# Patient Record
Sex: MALE | Race: WHITE | Employment: OTHER
[De-identification: names, ages, dates, MRNs, and addresses within clinical notes are randomized per-mention and may not be internally consistent; named-entity substitution may affect disease eponyms.]

---

## 2017-01-03 ENCOUNTER — OFFICE VISIT (OUTPATIENT)
Dept: FAMILY MEDICINE CLINIC | Facility: CLINIC | Age: 62
End: 2017-01-03

## 2017-01-03 VITALS
WEIGHT: 180 LBS | HEART RATE: 52 BPM | HEIGHT: 71 IN | TEMPERATURE: 97.9 F | DIASTOLIC BLOOD PRESSURE: 68 MMHG | BODY MASS INDEX: 25.2 KG/M2 | SYSTOLIC BLOOD PRESSURE: 117 MMHG

## 2017-01-03 DIAGNOSIS — E78.5 DYSLIPIDEMIA: ICD-10-CM

## 2017-01-03 DIAGNOSIS — M25.561 KNEE MENISCUS PAIN, RIGHT: Primary | ICD-10-CM

## 2017-01-03 DIAGNOSIS — Z23 NEED FOR INFLUENZA VACCINATION: ICD-10-CM

## 2017-01-03 DIAGNOSIS — M17.5 OTHER SECONDARY OSTEOARTHRITIS OF RIGHT KNEE: ICD-10-CM

## 2017-01-03 DIAGNOSIS — Z85.038 HISTORY OF COLON CANCER: ICD-10-CM

## 2017-01-03 PROCEDURE — 99214 OFFICE O/P EST MOD 30 MIN: CPT | Performed by: FAMILY MEDICINE

## 2017-01-03 PROCEDURE — 90688 IIV4 VACCINE SPLT 0.5 ML IM: CPT | Performed by: FAMILY MEDICINE

## 2017-01-03 PROCEDURE — 90471 IMMUNIZATION ADMIN: CPT | Performed by: FAMILY MEDICINE

## 2017-01-03 RX ORDER — TADALAFIL 5 MG
TABLET ORAL
Qty: 30 TABLET | Refills: 0 | Status: SHIPPED | OUTPATIENT
Start: 2017-01-03 | End: 2017-02-01 | Stop reason: SDUPTHER

## 2017-01-03 ASSESSMENT — ENCOUNTER SYMPTOMS
RESPIRATORY NEGATIVE: 1
EYES NEGATIVE: 1

## 2017-01-25 DIAGNOSIS — M25.561 RIGHT KNEE PAIN, UNSPECIFIED CHRONICITY: Primary | ICD-10-CM

## 2017-01-30 ENCOUNTER — OFFICE VISIT (OUTPATIENT)
Dept: ORTHOPEDIC SURGERY | Facility: CLINIC | Age: 62
End: 2017-01-30

## 2017-01-30 VITALS
BODY MASS INDEX: 25.31 KG/M2 | HEART RATE: 76 BPM | WEIGHT: 180.78 LBS | SYSTOLIC BLOOD PRESSURE: 132 MMHG | DIASTOLIC BLOOD PRESSURE: 86 MMHG | HEIGHT: 71 IN

## 2017-01-30 DIAGNOSIS — M17.11 PRIMARY OSTEOARTHRITIS OF RIGHT KNEE: Primary | ICD-10-CM

## 2017-01-30 PROCEDURE — 99203 OFFICE O/P NEW LOW 30 MIN: CPT | Performed by: ORTHOPAEDIC SURGERY

## 2017-02-01 RX ORDER — TADALAFIL 5 MG
TABLET ORAL
Qty: 30 TABLET | Refills: 0 | Status: SHIPPED | OUTPATIENT
Start: 2017-02-01 | End: 2017-03-01 | Stop reason: SDUPTHER

## 2017-02-08 ENCOUNTER — TELEPHONE (OUTPATIENT)
Dept: FAMILY MEDICINE CLINIC | Facility: CLINIC | Age: 62
End: 2017-02-08

## 2017-03-01 RX ORDER — TADALAFIL 5 MG
TABLET ORAL
Qty: 30 TABLET | Refills: 2 | Status: SHIPPED | OUTPATIENT
Start: 2017-03-01 | End: 2017-06-12 | Stop reason: SDUPTHER

## 2017-03-20 ENCOUNTER — TELEPHONE (OUTPATIENT)
Dept: FAMILY MEDICINE CLINIC | Age: 62
End: 2017-03-20

## 2017-03-20 DIAGNOSIS — R21 RASH: Primary | ICD-10-CM

## 2017-03-22 ENCOUNTER — TELEPHONE (OUTPATIENT)
Dept: FAMILY MEDICINE CLINIC | Age: 62
End: 2017-03-22

## 2017-04-04 RX ORDER — IBUPROFEN 800 MG/1
800 TABLET ORAL EVERY 6 HOURS PRN
Qty: 120 TABLET | Refills: 3 | Status: SHIPPED | OUTPATIENT
Start: 2017-04-04 | End: 2017-09-14 | Stop reason: SDUPTHER

## 2017-06-12 RX ORDER — TADALAFIL 5 MG
TABLET ORAL
Qty: 30 TABLET | Refills: 2 | Status: SHIPPED | OUTPATIENT
Start: 2017-06-12 | End: 2017-09-14 | Stop reason: SDUPTHER

## 2017-08-09 ENCOUNTER — OFFICE VISIT (OUTPATIENT)
Dept: FAMILY MEDICINE CLINIC | Age: 62
End: 2017-08-09
Payer: MEDICAID

## 2017-08-09 VITALS
BODY MASS INDEX: 25.06 KG/M2 | HEART RATE: 52 BPM | DIASTOLIC BLOOD PRESSURE: 65 MMHG | SYSTOLIC BLOOD PRESSURE: 109 MMHG | HEIGHT: 71 IN | TEMPERATURE: 98.2 F | WEIGHT: 179 LBS

## 2017-08-09 DIAGNOSIS — B07.9 VIRAL WARTS, UNSPECIFIED TYPE: ICD-10-CM

## 2017-08-09 DIAGNOSIS — D23.5 CYST, DERMOID, TRUNK: Primary | ICD-10-CM

## 2017-08-09 DIAGNOSIS — Z00.00 HEALTH CARE MAINTENANCE: ICD-10-CM

## 2017-08-09 PROCEDURE — 99213 OFFICE O/P EST LOW 20 MIN: CPT

## 2017-08-09 PROCEDURE — 99213 OFFICE O/P EST LOW 20 MIN: CPT | Performed by: FAMILY MEDICINE

## 2017-08-09 ASSESSMENT — ENCOUNTER SYMPTOMS
COUGH: 0
APNEA: 0
CHEST TIGHTNESS: 0
CHOKING: 0

## 2017-09-14 RX ORDER — TADALAFIL 5 MG
TABLET ORAL
Qty: 30 TABLET | Refills: 2 | Status: SHIPPED | OUTPATIENT
Start: 2017-09-14 | End: 2017-11-02 | Stop reason: SDUPTHER

## 2017-09-17 RX ORDER — IBUPROFEN 800 MG/1
TABLET ORAL
Qty: 120 TABLET | Refills: 3 | Status: SHIPPED | OUTPATIENT
Start: 2017-09-17 | End: 2018-01-28 | Stop reason: SDUPTHER

## 2017-11-02 ENCOUNTER — OFFICE VISIT (OUTPATIENT)
Dept: UROLOGY | Age: 62
End: 2017-11-02
Payer: MEDICAID

## 2017-11-02 VITALS
DIASTOLIC BLOOD PRESSURE: 75 MMHG | BODY MASS INDEX: 25.34 KG/M2 | WEIGHT: 181 LBS | SYSTOLIC BLOOD PRESSURE: 125 MMHG | HEIGHT: 71 IN | HEART RATE: 52 BPM

## 2017-11-02 DIAGNOSIS — N52.8 OTHER MALE ERECTILE DYSFUNCTION: ICD-10-CM

## 2017-11-02 DIAGNOSIS — R35.0 URINARY FREQUENCY: ICD-10-CM

## 2017-11-02 DIAGNOSIS — N40.1 BPH WITH OBSTRUCTION/LOWER URINARY TRACT SYMPTOMS: Primary | ICD-10-CM

## 2017-11-02 DIAGNOSIS — N13.8 BPH WITH OBSTRUCTION/LOWER URINARY TRACT SYMPTOMS: Primary | ICD-10-CM

## 2017-11-02 PROBLEM — H47.299 PALLOR OF OPTIC DISC: Status: ACTIVE | Noted: 2017-07-05

## 2017-11-02 PROBLEM — H52.4 PRESBYOPIA: Status: ACTIVE | Noted: 2017-06-05

## 2017-11-02 PROBLEM — H52.00 HYPEROPIA: Status: ACTIVE | Noted: 2017-06-05

## 2017-11-02 LAB
BILIRUBIN, POC: NEGATIVE
BLOOD URINE, POC: NEGATIVE
CLARITY, POC: CLEAR
COLOR, POC: YELLOW
GLUCOSE URINE, POC: NEGATIVE
KETONES, POC: NEGATIVE
LEUKOCYTE EST, POC: NEGATIVE
NITRITE, POC: NEGATIVE
PH, POC: 5.5
PROTEIN, POC: NEGATIVE
SPECIFIC GRAVITY, POC: 1.02
UROBILINOGEN, POC: 0.2

## 2017-11-02 PROCEDURE — 3017F COLORECTAL CA SCREEN DOC REV: CPT | Performed by: SPECIALIST

## 2017-11-02 PROCEDURE — 1036F TOBACCO NON-USER: CPT | Performed by: SPECIALIST

## 2017-11-02 PROCEDURE — G8484 FLU IMMUNIZE NO ADMIN: HCPCS | Performed by: SPECIALIST

## 2017-11-02 PROCEDURE — 99213 OFFICE O/P EST LOW 20 MIN: CPT | Performed by: SPECIALIST

## 2017-11-02 PROCEDURE — G8419 CALC BMI OUT NRM PARAM NOF/U: HCPCS | Performed by: SPECIALIST

## 2017-11-02 PROCEDURE — 81003 URINALYSIS AUTO W/O SCOPE: CPT | Performed by: SPECIALIST

## 2017-11-02 PROCEDURE — G8427 DOCREV CUR MEDS BY ELIG CLIN: HCPCS | Performed by: SPECIALIST

## 2017-11-02 RX ORDER — HYDROCODONE BITARTRATE AND ACETAMINOPHEN 5; 325 MG/1; MG/1
1-2 TABLET ORAL
COMMUNITY
Start: 2014-10-23 | End: 2018-06-01 | Stop reason: HOSPADM

## 2017-11-02 RX ORDER — TADALAFIL 5 MG/1
5 TABLET ORAL DAILY
Qty: 30 TABLET | Refills: 11 | Status: SHIPPED | OUTPATIENT
Start: 2017-11-02 | End: 2018-11-08

## 2017-11-02 RX ORDER — TAMSULOSIN HYDROCHLORIDE 0.4 MG/1
0.4 CAPSULE ORAL DAILY
Qty: 90 CAPSULE | Refills: 3 | Status: SHIPPED | OUTPATIENT
Start: 2017-11-02 | End: 2018-11-08 | Stop reason: SDUPTHER

## 2017-11-02 NOTE — PROGRESS NOTES
Yuliana Diallo MD, [facility]  [address] [facility] Progress Note    Patient:  Lulu Shah  YOB: 1955  Date: 11/2/2017    HISTORY OF PRESENT ILLNESS:   The patient is a 57 y.o. male who presents today for follow-up for the following problem(s): BPH, ED  Overall the problem(s) : show no change. Associated Symptoms: No dysuria, gross hematuria. Pain Severity: Pain Score:   0 - No pain    Summary of old records:   History of rectal cancer, had resection, colostomy followed by reversal.  BPH on Flomax/tamsulosin 0.4 mg po qd and Cialis 5 mg qd  ED: Cialis 5 mg qd    Additional History: Patient's lower urinary tract symptoms are stable on Flomax/tamsulosin 0.4 mg po qd and Cialis 5 mg po qd for BPH. Not interested in medical Rx for ED since patient not sexually active.       Last several PSA's:  Lab Results   Component Value Date    PSA 0.56 01/03/2017    PSA 0.40 08/19/2015       Last total testosterone:  No results found for: TESTOSTERONE    Urinalysis today:  Results for POC orders placed in visit on 11/02/17   POCT Urinalysis No Micro (Auto)   Result Value Ref Range    Color, UA yellow     Clarity, UA clear     Glucose, UA POC negative     Bilirubin, UA negative     Ketones, UA negative     Spec Grav, UA 1.025     Blood, UA POC negative     pH, UA 5.5     Protein, UA POC negative     Urobilinogen, UA 0.2     Leukocytes, UA negative     Nitrite, UA negative        Last BUN and creatinine:  Lab Results   Component Value Date    BUN 17 09/14/2016     Lab Results   Component Value Date    CREATININE 0.70 09/14/2016       Imaging Reviewed during this Office Visit:   (results were independently reviewed by physician and radiology report verified)    PAST MEDICAL, FAMILY AND SOCIAL HISTORY UPDATE:  Past Medical History:   Diagnosis Date    Back pain, chronic     Cancer ([address]     Rectal    Cocaine abuse in remission     ED (erectile dysfunction) 4/2/2015    Hemorrhoids     Hernia     History of colon cancer      Past Surgical History:   Procedure Laterality Date    COLECTOMY      Colostomy    COLONOSCOPY      COLONOSCOPY  07/18/2016    HERNIA REPAIR Right 2009    KNEE SURGERY  1970's    Right    TONSILLECTOMY       Family History   Problem Relation Age of Onset    Diabetes Mother     Heart Attack Father     Heart Disease Father     Heart Disease Brother      Outpatient Prescriptions Marked as Taking for the 11/2/17 encounter (Office Visit) with Vaishnavi Casas MD   Medication Sig Dispense Refill    HYDROcodone-acetaminophen (NORCO) 5-325 MG per tablet Take 1-2 tablets by mouth .  tamsulosin (FLOMAX) 0.4 MG capsule Take 1 capsule by mouth daily 90 capsule 3    tadalafil (CIALIS) 5 MG tablet Take 1 tablet by mouth daily 30 tablet 11    ibuprofen (ADVIL;MOTRIN) 800 MG tablet take 1 tablet by mouth every 6 hours if needed for pain 120 tablet 3    metoprolol tartrate (LOPRESSOR) 25 MG tablet take 1 tablet by mouth twice a day 60 tablet 3    Elastic Bandages & Supports (ABDOMINAL BINDER/ELASTIC XL) MISC 1 Device by Does not apply route as needed (ventral hernia, while ambulating/active) 1 each 1    acetaminophen (TYLENOL) 325 MG tablet Take 650 mg by mouth every 6 hours as needed for Pain. Codeine and Penicillins  History   Smoking Status    Former Smoker   Smokeless Tobacco    Never Used       History   Alcohol Use    5.0 oz/week    10 Standard drinks or equivalent per week     Comment: 3 -4 times a week       REVIEW OF SYSTEMS:  Constitutional: negative  Eyes: negative  Respiratory: negative  Cardiovascular: negative  Gastrointestinal: negative  Musculoskeletal: negative  Genitourinary: negative  Skin: negative   Neurological: negative  Hematological/Lymphatic: negative  Psychological: negative    Physical Exam:      Vitals:    11/02/17 0930   BP: 125/75   Pulse: 52         Assessment and Plan      1. BPH with obstruction/lower urinary tract symptoms    2.  Other male erectile dysfunction    3. Urinary frequency           Plan:      Return in about 1 year (around 11/2/2018). Patient's lower urinary tract symptoms are stable on Flomax/tamsulosin 0.4 mg po qd and Cialis 5 mg po qd for BPH. Not interested in medical Rx for ED since patient not sexually active. I have discussed the care of this patient including pertinent history and exam findings, with the resident. I have seen and examined the patient and the key elements of all parts of the encounter have been performed by me. I agree with the assessment, plan and orders as documented by the resident. Finas Cowboy Karie Saint, MD, FACS

## 2017-11-02 NOTE — LETTER
[facility]    11/2/17    Patient: Khloe Henry  YOB: 1955    Dear Enma Robledo MD,    I had the pleasure of seeing one of your patients, Amanda Nuno today in the office today. Below are the relevant portions of my assessment and plan of care. IMPRESSION:  1. BPH with obstruction/lower urinary tract symptoms    2. Other male erectile dysfunction    3. Urinary frequency      PLAN:  Return in about 1 year (around 11/2/2018). Patient's lower urinary tract symptoms are stable on Flomax/tamsulosin 0.4 mg po qd and Cialis 5 mg po qd for BPH. Not interested in medical Rx for ED since patient not sexually active. Thank you for allowing me to participate in the care of this patient. I will keep you updated on this patient's follow up and I look forward to serving you and your patients again in the future. Tommie Ray MD, FACS

## 2017-11-24 DIAGNOSIS — M17.11 PRIMARY OSTEOARTHRITIS OF RIGHT KNEE: Primary | ICD-10-CM

## 2017-12-12 ENCOUNTER — OFFICE VISIT (OUTPATIENT)
Dept: FAMILY MEDICINE CLINIC | Age: 62
End: 2017-12-12
Payer: MEDICAID

## 2017-12-12 VITALS
SYSTOLIC BLOOD PRESSURE: 140 MMHG | HEART RATE: 64 BPM | DIASTOLIC BLOOD PRESSURE: 80 MMHG | HEIGHT: 71 IN | WEIGHT: 184 LBS | BODY MASS INDEX: 25.76 KG/M2 | TEMPERATURE: 98 F

## 2017-12-12 DIAGNOSIS — Z23 NEED FOR INFLUENZA VACCINATION: ICD-10-CM

## 2017-12-12 DIAGNOSIS — K43.2 INCISIONAL HERNIA, WITHOUT OBSTRUCTION OR GANGRENE: Primary | ICD-10-CM

## 2017-12-12 PROCEDURE — 90471 IMMUNIZATION ADMIN: CPT | Performed by: FAMILY MEDICINE

## 2017-12-12 PROCEDURE — 99213 OFFICE O/P EST LOW 20 MIN: CPT | Performed by: FAMILY MEDICINE

## 2017-12-12 PROCEDURE — 3017F COLORECTAL CA SCREEN DOC REV: CPT | Performed by: FAMILY MEDICINE

## 2017-12-12 PROCEDURE — G8419 CALC BMI OUT NRM PARAM NOF/U: HCPCS | Performed by: FAMILY MEDICINE

## 2017-12-12 PROCEDURE — G8427 DOCREV CUR MEDS BY ELIG CLIN: HCPCS | Performed by: FAMILY MEDICINE

## 2017-12-12 PROCEDURE — 90688 IIV4 VACCINE SPLT 0.5 ML IM: CPT | Performed by: FAMILY MEDICINE

## 2017-12-12 PROCEDURE — G8484 FLU IMMUNIZE NO ADMIN: HCPCS | Performed by: FAMILY MEDICINE

## 2017-12-12 PROCEDURE — 1036F TOBACCO NON-USER: CPT | Performed by: FAMILY MEDICINE

## 2017-12-12 ASSESSMENT — PATIENT HEALTH QUESTIONNAIRE - PHQ9
2. FEELING DOWN, DEPRESSED OR HOPELESS: 0
SUM OF ALL RESPONSES TO PHQ9 QUESTIONS 1 & 2: 0
SUM OF ALL RESPONSES TO PHQ QUESTIONS 1-9: 0
1. LITTLE INTEREST OR PLEASURE IN DOING THINGS: 0

## 2017-12-12 ASSESSMENT — ENCOUNTER SYMPTOMS
ABDOMINAL PAIN: 0
RECTAL PAIN: 0
ABDOMINAL DISTENTION: 1
DIARRHEA: 0
CHEST TIGHTNESS: 0
CHOKING: 0
ANAL BLEEDING: 0
APNEA: 0
COUGH: 0
BLOOD IN STOOL: 0
NAUSEA: 0
CONSTIPATION: 0
VOMITING: 0

## 2017-12-12 NOTE — PROGRESS NOTES
Visit Information    Have you changed or started any medications since your last visit including any over-the-counter medicines, vitamins, or herbal medicines? no   Have you stopped taking any of your medications? Is so, why? -  no  Are you having any side effects from any of your medications? - no    Have you seen any other physician or provider since your last visit?  no   Have you had any other diagnostic tests since your last visit?  no   Have you been seen in the emergency room and/or had an admission in a hospital since we last saw you?  no   Have you had your routine dental cleaning in the past 6 months?  no     Do you have an active MyChart account? If no, what is the barrier?   Yes    Patient Care Team:  Conchis Gomez MD as PCP - General (Family Medicine)  Damon Arango MD as Consulting Physician (Gastroenterology)  Lashon Agudelo MD as Consulting Physician (Hematology and Oncology)  Diana Giles DO as Consulting Physician (General Surgery)  Gabe Freeman CNP (Otolaryngology)    Medical History Review  Past Medical, Family, and Social History reviewed and does not contribute to the patient presenting condition    Health Maintenance   Topic Date Due    Hepatitis C screen  1955    HIV screen  08/11/1968    Diabetes screen  08/11/1993    Flu vaccine (1) 09/01/2017    DTaP/Tdap/Td vaccine (1 - Tdap) 08/18/2021 (Originally 8/11/1972)    Colon cancer screen colonoscopy  07/18/2018    Lipid screen  01/03/2022    Zostavax vaccine  Addressed

## 2017-12-12 NOTE — PROGRESS NOTES
Subjective:      Patient ID: Khloe Henry is a 57 y.o. male. HPI     Patient seen today for abdominal mass that appeared 2 years ago after reversal of colostomy secondary to colon cancer surgical excision stage II. Mass has been gradually increasing in size and bulges out every time there is any increase in intra-abdominal pressure. Patient failed to get his health maintenance labs done. He wants flu vaccine. Review of Systems   Constitutional: Negative for diaphoresis, fatigue, fever and unexpected weight change. Respiratory: Negative for apnea, cough, choking and chest tightness. Cardiovascular: Negative for chest pain, palpitations and leg swelling. Gastrointestinal: Positive for abdominal distention. Negative for abdominal pain, anal bleeding, blood in stool, constipation, diarrhea, nausea, rectal pain and vomiting. Abdominal mass. Genitourinary: Negative for difficulty urinating, dysuria, enuresis and flank pain. Musculoskeletal: Negative for myalgias, neck pain and neck stiffness. Objective:   Physical Exam   Constitutional: He is oriented to person, place, and time. He appears well-developed and well-nourished. HENT:   Head: Normocephalic and atraumatic. Cardiovascular: Normal rate, regular rhythm and normal heart sounds. Exam reveals no gallop and no friction rub. No murmur heard. Pulmonary/Chest: Effort normal and breath sounds normal. No respiratory distress. He has no wheezes. He has no rales. Abdominal: Soft. Bowel sounds are normal. He exhibits mass. He exhibits no distension. There is no tenderness. There is no rebound and no guarding. Musculoskeletal: Normal range of motion. He exhibits no edema or tenderness. Neurological: He is alert and oriented to person, place, and time. Nursing note and vitals reviewed. Assessment:      1. Incisional hernia, without obstruction or gangrene     2.  Need for influenza vaccination  Fercho Castillo, 6 MO AND OLDER, IM, MDV, 0.5ML (FLULAVAL QUADV)           Plan:      1. Incisional hernia, without obstruction or gangrene  Follow-up with [facility]. Establish surgeon.      2. Need for influenza vaccination    - INFLUENZA, QUADV, 6 MO AND OLDER, IM, MDV, 0.5ML ([place])

## 2017-12-22 ENCOUNTER — TELEPHONE (OUTPATIENT)
Dept: UROLOGY | Age: 62
End: 2017-12-22

## 2018-01-10 ENCOUNTER — HOSPITAL ENCOUNTER (OUTPATIENT)
Age: 63
Setting detail: SPECIMEN
Discharge: HOME | End: 2018-01-10
Payer: MEDICAID

## 2018-01-10 DIAGNOSIS — Z00.00 HEALTH CARE MAINTENANCE: ICD-10-CM

## 2018-01-10 LAB
ESTIMATED AVERAGE GLUCOSE: 97 MG/DL
HBA1C MFR BLD: 5 % (ref 4–6)
HEPATITIS C ANTIBODY: NONREACTIVE
HIV AG/AB: NONREACTIVE

## 2018-01-29 NOTE — TELEPHONE ENCOUNTER
Please review and address medication refill , if i can be an assistance be route to acceptable pool. Thank you.   Next Visit Date:  Future Appointments  Date Time Provider Yves Corralesi   11/8/2018 9:00 AM Mahesh Akers MD [facility] Urology [address] Maintenance   Topic Date Due    DTaP/Tdap/Td vaccine (1 - Tdap) 08/18/2021 (Originally 8/11/1972)    Colon cancer screen colonoscopy  07/18/2018    Diabetes screen  01/10/2021    Lipid screen  01/03/2022    Zostavax vaccine  Addressed    Flu vaccine  Completed    Hepatitis C screen  Completed    HIV screen  Completed       Hemoglobin A1C (%)   Date Value   01/10/2018 5.0             ( goal A1C is < 7)   No results found for: LABMICR  LDL Cholesterol (mg/dL)   Date Value   01/03/2017 123       (goal LDL is <100)   AST (U/L)   Date Value   09/14/2016 20     ALT (U/L)   Date Value   09/14/2016 12     BUN (mg/dL)   Date Value   09/14/2016 17     BP Readings from Last 3 Encounters:   12/12/17 (!) 140/80   11/02/17 125/75   08/09/17 109/65          (goal 120/80)    All Future Testing planned in [facility]  Lab Frequency Next Occurrence   CBC Auto Differential     Comprehensive Metabolic Panel     CEA                 Patient Active Problem List:     Hematochezia     Dyslipidemia     Rectal cancer [facility])     ED (erectile dysfunction)     Incomplete bladder emptying     Hypertrophy of prostate with urinary obstruction and other lower urinary tract symptoms (LUTS)     History of colon cancer     A-fib (HCC)     Anemia     DVT prophylaxis     Hyperopia     Nausea     Pallor of optic disc     Post-op pain     Presbyopia     Incisional hernia, without obstruction or gangrene     Need for influenza vaccination

## 2018-01-30 RX ORDER — IBUPROFEN 800 MG/1
TABLET ORAL
Qty: 120 TABLET | Refills: 3 | Status: SHIPPED | OUTPATIENT
Start: 2018-01-30 | End: 2018-06-01 | Stop reason: ALTCHOICE

## 2018-02-21 ENCOUNTER — TELEPHONE (OUTPATIENT)
Dept: FAMILY MEDICINE CLINIC | Age: 63
End: 2018-02-21

## 2018-03-06 NOTE — TELEPHONE ENCOUNTER
Medication is on med list please review and address    Please address the medication refill and close the encounter. If I can be of assistance, please route to the applicable pool. Thank you.   Next Visit Date:  Future Appointments  Date Time Provider Yves Corralesi   6/1/2018 1:30 PM Daphney Pelayo MD [facility]   11/8/2018 9:00 AM Taye Doherty MD [facility] Urology [address] Maintenance   Topic Date Due    Shingles Vaccine (1 of 2 - 2 Dose Series) 08/11/2003    DTaP/Tdap/Td vaccine (1 - Tdap) 08/18/2021 (Originally 8/11/1972)    Colon cancer screen colonoscopy  07/18/2018    Diabetes screen  01/10/2021    Lipid screen  01/03/2022    Flu vaccine  Completed    Hepatitis C screen  Completed    HIV screen  Completed       Hemoglobin A1C (%)   Date Value   01/10/2018 5.0             ( goal A1C is < 7)   No results found for: LABMICR  LDL Cholesterol (mg/dL)   Date Value   01/03/2017 123       (goal LDL is <100)   AST (U/L)   Date Value   09/14/2016 20     ALT (U/L)   Date Value   09/14/2016 12     BUN (mg/dL)   Date Value   09/14/2016 17     BP Readings from Last 3 Encounters:   12/12/17 (!) 140/80   11/02/17 125/75   08/09/17 109/65          (goal 120/80)    All Future Testing planned in [facility]  Lab Frequency Next Occurrence   CBC Auto Differential     Comprehensive Metabolic Panel     CEA                 Patient Active Problem List:     Hematochezia     Dyslipidemia     Rectal cancer [facility])     ED (erectile dysfunction)     Incomplete bladder emptying     Hypertrophy of prostate with urinary obstruction and other lower urinary tract symptoms (LUTS)     History of colon cancer     A-fib (HCC)     Anemia     DVT prophylaxis     Hyperopia     Nausea     Pallor of optic disc     Post-op pain     Presbyopia     Incisional hernia, without obstruction or gangrene     Need for influenza vaccination

## 2018-04-11 PROBLEM — Z23 NEED FOR INFLUENZA VACCINATION: Status: RESOLVED | Noted: 2017-12-12 | Resolved: 2018-04-11

## 2018-06-01 ENCOUNTER — OFFICE VISIT (OUTPATIENT)
Dept: FAMILY MEDICINE CLINIC | Age: 63
End: 2018-06-01
Payer: MEDICAID

## 2018-06-01 VITALS
SYSTOLIC BLOOD PRESSURE: 100 MMHG | HEIGHT: 71 IN | DIASTOLIC BLOOD PRESSURE: 80 MMHG | TEMPERATURE: 98.4 F | HEART RATE: 60 BPM | BODY MASS INDEX: 24.78 KG/M2 | WEIGHT: 177 LBS

## 2018-06-01 DIAGNOSIS — I48.0 PAROXYSMAL ATRIAL FIBRILLATION (HCC): ICD-10-CM

## 2018-06-01 DIAGNOSIS — K40.90 LEFT INGUINAL HERNIA: ICD-10-CM

## 2018-06-01 DIAGNOSIS — K43.2 INCISIONAL HERNIA, WITHOUT OBSTRUCTION OR GANGRENE: ICD-10-CM

## 2018-06-01 DIAGNOSIS — K21.9 GASTROESOPHAGEAL REFLUX DISEASE, ESOPHAGITIS PRESENCE NOT SPECIFIED: ICD-10-CM

## 2018-06-01 DIAGNOSIS — I10 ESSENTIAL HYPERTENSION: Primary | ICD-10-CM

## 2018-06-01 DIAGNOSIS — R42 ORTHOSTATIC DIZZINESS: ICD-10-CM

## 2018-06-01 DIAGNOSIS — Z85.038 HISTORY OF COLON CANCER: ICD-10-CM

## 2018-06-01 DIAGNOSIS — R01.1 SYSTOLIC MURMUR: ICD-10-CM

## 2018-06-01 PROCEDURE — 99213 OFFICE O/P EST LOW 20 MIN: CPT | Performed by: FAMILY MEDICINE

## 2018-06-01 PROCEDURE — 3017F COLORECTAL CA SCREEN DOC REV: CPT | Performed by: FAMILY MEDICINE

## 2018-06-01 PROCEDURE — G8420 CALC BMI NORM PARAMETERS: HCPCS | Performed by: FAMILY MEDICINE

## 2018-06-01 PROCEDURE — 1036F TOBACCO NON-USER: CPT | Performed by: FAMILY MEDICINE

## 2018-06-01 PROCEDURE — G8427 DOCREV CUR MEDS BY ELIG CLIN: HCPCS | Performed by: FAMILY MEDICINE

## 2018-06-01 RX ORDER — OMEPRAZOLE 20 MG/1
20 CAPSULE, DELAYED RELEASE ORAL NIGHTLY
Qty: 30 CAPSULE | Refills: 3 | Status: SHIPPED | OUTPATIENT
Start: 2018-06-01 | End: 2018-09-15 | Stop reason: SDUPTHER

## 2018-06-01 RX ORDER — ASPIRIN 81 MG/1
81 TABLET ORAL DAILY
Qty: 30 TABLET | Refills: 3 | Status: SHIPPED | OUTPATIENT
Start: 2018-06-01 | End: 2018-09-20 | Stop reason: SDUPTHER

## 2018-06-01 ASSESSMENT — ENCOUNTER SYMPTOMS
CHOKING: 0
CHEST TIGHTNESS: 0
COUGH: 0
APNEA: 0

## 2018-06-12 ENCOUNTER — HOSPITAL ENCOUNTER (OUTPATIENT)
Dept: NON INVASIVE DIAGNOSTICS | Age: 63
Discharge: HOME | End: 2018-06-12
Payer: MEDICAID

## 2018-06-12 DIAGNOSIS — R01.1 SYSTOLIC MURMUR: ICD-10-CM

## 2018-06-12 DIAGNOSIS — I48.0 PAROXYSMAL ATRIAL FIBRILLATION (HCC): ICD-10-CM

## 2018-06-12 LAB
LV EF: 45 %
LVEF MODALITY: NORMAL

## 2018-06-12 PROCEDURE — 93306 TTE W/DOPPLER COMPLETE: CPT

## 2018-06-14 ENCOUNTER — OFFICE VISIT (OUTPATIENT)
Dept: FAMILY MEDICINE CLINIC | Age: 63
End: 2018-06-14
Payer: MEDICAID

## 2018-06-14 VITALS
HEART RATE: 58 BPM | SYSTOLIC BLOOD PRESSURE: 134 MMHG | WEIGHT: 176.6 LBS | HEIGHT: 71 IN | DIASTOLIC BLOOD PRESSURE: 74 MMHG | TEMPERATURE: 98.2 F | BODY MASS INDEX: 24.72 KG/M2

## 2018-06-14 DIAGNOSIS — I42.2 HYPERTROPHIC NONOBSTRUCTIVE CARDIOMYOPATHY (HCC): ICD-10-CM

## 2018-06-14 DIAGNOSIS — I48.0 PAROXYSMAL ATRIAL FIBRILLATION (HCC): Primary | ICD-10-CM

## 2018-06-14 DIAGNOSIS — F51.01 PRIMARY INSOMNIA: ICD-10-CM

## 2018-06-14 PROCEDURE — G8427 DOCREV CUR MEDS BY ELIG CLIN: HCPCS | Performed by: FAMILY MEDICINE

## 2018-06-14 PROCEDURE — 3017F COLORECTAL CA SCREEN DOC REV: CPT | Performed by: FAMILY MEDICINE

## 2018-06-14 PROCEDURE — 99213 OFFICE O/P EST LOW 20 MIN: CPT | Performed by: FAMILY MEDICINE

## 2018-06-14 PROCEDURE — 1036F TOBACCO NON-USER: CPT | Performed by: FAMILY MEDICINE

## 2018-06-14 PROCEDURE — G8420 CALC BMI NORM PARAMETERS: HCPCS | Performed by: FAMILY MEDICINE

## 2018-06-14 RX ORDER — PHENOL 1.4 %
10 AEROSOL, SPRAY (ML) MUCOUS MEMBRANE NIGHTLY
Qty: 30 TABLET | Refills: 1 | Status: SHIPPED | OUTPATIENT
Start: 2018-06-14 | End: 2018-09-26

## 2018-06-14 RX ORDER — ATORVASTATIN CALCIUM 40 MG/1
40 TABLET, FILM COATED ORAL DAILY
Qty: 30 TABLET | Refills: 3 | Status: SHIPPED | OUTPATIENT
Start: 2018-06-14 | End: 2018-10-22 | Stop reason: SDUPTHER

## 2018-06-15 ASSESSMENT — ENCOUNTER SYMPTOMS
CHEST TIGHTNESS: 0
APNEA: 0
COUGH: 0
CHOKING: 0

## 2018-06-27 ENCOUNTER — OFFICE VISIT (OUTPATIENT)
Dept: SURGERY | Age: 63
End: 2018-06-27
Payer: MEDICAID

## 2018-06-27 VITALS
SYSTOLIC BLOOD PRESSURE: 138 MMHG | BODY MASS INDEX: 25.23 KG/M2 | HEART RATE: 45 BPM | DIASTOLIC BLOOD PRESSURE: 68 MMHG | WEIGHT: 180.2 LBS | TEMPERATURE: 97.8 F | HEIGHT: 71 IN

## 2018-06-27 DIAGNOSIS — K43.9 VENTRAL HERNIA WITHOUT OBSTRUCTION OR GANGRENE: Primary | ICD-10-CM

## 2018-06-27 PROCEDURE — 99999 PR OFFICE/OUTPT VISIT,PROCEDURE ONLY: CPT | Performed by: STUDENT IN AN ORGANIZED HEALTH CARE EDUCATION/TRAINING PROGRAM

## 2018-06-28 PROBLEM — I42.2 HYPERTROPHIC NONOBSTRUCTIVE CARDIOMYOPATHY (HCC): Status: ACTIVE | Noted: 2018-06-28

## 2018-07-25 NOTE — TELEPHONE ENCOUNTER
Medication is on med list please review and address    Please address the medication refill and close the encounter. If I can be of assistance, please route to the applicable pool. Thank you.   Next Visit Date:  Future Appointments  Date Time Provider Yves Corralesi   11/8/2018 9:00 AM Alvin Bhatt MD [facility] Urology [address] Maintenance   Topic Date Due    Pneumococcal med risk (1 of 1 - PPSV23) 08/11/1972    Shingles Vaccine (1 of 2 - 2 Dose Series) 08/11/2003    Colon cancer screen colonoscopy  07/18/2018    DTaP/Tdap/Td vaccine (1 - Tdap) 08/18/2021 (Originally 8/11/1972)    Flu vaccine (1) 09/01/2018    Diabetes screen  01/10/2021    Lipid screen  01/03/2022    Hepatitis C screen  Completed    HIV screen  Completed       Hemoglobin A1C (%)   Date Value   01/10/2018 5.0             ( goal A1C is < 7)   No results found for: LABMICR  LDL Cholesterol (mg/dL)   Date Value   01/03/2017 123   01/20/2014 141 (H)       (goal LDL is <100)   AST (U/L)   Date Value   09/14/2016 20     ALT (U/L)   Date Value   09/14/2016 12     BUN (mg/dL)   Date Value   09/14/2016 17     BP Readings from Last 3 Encounters:   06/27/18 138/68   06/14/18 134/74   06/01/18 100/80          (goal 120/80)    All Future Testing planned in [facility]  Lab Frequency Next Occurrence               Patient Active Problem List:     Hematochezia     Dyslipidemia     Rectal cancer [facility])     ED (erectile dysfunction)     Incomplete bladder emptying     Hypertrophy of prostate with urinary obstruction and other lower urinary tract symptoms (LUTS)     History of colon cancer     A-fib (HCC)     Anemia     DVT prophylaxis     Hyperopia     Nausea     Pallor of optic disc     Post-op pain     Presbyopia     Incisional hernia, without obstruction or gangrene     Hypertrophic nonobstructive cardiomyopathy ([address]

## 2018-07-27 ENCOUNTER — TELEPHONE (OUTPATIENT)
Dept: ADMINISTRATIVE | Age: 63
End: 2018-07-27

## 2018-07-27 NOTE — TELEPHONE ENCOUNTER
Next Visit Date:  Future Appointments  Date Time Provider Yves Corralesi   11/8/2018 9:00 AM Phillip Al MD [facility] Urology [address] Maintenance   Topic Date Due    Pneumococcal med risk (1 of 1 - PPSV23) 08/11/1972    Shingles Vaccine (1 of 2 - 2 Dose Series) 08/11/2003    Colon cancer screen colonoscopy  07/18/2018    DTaP/Tdap/Td vaccine (1 - Tdap) 08/18/2021 (Originally 8/11/1972)    Flu vaccine (1) 09/01/2018    Diabetes screen  01/10/2021    Lipid screen  01/03/2022    Hepatitis C screen  Completed    HIV screen  Completed       Hemoglobin A1C (%)   Date Value   01/10/2018 5.0             ( goal A1C is < 7)   No results found for: LABMICR  LDL Cholesterol (mg/dL)   Date Value   01/03/2017 123   01/20/2014 141 (H)       (goal LDL is <100)   AST (U/L)   Date Value   09/14/2016 20     ALT (U/L)   Date Value   09/14/2016 12     BUN (mg/dL)   Date Value   09/14/2016 17     BP Readings from Last 3 Encounters:   06/27/18 138/68   06/14/18 134/74   06/01/18 100/80          (goal 120/80)    All Future Testing planned in [facility]  Lab Frequency Next Occurrence               Patient Active Problem List:     Hematochezia     Dyslipidemia     Rectal cancer [facility])     ED (erectile dysfunction)     Incomplete bladder emptying     Hypertrophy of prostate with urinary obstruction and other lower urinary tract symptoms (LUTS)     History of colon cancer     A-fib (HCC)     Anemia     DVT prophylaxis     Hyperopia     Nausea     Pallor of optic disc     Post-op pain     Presbyopia     Incisional hernia, without obstruction or gangrene     Hypertrophic nonobstructive cardiomyopathy ([address]

## 2018-07-28 RX ORDER — IBUPROFEN 800 MG/1
800 TABLET ORAL EVERY 6 HOURS PRN
Qty: 120 TABLET | Refills: 3 | Status: ON HOLD | OUTPATIENT
Start: 2018-07-28 | End: 2018-12-07 | Stop reason: HOSPADM

## 2018-09-15 DIAGNOSIS — K21.9 GASTROESOPHAGEAL REFLUX DISEASE, ESOPHAGITIS PRESENCE NOT SPECIFIED: ICD-10-CM

## 2018-09-17 RX ORDER — OMEPRAZOLE 20 MG/1
CAPSULE, DELAYED RELEASE ORAL
Qty: 30 CAPSULE | Refills: 3 | Status: ON HOLD | OUTPATIENT
Start: 2018-09-17 | End: 2018-12-07 | Stop reason: HOSPADM

## 2018-09-20 DIAGNOSIS — I10 ESSENTIAL HYPERTENSION: ICD-10-CM

## 2018-09-20 RX ORDER — ASPIRIN 81 MG/1
TABLET, DELAYED RELEASE ORAL
Qty: 30 TABLET | Refills: 3 | Status: SHIPPED | OUTPATIENT
Start: 2018-09-20 | End: 2018-12-27 | Stop reason: ALTCHOICE

## 2018-09-20 NOTE — TELEPHONE ENCOUNTER
Please address the medication refill and close the encounter. If I can be of assistance, please route to the applicable pool. Thank you.   Last visit  Last Med refill:    Next Visit Date:  Future Appointments  Date Time Provider Yves Corralesi   9/26/2018 2:30 PM Cassandra Katz MD [facility]   11/8/2018 9:00 AM Denice Reed MD [facility] Urology [address] Maintenance   Topic Date Due    Shingles Vaccine (1 of 2 - 2 Dose Series) 08/11/2003    Colon cancer screen colonoscopy  07/18/2018    Pneumococcal low/med risk (1 of 2 - PCV13) 08/11/2018    Flu vaccine (1) 09/01/2018    DTaP/Tdap/Td vaccine (1 - Tdap) 08/18/2021 (Originally 8/11/1972)    Diabetes screen  01/10/2021    Lipid screen  01/03/2022    AAA screen  Completed    Hepatitis C screen  Completed    HIV screen  Completed       Hemoglobin A1C (%)   Date Value   01/10/2018 5.0             ( goal A1C is < 7)   No results found for: LABMICR  LDL Cholesterol (mg/dL)   Date Value   01/03/2017 123   01/20/2014 141 (H)       (goal LDL is <100)   AST (U/L)   Date Value   09/14/2016 20     ALT (U/L)   Date Value   09/14/2016 12     BUN (mg/dL)   Date Value   09/14/2016 17     BP Readings from Last 3 Encounters:   06/27/18 138/68   06/14/18 134/74   06/01/18 100/80          (goal 120/80)    All Future Testing planned in [facility]  Lab Frequency Next Occurrence               Patient Active Problem List:     Hematochezia     Dyslipidemia     Rectal cancer [facility])     ED (erectile dysfunction)     Incomplete bladder emptying     Hypertrophy of prostate with urinary obstruction and other lower urinary tract symptoms (LUTS)     History of colon cancer     A-fib (HCC)     Anemia     DVT prophylaxis     Hyperopia     Nausea     Pallor of optic disc     Post-op pain     Presbyopia     Incisional hernia, without obstruction or gangrene     Hypertrophic nonobstructive cardiomyopathy ([facility] [address]

## 2018-09-26 ENCOUNTER — OFFICE VISIT (OUTPATIENT)
Dept: FAMILY MEDICINE CLINIC | Age: 63
End: 2018-09-26
Payer: MEDICARE

## 2018-09-26 VITALS
DIASTOLIC BLOOD PRESSURE: 70 MMHG | WEIGHT: 180 LBS | RESPIRATION RATE: 16 BRPM | TEMPERATURE: 97.1 F | HEIGHT: 70 IN | OXYGEN SATURATION: 97 % | SYSTOLIC BLOOD PRESSURE: 110 MMHG | BODY MASS INDEX: 25.77 KG/M2 | HEART RATE: 68 BPM

## 2018-09-26 DIAGNOSIS — Z12.5 SCREENING FOR PROSTATE CANCER: ICD-10-CM

## 2018-09-26 DIAGNOSIS — Z13.0 SCREENING FOR DEFICIENCY ANEMIA: ICD-10-CM

## 2018-09-26 DIAGNOSIS — I10 ESSENTIAL HYPERTENSION: ICD-10-CM

## 2018-09-26 DIAGNOSIS — E78.00 PURE HYPERCHOLESTEROLEMIA: ICD-10-CM

## 2018-09-26 DIAGNOSIS — N40.0 BENIGN PROSTATIC HYPERPLASIA, UNSPECIFIED WHETHER LOWER URINARY TRACT SYMPTOMS PRESENT: ICD-10-CM

## 2018-09-26 DIAGNOSIS — Z13.29 SCREENING FOR THYROID DISORDER: ICD-10-CM

## 2018-09-26 DIAGNOSIS — I42.2 HYPERTROPHIC NONOBSTRUCTIVE CARDIOMYOPATHY (HCC): ICD-10-CM

## 2018-09-26 DIAGNOSIS — I48.0 PAROXYSMAL ATRIAL FIBRILLATION (HCC): ICD-10-CM

## 2018-09-26 DIAGNOSIS — Z85.048 HISTORY OF RECTAL CANCER: ICD-10-CM

## 2018-09-26 DIAGNOSIS — Z23 NEED FOR INFLUENZA VACCINATION: Primary | ICD-10-CM

## 2018-09-26 PROCEDURE — 1101F PT FALLS ASSESS-DOCD LE1/YR: CPT | Performed by: INTERNAL MEDICINE

## 2018-09-26 PROCEDURE — 99214 OFFICE O/P EST MOD 30 MIN: CPT | Performed by: INTERNAL MEDICINE

## 2018-09-26 PROCEDURE — 4040F PNEUMOC VAC/ADMIN/RCVD: CPT | Performed by: INTERNAL MEDICINE

## 2018-09-26 PROCEDURE — 3017F COLORECTAL CA SCREEN DOC REV: CPT | Performed by: INTERNAL MEDICINE

## 2018-09-26 PROCEDURE — 1123F ACP DISCUSS/DSCN MKR DOCD: CPT | Performed by: INTERNAL MEDICINE

## 2018-09-26 PROCEDURE — 1036F TOBACCO NON-USER: CPT | Performed by: INTERNAL MEDICINE

## 2018-09-26 PROCEDURE — G8419 CALC BMI OUT NRM PARAM NOF/U: HCPCS | Performed by: INTERNAL MEDICINE

## 2018-09-26 PROCEDURE — G8427 DOCREV CUR MEDS BY ELIG CLIN: HCPCS | Performed by: INTERNAL MEDICINE

## 2018-09-26 ASSESSMENT — PATIENT HEALTH QUESTIONNAIRE - PHQ9
1. LITTLE INTEREST OR PLEASURE IN DOING THINGS: 0
SUM OF ALL RESPONSES TO PHQ9 QUESTIONS 1 & 2: 0
SUM OF ALL RESPONSES TO PHQ QUESTIONS 1-9: 0
2. FEELING DOWN, DEPRESSED OR HOPELESS: 0
SUM OF ALL RESPONSES TO PHQ QUESTIONS 1-9: 0

## 2018-09-26 NOTE — PROGRESS NOTES
General: Alert and oriented, in no distress. Patient ambulating with normal gait. Normal body habitus. Chest: clear with no wheezes or rales. No retractions, or use of accessory muscles noted. Cardiovascular: PMI is not displaced, and no thrill noted. Regular rate and rhythm with no rub, murmur or gallop. There is no peripheral edema. Pedal pulses are normal.   Abdomen: Abdomen is soft and nontender. The bowel sounds are normal. Multiple well healed surgical scars noted with incisional hernias at midline incision and site of former ileostomy   Musculoskeletal: There are no deformities of the the extremities. Patient has all ten fingers intact. The patient has full range of motion on all 4 extremities without pain. Skin: The skin is warm and dry. There are no rashes noted. Prior to Visit Medications    Medication Sig Taking? Authorizing Provider   SM ASPIRIN ADULT LOW STRENGTH 81 MG EC tablet take 1 tablet by mouth once daily Yes Soco Mccann MD   omeprazole (PRILOSEC) 20 MG delayed release capsule take 1 tablet by mouth every evening Yes Soco Mccann MD   ibuprofen (ADVIL;MOTRIN) 800 MG tablet Take 1 tablet by mouth every 6 hours as needed for Pain Yes Soco Mccann MD   metoprolol tartrate (LOPRESSOR) 25 MG tablet take 1 tablet by mouth twice a day Yes Santhosh De Leon MD   atorvastatin (LIPITOR) 40 MG tablet Take 1 tablet by mouth daily Yes Jose Barnes MD   tamsulosin (FLOMAX) 0.4 MG capsule Take 1 capsule by mouth daily Yes Cesar Patiño MD   tadalafil (CIALIS) 5 MG tablet Take 1 tablet by mouth daily Yes Cesar Patiño MD   Elastic Bandages & Supports (ABDOMINAL BINDER/ELASTIC XL) MISC 1 Device by Does not apply route as needed (ventral hernia, while ambulating/active) Yes Bianca Cruz DO       Data Review echo from 6/2018 reviewed   All previous labs and data reviewed in chart and careEverywhere      Assessment/Plan:      1. Need for influenza vaccination  Given today     2.

## 2018-09-27 PROCEDURE — 90662 IIV NO PRSV INCREASED AG IM: CPT | Performed by: INTERNAL MEDICINE

## 2018-09-27 PROCEDURE — G0008 ADMIN INFLUENZA VIRUS VAC: HCPCS | Performed by: INTERNAL MEDICINE

## 2018-10-01 ENCOUNTER — HOSPITAL ENCOUNTER (OUTPATIENT)
Age: 63
Setting detail: SPECIMEN
Discharge: HOME | End: 2018-10-01
Payer: MEDICARE

## 2018-10-01 DIAGNOSIS — Z13.29 SCREENING FOR THYROID DISORDER: ICD-10-CM

## 2018-10-01 DIAGNOSIS — I10 ESSENTIAL HYPERTENSION: ICD-10-CM

## 2018-10-01 DIAGNOSIS — Z13.0 SCREENING FOR DEFICIENCY ANEMIA: ICD-10-CM

## 2018-10-01 DIAGNOSIS — E78.00 PURE HYPERCHOLESTEROLEMIA: ICD-10-CM

## 2018-10-01 DIAGNOSIS — Z12.5 SCREENING FOR PROSTATE CANCER: ICD-10-CM

## 2018-10-01 DIAGNOSIS — Z85.048 HISTORY OF RECTAL CANCER: ICD-10-CM

## 2018-10-01 LAB
ABSOLUTE EOS #: 0.34 K/UL (ref 0–0.44)
ABSOLUTE IMMATURE GRANULOCYTE: <0.03 K/UL (ref 0–0.3)
ABSOLUTE LYMPH #: 1.24 K/UL (ref 1.1–3.7)
ABSOLUTE MONO #: 0.52 K/UL (ref 0.1–1.2)
ALBUMIN SERPL-MCNC: 3.9 G/DL (ref 3.5–5.2)
ALBUMIN/GLOBULIN RATIO: 1.3 (ref 1–2.5)
ALP BLD-CCNC: 81 U/L (ref 40–129)
ALT SERPL-CCNC: 14 U/L (ref 5–41)
ANION GAP SERPL CALCULATED.3IONS-SCNC: 11 MMOL/L (ref 9–17)
AST SERPL-CCNC: 21 U/L
BASOPHILS # BLD: 1 % (ref 0–2)
BASOPHILS ABSOLUTE: 0.04 K/UL (ref 0–0.2)
BILIRUB SERPL-MCNC: 0.55 MG/DL (ref 0.3–1.2)
BUN BLDV-MCNC: 22 MG/DL (ref 8–23)
BUN/CREAT BLD: NORMAL (ref 9–20)
CALCIUM SERPL-MCNC: 8.9 MG/DL (ref 8.6–10.4)
CARCINOEMBRYONIC ANTIGEN: 3.4 NG/ML
CHLORIDE BLD-SCNC: 107 MMOL/L (ref 98–107)
CHOLESTEROL, FASTING: 140 MG/DL
CHOLESTEROL/HDL RATIO: 3
CO2: 26 MMOL/L (ref 20–31)
CREAT SERPL-MCNC: 0.7 MG/DL (ref 0.7–1.2)
DIFFERENTIAL TYPE: ABNORMAL
EOSINOPHILS RELATIVE PERCENT: 6 % (ref 1–4)
GFR AFRICAN AMERICAN: >60 ML/MIN
GFR NON-AFRICAN AMERICAN: >60 ML/MIN
GFR SERPL CREATININE-BSD FRML MDRD: NORMAL ML/MIN/{1.73_M2}
GFR SERPL CREATININE-BSD FRML MDRD: NORMAL ML/MIN/{1.73_M2}
GLUCOSE BLD-MCNC: 94 MG/DL (ref 70–99)
HCT VFR BLD CALC: 38.2 % (ref 40.7–50.3)
HDLC SERPL-MCNC: 46 MG/DL
HEMOGLOBIN: 13.1 G/DL (ref 13–17)
IMMATURE GRANULOCYTES: 0 %
LDL CHOLESTEROL: 81 MG/DL (ref 0–130)
LYMPHOCYTES # BLD: 22 % (ref 24–43)
MCH RBC QN AUTO: 33.2 PG (ref 25.2–33.5)
MCHC RBC AUTO-ENTMCNC: 34.3 G/DL (ref 28.4–34.8)
MCV RBC AUTO: 96.7 FL (ref 82.6–102.9)
MONOCYTES # BLD: 9 % (ref 3–12)
NRBC AUTOMATED: 0 PER 100 WBC
PDW BLD-RTO: 13 % (ref 11.8–14.4)
PLATELET # BLD: 238 K/UL (ref 138–453)
PLATELET ESTIMATE: ABNORMAL
PMV BLD AUTO: 10.3 FL (ref 8.1–13.5)
POTASSIUM SERPL-SCNC: 4.5 MMOL/L (ref 3.7–5.3)
PROSTATE SPECIFIC ANTIGEN: 0.36 UG/L
RBC # BLD: 3.95 M/UL (ref 4.21–5.77)
RBC # BLD: ABNORMAL 10*6/UL
SEG NEUTROPHILS: 62 % (ref 36–65)
SEGMENTED NEUTROPHILS ABSOLUTE COUNT: 3.38 K/UL (ref 1.5–8.1)
SODIUM BLD-SCNC: 144 MMOL/L (ref 135–144)
TOTAL PROTEIN: 6.8 G/DL (ref 6.4–8.3)
TRIGLYCERIDE, FASTING: 63 MG/DL
TSH SERPL DL<=0.05 MIU/L-ACNC: 4.17 MIU/L (ref 0.3–5)
VLDLC SERPL CALC-MCNC: NORMAL MG/DL (ref 1–30)
WBC # BLD: 5.5 K/UL (ref 3.5–11.3)
WBC # BLD: ABNORMAL 10*3/UL

## 2018-10-22 DIAGNOSIS — I42.2 HYPERTROPHIC NONOBSTRUCTIVE CARDIOMYOPATHY (HCC): ICD-10-CM

## 2018-10-22 DIAGNOSIS — I48.0 PAROXYSMAL ATRIAL FIBRILLATION (HCC): ICD-10-CM

## 2018-10-22 NOTE — TELEPHONE ENCOUNTER
Filled last by previous PCP 6/2018 #30 with 3 RF  Seen 9/26/18    Next Visit Date:  Future Appointments  Date Time Provider Yves Henriquez   11/8/2018 9:00 AM Noe Abraham MD [facility] Urology [facility]   11/29/2018 10:00 AM [facility] CATH LAB RM A [facility]   12/12/2018 1:00 PM Cassandra Dempsey  [address] Maintenance   Topic Date Due    Colon cancer screen colonoscopy  07/18/2018    Pneumococcal low/med risk (1 of 2 - PCV13) 12/26/2018 (Originally 8/11/2018)    Shingles Vaccine (1 of 2 - 2 Dose Series) 09/26/2019 (Originally 8/11/2003)    DTaP/Tdap/Td vaccine (1 - Tdap) 08/18/2021 (Originally 8/11/1972)    Lipid screen  10/01/2023    Flu vaccine  Completed    AAA screen  Completed    Hepatitis C screen  Completed    HIV screen  Completed       Hemoglobin A1C (%)   Date Value   01/10/2018 5.0             ( goal A1C is < 7)   No results found for: LABMICR  LDL Cholesterol (mg/dL)   Date Value   10/01/2018 81   01/03/2017 123       (goal LDL is <100)   AST (U/L)   Date Value   10/01/2018 21     ALT (U/L)   Date Value   10/01/2018 14     BUN (mg/dL)   Date Value   10/01/2018 22     BP Readings from Last 3 Encounters:   09/26/18 110/70   06/27/18 138/68   06/14/18 134/74          (goal 120/80)    All Future Testing planned in [facility]  Lab Frequency Next Occurrence               Patient Active Problem List:     Hematochezia     Dyslipidemia     Rectal cancer [facility])     ED (erectile dysfunction)     Incomplete bladder emptying     Hypertrophy of prostate with urinary obstruction and other lower urinary tract symptoms (LUTS)     History of colon cancer     A-fib (HCC)     Anemia     DVT prophylaxis     Hyperopia     Pallor of optic disc     Presbyopia     Incisional hernia, without obstruction or gangrene     Hypertrophic nonobstructive cardiomyopathy ([facility] [address]

## 2018-10-23 RX ORDER — ATORVASTATIN CALCIUM 40 MG/1
40 TABLET, FILM COATED ORAL DAILY
Qty: 90 TABLET | Refills: 1 | Status: SHIPPED | OUTPATIENT
Start: 2018-10-23 | End: 2019-04-18 | Stop reason: SDUPTHER

## 2018-10-31 ENCOUNTER — TELEPHONE (OUTPATIENT)
Dept: OTHER | Age: 63
End: 2018-10-31

## 2018-10-31 DIAGNOSIS — M25.561 CHRONIC PAIN OF RIGHT KNEE: Primary | ICD-10-CM

## 2018-10-31 DIAGNOSIS — G89.29 CHRONIC PAIN OF RIGHT KNEE: Primary | ICD-10-CM

## 2018-11-08 ENCOUNTER — OFFICE VISIT (OUTPATIENT)
Dept: UROLOGY | Age: 63
End: 2018-11-08
Payer: MEDICARE

## 2018-11-08 VITALS
DIASTOLIC BLOOD PRESSURE: 77 MMHG | SYSTOLIC BLOOD PRESSURE: 138 MMHG | HEIGHT: 71 IN | WEIGHT: 184 LBS | HEART RATE: 53 BPM | BODY MASS INDEX: 25.76 KG/M2

## 2018-11-08 DIAGNOSIS — N40.1 BENIGN PROSTATIC HYPERPLASIA WITH URINARY OBSTRUCTION: Primary | ICD-10-CM

## 2018-11-08 DIAGNOSIS — N13.8 BENIGN PROSTATIC HYPERPLASIA WITH URINARY OBSTRUCTION: Primary | ICD-10-CM

## 2018-11-08 DIAGNOSIS — N52.8 OTHER MALE ERECTILE DYSFUNCTION: ICD-10-CM

## 2018-11-08 LAB
BILIRUBIN, POC: NEGATIVE
BLOOD URINE, POC: NEGATIVE
CLARITY, POC: CLEAR
COLOR, POC: YELLOW
GLUCOSE URINE, POC: NEGATIVE
KETONES, POC: NEGATIVE
LEUKOCYTE EST, POC: NEGATIVE
NITRITE, POC: NEGATIVE
PH, POC: 6
PROTEIN, POC: NORMAL
SPECIFIC GRAVITY, POC: >=1.03
UROBILINOGEN, POC: 0.2

## 2018-11-08 PROCEDURE — 1123F ACP DISCUSS/DSCN MKR DOCD: CPT | Performed by: SPECIALIST

## 2018-11-08 PROCEDURE — 1101F PT FALLS ASSESS-DOCD LE1/YR: CPT | Performed by: SPECIALIST

## 2018-11-08 PROCEDURE — G8419 CALC BMI OUT NRM PARAM NOF/U: HCPCS | Performed by: SPECIALIST

## 2018-11-08 PROCEDURE — 3017F COLORECTAL CA SCREEN DOC REV: CPT | Performed by: SPECIALIST

## 2018-11-08 PROCEDURE — 81002 URINALYSIS NONAUTO W/O SCOPE: CPT | Performed by: SPECIALIST

## 2018-11-08 PROCEDURE — 99212 OFFICE O/P EST SF 10 MIN: CPT

## 2018-11-08 PROCEDURE — G8427 DOCREV CUR MEDS BY ELIG CLIN: HCPCS | Performed by: SPECIALIST

## 2018-11-08 PROCEDURE — 4040F PNEUMOC VAC/ADMIN/RCVD: CPT | Performed by: SPECIALIST

## 2018-11-08 PROCEDURE — 1036F TOBACCO NON-USER: CPT | Performed by: SPECIALIST

## 2018-11-08 PROCEDURE — G8482 FLU IMMUNIZE ORDER/ADMIN: HCPCS | Performed by: SPECIALIST

## 2018-11-08 PROCEDURE — 99213 OFFICE O/P EST LOW 20 MIN: CPT | Performed by: SPECIALIST

## 2018-11-08 RX ORDER — SILDENAFIL CITRATE 20 MG/1
20 TABLET ORAL PRN
Qty: 30 TABLET | Refills: 11 | Status: SHIPPED | OUTPATIENT
Start: 2018-11-08 | End: 2018-11-29 | Stop reason: ALTCHOICE

## 2018-11-08 RX ORDER — TAMSULOSIN HYDROCHLORIDE 0.4 MG/1
0.4 CAPSULE ORAL DAILY
Qty: 90 CAPSULE | Refills: 3 | Status: SHIPPED | OUTPATIENT
Start: 2018-11-08 | End: 2019-11-05 | Stop reason: SDUPTHER

## 2018-11-08 NOTE — PROGRESS NOTES
Nicole Andino MD, [facility]  [address] [facility] Progress Note    Patient:  Sandy Stringer  YOB: 1955  Date: 11/8/2018    HISTORY OF PRESENT ILLNESS:   The patient is a 70 y.o. male who presents today for follow-up for the following problem(s): BPH with obstruction and ED  Overall the problem(s) : show no change. Associated Symptoms: No dysuria, gross hematuria. Pain Severity: Pain Score:   6 (knees)    Summary of old records:   History of rectal cancer, had resection, colostomy followed by reversal.  BPH on Flomax/tamsulosin 0.4 mg po qd and Cialis 5 mg qd  ED: Cialis 5 mg qd-too costly, gave Rx for generic sildenafil 20 mg (1-5 tabs) prn ED. Additional History: Patient's lower urinary tract symptoms are stable on Flomax/tamsulosin 0.4 mg po qd for BPH symptoms. Patient given an Rx for generic sildenafil 20 mg (1-5 tabs) prn ED.       Last several PSA's:  Lab Results   Component Value Date    PSA 0.36 10/01/2018    PSA 0.56 01/03/2017    PSA 0.40 08/19/2015       Last total testosterone:  No results found for: TESTOSTERONE    Urinalysis today:  Results for POC orders placed in visit on 11/08/18   POCT Urinalysis no Micro   Result Value Ref Range    Color, UA yellow     Clarity, UA clear     Glucose, UA POC negative     Bilirubin, UA negative     Ketones, UA negative     Spec Grav, UA >=1.030     Blood, UA POC negative     pH, UA 6.0     Protein, UA POC 30 mg/dL     Urobilinogen, UA 0.2     Leukocytes, UA negative     Nitrite, UA negative        Last BUN and creatinine:  Lab Results   Component Value Date    BUN 22 10/01/2018     Lab Results   Component Value Date    CREATININE 0.70 10/01/2018       Imaging Reviewed during this Office Visit:   (results were independently reviewed by physician and radiology report verified)    PAST MEDICAL, FAMILY AND SOCIAL HISTORY UPDATE:  Past Medical History:   Diagnosis Date    Back pain, chronic     Cancer ([address]     Rectal    Cocaine abuse in negative  Hematological/Lymphatic: negative  Psychological: negative    Physical Exam:      Vitals:    11/08/18 0914   BP: 138/77   Pulse: 53     Patient alert and oriented and in no apparent distress. Assessment and Plan      1. Benign prostatic hyperplasia with urinary obstruction    2. Other male erectile dysfunction           Plan:      Return in about 1 year (around 11/8/2019). Patient's lower urinary tract symptoms are stable on Flomax/tamsulosin 0.4 mg po qd for BPH symptoms. Patient given an Rx for generic sildenafil 20 mg (1-5 tabs) prn ED. I have discussed the care of this patient including pertinent history and exam findings, with the resident. I have seen and examined the patient and the key elements of all parts of the encounter have been performed by me. I agree with the assessment, plan and orders as documented by the resident. Mayra Altamirano MD, FACS

## 2018-11-19 ENCOUNTER — OFFICE VISIT (OUTPATIENT)
Dept: ORTHOPEDIC SURGERY | Age: 63
End: 2018-11-19
Payer: MEDICARE

## 2018-11-19 VITALS — HEIGHT: 71 IN | BODY MASS INDEX: 25.2 KG/M2 | OXYGEN SATURATION: 99 % | HEART RATE: 55 BPM | WEIGHT: 180 LBS

## 2018-11-19 DIAGNOSIS — G89.29 CHRONIC PAIN OF RIGHT KNEE: Primary | ICD-10-CM

## 2018-11-19 DIAGNOSIS — M17.11 PRIMARY OSTEOARTHRITIS OF RIGHT KNEE: Primary | ICD-10-CM

## 2018-11-19 DIAGNOSIS — M25.561 CHRONIC PAIN OF RIGHT KNEE: Primary | ICD-10-CM

## 2018-11-19 PROCEDURE — G8427 DOCREV CUR MEDS BY ELIG CLIN: HCPCS | Performed by: ORTHOPAEDIC SURGERY

## 2018-11-19 PROCEDURE — G8482 FLU IMMUNIZE ORDER/ADMIN: HCPCS | Performed by: ORTHOPAEDIC SURGERY

## 2018-11-19 PROCEDURE — 3017F COLORECTAL CA SCREEN DOC REV: CPT | Performed by: ORTHOPAEDIC SURGERY

## 2018-11-19 PROCEDURE — 1123F ACP DISCUSS/DSCN MKR DOCD: CPT | Performed by: ORTHOPAEDIC SURGERY

## 2018-11-19 PROCEDURE — 1036F TOBACCO NON-USER: CPT | Performed by: ORTHOPAEDIC SURGERY

## 2018-11-19 PROCEDURE — 1101F PT FALLS ASSESS-DOCD LE1/YR: CPT | Performed by: ORTHOPAEDIC SURGERY

## 2018-11-19 PROCEDURE — 4040F PNEUMOC VAC/ADMIN/RCVD: CPT | Performed by: ORTHOPAEDIC SURGERY

## 2018-11-19 PROCEDURE — G8419 CALC BMI OUT NRM PARAM NOF/U: HCPCS | Performed by: ORTHOPAEDIC SURGERY

## 2018-11-19 PROCEDURE — 99203 OFFICE O/P NEW LOW 30 MIN: CPT | Performed by: ORTHOPAEDIC SURGERY

## 2018-11-19 ASSESSMENT — PROMIS GLOBAL HEALTH SCALE
SUM OF RESPONSES TO QUESTIONS 3, 6, 7, & 8: 17
IN GENERAL, WOULD YOU SAY YOUR QUALITY OF LIFE IS...[ON A SCALE OF 1 (POOR) TO 5 (EXCELLENT)]: 3
IN THE PAST 7 DAYS, HOW WOULD YOU RATE YOUR FATIGUE ON AVERAGE [ON A SCALE FROM 1 (NONE) TO 5 (VERY SEVERE)]?: 4
IN GENERAL, WOULD YOU SAY YOUR HEALTH IS...[ON A SCALE OF 1 (POOR) TO 5 (EXCELLENT)]: 3
IN GENERAL, HOW WOULD YOU RATE YOUR PHYSICAL HEALTH [ON A SCALE OF 1 (POOR) TO 5 (EXCELLENT)]?: 3
TO WHAT EXTENT ARE YOU ABLE TO CARRY OUT YOUR EVERYDAY PHYSICAL ACTIVITIES SUCH AS WALKING, CLIMBING STAIRS, CARRYING GROCERIES, OR MOVING A CHAIR [ON A SCALE OF 1 (NOT AT ALL) TO 5 (COMPLETELY)]?: 4
IN THE PAST 7 DAYS, HOW OFTEN HAVE YOU BEEN BOTHERED BY EMOTIONAL PROBLEMS, SUCH AS FEELING ANXIOUS, DEPRESSED, OR IRRITABLE [ON A SCALE FROM 1 (NEVER) TO 5 (ALWAYS)]?: 1
IN GENERAL, HOW WOULD YOU RATE YOUR SATISFACTION WITH YOUR SOCIAL ACTIVITIES AND RELATIONSHIPS [ON A SCALE OF 1 (POOR) TO 5 (EXCELLENT)]?: 4
HOW IS THE PROMIS V1.1 BEING ADMINISTERED?: 0
IN THE PAST 7 DAYS, HOW WOULD YOU RATE YOUR PAIN ON AVERAGE [ON A SCALE FROM 0 (NO PAIN) TO 10 (WORST IMAGINABLE PAIN)]?: 6
WHO IS THE PERSON COMPLETING THE PROMIS V1.1 SURVEY?: 0
SUM OF RESPONSES TO QUESTIONS 2, 4, 5, & 10: 11
IN GENERAL, PLEASE RATE HOW WELL YOU CARRY OUT YOUR USUAL SOCIAL ACTIVITIES (INCLUDES ACTIVITIES AT HOME, AT WORK, AND IN YOUR COMMUNITY, AND RESPONSIBILITIES AS A PARENT, CHILD, SPOUSE, EMPLOYEE, FRIEND, ETC) [ON A SCALE OF 1 (POOR) TO 5 (EXCELLENT)]?: 4
IN GENERAL, HOW WOULD YOU RATE YOUR MENTAL HEALTH, INCLUDING YOUR MOOD AND YOUR ABILITY TO THINK [ON A SCALE OF 1 (POOR) TO 5 (EXCELLENT)]?: 3

## 2018-11-19 ASSESSMENT — KOOS JR
HOW SEVERE IS YOUR KNEE STIFFNESS AFTER FIRST WAKING IN MORNING: 3
TWISING OR PIVOTING ON KNEE: 3
STRAIGHTENING KNEE FULLY: 3
STANDING UPRIGHT: 3
RISING FROM SITTING: 2
GOING UP OR DOWN STAIRS: 3
BENDING TO THE FLOOR TO PICK UP OBJECT: 2

## 2018-11-29 ENCOUNTER — HOSPITAL ENCOUNTER (OUTPATIENT)
Dept: CARDIAC CATH/INVASIVE PROCEDURES | Age: 63
Discharge: HOME | End: 2018-11-29
Payer: MEDICARE

## 2018-11-29 VITALS
SYSTOLIC BLOOD PRESSURE: 134 MMHG | RESPIRATION RATE: 16 BRPM | BODY MASS INDEX: 23.99 KG/M2 | DIASTOLIC BLOOD PRESSURE: 75 MMHG | WEIGHT: 181 LBS | HEIGHT: 73 IN | TEMPERATURE: 98 F | OXYGEN SATURATION: 95 % | HEART RATE: 54 BPM

## 2018-11-29 LAB
GFR NON-AFRICAN AMERICAN: >60 ML/MIN
GFR SERPL CREATININE-BSD FRML MDRD: >60 ML/MIN
GFR SERPL CREATININE-BSD FRML MDRD: NORMAL ML/MIN/{1.73_M2}
GLUCOSE BLD-MCNC: 97 MG/DL (ref 74–100)
LV EF: 48 %
LVEF MODALITY: NORMAL
PLATELET # BLD: 246 K/UL (ref 138–453)
POC CHLORIDE: 110 MMOL/L (ref 98–107)
POC CREATININE: 0.79 MG/DL (ref 0.51–1.19)
POC HEMATOCRIT: 38 % (ref 41–53)
POC HEMOGLOBIN: 13 G/DL (ref 13.5–17.5)
POC POTASSIUM: 4.1 MMOL/L (ref 3.5–4.5)
POC SODIUM: 145 MMOL/L (ref 138–146)

## 2018-11-29 PROCEDURE — 7100000010 HC PHASE II RECOVERY - FIRST 15 MIN

## 2018-11-29 PROCEDURE — C1894 INTRO/SHEATH, NON-LASER: HCPCS

## 2018-11-29 PROCEDURE — 85014 HEMATOCRIT: CPT

## 2018-11-29 PROCEDURE — 93312 ECHO TRANSESOPHAGEAL: CPT

## 2018-11-29 PROCEDURE — 6360000004 HC RX CONTRAST MEDICATION

## 2018-11-29 PROCEDURE — 2500000003 HC RX 250 WO HCPCS

## 2018-11-29 PROCEDURE — 82947 ASSAY GLUCOSE BLOOD QUANT: CPT

## 2018-11-29 PROCEDURE — 85049 AUTOMATED PLATELET COUNT: CPT

## 2018-11-29 PROCEDURE — 2709999900 HC NON-CHARGEABLE SUPPLY

## 2018-11-29 PROCEDURE — 7100000011 HC PHASE II RECOVERY - ADDTL 15 MIN

## 2018-11-29 PROCEDURE — 82435 ASSAY OF BLOOD CHLORIDE: CPT

## 2018-11-29 PROCEDURE — 93458 L HRT ARTERY/VENTRICLE ANGIO: CPT | Performed by: INTERNAL MEDICINE

## 2018-11-29 PROCEDURE — 84132 ASSAY OF SERUM POTASSIUM: CPT

## 2018-11-29 PROCEDURE — C1725 CATH, TRANSLUMIN NON-LASER: HCPCS

## 2018-11-29 PROCEDURE — 93325 DOPPLER ECHO COLOR FLOW MAPG: CPT

## 2018-11-29 PROCEDURE — C1769 GUIDE WIRE: HCPCS

## 2018-11-29 PROCEDURE — 82565 ASSAY OF CREATININE: CPT

## 2018-11-29 PROCEDURE — 84295 ASSAY OF SERUM SODIUM: CPT

## 2018-11-29 PROCEDURE — 6360000002 HC RX W HCPCS

## 2018-11-29 RX ORDER — SODIUM CHLORIDE 0.9 % (FLUSH) 0.9 %
10 SYRINGE (ML) INJECTION EVERY 12 HOURS SCHEDULED
Status: CANCELLED | OUTPATIENT
Start: 2018-11-29

## 2018-11-29 RX ORDER — ACETAMINOPHEN 325 MG/1
650 TABLET ORAL EVERY 4 HOURS PRN
Status: CANCELLED | OUTPATIENT
Start: 2018-11-29

## 2018-11-29 RX ORDER — LISINOPRIL 5 MG/1
5 TABLET ORAL DAILY
Qty: 30 TABLET | Refills: 1 | Status: SHIPPED | OUTPATIENT
Start: 2018-11-29 | End: 2019-02-04 | Stop reason: SDUPTHER

## 2018-11-29 RX ORDER — SODIUM CHLORIDE 0.9 % (FLUSH) 0.9 %
10 SYRINGE (ML) INJECTION PRN
Status: CANCELLED | OUTPATIENT
Start: 2018-11-29

## 2018-11-29 RX ORDER — SODIUM CHLORIDE 9 MG/ML
INJECTION, SOLUTION INTRAVENOUS CONTINUOUS
Status: DISCONTINUED | OUTPATIENT
Start: 2018-11-29 | End: 2018-11-30 | Stop reason: HOSPADM

## 2018-11-29 RX ORDER — ONDANSETRON 2 MG/ML
4 INJECTION INTRAMUSCULAR; INTRAVENOUS EVERY 6 HOURS PRN
Status: CANCELLED | OUTPATIENT
Start: 2018-11-29

## 2018-11-29 RX ADMIN — SODIUM CHLORIDE: 9 INJECTION, SOLUTION INTRAVENOUS at 10:17

## 2018-11-29 NOTE — H&P
cancer - in remission    Patient Active Problem List   Diagnosis    Hematochezia    Dyslipidemia    [facility] ([facility]    ED (erectile dysfunction)    Incomplete bladder emptying    Hypertrophy of prostate with urinary obstruction and other lower urinary tract symptoms (LUTS)    History of colon cancer    A-fib (HCC)    Anemia    DVT prophylaxis    Hyperopia    Pallor of optic disc    Presbyopia    Incisional hernia, without obstruction or gangrene    Hypertrophic nonobstructive cardiomyopathy ([address]       RECOMMENDATIONS:  1. Proceed with ODALYS and possible left heart cath after discussion with Dr. Tori Montiel    Will discuss with rounding attending Dr. Tori Montiel for final recommendations. Syeda Brown MD  Cardiology Fellow      Attending Physician Statement  I have discussed the case of Semaj Taveras including pertinent history and exam findings with the resident. I have seen and examined the patient and the key elements of the encounter have been performed by me. I agree with the assessment, plan and orders as documented by the resident With changes made to the note.      Electronically signed by Mike Mendez MD on 12/5/2018 at 1:04 PM.    [facility]      [phone]

## 2018-11-29 NOTE — PROGRESS NOTES
Patient received post cath to [facility]. Assessment obtained. Post cath pathway initiated. Right radial radial site with TR band intact. No hematoma noted. Patient without complaints. Taking oral fluids well.

## 2018-11-29 NOTE — OP NOTE
Family history of CAD  [x] Hyperlipidemia     [] Cerebrovascular Disease   [] Prior MI       [] Peripheral Vascular disease   [] Prior PCI              [] Diabetes Mellitus    [] Left Main PCI. [] Currently on Dialysis. [] Prior CABG. [] Currently smoker. [] Cardiac Arrest outside of healthcare facility. [] Yes    [x] No     [] Cardiac Arrest at other Facility. [] Yes   [x] No    Pre-Procedure Information. Heart Failure       [] Yes    [x] No    Diagnostic Test:   EKG       [x] Normal   [] Abnormal    New antiarrhythmia medications:    [] Yes   [x] No   New onset atrial fibrillation / Flutter     [] Yes   [x] No    Stress Test Performed:      [] Yes    [x] No     Cardiac CTA Performed:     [] Yes    [x] No     Pre Procedure Medications:   [x] Yes    [] No         [] ASA    [x] Beta Blockers      [] Nitrate   [] Ca Channel Blockers      [] Ranolazine   [x] Statin       [] Plavix/Others antiplatelets      Electronically signed on 11/29/2018 at 11:57 AM by:    Monica Degroot MD  Fellow, Cardiovascular Diseases  [facility]      Attending Physician Statement  I have discussed the case of Al Short including pertinent history and exam findings with the resident. I have seen and examined the patient and the key elements of the encounter have been performed by me. I agree with the assessment, plan and orders as documented by the resident With changes made to the note.   I was present during entire procedure and performed all critical elements of the procedure    Electronically signed by Isela Rascon MD on 12/5/2018 at 1:10 PM.    [facility]      [phone]

## 2018-12-03 ENCOUNTER — HOSPITAL ENCOUNTER (OUTPATIENT)
Dept: PREADMISSION TESTING | Age: 63
Discharge: HOME | End: 2018-12-07
Payer: MEDICARE

## 2018-12-03 ENCOUNTER — HOSPITAL ENCOUNTER (OUTPATIENT)
Age: 63
Setting detail: SPECIMEN
Discharge: HOME | End: 2018-12-03
Payer: MEDICARE

## 2018-12-03 LAB
HCT VFR BLD CALC: 38 % (ref 40.7–50.3)
HEMOGLOBIN: 12.1 G/DL (ref 13–17)
MCH RBC QN AUTO: 29.4 PG (ref 25.2–33.5)
MCHC RBC AUTO-ENTMCNC: 31.8 G/DL (ref 28.4–34.8)
MCV RBC AUTO: 92.5 FL (ref 82.6–102.9)
NRBC AUTOMATED: 0 PER 100 WBC
PDW BLD-RTO: 12.7 % (ref 11.8–14.4)
PLATELET # BLD: 260 K/UL (ref 138–453)
PMV BLD AUTO: 10.6 FL (ref 8.1–13.5)
RBC # BLD: 4.11 M/UL (ref 4.21–5.77)
WBC # BLD: 5.9 K/UL (ref 3.5–11.3)

## 2018-12-04 ENCOUNTER — TELEPHONE (OUTPATIENT)
Dept: FAMILY MEDICINE CLINIC | Age: 63
End: 2018-12-04

## 2018-12-04 ENCOUNTER — TELEPHONE (OUTPATIENT)
Dept: ORTHOPEDIC SURGERY | Age: 63
End: 2018-12-04

## 2018-12-04 DIAGNOSIS — Z85.038 HISTORY OF COLON CANCER: Primary | ICD-10-CM

## 2018-12-04 DIAGNOSIS — R19.5 POSITIVE FIT (FECAL IMMUNOCHEMICAL TEST): ICD-10-CM

## 2018-12-04 DIAGNOSIS — Z85.038 HISTORY OF COLON CANCER: ICD-10-CM

## 2018-12-04 LAB
CONTROL: PRESENT
HEMOCCULT STL QL: POSITIVE

## 2018-12-04 PROCEDURE — 82274 ASSAY TEST FOR BLOOD FECAL: CPT | Performed by: INTERNAL MEDICINE

## 2018-12-04 RX ORDER — LIDOCAINE HYDROCHLORIDE 10 MG/ML
1 INJECTION, SOLUTION EPIDURAL; INFILTRATION; INTRACAUDAL; PERINEURAL
Status: CANCELLED | OUTPATIENT
Start: 2018-12-04 | End: 2018-12-04

## 2018-12-04 RX ORDER — SODIUM CHLORIDE, SODIUM LACTATE, POTASSIUM CHLORIDE, CALCIUM CHLORIDE 600; 310; 30; 20 MG/100ML; MG/100ML; MG/100ML; MG/100ML
INJECTION, SOLUTION INTRAVENOUS CONTINUOUS
Status: CANCELLED | OUTPATIENT
Start: 2018-12-04

## 2018-12-04 RX ORDER — SODIUM CHLORIDE 0.9 % (FLUSH) 0.9 %
10 SYRINGE (ML) INJECTION EVERY 12 HOURS SCHEDULED
Status: CANCELLED | OUTPATIENT
Start: 2018-12-04

## 2018-12-04 RX ORDER — SODIUM CHLORIDE 0.9 % (FLUSH) 0.9 %
10 SYRINGE (ML) INJECTION PRN
Status: CANCELLED | OUTPATIENT
Start: 2018-12-04

## 2018-12-05 ENCOUNTER — ANESTHESIA EVENT (OUTPATIENT)
Dept: ENDOSCOPY | Age: 63
DRG: 379 | End: 2018-12-05
Payer: MEDICARE

## 2018-12-05 ENCOUNTER — HOSPITAL ENCOUNTER (INPATIENT)
Age: 63
LOS: 2 days | Discharge: HOME | DRG: 379 | End: 2018-12-07
Attending: EMERGENCY MEDICINE | Admitting: INTERNAL MEDICINE
Payer: MEDICARE

## 2018-12-05 ENCOUNTER — APPOINTMENT (OUTPATIENT)
Dept: GENERAL RADIOLOGY | Age: 63
DRG: 379 | End: 2018-12-05
Payer: MEDICARE

## 2018-12-05 ENCOUNTER — ANESTHESIA (OUTPATIENT)
Dept: ENDOSCOPY | Age: 63
DRG: 379 | End: 2018-12-05
Payer: MEDICARE

## 2018-12-05 VITALS
SYSTOLIC BLOOD PRESSURE: 147 MMHG | OXYGEN SATURATION: 98 % | DIASTOLIC BLOOD PRESSURE: 69 MMHG | RESPIRATION RATE: 10 BRPM

## 2018-12-05 PROBLEM — D50.9 IRON (FE) DEFICIENCY ANEMIA: Status: ACTIVE | Noted: 2018-12-05

## 2018-12-05 PROBLEM — K92.2 GI BLEED: Status: ACTIVE | Noted: 2018-12-05

## 2018-12-05 LAB
-: NORMAL
ABSOLUTE EOS #: 0.34 K/UL (ref 0–0.44)
ABSOLUTE IMMATURE GRANULOCYTE: <0.03 K/UL (ref 0–0.3)
ABSOLUTE LYMPH #: 1.25 K/UL (ref 1.1–3.7)
ABSOLUTE MONO #: 0.55 K/UL (ref 0.1–1.2)
ALBUMIN SERPL-MCNC: 3.7 G/DL (ref 3.5–5.2)
ALBUMIN/GLOBULIN RATIO: 1.3 (ref 1–2.5)
ALP BLD-CCNC: 66 U/L (ref 40–129)
ALT SERPL-CCNC: 17 U/L (ref 5–41)
AMORPHOUS: NORMAL
ANION GAP SERPL CALCULATED.3IONS-SCNC: 7 MMOL/L (ref 9–17)
AST SERPL-CCNC: 22 U/L
BACTERIA: NORMAL
BASOPHILS # BLD: 0 % (ref 0–2)
BASOPHILS ABSOLUTE: <0.03 K/UL (ref 0–0.2)
BILIRUB SERPL-MCNC: 0.28 MG/DL (ref 0.3–1.2)
BILIRUBIN DIRECT: <0.08 MG/DL
BILIRUBIN URINE: NEGATIVE
BILIRUBIN, INDIRECT: ABNORMAL MG/DL (ref 0–1)
BUN BLDV-MCNC: 22 MG/DL (ref 8–23)
BUN/CREAT BLD: ABNORMAL (ref 9–20)
CALCIUM SERPL-MCNC: 8.5 MG/DL (ref 8.6–10.4)
CASTS UA: NORMAL /LPF (ref 0–8)
CHLORIDE BLD-SCNC: 104 MMOL/L (ref 98–107)
CO2: 28 MMOL/L (ref 20–31)
COLOR: YELLOW
CREAT SERPL-MCNC: 0.84 MG/DL (ref 0.7–1.2)
CRYSTALS, UA: NORMAL /HPF
DIFFERENTIAL TYPE: ABNORMAL
EKG ATRIAL RATE: 54 BPM
EKG P AXIS: 48 DEGREES
EKG P-R INTERVAL: 188 MS
EKG Q-T INTERVAL: 424 MS
EKG QRS DURATION: 88 MS
EKG QTC CALCULATION (BAZETT): 402 MS
EKG R AXIS: -7 DEGREES
EKG T AXIS: 8 DEGREES
EKG VENTRICULAR RATE: 54 BPM
EOSINOPHILS RELATIVE PERCENT: 7 % (ref 1–4)
EPITHELIAL CELLS UA: NORMAL /HPF (ref 0–5)
FERRITIN: 16 UG/L (ref 30–400)
GFR AFRICAN AMERICAN: >60 ML/MIN
GFR NON-AFRICAN AMERICAN: >60 ML/MIN
GFR SERPL CREATININE-BSD FRML MDRD: ABNORMAL ML/MIN/{1.73_M2}
GFR SERPL CREATININE-BSD FRML MDRD: ABNORMAL ML/MIN/{1.73_M2}
GLOBULIN: ABNORMAL G/DL (ref 1.5–3.8)
GLUCOSE BLD-MCNC: 103 MG/DL (ref 70–99)
GLUCOSE URINE: NEGATIVE
HCT VFR BLD CALC: 28.7 % (ref 40.7–50.3)
HCT VFR BLD CALC: 31.8 % (ref 40.7–50.3)
HEMOGLOBIN: 10.4 G/DL (ref 13–17)
HEMOGLOBIN: 9.3 G/DL (ref 13–17)
IMMATURE GRANULOCYTES: 0 %
INR BLD: 1.1
IRON SATURATION: 9 % (ref 20–55)
IRON: 28 UG/DL (ref 59–158)
KETONES, URINE: NEGATIVE
LEUKOCYTE ESTERASE, URINE: NEGATIVE
LYMPHOCYTES # BLD: 24 % (ref 24–43)
MCH RBC QN AUTO: 29.7 PG (ref 25.2–33.5)
MCHC RBC AUTO-ENTMCNC: 32.7 G/DL (ref 28.4–34.8)
MCV RBC AUTO: 90.9 FL (ref 82.6–102.9)
MONOCYTES # BLD: 11 % (ref 3–12)
MUCUS: NORMAL
NITRITE, URINE: NEGATIVE
NRBC AUTOMATED: 0 PER 100 WBC
OTHER OBSERVATIONS UA: NORMAL
PARTIAL THROMBOPLASTIN TIME: 26.3 SEC (ref 20.5–30.5)
PDW BLD-RTO: 12.5 % (ref 11.8–14.4)
PH UA: 7 (ref 5–8)
PLATELET # BLD: 231 K/UL (ref 138–453)
PLATELET ESTIMATE: ABNORMAL
PMV BLD AUTO: 10 FL (ref 8.1–13.5)
POTASSIUM SERPL-SCNC: 4.1 MMOL/L (ref 3.7–5.3)
PROTEIN UA: NEGATIVE
PROTHROMBIN TIME: 12 SEC (ref 9–12)
RBC # BLD: 3.5 M/UL (ref 4.21–5.77)
RBC # BLD: ABNORMAL 10*6/UL
RBC UA: NORMAL /HPF (ref 0–4)
RENAL EPITHELIAL, UA: NORMAL /HPF
SEG NEUTROPHILS: 58 % (ref 36–65)
SEGMENTED NEUTROPHILS ABSOLUTE COUNT: 2.98 K/UL (ref 1.5–8.1)
SODIUM BLD-SCNC: 139 MMOL/L (ref 135–144)
SPECIFIC GRAVITY UA: 1.01 (ref 1–1.03)
TOTAL IRON BINDING CAPACITY: 321 UG/DL (ref 250–450)
TOTAL PROTEIN: 6.5 G/DL (ref 6.4–8.3)
TRICHOMONAS: NORMAL
TROPONIN INTERP: NORMAL
TROPONIN INTERP: NORMAL
TROPONIN T: <0.03 NG/ML
TROPONIN T: <0.03 NG/ML
TURBIDITY: CLEAR
UNSATURATED IRON BINDING CAPACITY: 293 UG/DL (ref 112–347)
URINE HGB: NEGATIVE
UROBILINOGEN, URINE: NORMAL
WBC # BLD: 5.2 K/UL (ref 3.5–11.3)
WBC # BLD: ABNORMAL 10*3/UL
WBC UA: NORMAL /HPF (ref 0–5)
YEAST: NORMAL

## 2018-12-05 PROCEDURE — 7100000010 HC PHASE II RECOVERY - FIRST 15 MIN: Performed by: INTERNAL MEDICINE

## 2018-12-05 PROCEDURE — 80048 BASIC METABOLIC PNL TOTAL CA: CPT

## 2018-12-05 PROCEDURE — 2500000003 HC RX 250 WO HCPCS: Performed by: NURSE ANESTHETIST, CERTIFIED REGISTERED

## 2018-12-05 PROCEDURE — 6360000002 HC RX W HCPCS: Performed by: HOSPITALIST

## 2018-12-05 PROCEDURE — 96376 TX/PRO/DX INJ SAME DRUG ADON: CPT

## 2018-12-05 PROCEDURE — 83550 IRON BINDING TEST: CPT

## 2018-12-05 PROCEDURE — 6360000002 HC RX W HCPCS: Performed by: NURSE ANESTHETIST, CERTIFIED REGISTERED

## 2018-12-05 PROCEDURE — 7100000011 HC PHASE II RECOVERY - ADDTL 15 MIN: Performed by: INTERNAL MEDICINE

## 2018-12-05 PROCEDURE — 2580000003 HC RX 258: Performed by: HOSPITALIST

## 2018-12-05 PROCEDURE — 80076 HEPATIC FUNCTION PANEL: CPT

## 2018-12-05 PROCEDURE — 43235 EGD DIAGNOSTIC BRUSH WASH: CPT | Performed by: INTERNAL MEDICINE

## 2018-12-05 PROCEDURE — 85025 COMPLETE CBC W/AUTO DIFF WBC: CPT

## 2018-12-05 PROCEDURE — 81001 URINALYSIS AUTO W/SCOPE: CPT

## 2018-12-05 PROCEDURE — 93005 ELECTROCARDIOGRAM TRACING: CPT

## 2018-12-05 PROCEDURE — 6370000000 HC RX 637 (ALT 250 FOR IP): Performed by: HOSPITALIST

## 2018-12-05 PROCEDURE — 99222 1ST HOSP IP/OBS MODERATE 55: CPT | Performed by: INTERNAL MEDICINE

## 2018-12-05 PROCEDURE — 83540 ASSAY OF IRON: CPT

## 2018-12-05 PROCEDURE — 99285 EMERGENCY DEPT VISIT HI MDM: CPT

## 2018-12-05 PROCEDURE — 3609012800 HC EGD DIAGNOSTIC ONLY: Performed by: INTERNAL MEDICINE

## 2018-12-05 PROCEDURE — 6370000000 HC RX 637 (ALT 250 FOR IP): Performed by: INTERNAL MEDICINE

## 2018-12-05 PROCEDURE — 6370000000 HC RX 637 (ALT 250 FOR IP): Performed by: NURSE PRACTITIONER

## 2018-12-05 PROCEDURE — 2709999900 HC NON-CHARGEABLE SUPPLY: Performed by: INTERNAL MEDICINE

## 2018-12-05 PROCEDURE — 84484 ASSAY OF TROPONIN QUANT: CPT

## 2018-12-05 PROCEDURE — 74022 RADEX COMPL AQT ABD SERIES: CPT

## 2018-12-05 PROCEDURE — 2580000003 HC RX 258: Performed by: EMERGENCY MEDICINE

## 2018-12-05 PROCEDURE — 85730 THROMBOPLASTIN TIME PARTIAL: CPT

## 2018-12-05 PROCEDURE — 2580000003 HC RX 258: Performed by: INTERNAL MEDICINE

## 2018-12-05 PROCEDURE — G0378 HOSPITAL OBSERVATION PER HR: HCPCS

## 2018-12-05 PROCEDURE — 1200000000 HC SEMI PRIVATE

## 2018-12-05 PROCEDURE — 0DJ08ZZ INSPECTION OF UPPER INTESTINAL TRACT, VIA NATURAL OR ARTIFICIAL OPENING ENDOSCOPIC: ICD-10-PCS | Performed by: INTERNAL MEDICINE

## 2018-12-05 PROCEDURE — 85610 PROTHROMBIN TIME: CPT

## 2018-12-05 PROCEDURE — 3700000000 HC ANESTHESIA ATTENDED CARE: Performed by: INTERNAL MEDICINE

## 2018-12-05 PROCEDURE — 85014 HEMATOCRIT: CPT

## 2018-12-05 PROCEDURE — 99223 1ST HOSP IP/OBS HIGH 75: CPT | Performed by: INTERNAL MEDICINE

## 2018-12-05 PROCEDURE — 96375 TX/PRO/DX INJ NEW DRUG ADDON: CPT

## 2018-12-05 PROCEDURE — 36415 COLL VENOUS BLD VENIPUNCTURE: CPT

## 2018-12-05 PROCEDURE — 82728 ASSAY OF FERRITIN: CPT

## 2018-12-05 PROCEDURE — 85018 HEMOGLOBIN: CPT

## 2018-12-05 RX ORDER — ONDANSETRON 2 MG/ML
4 INJECTION INTRAMUSCULAR; INTRAVENOUS EVERY 6 HOURS PRN
Status: DISCONTINUED | OUTPATIENT
Start: 2018-12-05 | End: 2018-12-07 | Stop reason: HOSPADM

## 2018-12-05 RX ORDER — LIDOCAINE HYDROCHLORIDE 10 MG/ML
INJECTION, SOLUTION EPIDURAL; INFILTRATION; INTRACAUDAL; PERINEURAL PRN
Status: DISCONTINUED | OUTPATIENT
Start: 2018-12-05 | End: 2018-12-05 | Stop reason: SDUPTHER

## 2018-12-05 RX ORDER — SODIUM CHLORIDE 9 MG/ML
INJECTION, SOLUTION INTRAVENOUS CONTINUOUS
Status: DISCONTINUED | OUTPATIENT
Start: 2018-12-05 | End: 2018-12-07

## 2018-12-05 RX ORDER — PROPOFOL 10 MG/ML
INJECTION, EMULSION INTRAVENOUS PRN
Status: DISCONTINUED | OUTPATIENT
Start: 2018-12-05 | End: 2018-12-05 | Stop reason: SDUPTHER

## 2018-12-05 RX ORDER — PANTOPRAZOLE SODIUM 40 MG/1
40 TABLET, DELAYED RELEASE ORAL
Status: DISCONTINUED | OUTPATIENT
Start: 2018-12-05 | End: 2018-12-07 | Stop reason: HOSPADM

## 2018-12-05 RX ORDER — PANTOPRAZOLE SODIUM 40 MG/1
40 TABLET, DELAYED RELEASE ORAL
Status: DISCONTINUED | OUTPATIENT
Start: 2018-12-06 | End: 2018-12-05 | Stop reason: SDUPTHER

## 2018-12-05 RX ORDER — LISINOPRIL 5 MG/1
5 TABLET ORAL DAILY
Status: DISCONTINUED | OUTPATIENT
Start: 2018-12-05 | End: 2018-12-06

## 2018-12-05 RX ORDER — ACETAMINOPHEN 325 MG/1
650 TABLET ORAL EVERY 4 HOURS PRN
Status: DISCONTINUED | OUTPATIENT
Start: 2018-12-05 | End: 2018-12-07 | Stop reason: HOSPADM

## 2018-12-05 RX ORDER — SODIUM CHLORIDE 0.9 % (FLUSH) 0.9 %
10 SYRINGE (ML) INJECTION PRN
Status: DISCONTINUED | OUTPATIENT
Start: 2018-12-05 | End: 2018-12-07 | Stop reason: HOSPADM

## 2018-12-05 RX ORDER — 0.9 % SODIUM CHLORIDE 0.9 %
1000 INTRAVENOUS SOLUTION INTRAVENOUS ONCE
Status: COMPLETED | OUTPATIENT
Start: 2018-12-05 | End: 2018-12-05

## 2018-12-05 RX ORDER — SODIUM CHLORIDE 0.9 % (FLUSH) 0.9 %
10 SYRINGE (ML) INJECTION EVERY 12 HOURS SCHEDULED
Status: DISCONTINUED | OUTPATIENT
Start: 2018-12-05 | End: 2018-12-07 | Stop reason: HOSPADM

## 2018-12-05 RX ORDER — HYDRALAZINE HYDROCHLORIDE 20 MG/ML
10 INJECTION INTRAMUSCULAR; INTRAVENOUS EVERY 6 HOURS PRN
Status: DISCONTINUED | OUTPATIENT
Start: 2018-12-05 | End: 2018-12-06

## 2018-12-05 RX ORDER — POLYETHYLENE GLYCOL 3350 17 G/17G
255 POWDER, FOR SOLUTION ORAL ONCE
Status: COMPLETED | OUTPATIENT
Start: 2018-12-05 | End: 2018-12-05

## 2018-12-05 RX ADMIN — SODIUM CHLORIDE: 9 INJECTION, SOLUTION INTRAVENOUS at 11:08

## 2018-12-05 RX ADMIN — LIDOCAINE HYDROCHLORIDE 50 MG: 10 INJECTION, SOLUTION EPIDURAL; INFILTRATION; INTRACAUDAL at 15:09

## 2018-12-05 RX ADMIN — METOPROLOL TARTRATE 25 MG: 25 TABLET ORAL at 16:54

## 2018-12-05 RX ADMIN — Medication 10 ML: at 10:42

## 2018-12-05 RX ADMIN — BISACODYL 20 MG: 5 TABLET, DELAYED RELEASE ORAL at 19:52

## 2018-12-05 RX ADMIN — PANTOPRAZOLE SODIUM 40 MG: 40 TABLET, DELAYED RELEASE ORAL at 17:43

## 2018-12-05 RX ADMIN — POLYETHYLENE GLYCOL 3350 255 G: 17 POWDER, FOR SOLUTION ORAL at 21:26

## 2018-12-05 RX ADMIN — IRON SUCROSE 300 MG: 20 INJECTION, SOLUTION INTRAVENOUS at 17:55

## 2018-12-05 RX ADMIN — PROPOFOL 150 MG: 10 INJECTION, EMULSION INTRAVENOUS at 15:09

## 2018-12-05 RX ADMIN — SODIUM CHLORIDE 1000 ML: 9 INJECTION, SOLUTION INTRAVENOUS at 08:06

## 2018-12-05 ASSESSMENT — ENCOUNTER SYMPTOMS
COUGH: 0
CONSTIPATION: 0
NAUSEA: 0
ABDOMINAL PAIN: 0
EYES NEGATIVE: 1
VOMITING: 0
WHEEZING: 0
COLOR CHANGE: 0
BLOOD IN STOOL: 1
DIARRHEA: 0
SHORTNESS OF BREATH: 0
COUGH: 1

## 2018-12-05 ASSESSMENT — PAIN SCALES - GENERAL
PAINLEVEL_OUTOF10: 0

## 2018-12-05 ASSESSMENT — PAIN - FUNCTIONAL ASSESSMENT: PAIN_FUNCTIONAL_ASSESSMENT: 0-10

## 2018-12-05 NOTE — H&P
[facility]      Department of Internal Medicine - Staff Internal Medicine Service      History & Physical        CHIEF COMPLAINT:      History Obtained From:  patient    HISTORY OF PRESENT ILLNESS:      The patient is a 70 y.o.  male with PMH significant for colon cancer s/p resection x2 and Atrial fibrillation on Xarelto who presents for evaluation of black stool for the past 4 days. On 11/29 the patient underwent ODALYS with left heart cath for evaluation of new onset atrial fibrillation (diagnosed 10/3/18). Two days later the patient had a bowel movement and recalls that the \"toilet was full of blood\" mixed with red stool and black stool. He attempted to reach the team who performed the procedure but was unable to and therefore presents today for evaluation of ongoing bleeding. He denies nausea, vomiting, hematemesis, abdominal pain, chest pain, or any other concerns. He has felt intermittently lightheaded but has not had any syncopal episodes. Work up in the ED was significant for findings of anemia (HGB 10.4) without evidence of free air or other acute pathology on abdominal XR. Patient is hypertensive at 160/87 on initial evaluation but otherwise hemodynamically stable. Of note, patient states that since his diagnosis of atrial fibrillation 2 months ago he has not been taking his xarelto, but following the procedure on 11/29, he has been taking is daily. Past Medical History:   has a past medical history of Back pain, chronic; Cancer ([address] Cocaine abuse in remission [facility]); ED (erectile dysfunction); Hemorrhoids; Hernia; History of colon cancer; and Murmur, cardiac. Past Surgical History:   has a past surgical history that includes hernia repair (Right, 2009); Tonsillectomy; Colonoscopy; colectomy; Colonoscopy (07/18/2016); knee surgery (Right, 1970's); Cardiac catheterization (11/29/2018); and transesophageal echocardiogram (11/29/2018).      Home Medications:    Prior to Admission medications    Medication Sig Start Date End Date Taking? Authorizing Provider   rivaroxaban (XARELTO) 20 MG TABS tablet Take 20 mg by mouth daily (with breakfast)    Historical Provider, MD   lisinopril (PRINIVIL;ZESTRIL) 5 MG tablet Take 1 tablet by mouth daily 11/29/18   Ziyad Mays MD   tamsulosin (FLOMAX) 0.4 MG capsule Take 1 capsule by mouth daily 11/8/18   Val Ornelas MD   atorvastatin (LIPITOR) 40 MG tablet Take 1 tablet by mouth daily 10/23/18   Cassandra Miller MD    ASPIRIN ADULT LOW STRENGTH 81 MG EC tablet take 1 tablet by mouth once daily 9/20/18   Annabella Ramirez MD   omeprazole (PRILOSEC) 20 MG delayed release capsule take 1 tablet by mouth every evening 9/17/18   Annabella Ramirez MD   ibuprofen (ADVIL;MOTRIN) 800 MG tablet Take 1 tablet by mouth every 6 hours as needed for Pain 7/28/18   Annabella Ramirez MD   metoprolol tartrate (LOPRESSOR) 25 MG tablet take 1 tablet by mouth twice a day 7/26/18   Leonette Romberg, MD   Elastic Bandages & Supports (ABDOMINAL BINDER/ELASTIC XL) MISC 1 Device by Does not apply route as needed (ventral hernia, while ambulating/active) 9/28/16   Saloni Coleman, DO      No current facility-administered medications for this encounter. Allergies:  Codeine and Penicillins    Social History:   reports that he quit smoking about 47 years ago. He has a 0.50 pack-year smoking history. He has never used smokeless tobacco. He reports that he drinks about 5.0 oz of alcohol per week . He reports that he does not use drugs. Family History: family history includes Diabetes in his mother; Heart Attack in his father; Heart Disease in his brother and father. REVIEW OF SYSTEMS:    · Constitutional: there has been no unanticipated weight loss. There's been No change in energy level, No change in activity level. · Eyes: No visual changes or diplopia. No scleral icterus.   · ENT: No Headaches  · Cardiovascular: No chest pain  · Respiratory: No previous pulmonary problems, No cough  · Gastrointestinal: No abdominal pain. No change in bowel or bladder habits. · Genitourinary: No dysuria, trouble voiding, or hematuria. · Musculoskeletal:  No gait disturbance, No weakness or joint complaints. · Integumentary: No rash or pruritis. · Neurological: No headache, diplopia, change in muscle strength, numbness or tingling. No change in gait, balance, coordination, mood, affect, memory, mentation, behavior. · Psychiatric: No anxiety, or depression. · Endocrine: No temperature intolerance. No excessive thirst, fluid intake, or urination. No tremor. · Hematologic/Lymphatic: No abnormal bruising or bleeding, blood clots or swollen lymph nodes. · Allergic/Immunologic: No nasal congestion or hives. PHYSICAL EXAM:      /66   Pulse 53   Temp 97.9 °F (36.6 °C) (Oral)   Resp 16   Ht 6' (1.829 m)   Wt 250 lb (113.4 kg)   SpO2 96%   BMI 33.91 kg/m²    Constitutional and General Appearance: alert, cooperative, no distress and appears stated age  HEENT: PERRL, no cervical lymphadenopathy. No masses palpable. Normal oral mucosa  Respiratory:  · Normal excursion and expansion without use of accessory muscles  · Resp Auscultation: Good respiratory effort. No for increased work of breathing. · On auscultation: clear to auscultation bilaterally  Cardiovascular:  · The apical impulse is not displaced  · Normal S1/S2 without murmurs, rubs, or gallops   · Irregularly irregular  Abdomen:   · No masses or tenderness  · Bowel sounds present  Extremities:  ·  No Cyanosis or Clubbing  ·  Lower extremity edema: No  ·  Skin: Warm and dry  Neurological:  · Mental Status: Level of Alertness: awake Orientation: person, time and place  · Attention/Concentration: normal   · Language: normal     DATA/IMAGING :    Diagnostics:    EKG: Pending  ECHO: not obtained. Ejection fraction: 45-50% (ECHO 06/12/18)  Stress Test: not obtained.   Cardiac Angiography: reviewed. Labs:     CBC:   Recent Labs      12/03/18   1005  12/05/18   0802   WBC  5.9  5.2   HGB  12.1*  10.4*   HCT  38.0*  31.8*   PLT  260  231     BMP:   Recent Labs      12/05/18   0802   NA  139   K  4.1   CO2  28   BUN  22   CREATININE  0.84   LABGLOM  >60   GLUCOSE  103*     BNP: No results for input(s): BNP in the last 72 hours. PT/INR:   Recent Labs      12/05/18   0802   PROTIME  12.0   INR  1.1     APTT:  Recent Labs      12/05/18   0802   APTT  26.3     CARDIAC ENZYMES:No results for input(s): CKTOTAL, CKMB, CKMBINDEX, TROPONINI in the last 72 hours. FASTING LIPID PANEL:  Lab Results   Component Value Date    HDL 46 10/01/2018    TRIG 165 01/03/2017     LIVER PROFILE:  Recent Labs      12/05/18   0802   AST  22   ALT  17   LABALBU  3.7       ASSESSMENT:    1. GI bleed  2. Atrial fibrillation   3. Anemia   4.  HTN     PLAN:    GI bleed, likely upper source   - possibly secondary to patients use of Xarelto, recent ODALYS, and NSAID use   - patient counseled on use of NSAIDs for controlling arthritic pain; has been taking 800mg ibuprofen intermittently for pain relief   - will hold Xarelto and ASA   - consider IV protonix   - NPO status pending GI evaluation and consult   - monitor for ongoing bleeding     Atrial Fibrillation   - new diagnosis (10/03/2018)   - patient not taking Xarelto for past 2 months; no evidence of thrombus on recent ODALYS  - hold Xarelto and ASA pending GI consultation     Anemia secondary to upper GI bleed   - HGB 10.4  - if HGB drops below 7, will replace with 1 unit packed RBCs   - continue to monitor; f/u H/H     HTN  - cont lopressor, lisinopril     Gabrielle Valdes MS3   [place]   12/5/2018 at 9:49 AM

## 2018-12-05 NOTE — OP NOTE
PROCEDURE NOTE    DATE OF PROCEDURE: 12/5/2018     SURGEON: Miguel Zurita MD  Facility: [facility]  ASSISTANT: None  Anesthesia: maC  PREOPERATIVE DIAGNOSIS:   RECTAL BLEEDING VS MELENA   DROPPING HGB   ON XERALTO  NSAIDs USE       Diagnosis:  LONG C2M3 SEGEMENT BE,WITH ANOTHER ISLAND ABOVE IT , NOT BIOPSIED BECAUSE OF PRESENTATION   NEEDS BXS LATER   GASTRITIS, MILD , NO BLEEDING   NO BLOOD OLD/FRESH IN THE UPPER GI TRACT    POSTOPERATIVE DIAGNOSIS: As described below    OPERATION: Upper GI endoscopy with Biopsy    ANESTHESIA: Moderate Sedation     ESTIMATED BLOOD LOSS: Less than 50 ml    COMPLICATIONS: None. SPECIMENS:  Was Obtained:     LONG C2M3 SEGEMENT BE,WITH ANOTHER ISLAND ABOVE IT , NOT BIOPSIED BECAUSE OF PRESENTATION   NEEDS BXS LATER   GASTRITIS, MILD , NO BLEEDING   NO BLOOD OLD/FRESH IN THE UPPER GI TRACT    HISTORY: The patient is a 70y.o. year old male with history of above preop diagnosis. I recommended esophagogastroduodenoscopy with possible biopsy and I explained the risk, benefits, expected outcome, and alternatives to the procedure. Risks included but are not limited to bleeding, infection, respiratory distress, hypotension, and perforation of the esophagus, stomach, or duodenum. Patient understands and is in agreement. PROCEDURE: The patient was given IV conscious sedation. The patient's SPO2 remained above 90% throughout the procedure. The gastroscope was inserted orally and advanced under direct vision through the esophagus, through the stomach, through the pylorus, and into the descending duodenum. Post sedation note : The patient's SPO2 remained above 90% throughout the procedure. the vital signs remained stable , and no immediate complication form the procedure noted, patient will be ready for d/c when criteria is met .       Findings:    Retropharyngeal area was grossly normal appearing    Esophagus: abnormal: LONG C2M3 SEGEMENT BE,WITH ANOTHER ISLAND ABOVE IT , NOT BIOPSIED BECAUSE OF PRESENTATION   NEEDS BXS LATER     Stomach:  GASTRITIS, MILD , NO BLEEDING   NO BLOOD OLD/FRESH IN THE UPPER GI TRACT      Duodenum:     Descending: normal    Bulb: normal    The scope was removed and the patient tolerated the procedure well. Recommendations/Plan:   1. REPEAT EGD OUT PT LATER FOR BXS   2. PPI  3. COLONOSCOPY IN AM   4.  MONITOR H/H     Electronically signed by Alicia Dejesus MD  on 12/5/2018 at 3:14 PM

## 2018-12-05 NOTE — ANESTHESIA PRE PROCEDURE
ALT 17 12/05/2018       POC Tests: No results for input(s): POCGLU, POCNA, POCK, POCCL, POCBUN, POCHEMO, POCHCT in the last 72 hours. Coags:   Lab Results   Component Value Date    PROTIME 12.0 12/05/2018    INR 1.1 12/05/2018    APTT 26.3 12/05/2018       HCG (If Applicable): No results found for: PREGTESTUR, PREGSERUM, HCG, HCGQUANT     ABGs: No results found for: PHART, PO2ART, DPL2EBQ, MMF5TYJ, BEART, Q2FYRPAE     Type & Screen (If Applicable):  No results found for: [facility]    Anesthesia Evaluation  Patient summary reviewed no history of anesthetic complications:   Airway: Mallampati: II  TM distance: >3 FB   Neck ROM: full  Mouth opening: > = 3 FB Dental:    (+) upper dentures and partials      Pulmonary:Negative Pulmonary ROS                              Cardiovascular:  Exercise tolerance: good (>4 METS),   (+) dysrhythmias (currently in NSR ): atrial fibrillation,       ECG reviewed      Echocardiogram reviewed                  Neuro/Psych:   Negative Neuro/Psych ROS              GI/Hepatic/Renal:             Endo/Other:                     Abdominal:           Vascular:                                   Echo Summary  Mild left ventricular enlargement with mildly reduced systolic function;  Estimated left ventricular ejection fraction 45%. (patient converted from  AFib to NSR during the study)  Left atrium is moderately dilated. Moderate severity mitral regurgitation. Mildly thickened and calcified aortic valve leaflets with evidence of mild  stenosis. Peak instantaneous gradient 28 mmHg and mean gradient 14 mmHg. [facility] 11-30-18  Angiographic Findings      Cardiac Arteries and Lesion Findings     LMCA: Normal 0% stenosis. LAD: Diffuse irregularities 30-40%. LCx: Single stenosis.        Lesion on Prox CX: Ostial.40% stenosis.     RCA: Normal 0% stenosis.     Coronary Tree      Dominance: Left     LV Analysis  LV function assessed as:Normal.  Ejection

## 2018-12-05 NOTE — CARE COORDINATION
Case Management Initial Discharge Plan  Breanan Cardona,             Met with:patient to discuss discharge plans. Information verified: address, contacts, phone number, , insurance Yes  PCP: Aurora Fernandes MD  Date of last visit: 1 months ago     Insurance Provider: [employer]    Discharge Planning    Living Arrangements:      Support Systems:       Home has 2 stories  3 stairs to climb to get into front door, 1 flight stairs to climb to reach second floor  Location of bedroom/bathroom in home 2nd floor    Patient able to perform ADL's:Independent    Current Services (outpatient & in home) none  DME equipment: none  DME provider:     Pharmacy: [facility] on [facility]   Potential Assistance Purchasing Medications:     Does patient want to participate in local refill/ meds to beds program?       Potential Assistance Needed:       Patient agreeable to home care: No  [place] of choice provided:  n/a    Prior SNF/Rehab Placement and Facility:   Agreeable to SNF/Rehab: No  [place] of choice provided: n/a   Evaluation: no    Expected Discharge date:     Patient expects to be discharged to: Follow Up Appointment: Best Day/ Time:      Transportation provider: self  Transportation arrangements needed for discharge: No    Readmission Risk              Risk of Unplanned Readmission:        0             Does patient have a readmission risk score greater than 14?: not calculated  If yes, follow-up appointment must be made within 7 days of discharge. Discharge Plan: return to home, no skilled needs identified.           Electronically signed by Paty Kumar RN on 18 at 9:46 AM

## 2018-12-05 NOTE — ED PROVIDER NOTES
[address] [facility]  Emergency Department        Pt Name: Nina Colmenares  MRN: [id number]  [place] 1955  Date of evaluation: 12/5/18    CHIEF COMPLAINT       Chief Complaint   Patient presents with    Rectal Bleeding     pt reported red tarry stools       HISTORY OF PRESENT ILLNESS  (Location/Symptom, Timing/Onset, Context/Setting, Quality, Duration, Modifying Factors, Severity.)      Nina Colmenares is a 70 y.o. male who presentswith Rectal bleeding. Patient reports dark red tarry stools multiple episodes over the past day. Previous history of colon cancer status post resection. No vomiting no abdominal pain. The patient does take Xarelto for atrial fibrillation and recently had a ODALYS performed which reportedly they had difficulty passing the scope but no bleeding complications that he is aware of. He has not taken his Xarelto today. PAST MEDICAL / SURGICAL / SOCIAL / FAMILY HISTORY      has a past medical history of Back pain, chronic; Cancer ([facility] [address] Cocaine abuse in remission [facility]); ED (erectile dysfunction); Hemorrhoids; Hernia; History of colon cancer; and Murmur, cardiac.     has a past surgical history that includes hernia repair (Right, 2009); Tonsillectomy; Colonoscopy; colectomy; Colonoscopy (07/18/2016); knee surgery (Right, 1970's); Cardiac catheterization (11/29/2018); and transesophageal echocardiogram (11/29/2018). Social History     Social History    Marital status: Single     Spouse name: N/A    Number of children: N/A    Years of education: N/A     Occupational History    Not on file.      Social History Main Topics    Smoking status: Former Smoker     Packs/day: 0.50     Years: 1.00     Quit date: 1971    Smokeless tobacco: Never Used    Alcohol use 5.0 oz/week     10 Standard drinks or equivalent per week      Comment: 3 -4 times a week    Drug use: No      Comment: Cocaine use in past    Sexual activity: Yes     Partners: Female     Other Topics Concern    Not on file     Social History Narrative    No narrative on file       Family History   Problem Relation Age of Onset    Diabetes Mother     Heart Attack Father     Heart Disease Father     Heart Disease Brother        Allergies:  Codeine and Penicillins    Home Medications:  Prior to Admission medications    Medication Sig Start Date End Date Taking? Authorizing Provider   rivaroxaban (XARELTO) 20 MG TABS tablet Take 20 mg by mouth daily (with breakfast)    Historical Provider, MD   lisinopril (PRINIVIL;ZESTRIL) 5 MG tablet Take 1 tablet by mouth daily 11/29/18   Owen Burgos MD   tamsulosin (FLOMAX) 0.4 MG capsule Take 1 capsule by mouth daily 11/8/18   Rabia Gutierrez MD   atorvastatin (LIPITOR) 40 MG tablet Take 1 tablet by mouth daily 10/23/18   MD SHEY Ziegler ASPIRIN ADULT LOW STRENGTH 81 MG EC tablet take 1 tablet by mouth once daily 9/20/18   Frankie Narayan MD   omeprazole (PRILOSEC) 20 MG delayed release capsule take 1 tablet by mouth every evening 9/17/18   Frankie Narayan MD   ibuprofen (ADVIL;MOTRIN) 800 MG tablet Take 1 tablet by mouth every 6 hours as needed for Pain 7/28/18   Frankie Narayan MD   metoprolol tartrate (LOPRESSOR) 25 MG tablet take 1 tablet by mouth twice a day 7/26/18   Ariadna Lu MD   Elastic Bandages & Supports (ABDOMINAL BINDER/ELASTIC XL) MISC 1 Device by Does not apply route as needed (ventral hernia, while ambulating/active) 9/28/16   Nidia Ornelasigham, DO       REVIEW OF SYSTEMS    (2-9 systems for level 4, 10 or more for level 5)      Review of Systems   Constitutional: Negative for chills and fever. Eyes: Negative for visual disturbance. Respiratory: Negative for cough, shortness of breath and wheezing. Cardiovascular: Negative for chest pain. Gastrointestinal: Positive for blood in stool. Negative for abdominal pain, constipation, diarrhea, nausea and vomiting. Genitourinary: Negative for dysuria.    Musculoskeletal: Negative for REPORTED 9 - 20    Calcium 8.5 (L) 8.6 - 10.4 mg/dL    Sodium 139 135 - 144 mmol/L    Potassium 4.1 3.7 - 5.3 mmol/L    Chloride 104 98 - 107 mmol/L    CO2 28 20 - 31 mmol/L    Anion Gap 7 (L) 9 - 17 mmol/L    GFR Non-African American >60 >60 mL/min    GFR African American >60 >60 mL/min    GFR Comment          GFR Staging NOT REPORTED    HEPATIC FUNCTION PANEL   Result Value Ref Range    Alb 3.7 3.5 - 5.2 g/dL    Alkaline Phosphatase 66 40 - 129 U/L    ALT 17 5 - 41 U/L    AST 22 <40 U/L    Total Bilirubin 0.28 (L) 0.3 - 1.2 mg/dL    Bilirubin, Direct <0.08 <0.31 mg/dL    Bilirubin, Indirect CANNOT BE CALCULATED 0.00 - 1.00 mg/dL    Total Protein 6.5 6.4 - 8.3 g/dL    Globulin NOT REPORTED 1.5 - 3.8 g/dL    Albumin/Globulin Ratio 1.3 1.0 - 2.5   PROTIME-INR   Result Value Ref Range    Protime 12.0 9.0 - 12.0 sec    INR 1.1    APTT   Result Value Ref Range    PTT 26.3 20.5 - 30.5 sec   Troponin   Result Value Ref Range    Troponin T <0.03 <0.03 ng/mL    Troponin Interp             IMPRESSION: Hemoglobin 12.1-10.4 from 2 days ago. BMP lipase LFTs otherwise unremarkable. RADIOLOGY:  Xr Acute Abd Series Chest 1 Vw    Result Date: 12/5/2018  EXAMINATION: TWO XRAY VIEWS OF THE ABDOMEN AND SINGLE  XRAY VIEW OF THE CHEST 12/5/2018 8:10 am COMPARISON: None. HISTORY: ORDERING SYSTEM PROVIDED HISTORY: Recent ODALYS, dark tarry stools, on xarelto, eval for free air TECHNOLOGIST PROVIDED HISTORY: Recent ODALYS, dark tarry stools, on xarelto, eval for free air FINDINGS: Cardiac monitoring leads overlie the chest.  Borderline cardiomegaly. No effusion. No pneumothorax. No focal airspace consolidation. The bowel gas pattern is nonspecific. No air-filled dilated loops of bowel are identified. No suspicious calcifications. Extensive postoperative changes of the pelvis. Degenerative changes of the lumbar spine with slight levoscoliosis. There is no free subdiaphragmatic air. 1. No free subdiaphragmatic air.  2. No focal airspace consolidation. 3. No pathologically dilated loops of bowel. EKG:  EKG Interpretation    Interpreted by emergency department physician    Rhythm: sinus bradycardia and sinus arrhythmia  Rate: 54  Axis: normal  Ectopy: none  Conduction: normal  ST Segments: normal  T Waves: normal  Q Waves: aVl    EKG  Impression: sinus arrhythmia and sinus bradycardia    Liseth Alston MD        POC ULTRASOUND:  Not clinically indicated. EMERGENCY DEPARTMENT COURSE:  9:06 AM Labs reviewed and show nearly 2 g hemoglobin drop within 2 days. Otherwise labs unremarkable. Acute abdominal series negative for any signs of free air. Will admit patient to medicine, has been accepted under Dr. Rivera  service. GI consult paged. PROCEDURES:  Not clinically indicated. CONSULTS:  IP CONSULT TO INTERNAL MEDICINE  IP CONSULT TO GI    CRITICAL CARE:  None    FINAL IMPRESSION      1. Gastrointestinal hemorrhage, unspecified gastrointestinal hemorrhage type    2.  Acute blood loss anemia          DISPOSITION / PLAN     DISPOSITION Admitted 2018 09:05:42 AM      PATIENT REFERRED TO:  Cassandra Rickeyarleilani [place], [address] [facility]  [phone]            DISCHARGE MEDICATIONS:  New Prescriptions    No medications on file       Dewey Bain MD  Attending Emergency Physician  [address] [facility]  ED    (Please note that portions of this note were completed with a voice recognition program.  Efforts were made to edit thedictations but occasionally words are mis-transcribed.)           Liseth Alston MD  [phone]

## 2018-12-05 NOTE — CONSULTS
Neuro:  A&O x Three, No focal neurological deficits    LABS and IMAGING:     CBC  Recent Labs      12/03/18   1005  12/05/18   0802   WBC  5.9  5.2   HGB  12.1*  10.4*   MCV  92.5  90.9   RDW  12.7  12.5   PLT  260  231       ANEMIA STUDIES  Recent Labs      12/05/18   1227   LABIRON  9*   TIBC  321   FERRITIN  16*       BMP  Recent Labs      12/05/18   0802   NA  139   K  4.1   CL  104   CO2  28   BUN  22   CREATININE  0.84   GLUCOSE  103*   CALCIUM  8.5*       LFTS  Recent Labs      12/05/18   0802   ALKPHOS  66   ALT  17   AST  22   BILITOT  0.28*   BILIDIR  <0.08   LABALBU  3.7       AMYLASE/LIPASE/AMMONIA  No results for input(s): AMYLASE, LIPASE, AMMONIA in the last 72 hours. PT/INR  Recent Labs      12/05/18   0802   PROTIME  12.0   INR  1.1        ASSESSMENT:  1. Hematochezia:  Likely due to anticoagulation xarelto usage. 2.  Gastritis: Due to NSAID usage  3. Paraoxysmal atrial fibrillation  4. H/o colon cancer    Plan:  1. Patient had an EGD today , which showed  Mild gastritis, no bleeding. Abnormal oseophagus, not biopsied because of presentation. Will need repeat EGD outpatient later for biopsies. 2.Colonoscopy tomorrow am.  3.Monitor H&h, transfuse to keep Hb greater than 8.  4 Protonix 40 mg OD  5. Please call GI with any questions, concerns. Attending Physician Statement  I have discussed the care of Nakul Enriquez and   I have examined the patient myselft and taken ros and hpi , including pertinent history and exam findings,  with the author of this note . I have reviewed the key elements of all parts of the encounter with the nurse practitioner/resident.     I agree with the assessment, plan and orders as documented by the above health care provider   Addition/summary:  RECTAL BLEEDING VS MELENA   DROPPING HGB   ON XERALTO  NSAIDs USE      egd now   PPI   h/h monitor   if negative colonoscopy     Electronically signed by Madison Wallace MD       This plan was formulated in collaboration with     Electronically signed by:  Fredy Molina MD   PGY-1, Internal Medicine

## 2018-12-05 NOTE — H&P
[facility]    HISTORY AND PHYSICAL EXAMINATION            Date:   12/5/2018  Patient name:  Duaine Kayser  Date of admission:  12/5/2018  6:59 AM  MRN:   [id number]  YOB: 1955    CHIEF COMPLAINT     History Obtained From:  Patient and chart review. HISTORY OF PRESENT ILLNESS      The patient is a 70 y.o.  male who is admitted to the hospital for GI bleed. Patient has had ODALYS on 11/29 for evaluation of new onset atrial fibrillation. And started taking xarelto, aspirin 2 days after which he noticed blood in his stools. Denies any belly pain, loose stools nausea or vomiting COUGHING up blood, the ground emesis. Has had dizziness did not pass out. History of Motrin inatke for chronic pain. No History of PUD  Medical history significant for colon cancer status post resection in 2014 at [facility] clinic, as not recently seen an oncologist.  His hemoglobin dropped from 12-10.4.,  Platelet count 957, INR 1.1, normal LFTs     Patient was prescribed Xarelto 2 months ago but started taking it 2 days prior to his bleeding episode. PAST MEDICAL HISTORY       has a past medical history of Back pain, chronic; Cancer ([facility] [address] Cocaine abuse in remission [facility]); ED (erectile dysfunction); Hemorrhoids; Hernia; History of colon cancer; and Murmur, cardiac. PAST SURGICAL HISTORY       has a past surgical history that includes hernia repair (Right, 2009); Tonsillectomy; Colonoscopy; colectomy; Colonoscopy (07/18/2016); knee surgery (Right, 1970's); Cardiac catheterization (11/29/2018); and transesophageal echocardiogram (11/29/2018). HOME MEDICATIONS        Prior to Admission medications    Medication Sig Start Date End Date Taking?  Authorizing Provider   rivaroxaban (XARELTO) 20 MG TABS tablet Take 20 mg by mouth daily (with breakfast)    Historical Provider, MD   lisinopril (PRINIVIL;ZESTRIL) 5 MG tablet Take 1 tablet by mouth daily 11/29/18   Lynn Greenfield MD   tamsulosin (FLOMAX) 0.4 MG capsule Take 1 capsule by mouth daily 11/8/18   Cindy Burkett MD   atorvastatin (LIPITOR) 40 MG tablet Take 1 tablet by mouth daily 10/23/18   Cassandra Morgan MD    ASPIRIN ADULT LOW STRENGTH 81 MG EC tablet take 1 tablet by mouth once daily 9/20/18   Cheryl Nguyen MD   omeprazole (PRILOSEC) 20 MG delayed release capsule take 1 tablet by mouth every evening 9/17/18   Cheryl Nguyen MD   ibuprofen (ADVIL;MOTRIN) 800 MG tablet Take 1 tablet by mouth every 6 hours as needed for Pain 7/28/18   Cheryl Nguyen MD   metoprolol tartrate (LOPRESSOR) 25 MG tablet take 1 tablet by mouth twice a day 7/26/18   Leonora Hatchet, MD   Elastic Bandages & Supports (ABDOMINAL BINDER/ELASTIC XL) MISC 1 Device by Does not apply route as needed (ventral hernia, while ambulating/active) 9/28/16   Nikia Talley, DO       ALLERGIES      Codeine and Penicillins    SOCIAL HISTORY       reports that he quit smoking about 47 years ago. He has a 0.50 pack-year smoking history. He has never used smokeless tobacco. He reports that he drinks about 5.0 oz of alcohol per week . He reports that he does not use drugs. FAMILY HISTORY      family history includes Diabetes in his mother; Heart Attack in his father; Heart Disease in his brother and father. REVIEW OF SYSTEMS      · Constitutional: Negative for weight loss  · Eyes: Negative for visual changes, diplopia, scleral icterus. · ENT: Negative for Headaches, hearing loss, vertigo, mouth sores, sore throat. · Cardiovascular: Negative for lightheadedness/orthostatic symptoms ,chest pain, dyspnea on exertion, palpitations or loss of consciousness. · Respiratory: Negative for cough or wheezing, sputum production, hemoptysis, pleuritic pain. · Gastrointestinal: Positive for  blood in stools. · Genitourinary:Negative for change in bladder habits, dysuria, trouble voiding, hematuria.   · Musculoskeletal: Negative for gait disturbance, weakness, joint complaints. · Integumentary: Negative for rash, pruritis. · Neurological: Negative for headache, dizziness, change in muscle strength, numbness/tingling, change in gait, balance, coordination,   · Psychiatric: negative for change in mood, affect, memory, mentation, behavior. · Endocrine: negative for temperature intolerance, excessive thirst, fluid intake, or urination, tremor. · Hematologic/Lymphatic: negative for abnormal bruising or bleeding. PHYSICAL EXAM      BP (!) 142/78   Pulse 53   Temp 97.7 °F (36.5 °C) (Oral)   Resp 13   Ht 5' 11\" (1.803 m)   Wt 191 lb 12.8 oz (87 kg)   SpO2 100%   BMI 26.75 kg/m²      · General appearance: well nourished  · HEENT: Head: Normocephalic, no lesions, without obvious abnormality. · Lungs: clear to auscultation bilaterally  · Heart: regular rate and rhythm, S1, S2 normal, no murmur, click, rub or gallop  · Abdomen: soft, non-tender; bowel sounds normal; no masses,  no organomegaly  · Extremities: extremities normal, atraumatic, no cyanosis or edema  · Neurological: Gait normal. Reflexes normal and symmetric.  Sensation grossly normal  · Skin - no rash, no lump   · Eye no icterus no redness  · Psych-normal affect   · NEURO-no limb weakness  No facial droop  · Lymphatic system-no lymphadenopathy no splenomegaly     DIAGNOSTICS      Laboratory Testing:  CBC:   Recent Labs      12/05/18   0802   WBC  5.2   HGB  10.4*   PLT  231     BMP:    Recent Labs      12/05/18   0802   NA  139   K  4.1   CL  104   CO2  28   BUN  22   CREATININE  0.84   GLUCOSE  103*     S. Calcium:  Recent Labs      12/05/18   0802   CALCIUM  8.5*     INR:   Recent Labs      12/05/18   0802   INR  1.1     Hepatic functions:   Recent Labs      12/05/18   0802   ALKPHOS  66   ALT  17   AST  22   PROT  6.5   BILITOT  0.28*   BILIDIR  <0.08   LABALBU  3.7   Thyroid functions:   Lab Results   Component Value Date    TSH 4.17 10/01/2018

## 2018-12-05 NOTE — ED NOTES
Dr Divina Cummings at bedside to update pt on results and plans to admit. No concerns voiced. NAD noted at this time.       Kitty Sweet RN  12/05/18 8051

## 2018-12-06 ENCOUNTER — ANESTHESIA (OUTPATIENT)
Dept: ENDOSCOPY | Age: 63
DRG: 379 | End: 2018-12-06
Payer: MEDICARE

## 2018-12-06 ENCOUNTER — ANESTHESIA EVENT (OUTPATIENT)
Dept: ENDOSCOPY | Age: 63
DRG: 379 | End: 2018-12-06
Payer: MEDICARE

## 2018-12-06 VITALS
DIASTOLIC BLOOD PRESSURE: 48 MMHG | SYSTOLIC BLOOD PRESSURE: 98 MMHG | RESPIRATION RATE: 25 BRPM | OXYGEN SATURATION: 98 %

## 2018-12-06 LAB
ABSOLUTE EOS #: 0.27 K/UL (ref 0–0.44)
ABSOLUTE IMMATURE GRANULOCYTE: <0.03 K/UL (ref 0–0.3)
ABSOLUTE LYMPH #: 1.13 K/UL (ref 1.1–3.7)
ABSOLUTE MONO #: 0.63 K/UL (ref 0.1–1.2)
ANION GAP SERPL CALCULATED.3IONS-SCNC: 10 MMOL/L (ref 9–17)
BASOPHILS # BLD: 1 % (ref 0–2)
BASOPHILS ABSOLUTE: 0.03 K/UL (ref 0–0.2)
BUN BLDV-MCNC: 11 MG/DL (ref 8–23)
BUN/CREAT BLD: ABNORMAL (ref 9–20)
CALCIUM SERPL-MCNC: 8.2 MG/DL (ref 8.6–10.4)
CHLORIDE BLD-SCNC: 111 MMOL/L (ref 98–107)
CO2: 24 MMOL/L (ref 20–31)
CREAT SERPL-MCNC: 0.72 MG/DL (ref 0.7–1.2)
DIFFERENTIAL TYPE: ABNORMAL
EOSINOPHILS RELATIVE PERCENT: 5 % (ref 1–4)
GFR AFRICAN AMERICAN: >60 ML/MIN
GFR NON-AFRICAN AMERICAN: >60 ML/MIN
GFR SERPL CREATININE-BSD FRML MDRD: ABNORMAL ML/MIN/{1.73_M2}
GFR SERPL CREATININE-BSD FRML MDRD: ABNORMAL ML/MIN/{1.73_M2}
GLUCOSE BLD-MCNC: 90 MG/DL (ref 70–99)
HCT VFR BLD CALC: 28.6 % (ref 40.7–50.3)
HEMOGLOBIN: 9.1 G/DL (ref 13–17)
IMMATURE GRANULOCYTES: 0 %
LYMPHOCYTES # BLD: 19 % (ref 24–43)
MCH RBC QN AUTO: 29.6 PG (ref 25.2–33.5)
MCHC RBC AUTO-ENTMCNC: 31.8 G/DL (ref 28.4–34.8)
MCV RBC AUTO: 93.2 FL (ref 82.6–102.9)
MONOCYTES # BLD: 10 % (ref 3–12)
NRBC AUTOMATED: 0 PER 100 WBC
PDW BLD-RTO: 12.5 % (ref 11.8–14.4)
PLATELET # BLD: 213 K/UL (ref 138–453)
PLATELET ESTIMATE: ABNORMAL
PMV BLD AUTO: 10.2 FL (ref 8.1–13.5)
POTASSIUM SERPL-SCNC: 3.8 MMOL/L (ref 3.7–5.3)
RBC # BLD: 3.07 M/UL (ref 4.21–5.77)
RBC # BLD: ABNORMAL 10*6/UL
SEG NEUTROPHILS: 65 % (ref 36–65)
SEGMENTED NEUTROPHILS ABSOLUTE COUNT: 3.96 K/UL (ref 1.5–8.1)
SODIUM BLD-SCNC: 145 MMOL/L (ref 135–144)
WBC # BLD: 6 K/UL (ref 3.5–11.3)
WBC # BLD: ABNORMAL 10*3/UL

## 2018-12-06 PROCEDURE — 36415 COLL VENOUS BLD VENIPUNCTURE: CPT

## 2018-12-06 PROCEDURE — 85025 COMPLETE CBC W/AUTO DIFF WBC: CPT

## 2018-12-06 PROCEDURE — 6360000002 HC RX W HCPCS: Performed by: HOSPITALIST

## 2018-12-06 PROCEDURE — 7100000011 HC PHASE II RECOVERY - ADDTL 15 MIN: Performed by: INTERNAL MEDICINE

## 2018-12-06 PROCEDURE — 6360000002 HC RX W HCPCS: Performed by: NURSE ANESTHETIST, CERTIFIED REGISTERED

## 2018-12-06 PROCEDURE — 3609009500 HC COLONOSCOPY DIAGNOSTIC OR SCREENING: Performed by: INTERNAL MEDICINE

## 2018-12-06 PROCEDURE — 96375 TX/PRO/DX INJ NEW DRUG ADDON: CPT

## 2018-12-06 PROCEDURE — 2580000003 HC RX 258: Performed by: NURSE ANESTHETIST, CERTIFIED REGISTERED

## 2018-12-06 PROCEDURE — 2580000003 HC RX 258: Performed by: HOSPITALIST

## 2018-12-06 PROCEDURE — 0DJD8ZZ INSPECTION OF LOWER INTESTINAL TRACT, VIA NATURAL OR ARTIFICIAL OPENING ENDOSCOPIC: ICD-10-PCS | Performed by: INTERNAL MEDICINE

## 2018-12-06 PROCEDURE — 80048 BASIC METABOLIC PNL TOTAL CA: CPT

## 2018-12-06 PROCEDURE — 3700000000 HC ANESTHESIA ATTENDED CARE: Performed by: INTERNAL MEDICINE

## 2018-12-06 PROCEDURE — 99233 SBSQ HOSP IP/OBS HIGH 50: CPT | Performed by: INTERNAL MEDICINE

## 2018-12-06 PROCEDURE — 96365 THER/PROPH/DIAG IV INF INIT: CPT

## 2018-12-06 PROCEDURE — 7100000010 HC PHASE II RECOVERY - FIRST 15 MIN: Performed by: INTERNAL MEDICINE

## 2018-12-06 PROCEDURE — 1200000000 HC SEMI PRIVATE

## 2018-12-06 PROCEDURE — G0378 HOSPITAL OBSERVATION PER HR: HCPCS

## 2018-12-06 PROCEDURE — 3700000001 HC ADD 15 MINUTES (ANESTHESIA): Performed by: INTERNAL MEDICINE

## 2018-12-06 RX ORDER — METOPROLOL TARTRATE 5 MG/5ML
5 INJECTION INTRAVENOUS EVERY 6 HOURS PRN
Status: DISCONTINUED | OUTPATIENT
Start: 2018-12-06 | End: 2018-12-07 | Stop reason: HOSPADM

## 2018-12-06 RX ORDER — ATORVASTATIN CALCIUM 40 MG/1
40 TABLET, FILM COATED ORAL DAILY
Status: DISCONTINUED | OUTPATIENT
Start: 2018-12-06 | End: 2018-12-07 | Stop reason: HOSPADM

## 2018-12-06 RX ORDER — LISINOPRIL 10 MG/1
10 TABLET ORAL DAILY
Status: DISCONTINUED | OUTPATIENT
Start: 2018-12-06 | End: 2018-12-07

## 2018-12-06 RX ORDER — SODIUM CHLORIDE 9 MG/ML
INJECTION, SOLUTION INTRAVENOUS CONTINUOUS PRN
Status: DISCONTINUED | OUTPATIENT
Start: 2018-12-06 | End: 2018-12-08 | Stop reason: SDUPTHER

## 2018-12-06 RX ORDER — PROPOFOL 10 MG/ML
INJECTION, EMULSION INTRAVENOUS PRN
Status: DISCONTINUED | OUTPATIENT
Start: 2018-12-06 | End: 2018-12-08 | Stop reason: SDUPTHER

## 2018-12-06 RX ORDER — TAMSULOSIN HYDROCHLORIDE 0.4 MG/1
0.4 CAPSULE ORAL DAILY
Status: DISCONTINUED | OUTPATIENT
Start: 2018-12-06 | End: 2018-12-07 | Stop reason: HOSPADM

## 2018-12-06 RX ADMIN — Medication 10 ML: at 08:21

## 2018-12-06 RX ADMIN — HYDRALAZINE HYDROCHLORIDE 10 MG: 20 INJECTION INTRAMUSCULAR; INTRAVENOUS at 08:19

## 2018-12-06 RX ADMIN — Medication 10 ML: at 20:48

## 2018-12-06 RX ADMIN — IRON SUCROSE 200 MG: 20 INJECTION, SOLUTION INTRAVENOUS at 22:36

## 2018-12-06 RX ADMIN — PROPOFOL 200 MG: 10 INJECTION, EMULSION INTRAVENOUS at 13:58

## 2018-12-06 RX ADMIN — SODIUM CHLORIDE: 9 INJECTION, SOLUTION INTRAVENOUS at 13:55

## 2018-12-06 RX ADMIN — SODIUM CHLORIDE: 9 INJECTION, SOLUTION INTRAVENOUS at 21:09

## 2018-12-06 ASSESSMENT — PAIN SCALES - GENERAL: PAINLEVEL_OUTOF10: 0

## 2018-12-06 ASSESSMENT — PAIN - FUNCTIONAL ASSESSMENT: PAIN_FUNCTIONAL_ASSESSMENT: 0-10

## 2018-12-06 NOTE — PLAN OF CARE
Problem:  Bowel Function - Altered:  Goal: Bowel elimination is within specified parameters  Bowel elimination is within specified parameters  Outcome: Ongoing      Problem: Fluid Volume - Imbalance:  Goal: Will show no signs and symptoms of excessive bleeding  Will show no signs and symptoms of excessive bleeding  Outcome: Ongoing    Goal: Absence of imbalanced fluid volume signs and symptoms  Absence of imbalanced fluid volume signs and symptoms  Outcome: Ongoing      Problem: Nausea/Vomiting:  Goal: Absence of nausea/vomiting  Absence of nausea/vomiting  Outcome: Ongoing    Goal: Able to drink  Able to drink  Outcome: Ongoing  Pt NPO  Goal: Able to eat  Able to eat  Outcome: Ongoing  Pt NPO  Goal: Ability to achieve adequate nutritional intake will improve  Ability to achieve adequate nutritional intake will improve  Outcome: Ongoing  Pt NPO

## 2018-12-06 NOTE — OP NOTE
BLEEDING , WILL GET BLEEDING SCAN       Electronically signed by Jamison Martino MD  on 12/6/2018 at 2:10 PM

## 2018-12-06 NOTE — PROGRESS NOTES
Physical Therapy  DATE: 2018    NAME: Calin Real  MRN: [id number]   : 1955    Patient not seen this date for Physical Therapy due to:  [] Blood transfusion in progress  [] Hemodialysis  []  Patient Declined  [] Spine Precautions   [] Strict Bedrest  [] Surgery/ Procedure  [] Testing      [] Other        [x] PT being discontinued at this time. Patient independent. No further needs. Discussed with pt--he denies PT needs, states he's been walking to the bathroom independently; states he's having less blood in his stool, but going for colonoscopy this PM; Defer PT eval d/t pt independence. [] PT being discontinued at this time as the patient has been transferred to palliative care. No further needs.     [facility], PT

## 2018-12-06 NOTE — FLOWSHEET NOTE
Patient had door shut and room darkened. Although Television was on it is clear that the patient was not paying attention to it. Patient indicated that he is feeling anxious and scared over the possible diagnosis of cancer as he had it once before. Patient is hoping that the colonoscopy that he is undergoing today will show nothing or something minor to explain the major bleeding. Patient began talking about his fears when the bed arrived to escort him to the procedure.  prayed with patient and then let patient know that he can reach out to chaplains if he needs to talk or not feeling good. Patient thanked . 12/06/18 1151   Encounter Summary   Services provided to: Patient   Referral/Consult From: Livescribe   Support System Unknown   Continue Visiting (12.6.2018)   Complexity of Encounter Moderate   Length of Encounter 15 minutes   Spiritual Assessment Completed Yes   Crisis   Type Emotional distress   Assessment Anxious; Fearful; Anticipatory grief;Despair   Intervention Active listening;Explored feelings, thoughts, concerns;Prayer;Discussed illness/injury and it's impact   Outcome Expressed gratitude     Electronically signed by Sheila Dwyer on 12/6/2018 at 11:57 AM

## 2018-12-06 NOTE — PLAN OF CARE
Problem:  Bowel Function - Altered:  Goal: Bowel elimination is within specified parameters  Bowel elimination is within specified parameters   Outcome: Ongoing      Problem: Fluid Volume - Imbalance:  Goal: Will show no signs and symptoms of excessive bleeding  Will show no signs and symptoms of excessive bleeding   Outcome: Ongoing    Goal: Absence of imbalanced fluid volume signs and symptoms  Absence of imbalanced fluid volume signs and symptoms   Outcome: Ongoing      Problem: Nausea/Vomiting:  Goal: Absence of nausea/vomiting  Absence of nausea/vomiting   Outcome: Ongoing    Goal: Able to drink  Able to drink   Outcome: Ongoing    Goal: Able to eat  Able to eat   Outcome: Ongoing    Goal: Ability to achieve adequate nutritional intake will improve  Ability to achieve adequate nutritional intake will improve   Outcome: Ongoing      Problem: Falls - Risk of:  Goal: Will remain free from falls  Will remain free from falls   Outcome: Ongoing    Goal: Absence of physical injury  Absence of physical injury   Outcome: Ongoing

## 2018-12-06 NOTE — PROGRESS NOTES
[address] [place] [address]   Department of Internal Medicine -   Internal Medicine Residency Program  ______________________________________________________________________    Patient: Franklin Desai  YOB: 1955   MRN: [id number]    Acct: [de-identified]     Admit date: 2018  Today's date: 18    Admitting Diagnosis: GI bleed    Subjective:   Pt seen and Chart reviewed. Patient states that he had episode of bloody stool overnight, Hb stable at 9.1. Plt 213. He denies chest pain, shortness of breath, abdominal pain, or any other concerns at this time. Patient is awaiting colonoscopy this morning. EGD showed evidence of gastritis without active bleeding. Esophagus was abnormal, and GI plans to do EDG again to obtain biopsies. Fever:-  Temp (24hrs), Av.2 °F (36.8 °C), Min:97.6 °F (36.4 °C), Max:98.9 °F (37.2 °C)      Blood pressure: 615/19  Systolic (64VUA), PLW:445 , Min:155 , WMN:079     Diastolic (28SNM), KOF:62, Min:74, Max:80      Urine output in the last 24 hours: In: 2660 [P.O.:910; I.V.:1750]  Out: 475 [Urine:475]    Fluids: NS infusion 100mL/hr   Feeding:NPO pending colonoscopy this morning. DVT Prophylaxis: holding secondary to active bleeding   Mobilization:as tolerated   Head Up: 30 degree  GI Prophylaxis:  IV protonix   Glycemic Control:good  Bowel management:bowel prep for colonoscopy this morning    Indwelling catheter:none     BMP: Recent Labs      18   0802  18   0506   NA  139  145*   K  4.1  3.8   CL  104  111*   CO2  28  24   BUN  22  11   CREATININE  0.84  0.72   GLUCOSE  103*  90     CBC: )  Recent Labs      18   0802  185  18   0506   WBC  5.2   --   6.0   HGB  10.4*  9.3*  9.1*   HCT  31.8*  28.7*  28.6*   PLT  231   --   213          Review of systems.   · Constitutional: Negative for fever  · Cardiovascular: Negative for lightheadedness/orthostatic symptoms ,chest pain, dyspnea on exertion, palpitations or loss of consciousness. · Respiratory: Negative for cough or wheezing, sputum production, hemoptysis,   · Gastrointestinal: Positive for Blood in stool   · Genitourinary:Negative dysuria, trouble voiding, hematuria.   · Neurological: Negative for headache, dizziness,numbness/tingling    Objective:   Vital Sign:  BP (!) 167/80   Pulse 67   Temp 98.4 °F (36.9 °C) (Oral)   Resp 20   Ht 5' 11\" (1.803 m)   Wt 191 lb 12.8 oz (87 kg)   SpO2 96%   BMI 26.75 kg/m²       Physical Exam:  General appearance:   alert, well appearing, and in no distress  Mental Status: alert, oriented to person, place, and time  Neurologic:  alert, oriented, normal speech, no focal findings or movement disorder noted  Lungs:  clear to auscultation, no wheezes, rales or rhonchi, symmetric air entry  Heart[de-identified] normal S1, S2, Systolic murmur +  Abdomen:  soft, nontender, nondistended, no masses or organomegaly  Extremities: peripheral pulses normal, no pedal edema, no clubbing or cyanosis       Medications:   Scheduled Medications   lisinopril  10 mg Oral Daily    iron sucrose  200 mg Intravenous Q24H    sodium chloride flush  10 mL Intravenous 2 times per day    sodium chloride flush  10 mL Intravenous 2 times per day    metoprolol tartrate  25 mg Oral BID    bisacodyl  20 mg Oral Once    pantoprazole  40 mg Oral QAM AC       PRN Medications  sodium chloride flush 10 mL PRN   acetaminophen 650 mg Q4H PRN   sodium chloride flush 10 mL PRN   magnesium hydroxide 30 mL Daily PRN   ondansetron 4 mg Q6H PRN   hydrALAZINE 10 mg Q6H PRN       Diagnostic Labs and Imaging    CBC:  Recent Labs      12/05/18   0802  12/05/18   2035  12/06/18   0506   WBC  5.2   --   6.0   HGB  10.4*  9.3*  9.1*   PLT  231   --   213     BMP: Recent Labs      12/05/18   0802  12/06/18   0506   NA  139  145*   K  4.1  3.8   CL  104  111*   CO2  28  24   BUN  22  11   CREATININE  0.84  0.72   GLUCOSE  103*  90     Hepatic: Recent Labs      12/05/18   0802   AST  22 ALT  17   BILITOT  0.28*   ALKPHOS  66     INR:   Recent Labs      12/05/18   0802   INR  1.1     Troponin: No results for input(s): TROPONINI in the last 72 hours. Assessment and Plan:   1. Continued GI bleeding- colonoscopy today. Patient NPO. Cont IV protonix. Transfuse below 7. IVF at 100 ml /hr.  2. Anemia Hb 9.1- cont Venofer for iron replacement. F/u H/H Q 12 hrs  3. HTN- Lopressor PRN. 4. Atrial fibrillation- cont to hold xarelto, asa. No motrin. 5. History of colon cancer s/p resection   6. BPH-  flomax   7. SCD for DVT prophylaxis     Discharge planning: Activity: as tolerated   Disposition: Home pending colonoscopy results   PO intake: NPO pending colonoscopy  PT/OT evaluation and treatment   . Discharge planning.        Lalo Blackwood, PGY-3, Internal Medicine Resident  [facility]

## 2018-12-07 VITALS
TEMPERATURE: 97.9 F | OXYGEN SATURATION: 96 % | RESPIRATION RATE: 16 BRPM | WEIGHT: 191.8 LBS | HEART RATE: 68 BPM | SYSTOLIC BLOOD PRESSURE: 146 MMHG | BODY MASS INDEX: 26.85 KG/M2 | DIASTOLIC BLOOD PRESSURE: 73 MMHG | HEIGHT: 71 IN

## 2018-12-07 LAB
ANION GAP SERPL CALCULATED.3IONS-SCNC: 10 MMOL/L (ref 9–17)
BUN BLDV-MCNC: 13 MG/DL (ref 8–23)
BUN/CREAT BLD: ABNORMAL (ref 9–20)
CALCIUM SERPL-MCNC: 8.5 MG/DL (ref 8.6–10.4)
CHLORIDE BLD-SCNC: 107 MMOL/L (ref 98–107)
CO2: 24 MMOL/L (ref 20–31)
CREAT SERPL-MCNC: 0.72 MG/DL (ref 0.7–1.2)
GFR AFRICAN AMERICAN: >60 ML/MIN
GFR NON-AFRICAN AMERICAN: >60 ML/MIN
GFR SERPL CREATININE-BSD FRML MDRD: ABNORMAL ML/MIN/{1.73_M2}
GFR SERPL CREATININE-BSD FRML MDRD: ABNORMAL ML/MIN/{1.73_M2}
GLUCOSE BLD-MCNC: 94 MG/DL (ref 70–99)
HCT VFR BLD CALC: 34.1 % (ref 40.7–50.3)
HEMOGLOBIN: 10.5 G/DL (ref 13–17)
POTASSIUM SERPL-SCNC: 3.8 MMOL/L (ref 3.7–5.3)
SODIUM BLD-SCNC: 141 MMOL/L (ref 135–144)

## 2018-12-07 PROCEDURE — 99239 HOSP IP/OBS DSCHRG MGMT >30: CPT | Performed by: INTERNAL MEDICINE

## 2018-12-07 PROCEDURE — 85018 HEMOGLOBIN: CPT

## 2018-12-07 PROCEDURE — 6370000000 HC RX 637 (ALT 250 FOR IP): Performed by: HOSPITALIST

## 2018-12-07 PROCEDURE — 85014 HEMATOCRIT: CPT

## 2018-12-07 PROCEDURE — 80048 BASIC METABOLIC PNL TOTAL CA: CPT

## 2018-12-07 PROCEDURE — G0378 HOSPITAL OBSERVATION PER HR: HCPCS

## 2018-12-07 RX ORDER — ASPIRIN 81 MG/1
81 TABLET ORAL ONCE
Status: DISCONTINUED | OUTPATIENT
Start: 2018-12-07 | End: 2018-12-07 | Stop reason: HOSPADM

## 2018-12-07 RX ORDER — LANOLIN ALCOHOL/MO/W.PET/CERES
325 CREAM (GRAM) TOPICAL
Qty: 90 TABLET | Refills: 3 | Status: SHIPPED | OUTPATIENT
Start: 2018-12-07 | End: 2020-02-12

## 2018-12-07 RX ORDER — PANTOPRAZOLE SODIUM 40 MG/1
40 TABLET, DELAYED RELEASE ORAL
Qty: 30 TABLET | Refills: 1 | Status: SHIPPED | OUTPATIENT
Start: 2018-12-08 | End: 2019-01-31 | Stop reason: SDUPTHER

## 2018-12-07 RX ORDER — LISINOPRIL 5 MG/1
5 TABLET ORAL DAILY
Status: DISCONTINUED | OUTPATIENT
Start: 2018-12-07 | End: 2018-12-07 | Stop reason: HOSPADM

## 2018-12-07 RX ADMIN — LISINOPRIL 5 MG: 10 TABLET ORAL at 09:00

## 2018-12-07 RX ADMIN — RIVAROXABAN 20 MG: 20 TABLET, FILM COATED ORAL at 08:57

## 2018-12-07 RX ADMIN — TAMSULOSIN HYDROCHLORIDE 0.4 MG: 0.4 CAPSULE ORAL at 09:01

## 2018-12-07 RX ADMIN — METOPROLOL TARTRATE 25 MG: 25 TABLET ORAL at 09:00

## 2018-12-07 RX ADMIN — DESMOPRESSIN ACETATE 40 MG: 0.2 TABLET ORAL at 09:00

## 2018-12-07 NOTE — PROGRESS NOTES
[address] [place] [address]   Department of Internal Medicine -   Internal Medicine Residency Program  ______________________________________________________________________    Patient: Jann Whitaker  YOB: 1955   MRN: [id number]    Acct: [de-identified]     Admit date: 2018  Today's date: 18    Admitting Diagnosis: GI bleed    Subjective:   Pt seen and Chart reviewed. No acute events overnight, no blood in stools. EGD showed evidence of gastritis without active bleeding. Esophagus was abnormal, and GI plans to do EDG again to obtain biopsies. Colonoscopy showed diverticulosis        Fever:-  Temp (24hrs), Av.5 °F (36.9 °C), Min:97.9 °F (36.6 °C), Max:98.9 °F (37.2 °C)      Blood pressure: 762/71  Systolic (94UYE), KTD:543 , Min:114 , WQS:109     Diastolic (94BUI), correction:58, Min:52, Max:73      Urine output in the last 24 hours: In: 10 [I.V.:10]  Out: 300 [Urine:300]    Fluids: none  Feeding: general diet  DVT Prophylaxis: holding secondary to active bleeding   Mobilization:as tolerated   Head Up: 30 degree  GI Prophylaxis:  Po protonix  Glycemic Control:good  Bowel management: None  Indwelling catheter:none     BMP:   Recent Labs      18   0802  18   0506  18   0730   NA  139  145*  141   K  4.1  3.8  3.8   CL  104  111*  107   CO2  28  24  24   BUN  22  11  13   CREATININE  0.84  0.72  0.72   GLUCOSE  103*  90  94     CBC: )  Recent Labs      18   0802  18   2035  18   0506  18   0800   WBC  5.2   --   6.0   --    HGB  10.4*  9.3*  9.1*  10.5*   HCT  31.8*  28.7*  28.6*  34.1*   PLT  231   --   213   --           Review of systems. · Constitutional: Negative for fever  · Cardiovascular: Negative for lightheadedness/orthostatic symptoms ,chest pain, dyspnea on exertion, palpitations or loss of consciousness.    · Respiratory: Negative for cough or wheezing, sputum production, hemoptysis,   · Gastrointestinal: negative for blood in stool.  · Genitourinary:Negative dysuria, trouble voiding, hematuria.   · Neurological: Negative for headache, dizziness,numbness/tingling    Objective:   Vital Sign:  BP (!) 146/73   Pulse 68   Temp 97.9 °F (36.6 °C) (Oral)   Resp 16   Ht 5' 11\" (1.803 m)   Wt 191 lb 12.8 oz (87 kg)   SpO2 96%   BMI 26.75 kg/m²       Physical Exam:  General appearance:   alert, well appearing, and in no distress  Mental Status: alert, oriented to person, place, and time  Neurologic:  alert, oriented, normal speech, no focal findings or movement disorder noted  Lungs:  clear to auscultation, no wheezes, rales or rhonchi, symmetric air entry  Heart[de-identified] normal S1, S2, Systolic murmur +  Abdomen:  soft, nontender, nondistended, no masses or organomegaly  Extremities: peripheral pulses normal, no pedal edema, no clubbing or cyanosis       Medications:   Scheduled Medications   lisinopril  5 mg Oral Daily    iron sucrose  200 mg Intravenous Q24H    atorvastatin  40 mg Oral Daily    tamsulosin  0.4 mg Oral Daily    rivaroxaban  20 mg Oral Daily with breakfast    sodium chloride flush  10 mL Intravenous 2 times per day    sodium chloride flush  10 mL Intravenous 2 times per day    metoprolol tartrate  25 mg Oral BID    bisacodyl  20 mg Oral Once    pantoprazole  40 mg Oral QAM AC       PRN Medications    metoprolol 5 mg Q6H PRN   sodium chloride flush 10 mL PRN   acetaminophen 650 mg Q4H PRN   sodium chloride flush 10 mL PRN   magnesium hydroxide 30 mL Daily PRN   ondansetron 4 mg Q6H PRN       Diagnostic Labs and Imaging    CBC:  Recent Labs      12/05/18   0802  12/05/18   2035  12/06/18   0506  12/07/18   0800   WBC  5.2   --   6.0   --    HGB  10.4*  9.3*  9.1*  10.5*   PLT  231   --   213   --      BMP:   Recent Labs      12/05/18   0802  12/06/18   0506  12/07/18   0730   NA  139  145*  141   K  4.1  3.8  3.8   CL  104  111*  107   CO2  28  24  24   BUN  22  11  13   CREATININE  0.84  0.72  0.72   GLUCOSE  103*  90  94

## 2018-12-10 ENCOUNTER — HOSPITAL ENCOUNTER (OUTPATIENT)
Age: 63
Setting detail: SPECIMEN
Discharge: HOME | End: 2018-12-10
Payer: MEDICARE

## 2018-12-10 DIAGNOSIS — K57.91 GASTROINTESTINAL HEMORRHAGE ASSOCIATED WITH INTESTINAL DIVERTICULOSIS: ICD-10-CM

## 2018-12-10 LAB
ABSOLUTE EOS #: 0.41 K/UL (ref 0–0.44)
ABSOLUTE IMMATURE GRANULOCYTE: 0.04 K/UL (ref 0–0.3)
ABSOLUTE LYMPH #: 1.22 K/UL (ref 1.1–3.7)
ABSOLUTE MONO #: 0.49 K/UL (ref 0.1–1.2)
BASOPHILS # BLD: 1 % (ref 0–2)
BASOPHILS ABSOLUTE: 0.03 K/UL (ref 0–0.2)
DIFFERENTIAL TYPE: ABNORMAL
EOSINOPHILS RELATIVE PERCENT: 8 % (ref 1–4)
HCT VFR BLD CALC: 29.8 % (ref 40.7–50.3)
HEMOGLOBIN: 9.9 G/DL (ref 13–17)
IMMATURE GRANULOCYTES: 1 %
LYMPHOCYTES # BLD: 22 % (ref 24–43)
MCH RBC QN AUTO: 32.6 PG (ref 25.2–33.5)
MCHC RBC AUTO-ENTMCNC: 33.2 G/DL (ref 28.4–34.8)
MCV RBC AUTO: 98 FL (ref 82.6–102.9)
MONOCYTES # BLD: 9 % (ref 3–12)
NRBC AUTOMATED: 0 PER 100 WBC
PDW BLD-RTO: 13.5 % (ref 11.8–14.4)
PLATELET # BLD: 256 K/UL (ref 138–453)
PLATELET ESTIMATE: ABNORMAL
PMV BLD AUTO: 10.6 FL (ref 8.1–13.5)
RBC # BLD: 3.04 M/UL (ref 4.21–5.77)
RBC # BLD: ABNORMAL 10*6/UL
SEG NEUTROPHILS: 60 % (ref 36–65)
SEGMENTED NEUTROPHILS ABSOLUTE COUNT: 3.26 K/UL (ref 1.5–8.1)
WBC # BLD: 5.5 K/UL (ref 3.5–11.3)
WBC # BLD: ABNORMAL 10*3/UL

## 2018-12-12 ENCOUNTER — OFFICE VISIT (OUTPATIENT)
Dept: FAMILY MEDICINE CLINIC | Age: 63
End: 2018-12-12
Payer: MEDICARE

## 2018-12-12 VITALS
WEIGHT: 186 LBS | SYSTOLIC BLOOD PRESSURE: 115 MMHG | DIASTOLIC BLOOD PRESSURE: 64 MMHG | BODY MASS INDEX: 26.04 KG/M2 | HEART RATE: 56 BPM | TEMPERATURE: 97.3 F | OXYGEN SATURATION: 97 % | HEIGHT: 71 IN

## 2018-12-12 DIAGNOSIS — K92.2 LOWER GI BLEED: Primary | ICD-10-CM

## 2018-12-12 DIAGNOSIS — M15.9 PRIMARY OSTEOARTHRITIS INVOLVING MULTIPLE JOINTS: ICD-10-CM

## 2018-12-12 DIAGNOSIS — D50.8 OTHER IRON DEFICIENCY ANEMIA: ICD-10-CM

## 2018-12-12 DIAGNOSIS — K92.1 HEMATOCHEZIA: ICD-10-CM

## 2018-12-12 DIAGNOSIS — E78.5 DYSLIPIDEMIA: ICD-10-CM

## 2018-12-12 PROCEDURE — G8419 CALC BMI OUT NRM PARAM NOF/U: HCPCS | Performed by: INTERNAL MEDICINE

## 2018-12-12 PROCEDURE — 1036F TOBACCO NON-USER: CPT | Performed by: INTERNAL MEDICINE

## 2018-12-12 PROCEDURE — 3017F COLORECTAL CA SCREEN DOC REV: CPT | Performed by: INTERNAL MEDICINE

## 2018-12-12 PROCEDURE — 1123F ACP DISCUSS/DSCN MKR DOCD: CPT | Performed by: INTERNAL MEDICINE

## 2018-12-12 PROCEDURE — 1111F DSCHRG MED/CURRENT MED MERGE: CPT | Performed by: INTERNAL MEDICINE

## 2018-12-12 PROCEDURE — 1101F PT FALLS ASSESS-DOCD LE1/YR: CPT | Performed by: INTERNAL MEDICINE

## 2018-12-12 PROCEDURE — G8427 DOCREV CUR MEDS BY ELIG CLIN: HCPCS | Performed by: INTERNAL MEDICINE

## 2018-12-12 PROCEDURE — G8482 FLU IMMUNIZE ORDER/ADMIN: HCPCS | Performed by: INTERNAL MEDICINE

## 2018-12-12 PROCEDURE — 99214 OFFICE O/P EST MOD 30 MIN: CPT | Performed by: INTERNAL MEDICINE

## 2018-12-12 PROCEDURE — 4040F PNEUMOC VAC/ADMIN/RCVD: CPT | Performed by: INTERNAL MEDICINE

## 2018-12-12 RX ORDER — TRAMADOL HYDROCHLORIDE 50 MG/1
50 TABLET ORAL EVERY 12 HOURS PRN
Qty: 30 TABLET | Refills: 1 | Status: SHIPPED | OUTPATIENT
Start: 2018-12-12 | End: 2018-12-27 | Stop reason: ALTCHOICE

## 2018-12-12 NOTE — PROGRESS NOTES
Post-Discharge Transitional Care Management Services or Hospital Follow Up      Al Short   YOB: 1955    Date of Office Visit:  12/12/2018  Date of Hospital Admission: 12/5/18  Date of Hospital Discharge: 12/7/18  Risk of hospital readmission (high >=14%. Medium >=10%) :Readmission Risk Score: 16    Care management risk score Rising risk (score 2-5) and Complex Care (Scores >=6): 1     Non face to face  following discharge, date last encounter closed (first attempt may have been earlier): *No documented post hospital discharge outreach found in the last 14 days    Call initiated 2 business days of discharge: *No response recorded in the last 14 days    Patient Active Problem List   Diagnosis    Hematochezia    Dyslipidemia    [facility] [address]    ED (erectile dysfunction)    Incomplete bladder emptying    Hypertrophy of prostate with urinary obstruction and other lower urinary tract symptoms (LUTS)    History of colon cancer    A-fib ([facility] [address] [facility]    Anemia    DVT prophylaxis    Hyperopia    Pallor of optic disc    Presbyopia    Incisional hernia, without obstruction or gangrene    Hypertrophic nonobstructive cardiomyopathy (HCC)    GI bleed    Iron (Fe) deficiency anemia    Melena       Allergies   Allergen Reactions    Codeine Nausea Only     Other reaction(s): Other: See Comments  NAUSEA  Other reaction(s):  Other: See Comments  NAUSEA    Penicillins Swelling     As a baby  Other reaction(s): Unknown  As a baby       Medications listed as ordered at the time of discharge from hospital   [facility] Medication Instructions BLAYNE:    Printed on:12/12/18 2153   Medication Information                      atorvastatin (LIPITOR) 40 MG tablet  Take 1 tablet by mouth daily             Elastic Bandages & Supports (ABDOMINAL BINDER/ELASTIC XL) MISC  1 Device by Does not apply route as needed (ventral hernia, while ambulating/active)             ferrous sulfate (FE TABS) 325 (65 Fe) MG EC tablet  Take 1 tablet by mouth daily (with breakfast)             lisinopril (PRINIVIL;ZESTRIL) 5 MG tablet  Take 1 tablet by mouth daily             metoprolol tartrate (LOPRESSOR) 25 MG tablet  take 1 tablet by mouth twice a day             pantoprazole (PROTONIX) 40 MG tablet  Take 1 tablet by mouth every morning (before breakfast)             rivaroxaban (XARELTO) 20 MG TABS tablet  Take 20 mg by mouth daily (with breakfast)             SM ASPIRIN ADULT LOW STRENGTH 81 MG EC tablet  take 1 tablet by mouth once daily             tamsulosin (FLOMAX) 0.4 MG capsule  Take 1 capsule by mouth daily                   Medications marked \"taking\" at this time  Outpatient Prescriptions Marked as Taking for the 12/12/18 encounter (Office Visit) with Karen Chisholm MD   Medication Sig Dispense Refill    pantoprazole (PROTONIX) 40 MG tablet Take 1 tablet by mouth every morning (before breakfast) 30 tablet 1    rivaroxaban (XARELTO) 20 MG TABS tablet Take 20 mg by mouth daily (with breakfast)      lisinopril (PRINIVIL;ZESTRIL) 5 MG tablet Take 1 tablet by mouth daily 30 tablet 1    tamsulosin (FLOMAX) 0.4 MG capsule Take 1 capsule by mouth daily 90 capsule 3    atorvastatin (LIPITOR) 40 MG tablet Take 1 tablet by mouth daily 90 tablet 1    metoprolol tartrate (LOPRESSOR) 25 MG tablet take 1 tablet by mouth twice a day 60 tablet 3        Medications patient taking as of now reconciled against medications ordered at time of hospital discharge: Yes    Chief Complaint   Patient presents with    Hypertension    Joint Pain     hands, knees, neck pain not taking IBU anymore       History of Present illness - Follow up of Hospital diagnosis(es): admitted with black tarry stools x 4 days. He had a ODALYS with cardiac cath for new onset atrial fibrillation and started on xarelto, the dark stools started two days after the procedure. He had some lightheadedness and ongoing bleeding so went to the ER.  He was found to be anemic. EGD showed long segment of Barretts esophagus and gastritis, no biopsies done as he had been on xarelto. Discharged home after iron infusion. Will need to go back for biopsies. Has appointment to see GI. Inpatient course: Discharge summary reviewed- see chart. Interval history/Current status: he is planning to get knee surgery with Dr Thomas Simms, somewhat upset that this had to be put off due to his admission and GIB. He is trying to figure out what to do for his pain. He was taking NSAIDs prior to the admission and is having difficulty with moving around due to his knee pain since he had to stop them due to risk of bleeding again. A comprehensive review of systems was negative except for what was noted in the HPI. Vitals:    12/12/18 1315   BP: 115/64   Site: Right Upper Arm   Position: Sitting   Cuff Size: Large Adult   Pulse: 56   Temp: 97.3 °F (36.3 °C)   TempSrc: Oral   SpO2: 97%   Weight: 186 lb (84.4 kg)   Height: 5' 11\" (1.803 m)     Body mass index is 25.94 kg/m².    Wt Readings from Last 3 Encounters:   12/12/18 186 lb (84.4 kg)   12/06/18 191 lb 12.8 oz (87 kg)   11/29/18 181 lb (82.1 kg)     BP Readings from Last 3 Encounters:   12/12/18 115/64   12/07/18 (!) 146/73   12/06/18 (!) 98/48        Physical Exam:  General Appearance: alert and oriented to person, place and time, well developed and well- nourished, in no acute distress  Skin: warm and dry, no rash or erythema  Head: normocephalic and atraumatic  Eyes: pupils equal, round, and reactive to light, extraocular eye movements intact, conjunctivae normal  ENT: tympanic membrane, external ear and ear canal normal bilaterally, nose without deformity, nasal mucosa and turbinates normal without polyps  Neck: supple and non-tender without mass, no thyromegaly or thyroid nodules, no cervical lymphadenopathy  Pulmonary/Chest: clear to auscultation bilaterally- no wheezes, rales or rhonchi, normal air movement, no respiratory

## 2018-12-12 NOTE — PROGRESS NOTES
Pt is here for f/u on HTN. Pt reports joint pains, hands, knees, neck was taking IBU does not help much. Had labs done 12/2018 and lipids done 10/1/18. Visit Information    Have you changed or started any medications since your last visit including any over-the-counter medicines, vitamins, or herbal medicines? no   Have you stopped taking any of your medications? Is so, why? -  no  Are you having any side effects from any of your medications? - no    Have you seen any other physician or provider since your last visit?  no   Have you had any other diagnostic tests since your last visit?  no   Have you been seen in the emergency room and/or had an admission in a hospital since we last saw you?  no   Have you had your routine dental cleaning in the past 6 months?  no     Do you have an active MyChart account? If no, what is the barrier?   Yes    Patient Care Team:  Malou Gu MD as PCP - General (Internal Medicine)  Malou Gu MD as PCP - S Attributed Provider  Vamsi Holley MD as Consulting Physician (Gastroenterology)  Daquan Fowler MD as Consulting Physician (Hematology and Oncology)  Reid Chiu DO as Consulting Physician (General Surgery)  MIRI Ross - CNP (Otolaryngology)    Medical History Review  Past Medical, Family, and Social History reviewed and does contribute to the patient presenting condition    Health Maintenance   Topic Date Due    Pneumococcal low/med risk (1 of 2 - PCV13) 12/26/2018 (Originally 8/11/2018)    Shingles Vaccine (1 of 2 - 2 Dose Series) 09/26/2019 (Originally 8/11/2003)    DTaP/Tdap/Td vaccine (1 - Tdap) 08/18/2021 (Originally 8/11/1972)    Potassium monitoring  12/07/2019    Creatinine monitoring  12/07/2019    Colon cancer screen colonoscopy  12/06/2020    Lipid screen  10/01/2023    Flu vaccine  Completed    AAA screen  Completed    Hepatitis C screen  Completed    HIV screen  Completed

## 2018-12-13 ENCOUNTER — OFFICE VISIT (OUTPATIENT)
Dept: GASTROENTEROLOGY | Age: 63
End: 2018-12-13
Payer: MEDICARE

## 2018-12-13 VITALS
BODY MASS INDEX: 26 KG/M2 | DIASTOLIC BLOOD PRESSURE: 83 MMHG | WEIGHT: 186.4 LBS | SYSTOLIC BLOOD PRESSURE: 131 MMHG | HEART RATE: 61 BPM

## 2018-12-13 DIAGNOSIS — K22.70 BARRETT'S ESOPHAGUS WITHOUT DYSPLASIA: Primary | ICD-10-CM

## 2018-12-13 DIAGNOSIS — K92.1 GASTROINTESTINAL HEMORRHAGE WITH MELENA: ICD-10-CM

## 2018-12-13 PROCEDURE — 4040F PNEUMOC VAC/ADMIN/RCVD: CPT | Performed by: INTERNAL MEDICINE

## 2018-12-13 PROCEDURE — 1036F TOBACCO NON-USER: CPT | Performed by: INTERNAL MEDICINE

## 2018-12-13 PROCEDURE — 3017F COLORECTAL CA SCREEN DOC REV: CPT | Performed by: INTERNAL MEDICINE

## 2018-12-13 PROCEDURE — G8427 DOCREV CUR MEDS BY ELIG CLIN: HCPCS | Performed by: INTERNAL MEDICINE

## 2018-12-13 PROCEDURE — G8419 CALC BMI OUT NRM PARAM NOF/U: HCPCS | Performed by: INTERNAL MEDICINE

## 2018-12-13 PROCEDURE — 1101F PT FALLS ASSESS-DOCD LE1/YR: CPT | Performed by: INTERNAL MEDICINE

## 2018-12-13 PROCEDURE — G8482 FLU IMMUNIZE ORDER/ADMIN: HCPCS | Performed by: INTERNAL MEDICINE

## 2018-12-13 PROCEDURE — 99214 OFFICE O/P EST MOD 30 MIN: CPT | Performed by: INTERNAL MEDICINE

## 2018-12-13 PROCEDURE — 1123F ACP DISCUSS/DSCN MKR DOCD: CPT | Performed by: INTERNAL MEDICINE

## 2018-12-13 PROCEDURE — 1111F DSCHRG MED/CURRENT MED MERGE: CPT | Performed by: INTERNAL MEDICINE

## 2018-12-13 ASSESSMENT — ENCOUNTER SYMPTOMS
ABDOMINAL DISTENTION: 1
RESPIRATORY NEGATIVE: 1
ANAL BLEEDING: 0
RECTAL PAIN: 0
BLOOD IN STOOL: 0
DIARRHEA: 0
CONSTIPATION: 0
ABDOMINAL PAIN: 0
VOMITING: 0
ALLERGIC/IMMUNOLOGIC NEGATIVE: 1
EYE ITCHING: 1
NAUSEA: 0

## 2018-12-13 NOTE — PROGRESS NOTES
Subjective:      Patient ID: Glory Barnes is a 70 y.o. male. Dr. Madison Mckinney MD our mutual patient Glory Barnes was seen  for   1. Hill's esophagus without dysplasia    2. Gastrointestinal hemorrhage with melena     . This patient is seen in my office after two years   He has hx of colon cancer and had surgery done in 2014  He was admitted at [address] [place] [address] Dr España recently and had GI bleeding  Was evaluated by Dr Alvarenga Rash  He had 3 cm Hill's was not biopsied sec patient on blood thinners  He also had colonoscopy done which was normal with evidence of surgical scar  He has been doing OK now  No bleeding  No melena  Denies any abd pains    Past Medical, Family, and Social History reviewed and does contribute to the patient presenting condition. patient\"s PMH/PSH,SH,PSYCH hx, MEDs, ALLERGIES, and ROS was all reviewed and updated ion the appropriate sections    HPI    Review of Systems   HENT: Negative. Eyes: Positive for itching. Respiratory: Negative. Cardiovascular: Positive for chest pain and palpitations. Gastrointestinal: Positive for abdominal distention. Negative for abdominal pain, anal bleeding, blood in stool, constipation, diarrhea, nausea, rectal pain and vomiting. Endocrine: Positive for cold intolerance. Genitourinary: Negative. Musculoskeletal: Positive for arthralgias, gait problem, joint swelling, neck pain and neck stiffness. Skin: Negative. Allergic/Immunologic: Negative. Neurological: Positive for tremors. Negative for weakness. Hematological: Negative. Psychiatric/Behavioral: Positive for sleep disturbance. The patient is nervous/anxious. Reviewed and agree  Objective:   Physical Exam   Constitutional: He is oriented to person, place, and time. He appears well-developed and well-nourished. Anxious    HENT:   Head: Normocephalic and atraumatic. Eyes: Pupils are equal, round, and reactive to light.  Conjunctivae and EOM are normal.   Neck: Normal range of motion. Neck supple. Cardiovascular: Normal rate and regular rhythm. Pulmonary/Chest: Effort normal and breath sounds normal.   Abdominal: Soft. Bowel sounds are normal.   NON TENDER, NON DISTENTED  LIVER SPLEEN AND HERNIAS ARE NOT  PALPABLE  BOWEL SOUNDS ARE POSITIVE      Genitourinary: Rectum normal.   Musculoskeletal: Normal range of motion. Neurological: He is alert and oriented to person, place, and time. He has normal reflexes. Skin: Skin is warm. Assessment:      As agree      Plan:      Cont Protonix    He will need EGD once safely can be taken of his blood thinners    F/u HGB    Pt seems to have signs and symptoms consistent with GERD, acid indigestion and heartburns. He was discussed  in detail about some possible life style and dietary modifications. He was stressed about the maintenance  of appropriate weight and effect of obesity contributing to reflux symptoms. Routine exercise was streesed. Avoidance of Caffeine, nicotine and chocolate were explained. Pt was asked to avoid spices grease and fried food. Advices were also given about avoidance of any kind of fast foods, soda pops and high energy drinks. Pt was advised to place two small block under the head end of the bed which may help with night time reflux. Was advised not to eat any thin at least 2-3 hrs before going to bed and walk especially after dinner    Pt has verbalized understanding and agreement to this plan. Pt was advised in detail about some life style and dietary modifications. He was advised about avoidance of caffeine, nicotine and chocolate. Pt was also told to stay away from any kind of fast foods, soda pops. He was also advised to avoid lots of spices, grease and fried food etc.     Instructions were also given about trying to arrange the timing, quality and quantity of food.     Instructions were given about using ample amount of fiber including dietary and supplemental fiber either metamucil, bennafiber or

## 2018-12-27 ENCOUNTER — HOSPITAL ENCOUNTER (OUTPATIENT)
Dept: PREADMISSION TESTING | Age: 63
Discharge: HOME | End: 2018-12-31
Payer: MEDICARE

## 2018-12-27 VITALS
WEIGHT: 185 LBS | OXYGEN SATURATION: 98 % | HEART RATE: 52 BPM | TEMPERATURE: 98.8 F | SYSTOLIC BLOOD PRESSURE: 123 MMHG | BODY MASS INDEX: 25.9 KG/M2 | RESPIRATION RATE: 20 BRPM | HEIGHT: 71 IN | DIASTOLIC BLOOD PRESSURE: 75 MMHG

## 2018-12-27 LAB
-: ABNORMAL
ABSOLUTE EOS #: 0.2 K/UL (ref 0–0.4)
ABSOLUTE IMMATURE GRANULOCYTE: ABNORMAL K/UL (ref 0–0.3)
ABSOLUTE LYMPH #: 1.3 K/UL (ref 1–4.8)
ABSOLUTE MONO #: 0.5 K/UL (ref 0.1–1.3)
AMORPHOUS: ABNORMAL
ANION GAP SERPL CALCULATED.3IONS-SCNC: 10 MMOL/L (ref 9–17)
BACTERIA: ABNORMAL
BASOPHILS # BLD: 0 % (ref 0–2)
BASOPHILS ABSOLUTE: 0 K/UL (ref 0–0.2)
BILIRUBIN URINE: ABNORMAL
BUN BLDV-MCNC: 21 MG/DL (ref 8–23)
BUN/CREAT BLD: NORMAL (ref 9–20)
CALCIUM SERPL-MCNC: 8.8 MG/DL (ref 8.6–10.4)
CASTS UA: ABNORMAL /LPF
CHLORIDE BLD-SCNC: 104 MMOL/L (ref 98–107)
CO2: 28 MMOL/L (ref 20–31)
COLOR: YELLOW
COMMENT UA: ABNORMAL
CREAT SERPL-MCNC: 0.78 MG/DL (ref 0.7–1.2)
CRYSTALS, UA: ABNORMAL /HPF
DIFFERENTIAL TYPE: ABNORMAL
EOSINOPHILS RELATIVE PERCENT: 5 % (ref 0–4)
EPITHELIAL CELLS UA: ABNORMAL /HPF
GFR AFRICAN AMERICAN: >60 ML/MIN
GFR NON-AFRICAN AMERICAN: >60 ML/MIN
GFR SERPL CREATININE-BSD FRML MDRD: NORMAL ML/MIN/{1.73_M2}
GFR SERPL CREATININE-BSD FRML MDRD: NORMAL ML/MIN/{1.73_M2}
GLUCOSE BLD-MCNC: 95 MG/DL (ref 70–99)
GLUCOSE URINE: NEGATIVE
HCT VFR BLD CALC: 35.1 % (ref 41–53)
HEMOGLOBIN: 11.8 G/DL (ref 13.5–17.5)
IMMATURE GRANULOCYTES: ABNORMAL %
KETONES, URINE: NEGATIVE
LEUKOCYTE ESTERASE, URINE: NEGATIVE
LYMPHOCYTES # BLD: 26 % (ref 24–44)
MCH RBC QN AUTO: 31.7 PG (ref 26–34)
MCHC RBC AUTO-ENTMCNC: 33.6 G/DL (ref 31–37)
MCV RBC AUTO: 94.4 FL (ref 80–100)
MONOCYTES # BLD: 10 % (ref 1–7)
MUCUS: ABNORMAL
NITRITE, URINE: NEGATIVE
NRBC AUTOMATED: ABNORMAL PER 100 WBC
OTHER OBSERVATIONS UA: ABNORMAL
PDW BLD-RTO: 14.6 % (ref 11.5–14.9)
PH UA: 5.5 (ref 5–8)
PLATELET # BLD: 264 K/UL (ref 150–450)
PLATELET ESTIMATE: ABNORMAL
PMV BLD AUTO: 8.5 FL (ref 6–12)
POTASSIUM SERPL-SCNC: 4.7 MMOL/L (ref 3.7–5.3)
PROTEIN UA: NEGATIVE
RBC # BLD: 3.72 M/UL (ref 4.5–5.9)
RBC # BLD: ABNORMAL 10*6/UL
RBC UA: ABNORMAL /HPF
RENAL EPITHELIAL, UA: ABNORMAL /HPF
SEG NEUTROPHILS: 59 % (ref 36–66)
SEGMENTED NEUTROPHILS ABSOLUTE COUNT: 2.9 K/UL (ref 1.3–9.1)
SODIUM BLD-SCNC: 142 MMOL/L (ref 135–144)
SPECIFIC GRAVITY UA: 1.03 (ref 1–1.03)
TRICHOMONAS: ABNORMAL
TURBIDITY: CLEAR
URINE HGB: NEGATIVE
UROBILINOGEN, URINE: NORMAL
WBC # BLD: 4.8 K/UL (ref 3.5–11)
WBC # BLD: ABNORMAL 10*3/UL
WBC UA: ABNORMAL /HPF
YEAST: ABNORMAL

## 2018-12-27 PROCEDURE — 81001 URINALYSIS AUTO W/SCOPE: CPT

## 2018-12-27 PROCEDURE — 80048 BASIC METABOLIC PNL TOTAL CA: CPT

## 2018-12-27 PROCEDURE — 87641 MR-STAPH DNA AMP PROBE: CPT

## 2018-12-27 PROCEDURE — 85025 COMPLETE CBC W/AUTO DIFF WBC: CPT

## 2018-12-27 PROCEDURE — 36415 COLL VENOUS BLD VENIPUNCTURE: CPT

## 2018-12-28 LAB
MRSA, DNA, NASAL: NORMAL
SPECIMEN DESCRIPTION: NORMAL

## 2019-01-07 ENCOUNTER — ANESTHESIA EVENT (OUTPATIENT)
Dept: OPERATING ROOM | Age: 64
End: 2019-01-07
Payer: MEDICARE

## 2019-01-08 ENCOUNTER — APPOINTMENT (OUTPATIENT)
Dept: GENERAL RADIOLOGY | Age: 64
End: 2019-01-08
Attending: ORTHOPAEDIC SURGERY
Payer: MEDICARE

## 2019-01-08 ENCOUNTER — ANESTHESIA (OUTPATIENT)
Dept: OPERATING ROOM | Age: 64
End: 2019-01-08
Payer: MEDICARE

## 2019-01-08 ENCOUNTER — HOSPITAL ENCOUNTER (OUTPATIENT)
Age: 64
Setting detail: OBSERVATION
Discharge: HOME | End: 2019-01-10
Attending: ORTHOPAEDIC SURGERY | Admitting: ORTHOPAEDIC SURGERY
Payer: MEDICARE

## 2019-01-08 VITALS — OXYGEN SATURATION: 98 % | DIASTOLIC BLOOD PRESSURE: 55 MMHG | TEMPERATURE: 97.7 F | SYSTOLIC BLOOD PRESSURE: 97 MMHG

## 2019-01-08 DIAGNOSIS — M17.11 PRIMARY OSTEOARTHRITIS OF RIGHT KNEE: Primary | ICD-10-CM

## 2019-01-08 PROCEDURE — G8978 MOBILITY CURRENT STATUS: HCPCS

## 2019-01-08 PROCEDURE — 6360000002 HC RX W HCPCS: Performed by: ORTHOPAEDIC SURGERY

## 2019-01-08 PROCEDURE — 2709999900 HC NON-CHARGEABLE SUPPLY: Performed by: ORTHOPAEDIC SURGERY

## 2019-01-08 PROCEDURE — G8979 MOBILITY GOAL STATUS: HCPCS

## 2019-01-08 PROCEDURE — 2500000003 HC RX 250 WO HCPCS: Performed by: ANESTHESIOLOGY

## 2019-01-08 PROCEDURE — 7100000000 HC PACU RECOVERY - FIRST 15 MIN: Performed by: ORTHOPAEDIC SURGERY

## 2019-01-08 PROCEDURE — 97116 GAIT TRAINING THERAPY: CPT

## 2019-01-08 PROCEDURE — 6370000000 HC RX 637 (ALT 250 FOR IP): Performed by: ORTHOPAEDIC SURGERY

## 2019-01-08 PROCEDURE — C1776 JOINT DEVICE (IMPLANTABLE): HCPCS | Performed by: ORTHOPAEDIC SURGERY

## 2019-01-08 PROCEDURE — G8987 SELF CARE CURRENT STATUS: HCPCS

## 2019-01-08 PROCEDURE — 2580000003 HC RX 258: Performed by: ORTHOPAEDIC SURGERY

## 2019-01-08 PROCEDURE — 2580000003 HC RX 258: Performed by: ANESTHESIOLOGY

## 2019-01-08 PROCEDURE — 6360000002 HC RX W HCPCS: Performed by: NURSE ANESTHETIST, CERTIFIED REGISTERED

## 2019-01-08 PROCEDURE — 97165 OT EVAL LOW COMPLEX 30 MIN: CPT

## 2019-01-08 PROCEDURE — 76942 ECHO GUIDE FOR BIOPSY: CPT | Performed by: ANESTHESIOLOGY

## 2019-01-08 PROCEDURE — 97530 THERAPEUTIC ACTIVITIES: CPT

## 2019-01-08 PROCEDURE — 27447 TOTAL KNEE ARTHROPLASTY: CPT | Performed by: ORTHOPAEDIC SURGERY

## 2019-01-08 PROCEDURE — 6360000002 HC RX W HCPCS: Performed by: ANESTHESIOLOGY

## 2019-01-08 PROCEDURE — 94664 DEMO&/EVAL PT USE INHALER: CPT

## 2019-01-08 PROCEDURE — 2500000003 HC RX 250 WO HCPCS: Performed by: ORTHOPAEDIC SURGERY

## 2019-01-08 PROCEDURE — G8988 SELF CARE GOAL STATUS: HCPCS

## 2019-01-08 PROCEDURE — 3600000014 HC SURGERY LEVEL 4 ADDTL 15MIN: Performed by: ORTHOPAEDIC SURGERY

## 2019-01-08 PROCEDURE — 7100000001 HC PACU RECOVERY - ADDTL 15 MIN: Performed by: ORTHOPAEDIC SURGERY

## 2019-01-08 PROCEDURE — 2500000003 HC RX 250 WO HCPCS: Performed by: NURSE ANESTHETIST, CERTIFIED REGISTERED

## 2019-01-08 PROCEDURE — C1713 ANCHOR/SCREW BN/BN,TIS/BN: HCPCS | Performed by: ORTHOPAEDIC SURGERY

## 2019-01-08 PROCEDURE — G0378 HOSPITAL OBSERVATION PER HR: HCPCS

## 2019-01-08 PROCEDURE — 3700000000 HC ANESTHESIA ATTENDED CARE: Performed by: ORTHOPAEDIC SURGERY

## 2019-01-08 PROCEDURE — 97161 PT EVAL LOW COMPLEX 20 MIN: CPT

## 2019-01-08 PROCEDURE — 3700000001 HC ADD 15 MINUTES (ANESTHESIA): Performed by: ORTHOPAEDIC SURGERY

## 2019-01-08 PROCEDURE — 73560 X-RAY EXAM OF KNEE 1 OR 2: CPT

## 2019-01-08 PROCEDURE — 2720000010 HC SURG SUPPLY STERILE: Performed by: ORTHOPAEDIC SURGERY

## 2019-01-08 PROCEDURE — 3600000004 HC SURGERY LEVEL 4 BASE: Performed by: ORTHOPAEDIC SURGERY

## 2019-01-08 DEVICE — IMPLANTABLE DEVICE: Type: IMPLANTABLE DEVICE | Site: KNEE | Status: FUNCTIONAL

## 2019-01-08 DEVICE — TRAY TIB L83MM KNEE CO CHROM FIN MOD INTLOK CEM VANGUARD: Type: IMPLANTABLE DEVICE | Site: KNEE | Status: FUNCTIONAL

## 2019-01-08 DEVICE — COMPONENT PAT DIA37MM THK8.6MM THN KNEE POLY 3 PEG SER A: Type: IMPLANTABLE DEVICE | Site: KNEE | Status: FUNCTIONAL

## 2019-01-08 DEVICE — DISCONTINUED USE 416978 CEMENT PALACOS R SING DOSE 40GR: Type: IMPLANTABLE DEVICE | Site: KNEE | Status: FUNCTIONAL

## 2019-01-08 RX ORDER — ACETAMINOPHEN 500 MG
1000 TABLET ORAL ONCE
Status: COMPLETED | OUTPATIENT
Start: 2019-01-08 | End: 2019-01-08

## 2019-01-08 RX ORDER — ROCURONIUM BROMIDE 10 MG/ML
INJECTION, SOLUTION INTRAVENOUS PRN
Status: DISCONTINUED | OUTPATIENT
Start: 2019-01-08 | End: 2019-01-08 | Stop reason: SDUPTHER

## 2019-01-08 RX ORDER — METOPROLOL TARTRATE 5 MG/5ML
5 INJECTION INTRAVENOUS ONCE
Status: COMPLETED | OUTPATIENT
Start: 2019-01-08 | End: 2019-01-08

## 2019-01-08 RX ORDER — MIDAZOLAM HYDROCHLORIDE 1 MG/ML
INJECTION INTRAMUSCULAR; INTRAVENOUS PRN
Status: DISCONTINUED | OUTPATIENT
Start: 2019-01-08 | End: 2019-01-08 | Stop reason: SDUPTHER

## 2019-01-08 RX ORDER — LIDOCAINE HYDROCHLORIDE 10 MG/ML
INJECTION, SOLUTION EPIDURAL; INFILTRATION; INTRACAUDAL; PERINEURAL PRN
Status: DISCONTINUED | OUTPATIENT
Start: 2019-01-08 | End: 2019-01-08 | Stop reason: SDUPTHER

## 2019-01-08 RX ORDER — OXYCODONE HYDROCHLORIDE AND ACETAMINOPHEN 5; 325 MG/1; MG/1
2 TABLET ORAL PRN
Status: DISCONTINUED | OUTPATIENT
Start: 2019-01-08 | End: 2019-01-08 | Stop reason: HOSPADM

## 2019-01-08 RX ORDER — DOCUSATE SODIUM 100 MG/1
100 CAPSULE, LIQUID FILLED ORAL 2 TIMES DAILY
Status: DISCONTINUED | OUTPATIENT
Start: 2019-01-08 | End: 2019-01-10 | Stop reason: HOSPADM

## 2019-01-08 RX ORDER — DEXAMETHASONE SODIUM PHOSPHATE 4 MG/ML
INJECTION, SOLUTION INTRA-ARTICULAR; INTRALESIONAL; INTRAMUSCULAR; INTRAVENOUS; SOFT TISSUE PRN
Status: DISCONTINUED | OUTPATIENT
Start: 2019-01-08 | End: 2019-01-08 | Stop reason: SDUPTHER

## 2019-01-08 RX ORDER — SODIUM CHLORIDE 0.9 % (FLUSH) 0.9 %
10 SYRINGE (ML) INJECTION EVERY 12 HOURS SCHEDULED
Status: DISCONTINUED | OUTPATIENT
Start: 2019-01-08 | End: 2019-01-10 | Stop reason: HOSPADM

## 2019-01-08 RX ORDER — OXYCODONE HYDROCHLORIDE AND ACETAMINOPHEN 5; 325 MG/1; MG/1
1 TABLET ORAL PRN
Status: DISCONTINUED | OUTPATIENT
Start: 2019-01-08 | End: 2019-01-08 | Stop reason: HOSPADM

## 2019-01-08 RX ORDER — LABETALOL HYDROCHLORIDE 5 MG/ML
5 INJECTION, SOLUTION INTRAVENOUS EVERY 10 MIN PRN
Status: DISCONTINUED | OUTPATIENT
Start: 2019-01-08 | End: 2019-01-08 | Stop reason: HOSPADM

## 2019-01-08 RX ORDER — CALCIUM CHLORIDE 100 MG/ML
INJECTION INTRAVENOUS; INTRAVENTRICULAR PRN
Status: DISCONTINUED | OUTPATIENT
Start: 2019-01-08 | End: 2019-01-08 | Stop reason: HOSPADM

## 2019-01-08 RX ORDER — OXYCODONE HYDROCHLORIDE 5 MG/1
10 TABLET ORAL EVERY 4 HOURS PRN
Status: DISCONTINUED | OUTPATIENT
Start: 2019-01-08 | End: 2019-01-10 | Stop reason: HOSPADM

## 2019-01-08 RX ORDER — SODIUM CHLORIDE 0.9 % (FLUSH) 0.9 %
10 SYRINGE (ML) INJECTION PRN
Status: DISCONTINUED | OUTPATIENT
Start: 2019-01-08 | End: 2019-01-10 | Stop reason: HOSPADM

## 2019-01-08 RX ORDER — SODIUM CHLORIDE 0.9 % (FLUSH) 0.9 %
10 SYRINGE (ML) INJECTION EVERY 12 HOURS SCHEDULED
Status: DISCONTINUED | OUTPATIENT
Start: 2019-01-08 | End: 2019-01-08 | Stop reason: HOSPADM

## 2019-01-08 RX ORDER — GABAPENTIN 600 MG/1
600 TABLET ORAL ONCE
Status: COMPLETED | OUTPATIENT
Start: 2019-01-08 | End: 2019-01-08

## 2019-01-08 RX ORDER — HYDROMORPHONE HCL 110MG/55ML
PATIENT CONTROLLED ANALGESIA SYRINGE INTRAVENOUS PRN
Status: DISCONTINUED | OUTPATIENT
Start: 2019-01-08 | End: 2019-01-08 | Stop reason: SDUPTHER

## 2019-01-08 RX ORDER — TRANEXAMIC ACID 100 MG/ML
INJECTION, SOLUTION INTRAVENOUS PRN
Status: DISCONTINUED | OUTPATIENT
Start: 2019-01-08 | End: 2019-01-08 | Stop reason: SDUPTHER

## 2019-01-08 RX ORDER — PROPOFOL 10 MG/ML
INJECTION, EMULSION INTRAVENOUS PRN
Status: DISCONTINUED | OUTPATIENT
Start: 2019-01-08 | End: 2019-01-08 | Stop reason: SDUPTHER

## 2019-01-08 RX ORDER — DIPHENHYDRAMINE HYDROCHLORIDE 50 MG/ML
12.5 INJECTION INTRAMUSCULAR; INTRAVENOUS
Status: DISCONTINUED | OUTPATIENT
Start: 2019-01-08 | End: 2019-01-08 | Stop reason: HOSPADM

## 2019-01-08 RX ORDER — ONDANSETRON 2 MG/ML
INJECTION INTRAMUSCULAR; INTRAVENOUS PRN
Status: DISCONTINUED | OUTPATIENT
Start: 2019-01-08 | End: 2019-01-08 | Stop reason: SDUPTHER

## 2019-01-08 RX ORDER — OXYCODONE HYDROCHLORIDE 5 MG/1
5 TABLET ORAL EVERY 4 HOURS PRN
Status: DISCONTINUED | OUTPATIENT
Start: 2019-01-08 | End: 2019-01-10 | Stop reason: HOSPADM

## 2019-01-08 RX ORDER — SODIUM CHLORIDE, SODIUM LACTATE, POTASSIUM CHLORIDE, CALCIUM CHLORIDE 600; 310; 30; 20 MG/100ML; MG/100ML; MG/100ML; MG/100ML
INJECTION, SOLUTION INTRAVENOUS CONTINUOUS
Status: DISCONTINUED | OUTPATIENT
Start: 2019-01-08 | End: 2019-01-10 | Stop reason: HOSPADM

## 2019-01-08 RX ORDER — ACETAMINOPHEN 500 MG
1000 TABLET ORAL EVERY 8 HOURS SCHEDULED
Status: COMPLETED | OUTPATIENT
Start: 2019-01-08 | End: 2019-01-09

## 2019-01-08 RX ORDER — ACETAMINOPHEN 325 MG/1
650 TABLET ORAL EVERY 6 HOURS PRN
COMMUNITY
End: 2020-02-12

## 2019-01-08 RX ORDER — PROMETHAZINE HYDROCHLORIDE 25 MG/ML
6.25 INJECTION, SOLUTION INTRAMUSCULAR; INTRAVENOUS
Status: DISCONTINUED | OUTPATIENT
Start: 2019-01-08 | End: 2019-01-08 | Stop reason: CLARIF

## 2019-01-08 RX ORDER — MEPERIDINE HYDROCHLORIDE 50 MG/ML
12.5 INJECTION INTRAMUSCULAR; INTRAVENOUS; SUBCUTANEOUS EVERY 5 MIN PRN
Status: DISCONTINUED | OUTPATIENT
Start: 2019-01-08 | End: 2019-01-08 | Stop reason: HOSPADM

## 2019-01-08 RX ORDER — KETOROLAC TROMETHAMINE 30 MG/ML
15 INJECTION, SOLUTION INTRAMUSCULAR; INTRAVENOUS EVERY 8 HOURS SCHEDULED
Status: COMPLETED | OUTPATIENT
Start: 2019-01-08 | End: 2019-01-10

## 2019-01-08 RX ORDER — FENTANYL CITRATE 50 UG/ML
INJECTION, SOLUTION INTRAMUSCULAR; INTRAVENOUS PRN
Status: DISCONTINUED | OUTPATIENT
Start: 2019-01-08 | End: 2019-01-08 | Stop reason: SDUPTHER

## 2019-01-08 RX ORDER — DEXAMETHASONE SODIUM PHOSPHATE 10 MG/ML
10 INJECTION INTRAMUSCULAR; INTRAVENOUS ONCE
Status: COMPLETED | OUTPATIENT
Start: 2019-01-08 | End: 2019-01-08

## 2019-01-08 RX ORDER — SODIUM CHLORIDE, SODIUM LACTATE, POTASSIUM CHLORIDE, CALCIUM CHLORIDE 600; 310; 30; 20 MG/100ML; MG/100ML; MG/100ML; MG/100ML
INJECTION, SOLUTION INTRAVENOUS CONTINUOUS
Status: DISCONTINUED | OUTPATIENT
Start: 2019-01-08 | End: 2019-01-09

## 2019-01-08 RX ORDER — SODIUM CHLORIDE 0.9 % (FLUSH) 0.9 %
10 SYRINGE (ML) INJECTION PRN
Status: DISCONTINUED | OUTPATIENT
Start: 2019-01-08 | End: 2019-01-08 | Stop reason: HOSPADM

## 2019-01-08 RX ORDER — SCOLOPAMINE TRANSDERMAL SYSTEM 1 MG/1
1 PATCH, EXTENDED RELEASE TRANSDERMAL ONCE
Status: DISCONTINUED | OUTPATIENT
Start: 2019-01-08 | End: 2019-01-10 | Stop reason: HOSPADM

## 2019-01-08 RX ORDER — ONDANSETRON 2 MG/ML
4 INJECTION INTRAMUSCULAR; INTRAVENOUS EVERY 6 HOURS PRN
Status: DISCONTINUED | OUTPATIENT
Start: 2019-01-08 | End: 2019-01-10 | Stop reason: HOSPADM

## 2019-01-08 RX ADMIN — ROCURONIUM BROMIDE 50 MG: 10 INJECTION INTRAVENOUS at 10:33

## 2019-01-08 RX ADMIN — METOPROLOL TARTRATE 5 MG: 5 INJECTION, SOLUTION INTRAVENOUS at 12:52

## 2019-01-08 RX ADMIN — DOCUSATE SODIUM 100 MG: 100 CAPSULE, LIQUID FILLED ORAL at 21:28

## 2019-01-08 RX ADMIN — OXYCODONE HYDROCHLORIDE 10 MG: 5 TABLET ORAL at 23:44

## 2019-01-08 RX ADMIN — HYDROMORPHONE HYDROCHLORIDE 0.5 MG: 1 INJECTION, SOLUTION INTRAMUSCULAR; INTRAVENOUS; SUBCUTANEOUS at 12:52

## 2019-01-08 RX ADMIN — SODIUM CHLORIDE, POTASSIUM CHLORIDE, SODIUM LACTATE AND CALCIUM CHLORIDE: 600; 310; 30; 20 INJECTION, SOLUTION INTRAVENOUS at 09:37

## 2019-01-08 RX ADMIN — SODIUM CHLORIDE, POTASSIUM CHLORIDE, SODIUM LACTATE AND CALCIUM CHLORIDE: 600; 310; 30; 20 INJECTION, SOLUTION INTRAVENOUS at 18:02

## 2019-01-08 RX ADMIN — SODIUM CHLORIDE, POTASSIUM CHLORIDE, SODIUM LACTATE AND CALCIUM CHLORIDE: 600; 310; 30; 20 INJECTION, SOLUTION INTRAVENOUS at 13:53

## 2019-01-08 RX ADMIN — ONDANSETRON 4 MG: 2 INJECTION INTRAMUSCULAR; INTRAVENOUS at 11:50

## 2019-01-08 RX ADMIN — Medication 750 ML: at 12:24

## 2019-01-08 RX ADMIN — MIDAZOLAM 1 MG: 1 INJECTION INTRAMUSCULAR; INTRAVENOUS at 10:24

## 2019-01-08 RX ADMIN — FENTANYL CITRATE 100 MCG: 50 INJECTION, SOLUTION INTRAMUSCULAR; INTRAVENOUS at 10:33

## 2019-01-08 RX ADMIN — Medication 2 G: at 17:58

## 2019-01-08 RX ADMIN — PROPOFOL 200 MG: 10 INJECTION, EMULSION INTRAVENOUS at 10:33

## 2019-01-08 RX ADMIN — PHENYLEPHRINE HYDROCHLORIDE 200 MCG: 10 INJECTION INTRAVENOUS at 10:47

## 2019-01-08 RX ADMIN — HYDROMORPHONE HYDROCHLORIDE 0.5 MG: 2 INJECTION, SOLUTION INTRAMUSCULAR; INTRAVENOUS; SUBCUTANEOUS at 11:09

## 2019-01-08 RX ADMIN — SODIUM CHLORIDE, POTASSIUM CHLORIDE, SODIUM LACTATE AND CALCIUM CHLORIDE: 600; 310; 30; 20 INJECTION, SOLUTION INTRAVENOUS at 11:40

## 2019-01-08 RX ADMIN — OXYCODONE HYDROCHLORIDE 10 MG: 5 TABLET ORAL at 15:52

## 2019-01-08 RX ADMIN — KETOROLAC TROMETHAMINE 15 MG: 30 INJECTION, SOLUTION INTRAMUSCULAR at 14:06

## 2019-01-08 RX ADMIN — DEXAMETHASONE SODIUM PHOSPHATE 10 MG: 10 INJECTION INTRAMUSCULAR; INTRAVENOUS at 09:37

## 2019-01-08 RX ADMIN — LIDOCAINE HYDROCHLORIDE 50 MG: 10 INJECTION, SOLUTION EPIDURAL; INFILTRATION; INTRACAUDAL; PERINEURAL at 10:33

## 2019-01-08 RX ADMIN — TRANEXAMIC ACID 1000 MG: 100 INJECTION, SOLUTION INTRAVENOUS at 11:59

## 2019-01-08 RX ADMIN — PHENYLEPHRINE HYDROCHLORIDE 200 MCG: 10 INJECTION INTRAVENOUS at 10:55

## 2019-01-08 RX ADMIN — ACETAMINOPHEN 1000 MG: 500 TABLET, FILM COATED ORAL at 21:28

## 2019-01-08 RX ADMIN — ACETAMINOPHEN 1000 MG: 500 TABLET, FILM COATED ORAL at 08:56

## 2019-01-08 RX ADMIN — TRANEXAMIC ACID 1000 MG: 100 INJECTION, SOLUTION INTRAVENOUS at 10:48

## 2019-01-08 RX ADMIN — ACETAMINOPHEN 1000 MG: 500 TABLET, FILM COATED ORAL at 14:06

## 2019-01-08 RX ADMIN — DEXAMETHASONE SODIUM PHOSPHATE 8 MG: 4 INJECTION, SOLUTION INTRAMUSCULAR; INTRAVENOUS at 10:33

## 2019-01-08 RX ADMIN — GABAPENTIN 600 MG: 600 TABLET, FILM COATED ORAL at 08:56

## 2019-01-08 RX ADMIN — Medication 2 G: at 10:41

## 2019-01-08 RX ADMIN — KETOROLAC TROMETHAMINE 15 MG: 30 INJECTION, SOLUTION INTRAMUSCULAR at 21:28

## 2019-01-08 RX ADMIN — HYDROMORPHONE HYDROCHLORIDE 0.5 MG: 1 INJECTION, SOLUTION INTRAMUSCULAR; INTRAVENOUS; SUBCUTANEOUS at 12:33

## 2019-01-08 RX ADMIN — DOCUSATE SODIUM 100 MG: 100 CAPSULE, LIQUID FILLED ORAL at 14:06

## 2019-01-08 RX ADMIN — MIDAZOLAM 1 MG: 1 INJECTION INTRAMUSCULAR; INTRAVENOUS at 10:21

## 2019-01-08 ASSESSMENT — PAIN SCALES - GENERAL
PAINLEVEL_OUTOF10: 5
PAINLEVEL_OUTOF10: 5
PAINLEVEL_OUTOF10: 4
PAINLEVEL_OUTOF10: 3
PAINLEVEL_OUTOF10: 0
PAINLEVEL_OUTOF10: 5
PAINLEVEL_OUTOF10: 7
PAINLEVEL_OUTOF10: 4
PAINLEVEL_OUTOF10: 8
PAINLEVEL_OUTOF10: 7
PAINLEVEL_OUTOF10: 5
PAINLEVEL_OUTOF10: 4
PAINLEVEL_OUTOF10: 7
PAINLEVEL_OUTOF10: 5

## 2019-01-08 ASSESSMENT — PULMONARY FUNCTION TESTS
PIF_VALUE: 22
PIF_VALUE: 10
PIF_VALUE: 13
PIF_VALUE: 9
PIF_VALUE: 21
PIF_VALUE: 15
PIF_VALUE: 0
PIF_VALUE: 23
PIF_VALUE: 22
PIF_VALUE: 20
PIF_VALUE: 3
PIF_VALUE: 20
PIF_VALUE: 20
PIF_VALUE: 23
PIF_VALUE: 23
PIF_VALUE: 1
PIF_VALUE: 22
PIF_VALUE: 21
PIF_VALUE: 22
PIF_VALUE: 13
PIF_VALUE: 6
PIF_VALUE: 18
PIF_VALUE: 21
PIF_VALUE: 1
PIF_VALUE: 22
PIF_VALUE: 23
PIF_VALUE: 1
PIF_VALUE: 10
PIF_VALUE: 1
PIF_VALUE: 18
PIF_VALUE: 18
PIF_VALUE: 22
PIF_VALUE: 1
PIF_VALUE: 22
PIF_VALUE: 23
PIF_VALUE: 22
PIF_VALUE: 23
PIF_VALUE: 22
PIF_VALUE: 2
PIF_VALUE: 1
PIF_VALUE: 22
PIF_VALUE: 22
PIF_VALUE: 0
PIF_VALUE: 23
PIF_VALUE: 22
PIF_VALUE: 22
PIF_VALUE: 2
PIF_VALUE: 22
PIF_VALUE: 2
PIF_VALUE: 22
PIF_VALUE: 13
PIF_VALUE: 16
PIF_VALUE: 22
PIF_VALUE: 22
PIF_VALUE: 12
PIF_VALUE: 2
PIF_VALUE: 23
PIF_VALUE: 23
PIF_VALUE: 22
PIF_VALUE: 23
PIF_VALUE: 10
PIF_VALUE: 22
PIF_VALUE: 22
PIF_VALUE: 21
PIF_VALUE: 23
PIF_VALUE: 18
PIF_VALUE: 21
PIF_VALUE: 18
PIF_VALUE: 22
PIF_VALUE: 17
PIF_VALUE: 23
PIF_VALUE: 22
PIF_VALUE: 16
PIF_VALUE: 0
PIF_VALUE: 22
PIF_VALUE: 23
PIF_VALUE: 1
PIF_VALUE: 23
PIF_VALUE: 18
PIF_VALUE: 23
PIF_VALUE: 22
PIF_VALUE: 22
PIF_VALUE: 23
PIF_VALUE: 18
PIF_VALUE: 22
PIF_VALUE: 22
PIF_VALUE: 1
PIF_VALUE: 14
PIF_VALUE: 1
PIF_VALUE: 20
PIF_VALUE: 18
PIF_VALUE: 16
PIF_VALUE: 18
PIF_VALUE: 19
PIF_VALUE: 22
PIF_VALUE: 22
PIF_VALUE: 15
PIF_VALUE: 18
PIF_VALUE: 17
PIF_VALUE: 18
PIF_VALUE: 12
PIF_VALUE: 23
PIF_VALUE: 35

## 2019-01-08 ASSESSMENT — PAIN DESCRIPTION - FREQUENCY
FREQUENCY: CONTINUOUS
FREQUENCY: INTERMITTENT
FREQUENCY: CONTINUOUS
FREQUENCY: CONTINUOUS
FREQUENCY: INTERMITTENT

## 2019-01-08 ASSESSMENT — PAIN DESCRIPTION - PROGRESSION
CLINICAL_PROGRESSION: GRADUALLY WORSENING

## 2019-01-08 ASSESSMENT — PAIN DESCRIPTION - DESCRIPTORS
DESCRIPTORS: ACHING
DESCRIPTORS: THROBBING

## 2019-01-08 ASSESSMENT — PAIN DESCRIPTION - LOCATION
LOCATION: KNEE

## 2019-01-08 ASSESSMENT — PAIN DESCRIPTION - ORIENTATION
ORIENTATION: RIGHT

## 2019-01-08 ASSESSMENT — PAIN - FUNCTIONAL ASSESSMENT
PAIN_FUNCTIONAL_ASSESSMENT: 0-10
PAIN_FUNCTIONAL_ASSESSMENT: PREVENTS OR INTERFERES SOME ACTIVE ACTIVITIES AND ADLS

## 2019-01-08 ASSESSMENT — PAIN DESCRIPTION - PAIN TYPE
TYPE: SURGICAL PAIN
TYPE: SURGICAL PAIN
TYPE: SURGICAL PAIN;ACUTE PAIN
TYPE: SURGICAL PAIN
TYPE: SURGICAL PAIN;ACUTE PAIN
TYPE: SURGICAL PAIN

## 2019-01-08 ASSESSMENT — PAIN DESCRIPTION - ONSET
ONSET: AWAKENED FROM SLEEP

## 2019-01-09 LAB
HCT VFR BLD CALC: 30.1 % (ref 41–53)
HEMOGLOBIN: 9.9 G/DL (ref 13.5–17.5)

## 2019-01-09 PROCEDURE — 2580000003 HC RX 258: Performed by: ORTHOPAEDIC SURGERY

## 2019-01-09 PROCEDURE — 6360000002 HC RX W HCPCS: Performed by: ORTHOPAEDIC SURGERY

## 2019-01-09 PROCEDURE — 97110 THERAPEUTIC EXERCISES: CPT

## 2019-01-09 PROCEDURE — 96376 TX/PRO/DX INJ SAME DRUG ADON: CPT

## 2019-01-09 PROCEDURE — 85018 HEMOGLOBIN: CPT

## 2019-01-09 PROCEDURE — G0378 HOSPITAL OBSERVATION PER HR: HCPCS

## 2019-01-09 PROCEDURE — 36415 COLL VENOUS BLD VENIPUNCTURE: CPT

## 2019-01-09 PROCEDURE — 97530 THERAPEUTIC ACTIVITIES: CPT

## 2019-01-09 PROCEDURE — 96374 THER/PROPH/DIAG INJ IV PUSH: CPT

## 2019-01-09 PROCEDURE — 97116 GAIT TRAINING THERAPY: CPT

## 2019-01-09 PROCEDURE — 6370000000 HC RX 637 (ALT 250 FOR IP): Performed by: ORTHOPAEDIC SURGERY

## 2019-01-09 PROCEDURE — 97535 SELF CARE MNGMENT TRAINING: CPT

## 2019-01-09 PROCEDURE — 99219 PR INITIAL OBSERVATION CARE/DAY 50 MINUTES: CPT | Performed by: INTERNAL MEDICINE

## 2019-01-09 PROCEDURE — 85014 HEMATOCRIT: CPT

## 2019-01-09 RX ORDER — TAMSULOSIN HYDROCHLORIDE 0.4 MG/1
0.4 CAPSULE ORAL DAILY
Status: DISCONTINUED | OUTPATIENT
Start: 2019-01-09 | End: 2019-01-10 | Stop reason: HOSPADM

## 2019-01-09 RX ORDER — PANTOPRAZOLE SODIUM 40 MG/1
40 TABLET, DELAYED RELEASE ORAL
Status: DISCONTINUED | OUTPATIENT
Start: 2019-01-09 | End: 2019-01-10 | Stop reason: HOSPADM

## 2019-01-09 RX ORDER — OXYCODONE HYDROCHLORIDE 10 MG/1
10 TABLET ORAL EVERY 4 HOURS PRN
Qty: 40 TABLET | Refills: 0 | Status: SHIPPED | OUTPATIENT
Start: 2019-01-09 | End: 2019-01-16

## 2019-01-09 RX ORDER — FERROUS SULFATE 325(65) MG
325 TABLET ORAL
Status: DISCONTINUED | OUTPATIENT
Start: 2019-01-09 | End: 2019-01-10 | Stop reason: HOSPADM

## 2019-01-09 RX ORDER — LISINOPRIL 5 MG/1
5 TABLET ORAL DAILY
Status: DISCONTINUED | OUTPATIENT
Start: 2019-01-09 | End: 2019-01-10 | Stop reason: HOSPADM

## 2019-01-09 RX ORDER — ATORVASTATIN CALCIUM 40 MG/1
40 TABLET, FILM COATED ORAL DAILY
Status: DISCONTINUED | OUTPATIENT
Start: 2019-01-09 | End: 2019-01-10 | Stop reason: HOSPADM

## 2019-01-09 RX ADMIN — OXYCODONE HYDROCHLORIDE 10 MG: 5 TABLET ORAL at 03:26

## 2019-01-09 RX ADMIN — Medication 10 ML: at 08:39

## 2019-01-09 RX ADMIN — ACETAMINOPHEN 1000 MG: 500 TABLET, FILM COATED ORAL at 06:09

## 2019-01-09 RX ADMIN — OXYCODONE HYDROCHLORIDE 5 MG: 5 TABLET ORAL at 08:42

## 2019-01-09 RX ADMIN — DOCUSATE SODIUM 100 MG: 100 CAPSULE, LIQUID FILLED ORAL at 21:44

## 2019-01-09 RX ADMIN — ACETAMINOPHEN 1000 MG: 500 TABLET, FILM COATED ORAL at 21:43

## 2019-01-09 RX ADMIN — OXYCODONE HYDROCHLORIDE 10 MG: 5 TABLET ORAL at 17:17

## 2019-01-09 RX ADMIN — KETOROLAC TROMETHAMINE 15 MG: 30 INJECTION, SOLUTION INTRAMUSCULAR at 21:43

## 2019-01-09 RX ADMIN — ASPIRIN 325 MG: 325 TABLET, DELAYED RELEASE ORAL at 08:38

## 2019-01-09 RX ADMIN — ACETAMINOPHEN 1000 MG: 500 TABLET, FILM COATED ORAL at 13:37

## 2019-01-09 RX ADMIN — OXYCODONE HYDROCHLORIDE 10 MG: 5 TABLET ORAL at 21:44

## 2019-01-09 RX ADMIN — KETOROLAC TROMETHAMINE 15 MG: 30 INJECTION, SOLUTION INTRAMUSCULAR at 15:00

## 2019-01-09 RX ADMIN — LISINOPRIL 5 MG: 5 TABLET ORAL at 17:13

## 2019-01-09 RX ADMIN — METOPROLOL TARTRATE 25 MG: 25 TABLET ORAL at 21:44

## 2019-01-09 RX ADMIN — Medication 10 ML: at 21:44

## 2019-01-09 RX ADMIN — ATORVASTATIN CALCIUM 40 MG: 40 TABLET, FILM COATED ORAL at 21:44

## 2019-01-09 RX ADMIN — TAMSULOSIN HYDROCHLORIDE 0.4 MG: 0.4 CAPSULE ORAL at 17:14

## 2019-01-09 RX ADMIN — KETOROLAC TROMETHAMINE 15 MG: 30 INJECTION, SOLUTION INTRAMUSCULAR at 06:09

## 2019-01-09 RX ADMIN — RIVAROXABAN 20 MG: 20 TABLET, FILM COATED ORAL at 17:14

## 2019-01-09 RX ADMIN — OXYCODONE HYDROCHLORIDE 10 MG: 5 TABLET ORAL at 13:37

## 2019-01-09 RX ADMIN — DOCUSATE SODIUM 100 MG: 100 CAPSULE, LIQUID FILLED ORAL at 08:38

## 2019-01-09 RX ADMIN — Medication 2 G: at 03:14

## 2019-01-09 ASSESSMENT — PAIN SCALES - GENERAL
PAINLEVEL_OUTOF10: 7
PAINLEVEL_OUTOF10: 3
PAINLEVEL_OUTOF10: 7
PAINLEVEL_OUTOF10: 3
PAINLEVEL_OUTOF10: 3
PAINLEVEL_OUTOF10: 7
PAINLEVEL_OUTOF10: 6
PAINLEVEL_OUTOF10: 3
PAINLEVEL_OUTOF10: 5
PAINLEVEL_OUTOF10: 0
PAINLEVEL_OUTOF10: 4
PAINLEVEL_OUTOF10: 3

## 2019-01-09 ASSESSMENT — PAIN DESCRIPTION - PAIN TYPE
TYPE: SURGICAL PAIN
TYPE: ACUTE PAIN;SURGICAL PAIN
TYPE: SURGICAL PAIN

## 2019-01-09 ASSESSMENT — PAIN DESCRIPTION - PROGRESSION: CLINICAL_PROGRESSION: GRADUALLY IMPROVING

## 2019-01-09 ASSESSMENT — PAIN DESCRIPTION - LOCATION
LOCATION: KNEE

## 2019-01-09 ASSESSMENT — PAIN DESCRIPTION - ORIENTATION
ORIENTATION: RIGHT

## 2019-01-09 ASSESSMENT — PAIN DESCRIPTION - DESCRIPTORS: DESCRIPTORS: BURNING;ACHING

## 2019-01-10 VITALS
TEMPERATURE: 97.7 F | WEIGHT: 185 LBS | HEIGHT: 71 IN | DIASTOLIC BLOOD PRESSURE: 55 MMHG | RESPIRATION RATE: 18 BRPM | SYSTOLIC BLOOD PRESSURE: 104 MMHG | BODY MASS INDEX: 25.9 KG/M2 | HEART RATE: 61 BPM | OXYGEN SATURATION: 97 %

## 2019-01-10 PROCEDURE — 97530 THERAPEUTIC ACTIVITIES: CPT

## 2019-01-10 PROCEDURE — 96376 TX/PRO/DX INJ SAME DRUG ADON: CPT

## 2019-01-10 PROCEDURE — G0378 HOSPITAL OBSERVATION PER HR: HCPCS

## 2019-01-10 PROCEDURE — 99024 POSTOP FOLLOW-UP VISIT: CPT | Performed by: ORTHOPAEDIC SURGERY

## 2019-01-10 PROCEDURE — 6370000000 HC RX 637 (ALT 250 FOR IP): Performed by: ORTHOPAEDIC SURGERY

## 2019-01-10 PROCEDURE — 99225 PR SBSQ OBSERVATION CARE/DAY 25 MINUTES: CPT | Performed by: INTERNAL MEDICINE

## 2019-01-10 PROCEDURE — 6360000002 HC RX W HCPCS: Performed by: ORTHOPAEDIC SURGERY

## 2019-01-10 PROCEDURE — 97110 THERAPEUTIC EXERCISES: CPT

## 2019-01-10 PROCEDURE — 97116 GAIT TRAINING THERAPY: CPT

## 2019-01-10 PROCEDURE — 2580000003 HC RX 258: Performed by: ORTHOPAEDIC SURGERY

## 2019-01-10 RX ADMIN — OXYCODONE HYDROCHLORIDE 10 MG: 5 TABLET ORAL at 05:37

## 2019-01-10 RX ADMIN — OXYCODONE HYDROCHLORIDE 10 MG: 5 TABLET ORAL at 11:00

## 2019-01-10 RX ADMIN — PANTOPRAZOLE SODIUM 40 MG: 40 TABLET, DELAYED RELEASE ORAL at 05:37

## 2019-01-10 RX ADMIN — DOCUSATE SODIUM 100 MG: 100 CAPSULE, LIQUID FILLED ORAL at 08:51

## 2019-01-10 RX ADMIN — RIVAROXABAN 20 MG: 20 TABLET, FILM COATED ORAL at 08:51

## 2019-01-10 RX ADMIN — KETOROLAC TROMETHAMINE 15 MG: 30 INJECTION, SOLUTION INTRAMUSCULAR at 05:37

## 2019-01-10 RX ADMIN — TAMSULOSIN HYDROCHLORIDE 0.4 MG: 0.4 CAPSULE ORAL at 08:51

## 2019-01-10 RX ADMIN — FERROUS SULFATE TAB 325 MG (65 MG ELEMENTAL FE) 325 MG: 325 (65 FE) TAB at 08:51

## 2019-01-10 RX ADMIN — LISINOPRIL 5 MG: 5 TABLET ORAL at 08:51

## 2019-01-10 RX ADMIN — Medication 10 ML: at 08:55

## 2019-01-10 RX ADMIN — ATORVASTATIN CALCIUM 40 MG: 40 TABLET, FILM COATED ORAL at 08:51

## 2019-01-10 ASSESSMENT — PAIN DESCRIPTION - ORIENTATION: ORIENTATION: RIGHT

## 2019-01-10 ASSESSMENT — PAIN DESCRIPTION - LOCATION: LOCATION: KNEE

## 2019-01-10 ASSESSMENT — PAIN SCALES - GENERAL
PAINLEVEL_OUTOF10: 8
PAINLEVEL_OUTOF10: 10

## 2019-01-10 ASSESSMENT — PAIN DESCRIPTION - PAIN TYPE: TYPE: SURGICAL PAIN

## 2019-01-23 ENCOUNTER — OFFICE VISIT (OUTPATIENT)
Dept: ORTHOPEDIC SURGERY | Age: 64
End: 2019-01-23

## 2019-01-23 DIAGNOSIS — Z96.651 STATUS POST TOTAL RIGHT KNEE REPLACEMENT: Primary | ICD-10-CM

## 2019-01-23 PROCEDURE — 99024 POSTOP FOLLOW-UP VISIT: CPT | Performed by: ORTHOPAEDIC SURGERY

## 2019-01-23 RX ORDER — HYDROCODONE BITARTRATE AND ACETAMINOPHEN 5; 325 MG/1; MG/1
1-2 TABLET ORAL EVERY 6 HOURS PRN
Qty: 40 TABLET | Refills: 0 | Status: SHIPPED | OUTPATIENT
Start: 2019-01-23 | End: 2019-01-30

## 2019-01-31 RX ORDER — PANTOPRAZOLE SODIUM 40 MG/1
TABLET, DELAYED RELEASE ORAL
Qty: 30 TABLET | Refills: 1 | Status: SHIPPED | OUTPATIENT
Start: 2019-01-31 | End: 2019-03-12 | Stop reason: ALTCHOICE

## 2019-02-05 RX ORDER — LISINOPRIL 5 MG/1
5 TABLET ORAL DAILY
Qty: 90 TABLET | Refills: 1 | Status: SHIPPED | OUTPATIENT
Start: 2019-02-05 | End: 2019-07-27 | Stop reason: SDUPTHER

## 2019-02-27 ENCOUNTER — TELEPHONE (OUTPATIENT)
Dept: GASTROENTEROLOGY | Age: 64
End: 2019-02-27

## 2019-03-06 ENCOUNTER — OFFICE VISIT (OUTPATIENT)
Dept: ORTHOPEDIC SURGERY | Age: 64
End: 2019-03-06

## 2019-03-06 DIAGNOSIS — Z96.651 STATUS POST TOTAL RIGHT KNEE REPLACEMENT: Primary | ICD-10-CM

## 2019-03-06 PROCEDURE — 99024 POSTOP FOLLOW-UP VISIT: CPT | Performed by: ORTHOPAEDIC SURGERY

## 2019-03-12 ENCOUNTER — OFFICE VISIT (OUTPATIENT)
Dept: FAMILY MEDICINE CLINIC | Age: 64
End: 2019-03-12
Payer: MEDICARE

## 2019-03-12 VITALS
SYSTOLIC BLOOD PRESSURE: 110 MMHG | HEART RATE: 55 BPM | WEIGHT: 187.4 LBS | BODY MASS INDEX: 26.14 KG/M2 | OXYGEN SATURATION: 97 % | RESPIRATION RATE: 16 BRPM | TEMPERATURE: 97.1 F | DIASTOLIC BLOOD PRESSURE: 62 MMHG

## 2019-03-12 DIAGNOSIS — I48.91 ATRIAL FIBRILLATION WITH NORMAL VENTRICULAR RATE (HCC): ICD-10-CM

## 2019-03-12 DIAGNOSIS — K22.70 BARRETT'S ESOPHAGUS DETERMINED BY ENDOSCOPY: ICD-10-CM

## 2019-03-12 DIAGNOSIS — I10 BENIGN ESSENTIAL HTN: Primary | ICD-10-CM

## 2019-03-12 DIAGNOSIS — D50.8 OTHER IRON DEFICIENCY ANEMIA: ICD-10-CM

## 2019-03-12 DIAGNOSIS — Z85.048 HISTORY OF MALIGNANT NEOPLASM OF RECTUM: ICD-10-CM

## 2019-03-12 DIAGNOSIS — I42.2 HYPERTROPHIC NONOBSTRUCTIVE CARDIOMYOPATHY (HCC): ICD-10-CM

## 2019-03-12 DIAGNOSIS — M17.11 PRIMARY OSTEOARTHRITIS OF RIGHT KNEE: ICD-10-CM

## 2019-03-12 PROCEDURE — G8419 CALC BMI OUT NRM PARAM NOF/U: HCPCS | Performed by: INTERNAL MEDICINE

## 2019-03-12 PROCEDURE — G8482 FLU IMMUNIZE ORDER/ADMIN: HCPCS | Performed by: INTERNAL MEDICINE

## 2019-03-12 PROCEDURE — G8427 DOCREV CUR MEDS BY ELIG CLIN: HCPCS | Performed by: INTERNAL MEDICINE

## 2019-03-12 PROCEDURE — 1036F TOBACCO NON-USER: CPT | Performed by: INTERNAL MEDICINE

## 2019-03-12 PROCEDURE — 99214 OFFICE O/P EST MOD 30 MIN: CPT | Performed by: INTERNAL MEDICINE

## 2019-03-12 PROCEDURE — 4040F PNEUMOC VAC/ADMIN/RCVD: CPT | Performed by: INTERNAL MEDICINE

## 2019-03-12 PROCEDURE — 1123F ACP DISCUSS/DSCN MKR DOCD: CPT | Performed by: INTERNAL MEDICINE

## 2019-03-12 PROCEDURE — 3017F COLORECTAL CA SCREEN DOC REV: CPT | Performed by: INTERNAL MEDICINE

## 2019-03-12 PROCEDURE — 1101F PT FALLS ASSESS-DOCD LE1/YR: CPT | Performed by: INTERNAL MEDICINE

## 2019-03-12 RX ORDER — OMEPRAZOLE 40 MG/1
40 CAPSULE, DELAYED RELEASE ORAL
Qty: 90 CAPSULE | Refills: 1 | Status: SHIPPED | OUTPATIENT
Start: 2019-03-12 | End: 2019-08-21 | Stop reason: SDUPTHER

## 2019-03-12 ASSESSMENT — PATIENT HEALTH QUESTIONNAIRE - PHQ9
SUM OF ALL RESPONSES TO PHQ9 QUESTIONS 1 & 2: 0
SUM OF ALL RESPONSES TO PHQ QUESTIONS 1-9: 0
1. LITTLE INTEREST OR PLEASURE IN DOING THINGS: 0
SUM OF ALL RESPONSES TO PHQ QUESTIONS 1-9: 0
2. FEELING DOWN, DEPRESSED OR HOPELESS: 0

## 2019-03-19 ENCOUNTER — TELEPHONE (OUTPATIENT)
Dept: FAMILY MEDICINE CLINIC | Age: 64
End: 2019-03-19

## 2019-03-21 ENCOUNTER — TELEPHONE (OUTPATIENT)
Dept: FAMILY MEDICINE CLINIC | Age: 64
End: 2019-03-21

## 2019-03-21 RX ORDER — IBUPROFEN 800 MG/1
TABLET ORAL
Qty: 120 TABLET | Refills: 3 | Status: SHIPPED | OUTPATIENT
Start: 2019-03-21 | End: 2020-02-04

## 2019-04-15 ENCOUNTER — TELEPHONE (OUTPATIENT)
Dept: GASTROENTEROLOGY | Age: 64
End: 2019-04-15

## 2019-04-16 ENCOUNTER — TELEPHONE (OUTPATIENT)
Dept: GASTROENTEROLOGY | Age: 64
End: 2019-04-16

## 2019-04-16 ENCOUNTER — OFFICE VISIT (OUTPATIENT)
Dept: GASTROENTEROLOGY | Age: 64
End: 2019-04-16
Payer: MEDICARE

## 2019-04-16 VITALS
HEART RATE: 61 BPM | SYSTOLIC BLOOD PRESSURE: 108 MMHG | DIASTOLIC BLOOD PRESSURE: 66 MMHG | BODY MASS INDEX: 25.97 KG/M2 | WEIGHT: 186.2 LBS

## 2019-04-16 DIAGNOSIS — K22.70 BARRETT'S ESOPHAGUS WITHOUT DYSPLASIA: ICD-10-CM

## 2019-04-16 DIAGNOSIS — K21.9 GASTROESOPHAGEAL REFLUX DISEASE, ESOPHAGITIS PRESENCE NOT SPECIFIED: ICD-10-CM

## 2019-04-16 DIAGNOSIS — D64.9 ANEMIA, UNSPECIFIED TYPE: Primary | ICD-10-CM

## 2019-04-16 PROCEDURE — G8419 CALC BMI OUT NRM PARAM NOF/U: HCPCS | Performed by: INTERNAL MEDICINE

## 2019-04-16 PROCEDURE — 3017F COLORECTAL CA SCREEN DOC REV: CPT | Performed by: INTERNAL MEDICINE

## 2019-04-16 PROCEDURE — 1036F TOBACCO NON-USER: CPT | Performed by: INTERNAL MEDICINE

## 2019-04-16 PROCEDURE — 4040F PNEUMOC VAC/ADMIN/RCVD: CPT | Performed by: INTERNAL MEDICINE

## 2019-04-16 PROCEDURE — G8427 DOCREV CUR MEDS BY ELIG CLIN: HCPCS | Performed by: INTERNAL MEDICINE

## 2019-04-16 PROCEDURE — 99214 OFFICE O/P EST MOD 30 MIN: CPT | Performed by: INTERNAL MEDICINE

## 2019-04-16 PROCEDURE — 1123F ACP DISCUSS/DSCN MKR DOCD: CPT | Performed by: INTERNAL MEDICINE

## 2019-04-16 ASSESSMENT — ENCOUNTER SYMPTOMS
ABDOMINAL PAIN: 0
VOMITING: 0
BLOOD IN STOOL: 0
COUGH: 1
ABDOMINAL DISTENTION: 0
ANAL BLEEDING: 0
NAUSEA: 0
DIARRHEA: 0
ALLERGIC/IMMUNOLOGIC NEGATIVE: 1
EYE ITCHING: 1
CONSTIPATION: 0
RECTAL PAIN: 0

## 2019-04-16 NOTE — TELEPHONE ENCOUNTER
Patient is scheduled for EGD on 6/18/19. He needs clearance for Xarelto. Patient could not remember name of his cardiologist while in the office. He will call the office within the next day or two so that clearance can be sent.

## 2019-04-16 NOTE — PROGRESS NOTES
Subjective:      Patient ID: Gabino Galloway is a 70 y.o. male. HPI    Dr. Karina Donovan MD our mutual patient Gabino Galloway was seen  for   1. Anemia, unspecified type    2. 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 Gastroesophageal reflux disease, esophagitis presence not specified     . Here for f/u   Has been admitted at [facility] in the recent past  Had EGD and was found to have portillo's biopsies were not taken sec to blood thinners  Has some cough  Has cardiac issues  On PPI now    Clinically OK GI wised  No bleeding  No melena  No smoking  No fam hx known      Past Medical, Family, and Social History reviewed and does contribute to the patient presenting condition. patient\"s PMH/PSH,SH,PSYCH hx, MEDs, ALLERGIES, and ROS was all reviewed and updated ion the appropriate sections    Review of Systems   Constitutional: Negative. HENT: Negative. Eyes: Positive for itching. Respiratory: Positive for cough (tickle in throat daily). Cardiovascular: Positive for chest pain and palpitations. Gastrointestinal: Negative for abdominal distention, abdominal pain, anal bleeding, blood in stool, constipation, diarrhea, nausea, rectal pain and vomiting. Denies   Endocrine: Positive for cold intolerance. Genitourinary: Negative. Musculoskeletal: Positive for arthralgias, gait problem, joint swelling, neck pain and neck stiffness. Skin: Negative. Allergic/Immunologic: Negative. Neurological: Positive for tremors. Negative for weakness. Hematological: Negative. Psychiatric/Behavioral: Positive for sleep disturbance. The patient is nervous/anxious. Reviewed and agree  Objective:   Physical Exam   Constitutional: He is oriented to person, place, and time. He appears well-developed and well-nourished. Anxious    HENT:   Head: Normocephalic and atraumatic. Eyes: Pupils are equal, round, and reactive to light.  Conjunctivae and EOM are normal.   Neck: Normal range of motion. Neck supple. Cardiovascular: Normal rate and regular rhythm. Pulmonary/Chest: Effort normal. He has rales. Abdominal: Soft. Bowel sounds are normal.   NON TENDER, NON DISTENTED  LIVER SPLEEN AND HERNIAS ARE NOT  PALPABLE  BOWEL SOUNDS ARE POSITIVE      Genitourinary: Rectum normal.   Musculoskeletal: Normal range of motion. Neurological: He is alert and oriented to person, place, and time. He has normal reflexes. No rashes    Skin: Skin is warm. Vitals reviewed. Assessment:      Patient Active Problem List   Diagnosis    Hematochezia    Dyslipidemia    ED (erectile dysfunction)    Incomplete bladder emptying    Hypertrophy of prostate with urinary obstruction and other lower urinary tract symptoms (LUTS)    History of colon cancer    Atrial fibrillation with normal ventricular rate (HCC)    Anemia    Hyperopia    Pallor of optic disc    Presbyopia    Incisional hernia, without obstruction or gangrene    Hypertrophic nonobstructive cardiomyopathy (HCC)    GI bleed    Iron (Fe) deficiency anemia    Melena    Primary osteoarthritis of right knee    Benign essential HTN    History of malignant neoplasm of rectum           Plan:      Cont PPI    Pt was discussed in detail about the possible side effects of proton pump inhibiter therapy. He was explained about the possibility of calcium and magnesium malabsorption and was advised to start taking calcium supplements with Vit D. Some over the counter regimens were explained to patient. Some dietary advices were also given. He has verbalized understanding and agreement to this. Plan EGD once OK with Cardiology to stop blood thinners    Pt seems to have signs and symptoms consistent with GERD, acid indigestion and heartburns. He was discussed  in detail about some possible life style and dietary modifications. He was stressed about the maintenance  of appropriate weight and effect of obesity contributing to reflux symptoms. Routine exercise was streesed. Avoidance of Caffeine, nicotine and chocolate were explained. Pt was asked to avoid spices grease and fried food. Advices were also given about avoidance of any kind of fast foods, soda pops and high energy drinks. Pt was advised to place two small block under the head end of the bed which may help with night time reflux. Was advised not to eat any thin at least 2-3 hrs before going to bed and walk especially after dinner    Pt has verbalized understanding and agreement to this plan. Pt was advised in detail about some life style and dietary modifications. He was advised about avoidance of caffeine, nicotine and chocolate. Pt was also told to stay away from any kind of fast foods, soda pops. He was also advised to avoid lots of spices, grease and fried food etc.     Instructions were also given about trying to arrange the timing, quality and quantity of food. Instructions were given about using ample amount of fiber including dietary and supplemental fiber either metamucil, bennafiber or citrucell etc.  Pt was advised about drinking ample amount of water without any colors or chemicals. Stress was given about regular exercise. Pt has verbalized understanding and agreement to these modifications.       More than half of patient's clinic visit time was spent in counseling about lifestyle and dietary modifications  Patient's  questions were answered in this regard as well  The patient has verbalized understanding and agreement

## 2019-04-18 DIAGNOSIS — I48.0 PAROXYSMAL ATRIAL FIBRILLATION (HCC): ICD-10-CM

## 2019-04-18 DIAGNOSIS — I42.2 HYPERTROPHIC NONOBSTRUCTIVE CARDIOMYOPATHY (HCC): ICD-10-CM

## 2019-04-18 RX ORDER — ATORVASTATIN CALCIUM 40 MG/1
TABLET, FILM COATED ORAL
Qty: 90 TABLET | Refills: 1 | Status: SHIPPED | OUTPATIENT
Start: 2019-04-18 | End: 2019-10-13 | Stop reason: SDUPTHER

## 2019-04-18 NOTE — TELEPHONE ENCOUNTER
Last visit 3/19/19  Next Visit Date:   Future Appointments   Date Time Provider Yves Corralesi   7/12/2019  4:15 PM Cassandra Coburn MD [facility]   7/16/2019  3:00 PM Todd Joe MD [facility]   11/7/2019  9:40 AM Shun Schulz MD [facility] [address] Maintenance   Topic Date Due    Pneumococcal 65+ years Vaccine (1 of 2 - PCV13) 08/11/2018    Shingles Vaccine (1 of 2) 09/26/2019 (Originally 8/11/2003)    DTaP/Tdap/Td vaccine (1 - Tdap) 08/18/2021 (Originally 8/11/1972)    Potassium monitoring  12/27/2019    Creatinine monitoring  12/27/2019    Colon cancer screen colonoscopy  12/06/2020    Lipid screen  10/01/2023    Flu vaccine  Completed    AAA screen  Completed    Hepatitis C screen  Completed    HIV screen  Completed       Hemoglobin A1C (%)   Date Value   01/10/2018 5.0             ( goal A1C is < 7)   No results found for: LABMICR  LDL Cholesterol (mg/dL)   Date Value   10/01/2018 81   01/03/2017 123       (goal LDL is <100)   AST (U/L)   Date Value   12/05/2018 22     ALT (U/L)   Date Value   12/05/2018 17     BUN (mg/dL)   Date Value   12/27/2018 21     BP Readings from Last 3 Encounters:   04/16/19 108/66   03/12/19 110/62   01/10/19 (!) 104/55          (goal 120/80)    All Future Testing planned in [facility]  Lab Frequency Next Occurrence   EGD Once 07/16/2019               Patient Active Problem List:     Hematochezia     Dyslipidemia     ED (erectile dysfunction)     Incomplete bladder emptying     Hypertrophy of prostate with urinary obstruction and other lower urinary tract symptoms (LUTS)     History of colon cancer     Atrial fibrillation with normal ventricular rate (HCC)     Anemia     Hyperopia     Pallor of optic disc     Presbyopia     Incisional hernia, without obstruction or gangrene     Hypertrophic nonobstructive cardiomyopathy (HCC)     GI bleed     Iron (Fe) deficiency anemia     Melena     Primary osteoarthritis of right knee Benign essential HTN     History of malignant neoplasm of rectum

## 2019-04-23 ENCOUNTER — TELEPHONE (OUTPATIENT)
Dept: GASTROENTEROLOGY | Age: 64
End: 2019-04-23

## 2019-04-23 NOTE — TELEPHONE ENCOUNTER
Writer left second message for patient to call the office to inform us of the prescribing physician for Xarelto. Patient is scheduled for 6/18/19 EGD and clearance will need to be obtained.

## 2019-04-23 NOTE — TELEPHONE ENCOUNTER
Pt left vm on nurse line that Cardio is Morro Harris (?) at [facility]

## 2019-05-20 NOTE — TELEPHONE ENCOUNTER
Writer left msg on pt's vm that we have received cardiac clearance from Dr. Ladan Casey [facility]. Doctor has okayed for Xarelto to be stopped 2 days prior to proc (hold Xarelto starting 6/16/19) and aspirin to be stopped 5 days prior to proc (hold aspirin starting 6/11/19). Pt was instructed to call ofc back if he had any questions.

## 2019-05-22 ENCOUNTER — TELEPHONE (OUTPATIENT)
Dept: FAMILY MEDICINE CLINIC | Age: 64
End: 2019-05-22

## 2019-05-22 NOTE — TELEPHONE ENCOUNTER
Yes - he has Hill's esophagus, and on his prior EGD they could not do biopsies because he was on blood thinners hence he needs a second one. Hill's esophagus is a precancerous lesion therefore biopsies are important to monitor whether he is having concerning changes before it turns into cancer.

## 2019-05-22 NOTE — TELEPHONE ENCOUNTER
Patient called in upset because 3-4 mos ago he had and endoscopy, and they said that his throat looked good and he was in the clear. Now he has been called back and rescheduled because there may have been something in his throat and he is curious as to if you knew or not.

## 2019-06-12 ENCOUNTER — TELEPHONE (OUTPATIENT)
Dept: GASTROENTEROLOGY | Age: 64
End: 2019-06-12

## 2019-06-17 NOTE — TELEPHONE ENCOUNTER
LVM for pt to return call to confirm procedure for tomorrow 6/18/19, due to cancellation time has changed to 11:30am w/arrival at 9:30am   We also need to confirm he has a , and that he has held his xarelto 2 days and aspirin for 5 days.

## 2019-06-18 ENCOUNTER — ANESTHESIA EVENT (OUTPATIENT)
Dept: OPERATING ROOM | Age: 64
End: 2019-06-18
Payer: MEDICARE

## 2019-06-18 ENCOUNTER — ANESTHESIA (OUTPATIENT)
Dept: OPERATING ROOM | Age: 64
End: 2019-06-18
Payer: MEDICARE

## 2019-06-18 ENCOUNTER — HOSPITAL ENCOUNTER (OUTPATIENT)
Age: 64
Setting detail: OUTPATIENT SURGERY
Discharge: HOME | End: 2019-06-18
Attending: INTERNAL MEDICINE | Admitting: INTERNAL MEDICINE
Payer: MEDICARE

## 2019-06-18 VITALS
WEIGHT: 175 LBS | DIASTOLIC BLOOD PRESSURE: 78 MMHG | TEMPERATURE: 97.3 F | BODY MASS INDEX: 24.5 KG/M2 | SYSTOLIC BLOOD PRESSURE: 146 MMHG | HEIGHT: 71 IN | HEART RATE: 63 BPM | RESPIRATION RATE: 16 BRPM | OXYGEN SATURATION: 99 %

## 2019-06-18 VITALS — DIASTOLIC BLOOD PRESSURE: 74 MMHG | OXYGEN SATURATION: 98 % | SYSTOLIC BLOOD PRESSURE: 145 MMHG

## 2019-06-18 PROCEDURE — 2580000003 HC RX 258: Performed by: NURSE ANESTHETIST, CERTIFIED REGISTERED

## 2019-06-18 PROCEDURE — 2580000003 HC RX 258: Performed by: ANESTHESIOLOGY

## 2019-06-18 PROCEDURE — 3700000001 HC ADD 15 MINUTES (ANESTHESIA): Performed by: INTERNAL MEDICINE

## 2019-06-18 PROCEDURE — 2709999900 HC NON-CHARGEABLE SUPPLY: Performed by: INTERNAL MEDICINE

## 2019-06-18 PROCEDURE — 43239 EGD BIOPSY SINGLE/MULTIPLE: CPT | Performed by: INTERNAL MEDICINE

## 2019-06-18 PROCEDURE — 7100000001 HC PACU RECOVERY - ADDTL 15 MIN: Performed by: INTERNAL MEDICINE

## 2019-06-18 PROCEDURE — 88305 TISSUE EXAM BY PATHOLOGIST: CPT

## 2019-06-18 PROCEDURE — 7100000000 HC PACU RECOVERY - FIRST 15 MIN: Performed by: INTERNAL MEDICINE

## 2019-06-18 PROCEDURE — 6360000002 HC RX W HCPCS: Performed by: NURSE ANESTHETIST, CERTIFIED REGISTERED

## 2019-06-18 PROCEDURE — 3609012400 HC EGD TRANSORAL BIOPSY SINGLE/MULTIPLE: Performed by: INTERNAL MEDICINE

## 2019-06-18 PROCEDURE — 2500000003 HC RX 250 WO HCPCS: Performed by: NURSE ANESTHETIST, CERTIFIED REGISTERED

## 2019-06-18 PROCEDURE — 3700000000 HC ANESTHESIA ATTENDED CARE: Performed by: INTERNAL MEDICINE

## 2019-06-18 RX ORDER — PROPOFOL 10 MG/ML
INJECTION, EMULSION INTRAVENOUS PRN
Status: DISCONTINUED | OUTPATIENT
Start: 2019-06-18 | End: 2019-06-18 | Stop reason: SDUPTHER

## 2019-06-18 RX ORDER — SODIUM CHLORIDE, SODIUM LACTATE, POTASSIUM CHLORIDE, CALCIUM CHLORIDE 600; 310; 30; 20 MG/100ML; MG/100ML; MG/100ML; MG/100ML
INJECTION, SOLUTION INTRAVENOUS CONTINUOUS
Status: DISCONTINUED | OUTPATIENT
Start: 2019-06-18 | End: 2019-06-18 | Stop reason: HOSPADM

## 2019-06-18 RX ORDER — LIDOCAINE HYDROCHLORIDE 10 MG/ML
INJECTION, SOLUTION EPIDURAL; INFILTRATION; INTRACAUDAL; PERINEURAL PRN
Status: DISCONTINUED | OUTPATIENT
Start: 2019-06-18 | End: 2019-06-18 | Stop reason: SDUPTHER

## 2019-06-18 RX ORDER — SODIUM CHLORIDE, SODIUM LACTATE, POTASSIUM CHLORIDE, CALCIUM CHLORIDE 600; 310; 30; 20 MG/100ML; MG/100ML; MG/100ML; MG/100ML
INJECTION, SOLUTION INTRAVENOUS CONTINUOUS PRN
Status: DISCONTINUED | OUTPATIENT
Start: 2019-06-18 | End: 2019-06-18 | Stop reason: SDUPTHER

## 2019-06-18 RX ORDER — GLYCOPYRROLATE 1 MG/5 ML
SYRINGE (ML) INTRAVENOUS PRN
Status: DISCONTINUED | OUTPATIENT
Start: 2019-06-18 | End: 2019-06-18 | Stop reason: SDUPTHER

## 2019-06-18 RX ADMIN — LIDOCAINE HYDROCHLORIDE 100 MG: 10 INJECTION, SOLUTION EPIDURAL; INFILTRATION; INTRACAUDAL; PERINEURAL at 11:51

## 2019-06-18 RX ADMIN — Medication 0.4 MG: at 11:47

## 2019-06-18 RX ADMIN — SODIUM CHLORIDE, POTASSIUM CHLORIDE, SODIUM LACTATE AND CALCIUM CHLORIDE: 600; 310; 30; 20 INJECTION, SOLUTION INTRAVENOUS at 11:46

## 2019-06-18 RX ADMIN — SODIUM CHLORIDE, POTASSIUM CHLORIDE, SODIUM LACTATE AND CALCIUM CHLORIDE: 600; 310; 30; 20 INJECTION, SOLUTION INTRAVENOUS at 11:29

## 2019-06-18 RX ADMIN — PROPOFOL 140 MG: 10 INJECTION, EMULSION INTRAVENOUS at 11:51

## 2019-06-18 ASSESSMENT — PULMONARY FUNCTION TESTS
PIF_VALUE: 1

## 2019-06-18 ASSESSMENT — PAIN SCALES - GENERAL: PAINLEVEL_OUTOF10: 0

## 2019-06-18 ASSESSMENT — PAIN - FUNCTIONAL ASSESSMENT: PAIN_FUNCTIONAL_ASSESSMENT: 0-10

## 2019-06-18 NOTE — H&P
HISTORY and [address]       NAME:  Dilip Salmeron  MRN: [id number]   YOB: 1955   Date: 6/18/2019   Age: 70 y.o. Gender: male       COMPLAINT AND PRESENT HISTORY:                Dilip Salmeron is 70 y.o.,  male, undergoing  for EGD. Patient had a EGD done a year ago. Pt has hx of GERD and Hill's esophagus. Pt is on PPI . Pt states his sxs are less. Pt also  has a hx of  Anemia. Pt is on supplemental Iron tablets. Patient C/O of frequent heartburn, epigastric pains, no nausea and no vomiting . No diarrhea / constipation, no changes in the color, caliber or consistency of the stools. No fever or chills.     PAST MEDICAL HISTORY     Past Medical History:   Diagnosis Date    Atrial fibrillation ([address]     Back pain, chronic     BPH (benign prostatic hyperplasia)     Cancer (HCC)     colon-rectal    Cocaine abuse in remission [facility])     1970's    ED (erectile dysfunction) 4/2/2015    GERD (gastroesophageal reflux disease)     Hernia     History of colon cancer     Migraines     Murmur, cardiac        SURGICAL HISTORY       Past Surgical History:   Procedure Laterality Date    CARDIAC CATHETERIZATION  11/29/2018    Non-obstructive CAD    COLECTOMY      2nd colectomy, Colostomy and reversed [facility] COLECTOMY      1st time [place]    COLONOSCOPY      COLONOSCOPY  07/18/2016    COLONOSCOPY N/A 12/6/2018    COLONOSCOPY DIAGNOSTIC performed by Cliff Ashford MD at [address] Right 2009    inguinal    KNEE SURGERY Right 1970's    arthrotomy    TONSILLECTOMY      TOTAL KNEE ARTHROPLASTY Right 1/8/2019    KNEE TOTAL ARTHROPLASTY performed by Aakash Swanson MD at [address] [place] [address] TRANSESOPHAGEAL ECHOCARDIOGRAM  11/29/2018    UPPER GASTROINTESTINAL ENDOSCOPY N/A 12/5/2018    EGD DIAGNOSTIC ONLY performed by Cliff [facility]ford MD at [facility]       FAMILY HISTORY       Family History   Problem Relation Age of Onset    Diabetes Mother     Heart Attack Father     Heart Disease Father     Heart Disease Brother        SOCIAL HISTORY       Social History     Socioeconomic History    Marital status: Single     Spouse name: None    Number of children: None    Years of education: None    Highest education level: None   Occupational History    None   Social Needs    Financial resource strain: None    Food insecurity:     Worry: None     Inability: None    Transportation needs:     Medical: None     Non-medical: None   Tobacco Use    Smoking status: Former Smoker     Packs/day: 0.50     Years: 1.00     Pack years: 0.50     Last attempt to quit: [address]     Years since quittin.4    Smokeless tobacco: Never Used   Substance and Sexual Activity    Alcohol use: Yes     Alcohol/week: 5.0 oz     Types: 10 Standard drinks or equivalent per week     Comment: 3 -4 times a week    Drug use: No     Types: Other-see comments     Comment: Cocaine use in past in     Sexual activity: Yes     Partners: Female   Lifestyle    Physical activity:     Days per week: None     Minutes per session: None    Stress: None   Relationships    Social connections:     Talks on phone: None     Gets together: None     Attends Shinto service: None     Active member of club or organization: None     Attends meetings of clubs or organizations: None     Relationship status: None    Intimate partner violence:     Fear of current or ex partner: None     Emotionally abused: None     Physically abused: None     Forced sexual activity: None   Other Topics Concern    None   Social History Narrative    None           REVIEW OF SYSTEMS      Allergies   Allergen Reactions    Adhesive Tape Other (See Comments)     Blister badly     Codeine Nausea Only     Other reaction(s): Other: See Comments  NAUSEA  Other reaction(s):  Other: See Comments  NAUSEA    Penicillins Swelling     As a baby  Other reaction(s): Unknown  As a baby       No current facility-administered medications on file prior to encounter. Current Outpatient Medications on File Prior to Encounter   Medication Sig Dispense Refill    omeprazole (PRILOSEC) 40 MG delayed release capsule Take 1 capsule by mouth every morning (before breakfast) 90 capsule 1    lisinopril (PRINIVIL;ZESTRIL) 5 MG tablet Take 1 tablet by mouth daily 90 tablet 1    acetaminophen (TYLENOL) 325 MG tablet Take 650 mg by mouth every 6 hours as needed for Pain      ferrous sulfate (FE TABS) 325 (65 Fe) MG EC tablet Take 1 tablet by mouth daily (with breakfast) 90 tablet 3    tamsulosin (FLOMAX) 0.4 MG capsule Take 1 capsule by mouth daily 90 capsule 3    metoprolol tartrate (LOPRESSOR) 25 MG tablet take 1 tablet by mouth twice a day 60 tablet 3    ibuprofen (ADVIL;MOTRIN) 800 MG tablet take 1 tablet by mouth every 6 hours if needed for pain 120 tablet 3    rivaroxaban (XARELTO) 20 MG TABS tablet Take 20 mg by mouth daily (with breakfast)       Elastic Bandages & Supports (ABDOMINAL BINDER/ELASTIC XL) MISC 1 Device by Does not apply route as needed (ventral hernia, while ambulating/active) 1 each 1       Negative except for what is mentioned in the HPI. GENERAL PHYSICAL EXAM     Vitals: BP (!) 161/79   Pulse 57   Temp 97.9 °F (36.6 °C) (Oral)   Resp 18   Ht 5' 11\" (1.803 m)   Wt 175 lb (79.4 kg)   SpO2 100%   BMI 24.41 kg/m²  Body mass index is 24.41 kg/m². GENERAL APPEARANCE:   Bang Knox is 70 y.o.,  male, mildly obese, nourished, conscious, alert. Does not appear to be distress or pain at this time. SKIN:  Warm, dry, no cyanosis or jaundice. HEAD:  Normocephalic, atraumatic, no swelling or tenderness. EYES:  Pupils equal, reactive to light. EARS:  No discharge, no marked hearing loss. NOSE:  No rhinorrhea, epistaxis or septal deformity. THROAT:  Not congested.  No ulceration bleeding or discharge. NECK:  No stiffness, trachea central.  No palpable masses or L.N.                 CHEST:  Symmetrical and equal on expansion. HEART:  RRR S1 > S2. No audible murmurs or gallops. LUNGS:  Equal on expansion, normal breath sounds. No adventitious sounds. ABDOMEN:  Mildly obese. Soft on palpation. No localized tenderness. No guarding or rigidity. No palpable hepatosplenomegaly. LYMPHATICS:  No palpable cervical lymphadenopathy. LOCOMOTOR, BACK AND SPINE:  No tenderness or deformities. EXTREMITIES:  Symmetrical, no pretibial edema. Nasreens sign negative. No discoloration or ulcerations. NEUROLOGIC:  The patient is conscious, alert, oriented,Cranial nerve II-XII intact, taste was not examined. No apparent focal sensory or motor deficits.              PROVISIONAL DIAGNOSES / SURGERY:      ANEMIA  GERD  MCGUIRE'S ESOPHAGUS  EGD ESOPHAGOGASTRODUODENOSCOPY    Patient Active Problem List    Diagnosis Date Noted    History of malignant neoplasm of rectum 03/12/2019    Benign essential HTN     Primary osteoarthritis of right knee 01/08/2019    GI bleed 12/05/2018    Iron (Fe) deficiency anemia 12/05/2018    Melena     Hypertrophic nonobstructive cardiomyopathy ([address] 06/28/2018    Incisional hernia, without obstruction or gangrene 12/12/2017    Pallor of optic disc 07/05/2017    Hyperopia 06/05/2017    Presbyopia 06/05/2017    History of colon cancer     ED (erectile dysfunction) 04/02/2015    Incomplete bladder emptying 04/02/2015    Hypertrophy of prostate with urinary obstruction and other lower urinary tract symptoms (LUTS) 04/02/2015    Atrial fibrillation with normal ventricular rate ([address] 07/21/2014    Anemia 07/21/2014    Hematochezia 02/19/2014    Dyslipidemia 02/19/2014           GARRET Lopez, APRN - CNP on 6/18/2019 at 11:39 AM

## 2019-06-18 NOTE — OP NOTE
PROCEDURE NOTE    DATE OF PROCEDURE: 6/18/2019     SURGEON: Maciel Orta MD    ASSISTANT: None    PREOPERATIVE DIAGNOSIS: GERD  HX OF MCGUIRE'S    POSTOPERATIVE DIAGNOSIS: As described below    OPERATION: Upper GI endoscopy with Biopsy    ANESTHESIA: Moderate Sedation     ESTIMATED BLOOD LOSS: Less than 50 ml    COMPLICATIONS: None. SPECIMENS:  Was Obtained:     HISTORY: The patient is a 70y.o. year old male with history of above preop diagnosis. I recommended esophagogastroduodenoscopy with possible biopsy and I explained the risk, benefits, expected outcome, and alternatives to the procedure. Risks included but are not limited to bleeding, infection, respiratory distress, hypotension, and perforation of the esophagus, stomach, or duodenum. Patient understands and is in agreement. PROCEDURE: The patient was given IV conscious sedation. The patient's SPO2 remained above 90% throughout the procedure. The gastroscope was inserted orally and advanced under direct vision through the esophagus, through the stomach, through the pylorus, and into the descending duodenum. Findings:    Retropharyngeal area was grossly normal appearing    Esophagus: abnormal: EVIDENCE OF MCGUIRE'S ABOUT 3 CM ELONGATED SALMON COLORED MUCOSA  FROM THE IRREGULAR SCJ     BIOPSIES AND PICTURES WERE TAKEN    ABOUT 2- 3 CM HIATAL HERNIA    Stomach:    Fundus: normal    Body: normal    Antrum: normal    Duodenum:     Descending: normal    Bulb: normal    The scope was removed and the patient tolerated the procedure well. Recommendations/Plan:   1. F/U Biopsies  2. F/U In Office in 3-4 weeks  3. Discussed with the family  4.  Post sedation patient was stable with stable vital signs and stable O2 saturations    Electronically signed by Maciel Orta MD  on 6/18/2019 at 11:57 AM

## 2019-06-18 NOTE — ANESTHESIA POSTPROCEDURE EVALUATION
Department of Anesthesiology  Postprocedure Note    Patient: Melissa Ron  MRN: [id number]  YOB: 1955  Date of evaluation: 6/18/2019  Time:  2:39 PM     Procedure Summary     Date:  06/18/19 Room / Location:  [address] [place] [address] Brant Bee 08 / [id number]    Anesthesia Start:  7558 Anesthesia Stop:  8627    Procedure:  EGD BIOPSY (N/A Esophagus) Diagnosis:  (ANEMIA GERD MCGUIRE ESOPHAGUS    PAT ON ADMIT PER ANES)    Surgeon:  Jenna Boggs MD Responsible Provider:  Raya Pastrana MD    Anesthesia Type:  MAC ASA Status:  3          Anesthesia Type: MAC    Ganga Phase I: Ganga Score: 10    Ganga Phase II:      Last vitals: Reviewed and per EMR flowsheets.        Anesthesia Post Evaluation    Comments: POST- ANESTHESIA EVALUATION       Pt Name: Melissa Ron  MRN: [id number]  YOB: 1955  Date of evaluation: 6/18/2019  Time:  2:39 PM      BP (!) 146/78   Pulse 63   Temp 97.3 °F (36.3 °C)   Resp 16   Ht 5' 11\" (1.803 m)   Wt 175 lb (79.4 kg)   SpO2 99%   BMI 24.41 kg/m²      Consciousness Level  Awake  Cardiopulmonary Status  Stable  Pain Adequately Treated YES  Nausea / Vomiting  NO  Adequate Hydration  YES  Anesthesia Related Complications NONE      Electronically signed by Raya Pastrana MD on 6/18/2019 at 2:39 PM

## 2019-06-18 NOTE — ANESTHESIA PRE PROCEDURE
Comments)     Blister badly     Codeine Nausea Only     Other reaction(s): Other: See Comments  NAUSEA  Other reaction(s):  Other: See Comments  NAUSEA    Penicillins Swelling     As a baby  Other reaction(s): Unknown  As a baby       Problem List:    Patient Active Problem List   Diagnosis Code    Hematochezia K92.1    Dyslipidemia E78.5    ED (erectile dysfunction) N52.9    Incomplete bladder emptying R33.9    Hypertrophy of prostate with urinary obstruction and other lower urinary tract symptoms (LUTS) N40.1    History of colon cancer Z85.038    Atrial fibrillation with normal ventricular rate (HCC) I48.91    Anemia D64.9    Hyperopia H52.00    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    Presbyopia H52.4    Incisional hernia, without obstruction or gangrene K43.2    Hypertrophic nonobstructive cardiomyopathy (HCC) I42.2    GI bleed K92.2    Iron (Fe) deficiency anemia D50.9    Melena K92.1    Primary osteoarthritis of right knee M17.11    Benign essential HTN I10    History of malignant neoplasm of rectum Z85.048       Past Medical History:        Diagnosis Date    Atrial fibrillation (HCC)     Back pain, chronic     BPH (benign prostatic hyperplasia)     Cancer (HCC)     colon-rectal    Cocaine abuse in remission ([facility] [address]     1970's    ED (erectile dysfunction) 4/2/2015    GERD (gastroesophageal reflux disease)     Hernia     History of colon cancer     Migraines     Murmur, cardiac        Past Surgical History:        Procedure Laterality Date    CARDIAC CATHETERIZATION  11/29/2018    Non-obstructive CAD    COLECTOMY      2nd colectomy, Colostomy and reversed [facility] COLECTOMY      1st time Ksenia    COLONOSCOPY      COLONOSCOPY  07/18/2016    COLONOSCOPY N/A 12/6/2018    COLONOSCOPY DIAGNOSTIC performed by Kvng Garcia MD at [address] Right 2009    inguinal    KNEE SURGERY Right 1970's    arthrotomy    TONSILLECTOMY      TOTAL KNEE ARTHROPLASTY Right 2019    KNEE TOTAL ARTHROPLASTY performed by Colin Hu MD at [address] [place] [address] TRANSESOPHAGEAL ECHOCARDIOGRAM  2018    UPPER GASTROINTESTINAL ENDOSCOPY N/A 2018    EGD DIAGNOSTIC ONLY performed by Kvng Garcia MD at [facility]       Social History:    Social History     Tobacco Use    Smoking status: Former Smoker     Packs/day: 0.50     Years: 1.00     Pack years: 0.50     Last attempt to quit: [address]     Years since quittin.4    Smokeless tobacco: Never Used   Substance Use Topics    Alcohol use: Yes     Alcohol/week: 5.0 oz     Types: 10 Standard drinks or equivalent per week     Comment: 3 -4 times a week                                Counseling given: Not Answered      Vital Signs (Current): There were no vitals filed for this visit. BP Readings from Last 3 Encounters:   19 108/66   19 110/62   01/10/19 (!) 104/55       NPO Status:                                                                                 BMI:   Wt Readings from Last 3 Encounters:   19 186 lb 3.2 oz (84.5 kg)   19 187 lb 6.4 oz (85 kg)   19 185 lb (83.9 kg)     There is no height or weight on file to calculate BMI.    CBC:   Lab Results   Component Value Date    WBC 4.8 2018    RBC 3.72 2018    HGB 9.9 2019    HCT 30.1 2019    MCV 94.4 2018    RDW 14.6 2018     2018       CMP:   Lab Results   Component Value Date     2018    K 4.7 2018     2018    CO2 28 2018    BUN 21 2018    CREATININE 0.78 2018    GFRAA >60 2018    LABGLOM >60 2018    GLUCOSE 95 2018    PROT 6.5 2018    CALCIUM 8.8 2018    BILITOT 0.28 2018    ALKPHOS 66 2018    AST 22 2018    ALT 17 2018       POC Tests: No results for input(s): POCGLU, POCNA, POCK, POCCL, POCBUN, POCHEMO, POCHCT in the last 72 hours.     Coags:   Lab Results   Component Value Date    PROTIME 12.0 12/05/2018    INR 1.1 12/05/2018    APTT 26.3 12/05/2018       HCG (If Applicable): No results found for: PREGTESTUR, PREGSERUM, HCG, HCGQUANT     ABGs: No results found for: PHART, PO2ART, NEN5PKU, EVV8RHS, BEART, C8VFZTXF     Type & Screen (If Applicable):  No results found for: LABABO, [address]    Anesthesia Evaluation  Patient summary reviewed and Nursing notes reviewed  Airway: Mallampati: II  TM distance: >3 FB   Neck ROM: full  Mouth opening: > = 3 FB Dental: normal exam         Pulmonary:normal exam                               Cardiovascular:    (+) hypertension:, dysrhythmias: atrial fibrillation, murmur,       ECG reviewed  Rhythm: regular                      Neuro/Psych:   (+) headaches:,             GI/Hepatic/Renal:   (+) GERD:,           Endo/Other:                     Abdominal:           Vascular:                                      Anesthesia Plan      MAC     ASA 3       Induction: intravenous. Anesthetic plan and risks discussed with patient. Plan discussed with CRNA.                   Jj Albert MD   6/18/2019

## 2019-06-19 LAB — SURGICAL PATHOLOGY REPORT: NORMAL

## 2019-06-21 PROBLEM — K22.70 BARRETT'S ESOPHAGUS: Status: ACTIVE | Noted: 2019-06-18

## 2019-07-12 ENCOUNTER — OFFICE VISIT (OUTPATIENT)
Dept: FAMILY MEDICINE CLINIC | Age: 64
End: 2019-07-12
Payer: MEDICARE

## 2019-07-12 VITALS
HEIGHT: 71 IN | SYSTOLIC BLOOD PRESSURE: 120 MMHG | WEIGHT: 177 LBS | BODY MASS INDEX: 24.78 KG/M2 | OXYGEN SATURATION: 99 % | TEMPERATURE: 97.3 F | DIASTOLIC BLOOD PRESSURE: 68 MMHG | HEART RATE: 60 BPM

## 2019-07-12 DIAGNOSIS — I42.2 HYPERTROPHIC NONOBSTRUCTIVE CARDIOMYOPATHY (HCC): ICD-10-CM

## 2019-07-12 DIAGNOSIS — I10 BENIGN ESSENTIAL HTN: ICD-10-CM

## 2019-07-12 DIAGNOSIS — K22.70 BARRETT'S ESOPHAGUS WITHOUT DYSPLASIA: ICD-10-CM

## 2019-07-12 DIAGNOSIS — M15.9 GENERALIZED OSTEOARTHRITIS: Primary | ICD-10-CM

## 2019-07-12 DIAGNOSIS — I48.91 ATRIAL FIBRILLATION WITH NORMAL VENTRICULAR RATE (HCC): ICD-10-CM

## 2019-07-12 DIAGNOSIS — D50.8 OTHER IRON DEFICIENCY ANEMIA: ICD-10-CM

## 2019-07-12 DIAGNOSIS — E78.5 DYSLIPIDEMIA: ICD-10-CM

## 2019-07-12 PROBLEM — K92.2 GI BLEED: Status: RESOLVED | Noted: 2018-12-05 | Resolved: 2019-07-12

## 2019-07-12 PROCEDURE — 1123F ACP DISCUSS/DSCN MKR DOCD: CPT | Performed by: INTERNAL MEDICINE

## 2019-07-12 PROCEDURE — 1036F TOBACCO NON-USER: CPT | Performed by: INTERNAL MEDICINE

## 2019-07-12 PROCEDURE — 3017F COLORECTAL CA SCREEN DOC REV: CPT | Performed by: INTERNAL MEDICINE

## 2019-07-12 PROCEDURE — 99214 OFFICE O/P EST MOD 30 MIN: CPT | Performed by: INTERNAL MEDICINE

## 2019-07-12 PROCEDURE — G8427 DOCREV CUR MEDS BY ELIG CLIN: HCPCS | Performed by: INTERNAL MEDICINE

## 2019-07-12 PROCEDURE — G8420 CALC BMI NORM PARAMETERS: HCPCS | Performed by: INTERNAL MEDICINE

## 2019-07-12 PROCEDURE — 4040F PNEUMOC VAC/ADMIN/RCVD: CPT | Performed by: INTERNAL MEDICINE

## 2019-07-12 RX ORDER — PREDNISONE 2.5 MG
2.5 TABLET ORAL DAILY
Qty: 30 TABLET | Refills: 1 | Status: SHIPPED | OUTPATIENT
Start: 2019-07-12 | End: 2020-02-12

## 2019-07-12 NOTE — PROGRESS NOTES
Review    Lab Results   Component Value Date    WBC 4.8 12/27/2018    HGB 9.9 (L) 01/09/2019    HCT 30.1 (L) 01/09/2019    MCV 94.4 12/27/2018     12/27/2018     Lab Results   Component Value Date     12/27/2018    K 4.7 12/27/2018     12/27/2018    CO2 28 12/27/2018    BUN 21 12/27/2018    CREATININE 0.78 12/27/2018    GLUCOSE 95 12/27/2018    CALCIUM 8.8 12/27/2018    PROT 6.5 12/05/2018    LABALBU 3.7 12/05/2018    BILITOT 0.28 (L) 12/05/2018    ALKPHOS 66 12/05/2018    AST 22 12/05/2018    ALT 17 12/05/2018    LABGLOM >60 12/27/2018    GFRAA >60 12/27/2018    GLOB NOT REPORTED 12/05/2018       Lab Results   Component Value Date    CHOL 200 (H) 01/03/2017    CHOL 204 (H) 01/20/2014     Lab Results   Component Value Date    TRIG 165 (H) 01/03/2017    TRIG 132 01/20/2014     Lab Results   Component Value Date    HDL 46 10/01/2018    HDL 44 01/03/2017    HDL 37 (L) 01/20/2014     Lab Results   Component Value Date    LDLCHOLESTEROL 81 10/01/2018    LDLCHOLESTEROL 123 01/03/2017    LDLCHOLESTEROL 141 (H) 01/20/2014     Lab Results   Component Value Date    VLDL NOT REPORTED 10/01/2018    VLDL NOT REPORTED 01/03/2017    VLDL NOT REPORTED 01/20/2014     Lab Results   Component Value Date    CHOLHDLRATIO 3.0 10/01/2018    CHOLHDLRATIO 4.5 01/03/2017    CHOLHDLRATIO 5.5 (H) 01/20/2014          Assessment/Plan:     1. Generalized osteoarthritis  Cannot take NSAIDs due to long term anticoagulant use   - predniSONE (DELTASONE) 2.5 MG tablet; Take 1 tablet by mouth daily  Dispense: 30 tablet; Refill: 1    2. Atrial fibrillation with normal ventricular rate (HCC)  Rate controlled and anticoagulated    3. Dyslipidemia  Continue lipitor     4. Hypertrophic nonobstructive cardiomyopathy (HCC)  Continue current regimen   F/u cardiology     5. Benign essential HTN  Continue current regimen     6. Hill's esophagus without dysplasia  Continue omeprazole     7.  Other iron deficiency anemia  Continue iron

## 2019-07-15 ENCOUNTER — TELEPHONE (OUTPATIENT)
Dept: GASTROENTEROLOGY | Age: 64
End: 2019-07-15

## 2019-07-16 ENCOUNTER — OFFICE VISIT (OUTPATIENT)
Dept: GASTROENTEROLOGY | Age: 64
End: 2019-07-16
Payer: MEDICARE

## 2019-07-16 VITALS
BODY MASS INDEX: 24.49 KG/M2 | DIASTOLIC BLOOD PRESSURE: 72 MMHG | SYSTOLIC BLOOD PRESSURE: 102 MMHG | HEART RATE: 103 BPM | WEIGHT: 175.5 LBS

## 2019-07-16 DIAGNOSIS — K22.70 BARRETT'S ESOPHAGUS WITHOUT DYSPLASIA: Primary | ICD-10-CM

## 2019-07-16 DIAGNOSIS — K44.9 HIATAL HERNIA: ICD-10-CM

## 2019-07-16 PROCEDURE — G8427 DOCREV CUR MEDS BY ELIG CLIN: HCPCS | Performed by: INTERNAL MEDICINE

## 2019-07-16 PROCEDURE — 99214 OFFICE O/P EST MOD 30 MIN: CPT | Performed by: INTERNAL MEDICINE

## 2019-07-16 PROCEDURE — 4040F PNEUMOC VAC/ADMIN/RCVD: CPT | Performed by: INTERNAL MEDICINE

## 2019-07-16 PROCEDURE — G8420 CALC BMI NORM PARAMETERS: HCPCS | Performed by: INTERNAL MEDICINE

## 2019-07-16 PROCEDURE — 1123F ACP DISCUSS/DSCN MKR DOCD: CPT | Performed by: INTERNAL MEDICINE

## 2019-07-16 PROCEDURE — 1036F TOBACCO NON-USER: CPT | Performed by: INTERNAL MEDICINE

## 2019-07-16 PROCEDURE — 3017F COLORECTAL CA SCREEN DOC REV: CPT | Performed by: INTERNAL MEDICINE

## 2019-07-16 ASSESSMENT — ENCOUNTER SYMPTOMS
GASTROINTESTINAL NEGATIVE: 1
DIARRHEA: 0
COUGH: 0
NAUSEA: 0
BLOOD IN STOOL: 0
RECTAL PAIN: 0
ABDOMINAL PAIN: 0
VOICE CHANGE: 0
EYE ITCHING: 1
ALLERGIC/IMMUNOLOGIC NEGATIVE: 1
CONSTIPATION: 0
ANAL BLEEDING: 0
VOMITING: 0
SORE THROAT: 0
ABDOMINAL DISTENTION: 0
TROUBLE SWALLOWING: 0

## 2019-07-16 NOTE — PROGRESS NOTES
Subjective:      Patient ID: Flaquito Lopez is a 70 y.o. male. HPI  Dr. Sherly Harley MD our mutual patient Flaquito Lopez was seen  for   1. 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 Hiatal hernia     . The patient is here as a follow up of his recent GI procedure. The results have been sent to you separately   The findings were explained to the patient in detail and biopsies were also discussed   with him  Has long segment portillo's  On PPI  Pt is clinically feeling well GI wise  Has No significant abdominal pains, bloating or cramping  Has no sig GERD symptoms  Has no Dysphagia, No Nausea or any vomiting  Denies any rectal bleeding or any melena  Denies any sig constipation or any diarrhea symptoms  Weight is stable and appetite is generally good. Mild IBS like issues      Past Medical, Family, and Social History reviewed and does contribute to the patient presenting condition. patient\"s PMH/PSH,SH,PSYCH hx, MEDs, ALLERGIES, and ROS was all reviewed and updated ion the appropriate sections    Review of Systems   Constitutional: Negative. HENT: Negative. Negative for sore throat, trouble swallowing and voice change. Eyes: Positive for itching. Respiratory: Negative for cough (tickle in throat daily). Cardiovascular: Positive for chest pain and palpitations. Gastrointestinal: Negative. Negative for abdominal distention, abdominal pain, anal bleeding, blood in stool, constipation, diarrhea, nausea, rectal pain and vomiting. Denies   Endocrine: Positive for cold intolerance. Genitourinary: Negative. Musculoskeletal: Positive for arthralgias, gait problem, joint swelling, neck pain and neck stiffness. Skin: Negative. Allergic/Immunologic: Negative. Neurological: Positive for tremors. Negative for weakness. Hematological: Negative. Psychiatric/Behavioral: Positive for sleep disturbance. The patient is nervous/anxious.       Reviewed and agree  Objective: heartburns. He was discussed  in detail about some possible life style and dietary modifications. He was stressed about the maintenance  of appropriate weight and effect of obesity contributing to reflux symptoms. Routine exercise was streesed. Avoidance of Caffeine, nicotine and chocolate were explained. Pt was asked to avoid spices grease and fried food. Advices were also given about avoidance of any kind of fast foods, soda pops and high energy drinks. Pt was advised to place two small block under the head end of the bed which may help with night time reflux. Was advised not to eat any thin at least 2-3 hrs before going to bed and walk especially after dinner    Pt has verbalized understanding and agreement to this plan.     More than half of patient's clinic visit time was spent in counseling about lifestyle and dietary modifications  Patient's  questions were answered in this regard as well  The patient has verbalized understanding and agreement     RTC one year

## 2019-07-29 RX ORDER — LISINOPRIL 5 MG/1
TABLET ORAL
Qty: 90 TABLET | Refills: 1 | Status: ON HOLD | OUTPATIENT
Start: 2019-07-29 | End: 2020-10-26 | Stop reason: HOSPADM

## 2019-07-29 NOTE — TELEPHONE ENCOUNTER
Last visit: 7/12/19  Last Med refill: 2/5/19  Does patient have enough medication for 72 hours: Yes    Next Visit Date:  Future Appointments   Date Time Provider Yves Corralesi   11/7/2019  9:40 AM Rose Coreas MD [facility]rology TOLP   11/12/2019  4:15 PM Cassandra Watson MD [facility] FP TOLPP   1/16/2020  3:00 PM [facility]   Topic Date Due    Annual Wellness Visit (AWV)  08/11/2016    Pneumococcal 65+ years Vaccine (1 of 2 - PCV13) 08/11/2018    Shingles Vaccine (1 of 2) 09/26/2019 (Originally 8/11/2003)    DTaP/Tdap/Td vaccine (1 - Tdap) 08/18/2021 (Originally 8/11/1972)    Flu vaccine (1) 09/01/2019    Potassium monitoring  12/27/2019    Creatinine monitoring  12/27/2019    Colon cancer screen colonoscopy  12/06/2020    Lipid screen  10/01/2023    AAA screen  Completed    Hepatitis C screen  Completed    HIV screen  Completed       Hemoglobin A1C (%)   Date Value   01/10/2018 5.0             ( goal A1C is < 7)   No results found for: LABMICR  LDL Cholesterol (mg/dL)   Date Value   10/01/2018 81   01/03/2017 123       (goal LDL is <100)   AST (U/L)   Date Value   12/05/2018 22     ALT (U/L)   Date Value   12/05/2018 17     BUN (mg/dL)   Date Value   12/27/2018 21     BP Readings from Last 3 Encounters:   07/16/19 102/72   07/12/19 120/68   06/18/19 (!) 146/78          (goal 120/80)    All Future Testing planned in [facility]  Lab Frequency Next Occurrence   EGD Once 07/16/2019               Patient Active Problem List:     Dyslipidemia     ED (erectile dysfunction)     Incomplete bladder emptying     Hypertrophy of prostate with urinary obstruction and other lower urinary tract symptoms (LUTS)     History of colon cancer     Atrial fibrillation with normal ventricular rate (HCC)     Anemia     Hyperopia     Pallor of optic disc     Presbyopia     Incisional hernia, without obstruction or gangrene     Hypertrophic nonobstructive cardiomyopathy (HCC)     Iron (Fe) deficiency anemia     Primary osteoarthritis of right knee     Benign essential HTN     History of malignant neoplasm of rectum     Hill's esophagus

## 2019-08-01 ENCOUNTER — TELEPHONE (OUTPATIENT)
Dept: UROLOGY | Age: 64
End: 2019-08-01

## 2019-08-01 NOTE — TELEPHONE ENCOUNTER
pt is calling bc went to fill rx for cialis and bc it has been past 6m pharmacy wouldnt do it. wants new rx

## 2019-08-06 ENCOUNTER — TELEPHONE (OUTPATIENT)
Dept: ADMINISTRATIVE | Age: 64
End: 2019-08-06

## 2019-08-06 NOTE — TELEPHONE ENCOUNTER
Pt called and stated he has been waiting since 8/1 for a call back regarding his rx, pt states he suppose to get a call back on 8/2 and didn't hear anything, pt is getting irritated that no one will call him, please call pt regarding med refill at phone number [phone]

## 2019-08-15 ENCOUNTER — OFFICE VISIT (OUTPATIENT)
Dept: UROLOGY | Age: 64
End: 2019-08-15
Payer: MEDICARE

## 2019-08-15 VITALS
HEIGHT: 71 IN | HEART RATE: 54 BPM | BODY MASS INDEX: 25.62 KG/M2 | WEIGHT: 183 LBS | SYSTOLIC BLOOD PRESSURE: 127 MMHG | DIASTOLIC BLOOD PRESSURE: 70 MMHG

## 2019-08-15 DIAGNOSIS — N13.8 BENIGN PROSTATIC HYPERPLASIA WITH URINARY OBSTRUCTION: Primary | ICD-10-CM

## 2019-08-15 DIAGNOSIS — N52.8 OTHER MALE ERECTILE DYSFUNCTION: ICD-10-CM

## 2019-08-15 DIAGNOSIS — N40.1 BENIGN PROSTATIC HYPERPLASIA WITH URINARY OBSTRUCTION: Primary | ICD-10-CM

## 2019-08-15 LAB
BILIRUBIN, POC: NEGATIVE
BLOOD URINE, POC: NORMAL
CLARITY, POC: CLEAR
COLOR, POC: YELLOW
GLUCOSE URINE, POC: NEGATIVE
KETONES, POC: NEGATIVE
LEUKOCYTE EST, POC: NEGATIVE
NITRITE, POC: NEGATIVE
PH, POC: 6
PROTEIN, POC: NORMAL
SPECIFIC GRAVITY, POC: >=1.03
UROBILINOGEN, POC: 0.2

## 2019-08-15 PROCEDURE — G8419 CALC BMI OUT NRM PARAM NOF/U: HCPCS | Performed by: SPECIALIST

## 2019-08-15 PROCEDURE — 1123F ACP DISCUSS/DSCN MKR DOCD: CPT | Performed by: SPECIALIST

## 2019-08-15 PROCEDURE — 1036F TOBACCO NON-USER: CPT | Performed by: SPECIALIST

## 2019-08-15 PROCEDURE — 81002 URINALYSIS NONAUTO W/O SCOPE: CPT | Performed by: SPECIALIST

## 2019-08-15 PROCEDURE — 99213 OFFICE O/P EST LOW 20 MIN: CPT | Performed by: SPECIALIST

## 2019-08-15 PROCEDURE — 4040F PNEUMOC VAC/ADMIN/RCVD: CPT | Performed by: SPECIALIST

## 2019-08-15 PROCEDURE — 3017F COLORECTAL CA SCREEN DOC REV: CPT | Performed by: SPECIALIST

## 2019-08-15 PROCEDURE — G8428 CUR MEDS NOT DOCUMENT: HCPCS | Performed by: SPECIALIST

## 2019-08-15 RX ORDER — SILDENAFIL 100 MG/1
100 TABLET, FILM COATED ORAL PRN
Qty: 10 TABLET | Refills: 11 | Status: SHIPPED | OUTPATIENT
Start: 2019-08-15 | End: 2020-02-27 | Stop reason: SDUPTHER

## 2019-08-15 NOTE — LETTER
Sonal Bustos MD [address], [address]  [facility], [address] [facility]  P: [phone] / F: [phone]      8/15/19    Patient: Adams Diop  YOB: 1955    Dear Zeny Oneil MD,    I had the pleasure of seeing one of your patients, Ronne Dakin today in the office today. Below are the relevant portions of my assessment and plan of care. IMPRESSION:  1. Benign prostatic hyperplasia with urinary obstruction    2. Other male erectile dysfunction      PLAN:  Return in about 6 months (around 2/15/2020) for PSA. Patient's lower urinary tract symptoms are stable on Flomax/tamsulosin 0.4 mg po qd for BPH symptoms. Patient given an Rx for generic sildenafil 100 mg (1/2-1 tab) prn ED. Thank you for allowing me to participate in the care of this patient. I will keep you updated on this patient's follow up and I look forward to serving you and your patients again in the future. Sonal Bustos MD, FACS

## 2019-08-15 NOTE — PROGRESS NOTES
Tere Bolivar MD, [facility]  [facility] Progress Note    Patient:  Radha Gramajo  YOB: 1955  Date: 8/15/2019  Erectile Dysfunction:  Patient is here today for erectile dysfunction which started several year(s) ago. Recently his ED symptoms: show no change  Currently sexually active? Yes  Sex drive/libido: normal  Current medical Rx for ED: none   Pain Severity: Pain Score:   0 - No pain    Summary of old records:   History of rectal cancer, had resection, colostomy followed by reversal.  BPH on Flomax/tamsulosin 0.4 mg po qd  ED: Cialis 5 mg qd-too costly, gave Rx for generic sildenafil 100 mg (1/2-1 tab) prn ED 8/15/19    Additional History: Patient's lower urinary tract symptoms are stable on Flomax/tamsulosin 0.4 mg po qd for BPH symptoms. Patient given an Rx for generic sildenafil 100 mg (1/2-1 tab) prn ED.      Last several PSA's:  Lab Results   Component Value Date    PSA 0.36 10/01/2018    PSA 0.56 01/03/2017    PSA 0.40 08/19/2015       Last total testosterone:  No results found for: TESTOSTERONE    Urinalysis today:  Results for POC orders placed in visit on 08/15/19   POCT Urinalysis no Micro   Result Value Ref Range    Color, UA yellow     Clarity, UA clear     Glucose, UA POC negative     Bilirubin, UA negative     Ketones, UA negative     Spec Grav, UA >=1.030     Blood, UA POC trace-intact     pH, UA 6.0     Protein, UA POC 30 mg/dL     Urobilinogen, UA 0.2     Leukocytes, UA negative     Nitrite, UA negative        Last BUN and creatinine:  Lab Results   Component Value Date    BUN 21 12/27/2018     Lab Results   Component Value Date    CREATININE 0.78 12/27/2018       Imaging Reviewed during this Office Visit:   (results were independently reviewed by physician and radiology report verified)    PAST MEDICAL, FAMILY AND SOCIAL HISTORY UPDATE:  Past Medical History:   Diagnosis Date    Atrial fibrillation ([address]     Back pain, chronic     Hill's esophagus

## 2019-08-21 DIAGNOSIS — K22.70 BARRETT'S ESOPHAGUS DETERMINED BY ENDOSCOPY: ICD-10-CM

## 2019-08-21 RX ORDER — OMEPRAZOLE 40 MG/1
CAPSULE, DELAYED RELEASE ORAL
Qty: 90 CAPSULE | Refills: 1 | Status: SHIPPED | OUTPATIENT
Start: 2019-08-21 | End: 2020-02-06

## 2019-08-21 NOTE — TELEPHONE ENCOUNTER
Last visit: 7/12/19  Last Med refill: 3/12/19  Does patient have enough medication for 72 hours: Yes    Next Visit Date:  Future Appointments   Date Time Provider Yves Corralesi   11/7/2019  9:40 AM Stephie Cadet MD [facility] Urology [facility]   11/12/2019  4:15 PM Cassandra Louise MD [facility] FP TOLP   1/16/2020  3:00 PM Yonas Canas MD [facility] Maintenance   Topic Date Due    Annual Wellness Visit (AWV)  08/11/2016    Pneumococcal 65+ years Vaccine (1 of 2 - PCV13) 08/11/2018    Shingles Vaccine (1 of 2) 09/26/2019 (Originally 8/11/2003)    DTaP/Tdap/Td vaccine (1 - Tdap) 08/18/2021 (Originally 8/11/1972)    Flu vaccine (1) 09/01/2019    Potassium monitoring  12/27/2019    Creatinine monitoring  12/27/2019    Colon cancer screen colonoscopy  12/06/2020    Lipid screen  10/01/2023    AAA screen  Completed    Hepatitis C screen  Completed       Hemoglobin A1C (%)   Date Value   01/10/2018 5.0             ( goal A1C is < 7)   No results found for: LABMICR  LDL Cholesterol (mg/dL)   Date Value   10/01/2018 81   01/03/2017 123       (goal LDL is <100)   AST (U/L)   Date Value   12/05/2018 22     ALT (U/L)   Date Value   12/05/2018 17     BUN (mg/dL)   Date Value   12/27/2018 21     BP Readings from Last 3 Encounters:   08/15/19 127/70   07/16/19 102/72   07/12/19 120/68          (goal 120/80)    All Future Testing planned in [facility]  Lab Frequency Next Occurrence   EGD Once 07/16/2019   PSA, Diagnostic Once 02/11/2020               Patient Active Problem List:     Dyslipidemia     ED (erectile dysfunction)     Incomplete bladder emptying     Hypertrophy of prostate with urinary obstruction and other lower urinary tract symptoms (LUTS)     History of colon cancer     Atrial fibrillation with normal ventricular rate (HCC)     Anemia     Hyperopia     Pallor of optic disc     Presbyopia     Incisional hernia, without obstruction or gangrene     Hypertrophic nonobstructive

## 2019-09-12 ENCOUNTER — TELEPHONE (OUTPATIENT)
Dept: UROLOGY | Age: 64
End: 2019-09-12

## 2019-10-13 DIAGNOSIS — I48.0 PAROXYSMAL ATRIAL FIBRILLATION (HCC): ICD-10-CM

## 2019-10-13 DIAGNOSIS — I42.2 HYPERTROPHIC NONOBSTRUCTIVE CARDIOMYOPATHY (HCC): ICD-10-CM

## 2019-10-14 RX ORDER — ATORVASTATIN CALCIUM 40 MG/1
TABLET, FILM COATED ORAL
Qty: 90 TABLET | Refills: 1 | Status: SHIPPED | OUTPATIENT
Start: 2019-10-14 | End: 2020-04-14

## 2019-11-08 ENCOUNTER — NURSE ONLY (OUTPATIENT)
Dept: FAMILY MEDICINE CLINIC | Age: 64
End: 2019-11-08
Payer: MEDICARE

## 2019-11-08 DIAGNOSIS — Z23 NEED FOR INFLUENZA VACCINATION: ICD-10-CM

## 2019-11-08 PROCEDURE — 90471 IMMUNIZATION ADMIN: CPT | Performed by: INTERNAL MEDICINE

## 2019-11-08 PROCEDURE — 90653 IIV ADJUVANT VACCINE IM: CPT | Performed by: INTERNAL MEDICINE

## 2019-11-08 PROCEDURE — G0008 ADMIN INFLUENZA VIRUS VAC: HCPCS | Performed by: INTERNAL MEDICINE

## 2020-01-16 ENCOUNTER — OFFICE VISIT (OUTPATIENT)
Dept: GASTROENTEROLOGY | Age: 65
End: 2020-01-16
Payer: MEDICARE

## 2020-01-16 VITALS
WEIGHT: 191 LBS | HEART RATE: 68 BPM | DIASTOLIC BLOOD PRESSURE: 87 MMHG | SYSTOLIC BLOOD PRESSURE: 134 MMHG | BODY MASS INDEX: 26.64 KG/M2

## 2020-01-16 PROCEDURE — 3017F COLORECTAL CA SCREEN DOC REV: CPT | Performed by: INTERNAL MEDICINE

## 2020-01-16 PROCEDURE — 99214 OFFICE O/P EST MOD 30 MIN: CPT | Performed by: INTERNAL MEDICINE

## 2020-01-16 PROCEDURE — G8417 CALC BMI ABV UP PARAM F/U: HCPCS | Performed by: INTERNAL MEDICINE

## 2020-01-16 PROCEDURE — G8482 FLU IMMUNIZE ORDER/ADMIN: HCPCS | Performed by: INTERNAL MEDICINE

## 2020-01-16 PROCEDURE — 1123F ACP DISCUSS/DSCN MKR DOCD: CPT | Performed by: INTERNAL MEDICINE

## 2020-01-16 PROCEDURE — 4040F PNEUMOC VAC/ADMIN/RCVD: CPT | Performed by: INTERNAL MEDICINE

## 2020-01-16 PROCEDURE — 1036F TOBACCO NON-USER: CPT | Performed by: INTERNAL MEDICINE

## 2020-01-16 PROCEDURE — G8427 DOCREV CUR MEDS BY ELIG CLIN: HCPCS | Performed by: INTERNAL MEDICINE

## 2020-01-16 ASSESSMENT — ENCOUNTER SYMPTOMS
COUGH: 0
DIARRHEA: 0
RECTAL PAIN: 0
GASTROINTESTINAL NEGATIVE: 1
EYE ITCHING: 1
ALLERGIC/IMMUNOLOGIC NEGATIVE: 1
ANAL BLEEDING: 0
TROUBLE SWALLOWING: 0
SORE THROAT: 0
ABDOMINAL DISTENTION: 0
VOMITING: 0
VOICE CHANGE: 0
CONSTIPATION: 0
BLOOD IN STOOL: 0
ABDOMINAL PAIN: 0
NAUSEA: 0

## 2020-01-16 NOTE — PROGRESS NOTES
Subjective:      Patient ID: Cathye Liner is a 75 y.o. male. HPI    Dr. Coby Boothe MD our mutual patient Cathye Liner was seen  for   1. 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 Abdominal wall hernia    3. Abdominal pain, epigastric     . Here for f/u  Has been seen and had surgeries in the past by Dr Tulio Hobbs and Dr Libra Easley  Had Last EGD and Colon in Dec 2018  Gas long segment portillo's  Has been c/o some abd pains  Has some worsening of his swelling of RLQ area  He wears abdominal binder  Has no overt bleeding  Has no dysphagia  No smoking  Has hx of colon cancer  Also had Surgery at [facility] [place]    Past Medical, Family, and Social History reviewed and does contribute to the patient presenting condition. patient\"s PMH/PSH,SH,PSYCH hx, MEDs, ALLERGIES, and ROS was all reviewed and updated ion the appropriate sections    Review of Systems   Constitutional: Negative. HENT: Negative. Negative for sore throat, trouble swallowing and voice change. Eyes: Positive for itching. Respiratory: Negative for cough (tickle in throat daily). Cardiovascular: Positive for chest pain and palpitations. Gastrointestinal: Negative. Negative for abdominal distention, abdominal pain, anal bleeding, blood in stool, constipation, diarrhea, nausea, rectal pain and vomiting. Denies   Endocrine: Positive for cold intolerance. Genitourinary: Negative. Musculoskeletal: Positive for arthralgias, gait problem, joint swelling, neck pain and neck stiffness. Skin: Negative. Allergic/Immunologic: Negative. Neurological: Positive for tremors. Negative for weakness. Hematological: Negative. Psychiatric/Behavioral: Positive for sleep disturbance. The patient is nervous/anxious. Reviewed and agree  Objective:   Physical Exam  Nursing note reviewed. Constitutional:       Appearance: He is well-developed. Comments: Anxious    HENT:      Head: Normocephalic and atraumatic.    Eyes: using ample amount of fiber including dietary and supplemental fiber either metamucil, bennafiber or citrucell etc.  Pt was advised about drinking ample amount of water without any colors or chemicals. Stress was given about regular exercise. Pt has verbalized understanding and agreement to these modifications. The patient was instructed to start taking some OTC Probiotics products   These are available over the counter at the Pharmacy stores and Grocery stores  He was explained about the beneficial effects they have in the GI track  They will help to establish the good bacterial jannette and will help with the digestion and bowel movements  The patient has verbalized understanding and agreement to this plan    Pt seems to have signs and symptoms consistent with GERD, acid indigestion and heartburns. He was discussed  in detail about some possible life style and dietary modifications. He was stressed about the maintenance  of appropriate weight and effect of obesity contributing to reflux symptoms. Routine exercise was streesed. Avoidance of Caffeine, nicotine and chocolate were explained. Pt was asked to avoid spices grease and fried food. Advices were also given about avoidance of any kind of fast foods, soda pops and high energy drinks. Pt was advised to place two small block under the head end of the bed which may help with night time reflux. Was advised not to eat any thin at least 2-3 hrs before going to bed and walk especially after dinner    Pt has verbalized understanding and agreement to this plan. Pt was discussed in detail about the possible side effects of proton pump inhibiter therapy. He was explained about the possibility of calcium and magnesium malabsorption and was advised to start taking calcium supplements with Vit D. Some over the counter regimens were explained to patient. Some dietary advices were also given. He has verbalized understanding and agreement to this.

## 2020-01-27 ENCOUNTER — HOSPITAL ENCOUNTER (OUTPATIENT)
Dept: CT IMAGING | Age: 65
Discharge: HOME | End: 2020-01-29
Payer: MEDICARE

## 2020-01-27 LAB
BUN BLDV-MCNC: 23 MG/DL (ref 8–23)
CREAT SERPL-MCNC: 0.82 MG/DL (ref 0.7–1.2)
GFR AFRICAN AMERICAN: >60 ML/MIN
GFR NON-AFRICAN AMERICAN: >60 ML/MIN
GFR SERPL CREATININE-BSD FRML MDRD: NORMAL ML/MIN/{1.73_M2}
GFR SERPL CREATININE-BSD FRML MDRD: NORMAL ML/MIN/{1.73_M2}

## 2020-01-27 PROCEDURE — 36415 COLL VENOUS BLD VENIPUNCTURE: CPT

## 2020-01-27 PROCEDURE — 2580000003 HC RX 258: Performed by: INTERNAL MEDICINE

## 2020-01-27 PROCEDURE — 6360000004 HC RX CONTRAST MEDICATION: Performed by: INTERNAL MEDICINE

## 2020-01-27 PROCEDURE — 82565 ASSAY OF CREATININE: CPT

## 2020-01-27 PROCEDURE — 74177 CT ABD & PELVIS W/CONTRAST: CPT

## 2020-01-27 PROCEDURE — 84520 ASSAY OF UREA NITROGEN: CPT

## 2020-01-27 RX ORDER — 0.9 % SODIUM CHLORIDE 0.9 %
80 INTRAVENOUS SOLUTION INTRAVENOUS ONCE
Status: COMPLETED | OUTPATIENT
Start: 2020-01-27 | End: 2020-01-27

## 2020-01-27 RX ORDER — SODIUM CHLORIDE 0.9 % (FLUSH) 0.9 %
10 SYRINGE (ML) INJECTION PRN
Status: DISCONTINUED | OUTPATIENT
Start: 2020-01-27 | End: 2020-01-30 | Stop reason: HOSPADM

## 2020-01-27 RX ADMIN — IOPAMIDOL 75 ML: 755 INJECTION, SOLUTION INTRAVENOUS at 10:55

## 2020-01-27 RX ADMIN — SODIUM CHLORIDE 80 ML: 9 INJECTION, SOLUTION INTRAVENOUS at 10:54

## 2020-01-27 RX ADMIN — Medication 10 ML: at 10:55

## 2020-01-27 RX ADMIN — IOHEXOL 50 ML: 240 INJECTION, SOLUTION INTRATHECAL; INTRAVASCULAR; INTRAVENOUS; ORAL at 10:55

## 2020-02-03 RX ORDER — TAMSULOSIN HYDROCHLORIDE 0.4 MG/1
CAPSULE ORAL
Qty: 90 CAPSULE | Refills: 0 | Status: SHIPPED | OUTPATIENT
Start: 2020-02-03 | End: 2020-02-27

## 2020-02-04 NOTE — TELEPHONE ENCOUNTER
Last visit: 11/8/19  Last Med refill: 3/21/19  Does patient have enough medication for 72 hours: No:     Next Visit Date:  Future Appointments   Date Time Provider Yves [place]   2/12/2020  1:15 PM Cassandra Godoy MD [facility] FP [facility]OLPP   2/13/2020  9:00 AM Mitesh Braxton MD [facility] Urology [address] [facility]   3/26/2020  3:00 PM Meghna Flowers MD [facility] Maintenance   Topic Date Due    Shingles Vaccine (1 of 2) 08/11/2003    Pneumococcal 65+ years Vaccine (1 of 1 - PPSV23) 08/11/2018    Lipid screen  10/01/2019    Potassium monitoring  12/27/2019    DTaP/Tdap/Td vaccine (1 - Tdap) 08/18/2021 (Originally 8/11/1966)    Annual Wellness Visit (AWV)  12/03/2020    Colon cancer screen colonoscopy  12/06/2020    Creatinine monitoring  01/27/2021    Flu vaccine  Completed    AAA screen  Completed    Hepatitis C screen  Completed       Hemoglobin A1C (%)   Date Value   01/10/2018 5.0             ( goal A1C is < 7)   No results found for: LABMICR  LDL Cholesterol (mg/dL)   Date Value   10/01/2018 81   01/03/2017 123       (goal LDL is <100)   AST (U/L)   Date Value   12/05/2018 22     ALT (U/L)   Date Value   12/05/2018 17     BUN (mg/dL)   Date Value   01/27/2020 23     BP Readings from Last 3 Encounters:   01/16/20 134/87   08/15/19 127/70   07/16/19 102/72          (goal 120/80)    All Future Testing planned in [facility]  Lab Frequency Next Occurrence   EGD Once 07/16/2019   PSA, Diagnostic Once 02/11/2020               Patient Active Problem List:     Dyslipidemia     ED (erectile dysfunction)     Incomplete bladder emptying     Hypertrophy of prostate with urinary obstruction and other lower urinary tract symptoms (LUTS)     History of colon cancer     Atrial fibrillation with normal ventricular rate (HCC)     Anemia     Hyperopia     Pallor of optic disc     Presbyopia     Incisional hernia, without obstruction or gangrene     Hypertrophic nonobstructive cardiomyopathy (HCC)     Iron

## 2020-02-05 RX ORDER — IBUPROFEN 800 MG/1
TABLET ORAL
Qty: 120 TABLET | Refills: 1 | Status: SHIPPED | OUTPATIENT
Start: 2020-02-05 | End: 2020-05-26

## 2020-02-05 NOTE — TELEPHONE ENCOUNTER
Last visit: 7/12/2019  Last Med refill: 8/21/19  Does patient have enough medication for 72 hours: Yes    Next Visit Date:  Future Appointments   Date Time Provider [place]   2/12/2020  1:15 PM Cassandra Ness MD [id number]   2/13/2020  9:00 AM Radha Sorensen MD [facility]   3/26/2020  3:00 PM Charlynn Dakins, MD [place] Maintenance   Topic Date Due    Shingles Vaccine (1 of 2) 08/11/2003    Pneumococcal 65+ years Vaccine (1 of 1 - PPSV23) 08/11/2018    Lipid screen  10/01/2019    Potassium monitoring  12/27/2019    DTaP/Tdap/Td vaccine (1 - Tdap) 08/18/2021 (Originally 8/11/1966)    Annual Wellness Visit (AWV)  12/04/2020    Colon cancer screen colonoscopy  12/06/2020    Creatinine monitoring  01/27/2021    Flu vaccine  Completed    AAA screen  Completed    Hepatitis C screen  Completed    Hepatitis A vaccine  Aged Out    Hepatitis B vaccine  Aged Out    Hib vaccine  Aged Out    Meningococcal (ACWY) vaccine  Aged Out       Hemoglobin A1C (%)   Date Value   01/10/2018 5.0             ( goal A1C is < 7)   No results found for: LABMICR  LDL Cholesterol (mg/dL)   Date Value   10/01/2018 81   01/03/2017 123       (goal LDL is <100)   AST (U/L)   Date Value   12/05/2018 22     ALT (U/L)   Date Value   12/05/2018 17     BUN (mg/dL)   Date Value   01/27/2020 23     BP Readings from Last 3 Encounters:   01/16/20 134/87   08/15/19 127/70   07/16/19 102/72          (goal 120/80)    All Future Testing planned in [facility]  Lab Frequency Next Occurrence   EGD Once 07/16/2019   PSA, Diagnostic Once 02/11/2020               Patient Active Problem List:     Dyslipidemia     ED (erectile dysfunction)     Incomplete bladder emptying     Hypertrophy of prostate with urinary obstruction and other lower urinary tract symptoms (LUTS)     History of colon cancer     Atrial fibrillation with normal ventricular rate (HCC)     Anemia     Hyperopia     Pallor of optic disc     Presbyopia     Incisional hernia, without obstruction or gangrene     Hypertrophic nonobstructive cardiomyopathy (HCC)     Iron (Fe) deficiency anemia     Primary osteoarthritis of right knee     Benign essential HTN     History of malignant neoplasm of rectum     Hill's esophagus

## 2020-02-06 RX ORDER — OMEPRAZOLE 40 MG/1
CAPSULE, DELAYED RELEASE ORAL
Qty: 90 CAPSULE | Refills: 1 | Status: SHIPPED | OUTPATIENT
Start: 2020-02-06 | End: 2020-07-21

## 2020-02-12 ENCOUNTER — OFFICE VISIT (OUTPATIENT)
Dept: FAMILY MEDICINE CLINIC | Age: 65
End: 2020-02-12
Payer: MEDICARE

## 2020-02-12 VITALS
HEART RATE: 66 BPM | BODY MASS INDEX: 26.77 KG/M2 | DIASTOLIC BLOOD PRESSURE: 86 MMHG | OXYGEN SATURATION: 98 % | HEIGHT: 71 IN | SYSTOLIC BLOOD PRESSURE: 139 MMHG | WEIGHT: 191.2 LBS

## 2020-02-12 PROCEDURE — 3017F COLORECTAL CA SCREEN DOC REV: CPT | Performed by: INTERNAL MEDICINE

## 2020-02-12 PROCEDURE — 99214 OFFICE O/P EST MOD 30 MIN: CPT | Performed by: INTERNAL MEDICINE

## 2020-02-12 PROCEDURE — G8482 FLU IMMUNIZE ORDER/ADMIN: HCPCS | Performed by: INTERNAL MEDICINE

## 2020-02-12 PROCEDURE — G8417 CALC BMI ABV UP PARAM F/U: HCPCS | Performed by: INTERNAL MEDICINE

## 2020-02-12 PROCEDURE — G8427 DOCREV CUR MEDS BY ELIG CLIN: HCPCS | Performed by: INTERNAL MEDICINE

## 2020-02-12 PROCEDURE — 4040F PNEUMOC VAC/ADMIN/RCVD: CPT | Performed by: INTERNAL MEDICINE

## 2020-02-12 PROCEDURE — 1036F TOBACCO NON-USER: CPT | Performed by: INTERNAL MEDICINE

## 2020-02-12 PROCEDURE — 1123F ACP DISCUSS/DSCN MKR DOCD: CPT | Performed by: INTERNAL MEDICINE

## 2020-02-12 RX ORDER — PAROXETINE HYDROCHLORIDE 20 MG/1
20 TABLET, FILM COATED ORAL DAILY
Qty: 30 TABLET | Refills: 3 | Status: SHIPPED | OUTPATIENT
Start: 2020-02-12 | End: 2020-02-21

## 2020-02-12 ASSESSMENT — PATIENT HEALTH QUESTIONNAIRE - PHQ9
SUM OF ALL RESPONSES TO PHQ9 QUESTIONS 1 & 2: 2
SUM OF ALL RESPONSES TO PHQ QUESTIONS 1-9: 2
1. LITTLE INTEREST OR PLEASURE IN DOING THINGS: 0
SUM OF ALL RESPONSES TO PHQ QUESTIONS 1-9: 2
2. FEELING DOWN, DEPRESSED OR HOPELESS: 2

## 2020-02-12 NOTE — PROGRESS NOTES
diarrhea         Objective:        Physical Exam:  /86 (Site: Left Upper Arm, Position: Sitting, Cuff Size: Medium Adult)   Pulse 66   Ht 5' 10.98\" (1.803 m)   Wt 191 lb 3.2 oz (86.7 kg)   SpO2 98%   BMI 26.68 kg/m²     General: Alert and oriented, in no distress. Patient ambulating with normal gait. Normal body habitus. Chest: clear with no wheezes or rales. No retractions, or use of accessory muscles noted. Cardiovascular: PMI is not displaced, and no thrill noted. Regular rate and rhythm with no rub, murmur or gallop. There is no peripheral edema. Pedal pulses are normal.   Abdomen: Abdomen is soft and nontender. The bowel sounds are normal.   Musculoskeletal: There are no deformities of the the extremities. Patient has all ten fingers intact. The patient has full range of motion on all 4 extremities without pain. Skin: The skin is warm and dry. There are no rashes noted. Prior to Visit Medications    Medication Sig Taking?  Authorizing Provider   omeprazole (PRILOSEC) 40 MG delayed release capsule take 1 capsule by mouth every morning BEFORE BREAKFAST Yes MIRI Harper CNP   ibuprofen (ADVIL;MOTRIN) 800 MG tablet take 1 tablet by mouth every 6 hours if needed for pain Yes MIRI Harper CNP   tamsulosin (FLOMAX) 0.4 MG capsule take 1 capsule by mouth once daily Yes Val Ornelas MD   atorvastatin (LIPITOR) 40 MG tablet take 1 tablet by mouth once daily Yes Ho Lu MD   sildenafil (VIAGRA) 100 MG tablet Take 1 tablet by mouth as needed for Erectile Dysfunction Yes Val Ornelas MD   metoprolol tartrate (LOPRESSOR) 25 MG tablet take 1 tablet by mouth twice a day Yes Gloria Harris MD   lisinopril (PRINIVIL;ZESTRIL) 5 MG tablet take 1 tablet by mouth once daily Yes MIRI Ann CNP   predniSONE (DELTASONE) 2.5 MG tablet Take 1 tablet by mouth daily Yes Cassandra Miller MD   acetaminophen (TYLENOL) 325 MG tablet Take 650 mg by mouth every 6 hours as needed for Pain Yes Historical Provider, MD   ferrous sulfate (FE TABS) 325 (65 Fe) MG EC tablet Take 1 tablet by mouth daily (with breakfast) Yes Dyana Quarles MD   rivaroxaban (XARELTO) 20 MG TABS tablet Take 20 mg by mouth daily (with breakfast)  Yes Historical Provider, MD   Elastic Bandages & Supports (ABDOMINAL BINDER/ELASTIC XL) MISC 1 Device by Does not apply route as needed (ventral hernia, while ambulating/active) Yes Tarun Hammer, DO       Data Review    Lab Results   Component Value Date    WBC 4.8 12/27/2018    HGB 9.9 (L) 01/09/2019    HCT 30.1 (L) 01/09/2019    MCV 94.4 12/27/2018     12/27/2018     Lab Results   Component Value Date     12/27/2018    K 4.7 12/27/2018     12/27/2018    CO2 28 12/27/2018    BUN 23 01/27/2020    CREATININE 0.82 01/27/2020    GLUCOSE 95 12/27/2018    CALCIUM 8.8 12/27/2018    PROT 6.5 12/05/2018    LABALBU 3.7 12/05/2018    BILITOT 0.28 (L) 12/05/2018    ALKPHOS 66 12/05/2018    AST 22 12/05/2018    ALT 17 12/05/2018    LABGLOM >60 01/27/2020    GFRAA >60 01/27/2020    GLOB NOT REPORTED 12/05/2018       Lab Results   Component Value Date    CHOL 200 (H) 01/03/2017    CHOL 204 (H) 01/20/2014     Lab Results   Component Value Date    TRIG 165 (H) 01/03/2017    TRIG 132 01/20/2014     Lab Results   Component Value Date    HDL 46 10/01/2018    HDL 44 01/03/2017    HDL 37 (L) 01/20/2014     Lab Results   Component Value Date    LDLCHOLESTEROL 81 10/01/2018    LDLCHOLESTEROL 123 01/03/2017    LDLCHOLESTEROL 141 (H) 01/20/2014     Lab Results   Component Value Date    VLDL NOT REPORTED 10/01/2018    VLDL NOT REPORTED 01/03/2017    VLDL NOT REPORTED 01/20/2014     Lab Results   Component Value Date    CHOLHDLRATIO 3.0 10/01/2018    CHOLHDLRATIO 4.5 01/03/2017    CHOLHDLRATIO 5.5 (H) 01/20/2014          Assessment/Plan:     1. Hypertrophic nonobstructive cardiomyopathy [facility])  F/u cardiology   Continue current regimen     2.  Atrial

## 2020-02-21 ENCOUNTER — INITIAL CONSULT (OUTPATIENT)
Dept: BARIATRICS/WEIGHT MGMT | Age: 65
End: 2020-02-21
Payer: MEDICARE

## 2020-02-21 VITALS
DIASTOLIC BLOOD PRESSURE: 80 MMHG | WEIGHT: 187 LBS | SYSTOLIC BLOOD PRESSURE: 138 MMHG | HEART RATE: 68 BPM | HEIGHT: 71 IN | BODY MASS INDEX: 26.18 KG/M2

## 2020-02-21 PROCEDURE — 3017F COLORECTAL CA SCREEN DOC REV: CPT | Performed by: SURGERY

## 2020-02-21 PROCEDURE — G8482 FLU IMMUNIZE ORDER/ADMIN: HCPCS | Performed by: SURGERY

## 2020-02-21 PROCEDURE — 4040F PNEUMOC VAC/ADMIN/RCVD: CPT | Performed by: SURGERY

## 2020-02-21 PROCEDURE — 1036F TOBACCO NON-USER: CPT | Performed by: SURGERY

## 2020-02-21 PROCEDURE — 99203 OFFICE O/P NEW LOW 30 MIN: CPT | Performed by: SURGERY

## 2020-02-21 PROCEDURE — 1123F ACP DISCUSS/DSCN MKR DOCD: CPT | Performed by: SURGERY

## 2020-02-21 PROCEDURE — G8417 CALC BMI ABV UP PARAM F/U: HCPCS | Performed by: SURGERY

## 2020-02-21 PROCEDURE — G8428 CUR MEDS NOT DOCUMENT: HCPCS | Performed by: SURGERY

## 2020-02-21 NOTE — Clinical Note
I saw Radha Barth for his incisional hernia. He has loss of domain in addition to a right incisional hernia at his prior ostomy site. Given the size of the hernia he will need a component separation. He would like to go back to [facility] for surgery as he felt comfortable with surgery at their facility in the past.  We will set him up for referral as needed. He wants to hold off any repair as his wife is currently ill. Angel Hodgson

## 2020-02-23 NOTE — PROGRESS NOTES
REPAIR Right 2009    inguinal    KNEE SURGERY Right 's    arthrotomy    TONSILLECTOMY      TOTAL KNEE ARTHROPLASTY Right 2019    KNEE TOTAL ARTHROPLASTY performed by Dayanara Mondragon MD at [address] [place] [address] TRANSESOPHAGEAL ECHOCARDIOGRAM  2018    UPPER GASTROINTESTINAL ENDOSCOPY N/A 2018    EGD DIAGNOSTIC ONLY performed by Mane Aguilar MD at [facility]    UPPER GASTROINTESTINAL ENDOSCOPY N/A 2019    [facility]       Family History:      Problem Relation Age of Onset    Diabetes Mother     Heart Attack Father     Heart Disease Father     Heart Disease Brother        Social History:   Social History     Tobacco Use    Smoking status: Former Smoker     Packs/day: 0.50     Years: 1.00     Pack years: 0.50     Last attempt to quit: [address]     Years since quittin.1    Smokeless tobacco: Never Used   Substance Use Topics    Alcohol use: Yes     Alcohol/week: 8.3 standard drinks     Types: 10 Standard drinks or equivalent per week     Comment: 3 -4 times a week    Drug use: No     Types:  Other-see comments     Comment: Cocaine use in past in        Current Med List:  Current Outpatient Medications   Medication Sig Dispense Refill    omeprazole (PRILOSEC) 40 MG delayed release capsule take 1 capsule by mouth every morning BEFORE BREAKFAST 90 capsule 1    ibuprofen (ADVIL;MOTRIN) 800 MG tablet take 1 tablet by mouth every 6 hours if needed for pain 120 tablet 1    tamsulosin (FLOMAX) 0.4 MG capsule take 1 capsule by mouth once daily 90 capsule 0    atorvastatin (LIPITOR) 40 MG tablet take 1 tablet by mouth once daily 90 tablet 1    sildenafil (VIAGRA) 100 MG tablet Take 1 tablet by mouth as needed for Erectile Dysfunction 10 tablet 11    metoprolol tartrate (LOPRESSOR) 25 MG tablet take 1 tablet by mouth twice a day 60 tablet 3    lisinopril (PRINIVIL;ZESTRIL) 5 MG tablet take 1 tablet by mouth once daily 90 tablet 1    rivaroxaban (XARELTO) 20 MG TABS tablet Take 20 mg by mouth daily (with breakfast)       Elastic Bandages & Supports (ABDOMINAL BINDER/ELASTIC XL) MISC 1 Device by Does not apply route as needed (ventral hernia, while ambulating/active) 1 each 1     No current facility-administered medications for this visit. Allergies   Allergen Reactions    Adhesive Tape Other (See Comments)     Blister badly     Codeine Nausea Only     Other reaction(s): Other: See Comments  NAUSEA  Other reaction(s): Other: See Comments  NAUSEA    Penicillins Swelling     As a baby  Other reaction(s): Unknown  As a baby         SOCIAL:      This patient is alone for the evaluation today. [] HIV Risk Factors (i.e.) intravenous drug abuser; at risk sexual behavior; received blood products    [] TB Risk Factors (i.e.) Medically underserved, institutional care, foreign born, endemic area; exposure to active case    [] Hepatitis B&C Risk Factors (i.e.) Received blood transfusion prior to 1992; recreational drug use; high risk sexual behaviors; tattoos or body piercings; contact with blood or needle sticks in the workplace    Comprehension    Ability to grasp concepts and respond to questions:   [x] High   [] Medium   [] Low    Motivation    [x] Asks Questions; eager to learn   [] Needs education   [] Extreme anxiety    [] uncooperative   [] Denies need for education    English Speaking Ability    [x] Speaks English well   [x] Reads English well   [x] Understands spoken [place]    [] Understands written English   [] No need for interpretive support      [] Might benefit from interpretive support   []  required for all services     REVIEW OF SYSTEMS: (Negative unless marked otherwise)       Do you or have you had any of the following?   Cardiovascular YES NO Respiratory YES NO   High Blood Pressure   [x]   [] COPD   []   []   Heart Attack   []   [] TB/Positive skin Test   []   []   Congestive Heart Failure   []   [] Obstructive Sleep Apnea   []   []   Coronary Artery Disease older Fluzone, Flulaval, Fluarix and 3 yrs and older Afluria) 10/23/2014, 01/03/2017    Influenza, Quadv, IM, PF (6 mo and older Fluzone, Flulaval, Fluarix, and 3 yrs and older Afluria) 10/23/2014    Influenza, Triv, inactivated, subunit, adjuvanted, IM (Fluad 65 yrs and older) 11/08/2019       FALLS ASSESSMENT    [x] LOW RISK FOR FALLS    [] MODERATE RISK FOR FALLS    [] Difficulty walking/selfcare    [] Falls in the past 2 months    [] Suspicion of Clinician    [] Other:      SMOKING CESSATION     [x] Not needed     [] Instructed to stop smoking    [] Pamphlet community resources given     VTE SCREEN    [] Family hx DVT/PE  /   [] Personal hx of DVT/PE    [x] Denies any family or personal hx of DVT/PE    Physician Review    [x] Past medical, family, & social history reviewed and discussed with patient. PHYSICAL EXAMINATION:      /80   Pulse 68   Ht 5' 11\" (1.803 m)   Wt 187 lb (84.8 kg)   BMI 26.08 kg/m²     Constitutional:  Vital signs are normal. The patient appears well-developed   HEENT:      Head: Normocephalic. Atraumatic     Eyes: pupils are equal and reactive. No scleral icterus is present. Neck: No mass and no thyromegaly present. Cardiovascular: Normal rate, regular rhythm, S1 normal and S2 normal.  Bilateral pulses present. Pulmonary/Chest: Effort normal and breath sounds normal. No retractions. Abdominal: Soft. Normal appearance. There is no organomegaly. No tenderness. There is no rigidity, no rebound, no guarding and no Velasquez's sign. Musculoskeletal:      Right lower leg: Normal. No tenderness and no edema. Left lower leg: Normal. No tenderness and no edema. Lymphadenopathy:     No cervical adenopathy, No Exrtemity Adenopathy. Neurological: The patient is alert and oriented. Moving all four extremities equally, sensation grossly intact bilateral.  Skin: Skin is warm, dry and intact. Psychiatric: The patient has a normal mood and affect.  Speech is normal and behavior is normal. Judgment and thought content normal. Cognition and memory are normal.     ASSESSMENT/PLAN:       Diagnosis Orders   1. Incisional hernia, without obstruction or gangrene                He has loss of domain of his abdominal wall and an incisional hernia at his ileostomy site. No sign of obstruction or incarceration  Signs and symptoms of incarceration discussed with patient  Avoid heavy lifting over 20 lbs  He would need total abdominal wall reconstruction. He would prefer to see [facility] if necessary. He had prior colectomy here in [facility] and needed to be transferred to [facility] post surgery. Will refer as needed. He really wants to avoid surgery at this point as his wife is currently ill and he feels his attention should be directed towards his wife.   All questions answered  He will call back if he would like a referral             Electronically signed by Blake Colmenares DO on 2/23/2020 at 5:24 PM

## 2020-02-25 ENCOUNTER — HOSPITAL ENCOUNTER (OUTPATIENT)
Age: 65
Setting detail: SPECIMEN
Discharge: HOME | End: 2020-02-25
Payer: MEDICARE

## 2020-02-25 ENCOUNTER — TELEPHONE (OUTPATIENT)
Dept: FAMILY MEDICINE CLINIC | Age: 65
End: 2020-02-25

## 2020-02-25 LAB — PROSTATE SPECIFIC ANTIGEN: 0.73 UG/L

## 2020-02-27 ENCOUNTER — OFFICE VISIT (OUTPATIENT)
Dept: UROLOGY | Age: 65
End: 2020-02-27
Payer: MEDICARE

## 2020-02-27 VITALS
BODY MASS INDEX: 26.14 KG/M2 | WEIGHT: 193 LBS | DIASTOLIC BLOOD PRESSURE: 88 MMHG | HEIGHT: 72 IN | SYSTOLIC BLOOD PRESSURE: 142 MMHG | HEART RATE: 62 BPM

## 2020-02-27 PROCEDURE — G8427 DOCREV CUR MEDS BY ELIG CLIN: HCPCS | Performed by: SPECIALIST

## 2020-02-27 PROCEDURE — 99212 OFFICE O/P EST SF 10 MIN: CPT | Performed by: SPECIALIST

## 2020-02-27 PROCEDURE — 4040F PNEUMOC VAC/ADMIN/RCVD: CPT | Performed by: SPECIALIST

## 2020-02-27 PROCEDURE — 3017F COLORECTAL CA SCREEN DOC REV: CPT | Performed by: SPECIALIST

## 2020-02-27 PROCEDURE — 1036F TOBACCO NON-USER: CPT | Performed by: SPECIALIST

## 2020-02-27 PROCEDURE — G8482 FLU IMMUNIZE ORDER/ADMIN: HCPCS | Performed by: SPECIALIST

## 2020-02-27 PROCEDURE — 81002 URINALYSIS NONAUTO W/O SCOPE: CPT | Performed by: SPECIALIST

## 2020-02-27 PROCEDURE — 99213 OFFICE O/P EST LOW 20 MIN: CPT | Performed by: SPECIALIST

## 2020-02-27 PROCEDURE — G8417 CALC BMI ABV UP PARAM F/U: HCPCS | Performed by: SPECIALIST

## 2020-02-27 PROCEDURE — 1123F ACP DISCUSS/DSCN MKR DOCD: CPT | Performed by: SPECIALIST

## 2020-02-27 RX ORDER — TAMSULOSIN HYDROCHLORIDE 0.4 MG/1
0.4 CAPSULE ORAL DAILY
Qty: 90 CAPSULE | Refills: 3 | Status: SHIPPED | OUTPATIENT
Start: 2020-02-27 | End: 2020-10-15 | Stop reason: SDUPTHER

## 2020-02-27 RX ORDER — SILDENAFIL 100 MG/1
100 TABLET, FILM COATED ORAL PRN
Qty: 10 TABLET | Refills: 11 | Status: SHIPPED | OUTPATIENT
Start: 2020-02-27 | End: 2020-08-17

## 2020-02-27 NOTE — PROGRESS NOTES
Jayashree Kaminski MD, [facility]  [address] [facility] Progress Note    Patient:  Colette Ku  YOB: 1955  Date: 2/27/2020    HISTORY OF PRESENT ILLNESS:   The patient is a 75 y.o. male who presents today for follow-up for the following problem(s): lower urinary tract symptoms and ED  Overall the problem(s) : show no change. Associated Symptoms: No dysuria, gross hematuria. Pain Severity: Pain Score:   0 - No pain    Summary of old records:     History of rectal cancer, had resection, colostomy followed by reversal.  BPH on Flomax/tamsulosin 0.4 mg po qd  ED: Cialis 5 mg qd-too costly, gave Rx for generic sildenafil 100 mg prn ED 8/15/19    Additional History: Patient's lower urinary tract symptoms are stable on Flomax/tamsulosin 0.4 mg po qd for BPH symptoms. He reports taking this daily. He has also been started on sildenafil and takes 100 mg tablets as needed has good erections with this  Reports upcoming abdominal surgery for hernia repair.       Last several PSA's:  Lab Results   Component Value Date    PSA 0.73 02/25/2020    PSA 0.36 10/01/2018    PSA 0.56 01/03/2017       Last total testosterone:  No results found for: TESTOSTERONE    Urinalysis today:  Results for POC orders placed in visit on 02/27/20   POCT Urinalysis no Micro   Result Value Ref Range    Color, UA yellow     Clarity, UA clear     Glucose, UA POC negative     Bilirubin, UA negative     Ketones, UA negative     Spec Grav, UA >=1.030     Blood, UA POC negative     pH, UA 6.0     Protein, UA POC trace     Urobilinogen, UA 0.2     Leukocytes, UA negative     Nitrite, UA negative        Last BUN and creatinine:  Lab Results   Component Value Date    BUN 23 01/27/2020     Lab Results   Component Value Date    CREATININE 0.82 01/27/2020       Imaging Reviewed during this Office Visit:   (results were independently reviewed by physician and radiology report verified)    PAST MEDICAL, FAMILY AND SOCIAL HISTORY UPDATE:  Past or equivalent per week    Comment: 3 -4 times a week       REVIEW OF SYSTEMS (obtained by ancillary medical staff, reviewed by physician and agree):  Constitutional: negative  Eyes: negative  Respiratory: negative  Cardiovascular: negative  Gastrointestinal: negative  Musculoskeletal: negative  Genitourinary: negative  Skin: negative   Neurological: negative  Hematological/Lymphatic: negative  Psychological: negative    Physical Exam:    There were no vitals filed for this visit. Patient is a 75 y.o. male in no acute distress and alert and oriented to person, place and time. abd soft , nd, nt         Assessment and Plan      1. Benign prostatic hyperplasia with urinary obstruction    2. Other male erectile dysfunction           Plan:      Return in about 1 year (around 2/27/2021). Continue Flomax/tamsulosin 0.4 mg po qd for BPH symptoms. Continue generic sildenafil 100 mg (1/2-1 tab) prn ED. We will check PSA in 2 years as it was within normal limits 0.7 recently. I have discussed the care of this patient including pertinent history and exam findings, with the resident. I have seen and examined the patient and the key elements of all parts of the encounter have been performed by me. I agree with the assessment, plan and orders as documented by the resident. Giacomo Seip Janece Putty, MD, FACS

## 2020-02-27 NOTE — LETTER
[facility]    2/27/20    Patient: Isac Batista  YOB: 1955    Dear Diamond Watts MD,    I had the pleasure of seeing one of your patients, Marcelino Vides today in the office today. Below are the relevant portions of my assessment and plan of care. IMPRESSION:  1. Benign prostatic hyperplasia with urinary obstruction    2. Other male erectile dysfunction      Lab Results   Component Value Date    PSA 0.73 02/25/2020    PSA 0.36 10/01/2018    PSA 0.56 01/03/2017      PLAN:  Return in about 1 year (around 2/27/2021). Continue Flomax/tamsulosin 0.4 mg po qd for BPH symptoms. Continue generic sildenafil 100 mg (1/2-1 tab) prn ED. We will check PSA in 2 years as it was within normal limits 0.7 recently. Thank you for allowing me to participate in the care of this patient. I will keep you updated on this patient's follow up and I look forward to serving you and your patients again in the future. Coni Lundberg MD, FACS

## 2020-03-10 ENCOUNTER — HOSPITAL ENCOUNTER (OUTPATIENT)
Age: 65
Setting detail: SPECIMEN
Discharge: HOME | End: 2020-03-10
Payer: MEDICARE

## 2020-03-10 LAB
ABSOLUTE EOS #: 0.45 K/UL (ref 0–0.44)
ABSOLUTE IMMATURE GRANULOCYTE: <0.03 K/UL (ref 0–0.3)
ABSOLUTE LYMPH #: 1.57 K/UL (ref 1.1–3.7)
ABSOLUTE MONO #: 0.62 K/UL (ref 0.1–1.2)
ALBUMIN SERPL-MCNC: 3.8 G/DL (ref 3.5–5.2)
ALBUMIN/GLOBULIN RATIO: 1.2 (ref 1–2.5)
ALP BLD-CCNC: 103 U/L (ref 40–129)
ALT SERPL-CCNC: 18 U/L (ref 5–41)
ANION GAP SERPL CALCULATED.3IONS-SCNC: 12 MMOL/L (ref 9–17)
AST SERPL-CCNC: 23 U/L
BASOPHILS # BLD: 1 % (ref 0–2)
BASOPHILS ABSOLUTE: 0.05 K/UL (ref 0–0.2)
BILIRUB SERPL-MCNC: 0.65 MG/DL (ref 0.3–1.2)
BUN BLDV-MCNC: 18 MG/DL (ref 8–23)
BUN/CREAT BLD: NORMAL (ref 9–20)
CALCIUM SERPL-MCNC: 8.9 MG/DL (ref 8.6–10.4)
CHLORIDE BLD-SCNC: 107 MMOL/L (ref 98–107)
CHOLESTEROL, FASTING: 133 MG/DL
CHOLESTEROL/HDL RATIO: 3.7
CO2: 23 MMOL/L (ref 20–31)
CREAT SERPL-MCNC: 0.74 MG/DL (ref 0.7–1.2)
DIFFERENTIAL TYPE: ABNORMAL
EOSINOPHILS RELATIVE PERCENT: 7 % (ref 1–4)
GFR AFRICAN AMERICAN: >60 ML/MIN
GFR NON-AFRICAN AMERICAN: >60 ML/MIN
GFR SERPL CREATININE-BSD FRML MDRD: NORMAL ML/MIN/{1.73_M2}
GFR SERPL CREATININE-BSD FRML MDRD: NORMAL ML/MIN/{1.73_M2}
GLUCOSE BLD-MCNC: 90 MG/DL (ref 70–99)
HCT VFR BLD CALC: 42.3 % (ref 40.7–50.3)
HDLC SERPL-MCNC: 36 MG/DL
HEMOGLOBIN: 13.9 G/DL (ref 13–17)
IMMATURE GRANULOCYTES: 0 %
IRON SATURATION: 14 % (ref 20–55)
IRON: 48 UG/DL (ref 59–158)
LDL CHOLESTEROL: 82 MG/DL (ref 0–130)
LYMPHOCYTES # BLD: 25 % (ref 24–43)
MCH RBC QN AUTO: 30.3 PG (ref 25.2–33.5)
MCHC RBC AUTO-ENTMCNC: 32.9 G/DL (ref 28.4–34.8)
MCV RBC AUTO: 92.4 FL (ref 82.6–102.9)
MONOCYTES # BLD: 10 % (ref 3–12)
NRBC AUTOMATED: 0 PER 100 WBC
PDW BLD-RTO: 14.4 % (ref 11.8–14.4)
PLATELET # BLD: 319 K/UL (ref 138–453)
PLATELET ESTIMATE: ABNORMAL
PMV BLD AUTO: 10.1 FL (ref 8.1–13.5)
POTASSIUM SERPL-SCNC: 4.4 MMOL/L (ref 3.7–5.3)
RBC # BLD: 4.58 M/UL (ref 4.21–5.77)
RBC # BLD: ABNORMAL 10*6/UL
SEG NEUTROPHILS: 57 % (ref 36–65)
SEGMENTED NEUTROPHILS ABSOLUTE COUNT: 3.63 K/UL (ref 1.5–8.1)
SODIUM BLD-SCNC: 142 MMOL/L (ref 135–144)
TOTAL IRON BINDING CAPACITY: 340 UG/DL (ref 250–450)
TOTAL PROTEIN: 7 G/DL (ref 6.4–8.3)
TRIGLYCERIDE, FASTING: 77 MG/DL
TSH SERPL DL<=0.05 MIU/L-ACNC: 2.58 MIU/L (ref 0.3–5)
UNSATURATED IRON BINDING CAPACITY: 292 UG/DL (ref 112–347)
VLDLC SERPL CALC-MCNC: ABNORMAL MG/DL (ref 1–30)
WBC # BLD: 6.3 K/UL (ref 3.5–11.3)
WBC # BLD: ABNORMAL 10*3/UL

## 2020-03-19 ENCOUNTER — TELEPHONE (OUTPATIENT)
Dept: GASTROENTEROLOGY | Age: 65
End: 2020-03-19

## 2020-03-19 NOTE — TELEPHONE ENCOUNTER
Denise and sent letter that appt on 03/26 has been rescheduled to 06/01 @ [phone] and to call if the date and or time does not work

## 2020-03-31 ENCOUNTER — TELEPHONE (OUTPATIENT)
Dept: GASTROENTEROLOGY | Age: 65
End: 2020-03-31

## 2020-04-09 ENCOUNTER — NURSE TRIAGE (OUTPATIENT)
Dept: OTHER | Facility: CLINIC | Age: 65
End: 2020-04-09

## 2020-04-14 RX ORDER — ATORVASTATIN CALCIUM 40 MG/1
TABLET, FILM COATED ORAL
Qty: 90 TABLET | Refills: 1 | Status: SHIPPED | OUTPATIENT
Start: 2020-04-14 | End: 2020-10-08

## 2020-05-11 ENCOUNTER — TELEPHONE (OUTPATIENT)
Dept: GASTROENTEROLOGY | Age: 65
End: 2020-05-11

## 2020-05-11 NOTE — TELEPHONE ENCOUNTER
Called pt and left vm. Pt was scheduled as office apt but d/t COVID-19, but did ask if they would like to be changed to a Virtual Visit/phone visit. Did ask pt to call back so that we can change their apt to a virtual visit if interested.

## 2020-05-18 ENCOUNTER — TELEPHONE (OUTPATIENT)
Dept: GASTROENTEROLOGY | Age: 65
End: 2020-05-18

## 2020-05-19 ENCOUNTER — TELEPHONE (OUTPATIENT)
Dept: GASTROENTEROLOGY | Age: 65
End: 2020-05-19

## 2020-05-26 RX ORDER — IBUPROFEN 800 MG/1
TABLET ORAL
Qty: 120 TABLET | Refills: 1 | Status: SHIPPED | OUTPATIENT
Start: 2020-05-26 | End: 2020-10-27 | Stop reason: SDUPTHER

## 2020-07-19 RX ORDER — OMEPRAZOLE 40 MG/1
CAPSULE, DELAYED RELEASE ORAL
Qty: 90 CAPSULE | Refills: 1 | Status: CANCELLED | OUTPATIENT
Start: 2020-07-19

## 2020-07-20 NOTE — TELEPHONE ENCOUNTER
Last visit: 2/12/20  Last Med refill: 2/6/20  Does patient have enough medication for 72 hours: Yes    Next Visit Date:  No future appointments.     Health Maintenance   Topic Date Due    Shingles Vaccine (1 of 2) 08/11/2003    Pneumococcal 65+ years Vaccine (1 of 1 - PPSV23) 08/11/2018    DTaP/Tdap/Td vaccine (1 - Tdap) 08/18/2021 (Originally 8/11/1974)    Flu vaccine (1) 09/01/2020    Annual Wellness Visit (AWV)  12/04/2020    Colon cancer screen colonoscopy  12/06/2020    Lipid screen  03/10/2021    Potassium monitoring  03/10/2021    Creatinine monitoring  03/10/2021    AAA screen  Completed    Hepatitis C screen  Completed    Hepatitis A vaccine  Aged Out    Hepatitis B vaccine  Aged Out    Hib vaccine  Aged Out    Meningococcal (ACWY) vaccine  Aged Out       Hemoglobin A1C (%)   Date Value   01/10/2018 5.0             ( goal A1C is < 7)   No results found for: LABMICR  LDL Cholesterol (mg/dL)   Date Value   03/10/2020 82   10/01/2018 81       (goal LDL is <100)   AST (U/L)   Date Value   03/10/2020 23     ALT (U/L)   Date Value   03/10/2020 18     BUN (mg/dL)   Date Value   03/10/2020 18     BP Readings from Last 3 Encounters:   02/27/20 (!) 142/88   02/21/20 138/80   02/12/20 139/86          (goal 120/80)    All Future Testing planned in [facility]  Lab Frequency Next Occurrence               Patient Active Problem List:     Dyslipidemia     ED (erectile dysfunction)     Incomplete bladder emptying     Benign prostatic hyperplasia with urinary obstruction     History of colon cancer     Atrial fibrillation with normal ventricular rate (HCC)     Anemia     Pallor of optic disc     Presbyopia     Incisional hernia, without obstruction or gangrene     Hypertrophic nonobstructive cardiomyopathy (HCC)     Iron (Fe) deficiency anemia     Primary osteoarthritis of right knee     Benign essential HTN     History of malignant neoplasm of rectum     Hill's esophagus

## 2020-07-21 RX ORDER — OMEPRAZOLE 40 MG/1
CAPSULE, DELAYED RELEASE ORAL
Qty: 90 CAPSULE | Refills: 1 | Status: SHIPPED | OUTPATIENT
Start: 2020-07-21 | End: 2020-11-16 | Stop reason: SDUPTHER

## 2020-10-08 NOTE — TELEPHONE ENCOUNTER
Last visit: 2/12/20  Last Med refill: 4/14/20  Does patient have enough medication for 72 hours: No:     PATIENT NEEDS APPT    Next Visit Date:  No future appointments.     Health Maintenance   Topic Date Due    Shingles Vaccine (1 of 2) 08/11/2003    Pneumococcal 65+ years Vaccine (1 of 1 - PPSV23) 08/11/2018    Flu vaccine (1) 09/01/2020    DTaP/Tdap/Td vaccine (1 - Tdap) 08/18/2021 (Originally 8/11/1972)    Annual Wellness Visit (AWV)  12/04/2020    Colon cancer screen colonoscopy  12/06/2020    Lipid screen  03/10/2021    Potassium monitoring  03/10/2021    Creatinine monitoring  03/10/2021    AAA screen  Completed    Hepatitis C screen  Completed    Hepatitis A vaccine  Aged Out    Hepatitis B vaccine  Aged Out    Hib vaccine  Aged Out    Meningococcal (ACWY) vaccine  Aged Out       Hemoglobin A1C (%)   Date Value   01/10/2018 5.0             ( goal A1C is < 7)   No results found for: LABMICR  LDL Cholesterol (mg/dL)   Date Value   03/10/2020 82   10/01/2018 81       (goal LDL is <100)   AST (U/L)   Date Value   03/10/2020 23     ALT (U/L)   Date Value   03/10/2020 18     BUN (mg/dL)   Date Value   03/10/2020 18     BP Readings from Last 3 Encounters:   02/27/20 (!) 142/88   02/21/20 138/80   02/12/20 139/86          (goal 120/80)    All Future Testing planned in [facility]  Lab Frequency Next Occurrence               Patient Active Problem List:     Dyslipidemia     ED (erectile dysfunction)     Incomplete bladder emptying     Benign prostatic hyperplasia with urinary obstruction     History of colon cancer     Atrial fibrillation with normal ventricular rate (HCC)     Anemia     Pallor of optic disc     Presbyopia     Incisional hernia, without obstruction or gangrene     Hypertrophic nonobstructive cardiomyopathy (HCC)     Iron (Fe) deficiency anemia     Primary osteoarthritis of right knee     Benign essential HTN     History of malignant neoplasm of rectum     Hill's esophagus

## 2020-10-09 RX ORDER — ATORVASTATIN CALCIUM 40 MG/1
TABLET, FILM COATED ORAL
Qty: 30 TABLET | Refills: 0 | Status: SHIPPED | OUTPATIENT
Start: 2020-10-09 | End: 2020-11-06

## 2020-10-12 ENCOUNTER — TELEPHONE (OUTPATIENT)
Dept: FAMILY MEDICINE CLINIC | Age: 65
End: 2020-10-12

## 2020-10-12 NOTE — TELEPHONE ENCOUNTER
Patient called stating he was hospitalized last month for pneumonia and covid tested. States he is having symptoms again, shortness of breath, achy, and coughing. Advised patient to flu clinic. Verbally understood.

## 2020-10-14 RX ORDER — IBUPROFEN 800 MG/1
TABLET ORAL
Qty: 120 TABLET | Refills: 1 | OUTPATIENT
Start: 2020-10-14

## 2020-10-14 NOTE — TELEPHONE ENCOUNTER
Last visit: 02/27/2020  Last Med refill: unknown   Does patient have enough medication for 72 hours: No:     Next Visit Date:  Future Appointments   Date Time Provider Yves Henriquez   11/16/2020  4:15 PM Cassandra Lujan  [address] Maintenance   Topic Date Due    Shingles Vaccine (1 of 2) 08/11/2003    Pneumococcal 65+ years Vaccine (1 of 1 - PPSV23) 08/11/2018    Flu vaccine (1) 09/01/2020    DTaP/Tdap/Td vaccine (1 - Tdap) 08/18/2021 (Originally 8/11/1974)    Annual Wellness Visit (AWV)  12/04/2020    Colon cancer screen colonoscopy  12/06/2020    Lipid screen  03/10/2021    Potassium monitoring  03/10/2021    Creatinine monitoring  03/10/2021    AAA screen  Completed    Hepatitis C screen  Completed    Hepatitis A vaccine  Aged Out    Hepatitis B vaccine  Aged Out    Hib vaccine  Aged Out    Meningococcal (ACWY) vaccine  Aged Out       Hemoglobin A1C (%)   Date Value   01/10/2018 5.0             ( goal A1C is < 7)   No results found for: LABMICR  LDL Cholesterol (mg/dL)   Date Value   03/10/2020 82   10/01/2018 81       (goal LDL is <100)   AST (U/L)   Date Value   03/10/2020 23     ALT (U/L)   Date Value   03/10/2020 18     BUN (mg/dL)   Date Value   03/10/2020 18     BP Readings from Last 3 Encounters:   02/27/20 (!) 142/88   02/21/20 138/80   02/12/20 139/86          (goal 120/80)    All Future Testing planned in [facility]  Lab Frequency Next Occurrence               Patient Active Problem List:     Dyslipidemia     ED (erectile dysfunction)     Incomplete bladder emptying     Benign prostatic hyperplasia with urinary obstruction     History of colon cancer     Atrial fibrillation with normal ventricular rate (HCC)     Anemia     Pallor of optic disc     Presbyopia     Incisional hernia, without obstruction or gangrene     Hypertrophic nonobstructive cardiomyopathy (HCC)     Iron (Fe) deficiency anemia     Primary osteoarthritis of right knee     Benign essential HTN     History of malignant neoplasm of rectum     Hill's esophagus

## 2020-10-15 RX ORDER — TAMSULOSIN HYDROCHLORIDE 0.4 MG/1
0.4 CAPSULE ORAL DAILY
Qty: 90 CAPSULE | Refills: 0 | Status: SHIPPED | OUTPATIENT
Start: 2020-10-15 | End: 2021-05-17

## 2020-10-17 ENCOUNTER — APPOINTMENT (OUTPATIENT)
Dept: GENERAL RADIOLOGY | Age: 65
DRG: 291 | End: 2020-10-17
Payer: MEDICARE

## 2020-10-17 ENCOUNTER — HOSPITAL ENCOUNTER (INPATIENT)
Age: 65
LOS: 9 days | Discharge: HOME | DRG: 291 | End: 2020-10-26
Attending: EMERGENCY MEDICINE | Admitting: INTERNAL MEDICINE
Payer: MEDICARE

## 2020-10-17 PROBLEM — I50.43 CHF (CONGESTIVE HEART FAILURE), NYHA CLASS II, ACUTE ON CHRONIC, COMBINED (HCC): Status: ACTIVE | Noted: 2020-10-17

## 2020-10-17 LAB
-: NORMAL
ADENOVIRUS PCR: NOT DETECTED
ALBUMIN SERPL-MCNC: 3.4 G/DL (ref 3.5–5.2)
ALBUMIN/GLOBULIN RATIO: 0.9 (ref 1–2.5)
ALLEN TEST: POSITIVE
ALP BLD-CCNC: 124 U/L (ref 40–129)
ALT SERPL-CCNC: 17 U/L (ref 5–41)
ANION GAP SERPL CALCULATED.3IONS-SCNC: 14 MMOL/L (ref 9–17)
AST SERPL-CCNC: 32 U/L
BILIRUB SERPL-MCNC: 1.18 MG/DL (ref 0.3–1.2)
BNP INTERPRETATION: ABNORMAL
BORDETELLA PARAPERTUSSIS: NOT DETECTED
BORDETELLA PERTUSSIS PCR: NOT DETECTED
BUN BLDV-MCNC: 17 MG/DL (ref 8–23)
BUN/CREAT BLD: ABNORMAL (ref 9–20)
C-REACTIVE PROTEIN: 182.7 MG/L (ref 0–5)
CALCIUM SERPL-MCNC: 8.7 MG/DL (ref 8.6–10.4)
CHLAMYDIA PNEUMONIAE BY PCR: NOT DETECTED
CHLORIDE BLD-SCNC: 103 MMOL/L (ref 98–107)
CO2: 21 MMOL/L (ref 20–31)
COMPLEMENT C3: 164 MG/DL (ref 90–180)
COMPLEMENT C4: 30 MG/DL (ref 10–40)
CORONAVIRUS 229E PCR: NOT DETECTED
CORONAVIRUS HKU1 PCR: NOT DETECTED
CORONAVIRUS NL63 PCR: NOT DETECTED
CORONAVIRUS OC43 PCR: NOT DETECTED
CREAT SERPL-MCNC: 0.63 MG/DL (ref 0.7–1.2)
D-DIMER QUANTITATIVE: 1.24 MG/L FEU
FERRITIN: 238 UG/L (ref 30–400)
FIO2: 4
GFR AFRICAN AMERICAN: >60 ML/MIN
GFR NON-AFRICAN AMERICAN: >60 ML/MIN
GFR SERPL CREATININE-BSD FRML MDRD: ABNORMAL ML/MIN/{1.73_M2}
GFR SERPL CREATININE-BSD FRML MDRD: ABNORMAL ML/MIN/{1.73_M2}
GLUCOSE BLD-MCNC: 120 MG/DL (ref 70–99)
HCT VFR BLD CALC: 39 % (ref 40.7–50.3)
HEMOGLOBIN: 11.8 G/DL (ref 13–17)
HUMAN METAPNEUMOVIRUS PCR: NOT DETECTED
INFLUENZA A BY PCR: NOT DETECTED
INFLUENZA A H1 (2009) PCR: NORMAL
INFLUENZA A H1 PCR: NORMAL
INFLUENZA A H3 PCR: NORMAL
INFLUENZA B BY PCR: NOT DETECTED
LACTIC ACID, WHOLE BLOOD: 1.6 MMOL/L (ref 0.7–2.1)
LACTIC ACID: NORMAL MMOL/L
LEGIONELLA PNEUMOPHILIA AG, URINE: NEGATIVE
LEGIONELLA PNEUMOPHILIA AG, URINE: NEGATIVE
MAGNESIUM: 1.8 MG/DL (ref 1.6–2.6)
MCH RBC QN AUTO: 25.9 PG (ref 25.2–33.5)
MCHC RBC AUTO-ENTMCNC: 30.3 G/DL (ref 28.4–34.8)
MCV RBC AUTO: 85.5 FL (ref 82.6–102.9)
MODE: NORMAL
MYCOPLASMA PNEUMONIAE PCR: NOT DETECTED
NEGATIVE BASE EXCESS, ART: NORMAL (ref 0–2)
NRBC AUTOMATED: 0 PER 100 WBC
O2 DEVICE/FLOW/%: NORMAL
PARAINFLUENZA 1 PCR: NOT DETECTED
PARAINFLUENZA 2 PCR: NOT DETECTED
PARAINFLUENZA 3 PCR: NOT DETECTED
PARAINFLUENZA 4 PCR: NOT DETECTED
PATIENT TEMP: NORMAL
PDW BLD-RTO: 17.6 % (ref 11.8–14.4)
PLATELET # BLD: 299 K/UL (ref 138–453)
PMV BLD AUTO: 9.5 FL (ref 8.1–13.5)
POC HCO3: 24.8 MMOL/L (ref 21–28)
POC O2 SATURATION: 98 % (ref 94–98)
POC PCO2 TEMP: NORMAL MM HG
POC PCO2: 37.2 MM HG (ref 35–48)
POC PH TEMP: NORMAL
POC PH: 7.43 (ref 7.35–7.45)
POC PO2 TEMP: NORMAL MM HG
POC PO2: 99.9 MM HG (ref 83–108)
POSITIVE BASE EXCESS, ART: 1 (ref 0–3)
POTASSIUM SERPL-SCNC: 4 MMOL/L (ref 3.7–5.3)
PRO-BNP: 3525 PG/ML
PROCALCITONIN: 0.29 NG/ML
RBC # BLD: 4.56 M/UL (ref 4.21–5.77)
REASON FOR REJECTION: NORMAL
RESP SYNCYTIAL VIRUS PCR: NOT DETECTED
RHINO/ENTEROVIRUS PCR: NOT DETECTED
SAMPLE SITE: NORMAL
SARS-COV-2, RAPID: NORMAL
SARS-COV-2, RAPID: NOT DETECTED
SARS-COV-2: NORMAL
SARS-COV-2: NOT DETECTED
SODIUM BLD-SCNC: 138 MMOL/L (ref 135–144)
SOURCE: NORMAL
SPECIMEN DESCRIPTION: NORMAL
STREP PNEUMONIAE ANTIGEN: NEGATIVE
STREP PNEUMONIAE ANTIGEN: NEGATIVE
TCO2 (CALC), ART: 26 MMOL/L (ref 22–29)
TOTAL PROTEIN: 7 G/DL (ref 6.4–8.3)
TROPONIN INTERP: ABNORMAL
TROPONIN INTERP: ABNORMAL
TROPONIN INTERP: NORMAL
TROPONIN INTERP: NORMAL
TROPONIN T: ABNORMAL NG/ML
TROPONIN T: ABNORMAL NG/ML
TROPONIN T: NORMAL NG/ML
TROPONIN T: NORMAL NG/ML
TROPONIN, HIGH SENSITIVITY: 18 NG/L (ref 0–22)
TROPONIN, HIGH SENSITIVITY: 22 NG/L (ref 0–22)
TROPONIN, HIGH SENSITIVITY: 25 NG/L (ref 0–22)
TROPONIN, HIGH SENSITIVITY: 29 NG/L (ref 0–22)
TSH SERPL DL<=0.05 MIU/L-ACNC: 4.28 MIU/L (ref 0.3–5)
WBC # BLD: 8.6 K/UL (ref 3.5–11.3)
ZZ NTE CLEAN UP: ORDERED TEST: NORMAL
ZZ NTE WITH NAME CLEAN UP: SPECIMEN SOURCE: NORMAL

## 2020-10-17 PROCEDURE — 2500000003 HC RX 250 WO HCPCS: Performed by: STUDENT IN AN ORGANIZED HEALTH CARE EDUCATION/TRAINING PROGRAM

## 2020-10-17 PROCEDURE — 83735 ASSAY OF MAGNESIUM: CPT

## 2020-10-17 PROCEDURE — 6360000002 HC RX W HCPCS: Performed by: STUDENT IN AN ORGANIZED HEALTH CARE EDUCATION/TRAINING PROGRAM

## 2020-10-17 PROCEDURE — 83880 ASSAY OF NATRIURETIC PEPTIDE: CPT

## 2020-10-17 PROCEDURE — 86160 COMPLEMENT ANTIGEN: CPT

## 2020-10-17 PROCEDURE — 83516 IMMUNOASSAY NONANTIBODY: CPT

## 2020-10-17 PROCEDURE — 82805 BLOOD GASES W/O2 SATURATION: CPT

## 2020-10-17 PROCEDURE — 86698 HISTOPLASMA ANTIBODY: CPT

## 2020-10-17 PROCEDURE — 86140 C-REACTIVE PROTEIN: CPT

## 2020-10-17 PROCEDURE — 82728 ASSAY OF FERRITIN: CPT

## 2020-10-17 PROCEDURE — 2060000000 HC ICU INTERMEDIATE R&B

## 2020-10-17 PROCEDURE — 6370000000 HC RX 637 (ALT 250 FOR IP): Performed by: STUDENT IN AN ORGANIZED HEALTH CARE EDUCATION/TRAINING PROGRAM

## 2020-10-17 PROCEDURE — 80053 COMPREHEN METABOLIC PANEL: CPT

## 2020-10-17 PROCEDURE — 6360000002 HC RX W HCPCS: Performed by: GENERAL PRACTICE

## 2020-10-17 PROCEDURE — 2580000003 HC RX 258: Performed by: STUDENT IN AN ORGANIZED HEALTH CARE EDUCATION/TRAINING PROGRAM

## 2020-10-17 PROCEDURE — 71045 X-RAY EXAM CHEST 1 VIEW: CPT

## 2020-10-17 PROCEDURE — U0003 INFECTIOUS AGENT DETECTION BY NUCLEIC ACID (DNA OR RNA); SEVERE ACUTE RESPIRATORY SYNDROME CORONAVIRUS 2 (SARS-COV-2) (CORONAVIRUS DISEASE [COVID-19]), AMPLIFIED PROBE TECHNIQUE, MAKING USE OF HIGH THROUGHPUT TECHNOLOGIES AS DESCRIBED BY CMS-2020-01-R: HCPCS

## 2020-10-17 PROCEDURE — 99221 1ST HOSP IP/OBS SF/LOW 40: CPT | Performed by: INTERNAL MEDICINE

## 2020-10-17 PROCEDURE — 36415 COLL VENOUS BLD VENIPUNCTURE: CPT

## 2020-10-17 PROCEDURE — 93005 ELECTROCARDIOGRAM TRACING: CPT | Performed by: GENERAL PRACTICE

## 2020-10-17 PROCEDURE — 83605 ASSAY OF LACTIC ACID: CPT

## 2020-10-17 PROCEDURE — 0100U HC RESPIRPTHGN MULT REV TRANS & AMP PRB TECH 21 TRGT: CPT

## 2020-10-17 PROCEDURE — 82803 BLOOD GASES ANY COMBINATION: CPT

## 2020-10-17 PROCEDURE — 85379 FIBRIN DEGRADATION QUANT: CPT

## 2020-10-17 PROCEDURE — 84484 ASSAY OF TROPONIN QUANT: CPT

## 2020-10-17 PROCEDURE — 87899 AGENT NOS ASSAY W/OPTIC: CPT

## 2020-10-17 PROCEDURE — 86038 ANTINUCLEAR ANTIBODIES: CPT

## 2020-10-17 PROCEDURE — 99285 EMERGENCY DEPT VISIT HI MDM: CPT

## 2020-10-17 PROCEDURE — 84443 ASSAY THYROID STIM HORMONE: CPT

## 2020-10-17 PROCEDURE — 86738 MYCOPLASMA ANTIBODY: CPT

## 2020-10-17 PROCEDURE — 93005 ELECTROCARDIOGRAM TRACING: CPT | Performed by: STUDENT IN AN ORGANIZED HEALTH CARE EDUCATION/TRAINING PROGRAM

## 2020-10-17 PROCEDURE — 84145 PROCALCITONIN (PCT): CPT

## 2020-10-17 PROCEDURE — 99222 1ST HOSP IP/OBS MODERATE 55: CPT | Performed by: INTERNAL MEDICINE

## 2020-10-17 PROCEDURE — U0002 COVID-19 LAB TEST NON-CDC: HCPCS

## 2020-10-17 PROCEDURE — 85027 COMPLETE CBC AUTOMATED: CPT

## 2020-10-17 PROCEDURE — 87449 NOS EACH ORGANISM AG IA: CPT

## 2020-10-17 RX ORDER — LEVOFLOXACIN 5 MG/ML
750 INJECTION, SOLUTION INTRAVENOUS EVERY 24 HOURS
Status: DISCONTINUED | OUTPATIENT
Start: 2020-10-18 | End: 2020-10-18

## 2020-10-17 RX ORDER — TAMSULOSIN HYDROCHLORIDE 0.4 MG/1
0.4 CAPSULE ORAL DAILY
Status: DISCONTINUED | OUTPATIENT
Start: 2020-10-17 | End: 2020-10-26 | Stop reason: HOSPADM

## 2020-10-17 RX ORDER — LISINOPRIL 10 MG/1
5 TABLET ORAL DAILY
Status: DISCONTINUED | OUTPATIENT
Start: 2020-10-17 | End: 2020-10-20

## 2020-10-17 RX ORDER — ACETAMINOPHEN 325 MG/1
650 TABLET ORAL EVERY 6 HOURS PRN
Status: DISCONTINUED | OUTPATIENT
Start: 2020-10-17 | End: 2020-10-26 | Stop reason: HOSPADM

## 2020-10-17 RX ORDER — ONDANSETRON 2 MG/ML
4 INJECTION INTRAMUSCULAR; INTRAVENOUS EVERY 6 HOURS PRN
Status: DISCONTINUED | OUTPATIENT
Start: 2020-10-17 | End: 2020-10-26 | Stop reason: HOSPADM

## 2020-10-17 RX ORDER — POLYETHYLENE GLYCOL 3350 17 G/17G
17 POWDER, FOR SOLUTION ORAL DAILY PRN
Status: DISCONTINUED | OUTPATIENT
Start: 2020-10-17 | End: 2020-10-26 | Stop reason: HOSPADM

## 2020-10-17 RX ORDER — ATORVASTATIN CALCIUM 80 MG/1
40 TABLET, FILM COATED ORAL DAILY
Status: DISCONTINUED | OUTPATIENT
Start: 2020-10-17 | End: 2020-10-26 | Stop reason: HOSPADM

## 2020-10-17 RX ORDER — SODIUM CHLORIDE 0.9 % (FLUSH) 0.9 %
10 SYRINGE (ML) INJECTION PRN
Status: DISCONTINUED | OUTPATIENT
Start: 2020-10-17 | End: 2020-10-26 | Stop reason: HOSPADM

## 2020-10-17 RX ORDER — METOPROLOL TARTRATE 5 MG/5ML
5 INJECTION INTRAVENOUS ONCE
Status: COMPLETED | OUTPATIENT
Start: 2020-10-17 | End: 2020-10-17

## 2020-10-17 RX ORDER — METOPROLOL TARTRATE 5 MG/5ML
5 INJECTION INTRAVENOUS EVERY 6 HOURS PRN
Status: DISCONTINUED | OUTPATIENT
Start: 2020-10-17 | End: 2020-10-26 | Stop reason: HOSPADM

## 2020-10-17 RX ORDER — POTASSIUM CHLORIDE 7.45 MG/ML
10 INJECTION INTRAVENOUS PRN
Status: DISCONTINUED | OUTPATIENT
Start: 2020-10-17 | End: 2020-10-26 | Stop reason: HOSPADM

## 2020-10-17 RX ORDER — MAGNESIUM SULFATE IN WATER 40 MG/ML
2 INJECTION, SOLUTION INTRAVENOUS ONCE
Status: COMPLETED | OUTPATIENT
Start: 2020-10-17 | End: 2020-10-18

## 2020-10-17 RX ORDER — METHYLPREDNISOLONE SODIUM SUCCINATE 125 MG/2ML
125 INJECTION, POWDER, LYOPHILIZED, FOR SOLUTION INTRAMUSCULAR; INTRAVENOUS ONCE
Status: COMPLETED | OUTPATIENT
Start: 2020-10-17 | End: 2020-10-17

## 2020-10-17 RX ORDER — FUROSEMIDE 10 MG/ML
40 INJECTION INTRAMUSCULAR; INTRAVENOUS ONCE
Status: COMPLETED | OUTPATIENT
Start: 2020-10-17 | End: 2020-10-17

## 2020-10-17 RX ORDER — FUROSEMIDE 10 MG/ML
40 INJECTION INTRAMUSCULAR; INTRAVENOUS ONCE
Status: COMPLETED | OUTPATIENT
Start: 2020-10-18 | End: 2020-10-18

## 2020-10-17 RX ORDER — METOPROLOL TARTRATE 5 MG/5ML
10 INJECTION INTRAVENOUS EVERY 6 HOURS
Status: DISCONTINUED | OUTPATIENT
Start: 2020-10-17 | End: 2020-10-17

## 2020-10-17 RX ORDER — METOPROLOL TARTRATE 50 MG/1
25 TABLET, FILM COATED ORAL 2 TIMES DAILY
Status: DISCONTINUED | OUTPATIENT
Start: 2020-10-17 | End: 2020-10-17

## 2020-10-17 RX ORDER — ACETAMINOPHEN 650 MG/1
650 SUPPOSITORY RECTAL EVERY 6 HOURS PRN
Status: DISCONTINUED | OUTPATIENT
Start: 2020-10-17 | End: 2020-10-26 | Stop reason: HOSPADM

## 2020-10-17 RX ORDER — LEVOFLOXACIN 5 MG/ML
750 INJECTION, SOLUTION INTRAVENOUS ONCE
Status: COMPLETED | OUTPATIENT
Start: 2020-10-17 | End: 2020-10-17

## 2020-10-17 RX ORDER — PROMETHAZINE HYDROCHLORIDE 12.5 MG/1
12.5 TABLET ORAL EVERY 6 HOURS PRN
Status: DISCONTINUED | OUTPATIENT
Start: 2020-10-17 | End: 2020-10-26 | Stop reason: HOSPADM

## 2020-10-17 RX ORDER — POTASSIUM CHLORIDE 20 MEQ/1
40 TABLET, EXTENDED RELEASE ORAL PRN
Status: DISCONTINUED | OUTPATIENT
Start: 2020-10-17 | End: 2020-10-26 | Stop reason: HOSPADM

## 2020-10-17 RX ORDER — MAGNESIUM SULFATE 1 G/100ML
1 INJECTION INTRAVENOUS PRN
Status: DISCONTINUED | OUTPATIENT
Start: 2020-10-17 | End: 2020-10-26 | Stop reason: HOSPADM

## 2020-10-17 RX ORDER — METOPROLOL TARTRATE 50 MG/1
25 TABLET, FILM COATED ORAL EVERY 8 HOURS SCHEDULED
Status: DISCONTINUED | OUTPATIENT
Start: 2020-10-17 | End: 2020-10-22

## 2020-10-17 RX ORDER — SODIUM CHLORIDE 0.9 % (FLUSH) 0.9 %
10 SYRINGE (ML) INJECTION EVERY 12 HOURS SCHEDULED
Status: DISCONTINUED | OUTPATIENT
Start: 2020-10-17 | End: 2020-10-26 | Stop reason: HOSPADM

## 2020-10-17 RX ADMIN — FUROSEMIDE 40 MG: 10 INJECTION, SOLUTION INTRAMUSCULAR; INTRAVENOUS at 16:42

## 2020-10-17 RX ADMIN — METHYLPREDNISOLONE SODIUM SUCCINATE 125 MG: 125 INJECTION, POWDER, FOR SOLUTION INTRAMUSCULAR; INTRAVENOUS at 09:39

## 2020-10-17 RX ADMIN — DILTIAZEM HYDROCHLORIDE 12.5 MG/ML: 5 INJECTION INTRAVENOUS at 21:42

## 2020-10-17 RX ADMIN — FUROSEMIDE 40 MG: 10 INJECTION, SOLUTION INTRAMUSCULAR; INTRAVENOUS at 09:39

## 2020-10-17 RX ADMIN — TAMSULOSIN HYDROCHLORIDE 0.4 MG: 0.4 CAPSULE ORAL at 23:44

## 2020-10-17 RX ADMIN — LEVOFLOXACIN 750 MG: 5 INJECTION, SOLUTION INTRAVENOUS at 09:39

## 2020-10-17 RX ADMIN — LISINOPRIL 5 MG: 10 TABLET ORAL at 13:32

## 2020-10-17 RX ADMIN — METOROPROLOL TARTRATE 5 MG: 5 INJECTION, SOLUTION INTRAVENOUS at 11:30

## 2020-10-17 RX ADMIN — METOPROLOL TARTRATE 25 MG: 50 TABLET, FILM COATED ORAL at 10:11

## 2020-10-17 RX ADMIN — ATORVASTATIN CALCIUM 40 MG: 80 TABLET, FILM COATED ORAL at 13:32

## 2020-10-17 RX ADMIN — DILTIAZEM HYDROCHLORIDE 7.5 MG/HR: 5 INJECTION INTRAVENOUS at 07:05

## 2020-10-17 RX ADMIN — METOPROLOL TARTRATE 25 MG: 50 TABLET, FILM COATED ORAL at 23:40

## 2020-10-17 RX ADMIN — METOPROLOL TARTRATE 25 MG: 50 TABLET, FILM COATED ORAL at 16:36

## 2020-10-17 RX ADMIN — RIVAROXABAN 20 MG: 20 TABLET, FILM COATED ORAL at 13:32

## 2020-10-17 RX ADMIN — DILTIAZEM HYDROCHLORIDE 5 MG/HR: 5 INJECTION INTRAVENOUS at 13:16

## 2020-10-17 RX ADMIN — METOROPROLOL TARTRATE 5 MG: 5 INJECTION, SOLUTION INTRAVENOUS at 10:54

## 2020-10-17 ASSESSMENT — ENCOUNTER SYMPTOMS
FACIAL SWELLING: 0
TROUBLE SWALLOWING: 0
ALLERGIC/IMMUNOLOGIC NEGATIVE: 1
NAUSEA: 0
EYES NEGATIVE: 1
GASTROINTESTINAL NEGATIVE: 1
ABDOMINAL PAIN: 0
VOICE CHANGE: 0
SHORTNESS OF BREATH: 1
VOMITING: 0
SORE THROAT: 0
TACHYPNEA: 1
COUGH: 1

## 2020-10-17 ASSESSMENT — PAIN SCALES - GENERAL
PAINLEVEL_OUTOF10: 5
PAINLEVEL_OUTOF10: 0

## 2020-10-17 ASSESSMENT — PAIN DESCRIPTION - LOCATION: LOCATION: CHEST

## 2020-10-17 ASSESSMENT — PAIN DESCRIPTION - PAIN TYPE: TYPE: ACUTE PAIN

## 2020-10-17 ASSESSMENT — PAIN DESCRIPTION - ORIENTATION: ORIENTATION: LEFT

## 2020-10-17 ASSESSMENT — PAIN DESCRIPTION - DESCRIPTORS: DESCRIPTORS: PRESSURE

## 2020-10-17 NOTE — FLOWSHEET NOTE
Pt has not been able to receive the influenza vaccine due to his illness at this time, been feeling SOB over a month now.

## 2020-10-17 NOTE — PROGRESS NOTES
This is a 69-year-old male with past medical history of atrial fibrillation, on Xarelto and Lopressor), colon cancer, hypertrophic cardiomyopathy, essential hypertension, chronic diastolic CHF, recent Covid infection (Covid testing was negative but was still being treated as a positive case due to imaging findings. Treated with Decadron and remdesivir) 1 month back and BPH presenting to the ED with complaints of shortness of breath productive cough, dizziness and epigastric chest pain for several days. Patient has been on oxygen at night since his Covid pneumonia 1 month back. Patient was recently evaluated at an outlying facility on 10/15/2020, where  CT PE was negative. Patient received doxycycline and was discharged home. He presented to [facility] ED 2 days later with persisting symptoms. In the ED, patient short of breath and in respiratory distress. Patient desatted to 65% initially in the ED. Requiring 4 L oxygen via nasal cannula. Blood pressure 158/118, heart rate in 140s. Pertinent labs: BNP 3525  Troponin 29-25  CBC and BMP: WNL    Last echo 2018: 45% EF  Last cath 2018: Nonobstructive CAD    Chest x-ray: Bilateral scattered pulmonary opacities right greater than left. Suggestive of multifocal airspace disease. With small effusions. On examination, patient has bilateral basilar crackles, short of breath at rest.  Has mild bilateral pitting pedal edema. Assessment and plan:     Acute respiratory failure: Secondary to acute chronic CHF: Oxygen via nasal cannula. BiPAP as needed. Acute on chronic CHF: EF 45-50% 11/29/2018: Lasix 80 mg IV for today. Follow-up echo. Trend troponin. Strict intake output. Fluid restriction<2 L/day. Sodium restriction<2 g/day. Monitor urine output. Suspected Covid pneumonia: Follow-up ferritin, D-dimer and CRP. Follow-up PCR Covid testing. On Levaquin 750 mg IV daily. Infectious diseases consulted.     History of paroxysmal atrial fibrillation: Heart rate in 130s. In atrial fibrillation. On diltiazem drip. On Lopressor 25 mg thrice daily and anticoagulation with Xarelto. Cardiology consulted. Chest pain: Likely secondary to CHF. Follow-up trend troponin and repeat EKG. Essential hypertension: On lisinopril 5 mg daily and Lopressor 25 mg every 8 hours.     BPH: On Flomax 0.4 mg daily    Steve Schultz MD  PGY-2, Internal Medicine Resident  [facility]  10/17/2020 2:15 PM

## 2020-10-17 NOTE — ED PROVIDER NOTES
[facility] ED  Emergency Department Encounter  EmergencyMedicine Resident     Pt Name:Juan Carlos Gonsalves  MRN: [id number]  [place] 1955  Date of evaluation: 10/17/20  PCP:  Lory Murray MD    [address] [place] [address]       Chief Complaint   Patient presents with    Shortness of Breath    Chest Pain       HISTORY OF PRESENT ILLNESS  (Location/Symptom, Timing/Onset, Context/Setting, Quality, Duration, Modifying Factors, Severity.)      Tamie Chin is a 77 y.o. male who presents with shortness of breath. Patient states he is having difficulty ambulating, secondary to his shortness of breath, states he feels like he is going to pass out. Patient has history of atrial fibrillation, states that his symptoms of been going on for the last several days. Patient was evaluated outside hospital on 10/15/2020, had cardiac work-up as well as CT rule out PE secondary to elevated D-dimer. Patient states he has had 2 - Covid test.  Patient states he is using oxygen at night. Patient states his symptoms are getting much worse, started several days ago, constant worse with any movement. Patient is have associated cough, nonproductive. Denies any chest pain, diaphoresis, fever, rash or vomiting. Patient was seen at outside hospital they wanted to admit patient to the hospital for shortness of breath, cardiac work-up and PE work-up was negative however patient decided to leave A as he wanted to go see his primary care doctor.   Patient was given Lasix, potassium and doxycycline    PAST MEDICAL / SURGICAL / SOCIAL / FAMILY HISTORY      has a past medical history of Atrial fibrillation ([address] Back pain, chronic, Hill's esophagus, BPH (benign prostatic hyperplasia), Cancer ([facility] [address] Cocaine abuse in remission [facility]), ED (erectile dysfunction), GERD (gastroesophageal reflux disease), GI bleed, Hernia, History of colon cancer, Melena, Migraines, and Murmur, cardiac.     has a past surgical history that Forced sexual activity: Not on file   Other Topics Concern    Not on file   Social History Narrative    Not on file       Family History   Problem Relation Age of Onset    Diabetes Mother     Heart Attack Father     Heart Disease Father     Heart Disease Brother        Allergies:  Adhesive tape; Codeine; and Penicillins    Home Medications:  Prior to Admission medications    Medication Sig Start Date End Date Taking? Authorizing Provider   tamsulosin (FLOMAX) 0.4 MG capsule Take 1 capsule by mouth daily 10/15/20  Yes Abdirahman Webster MD   atorvastatin (LIPITOR) 40 MG tablet take 1 tablet by mouth once daily 10/9/20  Yes Abdirahman Webster MD   sildenafil (VIAGRA) 100 MG tablet TAKE ONE TABLET BY MOUTH AS NEEDED FOR FOR ERECTILE DYSFUNCTION 8/17/20  Yes Millie Marrero MD   omeprazole (PRILOSEC) 40 MG delayed release capsule take 1 capsule by mouth every morning before breakfast 7/21/20  Yes Cassandra Solano MD   ibuprofen (ADVIL;MOTRIN) 800 MG tablet take 1 tablet by mouth every 6 hours if needed for pain 5/26/20  Yes Cassandra Solano MD   metoprolol tartrate (LOPRESSOR) 25 MG tablet take 1 tablet by mouth twice a day 8/6/19  Yes Ludin Fleming MD   lisinopril (PRINIVIL;ZESTRIL) 5 MG tablet take 1 tablet by mouth once daily 7/29/19  Yes MIRI Shah - CNP   rivaroxaban (XARELTO) 20 MG TABS tablet Take 20 mg by mouth daily (with breakfast)    Yes Historical Provider, MD   Elastic Bandages & Supports (ABDOMINAL BINDER/ELASTIC XL) MISC 1 Device by Does not apply route as needed (ventral hernia, while ambulating/active) 9/28/16   Shelby Betts, DO       REVIEW OF SYSTEMS    (2-9 systems for level 4, 10 or more for level 5)      Review of Systems   Constitutional: Positive for activity change, chills and fatigue. Negative for diaphoresis and fever. HENT: Negative for congestion, ear pain, facial swelling, sore throat, trouble swallowing and voice change.     Eyes: Negative for visual disturbance. Respiratory: Positive for cough and shortness of breath. Cardiovascular: Positive for chest pain. Gastrointestinal: Negative for abdominal pain, nausea and vomiting. Genitourinary: Negative for dysuria. Musculoskeletal: Negative for gait problem. Skin: Negative for rash. Neurological: Negative for dizziness, facial asymmetry and headaches. Psychiatric/Behavioral: Negative for agitation and behavioral problems. The patient is not nervous/anxious. PHYSICAL EXAM   (up to 7 for level 4, 8 or more for level 5)      INITIAL VITALS:   BP (!) 158/118   Pulse 120   Temp 97.7 °F (36.5 °C) (Tympanic)   Resp 26   SpO2 95%     Physical Exam  Constitutional:       General: He is not in acute distress. Appearance: He is well-developed. He is ill-appearing. HENT:      Head: Normocephalic. Eyes:      Pupils: Pupils are equal, round, and reactive to light. Cardiovascular:      Rate and Rhythm: Tachycardia present. Pulmonary:      Effort: Tachypnea present. Breath sounds: Examination of the right-upper field reveals rales. Examination of the left-upper field reveals rales. Examination of the right-middle field reveals rales. Examination of the right-lower field reveals rales. Rales present. Comments: Patient is tachypneic, appears uncomfortable, respiratory distress  Chest:      Chest wall: No mass, deformity, tenderness or crepitus. Musculoskeletal:      Right lower leg: He exhibits no tenderness. No edema. Left lower leg: He exhibits no tenderness. No edema. Neurological:      General: No focal deficit present. Mental Status: He is alert and oriented to person, place, and time. Psychiatric:         Mood and Affect: Mood normal. Mood is not anxious. Behavior: Behavior is not agitated.          DIFFERENTIAL  DIAGNOSIS     PLAN (LABS / IMAGING / EKG):  Orders Placed This Encounter   Procedures    XR CHEST PORTABLE    Troponin    CBC    Comprehensive Metabolic Panel    Brain Natriuretic Peptide    TSH with Reflex    Magnesium    COVID-19    Inpatient consult to Internal Medicine    Initiate ED RT Aerosol protocol    EKG 12 Lead       MEDICATIONS ORDERED:  Orders Placed This Encounter   Medications    methylPREDNISolone sodium (SOLU-MEDROL) injection 125 mg    levoFLOXacin (LEVAQUIN) 750 MG/150ML infusion 750 mg     Order Specific Question:   Antimicrobial Indications     Answer:   Pneumonia (CAP)     DDX: ACS/MI, pneumonia, CHF exacerbation, COPD exacerbation    DIAGNOSTIC RESULTS / EMERGENCY DEPARTMENT COURSE / MDM   :  Results for orders placed or performed during the hospital encounter of 10/17/20   Troponin   Result Value Ref Range    Troponin, High Sensitivity 29 (H) 0 - 22 ng/L    Troponin T NOT REPORTED <0.03 ng/mL    Troponin Interp NOT REPORTED    CBC   Result Value Ref Range    WBC 8.6 3.5 - 11.3 k/uL    RBC 4.56 4.21 - 5.77 m/uL    Hemoglobin 11.8 (L) 13.0 - 17.0 g/dL    Hematocrit 39.0 (L) 40.7 - 50.3 %    MCV 85.5 82.6 - 102.9 fL    MCH 25.9 25.2 - 33.5 pg    MCHC 30.3 28.4 - 34.8 g/dL    RDW 17.6 (H) 11.8 - 14.4 %    Platelets 166 562 - 738 k/uL    MPV 9.5 8.1 - 13.5 fL    NRBC Automated 0.0 0.0 per 100 WBC   Comprehensive Metabolic Panel   Result Value Ref Range    Glucose 120 (H) 70 - 99 mg/dL    BUN 17 8 - 23 mg/dL    CREATININE 0.63 (L) 0.70 - 1.20 mg/dL    Bun/Cre Ratio NOT REPORTED 9 - 20    Calcium 8.7 8.6 - 10.4 mg/dL    Sodium 138 135 - 144 mmol/L    Potassium 4.0 3.7 - 5.3 mmol/L    Chloride 103 98 - 107 mmol/L    CO2 21 20 - 31 mmol/L    Anion Gap 14 9 - 17 mmol/L    Alkaline Phosphatase 124 40 - 129 U/L    ALT 17 5 - 41 U/L    AST 32 <40 U/L    Total Bilirubin 1.18 0.3 - 1.2 mg/dL    Total Protein 7.0 6.4 - 8.3 g/dL    Alb 3.4 (L) 3.5 - 5.2 g/dL    Albumin/Globulin Ratio 0.9 (L) 1.0 - 2.5    GFR Non-African American >60 >60 mL/min    GFR African American >60 >60 mL/min    GFR Comment          GFR Staging NOT REPORTED    Brain Natriuretic Peptide   Result Value Ref Range    Pro-BNP 3,525 (H) <300 pg/mL    BNP Interpretation Pro-BNP Reference Range:    TSH with Reflex   Result Value Ref Range    TSH 4.28 0.30 - 5.00 mIU/L   Magnesium   Result Value Ref Range    Magnesium 1.8 1.6 - 2.6 mg/dL   COVID-19    Specimen: Other   Result Value Ref Range    SARS-CoV-2          SARS-CoV-2, Rapid Not Detected Not Detected    Source . NASOPHARYNGEAL SWAB     SARS-CoV-2                 RADIOLOGY:  FINDINGS:  AP portable view of the chest time stamped at 811 hours demonstrates  overlying cardiac monitoring electrodes. Mild cardiomegaly is noted. Scattered pulmonary opacities right greater than left favoring multi side  focal airspace disease. Small bilateral effusions are not excluded.     IMPRESSION:  Bilateral scattered pulmonary opacities right greater than left favoring  multifocal airspace disease with small effusions not excluded. No  extrapleural air is seen. EKG  Sinus tachycardia rate of 139 bpm, leftward axis, normal intervals, no acute ST wave abnormalities, no acute T wave changes noted. Occasional PVC noted. All EKG's are interpreted by the Emergency Department Physician who either signs or Co-signs this chart in the absence of a cardiologist.    EMERGENCY DEPARTMENT COURSE/IMPRESSION: 68-year-old male present [facility] with dyspnea on exertion, shortness of breath, chest pain, patient was recently seen at outside hospital.  Cardiac work-up was initiated. Patient's chest x-ray concerning for CHF exacerbation versus pneumonia. Patient was given Solu-Medrol, Lasix, Levaquin due to penicillin allergy. Covid testing was negative. Patient is afebrile, patient was hypoxic upon arrival to emergency department with ambulation into the mid 60s. Patient symptoms improved with supplemental oxygen nasal cannula at 4 L. Patient is satting 95%.   Will plan admit patient to medicine service for CHF exacerbation/pneumonia. PROCEDURES:  None    CONSULTS:  IP CONSULT TO INTERNAL MEDICINE    CRITICAL CARE:  None    FINAL IMPRESSION      1. Hypoxia    2. Dyspnea and respiratory abnormalities    3. Chest pain, unspecified type          DISPOSITION / PLAN     DISPOSITION Decision To Admit 10/17/2020 09:23:21 AM      PATIENT REFERRED TO:  No follow-up provider specified.     DISCHARGE MEDICATIONS:  New Prescriptions    No medications on file       Michelle Jj DO  Emergency Medicine Resident    (Please note that portions of thisnote were completed with a voice recognition program.  Efforts were made to edit the dictations but occasionally words are mis-transcribed.)     Michelle Jj DO  Resident  10/17/20 [id number]

## 2020-10-17 NOTE — CONSULTS
[facility] - Initial Consult Note COVID 19 Patient  Today's Date and Time: 10/17/2020, 1:07 PM    Impression :   · COVID 19 Suspect  · COVID 19 Infection Not Confirmed by lab Testing  · COVID tests:  · 10-17-20: Negative  · 9-19-20: Negative   · 9-17-20: Negative  · Congestive heart failure with fluid retention in the lungs  · Paroxysmal atrial fibrillation  · Prior episodes of non sustained V tach  · Prior hospitalization on 9-18-20 at [facility]. Evaluated and treated for presumptive COVID 19 pneumonia by [facility] service despite two negative COVID tests. Current data do not support COVID  ·   Recommendations:   · Monitor off antibiotics  · Cardiac ECHO  · Diuresis  · Legionella antigen, strep pneumoniae antigen , mycoplasma antibody    Medical Decision Making/Summary/Discussion:10/17/2020     · Patient admitted with suspected COVID 19 infection  · Covid test negative   · He has CHF with fluid retention giving a CT scan picture suggestive of COVID but really from CHF  Infection Control Recommendations   · [place] Precautions    Antimicrobial Stewardship Recommendations     · Discontinuation of therapy  Coordination of Outpatient Care:   · Estimated Length of IV antimicrobials:TBD  · Patient will need Midline Catheter Insertion: TBD  · Patient will need PICC line Insertion: No  · Patient will need: Home IV , [place],  SNF,  LTAC:TBD  · Patient will need outpatient wound care:No    Chief complaint/reason for consultation:   · Concern for COVID infection  · Review of data does not substantiate a diagnosis of COVID 19      History of Present Illness:   Junior Pantoja is a 77y.o.-year-old  male who was initially admitted on 10/17/2020. Patient seen at the request of Jayden Cadet. INITIAL HISTORY:    Patient presented through ER with complaints of SOB, cough and concern for COVID 19.     Patient was recently hospitalized at [facility] on 9-18-20 because of similar symptoms. He was evaluated by the ID service of [facility] and felt to have COVID on the basis of CT scan. He received Remdesivir for 5 days and Decadron for 10 days for presumptive COVID. His COVID 19 tests on 9-17, 9-19-20 were negative. He was also seen by the Cardiology Nurse practitioner because of paroxysmal A fib, non sustained V tach, essential HTN, CHF. Treated for above. An ECHO was not done because of concern with COVID. Patient was referred back to his cardiologist Dr Nabeel Cao upon discharge. Since then the patient had a second CT scan on 10-15-20 with similar findings to the one on 9-17-20. The second CT was interpreted as CHF with bilateral pleural effusions and pulmonary edema. He has also presented to [facility] ER with similar complaints. His COVID test on 10-17-20 is Negative. The above data suggest that he does not have COVID but rather chronic CHF with pulmonary edema and pleural effusions. CURRENT EVALUATION : 10/17/2020    Afebrile  VS stable    Patient exhibiting respiratory distress. Yes  Respiratory secretions: No    Patient receiving supplemental oxygen. 4 l NC  02 sat 93  RR 26    Overall Daily Picture:      Worsening    Presence of secondary bacterial Infection:    No   Additional antibiotics: No    Labs, X rays reviewed: 10/17/2020    BUN: 17  Cr:0.63    WBC: 8.6  Hb:11.8  Plat: 299    Absolute Neutrophils:3.6  Absolute Lymphocytes:1.57  Neutrophil/Lymphocyte Ratio: 2 Low risk    CRP:  Ferritin:  LDH:     Pro Calcitonin:  Pro BNP 3525  Troponin 29-->25    Cultures:  Urine:  ·   Blood:  ·   Sputum :  ·   Wound:       CXR:   · 10-17-20 Scattered pulmonary infiltrates. Pleural effusion  CAT:      Discussed with patient, RN, CC, IM. I have personally reviewed the past medical history, past surgical history, medications, social history, and family history, and I have updated the database accordingly.   Past Medical History:     Past Medical History:   Diagnosis Date    Atrial fibrillation ([address]     Back pain, chronic     Mcguire's esophagus 06/18/2019    BPH (benign prostatic hyperplasia)     Cancer (HCC)     colon-rectal    Cocaine abuse in remission [facility])     1970's    ED (erectile dysfunction) 4/2/2015    GERD (gastroesophageal reflux disease)     GI bleed 12/5/2018    Hernia     History of colon cancer     Melena     Migraines     Murmur, cardiac        Past Surgical  History:     Past Surgical History:   Procedure Laterality Date    CARDIAC CATHETERIZATION  11/29/2018    Non-obstructive CAD    COLECTOMY      2nd colectomy, Colostomy and reversed Psychiatric COLECTOMY      1st time [place]    COLONOSCOPY      COLONOSCOPY  07/18/2016    COLONOSCOPY N/A 12/6/2018    COLONOSCOPY DIAGNOSTIC performed by Nasra Vaughan MD at [place] [id number] Right 2009    inguinal    KNEE SURGERY Right 1970's    arthrotomy    TONSILLECTOMY      TOTAL KNEE ARTHROPLASTY Right 1/8/2019    KNEE TOTAL ARTHROPLASTY performed by Mark Anthony Mccurdy MD at [address] TRANSESOPHAGEAL ECHOCARDIOGRAM  11/29/2018    UPPER GASTROINTESTINAL ENDOSCOPY N/A 12/5/2018    EGD DIAGNOSTIC ONLY performed by Nasra Vaughan MD at [facility] N/A 6/18/2019    MCGUIRE'S       Medications:      metoprolol tartrate  25 mg Oral BID    atorvastatin  40 mg Oral Daily    lisinopril  5 mg Oral Daily    tamsulosin  0.4 mg Oral Daily    rivaroxaban  20 mg Oral Daily with breakfast    furosemide  40 mg Intravenous Once       Social History:     Social History     Socioeconomic History    Marital status: Single     Spouse name: Not on file    Number of children: Not on file    Years of education: Not on file    Highest education level: Not on file   Occupational History    Not on file   Social Needs    Financial resource strain: Not on file    Food insecurity     Worry: Not on file     Inability: Not on file    Transportation needs     Medical: cramps. Genitourinary: No increased urinary frequency, or dysuria. No hematuria. No suprapubic or CVA pain  Musculoskeletal: No muscle aches or pains. No joint effusions, swelling or deformities  Hematologic: No bleeding or bruising. Neurologic: No headache, weakness, numbness, or tingling. Integument: No rash, no ulcers. Psychiatric: No depression. Endocrine: No polyuria, no polydipsia, no polyphagia. Physical Examination :     Patient Vitals for the past 8 hrs:   BP Temp Temp src Pulse Resp SpO2   10/17/20 1202 (!) 133/98 -- -- 137 -- 91 %   10/17/20 1132 (!) 123/107 -- -- 133 -- 94 %   10/17/20 1126 (!) 135/105 -- -- 136 -- 95 %   10/17/20 1117 -- -- -- 122 -- 94 %   10/17/20 0726 -- -- -- 120 -- --   10/17/20 0718 -- -- -- -- -- 95 %   10/17/20 0714 (!) 158/118 -- -- 140 26 (!) 65 %   10/17/20 0707 -- 97.7 °F (36.5 °C) Tympanic -- -- --     General Appearance: Awake, alert, and in respiratory distress  Head:  Normocephalic, no trauma  Eyes: Pupils equal, round, reactive to light and accommodation; extraocular movements intact; sclera anicteric; conjunctivae pink. No embolic phenomena. ENT: Oropharynx clear, without erythema, exudate, or thrush. No tenderness of sinuses. Mouth/throat: mucosa pink and moist. No lesions. Dentures  Neck:Supple, without lymphadenopathy. Thyroid normal, No bruits. Pulmonary/Chest: Distant sounds. Crackles Rt lung  Cardiovascular: Regular rate and rhythm with Grade 2/6 JANNETH, frequent missed beats, gallop rythm. Abdomen: Soft, non tender. Bowel sounds normal. No organomegaly  All four Extremities: No cyanosis, clubbing, edema, or effusions. Neurologic: No gross sensory or motor deficits. Skin: Warm and dry with good turgor. Signs of peripheral arterial insufficiency. No ulcerations. No open wounds.     Medical Decision Making -Laboratory:   I have independently reviewed/ordered the following labs:    CBC with Differential:   Recent Labs     10/17/20  0727   WBC 8.6   HGB 11.8*   HCT 39.0*        BMP:   Recent Labs     10/17/20  0727      K 4.0      CO2 21   BUN 17   CREATININE 0.63*   MG 1.8     Hepatic Function Panel:   Recent Labs     10/17/20  0727   PROT 7.0   LABALBU 3.4*   BILITOT 1.18   ALKPHOS 124   ALT 17   AST 32     No results for input(s): RPR in the last 72 hours. No results for input(s): HIV in the last 72 hours. No results for input(s): BC in the last 72 hours. Lab Results   Component Value Date    MUCUS 2+ 12/27/2018    RBC 4.56 10/17/2020    TRICHOMONAS NOT REPORTED 12/27/2018    WBC 8.6 10/17/2020    YEAST NOT REPORTED 12/27/2018    TURBIDITY CLEAR 12/27/2018     Lab Results   Component Value Date    CREATININE 0.63 10/17/2020    GLUCOSE 120 10/17/2020       Medical Decision Making-Imaging:     PACS Images [employer])     Show images for CT angiogram chest   Interpretation Summary       History:  Cough, increased shortness of breath, and elevated d-dimer.     Exam/Technique:  CTA imaging of the chest  is performed with bolus IV contrast with multiplanar reconstructions provided. Volume rendered 3 -D Maximum intensity projection reconstructions constructed under concurrent physician supervision on a independent workstation and reviewed for purposes of   evaluation of the pulmonary arterial tree     Comparison:  Noncontrast chest CT from 9/17/2020     Findings: No pulmonary emboli demonstrated. The thoracic aorta is only weakly opacified but displays only scattered mild calcific plaques. Cardiomegaly megaly is demonstrated. There is some minimal pericardial fluid. No hilar or mediastinal mass lesions or other significant mediastinal   abnormalities are displayed.     Small bilateral pleural effusions are displayed.  There is diffuse interstitial disease and scattered airspace infiltrates throughout both lungs.       No abnormalities are displayed in the portions of upper abdominal organs included.     IMPRESSION: Diffuse mixed interstitial and airspace infiltrates with small bilateral pleural effusions likely represents changes of CHF with pulmonary edema. Multifocal pneumonia cannot be excluded. Clinical correlation required.     No evidence of pulmonary emboli or other additional acute abnormalities.        All CT scans at this facility use dose modulation, iterative reconstruction, and/or weight based dosing when appropriate to reduce radiation dose to as low as reasonably achievable.     Workstation:[id number]     Finalized by Shelley Degroot MD on 10/15/2020 10:51 AM     CT chest without contrast (Accession [id number]) (Order [id number])  Order  Date and Time: 9/17/2020  8:31 PM Ordering Department: [place] [address] [address]   Reason for Exam    Pneumonia   PACS Images [employer])     Show images for CT chest without contrast   Interpretation Summary    CLINICAL INFORMATION: Dry cough. Shortness of breath.     COMPARISON: None.     PROCEDURE: Routine CT chest obtained without contrast. Multiplanar reformats obtained from the axial data. Automated exposure control was utilized.     FINDINGS:     LUNG PARENCHYMA: Severe diffuse bilateral groundglass airspace opacifications noted in the lung parenchyma.     PLEURA: Small bilateral pleural effusions.     HEART/AORTA: Cardiomegaly. Pleural effusion noted.     PULMONARY ARTERIES: Normal in size.     LYMPH NODES: Enlarged mediastinal lymph nodes possibly due to reactive changes.     OSSEOUS STRUCTURES: Degenerative changes of the thoracic spine.     IMPRESSION:     1. Typical findings of Covid 19 pneumonia. Commonly reported imaging features of (COVID-19) pneumonia are present.  Other processes such as influenza pneumonia and organizing pneumonia, as can be seen with drug toxicity and connective tissue disease, can cause a similar imaging pattern.     All CT scans at this facility use dose modulation, iterative reconstruction, and/or weight based dosing when appropriate to reduce radiation dose to as low as reasonably achievable.     Workstation:[id number]     Finalized by Nuria Judd MD on 9/17/2020 9:14 PM       Medical Decision Azyawg-Ykribkxo-Axkdy:       Medical Decision Making-Other:     Note:  · Labs, medications, radiologic studies were reviewed with personal review of films  · Large amounts of data were reviewed  · Discussed with nursing Staff, Discharge planner  · Infection Control and Prevention measures reviewed  · All prior entries were reviewed  · Administer medications as ordered  · Prognosis: Guarded  · Discharge planning reviewed  · Follow up as outpatient. Thank you for allowing us to participate in the care of this patient. Please call with questions.     Domenic Genao MD  Pager: [phone] - Office: [phone]

## 2020-10-17 NOTE — ED PROVIDER NOTES
[facility] [address]  ED  eMERGENCY dEPARTMENT eNCOUnter   Attending Attestation     Pt Name: Lorene Cooper  MRN: [id number]  Perlagfdaxa 1955  Date of evaluation: 10/17/20       Lorene Cooper is a 77 y.o. male who presents with Shortness of Breath and Chest Pain      History: Patient presents with shortness of breath, chest pain, patient's been seen multiple times recently. Exam: Heart rate elevated. Lungs are clear. Abdomen is soft, nontender. Patient is well-appearing speaking full sentences. Concern for pneumonia versus CHF versus PE versus COVID. Patient has had 2- COVID test recently. We will repeat this given very clear symptomatology of COVID. Patient recently had a CT scan for PE which was negative for PE. Plan for ACS rule out, admission. Patient is requiring oxygen. Will give diuretic, consider BiPAP. If patient becomes any worse we will start nitro. CT from two days prior: CT angiogram chest10/15/2020  [address] [place] [address]  Result Narrative     History:  Cough, increased shortness of breath, and elevated d-dimer. Exam/Technique:  CTA imaging of the chest  is performed with bolus IV contrast with multiplanar reconstructions provided.  Volume rendered 3 -D Maximum intensity projection reconstructions constructed under concurrent physician supervision on a independent workstation and reviewed for purposes of   evaluation of the pulmonary arterial tree    Comparison:  Noncontrast chest CT from 9/17/2020    Findings: No pulmonary emboli demonstrated. The thoracic aorta is only weakly opacified but displays only scattered mild calcific plaques.  Cardiomegaly megaly is demonstrated. Jenise Lissett is some minimal pericardial fluid.  No hilar or mediastinal mass lesions or other significant mediastinal   abnormalities are displayed. Small bilateral pleural effusions are displayed. There is diffuse interstitial disease and scattered airspace infiltrates throughout both lungs.      No abnormalities are displayed in the portions of upper abdominal organs included. IMPRESSION: Diffuse mixed interstitial and airspace infiltrates with small bilateral pleural effusions likely represents changes of CHF with pulmonary edema.  Multifocal pneumonia cannot be excluded.  Clinical correlation required. No evidence of pulmonary emboli or other additional acute abnormalities. All CT scans at this facility use dose modulation, iterative reconstruction, and/or weight based dosing when appropriate to reduce radiation dose to as low as reasonably achievable. [id number]    Finalized by Skylar Camargo MD on 10/15/2020 10:51 AM   Other Result Information   Interface - Rad Results/Orders In 1 - 10/15/2020 10:52 AM EDT    History:  Cough, increased shortness of breath, and elevated d-dimer. Exam/Technique:  CTA imaging of the chest  is performed with bolus IV contrast with multiplanar reconstructions provided. Volume rendered 3 -D Maximum intensity projection reconstructions constructed under concurrent physician supervision on a independent workstation and reviewed for purposes of   evaluation of the pulmonary arterial tree    Comparison:  Noncontrast chest CT from 9/17/2020    Findings: No pulmonary emboli demonstrated. The thoracic aorta is only weakly opacified but displays only scattered mild calcific plaques. Cardiomegaly megaly is demonstrated. There is some minimal pericardial fluid. No hilar or mediastinal mass lesions or other significant mediastinal   abnormalities are displayed. Small bilateral pleural effusions are displayed. There is diffuse interstitial disease and scattered airspace infiltrates throughout both lungs. No abnormalities are displayed in the portions of upper abdominal organs included.     IMPRESSION: Diffuse mixed interstitial and airspace infiltrates with small bilateral pleural effusions likely represents changes of CHF with pulmonary edema.  Multifocal pneumonia cannot be excluded. Clinical correlation required. No evidence of pulmonary emboli or other additional acute abnormalities. All CT scans at this facility use dose modulation, iterative reconstruction, and/or weight based dosing when appropriate to reduce radiation dose to as low as reasonably achievable. [id number]    Finalized by Brittani Ambriz MD on 10/15/2020 10:51 AM         I performed a history and physical examination of the patient and discussed management with the resident. I reviewed the residents note and agree with the documented findings and plan of care. Any areas of disagreement are noted on the chart. I was personally present for the key portions of any procedures. I have documented in the chart those procedures where I was not present during the key portions. I have personally reviewed all images and agree with the resident's interpretation. I have reviewed the emergency nurses triage note. I agree with the chief complaint, past medical history, past surgical history, allergies, medications, social and family history as documented unless otherwise noted below. Documentation of the HPI, Physical Exam and Medical Decision Making performed by medical students or scribes is based on my personal performance of the HPI, PE and MDM. For Phys Assistant/ Nurse Practitioner cases/documentation I have had a face to face evaluation of this patient and have completed at least one if not all key elements of the E/M (history, physical exam, and MDM). Additional findings are as noted. For APC cases I have personally evaluated and examined the patient in conjunction with the APC and agree with the treatment plan and disposition of the patient as recorded by the APC.     Brijesh Jarrell MD  Attending Emergency  Physician       Sebas Valle MD  10/17/20 [id number]

## 2020-10-17 NOTE — H&P
respiratory 46 saturations at 65 on room air    Initial Course in the ED: Patient was put on oxygen for low saturations and cardiology was consulted for tachycardia from ER    Significant Labs :   Troponin high-sensitivity 29>25>Hemoglobin of 11.8, hematocrit of 39 glucose 120, creatinine of 0.63 proBNP 3525 procalcitonin 0.29 .7, rapid COVID-19 negative, lactic acid 1.6, d-dimer 1.24.   Chest x-ray : Bilateral scattered pulmonary opacities    Treatment in ED : Initially treated with metoprolol for atrial fibrillation with RVR, furosemide 40 mg IV for congestive heart failure, levofloxacin 750 mg every 24,      PAST MEDICAL HISTORY     Past Medical History:   Diagnosis Date    Atrial fibrillation (HCC)     Back pain, chronic     Hill's esophagus 06/18/2019    BPH (benign prostatic hyperplasia)     Cancer (HCC)     colon-rectal    Cocaine abuse in remission ([facility] [address]     1970's    ED (erectile dysfunction) 4/2/2015    GERD (gastroesophageal reflux disease)     GI bleed 12/5/2018    Hernia     History of colon cancer     Melena     Migraines     Murmur, cardiac        PAST SURGICAL HISTORY     Past Surgical History:   Procedure Laterality Date    CARDIAC CATHETERIZATION  11/29/2018    Non-obstructive CAD    COLECTOMY      2nd colectomy, Colostomy and reversed [facility] COLECTOMY      1st time [place]    COLONOSCOPY      COLONOSCOPY  07/18/2016    COLONOSCOPY N/A 12/6/2018    COLONOSCOPY DIAGNOSTIC performed by Araceli Coleman MD at [address] Right 2009    inguinal    KNEE SURGERY Right 1970's    arthrotomy    TONSILLECTOMY      TOTAL KNEE ARTHROPLASTY Right 1/8/2019    KNEE TOTAL ARTHROPLASTY performed by Isaac Cabral MD at [facility] TRANSESOPHAGEAL ECHOCARDIOGRAM  11/29/2018    UPPER GASTROINTESTINAL ENDOSCOPY N/A 12/5/2018    EGD DIAGNOSTIC ONLY performed by Araceli Coleman MD at [facility] N/A 6/18/2019 [facility]       ALLERGIES     Adhesive tape; Codeine; and Penicillins    MEDICATIONS PRIOR TO ADMISSION     Prior to Admission medications    Medication Sig Start Date End Date Taking?  Authorizing Provider   tamsulosin (FLOMAX) 0.4 MG capsule Take 1 capsule by mouth daily 10/15/20  Yes Abdirahman Webster MD   atorvastatin (LIPITOR) 40 MG tablet take 1 tablet by mouth once daily 10/9/20  Yes Abdirahman Webster MD   sildenafil (VIAGRA) 100 MG tablet TAKE ONE TABLET BY MOUTH AS NEEDED FOR FOR ERECTILE DYSFUNCTION 20  Yes Millie Marrero MD   omeprazole (PRILOSEC) 40 MG delayed release capsule take 1 capsule by mouth every morning before breakfast 20  Yes Cassandra Solano MD   ibuprofen (ADVIL;MOTRIN) 800 MG tablet take 1 tablet by mouth every 6 hours if needed for pain 20  Yes Cassandra Solano MD   metoprolol tartrate (LOPRESSOR) 25 MG tablet take 1 tablet by mouth twice a day 19  Yes Ludin Fleming MD   lisinopril (PRINIVIL;ZESTRIL) 5 MG tablet take 1 tablet by mouth once daily 19  Yes Janeth Robbins APRN - CNP   rivaroxaban (XARELTO) 20 MG TABS tablet Take 20 mg by mouth daily (with breakfast)    Yes Historical Provider, MD   Elastic Bandages & Supports (ABDOMINAL BINDER/ELASTIC XL) MISC 1 Device by Does not apply route as needed (ventral hernia, while ambulating/active) 16   Shelby Betts,        SOCIAL HISTORY     Tobacco:   Social History     Tobacco Use   Smoking Status Former Smoker    Packs/day: 0.50    Years: 1.00    Pack years: 0.50    Last attempt to quit: Elias Beasley Years since quittin.8   Smokeless Tobacco Never Used      Alcohol: 3-4 times a week  Illicits: Patient denies currently but he has cocaine use in the past       FAMILY HISTORY     Family History   Problem Relation Age of Onset    Diabetes Mother     Heart Attack Father     Heart Disease Father     Heart Disease Brother        PHYSICAL EXAM     Vitals: BP (!) 158/118   Pulse 120   Temp 97.7 Laboratory Testing:     Recent Results (from the past 24 hour(s))   Troponin    Collection Time: 10/17/20  7:27 AM   Result Value Ref Range    Troponin, High Sensitivity 29 (H) 0 - 22 ng/L    Troponin T NOT REPORTED <0.03 ng/mL    Troponin Interp NOT REPORTED    CBC    Collection Time: 10/17/20  7:27 AM   Result Value Ref Range    WBC 8.6 3.5 - 11.3 k/uL    RBC 4.56 4.21 - 5.77 m/uL    Hemoglobin 11.8 (L) 13.0 - 17.0 g/dL    Hematocrit 39.0 (L) 40.7 - 50.3 %    MCV 85.5 82.6 - 102.9 fL    MCH 25.9 25.2 - 33.5 pg    MCHC 30.3 28.4 - 34.8 g/dL    RDW 17.6 (H) 11.8 - 14.4 %    Platelets 448 333 - 195 k/uL    MPV 9.5 8.1 - 13.5 fL    NRBC Automated 0.0 0.0 per 100 WBC   Comprehensive Metabolic Panel    Collection Time: 10/17/20  7:27 AM   Result Value Ref Range    Glucose 120 (H) 70 - 99 mg/dL    BUN 17 8 - 23 mg/dL    CREATININE 0.63 (L) 0.70 - 1.20 mg/dL    Bun/Cre Ratio NOT REPORTED 9 - 20    Calcium 8.7 8.6 - 10.4 mg/dL    Sodium 138 135 - 144 mmol/L    Potassium 4.0 3.7 - 5.3 mmol/L    Chloride 103 98 - 107 mmol/L    CO2 21 20 - 31 mmol/L    Anion Gap 14 9 - 17 mmol/L    Alkaline Phosphatase 124 40 - 129 U/L    ALT 17 5 - 41 U/L    AST 32 <40 U/L    Total Bilirubin 1.18 0.3 - 1.2 mg/dL    Total Protein 7.0 6.4 - 8.3 g/dL    Alb 3.4 (L) 3.5 - 5.2 g/dL    Albumin/Globulin Ratio 0.9 (L) 1.0 - 2.5    GFR Non-African American >60 >60 mL/min    GFR African American >60 >60 mL/min    GFR Comment          GFR Staging NOT REPORTED    Brain Natriuretic Peptide    Collection Time: 10/17/20  7:27 AM   Result Value Ref Range    Pro-BNP 3,525 (H) <300 pg/mL    BNP Interpretation Pro-BNP Reference Range:    TSH with Reflex    Collection Time: 10/17/20  7:27 AM   Result Value Ref Range    TSH 4.28 0.30 - 5.00 mIU/L   Magnesium    Collection Time: 10/17/20  7:27 AM   Result Value Ref Range    Magnesium 1.8 1.6 - 2.6 mg/dL   COVID-19    Collection Time: 10/17/20  9:02 AM    Specimen: Other   Result Value Ref Range    SARS-CoV-2 SARS-CoV-2, Rapid Not Detected Not Detected    Source . NASOPHARYNGEAL SWAB     SARS-CoV-2         Troponin    Collection Time: 10/17/20  9:32 AM   Result Value Ref Range    Troponin, High Sensitivity 25 (H) 0 - 22 ng/L    Troponin T NOT REPORTED <0.03 ng/mL    Troponin Interp NOT REPORTED        Imaging:   Xr Chest Portable    Result Date: 10/17/2020  Bilateral scattered pulmonary opacities right greater than left favoring multifocal airspace disease with small effusions not excluded. No extrapleural air is seen. ASSESSMENT & PLAN     ASSESSMENT / PLAN:     IMPRESSION    This is a 77 y.o. male who presented with Acute onset of SOB , chest pain and found to have fluid congestion on CXR ,  pedal edema and Atrial fibrillation with RVR and elvated troponins. Patient admitted to inpatient status because of Diastolic CHF, Atrial fibriullation with RVR NSTEMI work up and pneumonia work up. Principal Problem:    CHF (congestive heart failure), NYHA class II, acute on chronic, combined ([address]  Active Problems:    Dyslipidemia    Atrial fibrillation with normal ventricular rate (HCC)    Hypertrophic nonobstructive cardiomyopathy (HCC)    Benign essential HTN  Resolved Problems:    * No resolved hospital problems. *    Acute on chronic congestive heart failure. Ejection fraction 45 to 50%  -Pending echo,   -Continue on Lasix, fluid restriction, input output monitoring. Chest pain NSTEMI type II  -Follow-up on EKG, trend troponins, will monitor the patient    Atrial fibrillation with RVR  -Continue on diltiazem drip  -Continue on metoprolol tartrate 25 mg every 8 oral  -Continue on xarelto 20mg, continue telemetry monitoring     Suspected pneumonia COVID vs Community acquired  -Chest x-ray shows infiltrates concerning for infection vs fluid.   -Pending respiratory panel.  -Continue on levofloxacin 750 mg every 24 will recheck in the morning after infectious panel comes back.     Essential hypertension  -Metoprolol 25 mg, lisinopril 5 mg. DVT ppx: Patient on Xarelto  GI ppx: Not indicated    PT/OT/SW: Ongoing  Discharge Planning: Ongoing    Pedro Rock MD  Internal Medicine Resident, PGY- [address] [place], [place]  10/17/2020, 10:14 AM

## 2020-10-17 NOTE — ED NOTES
Pt to ED for shortness of breath/chest pain. Pt states that it has been on going for the past month. Pt states he was tested twice for covid and both were negative. Pt states he has a history of copd/afib. Pt is moderate respiratory distress. Pt 65% on room air. Pt placed on 4l pt went up to 95%. Pt states he was recently discharged from hospital and sent home on home 02 but only 1L at bedtime. Pt also states recently diagnosed with CHF.       Cecilio Halsted, RN  10/17/20 [id number]

## 2020-10-18 ENCOUNTER — APPOINTMENT (OUTPATIENT)
Dept: GENERAL RADIOLOGY | Age: 65
DRG: 291 | End: 2020-10-18
Payer: MEDICARE

## 2020-10-18 LAB
ABSOLUTE EOS #: 0 K/UL (ref 0–0.44)
ABSOLUTE IMMATURE GRANULOCYTE: 0.14 K/UL (ref 0–0.3)
ABSOLUTE LYMPH #: 0.71 K/UL (ref 1.1–3.7)
ABSOLUTE MONO #: 0.85 K/UL (ref 0.1–1.2)
ANION GAP SERPL CALCULATED.3IONS-SCNC: 11 MMOL/L (ref 9–17)
BASOPHILS # BLD: 0 % (ref 0–2)
BASOPHILS ABSOLUTE: 0 K/UL (ref 0–0.2)
BUN BLDV-MCNC: 25 MG/DL (ref 8–23)
BUN/CREAT BLD: ABNORMAL (ref 9–20)
CALCIUM SERPL-MCNC: 8.8 MG/DL (ref 8.6–10.4)
CHLORIDE BLD-SCNC: 99 MMOL/L (ref 98–107)
CO2: 26 MMOL/L (ref 20–31)
CREAT SERPL-MCNC: 0.71 MG/DL (ref 0.7–1.2)
DIFFERENTIAL TYPE: ABNORMAL
EOSINOPHILS RELATIVE PERCENT: 0 % (ref 1–4)
GFR AFRICAN AMERICAN: >60 ML/MIN
GFR NON-AFRICAN AMERICAN: >60 ML/MIN
GFR SERPL CREATININE-BSD FRML MDRD: ABNORMAL ML/MIN/{1.73_M2}
GFR SERPL CREATININE-BSD FRML MDRD: ABNORMAL ML/MIN/{1.73_M2}
GLUCOSE BLD-MCNC: 128 MG/DL (ref 70–99)
HCT VFR BLD CALC: 38.3 % (ref 40.7–50.3)
HEMOGLOBIN: 11.7 G/DL (ref 13–17)
IMMATURE GRANULOCYTES: 1 %
LYMPHOCYTES # BLD: 5 % (ref 24–43)
MAGNESIUM: 2.4 MG/DL (ref 1.6–2.6)
MCH RBC QN AUTO: 25.6 PG (ref 25.2–33.5)
MCHC RBC AUTO-ENTMCNC: 30.5 G/DL (ref 28.4–34.8)
MCV RBC AUTO: 83.8 FL (ref 82.6–102.9)
MONOCYTES # BLD: 6 % (ref 3–12)
MORPHOLOGY: ABNORMAL
NRBC AUTOMATED: 0 PER 100 WBC
PDW BLD-RTO: 17.4 % (ref 11.8–14.4)
PLATELET # BLD: 356 K/UL (ref 138–453)
PLATELET ESTIMATE: ABNORMAL
PMV BLD AUTO: 10 FL (ref 8.1–13.5)
POTASSIUM SERPL-SCNC: 3.5 MMOL/L (ref 3.7–5.3)
RBC # BLD: 4.57 M/UL (ref 4.21–5.77)
RBC # BLD: ABNORMAL 10*6/UL
SEG NEUTROPHILS: 88 % (ref 36–65)
SEGMENTED NEUTROPHILS ABSOLUTE COUNT: 12.5 K/UL (ref 1.5–8.1)
SODIUM BLD-SCNC: 136 MMOL/L (ref 135–144)
WBC # BLD: 14.2 K/UL (ref 3.5–11.3)
WBC # BLD: ABNORMAL 10*3/UL

## 2020-10-18 PROCEDURE — 2500000003 HC RX 250 WO HCPCS: Performed by: STUDENT IN AN ORGANIZED HEALTH CARE EDUCATION/TRAINING PROGRAM

## 2020-10-18 PROCEDURE — 36415 COLL VENOUS BLD VENIPUNCTURE: CPT

## 2020-10-18 PROCEDURE — 6370000000 HC RX 637 (ALT 250 FOR IP): Performed by: STUDENT IN AN ORGANIZED HEALTH CARE EDUCATION/TRAINING PROGRAM

## 2020-10-18 PROCEDURE — 85025 COMPLETE CBC W/AUTO DIFF WBC: CPT

## 2020-10-18 PROCEDURE — 71045 X-RAY EXAM CHEST 1 VIEW: CPT

## 2020-10-18 PROCEDURE — 2580000003 HC RX 258: Performed by: INTERNAL MEDICINE

## 2020-10-18 PROCEDURE — 2060000000 HC ICU INTERMEDIATE R&B

## 2020-10-18 PROCEDURE — 99232 SBSQ HOSP IP/OBS MODERATE 35: CPT | Performed by: INTERNAL MEDICINE

## 2020-10-18 PROCEDURE — 94761 N-INVAS EAR/PLS OXIMETRY MLT: CPT

## 2020-10-18 PROCEDURE — 6360000002 HC RX W HCPCS: Performed by: STUDENT IN AN ORGANIZED HEALTH CARE EDUCATION/TRAINING PROGRAM

## 2020-10-18 PROCEDURE — 80048 BASIC METABOLIC PNL TOTAL CA: CPT

## 2020-10-18 PROCEDURE — 6360000002 HC RX W HCPCS: Performed by: INTERNAL MEDICINE

## 2020-10-18 PROCEDURE — 83735 ASSAY OF MAGNESIUM: CPT

## 2020-10-18 PROCEDURE — 2580000003 HC RX 258: Performed by: STUDENT IN AN ORGANIZED HEALTH CARE EDUCATION/TRAINING PROGRAM

## 2020-10-18 PROCEDURE — 2700000000 HC OXYGEN THERAPY PER DAY

## 2020-10-18 RX ORDER — FUROSEMIDE 10 MG/ML
40 INJECTION INTRAMUSCULAR; INTRAVENOUS ONCE
Status: DISCONTINUED | OUTPATIENT
Start: 2020-10-18 | End: 2020-10-19

## 2020-10-18 RX ORDER — POTASSIUM CHLORIDE 20 MEQ/1
40 TABLET, EXTENDED RELEASE ORAL ONCE
Status: COMPLETED | OUTPATIENT
Start: 2020-10-18 | End: 2020-10-18

## 2020-10-18 RX ORDER — DIGOXIN 0.25 MG/ML
250 INJECTION INTRAMUSCULAR; INTRAVENOUS ONCE
Status: COMPLETED | OUTPATIENT
Start: 2020-10-18 | End: 2020-10-18

## 2020-10-18 RX ADMIN — MAGNESIUM SULFATE 2 G: 2 INJECTION INTRAVENOUS at 01:14

## 2020-10-18 RX ADMIN — DIGOXIN 250 MCG: 0.25 INJECTION INTRAMUSCULAR; INTRAVENOUS at 11:41

## 2020-10-18 RX ADMIN — AMIODARONE HYDROCHLORIDE 1 MG/MIN: 50 INJECTION, SOLUTION INTRAVENOUS at 15:58

## 2020-10-18 RX ADMIN — POTASSIUM CHLORIDE 40 MEQ: 1500 TABLET, EXTENDED RELEASE ORAL at 11:16

## 2020-10-18 RX ADMIN — LEVOFLOXACIN 750 MG: 5 INJECTION, SOLUTION INTRAVENOUS at 08:55

## 2020-10-18 RX ADMIN — AMIODARONE HYDROCHLORIDE 0.5 MG/MIN: 50 INJECTION, SOLUTION INTRAVENOUS at 23:04

## 2020-10-18 RX ADMIN — FUROSEMIDE 40 MG: 10 INJECTION, SOLUTION INTRAMUSCULAR; INTRAVENOUS at 01:12

## 2020-10-18 RX ADMIN — METOROPROLOL TARTRATE 5 MG: 5 INJECTION, SOLUTION INTRAVENOUS at 05:09

## 2020-10-18 RX ADMIN — ATORVASTATIN CALCIUM 40 MG: 80 TABLET, FILM COATED ORAL at 08:58

## 2020-10-18 RX ADMIN — DILTIAZEM HYDROCHLORIDE 7.5 MG/HR: 5 INJECTION INTRAVENOUS at 07:11

## 2020-10-18 RX ADMIN — SODIUM CHLORIDE, PRESERVATIVE FREE 10 ML: 5 INJECTION INTRAVENOUS at 09:05

## 2020-10-18 RX ADMIN — METOPROLOL TARTRATE 25 MG: 50 TABLET, FILM COATED ORAL at 20:30

## 2020-10-18 RX ADMIN — DILTIAZEM HYDROCHLORIDE 7.5 MG/HR: 5 INJECTION INTRAVENOUS at 11:43

## 2020-10-18 RX ADMIN — RIVAROXABAN 20 MG: 20 TABLET, FILM COATED ORAL at 08:54

## 2020-10-18 RX ADMIN — METOPROLOL TARTRATE 25 MG: 50 TABLET, FILM COATED ORAL at 07:09

## 2020-10-18 RX ADMIN — AMIODARONE HYDROCHLORIDE 150 MG: 50 INJECTION, SOLUTION INTRAVENOUS at 13:23

## 2020-10-18 ASSESSMENT — PAIN SCALES - GENERAL
PAINLEVEL_OUTOF10: 0

## 2020-10-18 NOTE — PROGRESS NOTES
[facility] Patient  Today's Date and Time: 10/18/2020, 9:29 AM    Impression :   · COVID 19 Suspect  · COVID 19 Infection Not Confirmed by lab Testing  · COVID tests:  · 10-17-20: Negative  · 9-19-20: Negative   · 9-17-20: Negative  · Congestive heart failure with fluid retention in the lungs  · Paroxysmal atrial fibrillation  · Prior episodes of non sustained V tach  · Prior hospitalization on 9-18-20 at [facility]. Evaluated and treated for presumptive COVID 19 pneumonia by [facility] service despite two negative COVID tests. Current data do not support COVID  ·   Recommendations:   · Monitor off antibiotics  · Cardiac ECHO   · Diuresis  · Legionella antigen, strep pneumoniae antigen , mycoplasma antibody    Medical Decision Making/Summary/Discussion:10/18/2020     · Patient admitted with suspected COVID 19 infection  · Covid test negative   · He has CHF with fluid retention giving a CT scan picture suggestive of COVID but really from CHF  Infection Control Recommendations   · [place] Precautions    Antimicrobial Stewardship Recommendations     · Discontinuation of therapy  Coordination of Outpatient Care:   · Estimated Length of IV antimicrobials:TBD  · Patient will need Midline Catheter Insertion: TBD  · Patient will need PICC line Insertion: No  · Patient will need: Home IV , [place],  SNF,  LTAC:TBD  · Patient will need outpatient wound care:No    Chief complaint/reason for consultation:   · Concern for COVID infection  · Review of data does not substantiate a diagnosis of COVID 19. History of Present Illness:   Annalisa Bruno is a 77y.o.-year-old  male who was initially admitted on 10/17/2020. Patient seen at the request of Nelson Pollack. INITIAL HISTORY:    Patient presented through ER with complaints of SOB, cough and concern for COVID 19. Patient was recently hospitalized at [facility] on 9-18-20 because of similar symptoms.  He was evaluated by the ID service of [facility] and felt to have COVID on the basis of CT scan. He received Remdesivir for 5 days and Decadron for 10 days for presumptive COVID. His COVID 19 tests on 9-17, 9-19-20 were negative. He was also seen by the Cardiology Nurse practitioner because of paroxysmal A fib, non sustained V tach, essential HTN, CHF. Treated for above. An ECHO was not done because of concern with COVID. Patient was referred back to his cardiologist Dr Charlotte Grayson upon discharge. Since then the patient had a second CT scan on 10-15-20 with similar findings to the one on 9-17-20. The second CT was interpreted as CHF with bilateral pleural effusions and pulmonary edema. He has also presented to [facility] ER with similar complaints. His COVID test on 10-17-20 is Negative. The above data suggest that he does not have COVID but rather chronic CHF with pulmonary edema and pleural effusions. CURRENT EVALUATION : 10/18/2020    Afebrile  RR:27  HR:138  BP:100/72    On Lasix. Monitor off antibiotics. Cardiac Echo not done yet. Strep Pneumoniae Ag, Legionella Ag, Mycoplasma Pneumonia by PCR  are negative. He is on 5L nasal Oxygen. Denies fevers, chills. He reports dry cough and attributes it to his GERD. Patient exhibiting respiratory distress. Yes  Respiratory secretions: No    Patient receiving supplemental oxygen. 4 L NC. BIPAP as needed. 02 sat 91>92  RR 23>27    Overall Daily Picture:    Worsening    Presence of secondary bacterial Infection:    No   Additional antibiotics: No    Labs, X rays reviewed: 10/18/2020    BUN: 17  Cr:0.63    WBC: 8.6  Hb:11.8  Plat: 299    Absolute Neutrophils:3.6  Absolute Lymphocytes:1.57  Neutrophil/Lymphocyte Ratio: 2 Low risk    CRP:  Ferritin:238  LDH:     Pro Calcitonin:  Pro BNP 3525  Troponin 29-->25    Cultures:  Urine:  ·   Blood:  ·   Sputum :  ·   Wound:       CXR:   · 10-17-20 Scattered pulmonary infiltrates.  Pleural effusion  CAT:      Discussed with double vision or blurry vision. No conjunctival inflammation. ENT: No sore throat or runny nose. . No hearing loss, tinnitus or vertigo. Cardiovascular: No chest pain or palpitations. shortness of breath. [place]  Lung: shortness of breath, cough. No sputum production  Abdomen: No nausea, vomiting, diarrhea, or abdominal pain. [place] Bound No cramps. Genitourinary: No increased urinary frequency, or dysuria. No hematuria. No suprapubic or CVA pain  Musculoskeletal: No muscle aches or pains. No joint effusions, swelling or deformities  Hematologic: No bleeding or bruising. Neurologic: No headache, weakness, numbness, or tingling. Integument: No rash, no ulcers. Psychiatric: No depression. Endocrine: No polyuria, no polydipsia, no polyphagia. Physical Examination :     Patient Vitals for the past 8 hrs:   BP Temp Temp src Pulse Resp SpO2   10/18/20 0857 100/72 -- -- -- -- --   10/18/20 0709 107/78 -- -- 134 -- --   10/18/20 0500 98/69 97.5 °F (36.4 °C) Oral 138 23 91 %   10/18/20 0400 96/66 -- -- 108 23 94 %   10/18/20 0300 102/73 -- -- 85 25 94 %   10/18/20 0230 107/73 -- -- 83 23 93 %   10/18/20 0200 108/72 -- -- 97 22 94 %   10/18/20 0130 97/68 -- -- 96 24 90 %     General Appearance: Awake, alert, and in respiratory distress  Head:  Normocephalic, no trauma  Eyes: Pupils equal, round, reactive to light and accommodation; extraocular movements intact; sclera anicteric; conjunctivae pink. No embolic phenomena. ENT: Oropharynx clear, without erythema, exudate, or thrush. No tenderness of sinuses. Mouth/throat: mucosa pink and moist. No lesions. Dentures  Neck:Supple, without lymphadenopathy. Thyroid normal, No bruits. Pulmonary/Chest: Distant sounds. Crackles Rt lung  Cardiovascular: Regular rate and rhythm with Grade 2/6 JANNETH, frequent missed beats, gallop rythm. Abdomen: Soft, non tender. Bowel sounds normal. No organomegaly  All four Extremities: No cyanosis, clubbing, edema, or effusions.   Neurologic: No gross sensory or motor deficits. Skin: Warm and dry with good turgor. Signs of peripheral arterial insufficiency. No ulcerations. No open wounds. Medical Decision Making -Laboratory:   I have independently reviewed/ordered the following labs:    CBC with Differential:   Recent Labs     10/17/20  0727 10/18/20  0804   WBC 8.6 14.2*   HGB 11.8* 11.7*   HCT 39.0* 38.3*    356   LYMPHOPCT  --  PENDING   MONOPCT  --  PENDING     BMP:   Recent Labs     10/17/20  0727 10/18/20  0804    136   K 4.0 3.5*    99   CO2 21 26   BUN 17 25*   CREATININE 0.63* 0.71   MG 1.8 2.4     Hepatic Function Panel:   Recent Labs     10/17/20  0727   PROT 7.0   LABALBU 3.4*   BILITOT 1.18   ALKPHOS 124   ALT 17   AST 32     No results for input(s): RPR in the last 72 hours. No results for input(s): HIV in the last 72 hours. No results for input(s): BC in the last 72 hours. Lab Results   Component Value Date    MUCUS 2+ 12/27/2018    RBC 4.57 10/18/2020    TRICHOMONAS NOT REPORTED 12/27/2018    WBC 14.2 10/18/2020    YEAST NOT REPORTED 12/27/2018    TURBIDITY CLEAR 12/27/2018     Lab Results   Component Value Date    CREATININE 0.71 10/18/2020    GLUCOSE 128 10/18/2020       Medical Decision Making-Imaging:     PACS Images L[facility] Exam Prep Platform)     Show images for CT angiogram chest   Interpretation Summary       History:  Cough, increased shortness of breath, and elevated d-dimer.     Exam/Technique:  CTA imaging of the chest  is performed with bolus IV contrast with multiplanar reconstructions provided. Volume rendered 3 -D Maximum intensity projection reconstructions constructed under concurrent physician supervision on a independent workstation and reviewed for purposes of   evaluation of the pulmonary arterial tree     Comparison:  Noncontrast chest CT from 9/17/2020     Findings: No pulmonary emboli demonstrated. The thoracic aorta is only weakly opacified but displays only scattered mild calcific plaques.   Cardiomegaly megaly is demonstrated. There is some minimal pericardial fluid. No hilar or mediastinal mass lesions or other significant mediastinal   abnormalities are displayed.     Small bilateral pleural effusions are displayed. There is diffuse interstitial disease and scattered airspace infiltrates throughout both lungs.       No abnormalities are displayed in the portions of upper abdominal organs included.     IMPRESSION: Diffuse mixed interstitial and airspace infiltrates with small bilateral pleural effusions likely represents changes of CHF with pulmonary edema. Multifocal pneumonia cannot be excluded. Clinical correlation required.     No evidence of pulmonary emboli or other additional acute abnormalities.        All CT scans at this facility use dose modulation, iterative reconstruction, and/or weight based dosing when appropriate to reduce radiation dose to as low as reasonably achievable.     Workstation:[id number]     Finalized by Jayshree Lemons MD on 10/15/2020 10:51 AM     CT chest without contrast (Accession [id number]) (Order [id number])  Order  Date and Time: 9/17/2020  8:31 PM Ordering Department: [place] [id number]   Reason for Exam    Pneumonia   PACS Images [facility])     Show images for CT chest without contrast   Interpretation Summary    CLINICAL INFORMATION: Dry cough. Shortness of breath.     COMPARISON: None.     PROCEDURE: Routine CT chest obtained without contrast. Multiplanar reformats obtained from the axial data. Automated exposure control was utilized.     FINDINGS:     LUNG PARENCHYMA: Severe diffuse bilateral groundglass airspace opacifications noted in the lung parenchyma.     PLEURA: Small bilateral pleural effusions.     HEART/AORTA: Cardiomegaly.  Pleural effusion noted.     PULMONARY ARTERIES: Normal in size.     LYMPH NODES: Enlarged mediastinal lymph nodes possibly due to reactive changes.     OSSEOUS STRUCTURES: Degenerative changes of the thoracic spine.     IMPRESSION:     1. Typical findings of Covid 19 pneumonia. Commonly reported imaging features of (COVID-19) pneumonia are present. Other processes such as influenza pneumonia and organizing pneumonia, as can be seen with drug toxicity and connective tissue disease, can cause a similar imaging pattern.     All CT scans at this facility use dose modulation, iterative reconstruction, and/or weight based dosing when appropriate to reduce radiation dose to as low as reasonably achievable.     Workstation:[id number]     Finalized by Laurie Ledezma MD on 9/17/2020 9:14 PM       Medical Decision [id number]:       Medical Decision Making-Other:     Note:  · Labs, medications, radiologic studies were reviewed with personal review of films  · Large amounts of data were reviewed  · Discussed with nursing Staff, Discharge planner  · Infection Control and Prevention measures reviewed  · All prior entries were reviewed  · Administer medications as ordered  · Prognosis: Guarded  · Discharge planning reviewed  · Follow up as outpatient. Thank you for allowing us to participate in the care of this patient. Please call with questions. Chanda Ng MD  Pager: [phone] - Office: [phone]    ATTESTATION:    I have discussed the case, including pertinent history and exam findings with the residents and students. I have seen and examined the patient and the key elements of the encounter have been performed by me. I was present when the student obtained his information or examined the patient. I have reviewed the laboratory data, other diagnostic studies and discussed them with the residents. I have updated the medical record where necessary. I agree with the assessment, plan and orders as documented by the resident/ student.     Shakeel Lockwood MD.

## 2020-10-18 NOTE — PROGRESS NOTES
Physical Therapy  DATE: 10/18/2020    NAME: Richard Barboza  MRN: [id number]   : 1955    Patient not seen this date for Physical Therapy due to:  [] Blood transfusion in progress  [] Hemodialysis  []  Patient Declined  [] Spine Precautions   [] Strict Bedrest  [] Surgery/ Procedure  [] Testing      [x] Other, increased HR on meds to control. Not appropriate this date        [] PT being discontinued at this time. Patient independent. No further needs. [] PT being discontinued at this time as the patient has been transferred to palliative care. No further needs.     Tanya Fierro, PT

## 2020-10-18 NOTE — PROGRESS NOTES
[facility]  Internal Medicine Teaching Residency Program  Inpatient Daily Progress Note  ______________________________________________________________________________    Patient: Kait Simons  YOB: 1955   PCR:[id number]    Acct: [de-identified]     Room: 2019/2019-01  Admit date: 10/17/2020  Today's date: 10/18/20  Number of days in the hospital: 1    SUBJECTIVE   Admitting Diagnosis: CHF (congestive heart failure), NYHA class II, acute on chronic, combined ([facility] [address]  CC:   Pt examined at bedside. Chart & results reviewed. No acute events overnight, patient has heart rate in high 130s  No new complaints today  We will increase the Lasix to 40 mg and add digoxin  Respiratory panel came back negative would like to discontinue antibiotics  ID is following  On metoprolol 25 mg, Xarelto for atrial fibrillation  Patient n.p.o. plans to do cath    ROS:  Constitutional:  negative for chills, fevers, sweats  Respiratory:  negative for cough, dyspnea on exertion, hemoptysis, shortness of breath, wheezing  Cardiovascular:  negative for chest pain, chest pressure/discomfort, lower extremity edema, palpitations  Gastrointestinal:  negative for abdominal pain, constipation, diarrhea, nausea, vomiting  Neurological:  negative for dizziness, headache  BRIEF HISTORY     The patient is a pleasant 77 y.o. male with past medical history of diastolic CHF, essential hypertension, dural fibrillation dyslipidemia, colon cancer status post resection, Hill's esophagus presents  to the ED with a chief complaint of acute onset of shortness of breath with productive cough since last 3 days, visited with some dizziness and chest pain.      Patient was recently treated for  COVID pneumonia with imaging concerning for COVID even though rapid test came back negative on 2 occasions . he was discharged on oxygen which he uses since then.   Today he complains of increased shortness of breath this morning which made him come to the ER. Initially in the ER he was found to be having saturations of 65%. Patient also have the chest pain which is sharp,localised, non radiating and non reproducible on deep breathing.      Pt denies headache, lightheadedness, focal neurological deficits, abdominal pain, nausea, fever, chills,. Initial Vitals on presentation : Blood pressure of 158/118, pulse rate of 140, respiratory 46 saturations at 65 on room air     Significant Labs :   Troponin high-sensitivity 29>25>Hemoglobin of 11.8, hematocrit of 39 glucose 120, creatinine of 0.63 proBNP 3525 procalcitonin 0.29 .7, rapid COVID-19 negative, lactic acid 1.6, d-dimer 1.24. Chest x-ray : Bilateral scattered pulmonary opacities     Treatment in ED : Initially treated with metoprolol for atrial fibrillation with RVR, furosemide 40 mg IV for congestive heart failure, levofloxacin 750 mg every 24,    OBJECTIVE     Vital Signs:  /78   Pulse 134   Temp 97.5 °F (36.4 °C) (Oral)   Resp 23   SpO2 91%     Temp (24hrs), Av.9 °F (36.6 °C), Min:97.5 °F (36.4 °C), Max:98.2 °F (36.8 °C)    In: 671.9   Out: 1800 [Urine:1800]    Physical Exam:  Constitutional: This is a well developed, well nourished, 25-29.9 - Overweight 77y.o. year old male who is alert, oriented, cooperative and in no apparent distress. Head:normocephalic and atraumatic. EENT:  PERRLA. No conjunctival injections. Septum was midline, mucosa was without erythema, exudates or cobblestoning. No thrush was noted. Neck: Supple without thyromegaly. No elevated JVP. Trachea was midline. Respiratory: Chest was symmetrical without dullness to percussion. Breath sounds bilaterally were clear to auscultation. There were no wheezes, rhonchi or rales. There is no intercostal retraction or use of accessory muscles. No egophony noted. Cardiovascular: Regular without murmur, clicks, gallops or rubs.    Abdomen: Slightly rounded and soft without organomegaly. No rebound, rigidity or guarding was appreciated. Lymphatic: No lymphadenopathy. Musculoskeletal: Normal curvature of the spine. No gross muscle weakness. Extremities:  No lower extremity edema, ulcerations, tenderness, varicosities or erythema. Muscle size, tone and strength are normal.  No involuntary movements are noted. Skin:  Warm and dry. Good color, turgor and pigmentation. No lesions or scars. No cyanosis or clubbing  Neurological/Psychiatric: The patient's general behavior, level of consciousness, thought content and emotional status is normal.        Medications:  Scheduled Medications:    atorvastatin  40 mg Oral Daily    lisinopril  5 mg Oral Daily    tamsulosin  0.4 mg Oral Daily    sodium chloride flush  10 mL Intravenous 2 times per day    rivaroxaban  20 mg Oral Daily with breakfast    levofloxacin  750 mg Intravenous Q24H    metoprolol tartrate  25 mg Oral 3 times per day     Continuous Infusions:    dilTIAZem (CARDIZEM) 125 mg in dextrose 5% 125 mL infusion 10 mg/hr (10/18/20 0809)     PRN Medicationssodium chloride flush, 10 mL, PRN  acetaminophen, 650 mg, Q6H PRN    Or  acetaminophen, 650 mg, Q6H PRN  polyethylene glycol, 17 g, Daily PRN  promethazine, 12.5 mg, Q6H PRN    Or  ondansetron, 4 mg, Q6H PRN  potassium chloride, 40 mEq, PRN    Or  potassium alternative oral replacement, 40 mEq, PRN    Or  potassium chloride, 10 mEq, PRN  magnesium sulfate, 1 g, PRN  metoprolol, 5 mg, Q6H PRN        Diagnostic Labs:  CBC:   Recent Labs     10/17/20  0727   WBC 8.6   RBC 4.56   HGB 11.8*   HCT 39.0*   MCV 85.5   RDW 17.6*        BMP:   Recent Labs     10/17/20  0727      K 4.0      CO2 21   BUN 17   CREATININE 0.63*     BNP: No results for input(s): BNP in the last 72 hours. PT/INR: No results for input(s): PROTIME, INR in the last 72 hours. APTT: No results for input(s): APTT in the last 72 hours.   CARDIAC ENZYMES: No results for input(s): CKMB, Dimitri Shepard in the last 72 hours. Invalid input(s): CKTOTAL;3  FASTING LIPID PANEL:  Lab Results   Component Value Date    CHOL 200 (H) 01/03/2017    HDL 36 (L) 03/10/2020    TRIG 165 (H) 01/03/2017     LIVER PROFILE:   Recent Labs     10/17/20  0727   AST 32   ALT 17   BILITOT 1.18   ALKPHOS 124      MICROBIOLOGY: No results found for: CULTURE    Imaging:    Xr Chest Portable    Result Date: 10/18/2020  No significant change in chest findings compared to the October 17th study. Xr Chest Portable    Result Date: 10/17/2020  Bilateral scattered pulmonary opacities right greater than left favoring multifocal airspace disease with small effusions not excluded. No extrapleural air is seen. ASSESSMENT & PLAN     ASSESSMENT / PLAN:     Principal Problem:    CHF (congestive heart failure), NYHA class II, acute on chronic, combined (HCC)  Active Problems:    Dyslipidemia    Benign prostatic hyperplasia with urinary obstruction    Atrial fibrillation with normal ventricular rate (HCC)    Hypertrophic nonobstructive cardiomyopathy (HCC)    Benign essential HTN    Hypoxia    Dyspnea and respiratory abnormalities  Resolved Problems:    * No resolved hospital problems. *    Acute on chronic congestive heart failure. Ejection fraction 45 to 50%  -Pending echo,   -Continue on Lasix 40 mg, fluid restriction, input output monitoring.     Chest pain NSTEMI type II  -Follow-up on EKG, trend troponins, will monitor the patient  -Consulted with plans to do cath patient is n.p.o. after midnight     Atrial fibrillation with RVR  -Continue on diltiazem drip  -Digoxin 250 mcg  -Continue on metoprolol tartrate 25 mg every 8 oral  -Continue on xarelto 20mg, continue telemetry monitoring      Suspected Community acquired  -Chest x-ray shows infiltrates concerning for infection vs fluid.   -Continue on levofloxacin 750 mg every 24     Essential hypertension  -Metoprolol 25 mg, lisinopril 5 mg.     Benign prostatic hyperplasia  -Continue on tamsulosin 0.4 mg     DVT ppx: Patient on Xarelto  GI ppx: Not indicated     PT/OT/SW: Ongoing  Discharge Planning: Ongoing    Tima Zepeda MD  Internal Medicine Resident, PGY- [address], [place]  10/18/2020, 8:28 AM

## 2020-10-18 NOTE — PROGRESS NOTES
Pt heart rate 138 to 140's diltiazem rate increased from 7.5mg/hr to 10mg/hr Bp 103/81 pt alert and oriented no s/s of distress noted will continue to monitor.

## 2020-10-18 NOTE — PROGRESS NOTES
Pt BP 83/62 diltiazem rate decreased back to 7.5mg/hr. pt alert and oriented no acute distress noted Dr Nelson Corado notified order to maintain current rate give digoxin 250mcg IVP and hold lasix til BP improves.

## 2020-10-18 NOTE — CONSULTS
[facility] Cardiology    Consult / H&P               Today's Date: 10/18/2020  Patient Name: Junior Pantoja  Date of admission: 10/17/2020  7:08 AM  Patient's age: 77 y.o., 1955  Admission Dx: CHF (congestive heart failure), NYHA class II, acute on chronic, combined ([facility] [address] [I50.43]  CHF (congestive heart failure), NYHA class II, acute on chronic, combined ([facility] [address] [I50.43]    Reason for Consult:  Aflutter     Requesting Physician: Joshua Bui MD    CHIEF COMPLAINT:  Shortness of breath     History Obtained From: Patient     HISTORY OF PRESENT ILLNESS:    Junior Pantoja 77 y.o. male presented with progressive shortness of breath for few weeks. In ER he was found to have hypoxia with sats in 60s. He did not have associated chest pain. He was found to have pulmonary edema and given lasix with improvement in symptoms. Recent admission 10/15 [facility] to [facility] presented with Shortness of breath for 3-4 days. His COVID test was negative with CT showing evidence of possible pneumonia. Patient was recommended to be admitted but left and was discharged on lasix and doxycyline     Hospitalization at [address] 9/17/2020 for COVID pnuemonia. He had paroxysmal Afib during admission and spontaneously converted. He was presumptively treated for COVID but had 2 negative tests     Past Medical History:   has a past medical history of Atrial fibrillation ([facility] [address] Back pain, chronic, Hill's esophagus, BPH (benign prostatic hyperplasia), Cancer ([facility] [address] Cocaine abuse in remission [facility]), ED (erectile dysfunction), GERD (gastroesophageal reflux disease), GI bleed, Hernia, History of colon cancer, Melena, Migraines, and Murmur, cardiac. Past Surgical History:   has a past surgical history that includes Tonsillectomy; Colonoscopy; colectomy; Colonoscopy (07/18/2016); knee surgery (Right, 1970's); transesophageal echocardiogram (11/29/2018); Upper gastrointestinal endoscopy (N/A, 12/5/2018);  Colonoscopy (N/A, 12/6/2018); hernia repair (Right, 2009); Cardiac catheterization (11/29/2018); colectomy; Total knee arthroplasty (Right, 1/8/2019); and Upper gastrointestinal endoscopy (N/A, 6/18/2019). Home Medications:    Prior to Admission medications    Medication Sig Start Date End Date Taking?  Authorizing Provider   tamsulosin (FLOMAX) 0.4 MG capsule Take 1 capsule by mouth daily 10/15/20  Yes Kaylyn Fothergill, MD   atorvastatin (LIPITOR) 40 MG tablet take 1 tablet by mouth once daily 10/9/20  Yes Kaylyn Fothergill, MD   sildenafil (VIAGRA) 100 MG tablet TAKE ONE TABLET BY MOUTH AS NEEDED FOR FOR ERECTILE DYSFUNCTION 8/17/20  Yes Chidi Tsai MD   omeprazole (PRILOSEC) 40 MG delayed release capsule take 1 capsule by mouth every morning before breakfast 7/21/20  Yes Cassandra Ladd MD   ibuprofen (ADVIL;MOTRIN) 800 MG tablet take 1 tablet by mouth every 6 hours if needed for pain 5/26/20  Yes Cassandra Ladd MD   metoprolol tartrate (LOPRESSOR) 25 MG tablet take 1 tablet by mouth twice a day 8/6/19  Yes Jarod Lamb MD   lisinopril (PRINIVIL;ZESTRIL) 5 MG tablet take 1 tablet by mouth once daily 7/29/19  Yes MIRI Shah - CNP   rivaroxaban (XARELTO) 20 MG TABS tablet Take 20 mg by mouth daily (with breakfast)    Yes Historical Provider, MD   Elastic Bandages & Supports (ABDOMINAL BINDER/ELASTIC XL) MISC 1 Device by Does not apply route as needed (ventral hernia, while ambulating/active) 9/28/16   Andra Baltazar,       Current Facility-Administered Medications: furosemide (LASIX) injection 40 mg, 40 mg, Intravenous, Once  [COMPLETED] amiodarone (CORDARONE) 150 mg in dextrose 5 % 100 mL bolus, 150 mg, Intravenous, Once **FOLLOWED BY** amiodarone (CORDARONE) 450 mg in dextrose 5 % 250 mL infusion, 1 mg/min, Intravenous, Continuous **FOLLOWED BY** amiodarone (CORDARONE) 450 mg in dextrose 5 % 250 mL infusion, 0.5 mg/min, Intravenous, Continuous  atorvastatin (LIPITOR) tablet 40 mg, 40 mg, Oral, Daily  lisinopril (PRINIVIL;ZESTRIL) tablet 5 mg, 5 mg, Oral, Daily  tamsulosin (FLOMAX) capsule 0.4 mg, 0.4 mg, Oral, Daily  sodium chloride flush 0.9 % injection 10 mL, 10 mL, Intravenous, 2 times per day  sodium chloride flush 0.9 % injection 10 mL, 10 mL, Intravenous, PRN  acetaminophen (TYLENOL) tablet 650 mg, 650 mg, Oral, Q6H PRN **OR** acetaminophen (TYLENOL) suppository 650 mg, 650 mg, Rectal, Q6H PRN  polyethylene glycol (GLYCOLAX) packet 17 g, 17 g, Oral, Daily PRN  promethazine (PHENERGAN) tablet 12.5 mg, 12.5 mg, Oral, Q6H PRN **OR** ondansetron (ZOFRAN) injection 4 mg, 4 mg, Intravenous, Q6H PRN  potassium chloride (KLOR-CON M) extended release tablet 40 mEq, 40 mEq, Oral, PRN **OR** potassium bicarb-citric acid (EFFER-K) effervescent tablet 40 mEq, 40 mEq, Oral, PRN **OR** potassium chloride 10 mEq/100 mL IVPB (Peripheral Line), 10 mEq, Intravenous, PRN  magnesium sulfate 1 g in dextrose 5% 100 mL IVPB, 1 g, Intravenous, PRN  metoprolol (LOPRESSOR) injection 5 mg, 5 mg, Intravenous, Q6H PRN  rivaroxaban (XARELTO) tablet 20 mg, 20 mg, Oral, Daily with breakfast  metoprolol tartrate (LOPRESSOR) tablet 25 mg, 25 mg, Oral, 3 times per day    Allergies:  Adhesive tape; Codeine; and Penicillins    Social History:   reports that he quit smoking about 49 years ago. He has a 0.50 pack-year smoking history. He has never used smokeless tobacco. He reports current alcohol use of about 8.3 standard drinks of alcohol per week. He reports that he does not use drugs. Family History: family history includes Diabetes in his mother; Heart Attack in his father; Heart Disease in his brother and father. No h/o sudden cardiac death. No for premature CAD    REVIEW OF SYSTEMS:    · Constitutional: there has been no unanticipated weight loss. There's been No change in energy level, No change in activity level. · Eyes: No visual changes or diplopia. No scleral icterus.   · ENT: No Headaches  · Cardiovascular: Shortness of breath   · Respiratory: No previous pulmonary problems, No cough  · Gastrointestinal: No abdominal pain. No change in bowel or bladder habits. · Genitourinary: No dysuria, trouble voiding, or hematuria. · Musculoskeletal:  No gait disturbance, No weakness or joint complaints. · Integumentary: No rash or pruritis. · Neurological: No headache, diplopia, change in muscle strength, numbness or tingling. No change in gait, balance, coordination, mood, affect, memory, mentation, behavior. · Psychiatric: No anxiety, or depression. · Endocrine: No temperature intolerance. No excessive thirst, fluid intake, or urination. No tremor. · Hematologic/Lymphatic: No abnormal bruising or bleeding, blood clots or swollen lymph nodes. · Allergic/Immunologic: No nasal congestion or hives. PHYSICAL EXAM:      /78   Pulse 134   Temp 97.5 °F (36.4 °C) (Oral)   Resp 23   SpO2 91%    Constitutional and General Appearance: alert, cooperative, no distress and appears stated age  HEENT: PERRL, no cervical lymphadenopathy. No masses palpable. Normal oral mucosa  Respiratory:  · B/L basilar crackles, decreased breath sounds at bases  Cardiovascular:  · The apical impulse is not displaced  · Heart tones are crisp and normal. regular S1 and S2.  · Jugular venous pulsation Normal  · The carotid upstroke is normal in amplitude and contour without delay or bruit  · Peripheral pulses are symmetrical and full   Abdomen:   · No masses or tenderness  · Bowel sounds present  Extremities:  ·  No Cyanosis or Clubbing  ·  Lower extremity edema: No  ·  Skin: Warm and dry  Neurological:  · Alert and oriented. · Moves all extremities well  · No abnormalities of mood, affect, memory, mentation, or behavior are noted    DATA:    Diagnostics:      EKG:   Aflutter with RVR    ECHO:   06/2018  eviewed. 06/12/2018  Mild left ventricular enlargement with mildly reduced systolic function;  Estimated left ventricular ejection fraction 45%. (patient converted from  AFib to NSR during the study)  Left atrium is moderately dilated. Moderate severity mitral regurgitation. Mildly thickened and calcified aortic valve leaflets with evidence of mild  stenosis. Peak instantaneous gradient 28 mmHg and mean gradient 14 mmHg. Cardiac Angiography:   11/2018     Left main: Normal     LAD: Luminal irregularities 30-40%     LCX: 40% ostial stenosis     RCA: Normal. Non-dominant, small     The LV gram was performed in the LORD 30 position. LVEF: 55%. LV Wall Motion: Normal        Conclusions:  1. Non-obstructive CAD  2. Overall preserved LV function    Labs:     CBC:   Recent Labs     10/17/20  0727   WBC 8.6   HGB 11.8*   HCT 39.0*        BMP:   Recent Labs     10/17/20  0727      K 4.0   CO2 21   BUN 17   CREATININE 0.63*   LABGLOM >60   GLUCOSE 120*     FASTING LIPID PANEL:  Lab Results   Component Value Date    HDL 36 03/10/2020    TRIG 165 01/03/2017     LIVER PROFILE:  Recent Labs     10/17/20  0727   AST 32   ALT 17   LABALBU 3.4*     Patient Active Problem List   Diagnosis    Dyslipidemia    ED (erectile dysfunction)    Incomplete bladder emptying    Benign prostatic hyperplasia with urinary obstruction    History of colon cancer    Atrial fibrillation with normal ventricular rate (HCC)    Anemia    Pallor of optic disc    Presbyopia    Incisional hernia, without obstruction or gangrene    Hypertrophic nonobstructive cardiomyopathy (HCC)    Iron (Fe) deficiency anemia    Primary osteoarthritis of right knee    Benign essential HTN    History of malignant neoplasm of rectum    Hill's esophagus    CHF (congestive heart failure), NYHA class II, acute on chronic, combined (HCC)    Hypoxia    Dyspnea and respiratory abnormalities       IMPRESSION:      1. Aflutter with RVR  2. Hypoxic resp failure secondary to Acute on chronic systolic CHF in the setting of Aflutter with RVR   3. H/O Paroxysmal Afib - on xarelto.  Complaint with no interruption in last 6 weeks per patient    4. Non obstructive CAD  5. Reduced systolic function ([place]:03%) on echo, normal EF on cardiac cath. 6. Moderate MR  7. H/O Colon cancer  8. Recent admission at [address] 9/2020 for pneumonia - treated as COVID with negative test  9. Recent ER visit 10/15/2020 to [facility] for shortness of breath, admission advised but left  10. H/o rectal cancer - in remission    RECOMMENDATIONS:  1. Rate not well controlled with Cardizem infusion, metoprolol and received one dose of IV digoxin  2. Recommend amiodarone due to borderline low blood pressure - bolus followed by infusion   3. Continue anticoagulation with Xarelto  4. If remain in [address] tomorrow will proceed with cardioversion, Keep NPO after midnight  5. Continue IV diuresis as blood pressure improves   6. Will obtain EP consult for ablation as OP. Will discuss with rounding attending Dr. Pete Rodriguez for final recommendations. Marcelle Saeed MD  Cardiology Fellow    Attending Physician Statement  I have discussed the case of Kait Simons including pertinent history and exam findings with the resident. I have seen and examined the patient and the key elements of the encounter have been performed by me. I agree with the assessment, plan and orders as documented by the resident With changes made to the note.      Electronically signed by Paz Doss MD on 10/18/2020 at 3:15 PM.    [facility]      [phone]

## 2020-10-18 NOTE — PROGRESS NOTES
At 1300 order per cardiology Dr Rosemarie Mina to stop diltiazem and start amiodarone drip. 1330 amiodarone 150mg bolus given. 1342 notified Dr. Rosemarie Mina that pt hrt rate no 78 and BP 80/47 order to wait and start amiodarone 1mg/hr once pt SBP is greater than 100.

## 2020-10-18 NOTE — PROGRESS NOTES
Ordered 12 lead complete and sent to cardiology. .   930pm Pt HR  80's a-flutter. Pt BP low 100's sys. Pt Cardizem running at 12.5 mg/hr Per cardiology fellow okay to continue gtt monitor BP, give ordered pO lopressor and wean down if possible. 6am Pt afib RVR /Aflutter cardiology contacted and ordered to give morning PO lopressor, increase gtt to 7.5mg/hr and titrate up if BP tolerates increases. See. Mar.    Day nurse and pt updated.

## 2020-10-19 ENCOUNTER — APPOINTMENT (OUTPATIENT)
Dept: GENERAL RADIOLOGY | Age: 65
DRG: 291 | End: 2020-10-19
Payer: MEDICARE

## 2020-10-19 ENCOUNTER — APPOINTMENT (OUTPATIENT)
Dept: CARDIAC CATH/INVASIVE PROCEDURES | Age: 65
DRG: 291 | End: 2020-10-19
Payer: MEDICARE

## 2020-10-19 LAB
ABSOLUTE EOS #: 0.09 K/UL (ref 0–0.44)
ABSOLUTE IMMATURE GRANULOCYTE: 0.08 K/UL (ref 0–0.3)
ABSOLUTE LYMPH #: 1.27 K/UL (ref 1.1–3.7)
ABSOLUTE MONO #: 1.24 K/UL (ref 0.1–1.2)
ANION GAP SERPL CALCULATED.3IONS-SCNC: 10 MMOL/L (ref 9–17)
ANTI-NUCLEAR ANTIBODY (ANA): NEGATIVE
BASOPHILS # BLD: 0 % (ref 0–2)
BASOPHILS ABSOLUTE: <0.03 K/UL (ref 0–0.2)
BUN BLDV-MCNC: 35 MG/DL (ref 8–23)
BUN/CREAT BLD: ABNORMAL (ref 9–20)
CALCIUM SERPL-MCNC: 8.6 MG/DL (ref 8.6–10.4)
CHLORIDE BLD-SCNC: 103 MMOL/L (ref 98–107)
CO2: 25 MMOL/L (ref 20–31)
CREAT SERPL-MCNC: 0.87 MG/DL (ref 0.7–1.2)
DIFFERENTIAL TYPE: ABNORMAL
EKG ATRIAL RATE: 139 BPM
EKG ATRIAL RATE: 277 BPM
EKG P AXIS: 153 DEGREES
EKG P-R INTERVAL: 146 MS
EKG Q-T INTERVAL: 320 MS
EKG Q-T INTERVAL: 418 MS
EKG QRS DURATION: 108 MS
EKG QRS DURATION: 86 MS
EKG QTC CALCULATION (BAZETT): 486 MS
EKG QTC CALCULATION (BAZETT): 502 MS
EKG R AXIS: -16 DEGREES
EKG R AXIS: -41 DEGREES
EKG T AXIS: -36 DEGREES
EKG T AXIS: 43 DEGREES
EKG VENTRICULAR RATE: 139 BPM
EKG VENTRICULAR RATE: 87 BPM
EOSINOPHILS RELATIVE PERCENT: 1 % (ref 1–4)
GFR AFRICAN AMERICAN: >60 ML/MIN
GFR NON-AFRICAN AMERICAN: >60 ML/MIN
GFR SERPL CREATININE-BSD FRML MDRD: ABNORMAL ML/MIN/{1.73_M2}
GFR SERPL CREATININE-BSD FRML MDRD: ABNORMAL ML/MIN/{1.73_M2}
GLUCOSE BLD-MCNC: 108 MG/DL (ref 70–99)
HCT VFR BLD CALC: 39 % (ref 40.7–50.3)
HEMOGLOBIN: 11.9 G/DL (ref 13–17)
IMMATURE GRANULOCYTES: 1 %
LYMPHOCYTES # BLD: 12 % (ref 24–43)
MCH RBC QN AUTO: 25.3 PG (ref 25.2–33.5)
MCHC RBC AUTO-ENTMCNC: 30.5 G/DL (ref 28.4–34.8)
MCV RBC AUTO: 83 FL (ref 82.6–102.9)
MONOCYTES # BLD: 11 % (ref 3–12)
MYCOPLASMA PNEUMONIAE IGG: 2.39
MYCOPLASMA PNEUMONIAE IGM: 0.54
NRBC AUTOMATED: 0 PER 100 WBC
PDW BLD-RTO: 17.6 % (ref 11.8–14.4)
PLATELET # BLD: 396 K/UL (ref 138–453)
PLATELET ESTIMATE: ABNORMAL
PMV BLD AUTO: 9.3 FL (ref 8.1–13.5)
POTASSIUM SERPL-SCNC: 4 MMOL/L (ref 3.7–5.3)
RBC # BLD: 4.7 M/UL (ref 4.21–5.77)
RBC # BLD: ABNORMAL 10*6/UL
SEG NEUTROPHILS: 75 % (ref 36–65)
SEGMENTED NEUTROPHILS ABSOLUTE COUNT: 8.29 K/UL (ref 1.5–8.1)
SODIUM BLD-SCNC: 138 MMOL/L (ref 135–144)
WBC # BLD: 11 K/UL (ref 3.5–11.3)
WBC # BLD: ABNORMAL 10*3/UL

## 2020-10-19 PROCEDURE — 2060000000 HC ICU INTERMEDIATE R&B

## 2020-10-19 PROCEDURE — 6360000002 HC RX W HCPCS

## 2020-10-19 PROCEDURE — 93010 ELECTROCARDIOGRAM REPORT: CPT | Performed by: INTERNAL MEDICINE

## 2020-10-19 PROCEDURE — 99232 SBSQ HOSP IP/OBS MODERATE 35: CPT | Performed by: INTERNAL MEDICINE

## 2020-10-19 PROCEDURE — 80048 BASIC METABOLIC PNL TOTAL CA: CPT

## 2020-10-19 PROCEDURE — 6370000000 HC RX 637 (ALT 250 FOR IP): Performed by: NURSE PRACTITIONER

## 2020-10-19 PROCEDURE — 36415 COLL VENOUS BLD VENIPUNCTURE: CPT

## 2020-10-19 PROCEDURE — 92960 CARDIOVERSION ELECTRIC EXT: CPT | Performed by: INTERNAL MEDICINE

## 2020-10-19 PROCEDURE — 2580000003 HC RX 258: Performed by: STUDENT IN AN ORGANIZED HEALTH CARE EDUCATION/TRAINING PROGRAM

## 2020-10-19 PROCEDURE — 6370000000 HC RX 637 (ALT 250 FOR IP): Performed by: STUDENT IN AN ORGANIZED HEALTH CARE EDUCATION/TRAINING PROGRAM

## 2020-10-19 PROCEDURE — 85025 COMPLETE CBC W/AUTO DIFF WBC: CPT

## 2020-10-19 PROCEDURE — 2709999900 HC NON-CHARGEABLE SUPPLY

## 2020-10-19 PROCEDURE — 5A2204Z RESTORATION OF CARDIAC RHYTHM, SINGLE: ICD-10-PCS | Performed by: INTERNAL MEDICINE

## 2020-10-19 PROCEDURE — 71045 X-RAY EXAM CHEST 1 VIEW: CPT

## 2020-10-19 PROCEDURE — 6360000002 HC RX W HCPCS: Performed by: STUDENT IN AN ORGANIZED HEALTH CARE EDUCATION/TRAINING PROGRAM

## 2020-10-19 RX ORDER — AMIODARONE HYDROCHLORIDE 200 MG/1
200 TABLET ORAL 2 TIMES DAILY
Status: DISCONTINUED | OUTPATIENT
Start: 2020-10-19 | End: 2020-10-21

## 2020-10-19 RX ORDER — FUROSEMIDE 10 MG/ML
40 INJECTION INTRAMUSCULAR; INTRAVENOUS ONCE
Status: COMPLETED | OUTPATIENT
Start: 2020-10-19 | End: 2020-10-19

## 2020-10-19 RX ADMIN — AMIODARONE HYDROCHLORIDE 200 MG: 200 TABLET ORAL at 23:36

## 2020-10-19 RX ADMIN — METOPROLOL TARTRATE 25 MG: 50 TABLET, FILM COATED ORAL at 18:18

## 2020-10-19 RX ADMIN — TAMSULOSIN HYDROCHLORIDE 0.4 MG: 0.4 CAPSULE ORAL at 09:14

## 2020-10-19 RX ADMIN — METOPROLOL TARTRATE 25 MG: 50 TABLET, FILM COATED ORAL at 09:13

## 2020-10-19 RX ADMIN — METOPROLOL TARTRATE 25 MG: 50 TABLET, FILM COATED ORAL at 21:57

## 2020-10-19 RX ADMIN — SODIUM CHLORIDE, PRESERVATIVE FREE 10 ML: 5 INJECTION INTRAVENOUS at 21:58

## 2020-10-19 RX ADMIN — ACETAMINOPHEN 650 MG: 325 TABLET ORAL at 09:15

## 2020-10-19 RX ADMIN — FUROSEMIDE 40 MG: 10 INJECTION, SOLUTION INTRAMUSCULAR; INTRAVENOUS at 09:15

## 2020-10-19 RX ADMIN — RIVAROXABAN 20 MG: 20 TABLET, FILM COATED ORAL at 18:17

## 2020-10-19 RX ADMIN — SODIUM CHLORIDE, PRESERVATIVE FREE 10 ML: 5 INJECTION INTRAVENOUS at 09:14

## 2020-10-19 RX ADMIN — ATORVASTATIN CALCIUM 40 MG: 80 TABLET, FILM COATED ORAL at 09:14

## 2020-10-19 ASSESSMENT — PAIN SCALES - GENERAL
PAINLEVEL_OUTOF10: 0
PAINLEVEL_OUTOF10: 8
PAINLEVEL_OUTOF10: 0

## 2020-10-19 NOTE — PROGRESS NOTES
[facility]  Internal Medicine Teaching Residency Program  Inpatient Daily Progress Note  ______________________________________________________________________________    Patient: James Christy  YOB: 1955   NTB:[id number]    Acct: [de-identified]     Room: 2019/2019-01  Admit date: 10/17/2020  Today's date: 10/19/20  Number of days in the hospital: 2    SUBJECTIVE   Admitting Diagnosis: CHF (congestive heart failure), NYHA class II, acute on chronic, combined ([facility])  CC:   Pt examined at bedside. Chart & results reviewed. No acute events overnight, patient still in RVR  No new complaints today. On metoprolol 25 mg, Xarelto for atrial fibrillation  Patient n.p.o. for cardioversion which is done and pt back in sinus. Plans to discharge the patient today with follow-up for CHF clinic and cardiology outpatient    ROS:  Constitutional:  negative for chills, fevers, sweats  Respiratory:  negative for cough, dyspnea on exertion, hemoptysis, shortness of breath, wheezing  Cardiovascular:  negative for chest pain, chest pressure/discomfort, lower extremity edema, palpitations  Gastrointestinal:  negative for abdominal pain, constipation, diarrhea, nausea, vomiting  Neurological:  negative for dizziness, headache  BRIEF HISTORY     The patient is a pleasant 77 y.o. male with past medical history of diastolic CHF, essential hypertension, dural fibrillation dyslipidemia, colon cancer status post resection, Hill's esophagus presents  to the ED with a chief complaint of acute onset of shortness of breath with productive cough since last 3 days, visited with some dizziness and chest pain.      Patient was recently treated for  COVID pneumonia with imaging concerning for COVID even though rapid test came back negative on 2 occasions . he was discharged on oxygen which he uses since then.   Today he complains of increased shortness of breath this morning which made him come to the ER. Initially in the ER he was found to be having saturations of 65%. Patient also have the chest pain which is sharp,localised, non radiating and non reproducible on deep breathing.      Pt denies headache, lightheadedness, focal neurological deficits, abdominal pain, nausea, fever, chills,. Initial Vitals on presentation : Blood pressure of 158/118, pulse rate of 140, respiratory 46 saturations at 65 on room air     Significant Labs :   Troponin high-sensitivity 29>25>Hemoglobin of 11.8, hematocrit of 39 glucose 120, creatinine of 0.63 proBNP 3525 procalcitonin 0.29 .7, rapid COVID-19 negative, lactic acid 1.6, d-dimer 1.24. Chest x-ray : Bilateral scattered pulmonary opacities     Treatment in ED : Initially treated with metoprolol for atrial fibrillation with RVR, furosemide 40 mg IV for congestive heart failure, levofloxacin 750 mg every 24,    OBJECTIVE     Vital Signs:  /79   Pulse 105   Temp 97.2 °F (36.2 °C) (Oral)   Resp (!) 32   SpO2 94%     Temp (24hrs), Av.6 °F (36.4 °C), Min:97.2 °F (36.2 °C), Max:97.9 °F (36.6 °C)    In: -   Out: 775 [Urine:775]    Physical Exam:  Constitutional: This is a well developed, well nourished, 25-29.9 - Overweight 77y.o. year old male who is alert, oriented, cooperative and in no apparent distress. Head:normocephalic and atraumatic. EENT:  PERRLA. No conjunctival injections. Septum was midline, mucosa was without erythema, exudates or cobblestoning. No thrush was noted. Neck: Supple without thyromegaly. No elevated JVP. Trachea was midline. Respiratory: Chest was symmetrical without dullness to percussion. Breath sounds bilaterally were clear to auscultation. There were no wheezes, rhonchi or rales. There is no intercostal retraction or use of accessory muscles. No egophony noted. Cardiovascular: Regular without murmur, clicks, gallops or rubs. Abdomen: Slightly rounded and soft without organomegaly.  No rebound, rigidity or guarding was appreciated. Lymphatic: No lymphadenopathy. Musculoskeletal: Normal curvature of the spine. No gross muscle weakness. Extremities:  No lower extremity edema, ulcerations, tenderness, varicosities or erythema. Muscle size, tone and strength are normal.  No involuntary movements are noted. Skin:  Warm and dry. Good color, turgor and pigmentation. No lesions or scars. No cyanosis or clubbing  Neurological/Psychiatric: The patient's general behavior, level of consciousness, thought content and emotional status is normal.        Medications:  Scheduled Medications:    furosemide  40 mg Intravenous Once    atorvastatin  40 mg Oral Daily    [Held by provider] lisinopril  5 mg Oral Daily    tamsulosin  0.4 mg Oral Daily    sodium chloride flush  10 mL Intravenous 2 times per day    rivaroxaban  20 mg Oral Daily with breakfast    metoprolol tartrate  25 mg Oral 3 times per day     Continuous Infusions:    amiodarone 0.5 mg/min (10/18/20 2304)     PRN Medicationssodium chloride flush, 10 mL, PRN  acetaminophen, 650 mg, Q6H PRN    Or  acetaminophen, 650 mg, Q6H PRN  polyethylene glycol, 17 g, Daily PRN  promethazine, 12.5 mg, Q6H PRN    Or  ondansetron, 4 mg, Q6H PRN  potassium chloride, 40 mEq, PRN    Or  potassium alternative oral replacement, 40 mEq, PRN    Or  potassium chloride, 10 mEq, PRN  magnesium sulfate, 1 g, PRN  metoprolol, 5 mg, Q6H PRN        Diagnostic Labs:  CBC:   Recent Labs     10/17/20  0727 10/18/20  0804 10/19/20  0649   WBC 8.6 14.2* 11.0   RBC 4.56 4.57 4.70   HGB 11.8* 11.7* 11.9*   HCT 39.0* 38.3* 39.0*   MCV 85.5 83.8 83.0   RDW 17.6* 17.4* 17.6*    356 396     BMP:   Recent Labs     10/17/20  0727 10/18/20  0804 10/19/20  0649    136 138   K 4.0 3.5* 4.0    99 103   CO2 21 26 25   BUN 17 25* 35*   CREATININE 0.63* 0.71 0.87     BNP: No results for input(s): BNP in the last 72 hours.   PT/INR: No results for input(s): PROTIME, INR in the last 72 hours. APTT: No results for input(s): APTT in the last 72 hours. CARDIAC ENZYMES: No results for input(s): CKMB, CKMBINDEX, TROPONINI in the last 72 hours. Invalid input(s): CKTOTAL;3  FASTING LIPID PANEL:  Lab Results   Component Value Date    CHOL 200 (H) 01/03/2017    HDL 36 (L) 03/10/2020    TRIG 165 (H) 01/03/2017     LIVER PROFILE:   Recent Labs     10/17/20  0727   AST 32   ALT 17   BILITOT 1.18   ALKPHOS 124      MICROBIOLOGY: No results found for: CULTURE    Imaging:    Xr Chest Portable    Result Date: 10/18/2020  No significant change in chest findings compared to the October 17th study. Xr Chest Portable    Result Date: 10/17/2020  Bilateral scattered pulmonary opacities right greater than left favoring multifocal airspace disease with small effusions not excluded. No extrapleural air is seen. ASSESSMENT & PLAN     ASSESSMENT / PLAN:     Principal Problem:    CHF (congestive heart failure), NYHA class II, acute on chronic, combined ([facility])  Active Problems:    Dyslipidemia    Benign prostatic hyperplasia with urinary obstruction    Atrial fibrillation with normal ventricular rate ([facility])    Hypertrophic nonobstructive cardiomyopathy ([facility])    Benign essential HTN    Hypoxia    Dyspnea and respiratory abnormalities  Resolved Problems:    * No resolved hospital problems. *    Acute on chronic congestive heart failure.   Ejection fraction 45 to 50%  -Pending echo,   -Continue on Lasix 40 mg, fluid restriction, input output monitoring.     Chest pain NSTEMI type II  -Follow-up on EKG, trend troponins, will monitor the patient  -Consulted with plans to do cath patient is n.p.o. after midnight     Atrial fibrillation with RVR s/p cardioversion  -Cardioversion done today   -Cardiology  on board plans to cath the patient.  -Continue on metoprolol tartrate 25 mg every 8 oral  -Continue on xarelto 20mg, continue telemetry monitoring      Suspected Community acquired  -Chest x-ray shows infiltrates concerning for infection vs fluid. -ID Following the patient and would follow the recommendations    Essential hypertension  -Metoprolol 25 mg..    Benign prostatic hyperplasia  -Continue on tamsulosin 0.4 mg     DVT ppx: Patient on Xarelto  GI ppx: Not indicated     PT/OT/SW: Ongoing  Discharge Planning: Ongoing possible discharge today    Eleanor Eubanks MD  Internal Medicine Resident, PGY- [facility];  [place], [place]  10/19/2020, 8:36 AM

## 2020-10-19 NOTE — PROCEDURES
[facility]   Cardioversion Procedure Note         Today's Date:  10/19/2020  Patient name:  Monique Hernandez  MRN:   [id number]  YOB: 1955  PCP:    Prashanth Hernandez MD    Indication:  Atrial Flutter    Operators:    Sebastian Lindsey MD  Assistant: Eb Bennett MD (CV Fellow)    Patient seen and examined. History and Physical reviewed. Labs reviewed. After informed consent was obtained with explanation of the risks and benefits, the patient was brought to Cath lab. All sedation was administered by the cardiologist.     Raji Trejo:  After an adequate level of sedation was achieved, 100J in biphasic synchronized delivery was administered. no change in rhythm. The patient awoke without complications. Another shock at 200 J was given with conversion to normal sinus rhythm. A post procedure 12 L ECG was ordered and reviewed. Eb Bennett MD  Fellow, Cardiovascular Diseases    [facility]        I have reviewed the case / procedure with resident / fellow  I have examined the patient personally  Patient agree with treatment plan, correction innotes was made as appropriate, and discussed final arrangement based on  my evaluation and exam.    Risk and benefit of procedure if planned were explained in details. Procedure was performed by me, with all aspect of the procedure being done using standard protocol. Note was modified based on my own assessment and treatment.     Lashaun Mcguire MD  [facility]

## 2020-10-19 NOTE — PLAN OF CARE
Problem: OXYGENATION/RESPIRATORY FUNCTION  Goal: Patient will maintain patent airway  10/19/2020 1840 by Patsy Calloway RN  Outcome: Ongoing  10/19/2020 0544 by Yue Greenwood RN  Outcome: Ongoing  Goal: Patient will achieve/maintain normal respiratory rate/effort  Description: Respiratory rate and effort will be within normal limits for the patient  10/19/2020 1840 by Patsy Calloway RN  Outcome: Ongoing  10/19/2020 0544 by Yue Greenwood RN  Outcome: Ongoing     Problem: HEMODYNAMIC STATUS  Goal: Patient has stable vital signs and fluid balance  10/19/2020 1840 by Patsy Calloway RN  Outcome: Ongoing  10/19/2020 0544 by Yue Greenwood RN  Outcome: Ongoing     Problem: FLUID AND ELECTROLYTE IMBALANCE  Goal: Fluid and electrolyte balance are achieved/maintained  10/19/2020 1840 by Patsy Calloway RN  Outcome: Ongoing  10/19/2020 0544 by Yue Greenwood RN  Outcome: Ongoing     Problem: ACTIVITY INTOLERANCE/IMPAIRED MOBILITY  Goal: Mobility/activity is maintained at optimum level for patient  10/19/2020 1840 by Patsy Calloway RN  Outcome: Ongoing  10/19/2020 0544 by Yue Greenwood RN  Outcome: Ongoing     Problem: Falls - Risk of:  Goal: Will remain free from falls  Description: Will remain free from falls  10/19/2020 1840 by Ptasy Calloway RN  Outcome: Ongoing  10/19/2020 0544 by Yue Greenwood RN  Outcome: Ongoing  Goal: Absence of physical injury  Description: Absence of physical injury  10/19/2020 1840 by Patsy Calloway RN  Outcome: Ongoing  10/19/2020 0544 by Yue Greenwood RN  Outcome: Ongoing   Electronically signed by Patsy Calloway RN on 10/19/2020 at 6:40 PM

## 2020-10-19 NOTE — FLOWSHEET NOTE
Assessment: The patient was approachable and coping will. The patient hopes after one more test he will know more. The patient's daughter is at beside. Intervention:  engaged in active listening.  explored the patient's thoughts and feelings.  inquired about the patients support system; family, friends, and community groups.  asked if the patient would like prayer and informed the patient that chaplains are available 24/7. Outcome: The patient engaged in the conversation.         10/19/20 1333   Encounter Summary   Services provided to: Patient and family together   Referral/Consult From: Kanwal Coronado  of [facility] None   Continue Visiting   (10/19/20)   Complexity of Encounter Moderate   Length of Encounter 15 minutes   Spiritual Assessment Completed Yes   Routine   Type Initial   Assessment Approachable;Coping   Intervention Active listening;Explored feelings, thoughts, concerns;Explored coping resources;Nurtured hope   Outcome Expressed gratitude;Engaged in conversation

## 2020-10-19 NOTE — PROGRESS NOTES
Patient admitted, consent signed and questions answered. Patient ready for procedure. Call light to reach with side rails up 2 of 2.    History and physical completed,

## 2020-10-19 NOTE — PLAN OF CARE
Problem: OXYGENATION/RESPIRATORY FUNCTION  Goal: Patient will maintain patent airway  Outcome: Ongoing  Goal: Patient will achieve/maintain normal respiratory rate/effort  Description: Respiratory rate and effort will be within normal limits for the patient  Outcome: Ongoing     Problem: HEMODYNAMIC STATUS  Goal: Patient has stable vital signs and fluid balance  Outcome: Ongoing     Problem: FLUID AND ELECTROLYTE IMBALANCE  Goal: Fluid and electrolyte balance are achieved/maintained  Outcome: Ongoing     Problem: ACTIVITY INTOLERANCE/IMPAIRED MOBILITY  Goal: Mobility/activity is maintained at optimum level for patient  Outcome: Ongoing     Problem: Falls - Risk of:  Goal: Will remain free from falls  Description: Will remain free from falls  Outcome: Ongoing  Goal: Absence of physical injury  Description: Absence of physical injury  Outcome: Ongoing

## 2020-10-19 NOTE — PROGRESS NOTES
[facility] [address] [place] [address] 19 Patient  Today's Date and Time: 10/19/2020, 7:46 AM    Impression :   · COVID 19 Suspect  · COVID 19 Infection Not Confirmed by lab Testing  · COVID tests:  · 10-17-20: Negative  · 9-19-20: Negative   · 9-17-20: Negative  · Congestive heart failure with fluid retention in the lungs  · Paroxysmal atrial fibrillation  · Prior episodes of non sustained V tach  · Prior hospitalization on 9-18-20 at [facility]. Evaluated and treated for presumptive COVID 19 pneumonia by [facility] service despite two negative COVID tests. Current data do not support COVID  ·   Recommendations:   · Monitor off antibiotics  · Cardiac ECHO   · Diuresis    Medical Decision Making/Summary/Discussion:10/19/2020     · Patient admitted with suspected COVID 19 infection  · Covid test negative   · He has CHF with fluid retention giving a CT scan picture suggestive of COVID but really from CHF  Infection Control Recommendations   · [place] Precautions    Antimicrobial Stewardship Recommendations     · Discontinuation of therapy  Coordination of Outpatient Care:   · Estimated Length of IV antimicrobials:TBD  · Patient will need Midline Catheter Insertion: TBD  · Patient will need PICC line Insertion: No  · Patient will need: Home IV , [place],  SNF,  LTAC:TBD  · Patient will need outpatient wound care:No    Chief complaint/reason for consultation:   · Concern for COVID infection  · Review of data does not substantiate a diagnosis of COVID 19. History of Present Illness:   Kait Simons is a 77y.o.-year-old  male who was initially admitted on 10/17/2020. Patient seen at the request of Akbar Trujillo. INITIAL HISTORY:    Patient presented through ER with complaints of SOB, cough and concern for COVID 19. Patient was recently hospitalized at [facility] on 9-18-20 because of similar symptoms.  He was evaluated by the ID service of [facility] and felt to have COVID on the basis of CT scan. He received Remdesivir for 5 days and Decadron for 10 days for presumptive COVID. His COVID 19 tests on 9-17, 9-19-20 were negative. He was also seen by the Cardiology Nurse practitioner because of paroxysmal A fib, non sustained V tach, essential HTN, CHF. Treated for above. An ECHO was not done because of concern with COVID. Patient was referred back to his cardiologist Dr Afshin Church upon discharge. Since then the patient had a second CT scan on 10-15-20 with similar findings to the one on 9-17-20. The second CT was interpreted as CHF with bilateral pleural effusions and pulmonary edema. He has also presented to [facility] ER with similar complaints. His COVID test on 10-17-20 is Negative. The above data suggest that he does not have COVID but rather chronic CHF with pulmonary edema and pleural effusions. CURRENT EVALUATION : 10/19/2020    Afebrile  VS stable    RR:32  HR:105  BP:139/79    On Lasix. Monitor off antibiotics. Cardiac Echo not done yet. Strep Pneumoniae Ag, Legionella Ag, Mycoplasma Pneumonia by PCR  are negative. He is on 5L nasal Oxygen. Denies fevers, chills, or a cough    Patient exhibiting respiratory distress. Yes  Respiratory secretions: No    Patient receiving supplemental oxygen. 4 L NC. BIPAP as needed. 02 sat 92>94>90  RR 22>28>28  He is tachycardic with the HR in 120s    Overall Daily Picture:    Improving    Presence of secondary bacterial Infection:    No   Additional antibiotics: No    Labs, X rays reviewed: 10/19/2020    BUN: 35  Cr: 0.87    WBC: 11  Hb:11.9  Plat: 396    Absolute Neutrophils: 8.29  Absolute Lymphocytes:1.27  Neutrophil/Lymphocyte Ratio: 6.5    CRP: 182.7  Ferritin:238  LDH:     Pro Calcitonin:  Pro BNP 3525  Troponin 29-->25    Cultures:  Urine:  ·   Blood:  ·   Sputum :  ·   Wound:       CXR:   · 10-17-20 Scattered pulmonary infiltrates. Pleural effusion  CAT:      Discussed with patient, RN, CC, IM.       I have personally reviewed the past medical history, past surgical history, medications, social history, and family history, and I have updated the database accordingly.   Past Medical History:     Past Medical History:   Diagnosis Date    Atrial fibrillation ([address]     Back pain, chronic     Mcguire's esophagus 06/18/2019    BPH (benign prostatic hyperplasia)     Cancer (HCC)     colon-rectal    Cocaine abuse in remission [facility])     1970's    ED (erectile dysfunction) 4/2/2015    GERD (gastroesophageal reflux disease)     GI bleed 12/5/2018    Hernia     History of colon cancer     Melena     Migraines     Murmur, cardiac        Past Surgical  History:     Past Surgical History:   Procedure Laterality Date    CARDIAC CATHETERIZATION  11/29/2018    Non-obstructive CAD    COLECTOMY      2nd colectomy, Colostomy and reversed [facility] COLECTOMY      1st time [place]    COLONOSCOPY      COLONOSCOPY  07/18/2016    COLONOSCOPY N/A 12/6/2018    COLONOSCOPY DIAGNOSTIC performed by Josie Amezquita MD at [address] Right 2009    inguinal    KNEE SURGERY Right 1970's    arthrotomy    TONSILLECTOMY      TOTAL KNEE ARTHROPLASTY Right 1/8/2019    KNEE TOTAL ARTHROPLASTY performed by Jena Galan MD at [facility] TRANSESOPHAGEAL ECHOCARDIOGRAM  11/29/2018    UPPER GASTROINTESTINAL ENDOSCOPY N/A 12/5/2018    EGD DIAGNOSTIC ONLY performed by Josie Amezquita MD at [address] [place] [address] N/A 6/18/2019    MCGUIRE'S       Medications:      furosemide  40 mg Intravenous Once    atorvastatin  40 mg Oral Daily    [Held by provider] lisinopril  5 mg Oral Daily    tamsulosin  0.4 mg Oral Daily    sodium chloride flush  10 mL Intravenous 2 times per day    rivaroxaban  20 mg Oral Daily with breakfast    metoprolol tartrate  25 mg Oral 3 times per day       Social History:     Social History     Socioeconomic History    Marital status: Single     Spouse name: Not on file   Sage Prather conjunctival inflammation. ENT: No sore throat or runny nose. . No hearing loss, tinnitus or vertigo. Cardiovascular: No chest pain or palpitations. shortness of breath. [place]  Lung: shortness of breath, cough. No sputum production  Abdomen: No nausea, vomiting, diarrhea, or abdominal pain. [place] No cramps. Genitourinary: No increased urinary frequency, or dysuria. No hematuria. No suprapubic or CVA pain  Musculoskeletal: No muscle aches or pains. No joint effusions, swelling or deformities  Hematologic: No bleeding or bruising. Neurologic: No headache, weakness, numbness, or tingling. Integument: No rash, no ulcers. Psychiatric: No depression. Endocrine: No polyuria, no polydipsia, no polyphagia. Physical Examination :     Patient Vitals for the past 8 hrs:   BP Temp Temp src Pulse Resp SpO2   10/19/20 0600 139/79 -- -- 105 -- 94 %   10/19/20 0530 117/76 -- -- 95 -- 94 %   10/19/20 0500 (!) 130/92 -- -- 99 (!) 32 91 %   10/19/20 0430 126/83 -- -- 91 (!) 33 94 %   10/19/20 0400 134/84 -- -- 115 (!) 34 92 %   10/19/20 0330 137/83 -- -- 93 (!) 31 94 %   10/19/20 0300 (!) 142/75 -- -- 97 (!) 33 93 %   10/19/20 0230 135/83 -- -- 112 30 92 %   10/19/20 0200 132/86 -- -- 84 27 95 %   10/19/20 0130 128/77 -- -- 84 25 96 %   10/19/20 0100 (!) 142/82 -- -- 94 27 96 %   10/19/20 0045 122/72 -- -- 96 25 (!) 87 %   10/19/20 0043 -- 97.2 °F (36.2 °C) Oral -- -- --   10/19/20 0030 132/75 -- -- 85 26 97 %   10/19/20 0000 119/82 -- -- 87 23 95 %     General Appearance: Awake, alert, and in respiratory distress  Head:  Normocephalic, no trauma  Eyes: Pupils equal, round, reactive to light and accommodation; extraocular movements intact; sclera anicteric; conjunctivae pink. No embolic phenomena. ENT: Oropharynx clear, without erythema, exudate, or thrush. No tenderness of sinuses. Mouth/throat: mucosa pink and moist. No lesions. Dentures  Neck:Supple, without lymphadenopathy. Thyroid normal, No bruits.   Pulmonary/Chest: Distant sounds. Crackles Rt lung  Cardiovascular: Tachycardic with Grade 2/6 JANNETH, frequent missed beats, gallop rythm. Abdomen: Soft, non tender. Bowel sounds normal. No organomegaly  All four Extremities: No cyanosis, clubbing, edema, or effusions. Neurologic: No gross sensory or motor deficits. Skin: Warm and dry with good turgor. Signs of peripheral arterial insufficiency. No ulcerations. No open wounds. Medical Decision Making -Laboratory:   I have independently reviewed/ordered the following labs:    CBC with Differential:   Recent Labs     10/18/20  0804 10/19/20  0649   WBC 14.2* 11.0   HGB 11.7* 11.9*   HCT 38.3* 39.0*    396   LYMPHOPCT 5* 12*   MONOPCT 6 11     BMP:   Recent Labs     10/17/20  0727 10/18/20  0804 10/19/20  0649    136 138   K 4.0 3.5* 4.0    99 103   CO2 21 26 25   BUN 17 25* 35*   CREATININE 0.63* 0.71 0.87   MG 1.8 2.4  --      Hepatic Function Panel:   Recent Labs     10/17/20  0727   PROT 7.0   LABALBU 3.4*   BILITOT 1.18   ALKPHOS 124   ALT 17   AST 32     No results for input(s): RPR in the last 72 hours. No results for input(s): HIV in the last 72 hours. No results for input(s): BC in the last 72 hours. Lab Results   Component Value Date    MUCUS 2+ 12/27/2018    RBC 4.70 10/19/2020    TRICHOMONAS NOT REPORTED 12/27/2018    WBC 11.0 10/19/2020    YEAST NOT REPORTED 12/27/2018    TURBIDITY CLEAR 12/27/2018     Lab Results   Component Value Date    CREATININE 0.87 10/19/2020    GLUCOSE 108 10/19/2020       Medical Decision Making-Imaging:     PACS Images [facility])     Show images for CT angiogram chest   Interpretation Summary       History:  Cough, increased shortness of breath, and elevated d-dimer.     Exam/Technique:  CTA imaging of the chest  is performed with bolus IV contrast with multiplanar reconstructions provided.   Volume rendered 3 -D Maximum intensity projection reconstructions constructed under concurrent physician supervision on a independent workstation and reviewed for purposes of   evaluation of the pulmonary arterial tree     Comparison:  Noncontrast chest CT from 9/17/2020     Findings: No pulmonary emboli demonstrated. The thoracic aorta is only weakly opacified but displays only scattered mild calcific plaques. Cardiomegaly megaly is demonstrated. There is some minimal pericardial fluid. No hilar or mediastinal mass lesions or other significant mediastinal   abnormalities are displayed.     Small bilateral pleural effusions are displayed. There is diffuse interstitial disease and scattered airspace infiltrates throughout both lungs.       No abnormalities are displayed in the portions of upper abdominal organs included.     IMPRESSION: Diffuse mixed interstitial and airspace infiltrates with small bilateral pleural effusions likely represents changes of CHF with pulmonary edema. Multifocal pneumonia cannot be excluded. Clinical correlation required.     No evidence of pulmonary emboli or other additional acute abnormalities.        All CT scans at this facility use dose modulation, iterative reconstruction, and/or weight based dosing when appropriate to reduce radiation dose to as low as reasonably achievable.     Workstation:[id number]     Finalized by Yudelka Barajas MD on 10/15/2020 10:51 AM     CT chest without contrast (Accession [id number]) (Order [id number])  Order  Date and Time: 9/17/2020  8:31 PM Ordering Department: [address]   Reason for Exam    Pneumonia   PACS Images [facility])     Show images for CT chest without contrast   Interpretation Summary    CLINICAL INFORMATION: Dry cough. Shortness of breath.     COMPARISON: None.     PROCEDURE: Routine CT chest obtained without contrast. Multiplanar reformats obtained from the axial data.  Automated exposure control was utilized.     FINDINGS:     LUNG PARENCHYMA: Severe diffuse bilateral groundglass airspace opacifications noted in the lung parenchyma.     PLEURA: Small bilateral pleural effusions.     HEART/AORTA: Cardiomegaly. Pleural effusion noted.     PULMONARY ARTERIES: Normal in size.     LYMPH NODES: Enlarged mediastinal lymph nodes possibly due to reactive changes.     OSSEOUS STRUCTURES: Degenerative changes of the thoracic spine.     IMPRESSION:     1. Typical findings of Covid 19 pneumonia. Commonly reported imaging features of (COVID-19) pneumonia are present. Other processes such as influenza pneumonia and organizing pneumonia, as can be seen with drug toxicity and connective tissue disease, can cause a similar imaging pattern.     All CT scans at this facility use dose modulation, iterative reconstruction, and/or weight based dosing when appropriate to reduce radiation dose to as low as reasonably achievable.     Workstation:[id number]     Finalized by Taisha Sorenson MD on 9/17/2020 9:14 PM       Medical Decision [id number]:       Medical Decision Making-Other:     Note:  · Labs, medications, radiologic studies were reviewed with personal review of films  · Large amounts of data were reviewed  · Discussed with nursing Staff, Discharge planner  · Infection Control and Prevention measures reviewed  · All prior entries were reviewed  · Administer medications as ordered  · Prognosis: Guarded  · Discharge planning reviewed  · Follow up as outpatient. Thank you for allowing us to participate in the care of this patient. Please call with questions. Kimberley Ramos  Pager: [phone] - Office: [phone]    ATTESTATION:    I have discussed the case, including pertinent history and exam findings with the residents and students. I have seen and examined the patient and the key elements of the encounter have been performed by me. I was present when the student obtained his information or examined the patient. I have reviewed the laboratory data, other diagnostic studies and discussed them with the residents.  I have updated the medical record where necessary. I agree with the assessment, plan and orders as documented by the resident/ student.     Byron Navarrete MD.

## 2020-10-20 PROBLEM — J15.7 PNEUMONIA DUE TO MYCOPLASMA PNEUMONIAE: Status: ACTIVE | Noted: 2020-10-20

## 2020-10-20 LAB
ABSOLUTE EOS #: 0.55 K/UL (ref 0–0.44)
ABSOLUTE IMMATURE GRANULOCYTE: 0.11 K/UL (ref 0–0.3)
ABSOLUTE LYMPH #: 1.89 K/UL (ref 1.1–3.7)
ABSOLUTE MONO #: 1.12 K/UL (ref 0.1–1.2)
ANCA MYELOPEROXIDASE: 16 AU/ML
ANCA PROTEINASE 3: 8 AU/ML
ANION GAP SERPL CALCULATED.3IONS-SCNC: 10 MMOL/L (ref 9–17)
BASOPHILS # BLD: 0 % (ref 0–2)
BASOPHILS ABSOLUTE: 0.04 K/UL (ref 0–0.2)
BUN BLDV-MCNC: 28 MG/DL (ref 8–23)
BUN/CREAT BLD: ABNORMAL (ref 9–20)
CALCIUM SERPL-MCNC: 8.8 MG/DL (ref 8.6–10.4)
CHLORIDE BLD-SCNC: 102 MMOL/L (ref 98–107)
CO2: 25 MMOL/L (ref 20–31)
CREAT SERPL-MCNC: 0.78 MG/DL (ref 0.7–1.2)
DIFFERENTIAL TYPE: ABNORMAL
EOSINOPHILS RELATIVE PERCENT: 5 % (ref 1–4)
GFR AFRICAN AMERICAN: >60 ML/MIN
GFR NON-AFRICAN AMERICAN: >60 ML/MIN
GFR SERPL CREATININE-BSD FRML MDRD: ABNORMAL ML/MIN/{1.73_M2}
GFR SERPL CREATININE-BSD FRML MDRD: ABNORMAL ML/MIN/{1.73_M2}
GLUCOSE BLD-MCNC: 99 MG/DL (ref 70–99)
HCT VFR BLD CALC: 43.3 % (ref 40.7–50.3)
HEMOGLOBIN: 12.8 G/DL (ref 13–17)
IMMATURE GRANULOCYTES: 1 %
LV EF: 35 %
LVEF MODALITY: NORMAL
LYMPHOCYTES # BLD: 18 % (ref 24–43)
MCH RBC QN AUTO: 25.4 PG (ref 25.2–33.5)
MCHC RBC AUTO-ENTMCNC: 29.6 G/DL (ref 28.4–34.8)
MCV RBC AUTO: 85.9 FL (ref 82.6–102.9)
MONOCYTES # BLD: 11 % (ref 3–12)
NRBC AUTOMATED: 0 PER 100 WBC
PDW BLD-RTO: 17.6 % (ref 11.8–14.4)
PLATELET # BLD: 440 K/UL (ref 138–453)
PLATELET ESTIMATE: ABNORMAL
PMV BLD AUTO: 9.2 FL (ref 8.1–13.5)
POTASSIUM SERPL-SCNC: 4 MMOL/L (ref 3.7–5.3)
RBC # BLD: 5.04 M/UL (ref 4.21–5.77)
RBC # BLD: ABNORMAL 10*6/UL
SEG NEUTROPHILS: 65 % (ref 36–65)
SEGMENTED NEUTROPHILS ABSOLUTE COUNT: 6.57 K/UL (ref 1.5–8.1)
SODIUM BLD-SCNC: 137 MMOL/L (ref 135–144)
WBC # BLD: 10.3 K/UL (ref 3.5–11.3)
WBC # BLD: ABNORMAL 10*3/UL

## 2020-10-20 PROCEDURE — 97161 PT EVAL LOW COMPLEX 20 MIN: CPT

## 2020-10-20 PROCEDURE — 2060000000 HC ICU INTERMEDIATE R&B

## 2020-10-20 PROCEDURE — 80048 BASIC METABOLIC PNL TOTAL CA: CPT

## 2020-10-20 PROCEDURE — 2580000003 HC RX 258: Performed by: STUDENT IN AN ORGANIZED HEALTH CARE EDUCATION/TRAINING PROGRAM

## 2020-10-20 PROCEDURE — 6370000000 HC RX 637 (ALT 250 FOR IP): Performed by: NURSE PRACTITIONER

## 2020-10-20 PROCEDURE — 6370000000 HC RX 637 (ALT 250 FOR IP): Performed by: STUDENT IN AN ORGANIZED HEALTH CARE EDUCATION/TRAINING PROGRAM

## 2020-10-20 PROCEDURE — 99232 SBSQ HOSP IP/OBS MODERATE 35: CPT | Performed by: INTERNAL MEDICINE

## 2020-10-20 PROCEDURE — 94761 N-INVAS EAR/PLS OXIMETRY MLT: CPT

## 2020-10-20 PROCEDURE — 2700000000 HC OXYGEN THERAPY PER DAY

## 2020-10-20 PROCEDURE — 97530 THERAPEUTIC ACTIVITIES: CPT

## 2020-10-20 PROCEDURE — 85025 COMPLETE CBC W/AUTO DIFF WBC: CPT

## 2020-10-20 PROCEDURE — 93306 TTE W/DOPPLER COMPLETE: CPT

## 2020-10-20 PROCEDURE — 94660 CPAP INITIATION&MGMT: CPT

## 2020-10-20 PROCEDURE — 6360000002 HC RX W HCPCS: Performed by: NURSE PRACTITIONER

## 2020-10-20 PROCEDURE — 93005 ELECTROCARDIOGRAM TRACING: CPT | Performed by: INTERNAL MEDICINE

## 2020-10-20 PROCEDURE — 36415 COLL VENOUS BLD VENIPUNCTURE: CPT

## 2020-10-20 RX ORDER — DOXYCYCLINE HYCLATE 100 MG
100 TABLET ORAL EVERY 12 HOURS SCHEDULED
Status: DISCONTINUED | OUTPATIENT
Start: 2020-10-20 | End: 2020-10-20

## 2020-10-20 RX ORDER — FUROSEMIDE 40 MG/1
40 TABLET ORAL DAILY
Status: DISCONTINUED | OUTPATIENT
Start: 2020-10-21 | End: 2020-10-23

## 2020-10-20 RX ORDER — FUROSEMIDE 10 MG/ML
40 INJECTION INTRAMUSCULAR; INTRAVENOUS ONCE
Status: COMPLETED | OUTPATIENT
Start: 2020-10-20 | End: 2020-10-20

## 2020-10-20 RX ADMIN — RIVAROXABAN 20 MG: 20 TABLET, FILM COATED ORAL at 09:13

## 2020-10-20 RX ADMIN — METOPROLOL TARTRATE 25 MG: 50 TABLET, FILM COATED ORAL at 13:10

## 2020-10-20 RX ADMIN — DOXYCYCLINE HYCLATE 100 MG: 100 TABLET, COATED ORAL at 09:13

## 2020-10-20 RX ADMIN — AMIODARONE HYDROCHLORIDE 200 MG: 200 TABLET ORAL at 09:13

## 2020-10-20 RX ADMIN — SODIUM CHLORIDE, PRESERVATIVE FREE 10 ML: 5 INJECTION INTRAVENOUS at 09:13

## 2020-10-20 RX ADMIN — METOPROLOL TARTRATE 25 MG: 50 TABLET, FILM COATED ORAL at 21:12

## 2020-10-20 RX ADMIN — SODIUM CHLORIDE, PRESERVATIVE FREE 10 ML: 5 INJECTION INTRAVENOUS at 20:49

## 2020-10-20 RX ADMIN — TAMSULOSIN HYDROCHLORIDE 0.4 MG: 0.4 CAPSULE ORAL at 09:12

## 2020-10-20 RX ADMIN — ACETAMINOPHEN 650 MG: 325 TABLET ORAL at 20:42

## 2020-10-20 RX ADMIN — AMIODARONE HYDROCHLORIDE 200 MG: 200 TABLET ORAL at 20:49

## 2020-10-20 RX ADMIN — ATORVASTATIN CALCIUM 40 MG: 80 TABLET, FILM COATED ORAL at 09:13

## 2020-10-20 RX ADMIN — FUROSEMIDE 40 MG: 10 INJECTION, SOLUTION INTRAMUSCULAR; INTRAVENOUS at 13:09

## 2020-10-20 ASSESSMENT — PAIN SCALES - GENERAL
PAINLEVEL_OUTOF10: 5
PAINLEVEL_OUTOF10: 5
PAINLEVEL_OUTOF10: 0

## 2020-10-20 ASSESSMENT — PAIN DESCRIPTION - PAIN TYPE: TYPE: ACUTE PAIN

## 2020-10-20 ASSESSMENT — PAIN DESCRIPTION - LOCATION: LOCATION: HEAD

## 2020-10-20 NOTE — PROGRESS NOTES
[facility] [address] [facility] Patient  Today's Date and Time: 10/20/2020, 7:51 AM    Impression :   · COVID 19 Suspect  · COVID 19 Infection Not Confirmed by lab Testing  · COVID tests:  · 10-17-20: Negative  · 9-19-20: Negative   · 9-17-20: Negative  · Congestive heart failure with fluid retention in the lungs  · Paroxysmal atrial fibrillation  · Prior episodes of non sustained V tach  · Prior hospitalization on 9[place]18[place]20 [place] [facility] [place] Evaluated and treated for presumptive COVID 19 pneumonia by [facility] service despite two negative COVID tests. Current data do not support COVID  · Serologic evidence of prior Mycoplasma infection  ·   Recommendations:   · Monitor off antibiotics  · Cardiac ECHO   · Diuresis    Medical Decision Making/Summary/Discussion:10/20/2020     · Patient admitted with suspected COVID 19 infection  · Covid test negative   · He has CHF with fluid retention giving a CT scan picture suggestive of COVID but really from CHF  Infection Control Recommendations   · [place] Precautions    Antimicrobial Stewardship Recommendations     · Discontinuation of therapy  Coordination of Outpatient Care:   · Estimated Length of IV antimicrobials:TBD  · Patient will need Midline Catheter Insertion: TBD  · Patient will need PICC line Insertion: No  · Patient will need: Home IV , [place],  SNF,  LTAC:TBD  · Patient will need outpatient wound care:No    Chief complaint/reason for consultation:   · Concern for COVID infection  · Review of data does not substantiate a diagnosis of COVID 19. History of Present Illness:   Karina Manuel is a 77y.o.-year-old  male who was initially admitted on 10/17/2020. Patient seen at the request of Karen Odonnell. INITIAL HISTORY:    Patient presented through ER with complaints of SOB, cough and concern for COVID 19. Patient was recently hospitalized [place] [facility] [place] because of similar symptoms.  He was evaluated by the ID service of [address] and felt to have COVID on the basis of CT scan. He received Remdesivir for 5 days and Decadron for 10 days for presumptive COVID. His COVID 19 tests on 9-17, 9-19-20 were negative. He was also seen by the Cardiology Nurse practitioner because of paroxysmal A fib, non sustained V tach, essential HTN, CHF. Treated for above. An ECHO was not done because of concern with COVID. Patient was referred back to his cardiologist Dr Deya Bunch upon discharge. Since then the patient had a second CT scan on 10-15-20 with similar findings to the one on 9-17-20. The second CT was interpreted as CHF with bilateral pleural effusions and pulmonary edema. He has also presented to [facility] ER with similar complaints. His COVID test on 10-17-20 is Negative. The above data suggest that he does not have COVID but rather chronic CHF with pulmonary edema and pleural effusions. CURRENT EVALUATION : 10/20/2020    Afebrile  VS stable    RR:16  HR: 61  BP:109/60    On Lasix. Monitor off antibiotics. Cardiac Echo not done yet - patient was cardioverted yesterday because he had persistent tachycardia and was in A-flutter  Strep Pneumoniae Ag and Legionella Ag are both negative and Mycoplasma Pneumonia IGM negative, but IGG is positive. He is on 5L nasal Oxygen. Denies fevers/chills or chest pain. Has sob when he gets up and walks. Has a cough but states that it's due to post nasal drip. Patient exhibiting respiratory distress. Yes  Respiratory secretions: No    Patient receiving supplemental oxygen. 4 L NC. BIPAP as needed.   02 sat 92>94>90>99  RR 22>28>28>16  HR 61    Overall Daily Picture:    Improving    Presence of secondary bacterial Infection:    No   Additional antibiotics: No    Labs, X rays reviewed: 10/20/2020    BUN: 35-->28  Cr: 0.87-->0.78    WBC: 11-->10.3  Hb:11.9-->12.8  Plat: 396-->440    Absolute Neutrophils: 6.57  Absolute Lymphocytes:1.89  Neutrophil/Lymphocyte Ratio: 3.47    CRP: mg Oral BID    atorvastatin  40 mg Oral Daily    [Held by provider] lisinopril  5 mg Oral Daily    tamsulosin  0.4 mg Oral Daily    sodium chloride flush  10 mL Intravenous 2 times per day    rivaroxaban  20 mg Oral Daily with breakfast    metoprolol tartrate  25 mg Oral 3 times per day       Social History:     Social History     Socioeconomic History    Marital status: Single     Spouse name: Not on file    Number of children: Not on file    Years of education: Not on file    Highest education level: Not on file   Occupational History    Not on file   Social Needs    Financial resource strain: Not on file    Food insecurity     Worry: Not on file     Inability: Not on file   [employer] needs     Medical: Not on file     Non-medical: Not on file   Tobacco Use    Smoking status: Former Smoker     Packs/day: 0.50     Years: 1.00     Pack years: 0.50     Last attempt to quit: [address]     Years since quittin.8    Smokeless tobacco: Never Used   Substance and Sexual Activity    Alcohol use: Yes     Alcohol/week: 8.3 standard drinks     Types: 10 Standard drinks or equivalent per week     Comment: 3 -4 times a week    Drug use: No     Types:  Other-see comments     Comment: Cocaine use in past in     Sexual activity: Yes     Partners: Female   Lifestyle    Physical activity     Days per week: Not on file     Minutes per session: Not on file    Stress: Not on file   Relationships    Social connections     Talks on phone: Not on file     Gets together: Not on file     Attends Sabianist service: Not on file     Active member of club or organization: Not on file     Attends meetings of clubs or organizations: Not on file     Relationship status: Not on file    Intimate partner violence     Fear of current or ex partner: Not on file     Emotionally abused: Not on file     Physically abused: Not on file     Forced sexual activity: Not on file   Other Topics Concern    Not on file   Social History Narrative    Not on file       Family History:     Family History   Problem Relation Age of Onset    Diabetes Mother     Heart Attack Father     Heart Disease Father     Heart Disease Brother         Allergies:   Adhesive tape; Codeine; and Penicillins     Review of Systems:       Constitutional: No fevers or chills. No systemic complaints  Head: No headaches  Eyes: No double vision or blurry vision. No conjunctival inflammation. ENT: No sore throat or runny nose. . No hearing loss, tinnitus or vertigo. Cardiovascular: No chest pain or palpitations. shortness of breath. CLAYTON  Lung: some cough, no sob. No sputum production  Abdomen: No nausea, vomiting, diarrhea, or abdominal pain. Benedetta Ou No cramps. Genitourinary: No increased urinary frequency, or dysuria. No hematuria. No suprapubic or CVA pain  Musculoskeletal: No muscle aches or pains. No joint effusions, swelling or deformities  Hematologic: No bleeding or bruising. Neurologic: No headache, weakness, numbness, or tingling. Integument: No rash, no ulcers. Psychiatric: No depression. Endocrine: No polyuria, no polydipsia, no polyphagia. Physical Examination :     Patient Vitals for the past 8 hrs:   BP Temp Temp src Pulse Resp SpO2 Weight   10/20/20 0654 109/60 -- -- 61 -- 99 % --   10/20/20 0608 -- -- -- 67 -- 92 % 180 lb (81.6 kg)   10/20/20 0400 118/67 98.6 °F (37 °C) Oral 65 16 -- --     General Appearance: Awake, alert, and in respiratory distress  Head:  Normocephalic, no trauma  Eyes: Pupils equal, round, reactive to light and accommodation; extraocular movements intact; sclera anicteric; conjunctivae pink. No embolic phenomena. ENT: Oropharynx clear, without erythema, exudate, or thrush. No tenderness of sinuses. Mouth/throat: mucosa pink and moist. No lesions. Dentures  Neck:Supple, without lymphadenopathy. Thyroid normal, No bruits. Pulmonary/Chest: CTA B/L.  No wheezes, rales, or rhonchi  Cardiovascular: Tachycardic with Grade 2/6 JANNETH, tree     Comparison:  Noncontrast chest CT from 9/17/2020     Findings: No pulmonary emboli demonstrated. The thoracic aorta is only weakly opacified but displays only scattered mild calcific plaques. Cardiomegaly megaly is demonstrated. There is some minimal pericardial fluid. No hilar or mediastinal mass lesions or other significant mediastinal   abnormalities are displayed.     Small bilateral pleural effusions are displayed. There is diffuse interstitial disease and scattered airspace infiltrates throughout both lungs.       No abnormalities are displayed in the portions of upper abdominal organs included.     IMPRESSION: Diffuse mixed interstitial and airspace infiltrates with small bilateral pleural effusions likely represents changes of CHF with pulmonary edema. Multifocal pneumonia cannot be excluded. Clinical correlation required.     No evidence of pulmonary emboli or other additional acute abnormalities.        All CT scans at this facility use dose modulation, iterative reconstruction, and/or weight based dosing when appropriate to reduce radiation dose to as low as reasonably achievable.     Workstation:[id number]     Finalized by Daniel Mcgill MD on 10/15/2020 10:51 AM     CT chest without contrast (Accession [id number]) (Order [id number])  Order  Date and Time: 9/17/2020  8:31 PM Ordering Department: [address]   Reason for Exam    Pneumonia   PACS Images Carmela Rhoades)     Show images for CT chest without contrast   Interpretation Summary    CLINICAL INFORMATION: Dry cough. Shortness of breath.     COMPARISON: None.     PROCEDURE: Routine CT chest obtained without contrast. Multiplanar reformats obtained from the axial data.  Automated exposure control was utilized.     FINDINGS:     LUNG PARENCHYMA: Severe diffuse bilateral groundglass airspace opacifications noted in the lung parenchyma.     PLEURA: Small bilateral pleural effusions.     HEART/AORTA: Cardiomegaly. Pleural effusion noted.     PULMONARY ARTERIES: Normal in size.     LYMPH NODES: Enlarged mediastinal lymph nodes possibly due to reactive changes.     OSSEOUS STRUCTURES: Degenerative changes of the thoracic spine.     IMPRESSION:     1. Typical findings of Covid 19 pneumonia. Commonly reported imaging features of (COVID-19) pneumonia are present. Other processes such as influenza pneumonia and organizing pneumonia, as can be seen with drug toxicity and connective tissue disease, can cause a similar imaging pattern.     All CT scans at this facility use dose modulation, iterative reconstruction, and/or weight based dosing when appropriate to reduce radiation dose to as low as reasonably achievable.     Workstation:[id number]     Finalized by Marc Lennon MD on 9/17/2020 9:14 PM       Medical Decision Drigab-Hqjyzanr-Fquwf:       Medical Decision Making-Other:     Note:  · Labs, medications, radiologic studies were reviewed with personal review of films  · Large amounts of data were reviewed  · Discussed with nursing Staff, Discharge planner  · Infection Control and Prevention measures reviewed  · All prior entries were reviewed  · Administer medications as ordered  · Prognosis: Guarded  · Discharge planning reviewed  · Follow up as outpatient. Thank you for allowing us to participate in the care of this patient. Please call with questions. Asher Pineda  Pager: [phone] - Office: [phone]    ATTESTATION:    I have discussed the case, including pertinent history and exam findings with the residents and students. I have seen and examined the patient and the key elements of the encounter have been performed by me. I was present when the student obtained his information or examined the patient. I have reviewed the laboratory data, other diagnostic studies and discussed them with the residents. I have updated the medical record where necessary.     I agree with the assessment, plan and orders as documented by the resident/ student.     Birgit Mullen MD.

## 2020-10-20 NOTE — PLAN OF CARE
Problem: OXYGENATION/RESPIRATORY FUNCTION  Goal: Patient will maintain patent airway  10/20/2020 0150 by Kemp Alpers, RN  Outcome: Ongoing  10/19/2020 1840 by Michael Del Castillo RN  Outcome: Ongoing  Goal: Patient will achieve/maintain normal respiratory rate/effort  Description: Respiratory rate and effort will be within normal limits for the patient  10/20/2020 0150 by Kemp Alpers, RN  Outcome: Ongoing  10/19/2020 1840 by Michael Del Castillo RN  Outcome: Ongoing     Problem: FLUID AND ELECTROLYTE IMBALANCE  Goal: Fluid and electrolyte balance are achieved/maintained  10/20/2020 0150 by Kemp Alpers, RN  Outcome: Ongoing  10/19/2020 1840 by Michael Del Castillo RN  Outcome: Ongoing     Problem: ACTIVITY INTOLERANCE/IMPAIRED MOBILITY  Goal: Mobility/activity is maintained at optimum level for patient  10/20/2020 0150 by Kemp Alpers, RN  Outcome: Ongoing  10/19/2020 1840 by Michael Del Castillo RN  Outcome: Ongoing     Problem: Falls - Risk of:  Goal: Will remain free from falls  Description: Will remain free from falls  10/20/2020 0150 by Kemp Alpers, RN  Outcome: Ongoing  10/19/2020 1840 by Michael Del Castillo RN  Outcome: Ongoing  Goal: Absence of physical injury  Description: Absence of physical injury  10/20/2020 0150 by Kemp Alpers, RN  Outcome: Ongoing  10/19/2020 1840 by Michael Del Castillo RN  Outcome: Ongoing

## 2020-10-20 NOTE — PROGRESS NOTES
Physical Therapy    Facility/Department: [facility]  Initial Assessment    NAME: Lorene Cooper  : 1955  MRN: [id number]    Date of Service: 10/20/2020  The patient is a pleasant 77 y.o. male with past medical history of diastolic CHF, essential hypertension, dural fibrillation dyslipidemia, colon cancer status post resection, Hill's esophagus presents  to the ED with a chief complaint of acute onset of shortness of breath with productive cough since last 3 days, visited with some dizziness and chest pain. Patient was recently treated for  COVID pneumonia with imaging concerning for COVID even though rapid test came back negative on 2 occasions . he was discharged on oxygen which he uses since then. Today he complains of increased shortness of breath this morning which made him come to the ER. Initially in the ER he was found to be having saturations of 65%. Patient also have the chest pain which is sharp,localised, non radiating and non reproducible on deep breathing. Discharge Recommendations:    Further therapy recommended at discharge. PT Equipment Recommendations  Equipment Needed: No    Assessment    Patient tolerated evaluation fair. While pt was supine with HOB elevated, his O2 sat was 94% at start of evaluation. Pt independently moved from supine to sit at EOB to put his shoes on. Pt O2 dropped to 75% and with some deep breaths through his nose increased to 84%. Pt's O2 remained around 89-91%. Pt's RN was notified. Pt stood independently and used bedside urinal.   Body structures, Functions, Activity limitations: Decreased safe awareness;Decreased endurance  Prognosis: Good  Decision Making: Low Complexity  PT Education: Goals;PT Role;Plan of Care;General Safety  REQUIRES PT FOLLOW UP: Yes  Activity Tolerance  Activity Tolerance: Treatment limited secondary to medical complications (O2 sats below 90%)       Patient Diagnosis(es): The primary encounter diagnosis was Hypoxia.  Diagnoses of

## 2020-10-20 NOTE — PLAN OF CARE
PATIENT REFUSES TO WEAR BIPAP     [x] Risks and benefits explained to patient   [x] Patient refuses to wear Bipap stating he does not like it and will not wear it  [x] Patient verbalizes understanding of information presented.

## 2020-10-20 NOTE — PROGRESS NOTES
[facility]   Progress Note                   Date:   10/20/2020  Patient name: Jeannette Jacome  Date of admission:  10/17/2020  7:08 AM  MRN:   [id number]  YOB: 1955  PCP: Missy Lan MD    Reason for Admission: CHF (congestive heart failure), NYHA class II, acute on chronic, combined ([facility] [I50.43]  CHF (congestive heart failure), NYHA class II, acute on chronic, combined ([facility] [I50.43]    Subjective:       Clinical Changes / Abnormalities: Pt seen and examined in the room. Pt denies any CP. Remains NSR. Did have some hypotension post CV. BP stable now. Pt remains sob on 02 per NC. Medications:   Scheduled Meds:   amiodarone  200 mg Oral BID    atorvastatin  40 mg Oral Daily    [Held by provider] lisinopril  5 mg Oral Daily    tamsulosin  0.4 mg Oral Daily    sodium chloride flush  10 mL Intravenous 2 times per day    rivaroxaban  20 mg Oral Daily with breakfast    metoprolol tartrate  25 mg Oral 3 times per day     Continuous Infusions:  CBC:   Recent Labs     10/18/20  0804 10/19/20  0649 10/20/20  0600   WBC 14.2* 11.0 10.3   HGB 11.7* 11.9* 12.8*    396 440     BMP:    Recent Labs     10/18/20  0804 10/19/20  0649 10/20/20  0600    138 137   K 3.5* 4.0 4.0   CL 99 103 102   CO2 26 25 25   BUN 25* 35* 28*   CREATININE 0.71 0.87 0.78   GLUCOSE 128* 108* 99     Hepatic: No results for input(s): AST, ALT, ALB, BILITOT, ALKPHOS in the last 72 hours. Troponin:   Recent Labs     10/17/20  1335 10/17/20  2018   TROPHS 22 18     BNP: No results for input(s): BNP in the last 72 hours. Lipids: No results for input(s): CHOL, HDL in the last 72 hours. Invalid input(s): LDLCALCU  INR: No results for input(s): INR in the last 72 hours. EKG:   Aflutter with RVR     CV 10/19/2020  CARDIOVERSION:  After an adequate level of sedation was achieved, 100J in biphasic synchronized delivery was administered. no change in rhythm.  The patient awoke without test  11. Recent ER visit 10/15/2020 to [facility] for shortness of breath, admission advised but left  12. H/o rectal cancer - in remission    Patient Active Problem List:     Dyslipidemia     ED (erectile dysfunction)     Incomplete bladder emptying     Benign prostatic hyperplasia with urinary obstruction     History of colon cancer     Atrial fibrillation with normal ventricular rate (HCC)     Anemia     Pallor of optic disc     Presbyopia     Incisional hernia, without obstruction or gangrene     Hypertrophic nonobstructive cardiomyopathy (HCC)     Iron (Fe) deficiency anemia     Primary osteoarthritis of right knee     Benign essential HTN     History of malignant neoplasm of rectum     Hill's esophagus     CHF (congestive heart failure), NYHA class II, acute on chronic, combined (HCC)     Hypoxia     Dyspnea and respiratory abnormalities     Pneumonia due to Mycoplasma pneumoniae      Plan of Treatment:   1. Afib with RVR. S.p CV. Continue BB and Amiodarone. On Xarelto   2. CHF- Remains sob. Will give one time IV lasix. Continue  BB. Will order lasix 40mg PO daily as oupt   3. HTN - Stable. Conitnue BB. Will hold ACE on discharge. Will re-evaluate as outpt. 4. Ok to d/c and follow up as outpt.       Electronically signed by MIRI Bergeron CNP on 10/20/2020 at 12:14 PM  [address]  [phone]

## 2020-10-20 NOTE — PROGRESS NOTES
[facility]  Internal Medicine Teaching Residency Program  Inpatient Daily Progress Note  ______________________________________________________________________________    Patient: Annmarie Bosworth  YOB: 1955   GHX:[id number]    Acct: [de-identified]     Room: 2019/2019-01  Admit date: 10/17/2020  Today's date: 10/20/20  Number of days in the hospital: 3    SUBJECTIVE   Admitting Diagnosis: CHF (congestive heart failure), NYHA class II, acute on chronic, combined ([facility] [address]  CC:     Pt examined at bedside. Chart & results reviewed. No acute events overnight. Patient complains of cough with white-colored phlegm. On metoprolol 25 mg, Xarelto for atrial fibrillation  Patient was cardioverted yesterday and put in sinus rhythm. Plans to discharge the patient today with follow-up for CHF clinic and cardiology outpatient    ROS:  Constitutional:  negative for chills, fevers, sweats  Respiratory:  negative for cough, dyspnea on exertion, hemoptysis, shortness of breath, wheezing  Cardiovascular:  negative for chest pain, chest pressure/discomfort, lower extremity edema, palpitations  Gastrointestinal:  negative for abdominal pain, constipation, diarrhea, nausea, vomiting  Neurological:  negative for dizziness, headache  BRIEF HISTORY     The patient is a pleasant 77 y.o. male with past medical history of diastolic CHF, essential hypertension, dural fibrillation dyslipidemia, colon cancer status post resection, Hill's esophagus presents  to the ED with a chief complaint of acute onset of shortness of breath with productive cough since last 3 days, visited with some dizziness and chest pain.      Patient was recently treated for  COVID pneumonia with imaging concerning for COVID even though rapid test came back negative on 2 occasions . he was discharged on oxygen which he uses since then.   Today he complains of increased shortness of breath this morning which made him come to the ER. Initially in the ER he was found to be having saturations of 65%. Patient also have the chest pain which is sharp,localised, non radiating and non reproducible on deep breathing.      Pt denies headache, lightheadedness, focal neurological deficits, abdominal pain, nausea, fever, chills,. Initial Vitals on presentation : Blood pressure of 158/118, pulse rate of 140, respiratory 46 saturations at 65 on room air     Significant Labs :   Troponin high-sensitivity 29>25>Hemoglobin of 11.8, hematocrit of 39 glucose 120, creatinine of 0.63 proBNP 3525 procalcitonin 0.29 .7, rapid COVID-19 negative, lactic acid 1.6, d-dimer 1.24. Chest x-ray : Bilateral scattered pulmonary opacities     Treatment in ED : Initially treated with metoprolol for atrial fibrillation with RVR, furosemide 40 mg IV for congestive heart failure, levofloxacin 750 mg every 24,    OBJECTIVE     Vital Signs:  /63   Pulse 64   Temp 98.6 °F (37 °C) (Oral)   Resp 16   Wt 180 lb (81.6 kg)   SpO2 96%   BMI 24.41 kg/m²     Temp (24hrs), Av.3 °F (36.8 °C), Min:98.1 °F (36.7 °C), Max:98.6 °F (37 °C)    In: -   Out: 1060 [Urine:1060]    Physical Exam:  Constitutional: This is a well developed, well nourished, 25-29.9 - Overweight 77y.o. year old male who is alert, oriented, cooperative and in no apparent distress. Head:normocephalic and atraumatic. EENT:  PERRLA. No conjunctival injections. Septum was midline, mucosa was without erythema, exudates or cobblestoning. No thrush was noted. Neck: Supple without thyromegaly. No elevated JVP. Trachea was midline. Respiratory: Chest was symmetrical without dullness to percussion. Breath sounds bilaterally were clear to auscultation. There were no wheezes, rhonchi or rales. Cardiovascular: Regular without murmur, clicks, gallops or rubs. Abdomen: Slightly rounded and soft without organomegaly. No rebound, rigidity or guarding was appreciated.     Lymphatic: No lymphadenopathy. Musculoskeletal: Normal curvature of the spine. No gross muscle weakness. Extremities:  No lower extremity edema, ulcerations, tenderness, varicosities or erythema. Muscle size, tone and strength are normal.  No involuntary movements are noted. Skin:  Warm and dry. Good color, turgor and pigmentation. No lesions or scars. No cyanosis or clubbing  Neurological/Psychiatric: The patient's general behavior, level of consciousness, thought content and emotional status is normal.        Medications:  Scheduled Medications:    doxycycline hyclate  100 mg Oral 2 times per day    amiodarone  200 mg Oral BID    atorvastatin  40 mg Oral Daily    [Held by provider] lisinopril  5 mg Oral Daily    tamsulosin  0.4 mg Oral Daily    sodium chloride flush  10 mL Intravenous 2 times per day    rivaroxaban  20 mg Oral Daily with breakfast    metoprolol tartrate  25 mg Oral 3 times per day     Continuous Infusions:     PRN Medicationssodium chloride flush, 10 mL, PRN  acetaminophen, 650 mg, Q6H PRN    Or  acetaminophen, 650 mg, Q6H PRN  polyethylene glycol, 17 g, Daily PRN  promethazine, 12.5 mg, Q6H PRN    Or  ondansetron, 4 mg, Q6H PRN  potassium chloride, 40 mEq, PRN    Or  potassium alternative oral replacement, 40 mEq, PRN    Or  potassium chloride, 10 mEq, PRN  magnesium sulfate, 1 g, PRN  metoprolol, 5 mg, Q6H PRN        Diagnostic Labs:  CBC:   Recent Labs     10/18/20  0804 10/19/20  0649 10/20/20  0600   WBC 14.2* 11.0 10.3   RBC 4.57 4.70 5.04   HGB 11.7* 11.9* 12.8*   HCT 38.3* 39.0* 43.3   MCV 83.8 83.0 85.9   RDW 17.4* 17.6* 17.6*    396 440     BMP:   Recent Labs     10/18/20  0804 10/19/20  0649 10/20/20  0600    138 137   K 3.5* 4.0 4.0   CL 99 103 102   CO2 26 25 25   BUN 25* 35* 28*   CREATININE 0.71 0.87 0.78     BNP: No results for input(s): BNP in the last 72 hours. PT/INR: No results for input(s): PROTIME, INR in the last 72 hours.   APTT: No results for input(s): APTT in the last 72 hours. CARDIAC ENZYMES: No results for input(s): CKMB, CKMBINDEX, TROPONINI in the last 72 hours. Invalid input(s): CKTOTAL;3  FASTING LIPID PANEL:  Lab Results   Component Value Date    CHOL 200 (H) 01/03/2017    HDL 36 (L) 03/10/2020    TRIG 165 (H) 01/03/2017     LIVER PROFILE:   No results for input(s): AST, ALT, ALB, BILIDIR, BILITOT, ALKPHOS in the last 72 hours. MICROBIOLOGY: No results found for: CULTURE    Imaging:    Xr Chest Portable    Result Date: 10/18/2020  No significant change in chest findings compared to the October 17th study. Xr Chest Portable    Result Date: 10/17/2020  Bilateral scattered pulmonary opacities right greater than left favoring multifocal airspace disease with small effusions not excluded. No extrapleural air is seen. ASSESSMENT & PLAN     ASSESSMENT / PLAN:     Principal Problem:    CHF (congestive heart failure), NYHA class II, acute on chronic, combined ([facility])  Active Problems:    Dyslipidemia    Benign prostatic hyperplasia with urinary obstruction    Atrial fibrillation with normal ventricular rate ([facility])    Hypertrophic nonobstructive cardiomyopathy ([facility])    Benign essential HTN    Hypoxia    Dyspnea and respiratory abnormalities    Pneumonia due to Mycoplasma pneumoniae  Resolved Problems:    * No resolved hospital problems. *    1. Acute on chronic congestive heart failure. Ejection fraction 45 to 50% -Continue on Lasix 40 mg, fluid restriction, input output monitoring. 2.  Chest pain NSTEMI type II -Follow-up on EKG, trend troponins, will monitor the patient    3. Atrial fibrillation with RVR s/p cardioversion -Patient cardioverted yesterday by cardiology and put back to sinus rhythm. Continue on metoprolol tartrate 25 mg every 8 oral. Continue on xarelto 20mg, continue telemetry monitoring    4. Suspected Community acquired -Chest x-ray shows infiltrates concerning for infection vs fluid. Patient currently on no antibiotics.   ID Following the patient and would follow the recommendations    5. Essential hypertension -continued on metoprolol 25 mg every 8 hours. 6.  Benign prostatic hyperplasia -Continue on tamsulosin 0.4 mg     DVT ppx: Patient on Xarelto  GI ppx: Not indicated     PT/OT/SW: Ongoing  Discharge Planning: Ongoing possible discharge today    Yony Brewer MD  Internal Medicine Resident, PGY- [facility];  [place], [place]  10/20/2020, 11:08 AM

## 2020-10-21 ENCOUNTER — APPOINTMENT (OUTPATIENT)
Dept: CT IMAGING | Age: 65
DRG: 291 | End: 2020-10-21
Payer: MEDICARE

## 2020-10-21 LAB
ABSOLUTE EOS #: 0.65 K/UL (ref 0–0.44)
ABSOLUTE IMMATURE GRANULOCYTE: 0.21 K/UL (ref 0–0.3)
ABSOLUTE LYMPH #: 1.39 K/UL (ref 1.1–3.7)
ABSOLUTE MONO #: 1.01 K/UL (ref 0.1–1.2)
ANION GAP SERPL CALCULATED.3IONS-SCNC: 12 MMOL/L (ref 9–17)
BASOPHILS # BLD: 1 % (ref 0–2)
BASOPHILS ABSOLUTE: 0.08 K/UL (ref 0–0.2)
BUN BLDV-MCNC: 26 MG/DL (ref 8–23)
BUN/CREAT BLD: ABNORMAL (ref 9–20)
CALCIUM SERPL-MCNC: 8.8 MG/DL (ref 8.6–10.4)
CHLORIDE BLD-SCNC: 102 MMOL/L (ref 98–107)
CO2: 22 MMOL/L (ref 20–31)
CREAT SERPL-MCNC: 0.63 MG/DL (ref 0.7–1.2)
DIFFERENTIAL TYPE: ABNORMAL
EKG ATRIAL RATE: 339 BPM
EKG ATRIAL RATE: 68 BPM
EKG P AXIS: 30 DEGREES
EKG P AXIS: 45 DEGREES
EKG P-R INTERVAL: 188 MS
EKG Q-T INTERVAL: 332 MS
EKG Q-T INTERVAL: 408 MS
EKG QRS DURATION: 90 MS
EKG QRS DURATION: 96 MS
EKG QTC CALCULATION (BAZETT): 433 MS
EKG QTC CALCULATION (BAZETT): 455 MS
EKG R AXIS: -19 DEGREES
EKG R AXIS: -26 DEGREES
EKG T AXIS: 3 DEGREES
EKG VENTRICULAR RATE: 113 BPM
EKG VENTRICULAR RATE: 68 BPM
EOSINOPHILS RELATIVE PERCENT: 7 % (ref 1–4)
GFR AFRICAN AMERICAN: >60 ML/MIN
GFR NON-AFRICAN AMERICAN: >60 ML/MIN
GFR SERPL CREATININE-BSD FRML MDRD: ABNORMAL ML/MIN/{1.73_M2}
GFR SERPL CREATININE-BSD FRML MDRD: ABNORMAL ML/MIN/{1.73_M2}
GLUCOSE BLD-MCNC: 96 MG/DL (ref 70–99)
HCT VFR BLD CALC: 42.3 % (ref 40.7–50.3)
HEMOGLOBIN: 12.7 G/DL (ref 13–17)
IMMATURE GRANULOCYTES: 2 %
LYMPHOCYTES # BLD: 15 % (ref 24–43)
MCH RBC QN AUTO: 24.8 PG (ref 25.2–33.5)
MCHC RBC AUTO-ENTMCNC: 30 G/DL (ref 28.4–34.8)
MCV RBC AUTO: 82.6 FL (ref 82.6–102.9)
MONOCYTES # BLD: 11 % (ref 3–12)
NRBC AUTOMATED: 0 PER 100 WBC
PDW BLD-RTO: 16.8 % (ref 11.8–14.4)
PLATELET # BLD: 433 K/UL (ref 138–453)
PLATELET ESTIMATE: ABNORMAL
PMV BLD AUTO: 9.2 FL (ref 8.1–13.5)
POTASSIUM SERPL-SCNC: 3.7 MMOL/L (ref 3.7–5.3)
RBC # BLD: 5.12 M/UL (ref 4.21–5.77)
RBC # BLD: ABNORMAL 10*6/UL
SEG NEUTROPHILS: 64 % (ref 36–65)
SEGMENTED NEUTROPHILS ABSOLUTE COUNT: 5.96 K/UL (ref 1.5–8.1)
SODIUM BLD-SCNC: 136 MMOL/L (ref 135–144)
WBC # BLD: 9.3 K/UL (ref 3.5–11.3)
WBC # BLD: ABNORMAL 10*3/UL

## 2020-10-21 PROCEDURE — 99233 SBSQ HOSP IP/OBS HIGH 50: CPT | Performed by: INTERNAL MEDICINE

## 2020-10-21 PROCEDURE — 2060000000 HC ICU INTERMEDIATE R&B

## 2020-10-21 PROCEDURE — 99232 SBSQ HOSP IP/OBS MODERATE 35: CPT | Performed by: INTERNAL MEDICINE

## 2020-10-21 PROCEDURE — 6360000004 HC RX CONTRAST MEDICATION: Performed by: STUDENT IN AN ORGANIZED HEALTH CARE EDUCATION/TRAINING PROGRAM

## 2020-10-21 PROCEDURE — 2580000003 HC RX 258: Performed by: INTERNAL MEDICINE

## 2020-10-21 PROCEDURE — 93010 ELECTROCARDIOGRAM REPORT: CPT | Performed by: INTERNAL MEDICINE

## 2020-10-21 PROCEDURE — 6360000002 HC RX W HCPCS: Performed by: STUDENT IN AN ORGANIZED HEALTH CARE EDUCATION/TRAINING PROGRAM

## 2020-10-21 PROCEDURE — 6370000000 HC RX 637 (ALT 250 FOR IP): Performed by: STUDENT IN AN ORGANIZED HEALTH CARE EDUCATION/TRAINING PROGRAM

## 2020-10-21 PROCEDURE — 6370000000 HC RX 637 (ALT 250 FOR IP): Performed by: NURSE PRACTITIONER

## 2020-10-21 PROCEDURE — 71260 CT THORAX DX C+: CPT

## 2020-10-21 PROCEDURE — 85025 COMPLETE CBC W/AUTO DIFF WBC: CPT

## 2020-10-21 PROCEDURE — 36415 COLL VENOUS BLD VENIPUNCTURE: CPT

## 2020-10-21 PROCEDURE — 80048 BASIC METABOLIC PNL TOTAL CA: CPT

## 2020-10-21 PROCEDURE — 93005 ELECTROCARDIOGRAM TRACING: CPT | Performed by: STUDENT IN AN ORGANIZED HEALTH CARE EDUCATION/TRAINING PROGRAM

## 2020-10-21 PROCEDURE — 6360000002 HC RX W HCPCS: Performed by: INTERNAL MEDICINE

## 2020-10-21 RX ADMIN — ACETAMINOPHEN 650 MG: 325 TABLET ORAL at 17:46

## 2020-10-21 RX ADMIN — RIVAROXABAN 20 MG: 20 TABLET, FILM COATED ORAL at 08:31

## 2020-10-21 RX ADMIN — AMIODARONE HYDROCHLORIDE 150 MG: 50 INJECTION, SOLUTION INTRAVENOUS at 01:58

## 2020-10-21 RX ADMIN — METOPROLOL TARTRATE 25 MG: 50 TABLET, FILM COATED ORAL at 13:45

## 2020-10-21 RX ADMIN — TAMSULOSIN HYDROCHLORIDE 0.4 MG: 0.4 CAPSULE ORAL at 08:30

## 2020-10-21 RX ADMIN — AMIODARONE HYDROCHLORIDE 0.5 MG/MIN: 50 INJECTION, SOLUTION INTRAVENOUS at 08:32

## 2020-10-21 RX ADMIN — IOPAMIDOL 75 ML: 755 INJECTION, SOLUTION INTRAVENOUS at 16:11

## 2020-10-21 RX ADMIN — AMIODARONE HYDROCHLORIDE 1 MG/MIN: 150 INJECTION, SOLUTION INTRAVENOUS at 02:16

## 2020-10-21 RX ADMIN — HYALURONIDASE (HUMAN RECOMBINANT): 150 INJECTION, SOLUTION SUBCUTANEOUS at 20:51

## 2020-10-21 RX ADMIN — ATORVASTATIN CALCIUM 40 MG: 80 TABLET, FILM COATED ORAL at 08:30

## 2020-10-21 RX ADMIN — METOPROLOL TARTRATE 25 MG: 50 TABLET, FILM COATED ORAL at 08:31

## 2020-10-21 RX ADMIN — FUROSEMIDE 40 MG: 40 TABLET ORAL at 08:30

## 2020-10-21 ASSESSMENT — PAIN SCALES - GENERAL
PAINLEVEL_OUTOF10: 0
PAINLEVEL_OUTOF10: 5
PAINLEVEL_OUTOF10: 0
PAINLEVEL_OUTOF10: 0

## 2020-10-21 NOTE — PROGRESS NOTES
[facility] [address] [place] [address] 19 Patient  Today's Date and Time: 10/21/2020, 8:11 AM    Impression :   · COVID 19 Suspect  · COVID 19 Infection Not Confirmed by lab Testing  · COVID tests:  · 10-17-20: Negative  · 9-19-20: Negative   · 9-17-20: Negative  · Congestive heart failure with fluid retention in the lungs  · Paroxysmal atrial fibrillation  · Prior episodes of non sustained V tach  · Prior hospitalization on 9-18-20 at [facility]. Evaluated and treated for presumptive COVID 19 pneumonia by [facility] service despite two negative COVID tests. Current data do not support COVID  · Serologic evidence of prior Mycoplasma infection  ·   Recommendations:   · Monitor off antibiotics  · Follow cardiology's recommendations   · Diuresis  · ID will sign off. No apparent infection    Medical Decision Making/Summary/Discussion:10/21/2020     · Patient admitted with suspected COVID 19 infection  · Covid test negative   · He has CHF with fluid retention giving a CT scan/Xray picture suggestive of COVID/pneumonia but really from CHF  Infection Control Recommendations   · [place] Precautions    Antimicrobial Stewardship Recommendations     · Discontinuation of therapy  Coordination of Outpatient Care:   · Estimated Length of IV antimicrobials:TBD  · Patient will need Midline Catheter Insertion: TBD  · Patient will need PICC line Insertion: No  · Patient will need: Home IV , [place],  SNF,  LTAC:TBD  · Patient will need outpatient wound care:No    Chief complaint/reason for consultation:   · Concern for COVID infection  · Review of data does not substantiate a diagnosis of COVID 19. History of Present Illness:   Junior Pantoja is a 77y.o.-year-old  male who was initially admitted on 10/17/2020. Patient seen at the request of Jayden Cadet. INITIAL HISTORY:    Patient presented through ER with complaints of SOB, cough and concern for COVID 19.     Patient was recently hospitalized at [facility] on 9-18-20 because of similar symptoms. He was evaluated by the ID service of [facility] and felt to have COVID on the basis of CT scan. He received Remdesivir for 5 days and Decadron for 10 days for presumptive COVID. His COVID 19 tests on 9-17, 9-19-20 were negative. He was also seen by the Cardiology Nurse practitioner because of paroxysmal A fib, non sustained V tach, essential HTN, CHF. Treated for above. An ECHO was not done because of concern with COVID. Patient was referred back to his cardiologist Dr Rigoberto Franks upon discharge. Since then the patient had a second CT scan on 10-15-20 with similar findings to the one on 9-17-20. The second CT was interpreted as CHF with bilateral pleural effusions and pulmonary edema. He has also presented to [facility] ER with similar complaints. His COVID test on 10-17-20 is Negative. The above data suggest that he does not have COVID but rather chronic CHF with pulmonary edema and pleural effusions. CURRENT EVALUATION : 10/21/2020    Afebrile  VS stable    RR:16  HR: 105  BP:109/60    On Lasix. Monitor off antibiotics. Patient was cardioverted on 10/19/2020 because he had persistent tachycardia and was in A-flutter. Strep Pneumoniae Ag and Legionella Ag are both negative. Mycoplasma Pneumonia IGM negative, but IGG is positive. Based on patient's last couple CXR results and antibody test results, patient seems to have pulmonary edema rather than Mycoplasma pneumonia. He is on 5L nasal Oxygen. Denies fevers/chills or chest pain. Has sob when he gets up and walks. Has some cough but states that it's due to post nasal drip. He is tachycardic and in aflutter again this morning. Patient exhibiting respiratory distress. No  Respiratory secretions: No    Patient receiving supplemental oxygen. 4 L NC. BIPAP as needed.   02 sat 92>94>90>99>97  RR 22>28>28>16>18  HR 61>70    Overall Daily Picture:    Improving    Presence of secondary bacterial Infection:    No   Additional antibiotics: No    Labs, X rays reviewed: 10/21/2020    BUN: 35-->28 > 26  Cr: 0.87-->0.78 > 0.63    WBC: 11-->10.3 > 9.3  Hb:11.9-->12.8 > 12.7  Plat: 396-->440 > 433    Absolute Neutrophils: 5.96  Absolute Lymphocytes: 1.39  Neutrophil/Lymphocyte Ratio: 4.28    CRP: 182.7  Ferritin:238  LDH:     Pro Calcitonin:  Pro BNP 3525  Troponin 29-->25-->18    Cultures:  Urine:  ·   Blood:  ·   Sputum :  ·   Wound:       CXR:   10-19-20:      · 10-17-20 Scattered pulmonary infiltrates. Pleural effusion    · 10-19-20   Persistent bilateral pulmonary opacities could represent pulmonary edema or    atypical pneumonia      ·   CAT:      Discussed with patient, RN, CC, IM. I have personally reviewed the past medical history, past surgical history, medications, social history, and family history, and I have updated the database accordingly.   Past Medical History:     Past Medical History:   Diagnosis Date    Atrial fibrillation ([address]     Back pain, chronic     Hill's esophagus 06/18/2019    BPH (benign prostatic hyperplasia)     Cancer (HCC)     colon-rectal    Cocaine abuse in remission [facility])     1970's    ED (erectile dysfunction) 4/2/2015    GERD (gastroesophageal reflux disease)     GI bleed 12/5/2018    Hernia     History of colon cancer     Melena     Migraines     Murmur, cardiac        Past Surgical  History:     Past Surgical History:   Procedure Laterality Date    CARDIAC CATHETERIZATION  11/29/2018    Non-obstructive CAD    COLECTOMY      2nd colectomy, Colostomy and reversed [facility] COLECTOMY      1st time Ksenia    COLONOSCOPY      COLONOSCOPY  07/18/2016    COLONOSCOPY N/A 12/6/2018    COLONOSCOPY DIAGNOSTIC performed by Lisa Gray MD at [address] Right 2009    inguinal    KNEE SURGERY Right 1970's    arthrotomy    TONSILLECTOMY      TOTAL KNEE ARTHROPLASTY Right 1/8/2019    KNEE TOTAL ARTHROPLASTY performed by Rhonda Ashton MD at [facility] TRANSESOPHAGEAL ECHOCARDIOGRAM  2018    UPPER GASTROINTESTINAL ENDOSCOPY N/A 2018    EGD DIAGNOSTIC ONLY performed by Areli Olivarez MD at [address] [place] [address] N/A 2019    MCGUIRE'S       Medications:      furosemide  40 mg Oral Daily    atorvastatin  40 mg Oral Daily    tamsulosin  0.4 mg Oral Daily    sodium chloride flush  10 mL Intravenous 2 times per day    rivaroxaban  20 mg Oral Daily with breakfast    metoprolol tartrate  25 mg Oral 3 times per day       Social History:     Social History     Socioeconomic History    Marital status: Single     Spouse name: Not on file    Number of children: Not on file    Years of education: Not on file    Highest education level: Not on file   Occupational History    Not on file   Social Needs    Financial resource strain: Not on file    Food insecurity     Worry: Not on file     Inability: Not on file   [employer] needs     Medical: Not on file     Non-medical: Not on file   Tobacco Use    Smoking status: Former Smoker     Packs/day: 0.50     Years: 1.00     Pack years: 0.50     Last attempt to quit: [address]     Years since quittin.8    Smokeless tobacco: Never Used   Substance and Sexual Activity    Alcohol use: Yes     Alcohol/week: 8.3 standard drinks     Types: 10 Standard drinks or equivalent per week     Comment: 3 -4 times a week    Drug use: No     Types:  Other-see comments     Comment: Cocaine use in past in     Sexual activity: Yes     Partners: Female   Lifestyle    Physical activity     Days per week: Not on file     Minutes per session: Not on file    Stress: Not on file   Relationships    Social connections     Talks on phone: Not on file     Gets together: Not on file     Attends Jainism service: Not on file     Active member of club or organization: Not on file     Attends meetings of clubs or organizations: Not on file     Relationship status: Not on file    Intimate partner violence     Fear of current or ex partner: Not on file     Emotionally abused: Not on file     Physically abused: Not on file     Forced sexual activity: Not on file   Other Topics Concern    Not on file   Social History Narrative    Not on file       Family History:     Family History   Problem Relation Age of Onset    Diabetes Mother     Heart Attack Father     Heart Disease Father     Heart Disease Brother         Allergies:   Adhesive tape; Codeine; and Penicillins     Review of Systems:       Constitutional: No fevers or chills. No systemic complaints  Head: No headaches  Eyes: No double vision or blurry vision. No conjunctival inflammation. ENT: No sore throat or runny nose. . No hearing loss, tinnitus or vertigo. Cardiovascular: No chest pain or palpitations. shortness of breath. CLAYTON  Lung: some cough, no sob. No sputum production  Abdomen: No nausea, vomiting, diarrhea, or abdominal pain. Cleophus Upper Tract No cramps. Genitourinary: No increased urinary frequency, or dysuria. No hematuria. No suprapubic or CVA pain  Musculoskeletal: No muscle aches or pains. No joint effusions, swelling or deformities  Hematologic: No bleeding or bruising. Neurologic: No headache, weakness, numbness, or tingling. Integument: No rash, no ulcers. Psychiatric: No depression. Endocrine: No polyuria, no polydipsia, no polyphagia. Physical Examination :     Patient Vitals for the past 8 hrs:   BP Temp Temp src Pulse Resp SpO2 Weight   10/21/20 0500 118/60 98.2 °F (36.8 °C) Oral 70 18 97 % 171 lb (77.6 kg)   10/21/20 0354 -- -- -- -- -- 96 % --   10/21/20 0049 100/66 -- -- 110 20 97 % --     General Appearance: Awake, alert, and in respiratory distress  Head:  Normocephalic, no trauma  Eyes: Pupils equal, round, reactive to light and accommodation; extraocular movements intact; sclera anicteric; conjunctivae pink. No embolic phenomena.   ENT: Oropharynx clear, without erythema, exudate, or thrush. No tenderness of sinuses. Mouth/throat: mucosa pink and moist. No lesions. Dentures  Neck:Supple, without lymphadenopathy. Thyroid normal, No bruits. Pulmonary/Chest: rales noted in the lower lungs bilaterally  Cardiovascular: Tachycardic with Grade 2/6 JANNETH, frequent missed beats, gallop rythm. Abdomen: Soft, non tender. Bowel sounds normal. No organomegaly  All four Extremities: No cyanosis, clubbing, edema, or effusions. Neurologic: No gross sensory or motor deficits. Skin: Warm and dry with good turgor. Signs of peripheral arterial insufficiency. No ulcerations. No open wounds. Medical Decision Making -Laboratory:   I have independently reviewed/ordered the following labs:    CBC with Differential:   Recent Labs     10/20/20  0600 10/21/20  0604   WBC 10.3 9.3   HGB 12.8* 12.7*   HCT 43.3 42.3    433   LYMPHOPCT 18* 15*   MONOPCT 11 11     BMP:   Recent Labs     10/20/20  0600 10/21/20  0604    136   K 4.0 3.7    102   CO2 25 22   BUN 28* 26*   CREATININE 0.78 0.63*     Hepatic Function Panel:   No results for input(s): PROT, LABALBU, BILIDIR, IBILI, BILITOT, ALKPHOS, ALT, AST in the last 72 hours. No results for input(s): RPR in the last 72 hours. No results for input(s): HIV in the last 72 hours. No results for input(s): BC in the last 72 hours. Lab Results   Component Value Date    MUCUS 2+ 12/27/2018    RBC 5.12 10/21/2020    TRICHOMONAS NOT REPORTED 12/27/2018    WBC 9.3 10/21/2020    YEAST NOT REPORTED 12/27/2018    TURBIDITY CLEAR 12/27/2018     Lab Results   Component Value Date    CREATININE 0.63 10/21/2020    GLUCOSE 96 10/21/2020       Medical Decision Making-Imaging:     PACS Images Gala Velze)     Show images for CT angiogram chest   Interpretation Summary       History:  Cough, increased shortness of breath, and elevated d-dimer.     Exam/Technique:  CTA imaging of the chest  is performed with bolus IV contrast with multiplanar reconstructions provided.   Volume rendered 3 -D Maximum intensity projection reconstructions constructed under concurrent physician supervision on a independent workstation and reviewed for purposes of   evaluation of the pulmonary arterial tree     Comparison:  Noncontrast chest CT from 9/17/2020     Findings: No pulmonary emboli demonstrated. The thoracic aorta is only weakly opacified but displays only scattered mild calcific plaques. Cardiomegaly megaly is demonstrated. There is some minimal pericardial fluid. No hilar or mediastinal mass lesions or other significant mediastinal   abnormalities are displayed.     Small bilateral pleural effusions are displayed. There is diffuse interstitial disease and scattered airspace infiltrates throughout both lungs.       No abnormalities are displayed in the portions of upper abdominal organs included.     IMPRESSION: Diffuse mixed interstitial and airspace infiltrates with small bilateral pleural effusions likely represents changes of CHF with pulmonary edema. Multifocal pneumonia cannot be excluded. Clinical correlation required.     No evidence of pulmonary emboli or other additional acute abnormalities.        All CT scans at this facility use dose modulation, iterative reconstruction, and/or weight based dosing when appropriate to reduce radiation dose to as low as reasonably achievable.     Workstation:[id number]     Finalized by Theo Lennox, MD on 10/15/2020 10:51 AM     CT chest without contrast (Accession [id number]) (Order [id number])  Order  Date and Time: 9/17/2020  8:31 PM Ordering Department: [place] [id number]   Reason for Exam    Pneumonia   PACS Images Alize Uribe)     Show images for CT chest without contrast   Interpretation Summary    CLINICAL INFORMATION: Dry cough. Shortness of breath.     COMPARISON: None.     PROCEDURE: Routine CT chest obtained without contrast. Multiplanar reformats obtained from the axial data.  Automated exposure control was utilized.     FINDINGS:     LUNG PARENCHYMA: Severe diffuse bilateral groundglass airspace opacifications noted in the lung parenchyma.     PLEURA: Small bilateral pleural effusions.     HEART/AORTA: Cardiomegaly. Pleural effusion noted.     PULMONARY ARTERIES: Normal in size.     LYMPH NODES: Enlarged mediastinal lymph nodes possibly due to reactive changes.     OSSEOUS STRUCTURES: Degenerative changes of the thoracic spine.     IMPRESSION:     1. Typical findings of Covid 19 pneumonia. Commonly reported imaging features of (COVID-19) pneumonia are present. Other processes such as influenza pneumonia and organizing pneumonia, as can be seen with drug toxicity and connective tissue disease, can cause a similar imaging pattern.     All CT scans at this facility use dose modulation, iterative reconstruction, and/or weight based dosing when appropriate to reduce radiation dose to as low as reasonably achievable.     Workstation:[id number]     Finalized by Taisha Sorenson MD on 9/17/2020 9:14 PM       Medical Decision [id number]:       Medical Decision Making-Other:     Note:  · Labs, medications, radiologic studies were reviewed with personal review of films  · Large amounts of data were reviewed  · Discussed with nursing Staff, Discharge planner  · Infection Control and Prevention measures reviewed  · All prior entries were reviewed  · Administer medications as ordered  · Prognosis: Guarded  · Discharge planning reviewed  · Follow up as outpatient. Thank you for allowing us to participate in the care of this patient. Please call with questions. Kimberley Ramos  Pager: [phone] - Office: [phone]    ATTESTATION:    I have discussed the case, including pertinent history and exam findings with the residents and students. I have seen and examined the patient and the key elements of the encounter have been performed by me.  I was present when the student obtained his information or examined the patient. I have reviewed the laboratory data, other diagnostic studies and discussed them with the residents. I have updated the medical record where necessary. I agree with the assessment, plan and orders as documented by the resident/ student.     Anjel French MD.

## 2020-10-21 NOTE — PLAN OF CARE
Problem: OXYGENATION/RESPIRATORY FUNCTION  Goal: Patient will maintain patent airway  10/21/2020 1048 by Ousmane Sidhu RN  Outcome: Ongoing  10/21/2020 0827 by Jeni Lancaster RN  Outcome: Ongoing  Goal: Patient will achieve/maintain normal respiratory rate/effort  Description: Respiratory rate and effort will be within normal limits for the patient  10/21/2020 1048 by Ousmane Sidhu RN  Outcome: Ongoing  10/21/2020 0827 by Jeni Lancaster RN  Outcome: Ongoing     Problem: HEMODYNAMIC STATUS  Goal: Patient has stable vital signs and fluid balance  10/21/2020 1048 by Ousmane Sidhu RN  Outcome: Ongoing  10/21/2020 0827 by Jeni Lancaster RN  Outcome: Ongoing     Problem: FLUID AND ELECTROLYTE IMBALANCE  Goal: Fluid and electrolyte balance are achieved/maintained  10/21/2020 1048 by Ousmane Sidhu RN  Outcome: Ongoing  10/21/2020 0827 by Jeni Lancaster RN  Outcome: Ongoing     Problem: ACTIVITY INTOLERANCE/IMPAIRED MOBILITY  Goal: Mobility/activity is maintained at optimum level for patient  10/21/2020 1048 by Ousmane Sidhu RN  Outcome: Ongoing  10/21/2020 0827 by Jeni Lancaster RN  Outcome: Ongoing     Problem: Falls - Risk of:  Goal: Will remain free from falls  Description: Will remain free from falls  10/21/2020 1048 by Ousmane Sidhu RN  Outcome: Ongoing  10/21/2020 0827 by Jeni Lancaster RN  Outcome: Ongoing  Goal: Absence of physical injury  Description: Absence of physical injury  10/21/2020 1048 by Ousmane Sidhu RN  Outcome: Ongoing  10/21/2020 0827 by Jeni Lancaster RN  Outcome: Ongoing     Problem: Pain:  Goal: Pain level will decrease  Description: Pain level will decrease  10/21/2020 1048 by Ousmane Sidhu RN  Outcome: Ongoing  10/21/2020 0827 by Jeni Lancaster RN  Outcome: Ongoing  Goal: Control of acute pain  Description: Control of acute pain  10/21/2020 1048 by Ousmane Sidhu RN  Outcome: Ongoing  10/21/2020 2647 by Artemus Osgood, RN  Outcome: Ongoing  Goal: Control of chronic pain  Description: Control of chronic pain  10/21/2020 1048 by Nirav Shoemaker RN  Outcome: Ongoing  10/21/2020 0827 by Artemus Osgood, RN  Outcome: Ongoing

## 2020-10-21 NOTE — PROGRESS NOTES
PROVIDE ADEQUATE OXYGENATION WITH ACCEPTABLE SP02/ABG'S    [x]  IDENTIFY APPROPRIATE OXYGEN THERAPY  [x]   MONITOR SP02/ABG'S AS NEEDED   [x]   PATIENT EDUCATION AS NEEDED    [x]  NON INVASIVE VENTILATION  [x]  PROVIDE OPTIMAL VENTILATION/ACCEPTABLE SP02  []  IMPLEMENT NON INVASIVE VENTILATION PROTOCOL  [x]  ASSESSMENT SKIN INTEGRITY  [x]  PATIENT EDUCATION AS NEEDED  [x]  BIPAP AS NEEDED

## 2020-10-21 NOTE — PROGRESS NOTES
Linens changed per pt request, lotion applied to back and pt was provided baby powder. pt sitting in chair resting now. Will continue to monitor.

## 2020-10-21 NOTE — FLOWSHEET NOTE
Patient was NSR in the beginning of the shift, by 0015 the rhythm became irregular, heart rate jumping up and down between 90s and 120s, and blood pressure borderline hypotension. 12 leads EKG showed A.flutter with  variable AV block. Writer paged cardiology Dr. Sylvia Aschoff and the on call internal medicine resident. Dr. Sg Barraza call back and spoke to the writer and the charge nurse, and ordered Amiodarone drip. Will continue to monitor.   Zoraida Ge RN

## 2020-10-21 NOTE — PROGRESS NOTES
[facility]  Internal Medicine Teaching Residency Program  Inpatient Daily Progress Note  ______________________________________________________________________________    Patient: Giuliana Williamson  YOB: 1955   YWH:[id number]    Acct: [de-identified]     Room: 2019/2019-01  Admit date: 10/17/2020  Today's date: 10/21/20  Number of days in the hospital: 4    SUBJECTIVE   Admitting Diagnosis: CHF (congestive heart failure), NYHA class II, acute on chronic, combined ([facility] [address]  CC:     Pt examined at bedside. Chart & results reviewed. Patient sitting in the chair nasal cannula. Patient had a run of atrial flutter overnight after he stopped his BiPAP. Amiodarone drip was restarted by cardiology. Patient denies any palpitations or shortness of breath. Patient desaturating when tried to wean off of oxygen. ROS:  Constitutional:  negative for chills, fevers, sweats  Respiratory:  negative for cough, dyspnea on exertion, hemoptysis, shortness of breath, wheezing  Cardiovascular:  negative for chest pain, chest pressure/discomfort, lower extremity edema, palpitations  Gastrointestinal:  negative for abdominal pain, constipation, diarrhea, nausea, vomiting  Neurological:  negative for dizziness, headache  BRIEF HISTORY     The patient is a pleasant 77 y.o. male with past medical history of diastolic CHF, essential hypertension, dural fibrillation dyslipidemia, colon cancer status post resection, Hill's esophagus presents  to the ED with a chief complaint of acute onset of shortness of breath with productive cough since last 3 days, visited with some dizziness and chest pain.      Patient was recently treated for  COVID pneumonia with imaging concerning for COVID even though rapid test came back negative on 2 occasions . he was discharged on oxygen which he uses since then.   Today he complains of increased shortness of breath this morning which made him come to the ER.  Initially in the ER he was found to be having saturations of 65%. Patient also have the chest pain which is sharp,localised, non radiating and non reproducible on deep breathing.      Pt denies headache, lightheadedness, focal neurological deficits, abdominal pain, nausea, fever, chills,. Initial Vitals on presentation : Blood pressure of 158/118, pulse rate of 140, respiratory 46 saturations at 65 on room air     Significant Labs :   Troponin high-sensitivity 29>25>Hemoglobin of 11.8, hematocrit of 39 glucose 120, creatinine of 0.63 proBNP 3525 procalcitonin 0.29 .7, rapid COVID-19 negative, lactic acid 1.6, d-dimer 1.24. Chest x-ray : Bilateral scattered pulmonary opacities     Treatment in ED : Initially treated with metoprolol for atrial fibrillation with RVR, furosemide 40 mg IV for congestive heart failure, levofloxacin 750 mg every 24,    OBJECTIVE     Vital Signs:  /60   Pulse 70   Temp 98.2 °F (36.8 °C) (Oral)   Resp 18   Wt 171 lb (77.6 kg)   SpO2 97%   BMI 23.19 kg/m²     Temp (24hrs), Av.6 °F (37 °C), Min:98.2 °F (36.8 °C), Max:99.1 °F (37.3 °C)    In: 550   Out: 1300 [Urine:1300]    Physical Exam:  Constitutional: This is a well developed, well nourished, 25-29.9 - Overweight 77y.o. year old male who is alert, oriented, cooperative and in no apparent distress. Head:normocephalic and atraumatic. Respiratory: Chest was symmetrical without dullness to percussion. Fine crackles heard on the left  Cardiovascular: Regular without murmur, clicks, gallops or rubs. Abdomen: Slightly rounded and soft without organomegaly. No rebound, rigidity or guarding was appreciated. Lymphatic: No lymphadenopathy. Musculoskeletal: Normal curvature of the spine. No gross muscle weakness. Extremities:  No lower extremity edema, ulcerations, tenderness, varicosities or erythema. Muscle size, tone and strength are normal.  No involuntary movements are noted.     Skin:  Warm and dry.  Good color, turgor and pigmentation. No lesions or scars. No cyanosis or clubbing  Neurological/Psychiatric: The patient's general behavior, level of consciousness, thought content and emotional status is normal.        Medications:  Scheduled Medications:    furosemide  40 mg Oral Daily    atorvastatin  40 mg Oral Daily    tamsulosin  0.4 mg Oral Daily    sodium chloride flush  10 mL Intravenous 2 times per day    rivaroxaban  20 mg Oral Daily with breakfast    metoprolol tartrate  25 mg Oral 3 times per day     Continuous Infusions:    amiodarone 1 mg/min (10/21/20 0216)    Followed by   [facility] amiodarone       PRN Medicationssodium chloride flush, 10 mL, PRN  acetaminophen, 650 mg, Q6H PRN    Or  acetaminophen, 650 mg, Q6H PRN  polyethylene glycol, 17 g, Daily PRN  promethazine, 12.5 mg, Q6H PRN    Or  ondansetron, 4 mg, Q6H PRN  potassium chloride, 40 mEq, PRN    Or  potassium alternative oral replacement, 40 mEq, PRN    Or  potassium chloride, 10 mEq, PRN  magnesium sulfate, 1 g, PRN  metoprolol, 5 mg, Q6H PRN        Diagnostic Labs:  CBC:   Recent Labs     10/19/20  0649 10/20/20  0600 10/21/20  0604   WBC 11.0 10.3 9.3   RBC 4.70 5.04 5.12   HGB 11.9* 12.8* 12.7*   HCT 39.0* 43.3 42.3   MCV 83.0 85.9 82.6   RDW 17.6* 17.6* 16.8*    440 433     BMP:   Recent Labs     10/19/20  0649 10/20/20  0600 10/21/20  0604    137 136   K 4.0 4.0 3.7    102 102   CO2 25 25 22   BUN 35* 28* 26*   CREATININE 0.87 0.78 0.63*       ASSESSMENT & PLAN     1. Acute on chronic systolic congestive heart failure. Continue on Lasix 40 mg, fluid restriction, input output monitoring. 2.  Chest pain NSTEMI type II -Follow-up on EKG, trend troponins, will monitor the patient    3. Atrial fibrillation with RVR s/p cardioversion - Patient restarted on amiodarone drip by cardiology. Continue on metoprolol tartrate 25 mg every 8 oral. Continue on xarelto 20mg, continue telemetry monitoring    4.  Suspected

## 2020-10-21 NOTE — PLAN OF CARE
Problem: OXYGENATION/RESPIRATORY FUNCTION  Goal: Patient will maintain patent airway  Outcome: Ongoing  Goal: Patient will achieve/maintain normal respiratory rate/effort  Description: Respiratory rate and effort will be within normal limits for the patient  Outcome: Ongoing     Problem: FLUID AND ELECTROLYTE IMBALANCE  Goal: Fluid and electrolyte balance are achieved/maintained  Outcome: Ongoing     Problem: ACTIVITY INTOLERANCE/IMPAIRED MOBILITY  Goal: Mobility/activity is maintained at optimum level for patient  Outcome: Ongoing     Problem: Falls - Risk of:  Goal: Will remain free from falls  Description: Will remain free from falls  Outcome: Ongoing  Goal: Absence of physical injury  Description: Absence of physical injury  Outcome: Ongoing     Problem: Pain:  Goal: Pain level will decrease  Description: Pain level will decrease  Outcome: Ongoing  Goal: Control of acute pain  Description: Control of acute pain  Outcome: Ongoing  Goal: Control of chronic pain  Description: Control of chronic pain  Outcome: Ongoing

## 2020-10-21 NOTE — FLOWSHEET NOTE
DATE: 10/21/2020    NAME: Karina Manuel  MRN: [id number]   : 1955    Patient not seen this date for Physical Therapy due to:  [] Blood transfusion in progress  [] Hemodialysis  []  Patient Declined  [] Spine Precautions   [] Strict Bedrest  [] Surgery/ Procedure  [] Testing      [x] Other: Vitals unstable, Upon arrival pt standing to use urinal and gold underwear, HR increased up to 160bpm and O2 decreased to 85%, RN notified, Rn reports Dr was just in with pt and is aware        [] PT being discontinued at this time. Patient independent. No further needs. [] PT being discontinued at this time as the patient has been transferred to palliative care. No further needs.     Wesley Amaral, PTA

## 2020-10-21 NOTE — PROGRESS NOTES
Physician Progress Note      PATIENT:               Suzan Schmitt  [facility] #:                  [id number]  :                       1955  ADMIT DATE:       10/17/2020 7:08 AM  DISCH DATE:  RESPONDING  PROVIDER #:        Antionette Saint MD          QUERY TEXT:    Patient admitted with Acute on chronic chf . Noted documentation of NSTEMI    type 2 in progress notes on 10/19. cardiology note documents CHF, cad and a   fib. troponin levels of 18 and 22 and EKG shows no acute changes   If   possible, please document in progress notes and discharge summary:    [NSTEMI type 2 present as evidenced by::NSTEMI  type 2 is present as evidenced   by##Please document evidence.]]    The medical record reflects the following:  Risk Factors: cad, chf, htn , a fib  Clinical Indicators: EKG on admission shows no acute changes. troponin levels   of 18 and 22. cardiology consult documents   non obstructive CAD  Treatment: monitoring    Thank you Bernice Coats RN CCDS BSN. . call if questions [phone]  Options provided:  -- NSTEMI  was ruled out  -- Other - I will add my own diagnosis  -- Disagree - Not applicable / Not valid  -- Disagree - Clinically unable to determine / Unknown  -- Refer to Clinical Documentation Reviewer    PROVIDER RESPONSE TEXT:    Non obstructive coronary artery disease    Query created by: Rayna Felder on 10/20/2020 1:23 PM      QUERY TEXT:    Patient admitted with Acute CHF  Noted documentation of community acquired   pneumonia vs fluid. ID consult documents admission for recent pneumonia. If   possible, please document in progress notes and discharge summary:    The medical record reflects the following:  Risk Factors: chf, recent admission for pneumonia and to rule out covid 19  Clinical Indicators: id consult documents low suspicion for covid. recent   admission pneumonia and monitor off antibiotics  Treatment: monitoring    Thank you Bernice URIBES BSN. . call if questions [phone]  Options provided:  -- pneumonia was ruled out  -- Other - I will add my own diagnosis  -- Disagree - Not applicable / Not valid  -- Disagree - Clinically unable to determine / Unknown  -- Refer to Clinical Documentation Reviewer    PROVIDER RESPONSE TEXT:    pneumonia was ruled out after study. Query created by:  Ana Chapin on 10/20/2020 1:26 PM      Electronically signed by:  Javon Ernandez MD 10/21/2020 7:03 AM

## 2020-10-22 PROBLEM — J98.4: Status: ACTIVE | Noted: 2020-10-22

## 2020-10-22 LAB
ABSOLUTE EOS #: 0.65 K/UL (ref 0–0.44)
ABSOLUTE IMMATURE GRANULOCYTE: 0.45 K/UL (ref 0–0.3)
ABSOLUTE LYMPH #: 1.48 K/UL (ref 1.1–3.7)
ABSOLUTE MONO #: 1.07 K/UL (ref 0.1–1.2)
ANION GAP SERPL CALCULATED.3IONS-SCNC: 8 MMOL/L (ref 9–17)
BASOPHILS # BLD: 1 % (ref 0–2)
BASOPHILS ABSOLUTE: 0.12 K/UL (ref 0–0.2)
BUN BLDV-MCNC: 22 MG/DL (ref 8–23)
BUN/CREAT BLD: ABNORMAL (ref 9–20)
CALCIUM SERPL-MCNC: 8.7 MG/DL (ref 8.6–10.4)
CHLORIDE BLD-SCNC: 100 MMOL/L (ref 98–107)
CO2: 24 MMOL/L (ref 20–31)
CREAT SERPL-MCNC: 0.68 MG/DL (ref 0.7–1.2)
DIFFERENTIAL TYPE: ABNORMAL
EKG ATRIAL RATE: 277 BPM
EKG P AXIS: 65 DEGREES
EKG Q-T INTERVAL: 522 MS
EKG QRS DURATION: 94 MS
EKG QTC CALCULATION (BAZETT): 516 MS
EKG R AXIS: -4 DEGREES
EKG T AXIS: 12 DEGREES
EKG VENTRICULAR RATE: 59 BPM
EOSINOPHILS RELATIVE PERCENT: 6 % (ref 1–4)
GFR AFRICAN AMERICAN: >60 ML/MIN
GFR NON-AFRICAN AMERICAN: >60 ML/MIN
GFR SERPL CREATININE-BSD FRML MDRD: ABNORMAL ML/MIN/{1.73_M2}
GFR SERPL CREATININE-BSD FRML MDRD: ABNORMAL ML/MIN/{1.73_M2}
GLUCOSE BLD-MCNC: 98 MG/DL (ref 70–99)
HCT VFR BLD CALC: 42 % (ref 40.7–50.3)
HEMOGLOBIN: 13 G/DL (ref 13–17)
HISTOPLASMA ABS, ID: NORMAL
HISTOPLASMA ANTIBODY MYCELIAL CF: NORMAL
HISTOPLASMA ANTIBODY YEAST CF: NORMAL
IMMATURE GRANULOCYTES: 4 %
LYMPHOCYTES # BLD: 13 % (ref 24–43)
MCH RBC QN AUTO: 25.6 PG (ref 25.2–33.5)
MCHC RBC AUTO-ENTMCNC: 31 G/DL (ref 28.4–34.8)
MCV RBC AUTO: 82.7 FL (ref 82.6–102.9)
MONOCYTES # BLD: 10 % (ref 3–12)
NRBC AUTOMATED: 0 PER 100 WBC
PDW BLD-RTO: 16.8 % (ref 11.8–14.4)
PLATELET # BLD: 516 K/UL (ref 138–453)
PLATELET ESTIMATE: ABNORMAL
PMV BLD AUTO: 9.1 FL (ref 8.1–13.5)
POTASSIUM SERPL-SCNC: 3.9 MMOL/L (ref 3.7–5.3)
RBC # BLD: 5.08 M/UL (ref 4.21–5.77)
RBC # BLD: ABNORMAL 10*6/UL
SEDIMENTATION RATE, ERYTHROCYTE: 82 MM (ref 0–20)
SEG NEUTROPHILS: 66 % (ref 36–65)
SEGMENTED NEUTROPHILS ABSOLUTE COUNT: 7.31 K/UL (ref 1.5–8.1)
SODIUM BLD-SCNC: 132 MMOL/L (ref 135–144)
WBC # BLD: 11.1 K/UL (ref 3.5–11.3)
WBC # BLD: ABNORMAL 10*3/UL

## 2020-10-22 PROCEDURE — 6370000000 HC RX 637 (ALT 250 FOR IP): Performed by: STUDENT IN AN ORGANIZED HEALTH CARE EDUCATION/TRAINING PROGRAM

## 2020-10-22 PROCEDURE — 97116 GAIT TRAINING THERAPY: CPT

## 2020-10-22 PROCEDURE — 97535 SELF CARE MNGMENT TRAINING: CPT

## 2020-10-22 PROCEDURE — 85652 RBC SED RATE AUTOMATED: CPT

## 2020-10-22 PROCEDURE — 99233 SBSQ HOSP IP/OBS HIGH 50: CPT | Performed by: INTERNAL MEDICINE

## 2020-10-22 PROCEDURE — 2580000003 HC RX 258: Performed by: STUDENT IN AN ORGANIZED HEALTH CARE EDUCATION/TRAINING PROGRAM

## 2020-10-22 PROCEDURE — 2580000003 HC RX 258: Performed by: INTERNAL MEDICINE

## 2020-10-22 PROCEDURE — 2060000000 HC ICU INTERMEDIATE R&B

## 2020-10-22 PROCEDURE — 36415 COLL VENOUS BLD VENIPUNCTURE: CPT

## 2020-10-22 PROCEDURE — 6370000000 HC RX 637 (ALT 250 FOR IP): Performed by: NURSE PRACTITIONER

## 2020-10-22 PROCEDURE — 85025 COMPLETE CBC W/AUTO DIFF WBC: CPT

## 2020-10-22 PROCEDURE — 6360000002 HC RX W HCPCS: Performed by: INTERNAL MEDICINE

## 2020-10-22 PROCEDURE — 80048 BASIC METABOLIC PNL TOTAL CA: CPT

## 2020-10-22 PROCEDURE — 99223 1ST HOSP IP/OBS HIGH 75: CPT | Performed by: INTERNAL MEDICINE

## 2020-10-22 PROCEDURE — 97165 OT EVAL LOW COMPLEX 30 MIN: CPT

## 2020-10-22 RX ORDER — FAMOTIDINE 20 MG/1
20 TABLET, FILM COATED ORAL 2 TIMES DAILY
Status: DISCONTINUED | OUTPATIENT
Start: 2020-10-22 | End: 2020-10-26 | Stop reason: HOSPADM

## 2020-10-22 RX ORDER — PREDNISONE 20 MG/1
40 TABLET ORAL DAILY
Status: DISCONTINUED | OUTPATIENT
Start: 2020-10-22 | End: 2020-10-26 | Stop reason: HOSPADM

## 2020-10-22 RX ADMIN — SODIUM CHLORIDE, PRESERVATIVE FREE 10 ML: 5 INJECTION INTRAVENOUS at 20:19

## 2020-10-22 RX ADMIN — TAMSULOSIN HYDROCHLORIDE 0.4 MG: 0.4 CAPSULE ORAL at 08:05

## 2020-10-22 RX ADMIN — PREDNISONE 40 MG: 20 TABLET ORAL at 12:55

## 2020-10-22 RX ADMIN — RIVAROXABAN 20 MG: 20 TABLET, FILM COATED ORAL at 08:05

## 2020-10-22 RX ADMIN — AMIODARONE HYDROCHLORIDE 0.5 MG/MIN: 50 INJECTION, SOLUTION INTRAVENOUS at 02:11

## 2020-10-22 RX ADMIN — FUROSEMIDE 40 MG: 40 TABLET ORAL at 08:05

## 2020-10-22 RX ADMIN — METOPROLOL TARTRATE 12.5 MG: 25 TABLET ORAL at 14:00

## 2020-10-22 RX ADMIN — ATORVASTATIN CALCIUM 40 MG: 80 TABLET, FILM COATED ORAL at 08:05

## 2020-10-22 RX ADMIN — FAMOTIDINE 20 MG: 20 TABLET ORAL at 21:28

## 2020-10-22 RX ADMIN — METOPROLOL TARTRATE 25 MG: 25 TABLET ORAL at 20:18

## 2020-10-22 ASSESSMENT — PAIN SCALES - GENERAL
PAINLEVEL_OUTOF10: 0

## 2020-10-22 NOTE — PROGRESS NOTES
Occupational Therapy   Occupational Therapy Initial Assessment  Date: 10/22/2020   Patient Name: Meagan Mead  MRN: [id number]     : 1955    Date of Service: 10/22/2020    Discharge Recommendations:       No therapy recommended at discharge. Assessment   Performance deficits / Impairments: Decreased functional mobility ; Decreased ADL status; Decreased endurance  Assessment: Pt performed transfers at SBA and functional mobiliyt at SBA d/t low O2 sat. Pt donned socks while seated EOB at supervision d/t safety concerns from low O2 sats. Low O2 sat causes endurance limitations decreasing the pt's ability to safely participate in transfers/mobility and ADLs. OT is warranted to educate about EC/WS techniques and to assess for the need of AD. Prognosis: Good  Decision Making: Low Complexity  Patient Education: Pt educated on role of OT, purpose of OT evaluation, and pursed lip breathing - good return  REQUIRES OT FOLLOW UP: Yes  Activity Tolerance  Activity Tolerance: Patient limited by fatigue;Patient Tolerated treatment well  Activity Tolerance: Initially on 3.5 L O2 in room. Increased O2 to 4 L while ambulating in hallway d/t O2 sat decreasing to 87%. While seated EOB at the end of the session, room O2 increased to 4.5L d/t O2 sat decreasing to 84%. RN notified  Safety Devices  Safety Devices in place: Yes  Type of devices: Gait belt;Call light within reach;Nurse notified; Left in bed  Restraints  Initially in place: No           Patient Diagnosis(es): The primary encounter diagnosis was Hypoxia. Diagnoses of Dyspnea and respiratory abnormalities and Chest pain, unspecified type were also pertinent to this visit.      has a past medical history of Atrial fibrillation ([facility] [address] Back pain, chronic, Hlil's esophagus, BPH (benign prostatic hyperplasia), Cancer ([facility] [address] Cocaine abuse in remission [facility]), ED (erectile dysfunction), GERD (gastroesophageal reflux disease), GI bleed, Hernia, History of colon cancer, Melena, Migraines, and Murmur, cardiac.   has a past surgical history that includes Tonsillectomy; Colonoscopy; colectomy; Colonoscopy (07/18/2016); knee surgery (Right, 1970's); transesophageal echocardiogram (11/29/2018); Upper gastrointestinal endoscopy (N/A, 12/5/2018); Colonoscopy (N/A, 12/6/2018); hernia repair (Right, 2009); Cardiac catheterization (11/29/2018); colectomy; Total knee arthroplasty (Right, 1/8/2019); and Upper gastrointestinal endoscopy (N/A, 6/18/2019). Restrictions  Restrictions/Precautions  Restrictions/Precautions: General Precautions(up with assistance)  Required Braces or Orthoses?: No  Position Activity Restriction  Other position/activity restrictions: Room O2 at 3.5 L upon arrival, room O2 left at 4.5 L upon departure with nursing notified. Subjective   General  Patient assessed for rehabilitation services?: Yes  Family / Caregiver Present: Yes(spouse)  General Comment  Comments: RN ok'd pt for OT eval, pt pleasant, cooperative, and motivated throughout session.   Patient Currently in Pain: No  Pain Assessment  Pain Assessment: 0-10  Pain Level: 0  Patient's Stated Pain Goal: No pain  Vital Signs  Patient Currently in Pain: No    Social/Functional History  Social/Functional History  Lives With: Spouse  Type of Home: House  Home Layout: Two level, Laundry in basement, Bed/Bath upstairs  Home Access: Stairs to enter with rails  Entrance Stairs - Number of Steps: 3  Entrance Stairs - Rails: Both  Bathroom Shower/Tub: Tub/Shower unit, Shower chair with back  Bathroom Toilet: Standard  Bathroom Equipment: Toilet raiser(toilet raiser with B grab bars)  Home Equipment: Quad cane, Rolling walker  ADL Assistance: Independent  Homemaking Assistance: Independent  Ambulation Assistance: Independent  Transfer Assistance: Independent  Active : Yes  Mode of Transportation: Truck  Occupation: Full time employment  Type of occupation: [employer]       Objective   Vision: Within Functional Limits  Hearing: Within functional limits          Balance  Sitting Balance: Supervision  Standing Balance: Stand by assistance  Standing Balance  Time: ~1 Min  Activity: EOB prior to mobility  Functional Mobility  Activity: Other(ambulated household distances)  Assist Level: Stand by assistance  ADL  Feeding: Independent;Setup  Grooming: Independent;Setup  UE Bathing: Supervision;Setup  LE Bathing: Supervision;Setup  UE Dressing: Supervision;Setup  LE Dressing: Supervision;Setup(Pt donned socks while seated EOB)  Toileting: Stand by assistance  Tone RUE  RUE Tone: Normotonic  Tone LUE  LUE Tone: Normotonic  Coordination  Movements Are Fluid And Coordinated: Yes     Bed mobility  Supine to Sit: Supervision  Sit to Supine: Supervision  Transfers  Sit to stand: Stand by assistance  Stand to sit: Stand by assistance     Cognition  Overall Cognitive Status: WFL        Sensation  Overall Sensation Status: WFL        LUE AROM (degrees)  LUE AROM : WFL  Left Hand AROM (degrees)  Left Hand AROM: WFL  RUE AROM (degrees)  RUE AROM : WFL  Right Hand AROM (degrees)  Right Hand AROM: WFL  LUE Strength  Gross LUE Strength: WNL  L Hand General: 5/5  RUE Strength  Gross RUE Strength:  WNL  R Hand General: 5/5              Plan   Plan  Times per week: 2-3x/wk  Current Treatment Recommendations: Functional Mobility Training, Endurance Training, Safety Education & Training, Patient/Caregiver Education & Training, Equipment Evaluation, Education, & procurement, Self-Care / ADL             AM-PAC Score        AM-[facility] Inpatient Daily Activity Raw Score: 20 (10/22/20 1554)  AM-PAC Inpatient ADL T-Scale Score : 42.03 (10/22/20 1554)  ADL Inpatient CMS 0-100% Score: 38.32 (10/22/20 1554)  ADL Inpatient CMS G-Code Modifier : Rimamilena Aaron (10/22/20 1554)    Goals  Short term goals  Time Frame for Short term goals: By discharge, pt will  Short term goal 1: perform UB ADLs independently  Short term goal 2: perform LB ADLs independently  Short term goal 3: tolerate 25+ min functional activity at supervision using EC/WS techniques PRN  Short term goal 4: safely perform transfer/mobility tasks at Mod I using EC/WS techniques PRN       Therapy Time   Individual Concurrent Group Co-treatment   Time In 1416         Time Out 1440         Minutes 24         Timed Code Treatment Minutes: [address]

## 2020-10-22 NOTE — PROGRESS NOTES
Physical Therapy  Facility/Department: [facility] 2  Daily Treatment Note  NAME: Corrine Flynn  : 1955  MRN: [id number]    Date of Service: 10/22/2020    Discharge Recommendations:    Discharge Recommendations:    Further therapy recommended at discharge. PT Equipment Recommendations  Equipment Needed: No     Assessment     Body structures, Functions, Activity limitations: Decreased safe awareness;Decreased endurance  Prognosis: Good  Decision Making: Low Complexity  PT Education: Goals;PT Role;Plan of Care;General Safety  REQUIRES PT FOLLOW UP: Yes  Activity Tolerance  Activity Tolerance: Treatment limited secondary to medical complications (O2 sats below 90%)         Patient Diagnosis(es): The primary encounter diagnosis was Hypoxia. Diagnoses of Dyspnea and respiratory abnormalities and Chest pain, unspecified type were also pertinent to this visit. has a past medical history of Atrial fibrillation ([facility] [address] Back pain, chronic, Hill's esophagus, BPH (benign prostatic hyperplasia), [facility] Cocaine abuse in remission [facility]), ED (erectile dysfunction), GERD (gastroesophageal reflux disease), GI bleed, Hernia, History of colon cancer, Melena, Migraines, and Murmur, cardiac.   has a past surgical history that includes Tonsillectomy; Colonoscopy; colectomy; Colonoscopy (2016); knee surgery (Right, 's); transesophageal echocardiogram (2018); Upper gastrointestinal endoscopy (N/A, 2018); Colonoscopy (N/A, 2018); hernia repair (Right, ); Cardiac catheterization (2018); colectomy; Total knee arthroplasty (Right, 2019); and Upper gastrointestinal endoscopy (N/A, 2019). Restrictions  Restrictions/Precautions  Restrictions/Precautions: General Precautions, Fall Risk, Up as Tolerated  Position Activity Restriction  Other position/activity restrictions: 2L O2 via nasal cannula  Subjective    Pt sitting up on the EOB. Pt's wife is present. Pt has no c/o pain. Orientation    Overall Orientation Status: Within Normal Limits  Objective     Transfers  Sit to Stand: Independent  Stand to sit: Independent  Ambulation  Ambulation?: Yes,  Device: Pt held onto IV Pole. Assist: SBA  Distance: 250' x 1  Comment: Pt's SP02 ranged from SP02 84% - 87% on 6L 02. Balance  Posture: Good  Sitting - Static: Good  Sitting - Dynamic: Good  Standing - Static: Good  Standing - Dynamic: Good  Comments: standing balance assessed without AD   No ex's d/t low SP 02. Goals  Short term goals  Time Frame for Short term goals: 12 visits  Short term goal 1: pt will ambulate 200' with least restrictive device independently  Short term goal 2: pt will ascend/descend 3 stairs with B handrail mod (I)  Short term goal 3: pt will demo increased endurance by tolerating a 25 minute therapy session  Patient Goals   Patient goals : to return home    Plan    Plan  Times per week: 5-6 visits per week  Times per day: Daily  Current Treatment Recommendations: Balance Training, Endurance Training, Gait Training, Stair training  Safety Devices  Type of devices:  All fall risk precautions in place, Call light within reach, Gait belt, Left on EOB, Nurse notified  Restraints  Initially in place: No     Therapy Time   Individual Concurrent Group Co-treatment   Time In  220         Time Out  240         Minutes  [place] [id number], [place]

## 2020-10-22 NOTE — PLAN OF CARE
Problem: OXYGENATION/RESPIRATORY FUNCTION  Goal: Patient will maintain patent airway  Outcome: Ongoing  Goal: Patient will achieve/maintain normal respiratory rate/effort  Description: Respiratory rate and effort will be within normal limits for the patient  Outcome: Ongoing     Problem: HEMODYNAMIC STATUS  Goal: Patient has stable vital signs and fluid balance  Outcome: Ongoing     Problem: FLUID AND ELECTROLYTE IMBALANCE  Goal: Fluid and electrolyte balance are achieved/maintained  Outcome: Ongoing     Problem: ACTIVITY INTOLERANCE/IMPAIRED MOBILITY  Goal: Mobility/activity is maintained at optimum level for patient  Outcome: Ongoing     Problem: Falls - Risk of:  Goal: Will remain free from falls  Description: Will remain free from falls  Outcome: Ongoing  Goal: Absence of physical injury  Description: Absence of physical injury  Outcome: Ongoing     Problem: Pain:  Goal: Pain level will decrease  Description: Pain level will decrease  Outcome: Ongoing  Goal: Control of acute pain  Description: Control of acute pain  Outcome: Ongoing  Goal: Control of chronic pain  Description: Control of chronic pain  Outcome: Ongoing     Problem: Falls - Risk of:  Goal: Will remain free from falls  Description: Will remain free from falls  Outcome: Ongoing  Goal: Absence of physical injury  Description: Absence of physical injury  Outcome: Ongoing

## 2020-10-22 NOTE — PROGRESS NOTES
Pt up in chair for breakfast, had questions regarding POC, waiting for services to round to find out plan. Weaned o2 from 5L to 3.5. Pt comfortable at this time, will continue to monitor.

## 2020-10-22 NOTE — PROGRESS NOTES
PULMONARY & CRITICAL CARE MEDICINE CONSULT NOTE     Patient:  Rvaen Olson  MRN: [id number]  Admit date: 10/17/2020  Primary Care Physician: Dawson Dubose MD  Consulting Physician: Kaleb Encarnacion MD  CODE Status: Full Code     HISTORY     CHIEF COMPLAINT/REASON FOR CONSULT:    HISTORY OF PRESENT ILLNESS:  PMH: hx cocaine abuse, Atrial Fibrillation, Hill's, diastolic chf, colon cancer s/p resection    The patient is a 77 y.o. male with significant past medical history as above presented to the hospital on 10/17 with a chief complaint of shortness of breath and productive cough x 3 days. Associated dizziness. Of note patient was recently treated for Covid-19 pneumonia at [facility] despite covid-19 negative testing x 2 in 9/2020. S/p decadron, remdisivir, rocephin, & discharged on home oxygen 1.5 L NC. CT from [facility] with typical findings of covid-19. On admission, patient was desaturating to 65%. Tachycardia and Tachypnea on admission. Hemodynamically stable. Placed on 5 L NC. Initial BMP unremarkable. Procal 0.29. ABG on admission: 7.432 / 37.2 / 98 / 24.8 (likely on oxygen). . Pro-bnp 3525. Hepatic panel negative. TSH WNL. No leukocytosis. + normocytic anemia. Ferritin 238. D-dimer 1.24. Viral panel negative. Covid PCR negative. Legionella, Mycoplasma IgM, strep pneumonia all negative. ANCA, YOUNG, C3/4 negative. Serological evidence of prior mycoplasma infection. Serial CXRs have shown bilateral pulmonary infiltrates. CT chest showed ground glass attenuation, no pe, and septal thickening in the lower lobes. Echocardiogram shows moderately reduced EF at 35%. Global hypokinesis. Grade 1 diastolic dysfunction. Mild-moderate MR. RVSP 34 mmHg. Patient has been treated for diastolic/systolic CHF with diuresis. Currently -7.5 L since admission. Patient has also been treated for atrial fibrillation and cardioverted on 10/19 and remains in sinus rhythm.      Patient notes that prior to September he has been having some dry cough at night time for a number of weeks. However, he does not report much shortness of breath prior to September when he was admitted to [facility]. Has a dog at home. 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 No other animal exposure. No real tobacco abuse history.        WBC   Date Value Ref Range Status   10/22/2020 11.1 3.5 - 11.3 k/uL Final       PAST MEDICAL HISTORY:        Diagnosis Date    Atrial fibrillation (HCC)     Back pain, chronic     Mcguire's esophagus 06/18/2019    BPH (benign prostatic hyperplasia)     Cancer (HCC)     colon-rectal    Cocaine abuse in remission [facility])     1970's    ED (erectile dysfunction) 4/2/2015    GERD (gastroesophageal reflux disease)     GI bleed 12/5/2018    Hernia     History of colon cancer     Melena     Migraines     Murmur, cardiac      PAST SURGICAL HISTORY:        Procedure Laterality Date    CARDIAC CATHETERIZATION  11/29/2018    Non-obstructive CAD    COLECTOMY      2nd colectomy, Colostomy and reversed [facility] COLECTOMY      1st time Ksenia    COLONOSCOPY      COLONOSCOPY  07/18/2016    COLONOSCOPY N/A 12/6/2018    COLONOSCOPY DIAGNOSTIC performed by Lonell Simmonds, MD at [address] Right 2009    inguinal    KNEE SURGERY Right 1970's    arthrotomy    TONSILLECTOMY      TOTAL KNEE ARTHROPLASTY Right 1/8/2019    KNEE TOTAL ARTHROPLASTY performed by Darien Lee MD at [address] TRANSESOPHAGEAL ECHOCARDIOGRAM  11/29/2018    UPPER GASTROINTESTINAL ENDOSCOPY N/A 12/5/2018    EGD DIAGNOSTIC ONLY performed by Lonell Simmonds, MD [facility] N/A 6/18/2019    MCGUIRE'S     FAMILY HISTORY:       Problem Relation Age of Onset    Diabetes Mother     Heart Attack Father     Heart Disease Father     Heart Disease Brother      SOCIAL HISTORY:   TOBACCO:   reports that he quit smoking about 52 mg by mouth daily (with breakfast)    Yes Historical Provider, MD   Elastic Bandages & Supports (ABDOMINAL BINDER/ELASTIC XL) MISC 1 Device by Does not apply route as needed (ventral hernia, while ambulating/active) 16   Neftali Browne DO     IMMUNIZATIONS:  Most Recent Immunizations   Administered Date(s) Administered    Influenza Vaccine, unspecified formulation 2017    Influenza Virus Vaccine 2015    Influenza, High Dose (Fluzone 65 yrs and older) 2018    Influenza, Torres, 6 mo and older, IM (Fluzone, Flulaval) 2017    Influenza, Torres, IM, (6 mo and older Fluzone, Flulaval, Fluarix and 3 yrs and older Afluria) 2017    Influenza, Quadv, IM, PF (6 mo and older Fluzone, Flulaval, Fluarix, and 3 yrs and older Afluria) 10/23/2014    Influenza, Triv, inactivated, subunit, adjuvanted, IM (Fluad 65 yrs and older) 2019       REVIEW OF SYSTEMS:  General: negative for chills, fatigue or fever  ENT: negative for headaches, nasal congestion, sore throat or visual changes  Allergy and Immunology: negative for postnasal drip or seasonal allergies  Hematological and Lymphatic: negative for bleeding problems, swollen lymph nodes  Respiratory: positive for cough and shortness of breath negative for hemoptysis, orthopnea or sputum changes  Cardiovascular: negative for edema or palpitations  Gastrointestinal: negative for abdominal pain, change in bowel habits or nausea/vomiting  Genito-Urinary: negative for dysuria or urinary frequency/urgency  Musculoskeletal: negative for joint pain or joint swelling  Neurological: negative for numbness/tingling, seizures or weakness  Dermatological: negative for pruritus or rash    PHYSICAL EXAMINATION     VITAL SIGNS:   LAST-  /65   Pulse 62   Temp 97.7 °F (36.5 °C) (Oral)   Resp 19   Ht 6' (1.829 m)   Wt 173 lb 12.8 oz (78.8 kg)   SpO2 95%   BMI 23.57 kg/m²   8-24 HR RANGE-  TEMP Temp  Av.1 °F (36.7 °C)  Min: 97.7 °F (36.5 °C) Max: 98.8 °F (53.5 °C)   BP Systolic (46LKX), OTC:281 , Min:97 , JGM:662      Diastolic (65WPT), FAY:89, Min:47, Max:78     PULSE Pulse  Av.8  Min: 60  Max: 101   RR Resp  Av  Min: 19  Max: 19   O2 SAT SpO2  Av.3 %  Min: 94 %  Max: 97 %   OXYGEN DELIVERY No data recorded     SYSTEMIC EXAMINATION:    General appearance - well appearing, overweight, comfortable and in no acute distress   Mental status - alert, oriented to person, place, and time   Eyes - pupils equal and reactive, extraocular eye movements intact, sclera anicteric   Ears - not examined   Nose - normal and patent, no erythema, discharge   Mouth - mucous membranes moist, pharynx normal without lesions   Neck - supple, no significant adenopathy, carotids upstroke normal bilaterally, no bruits   Lymphatics - no palpable lymphadenopathy, no hepatosplenomegaly   Chest - clear to auscultation, mild rales to the mid and lower lung fields.  Heart - NSR currently. 2+ systolic murmur.  Abdomen - soft, nontender, nondistended, no masses or organomegaly   Neurological - motor and sensory grossly normal bilaterally   Musculoskeletal - no joint tenderness, deformity or swelling   Extremities - peripheral pulses normal, no pedal edema, no clubbing or cyanosis   Skin - normal coloration and turgor, no rashes, no suspicious skin lesions noted    DATA REVIEW     Medications: Current Inpatient  Scheduled Meds:   metoprolol tartrate  12.5 mg Oral 3 times per day    predniSONE  40 mg Oral Daily    furosemide  40 mg Oral Daily    atorvastatin  40 mg Oral Daily    tamsulosin  0.4 mg Oral Daily    sodium chloride flush  10 mL Intravenous 2 times per day    rivaroxaban  20 mg Oral Daily with breakfast     Continuous Infusions:   amiodarone 0.5 mg/min (10/22/20 021)     INPUT/OUTPUT:  In: 880 [P.O.:450;  I.V.:430]  Out: 1215 [Urine:1215]    LABS:-  ABG:   No results for input(s): POCPH, POCPCO2, POCPO2, POCHCO3, FMPD4SFH in the last 72 hours.  CBC:   Recent Labs     10/20/20  0600 10/21/20  0604 10/22/20  0816   WBC 10.3 9.3 11.1   HGB 12.8* 12.7* 13.0   HCT 43.3 42.3 42.0   MCV 85.9 82.6 82.7    433 516*   LYMPHOPCT 18* 15* 13*   RBC 5.04 5.12 5.08   MCH 25.4 24.8* 25.6   MCHC 29.6 30.0 31.0   RDW 17.6* 16.8* 16.8*     BMP:   Recent Labs     10/20/20  0600 10/21/20  0604 10/22/20  0816    136 132*   K 4.0 3.7 3.9    102 100   CO2 25 22 24   BUN 28* 26* 22   CREATININE 0.78 0.63* 0.68*   GLUCOSE 99 96 98     Liver Function Test:   No results for input(s): PROT, LABALBU, ALT, AST, GGT, ALKPHOS, BILITOT in the last 72 hours. Amylase/Lipase:  No results for input(s): AMYLASE, LIPASE in the last 72 hours. Coagulation Profile:   No results for input(s): INR, PROTIME, APTT in the last 72 hours. Cardiac Enzymes:  No results for input(s): CKTOTAL, CKMB, CKMBINDEX, TROPONINI in the last 72 hours. Lactic Acid:  Lab Results   Component Value Date    LACTA NOT REPORTED 10/17/2020    LACTA 1.0 08/11/2014     BNP:   No results found for: BNP  D-Dimer:  Lab Results   Component Value Date    DDIMER 1.24 10/17/2020     Others:   Lab Results   Component Value Date    TSH 4.28 10/17/2020     Lab Results   Component Value Date    YOUNG NEGATIVE 10/17/2020    SEDRATE 82 (H) 10/22/2020    .7 (H) 10/17/2020     No results found for: Dulce Gun  Lab Results   Component Value Date    IRON 48 (L) 03/10/2020    TIBC 340 03/10/2020    FERRITIN 238 10/17/2020     No results found for: SPEP, UPEP  Lab Results   Component Value Date    PSA 0.73 02/25/2020    CEA 3.4 10/01/2018     Microbiology:    Pathology:    Radiology Reports:  CT CHEST PULMONARY EMBOLISM W CONTRAST   Final Result   1. No evidence of pulmonary embolism. 2.  Fairly extensive scattered ground-glass opacities in the lungs which may   be related to atypical pneumonitis less likely venous congestion. No   effusions.       3.  Septal thickening, most severe in the lung bases.         XR CHEST PORTABLE   Final Result   Persistent bilateral pulmonary opacities could represent pulmonary edema or   atypical pneumonia         XR CHEST PORTABLE   Final Result   No significant change in chest findings compared to the October 17th study. XR CHEST PORTABLE   Final Result   Bilateral scattered pulmonary opacities right greater than left favoring   multifocal airspace disease with small effusions not excluded. No   extrapleural air is seen. Pulmonary Function test:    Polysomnogram:    Echocardiogram:   Results for orders placed during the hospital encounter of 10/17/20   ECHO Complete 2D W Doppler W Color    Narrative Transthoracic Echocardiography Report (TTE)     Patient Name Snehal Matthews     Date of Study               10/20/2020                Tiny Guallpa      Date of      1955  Gender                      Male   Birth      Age          77 year(s)  Race                              Room Number  2019        Height:                     72 inch, 182.88 cm      Corporate ID [id number]    Weight:                     193 pounds, 87.5 kg   #      Patient Acct [de-identified]   BSA:          2.1 m^2       BMI:     26.18 kg/m^2   #      MR #         [id number]     [address] the [facility]      Accession #  [id number]  Interpreting Physician      [address] [place] [address]      Fellow       Sebastian Rodgers   Referring Nurse                            Practitioner      Interpreting             Referring Physician         Oscar Gurrola MD   Fellow     Type of Study      TTE procedure:2D Echocardiogram, M-Mode, Doppler, Color Doppler. Procedure Date  Date: 10/20/2020 Start: 09:50 AM    Study Location: [facility]  Technical Quality: Good visualization    Indications:Congestive heart failure. History / Tech. Comments:  Procedure explained to patient. HTN. Aortic stenosis.     Patient Status: Inpatient    Height: 72 inches Weight: 193 pounds BSA: 2.1 m^2 BMI: AND PLAN     Assessment:    // Acute on Chronic Diastolic CHF  // Atrial Fibrillation with RVR s/p cardioversion  // Fibrotic changes lower lung fields  // HTN  // Colon cancer s/p resection    Plan:    I personally interviewed/examined the patient; reviewed interval history, interpreted all available radiographic and laboratory data at the time of service. 1. Patient is currently being treated for acute systolic/diastolic CHF. Echocardiogram is showing EF 35% at this time. Echocardiogram from 2018 shows EF 45-50%. Heart cath from 11/2018 shows minimal CAD. Repeat ischemic work up per cardiology discretion. However, agree with diuresis at this time. 2. Atrial Fibrillation s/p cardioversion and not initiated on amiodarone. Would recommend for cardiology to evaluate patient for possible discontinuation of Amiodarone. Patient already has fibrotic changes on the lungs and we recommend discontinuation of amiodarone as to not create possible future contributory offending agents. 3. CT at [facility] shows pulmonary edema and some fibrotic changes more so in the lower lung fields. Patient will need a HRCT. However, this will occur outpatient as acute CHF exacerbation with pulmonary edema needs to resolve prior to re-imaging of the chest to evaluate fibrosis. Patient noted to have some fibrosis in lower lung fields on CT abd in 1/2019.  4. Wean oxygen as tolerated. May need repeat home o2 eval.   5. Will attempt to obtain disc (actual imaging) of CT chest from [facility]. 6. Follow up with pulmonology as outpatient. Glenda Oden MD  Internal Medicine Resident, PGY-3  [address], [place]  10/22/2020, 12:58 PM    Please note that this chart was generated using voice recognition Dragon dictation software. Although every effort was made to ensure the accuracy of this automated transcription, some errors in transcription may have occurred.

## 2020-10-22 NOTE — CONSULTS
PULMONARY & CRITICAL CARE MEDICINE CONSULT NOTE     Patient:  Jeannette Jacome  MRN: [id number]  Admit date: 10/17/2020  Primary Care Physician: Missy Lan MD  Consulting Physician: Halina Mullen MD  CODE Status: Full Code     HISTORY     CHIEF COMPLAINT/REASON FOR CONSULT:    HISTORY OF PRESENT ILLNESS:  PMH: hx cocaine abuse, Atrial Fibrillation, Hill's, diastolic chf, colon cancer s/p resection    The patient is a 77 y.o. male with significant past medical history as above presented to the hospital on 10/17 with a chief complaint of shortness of breath and productive cough x 3 days. Associated dizziness. Of note patient was recently treated for Covid-19 pneumonia at [facility] despite covid-19 negative testing x 2 in 9/2020. S/p decadron, remdisivir, rocephin, & discharged on home oxygen 1.5 L NC. CT from [facility] with typical findings of covid-19. On admission, patient was desaturating to 65%. Tachycardia and Tachypnea on admission. Hemodynamically stable. Placed on 5 L NC. Initial BMP unremarkable. Procal 0.29. ABG on admission: 7.432 / 37.2 / 98 / 24.8 (likely on oxygen). . Pro-bnp 3525. Hepatic panel negative. TSH WNL. No leukocytosis. + normocytic anemia. Ferritin 238. D-dimer 1.24. Viral panel negative. Covid PCR negative. Legionella, Mycoplasma IgM, strep pneumonia all negative. ANCA, YOUNG, C3/4 negative. Serological evidence of prior mycoplasma infection. Serial CXRs have shown bilateral pulmonary infiltrates. CT chest showed ground glass attenuation, no pe, and septal thickening in the lower lobes. Echocardiogram shows moderately reduced EF at 35%. Global hypokinesis. Grade 1 diastolic dysfunction. Mild-moderate MR. RVSP 34 mmHg. Patient has been treated for diastolic/systolic CHF with diuresis. Currently -7.5 L since admission. Patient has also been treated for atrial fibrillation and cardioverted on 10/19 and remains in sinus rhythm.      Patient notes that prior to September he has been having some dry cough at night time for a number of weeks. However, he does not report much shortness of breath prior to September when he was admitted to [facility]. Has a dog at home. 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 No other animal exposure. No real tobacco abuse history.        WBC   Date Value Ref Range Status   10/22/2020 11.1 3.5 - 11.3 k/uL Final       PAST MEDICAL HISTORY:        Diagnosis Date    Atrial fibrillation (HCC)     Back pain, chronic     Mcguire's esophagus 06/18/2019    BPH (benign prostatic hyperplasia)     Cancer (HCC)     colon-rectal    Cocaine abuse in remission [facility])     1970's    ED (erectile dysfunction) 4/2/2015    GERD (gastroesophageal reflux disease)     GI bleed 12/5/2018    Hernia     History of colon cancer     Melena     Migraines     Murmur, cardiac      PAST SURGICAL HISTORY:        Procedure Laterality Date    CARDIAC CATHETERIZATION  11/29/2018    Non-obstructive CAD    COLECTOMY      2nd colectomy, Colostomy and reversed [facility] COLECTOMY      1st time Ksenia    COLONOSCOPY      COLONOSCOPY  07/18/2016    COLONOSCOPY N/A 12/6/2018    COLONOSCOPY DIAGNOSTIC performed by [address], MD at [address] Right 2009    inguinal    KNEE SURGERY Right 1970's    arthrotomy    TONSILLECTOMY      TOTAL KNEE ARTHROPLASTY Right 1/8/2019    KNEE TOTAL ARTHROPLASTY performed by Dick Johnson MD at [address] TRANSESOPHAGEAL ECHOCARDIOGRAM  11/29/2018    UPPER GASTROINTESTINAL ENDOSCOPY N/A 12/5/2018    EGD DIAGNOSTIC ONLY performed by [address], MD at [address] [place] [address] N/A 6/18/2019    MCGUIRE'S     FAMILY HISTORY:       Problem Relation Age of Onset    Diabetes Mother     Heart Attack Father     Heart Disease Father     Heart Disease Brother      SOCIAL HISTORY:   TOBACCO:   reports that he quit smoking about 52 years ago. He has a 0.50 pack-year smoking history. He has never used smokeless tobacco.  ETOH:  reports current alcohol use of about 8.3 standard drinks of alcohol per week. DRUGS: reports no history of drug use. AVOCATION/OCCUPATIONAL EXPOSURE:    The patient unsure but believes he has  asbestos, silica dust, coal, foundry, quarry or [employer] exposure. The patient admits to  to having pet dogs at home. There is no history of TB or TB exposure. There is no exposure to sick contacts. Travel history is not significant history of risk factors for pulmonary disease. The patient denies using Hot Tubs. ALLERGIES:    Allergies   Allergen Reactions    Adhesive Tape Other (See Comments)     Blister badly     Codeine Nausea Only     Other reaction(s): Other: See Comments  NAUSEA  Other reaction(s): Other: See Comments  NAUSEA    Penicillins Swelling     As a baby  Other reaction(s): Unknown  As a baby         HOME MEDICATIONS:  Prior to Admission medications    Medication Sig Start Date End Date Taking?  Authorizing Provider   tamsulosin (FLOMAX) 0.4 MG capsule Take 1 capsule by mouth daily 10/15/20  Yes Cassandra Palumbo MD   atorvastatin (LIPITOR) 40 MG tablet take 1 tablet by mouth once daily 10/9/20  Yes Rosa Maria Castillo MD   sildenafil (VIAGRA) 100 MG tablet TAKE ONE TABLET BY MOUTH AS NEEDED FOR FOR ERECTILE DYSFUNCTION 8/17/20  Yes Yani Chi MD   omeprazole (PRILOSEC) 40 MG delayed release capsule take 1 capsule by mouth every morning before breakfast 7/21/20  Yes Cassandra Palumbo MD   ibuprofen (ADVIL;MOTRIN) 800 MG tablet take 1 tablet by mouth every 6 hours if needed for pain 5/26/20  Yes Cassandra Palumbo MD   metoprolol tartrate (LOPRESSOR) 25 MG tablet take 1 tablet by mouth twice a day 8/6/19  Yes Nithya Medina MD   lisinopril (PRINIVIL;ZESTRIL) 5 MG tablet take 1 tablet by mouth once daily 7/29/19  Yes MIRI Shah - CNP   rivaroxaban (XARELTO) 20 MG TABS tablet Take 20 mg by mouth daily (with breakfast)    Yes Historical Provider, MD   Elastic Bandages & Supports (ABDOMINAL BINDER/ELASTIC XL) MISC 1 Device by Does not apply route as needed (ventral hernia, while ambulating/active) 16   Lyndon Staley DO     IMMUNIZATIONS:  Most Recent Immunizations   Administered Date(s) Administered    Influenza Vaccine, unspecified formulation 2017    Influenza Virus Vaccine 2015    Influenza, High Dose (Fluzone 65 yrs and older) 2018    Influenza, Ibarra Grace, 6 mo and older, IM (Fluzone, Flulaval) 2017    Influenza, Ibarra Grace, IM, (6 mo and older Fluzone, Flulaval, Fluarix and 3 yrs and older Afluria) 2017    Influenza, Quadv, IM, PF (6 mo and older Fluzone, Flulaval, Fluarix, and 3 yrs and older Afluria) 10/23/2014    Influenza, Triv, inactivated, subunit, adjuvanted, IM (Fluad 65 yrs and older) 2019       REVIEW OF SYSTEMS:  General: negative for chills, fatigue or fever  ENT: negative for headaches, nasal congestion, sore throat or visual changes  Allergy and Immunology: negative for postnasal drip or seasonal allergies  Hematological and Lymphatic: negative for bleeding problems, swollen lymph nodes  Respiratory: positive for cough and shortness of breath negative for hemoptysis, orthopnea or sputum changes  Cardiovascular: negative for edema or palpitations  Gastrointestinal: negative for abdominal pain, change in bowel habits or nausea/vomiting  Genito-Urinary: negative for dysuria or urinary frequency/urgency  Musculoskeletal: negative for joint pain or joint swelling  Neurological: negative for numbness/tingling, seizures or weakness  Dermatological: negative for pruritus or rash    PHYSICAL EXAMINATION     VITAL SIGNS:   LAST-  /65   Pulse 62   Temp 97.7 °F (36.5 °C) (Oral)   Resp 19   Ht 6' (1.829 m)   Wt 173 lb 12.8 oz (78.8 kg)   SpO2 95%   BMI 23.57 kg/m²   8-24 HR RANGE-  TEMP Temp  Av.1 °F (36.7 °C)  Min: 97.7 °F (36.5 °C) bases.         XR CHEST PORTABLE   Final Result   Persistent bilateral pulmonary opacities could represent pulmonary edema or   atypical pneumonia         XR CHEST PORTABLE   Final Result   No significant change in chest findings compared to the October 17th study. XR CHEST PORTABLE   Final Result   Bilateral scattered pulmonary opacities right greater than left favoring   multifocal airspace disease with small effusions not excluded. No   extrapleural air is seen. Pulmonary Function test:    Polysomnogram:    Echocardiogram:   Results for orders placed during the hospital encounter of 10/17/20   ECHO Complete 2D W Doppler W Color    Narrative Transthoracic Echocardiography Report (TTE)     Patient Name Amari Lesser     Date of Study               10/20/2020                Chantal Cater      Date of      1955  Gender                      Male   Birth      Age          77 year(s)  Race                              Room Number  2019        Height:                     72 inch, 182.88 cm      Corporate ID [id number]    Weight:                     193 pounds, 87.5 kg   #      Patient Acct [de-identified]   BSA:          2.1 m^2       BMI:     26.18 kg/m^2   #      MR #         [id number]     [address] [facility]      Accession #  [id number]  Interpreting Physician      [address] [place] [address]      Fellow       Anibal Alvarez   Referring Nurse                            Practitioner      Interpreting             Referring Physician         Bijan Hsieh MD   Fellow     Type of Study      TTE procedure:2D Echocardiogram, M-Mode, Doppler, Color Doppler. Procedure Date  Date: 10/20/2020 Start: 09:50 AM    Study Location: [facility]  Technical Quality: Good visualization    Indications:Congestive heart failure. History / Tech. Comments:  Procedure explained to patient. HTN. Aortic stenosis.     Patient Status: Inpatient    Height: 72 inches Weight: 193 pounds BSA: 2.1 m^2 BMI: 26.18 kg/m^2    HR: 65 bpm    Allergies    - Penicillin. - Codeine. CONCLUSIONS    Summary  Left ventricle is normal in size Global left ventricular systolic function  is moderately reduced Estimated ejection fraction is 35 % . Mostly global hypokinesis with minor regional variation. Grade I (mild) left ventricular diastolic dysfunction. Left atrium is moderately dilated. Aortic leaflet calcification with Moderate Aortic Stenosis, maybe  underestimated due to poor LVEF. Thickened mitral valve leaflets. Mild to moderate mitral regurgitation. Trivial tricuspid regurgitation. Estimated right ventricular systolic  pressure is [id number]. IVC dilated but unable to assess respiratory collapse. Signature  ----------------------------------------------------------------------------   Electronically signed by Emili Sullivan on 10/20/2020 10:49   AM  ----------------------------------------------------------------------------    ----------------------------------------------------------------------------   Electronically signed by Alexandria HookerInterpreting physician) on 10/20/2020   01:48 PM  ----------------------------------------------------------------------------  FINDINGS  Left Atrium  Left atrium is moderately dilated. Left Ventricle  Left ventricle is normal in size Global left ventricular systolic function  is moderately reduced Estimated ejection fraction is 35 % . Mostly global hypokinesis with minor regional variation. Grade I (mild) left ventricular diastolic dysfunction. Right Atrium  Right atrium is normal in size. Right Ventricle  Normal right ventricular size and function. Mitral Valve  Thickened mitral valve leaflets. Mild to moderate mitral regurgitation. Aortic Valve  Aortic leaflet calcification with moderate stenosis, maybe underestimated  due to poor LVEF. The peak/mean gradient is 39mmHg and 22mmHg. No aortic insufficiency.   Tricuspid Valve  No obvious valvular abnormality. Trivial tricuspid regurgitation. Estimated right ventricular systolic  pressure is 70TFTJ. Pulmonic Valve  The pulmonic valve is normal in structure. No pulmonic insufficiency. Pericardial Effusion  No significant pericardial effusion is seen. Miscellaneous  Normal aortic root dimension. E/E' average = 12.1. IVC dilated but unable to assess respiratory collapse. M-mode / 2D Measurements & Calculations:      LVIDd:5.7 cm(3.7 - 5.6 cm)       Diastolic XCGWWO:671 ml   MJUAI:0.3 cm(2.2 - 4.0 cm)       Systolic LODZPY:392 ml   XDFR:4.3 cm(0.6 - 1.1 cm)        Aortic Root:3.3 cm(2.0 - 3.7 cm)   LVPWd:0.9 cm(0.6 - 1.1 cm)       LA Dimension: 4.6 cm(1.9 - 4.0 cm)   Fractional Shortenin.54 %    LA volume/Index: 93.7 ml /45m^2   Calculated LVEF (%): 45.81 %     LVOT:2.2 cm                                    RVDd:3.7 cm      Mitral:                                 Aortic      Valve Area (P1/2-Time): 3.67 cm^2       Peak Velocity: 3.11 m/s   Peak E-Wave: 0.94 m/s                   Mean Velocity: 2.21 m/s   Peak A-Wave: 0.55 m/s                   Peak Gradient: 38.69 mmHg   E/A Ratio: 1.7                          Mean Gradient: 22 mmHg   Peak Gradient: 3.52 mmHg   Mean Gradient: 2 mmHg   Deceleration Time: 204 msec             Area (continuity): 0.88 cm^2   P1/2t: 60 msec                          AV VTI: 67.6 cm      Area (continuity): 1.8 cm^2   Mean Velocity: 0.57 m/s      Tricuspid:                              Pulmonic:      Peak TR Velocity: 2.18 m/s              Peak Velocity: 1.03 m/s   Peak TR Gradient: 19.0096 mmHg          Peak Gradient: 4.24 mmHg   Estimated RA Pressure: 15 mmHg                                              Estimated PASP: 34.01 mmHg     Diastology / Tissue Doppler  Septal Wall E' velocity:0.06 m/s  Septal Wall E/E':14.6  Lateral Wall E' velocity:0.10 m/s  Lateral Wall E/E':9.7       Cardiac Catheterization:   No results found for this or any previous visit.     ASSESSMENT AND PLAN     Assessment:    // Acute on Chronic Diastolic CHF  // Atrial Fibrillation with RVR s/p cardioversion  // Fibrotic changes lower lung fields  // HTN  // Colon cancer s/p resection    Plan:    I personally interviewed/examined the patient; reviewed interval history, interpreted all available radiographic and laboratory data at the time of service. 1. Patient is currently being treated for acute systolic/diastolic CHF. Echocardiogram is showing EF 35% at this time. Echocardiogram from 2018 shows EF 45-50%. Heart cath from 11/2018 shows minimal CAD. Repeat ischemic work up per cardiology discretion. However, agree with diuresis at this time. 2. Atrial Fibrillation s/p cardioversion and not initiated on amiodarone. Would recommend for cardiology to evaluate patient for possible discontinuation of Amiodarone. Patient already has fibrotic changes on the lungs and we recommend discontinuation of amiodarone as to not create possible future contributory offending agents. 3. CT at [facility] shows pulmonary edema and some fibrotic changes more so in the lower lung fields. Patient will need a HRCT. However, this will occur outpatient as acute CHF exacerbation with pulmonary edema needs to resolve prior to re-imaging of the chest to evaluate fibrosis. Patient noted to have some fibrosis in lower lung fields on CT abd in 1/2019.  4. Wean oxygen as tolerated. May need repeat home o2 eval.   5. Will attempt to obtain disc (actual imaging) of CT chest from [facility]. 6. Follow up with pulmonology as outpatient. Jenise Boykin MD  Internal Medicine Resident, PGY-3  [facility]; [place], [place]  10/22/2020, 12:58 PM    Please note that this chart was generated using voice recognition Dragon dictation software. Although every effort was made to ensure the accuracy of this automated transcription, some errors in transcription may have occurred.       Attending Physician Statement  I have discussed the care of Zee Dewitt, including pertinent history and exam findings with the resident. I have reviewed the key elements of all parts of the encounter with the resident. I have seen and examined the patient with the resident. I agree with the assessment and plan and status of the problem list as documented    I have reviewed the chart, imaging studies seen, I have reviewed the CT scan report available from [facility] from September, and I have detailed interview with patient and examination. He has bilateral pulmonary infiltrate on review of the CT scan of the chest he has bilateral groundglass changes which are present in upper and lower lung field there are also chronic looking fibrotic changes with septal thickening which is more in the periphery and lower lungs. At this time patient does have atrial fibrillation with rapid ventricular rate on admission, he has acute CHF with ejection fraction of 35%, previous echo had define EF of 45%, he has at least moderate aortic stenosis he does have aortic murmur and also have mitral valve regurgitation the severity of both aortic stenosis or MR may be underestimated on echo. His CT scan finding is likely a combination of pulmonary edema with pulmonary fibrosis so difficulty determine the severity of his pulmonary fibrosis unless his CHF and pulmonary edema is improved which is likely related to acute CHF and atrial fibrillation with valvular heart disease. He is currently on amiodarone but amiodarone was just started during this admission and he is in sinus rhythm currently he was initially on 5 L nasal cannula and currently he is on 3 L saturating 94 to 98%. He was recently admitted to [facility] and at that time he had a similar complaint he was treated with diuretics he had to test negative for Covid pneumonia but he had received treatment along with steroids and remdesivir there.   CT scan of the chest at that time no images available but report showed interstitial changes with edema but there was no mention of fibrosis or honeycombing at that time per report. According to patient on treatment he improved but he was discharged on nasal cannula oxygen and he was using 1 L as needed clinically and symptomatically he was better until this episode acutely happen. He is YOUNG, ANCA negative C3-C4 normal.  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 Acute on chronic systolic and diastolic CHF. Pulmonary fibrosis/interstitial lung disease  Acute hypoxemic respiratory failure. Chronic atrial fibrillation on Xarelto  Atrial fibrillation with rapid ventricular rate on admission. Hypertension. History of colon cancer status post surgery. No history of chemotherapy  No history of chronic amiodarone use started on amiodarone currently on amiodarone drip during this admission. No history of smoking. He would need high resolution CT of the chest in the next 4 weeks once his acute issues and acute CHF and atrial fibrillation is better controlled so pulmonary edema cannot be excluded as a cause of groundglass changes which are present along with fibrotic changes. Detailed discussion with patient about follow-up and high-resolution CT and likely will need lung biopsy depending upon HRCT but will need cardiac optimization if biopsy needed. Continue diuretic and treatment for acute CHF and optimization of treatment per cardiology. I would recommend transesophageal echo for better evaluation of aortic and mitral valve. As he has fibrotic changes present likely on CT, if it is all possible to avoid amiodarone. Continue with Xarelto. Home O2 evaluation before discharge  To complete work-up will get CCP antibody    Please note that this chart was generated using voice recognition Dragon dictation software.  Although every effort was made to ensure the accuracy of this automated transcription, some errors in transcription may have occurred.     Devin Mueller MD  10/22/2020 6:57 PM

## 2020-10-22 NOTE — PLAN OF CARE
Problem: OXYGENATION/RESPIRATORY FUNCTION  Goal: Patient will maintain patent airway  10/22/2020 0729 by Roland Mathews RN  Outcome: Ongoing  10/22/2020 0348 by Betty Aguero RN  Outcome: Ongoing  Goal: Patient will achieve/maintain normal respiratory rate/effort  Description: Respiratory rate and effort will be within normal limits for the patient  10/22/2020 0729 by Roland Mathews RN  Outcome: Ongoing  10/22/2020 0348 by Betty Aguero RN  Outcome: Ongoing     Problem: HEMODYNAMIC STATUS  Goal: Patient has stable vital signs and fluid balance  10/22/2020 0729 by Roland Mathews RN  Outcome: Ongoing  10/22/2020 0348 by Betty Aguero RN  Outcome: Ongoing     Problem: FLUID AND ELECTROLYTE IMBALANCE  Goal: Fluid and electrolyte balance are achieved/maintained  10/22/2020 0729 by Roland Mathews RN  Outcome: Ongoing  10/22/2020 0348 by Betty Aguero RN  Outcome: Ongoing     Problem: ACTIVITY INTOLERANCE/IMPAIRED MOBILITY  Goal: Mobility/activity is maintained at optimum level for patient  10/22/2020 0729 by Roland Mathews RN  Outcome: Ongoing  10/22/2020 0348 by Betty Aguero RN  Outcome: Ongoing     Problem: Falls - Risk of:  Goal: Will remain free from falls  Description: Will remain free from falls  10/22/2020 0729 by Roland Mathews RN  Outcome: Ongoing  10/22/2020 0348 by Betty Aguero RN  Outcome: Ongoing  Goal: Absence of physical injury  Description: Absence of physical injury  10/22/2020 0729 by Roland Mathews RN  Outcome: Ongoing  10/22/2020 0348 by Betty Aguero RN  Outcome: Ongoing     Problem: Pain:  Goal: Pain level will decrease  Description: Pain level will decrease  10/22/2020 0729 by Roland Mathews RN  Outcome: Ongoing  10/22/2020 0348 by Betty Aguero RN  Outcome: Ongoing  Goal: Control of acute pain  Description: Control of acute pain  10/22/2020 0729 by Roland Mathews RN  Outcome: Ongoing  10/22/2020 [id number] by Hellen Mercado RN  Outcome: Ongoing  Goal: Control of chronic pain  Description: Control of chronic pain  10/22/2020 0729 by Christy Sauer RN  Outcome: Ongoing  10/22/2020 0348 by Hellen Mercado RN  Outcome: Ongoing

## 2020-10-22 NOTE — PROGRESS NOTES
[facility]  Internal Medicine Teaching Residency Program  Inpatient Daily Progress Note  ______________________________________________________________________________    Patient: Ailin Collins  YOB: 1955   IIM:[id number]    Acct: [de-identified]     Room: 2019/2019-01  Admit date: 10/17/2020  Today's date: 10/22/20  Number of days in the hospital: 5    SUBJECTIVE   Admitting Diagnosis: CHF (congestive heart failure), NYHA class II, acute on chronic, combined ([facility]  CC:     Pt examined at bedside. Chart & results reviewed. Patient states he has been feeling better. Patient denies any palpitations or shortness of breath. Patient desaturating when tried to wean off of oxygen. Patient desaturates to 80s when on sitting up or ambulating. ROS:  Constitutional:  negative for chills, fevers, sweats  Respiratory:  negative for cough, dyspnea on exertion, hemoptysis, shortness of breath, wheezing  Cardiovascular:  negative for chest pain, chest pressure/discomfort, lower extremity edema, palpitations  Gastrointestinal:  negative for abdominal pain, constipation, diarrhea, nausea, vomiting  Neurological:  negative for dizziness, headache  BRIEF HISTORY     The patient is a pleasant 77 y.o. male with past medical history of diastolic CHF, essential hypertension, dural fibrillation dyslipidemia, colon cancer status post resection, Hill's esophagus presents  to the ED with a chief complaint of acute onset of shortness of breath with productive cough since last 3 days, visited with some dizziness and chest pain.      Patient was recently treated for  COVID pneumonia with imaging concerning for COVID even though rapid test came back negative on 2 occasions . he was discharged on oxygen which he uses since then. Today he complains of increased shortness of breath this morning which made him come to the ER.   Initially in the ER he was found to be having saturations of 65%. Patient also have the chest pain which is sharp,localised, non radiating and non reproducible on deep breathing.      Pt denies headache, lightheadedness, focal neurological deficits, abdominal pain, nausea, fever, chills,. Initial Vitals on presentation : Blood pressure of 158/118, pulse rate of 140, respiratory 46 saturations at 65 on room air     Significant Labs :   Troponin high-sensitivity 29>25>Hemoglobin of 11.8, hematocrit of 39 glucose 120, creatinine of 0.63 proBNP 3525 procalcitonin 0.29 .7, rapid COVID-19 negative, lactic acid 1.6, d-dimer 1.24. Chest x-ray : Bilateral scattered pulmonary opacities     Treatment in ED : Initially treated with metoprolol for atrial fibrillation with RVR, furosemide 40 mg IV for congestive heart failure, levofloxacin 750 mg every 24,    OBJECTIVE     Vital Signs:  /66   Pulse 67   Temp 98 °F (36.7 °C) (Oral)   Resp 20   Ht 6' (1.829 m)   Wt 168 lb (76.2 kg)   SpO2 96%   BMI 22.78 kg/m²     Temp (24hrs), Av.2 °F (36.8 °C), Min:98 °F (36.7 °C), Max:98.8 °F (37.1 °C)    In: 240   Out: 875 [Urine:875]    Physical Exam:  Constitutional: This is a well developed, well nourished, 25-29.9 - Overweight 77y.o. year old male who is alert, oriented, cooperative and in no apparent distress. Head:normocephalic and atraumatic. Respiratory: Chest was symmetrical without dullness to percussion. Fine crackles heard on the left  Cardiovascular: Regular without murmur, clicks, gallops or rubs. Abdomen: Slightly rounded and soft without organomegaly. No rebound, rigidity or guarding was appreciated. Lymphatic: No lymphadenopathy. Musculoskeletal: Normal curvature of the spine. No gross muscle weakness. Extremities:  No lower extremity edema, ulcerations, tenderness, varicosities or erythema. Muscle size, tone and strength are normal.  No involuntary movements are noted. Skin:  Warm and dry.   Good color, turgor and pigmentation. No lesions or scars. No cyanosis or clubbing  Neurological/Psychiatric: The patient's general behavior, level of consciousness, thought content and emotional status is normal.        Medications:  Scheduled Medications:    furosemide  40 mg Oral Daily    atorvastatin  40 mg Oral Daily    tamsulosin  0.4 mg Oral Daily    sodium chloride flush  10 mL Intravenous 2 times per day    rivaroxaban  20 mg Oral Daily with breakfast    metoprolol tartrate  25 mg Oral 3 times per day     Continuous Infusions:    amiodarone 0.5 mg/min (10/22/20 0211)     PRN Medicationssodium chloride flush, 10 mL, PRN  acetaminophen, 650 mg, Q6H PRN    Or  acetaminophen, 650 mg, Q6H PRN  polyethylene glycol, 17 g, Daily PRN  promethazine, 12.5 mg, Q6H PRN    Or  ondansetron, 4 mg, Q6H PRN  potassium chloride, 40 mEq, PRN    Or  potassium alternative oral replacement, 40 mEq, PRN    Or  potassium chloride, 10 mEq, PRN  magnesium sulfate, 1 g, PRN  metoprolol, 5 mg, Q6H PRN        Diagnostic Labs:  CBC:   Recent Labs     10/19/20  0649 10/20/20  0600 10/21/20  0604   WBC 11.0 10.3 9.3   RBC 4.70 5.04 5.12   HGB 11.9* 12.8* 12.7*   HCT 39.0* 43.3 42.3   MCV 83.0 85.9 82.6   RDW 17.6* 17.6* 16.8*    440 433     BMP:   Recent Labs     10/19/20  0649 10/20/20  0600 10/21/20  0604    137 136   K 4.0 4.0 3.7    102 102   CO2 25 25 22   BUN 35* 28* 26*   CREATININE 0.87 0.78 0.63*       ASSESSMENT & PLAN     1. Acute on chronic systolic congestive heart failure. Continue on Lasix 40 mg, fluid restriction, input output monitoring. 2.  Chest pain NSTEMI type II -Follow-up on EKG, trend troponins, will monitor the patient    3. Atrial fibrillation with RVR s/p cardioversion -continued on amiodarone drip. Decreased metoprolol tartrate to 1.5 mg every 8 oral. Continue on xarelto 20mg, continue telemetry monitoring    4. Suspected Community acquired - Patient currently on no antibiotics.   ID Following the

## 2020-10-22 NOTE — PROGRESS NOTES
Attending Physician Statement  I have discussed the care of Richard Barboza, including pertinent history and exam findings with the resident. I have reviewed the key elements of all parts of the encounter with the resident. I have seen and examined the patient with the resident. I agree with the assessment and plan and status of the problem list as documented. Please see full note by pulmonary resident. I have reviewed the chart, imaging studies seen, I have reviewed the CT scan report available from [facility] from September, and I have detailed interview with patient and examination. He has bilateral pulmonary infiltrate on review of the CT scan of the chest he has bilateral groundglass changes which are present in upper and lower lung field there are also chronic looking fibrotic changes with septal thickening which is more in the periphery and lower lungs. At this time patient does have atrial fibrillation with rapid ventricular rate on admission, he has acute CHF with ejection fraction of 35%, previous echo had define EF of 45%, he has at least moderate aortic stenosis he does have aortic murmur and also have mitral valve regurgitation the severity of both aortic stenosis or MR may be underestimated on echo. His CT scan finding is likely a combination of pulmonary edema with pulmonary fibrosis so difficulty determine the severity of his pulmonary fibrosis unless his CHF and pulmonary edema is improved which is likely related to acute CHF and atrial fibrillation with valvular heart disease. He is currently on amiodarone but amiodarone was just started during this admission and he is in sinus rhythm currently he was initially on 5 L nasal cannula and currently he is on 3 L saturating 94 to 98%.   He was recently admitted to [facility] and at that time he had a similar complaint he was treated with diuretics he had to test negative for Covid pneumonia but he had received treatment along with steroids and remdesivir there. CT scan of the chest at that time no images available but report showed interstitial changes with edema but there was no mention of fibrosis or honeycombing at that time per report. According to patient on treatment he improved but he was discharged on nasal cannula oxygen and he was using 1 L as needed clinically and symptomatically he was better until this episode acutely happen. He is YOUNG, ANCA negative C3-C4 normal.  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 Acute on chronic systolic and diastolic CHF. Pulmonary fibrosis/interstitial lung disease  Acute hypoxemic respiratory failure. Chronic atrial fibrillation on Xarelto  Atrial fibrillation with rapid ventricular rate on admission. Hypertension. History of colon cancer status post surgery. No history of chemotherapy  No history of chronic amiodarone use started on amiodarone currently on amiodarone drip during this admission. No history of smoking. He would need high resolution CT of the chest in the next 4 weeks once his acute issues and acute CHF and atrial fibrillation is better controlled so pulmonary edema cannot be excluded as a cause of groundglass changes which are present along with fibrotic changes. Detailed discussion with patient about follow-up and high-resolution CT and likely will need lung biopsy depending upon HRCT but will need cardiac optimization if biopsy needed. Continue diuretic and treatment for acute CHF and optimization of treatment per cardiology. I would recommend transesophageal echo for better evaluation of aortic and mitral valve. As he has fibrotic changes present likely on CT, if it is all possible to avoid amiodarone. Continue with Xarelto.   Home O2 evaluation before discharge  To complete work-up will get CCP antibody    Please note that this

## 2020-10-23 ENCOUNTER — APPOINTMENT (OUTPATIENT)
Dept: CT IMAGING | Age: 65
DRG: 291 | End: 2020-10-23
Payer: MEDICARE

## 2020-10-23 LAB
ABSOLUTE EOS #: 0.04 K/UL (ref 0–0.44)
ABSOLUTE IMMATURE GRANULOCYTE: 0.29 K/UL (ref 0–0.3)
ABSOLUTE LYMPH #: 1.09 K/UL (ref 1.1–3.7)
ABSOLUTE MONO #: 0.85 K/UL (ref 0.1–1.2)
ANION GAP SERPL CALCULATED.3IONS-SCNC: 12 MMOL/L (ref 9–17)
BASOPHILS # BLD: 0 % (ref 0–2)
BASOPHILS ABSOLUTE: 0.04 K/UL (ref 0–0.2)
BUN BLDV-MCNC: 21 MG/DL (ref 8–23)
BUN/CREAT BLD: ABNORMAL (ref 9–20)
CALCIUM SERPL-MCNC: 8.7 MG/DL (ref 8.6–10.4)
CCP IGG ANTIBODIES: <1.5 U/ML
CHLORIDE BLD-SCNC: 102 MMOL/L (ref 98–107)
CO2: 23 MMOL/L (ref 20–31)
CREAT SERPL-MCNC: 0.68 MG/DL (ref 0.7–1.2)
DIFFERENTIAL TYPE: ABNORMAL
EOSINOPHILS RELATIVE PERCENT: 0 % (ref 1–4)
GFR AFRICAN AMERICAN: >60 ML/MIN
GFR NON-AFRICAN AMERICAN: >60 ML/MIN
GFR SERPL CREATININE-BSD FRML MDRD: ABNORMAL ML/MIN/{1.73_M2}
GFR SERPL CREATININE-BSD FRML MDRD: ABNORMAL ML/MIN/{1.73_M2}
GLUCOSE BLD-MCNC: 121 MG/DL (ref 70–99)
HCT VFR BLD CALC: 40.2 % (ref 40.7–50.3)
HEMOGLOBIN: 12.3 G/DL (ref 13–17)
IMMATURE GRANULOCYTES: 2 %
LYMPHOCYTES # BLD: 9 % (ref 24–43)
MAGNESIUM: 2.6 MG/DL (ref 1.6–2.6)
MCH RBC QN AUTO: 25.1 PG (ref 25.2–33.5)
MCHC RBC AUTO-ENTMCNC: 30.6 G/DL (ref 28.4–34.8)
MCV RBC AUTO: 81.9 FL (ref 82.6–102.9)
MONOCYTES # BLD: 7 % (ref 3–12)
NRBC AUTOMATED: 0 PER 100 WBC
PDW BLD-RTO: 16.5 % (ref 11.8–14.4)
PLATELET # BLD: 556 K/UL (ref 138–453)
PLATELET ESTIMATE: ABNORMAL
PMV BLD AUTO: 9.3 FL (ref 8.1–13.5)
POTASSIUM SERPL-SCNC: 4.2 MMOL/L (ref 3.7–5.3)
RBC # BLD: 4.91 M/UL (ref 4.21–5.77)
RBC # BLD: ABNORMAL 10*6/UL
RHEUMATOID FACTOR: 18.5 IU/ML
SEG NEUTROPHILS: 82 % (ref 36–65)
SEGMENTED NEUTROPHILS ABSOLUTE COUNT: 9.73 K/UL (ref 1.5–8.1)
SODIUM BLD-SCNC: 137 MMOL/L (ref 135–144)
WBC # BLD: 12 K/UL (ref 3.5–11.3)
WBC # BLD: ABNORMAL 10*3/UL

## 2020-10-23 PROCEDURE — 71250 CT THORAX DX C-: CPT

## 2020-10-23 PROCEDURE — 83735 ASSAY OF MAGNESIUM: CPT

## 2020-10-23 PROCEDURE — 85025 COMPLETE CBC W/AUTO DIFF WBC: CPT

## 2020-10-23 PROCEDURE — 99232 SBSQ HOSP IP/OBS MODERATE 35: CPT | Performed by: INTERNAL MEDICINE

## 2020-10-23 PROCEDURE — 99233 SBSQ HOSP IP/OBS HIGH 50: CPT | Performed by: INTERNAL MEDICINE

## 2020-10-23 PROCEDURE — 86200 CCP ANTIBODY: CPT

## 2020-10-23 PROCEDURE — 97116 GAIT TRAINING THERAPY: CPT

## 2020-10-23 PROCEDURE — 97535 SELF CARE MNGMENT TRAINING: CPT

## 2020-10-23 PROCEDURE — 36415 COLL VENOUS BLD VENIPUNCTURE: CPT

## 2020-10-23 PROCEDURE — 80048 BASIC METABOLIC PNL TOTAL CA: CPT

## 2020-10-23 PROCEDURE — 86431 RHEUMATOID FACTOR QUANT: CPT

## 2020-10-23 PROCEDURE — 2580000003 HC RX 258: Performed by: STUDENT IN AN ORGANIZED HEALTH CARE EDUCATION/TRAINING PROGRAM

## 2020-10-23 PROCEDURE — 93005 ELECTROCARDIOGRAM TRACING: CPT | Performed by: STUDENT IN AN ORGANIZED HEALTH CARE EDUCATION/TRAINING PROGRAM

## 2020-10-23 PROCEDURE — 6370000000 HC RX 637 (ALT 250 FOR IP): Performed by: STUDENT IN AN ORGANIZED HEALTH CARE EDUCATION/TRAINING PROGRAM

## 2020-10-23 PROCEDURE — 2060000000 HC ICU INTERMEDIATE R&B

## 2020-10-23 PROCEDURE — 6370000000 HC RX 637 (ALT 250 FOR IP): Performed by: NURSE PRACTITIONER

## 2020-10-23 PROCEDURE — 97110 THERAPEUTIC EXERCISES: CPT

## 2020-10-23 RX ORDER — FUROSEMIDE 20 MG/1
20 TABLET ORAL DAILY
Status: DISCONTINUED | OUTPATIENT
Start: 2020-10-24 | End: 2020-10-26 | Stop reason: HOSPADM

## 2020-10-23 RX ADMIN — SODIUM CHLORIDE, PRESERVATIVE FREE 10 ML: 5 INJECTION INTRAVENOUS at 19:37

## 2020-10-23 RX ADMIN — PREDNISONE 40 MG: 20 TABLET ORAL at 08:41

## 2020-10-23 RX ADMIN — TAMSULOSIN HYDROCHLORIDE 0.4 MG: 0.4 CAPSULE ORAL at 08:41

## 2020-10-23 RX ADMIN — ATORVASTATIN CALCIUM 40 MG: 80 TABLET, FILM COATED ORAL at 08:42

## 2020-10-23 RX ADMIN — METOPROLOL TARTRATE 25 MG: 25 TABLET ORAL at 08:41

## 2020-10-23 RX ADMIN — FAMOTIDINE 20 MG: 20 TABLET ORAL at 08:42

## 2020-10-23 RX ADMIN — FAMOTIDINE 20 MG: 20 TABLET ORAL at 19:37

## 2020-10-23 RX ADMIN — RIVAROXABAN 20 MG: 20 TABLET, FILM COATED ORAL at 08:41

## 2020-10-23 RX ADMIN — METOPROLOL TARTRATE 25 MG: 25 TABLET ORAL at 21:00

## 2020-10-23 RX ADMIN — FUROSEMIDE 40 MG: 40 TABLET ORAL at 08:41

## 2020-10-23 RX ADMIN — SODIUM CHLORIDE, PRESERVATIVE FREE 10 ML: 5 INJECTION INTRAVENOUS at 08:41

## 2020-10-23 ASSESSMENT — PAIN SCALES - GENERAL
PAINLEVEL_OUTOF10: 0

## 2020-10-23 NOTE — PROGRESS NOTES
[facility]   Progress Note                   Date:   10/23/2020  Patient name: Radha Dates  Date of admission:  10/17/2020  7:08 AM  MRN:   [id number]  YOB: 1955  PCP: Rochelle Hill MD    Reason for Admission: CHF (congestive heart failure), NYHA class II, acute on chronic, [facility] [I50.43]  CHF (congestive heart failure), NYHA class II, acute on chronic, combined ([facility] [I50.43]    Subjective:       Clinical Changes / Abnormalities: Pt seen and examined in the room after discussing with RN. Pt denies any CP. Episode again of Afib/flutter RVR this AM with 6 beat run NSVT. Labs and vitals noted. States he felt \"OK\" during this episode but did \"feel it. \"   Orthostatics positive this AM.  Now SR and states he feels \"just fine and ready to go. \"     Medications:   Scheduled Meds:   predniSONE  40 mg Oral Daily    metoprolol tartrate  25 mg Oral 3 times per day    famotidine  20 mg Oral BID    furosemide  40 mg Oral Daily    atorvastatin  40 mg Oral Daily    tamsulosin  0.4 mg Oral Daily    sodium chloride flush  10 mL Intravenous 2 times per day    rivaroxaban  20 mg Oral Daily with breakfast     Continuous Infusions:  CBC:   Recent Labs     10/21/20  0604 10/22/20  0816 10/23/20  0608   WBC 9.3 11.1 12.0*   HGB 12.7* 13.0 12.3*    516* 556*     BMP:    Recent Labs     10/21/20  0604 10/22/20  0816 10/23/20  0608    132* 137   K 3.7 3.9 4.2    100 102   CO2 22 24 23   BUN 26* 22 21   CREATININE 0.63* 0.68* 0.68*   GLUCOSE 96 98 121*     Hepatic: No results for input(s): AST, ALT, ALB, BILITOT, ALKPHOS in the last 72 hours. Troponin:   No results for input(s): TROPHS in the last 72 hours. BNP: No results for input(s): BNP in the last 72 hours. Lipids: No results for input(s): CHOL, HDL in the last 72 hours. Invalid input(s): LDLCALCU  INR: No results for input(s): INR in the last 72 hours.     EKG:   Aflutter with RVR     CV 10/19/2020  CARDIOVERSION:  After an adequate level of sedation was achieved, 100J in biphasic synchronized delivery was administered. no change in rhythm. The patient awoke without complications. Another shock at 200 J was given with conversion to normal sinus rhythm. A post procedure 12 L ECG was ordered and reviewed. ECHO:   06/2018  eviewed. 06/12/2018  Mild left ventricular enlargement with mildly reduced systolic function;  Estimated left ventricular ejection fraction 45%. (patient converted from  AFib to NSR during the study)  Left atrium is moderately dilated. Moderate severity mitral regurgitation. Mildly thickened and calcified aortic valve leaflets with evidence of mild  stenosis. Peak instantaneous gradient 28 mmHg and mean gradient 14 mmHg.        Cardiac Angiography:   11/2018     Left main: Normal     LAD: Luminal irregularities 30-40%     LCX: 40% ostial stenosis     RCA: Normal. Non-dominant, small     The LV gram was performed in the LORD 30 position. LVEF: 55%. LV Wall Motion: Normal        Conclusions:  1. Non-obstructive CAD  2. Overall preserved LV function  Objective:   Vitals: /64   Pulse 68   Temp 97.3 °F (36.3 °C) (Oral)   Resp 17   Ht 6' (1.829 m)   Wt 172 lb 14.4 oz (78.4 kg)   SpO2 97%   BMI 23.45 kg/m²   General appearance: alert and cooperative with exam  HEENT: Head: Normocephalic, no lesions, without obvious abnormality. Neck: no JVD, trachea midline, no adenopathy  Lungs: Clear throughout,  on 02 per NC  Heart: Regular rate and rhythm, s1/s2 auscultated, no murmurs. SR  Abdomen: soft, non-tender, bowel sounds active  Extremities: no edema  Neurologic: not done        Assessment / Acute Cardiac Problems:   3. Aflutter with RVR  4. Hypoxic resp failure secondary to Acute on chronic systolic CHF in the setting of Aflutter with RVR   5. H/O Paroxysmal Afib - on xarelto. Complaint with no interruption in last 6 weeks per patient    6. Non obstructive CAD  7.  Reduced systolic function (FN:42%) on echo, normal EF on cardiac cath. 8. Moderate MR  9. H/O Colon cancer  10. Recent admission at [address] 9/2020 for pneumonia - treated as COVID with negative test  11. Recent ER visit 10/15/2020 to [facility] for shortness of breath, admission advised but left  12. H/o rectal cancer - in remission  13. Fibrotic changes lower lung fields    Patient Active Problem List:     Dyslipidemia     ED (erectile dysfunction)     Incomplete bladder emptying     Benign prostatic hyperplasia with urinary obstruction     History of colon cancer     Atrial fibrillation with normal ventricular rate (HCC)     Anemia     Pallor of optic disc     Presbyopia     Incisional hernia, without obstruction or gangrene     Hypertrophic nonobstructive cardiomyopathy (HCC)     Iron (Fe) deficiency anemia     Primary osteoarthritis of right knee     Benign essential HTN     History of malignant neoplasm of rectum     Hill's esophagus     CHF (congestive heart failure), NYHA class II, acute on chronic, combined (HCC)     Hypoxia     Dyspnea and respiratory abnormalities     Pneumonia due to Mycoplasma pneumoniae      Plan of Treatment:   1. Afib/flutter with RVR. S.p CV. Recurrent episode this AM with spontaneous conversion. Continue BB and Amiodarone. On Xarelto   2. CHF- stable. Echo reviewed. Continue  BB and Lasix at decreased dose. 3. HTN - Stable. Orthostatic positive this AM. Conitnue BB. Will continue to hold ACE on discharge. LE compression stockings and discussed in detail importance of monitoring PO fluid intake while avoiding dehydration. Decrease PO Lasix   4. Check Mg+. Keep K >4 and Mg > 2.    5. No objection to discharge on med changes as above. F/U in clinic in 2 weeks.      Electronically signed by MIRI Noble CNP on 10/23/2020 at 9:38 AM  [address]  [phone]

## 2020-10-23 NOTE — PROGRESS NOTES
Occupational Therapy  Facility/Department: [facility]  Daily Treatment Note  NAME: Ailin Collins  : 1955  MRN: [id number]    Date of Service: 10/23/2020    Discharge Recommendations: Pt has met Acute Care OT goals and demo independence w/ ADLs & transfers. Pt is no longer in need of Acute Care OT services. Collaborated w/ pt and OTR whom are both in agreement of signing off on OT services. Ed on OT services, EC/WS, fall prevention tips, DME/AE- good return          Assessment   Prognosis: Good  No Skilled OT: Independent with functional mobility; Independent with ADL's;At baseline function  REQUIRES OT FOLLOW UP: No  Activity Tolerance  Activity Tolerance: Patient Tolerated treatment well  Safety Devices  Safety Devices in place: Yes         Patient Diagnosis(es): The primary encounter diagnosis was Hypoxia. Diagnoses of Dyspnea and respiratory abnormalities and Chest pain, unspecified type were also pertinent to this visit. has a past medical history of Atrial fibrillation ([facility] [address] Back pain, chronic, Hill's esophagus, BPH (benign prostatic hyperplasia), Cancer ([facility] [address] Cocaine abuse in remission [facility]), ED (erectile dysfunction), GERD (gastroesophageal reflux disease), GI bleed, Hernia, History of colon cancer, Melena, Migraines, and Murmur, cardiac.   has a past surgical history that includes Tonsillectomy; Colonoscopy; colectomy; Colonoscopy (2016); knee surgery (Right, 's); transesophageal echocardiogram (2018); Upper gastrointestinal endoscopy (N/A, 2018); Colonoscopy (N/A, 2018); hernia repair (Right, ); Cardiac catheterization (2018); colectomy; Total knee arthroplasty (Right, 2019); and Upper gastrointestinal endoscopy (N/A, 2019).     Restrictions  Restrictions/Precautions  Restrictions/Precautions: General Precautions  Required Braces or Orthoses?: No  Position Activity Restriction  Other position/activity restrictions: Room O2 at 3.5 L  Subjective General  Patient assessed for rehabilitation services?: Yes  Family / Caregiver Present: No    Vital Signs  Patient Currently in Pain: No   Orientation  Orientation  Overall Orientation Status: Within Functional Limits  Objective    Pt seated in recliner upon arrival awake and alert. Pt reports completing dressing & bathing this morning independently but was willing to demo functional transfers in room. Also, pt demo simple grooming task standing at the sink. Pt receptive to ed and retired to recliner at end of session. ADL  Grooming: Independent  LE Dressing: Independent  Toileting: Independent        Balance  Sitting Balance: Independent  Standing Balance: Independent  Standing Balance  Time: stood for ~ 10 min to demo functional transfers in room and stand at sink to complete simple grooming  Activity: NO LOB  Comment: Pt aware of taking rest breaks and verbalized some EC techniques like sitting vs standing or sitting during a shower. Bed mobility  Supine to Sit: Independent  Sit to Supine: Independent  Scooting: Independent  Transfers  Stand Step Transfers: Independent  Stand Pivot Transfers: Independent  Sit to stand: Independent  Stand to sit:  Independent  Attendance  Participation: Active participation             Plan   Plan  Times per week: 2-3x/wk  Current Treatment Recommendations: Functional Mobility Training, Endurance Training, Safety Education & Training, Patient/Caregiver Education & Training, Equipment Evaluation, Education, & procurement, Self-Care / ADL       Goals  Short term goals  Time Frame for Short term goals: By discharge, pt will  Short term goal 1: perform UB ADLs independently  Short term goal 2: perform LB ADLs independently  Short term goal 3: tolerate 25+ min functional activity at supervision using EC/WS techniques PRN  Short term goal 4: safely perform transfer/mobility tasks at Mod I using EC/WS techniques PRN       Therapy Time   Individual Concurrent Group Co-treatment   Time In  10:30         Time Out  10:45         Minutes  15          time code min: 15 min       [address] [facility], [place]/L

## 2020-10-23 NOTE — PLAN OF CARE
Nutrition Problem #1: Unintended weight loss  Intervention: Food and/or Nutrient Delivery: Modify Current Diet, Start Oral Nutrition Supplement  Nutritional Goals: Pt to meet % of est'd daily needs via PO

## 2020-10-23 NOTE — PROGRESS NOTES
Comprehensive Nutrition Assessment    Type and Reason for Visit:  Initial(LOS day 6)    Nutrition Recommendations/Plan:   -Recommend liberalizing cardiac, 2 gm Na diet to CHRIS diet to allow > po intake, continue 1800 mL FR   -Suggest ensure enlive supplements BID   -Will monitor po intake and weights     Nutrition Assessment:   Pt admitted d/t chronic CHF. Pt stated that he has a good appetite but dislikes the hospital food. Pt stated UBW of 180 lbs x 1 wk ago and attributes his wt loss to disliking the hospital food. Pt w/ 4.4% wt loss x 1 wk, significant. Pt agreed to nutritional supplements to compliment his po intake and will liberalize diet to allow > po intake. Malnutrition Assessment:  Malnutrition Status: At risk for malnutrition (Comment)    Context:  Acute Illness     Findings of the 6 clinical characteristics of malnutrition:  Energy Intake:  (variable po intake x 1 wk)  Weight Loss:  7 - Greater than 2% over 1 week     Body Fat Loss:  No significant body fat loss     Muscle Mass Loss:  No significant muscle mass loss    Fluid Accumulation:  No significant fluid accumulation     Strength:  Not Performed    Estimated Daily Nutrient Needs:  Energy (kcal):  28-32 ~> 1566-6827 kcals/d; Weight Used for Energy Requirements:  Admission     Protein (g):  1.3-1.5 gm/kg ~> 107-123 gms/d; Weight Used for Protein Requirements:  Admission          Nutrition Related Findings:  labs/meds reviewed      Wounds:  None       Current Nutrition Therapies:    DIET CARDIAC; Low Sodium (2 GM);  Daily Fluid Restriction: 1800 ml    Anthropometric Measures:  · Height: 6' (182.9 cm)  · Current Body Weight: 172 lb (78 kg)   · Admission Body Weight: 180 lb (81.6 kg)    · Usual Body Weight: 180 lb (81.6 kg)(per pt)     · Ideal Body Weight: 178 lbs; % Ideal Body Weight 96.6 %   · BMI: 23.3  · BMI Categories: Normal Weight (BMI 22.0 to 24.9) age over 72       Nutrition Diagnosis:   · Unintended weight loss related to (dislikes hospital food) as evidenced by (Need for ONS and liberalize diet)      Nutrition Interventions:   Food and/or Nutrient Delivery:  Modify Current Diet, Start Oral Nutrition Supplement  Nutrition Education/Counseling:  Education not indicated   Coordination of Nutrition Care:  Continued Inpatient Monitoring    Goals: Set   Pt to meet % of est'd daily needs via PO       Nutrition Monitoring and Evaluation:   Food/Nutrient Intake Outcomes:  Food and Nutrient Intake, Supplement Intake  Physical Signs/Symptoms Outcomes:  Biochemical Data, Nutrition Focused Physical Findings, Skin, Weight, GI Status     Discharge Planning:     Too soon to determine     Electronically signed by Baljinder Hines RD, LD on 10/23/20 at 2:05 PM EDT    Contact: [phone]

## 2020-10-23 NOTE — PROGRESS NOTES
PULMONARY & CRITICAL CARE MEDICINE CONSULT NOTE     Patient:  Nilton Van  MRN: [id number]  Admit date: 10/17/2020  Primary Care Physician: Jacki Velasco MD  Consulting Physician: Minal Weller MD  CODE Status: Full Code     HISTORY     CHIEF COMPLAINT/REASON FOR CONSULT:    Interval History  Afebrile. Non-tachycardia. Hemodynamically stable. 4 L NC.  1350 cc urine output last 24 hours.   -8 L since admission. States breathing has improved. Denies chest pain, N/V/D, fevers, chills. HISTORY OF PRESENT ILLNESS:  PMH: hx cocaine abuse, Atrial Fibrillation, Hill's, diastolic chf, colon cancer s/p resection    The patient is a 77 y.o. male with significant past medical history as above presented to the hospital on 10/17 with a chief complaint of shortness of breath and productive cough x 3 days. Associated dizziness. Of note patient was recently treated for Covid-19 [facility] despite covid-19 negative testing x 2 in 9/2020. S/p decadron, remdisivir, rocephin, & discharged on home oxygen 1.5 L NC. CT from [facility] with typical findings of covid-19. On admission, patient was desaturating to 65%. Tachycardia and Tachypnea on admission. Hemodynamically stable. Placed on 5 L NC. Initial BMP unremarkable. Procal 0.29. ABG on admission: 7.432 / 37.2 / 98 / 24.8 (likely on oxygen). . Pro-bnp 3525. Hepatic panel negative. TSH WNL. No leukocytosis. + normocytic anemia. Ferritin 238. D-dimer 1.24. Viral panel negative. Covid PCR negative. Legionella, Mycoplasma IgM, strep pneumonia all negative. ANCA, YOUNG, C3/4 negative. Serological evidence of prior mycoplasma infection. Serial CXRs have shown bilateral pulmonary infiltrates. CT chest showed ground glass attenuation, no pe, and septal thickening in the lower lobes. Echocardiogram shows moderately reduced EF at 35%. Global hypokinesis. Grade 1 diastolic dysfunction. Mild-moderate MR. RVSP 34 mmHg.      Patient has been treated for diastolic/systolic CHF with diuresis. Currently -7.5 L since admission. Patient has also been treated for atrial fibrillation and cardioverted on 10/19 and remains in sinus rhythm. Patient notes that prior to September he has been having some dry cough at night time for a number of weeks. However, he does not report much shortness of breath prior to September when he was admitted to [facility]. Has a dog at home. 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 No other animal exposure. No real tobacco abuse history.        WBC   Date Value Ref Range Status   10/22/2020 11.1 3.5 - 11.3 k/uL Final       PAST MEDICAL HISTORY:        Diagnosis Date    Atrial fibrillation (HCC)     Back pain, chronic     Mcguire's esophagus 06/18/2019    BPH (benign prostatic hyperplasia)     Cancer (HCC)     colon-rectal    Cocaine abuse in remission [facility])     1970's    ED (erectile dysfunction) 4/2/2015    GERD (gastroesophageal reflux disease)     GI bleed 12/5/2018    Hernia     History of colon cancer     Melena     Migraines     Murmur, cardiac      PAST SURGICAL HISTORY:        Procedure Laterality Date    CARDIAC CATHETERIZATION  11/29/2018    Non-obstructive CAD    COLECTOMY      2nd colectomy, Colostomy and reversed [facility] COLECTOMY      1st time Ksenia    COLONOSCOPY      COLONOSCOPY  07/18/2016    COLONOSCOPY N/A 12/6/2018    COLONOSCOPY DIAGNOSTIC performed by Everardo Klein MD at [address] Right 2009    inguinal    KNEE SURGERY Right 1970's    arthrotomy    TONSILLECTOMY      TOTAL KNEE ARTHROPLASTY Right 1/8/2019    KNEE TOTAL ARTHROPLASTY performed by Nina Lane MD at [address] TRANSESOPHAGEAL ECHOCARDIOGRAM  11/29/2018    UPPER GASTROINTESTINAL ENDOSCOPY N/A 12/5/2018    EGD DIAGNOSTIC ONLY performed by Everardo Klein MD at [address] [facility] N/A 6/18/2019    MCGUIRE'S     FAMILY HISTORY:       Problem Relation Age of Onset    Diabetes Mother     Heart Attack Father     Heart Disease Father     Heart Disease Brother      SOCIAL HISTORY:   TOBACCO:   reports that he quit smoking about 49 years ago. He has a 0.50 pack-year smoking history. He has never used smokeless tobacco.  ETOH:  reports current alcohol use of about 8.3 standard drinks of alcohol per week. DRUGS: reports no history of drug use. AVOCATION/OCCUPATIONAL EXPOSURE:    The patient unsure but believes he has  asbestos, silica dust, coal, foundry, quarry or [employer] exposure. The patient admits to  to having pet dogs at home. There is no history of TB or TB exposure. There is no exposure to sick contacts. Travel history is not significant history of risk factors for pulmonary disease. The patient denies using Hot Tubs. ALLERGIES:    Allergies   Allergen Reactions    Adhesive Tape Other (See Comments)     Blister badly     Codeine Nausea Only     Other reaction(s): Other: See Comments  NAUSEA  Other reaction(s): Other: See Comments  NAUSEA    Penicillins Swelling     As a baby  Other reaction(s): Unknown  As a baby         HOME MEDICATIONS:  Prior to Admission medications    Medication Sig Start Date End Date Taking?  Authorizing Provider   tamsulosin (FLOMAX) 0.4 MG capsule Take 1 capsule by mouth daily 10/15/20  Yes Cassandra Montana MD   atorvastatin (LIPITOR) 40 MG tablet take 1 tablet by mouth once daily 10/9/20  Yes Cassandra Montana MD   sildenafil (VIAGRA) 100 MG tablet TAKE ONE TABLET BY MOUTH AS NEEDED FOR FOR ERECTILE DYSFUNCTION 8/17/20  Yes Surinder Presley MD   omeprazole (PRILOSEC) 40 MG delayed release capsule take 1 capsule by mouth every morning before breakfast 7/21/20  Yes Cassandra Montana MD   ibuprofen (ADVIL;MOTRIN) 800 MG tablet take 1 tablet by mouth every 6 hours if needed for pain 5/26/20  Yes Cassandra Montana MD   metoprolol tartrate (LOPRESSOR) 25 MG tablet take 1 tablet by mouth twice a day 8/6/19  Yes Jolynn Mederos MD   lisinopril (PRINIVIL;ZESTRIL) 5 MG tablet take 1 tablet by mouth once daily 7/29/19  Yes MIRI Shah CNP   rivaroxaban (XARELTO) 20 MG TABS tablet Take 20 mg by mouth daily (with breakfast)    Yes Historical Provider, MD   Elastic Bandages & Supports (ABDOMINAL BINDER/ELASTIC XL) MISC 1 Device by Does not apply route as needed (ventral hernia, while ambulating/active) 9/28/16   Apolonia Schroeder DO     IMMUNIZATIONS:  Most Recent Immunizations   Administered Date(s) Administered    Influenza Vaccine, unspecified formulation 12/12/2017    Influenza Virus Vaccine 12/04/2015    Influenza, High Dose (Fluzone 65 yrs and older) 09/27/2018    Influenza, Jerone Cushing, 6 mo and older, IM (Fluzone, Flulaval) 12/12/2017    Influenza, Jerone Cushing, IM, (6 mo and older Fluzone, Flulaval, Fluarix and 3 yrs and older Afluria) 01/03/2017    Influenza, Quadv, IM, PF (6 mo and older Fluzone, Flulaval, Fluarix, and 3 yrs and older Afluria) 10/23/2014    Influenza, Triv, inactivated, subunit, adjuvanted, IM (Fluad 65 yrs and older) 11/08/2019       REVIEW OF SYSTEMS:  General: negative for chills, fatigue or fever  ENT: negative for headaches, nasal congestion, sore throat or visual changes  Allergy and Immunology: negative for postnasal drip or seasonal allergies  Hematological and Lymphatic: negative for bleeding problems, swollen lymph nodes  Respiratory: positive for cough and shortness of breath negative for hemoptysis, orthopnea or sputum changes  Cardiovascular: negative for edema or palpitations  Gastrointestinal: negative for abdominal pain, change in bowel habits or nausea/vomiting  Genito-Urinary: negative for dysuria or urinary frequency/urgency  Musculoskeletal: negative for joint pain or joint swelling  Neurological: negative for numbness/tingling, seizures or weakness  Dermatological: negative for pruritus or rash    PHYSICAL EXAMINATION     VITAL SIGNS:   LAST-  /65 Pulse 62   Temp 97.7 °F (36.5 °C) (Oral)   Resp 19   Ht 6' (1.829 m)   Wt 173 lb 12.8 oz (78.8 kg)   SpO2 95%   BMI 23.57 kg/m²   8-24 HR RANGE-  TEMP Temp  Av.1 °F (36.7 °C)  Min: 97.7 °F (36.5 °C)  Max: 98.8 °F (95.4 °C)   BP Systolic (82FYR), JNC:192 , Min:97 , WSL:805      Diastolic (51OGP), GOV:05, Min:47, Max:78     PULSE Pulse  Av.8  Min: 60  Max: 101   RR Resp  Av  Min: 19  Max: 19   O2 SAT SpO2  Av.3 %  Min: 94 %  Max: 97 %   OXYGEN DELIVERY No data recorded     SYSTEMIC EXAMINATION:    General appearance - well appearing, overweight, comfortable and in no acute distress   Mental status - alert, oriented to person, place, and time   Eyes - pupils equal and reactive, extraocular eye movements intact, sclera anicteric   Ears - not examined   Nose - normal and patent, no erythema, discharge   Mouth - mucous membranes moist, pharynx normal without lesions   Neck - supple, no significant adenopathy, carotids upstroke normal bilaterally, no bruits   Lymphatics - no palpable lymphadenopathy, no hepatosplenomegaly   Chest - clear to auscultation, mild rales to the mid and lower lung fields.  Heart - NSR currently. 2+ systolic murmur.     Abdomen - soft, nontender, nondistended, no masses or organomegaly   Neurological - motor and sensory grossly normal bilaterally   Musculoskeletal - no joint tenderness, deformity or swelling   Extremities - peripheral pulses normal, no pedal edema, no clubbing or cyanosis   Skin - normal coloration and turgor, no rashes, no suspicious skin lesions noted    DATA REVIEW     Medications: Current Inpatient  Scheduled Meds:   metoprolol tartrate  12.5 mg Oral 3 times per day    predniSONE  40 mg Oral Daily    furosemide  40 mg Oral Daily    atorvastatin  40 mg Oral Daily    tamsulosin  0.4 mg Oral Daily    sodium chloride flush  10 mL Intravenous 2 times per day    rivaroxaban  20 mg Oral Daily with breakfast     Continuous Infusions:   amiodarone 0.5 mg/min (10/22/20 0211)     INPUT/OUTPUT:  In: 0 [P.O.:450; I.V.:430]  Out: 1215 [Urine:1215]    LABS:-  ABG:   No results for input(s): POCPH, POCPCO2, POCPO2, POCHCO3, EWLA5QXQ in the last 72 hours. CBC:   Recent Labs     10/20/20  0600 10/21/20  0604 10/22/20  0816   WBC 10.3 9.3 11.1   HGB 12.8* 12.7* 13.0   HCT 43.3 42.3 42.0   MCV 85.9 82.6 82.7    433 516*   LYMPHOPCT 18* 15* 13*   RBC 5.04 5.12 5.08   MCH 25.4 24.8* 25.6   MCHC 29.6 30.0 31.0   RDW 17.6* 16.8* 16.8*     BMP:   Recent Labs     10/20/20  0600 10/21/20  0604 10/22/20  0816    136 132*   K 4.0 3.7 3.9    102 100   CO2 25 22 24   BUN 28* 26* 22   CREATININE 0.78 0.63* 0.68*   GLUCOSE 99 96 98     Liver Function Test:   No results for input(s): PROT, LABALBU, ALT, AST, GGT, ALKPHOS, BILITOT in the last 72 hours. Amylase/Lipase:  No results for input(s): AMYLASE, LIPASE in the last 72 hours. Coagulation Profile:   No results for input(s): INR, PROTIME, APTT in the last 72 hours. Cardiac Enzymes:  No results for input(s): CKTOTAL, CKMB, CKMBINDEX, TROPONINI in the last 72 hours. Lactic Acid:  Lab Results   Component Value Date    LACTA NOT REPORTED 10/17/2020    LACTA 1.0 08/11/2014     BNP:   No results found for: BNP  D-Dimer:  Lab Results   Component Value Date    DDIMER 1.24 10/17/2020     Others:   Lab Results   Component Value Date    TSH 4.28 10/17/2020     Lab Results   Component Value Date    YOUNG NEGATIVE 10/17/2020    SEDRATE 82 (H) 10/22/2020    .7 (H) 10/17/2020     No results found for: Tameka Hall  Lab Results   Component Value Date    IRON 48 (L) 03/10/2020    TIBC 340 03/10/2020    FERRITIN 238 10/17/2020     No results found for: SPEP, UPEP  Lab Results   Component Value Date    PSA 0.73 02/25/2020    CEA 3.4 10/01/2018     Microbiology:    Pathology:    Radiology Reports:  CT CHEST PULMONARY EMBOLISM W CONTRAST   Final Result   1. No evidence of pulmonary embolism.       2. Fairly extensive scattered ground-glass opacities in the lungs which may   be related to atypical pneumonitis less likely venous congestion. No   effusions. 3.  Septal thickening, most severe in the lung bases. XR CHEST PORTABLE   Final Result   Persistent bilateral pulmonary opacities could represent pulmonary edema or   atypical pneumonia         XR CHEST PORTABLE   Final Result   No significant change in chest findings compared to the October 17th study. XR CHEST PORTABLE   Final Result   Bilateral scattered pulmonary opacities right greater than left favoring   multifocal airspace disease with small effusions not excluded. No   extrapleural air is seen. Pulmonary Function test:    Polysomnogram:    Echocardiogram:   Results for orders placed during the hospital encounter of 10/17/20   ECHO Complete 2D W Doppler W Color    Narrative Transthoracic Echocardiography Report (TTE)     Patient Name Robbin Yamilex     Date of Study               10/20/2020                Madi Petite      Date of      1955  Gender                      Male   Birth      Age          77 year(s)  Race                              Room Number  2019        Height:                     72 inch, 182.88 cm      Corporate ID [id number]    Weight:                     193 pounds, 87.5 kg   #      Patient Acct [de-identified]   BSA:          2.1 m^2       BMI:     26.18 kg/m^2   #      MR #         [id number]     [address]      Accession #  [id number]  Interpreting Physician      [address] [facility]      Fellow       Aditya Pritchett   Referring Nurse                            Practitioner      Interpreting             Referring Physician         Chandler Wong MD   Fellow     Type of Study      TTE procedure:2D Echocardiogram, M-Mode, Doppler, Color Doppler.      Procedure Date  Date: 10/20/2020 Start: 09:50 AM    Study Location: [facility]  Technical Quality: Mimi Giraldo visualization    Indications:Congestive heart failure. History / Tech. Comments:  Procedure explained to patient. HTN. Aortic stenosis. Patient Status: Inpatient    Height: 72 inches Weight: 193 pounds BSA: 2.1 m^2 BMI: 26.18 kg/m^2    HR: 65 bpm    Allergies    - Penicillin. - Codeine. CONCLUSIONS    Summary  Left ventricle is normal in size Global left ventricular systolic function  is moderately reduced Estimated ejection fraction is 35 % . Mostly global hypokinesis with minor regional variation. Grade I (mild) left ventricular diastolic dysfunction. Left atrium is moderately dilated. Aortic leaflet calcification with Moderate Aortic Stenosis, maybe  underestimated due to poor LVEF. Thickened mitral valve leaflets. Mild to moderate mitral regurgitation. Trivial tricuspid regurgitation. Estimated right ventricular systolic  pressure is [id number]. IVC dilated but unable to assess respiratory collapse. Signature  ----------------------------------------------------------------------------   Electronically signed by Noelle Dunaway on 10/20/2020 10:49   AM  ----------------------------------------------------------------------------    ----------------------------------------------------------------------------   Electronically signed by Alexandria HookerInterpreting physician) on 10/20/2020   01:48 PM  ----------------------------------------------------------------------------  FINDINGS  Left Atrium  Left atrium is moderately dilated. Left Ventricle  Left ventricle is normal in size Global left ventricular systolic function  is moderately reduced Estimated ejection fraction is 35 % . Mostly global hypokinesis with minor regional variation. Grade I (mild) left ventricular diastolic dysfunction. Right Atrium  Right atrium is normal in size. Right Ventricle  Normal right ventricular size and function. Mitral Valve  Thickened mitral valve leaflets.   Mild to moderate mitral Diastology / Tissue Doppler  Septal Wall E' velocity:0.06 m/s  Septal Wall E/E':14.6  Lateral Wall E' velocity:0.10 m/s  Lateral Wall E/E':9.7       Cardiac Catheterization:   No results found for this or any previous visit. ASSESSMENT AND PLAN     Assessment:    // Acute on Chronic Diastolic CHF  // Atrial Fibrillation with RVR s/p cardioversion  // Fibrotic changes lower lung fields  // HTN  // Colon cancer s/p resection    Plan:    I personally interviewed/examined the patient; reviewed interval history, interpreted all available radiographic and laboratory data at the time of service. 1. Patient is currently being treated for acute systolic/diastolic CHF. Echocardiogram is showing EF 35% at this time. Echocardiogram from 2018 shows EF 45-50%. Heart cath from 11/2018 shows minimal CAD. Repeat ischemic work up per cardiology discretion. However, agree with diuresis at this time. Patient is currently -8.8 L   2. Atrial Fibrillation s/p cardioversion and not initiated on amiodarone. Would recommend for cardiology to evaluate patient for possible discontinuation of Amiodarone. Patient already has fibrotic changes on the lungs and we recommend discontinuation of amiodarone as to not create possible future contributory offending agents. 3. CT at [facility] shows pulmonary edema and some fibrotic changes more so in the lower lung fields. Patient noted to have some fibrosis in lower lung fields on CT abd in 1/2019. - 8 L since admission will order HRCT. Added anti-CCP & RF.   4. Consideration to Organizing pneumonia. 5. Wean oxygen as tolerated. May need repeat home o2 eval.   6. Will attempt to obtain disc (actual imaging) of CT chest from [facility]. 7. Follow up with pulmonology as outpatient. Glenda Oden MD  Internal Medicine Resident, PGY-3  [facility];  [place], [place]  10/22/2020, 12:58 PM  Attending Physician Statement  I have discussed the care of Ailin Collins including pertinent history and exam findings,  with the resident. I have seen and examined the patient and the key elements of all parts of the encounter have been performed by me. I agree with the assessment, plan and orders as documented by the resident with additions . Patient examined. History reviewed    X-ray and CT scan reviewed. Patient very likely had cryptogenic organizing pneumonia. At this time he has been treated with steroid which may be old reasonable however for definitive diagnosis he required an open lung biopsy. He has been treated with diuretics which has not improved the infiltrates. It is my feeling that most likely this is not heart failure. He is 77year-old. We should get a high-resolution CT scan of the chest to further evaluate her interstitial infiltrates. If these are consistent with IPF interstitial process the rebiopsy may be withheld and the patient may be treated with kinase inhibitors. Treatment plan Discussed with nursing staff in detail , all questions answered . Electronically signed by Ilene Bro MD on   10/23/20 at 6:20 PM EDT    Please note that this chart was generated using voice recognition Dragon dictation software. Although every effort was made to ensure the accuracy of this automated transcription, some errors in transcription may have occurred. Please note that this chart was generated using voice recognition Dragon dictation software. Although every effort was made to ensure the accuracy of this automated transcription, some errors in transcription may have occurred.

## 2020-10-23 NOTE — PROGRESS NOTES
Physical Therapy  Facility/Department: [facility] CAR 2  Daily Treatment Note  NAME: Annalisa Bruno  : 1955  MRN: [id number]    Date of Service: 10/23/2020    Discharge Recommendations:    Discharge Recommendations:    Further therapy recommended at discharge. PT Equipment Recommendations  Equipment Needed: No     Assessment     Body structures, Functions, Activity limitations: Decreased safe awareness;Decreased endurance  Prognosis: Good  Decision Making: Low Complexity  PT Education: Goals;PT Role;Plan of Care;General Safety  REQUIRES PT FOLLOW UP: Yes  Activity Tolerance  Activity Tolerance: Treatment limited secondary to medical complications (O2 sats below 90%)         Patient Diagnosis(es): The primary encounter diagnosis was Hypoxia. Diagnoses of Dyspnea and respiratory abnormalities and Chest pain, unspecified type were also pertinent to this visit. has a past medical history of Atrial fibrillation ([facility] [address] Back pain, chronic, Hill's esophagus, BPH (benign prostatic hyperplasia), [facility] Cocaine abuse in remission [facility]), ED (erectile dysfunction), GERD (gastroesophageal reflux disease), GI bleed, Hernia, History of colon cancer, Melena, Migraines, and Murmur, cardiac.   has a past surgical history that includes Tonsillectomy; Colonoscopy; colectomy; Colonoscopy (2016); knee surgery (Right, 's); transesophageal echocardiogram (2018); Upper gastrointestinal endoscopy (N/A, 2018); Colonoscopy (N/A, 2018); hernia repair (Right, ); Cardiac catheterization (2018); colectomy; Total knee arthroplasty (Right, 2019); and Upper gastrointestinal endoscopy (N/A, 2019). Restrictions  Restrictions/Precautions  Restrictions/Precautions: General Precautions  Required Braces or Orthoses?: No  Position Activity Restriction  Other position/activity restrictions: Room O2 at 3.5 L  Subjective    Pt sitting up on the EOB. Pt has no c/o pain.    Orientation    Overall Orientation Status: Within Normal Limits  Objective     Transfers  Sit to Stand: Independent  Stand to sit: Independent  Ambulation  Ambulation?: Yes,  Device: SPC  Assist: SBA  Distance: 250' x 1  Balance  Posture: Good  Sitting - Static: Good  Sitting - Dynamic: Good  Standing - Static: Good  Standing - Dynamic: Good  Comments: standing balance assessed without AD    Ex's  Upper extremity exercises: Bicep curl, shoulder flexion/extension, punches. Reps: 10 x each with 2 lb wt on a SPC. Standing exercise program: Heel/toe raises, hip flexion, hip abduction, mini squats, hip extension, and hamstring curls. Reps: 10 x each with 2 lb wt on B LE's. .   Goals  Short term goals  Time Frame for Short term goals: 12 visits  Short term goal 1: pt will ambulate 200' with least restrictive device independently  Short term goal 2: pt will ascend/descend 3 stairs with B handrail mod (I)  Short term goal 3: pt will demo increased endurance by tolerating a 25 minute therapy session  Patient Goals   Patient goals : to return home    Plan    Plan  Times per week: 5-6 visits per week  Times per day: Daily  Current Treatment Recommendations: Balance Training, Endurance Training, Gait Training, Stair training  Safety Devices  Type of devices:  All fall risk precautions in place, Call light within reach, Gait belt, Left on EOB, Nurse notified  Restraints  Initially in place: No     Therapy Time   Individual Concurrent Group Co-treatment   Time In   150         Time Out  220         Minutes   [address] [place] [address]

## 2020-10-23 NOTE — PROGRESS NOTES
[facility]  Internal Medicine Teaching Residency Program  Inpatient Daily Progress Note  ______________________________________________________________________________    Patient: Richard Barboza  YOB: 1955   BGW:[id number]    Acct: [de-identified]     Room: 2019/2019-01  Admit date: 10/17/2020  Today's date: 10/23/20  Number of days in the hospital: 6    SUBJECTIVE   Admitting Diagnosis: CHF (congestive heart failure), NYHA class II, acute on chronic, combined ([facility] [address]  CC:     Pt examined at bedside. Chart & results reviewed. No acute events overnight, /62, HR 63, Pt does have RVR in the morning and spontaneous converted back to NS.  indicates breathing much better and on NC 4l saturating at 95 not used BIPAP overnight. Felt dizziness in the morning on standing and othostatic positive this morning. Anti CCP antibodies negative. Follow up on CT chest result. ROS:  Constitutional:  negative for chills, fevers, sweats  Respiratory:  negative for cough, dyspnea on exertion, hemoptysis, shortness of breath, wheezing  Cardiovascular:  negative for chest pain, chest pressure/discomfort, lower extremity edema, palpitations  Gastrointestinal:  negative for abdominal pain, constipation, diarrhea, nausea, vomiting  Neurological:  negative for dizziness, headache    BRIEF HISTORY     The patient is a pleasant 77 y.o. male with past medical history of diastolic CHF, essential hypertension, dural fibrillation dyslipidemia, colon cancer status post resection, Hill's esophagus presents  to the ED with a chief complaint of acute onset of shortness of breath with productive cough since last 3 days, visited with some dizziness and chest pain.      Patient was recently treated for  COVID pneumonia with imaging concerning for COVID even though rapid test came back negative on 2 occasions . he was discharged on oxygen which he uses since then.   Today he complains of increased shortness of breath this morning which made him come to the ER. Initially in the ER he was found to be having saturations of 65%. Patient also have the chest pain which is sharp,localised, non radiating and non reproducible on deep breathing.      Pt denied headache, lightheadedness, focal neurological deficits, abdominal pain, nausea, fever, chills      OBJECTIVE     Vital Signs:  /62   Pulse 63   Temp 98 °F (36.7 °C) (Oral)   Resp 14   Ht 6' (1.829 m)   Wt 172 lb 14.4 oz (78.4 kg)   SpO2 95%   BMI 23.45 kg/m²     Temp (24hrs), Av.9 °F (36.6 °C), Min:97.3 °F (36.3 °C), Max:98.2 °F (36.8 °C)    In: 10   Out: 1350 [Urine:1350]    Physical Exam:    Constitutional: This is a well developed, well nourished, 25-29.9 - Overweight 77y.o. year old male who is alert, oriented, cooperative and in no apparent distress. Saturating at 95 on 4l nc. Head:normocephalic and atraumatic. Respiratory: Still has bilateral creps in lower  Lobes . Chest was symmetrical without dullness to percussion. Fine crackles heard on the left  Cardiovascular: Regular without murmur, clicks, gallops or rubs. Abdomen: Slightly rounded and soft without organomegaly. No rebound, rigidity or guarding was appreciated. Lymphatic: No lymphadenopathy. Musculoskeletal: Normal curvature of the spine. No gross muscle weakness. Extremities:  No lower extremity edema, ulcerations, tenderness, varicosities or erythema. Muscle size, tone and strength are normal.  No involuntary movements are noted. Skin:  Warm and dry. Good color, turgor and pigmentation. No lesions or scars.   No cyanosis or clubbing  Neurological/Psychiatric: The patient's general behavior, level of consciousness, thought content and emotional status is normal.        Medications:  Scheduled Medications:    [START ON 10/24/2020] furosemide  20 mg Oral Daily    predniSONE  40 mg Oral Daily    metoprolol tartrate  25 mg Oral 3 times per day    famotidine  20 mg Oral BID    atorvastatin  40 mg Oral Daily    tamsulosin  0.4 mg Oral Daily    sodium chloride flush  10 mL Intravenous 2 times per day    rivaroxaban  20 mg Oral Daily with breakfast     Continuous Infusions:     PRN Medicationssodium chloride flush, 10 mL, PRN  acetaminophen, 650 mg, Q6H PRN    Or  acetaminophen, 650 mg, Q6H PRN  polyethylene glycol, 17 g, Daily PRN  promethazine, 12.5 mg, Q6H PRN    Or  ondansetron, 4 mg, Q6H PRN  potassium chloride, 40 mEq, PRN    Or  potassium alternative oral replacement, 40 mEq, PRN    Or  potassium chloride, 10 mEq, PRN  magnesium sulfate, 1 g, PRN  metoprolol, 5 mg, Q6H PRN        Diagnostic Labs:  CBC:   Recent Labs     10/21/20  0604 10/22/20  0816 10/23/20  0608   WBC 9.3 11.1 12.0*   RBC 5.12 5.08 4.91   HGB 12.7* 13.0 12.3*   HCT 42.3 42.0 40.2*   MCV 82.6 82.7 81.9*   RDW 16.8* 16.8* 16.5*    516* 556*     BMP:   Recent Labs     10/21/20  0604 10/22/20  0816 10/23/20  0608    132* 137   K 3.7 3.9 4.2    100 102   CO2 22 24 23   BUN 26* 22 21   CREATININE 0.63* 0.68* 0.68*       ASSESSMENT & PLAN     1. Acute on chronic systolic and diastolic congestive heart failure. Continue on furosemide 20mg OD, fluid restriction, input output monitoring. 2. Acute hypoxic respiratory failure due to #3. Continue home oxygen  3. Pulmonary fibrosis/ interstitial lung disease - Continue Prednisone 40mg OD. Need outpatient work up  4. Demand ischemia of the myocardium. Resolved. 5. Atrial fibrillation with RVR s/p cardioversion - Continue on amiodarone. Continue metoprolol [id number] TID Continue on xarelto 20mg, continue telemetry monitoring. 6. Essential hypertension -continue on metoprolol 25 mg TID  7.  Benign prostatic hyperplasia -Continue on tamsulosin 0.4 mgOD     DVT ppx: Patient on Xarelto 20mg   GI ppx: famotidine 20mg     PT/OT/SW: Ongoing  Discharge Planning: Ongoing    Rafael Dial MD  Internal Medicine Resident, PGY- [address], [place]  10/23/2020, 2:07 PM       Attending Physician Statement  I have discussed the case, including pertinent history and exam findings with the resident and the team.  I have seen and examined the patient and the key elements of the encounter have been performed by me. I agree with the assessment, plan and orders as documented by the resident.       Breezy Gardner MD   Attending Physician, Internal Medicine Residency Program  10/23/2020, 2:46 PM

## 2020-10-23 NOTE — PROGRESS NOTES
EKG completed with pt in bed, assessed orthostatics as well, waiting for plan from pulmonology, pt now in chair watching tv.

## 2020-10-23 NOTE — PLAN OF CARE
Problem: OXYGENATION/RESPIRATORY FUNCTION  Goal: Patient will maintain patent airway  10/23/2020 0704 by Twan Alexandre RN  Outcome: Ongoing  10/22/2020 2258 by Marlena Severs, RN  Outcome: Ongoing  Goal: Patient will achieve/maintain normal respiratory rate/effort  Description: Respiratory rate and effort will be within normal limits for the patient  10/23/2020 0704 by Twan Alexandre RN  Outcome: Ongoing  10/22/2020 2258 by Marlena Severs, RN  Outcome: Ongoing     Problem: HEMODYNAMIC STATUS  Goal: Patient has stable vital signs and fluid balance  10/23/2020 0704 by Twan Alexandre RN  Outcome: Ongoing  10/22/2020 2258 by Marlena Severs, RN  Outcome: Ongoing     Problem: FLUID AND ELECTROLYTE IMBALANCE  Goal: Fluid and electrolyte balance are achieved/maintained  10/23/2020 0704 by Twan Alexandre RN  Outcome: Ongoing  10/22/2020 2258 by Marlena Severs, RN  Outcome: Ongoing     Problem: ACTIVITY INTOLERANCE/IMPAIRED MOBILITY  Goal: Mobility/activity is maintained at optimum level for patient  10/23/2020 0704 by Twan Alexandre RN  Outcome: Ongoing  10/22/2020 2258 by Marlena Severs, RN  Outcome: Ongoing     Problem: Falls - Risk of:  Goal: Will remain free from falls  Description: Will remain free from falls  10/23/2020 0704 by Twan Alexandre RN  Outcome: Ongoing  10/22/2020 2258 by Marlena Severs, RN  Outcome: Ongoing  Goal: Absence of physical injury  Description: Absence of physical injury  10/23/2020 0704 by Twan Alexandre RN  Outcome: Ongoing  10/22/2020 2258 by Marlena Severs, RN  Outcome: Ongoing     Problem: Pain:  Goal: Pain level will decrease  Description: Pain level will decrease  10/23/2020 0704 by Twan Alexandre RN  Outcome: Ongoing  10/22/2020 2258 by Marlena Severs, RN  Outcome: Ongoing  Goal: Control of acute pain  Description: Control of acute pain  10/23/2020 0704 by Twan Alexandre RN  Outcome: Ongoing  10/22/2020 2258 by Marlena Severs, RN  Outcome: Ongoing  Goal: Control of chronic pain  Description: Control of chronic pain  10/23/2020 0704 by Aristeo Manrique RN  Outcome: Ongoing  10/22/2020 225 by Garima Krueger RN  Outcome: Ongoing

## 2020-10-23 NOTE — PLAN OF CARE
Problem: OXYGENATION/RESPIRATORY FUNCTION  Goal: Patient will maintain patent airway  Outcome: Ongoing  Goal: Patient will achieve/maintain normal respiratory rate/effort  Description: Respiratory rate and effort will be within normal limits for the patient  Outcome: Ongoing     Problem: HEMODYNAMIC STATUS  Goal: Patient has stable vital signs and fluid balance  Outcome: Ongoing     Problem: FLUID AND ELECTROLYTE IMBALANCE  Goal: Fluid and electrolyte balance are achieved/maintained  Outcome: Ongoing     Problem: ACTIVITY INTOLERANCE/IMPAIRED MOBILITY  Goal: Mobility/activity is maintained at optimum level for patient  Outcome: Ongoing     Problem: Falls - Risk of:  Goal: Will remain free from falls  Description: Will remain free from falls  Outcome: Ongoing  Goal: Absence of physical injury  Description: Absence of physical injury  Outcome: Ongoing     Problem: Pain:  Goal: Pain level will decrease  Description: Pain level will decrease  Outcome: Ongoing  Goal: Control of acute pain  Description: Control of acute pain  Outcome: Ongoing  Goal: Control of chronic pain  Description: Control of chronic pain  Outcome: Ongoing

## 2020-10-24 LAB
ABSOLUTE EOS #: 0.09 K/UL (ref 0–0.44)
ABSOLUTE IMMATURE GRANULOCYTE: 0.31 K/UL (ref 0–0.3)
ABSOLUTE LYMPH #: 1.58 K/UL (ref 1.1–3.7)
ABSOLUTE MONO #: 1.06 K/UL (ref 0.1–1.2)
ANION GAP SERPL CALCULATED.3IONS-SCNC: 9 MMOL/L (ref 9–17)
BASOPHILS # BLD: 1 % (ref 0–2)
BASOPHILS ABSOLUTE: 0.08 K/UL (ref 0–0.2)
BUN BLDV-MCNC: 18 MG/DL (ref 8–23)
BUN/CREAT BLD: NORMAL (ref 9–20)
CALCIUM SERPL-MCNC: 8.8 MG/DL (ref 8.6–10.4)
CHLORIDE BLD-SCNC: 102 MMOL/L (ref 98–107)
CO2: 26 MMOL/L (ref 20–31)
CREAT SERPL-MCNC: 0.74 MG/DL (ref 0.7–1.2)
DIFFERENTIAL TYPE: ABNORMAL
EKG ATRIAL RATE: 66 BPM
EKG P AXIS: 39 DEGREES
EKG P-R INTERVAL: 184 MS
EKG Q-T INTERVAL: 438 MS
EKG QRS DURATION: 92 MS
EKG QTC CALCULATION (BAZETT): 459 MS
EKG R AXIS: -22 DEGREES
EKG T AXIS: 6 DEGREES
EKG VENTRICULAR RATE: 66 BPM
EOSINOPHILS RELATIVE PERCENT: 1 % (ref 1–4)
GFR AFRICAN AMERICAN: >60 ML/MIN
GFR NON-AFRICAN AMERICAN: >60 ML/MIN
GFR SERPL CREATININE-BSD FRML MDRD: NORMAL ML/MIN/{1.73_M2}
GFR SERPL CREATININE-BSD FRML MDRD: NORMAL ML/MIN/{1.73_M2}
GLUCOSE BLD-MCNC: 81 MG/DL (ref 70–99)
HCT VFR BLD CALC: 42.2 % (ref 40.7–50.3)
HEMOGLOBIN: 12.6 G/DL (ref 13–17)
IMMATURE GRANULOCYTES: 3 %
LYMPHOCYTES # BLD: 14 % (ref 24–43)
MCH RBC QN AUTO: 25.5 PG (ref 25.2–33.5)
MCHC RBC AUTO-ENTMCNC: 29.9 G/DL (ref 28.4–34.8)
MCV RBC AUTO: 85.4 FL (ref 82.6–102.9)
MONOCYTES # BLD: 10 % (ref 3–12)
NRBC AUTOMATED: 0 PER 100 WBC
PDW BLD-RTO: 16.8 % (ref 11.8–14.4)
PLATELET # BLD: 587 K/UL (ref 138–453)
PLATELET ESTIMATE: ABNORMAL
PMV BLD AUTO: 9.5 FL (ref 8.1–13.5)
POTASSIUM SERPL-SCNC: 4.1 MMOL/L (ref 3.7–5.3)
RBC # BLD: 4.94 M/UL (ref 4.21–5.77)
RBC # BLD: ABNORMAL 10*6/UL
SEG NEUTROPHILS: 71 % (ref 36–65)
SEGMENTED NEUTROPHILS ABSOLUTE COUNT: 7.96 K/UL (ref 1.5–8.1)
SODIUM BLD-SCNC: 137 MMOL/L (ref 135–144)
WBC # BLD: 11.1 K/UL (ref 3.5–11.3)
WBC # BLD: ABNORMAL 10*3/UL

## 2020-10-24 PROCEDURE — 99233 SBSQ HOSP IP/OBS HIGH 50: CPT | Performed by: INTERNAL MEDICINE

## 2020-10-24 PROCEDURE — 85025 COMPLETE CBC W/AUTO DIFF WBC: CPT

## 2020-10-24 PROCEDURE — 93010 ELECTROCARDIOGRAM REPORT: CPT | Performed by: INTERNAL MEDICINE

## 2020-10-24 PROCEDURE — 6370000000 HC RX 637 (ALT 250 FOR IP): Performed by: NURSE PRACTITIONER

## 2020-10-24 PROCEDURE — 80048 BASIC METABOLIC PNL TOTAL CA: CPT

## 2020-10-24 PROCEDURE — 2580000003 HC RX 258: Performed by: STUDENT IN AN ORGANIZED HEALTH CARE EDUCATION/TRAINING PROGRAM

## 2020-10-24 PROCEDURE — 6370000000 HC RX 637 (ALT 250 FOR IP): Performed by: STUDENT IN AN ORGANIZED HEALTH CARE EDUCATION/TRAINING PROGRAM

## 2020-10-24 PROCEDURE — 2060000000 HC ICU INTERMEDIATE R&B

## 2020-10-24 PROCEDURE — 36415 COLL VENOUS BLD VENIPUNCTURE: CPT

## 2020-10-24 PROCEDURE — 99238 HOSP IP/OBS DSCHRG MGMT 30/<: CPT | Performed by: INTERNAL MEDICINE

## 2020-10-24 RX ADMIN — ATORVASTATIN CALCIUM 40 MG: 80 TABLET, FILM COATED ORAL at 09:48

## 2020-10-24 RX ADMIN — METOPROLOL TARTRATE 25 MG: 25 TABLET ORAL at 18:10

## 2020-10-24 RX ADMIN — FAMOTIDINE 20 MG: 20 TABLET ORAL at 09:47

## 2020-10-24 RX ADMIN — SODIUM CHLORIDE, PRESERVATIVE FREE 10 ML: 5 INJECTION INTRAVENOUS at 21:10

## 2020-10-24 RX ADMIN — METOPROLOL TARTRATE 25 MG: 25 TABLET ORAL at 09:47

## 2020-10-24 RX ADMIN — RIVAROXABAN 20 MG: 20 TABLET, FILM COATED ORAL at 09:47

## 2020-10-24 RX ADMIN — TAMSULOSIN HYDROCHLORIDE 0.4 MG: 0.4 CAPSULE ORAL at 12:55

## 2020-10-24 RX ADMIN — FAMOTIDINE 20 MG: 20 TABLET ORAL at 21:10

## 2020-10-24 RX ADMIN — SODIUM CHLORIDE, PRESERVATIVE FREE 10 ML: 5 INJECTION INTRAVENOUS at 09:48

## 2020-10-24 RX ADMIN — FUROSEMIDE 20 MG: 20 TABLET ORAL at 09:47

## 2020-10-24 RX ADMIN — PREDNISONE 40 MG: 20 TABLET ORAL at 09:47

## 2020-10-24 ASSESSMENT — PAIN SCALES - GENERAL
PAINLEVEL_OUTOF10: 0

## 2020-10-24 NOTE — PROGRESS NOTES
PULMONARY & CRITICAL CARE MEDICINE PROGRESS NOTE     Patient:  Raven Olson  MRN: [id number]  Admit date: 10/17/2020  Primary Care Physician: Dawson Dubose MD  Consulting Physician: Kaleb Encarnacion MD  CODE Status: Full Code     HISTORY     CHIEF COMPLAINT/REASON FOR CONSULT: Acute on chronic hypoxemic respiratory failure            HISTORY OF PRESENT ILLNESS:  PMH: hx cocaine abuse, Atrial Fibrillation, Hill's, diastolic chf, colon cancer s/p resection    The patient is a 77 y.o. male with significant past medical history as above presented to the hospital on 10/17 with a chief complaint of shortness of breath and productive cough x 3 days. Associated dizziness. Of note patient was recently treated for Covid-19 pneumonia at [facility] despite covid-19 negative testing x 2 in 9/2020. S/p decadron, remdisivir, rocephin, & discharged on home oxygen 1.5 L NC. CT from [facility] with typical findings of covid-19. On admission, patient was desaturating to 65%. Tachycardia and Tachypnea on admission. Hemodynamically stable. Placed on 5 L NC. Initial BMP unremarkable. Procal 0.29. ABG on admission: 7.432 / 37.2 / 98 / 24.8 (likely on oxygen). . Pro-bnp 3525. Hepatic panel negative. TSH WNL. No leukocytosis. + normocytic anemia. Ferritin 238. D-dimer 1.24. Viral panel negative. Covid PCR negative. Legionella, Mycoplasma IgM, strep pneumonia all negative. ANCA, YOUNG, C3/4 negative. Serological evidence of prior mycoplasma infection. Serial CXRs have shown bilateral pulmonary infiltrates. CT chest showed ground glass attenuation, no pe, and septal thickening in the lower lobes. Echocardiogram shows moderately reduced EF at 35%. Global hypokinesis. Grade 1 diastolic dysfunction. Mild-moderate MR. RVSP 34 mmHg. Patient has been treated for diastolic/systolic CHF with diuresis. Currently -7.5 L since admission.  Patient has also been treated for atrial fibrillation and cardioverted on 10/19 and remains in sinus rhythm. Interval History  Afebrile. Non-tachycardia. Hemodynamically stable. No shortness of breath or wheezing  Denies chest pain, N/V/D, fevers, chills. Patient is on 3 L oxygen by nasal cannula  Patient uses oxygen at home  No fever  No chest pain or pressure      PAST MEDICAL HISTORY:        Diagnosis Date    Atrial fibrillation ([address]     Back pain, chronic     Mcguire's esophagus 06/18/2019    BPH (benign prostatic hyperplasia)     Cancer (HCC)     colon-rectal    Cocaine abuse in remission [facility])     1970's    ED (erectile dysfunction) 4/2/2015    GERD (gastroesophageal reflux disease)     GI bleed 12/5/2018    Hernia     History of colon cancer     Melena     Migraines     Murmur, cardiac      PAST SURGICAL HISTORY:        Procedure Laterality Date    CARDIAC CATHETERIZATION  11/29/2018    Non-obstructive CAD    COLECTOMY      2nd colectomy, Colostomy and reversed [facility] COLECTOMY      1st time [place]    COLONOSCOPY      COLONOSCOPY  07/18/2016    COLONOSCOPY N/A 12/6/2018    COLONOSCOPY DIAGNOSTIC performed by Neftali Mathew MD at [address] Right 2009    inguinal    KNEE SURGERY Right 1970's    arthrotomy    TONSILLECTOMY      TOTAL KNEE ARTHROPLASTY Right 1/8/2019    KNEE TOTAL ARTHROPLASTY performed by Bill Sims MD at [address] TRANSESOPHAGEAL ECHOCARDIOGRAM  11/29/2018    UPPER GASTROINTESTINAL ENDOSCOPY N/A 12/5/2018    EGD DIAGNOSTIC ONLY performed by Nfetali Mathew MD at [address] [facility] ENDOSCOPY N/A 6/18/2019    MCGUIRE'S     FAMILY HISTORY:       Problem Relation Age of Onset    Diabetes Mother     Heart Attack Father     Heart Disease Father     Heart Disease Brother      SOCIAL HISTORY:   TOBACCO:   reports that he quit smoking about 49 years ago. He has a 0.50 pack-year smoking history.  He has never used smokeless tobacco.  ETOH:  reports current alcohol use of about 8.3 standard drinks of alcohol per week. DRUGS: reports no history of drug use. AVOCATION/OCCUPATIONAL EXPOSURE:    The patient unsure but believes he has  asbestos, silica dust, coal, foundry, quarry or [employer] exposure. The patient admits to  to having pet dogs at home. There is no history of TB or TB exposure. There is no exposure to sick contacts. Travel history is not significant history of risk factors for pulmonary disease. The patient denies using Hot Tubs. ALLERGIES:    Allergies   Allergen Reactions    Adhesive Tape Other (See Comments)     Blister badly     Codeine Nausea Only     Other reaction(s): Other: See Comments  NAUSEA  Other reaction(s): Other: See Comments  NAUSEA    Penicillins Swelling     As a baby  Other reaction(s): Unknown  As a baby         HOME MEDICATIONS:  Prior to Admission medications    Medication Sig Start Date End Date Taking?  Authorizing Provider   tamsulosin (FLOMAX) 0.4 MG capsule Take 1 capsule by mouth daily 10/15/20  Yes Cassandra Lake MD   atorvastatin (LIPITOR) 40 MG tablet take 1 tablet by mouth once daily 10/9/20  Yes Negro Solorzano MD   sildenafil (VIAGRA) 100 MG tablet TAKE ONE TABLET BY MOUTH AS NEEDED FOR FOR ERECTILE DYSFUNCTION 8/17/20  Yes Koki Douglas MD   omeprazole (PRILOSEC) 40 MG delayed release capsule take 1 capsule by mouth every morning before breakfast 7/21/20  Yes Cassandra Lake MD   ibuprofen (ADVIL;MOTRIN) 800 MG tablet take 1 tablet by mouth every 6 hours if needed for pain 5/26/20  Yes Cassandra Lake MD   metoprolol tartrate (LOPRESSOR) 25 MG tablet take 1 tablet by mouth twice a day 8/6/19  Yes Shanice Siegel MD   lisinopril (PRINIVIL;ZESTRIL) 5 MG tablet take 1 tablet by mouth once daily 7/29/19  Yes Janeth Robbisn APRN - CNP   rivaroxaban (XARELTO) 20 MG TABS tablet Take 20 mg by mouth daily (with breakfast)    Yes Historical Provider, MD   Elastic Bandages & Supports (ABDOMINAL BINDER/ELASTIC XL) MISC 1 Device by Does not apply route as needed (ventral hernia, while ambulating/active) 16   Rodríguez Ramírez DO     IMMUNIZATIONS:  Most Recent Immunizations   Administered Date(s) Administered    Influenza Vaccine, unspecified formulation 2017    Influenza Virus Vaccine 2015    Influenza, High Dose (Fluzone 65 yrs and older) 2018    Influenza, Nellene Aron, 6 mo and older, IM (Fluzone, Flulaval) 2017    Influenza, Nellene Aron, IM, (6 mo and older Fluzone, Flulaval, Fluarix and 3 yrs and older Afluria) 2017    Influenza, Quadv, IM, PF (6 mo and older Fluzone, Flulaval, Fluarix, and 3 yrs and older Afluria) 10/23/2014    Influenza, Triv, inactivated, subunit, adjuvanted, IM (Fluad 65 yrs and older) 2019       REVIEW OF SYSTEMS:  General: negative for chills, fatigue or fever  ENT: negative for headaches, nasal congestion, sore throat or visual changes  Allergy and Immunology: negative for postnasal drip or seasonal allergies  Hematological and Lymphatic: negative for bleeding problems, swollen lymph nodes  Respiratory: positive for cough and shortness of breath negative for hemoptysis, orthopnea or sputum changes  Cardiovascular: negative for edema or palpitations  Gastrointestinal: negative for abdominal pain, change in bowel habits or nausea/vomiting  Genito-Urinary: negative for dysuria or urinary frequency/urgency  Musculoskeletal: negative for joint pain or joint swelling  Neurological: negative for numbness/tingling, seizures or weakness  Dermatological: negative for pruritus or rash    PHYSICAL EXAMINATION     VITAL SIGNS:   LAST-  /65   Pulse 62   Temp 97.7 °F (36.5 °C) (Oral)   Resp 19   Ht 6' (1.829 m)   Wt 173 lb 12.8 oz (78.8 kg)   SpO2 95%   BMI 23.57 kg/m²   8-24 HR RANGE-  TEMP Temp  Av.1 °F (36.7 °C)  Min: 97.7 °F (36.5 °C)  Max: 98.8 °F (53.3 °C)   BP Systolic (02BZS), IFJ:574 , Min:97 , JWL:727      Diastolic (61RSK), RYAN:44, Min:47, Max:78     PULSE Pulse  Av.8  Min: 60  Max: 101   RR Resp  Av  Min: 19  Max: 19   O2 SAT SpO2  Av.3 %  Min: 94 %  Max: 97 %   OXYGEN DELIVERY No data recorded     SYSTEMIC EXAMINATION:    General appearance - well appearing, overweight, comfortable and in no acute distress   Mental status - alert, oriented to person, place, and time   Ears - not examined   Nose - normal and patent, no erythema, discharge   Mouth - mucous membranes moist, pharynx normal without lesions   Neck - supple, no significant adenopathy, carotids upstroke normal bilaterally, no bruits. No accessory musculature use for inhalation or exhalation   Lymphatics - no palpable lymphadenopathy, no hepatosplenomegaly   Chest - clear to auscultation, mild rales to the mid and lower lung fields.  Heart - NSR currently. 2+ systolic murmur.  Abdomen - soft, nontender, nondistended, no masses or organomegaly   Neurological - motor and sensory grossly normal bilaterally   Musculoskeletal - no joint tenderness, deformity or swelling   Extremities -no pedal edema, no clubbing or cyanosis   Skin - normal coloration and turgor, no rashes, no suspicious skin lesions noted    DATA REVIEW     Medications: Current Inpatient  Scheduled Meds:   metoprolol tartrate  12.5 mg Oral 3 times per day    predniSONE  40 mg Oral Daily    furosemide  40 mg Oral Daily    atorvastatin  40 mg Oral Daily    tamsulosin  0.4 mg Oral Daily    sodium chloride flush  10 mL Intravenous 2 times per day    rivaroxaban  20 mg Oral Daily with breakfast     Continuous Infusions:   amiodarone 0.5 mg/min (10/22/20 0211)     INPUT/OUTPUT:  In: 880 [P.O.:450; I.V.:430]  Out: 1215 [Urine:1215]    LABS:-  ABG:   No results for input(s): POCPH, POCPCO2, POCPO2, POCHCO3, ORPN9NOS in the last 72 hours.   CBC:   Recent Labs     10/20/20  0600 10/21/20  0604 10/22/20  0816   WBC 10.3 9.3 11.1   HGB 12.8* 12.7* 13.0   HCT 43.3 42.3 42.0   MCV 85.9 82.6 82.7    433 516*   LYMPHOPCT 18* 15* 13* RBC 5.04 5.12 5.08   MCH 25.4 24.8* 25.6   MCHC 29.6 30.0 31.0   RDW 17.6* 16.8* 16.8*     BMP:   Recent Labs     10/20/20  0600 10/21/20  0604 10/22/20  0816    136 132*   K 4.0 3.7 3.9    102 100   CO2 25 22 24   BUN 28* 26* 22   CREATININE 0.78 0.63* 0.68*   GLUCOSE 99 96 98     Liver Function Test:   No results for input(s): PROT, LABALBU, ALT, AST, GGT, ALKPHOS, BILITOT in the last 72 hours. Amylase/Lipase:  No results for input(s): AMYLASE, LIPASE in the last 72 hours. Coagulation Profile:   No results for input(s): INR, PROTIME, APTT in the last 72 hours. Cardiac Enzymes:  No results for input(s): CKTOTAL, CKMB, CKMBINDEX, TROPONINI in the last 72 hours. Lactic Acid:  Lab Results   Component Value Date    LACTA NOT REPORTED 10/17/2020    LACTA 1.0 08/11/2014     BNP:   No results found for: BNP  D-Dimer:  Lab Results   Component Value Date    DDIMER 1.24 10/17/2020     Others:   Lab Results   Component Value Date    TSH 4.28 10/17/2020     Lab Results   Component Value Date    YOUNG NEGATIVE 10/17/2020    SEDRATE 82 (H) 10/22/2020    .7 (H) 10/17/2020     No results found for: Jim Barefoot  Lab Results   Component Value Date    IRON 48 (L) 03/10/2020    TIBC 340 03/10/2020    FERRITIN 238 10/17/2020     No results found for: SPEP, UPEP  Lab Results   Component Value Date    PSA 0.73 02/25/2020    CEA 3.4 10/01/2018     Microbiology:    Pathology:    Radiology Reports:  CT CHEST PULMONARY EMBOLISM W CONTRAST   Final Result   1. No evidence of pulmonary embolism. 2.  Fairly extensive scattered ground-glass opacities in the lungs which may   be related to atypical pneumonitis less likely venous congestion. No   effusions. 3.  Septal thickening, most severe in the lung bases.          XR CHEST PORTABLE   Final Result   Persistent bilateral pulmonary opacities could represent pulmonary edema or   atypical pneumonia         XR CHEST PORTABLE   Final Result   No significant ejection fraction is 35 % . Mostly global hypokinesis with minor regional variation. Grade I (mild) left ventricular diastolic dysfunction. Left atrium is moderately dilated. Aortic leaflet calcification with Moderate Aortic Stenosis, maybe  underestimated due to poor LVEF. Thickened mitral valve leaflets. Mild to moderate mitral regurgitation. Trivial tricuspid regurgitation. Estimated right ventricular systolic  pressure is [id number]. IVC dilated but unable to assess respiratory collapse. Signature  ----------------------------------------------------------------------------   Electronically signed by Virgilio Ibarra on 10/20/2020 10:49   AM  ----------------------------------------------------------------------------    ----------------------------------------------------------------------------   Electronically signed by Alexandria HookerInterpreting physician) on 10/20/2020   01:48 PM  ----------------------------------------------------------------------------  FINDINGS  Left Atrium  Left atrium is moderately dilated. Left Ventricle  Left ventricle is normal in size Global left ventricular systolic function  is moderately reduced Estimated ejection fraction is 35 % . Mostly global hypokinesis with minor regional variation. Grade I (mild) left ventricular diastolic dysfunction. Right Atrium  Right atrium is normal in size. Right Ventricle  Normal right ventricular size and function. Mitral Valve  Thickened mitral valve leaflets. Mild to moderate mitral regurgitation. Aortic Valve  Aortic leaflet calcification with moderate stenosis, maybe underestimated  due to poor LVEF. The peak/mean gradient is 39mmHg and 22mmHg. No aortic insufficiency. Tricuspid Valve  No obvious valvular abnormality. Trivial tricuspid regurgitation. Estimated right ventricular systolic  pressure is 66ORTU. Pulmonic Valve  The pulmonic valve is normal in structure. No pulmonic insufficiency.   Pericardial Effusion  No significant pericardial effusion is seen. Miscellaneous  Normal aortic root dimension. E/E' average = 12.1. IVC dilated but unable to assess respiratory collapse. M-mode / 2D Measurements & Calculations:      LVIDd:5.7 cm(3.7 - 5.6 cm)       Diastolic MJUPVB:194 ml   VXMOC:3.3 cm(2.2 - 4.0 cm)       Systolic HHYTSW:351 ml   PEDK:4.4 cm(0.6 - 1.1 cm)        Aortic Root:3.3 cm(2.0 - 3.7 cm)   LVPWd:0.9 cm(0.6 - 1.1 cm)       LA Dimension: 4.6 cm(1.9 - 4.0 cm)   Fractional Shortenin.54 %    LA volume/Index: 93.7 ml /45m^2   Calculated LVEF (%): 45.81 %     LVOT:2.2 cm                                    RVDd:3.7 cm      Mitral:                                 Aortic      Valve Area (P1/2-Time): 3.67 cm^2       Peak Velocity: 3.11 m/s   Peak E-Wave: 0.94 m/s                   Mean Velocity: 2.21 m/s   Peak A-Wave: 0.55 m/s                   Peak Gradient: 38.69 mmHg   E/A Ratio: 1.7                          Mean Gradient: 22 mmHg   Peak Gradient: 3.52 mmHg   Mean Gradient: 2 mmHg   Deceleration Time: 204 msec             Area (continuity): 0.88 cm^2   P1/2t: 60 msec                          AV VTI: 67.6 cm      Area (continuity): 1.8 cm^2   Mean Velocity: 0.57 m/s      Tricuspid:                              Pulmonic:      Peak TR Velocity: 2.18 m/s              Peak Velocity: 1.03 m/s   Peak TR Gradient: 19.0096 mmHg          Peak Gradient: 4.24 mmHg   Estimated RA Pressure: 15 mmHg                                              Estimated PASP: 34.01 mmHg     Diastology / Tissue Doppler  Septal Wall E' velocity:0.06 m/s  Septal Wall E/E':14.6  Lateral Wall E' velocity:0.10 m/s  Lateral Wall E/E':9.7       Cardiac Catheterization:   No results found for this or any previous visit. ASSESSMENT AND PLAN     Assessment:    // Acute on Chronic Diastolic CHF  // Atrial Fibrillation with RVR s/p cardioversion  // Fibrotic changes lower lung fields. ?  Idiopathic pulmonary fibrosis  // HTN  // Colon

## 2020-10-24 NOTE — PLAN OF CARE
Problem: OXYGENATION/RESPIRATORY FUNCTION  Goal: Patient will maintain patent airway  Outcome: Ongoing  Goal: Patient will achieve/maintain normal respiratory rate/effort  Description: Respiratory rate and effort will be within normal limits for the patient  Outcome: Ongoing     Problem: HEMODYNAMIC STATUS  Goal: Patient has stable vital signs and fluid balance  Outcome: Ongoing     Problem: FLUID AND ELECTROLYTE IMBALANCE  Goal: Fluid and electrolyte balance are achieved/maintained  Outcome: Ongoing     Problem: ACTIVITY INTOLERANCE/IMPAIRED MOBILITY  Goal: Mobility/activity is maintained at optimum level for patient  Outcome: Ongoing     Problem: Falls - Risk of:  Goal: Will remain free from falls  Description: Will remain free from falls  Outcome: Ongoing  Goal: Absence of physical injury  Description: Absence of physical injury  Outcome: Ongoing     Problem: Pain:  Goal: Pain level will decrease  Description: Pain level will decrease  Outcome: Ongoing  Goal: Control of acute pain  Description: Control of acute pain  Outcome: Ongoing  Goal: Control of chronic pain  Description: Control of chronic pain  Outcome: Ongoing     Problem: Nutrition  Goal: Optimal nutrition therapy  Description: Nutrition Problem #1: Unintended weight loss  Intervention: Food and/or Nutrient Delivery: Modify Current Diet, Start Oral Nutrition Supplement  Nutritional Goals: Pt to meet % of est'd daily needs via PO     Outcome: Ongoing

## 2020-10-24 NOTE — PLAN OF CARE
PATIENT REFUSES TO WEAR BIPAP     [x] Risks and benefits explained to patient   [x] Patient refuses to wear Bipap stating i'm not going to wear that  [x] Patient verbalizes understanding of information presented.

## 2020-10-24 NOTE — PLAN OF CARE
Problem: OXYGENATION/RESPIRATORY FUNCTION  Goal: Patient will maintain patent airway  10/24/2020 0952 by Svetlana Victoria RN  Outcome: Ongoing  10/23/2020 2313 by Zora Hylton RN  Outcome: Ongoing  Goal: Patient will achieve/maintain normal respiratory rate/effort  Description: Respiratory rate and effort will be within normal limits for the patient  10/23/2020 2313 by Zora Hylton RN  Outcome: Ongoing     Problem: HEMODYNAMIC STATUS  Goal: Patient has stable vital signs and fluid balance  10/23/2020 2313 by Zora Hylton RN  Outcome: Ongoing     Problem: Pain:  Goal: Pain level will decrease  Description: Pain level will decrease  10/23/2020 2313 by Zora Hylton RN  Outcome: Ongoing  Goal: Control of acute pain  Description: Control of acute pain  10/23/2020 2313 by Zora Hylton RN  Outcome: Ongoing  Goal: Control of chronic pain  Description: Control of chronic pain  10/23/2020 2313 by Zora Hylton RN  Outcome: Ongoing

## 2020-10-24 NOTE — PROGRESS NOTES
Zackary Debra  Internal Medicine Teaching Residency Program  Inpatient Daily Progress Note  ______________________________________________________________________________    Patient: Lorene Cooper  YOB: 1955   ZBO:[id number]    Acct: [de-identified]     Room: 2019/2019-01  Admit date: 10/17/2020  Today's date: 10/24/20  Number of days in the hospital: 7    SUBJECTIVE   Admitting Diagnosis: CHF (congestive heart failure), NYHA class II, acute on chronic, combined ([facility] [address]  CC: Shortness of breath and cough    Pt examined at bedside. Chart & results reviewed. No acute events overnight,  Medically stable. Afebrile. /66, HR 85. ROS:  Constitutional:  negative for chills, fevers, sweats  Respiratory:  negative for cough, dyspnea on exertion, hemoptysis, shortness of breath, wheezing  Cardiovascular:  negative for chest pain, chest pressure/discomfort, lower extremity edema, palpitations  Gastrointestinal:  negative for abdominal pain, constipation, diarrhea, nausea, vomiting  Neurological:  negative for dizziness, headache    BRIEF HISTORY     The patient is a pleasant 77 y.o. male with past medical history of diastolic and systolic CHF, essential hypertension, atrial fibrillation dyslipidemia,  presents  to the ED with a chief complaint of acute onset of shortness of breath with productive cough since last 3 days, visited with some dizziness and chest pain.      Patient has history of chronic respiratory failure on home oxygen was recently treated for suspected Covid pneumonia, Covid lab work-up negative. He was discharged on oxygen which he uses since then. Today he complains of increased shortness of breath this morning which made him come to the ER. Initially in the ER he was found to be having saturations of 65%.   Patient also have the chest pain which is sharp,localised, non radiating and non reproducible on deep breathing.      Pt denied headache, lightheadedness, focal neurological deficits, abdominal pain, nausea, fever, chills      OBJECTIVE     Vital Signs:  /66   Pulse 52   Temp 98.8 °F (37.1 °C) (Oral)   Resp 18   Ht 6' (1.829 m)   Wt 173 lb 3.2 oz (78.6 kg)   SpO2 99%   BMI 23.49 kg/m²     Temp (24hrs), Av.2 °F (36.8 °C), Min:97.9 °F (36.6 °C), Max:98.8 °F (37.1 °C)    In: 610   Out: 2250 [Urine:2250]    Physical Exam:    Constitutional: This is a well developed, well nourished, 25-29.9 - Overweight 77y.o. year old male who is alert, oriented, cooperative and in no apparent distress. Saturating at 95 on 4l nc. Head:normocephalic and atraumatic. Respiratory: Still has bilateral creps in lower  Lobes . Chest was symmetrical without dullness to percussion. Fine crackles heard on the left  Cardiovascular: Regular without murmur, clicks, gallops or rubs. Abdomen: Slightly rounded and soft without organomegaly. No rebound, rigidity or guarding was appreciated. Lymphatic: No lymphadenopathy. Musculoskeletal: Normal curvature of the spine. No gross muscle weakness. Extremities:  No lower extremity edema, ulcerations, tenderness, varicosities or erythema. Muscle size, tone and strength are normal.  No involuntary movements are noted. Skin:  Warm and dry. Good color, turgor and pigmentation. No lesions or scars.   No cyanosis or clubbing  Neurological/Psychiatric: The patient's general behavior, level of consciousness, thought content and emotional status is normal.        Medications:  Scheduled Medications:    furosemide  20 mg Oral Daily    predniSONE  40 mg Oral Daily    metoprolol tartrate  25 mg Oral 3 times per day    famotidine  20 mg Oral BID    atorvastatin  40 mg Oral Daily    tamsulosin  0.4 mg Oral Daily    sodium chloride flush  10 mL Intravenous 2 times per day    rivaroxaban  20 mg Oral Daily with breakfast     Continuous Infusions:     PRN Medicationssodium chloride flush, 10 mL, PRN  acetaminophen, 650 mg, Q6H PRN    Or  acetaminophen, 650 mg, Q6H PRN  polyethylene glycol, 17 g, Daily PRN  promethazine, 12.5 mg, Q6H PRN    Or  ondansetron, 4 mg, Q6H PRN  potassium chloride, 40 mEq, PRN    Or  potassium alternative oral replacement, 40 mEq, PRN    Or  potassium chloride, 10 mEq, PRN  magnesium sulfate, 1 g, PRN  metoprolol, 5 mg, Q6H PRN      HRCT: Patchy areas of fibrosis predominantly in the periphery of the lung and bibasilar lower lobes      Diagnostic Labs:  CBC:   Recent Labs     10/22/20  0816 10/23/20  0608 10/24/20  0743   WBC 11.1 12.0* 11.1   RBC 5.08 4.91 4.94   HGB 13.0 12.3* 12.6*   HCT 42.0 40.2* 42.2   MCV 82.7 81.9* 85.4   RDW 16.8* 16.5* 16.8*   * 556* 587*     BMP:   Recent Labs     10/22/20  0816 10/23/20  0608 10/24/20  0743   * 137 137   K 3.9 4.2 4.1    102 102   CO2 24 23 26   BUN 22 21 18   CREATININE 0.68* 0.68* 0.74       ASSESSMENT & PLAN     1. Acute on chronic systolic and diastolic congestive heart failure. Continue on furosemide 20mg daily, fluid restriction, input output monitoring. 2. Acute hypoxic respiratory failure due to #3. Continue home oxygen  3. Pulmonary fibrosis/ interstitial lung disease - Continue Prednisone 40mg daily. Need outpatient work up with pulmonology. 4. Demand ischemia of the myocardium. Resolved. 5. Atrial fibrillation with RVR s/p cardioversion - Amiodarone discontinued. Continue metoprolol [id number] thrice daily continue on xarelto 20mg, continue telemetry monitoring. 6. Essential hypertension -continue on metoprolol 25 mg thrice daily. 7. Benign prostatic hyperplasia -Continue on tamsulosin 0.4mg daily  8. Discharge planning: Patient likely getting discharged today to home after getting cardiology and pulmonology recommendations. Patient ambulating.       Lola Mann MD  Internal Medicine Resident, PGY- [facility]  10/24/2020, 10:13 AM       Attending Physician Statement  I have discussed the case, including pertinent history and exam findings with the resident and the team.  I have seen and examined the patient and the key elements of the encounter have been performed by me. I agree with the assessment, plan and orders as documented by the resident.       Doing well  Breathing better  Requires less oxygen  Stable for discharge  Awaiting Pulm follow up rec    Breezy Johnson MD   Attending Physician, Internal Medicine Residency Program  10/24/2020, 4:26 PM

## 2020-10-25 PROBLEM — R00.1 SINUS BRADYCARDIA: Status: ACTIVE | Noted: 2020-10-25

## 2020-10-25 LAB
ABSOLUTE EOS #: 0.05 K/UL (ref 0–0.44)
ABSOLUTE IMMATURE GRANULOCYTE: 0.17 K/UL (ref 0–0.3)
ABSOLUTE LYMPH #: 1.57 K/UL (ref 1.1–3.7)
ABSOLUTE MONO #: 0.98 K/UL (ref 0.1–1.2)
ANION GAP SERPL CALCULATED.3IONS-SCNC: 8 MMOL/L (ref 9–17)
BASOPHILS # BLD: 0 % (ref 0–2)
BASOPHILS ABSOLUTE: 0.04 K/UL (ref 0–0.2)
BUN BLDV-MCNC: 20 MG/DL (ref 8–23)
BUN/CREAT BLD: ABNORMAL (ref 9–20)
CALCIUM SERPL-MCNC: 8.5 MG/DL (ref 8.6–10.4)
CHLORIDE BLD-SCNC: 101 MMOL/L (ref 98–107)
CO2: 26 MMOL/L (ref 20–31)
CREAT SERPL-MCNC: 0.62 MG/DL (ref 0.7–1.2)
DIFFERENTIAL TYPE: ABNORMAL
EOSINOPHILS RELATIVE PERCENT: 1 % (ref 1–4)
GFR AFRICAN AMERICAN: >60 ML/MIN
GFR NON-AFRICAN AMERICAN: >60 ML/MIN
GFR SERPL CREATININE-BSD FRML MDRD: ABNORMAL ML/MIN/{1.73_M2}
GFR SERPL CREATININE-BSD FRML MDRD: ABNORMAL ML/MIN/{1.73_M2}
GLUCOSE BLD-MCNC: 78 MG/DL (ref 70–99)
HCT VFR BLD CALC: 40.7 % (ref 40.7–50.3)
HEMOGLOBIN: 12.3 G/DL (ref 13–17)
IMMATURE GRANULOCYTES: 2 %
LYMPHOCYTES # BLD: 14 % (ref 24–43)
MCH RBC QN AUTO: 25.6 PG (ref 25.2–33.5)
MCHC RBC AUTO-ENTMCNC: 30.2 G/DL (ref 28.4–34.8)
MCV RBC AUTO: 84.6 FL (ref 82.6–102.9)
MONOCYTES # BLD: 9 % (ref 3–12)
NRBC AUTOMATED: 0 PER 100 WBC
PDW BLD-RTO: 16.9 % (ref 11.8–14.4)
PLATELET # BLD: 633 K/UL (ref 138–453)
PLATELET ESTIMATE: ABNORMAL
PMV BLD AUTO: 9.3 FL (ref 8.1–13.5)
POTASSIUM SERPL-SCNC: 4 MMOL/L (ref 3.7–5.3)
RBC # BLD: 4.81 M/UL (ref 4.21–5.77)
RBC # BLD: ABNORMAL 10*6/UL
SEG NEUTROPHILS: 74 % (ref 36–65)
SEGMENTED NEUTROPHILS ABSOLUTE COUNT: 8.28 K/UL (ref 1.5–8.1)
SODIUM BLD-SCNC: 135 MMOL/L (ref 135–144)
WBC # BLD: 11.1 K/UL (ref 3.5–11.3)
WBC # BLD: ABNORMAL 10*3/UL

## 2020-10-25 PROCEDURE — 6370000000 HC RX 637 (ALT 250 FOR IP): Performed by: NURSE PRACTITIONER

## 2020-10-25 PROCEDURE — 6370000000 HC RX 637 (ALT 250 FOR IP): Performed by: STUDENT IN AN ORGANIZED HEALTH CARE EDUCATION/TRAINING PROGRAM

## 2020-10-25 PROCEDURE — 99233 SBSQ HOSP IP/OBS HIGH 50: CPT | Performed by: INTERNAL MEDICINE

## 2020-10-25 PROCEDURE — 85025 COMPLETE CBC W/AUTO DIFF WBC: CPT

## 2020-10-25 PROCEDURE — 80048 BASIC METABOLIC PNL TOTAL CA: CPT

## 2020-10-25 PROCEDURE — 2060000000 HC ICU INTERMEDIATE R&B

## 2020-10-25 PROCEDURE — 36415 COLL VENOUS BLD VENIPUNCTURE: CPT

## 2020-10-25 PROCEDURE — 2580000003 HC RX 258: Performed by: STUDENT IN AN ORGANIZED HEALTH CARE EDUCATION/TRAINING PROGRAM

## 2020-10-25 PROCEDURE — 99232 SBSQ HOSP IP/OBS MODERATE 35: CPT | Performed by: INTERNAL MEDICINE

## 2020-10-25 RX ORDER — PREDNISONE 20 MG/1
40 TABLET ORAL DAILY
Qty: 20 TABLET | Refills: 0 | Status: CANCELLED | OUTPATIENT
Start: 2020-10-25

## 2020-10-25 RX ADMIN — FAMOTIDINE 20 MG: 20 TABLET ORAL at 08:34

## 2020-10-25 RX ADMIN — FUROSEMIDE 20 MG: 20 TABLET ORAL at 08:34

## 2020-10-25 RX ADMIN — ATORVASTATIN CALCIUM 40 MG: 80 TABLET, FILM COATED ORAL at 22:24

## 2020-10-25 RX ADMIN — PREDNISONE 40 MG: 20 TABLET ORAL at 08:34

## 2020-10-25 RX ADMIN — TAMSULOSIN HYDROCHLORIDE 0.4 MG: 0.4 CAPSULE ORAL at 08:34

## 2020-10-25 RX ADMIN — SODIUM CHLORIDE, PRESERVATIVE FREE 10 ML: 5 INJECTION INTRAVENOUS at 22:24

## 2020-10-25 RX ADMIN — RIVAROXABAN 20 MG: 20 TABLET, FILM COATED ORAL at 08:34

## 2020-10-25 RX ADMIN — FAMOTIDINE 20 MG: 20 TABLET ORAL at 22:24

## 2020-10-25 RX ADMIN — SODIUM CHLORIDE, PRESERVATIVE FREE 10 ML: 5 INJECTION INTRAVENOUS at 08:34

## 2020-10-25 ASSESSMENT — PAIN SCALES - GENERAL
PAINLEVEL_OUTOF10: 0

## 2020-10-25 NOTE — PROGRESS NOTES
Primary team notified of patient being bradycardic and lopressor being held over night. Orders to follow.    Electronically signed by Yovanny Restrepo RN on 10/25/2020 at 8:53 AM

## 2020-10-25 NOTE — PLAN OF CARE
Problem: OXYGENATION/RESPIRATORY FUNCTION  Goal: Patient will maintain patent airway  Outcome: Ongoing  Goal: Patient will achieve/maintain normal respiratory rate/effort  Description: Respiratory rate and effort will be within normal limits for the patient  Outcome: Ongoing     Problem: HEMODYNAMIC STATUS  Goal: Patient has stable vital signs and fluid balance  Outcome: Ongoing     Problem: FLUID AND ELECTROLYTE IMBALANCE  Goal: Fluid and electrolyte balance are achieved/maintained  Outcome: Ongoing     Problem: ACTIVITY INTOLERANCE/IMPAIRED MOBILITY  Goal: Mobility/activity is maintained at optimum level for patient  Outcome: Ongoing     Problem: Falls - Risk of:  Goal: Will remain free from falls  Description: Will remain free from falls  Outcome: Ongoing  Goal: Absence of physical injury  Description: Absence of physical injury  Outcome: Ongoing     Problem: Pain:  Goal: Pain level will decrease  Description: Pain level will decrease  Outcome: Ongoing  Goal: Control of acute pain  Description: Control of acute pain  Outcome: Ongoing  Goal: Control of chronic pain  Description: Control of chronic pain  Outcome: Ongoing     Problem: Nutrition  Goal: Optimal nutrition therapy  Description: Nutrition Problem #1: Unintended weight loss  Intervention: Food and/or Nutrient Delivery: Modify Current Diet, Start Oral Nutrition Supplement  Nutritional Goals: Pt to meet % of est'd daily needs via PO     Outcome: Ongoing   Electronically signed by Phong Bazzi RN on 10/25/2020 at 3:49 PM

## 2020-10-25 NOTE — PROGRESS NOTES
in sinus rhythm. Interval History  Afebrile. Non-tachycardia. Hemodynamically stable. No shortness of breath or wheezing  Denies chest pain, N/V/D, fevers, chills. Patient is on 3 L oxygen by nasal cannula  Patient uses oxygen at home  No fever  No chest pain or pressure      PAST MEDICAL HISTORY:        Diagnosis Date    Atrial fibrillation ([address]     Back pain, chronic     Mcguire's esophagus 06/18/2019    BPH (benign prostatic hyperplasia)     Cancer (HCC)     colon-rectal    Cocaine abuse in remission [facility])     1970's    ED (erectile dysfunction) 4/2/2015    GERD (gastroesophageal reflux disease)     GI bleed 12/5/2018    Hernia     History of colon cancer     Melena     Migraines     Murmur, cardiac      PAST SURGICAL HISTORY:        Procedure Laterality Date    CARDIAC CATHETERIZATION  11/29/2018    Non-obstructive CAD    COLECTOMY      2nd colectomy, Colostomy and reversed [facility] COLECTOMY      1st time Pender    COLONOSCOPY      COLONOSCOPY  07/18/2016    COLONOSCOPY N/A 12/6/2018    COLONOSCOPY DIAGNOSTIC performed by Danyell Mc MD at [address] [facility] Right 2009    inguinal    KNEE SURGERY Right 1970's    arthrotomy    TONSILLECTOMY      TOTAL KNEE ARTHROPLASTY Right 1/8/2019    KNEE TOTAL ARTHROPLASTY performed by Cherelle Irizarry MD at [address] TRANSESOPHAGEAL ECHOCARDIOGRAM  11/29/2018    UPPER GASTROINTESTINAL ENDOSCOPY N/A 12/5/2018    EGD DIAGNOSTIC ONLY performed by Danyell Mc MD at [address] [facility] ENDOSCOPY N/A 6/18/2019    MCGUIRE'S     FAMILY HISTORY:       Problem Relation Age of Onset    Diabetes Mother     Heart Attack Father     Heart Disease Father     Heart Disease Brother      SOCIAL HISTORY:   TOBACCO:   reports that he quit smoking about 49 years ago. He has a 0.50 pack-year smoking history.  He has never used smokeless tobacco.  ETOH:  reports current alcohol use of about 8.3 standard drinks of alcohol per week. DRUGS: reports no history of drug use. AVOCATION/OCCUPATIONAL EXPOSURE:    The patient unsure but believes he has  asbestos, silica dust, coal, foundry, quarry or [employer] exposure. The patient admits to  to having pet dogs at home. There is no history of TB or TB exposure. There is no exposure to sick contacts. Travel history is not significant history of risk factors for pulmonary disease. The patient denies using Hot Tubs. ALLERGIES:    Allergies   Allergen Reactions    Adhesive Tape Other (See Comments)     Blister badly     Codeine Nausea Only     Other reaction(s): Other: See Comments  NAUSEA  Other reaction(s): Other: See Comments  NAUSEA    Penicillins Swelling     As a baby  Other reaction(s): Unknown  As a baby         HOME MEDICATIONS:  Prior to Admission medications    Medication Sig Start Date End Date Taking?  Authorizing Provider   tamsulosin (FLOMAX) 0.4 MG capsule Take 1 capsule by mouth daily 10/15/20  Yes Cassandra Solano MD   atorvastatin (LIPITOR) 40 MG tablet take 1 tablet by mouth once daily 10/9/20  Yes Abdirahman Webster MD   sildenafil (VIAGRA) 100 MG tablet TAKE ONE TABLET BY MOUTH AS NEEDED FOR FOR ERECTILE DYSFUNCTION 8/17/20  Yes Millie Marrero MD   omeprazole (PRILOSEC) 40 MG delayed release capsule take 1 capsule by mouth every morning before breakfast 7/21/20  Yes Cassandra Solano MD   ibuprofen (ADVIL;MOTRIN) 800 MG tablet take 1 tablet by mouth every 6 hours if needed for pain 5/26/20  Yes Cassandra Solano MD   metoprolol tartrate (LOPRESSOR) 25 MG tablet take 1 tablet by mouth twice a day 8/6/19  Yes Ludin Fleming MD   lisinopril (PRINIVIL;ZESTRIL) 5 MG tablet take 1 tablet by mouth once daily 7/29/19  Yes MIRI Shah - CNP   rivaroxaban (XARELTO) 20 MG TABS tablet Take 20 mg by mouth daily (with breakfast)    Yes Historical Provider, MD   Elastic Bandages & Supports (ABDOMINAL BINDER/ELASTIC XL) MISC 1 Device by Does not apply route as needed (ventral hernia, while ambulating/active) 16   Shelby Betts DO     IMMUNIZATIONS:  Most Recent Immunizations   Administered Date(s) Administered    Influenza Vaccine, unspecified formulation 2017    Influenza Virus Vaccine 2015    Influenza, High Dose (Fluzone 65 yrs and older) 2018    Influenza, Hassel Scarce, 6 mo and older, IM (Fluzone, Flulaval) 2017    Influenza, Hassel Scarce, IM, (6 mo and older Fluzone, Flulaval, Fluarix and 3 yrs and older Afluria) 2017    Influenza, Quadv, IM, PF (6 mo and older Fluzone, Flulaval, Fluarix, and 3 yrs and older Afluria) 10/23/2014    Influenza, Triv, inactivated, subunit, adjuvanted, IM (Fluad 65 yrs and older) 2019       REVIEW OF SYSTEMS:  General: negative for chills, fatigue or fever  ENT: negative for headaches, nasal congestion, sore throat or visual changes  Allergy and Immunology: negative for postnasal drip or seasonal allergies  Hematological and Lymphatic: negative for bleeding problems, swollen lymph nodes  Respiratory: positive for cough and shortness of breath negative for hemoptysis, orthopnea or sputum changes  Cardiovascular: negative for edema or palpitations  Gastrointestinal: negative for abdominal pain, change in bowel habits or nausea/vomiting  Genito-Urinary: negative for dysuria or urinary frequency/urgency  Musculoskeletal: negative for joint pain or joint swelling  Neurological: negative for numbness/tingling, seizures or weakness  Dermatological: negative for pruritus or rash    PHYSICAL EXAMINATION     VITAL SIGNS:   LAST-  /65   Pulse 62   Temp 97.7 °F (36.5 °C) (Oral)   Resp 19   Ht 6' (1.829 m)   Wt 173 lb 12.8 oz (78.8 kg)   SpO2 95%   BMI 23.57 kg/m²   8-24 HR RANGE-  TEMP Temp  Av.1 °F (36.7 °C)  Min: 97.7 °F (36.5 °C)  Max: 98.8 °F (20.8 °C)   BP Systolic (61JAA), YPJ:847 , Min:97 , GJN:992      Diastolic (81AHO), FNO:69, Min:47, Max:78     PULSE Pulse  Av.8  Min: 60  Max: 101   RR Resp  Av  Min: 19  Max: 19   O2 SAT SpO2  Av.3 %  Min: 94 %  Max: 97 %   OXYGEN DELIVERY No data recorded     SYSTEMIC EXAMINATION:    General appearance - well appearing, overweight, comfortable and in no acute distress   Mental status - alert, oriented to person, place, and time   Ears - not examined   Nose - normal and patent, no erythema, discharge   Mouth - mucous membranes moist, pharynx normal without lesions   Neck - supple, no significant adenopathy, carotids upstroke normal bilaterally, no bruits. No accessory musculature use for inhalation or exhalation   Lymphatics - no palpable lymphadenopathy, no hepatosplenomegaly   Chest - clear to auscultation, mild rales to the mid and lower lung fields.  Heart - NSR currently. 2+ systolic murmur.  Abdomen - soft, nontender, nondistended, no masses or organomegaly   Neurological - motor and sensory grossly normal bilaterally   Musculoskeletal - no joint tenderness, deformity or swelling   Extremities -no pedal edema, no clubbing or cyanosis   Skin - normal coloration and turgor, no rashes, no suspicious skin lesions noted    DATA REVIEW     Medications: Current Inpatient  Scheduled Meds:   metoprolol tartrate  12.5 mg Oral 3 times per day    predniSONE  40 mg Oral Daily    furosemide  40 mg Oral Daily    atorvastatin  40 mg Oral Daily    tamsulosin  0.4 mg Oral Daily    sodium chloride flush  10 mL Intravenous 2 times per day    rivaroxaban  20 mg Oral Daily with breakfast     Continuous Infusions:   amiodarone 0.5 mg/min (10/22/20 0211)     INPUT/OUTPUT:  In: 880 [P.O.:450; I.V.:430]  Out: 1215 [Urine:1215]    LABS:-  ABG:   No results for input(s): POCPH, POCPCO2, POCPO2, POCHCO3, [id number] in the last 72 hours.   CBC:   Recent Labs     10/20/20  0600 10/21/20  0604 10/22/20  0816   WBC 10.3 9.3 11.1   HGB 12.8* 12.7* 13.0   HCT 43.3 42.3 42.0   MCV 85.9 82.6 82.7    433 516*   LYMPHOPCT 18* 15* 13* change in chest findings compared to the October 17th study. XR CHEST PORTABLE   Final Result   Bilateral scattered pulmonary opacities right greater than left favoring   multifocal airspace disease with small effusions not excluded. No   extrapleural air is seen. Pulmonary Function test:    Polysomnogram:    Echocardiogram:   Results for orders placed during the hospital encounter of 10/17/20   ECHO Complete 2D W Doppler W Color    Narrative Transthoracic Echocardiography Report (TTE)     Patient Name Gerson Pastor     Date of Study               10/20/2020                Suzette Snowball      Date of      1955  Gender                      Male   Birth      Age          77 year(s)  Race                              Room Number  2019        Height:                     72 inch, 182.88 cm      Corporate ID [id number]    Weight:                     193 pounds, 87.5 kg   #      Patient Acct [de-identified]   BSA:          2.1 m^2       BMI:     26.18 kg/m^2   #      MR #         [id number]     [address] [facility]      Accession #  [id number]  Interpreting Physician      [address] [place] [address]      Fellow       Frida Casillas   Referring Nurse                            Practitioner      Interpreting             Referring Physician         Thompson Mueller MD   Fellow     Type of Study      TTE procedure:2D Echocardiogram, M-Mode, Doppler, Color Doppler. Procedure Date  Date: 10/20/2020 Start: 09:50 AM    Study Location: [facility]  Technical Quality: Good visualization    Indications:Congestive heart failure. History / Tech. Comments:  Procedure explained to patient. HTN. Aortic stenosis. Patient Status: Inpatient    Height: 72 inches Weight: 193 pounds BSA: 2.1 m^2 BMI: 26.18 kg/m^2    HR: 65 bpm    Allergies    - Penicillin. - Codeine.     CONCLUSIONS    Summary  Left ventricle is normal in size Global left ventricular systolic function  is moderately reduced Estimated ejection fraction is 35 % . Mostly global hypokinesis with minor regional variation. Grade I (mild) left ventricular diastolic dysfunction. Left atrium is moderately dilated. Aortic leaflet calcification with Moderate Aortic Stenosis, maybe  underestimated due to poor LVEF. Thickened mitral valve leaflets. Mild to moderate mitral regurgitation. Trivial tricuspid regurgitation. Estimated right ventricular systolic  pressure is 41OQXA. IVC dilated but unable to assess respiratory collapse. Signature  ----------------------------------------------------------------------------   Electronically signed by Donovan Carmona on 10/20/2020 10:49   AM  ----------------------------------------------------------------------------    ----------------------------------------------------------------------------   Electronically signed by Alexandria HookerInterpreting physician) on 10/20/2020   01:48 PM  ----------------------------------------------------------------------------  FINDINGS  Left Atrium  Left atrium is moderately dilated. Left Ventricle  Left ventricle is normal in size Global left ventricular systolic function  is moderately reduced Estimated ejection fraction is 35 % . Mostly global hypokinesis with minor regional variation. Grade I (mild) left ventricular diastolic dysfunction. Right Atrium  Right atrium is normal in size. Right Ventricle  Normal right ventricular size and function. Mitral Valve  Thickened mitral valve leaflets. Mild to moderate mitral regurgitation. Aortic Valve  Aortic leaflet calcification with moderate stenosis, maybe underestimated  due to poor LVEF. The peak/mean gradient is 39mmHg and 22mmHg. No aortic insufficiency. Tricuspid Valve  No obvious valvular abnormality. Trivial tricuspid regurgitation. Estimated right ventricular systolic  pressure is 36RDKO. Pulmonic Valve  The pulmonic valve is normal in structure. No pulmonic insufficiency.   Pericardial Effusion  No significant pericardial effusion is seen. Miscellaneous  Normal aortic root dimension. E/E' average = 12.1. IVC dilated but unable to assess respiratory collapse. M-mode / 2D Measurements & Calculations:      LVIDd:5.7 cm(3.7 - 5.6 cm)       Diastolic OKSCLN:774 ml   JJMOE:5.5 cm(2.2 - 4.0 cm)       Systolic WWUZUY:851 ml   LVDT:5.9 cm(0.6 - 1.1 cm)        Aortic Root:3.3 cm(2.0 - 3.7 cm)   LVPWd:0.9 cm(0.6 - 1.1 cm)       LA Dimension: 4.6 cm(1.9 - 4.0 cm)   Fractional Shortenin.54 %    LA volume/Index: 93.7 ml /45m^2   Calculated LVEF (%): 45.81 %     LVOT:2.2 cm                                    RVDd:3.7 cm      Mitral:                                 Aortic      Valve Area (P1/2-Time): 3.67 cm^2       Peak Velocity: 3.11 m/s   Peak E-Wave: 0.94 m/s                   Mean Velocity: 2.21 m/s   Peak A-Wave: 0.55 m/s                   Peak Gradient: 38.69 mmHg   E/A Ratio: 1.7                          Mean Gradient: 22 mmHg   Peak Gradient: 3.52 mmHg   Mean Gradient: 2 mmHg   Deceleration Time: 204 msec             Area (continuity): 0.88 cm^2   P1/2t: 60 msec                          AV VTI: 67.6 cm      Area (continuity): 1.8 cm^2   Mean Velocity: 0.57 m/s      Tricuspid:                              Pulmonic:      Peak TR Velocity: 2.18 m/s              Peak Velocity: 1.03 m/s   Peak TR Gradient: 19.0096 mmHg          Peak Gradient: 4.24 mmHg   Estimated RA Pressure: 15 mmHg                                              Estimated PASP: 34.01 mmHg     Diastology / Tissue Doppler  Septal Wall E' velocity:0.06 m/s  Septal Wall E/E':14.6  Lateral Wall E' velocity:0.10 m/s  Lateral Wall E/E':9.7       Cardiac Catheterization:   No results found for this or any previous visit. ASSESSMENT AND PLAN     Assessment:    // Acute on Chronic Diastolic CHF  // Atrial Fibrillation with RVR s/p cardioversion  // Fibrotic changes lower lung fields. ?  Idiopathic pulmonary fibrosis  // HTN  // Colon

## 2020-10-25 NOTE — PLAN OF CARE
Problem: OXYGENATION/RESPIRATORY FUNCTION  Goal: Patient will maintain patent airway  10/24/2020 2323 by Narda Valdez RN  Outcome: Ongoing  10/24/2020 0952 by Megan Swift RN  Outcome: Ongoing  Goal: Patient will achieve/maintain normal respiratory rate/effort  Description: Respiratory rate and effort will be within normal limits for the patient  Outcome: Ongoing     Problem: HEMODYNAMIC STATUS  Goal: Patient has stable vital signs and fluid balance  Outcome: Ongoing     Problem: FLUID AND ELECTROLYTE IMBALANCE  Goal: Fluid and electrolyte balance are achieved/maintained  Outcome: Ongoing     Problem: ACTIVITY INTOLERANCE/IMPAIRED MOBILITY  Goal: Mobility/activity is maintained at optimum level for patient  Outcome: Ongoing     Problem: Falls - Risk of:  Goal: Will remain free from falls  Description: Will remain free from falls  10/24/2020 2323 by Narda Valdez RN  Outcome: Ongoing  10/24/2020 0952 by Megan Swift RN  Outcome: Ongoing  Goal: Absence of physical injury  Description: Absence of physical injury  Outcome: Ongoing     Problem: Pain:  Goal: Pain level will decrease  Description: Pain level will decrease  Outcome: Ongoing  Goal: Control of acute pain  Description: Control of acute pain  Outcome: Ongoing  Goal: Control of chronic pain  Description: Control of chronic pain  Outcome: Ongoing     Problem: Nutrition  Goal: Optimal nutrition therapy  Description: Nutrition Problem #1: Unintended weight loss  Intervention: Food and/or Nutrient Delivery: Modify Current Diet, Start Oral Nutrition Supplement  Nutritional Goals: Pt to meet % of est'd daily needs via PO     Outcome: Ongoing

## 2020-10-25 NOTE — PROGRESS NOTES
[facility]  Internal Medicine Teaching Residency Program  Inpatient Daily Progress Note  ______________________________________________________________________________    Patient: Marcos Dsouza  YOB: 1955   HCQ:[id number]    Acct: [de-identified]     Room: 2019/2019-01  Admit date: 10/17/2020  Today's date: 10/25/20  Number of days in the hospital: 8    SUBJECTIVE   Admitting Diagnosis: CHF (congestive heart failure), NYHA class II, acute on chronic, combined ([facility] [address]  CC: Shortness of breath and cough    Pt examined at bedside. Chart & results reviewed. Afebrile. Patient was bradycardic overnight but without any symptoms. Patient states that he has been breathing well without any any chest pain or shortness of breath. ROS:  Constitutional:  negative for chills, fevers, sweats  Respiratory:  negative for cough, dyspnea on exertion, hemoptysis, shortness of breath, wheezing  Cardiovascular:  negative for chest pain, chest pressure/discomfort, lower extremity edema, palpitations  Gastrointestinal:  negative for abdominal pain, constipation, diarrhea, nausea, vomiting  Neurological:  negative for dizziness, headache    BRIEF HISTORY     The patient is a pleasant 77 y.o. male with past medical history of diastolic and systolic CHF, essential hypertension, atrial fibrillation dyslipidemia,  presents  to the ED with a chief complaint of acute onset of shortness of breath with productive cough since last 3 days, visited with some dizziness and chest pain.      Patient has history of chronic respiratory failure on home oxygen was recently treated for suspected Covid pneumonia, Covid lab work-up negative. He was discharged on oxygen which he uses since then. Today he complains of increased shortness of breath this morning which made him come to the ER. Initially in the ER he was found to be having saturations of 65%.   Patient also have the chest pain which is sharp,localised, non radiating and non reproducible on deep breathing.      Pt denied headache, lightheadedness, focal neurological deficits, abdominal pain, nausea, fever, chills      OBJECTIVE     Vital Signs:  /63   Pulse (!) 49   Temp 97.5 °F (36.4 °C) (Oral)   Resp 16   Ht 6' (1.829 m)   Wt 177 lb 14.4 oz (80.7 kg)   SpO2 94%   BMI 24.13 kg/m²     Temp (24hrs), Av.9 °F (36.6 °C), Min:97.5 °F (36.4 °C), Max:98.6 °F (37 °C)    In: -   Out: 250 [Urine:250]    Physical Exam:    Constitutional: This is a well developed, well nourished, 25-29.9 - Overweight 77y.o. year old male who is alert, oriented, cooperative and in no apparent distress. Saturating at 96 on 3l nasal canula  Respiratory: Chest was symmetrical. B/L air entry and breath sounds heard on auscultation  Cardiovascular: Regular without murmur, clicks, gallops or rubs. Abdomen: Slightly rounded and soft without organomegaly. No rebound, rigidity or guarding was appreciated. Musculoskeletal: Normal curvature of the spine. No gross muscle weakness. Extremities:  No lower extremity edema, ulcerations, tenderness, varicosities or erythema. Muscle size, tone and strength are normal.  No involuntary movements are noted. Skin:  Warm and dry. Good color, turgor and pigmentation. No lesions or scars.   No cyanosis or clubbing  Neurological/Psychiatric: The patient's general behavior, level of consciousness, thought content and emotional status is normal.        Medications:  Scheduled Medications:    furosemide  20 mg Oral Daily    predniSONE  40 mg Oral Daily    metoprolol tartrate  25 mg Oral 3 times per day    famotidine  20 mg Oral BID    atorvastatin  40 mg Oral Daily    tamsulosin  0.4 mg Oral Daily    sodium chloride flush  10 mL Intravenous 2 times per day    rivaroxaban  20 mg Oral Daily with breakfast     Continuous Infusions:     PRN Medicationssodium chloride flush, 10 mL, PRN  acetaminophen, 650 mg, Q6H PRN Or  acetaminophen, 650 mg, Q6H PRN  polyethylene glycol, 17 g, Daily PRN  promethazine, 12.5 mg, Q6H PRN    Or  ondansetron, 4 mg, Q6H PRN  potassium chloride, 40 mEq, PRN    Or  potassium alternative oral replacement, 40 mEq, PRN    Or  potassium chloride, 10 mEq, PRN  magnesium sulfate, 1 g, PRN  metoprolol, 5 mg, Q6H PRN      HRCT: Patchy areas of fibrosis predominantly in the periphery of the lung and bibasilar lower lobes      Diagnostic Labs:  CBC:   Recent Labs     10/22/20  0816 10/23/20  0608 10/24/20  0743   WBC 11.1 12.0* 11.1   RBC 5.08 4.91 4.94   HGB 13.0 12.3* 12.6*   HCT 42.0 40.2* 42.2   MCV 82.7 81.9* 85.4   RDW 16.8* 16.5* 16.8*   * 556* 587*     BMP:   Recent Labs     10/22/20  0816 10/23/20  0608 10/24/20  0743   * 137 137   K 3.9 4.2 4.1    102 102   CO2 24 23 26   BUN 22 21 18   CREATININE 0.68* 0.68* 0.74       ASSESSMENT & PLAN     1. Acute on chronic systolic and diastolic congestive heart failure. Continue on furosemide 20mg daily, fluid restriction, input output monitoring. 2. Acute hypoxic respiratory failure due to #3. Continue home oxygen  3. Pulmonary fibrosis/ interstitial lung disease - Continue Prednisone 40mg daily. Need outpatient work up with pulmonology. 4. Demand ischemia of the myocardium. Resolved. 5. Atrial fibrillation with RVR s/p cardioversion - Amiodarone discontinued. Decreased metoprolol tartrate 25mg thrice daily to twice daily. Continue on xarelto 20mg, continue telemetry monitoring. 6. Asymptomatic bradycardia - Decreased metoprolol tartrate 25mg thrice daily to twice daily. Will monitor for 24 hours before discharge. 7. Essential hypertension -continue on metoprolol 25 mg thrice daily. 8. Benign prostatic hyperplasia -Continue on tamsulosin 0.4mg daily  9. Discharge planning: Patient likely getting discharged tomorrow to home after getting cardiology and pulmonology recommendations.   Patient ambulating.       Analy Greenberg MD  Internal Medicine Resident, PGY- [address], [place]  10/25/2020, 7:53 AM       Attending Physician Statement  I have discussed the case, including pertinent history and exam findings with the resident and the team.  I have seen and examined the patient and the key elements of the encounter have been performed by me. I agree with the assessment, plan and orders as documented by the resident.       Breezy Benz MD   Attending Physician, Internal Medicine Residency Program  10/25/2020, 5:43 PM

## 2020-10-26 ENCOUNTER — TELEPHONE (OUTPATIENT)
Dept: PULMONOLOGY | Age: 65
End: 2020-10-26

## 2020-10-26 VITALS
OXYGEN SATURATION: 92 % | SYSTOLIC BLOOD PRESSURE: 91 MMHG | HEART RATE: 88 BPM | BODY MASS INDEX: 24.09 KG/M2 | DIASTOLIC BLOOD PRESSURE: 61 MMHG | HEIGHT: 72 IN | RESPIRATION RATE: 16 BRPM | WEIGHT: 177.9 LBS | TEMPERATURE: 96.8 F

## 2020-10-26 LAB
ABSOLUTE EOS #: 0.05 K/UL (ref 0–0.44)
ABSOLUTE IMMATURE GRANULOCYTE: 0.14 K/UL (ref 0–0.3)
ABSOLUTE LYMPH #: 2.03 K/UL (ref 1.1–3.7)
ABSOLUTE MONO #: 0.86 K/UL (ref 0.1–1.2)
ANION GAP SERPL CALCULATED.3IONS-SCNC: 9 MMOL/L (ref 9–17)
BASOPHILS # BLD: 1 % (ref 0–2)
BASOPHILS ABSOLUTE: 0.06 K/UL (ref 0–0.2)
BUN BLDV-MCNC: 17 MG/DL (ref 8–23)
BUN/CREAT BLD: NORMAL (ref 9–20)
CALCIUM SERPL-MCNC: 8.8 MG/DL (ref 8.6–10.4)
CCP IGG ANTIBODIES: <1.5 U/ML
CHLORIDE BLD-SCNC: 105 MMOL/L (ref 98–107)
CO2: 25 MMOL/L (ref 20–31)
CREAT SERPL-MCNC: 0.7 MG/DL (ref 0.7–1.2)
DIFFERENTIAL TYPE: ABNORMAL
EOSINOPHILS RELATIVE PERCENT: 1 % (ref 1–4)
GFR AFRICAN AMERICAN: >60 ML/MIN
GFR NON-AFRICAN AMERICAN: >60 ML/MIN
GFR SERPL CREATININE-BSD FRML MDRD: NORMAL ML/MIN/{1.73_M2}
GFR SERPL CREATININE-BSD FRML MDRD: NORMAL ML/MIN/{1.73_M2}
GLUCOSE BLD-MCNC: 71 MG/DL (ref 70–99)
HCT VFR BLD CALC: 44 % (ref 40.7–50.3)
HEMOGLOBIN: 13.3 G/DL (ref 13–17)
IMMATURE GRANULOCYTES: 1 %
LYMPHOCYTES # BLD: 19 % (ref 24–43)
MCH RBC QN AUTO: 25.3 PG (ref 25.2–33.5)
MCHC RBC AUTO-ENTMCNC: 30.2 G/DL (ref 28.4–34.8)
MCV RBC AUTO: 83.7 FL (ref 82.6–102.9)
MONOCYTES # BLD: 8 % (ref 3–12)
NRBC AUTOMATED: 0 PER 100 WBC
PDW BLD-RTO: 17.6 % (ref 11.8–14.4)
PLATELET # BLD: 729 K/UL (ref 138–453)
PLATELET ESTIMATE: ABNORMAL
PMV BLD AUTO: 9.7 FL (ref 8.1–13.5)
POTASSIUM SERPL-SCNC: 3.9 MMOL/L (ref 3.7–5.3)
RBC # BLD: 5.26 M/UL (ref 4.21–5.77)
RBC # BLD: ABNORMAL 10*6/UL
SEG NEUTROPHILS: 70 % (ref 36–65)
SEGMENTED NEUTROPHILS ABSOLUTE COUNT: 7.73 K/UL (ref 1.5–8.1)
SODIUM BLD-SCNC: 139 MMOL/L (ref 135–144)
WBC # BLD: 10.9 K/UL (ref 3.5–11.3)
WBC # BLD: ABNORMAL 10*3/UL

## 2020-10-26 PROCEDURE — 80048 BASIC METABOLIC PNL TOTAL CA: CPT

## 2020-10-26 PROCEDURE — 36415 COLL VENOUS BLD VENIPUNCTURE: CPT

## 2020-10-26 PROCEDURE — 6370000000 HC RX 637 (ALT 250 FOR IP): Performed by: STUDENT IN AN ORGANIZED HEALTH CARE EDUCATION/TRAINING PROGRAM

## 2020-10-26 PROCEDURE — 85025 COMPLETE CBC W/AUTO DIFF WBC: CPT

## 2020-10-26 PROCEDURE — 6370000000 HC RX 637 (ALT 250 FOR IP): Performed by: NURSE PRACTITIONER

## 2020-10-26 PROCEDURE — 99239 HOSP IP/OBS DSCHRG MGMT >30: CPT | Performed by: INTERNAL MEDICINE

## 2020-10-26 PROCEDURE — 2580000003 HC RX 258: Performed by: STUDENT IN AN ORGANIZED HEALTH CARE EDUCATION/TRAINING PROGRAM

## 2020-10-26 RX ORDER — FUROSEMIDE 20 MG/1
20 TABLET ORAL DAILY
Qty: 60 TABLET | Refills: 3 | Status: ON HOLD | OUTPATIENT
Start: 2020-10-26 | End: 2020-12-07 | Stop reason: HOSPADM

## 2020-10-26 RX ORDER — METOPROLOL TARTRATE 37.5 MG/1
37.5 TABLET, FILM COATED ORAL 2 TIMES DAILY
Qty: 60 TABLET | Refills: 3 | Status: SHIPPED | OUTPATIENT
Start: 2020-10-26 | End: 2020-11-16 | Stop reason: ALTCHOICE

## 2020-10-26 RX ORDER — PREDNISONE 20 MG/1
40 TABLET ORAL DAILY
Qty: 14 TABLET | Refills: 0 | Status: SHIPPED | OUTPATIENT
Start: 2020-10-27 | End: 2020-11-03

## 2020-10-26 RX ORDER — METOPROLOL TARTRATE 37.5 MG/1
37.5 TABLET, FILM COATED ORAL 2 TIMES DAILY
Qty: 60 TABLET | Refills: 3 | Status: CANCELLED | OUTPATIENT
Start: 2020-10-26

## 2020-10-26 RX ADMIN — METOPROLOL TARTRATE 25 MG: 25 TABLET ORAL at 09:18

## 2020-10-26 RX ADMIN — SODIUM CHLORIDE, PRESERVATIVE FREE 10 ML: 5 INJECTION INTRAVENOUS at 07:48

## 2020-10-26 RX ADMIN — TAMSULOSIN HYDROCHLORIDE 0.4 MG: 0.4 CAPSULE ORAL at 07:48

## 2020-10-26 RX ADMIN — FAMOTIDINE 20 MG: 20 TABLET ORAL at 07:48

## 2020-10-26 RX ADMIN — PREDNISONE 40 MG: 20 TABLET ORAL at 07:48

## 2020-10-26 RX ADMIN — ATORVASTATIN CALCIUM 40 MG: 80 TABLET, FILM COATED ORAL at 07:48

## 2020-10-26 RX ADMIN — RIVAROXABAN 20 MG: 20 TABLET, FILM COATED ORAL at 07:48

## 2020-10-26 RX ADMIN — FUROSEMIDE 20 MG: 20 TABLET ORAL at 07:48

## 2020-10-26 NOTE — PROGRESS NOTES
Patient has 25mg of lopressor scheduled this morning. BP 99/65 paged Tennis Needs, NP with cardio to see about holding parameters for med. Message received from stating okay to give lopressor. Will continue to monitor.

## 2020-10-26 NOTE — PROGRESS NOTES
[facility]   Progress Note                   Date:   10/26/2020  Patient name: Ailin Collins  Date of admission:  10/17/2020  7:08 AM  MRN:   [id number]  YOB: 1955  PCP: Dedra Shaffer MD    Reason for Admission: CHF (congestive heart failure), NYHA class II, acute on chronic, combined ([facility] [address] [I50.43]  CHF (congestive heart failure), NYHA class II, acute on chronic, combined ([facility] [address] [I50.43]    Subjective:       Clinical Changes / Abnormalities: Pt seen and examined in the room after returning from restroom. Pt denies any CP. States SOB is much better and states \"I dont feel this heart rate they talk about and I want to go home. \"   Labs and vitals noted. Tele presently Aflutter with rates into the 120's with activity but slow to the 80's. He states completely asymptomatic. On NC oxygen presently but states he did \"good\" without it for about 3 hours yesterday. Medications:   Scheduled Meds:   metoprolol tartrate  25 mg Oral BID    furosemide  20 mg Oral Daily    predniSONE  40 mg Oral Daily    famotidine  20 mg Oral BID    atorvastatin  40 mg Oral Daily    tamsulosin  0.4 mg Oral Daily    sodium chloride flush  10 mL Intravenous 2 times per day    rivaroxaban  20 mg Oral Daily with breakfast     Continuous Infusions:  CBC:   Recent Labs     10/24/20  0743 10/25/20  0753 10/26/20  0802   WBC 11.1 11.1 10.9   HGB 12.6* 12.3* 13.3   * 633* 729*     BMP:    Recent Labs     10/24/20  0743 10/25/20  0753 10/26/20  0802    135 139   K 4.1 4.0 3.9    101 105   CO2 26 26 25   BUN 18 20 17   CREATININE 0.74 0.62* 0.70   GLUCOSE 81 78 71     Hepatic: No results for input(s): AST, ALT, ALB, BILITOT, ALKPHOS in the last 72 hours. Troponin:   No results for input(s): TROPHS in the last 72 hours. BNP: No results for input(s): BNP in the last 72 hours. Lipids: No results for input(s): CHOL, HDL in the last 72 hours.     Invalid input(s): LDLCALCU  INR: No results for input(s): INR in the last 72 hours. EKG:   Aflutter with RVR     CV 10/19/2020  CARDIOVERSION:  After an adequate level of sedation was achieved, 100J in biphasic synchronized delivery was administered. no change in rhythm. The patient awoke without complications. Another shock at 200 J was given with conversion to normal sinus rhythm. A post procedure 12 L ECG was ordered and reviewed. ECHO:   06/2018  eviewed. 06/12/2018  Mild left ventricular enlargement with mildly reduced systolic function;  Estimated left ventricular ejection fraction 45%. (patient converted from  AFib to NSR during the study)  Left atrium is moderately dilated. Moderate severity mitral regurgitation. Mildly thickened and calcified aortic valve leaflets with evidence of mild  stenosis. Peak instantaneous gradient 28 mmHg and mean gradient 14 mmHg.        Cardiac Angiography:   11/2018     Left main: Normal     LAD: Luminal irregularities 30-40%     LCX: 40% ostial stenosis     RCA: Normal. Non-dominant, small     The LV gram was performed in the LORD 30 position. LVEF: 55%. LV Wall Motion: Normal        Conclusions:  1. Non-obstructive CAD  2. Overall preserved LV function  Objective:   Vitals: BP 98/71   Pulse 114   Temp 97.3 °F (36.3 °C) (Oral)   Resp 18   Ht 6' (1.829 m)   Wt 177 lb 14.4 oz (80.7 kg)   SpO2 93%   BMI 24.13 kg/m²   General appearance: alert and cooperative with exam  HEENT: Head: Normocephalic, no lesions, without obvious abnormality. Neck: no JVD, trachea midline, no adenopathy  Lungs: Clear throughout,  on 02 per NC - chronic  Heart: Irregular rate and rhythm, s1/s2 auscultated, no murmurs. Aflutter 3:1  Abdomen: soft, non-tender, bowel sounds active  Extremities: no edema  Neurologic: not done        Assessment / Acute Cardiac Problems:   3. Aflutter with RVR  4. Hypoxic resp failure secondary to Acute on chronic systolic CHF in the setting of Aflutter with RVR   5. H/O Paroxysmal Afib - on xarelto. Complaint with no interruption in last 6 weeks per patient    6. Non obstructive CAD  7. Reduced systolic function (NN:88%) on echo, normal EF on cardiac cath. 8. Moderate MR  9. H/O Colon cancer  10. Recent admission at [address] 9/2020 for pneumonia - treated as COVID with negative test  11. Recent ER visit 10/15/2020 to [facility] for shortness of breath, admission advised but left  12. H/o rectal cancer - in remission  13. Fibrotic changes lower lung fields    Patient Active Problem List:     Dyslipidemia     ED (erectile dysfunction)     Incomplete bladder emptying     Benign prostatic hyperplasia with urinary obstruction     History of colon cancer     Atrial fibrillation with normal ventricular rate (HCC)     Anemia     Pallor of optic disc     Presbyopia     Incisional hernia, without obstruction or gangrene     Hypertrophic nonobstructive cardiomyopathy (HCC)     Iron (Fe) deficiency anemia     Primary osteoarthritis of right knee     Benign essential HTN     History of malignant neoplasm of rectum     Hill's esophagus     CHF (congestive heart failure), NYHA class II, acute on chronic, combined (HCC)     Hypoxia     Dyspnea and respiratory abnormalities     Pneumonia due to Mycoplasma pneumoniae      Plan of Treatment:   1. Afib/flutter with RVR. S.p CV. Now back in Aflutter, paroxysmal at times, and asymptomatic. Amio discontinued d/t bradycardia on Saturday and BB decrease from TID to BID. On Xarelto. Discussed with Dr. Jigna Bashir EP. Will increase BB to 37.5mg PO BID. Continue Xarelto. Will see as OP to discuss ablation. 2. CHF- stable. Echo reviewed. Continue  BB and Lasix. Discussed importance of monitoring PO fluid intake and urine output. LE compression stockings. 3. HTN - Stable. Continue LE compression stockings and discussed in detail importance of monitoring PO fluid intake while avoiding dehydration. 4. Check Mg+.  Keep K >4 and Mg > 2.    5. OK for discharge from CV standpoint with OP EP evaluation and f/u in our office as scheduled.       Electronically signed by Travis Osler, APRN - CNP on 10/26/2020 at [address]  [phone]

## 2020-10-26 NOTE — PROGRESS NOTES
Home oxygen evaluation completed:    Resting on room air Sp02 91-92%  Room air with exercise Sp02 88%   2L applied brought level up to 95%

## 2020-10-26 NOTE — PLAN OF CARE
PATIENT REFUSES TO WEAR BIPAP     [x] Risks and benefits explained to patient   [x] Patient refuses to wear Bipap stating \"No I don't need that. \"  [x] Patient verbalizes understanding of information presented.

## 2020-10-26 NOTE — PROGRESS NOTES
[facility]  Internal Medicine Teaching Residency Program  Inpatient Daily Progress Note  ______________________________________________________________________________    Patient: Annmarie Bosworth  YOB: 1955   UPY:[id number]    Acct: [de-identified]     Room: 2019/2019-01  Admit date: 10/17/2020  Today's date: 10/26/20  Number of days in the hospital: 9    SUBJECTIVE   Admitting Diagnosis: CHF (congestive heart failure), NYHA class II, [facility]  CC: Shortness of breath and cough    Pt examined at bedside. Chart & results reviewed. Hemodynamically stable. Afebrile. No acute complaints overnight. Patient states that he has been breathing well without any any chest pain or shortness of breath. ROS:  Constitutional:  negative for chills, fevers, sweats  Respiratory:  negative for cough, dyspnea on exertion, hemoptysis, shortness of breath, wheezing  Cardiovascular:  negative for chest pain, chest pressure/discomfort, lower extremity edema, palpitations  Gastrointestinal:  negative for abdominal pain, constipation, diarrhea, nausea, vomiting  Neurological:  negative for dizziness, headache    BRIEF HISTORY     The patient is a pleasant 77 y.o. male with past medical history of diastolic and systolic CHF, essential hypertension, atrial fibrillation dyslipidemia,  presents  to the ED with a chief complaint of acute onset of shortness of breath with productive cough since last 3 days, visited with some dizziness and chest pain.      Patient has history of chronic respiratory failure on home oxygen was recently treated for suspected Covid pneumonia, Covid lab work-up negative. He was discharged on oxygen which he uses since then. Today he complains of increased shortness of breath this morning which made him come to the ER. Initially in the ER he was found to be having saturations of 65%.   Patient also have the chest pain which is sharp,localised, non radiating and non reproducible on deep breathing.      Pt denied headache, lightheadedness, focal neurological deficits, abdominal pain, nausea, fever, chills      OBJECTIVE     Vital Signs:  BP 98/71   Pulse 114   Temp 97.3 °F (36.3 °C) (Oral)   Resp 18   Ht 6' (1.829 m)   Wt 177 lb 14.4 oz (80.7 kg)   SpO2 93%   BMI 24.13 kg/m²     Temp (24hrs), Av.6 °F (36.4 °C), Min:97.3 °F (36.3 °C), Max:97.9 °F (36.6 °C)    In: 340   Out: 850 [Urine:850]    Physical Exam:    Constitutional: This is a well developed, well nourished, 25-29.9 - Overweight 77y.o. year old male who is alert, oriented, cooperative and in no apparent distress. Saturating at 96 on 2l nasal canula  Respiratory: Chest was symmetrical. B/L air entry and breath sounds heard on auscultation  Cardiovascular: Regular without murmur, clicks, gallops or rubs. Abdomen: Slightly rounded and soft without organomegaly. No rebound, rigidity or guarding was appreciated. Musculoskeletal: Normal curvature of the spine. No gross muscle weakness. Extremities:  No lower extremity edema, ulcerations, tenderness, varicosities or erythema. Muscle size, tone and strength are normal.  No involuntary movements are noted. Skin:  Warm and dry. Good color, turgor and pigmentation. No lesions or scars.   No cyanosis or clubbing  Neurological/Psychiatric: The patient's general behavior, level of consciousness, thought content and emotional status is normal.        Medications:  Scheduled Medications:    metoprolol tartrate  37.5 mg Oral BID    furosemide  20 mg Oral Daily    predniSONE  40 mg Oral Daily    famotidine  20 mg Oral BID    atorvastatin  40 mg Oral Daily    tamsulosin  0.4 mg Oral Daily    sodium chloride flush  10 mL Intravenous 2 times per day    rivaroxaban  20 mg Oral Daily with breakfast     Continuous Infusions:     PRN Medicationssodium chloride flush, 10 mL, PRN  acetaminophen, 650 mg, Q6H PRN Or  acetaminophen, 650 mg, Q6H PRN  polyethylene glycol, 17 g, Daily PRN  promethazine, 12.5 mg, Q6H PRN    Or  ondansetron, 4 mg, Q6H PRN  potassium chloride, 40 mEq, PRN    Or  potassium alternative oral replacement, 40 mEq, PRN    Or  potassium chloride, 10 mEq, PRN  magnesium sulfate, 1 g, PRN  metoprolol, 5 mg, Q6H PRN      HRCT: Patchy areas of fibrosis predominantly in the periphery of the lung and bibasilar lower lobes      Diagnostic Labs:  CBC:   Recent Labs     10/24/20  0743 10/25/20  0753 10/26/20  0802   WBC 11.1 11.1 10.9   RBC 4.94 4.81 5.26   HGB 12.6* 12.3* 13.3   HCT 42.2 40.7 44.0   MCV 85.4 84.6 83.7   RDW 16.8* 16.9* 17.6*   * 633* 729*     BMP:   Recent Labs     10/24/20  0743 10/25/20  0753 10/26/20  0802    135 139   K 4.1 4.0 3.9    101 105   CO2 26 26 25   BUN 18 20 17   CREATININE 0.74 0.62* 0.70       ASSESSMENT & PLAN     1. Acute on chronic systolic and diastolic congestive heart failure. Continue on furosemide 20mg daily, fluid restriction, input output monitoring. 2. Acute hypoxic respiratory failure due to #3. Continue home oxygen  3. Pulmonary fibrosis/ interstitial lung disease - Continue Prednisone 40mg daily. Need outpatient work up with pulmonology. 4. Demand ischemia of the myocardium. Resolved. 5. Atrial fibrillation with RVR s/p cardioversion - Amiodarone discontinued. Increased metoprolol tartrate dose from 25mg to 37.5mg to twice daily on discharge as per cardiology recommendations. Continue on xarelto 20mg, continue telemetry monitoring. 6. Asymptomatic bradycardia - Increased metoprolol tartrate dose from 25mg to 37.5mg to twice daily on discharge as per cardiology recommendations. 7. Essential hypertension -continue on metoprolol 37.5 mg twice daily. 8. Benign prostatic hyperplasia -Continue on tamsulosin 0.4mg daily  9. Discharge planning: Patient likely getting discharged today to home.   Patient ambulating.       Heaven Stewart MD  Internal Medicine Resident, PGY- [address], [place]  10/26/2020, 12:07 PM         Attending Physician Statement  I have discussed the case, including pertinent history and exam findings with the resident and the team.  I have seen and examined the patient and the key elements of the encounter have been performed by me. I agree with the assessment, plan and orders as documented by the resident. In Brief:    Patient was evaluated today for the diagnosis of Pulmonary fibrosis. I entered a DME order for home oxygen because the diagnosis and testing requires the patient to have supplemental oxygen. Condition will improve or be benefited by oxygen use. The patient is not able to perform good mobility in a home setting and therefore does require the use of a portable oxygen system. The need for this equipment was discussed with the patient and he understands and is in agreement.     Vital stable  Can go home today    Breezy Manzano MD   Attending Physician, Internal Medicine Residency Program  10/26/2020, 3:20 PM

## 2020-10-26 NOTE — DISCHARGE INSTR - COC
COVID-19 Rule Out 10/17/20 10/17/20 10/17/20 COVID-19 (Ordered)   10/17/20 Rule-Out Test Resulted    COVID-19 Rule Out 10/17/20 10/17/20 10/17/20 COVID-19 (Ordered)   10/17/20 Rule-Out Test Resulted            Nurse Assessment:  Last Vital Signs: BP 98/71   Pulse 114   Temp 97.3 °F (36.3 °C) (Oral)   Resp 18   Ht 6' (1.829 m)   Wt 177 lb 14.4 oz (80.7 kg)   SpO2 93%   BMI 24.13 kg/m²     Last documented pain score (0-10 scale): Pain Level: 0  Last Weight:   Wt Readings from Last 1 Encounters:   10/24/20 177 lb 14.4 oz (80.7 kg)     Mental Status:  {IP PT MENTAL STATUS::::0}    IV Access:  { ELONOR IV ACCESS:638711336:::0}    Nursing Mobility/ADLs:  Walking   {CHP DME ADLs:384575891:::0}  Transfer  {CHP DME ADLs:313573757:::0}  Bathing  {CHP DME ADLs:022036728:::0}  Dressing  {CHP DME ADLs:052078176:::0}  Toileting  {CHP DME ADLs:725893050:::0}  Feeding  {CHP DME ADLs:494370500:::0}  Med Admin  {CHP DME ADLs:964409933:::0}  Med Delivery   { LEONOR MED Delivery:754414834:::0}    Wound Care Documentation and Therapy:        Elimination:  Continence: Bowel: {YES / }  Bladder: {YES / KM:93185}  Urinary Catheter: {Urinary Catheter:956683344:::0}   Colostomy/Ileostomy/Ileal Conduit: {YES / XI:06757}       Date of Last BM: ***    Intake/Output Summary (Last 24 hours) at 10/26/2020 1132  Last data filed at 10/26/2020 0923  Gross per 24 hour   Intake 560 ml   Output 1450 ml   Net -890 ml     I/O last 3 completed shifts:   In: 65 [P.O.:660]  Out: 1700 [Urine:1700]    Safety Concerns:     [address] Safety Concerns:147073321:::0}    Impairments/Disabilities:      [address] Impairments/Disabilities:836212349:::0}    Nutrition Therapy:  Current Nutrition Therapy:   [address] Diet List:585999491:::0}    Routes of Feeding: {CHP DME Other Feedings:332911523:::0}  Liquids: {Slp liquid thickness:20875}  Daily Fluid Restriction: {CHP DME Yes amt example:597714013:::0}  Last Modified Barium Swallow with Video (Video Swallowing Test): {Done Not Done CDIQ:078997100:::6}    Treatments at the Time of Hospital Discharge:   Respiratory Treatments: ***  Oxygen Therapy:  {Therapy; copd oxygen:37809:::0}  Ventilator:    {[facility] Vent List:529390916:::0}    Rehab Therapies: {THERAPEUTIC INTERVENTION:7957470286}  Weight Bearing Status/Restrictions: {[facility] Weight Bearin:::0}  Other Medical Equipment (for information only, NOT a DME order):  {EQUIPMENT:276316127}  Other Treatments: ***    Patient's personal belongings (please select all that are sent with patient):  {CHP DME Belongings:280705961:::0}    RN SIGNATURE:  {Esignature:386289392:::0}    CASE MANAGEMENT/SOCIAL WORK SECTION    Inpatient Status Date: ***    Readmission Risk Assessment Score:  Readmission Risk              Risk of Unplanned Readmission:        9           Discharging to Facility/ Agency   Name:   Address:  Phone:  Fax:    Dialysis Facility (if applicable)   Name:  Address:  Dialysis Schedule:  Phone:  Fax:    / signature: {Esignature:635028838:::0}    PHYSICIAN SECTION    Prognosis: Good    Condition at Discharge: Stable    Rehab Potential (if transferring to Rehab): Good    Recommended Labs or Other Treatments After Discharge: ***    Physician Certification: I certify the above information and transfer of Chin Mathis  is necessary for the continuing treatment of the diagnosis listed and that he requires [address] for less 30 days.      Update Admission H&P: No change in H&P    PHYSICIAN SIGNATURE:  Electronically signed by Enzo Nava MD on 10/26/20 at 11:32 AM EDT

## 2020-10-26 NOTE — PROGRESS NOTES
DATE: 10/26/2020    NAME: Kait Simons  MRN: [id number]   : 1955    Patient not seen this date for Physical Therapy due to:  [] Blood transfusion in progress  [] Hemodialysis  []  Patient Declined  [] Spine Precautions   [] Strict Bedrest  [] Surgery/ Procedure  [] Testing      [x] Other Pt's RN reported that the pt is going home soon. [] PT being discontinued at this time. Patient independent. No further needs. [] PT being discontinued at this time as the patient has been transferred to palliative care. No further needs.     Aron Gaitan, PTA

## 2020-10-26 NOTE — PLAN OF CARE
Problem: OXYGENATION/RESPIRATORY FUNCTION  Goal: Patient will maintain patent airway  Outcome: Ongoing  Goal: Patient will achieve/maintain normal respiratory rate/effort  Description: Respiratory rate and effort will be within normal limits for the patient  Outcome: Ongoing     Problem: ACTIVITY INTOLERANCE/IMPAIRED MOBILITY  Goal: Mobility/activity is maintained at optimum level for patient  Outcome: Ongoing     Problem: Falls - Risk of:  Goal: Will remain free from falls  Description: Will remain free from falls  Outcome: Ongoing  Goal: Absence of physical injury  Description: Absence of physical injury  Outcome: Ongoing     Problem: Pain:  Goal: Pain level will decrease  Description: Pain level will decrease  Outcome: Ongoing  Goal: Control of acute pain  Description: Control of acute pain  Outcome: Ongoing  Goal: Control of chronic pain  Description: Control of chronic pain  Outcome: Ongoing     Problem: Nutrition  Goal: Optimal nutrition therapy  Description: Nutrition Problem #1: Unintended weight loss  Intervention: Food and/or Nutrient Delivery: Modify Current Diet, Start Oral Nutrition Supplement  Nutritional Goals: Pt to meet % of est'd daily needs via PO     Outcome: Ongoing

## 2020-10-26 NOTE — PROGRESS NOTES
Discharge paperwork given and explained. All questions answered. IV removed. Patient will  meds from home pharmacy. Oxygen delivered to bedside. Patient wheeled to his truck parked in the ED parking lot. Patient left with all belongings.

## 2020-10-26 NOTE — DISCHARGE INSTR - DIET
 Good nutrition is important when healing from an illness, injury, or surgery. Follow any nutrition recommendations given to you during your hospital stay.  If you were given an oral nutrition supplement while in the hospital, continue to take this supplement at home. You can take it with meals, in-between meals, and/or before bedtime. These supplements can be purchased at most local grocery stores, pharmacies, and chain super-stores.  If you have any questions about your diet or nutrition, call the hospital and ask for the dietitian. Heart-Healthy Diet: Care Instructions  Your Care Instructions     A heart-healthy diet has lots of vegetables, fruits, nuts, beans, and whole grains, and is low in salt. It limits foods that are high in saturated fat, such as meats, cheeses, and fried foods. It may be hard to change your diet, but even small changes can lower your risk of heart attack and heart disease. Follow-up care is a key part of your treatment and safety. Be sure to make and go to all appointments, and call your doctor if you are having problems. It's also a good idea to know your test results and keep a list of the medicines you take. How can you care for yourself at home? Watch your portions  · Learn what a serving is. A \"serving\" and a \"portion\" are not always the same thing. Make sure that you are not eating larger portions than are recommended. For example, a serving of pasta is ½ cup. A serving size of meat is 2 to 3 ounces. A 3-ounce serving is about the size of a deck of cards. Measure serving sizes until you are good at [place]" them. Keep in mind that restaurants often serve portions that are 2 or 3 times the size of one serving. · To keep your energy level up and keep you from feeling hungry, eat often but in smaller portions. · Eat only the number of calories you need to stay at a healthy weight.  If you need to lose weight, eat fewer calories than your body burns (through soda pop. Limit alcohol  · Limit alcohol to no more than 2 drinks a day for men and 1 drink a day for women. Too much alcohol can cause health problems. When should you call for help? Watch closely for changes in your health, and be sure to contact your doctor if:    · You would like help planning heart-healthy meals. Where can you learn more? Go to [email or web address] org and sign in to your [employer] account. Enter V137 in the [employer] box to learn more about \"Heart-Healthy Diet: Care Instructions. \"     If you do not have an account, please click on the \"Sign Up Now\" link. Current as of: August 22, 2019               Content Version: 12.6  © 3512-6437 [employer]. Care instructions adapted under license by [facility] ([facility]). If you have questions about a medical condition or this instruction, always ask your healthcare professional. [address] any warranty or liability for your use of this information.

## 2020-10-26 NOTE — PROGRESS NOTES
CLINICAL PHARMACY NOTE: MEDS TO [address] Select Patient?: No  Total # of Prescriptions Filled: 0   The following medications were delivered to the patient:  · none  Total # of Interventions Completed: 0  Time Spent (min): 0    Additional Documentation: went to delivered meds to the pt room, the pt stated that he does not have any cash with him and he drove himself. He asked for the medications to be transferred to his local pharmacy. [facility].

## 2020-10-27 ENCOUNTER — CARE COORDINATION (OUTPATIENT)
Dept: CASE MANAGEMENT | Age: 65
End: 2020-10-27

## 2020-10-27 RX ORDER — IBUPROFEN 800 MG/1
TABLET ORAL
Qty: 120 TABLET | Refills: 0 | Status: ON HOLD | OUTPATIENT
Start: 2020-10-27 | End: 2020-12-07 | Stop reason: HOSPADM

## 2020-10-27 NOTE — TELEPHONE ENCOUNTER
Last visit: 02/12/2020  Last Med refill: UNKNOWN  Does patient have enough medication for 72 hours: No: PT STATES HAS BEEN OUT AND IS IN PAIN.  ITS HARD MOVING AROUND W/O IBU TO TAKE    HE HAS AN APPT ON 11/16/2020    Next Visit Date:  Future Appointments   Date Time Provider Yves Florence   11/16/2020  4:15 PM Cassandra Arceo  [address] Maintenance   Topic Date Due    Shingles Vaccine (1 of 2) 08/11/2003    Pneumococcal 65+ years Vaccine (1 of 1 - PPSV23) 08/11/2018    Flu vaccine (1) 09/01/2020    DTaP/Tdap/Td vaccine (1 - Tdap) 08/18/2021 (Originally 8/11/1972)    Annual Wellness Visit (AWV)  12/04/2020    Colon cancer screen colonoscopy  12/06/2020    Lipid screen  03/10/2021    Potassium monitoring  10/26/2021    Creatinine monitoring  10/26/2021    AAA screen  Completed    Hepatitis C screen  Completed    Hepatitis A vaccine  Aged Out    Hepatitis B vaccine  Aged Out    Hib vaccine  Aged Out    Meningococcal (ACWY) vaccine  Aged Out       Hemoglobin A1C (%)   Date Value   01/10/2018 5.0             ( goal A1C is < 7)   No results found for: LABMICR  LDL Cholesterol (mg/dL)   Date Value   03/10/2020 82   10/01/2018 81       (goal LDL is <100)   AST (U/L)   Date Value   10/17/2020 32     ALT (U/L)   Date Value   10/17/2020 17     BUN (mg/dL)   Date Value   10/26/2020 17     BP Readings from Last 3 Encounters:   10/26/20 91/61   02/27/20 (!) 142/88   02/21/20 138/80          (goal 120/80)    All Future Testing planned in [facility]  Lab Frequency Next Occurrence               Patient Active Problem List:     Dyslipidemia     ED (erectile dysfunction)     Incomplete bladder emptying     Benign prostatic hyperplasia with urinary obstruction     History of colon cancer     Atrial fibrillation with normal ventricular rate (HCC)     Anemia     Pallor of optic disc     Presbyopia     Incisional hernia, without obstruction or gangrene     Hypertrophic nonobstructive cardiomyopathy (HCC)     Iron (Fe) deficiency anemia     Primary osteoarthritis of right knee     Benign essential HTN     History of malignant neoplasm of rectum     Hill's esophagus     CHF (congestive heart failure), NYHA class II, acute on chronic, combined (HCC)     Hypoxia     Dyspnea and respiratory abnormalities     Pneumonia due to Mycoplasma pneumoniae     Occupational pulmonary disease     Sinus bradycardia

## 2020-10-27 NOTE — CARE COORDINATION
Amanda 45 Transitions Initial Follow Up Call    Call within 2 business days of discharge: Yes    Patient: Rubens Stauffer Patient : 1955   MRN: [id number]  Reason for Admission: Hypoxia  Discharge Date: 10/26/20 RARS: Readmission Risk Score: 9      Last Discharge [facility]       Complaint Diagnosis Description Type Department Provider    10/17/20 Shortness of Breath; Chest Pain Hypoxia . .. ED to Hosp-Admission (Discharged) (ADMITTED) [facility] CAR 2 Mbonu Colton Singh MD; Simba Song. .. Spoke with: [facility]: [facility]    Non-face-to-face services provided:  Scheduled appointment with PCP-  Scheduled appointment with Specialist-10/28 with Dr. Beverly Coates and reviewed discharge summary and/or continuity of care documents     Spoke with patient who said he is feeling good today. He has some CLAYTON and is a little weak but denies any chest pain, palpitations, swelling, cough or other cardiac related symptom. He is awaiting his concentrator from [address] [place] [address], has a portable tank but has not needed to use the O2. He has follow up with the EP MD tomorrow. Denies any needs or concerns. Challenges to be reviewed by the provider   Additional needs identified to be addressed with provider No  none    Discussed COVID-19 related testing which was available at this time. Test results were negative. Patient informed of results, if available? Yes         Method of communication with provider : none    Advance Care Planning:   Does patient have an Advance Directive:  reviewed and current. Was this a readmission? No  Patient stated reason for admission: short of breath  Patients top risk factors for readmission: medical condition    Care Transition Nurse (CTN) contacted the patient by telephone to perform post hospital discharge assessment. Verified name and  with patient as identifiers. Provided introduction to self, and explanation of the CTN role.      CTN reviewed discharge instructions, medical action plan and red flags with patient who verbalized understanding. Patient given an opportunity to ask questions and does not have any further questions or concerns at this time. Were discharge instructions available to patient? Yes. Reviewed appropriate site of care based on symptoms and resources available to patient including: PCP and Specialist. The patient agrees to contact the PCP office for questions related to their healthcare. Medication reconciliation was performed with patient, who verbalizes understanding of administration of home medications. Advised obtaining a 90-day supply of all daily and as-needed medications. Covid Risk Education    Patient has following risk factors of: acute respiratory failure. Education provided regarding infection prevention, and signs and symptoms of COVID-19 and when to seek medical attention with patient who verbalized understanding. Discussed exposure protocols and quarantine From CDC: Are you at higher risk for severe illness?   and given an opportunity for questions and concerns. The patient agrees to contact the COVID-19 hotline 942-605-3620 or PCP office for questions related to COVID-19. For more information on steps you can take to protect yourself, see CDC's How to Protect Yourself     Patient/family/caregiver given information for GetWell Loop and agrees to enroll no  Patient's preferred e-mail: declines  Patient's preferred phone number: declines    Discussed follow-up appointments. If no appointment was previously scheduled, appointment scheduling offered: Yes. Is follow up appointment scheduled within 7 days of discharge? Yes  Non-[facility] follow up appointment(s): 10/28    Plan for follow-up call in 7-10 days based on severity of symptoms and risk factors. Plan for next call: Routine folllow up  CTN provided contact information for future needs.           Care Transitions 24 Hour Call    Schedule Follow Up Appointment with PCP:  Completed  Do you have any ongoing symptoms?:  Yes  Patient-reported symptoms:  Shortness of Breath (Comment: CLAYTON)  Do you have a copy of your discharge instructions?:  Yes  Do you have all of your prescriptions and are they filled?:  Yes  Have you been contacted by a [address]?:  No  Have you scheduled your follow up appointment?:  Yes  How are you going to get to your appointment?:  Car - family or friend to transport  Were you discharged with any Home Care or Post Acute Services:  No  Care Transitions Interventions         Follow Up  Future Appointments   Date Time Provider Yves Henriquez   11/10/2020  9:00 AM MIRI Romano - CNP Resp Spec [facility]   11/16/2020  4:15 PM Cassandra Kasper MD [facility] JOESPH Ascencio RN

## 2020-10-29 NOTE — DISCHARGE SUMMARY
[facility]     Department of Internal Medicine - Staff Internal Medicine Teaching Service    INPATIENT DISCHARGE SUMMARY      Patient Identification:  Corrine Flynn is a 77 y.o. male. :  1955  MRN: [id number]     Acct: [de-identified]   PCP: Ray Joseph MD  Admit Date:  10/17/2020  Discharge date and time: 10/26/2020  3:30 PM   Attending Provider: No att. providers found                                     [address] Problem Lists:  Principal Problem:    CHF (congestive heart failure), NYHA class II, acute on chronic, combined ([facility] [address]  Active Problems:    Dyslipidemia    Benign prostatic hyperplasia with urinary obstruction    Atrial fibrillation with normal ventricular rate (HCC)    Hypertrophic nonobstructive cardiomyopathy (HCC)    Benign essential HTN    Hypoxia    Dyspnea and respiratory abnormalities    Pneumonia due to Mycoplasma pneumoniae    Occupational pulmonary disease    Sinus bradycardia  Resolved Problems:    * No resolved hospital problems. *      HOSPITAL STAY     Brief Inpatient course: Corrine Flynn is a 77 y.o. male who was admitted for the management of CHF (congestive heart failure), NYHA class II, acute on chronic, combined ([facility] [address] presented to the emergency department with past medical history of diastolic and systolic CHF, essential hypertension, atrial fibrillation dyslipidemia,  presents  to the ED with a chief complaint of acute onset of shortness of breath with productive cough since last 3 days, visited with some dizziness and chest pain.   Patient has history of chronic respiratory failure on home oxygen was recently treated for suspected Covid pneumonia, Covid lab work-up negative.   He complained of increased shortness of breath in the ER he was found to be having saturations of 65%.  Patient also have the chest pain which is sharp,localised, non radiating and non reproducible on deep breathing.   Pt denied headache, lightheadedness, focal neurological deficits, abdominal pain, nausea, fever, chills. He was admitted and treated for Acute on chronic systolic and diastolic congestive heart failure with  furosemide 20mg dailyAcute hypoxic respiratory failure with oxygen, Pulmonary fibrosis/ interstitial lung disease with Prednisone 40mg daily ,Atrial fibrillation with RVR s/p cardioversion - Amiodarone on discharge and  Decreased metoprolol tartrate 25mg thrice daily to twice daily.  Continued on xarelto 20mg, Asymptomatic bradycardia - Decreased metoprolol tartrate 25mg thrice daily to twice daily,Essential hypertension -continue on metoprolol 25 mg thrice daily, Benign prostatic hyperplasia with  tamsulosin 0.4mg daily      Procedures/ Significant Interventions:      Cardioversion    Consults:     Consults:     Final Specialist Recommendations/Findings:   IP CONSULT TO INTERNAL MEDICINE  IP CONSULT TO INFECTIOUS DISEASES  IP CONSULT TO CASE MANAGEMENT  IP CONSULT TO CARDIOLOGY  IP CONSULT TO PULMONOLOGY      Any Hospital Acquired Infections: none    Discharge Functional Status:  stable    DISCHARGE PLAN     Disposition: home    Patient Instructions:   Discharge Medication List as of 10/26/2020 12:53 PM      START taking these medications    Details   furosemide (LASIX) 20 MG tablet Take 1 tablet by mouth daily, Disp-60 tablet,R-3Normal      predniSONE (DELTASONE) 20 MG tablet Take 2 tablets by mouth daily for 7 days, Disp-14 tablet,R-0Normal         CONTINUE these medications which have CHANGED    Details   metoprolol tartrate 37.5 MG TABS Take 37.5 mg by mouth 2 times daily, Disp-60 tablet,R-3Normal         CONTINUE these medications which have NOT CHANGED    Details   tamsulosin (FLOMAX) 0.4 MG capsule Take 1 capsule by mouth daily, Disp-90 capsule,R-0Normal      atorvastatin (LIPITOR) 40 MG tablet take 1 tablet by mouth once daily, Disp-30 tablet,R-0Normal      sildenafil (VIAGRA) 100 MG tablet TAKE ONE TABLET BY MOUTH AS NEEDED FOR FOR ERECTILE DYSFUNCTION, Disp-10 tablet,R-5Normal      omeprazole (PRILOSEC) 40 MG delayed release capsule take 1 capsule by mouth every morning before breakfast, Disp-90 capsule,R-1Normal      ibuprofen (ADVIL;MOTRIN) 800 MG tablet take 1 tablet by mouth every 6 hours if needed for pain, Disp-120 tablet,R-1Normal      rivaroxaban (XARELTO) 20 MG TABS tablet Take 20 mg by mouth daily (with breakfast) Historical Med         STOP taking these medications       lisinopril (PRINIVIL;ZESTRIL) 5 MG tablet Comments:   Reason for Stopping:         Elastic Bandages & Supports (ABDOMINAL BINDER/ELASTIC XL) MISC Comments:   Reason for Stopping:               Activity: activity as tolerated    Diet: cardiac diet    Follow-up:    [facility]  [address] [facility]. [address]  On 11/3/2020  at 9:15 am [facility] LIYA Christian MD  [address] [address]  [phone]    Schedule an appointment as soon as possible for a visit in 1 week  follow up after hospital discharge     Ida Boykin, [facility]  [address] [facility] [address] [place] [address]    Schedule an appointment as soon as possible for a visit in 2 weeks  hospital follow up for ILD. On home oxygen     Aminata Don MD  [address], [address]  [phone]    On 10/28/2020  at 9:30a for hospital follow-up with EP cardiology to discuss ablation/options      Patient Instructions:     Please take your metoprolol twice daily which is for your heart rate control and follow-up with your cardiologist on 10/28/2020. Please use your oxygen. Take all your medications as prescribed. Please follow up with your  pulmonology doctor.   Follow up labs:   none  Follow up imaging: none    Note that over 30 minutes was spent in preparing discharge papers, discussing discharge with patient, medication review, etc.      Heide Moses MD, MD  Internal Medicine Resident, PGY-1  [facility];  [place], [place]  10/29/2020, 4:20 PM

## 2020-11-02 ENCOUNTER — CARE COORDINATION (OUTPATIENT)
Dept: CASE MANAGEMENT | Age: 65
End: 2020-11-02

## 2020-11-02 NOTE — CARE COORDINATION
Amanda 45 Transitions Follow Up Call    2020    Patient: Corrine Flynn  Patient : 1955   MRN: [id number]  Reason for Admission: Hypoxia  Discharge Date: 10/26/20 RARS: Readmission Risk Score: 9         Spoke with: Dana Rollins    Spoke with patient who continues to have dyspnea. He denies any f/c, n/v or other viral symptoms. He will see his cardiologist this week. Denies any needs or concerns. Needs to be reviewed by the provider   Additional needs identified to be addressed with provider No  none  Discussed COVID-19 related testing which was available at this time. Test results were negative. Patient informed of results, if available? Yes         Method of communication with provider : none    Care Transition Nurse (CTN) contacted the patient by telephone to follow up after admission on 10/17. Verified name and  with patient as identifiers. Addressed changes since last contact: symptom management-of CHF  Discharged needs reviewed: none  Follow up appointment completed? Yes    Advance Care Planning:   Does patient have an Advance Directive:  reviewed and current. CTN reviewed discharge instructions, medical action plan and red flags with patient and discussed any barriers to care and/or understanding of plan of care after discharge. Discussed appropriate site of care based on symptoms and resources available to patient including: PCP and Specialist. The patient agrees to contact the PCP office for questions related to their healthcare. Patients top risk factors for readmission: medical condition  Interventions to address risk factors: Obtained and reviewed discharge summary and/or continuity of care documents    Discussed follow-up appointments. Plan for follow-up call in 7-10 days based on severity of symptoms and risk factors. Plan for next call: Routine follow up, follow up from cardiology visit  CTN provided contact information for future needs.           Care Transitions Subsequent and Final Call    Schedule Follow Up Appointment with PCP:  Completed  Subsequent and Final Calls  Do you have any ongoing symptoms?:  Yes  Onset of Patient-reported symptoms: In the past 7 days  Patient-reported symptoms:  Shortness of Breath, Fatigue  Have your medications changed?:  No  Do you have any questions related to your medications?:  No  Do you currently have any active services?:  No  Do you have any needs or concerns that I can assist you with?:  No  Identified Barriers:  Lack of Education  Care Transitions Interventions  Other Interventions:             Follow Up  Future Appointments   Date Time Provider Yves Henriquez   11/10/2020  9:00 AM MIRI Wade - CNP Resp Spec [facility]   11/16/2020  4:15 PM Cassandra Tello MD [facility] JOESPH Rivas RN

## 2020-11-03 NOTE — PROGRESS NOTES
Subjective:      Patient ID: Mamadou Garcia is a 77 y.o. male. HPI    Here for post hospital follow-up. Hospital recap:  Patient presented to the hospital on 10/17/2020 with a history of productive cough x3 days with associated dizziness and recently was treated for Covid-19 at [facility] with Decadron, remdesivir, Rocephin and was discharged home on oxygen at 1.5 L/min nasal cannula. On admission patient was desaturating down to 65% SPO2. Patient was placed on 5 L nasal cannula. ABG on admission showed pH 7.432/PCO2 37.2/PO2 98/bicarb 24.8. Covid PCR panel was negative. Legionella, mycoplasma IgM, strep pneumoniae, ANCA, YOUNG, C3/4 all negative. Serial chest x-rays noting bilateral pulmonary infiltrates. CT chest with groundglass attenuation, negative for pulmonary embolism with septal thickening in the lower lobes. Echocardiogram noted moderate reduction in ejection fraction at 35% with global hypokinesis, grade 1 diastolic dysfunction, mild to moderate MR, RVSP 34 mmHg. Patient was diuresed for diastolic/systolic CHF. He was noted to have atrial fibrillation and underwent cardioversion on 10/19/2020. Patient discharged on Xarelto, Lasix 20 mg daily, prednisone burst through 11/3/2020, metoprolol 37.5 mg twice daily. Patient to follow-up with cardiology within 1 week after discharge to discuss possible ablation. Home O2 evaluation completed prior to discharge and SPO2 at rest on room air was 91-92% with exercise SPO2 was 88% on room air, patient was titrated onto 2 L/min nasal cannula and discharged on home O2. Today's evaluation:  Patient states that he has followed up with cardiology since he was discharged home and his medications were changed to sotalol 80 mg twice daily. He has since been able to come down on his oxygen support during the day over the last 3 to 4 days due to SPO2 readings ranging approximately 90 to 93%. States that he still wearing his oxygen at night.   Is noticed that he is gradually starting to feel better but still not at his baseline. Still plans to have intervention for his underlying atrial fibrillation, not scheduled to date. He endorses shortness of breath with activity that is improving. He also endorses a nonproductive cough. Medications:   Currently not on any inhalers. Currently not on any nebulizers. PRIOR WORKUP:  PFT: None on chart    CT Imaging:  High resolution CT chest 10/23/2020: Patchy areas of groundglass opacity and intralobular septal thickening, predominantly in the periphery of the lung bilaterally and within the bilateral basilar lower lobes. There is mild bronchiectasis noted, predominant in the lingula, right middle lobe and bilateral lower lobes posteriorly. Findings are compatible with fibrosis and traction bronchiectasis. No honeycombing is identified. No pleural effusion or pneumothorax. No suspicious pulmonary nodules. CTA chest 10/21/2020: Relatively diffuse septal thickening is noted more significant toward the bases. Patient with scattered groundglass opacities in the lungs consistent with airspace disease, overall appearance most compatible with atypical pneumonitis including inflammatory etiology versus mild edema. Septal thickening may be related to fibrosis. CTA chest 10/15/2020: Negative for pulmonary embolism. Diffuse interstitial disease and scattered airspace infiltrates throughout both lungs with small bilateral pleural effusions. CT chest 9/17/2020: Severe diffuse bilateral groundglass airspace opacifications noted in the lung parenchyma. CT chest 3/30/2015: Mild to moderate centrilobular emphysema is present. No discrete lung lesion, infiltrate or suspicious nodule.       Sleep Study: None on chart    Laboratory evaluation:  CCP IgG Antibodies 10/23/2020:  <1.5 negative  Rheumatoid factor 10/23//2020: 18.5-elevated  Sedimentation rate 10/22/2020: 82-elevated  Histoplasma antibodies 10/17/2020: Not detected  Mycoplasma pneumonia antibodies IgM 10/17/2020: 0.54-negative  Mycoplasma pneumonia antibodies IgG 10/17/2020: 2.39-elevated  Legionella antigen 10/17/2020: Negative  Strep pneumonia antigen 10/17/2020: Negative  SARS-CoV-2 10/17/2020: Negative  Respiratory virus PCR panel 10/17/2020: Negative  C4 10/17/2020: 30  C3 10/17/2020: 164  ANCA myeloperoxidase 10/17/2020: 16  ANCA proteinase 3 10/17/2020: 8  YOUNG 10/17/2020: Negative  CRP 10/17/2020: 182. 7-elevated    Immunizations:   Immunization History   Administered Date(s) Administered    Influenza Vaccine, unspecified formulation 01/16/2014, 01/03/2017, 12/12/2017    Influenza Virus Vaccine 01/16/2014, 10/26/2014, 12/04/2015    Influenza, High Dose (Fluzone 65 yrs and older) 09/27/2018    Influenza, Sarah Angelia, 6 mo and older, IM (Fluzone, Flulaval) 12/12/2017    Influenza, Sarah Angelia, IM, (6 mo and older Fluzone, Flulaval, Fluarix and 3 yrs and older Afluria) 10/23/2014, 01/03/2017    Influenza, Sarah Angelia, IM, PF (6 mo and older Fluzone, Flulaval, Fluarix, and 3 yrs and older Afluria) 10/23/2014    Influenza, Triv, inactivated, subunit, adjuvanted, IM (Fluad 65 yrs and older) 11/08/2019        Sleep Medicine 11/10/2020   Sitting and reading 0   Watching TV 0   Sitting, inactive in a public place (e.g. a theatre or a meeting) 0   As a passenger in a car for an hour without a break 0   Lying down to rest in the afternoon when circumstances permit 2   Sitting and talking to someone 0   Sitting quietly after a lunch without alcohol 0   In a car, while stopped for a few minutes in traffic 0   Total score 2       /65 (Site: Left Upper Arm, Position: Sitting, Cuff Size: Medium Adult)   Pulse (!) 49   Temp 97.9 °F (36.6 °C) (Temporal)   Resp 16   Ht 6' (1.829 m)   Wt 173 lb 3.2 oz (78.6 kg)   SpO2 95% Comment: room air at rest  BMI 23.49 kg/m²     Past Medical History:   Diagnosis Date    Atrial fibrillation (HCC)     Back pain, chronic     Hill's  Smokeless tobacco: Never Used    Tobacco comment: stated never actually really smoked only inhaled    Substance and Sexual Activity    Alcohol use: Yes     Alcohol/week: 8.3 standard drinks     Types: 10 Standard drinks or equivalent per week     Comment: 3 -4 times a week    Drug use: No     Types: Other-see comments     Comment: Cocaine use in past in 1970's    Sexual activity: Yes     Partners: Female   Lifestyle    Physical activity     Days per week: Not on file     Minutes per session: Not on file    Stress: Not on file   Relationships    Social connections     Talks on phone: Not on file     Gets together: Not on file     Attends Sabianist service: Not on file     Active member of club or organization: Not on file     Attends meetings of clubs or organizations: Not on file     Relationship status: Not on file    Intimate partner violence     Fear of current or ex partner: Not on file     Emotionally abused: Not on file     Physically abused: Not on file     Forced sexual activity: Not on file   Other Topics Concern    Not on file   Social History Narrative    Not on file       Review of Systems   Constitutional:        Patient with physical activity intolerance, gradually improving since his discharge from the hospital.  Still not at baseline. HENT:        Dry nonproductive cough-patient attributes this to his acid reflux symptoms. Eyes: Negative. Respiratory:        Shortness of breath with moderate activity, currently has weaned himself off of oxygen during the day but is continuing to use at 2 L/min at night. Patient monitors his SPO2 at home and it ranges 90-93% on room air during the day. Cardiovascular:        Denies any chest pain or palpitations. \"Still does not feel right\"   Gastrointestinal:        Acid reflux at times   Endocrine: Negative. Genitourinary: Negative. Musculoskeletal: Negative. Skin: Negative. Allergic/Immunologic: Negative.     Neurological: Not Detected Not Detected    Coronavirus NL63 PCR Not Detected Not Detected    Coronavirus OC43 PCR Not Detected Not Detected    Human Metapneumovirus PCR Not Detected Not Detected    Rhino/Enterovirus PCR Not Detected Not Detected    Influenza A by PCR Not Detected Not Detected    Influenza A H1 PCR NOT REPORTED Not Detected    Influenza A H1 (2009) PCR NOT REPORTED Not Detected    Influenza A H3 PCR NOT REPORTED Not Detected    Influenza B by PCR Not Detected Not Detected    Parainfluenza 1 PCR Not Detected Not Detected    Parainfluenza 2 PCR Not Detected Not Detected    Parainfluenza 3 PCR Not Detected Not Detected    Parainfluenza 4 PCR Not Detected Not Detected    Resp Syncytial Virus PCR Not Detected Not Detected    Bordetella Parapertussis Not Detected Not Detected    B Pertussis by PCR Not Detected Not Detected    Chlamydia pneumoniae By PCR Not Detected Not Detected    Mycoplasma pneumo by PCR Not Detected Not Detected   LEGIONELLA ANTIGEN, URINE    Specimen: Urine voided   Result Value Ref Range    Legionella Pneumophilia Ag, Urine NEGATIVE    Strep Pneumoniae Antigen    Specimen: Urine voided   Result Value Ref Range    Source . URINE     Strep pneumo Ag NEGATIVE    Troponin   Result Value Ref Range    Troponin, High Sensitivity 29 (H) 0 - 22 ng/L    Troponin T NOT REPORTED <0.03 ng/mL    Troponin Interp NOT REPORTED    Troponin   Result Value Ref Range    Troponin, High Sensitivity 25 (H) 0 - 22 ng/L    Troponin T NOT REPORTED <0.03 ng/mL    Troponin Interp NOT REPORTED    CBC   Result Value Ref Range    WBC 8.6 3.5 - 11.3 k/uL    RBC 4.56 4.21 - 5.77 m/uL    Hemoglobin 11.8 (L) 13.0 - 17.0 g/dL    Hematocrit 39.0 (L) 40.7 - 50.3 %    MCV 85.5 82.6 - 102.9 fL    MCH 25.9 25.2 - 33.5 pg    MCHC 30.3 28.4 - 34.8 g/dL    RDW 17.6 (H) 11.8 - 14.4 %    Platelets 546 463 - 290 k/uL    MPV 9.5 8.1 - 13.5 fL    NRBC Automated 0.0 0.0 per 100 WBC   Comprehensive Metabolic Panel   Result Value Ref Range    Glucose 120 (H) 70 - 99 mg/dL    BUN 17 8 - 23 mg/dL    CREATININE 0.63 (L) 0.70 - 1.20 mg/dL    Bun/Cre Ratio NOT REPORTED 9 - 20    Calcium 8.7 8.6 - 10.4 mg/dL    Sodium 138 135 - 144 mmol/L    Potassium 4.0 3.7 - 5.3 mmol/L    Chloride 103 98 - 107 mmol/L    CO2 21 20 - 31 mmol/L    Anion Gap 14 9 - 17 mmol/L    Alkaline Phosphatase 124 40 - 129 U/L    ALT 17 5 - 41 U/L    AST 32 <40 U/L    Total Bilirubin 1.18 0.3 - 1.2 mg/dL    Total Protein 7.0 6.4 - 8.3 g/dL    Alb 3.4 (L) 3.5 - 5.2 g/dL    Albumin/Globulin Ratio 0.9 (L) 1.0 - 2.5    GFR Non-African American >60 >60 mL/min    GFR African American >60 >60 mL/min    GFR Comment          GFR Staging NOT REPORTED    Brain Natriuretic Peptide   Result Value Ref Range    Pro-BNP 3,525 (H) <300 pg/mL    BNP Interpretation Pro-BNP Reference Range:    TSH with Reflex   Result Value Ref Range    TSH 4.28 0.30 - 5.00 mIU/L   Magnesium   Result Value Ref Range    Magnesium 1.8 1.6 - 2.6 mg/dL   COVID-19    Specimen: Other   Result Value Ref Range    SARS-CoV-2          SARS-CoV-2, Rapid Not Detected Not Detected    Source . NASOPHARYNGEAL SWAB     SARS-CoV-2         Troponin   Result Value Ref Range    Troponin, High Sensitivity 22 0 - 22 ng/L    Troponin T NOT REPORTED <0.03 ng/mL    Troponin Interp NOT REPORTED    Ferritin   Result Value Ref Range    Ferritin 238 30 - 400 ug/L   Procalcitonin   Result Value Ref Range    Procalcitonin 0.29 (H) <0.09 ng/mL   COVID-19    Specimen: Other   Result Value Ref Range    SARS-CoV-2 Not Detected Not Detected    SARS-CoV-2, Rapid          Source NARES     SARS-CoV-2         C-Reactive Protein   Result Value Ref Range    .7 (H) 0.0 - 5.0 mg/L   Lactic acid, plasma   Result Value Ref Range    Lactic Acid NOT REPORTED mmol/L    Lactic Acid, Whole Blood 1.6 0.7 - 2.1 mmol/L   YOUNG Screen with Reflex   Result Value Ref Range    YOUNG NEGATIVE NEGATIVE   Anti-Neutrophilic Cytoplasmic Antibody   Result Value Ref Range    ANCA Myeloperoxidase 16 <100 AU/mL    ANCA Proteinase 3 8 <100 AU/mL   C3 Complement   Result Value Ref Range    Complement C3 164 90 - 180 mg/dL   C4 Complement   Result Value Ref Range    Complement C4 30 10 - 40 mg/dL   SPECIMEN REJECTION   Result Value Ref Range    Specimen Source . BLOOD     Ordered Test DIME     Reason for Rejection       Unable to perform testing: Specimen age beyond stability limit.    - NOT REPORTED    D-Dimer, Quantitative   Result Value Ref Range    D-Dimer, Quant 1.24 mg/L FEU   Basic Metabolic Panel w/ Reflex to MG   Result Value Ref Range    Glucose 128 (H) 70 - 99 mg/dL    BUN 25 (H) 8 - 23 mg/dL    CREATININE 0.71 0.70 - 1.20 mg/dL    Bun/Cre Ratio NOT REPORTED 9 - 20    Calcium 8.8 8.6 - 10.4 mg/dL    Sodium 136 135 - 144 mmol/L    Potassium 3.5 (L) 3.7 - 5.3 mmol/L    Chloride 99 98 - 107 mmol/L    CO2 26 20 - 31 mmol/L    Anion Gap 11 9 - 17 mmol/L    GFR Non-African American >60 >60 mL/min    GFR African American >60 >60 mL/min    GFR Comment          GFR Staging NOT REPORTED    CBC auto differential   Result Value Ref Range    WBC 14.2 (H) 3.5 - 11.3 k/uL    RBC 4.57 4.21 - 5.77 m/uL    Hemoglobin 11.7 (L) 13.0 - 17.0 g/dL    Hematocrit 38.3 (L) 40.7 - 50.3 %    MCV 83.8 82.6 - 102.9 fL    MCH 25.6 25.2 - 33.5 pg    MCHC 30.5 28.4 - 34.8 g/dL    RDW 17.4 (H) 11.8 - 14.4 %    Platelets 536 889 - 910 k/uL    MPV 10.0 8.1 - 13.5 fL    NRBC Automated 0.0 0.0 per 100 WBC    Differential Type NOT REPORTED     WBC Morphology NOT REPORTED     RBC Morphology NOT REPORTED     Platelet Estimate NOT REPORTED     Immature Granulocytes 1 (H) 0 %    Seg Neutrophils 88 (H) 36 - 65 %    Lymphocytes 5 (L) 24 - 43 %    Monocytes 6 3 - 12 %    Eosinophils % 0 (L) 1 - 4 %    Basophils 0 0 - 2 %    Absolute Immature Granulocyte 0.14 0.00 - 0.30 k/uL    Segs Absolute 12.50 (H) 1.50 - 8.10 k/uL    Absolute Lymph # 0.71 (L) 1.10 - 3.70 k/uL    Absolute Mono # 0.85 0.10 - 1.20 k/uL    Absolute Eos # 0.00 0.00 - 0.44 k/uL Basophils Absolute 0.00 0.00 - 0.20 k/uL    Morphology ANISOCYTOSIS PRESENT    Troponin   Result Value Ref Range    Troponin, High Sensitivity 18 0 - 22 ng/L    Troponin T NOT REPORTED <0.03 ng/mL    Troponin Interp NOT REPORTED    MYCOPLASMA PNEUMONIAE ANTIBODY, IGG   Result Value Ref Range    Mycoplasma pneumo IgG 2.39 (H) <0.91   MYCOPLASMA PNEUMONIAE ANTIBODY, IGM   Result Value Ref Range    Mycoplasma pneumo IgM 0.54 <0.91   HISTOPLASMA ANTIBODIES   Result Value Ref Range    Histoplasma Abs, ID None Detected None Detected    Histoplasma Ab Mycelial CF <1:8 <1:8    Histoplasma Ab Yeast CF <1:8 <1:8   Magnesium   Result Value Ref Range    Magnesium 2.4 1.6 - 2.6 mg/dL   Basic Metabolic Panel w/ Reflex to MG   Result Value Ref Range    Glucose 108 (H) 70 - 99 mg/dL    BUN 35 (H) 8 - 23 mg/dL    CREATININE 0.87 0.70 - 1.20 mg/dL    Bun/Cre Ratio NOT REPORTED 9 - 20    Calcium 8.6 8.6 - 10.4 mg/dL    Sodium 138 135 - 144 mmol/L    Potassium 4.0 3.7 - 5.3 mmol/L    Chloride 103 98 - 107 mmol/L    CO2 25 20 - 31 mmol/L    Anion Gap 10 9 - 17 mmol/L    GFR Non-African American >60 >60 mL/min    GFR African American >60 >60 mL/min    GFR Comment          GFR Staging NOT REPORTED    CBC auto differential   Result Value Ref Range    WBC 11.0 3.5 - 11.3 k/uL    RBC 4.70 4.21 - 5.77 m/uL    Hemoglobin 11.9 (L) 13.0 - 17.0 g/dL    Hematocrit 39.0 (L) 40.7 - 50.3 %    MCV 83.0 82.6 - 102.9 fL    MCH 25.3 25.2 - 33.5 pg    MCHC 30.5 28.4 - 34.8 g/dL    RDW 17.6 (H) 11.8 - 14.4 %    Platelets 746 500 - 971 k/uL    MPV 9.3 8.1 - 13.5 fL    NRBC Automated 0.0 0.0 per 100 WBC    Differential Type NOT REPORTED     Seg Neutrophils 75 (H) 36 - 65 %    Lymphocytes 12 (L) 24 - 43 %    Monocytes 11 3 - 12 %    Eosinophils % 1 1 - 4 %    Basophils 0 0 - 2 %    Immature Granulocytes 1 (H) 0 %    Segs Absolute 8.29 (H) 1.50 - 8.10 k/uL    Absolute Lymph # 1.27 1.10 - 3.70 k/uL    Absolute Mono # 1.24 (H) 0.10 - 1.20 k/uL    Absolute Eos # 0. 09 0.00 - 0.44 k/uL    Basophils Absolute <0.03 0.00 - 0.20 k/uL    Absolute Immature Granulocyte 0.08 0.00 - 0.30 k/uL    WBC Morphology NOT REPORTED     RBC Morphology ANISOCYTOSIS PRESENT     Platelet Estimate NOT REPORTED    Basic Metabolic Panel w/ Reflex to MG   Result Value Ref Range    Glucose 99 70 - 99 mg/dL    BUN 28 (H) 8 - 23 mg/dL    CREATININE 0.78 0.70 - 1.20 mg/dL    Bun/Cre Ratio NOT REPORTED 9 - 20    Calcium 8.8 8.6 - 10.4 mg/dL    Sodium 137 135 - 144 mmol/L    Potassium 4.0 3.7 - 5.3 mmol/L    Chloride 102 98 - 107 mmol/L    CO2 25 20 - 31 mmol/L    Anion Gap 10 9 - 17 mmol/L    GFR Non-African American >60 >60 mL/min    GFR African American >60 >60 mL/min    GFR Comment          GFR Staging NOT REPORTED    CBC auto differential   Result Value Ref Range    WBC 10.3 3.5 - 11.3 k/uL    RBC 5.04 4.21 - 5.77 m/uL    Hemoglobin 12.8 (L) 13.0 - 17.0 g/dL    Hematocrit 43.3 40.7 - 50.3 %    MCV 85.9 82.6 - 102.9 fL    MCH 25.4 25.2 - 33.5 pg    MCHC 29.6 28.4 - 34.8 g/dL    RDW 17.6 (H) 11.8 - 14.4 %    Platelets 988 244 - 603 k/uL    MPV 9.2 8.1 - 13.5 fL    NRBC Automated 0.0 0.0 per 100 WBC    Differential Type NOT REPORTED     Seg Neutrophils 65 36 - 65 %    Lymphocytes 18 (L) 24 - 43 %    Monocytes 11 3 - 12 %    Eosinophils % 5 (H) 1 - 4 %    Basophils 0 0 - 2 %    Immature Granulocytes 1 (H) 0 %    Segs Absolute 6.57 1.50 - 8.10 k/uL    Absolute Lymph # 1.89 1.10 - 3.70 k/uL    Absolute Mono # 1.12 0.10 - 1.20 k/uL    Absolute Eos # 0.55 (H) 0.00 - 0.44 k/uL    Basophils Absolute 0.04 0.00 - 0.20 k/uL    Absolute Immature Granulocyte 0.11 0.00 - 0.30 k/uL    WBC Morphology NOT REPORTED     RBC Morphology ANISOCYTOSIS PRESENT     Platelet Estimate NOT REPORTED    Basic Metabolic Panel w/ Reflex to MG   Result Value Ref Range    Glucose 96 70 - 99 mg/dL    BUN 26 (H) 8 - 23 mg/dL    CREATININE 0.63 (L) 0.70 - 1.20 mg/dL    Bun/Cre Ratio NOT REPORTED 9 - 20    Calcium 8.8 8.6 - 10.4 mg/dL Sodium 136 135 - 144 mmol/L    Potassium 3.7 3.7 - 5.3 mmol/L    Chloride 102 98 - 107 mmol/L    CO2 22 20 - 31 mmol/L    Anion Gap 12 9 - 17 mmol/L    GFR Non-African American >60 >60 mL/min    GFR African American >60 >60 mL/min    GFR Comment          GFR Staging NOT REPORTED    CBC auto differential   Result Value Ref Range    WBC 9.3 3.5 - 11.3 k/uL    RBC 5.12 4.21 - 5.77 m/uL    Hemoglobin 12.7 (L) 13.0 - 17.0 g/dL    Hematocrit 42.3 40.7 - 50.3 %    MCV 82.6 82.6 - 102.9 fL    MCH 24.8 (L) 25.2 - 33.5 pg    MCHC 30.0 28.4 - 34.8 g/dL    RDW 16.8 (H) 11.8 - 14.4 %    Platelets 005 450 - 520 k/uL    MPV 9.2 8.1 - 13.5 fL    NRBC Automated 0.0 0.0 per 100 WBC    Differential Type NOT REPORTED     Seg Neutrophils 64 36 - 65 %    Lymphocytes 15 (L) 24 - 43 %    Monocytes 11 3 - 12 %    Eosinophils % 7 (H) 1 - 4 %    Basophils 1 0 - 2 %    Immature Granulocytes 2 (H) 0 %    Segs Absolute 5.96 1.50 - 8.10 k/uL    Absolute Lymph # 1.39 1.10 - 3.70 k/uL    Absolute Mono # 1.01 0.10 - 1.20 k/uL    Absolute Eos # 0.65 (H) 0.00 - 0.44 k/uL    Basophils Absolute 0.08 0.00 - 0.20 k/uL    Absolute Immature Granulocyte 0.21 0.00 - 0.30 k/uL    WBC Morphology NOT REPORTED     RBC Morphology ANISOCYTOSIS PRESENT     Platelet Estimate NOT REPORTED    Basic Metabolic Panel w/ Reflex to MG   Result Value Ref Range    Glucose 98 70 - 99 mg/dL    BUN 22 8 - 23 mg/dL    CREATININE 0.68 (L) 0.70 - 1.20 mg/dL    Bun/Cre Ratio NOT REPORTED 9 - 20    Calcium 8.7 8.6 - 10.4 mg/dL    Sodium 132 (L) 135 - 144 mmol/L    Potassium 3.9 3.7 - 5.3 mmol/L    Chloride 100 98 - 107 mmol/L    CO2 24 20 - 31 mmol/L    Anion Gap 8 (L) 9 - 17 mmol/L    GFR Non-African American >60 >60 mL/min    GFR African American >60 >60 mL/min    GFR Comment          GFR Staging NOT REPORTED    CBC auto differential   Result Value Ref Range    WBC 11.1 3.5 - 11.3 k/uL    RBC 5.08 4.21 - 5.77 m/uL    Hemoglobin 13.0 13.0 - 17.0 g/dL    Hematocrit 42.0 40.7 - 50.3 % MCV 82.7 82.6 - 102.9 fL    MCH 25.6 25.2 - 33.5 pg    MCHC 31.0 28.4 - 34.8 g/dL    RDW 16.8 (H) 11.8 - 14.4 %    Platelets 280 (H) 652 - 453 k/uL    MPV 9.1 8.1 - 13.5 fL    NRBC Automated 0.0 0.0 per 100 WBC    Differential Type NOT REPORTED     Seg Neutrophils 66 (H) 36 - 65 %    Lymphocytes 13 (L) 24 - 43 %    Monocytes 10 3 - 12 %    Eosinophils % 6 (H) 1 - 4 %    Basophils 1 0 - 2 %    Immature Granulocytes 4 (H) 0 %    Segs Absolute 7.31 1.50 - 8.10 k/uL    Absolute Lymph # 1.48 1.10 - 3.70 k/uL    Absolute Mono # 1.07 0.10 - 1.20 k/uL    Absolute Eos # 0.65 (H) 0.00 - 0.44 k/uL    Basophils Absolute 0.12 0.00 - 0.20 k/uL    Absolute Immature Granulocyte 0.45 (H) 0.00 - 0.30 k/uL    WBC Morphology NOT REPORTED     RBC Morphology ANISOCYTOSIS PRESENT     Platelet Estimate NOT REPORTED    Sedimentation Rate   Result Value Ref Range    Sed Rate 82 (H) 0 - 20 mm   Basic Metabolic Panel w/ Reflex to MG   Result Value Ref Range    Glucose 121 (H) 70 - 99 mg/dL    BUN 21 8 - 23 mg/dL    CREATININE 0.68 (L) 0.70 - 1.20 mg/dL    Bun/Cre Ratio NOT REPORTED 9 - 20    Calcium 8.7 8.6 - 10.4 mg/dL    Sodium 137 135 - 144 mmol/L    Potassium 4.2 3.7 - 5.3 mmol/L    Chloride 102 98 - 107 mmol/L    CO2 23 20 - 31 mmol/L    Anion Gap 12 9 - 17 mmol/L    GFR Non-African American >60 >60 mL/min    GFR African American >60 >60 mL/min    GFR Comment          GFR Staging NOT REPORTED    CBC auto differential   Result Value Ref Range    WBC 12.0 (H) 3.5 - 11.3 k/uL    RBC 4.91 4.21 - 5.77 m/uL    Hemoglobin 12.3 (L) 13.0 - 17.0 g/dL    Hematocrit 40.2 (L) 40.7 - 50.3 %    MCV 81.9 (L) 82.6 - 102.9 fL    MCH 25.1 (L) 25.2 - 33.5 pg    MCHC 30.6 28.4 - 34.8 g/dL    RDW 16.5 (H) 11.8 - 14.4 %    Platelets 306 (H) 437 - 453 k/uL    MPV 9.3 8.1 - 13.5 fL    NRBC Automated 0.0 0.0 per 100 WBC    Differential Type NOT REPORTED     Seg Neutrophils 82 (H) 36 - 65 %    Lymphocytes 9 (L) 24 - 43 %    Monocytes 7 3 - 12 %    Eosinophils % 0 (L) 1 - 4 %    Basophils 0 0 - 2 %    Immature Granulocytes 2 (H) 0 %    Segs Absolute 9.73 (H) 1.50 - 8.10 k/uL    Absolute Lymph # 1.09 (L) 1.10 - 3.70 k/uL    Absolute Mono # 0.85 0.10 - 1.20 k/uL    Absolute Eos # 0.04 0.00 - 0.44 k/uL    Basophils Absolute 0.04 0.00 - 0.20 k/uL    Absolute Immature Granulocyte 0.29 0.00 - 0.30 k/uL    WBC Morphology NOT REPORTED     RBC Morphology ANISOCYTOSIS PRESENT     Platelet Estimate NOT REPORTED    CYCLIC CITRUL PEPTIDE ANTIBODY, IGG   Result Value Ref Range    CCP IgG Antibodies <1.5 <4.0 U/mL   Magnesium   Result Value Ref Range    Magnesium 2.6 1.6 - 2.6 mg/dL   Rheumatoid Factor   Result Value Ref Range    Rheumatoid Factor 18.5 (H) <11 IU/mL   CYCLIC CITRUL PEPTIDE ANTIBODY, IGG   Result Value Ref Range    CCP IgG Antibodies <1.5 <4.0 U/mL   Basic Metabolic Panel w/ Reflex to MG   Result Value Ref Range    Glucose 81 70 - 99 mg/dL    BUN 18 8 - 23 mg/dL    CREATININE 0.74 0.70 - 1.20 mg/dL    Bun/Cre Ratio NOT REPORTED 9 - 20    Calcium 8.8 8.6 - 10.4 mg/dL    Sodium 137 135 - 144 mmol/L    Potassium 4.1 3.7 - 5.3 mmol/L    Chloride 102 98 - 107 mmol/L    CO2 26 20 - 31 mmol/L    Anion Gap 9 9 - 17 mmol/L    GFR Non-African American >60 >60 mL/min    GFR African American >60 >60 mL/min    GFR Comment          GFR Staging NOT REPORTED    CBC auto differential   Result Value Ref Range    WBC 11.1 3.5 - 11.3 k/uL    RBC 4.94 4.21 - 5.77 m/uL    Hemoglobin 12.6 (L) 13.0 - 17.0 g/dL    Hematocrit 42.2 40.7 - 50.3 %    MCV 85.4 82.6 - 102.9 fL    MCH 25.5 25.2 - 33.5 pg    MCHC 29.9 28.4 - 34.8 g/dL    RDW 16.8 (H) 11.8 - 14.4 %    Platelets 502 (H) 087 - 453 k/uL    MPV 9.5 8.1 - 13.5 fL    NRBC Automated 0.0 0.0 per 100 WBC    Differential Type NOT REPORTED     Seg Neutrophils 71 (H) 36 - 65 %    Lymphocytes 14 (L) 24 - 43 %    Monocytes 10 3 - 12 %    Eosinophils % 1 1 - 4 %    Basophils 1 0 - 2 %    Immature Granulocytes 3 (H) 0 %    Segs Absolute 7.96 1.50 - 8.10 k/uL    Absolute Lymph # 1.58 1.10 - 3.70 k/uL    Absolute Mono # 1.06 0.10 - 1.20 k/uL    Absolute Eos # 0.09 0.00 - 0.44 k/uL    Basophils Absolute 0.08 0.00 - 0.20 k/uL    Absolute Immature Granulocyte 0.31 (H) 0.00 - 0.30 k/uL    WBC Morphology NOT REPORTED     RBC Morphology ANISOCYTOSIS PRESENT     Platelet Estimate NOT REPORTED    Basic Metabolic Panel w/ Reflex to MG   Result Value Ref Range    Glucose 78 70 - 99 mg/dL    BUN 20 8 - 23 mg/dL    CREATININE 0.62 (L) 0.70 - 1.20 mg/dL    Bun/Cre Ratio NOT REPORTED 9 - 20    Calcium 8.5 (L) 8.6 - 10.4 mg/dL    Sodium 135 135 - 144 mmol/L    Potassium 4.0 3.7 - 5.3 mmol/L    Chloride 101 98 - 107 mmol/L    CO2 26 20 - 31 mmol/L    Anion Gap 8 (L) 9 - 17 mmol/L    GFR Non-African American >60 >60 mL/min    GFR African American >60 >60 mL/min    GFR Comment          GFR Staging NOT REPORTED    CBC auto differential   Result Value Ref Range    WBC 11.1 3.5 - 11.3 k/uL    RBC 4.81 4.21 - 5.77 m/uL    Hemoglobin 12.3 (L) 13.0 - 17.0 g/dL    Hematocrit 40.7 40.7 - 50.3 %    MCV 84.6 82.6 - 102.9 fL    MCH 25.6 25.2 - 33.5 pg    MCHC 30.2 28.4 - 34.8 g/dL    RDW 16.9 (H) 11.8 - 14.4 %    Platelets 242 (H) 746 - 453 k/uL    MPV 9.3 8.1 - 13.5 fL    NRBC Automated 0.0 0.0 per 100 WBC    Differential Type NOT REPORTED     Seg Neutrophils 74 (H) 36 - 65 %    Lymphocytes 14 (L) 24 - 43 %    Monocytes 9 3 - 12 %    Eosinophils % 1 1 - 4 %    Basophils 0 0 - 2 %    Immature Granulocytes 2 (H) 0 %    Segs Absolute 8.28 (H) 1.50 - 8.10 k/uL    Absolute Lymph # 1.57 1.10 - 3.70 k/uL    Absolute Mono # 0.98 0.10 - 1.20 k/uL    Absolute Eos # 0.05 0.00 - 0.44 k/uL    Basophils Absolute 0.04 0.00 - 0.20 k/uL    Absolute Immature Granulocyte 0.17 0.00 - 0.30 k/uL    WBC Morphology NOT REPORTED     RBC Morphology ANISOCYTOSIS PRESENT     Platelet Estimate NOT REPORTED    Basic Metabolic Panel w/ Reflex to MG   Result Value Ref Range    Glucose 71 70 - 99 mg/dL    BUN 17 8 - 23 mg/dL CREATININE 0.70 0.70 - 1.20 mg/dL    Bun/Cre Ratio NOT REPORTED 9 - 20    Calcium 8.8 8.6 - 10.4 mg/dL    Sodium 139 135 - 144 mmol/L    Potassium 3.9 3.7 - 5.3 mmol/L    Chloride 105 98 - 107 mmol/L    CO2 25 20 - 31 mmol/L    Anion Gap 9 9 - 17 mmol/L    GFR Non-African American >60 >60 mL/min    GFR African American >60 >60 mL/min    GFR Comment          GFR Staging NOT REPORTED    CBC auto differential   Result Value Ref Range    WBC 10.9 3.5 - 11.3 k/uL    RBC 5.26 4.21 - 5.77 m/uL    Hemoglobin 13.3 13.0 - 17.0 g/dL    Hematocrit 44.0 40.7 - 50.3 %    MCV 83.7 82.6 - 102.9 fL    MCH 25.3 25.2 - 33.5 pg    MCHC 30.2 28.4 - 34.8 g/dL    RDW 17.6 (H) 11.8 - 14.4 %    Platelets 481 (H) 398 - 453 k/uL    MPV 9.7 8.1 - 13.5 fL    NRBC Automated 0.0 0.0 per 100 WBC    Differential Type NOT REPORTED     Seg Neutrophils 70 (H) 36 - 65 %    Lymphocytes 19 (L) 24 - 43 %    Monocytes 8 3 - 12 %    Eosinophils % 1 1 - 4 %    Basophils 1 0 - 2 %    Immature Granulocytes 1 (H) 0 %    Segs Absolute 7.73 1.50 - 8.10 k/uL    Absolute Lymph # 2.03 1.10 - 3.70 k/uL    Absolute Mono # 0.86 0.10 - 1.20 k/uL    Absolute Eos # 0.05 0.00 - 0.44 k/uL    Basophils Absolute 0.06 0.00 - 0.20 k/uL    Absolute Immature Granulocyte 0.14 0.00 - 0.30 k/uL    WBC Morphology NOT REPORTED     RBC Morphology ANISOCYTOSIS PRESENT     Platelet Estimate NOT REPORTED    Arterial Blood Gas, POC   Result Value Ref Range    POC pH 7.432 7.350 - 7.450    POC pCO2 37.2 35.0 - 48.0 mm Hg    POC PO2 99.9 83.0 - 108.0 mm Hg    POC HCO3 24.8 21.0 - 28.0 mmol/L    TCO2 (calc), Art 26 22.0 - 29.0 mmol/L    Negative Base Excess, Art NOT REPORTED 0.0 - 2.0    Positive Base Excess, Art 1 0.0 - 3.0    POC O2 SAT 98 94.0 - 98.0 %    O2 Device/Flow/% Cannula     Prem Test POSITIVE     Sample Site Right Radial Artery     Mode NOT REPORTED     FIO2 4.0     Pt Temp NOT REPORTED     POC pH Temp NOT REPORTED     POC pCO2 Temp NOT REPORTED mm Hg    POC pO2 Temp NOT REPORTED mm Hg   EKG 12 Lead   Result Value Ref Range    Ventricular Rate 139 BPM    Atrial Rate 139 BPM    P-R Interval 146 ms    QRS Duration 86 ms    Q-T Interval 320 ms    QTc Calculation (Bazett) 486 ms    R Axis -16 degrees    T Axis 43 degrees   EKG 12 Lead   Result Value Ref Range    Ventricular Rate 87 BPM    Atrial Rate 277 BPM    QRS Duration 108 ms    Q-T Interval 418 ms    QTc Calculation (Bazett) 502 ms    P Axis 153 degrees    R Axis -41 degrees    T Axis -36 degrees   EKG 12 Lead   Result Value Ref Range    Ventricular Rate 59 BPM    Atrial Rate 277 BPM    QRS Duration 94 ms    Q-T Interval 522 ms    QTc Calculation (Bazett) 516 ms    P Axis 65 degrees    R Axis -4 degrees    T Axis 12 degrees   EKG 12 Lead   Result Value Ref Range    Ventricular Rate 68 BPM    Atrial Rate 68 BPM    P-R Interval 188 ms    QRS Duration 90 ms    Q-T Interval 408 ms    QTc Calculation (Bazett) 433 ms    P Axis 45 degrees    R Axis -26 degrees   EKG 12 Lead   Result Value Ref Range    Ventricular Rate 113 BPM    Atrial Rate 339 BPM    QRS Duration 96 ms    Q-T Interval 332 ms    QTc Calculation (Bazett) 455 ms    P Axis 30 degrees    R Axis -19 degrees    T Axis 3 degrees   EKG 12 Lead   Result Value Ref Range    Ventricular Rate 66 BPM    Atrial Rate 66 BPM    P-R Interval 184 ms    QRS Duration 92 ms    Q-T Interval 438 ms    QTc Calculation (Bazett) 459 ms    P Axis 39 degrees    R Axis -22 degrees    T Axis 6 degrees   ECHO Complete 2D W Doppler W Color   Result Value Ref Range    Left Ventricular Ejection Fraction 35     LVEF MODALITY ECHO        Xr Chest Portable    Result Date: 10/19/2020  EXAMINATION: ONE XRAY VIEW OF THE CHEST 10/19/2020 3:07 pm COMPARISON: 10/18/2020 HISTORY: ORDERING SYSTEM PROVIDED HISTORY: chf TECHNOLOGIST PROVIDED HISTORY: chf FINDINGS: Cardiomediastinal silhouette is stable. No new airspace disease. Bilateral pulmonary opacities not changed. No pneumothorax. No pleural effusion. Persistent bilateral pulmonary opacities could represent pulmonary edema or atypical pneumonia     Xr Chest Portable    Result Date: 10/18/2020  EXAMINATION: ONE XRAY VIEW OF THE CHEST 10/18/2020 6:24 am COMPARISON: Chest October 17, 2020. HISTORY: ORDERING SYSTEM PROVIDED HISTORY: fluid overload TECHNOLOGIST PROVIDED HISTORY: fluid overload Reason for Exam: fluid overload   upright port FINDINGS: Cardiomegaly and vascular congestion is unchanged. Bilateral airspace disease unchanged. Small bilateral pleural effusions are unchanged. No pneumothorax or free air. No significant change in chest findings compared to the October 17th study. Xr Chest Portable    Result Date: 10/17/2020  EXAMINATION: ONE XRAY VIEW OF THE CHEST 10/17/2020 8:36 am COMPARISON: 2 November 2009 HISTORY: ORDERING SYSTEM PROVIDED HISTORY: sob TECHNOLOGIST PROVIDED HISTORY: sob Reason for Exam: sob FINDINGS: AP portable view of the chest time stamped at 811 hours demonstrates overlying cardiac monitoring electrodes. Mild cardiomegaly is noted. Scattered pulmonary opacities right greater than left favoring multi side focal airspace disease. Small bilateral effusions are not excluded. Bilateral scattered pulmonary opacities right greater than left favoring multifocal airspace disease with small effusions not excluded. No extrapleural air is seen. Ct Chest High Resolution    Result Date: 10/23/2020  EXAMINATION: CT IMAGES OF THE CHEST WITHOUT CONTRAST, HIGH RESOLUTION 10/23/2020 TECHNIQUE: CT of the chest was performed without the administration of intravenous contrast. High resolution CT imaging was performed of the lungs. Multiplanar reformatted images are provided for review. Dose modulation, iterative reconstruction, and/or weight based adjustment of the mA/kV was utilized to reduce the radiation dose to as low as reasonably achievable.  High resolution CT images were performed in the supine inspiration, supine expiration, and prone inspiration positions. COMPARISON: CT pulmonary angiogram performed 10/21/2020. HISTORY: ORDERING SYSTEM PROVIDED HISTORY: eval interstitial disease TECHNOLOGIST PROVIDED HISTORY: eval interstitial disease Reason for Exam: eval interstitial disease FINDINGS: Mediastinum: The visualized thyroid gland and esophagus are unremarkable. Few prominent mediastinal lymph nodes, not significantly changed compared to prior examination. No significant hilar lymphadenopathy. Multivessel coronary artery disease. Trace pericardial effusion, similar to prior examination. Scattered calcification involving the mitral valve annulus and aortic valve leaflets. The thoracic aorta is normal in course and caliber with scattered atherosclerotic disease. HRCT Findings/Lungs/pleura: There are patchy areas of ground-glass opacity and interlobular septal thickening, predominantly in the periphery of the lung bilaterally and within the basilar bilateral lower lobes. There is mild bronchiectasis noted, predominantly in the lingula, right middle lobe, and bilateral lower lobes posteriorly. Findings are compatible with fibrosis and traction bronchiectasis. No honeycombing identified. No pleural effusion or pneumothorax. No suspicious pulmonary nodules. The central airways are grossly patent. Upper Abdomen: No acute abnormality of the visualized upper abdomen. No acute soft tissue or osseous abnormality. Soft Tissues/Bones: No acute soft tissue or osseous abnormality. 1. Patchy areas of fibrosis  predominantly in the periphery of the lung and basilar lower lobes. No honeycombing identified. 2. Trace pericardial effusion, grossly unchanged. 3. Multivessel coronary artery disease.      Ct Chest Pulmonary Embolism W Contrast    Result Date: 10/21/2020  EXAMINATION: CTA OF THE CHEST 10/21/2020 4:12 pm TECHNIQUE: CTA of the chest was performed after the administration of intravenous contrast.  Multiplanar reformatted images are provided for review. MIP images are provided for review. Dose modulation, iterative reconstruction, and/or weight based adjustment of the mA/kV was utilized to reduce the radiation dose to as low as reasonably achievable. COMPARISON: 30 March 2015 HISTORY: ORDERING SYSTEM PROVIDED HISTORY: r/o PE TECHNOLOGIST PROVIDED HISTORY: r/o PE Reason for Exam: sob, chest pain Congestive heart failure. FINDINGS: Pulmonary Arteries: Pulmonary arteries are adequately opacified for evaluation. No evidence of intraluminal filling defect to suggest pulmonary embolism. Main pulmonary artery is normal in caliber. Mediastinum: Shotty to mildly prominent mediastinal lymph nodes are present. Coronary artery calcification is noted. Mild cardiomegaly is present with trace pericardial fluid, 6-7 mm depth. Lungs/pleura: Relatively diffuse septal thickening is noted more significant toward the bases. Patient has scattered ground-glass opacities in the lungs consistent with airspace disease, overall appearance most compatible with atypical pneumonitis including inflammatory etiology versus mild edema. Septal thickening may be related to fibrosis. No effusions are present. Tracheal bronchial tree is patent. Upper Abdomen: Limited images of the upper abdomen are unremarkable. Adrenal glands are unremarkable. Soft Tissues/Bones: No acute bone or soft tissue abnormality. 1.  No evidence of pulmonary embolism. 2.  Fairly extensive scattered ground-glass opacities in the lungs which may be related to atypical pneumonitis less likely venous congestion. No effusions. 3.  Septal thickening, most severe in the lung bases. Assessment:      1. Pulmonary fibrosis ([address]    2. Atrial fibrillation with normal ventricular rate ([address]    3. Acute on chronic respiratory failure with hypoxemia (HCC)    4. Bronchiectasis without complication ([address]    5. Hx of mycoplasma pneumonia    6.  CHF (congestive heart failure), NYHA class II, acute on chronic, combined ([facility]          Plan:      1. Medications reviewed, currently on no pulmonary medications. 2. Educated and clarified the medication use. 3. Recommend flu vaccination in the fall annually. Patient scheduled to receive at his PCP appointment on Monday 11/16  4. Patient is up-to-date with vaccinations from pulmonary perspective. 5. Maintain an active lifestyle. 6. Patient's questions were answered to his satisfaction. 7. Supplemental oxygen continued nocturnally. May be off during the day as long as SPO2 is greater than 90% and patient not symptomatic with shortness of breath. 8. Pulmonary function tests with next office visit  9. CT scan of the chest was reviewed. Repeat in 3 months  10.  We'll see the patient back in 4 months        Electronically signed by MIRI Cruz CNP on 11/10/2020 at 10:09 AM

## 2020-11-05 NOTE — TELEPHONE ENCOUNTER
Elise Risk visit: 02/12/2020  Last Med refill: 10/09/2020  Does patient have enough medication for 72 hours: No:     Next Visit Date:  Future Appointments   Date Time Provider Yves Henriquez   11/10/2020  9:00 AM MIRI Castanon - CNP Resp Spec [id number]   11/16/2020  4:15 PM Cassandra Kaminski  [address] Maintenance   Topic Date Due    Shingles Vaccine (1 of 2) 08/11/2003    Pneumococcal 65+ years Vaccine (1 of 1 - PPSV23) 08/11/2018    Flu vaccine (1) 09/01/2020    Annual Wellness Visit (AWV)  12/04/2020    DTaP/Tdap/Td vaccine (1 - Tdap) 08/18/2021 (Originally 8/11/1974)    Colon cancer screen colonoscopy  12/06/2020    Lipid screen  03/10/2021    Potassium monitoring  10/26/2021    Creatinine monitoring  10/26/2021    AAA screen  Completed    Hepatitis C screen  Completed    Hepatitis A vaccine  Aged Out    Hepatitis B vaccine  Aged Out    Hib vaccine  Aged Out    Meningococcal (ACWY) vaccine  Aged Out       Hemoglobin A1C (%)   Date Value   01/10/2018 5.0             ( goal A1C is < 7)   No results found for: LABMICR  LDL Cholesterol (mg/dL)   Date Value   03/10/2020 82   10/01/2018 81       (goal LDL is <100)   AST (U/L)   Date Value   10/17/2020 32     ALT (U/L)   Date Value   10/17/2020 17     BUN (mg/dL)   Date Value   10/26/2020 17     BP Readings from Last 3 Encounters:   10/26/20 91/61   02/27/20 (!) 142/88   02/21/20 138/80          (goal 120/80)    All Future Testing planned in [facility]  Lab Frequency Next Occurrence               Patient Active Problem List:     Dyslipidemia     ED (erectile dysfunction)     Incomplete bladder emptying     Benign prostatic hyperplasia with urinary obstruction     History of colon cancer     Atrial fibrillation with normal ventricular rate (HCC)     Anemia     Pallor of optic disc     Presbyopia     Incisional hernia, without obstruction or gangrene     Hypertrophic nonobstructive cardiomyopathy (HCC)     Iron (Fe) deficiency anemia     Primary osteoarthritis of right knee     Benign essential HTN     History of malignant neoplasm of rectum     Hill's esophagus     CHF (congestive heart failure), NYHA class II, acute on chronic, combined (HCC)     Hypoxia     Dyspnea and respiratory abnormalities     Pneumonia due to Mycoplasma pneumoniae     Occupational pulmonary disease     Sinus bradycardia

## 2020-11-06 RX ORDER — ATORVASTATIN CALCIUM 40 MG/1
TABLET, FILM COATED ORAL
Qty: 30 TABLET | Refills: 0 | Status: SHIPPED | OUTPATIENT
Start: 2020-11-06 | End: 2020-11-16 | Stop reason: SDUPTHER

## 2020-11-09 ENCOUNTER — CARE COORDINATION (OUTPATIENT)
Dept: CASE MANAGEMENT | Age: 65
End: 2020-11-09

## 2020-11-10 ENCOUNTER — OFFICE VISIT (OUTPATIENT)
Dept: PULMONOLOGY | Age: 65
End: 2020-11-10
Payer: MEDICARE

## 2020-11-10 VITALS
TEMPERATURE: 97.9 F | BODY MASS INDEX: 23.46 KG/M2 | HEIGHT: 72 IN | OXYGEN SATURATION: 95 % | SYSTOLIC BLOOD PRESSURE: 107 MMHG | RESPIRATION RATE: 16 BRPM | HEART RATE: 49 BPM | DIASTOLIC BLOOD PRESSURE: 65 MMHG | WEIGHT: 173.2 LBS

## 2020-11-10 PROCEDURE — 1123F ACP DISCUSS/DSCN MKR DOCD: CPT | Performed by: NURSE PRACTITIONER

## 2020-11-10 PROCEDURE — 1111F DSCHRG MED/CURRENT MED MERGE: CPT | Performed by: NURSE PRACTITIONER

## 2020-11-10 PROCEDURE — 4040F PNEUMOC VAC/ADMIN/RCVD: CPT | Performed by: NURSE PRACTITIONER

## 2020-11-10 PROCEDURE — 3017F COLORECTAL CA SCREEN DOC REV: CPT | Performed by: NURSE PRACTITIONER

## 2020-11-10 PROCEDURE — 99213 OFFICE O/P EST LOW 20 MIN: CPT | Performed by: NURSE PRACTITIONER

## 2020-11-10 PROCEDURE — 1036F TOBACCO NON-USER: CPT | Performed by: NURSE PRACTITIONER

## 2020-11-10 PROCEDURE — G8484 FLU IMMUNIZE NO ADMIN: HCPCS | Performed by: NURSE PRACTITIONER

## 2020-11-10 PROCEDURE — G8427 DOCREV CUR MEDS BY ELIG CLIN: HCPCS | Performed by: NURSE PRACTITIONER

## 2020-11-10 PROCEDURE — G8420 CALC BMI NORM PARAMETERS: HCPCS | Performed by: NURSE PRACTITIONER

## 2020-11-10 RX ORDER — SOTALOL HYDROCHLORIDE 80 MG/1
TABLET ORAL
Status: ON HOLD | COMMUNITY
Start: 2020-10-28 | End: 2020-12-07 | Stop reason: HOSPADM

## 2020-11-10 RX ORDER — LISINOPRIL 5 MG/1
TABLET ORAL
Status: ON HOLD | COMMUNITY
Start: 2020-10-26 | End: 2020-12-07 | Stop reason: HOSPADM

## 2020-11-10 RX ORDER — DEXAMETHASONE 2 MG/1
TABLET ORAL
COMMUNITY
Start: 2020-09-23 | End: 2020-11-10

## 2020-11-10 ASSESSMENT — SLEEP AND FATIGUE QUESTIONNAIRES
HOW LIKELY ARE YOU TO NOD OFF OR FALL ASLEEP WHEN YOU ARE A PASSENGER IN A CAR FOR AN HOUR WITHOUT A BREAK: 0
HOW LIKELY ARE YOU TO NOD OFF OR FALL ASLEEP WHILE LYING DOWN TO REST IN THE AFTERNOON WHEN CIRCUMSTANCES PERMIT: 2
HOW LIKELY ARE YOU TO NOD OFF OR FALL ASLEEP WHILE SITTING INACTIVE IN A PUBLIC PLACE: 0
HOW LIKELY ARE YOU TO NOD OFF OR FALL ASLEEP WHILE SITTING QUIETLY AFTER LUNCH WITHOUT ALCOHOL: 0
HOW LIKELY ARE YOU TO NOD OFF OR FALL ASLEEP WHILE SITTING AND READING: 0
HOW LIKELY ARE YOU TO NOD OFF OR FALL ASLEEP WHILE WATCHING TV: 0
ESS TOTAL SCORE: 2
HOW LIKELY ARE YOU TO NOD OFF OR FALL ASLEEP IN A CAR, WHILE STOPPED FOR A FEW MINUTES IN TRAFFIC: 0
HOW LIKELY ARE YOU TO NOD OFF OR FALL ASLEEP WHILE SITTING AND TALKING TO SOMEONE: 0

## 2020-11-10 ASSESSMENT — ENCOUNTER SYMPTOMS
ALLERGIC/IMMUNOLOGIC NEGATIVE: 1
EYES NEGATIVE: 1

## 2020-11-16 ENCOUNTER — OFFICE VISIT (OUTPATIENT)
Dept: FAMILY MEDICINE CLINIC | Age: 65
End: 2020-11-16
Payer: MEDICARE

## 2020-11-16 VITALS
TEMPERATURE: 97.3 F | SYSTOLIC BLOOD PRESSURE: 142 MMHG | HEART RATE: 55 BPM | DIASTOLIC BLOOD PRESSURE: 80 MMHG | BODY MASS INDEX: 24.14 KG/M2 | OXYGEN SATURATION: 95 % | WEIGHT: 178 LBS

## 2020-11-16 PROBLEM — J15.7 PNEUMONIA DUE TO MYCOPLASMA PNEUMONIAE: Status: RESOLVED | Noted: 2020-10-20 | Resolved: 2020-11-16

## 2020-11-16 PROCEDURE — 1036F TOBACCO NON-USER: CPT | Performed by: INTERNAL MEDICINE

## 2020-11-16 PROCEDURE — G8420 CALC BMI NORM PARAMETERS: HCPCS | Performed by: INTERNAL MEDICINE

## 2020-11-16 PROCEDURE — 4040F PNEUMOC VAC/ADMIN/RCVD: CPT | Performed by: INTERNAL MEDICINE

## 2020-11-16 PROCEDURE — 1123F ACP DISCUSS/DSCN MKR DOCD: CPT | Performed by: INTERNAL MEDICINE

## 2020-11-16 PROCEDURE — 1111F DSCHRG MED/CURRENT MED MERGE: CPT | Performed by: INTERNAL MEDICINE

## 2020-11-16 PROCEDURE — 99214 OFFICE O/P EST MOD 30 MIN: CPT | Performed by: INTERNAL MEDICINE

## 2020-11-16 PROCEDURE — G8484 FLU IMMUNIZE NO ADMIN: HCPCS | Performed by: INTERNAL MEDICINE

## 2020-11-16 PROCEDURE — G0008 ADMIN INFLUENZA VIRUS VAC: HCPCS | Performed by: INTERNAL MEDICINE

## 2020-11-16 PROCEDURE — G8427 DOCREV CUR MEDS BY ELIG CLIN: HCPCS | Performed by: INTERNAL MEDICINE

## 2020-11-16 PROCEDURE — 3017F COLORECTAL CA SCREEN DOC REV: CPT | Performed by: INTERNAL MEDICINE

## 2020-11-16 PROCEDURE — 90694 VACC AIIV4 NO PRSRV 0.5ML IM: CPT | Performed by: INTERNAL MEDICINE

## 2020-11-16 RX ORDER — OMEPRAZOLE 40 MG/1
40 CAPSULE, DELAYED RELEASE ORAL DAILY
Qty: 90 CAPSULE | Refills: 1 | Status: SHIPPED | OUTPATIENT
Start: 2020-11-16 | End: 2021-05-24 | Stop reason: SDUPTHER

## 2020-11-16 RX ORDER — ATORVASTATIN CALCIUM 40 MG/1
40 TABLET, FILM COATED ORAL DAILY
Qty: 90 TABLET | Refills: 1 | Status: SHIPPED | OUTPATIENT
Start: 2020-11-16 | End: 2021-05-24 | Stop reason: SDUPTHER

## 2020-11-16 NOTE — PROGRESS NOTES
Pt states he has been to ER 3 times during this COVID. He did have some issues with his heart. He has had multiple test but all were negative. He is having issues with breathing since PNE. He states lungs are still infected.

## 2020-11-16 NOTE — PROGRESS NOTES
Post-Discharge Transitional Care Management Services or Hospital Follow Up      [place] Dates   YOB: 1955    Date of Office Visit:  11/16/2020  Date of Hospital Admission: 10/17/20  Date of Hospital Discharge: 10/26/20  Risk of hospital readmission (high >=14%. Medium >=10%) :Readmission Risk Score: 9      Care management risk score Rising risk (score 2-5) and Complex Care (Scores >=6): 2     Non face to face  following discharge, date last encounter closed (first attempt may have been earlier): 10/27/2020 10:59 AM    Call initiated 2 business days of discharge: Yes    Patient Active Problem List   Diagnosis    Dyslipidemia    ED (erectile dysfunction)    Incomplete bladder emptying    Benign prostatic hyperplasia with urinary obstruction    History of colon cancer    Atrial fibrillation with normal ventricular rate (HCC)    Anemia    Pallor of optic disc    Presbyopia    Incisional hernia, without obstruction or gangrene    Hypertrophic nonobstructive cardiomyopathy (HCC)    Iron (Fe) deficiency anemia    Primary osteoarthritis of right knee    Benign essential HTN    History of malignant neoplasm of rectum    Hill's esophagus    CHF (congestive heart failure), NYHA class II, acute on chronic, combined (HCC)    Hypoxia    Dyspnea and respiratory abnormalities    Pneumonia due to Mycoplasma pneumoniae    Occupational pulmonary disease    Sinus bradycardia       Allergies   Allergen Reactions    Adhesive Tape Other (See Comments)     Blister badly     Codeine Nausea Only     Other reaction(s): Other: See Comments  NAUSEA  Other reaction(s):  Other: See Comments  NAUSEA    Penicillins Swelling     As a baby  Other reaction(s): Unknown  As a baby       Medications listed as ordered at the time of discharge from hospital   Florencio Maria   [place] Medication Instructions BLAYNE:    Printed on:11/16/20 6163   Medication Information                      atorvastatin (LIPITOR) 40 MG tablet  take 1 tablet by mouth once daily             furosemide (LASIX) 20 MG tablet  Take 1 tablet by mouth daily             ibuprofen (ADVIL;MOTRIN) 800 MG tablet  Take 1 tablet by mouth every 6 hours if needed for pain             lisinopril (PRINIVIL;ZESTRIL) 5 MG tablet  take 1 tablet by mouth once daily             omeprazole (PRILOSEC) 40 MG delayed release capsule  take 1 capsule by mouth every morning before breakfast             rivaroxaban (XARELTO) 20 MG TABS tablet  Take 20 mg by mouth daily (with breakfast)              sotalol (BETAPACE) 80 MG tablet  take 1 tablet by mouth every 12 hours             tamsulosin (FLOMAX) 0.4 MG capsule  Take 1 capsule by mouth daily                   Medications marked \"taking\" at this time  Outpatient Medications Marked as Taking for the 11/16/20 encounter (Office Visit) with Rosa Maria Castillo MD   Medication Sig Dispense Refill    lisinopril (PRINIVIL;ZESTRIL) 5 MG tablet take 1 tablet by mouth once daily      sotalol (BETAPACE) 80 MG tablet take 1 tablet by mouth every 12 hours      atorvastatin (LIPITOR) 40 MG tablet take 1 tablet by mouth once daily 30 tablet 0    ibuprofen (ADVIL;MOTRIN) 800 MG tablet Take 1 tablet by mouth every 6 hours if needed for pain 120 tablet 0    furosemide (LASIX) 20 MG tablet Take 1 tablet by mouth daily 60 tablet 3    tamsulosin (FLOMAX) 0.4 MG capsule Take 1 capsule by mouth daily 90 capsule 0    omeprazole (PRILOSEC) 40 MG delayed release capsule take 1 capsule by mouth every morning before breakfast 90 capsule 1    rivaroxaban (XARELTO) 20 MG TABS tablet Take 20 mg by mouth daily (with breakfast)           Medications patient taking as of now reconciled against medications ordered at time of hospital discharge: Yes    Chief Complaint   Patient presents with    Follow-Up from Hospital     heart issues/COVID and PNE       History of Present illness - Follow up of Hospital diagnosis(es): presented to ER with worsening dyspnea, found to be severely hypoxic. Admitted with acute on chronic systolic and diastolic CHF and treated with oxygen and diuresis, felt to be due to A. fib with RVR-underwent cardioversion during his hospital stay. Diagnosed with pulmonary fibrosis/interstitial lung disease and started on prednisone. Started on amiodarone at discharge and metoprolol decreased due to atrial fibrillation. Continued on Xarelto. Discharged home to follow-up with cardiology. Inpatient course: Discharge summary reviewed- see chart. Interval history/Current status: has had a bad day today with his breathing, feels awful. Taking lasix every other day now. He reports except today, his BP in the past 7 days has not exceeded 120/80. Has seen cardiology - meds changed from metoprolol to sotalol. On lisinopril and xarelto. Following with pulmonology now. A comprehensive review of systems was negative except for what was noted in the HPI. Vitals:    11/16/20 1610   BP: (!) 162/70   Site: Right Upper Arm   Position: Sitting   Cuff Size: Medium Adult   Pulse: 55   Temp: 97.3 °F (36.3 °C)   TempSrc: Temporal   SpO2: 95%   Weight: 178 lb (80.7 kg)     Body mass index is 24.14 kg/m².    Wt Readings from Last 3 Encounters:   11/16/20 178 lb (80.7 kg)   11/10/20 173 lb 3.2 oz (78.6 kg)   10/24/20 177 lb 14.4 oz (80.7 kg)     BP Readings from Last 3 Encounters:   11/16/20 (!) 162/70   11/10/20 107/65   10/26/20 91/61        Physical Exam:  General Appearance: alert and oriented to person, place and time, well developed and well- nourished, in no acute distress  Skin: warm and dry, no rash or erythema  Head: normocephalic and atraumatic  Eyes: pupils equal, round, and reactive to light, extraocular eye movements intact, conjunctivae normal  ENT: tympanic membrane, external ear and ear canal normal bilaterally, nose without deformity, nasal mucosa and turbinates normal without polyps  Neck: supple and non-tender without mass, no thyromegaly or thyroid nodules, no cervical lymphadenopathy  Pulmonary/Chest: clear to auscultation bilaterally- no wheezes, rales or rhonchi, normal air movement, no respiratory distress  Cardiovascular: normal rate, regular rhythm, normal S1 and S2, no murmurs, rubs, clicks, or gallops, distal pulses intact, no carotid bruits  Abdomen: soft, non-tender, non-distended, normal bowel sounds, no masses or organomegaly  Extremities: no cyanosis, clubbing or edema  Musculoskeletal: normal range of motion, no joint swelling, deformity or tenderness  Neurologic: reflexes normal and symmetric, no cranial nerve deficit, gait, coordination and speech normal    Assessment/Plan:  1. Need for influenza vaccination  - INFLUENZA, QUADV, ADJUVANTED, 65 YRS =, IM, PF, PREFILL SYR, 0.5ML (FLUAD)    2. Paroxysmal atrial fibrillation (HCC)  - atorvastatin (LIPITOR) 40 MG tablet; Take 1 tablet by mouth daily  Dispense: 90 tablet; Refill: 1  - CA DISCHARGE MEDS RECONCILED W/ CURRENT OUTPATIENT MED LIST  S/p cardioversion, rate controlled and anticoagulated     3. Hypertrophic nonobstructive cardiomyopathy (HCC)  - atorvastatin (LIPITOR) 40 MG tablet; Take 1 tablet by mouth daily  Dispense: 90 tablet; Refill: 1  - CA DISCHARGE MEDS RECONCILED W/ CURRENT OUTPATIENT MED LIST    4. Hill's esophagus determined by endoscopy  - omeprazole (PRILOSEC) 40 MG delayed release capsule; Take 1 capsule by mouth daily  Dispense: 90 capsule; Refill: 1    5. Benign prostatic hyperplasia with urinary obstruction  Continue Flomax    6. Hypoxia  Off oxygen today    7. CHF (congestive heart failure), NYHA class II, acute on chronic, combined (HCC)  Stable, resolving, follow-up cardiology    8.  Primary osteoarthritis of right knee  Continue current management        Medical Decision Making: high complexity

## 2020-11-23 ENCOUNTER — HOSPITAL ENCOUNTER (INPATIENT)
Age: 65
LOS: 14 days | Discharge: HOME | DRG: 871 | End: 2020-12-07
Attending: EMERGENCY MEDICINE | Admitting: INTERNAL MEDICINE
Payer: MEDICARE

## 2020-11-23 ENCOUNTER — APPOINTMENT (OUTPATIENT)
Dept: GENERAL RADIOLOGY | Age: 65
DRG: 871 | End: 2020-11-23
Payer: MEDICARE

## 2020-11-23 ENCOUNTER — APPOINTMENT (OUTPATIENT)
Dept: CT IMAGING | Age: 65
DRG: 871 | End: 2020-11-23
Payer: MEDICARE

## 2020-11-23 PROBLEM — J96.01 ACUTE HYPOXEMIC RESPIRATORY FAILURE DUE TO COVID-19 (HCC): Status: ACTIVE | Noted: 2020-11-23

## 2020-11-23 PROBLEM — U07.1 ACUTE HYPOXEMIC RESPIRATORY FAILURE DUE TO COVID-19 (HCC): Status: ACTIVE | Noted: 2020-11-23

## 2020-11-23 LAB
-: NORMAL
ABSOLUTE EOS #: 0.42 K/UL (ref 0–0.44)
ABSOLUTE EOS #: <0.03 K/UL (ref 0–0.44)
ABSOLUTE IMMATURE GRANULOCYTE: 0.09 K/UL (ref 0–0.3)
ABSOLUTE IMMATURE GRANULOCYTE: 0.11 K/UL (ref 0–0.3)
ABSOLUTE LYMPH #: 0.59 K/UL (ref 1.1–3.7)
ABSOLUTE LYMPH #: 2.99 K/UL (ref 1.1–3.7)
ABSOLUTE MONO #: 0.21 K/UL (ref 0.1–1.2)
ABSOLUTE MONO #: 0.67 K/UL (ref 0.1–1.2)
ALBUMIN SERPL-MCNC: 2.8 G/DL (ref 3.5–5.2)
ALBUMIN SERPL-MCNC: 2.9 G/DL (ref 3.5–5.2)
ALBUMIN/GLOBULIN RATIO: 0.8 (ref 1–2.5)
ALBUMIN/GLOBULIN RATIO: 0.8 (ref 1–2.5)
ALLEN TEST: ABNORMAL
ALP BLD-CCNC: 86 U/L (ref 40–129)
ALP BLD-CCNC: 98 U/L (ref 40–129)
ALT SERPL-CCNC: 12 U/L (ref 5–41)
ALT SERPL-CCNC: 15 U/L (ref 5–41)
AMORPHOUS: NORMAL
ANION GAP SERPL CALCULATED.3IONS-SCNC: 10 MMOL/L (ref 9–17)
ANION GAP SERPL CALCULATED.3IONS-SCNC: 17 MMOL/L (ref 9–17)
ANION GAP: 11 MMOL/L (ref 7–16)
AST SERPL-CCNC: 24 U/L
AST SERPL-CCNC: 29 U/L
BACTERIA: NORMAL
BASOPHILS # BLD: 0 % (ref 0–2)
BASOPHILS # BLD: 0 % (ref 0–2)
BASOPHILS ABSOLUTE: 0.03 K/UL (ref 0–0.2)
BASOPHILS ABSOLUTE: 0.04 K/UL (ref 0–0.2)
BILIRUB SERPL-MCNC: 1.28 MG/DL (ref 0.3–1.2)
BILIRUB SERPL-MCNC: 1.57 MG/DL (ref 0.3–1.2)
BILIRUBIN URINE: NEGATIVE
BNP INTERPRETATION: ABNORMAL
BUN BLDV-MCNC: 16 MG/DL (ref 8–23)
BUN BLDV-MCNC: 17 MG/DL (ref 8–23)
BUN/CREAT BLD: ABNORMAL (ref 9–20)
BUN/CREAT BLD: ABNORMAL (ref 9–20)
C-REACTIVE PROTEIN: 204.8 MG/L (ref 0–5)
CALCIUM SERPL-MCNC: 8.1 MG/DL (ref 8.6–10.4)
CALCIUM SERPL-MCNC: 8.6 MG/DL (ref 8.6–10.4)
CASTS UA: NORMAL /LPF (ref 0–8)
CHLORIDE BLD-SCNC: 101 MMOL/L (ref 98–107)
CHLORIDE BLD-SCNC: 103 MMOL/L (ref 98–107)
CO2: 22 MMOL/L (ref 20–31)
CO2: 27 MMOL/L (ref 20–31)
COLOR: YELLOW
COMMENT UA: ABNORMAL
CREAT SERPL-MCNC: 0.62 MG/DL (ref 0.7–1.2)
CREAT SERPL-MCNC: 0.64 MG/DL (ref 0.7–1.2)
CRYSTALS, UA: NORMAL /HPF
D-DIMER QUANTITATIVE: 2.86 MG/L FEU
DIFFERENTIAL TYPE: ABNORMAL
DIFFERENTIAL TYPE: ABNORMAL
EOSINOPHILS RELATIVE PERCENT: 0 % (ref 1–4)
EOSINOPHILS RELATIVE PERCENT: 3 % (ref 1–4)
EPITHELIAL CELLS UA: NORMAL /HPF (ref 0–5)
FERRITIN: 395 UG/L (ref 30–400)
FIO2: ABNORMAL
GFR AFRICAN AMERICAN: >60 ML/MIN
GFR AFRICAN AMERICAN: >60 ML/MIN
GFR NON-AFRICAN AMERICAN: >60 ML/MIN
GFR SERPL CREATININE-BSD FRML MDRD: >60 ML/MIN
GFR SERPL CREATININE-BSD FRML MDRD: ABNORMAL ML/MIN/{1.73_M2}
GFR SERPL CREATININE-BSD FRML MDRD: NORMAL ML/MIN/{1.73_M2}
GLUCOSE BLD-MCNC: 131 MG/DL (ref 70–99)
GLUCOSE BLD-MCNC: 151 MG/DL (ref 74–100)
GLUCOSE BLD-MCNC: 154 MG/DL (ref 70–99)
GLUCOSE URINE: NEGATIVE
HCT VFR BLD CALC: 39.5 % (ref 40.7–50.3)
HCT VFR BLD CALC: 44.1 % (ref 40.7–50.3)
HEMOGLOBIN: 12 G/DL (ref 13–17)
HEMOGLOBIN: 13.5 G/DL (ref 13–17)
IMMATURE GRANULOCYTES: 1 %
IMMATURE GRANULOCYTES: 1 %
INR BLD: 1.4
KETONES, URINE: NEGATIVE
LACTATE DEHYDROGENASE: 521 U/L (ref 135–225)
LACTIC ACID, SEPSIS WHOLE BLOOD: 1.3 MMOL/L (ref 0.5–1.9)
LACTIC ACID, SEPSIS WHOLE BLOOD: 3.8 MMOL/L (ref 0.5–1.9)
LACTIC ACID, SEPSIS: ABNORMAL MMOL/L (ref 0.5–1.9)
LACTIC ACID, SEPSIS: NORMAL MMOL/L (ref 0.5–1.9)
LEUKOCYTE ESTERASE, URINE: NEGATIVE
LYMPHOCYTES # BLD: 18 % (ref 24–43)
LYMPHOCYTES # BLD: 4 % (ref 24–43)
MAGNESIUM: 2.1 MG/DL (ref 1.6–2.6)
MCH RBC QN AUTO: 25 PG (ref 25.2–33.5)
MCH RBC QN AUTO: 25.7 PG (ref 25.2–33.5)
MCHC RBC AUTO-ENTMCNC: 30.4 G/DL (ref 28.4–34.8)
MCHC RBC AUTO-ENTMCNC: 30.6 G/DL (ref 28.4–34.8)
MCV RBC AUTO: 82.3 FL (ref 82.6–102.9)
MCV RBC AUTO: 83.8 FL (ref 82.6–102.9)
MODE: ABNORMAL
MONOCYTES # BLD: 2 % (ref 3–12)
MONOCYTES # BLD: 4 % (ref 3–12)
MUCUS: NORMAL
NEGATIVE BASE EXCESS, ART: ABNORMAL (ref 0–2)
NITRITE, URINE: NEGATIVE
NRBC AUTOMATED: 0 PER 100 WBC
NRBC AUTOMATED: 0 PER 100 WBC
O2 DEVICE/FLOW/%: ABNORMAL
OTHER OBSERVATIONS UA: NORMAL
PARTIAL THROMBOPLASTIN TIME: 22.7 SEC (ref 20.5–30.5)
PARTIAL THROMBOPLASTIN TIME: 26.6 SEC (ref 20.5–30.5)
PARTIAL THROMBOPLASTIN TIME: 78.2 SEC (ref 20.5–30.5)
PATIENT TEMP: ABNORMAL
PDW BLD-RTO: 19.8 % (ref 11.8–14.4)
PDW BLD-RTO: 20 % (ref 11.8–14.4)
PH UA: 8 (ref 5–8)
PLATELET # BLD: 282 K/UL (ref 138–453)
PLATELET # BLD: 361 K/UL (ref 138–453)
PLATELET ESTIMATE: ABNORMAL
PLATELET ESTIMATE: ABNORMAL
PMV BLD AUTO: 9.4 FL (ref 8.1–13.5)
PMV BLD AUTO: 9.5 FL (ref 8.1–13.5)
POC CHLORIDE: 104 MMOL/L (ref 98–107)
POC CREATININE: 0.78 MG/DL (ref 0.51–1.19)
POC HCO3: 26.2 MMOL/L (ref 21–28)
POC HEMATOCRIT: 43 % (ref 41–53)
POC HEMOGLOBIN: 14.8 G/DL (ref 13.5–17.5)
POC IONIZED CALCIUM: 1.06 MMOL/L (ref 1.15–1.33)
POC LACTIC ACID: 5.45 MMOL/L (ref 0.56–1.39)
POC O2 SATURATION: 87 % (ref 94–98)
POC PCO2 TEMP: ABNORMAL MM HG
POC PCO2: 32.2 MM HG (ref 35–48)
POC PH TEMP: ABNORMAL
POC PH: 7.52 (ref 7.35–7.45)
POC PO2 TEMP: ABNORMAL MM HG
POC PO2: 47 MM HG (ref 83–108)
POC POTASSIUM: 3.1 MMOL/L (ref 3.5–4.5)
POC SODIUM: 141 MMOL/L (ref 138–146)
POSITIVE BASE EXCESS, ART: 4 (ref 0–3)
POTASSIUM SERPL-SCNC: 3.2 MMOL/L (ref 3.7–5.3)
POTASSIUM SERPL-SCNC: 3.6 MMOL/L (ref 3.7–5.3)
PRO-BNP: 3472 PG/ML
PROTEIN UA: ABNORMAL
PROTHROMBIN TIME: 14.2 SEC (ref 9–12)
RBC # BLD: 4.8 M/UL (ref 4.21–5.77)
RBC # BLD: 5.26 M/UL (ref 4.21–5.77)
RBC # BLD: ABNORMAL 10*6/UL
RBC # BLD: ABNORMAL 10*6/UL
RBC UA: NORMAL /HPF (ref 0–4)
RENAL EPITHELIAL, UA: NORMAL /HPF
SAMPLE SITE: ABNORMAL
SARS-COV-2, RAPID: DETECTED
SARS-COV-2: ABNORMAL
SARS-COV-2: ABNORMAL
SEG NEUTROPHILS: 74 % (ref 36–65)
SEG NEUTROPHILS: 93 % (ref 36–65)
SEGMENTED NEUTROPHILS ABSOLUTE COUNT: 12.44 K/UL (ref 1.5–8.1)
SEGMENTED NEUTROPHILS ABSOLUTE COUNT: 13.16 K/UL (ref 1.5–8.1)
SODIUM BLD-SCNC: 140 MMOL/L (ref 135–144)
SODIUM BLD-SCNC: 140 MMOL/L (ref 135–144)
SOURCE: ABNORMAL
SPECIFIC GRAVITY UA: 1.04 (ref 1–1.03)
TCO2 (CALC), ART: 27 MMOL/L (ref 22–29)
TOTAL PROTEIN: 6.4 G/DL (ref 6.4–8.3)
TOTAL PROTEIN: 6.7 G/DL (ref 6.4–8.3)
TRICHOMONAS: NORMAL
TROPONIN INTERP: ABNORMAL
TROPONIN T: ABNORMAL NG/ML
TROPONIN, HIGH SENSITIVITY: 102 NG/L (ref 0–22)
TROPONIN, HIGH SENSITIVITY: 35 NG/L (ref 0–22)
TROPONIN, HIGH SENSITIVITY: 64 NG/L (ref 0–22)
TROPONIN, HIGH SENSITIVITY: 69 NG/L (ref 0–22)
TROPONIN, HIGH SENSITIVITY: 91 NG/L (ref 0–22)
TURBIDITY: CLEAR
URINE HGB: NEGATIVE
UROBILINOGEN, URINE: NORMAL
VITAMIN D 25-HYDROXY: 40.8 NG/ML (ref 30–100)
WBC # BLD: 14.1 K/UL (ref 3.5–11.3)
WBC # BLD: 16.7 K/UL (ref 3.5–11.3)
WBC # BLD: ABNORMAL 10*3/UL
WBC # BLD: ABNORMAL 10*3/UL
WBC UA: NORMAL /HPF (ref 0–5)
YEAST: NORMAL

## 2020-11-23 PROCEDURE — 82728 ASSAY OF FERRITIN: CPT

## 2020-11-23 PROCEDURE — 83615 LACTATE (LD) (LDH) ENZYME: CPT

## 2020-11-23 PROCEDURE — 82330 ASSAY OF CALCIUM: CPT

## 2020-11-23 PROCEDURE — 85610 PROTHROMBIN TIME: CPT

## 2020-11-23 PROCEDURE — U0002 COVID-19 LAB TEST NON-CDC: HCPCS

## 2020-11-23 PROCEDURE — 6360000002 HC RX W HCPCS: Performed by: NURSE PRACTITIONER

## 2020-11-23 PROCEDURE — 71260 CT THORAX DX C+: CPT

## 2020-11-23 PROCEDURE — 83605 ASSAY OF LACTIC ACID: CPT

## 2020-11-23 PROCEDURE — 82306 VITAMIN D 25 HYDROXY: CPT

## 2020-11-23 PROCEDURE — 86140 C-REACTIVE PROTEIN: CPT

## 2020-11-23 PROCEDURE — 82565 ASSAY OF CREATININE: CPT

## 2020-11-23 PROCEDURE — 2060000000 HC ICU INTERMEDIATE R&B

## 2020-11-23 PROCEDURE — 6370000000 HC RX 637 (ALT 250 FOR IP): Performed by: NURSE PRACTITIONER

## 2020-11-23 PROCEDURE — 84132 ASSAY OF SERUM POTASSIUM: CPT

## 2020-11-23 PROCEDURE — 2700000000 HC OXYGEN THERAPY PER DAY

## 2020-11-23 PROCEDURE — 6370000000 HC RX 637 (ALT 250 FOR IP): Performed by: STUDENT IN AN ORGANIZED HEALTH CARE EDUCATION/TRAINING PROGRAM

## 2020-11-23 PROCEDURE — 2580000003 HC RX 258: Performed by: NURSE PRACTITIONER

## 2020-11-23 PROCEDURE — 99223 1ST HOSP IP/OBS HIGH 75: CPT | Performed by: INTERNAL MEDICINE

## 2020-11-23 PROCEDURE — 94761 N-INVAS EAR/PLS OXIMETRY MLT: CPT

## 2020-11-23 PROCEDURE — 82435 ASSAY OF BLOOD CHLORIDE: CPT

## 2020-11-23 PROCEDURE — 87040 BLOOD CULTURE FOR BACTERIA: CPT

## 2020-11-23 PROCEDURE — 36415 COLL VENOUS BLD VENIPUNCTURE: CPT

## 2020-11-23 PROCEDURE — 85730 THROMBOPLASTIN TIME PARTIAL: CPT

## 2020-11-23 PROCEDURE — 84484 ASSAY OF TROPONIN QUANT: CPT

## 2020-11-23 PROCEDURE — 81001 URINALYSIS AUTO W/SCOPE: CPT

## 2020-11-23 PROCEDURE — 71045 X-RAY EXAM CHEST 1 VIEW: CPT

## 2020-11-23 PROCEDURE — 85025 COMPLETE CBC W/AUTO DIFF WBC: CPT

## 2020-11-23 PROCEDURE — 99284 EMERGENCY DEPT VISIT MOD MDM: CPT

## 2020-11-23 PROCEDURE — 2500000003 HC RX 250 WO HCPCS: Performed by: STUDENT IN AN ORGANIZED HEALTH CARE EDUCATION/TRAINING PROGRAM

## 2020-11-23 PROCEDURE — 83880 ASSAY OF NATRIURETIC PEPTIDE: CPT

## 2020-11-23 PROCEDURE — 83735 ASSAY OF MAGNESIUM: CPT

## 2020-11-23 PROCEDURE — 85014 HEMATOCRIT: CPT

## 2020-11-23 PROCEDURE — 6360000004 HC RX CONTRAST MEDICATION: Performed by: EMERGENCY MEDICINE

## 2020-11-23 PROCEDURE — 84295 ASSAY OF SERUM SODIUM: CPT

## 2020-11-23 PROCEDURE — 80053 COMPREHEN METABOLIC PANEL: CPT

## 2020-11-23 PROCEDURE — 6360000002 HC RX W HCPCS: Performed by: STUDENT IN AN ORGANIZED HEALTH CARE EDUCATION/TRAINING PROGRAM

## 2020-11-23 PROCEDURE — 82803 BLOOD GASES ANY COMBINATION: CPT

## 2020-11-23 PROCEDURE — 2580000003 HC RX 258: Performed by: STUDENT IN AN ORGANIZED HEALTH CARE EDUCATION/TRAINING PROGRAM

## 2020-11-23 PROCEDURE — 93005 ELECTROCARDIOGRAM TRACING: CPT | Performed by: STUDENT IN AN ORGANIZED HEALTH CARE EDUCATION/TRAINING PROGRAM

## 2020-11-23 PROCEDURE — XW033E5 INTRODUCTION OF REMDESIVIR ANTI-INFECTIVE INTO PERIPHERAL VEIN, PERCUTANEOUS APPROACH, NEW TECHNOLOGY GROUP 5: ICD-10-PCS | Performed by: INTERNAL MEDICINE

## 2020-11-23 PROCEDURE — 94660 CPAP INITIATION&MGMT: CPT

## 2020-11-23 PROCEDURE — APPSS180 APP SPLIT SHARED TIME > 60 MINUTES: Performed by: NURSE PRACTITIONER

## 2020-11-23 PROCEDURE — 99222 1ST HOSP IP/OBS MODERATE 55: CPT | Performed by: INTERNAL MEDICINE

## 2020-11-23 PROCEDURE — 82947 ASSAY GLUCOSE BLOOD QUANT: CPT

## 2020-11-23 PROCEDURE — 85379 FIBRIN DEGRADATION QUANT: CPT

## 2020-11-23 PROCEDURE — 96374 THER/PROPH/DIAG INJ IV PUSH: CPT

## 2020-11-23 RX ORDER — ASPIRIN 81 MG/1
324 TABLET, CHEWABLE ORAL ONCE
Status: COMPLETED | OUTPATIENT
Start: 2020-11-23 | End: 2020-11-23

## 2020-11-23 RX ORDER — HEPARIN SODIUM 10000 [USP'U]/100ML
12 INJECTION, SOLUTION INTRAVENOUS CONTINUOUS
Status: DISCONTINUED | OUTPATIENT
Start: 2020-11-23 | End: 2020-11-23 | Stop reason: SDUPTHER

## 2020-11-23 RX ORDER — FAMOTIDINE 20 MG/1
20 TABLET, FILM COATED ORAL DAILY
Status: DISCONTINUED | OUTPATIENT
Start: 2020-11-23 | End: 2020-12-07 | Stop reason: HOSPADM

## 2020-11-23 RX ORDER — MAGNESIUM SULFATE 1 G/100ML
1 INJECTION INTRAVENOUS PRN
Status: DISCONTINUED | OUTPATIENT
Start: 2020-11-23 | End: 2020-12-07 | Stop reason: HOSPADM

## 2020-11-23 RX ORDER — ACETAMINOPHEN 650 MG/1
650 SUPPOSITORY RECTAL EVERY 6 HOURS PRN
Status: DISCONTINUED | OUTPATIENT
Start: 2020-11-23 | End: 2020-12-07 | Stop reason: HOSPADM

## 2020-11-23 RX ORDER — HEPARIN SODIUM 1000 [USP'U]/ML
4000 INJECTION, SOLUTION INTRAVENOUS; SUBCUTANEOUS ONCE
Status: DISCONTINUED | OUTPATIENT
Start: 2020-11-23 | End: 2020-11-24

## 2020-11-23 RX ORDER — METHYLPREDNISOLONE SODIUM SUCCINATE 125 MG/2ML
125 INJECTION, POWDER, LYOPHILIZED, FOR SOLUTION INTRAMUSCULAR; INTRAVENOUS ONCE
Status: COMPLETED | OUTPATIENT
Start: 2020-11-23 | End: 2020-11-23

## 2020-11-23 RX ORDER — METOPROLOL TARTRATE 5 MG/5ML
5 INJECTION INTRAVENOUS ONCE
Status: COMPLETED | OUTPATIENT
Start: 2020-11-23 | End: 2020-11-23

## 2020-11-23 RX ORDER — TAMSULOSIN HYDROCHLORIDE 0.4 MG/1
0.4 CAPSULE ORAL DAILY
Status: DISCONTINUED | OUTPATIENT
Start: 2020-11-23 | End: 2020-12-07 | Stop reason: HOSPADM

## 2020-11-23 RX ORDER — NICOTINE 21 MG/24HR
1 PATCH, TRANSDERMAL 24 HOURS TRANSDERMAL DAILY PRN
Status: DISCONTINUED | OUTPATIENT
Start: 2020-11-23 | End: 2020-12-07 | Stop reason: HOSPADM

## 2020-11-23 RX ORDER — POLYETHYLENE GLYCOL 3350 17 G/17G
17 POWDER, FOR SOLUTION ORAL DAILY PRN
Status: DISCONTINUED | OUTPATIENT
Start: 2020-11-23 | End: 2020-12-07 | Stop reason: HOSPADM

## 2020-11-23 RX ORDER — SODIUM CHLORIDE 0.9 % (FLUSH) 0.9 %
10 SYRINGE (ML) INJECTION EVERY 12 HOURS SCHEDULED
Status: DISCONTINUED | OUTPATIENT
Start: 2020-11-23 | End: 2020-12-07 | Stop reason: HOSPADM

## 2020-11-23 RX ORDER — ALBUTEROL SULFATE 90 UG/1
2 AEROSOL, METERED RESPIRATORY (INHALATION) EVERY 4 HOURS PRN
Status: DISCONTINUED | OUTPATIENT
Start: 2020-11-23 | End: 2020-11-23

## 2020-11-23 RX ORDER — FUROSEMIDE 10 MG/ML
40 INJECTION INTRAMUSCULAR; INTRAVENOUS 2 TIMES DAILY
Status: DISCONTINUED | OUTPATIENT
Start: 2020-11-23 | End: 2020-11-24

## 2020-11-23 RX ORDER — DEXAMETHASONE 4 MG/1
6 TABLET ORAL DAILY
Status: COMPLETED | OUTPATIENT
Start: 2020-11-23 | End: 2020-12-02

## 2020-11-23 RX ORDER — SOTALOL HYDROCHLORIDE 80 MG/1
80 TABLET ORAL 2 TIMES DAILY
Status: DISCONTINUED | OUTPATIENT
Start: 2020-11-23 | End: 2020-11-30

## 2020-11-23 RX ORDER — FUROSEMIDE 20 MG/1
20 TABLET ORAL DAILY
Status: DISCONTINUED | OUTPATIENT
Start: 2020-11-23 | End: 2020-11-24

## 2020-11-23 RX ORDER — GUAIFENESIN/DEXTROMETHORPHAN 100-10MG/5
5 SYRUP ORAL EVERY 4 HOURS PRN
Status: DISCONTINUED | OUTPATIENT
Start: 2020-11-23 | End: 2020-11-23 | Stop reason: CLARIF

## 2020-11-23 RX ORDER — MAGNESIUM SULFATE 1 G/100ML
INJECTION INTRAVENOUS
Status: DISPENSED
Start: 2020-11-23 | End: 2020-11-23

## 2020-11-23 RX ORDER — POTASSIUM CHLORIDE 7.45 MG/ML
10 INJECTION INTRAVENOUS PRN
Status: DISCONTINUED | OUTPATIENT
Start: 2020-11-23 | End: 2020-12-07 | Stop reason: HOSPADM

## 2020-11-23 RX ORDER — ACETAMINOPHEN 650 MG/1
650 SUPPOSITORY RECTAL EVERY 6 HOURS PRN
Status: DISCONTINUED | OUTPATIENT
Start: 2020-11-23 | End: 2020-11-23 | Stop reason: SDUPTHER

## 2020-11-23 RX ORDER — 0.9 % SODIUM CHLORIDE 0.9 %
500 INTRAVENOUS SOLUTION INTRAVENOUS ONCE
Status: COMPLETED | OUTPATIENT
Start: 2020-11-23 | End: 2020-11-23

## 2020-11-23 RX ORDER — ATORVASTATIN CALCIUM 80 MG/1
40 TABLET, FILM COATED ORAL DAILY
Status: DISCONTINUED | OUTPATIENT
Start: 2020-11-23 | End: 2020-12-07 | Stop reason: HOSPADM

## 2020-11-23 RX ORDER — MAGNESIUM SULFATE 1 G/100ML
1 INJECTION INTRAVENOUS
Status: ACTIVE | OUTPATIENT
Start: 2020-11-23 | End: 2020-11-23

## 2020-11-23 RX ORDER — ALBUTEROL SULFATE 2.5 MG/3ML
15 SOLUTION RESPIRATORY (INHALATION)
Status: DISCONTINUED | OUTPATIENT
Start: 2020-11-23 | End: 2020-11-23

## 2020-11-23 RX ORDER — HEPARIN SODIUM 1000 [USP'U]/ML
4000 INJECTION, SOLUTION INTRAVENOUS; SUBCUTANEOUS ONCE
Status: COMPLETED | OUTPATIENT
Start: 2020-11-23 | End: 2020-11-23

## 2020-11-23 RX ORDER — VITAMIN B COMPLEX
2000 TABLET ORAL DAILY
Status: DISCONTINUED | OUTPATIENT
Start: 2020-11-23 | End: 2020-12-07 | Stop reason: HOSPADM

## 2020-11-23 RX ORDER — ONDANSETRON 2 MG/ML
4 INJECTION INTRAMUSCULAR; INTRAVENOUS EVERY 6 HOURS PRN
Status: DISCONTINUED | OUTPATIENT
Start: 2020-11-23 | End: 2020-11-24

## 2020-11-23 RX ORDER — HEPARIN SODIUM 1000 [USP'U]/ML
4000 INJECTION, SOLUTION INTRAVENOUS; SUBCUTANEOUS PRN
Status: DISCONTINUED | OUTPATIENT
Start: 2020-11-23 | End: 2020-11-24

## 2020-11-23 RX ORDER — ALBUTEROL SULFATE 90 UG/1
2 AEROSOL, METERED RESPIRATORY (INHALATION) EVERY 6 HOURS PRN
Status: DISCONTINUED | OUTPATIENT
Start: 2020-11-23 | End: 2020-12-05

## 2020-11-23 RX ORDER — ALBUTEROL SULFATE 2.5 MG/3ML
2.5 SOLUTION RESPIRATORY (INHALATION)
Status: DISCONTINUED | OUTPATIENT
Start: 2020-11-23 | End: 2020-12-05

## 2020-11-23 RX ORDER — SODIUM CHLORIDE 0.9 % (FLUSH) 0.9 %
10 SYRINGE (ML) INJECTION PRN
Status: DISCONTINUED | OUTPATIENT
Start: 2020-11-23 | End: 2020-12-07 | Stop reason: HOSPADM

## 2020-11-23 RX ORDER — HEPARIN SODIUM 1000 [USP'U]/ML
2000 INJECTION, SOLUTION INTRAVENOUS; SUBCUTANEOUS PRN
Status: DISCONTINUED | OUTPATIENT
Start: 2020-11-23 | End: 2020-11-23 | Stop reason: SDUPTHER

## 2020-11-23 RX ORDER — HEPARIN SODIUM 1000 [USP'U]/ML
2000 INJECTION, SOLUTION INTRAVENOUS; SUBCUTANEOUS PRN
Status: DISCONTINUED | OUTPATIENT
Start: 2020-11-23 | End: 2020-11-24

## 2020-11-23 RX ORDER — PROMETHAZINE HYDROCHLORIDE 12.5 MG/1
12.5 TABLET ORAL EVERY 6 HOURS PRN
Status: DISCONTINUED | OUTPATIENT
Start: 2020-11-23 | End: 2020-12-07 | Stop reason: HOSPADM

## 2020-11-23 RX ORDER — LISINOPRIL 10 MG/1
20 TABLET ORAL DAILY
Status: DISCONTINUED | OUTPATIENT
Start: 2020-11-23 | End: 2020-11-29

## 2020-11-23 RX ORDER — HEPARIN SODIUM 10000 [USP'U]/100ML
12 INJECTION, SOLUTION INTRAVENOUS CONTINUOUS
Status: DISCONTINUED | OUTPATIENT
Start: 2020-11-23 | End: 2020-11-24

## 2020-11-23 RX ORDER — POTASSIUM CHLORIDE 20 MEQ/1
40 TABLET, EXTENDED RELEASE ORAL PRN
Status: DISCONTINUED | OUTPATIENT
Start: 2020-11-23 | End: 2020-12-07 | Stop reason: HOSPADM

## 2020-11-23 RX ORDER — GUAIFENESIN DEXTROMETHORPHAN HYDROBROMIDE ORAL SOLUTION 10; 100 MG/5ML; MG/5ML
5 SOLUTION ORAL EVERY 4 HOURS PRN
Status: DISCONTINUED | OUTPATIENT
Start: 2020-11-23 | End: 2020-12-07 | Stop reason: HOSPADM

## 2020-11-23 RX ORDER — ACETAMINOPHEN 325 MG/1
650 TABLET ORAL EVERY 6 HOURS PRN
Status: DISCONTINUED | OUTPATIENT
Start: 2020-11-23 | End: 2020-11-23 | Stop reason: SDUPTHER

## 2020-11-23 RX ORDER — ACETAMINOPHEN 325 MG/1
650 TABLET ORAL EVERY 6 HOURS PRN
Status: DISCONTINUED | OUTPATIENT
Start: 2020-11-23 | End: 2020-12-07 | Stop reason: HOSPADM

## 2020-11-23 RX ORDER — HEPARIN SODIUM 1000 [USP'U]/ML
4000 INJECTION, SOLUTION INTRAVENOUS; SUBCUTANEOUS PRN
Status: DISCONTINUED | OUTPATIENT
Start: 2020-11-23 | End: 2020-11-23 | Stop reason: SDUPTHER

## 2020-11-23 RX ADMIN — HEPARIN SODIUM 4000 UNITS: 1000 INJECTION INTRAVENOUS; SUBCUTANEOUS at 10:16

## 2020-11-23 RX ADMIN — POTASSIUM CHLORIDE 40 MEQ: 1500 TABLET, EXTENDED RELEASE ORAL at 17:24

## 2020-11-23 RX ADMIN — METOPROLOL TARTRATE 5 MG: 1 INJECTION, SOLUTION INTRAVENOUS at 09:25

## 2020-11-23 RX ADMIN — LISINOPRIL 20 MG: 10 TABLET ORAL at 15:17

## 2020-11-23 RX ADMIN — Medication 10 ML: at 17:24

## 2020-11-23 RX ADMIN — FUROSEMIDE 40 MG: 10 INJECTION, SOLUTION INTRAMUSCULAR; INTRAVENOUS at 17:24

## 2020-11-23 RX ADMIN — TAMSULOSIN HYDROCHLORIDE 0.4 MG: 0.4 CAPSULE ORAL at 15:16

## 2020-11-23 RX ADMIN — SOTALOL HYDROCHLORIDE 80 MG: 80 TABLET ORAL at 15:20

## 2020-11-23 RX ADMIN — SODIUM CHLORIDE 500 ML: 9 INJECTION, SOLUTION INTRAVENOUS at 09:25

## 2020-11-23 RX ADMIN — MAGNESIUM SULFATE HEPTAHYDRATE 1 G: 1 INJECTION, SOLUTION INTRAVENOUS at 07:09

## 2020-11-23 RX ADMIN — IOPAMIDOL 75 ML: 755 INJECTION, SOLUTION INTRAVENOUS at 08:08

## 2020-11-23 RX ADMIN — DEXAMETHASONE 6 MG: 4 TABLET ORAL at 15:15

## 2020-11-23 RX ADMIN — ASPIRIN 324 MG: 81 TABLET ORAL at 07:42

## 2020-11-23 RX ADMIN — HEPARIN SODIUM AND DEXTROSE 12 UNITS/KG/HR: 10000; 5 INJECTION INTRAVENOUS at 10:18

## 2020-11-23 RX ADMIN — METHYLPREDNISOLONE SODIUM SUCCINATE 125 MG: 125 INJECTION, POWDER, FOR SOLUTION INTRAMUSCULAR; INTRAVENOUS at 07:08

## 2020-11-23 RX ADMIN — FAMOTIDINE 20 MG: 20 TABLET, FILM COATED ORAL at 15:17

## 2020-11-23 RX ADMIN — SOTALOL HYDROCHLORIDE 80 MG: 80 TABLET ORAL at 15:19

## 2020-11-23 RX ADMIN — ATORVASTATIN CALCIUM 40 MG: 80 TABLET, FILM COATED ORAL at 23:51

## 2020-11-23 RX ADMIN — FUROSEMIDE 20 MG: 20 TABLET ORAL at 15:15

## 2020-11-23 ASSESSMENT — ENCOUNTER SYMPTOMS
NAUSEA: 0
SHORTNESS OF BREATH: 1
COUGH: 1
ABDOMINAL PAIN: 0
EYE ITCHING: 0
VOMITING: 0
SORE THROAT: 0
RHINORRHEA: 0
EYE REDNESS: 0

## 2020-11-23 NOTE — ED NOTES
Critical troponin level received from lab. Dr. Beckie Slater notified.       Tone [place] RN  11/23/20 0155

## 2020-11-23 NOTE — CONSULTS
acute on chronic combined heart failure. He also suffers from atrial fibrillation and hypertrophic nonobstructive cardiomyopathy, benign essential hypertension. His previous diagnosis includes occupational pulmonary fibrosis. During that admission he was also found to have suffered from mycoplasma pneumonia and was treated with antibiotics. He reports being seen at the [facility], after his admission to [facility],  where he was cardioverted because of the atrial fibrillation. He was also found to be in heart failure and was a started on diuretics. When the patient was evaluated in the emergency room he was found to be hypoxic and was treated with BiPAP which improved his oxygen saturation rate. He was tested for Covid and was found to be positive on 11/23/2020. Chest x-ray:  · 11/23/2020 persistent bilateral pulmonary infiltrates with associated pulmonary vascular congestion. Unchanged from films of 10/23/2020    Chest CT:  · 11/23/2020: Pulmonary fibrosis. No pulmonary embolus. Increased groundglass opacities and parenchymal opacities throughout both lungs when compared with films of 10/21/2020    Patient admitted because of concerns with COVID 19.    CURRENT EVALUATION : 11/23/2020    Afebrile  VS stable, HTN    Patient exhibiting respiratory distress. Yes  Respiratory secretions: No    Patient receiving supplemental oxygen. Yes BiPAP, FiO2 of 80%  02 sat 96%  RR 32    QTc:           NEWS Score: 0-4 Low risk group; 5-6: Medium risk group; 7 or above: High risk group  Parameters 3 2 1 0 1 2 3   Age    < 65   ? 65   RR ? 8  9-11 12-20  21-24 ? 25   O2 Sats ?  91 92-93 94-95 ? 96      Suppl O2  Yes  No      SBP ? 90  101-110 111-219   ? 220   HR ? 40  41-50 51-90  111-130 ? 131   Consciousness    Alert   Drowsiness, lethargy, or confusion   Temperature ? 35.0 C (95.0 F)  35.1-36.0 C 95.1-96.9 F 36.1-38.0 C 97.0-100.4 F 38.1-39.0 C 100.5-102.3 F ? 39.1 C ? 102.4 F      NEWS Score:   11/23/2020: 9 risk    Overall Daily Picture:      Worsening    Presence of secondary bacterial Infection:    No   Additional antibiotics: No    Labs, X rays reviewed: 11/23/2020    BUN: 16  Cr: 5.62    WBC: 14.1  Hb: 12.0  Plat: 282    Bilirubin 1.28  Alk phos 86  ALT 12  AST 24    Absolute Neutrophils: 13.1  Absolute Lymphocytes: 0.59  Neutrophil/Lymphocyte Ratio: 22.2 high risk    CRP: 182 (10-17-20)   Ferritin: 395  LDH:     Pro Calcitonin:  Troponin high-sensitivity 102  proBNP 3472    Cultures:  Urine:  ·   Blood:  ·   Sputum :  ·   Wound:       CXR:   · 11/23/2020 pulmonary fibrosis with interstitial edema. Scattered infiltrates bilaterally  CAT:  · 11/23/2020: Pulmonary fibrosis. No pulmonary embolus. Increased groundglass opacities and parenchymal opacities throughout both lungs when compared with films of 10/21/2020    Discussed with patient, RN, CC, IM. I have personally reviewed the past medical history, past surgical history, medications, social history, and family history, and I have updated the database accordingly.   Past Medical History:     Past Medical History:   Diagnosis Date    Atrial fibrillation ([address]     Back pain, chronic     Hill's esophagus 06/18/2019    BPH (benign prostatic hyperplasia)     Cancer (HCC)     colon-rectal    Cocaine abuse in remission [facility])     1970's    ED (erectile dysfunction) 4/2/2015    GERD (gastroesophageal reflux disease)     GI bleed 12/5/2018    Hernia     History of colon cancer     Melena     Migraines     Murmur, cardiac        Past Surgical  History:     Past Surgical History:   Procedure Laterality Date    CARDIAC CATHETERIZATION  11/29/2018    Non-obstructive CAD    CARDIOVERSION  2020    COLECTOMY      2nd colectomy, Colostomy and reversed [facility] COLECTOMY      1st time Ksenia    COLONOSCOPY      COLONOSCOPY  07/18/2016    COLONOSCOPY N/A 12/6/2018    COLONOSCOPY DIAGNOSTIC performed by Lázaro Gleason 1549 (!) 117/97 97.6 °F (36.4 °C) Axillary 104 (!) 32 -- -- 173 lb 11.6 oz (78.8 kg)   11/23/20 1532 116/76 -- -- 99 30 96 % -- --   11/23/20 1521 -- -- -- 104 26 95 % -- --   11/23/20 1517 118/73 -- -- -- -- -- -- --   11/23/20 1502 118/73 -- -- 110 30 98 % -- --   11/23/20 1431 116/77 -- -- 115 (!) 31 97 % -- --   11/23/20 1332 101/69 -- -- 113 30 94 % -- --   11/23/20 1302 119/81 -- -- 114 (!) 36 96 % -- --   11/23/20 1201 103/77 -- -- 124 25 95 % -- --   11/23/20 1148 -- -- -- 120 28 97 % -- --   11/23/20 1132 (!) 128/92 -- -- 137 (!) 33 97 % -- --   11/23/20 1102 (!) 130/92 -- -- 120 (!) 34 97 % -- --   11/23/20 1032 (!) 135/94 -- -- 149 (!) 37 97 % -- --   11/23/20 1000 (!) 137/109 -- -- 129 (!) 36 91 % -- --   11/23/20 0943 -- -- -- 156 (!) 33 95 % -- --   11/23/20 0942 -- -- -- 143 (!) 35 95 % -- --   11/23/20 0940 -- -- -- -- -- -- 6' (1.829 m) 178 lb (80.7 kg)   11/23/20 0933 -- -- -- 113 (!) 35 96 % -- --   11/23/20 0916 129/73 -- -- 138 -- -- -- --     General Appearance: Awake, alert, and in no apparent distress  Head:  Normocephalic, no trauma  Eyes: Pupils equal, round, reactive to light; sclera anicteric; conjunctivae pink. No embolic phenomena. ENT: Oropharynx clear, without erythema, exudate, or thrush. No tenderness of sinuses. Mouth/throat: mucosa pink and moist. No lesions. Dentition in good repair. Neck:Supple, without lymphadenopathy. Thyroid normal, No bruits. Pulmonary/Chest: ,Distant breath sounds, decreased breath sounds at the bases   cardiovascular: Irreegular rate and rhythm without murmurs, rubs, or gallops. Abdomen: Soft, non tender. Bowel sounds normal. No organomegaly  All four Extremities: No cyanosis, clubbing, edema, or effusions. Neurologic: No gross sensory or motor deficits. Skin: Warm and dry with good turgor. No signs of peripheral arterial or venous insufficiency. No ulcerations. No open wounds.     Medical Decision Making -Laboratory:   I have independently reviewed/ordered the following labs:    CBC with Differential:   Recent Labs     11/23/20  0707 11/23/20  1304   WBC 16.7* 14.1*   HGB 13.5 12.0*   HCT 44.1 39.5*    282   LYMPHOPCT 18* 4*   MONOPCT 4 2*     BMP:   Recent Labs     11/23/20  0707 11/23/20  1304    140   K 3.6* 3.2*    103   CO2 22 27   BUN 17 16   CREATININE 0.64* 0.62*   MG  --  2.1     Hepatic Function Panel:   Recent Labs     11/23/20  0707 11/23/20  1304   PROT 6.7 6.4   LABALBU 2.9* 2.8*   BILITOT 1.57* 1.28*   ALKPHOS 98 86   ALT 15 12   AST 29 24     No results for input(s): RPR in the last 72 hours. No results for input(s): HIV in the last 72 hours. No results for input(s): BC in the last 72 hours. Lab Results   Component Value Date    MUCUS NOT REPORTED 11/23/2020    RBC 4.80 11/23/2020    TRICHOMONAS NOT REPORTED 11/23/2020    WBC 14.1 11/23/2020    YEAST NOT REPORTED 11/23/2020    TURBIDITY CLEAR 11/23/2020     Lab Results   Component Value Date    CREATININE 0.62 11/23/2020    GLUCOSE 154 11/23/2020       Medical Decision Making-Imaging:     EXAMINATION:    CTA OF THE CHEST 11/23/2020 7:42 am         TECHNIQUE:    CTA of the chest was performed after the administration of intravenous    contrast.  Multiplanar reformatted images are provided for review.  MIP    images are provided for review. Dose modulation, iterative reconstruction,    and/or weight based adjustment of the mA/kV was utilized to reduce the    radiation dose to as low as reasonably achievable.         COMPARISON:    High-resolution chest CT 10 03/20/2020         CT PA 10/21/2020         HISTORY:    ORDERING SYSTEM PROVIDED HISTORY: r/o PE    TECHNOLOGIST PROVIDED HISTORY:    r/o PE    Reason for Exam: sob    Acuity: Acute              Type of Exam: Initial         FINDINGS:    No intimal flap is seen in aorta. .  Small mediastinal nodes are noted,    similar to prior         No embolus is seen the central, right, left main pulmonary artery.  No embolus is seen in the proximal segmental branches.  Distal segmental    branches and subsegmental branches are not well evaluated secondary to    respiratory motion artifact.         Patchy ground-glass and parenchymal opacities are seen in the left upper and    left lower lobe.         On the right patchy ground-glass and parenchymal opacities are seen in the    right upper, right middle and right lower lobe.  There is mild fusiform    bronchiectasis. Ginger Cheers is trace left-sided pleural effusion.         There is mild underlying pulmonary fibrosis.  There is underlying    bronchiectasis         Right adrenal gland is normal.  Left adrenal gland is normal.         1 mm calcification is seen in the left kidney         Spurring is seen in the spine.  Spurring is seen in the shoulder joints         Small soft tissue nodule is seen posteriorly in the subcutaneous fat    measuring 2.1 cm, likely sebaceous cyst              Impression    No central pulmonary embolus.  Peripheral branches are not well evaluated    secondary to motion.         Increased ground-glass opacity and parenchymal opacities throughout the lungs    either due to developing pneumonia or edema.  There is a small left-sided    pleural effusion.  By report there is a history of COVID-19 infection         Underlying pulmonary fibrosis again noted.                EXAMINATION:    ONE XRAY VIEW OF THE CHEST         11/23/2020 8:02 am         COMPARISON:    10/19/2020         HISTORY:    ORDERING SYSTEM PROVIDED HISTORY: SOB    TECHNOLOGIST PROVIDED HISTORY:    SOB    Reason for Exam: uprt port chest         FINDINGS:    Cardiomediastinal silhouette is stable.  Bilateral pulmonary infiltrates    persist unchanged and may represent pulmonary edema versus atypical pneumonia.              Impression    Stable exam        Medical Decision [id number]-[id number]:       Medical Decision Making-Other:     Note:  · Labs, medications, radiologic studies were reviewed with personal review of films  · Large amounts of data were reviewed  · Discussed with nursing Staff, Discharge planner  · Infection Control and Prevention measures reviewed  · All prior entries were reviewed  · Administer medications as ordered  · Prognosis: Guarded  · Discharge planning reviewed  · Follow up as outpatient. Thank you for allowing us to participate in the care of this patient. Please call with questions.     Mary Beth Murdock MD  Pager: [phone] - Office: [phone]

## 2020-11-23 NOTE — ED NOTES
Pt arrived to the ED in respiratory distress. Pt normally on 2L oxygen at home and had to place himself on 5L today.       Yazmin De Leon RN  11/23/20 3026

## 2020-11-23 NOTE — CONSULTS
Pulmonary/Critical Care consultation    Patient's name:  Joel Galvez  Medical Record Number: [id number]  Patient's account/billing number: [de-identified]  Patient's YOB: 1955  Age: 77 y.o. Date of Admission: 11/23/2020  6:44 AM  Date of Consult: 11/23/2020      Primary Care Physician: Lorna Alcala MD      Code Status: Prior    Reason for consult: Acute hypoxemic respiratory failure secondary to COVID-19 pneumonia      HISTORY OF PRESENT ILLNESS:   History was obtained from chart review and the patient. Mr. Joel Galvez is a 77 y.o. white gentleman was admitted with shortness of breath. Patient recently discharged from hospital after having an acute on chronic combined systolic and diastolic CHF and atrial fibrillation with RVR. Chest x-ray showed worsening bilateral infiltrates and CT scan showed some infiltrates along with underlying pulmonary fibrosis but no blood clot was seen. Patient had a Covid test that was positive along with elevated serum BNP and D-dimer of 2.86.   Patient was placed on 100% nonrebreather mask  Patient shortness of breath is much improved with using 100% nonrebreather mask  Has a dry cough  Also has some orthopnea        Past Medical History:        Diagnosis Date    Atrial fibrillation ([address]     Back pain, chronic     Hill's esophagus 06/18/2019    BPH (benign prostatic hyperplasia)     Cancer (HCC)     colon-rectal    Cocaine abuse in remission [facility])     1970's    ED (erectile dysfunction) 4/2/2015    GERD (gastroesophageal reflux disease)     GI bleed 12/5/2018    Hernia     History of colon cancer     Melena     Migraines     Murmur, cardiac        Past Surgical History:        Procedure Laterality Date    CARDIAC CATHETERIZATION  11/29/2018    Non-obstructive CAD    COLECTOMY      2nd colectomy, Colostomy and reversed [facility] COLECTOMY      1st time [facility]  COLONOSCOPY  07/18/2016    COLONOSCOPY N/A 12/6/2018    COLONOSCOPY DIAGNOSTIC performed by Rajat Murphy MD at [address] Right 2009    inguinal    KNEE SURGERY Right 1970's    arthrotomy    TONSILLECTOMY      TOTAL KNEE ARTHROPLASTY Right 1/8/2019    KNEE TOTAL ARTHROPLASTY performed by Rylan Jaramillo MD at [facility] TRANSESOPHAGEAL ECHOCARDIOGRAM  11/29/2018    UPPER GASTROINTESTINAL ENDOSCOPY N/A 12/5/2018    EGD DIAGNOSTIC ONLY performed by Rajat Murphy MD at [facility] N/A 6/18/2019    [facility]RE'S       Allergies: Allergies   Allergen Reactions    Adhesive Tape Other (See Comments)     Blister badly     Codeine Nausea Only     Other reaction(s): Other: See Comments  NAUSEA  Other reaction(s): Other: See Comments  NAUSEA    Penicillins Swelling     As a baby  Other reaction(s): Unknown  As a baby         Home Meds:   Prior to Admission medications    Medication Sig Start Date End Date Taking?  Authorizing Provider   atorvastatin (LIPITOR) 40 MG tablet Take 1 tablet by mouth daily 11/16/20   Cassandra Adams MD   omeprazole (PRILOSEC) 40 MG delayed release capsule Take 1 capsule by mouth daily 11/16/20   Cassandra Adams MD   lisinopril (PRINIVIL;ZESTRIL) 5 MG tablet take 1 tablet by mouth once daily 10/26/20   Historical Provider, MD   sotalol (BETAPACE) 80 MG tablet take 1 tablet by mouth every 12 hours 10/28/20   Historical Provider, MD   ibuprofen (ADVIL;MOTRIN) 800 MG tablet Take 1 tablet by mouth every 6 hours if needed for pain 10/27/20   Cassandra Adams MD   furosemide (LASIX) 20 MG tablet Take 1 tablet by mouth daily 10/26/20   Laura Spain MD   tamsulosin [facility]) 0.4 MG capsule Take 1 capsule by mouth daily 10/15/20   Cassandra Adams MD   rivaroxaban (XARELTO) 20 MG TABS tablet Take 20 mg by mouth daily (with breakfast)     Historical Provider, MD       Family History:       Problem Relation Age of Onset    Diabetes Mother     Heart Attack Father     Heart Disease Father     Heart Disease Brother          Social History:   TOBACCO:   reports that he quit smoking about 49 years ago. He has a 0.50 pack-year smoking history. He has never used smokeless tobacco.  ETOH:   reports current alcohol use of about 8.3 standard drinks of alcohol per week. DRUGS:  reports no history of drug use. OCCUPATION: Noncontributory          REVIEW OF SYSTEMS:    Review of Systems -   General ROS: Completed and except as mentioned above were negative   Psychological ROS:  Completed and except as mentioned above were negative  Ophthalmic ROS:  Completed and except as mentioned above were negative  ENT ROS:  Completed and except as mentioned above were negative  Allergy and Immunology ROS:  Completed and except as mentioned above were negative  Hematological and Lymphatic ROS:  Completed and except as mentioned above were negative  Endocrine ROS: Completed and except as mentioned above were negative  Breast ROS:  Completed and except as mentioned above were negative  Respiratory ROS:  Completed and except as mentioned above were negative  Cardiovascular ROS:  Completed and except as mentioned above were negative  Gastrointestinal ROS: Completed and except as mentioned above were negative  Genito-Urinary ROS:  Completed and except as mentioned above were negative  Musculoskeletal ROS:  Completed and except as mentioned above were negative  Neurological ROS:  Completed and except as mentioned above were negative  Dermatological ROS:  Completed and except as mentioned above were negative          Physical Exam:    Vitals: /73   Pulse 104   Temp 97.6 °F (36.4 °C) (Oral)   Resp 26   Ht 6' (1.829 m)   Wt 178 lb (80.7 kg)   SpO2 95%   BMI 24.14 kg/m²     Last Body weight:   Wt Readings from Last 3 Encounters:   11/23/20 178 lb (80.7 kg)   11/16/20 178 lb (80.7 kg)   11/10/20 173 lb 3.2 oz (78.6 kg)       Body Mass Index :  Body mass index is 24.14 kg/m². Intake and Output summary: No intake or output data in the 24 hours ending 11/23/20 1530    Physical Examination:   PHYSICAL EXAMINATION:  Vitals:    11/23/20 1431 11/23/20 1502 11/23/20 1517 11/23/20 1521   BP: 116/77 118/73 118/73    Pulse: 115 110  104   Resp: (!) 31 30  26   Temp:       TempSrc:       SpO2: 97% 98%  95%   Weight:       Height:         Due to the current efforts to prevent transmission of COVID-19 and also the need to preserve PPE for other caregivers, a face-to-face encounter with the patient was not performed. That being said, all relevant records and diagnostic tests were reviewed, including laboratory results and imaging. Patient was evaluated from the window, called on the phone, and chart reviewed, disc w  involved healthcare workers. Patient appears comfortable on 100% nonrebreather mask  Not using accessory musculature for breathing        Laboratory findings:-    CBC:   Recent Labs     11/23/20  1304   WBC 14.1*   HGB 12.0*        BMP:    Recent Labs     11/23/20  0650  11/23/20  0707 11/23/20  1304   NA  --    < > 140 140   K  --    < > 3.6* 3.2*   CL  --    < > 101 103   CO2  --    < > 22 27   BUN  --    < > 17 16   CREATININE 0.78  --  0.64* 0.62*   GLUCOSE  --    < > 131* 154*    < > = values in this interval not displayed. S. Calcium:  Recent Labs     11/23/20  1304   CALCIUM 8.1*     S. Ionized Calcium:No results for input(s): IONCA in the last 72 hours. S. Magnesium:  Recent Labs     11/23/20  1304   MG 2.1     S. Phosphorus:No results for input(s): PHOS in the last 72 hours. S. Glucose:  Recent Labs     11/23/20  0650   POCGLU 151*     Glycosylated hemoglobin A1C: No results for input(s): LABA1C in the last 72 hours.   INR:   Recent Labs     11/23/20  0707   INR 1.4     Hepatic functions:   Recent Labs     11/23/20  1304   ALKPHOS 86   ALT 12   AST 24   PROT 6.4   BILITOT 1.28*   LABALBU 2.8*     Pancreatic functions:No results for input(s): LACTA, AMYLASE in the last 72 hours. S. Lactic Acid: No results for input(s): LACTA in the last 72 hours. Cardiac enzymes:No results for input(s): CKTOTAL, CKMB, CKMBINDEX, TROPONINI in the last 72 hours. BNP:No results for input(s): BNP in the last 72 hours. Lipid profile: No results for input(s): CHOL, TRIG, HDL, LDLCALC in the last 72 hours. Invalid input(s): LDL  Blood Gases: No results found for: PH, PCO2, PO2, HCO3, O2SAT  Thyroid functions:   Lab Results   Component Value Date    TSH 4.28 10/17/2020            Radiological reports:  CT scan of the chest showed no pulmonary embolism but showed bilateral pulmonary infiltrates and small left pleural effusion    Chest x-ray showed bilateral pulmonary infiltrates that are unchanged from before       Assessment and Plan     Active Problems:    Acute hypoxemic respiratory failure due to COVID-19 [facility])  Resolved Problems:    * No resolved hospital problems. *        Assessment:     Acute hypoxic respiratory failure   Acute respiratory distress syndrome   COVID 19 infection/pneumonia   Patient may also have a competent of acute on chronic combined congestive heart failure   Hypokalemia   Leukocytosis secondary to infection   Mild anemia    Plan:     Continue Airborne isolation   Continue oxygen by 100% nonrebreather mask   Obtain X-ray chest as needed    Monitor input/output, with a goal of even/negative fluid balance   ID consultation   Continue Decadron   Monitor CRP, LDH, AST/ALT/ferritin/ D-dimer   Continue supportive care, tube feeds   GI/DVT prophylaxis  Glycemic control per primary service    Management as per guidance provided by hospital policy and prevailing evidence based medicine, during the COVID-19 pandemic emergency. This patient was evaluated in the context of the global SARS-CoV-2 (COVID-19) pandemic, which necessitated considerations that the patient either has COVID-19 infection or is at risk of infection with COVID-19. Institutional protocols and algorithms that pertain to the evaluation & management of patients with COVID-19 or those at risk for COVID-19 are in a state of rapid changes based on information released by regulatory bodies including the CDC and federal and state organizations. These policies and algorithms were followed during the patient's care. Please note that this chart was generated using voice recognition Dragon dictation software. Although every effort was made to ensure the accuracy of this automated transcription, some errors in transcription may have occurred. Thank you for having us involved in the care of your patient. Please call us if you have any questions or concerns.       Marika Sylvester M.D.            11/23/2020, 3:30 PM

## 2020-11-23 NOTE — ED NOTES
requirements to allow them to act on the patient's behalf when appropriate. Care Preferences    Ventilation: \"If you were in your present state of health and suddenly became very ill and were unable to breathe on your own, what would your preference be about the use of a ventilator (breathing machine) if it were available to you? \"      Would the patient desire the use of ventilator (breathing machine)?: yes    \"If your health worsens and it becomes clear that your chance of recovery is unlikely, what would your preference be about the use of a ventilator (breathing machine) if it were available to you? \"     Would the patient desire the use of ventilator (breathing machine)?: Yes      Resuscitation  \"CPR works best to restart the heart when there is a sudden event, like a heart attack, in someone who is otherwise healthy. Unfortunately, CPR does not typically restart the heart for people who have serious health conditions or who are very sick. \"    \"In the event your heart stopped as a result of an underlying serious health condition, would you want attempts to be made to restart your heart (answer \"yes\" for attempt to resuscitate) or would you prefer a natural death (answer \"no\" for do not attempt to resuscitate)? \" yes      NOTE: If the patient has a valid advance directive AND now provides care preference(s) that are inconsistent with that prior directive, advise the patient to consider either: creating a new advance directive that complies with state-specific requirements; or, if that is not possible, orally revoking that prior directive in accordance with state-specific requirements, which must be documented in the EHR. [] Yes   [x] No   Educated Patient / [place] regarding differences between Advance Directives and portable DNR orders.     Length of ACP Conversation in minutes:  5  Conversation Outcomes:  [x] ACP discussion completed  [] Existing advance directive reviewed with patient; no changes to patient's previously recorded wishes  [] New Advance Directive completed  [] Portable Do Not Rescitate prepared for Provider review and signature  [] POLST/POST/MOLST/MOST prepared for Provider review and signature      Follow-up plan:    [] Schedule follow-up conversation to continue planning  [] Referred individual to Provider for additional questions/concerns   [x] Advised patient/agent/surrogate to review completed ACP document and update if needed with changes in condition, patient preferences or care setting    [] This note routed to one or more involved healthcare providers          ABIGAIL Cronin  11/23/20 3081

## 2020-11-23 NOTE — ED NOTES
Dr. Gricelda Zhang notified of patient's -150's. Pt denies any chest pain at this time. Orders received.       Natali Cid RN  11/23/20 0960

## 2020-11-23 NOTE — ED PROVIDER NOTES
[facility] ED  Emergency Department Encounter  Emergency Medicine Resident     Pt Name: Evangelina Nance  MRN: [id number]  [place] 1955  Date ofevaluation: 11/23/20  PCP:  Arcelia Car MD    [address] [place] [address]       Chief Complaint   Patient presents with    Shortness of Breath     HISTORY OF PRESENT ILLNESS  (Location/Symptom, Timing/Onset, Context/Setting, Quality, Duration, Modifying Factors, Severity, Associated signs/symptoms)     Evangelina Nance is a 77 y.o. male who presents acute onset of shortness of breath. Patient reports that he is getting progressive more short of breath starting yesterday but worsened this morning. He was recently [place] to [facility] [place] [facility] and found to have pulmonary fibrosis. One of the atrial fibrillation was cardioverted there. Found to have heart failure as well and started on diuretics. He was brought in emergent this morning complaining shortness of breath. Found to be hypoxic on a 40% on his submental oxygen that he wears at home. Immediately brought back and BiPAP was placed on him. History is somewhat limited secondary to emergent nature of his condition. He is oriented x3 right now. Does report some hemoptysis and cough as well as shortness of breath denies any fevers or chills. No body aches or joint pains. No nausea vomiting diarrhea or other concerns. PAST MEDICAL / SURGICAL / SOCIAL / FAMILY HISTORY      has a past medical history of Atrial fibrillation ([address] Back pain, chronic, Hill's esophagus, BPH (benign prostatic hyperplasia), Cancer ([address] Cocaine abuse in remission [facility]), ED (erectile dysfunction), GERD (gastroesophageal reflux disease), GI bleed, Hernia, History of colon cancer, Melena, Migraines, and Murmur, cardiac.     has a past surgical history that includes Tonsillectomy; Colonoscopy; colectomy; Colonoscopy (07/18/2016); knee surgery (Right, 1970's); transesophageal echocardiogram (11/29/2018);  Upper gastrointestinal endoscopy (N/A, 2018); Colonoscopy (N/A, 2018); hernia repair (Right, ); Cardiac catheterization (2018); colectomy; Total knee arthroplasty (Right, 2019); and Upper gastrointestinal endoscopy (N/A, 2019). Social History     Socioeconomic History    Marital status: Single     Spouse name: Not on file    Number of children: Not on file    Years of education: Not on file    Highest education level: Not on file   Occupational History    Not on file   Social Needs    Financial resource strain: Not on file    Food insecurity     Worry: Not on file     Inability: Not on file    Transportation needs     Medical: Not on file     Non-medical: Not on file   Tobacco Use    Smoking status: Former Smoker     Packs/day: 0.50     Years: 1.00     Pack years: 0.50     Last attempt to quit:      Years since quittin.9    Smokeless tobacco: Never Used    Tobacco comment: stated never actually really smoked only inhaled    Substance and Sexual Activity    Alcohol use: Yes     Alcohol/week: 8.3 standard drinks     Types: 10 Standard drinks or equivalent per week     Comment: 3 -4 times a week    Drug use: No     Types:  Other-see comments     Comment: Cocaine use in past in     Sexual activity: Yes     Partners: Female   Lifestyle    Physical activity     Days per week: Not on file     Minutes per session: Not on file    Stress: Not on file   Relationships    Social connections     Talks on phone: Not on file     Gets together: Not on file     Attends [employer]ism service: Not on file     Active member of club or organization: Not on file     Attends meetings of clubs or organizations: Not on file     Relationship status: Not on file    Intimate partner violence     Fear of current or ex partner: Not on file     Emotionally abused: Not on file     Physically abused: Not on file     Forced sexual activity: Not on file   Other Topics Concern    Not on file Social History Narrative    Not on file       Family History   Problem Relation Age of Onset    Diabetes Mother     Heart Attack Father     Heart Disease Father     Heart Disease Brother        Allergies:  Adhesive tape; Codeine; and Penicillins    Home Medications:  Prior to Admission medications    Medication Sig Start Date End Date Taking? Authorizing Provider   atorvastatin (LIPITOR) 40 MG tablet Take 1 tablet by mouth daily 11/16/20   Cassandra Casas MD   omeprazole (PRILOSEC) 40 MG delayed release capsule Take 1 capsule by mouth daily 11/16/20   Cassandra Casas MD   lisinopril (PRINIVIL;ZESTRIL) 5 MG tablet take 1 tablet by mouth once daily 10/26/20   Historical Provider, MD   sotalol (BETAPACE) 80 MG tablet take 1 tablet by mouth every 12 hours 10/28/20   Historical Provider, MD   ibuprofen (ADVIL;MOTRIN) 800 MG tablet Take 1 tablet by mouth every 6 hours if needed for pain 10/27/20   Cassandra Casas MD   furosemide (LASIX) 20 MG tablet Take 1 tablet by mouth daily 10/26/20   Gamaliel Lozoya MD   tamsulosin [facility]) 0.4 MG capsule Take 1 capsule by mouth daily 10/15/20   Cassandra Casas MD   rivaroxaban (XARELTO) 20 MG TABS tablet Take 20 mg by mouth daily (with breakfast)     Historical Provider, MD       REVIEW OF SYSTEMS    (2-9 systems for level 4, 10 or more for level 5)      Review of Systems   Constitutional: Negative for chills and fever. HENT: Negative for rhinorrhea and sore throat. Eyes: Negative for redness and itching. Respiratory: Positive for cough and shortness of breath. Cardiovascular: Negative for chest pain. Gastrointestinal: Negative for abdominal pain, nausea and vomiting. Genitourinary: Negative for dysuria and hematuria. Musculoskeletal: Negative for arthralgias and myalgias. Skin: Negative for rash and wound. Neurological: Positive for headaches.        PHYSICAL EXAM   (up to 7 for level 4, 8 or more for level 5)      INITIAL VITALS:   /77 Pulse 124   Temp 97.6 °F (36.4 °C) (Oral)   Resp 25   Ht 6' (1.829 m)   Wt 178 lb (80.7 kg)   SpO2 95%   BMI 24.14 kg/m²     Physical Exam  Vitals signs and nursing note reviewed. Constitutional:       General: He is in acute distress. Appearance: Normal appearance. He is well-developed. He is not ill-appearing, toxic-appearing or diaphoretic. HENT:      Head: Normocephalic and atraumatic. Eyes:      General: No scleral icterus. Conjunctiva/sclera: Conjunctivae normal.   Neck:      Musculoskeletal: Neck supple. Cardiovascular:      Rate and Rhythm: Normal rate and regular rhythm. Pulmonary:      Effort: Respiratory distress present. Breath sounds: No stridor. Rhonchi present. No wheezing or rales. Abdominal:      General: There is no distension. Palpations: Abdomen is soft. There is no mass. Tenderness: There is no abdominal tenderness. There is no guarding or rebound. Musculoskeletal:      Right lower leg: No edema. Left lower leg: No edema. Skin:     General: Skin is warm and dry. Findings: No rash (over exposed skin). Neurological:      General: No focal deficit present. Mental Status: He is alert and oriented to person, place, and time.    Psychiatric:         Mood and Affect: Mood normal.         Behavior: Behavior normal.         DIAGNOSTICS     PLAN (LABS / IMAGING / EKG):  Orders Placed This Encounter   Procedures    Culture, Blood 1    Culture, Blood 2    CT CHEST PULMONARY EMBOLISM W CONTRAST    XR CHEST PORTABLE    Hemoglobin and hematocrit, blood    SODIUM (POC)    POTASSIUM (POC)    CHLORIDE (POC)    CALCIUM, IONIC (POC)    CBC Auto Differential    COMPREHENSIVE METABOLIC PANEL    Troponin    Brain Natriuretic Peptide    COVID-19    Urinalysis Reflex to Culture    Lactate, Sepsis    PROTIME-INR    APTT    Troponin    Microscopic Urinalysis    CBC    APTT    CBC Auto Differential    Comprehensive Metabolic Panel w/ Reflex to MG    Ferritin    D-Dimer, Quantitative    Vitamin D 25 Hydroxy    Troponin    CBC    APTT    Magnesium    DIET GENERAL; No Added Salt (3-4 GM)    Height and weight    PPE Instructions    Telemetry Monitoring    Inpatient consult to Hospitalist    Inpatient consult to Cardiology    Consult to Infectious Disease    Consult to Pulmonology    Droplet Plus Isolation    OT eval and treat    PT evaluation and treat    Initiate ED RT Aerosol protocol    Initiate Oxygen Therapy Protocol    Pulse oximetry, continuous    BIPAP    Heated/ Humidified High Flow Nasal Cannula    MDI Treatment    Arterial Blood Gas, POC    Creatinine W/GFR Point of Care    Lactic Acid, POC    POCT Glucose    Anion Gap (Calc) POC    POC Blood Gas    EKG 12 Lead    PATIENT STATUS (FROM ED OR OR/PROCEDURAL) Inpatient       MEDICATIONS ORDERED:  Orders Placed This Encounter   Medications    magnesium sulfate 1 g in dextrose 5% 100 mL IVPB    methylPREDNISolone sodium (SOLU-MEDROL) injection 125 mg    AND Linked Order Group     albuterol (PROVENTIL) nebulizer solution 2.5 mg     ipratropium (ATROVENT) 0.02 % nebulizer solution 0.25 mg    DISCONTD: albuterol (PROVENTIL) nebulizer solution 15 mg    DISCONTD: ipratropium (ATROVENT) 0.02 % nebulizer solution 0.25 mg    aspirin chewable tablet 324 mg    magnesium sulfate 1-5 GM/100ML-% IVPB (premix)     Brianna Olivo: cabinet override    magnesium sulfate 1-5 GM/100ML-% IVPB (premix)     Brianna Olivo: cabinet override    DISCONTD: albuterol sulfate  (90 Base) MCG/ACT inhaler 2 puff    iopamidol (ISOVUE-370) 76 % injection 75 mL    0.9 % sodium chloride bolus    metoprolol (LOPRESSOR) injection 5 mg    heparin (porcine) injection 4,000 Units    DISCONTD: heparin (porcine) injection 4,000 Units    DISCONTD: heparin (porcine) injection 2,000 Units    DISCONTD: heparin 25,000 units in dextrose 5% 250 mL infusion    atorvastatin (LIPITOR) tablet 40 mg    furosemide (LASIX) tablet 20 mg    lisinopril (PRINIVIL;ZESTRIL) tablet 20 mg    sotalol (BETAPACE) tablet 80 mg    tamsulosin (FLOMAX) capsule 0.4 mg    OR Linked Order Group     potassium chloride (KLOR-CON M) extended release tablet 40 mEq     potassium bicarb-citric acid (EFFER-K) effervescent tablet 40 mEq     potassium chloride 10 mEq/100 mL IVPB (Peripheral Line)    magnesium sulfate 1 g in dextrose 5% 100 mL IVPB    DISCONTD: acetaminophen (TYLENOL) tablet 650 mg    DISCONTD: acetaminophen (TYLENOL) suppository 650 mg    OR Linked Order Group     acetaminophen (TYLENOL) tablet 650 mg     acetaminophen (TYLENOL) suppository 650 mg    polyethylene glycol (GLYCOLAX) packet 17 g    OR Linked Order Group     promethazine (PHENERGAN) tablet 12.5 mg     ondansetron (ZOFRAN) injection 4 mg    famotidine (PEPCID) tablet 20 mg    dexamethasone (DECADRON) tablet 6 mg    Vitamin D (CHOLECALCIFEROL) tablet 2,000 Units    DISCONTD: guaiFENesin-dextromethorphan (ROBITUSSIN DM) 100-10 MG/5ML syrup 5 mL    albuterol sulfate  (90 Base) MCG/ACT inhaler 2 puff    heparin (porcine) injection 4,000 Units    heparin (porcine) injection 4,000 Units    heparin (porcine) injection 2,000 Units    heparin 25,000 units in dextrose 5% 250 mL infusion    dextromethorphan-guaiFENesin (ROBITUSSIN-DM)  MG/5ML liquid 5 mL       DIAGNOSTIC RESULTS / EMERGENCYDEPARTMENT COURSE / MDM     LABS:  Results for orders placed or performed during the hospital encounter of 11/23/20   Culture, Blood 1    Specimen: Blood   Result Value Ref Range    Specimen Description . BLOOD     Special Requests 10CC R HAND     Culture NO GROWTH 3 HOURS    Culture, Blood 2    Specimen: Blood   Result Value Ref Range    Specimen Description . BLOOD     Special Requests 20CC F FA     Culture NO GROWTH 3 HOURS    Hemoglobin and hematocrit, blood   Result Value Ref Range    POC Hemoglobin 14.8 13.5 - 17.5 g/dL    POC Hematocrit 43 41 - 53 %   SODIUM (POC)   Result Value Ref Range    POC Sodium 141 138 - 146 mmol/L   POTASSIUM (POC)   Result Value Ref Range    POC Potassium 3.1 (L) 3.5 - 4.5 mmol/L   CHLORIDE (POC)   Result Value Ref Range    POC Chloride 104 98 - 107 mmol/L   CALCIUM, IONIC (POC)   Result Value Ref Range    POC Ionized Calcium 1.06 (L) 1.15 - 1.33 mmol/L   CBC Auto Differential   Result Value Ref Range    WBC 16.7 (H) 3.5 - 11.3 k/uL    RBC 5.26 4.21 - 5.77 m/uL    Hemoglobin 13.5 13.0 - 17.0 g/dL    Hematocrit 44.1 40.7 - 50.3 %    MCV 83.8 82.6 - 102.9 fL    MCH 25.7 25.2 - 33.5 pg    MCHC 30.6 28.4 - 34.8 g/dL    RDW 20.0 (H) 11.8 - 14.4 %    Platelets 452 912 - 551 k/uL    MPV 9.4 8.1 - 13.5 fL    NRBC Automated 0.0 0.0 per 100 WBC    Differential Type NOT REPORTED     Seg Neutrophils 74 (H) 36 - 65 %    Lymphocytes 18 (L) 24 - 43 %    Monocytes 4 3 - 12 %    Eosinophils % 3 1 - 4 %    Basophils 0 0 - 2 %    Immature Granulocytes 1 (H) 0 %    Segs Absolute 12.44 (H) 1.50 - 8.10 k/uL    Absolute Lymph # 2.99 1.10 - 3.70 k/uL    Absolute Mono # 0.67 0.10 - 1.20 k/uL    Absolute Eos # 0.42 0.00 - 0.44 k/uL    Basophils Absolute 0.04 0.00 - 0.20 k/uL    Absolute Immature Granulocyte 0.11 0.00 - 0.30 k/uL    WBC Morphology NOT REPORTED     RBC Morphology ANISOCYTOSIS PRESENT     Platelet Estimate NOT REPORTED    COMPREHENSIVE METABOLIC PANEL   Result Value Ref Range    Glucose 131 (H) 70 - 99 mg/dL    BUN 17 8 - 23 mg/dL    CREATININE 0.64 (L) 0.70 - 1.20 mg/dL    Bun/Cre Ratio NOT REPORTED 9 - 20    Calcium 8.6 8.6 - 10.4 mg/dL    Sodium 140 135 - 144 mmol/L    Potassium 3.6 (L) 3.7 - 5.3 mmol/L    Chloride 101 98 - 107 mmol/L    CO2 22 20 - 31 mmol/L    Anion Gap 17 9 - 17 mmol/L    Alkaline Phosphatase 98 40 - 129 U/L    ALT 15 5 - 41 U/L    AST 29 <40 U/L    Total Bilirubin 1.57 (H) 0.3 - 1.2 mg/dL    Total Protein 6.7 6.4 - 8.3 g/dL    Alb 2.9 (L) 3.5 - 5.2 g/dL    Albumin/Globulin Ratio 0.8 (L) 1.0 - 2.5    GFR Non-African American >60 >60 mL/min    GFR African American >60 >60 mL/min    GFR Comment          GFR Staging NOT REPORTED    Troponin   Result Value Ref Range    Troponin, High Sensitivity 91 (HH) 0 - 22 ng/L    Troponin T NOT REPORTED <0.03 ng/mL    Troponin Interp NOT REPORTED    Brain Natriuretic Peptide   Result Value Ref Range    Pro-BNP 3,472 (H) <300 pg/mL    BNP Interpretation Pro-BNP Reference Range:    COVID-19    Specimen: Other   Result Value Ref Range    SARS-CoV-2          SARS-CoV-2, Rapid DETECTED (A) Not Detected    Source . NASOPHARYNGEAL SWAB     SARS-CoV-2         Urinalysis Reflex to Culture    Specimen: Urine voided   Result Value Ref Range    Color, UA YELLOW YELLOW    Turbidity UA CLEAR CLEAR    Glucose, Ur NEGATIVE NEGATIVE    Bilirubin Urine NEGATIVE NEGATIVE    Ketones, Urine NEGATIVE NEGATIVE    Specific Gravity, UA 1.042 (H) 1.005 - 1.030    Urine Hgb NEGATIVE NEGATIVE    pH, UA 8.0 5.0 - 8.0    Protein, UA NEGATIVE  Verified by sulfosalicylic acid test. (A) NEGATIVE    Urobilinogen, Urine Normal Normal    Nitrite, Urine NEGATIVE NEGATIVE    Leukocyte Esterase, Urine NEGATIVE NEGATIVE    Urinalysis Comments NOT REPORTED    Lactate, Sepsis   Result Value Ref Range    Lactic Acid, Sepsis NOT REPORTED 0.5 - 1.9 mmol/L    Lactic Acid, Sepsis, Whole Blood 3.8 (H) 0.5 - 1.9 mmol/L   Lactate, Sepsis   Result Value Ref Range    Lactic Acid, Sepsis NOT REPORTED 0.5 - 1.9 mmol/L    Lactic Acid, Sepsis, Whole Blood 1.3 0.5 - 1.9 mmol/L   PROTIME-INR   Result Value Ref Range    Protime 14.2 (H) 9.0 - 12.0 sec    INR 1.4    APTT   Result Value Ref Range    PTT 22.7 20.5 - 30.5 sec   Troponin   Result Value Ref Range    Troponin, High Sensitivity 35 (H) 0 - 22 ng/L    Troponin T NOT REPORTED <0.03 ng/mL    Troponin Interp NOT REPORTED    Microscopic Urinalysis   Result Value Ref Range    -          WBC, UA None 0 - 5 /HPF    RBC, UA 0 TO 2 0 - 4 /HPF    Casts UA  0 - 8 /LPF     0 TO 2 HYALINE Reference range defined for non-centrifuged specimen. Crystals, UA NOT REPORTED None /HPF    Epithelial Cells UA 2 TO 5 0 - 5 /HPF    Renal Epithelial, UA NOT REPORTED 0 /HPF    Bacteria, UA NOT REPORTED None    Mucus, UA NOT REPORTED None    Trichomonas, UA NOT REPORTED None    Amorphous, UA NOT REPORTED None    Other Observations UA NOT REPORTED NOT REQ.     Yeast, UA NOT REPORTED None   CBC Auto Differential   Result Value Ref Range    WBC 14.1 (H) 3.5 - 11.3 k/uL    RBC 4.80 4.21 - 5.77 m/uL    Hemoglobin 12.0 (L) 13.0 - 17.0 g/dL    Hematocrit 39.5 (L) 40.7 - 50.3 %    MCV 82.3 (L) 82.6 - 102.9 fL    MCH 25.0 (L) 25.2 - 33.5 pg    MCHC 30.4 28.4 - 34.8 g/dL    RDW 19.8 (H) 11.8 - 14.4 %    Platelets 461 259 - 561 k/uL    MPV 9.5 8.1 - 13.5 fL    NRBC Automated 0.0 0.0 per 100 WBC    Differential Type NOT REPORTED     WBC Morphology NOT REPORTED     RBC Morphology ANISOCYTOSIS PRESENT     Platelet Estimate NOT REPORTED     Seg Neutrophils 93 (H) 36 - 65 %    Lymphocytes 4 (L) 24 - 43 %    Monocytes 2 (L) 3 - 12 %    Eosinophils % 0 (L) 1 - 4 %    Basophils 0 0 - 2 %    Immature Granulocytes 1 (H) 0 %    Segs Absolute 13.16 (H) 1.50 - 8.10 k/uL    Absolute Lymph # 0.59 (L) 1.10 - 3.70 k/uL    Absolute Mono # 0.21 0.10 - 1.20 k/uL    Absolute Eos # <0.03 0.00 - 0.44 k/uL    Basophils Absolute 0.03 0.00 - 0.20 k/uL    Absolute Immature Granulocyte 0.09 0.00 - 0.30 k/uL   Comprehensive Metabolic Panel w/ Reflex to MG   Result Value Ref Range    Glucose 154 (H) 70 - 99 mg/dL    BUN 16 8 - 23 mg/dL    CREATININE 0.62 (L) 0.70 - 1.20 mg/dL    Bun/Cre Ratio NOT REPORTED 9 - 20    Calcium 8.1 (L) 8.6 - 10.4 mg/dL    Sodium 140 135 - 144 mmol/L    Potassium 3.2 (L) 3.7 - 5.3 mmol/L    Chloride 103 98 - 107 mmol/L    CO2 27 20 - 31 mmol/L    Anion Gap 10 9 - 17 mmol/L    Alkaline Phosphatase 86 40 - 129 U/L    ALT 12 5 - 41 U/L    AST 24 <40 U/L    Total Bilirubin 1.28 (H) 0.3 - 1.2 mg/dL    Total Protein 6.4 6.4 - 8.3 g/dL    Alb 2.8 (L) 3.5 - 5.2 g/dL    Albumin/Globulin Ratio 0.8 (L) 1.0 - 2.5    GFR Non-African American >60 >60 mL/min    GFR African American >60 >60 mL/min    GFR Comment          GFR Staging NOT REPORTED    Ferritin   Result Value Ref Range    Ferritin 395 30 - 400 ug/L   D-Dimer, Quantitative   Result Value Ref Range    D-Dimer, Quant 2.86 mg/L FEU   Vitamin D 25 Hydroxy   Result Value Ref Range    Vit D, 25-Hydroxy 40.8 30.0 - 100.0 ng/mL   Troponin   Result Value Ref Range    Troponin, High Sensitivity 102 (HH) 0 - 22 ng/L    Troponin T NOT REPORTED <0.03 ng/mL    Troponin Interp NOT REPORTED    APTT   Result Value Ref Range    PTT 78.2 (H) 20.5 - 30.5 sec   Magnesium   Result Value Ref Range    Magnesium 2.1 1.6 - 2.6 mg/dL   Arterial Blood Gas, POC   Result Value Ref Range    POC pH 7.519 (H) 7.350 - 7.450    POC pCO2 32.2 (L) 35.0 - 48.0 mm Hg    POC PO2 47.0 (LL) 83.0 - 108.0 mm Hg    POC HCO3 26.2 21.0 - 28.0 mmol/L    TCO2 (calc), Art 27 22.0 - 29.0 mmol/L    Negative Base Excess, Art NOT REPORTED 0.0 - 2.0    Positive Base Excess, Art 4 (H) 0.0 - 3.0    POC O2 SAT 87 (L) 94.0 - 98.0 %    O2 Device/Flow/% NOT REPORTED     Prem Test NOT REPORTED     Sample Site NOT REPORTED     Mode NOT REPORTED     FIO2 NOT REPORTED     Pt Temp NOT REPORTED     POC pH Temp NOT REPORTED     POC pCO2 Temp NOT REPORTED mm Hg    POC pO2 Temp NOT REPORTED mm Hg   Creatinine W/GFR Point of Care   Result Value Ref Range    POC Creatinine 0.78 0.51 - 1.19 mg/dL    GFR Comment >60 >60 mL/min    GFR Non-African American >60 >60 mL/min    GFR Comment         Lactic Acid, POC   Result Value Ref Range    POC Lactic Acid 5.45 (H) 0.56 - 1.39 mmol/L   POCT Glucose   Result Value Ref Range    POC Glucose 151 (H) 74 - 100 mg/dL   Anion Gap (Calc) POC   Result Value Ref Range    Anion Gap 11 7 - 16 mmol/L       RADIOLOGY:  CT CHEST PULMONARY EMBOLISM W CONTRAST   Final Result   No central pulmonary embolus.   Peripheral branches are not well evaluated   secondary to motion. Increased ground-glass opacity and parenchymal opacities throughout the lungs   either due to developing pneumonia or edema. There is a small left-sided   pleural effusion. By report there is a history of COVID-19 infection      Underlying pulmonary fibrosis again noted. XR CHEST PORTABLE   Final Result   Stable exam            EKG  Rhythm: normal sinus   Rate: normal  Axis: left  Ectopy: none  Conduction: normal  ST Segments: no acute change  T Waves: no acute change  Q Waves: none    Clinical Impression: When compared to EKG 10/23/2020, no acute changes and non-specific EKG    Normal Interval Reference:  P-wave <110 ms  -200 ms  QRS <100 ms  QT <420 ms  QTc 330-470 ms    All EKG's are interpreted by the Emergency Department Physician who either signs or Co-signsthis chart in the absence of a cardiologist.    EMERGENCY DEPARTMENT COURSE:         MDM: 58-year-old male presenting with acute onset of shortness of breath. On arrival patient was satting in the 40% range with a good waveform. This improved after addition of nonrebreather and improved further with addition of BiPAP. Was recently admitted in October for similar hypoxia. Found to have pulmonary fibrosis. On exam he is in moderate respiratory distress, but alert and oriented x3. He is hypoxic, vitals otherwise unremarkable. Heart regular rate and rhythm, lungs rhonchi bilaterally. Abdomen soft nontender. Differential diagnosis includes pneumonia, coronavirus infection, COPD exacerbation, fluid overload,, and others. Plan is for septic work-up, CT scan of chest to rule out PE, reassess. Patient found to have coronavirus infection. Does also have troponin elevation. Cardiology consulted started on heparin drip. We will continue to trend troponins for now. Patient mid to Intermed service for further evaluation and management.     PROCEDURES:  none    CONSULTS:  IP CONSULT TO HOSPITALIST  IP CONSULT TO CARDIOLOGY  IP CONSULT TO INFECTIOUS DISEASES  IP CONSULT TO PULMONOLOGY    FINAL IMPRESSION      1. COVID-19    2. Hypoxia          DISPOSITION / PLAN     DISPOSITION Admitted 11/23/2020 10:15:47 AM      PATIENT REFERRED TO:  No follow-up provider specified.     DISCHARGE MEDICATIONS:  New Prescriptions    No medications on file       Aminata Jason DO  Emergency Medicine Resident  [facility]    (Please note that portions of this note were completed with a voice recognition program.  Efforts were made to edit thedictations but occasionally words are mis-transcribed.)       Aminata Jason DO  Resident  11/23/20 1520

## 2020-11-23 NOTE — CONSULTS
[facility]    Inpatient Consultation Note               Today's Date: 11/23/2020  Patient Name: Karina Manuel  Date of admission: 11/23/2020  6:44 AM  Patient's age: 77 y.o., 1955  Admission Dx: Acute hypoxemic respiratory failure due to COVID-19 ([facility] [address] [U07.1, J96.01]    Reason for  Consult:  Troponin elevation    Requesting Physician: Gretchen Finnegan DO    CHIEF COMPLAINT:     Chief Complaint   Patient presents with    Shortness of Breath       History Obtained From:  patient, electronic medical record    HISTORY OF PRESENT ILLNESS:   The patient is a 77 y.o. male who is admitted to the hospital for acute hypoxic respiratory failure likely secondary to COVID-19. PMH significant for A flutter/A fib on xarelto, HFrEF (EF 35%), colon cancer, DLD, HTN. Presents to the ED after developing shortness of breath for the last couple of days. Reports no orthopnea, PND, or LE edema but does report SOB at rest which worsens with exertion. In ED, noted to have a SpO2 of 44 at time of arrival. Noted to be hypertensive and started on BiPAP. Saturation improved immediately after BiPAP initiation and patient reported improvement in symptoms. Troponin noted to be up-trending from 31 to 90's. Cardiology consulted. Past Medical History:   has a past medical history of Atrial fibrillation ([facility] [address] Back pain, chronic, Hill's esophagus, BPH (benign prostatic hyperplasia), Cancer ([facility] [address] Cocaine abuse in remission [facility]), ED (erectile dysfunction), GERD (gastroesophageal reflux disease), GI bleed, Hernia, History of colon cancer, Melena, Migraines, and Murmur, cardiac. Past Surgical History:   has a past surgical history that includes Tonsillectomy; Colonoscopy; colectomy; Colonoscopy (07/18/2016); knee surgery (Right, 1970's); transesophageal echocardiogram (11/29/2018); Upper gastrointestinal endoscopy (N/A, 12/5/2018); Colonoscopy (N/A, 12/6/2018); hernia repair (Right, 2009);  Cardiac catheterization (11/29/2018); colectomy; Total knee arthroplasty (Right, 1/8/2019); and Upper gastrointestinal endoscopy (N/A, 6/18/2019). Home Medications:    Prior to Admission medications    Medication Sig Start Date End Date Taking?  Authorizing Provider   atorvastatin (LIPITOR) 40 MG tablet Take 1 tablet by mouth daily 11/16/20   Cassandra Coleman MD   omeprazole (PRILOSEC) 40 MG delayed release capsule Take 1 capsule by mouth daily 11/16/20   Cassandra Coleman MD   lisinopril (PRINIVIL;ZESTRIL) 5 MG tablet take 1 tablet by mouth once daily 10/26/20   Historical Provider, MD   sotalol (BETAPACE) 80 MG tablet take 1 tablet by mouth every 12 hours 10/28/20   Historical Provider, MD   ibuprofen (ADVIL;MOTRIN) 800 MG tablet Take 1 tablet by mouth every 6 hours if needed for pain 10/27/20   Cassandra Coleman MD   furosemide (LASIX) 20 MG tablet Take 1 tablet by mouth daily 10/26/20   Rocky Douglas MD   [facility]) 0.4 MG capsule Take 1 capsule by mouth daily 10/15/20   Cassandra Coleman MD   rivaroxaban (XARELTO) 20 MG TABS tablet Take 20 mg by mouth daily (with breakfast)     Historical Provider, MD        Current Facility-Administered Medications: albuterol (PROVENTIL) nebulizer solution 2.5 mg, 2.5 mg, Nebulization, Q20 Min PRN **AND** ipratropium (ATROVENT) 0.02 % nebulizer solution 0.25 mg, 0.25 mg, Nebulization, Once PRN  albuterol (PROVENTIL) nebulizer solution 15 mg, 15 mg, Nebulization, Q1H PRN **AND** ipratropium (ATROVENT) 0.02 % nebulizer solution 0.25 mg, 0.25 mg, Nebulization, Once PRN  magnesium sulfate 1-5 GM/100ML-% IVPB (premix), , ,   albuterol sulfate  (90 Base) MCG/ACT inhaler 2 puff, 2 puff, Inhalation, Q4H PRN  heparin (porcine) injection 4,000 Units, 4,000 Units, Intravenous, PRN  heparin (porcine) injection 2,000 Units, 2,000 Units, Intravenous, PRN  heparin 25,000 units in dextrose 5% 250 mL infusion, 12 Units/kg/hr, Intravenous, Continuous    Allergies:  Adhesive tape; Codeine; and Penicillins    Social History:   reports that he quit smoking about 49 years ago. He has a 0.50 pack-year smoking history. He has never used smokeless tobacco. He reports current alcohol use of about 8.3 standard drinks of alcohol per week. He reports that he does not use drugs. Family History: family history includes Diabetes in his mother; Heart Attack in his father; Heart Disease in his brother and father. REVIEW OF SYSTEMS:      · Constitutional: there has been no unanticipated weight loss. · Eyes: No visual changes or diplopia. · ENT: No Headaches  · Cardiovascular:  Remaining as above  · Respiratory: No cough  · Gastrointestinal: No abdominal pain. No change in bowel or bladder habits. · Genitourinary: No dysuria, trouble voiding, or hematuria. · Musculoskeletal: No joint complaints. · Neurological: No headache  · Hematologic/Lymphatic: No abnormal bruising or bleeding      PHYSICAL EXAM:      /73   Pulse 113   Temp 97.6 °F (36.4 °C) (Oral)   Resp (!) 35   Ht 6' (1.829 m)   Wt 178 lb (80.7 kg)   SpO2 96%   BMI 24.14 kg/m²    No intake or output data in the 24 hours ending 11/23/20 1057      Constitutional and General Appearance:    Alert, cooperative, no distress and appears stated age  Respiratory:  · Mild increased work of breathing  · On auscultation: clear to auscultation bilaterally  · On NRB  Cardiovascular:  · Regular S1 and S2.   · No murmurs  Abdomen:   · No masses or tenderness  · Bowel sounds present  Extremities:  ·  No Cyanosis or Clubbing  ·  Lower extremity edema: no  Neurological:  · Alert and oriented. · Moves all extremities well    DATA:    Diagnostics:    EKG:   NSR, Biatrial enlargement  ECHO: 10/20/20   Summary  Left ventricle is normal in size Global left ventricular systolic function  is moderately reduced Estimated ejection fraction is 35 % . Mostly global hypokinesis with minor regional variation.   Grade I (mild) left ventricular diastolic dysfunction. Left atrium is moderately dilated. Aortic leaflet calcification with Moderate Aortic Stenosis, maybe  underestimated due to poor LVEF. Thickened mitral valve leaflets. Mild to moderate mitral regurgitation. Trivial tricuspid regurgitation. Estimated right ventricular systolic  pressure is 65HFON. IVC dilated but unable to assess respiratory collapse. Cardiac Angiography: 2018  Procedure Summary      Non-obstructive CAD. Normal LV systolic function. Recommendations      Medical therapy as needed. Risk factor modification. Labs:     CBC:   Recent Labs     11/23/20  0707   WBC 16.7*   HGB 13.5   HCT 44.1        BMP:   Recent Labs     11/23/20  0650 11/23/20  0707   NA  --  140   K  --  3.6*   CO2  --  22   BUN  --  17   CREATININE 0.78 0.64*   LABGLOM >60 >60   GLUCOSE  --  131*     Pro-BNP:    Recent Labs     11/23/20  0707   PROBNP 3,472*     BNP: No results for input(s): BNP in the last 72 hours. PT/INR:   Recent Labs     11/23/20 0707   PROTIME 14.2*   INR 1.4     APTT:  Recent Labs     11/23/20  0707   APTT 22.7     CARDIAC ENZYMES:No results for input(s): CKTOTAL, CKMB, CKMBINDEX, TROPONINI in the last 72 hours. Invalid input(s):  [address]     11/23/20  0707 11/23/20  0901   TROPONINT NOT REPORTED NOT REPORTED       FASTING LIPID PANEL:  Lab Results   Component Value Date    HDL 36 03/10/2020    TRIG 165 01/03/2017     LIVER PROFILE:  Recent Labs     11/23/20  0707   AST 29   ALT 15   LABALBU 2.9*         Patient's Active Problem List  Active Problems:    Acute hypoxemic respiratory failure due to COVID-19 [facility])  Resolved Problems:    * No resolved hospital problems. *        IMPRESSION:    1. Acute hypoxic respiratory failure secondary to COVID-19 PNA  2. Troponin elevation likely demand ischemia vs type I  3. Atrial fibrillation with RVR  4. DLD  5. COVID-19    RECOMMENDATIONS:  1. Start low intensity heparin drip (ACS protocol)  2.  Trend Troponin  3. Provide supplemental O2 as needed  4. Obtain EKG as telemetry suggestive of A fib with RVR. If in a fib consider amiodarone bolus and drip, per protocol  5. Will consider 2D ECHO to evaluate for wall motion abnormality or reduction in EF if troponin up trends. 6. K>4, Mg>2.  7. Treatment of COVID-19 per primary team      Thank you for allowing us to participate in the care of Martin Anderson. If you have any questions or concerns, please do not hesitate to contact us. Discussed with patient and Nurse. Danielle Ibrahim M.D. Fellow, [address] [place] [address]        Please note that part of this chart were generated using voice recognition  dictation software. Although every effort was made to ensure the accuracy of this automated transcription, some errors in transcription may have occurred. Attestation signed by      Attending Physician Statement:    I have discussed the care of  Martin Anderson , including pertinent history and exam findings, with the Cardiology fellow/resident. I have seen and examined the patient and the key elements of all parts of the encounter have been performed by me. I agree with the assessment, plan and orders as documented by the fellow/resident, after I modified exam findings and plan of treatments, and the final version is my approved version of the assessment. Additional Comments: The patient was not seen due to Covid 19. Agree with above evaluation and plan. Will start IV Amiodarone and IV heparin. Will follow. Covid Pneumonia treatment per primary service.

## 2020-11-23 NOTE — H&P
[facility]  Office: [address], DO, Junior Kras, DO, Marion Bang, DO, Pawel Carl Blood, DO, Elizabeth Lorenzo MD, Holland Carlisle MD, Tootie Iglesias MD, Philip Mcguire MD, Niki Stallings MD, Cristiano King MD, John Zheng MD, Rosie Kay MD, Breezy Swenson MD, Ashlie Diaz, DO, Diana Mcdonough MD, Jay Ponce MD, Caleb Oro, DO, Prosper Ortega MD,  Joe Zacarias, DO, Genaro Mann MD, Negin Cedeño MD, Daquan Sifuentes [facility], [facility], CNP, Reese Richadrson, CNP, Ayleen Matthew, CNS, She Baldwin, CNP, Eliecer Padilla, CNP, Lynette Hartman, CNP, Sandrita Kwok, CNP, Erum Soldjennifer, CNP, Abigail Heredia PA-C, Leandra Talbot [facility], Kanu Rivas, CNP, Orethan Soda, CNP, Wyrivera Husbands, CNP, Colletta Moros, CNP, Hugh Elaine, [facility]   [address]    HISTORY AND PHYSICAL EXAMINATION            Date:   11/23/2020  Patient name:  Karina Manuel  Date of admission:  11/23/2020  6:44 AM  MRN:   [id number]  Account:  [de-identified]  YOB: 1955  PCP:    Danny Salgeuro MD  Room:   [facility]  Code Status:    Full Code    Chief Complaint:     Chief Complaint   Patient presents with    Shortness of Breath       History Obtained From:     patient, electronic medical record    History of Present Illness:     Karina Manuel is a 77 y.o. Non-/non  male who presents with Shortness of Breath   and is admitted to the hospital for the management of Acute hypoxemic respiratory failure due to COVID-19 [facility]). Patient is a 64-year-old male who was recently seen and evaluated by resident teaching service for acute on chronic combined systolic and diastolic CHF exacerbation with atrial fibrillation with RVR. Patient was successfully cardioverted. Medication adjustment was completed. He was discharged home.   Unfortunately he represents to the emergency department on 11/23 with very similar symptoms including chest pain and shortness of breath. On presentation patient was tachypneic, hypertensive and O2 saturations on room air were 44%. He was placed on BiPAP support and admitted for further work-up and evaluation    Chest x-ray demonstrating multifocal pneumonia. CTA showing known underlying pulmonary fibrosis without evidence of central pulmonary embolus. Lab work demonstrating above-mentioned BNP 3472, troponin I 02, white count 14.1, hemoglobin 12 potassium 3.2. Covid positive with a D-dimer of 2.86    On assessment patient is tolerating nonrebreather without respiratory distress. He continues to be tachypneic now tachycardic and afebrile. Heparin infusion started for elevated troponins most likely in the setting of demand ischemia. BNP slightly elevated and Lasix was increased on admission.   With a reported symptomology    Consults including cardiology, pulmonary and infectious disease all notified of patient's arrival    Past Medical History:     Past Medical History:   Diagnosis Date    Atrial fibrillation ([facility] [address]     Back pain, chronic     Hill's esophagus 06/18/2019    BPH (benign prostatic hyperplasia)     Cancer ([facility] [address]     colon-rectal    Cocaine abuse in remission [facility])     1970's    ED (erectile dysfunction) 4/2/2015    GERD (gastroesophageal reflux disease)     GI bleed 12/5/2018    Hernia     History of colon cancer     Melena     Migraines     Murmur, cardiac         Past Surgical History:     Past Surgical History:   Procedure Laterality Date    CARDIAC CATHETERIZATION  11/29/2018    Non-obstructive CAD    CARDIOVERSION  2020    COLECTOMY      2nd colectomy, Colostomy and reversed [facility] COLECTOMY      1st time [place]    COLONOSCOPY      COLONOSCOPY  07/18/2016    COLONOSCOPY N/A 12/6/2018    COLONOSCOPY DIAGNOSTIC performed by Jamila Martin MD at [place] [address] Right 2009    inguinal    KNEE SURGERY Right 1970's    arthrotomy    TONSILLECTOMY      TOTAL KNEE ARTHROPLASTY Right 1/8/2019    KNEE TOTAL ARTHROPLASTY performed by Risa Wiseman MD at [address] [facility] TRANSESOPHAGEAL ECHOCARDIOGRAM  11/29/2018    UPPER GASTROINTESTINAL ENDOSCOPY N/A 12/5/2018    EGD DIAGNOSTIC ONLY performed by Stefan Ram MD at [facility] 6/18/2019    [facility]        Medications Prior to Admission:     Prior to Admission medications    Medication Sig Start Date End Date Taking? Authorizing Provider   atorvastatin (LIPITOR) 40 MG tablet Take 1 tablet by mouth daily 11/16/20  Yes Cassandra Ladd MD   omeprazole (PRILOSEC) 40 MG delayed release capsule Take 1 capsule by mouth daily 11/16/20  Yes Cassandra Ladd MD   lisinopril (PRINIVIL;ZESTRIL) 5 MG tablet take 1 tablet by mouth once daily 10/26/20  Yes Historical Provider, MD   sotalol (BETAPACE) 80 MG tablet take 1 tablet by mouth every 12 hours 10/28/20  Yes Historical Provider, MD   ibuprofen (ADVIL;MOTRIN) 800 MG tablet Take 1 tablet by mouth every 6 hours if needed for pain 10/27/20  Yes Cassandra Ladd MD   furosemide (LASIX) 20 MG tablet Take 1 tablet by mouth daily 10/26/20  Yes Fide Ragsdale MD   [facility]) 0.4 MG capsule Take 1 capsule by mouth daily 10/15/20  Yes Kaylyn Fothergill, MD   rivaroxaban (XARELTO) 20 MG TABS tablet Take 20 mg by mouth daily (with breakfast)    Yes Historical Provider, MD        Allergies:     Adhesive tape; Codeine; and Penicillins    Social History:     Tobacco:    reports that he quit smoking about 49 years ago. He has a 0.50 pack-year smoking history. He has never used smokeless tobacco.  Alcohol:      reports current alcohol use of about 12.0 standard drinks of alcohol per week. Drug Use:  reports no history of drug use.     Family History:     Family History   Problem Relation Age of Onset    Diabetes Mother     Heart Attack Father     Heart Disease Father     Heart Disease Brother        Review of Systems:     Positive and Negative as described in HPI.     CONSTITUTIONAL:  negative for fevers, chills, sweats, fatigue, weight loss  HEENT:  negative for vision, hearing changes, runny nose, throat pain  RESPIRATORY:  +shortness of breath, +cough, - congestion, - wheezing  CARDIOVASCULAR:  + chest pain, + palpitations  GASTROINTESTINAL:  negative for nausea, vomiting, diarrhea, constipation, change in bowel habits, abdominal pain   GENITOURINARY:  negative for difficulty of urination, burning with urination, frequency   INTEGUMENT:  negative for rash, skin lesions, easy bruising   HEMATOLOGIC/LYMPHATIC:  negative for swelling/edema   ALLERGIC/IMMUNOLOGIC:  negative for urticaria , itching  ENDOCRINE:  negative increase in drinking, increase in urination, hot or cold intolerance  MUSCULOSKELETAL:  negative joint pains, muscle aches, swelling of joints  NEUROLOGICAL:  negative for headaches, dizziness, lightheadedness, numbness, pain, tingling extremities  BEHAVIOR/PSYCH:  negative for depression, anxiety    Physical Exam:   BP (!) 117/97   Pulse 104   Temp 97.6 °F (36.4 °C) (Axillary)   Resp (!) 32   Ht 6' (1.829 m)   Wt 173 lb 11.6 oz (78.8 kg)   SpO2 96%   BMI 23.56 kg/m²   Temp (24hrs), Av.6 °F (36.4 °C), Min:97.6 °F (36.4 °C), Max:97.6 °F (36.4 °C)    Recent Labs     20  0650   POCGLU 151*     No intake or output data in the 24 hours ending 20 1648    General Appearance: alert, well appearing, in mild respiratory distress  Mental status: oriented to person, place, and time  Head: normocephalic, atraumatic  Eye: no icterus, redness, pupils equal and reactive, extraocular eye movements intact, conjunctiva clear  Ear: normal external ear, no discharge, hearing intact  Mouth: mucous membranes moist  Neck: supple, no carotid bruits, thyroid not palpable  Lungs: Bilateral posterior crackles, on nonrebreather  Cardiovascular: Irregular rate and rhythm no murmur, gallop, rub  Abdomen: Soft, nontender, nondistended, normal bowel sounds, no hepatomegaly or splenomegaly  Neurologic: There are no new focal motor or sensory deficits, normal muscle tone and bulk, no abnormal sensation, normal speech, cranial nerves II through XII grossly intact  Skin: No gross lesions, rashes, bruising or bleeding on exposed skin area  Extremities: peripheral pulses palpable, no pedal edema or calf pain with palpation  Psych: normal affect    Investigations:      Laboratory Testing:  Recent Results (from the past 24 hour(s))   Arterial Blood Gas, POC    Collection Time: 11/23/20  6:50 AM   Result Value Ref Range    POC pH 7.519 (H) 7.350 - 7.450    POC pCO2 32.2 (L) 35.0 - 48.0 mm Hg    POC PO2 47.0 (LL) 83.0 - 108.0 mm Hg    POC HCO3 26.2 21.0 - 28.0 mmol/L    TCO2 (calc), Art 27 22.0 - 29.0 mmol/L    Negative Base Excess, Art NOT REPORTED 0.0 - 2.0    Positive Base Excess, Art 4 (H) 0.0 - 3.0    POC O2 SAT 87 (L) 94.0 - 98.0 %    O2 Device/Flow/% NOT REPORTED     Prem Test NOT REPORTED     Sample Site NOT REPORTED     Mode NOT REPORTED     FIO2 NOT REPORTED     Pt Temp NOT REPORTED     POC pH Temp NOT REPORTED     POC pCO2 Temp NOT REPORTED mm Hg    POC pO2 Temp NOT REPORTED mm Hg   Hemoglobin and hematocrit, blood    Collection Time: 11/23/20  6:50 AM   Result Value Ref Range    POC Hemoglobin 14.8 13.5 - 17.5 g/dL    POC Hematocrit 43 41 - 53 %   Creatinine W/GFR Point of Care    Collection Time: 11/23/20  6:50 AM   Result Value Ref Range    POC Creatinine 0.78 0.51 - 1.19 mg/dL    GFR Comment >60 >60 mL/min    GFR Non-African American >60 >60 mL/min    GFR Comment         SODIUM (POC)    Collection Time: 11/23/20  6:50 AM   Result Value Ref Range    POC Sodium 141 138 - 146 mmol/L   POTASSIUM (POC)    Collection Time: 11/23/20  6:50 AM   Result Value Ref Range    POC Potassium 3.1 (L) 3.5 - 4.5 mmol/L   CHLORIDE (POC)    Collection Time: 11/23/20  6:50 AM   Result Value Ref Range    POC Chloride 104 98 - 107 mmol/L CALCIUM, IONIC (POC)    Collection Time: 11/23/20  6:50 AM   Result Value Ref Range    POC Ionized Calcium 1.06 (L) 1.15 - 1.33 mmol/L   Lactic Acid, POC    Collection Time: 11/23/20  6:50 AM   Result Value Ref Range    POC Lactic Acid 5.45 (H) 0.56 - 1.39 mmol/L   POCT Glucose    Collection Time: 11/23/20  6:50 AM   Result Value Ref Range    POC Glucose 151 (H) 74 - 100 mg/dL   Anion Gap (Calc) POC    Collection Time: 11/23/20  6:50 AM   Result Value Ref Range    Anion Gap 11 7 - 16 mmol/L   Culture, Blood 1    Collection Time: 11/23/20  7:00 AM    Specimen: Blood   Result Value Ref Range    Specimen Description . BLOOD     Special Requests 10CC R HAND     Culture NO GROWTH 8 HOURS    Culture, Blood 2    Collection Time: 11/23/20  7:00 AM    Specimen: Blood   Result Value Ref Range    Specimen Description . BLOOD     Special Requests 20CC F FA     Culture NO GROWTH 8 HOURS    COVID-19    Collection Time: 11/23/20  7:03 AM    Specimen: Other   Result Value Ref Range    SARS-CoV-2          SARS-CoV-2, Rapid DETECTED (A) Not Detected    Source . NASOPHARYNGEAL SWAB     SARS-CoV-2         CBC Auto Differential    Collection Time: 11/23/20  7:07 AM   Result Value Ref Range    WBC 16.7 (H) 3.5 - 11.3 k/uL    RBC 5.26 4.21 - 5.77 m/uL    Hemoglobin 13.5 13.0 - 17.0 g/dL    Hematocrit 44.1 40.7 - 50.3 %    MCV 83.8 82.6 - 102.9 fL    MCH 25.7 25.2 - 33.5 pg    MCHC 30.6 28.4 - 34.8 g/dL    RDW 20.0 (H) 11.8 - 14.4 %    Platelets 093 522 - 893 k/uL    MPV 9.4 8.1 - 13.5 fL    NRBC Automated 0.0 0.0 per 100 WBC    Differential Type NOT REPORTED     Seg Neutrophils 74 (H) 36 - 65 %    Lymphocytes 18 (L) 24 - 43 %    Monocytes 4 3 - 12 %    Eosinophils % 3 1 - 4 %    Basophils 0 0 - 2 %    Immature Granulocytes 1 (H) 0 %    Segs Absolute 12.44 (H) 1.50 - 8.10 k/uL    Absolute Lymph # 2.99 1.10 - 3.70 k/uL    Absolute Mono # 0.67 0.10 - 1.20 k/uL    Absolute Eos # 0.42 0.00 - 0.44 k/uL    Basophils Absolute 0.04 0.00 - 0.20 k/uL Culture    Collection Time: 11/23/20  9:01 AM    Specimen: Urine voided   Result Value Ref Range    Color, UA YELLOW YELLOW    Turbidity UA CLEAR CLEAR    Glucose, Ur NEGATIVE NEGATIVE    Bilirubin Urine NEGATIVE NEGATIVE    Ketones, Urine NEGATIVE NEGATIVE    Specific Gravity, UA 1.042 (H) 1.005 - 1.030    Urine Hgb NEGATIVE NEGATIVE    pH, UA 8.0 5.0 - 8.0    Protein, UA NEGATIVE  Verified by sulfosalicylic acid test. (A) NEGATIVE    Urobilinogen, Urine Normal Normal    Nitrite, Urine NEGATIVE NEGATIVE    Leukocyte Esterase, Urine NEGATIVE NEGATIVE    Urinalysis Comments NOT REPORTED    Lactate, Sepsis    Collection Time: 11/23/20  9:01 AM   Result Value Ref Range    Lactic Acid, Sepsis NOT REPORTED 0.5 - 1.9 mmol/L    Lactic Acid, Sepsis, Whole Blood 1.3 0.5 - 1.9 mmol/L   Microscopic Urinalysis    Collection Time: 11/23/20  9:01 AM   Result Value Ref Range    -          WBC, UA None 0 - 5 /HPF    RBC, UA 0 TO 2 0 - 4 /HPF    Casts UA  0 - 8 /LPF     0 TO 2 HYALINE Reference range defined for non-centrifuged specimen. Crystals, UA NOT REPORTED None /HPF    Epithelial Cells UA 2 TO 5 0 - 5 /HPF    Renal Epithelial, UA NOT REPORTED 0 /HPF    Bacteria, UA NOT REPORTED None    Mucus, UA NOT REPORTED None    Trichomonas, UA NOT REPORTED None    Amorphous, UA NOT REPORTED None    Other Observations UA NOT REPORTED NOT REQ.     Yeast, UA NOT REPORTED None   CBC Auto Differential    Collection Time: 11/23/20  1:04 PM   Result Value Ref Range    WBC 14.1 (H) 3.5 - 11.3 k/uL    RBC 4.80 4.21 - 5.77 m/uL    Hemoglobin 12.0 (L) 13.0 - 17.0 g/dL    Hematocrit 39.5 (L) 40.7 - 50.3 %    MCV 82.3 (L) 82.6 - 102.9 fL    MCH 25.0 (L) 25.2 - 33.5 pg    MCHC 30.4 28.4 - 34.8 g/dL    RDW 19.8 (H) 11.8 - 14.4 %    Platelets 768 064 - 122 k/uL    MPV 9.5 8.1 - 13.5 fL    NRBC Automated 0.0 0.0 per 100 WBC    Differential Type NOT REPORTED     WBC Morphology NOT REPORTED     RBC Morphology ANISOCYTOSIS PRESENT     Platelet Estimate NOT REPORTED     Seg Neutrophils 93 (H) 36 - 65 %    Lymphocytes 4 (L) 24 - 43 %    Monocytes 2 (L) 3 - 12 %    Eosinophils % 0 (L) 1 - 4 %    Basophils 0 0 - 2 %    Immature Granulocytes 1 (H) 0 %    Segs Absolute 13.16 (H) 1.50 - 8.10 k/uL    Absolute Lymph # 0.59 (L) 1.10 - 3.70 k/uL    Absolute Mono # 0.21 0.10 - 1.20 k/uL    Absolute Eos # <0.03 0.00 - 0.44 k/uL    Basophils Absolute 0.03 0.00 - 0.20 k/uL    Absolute Immature Granulocyte 0.09 0.00 - 0.30 k/uL   Comprehensive Metabolic Panel w/ Reflex to MG    Collection Time: 11/23/20  1:04 PM   Result Value Ref Range    Glucose 154 (H) 70 - 99 mg/dL    BUN 16 8 - 23 mg/dL    CREATININE 0.62 (L) 0.70 - 1.20 mg/dL    Bun/Cre Ratio NOT REPORTED 9 - 20    Calcium 8.1 (L) 8.6 - 10.4 mg/dL    Sodium 140 135 - 144 mmol/L    Potassium 3.2 (L) 3.7 - 5.3 mmol/L    Chloride 103 98 - 107 mmol/L    CO2 27 20 - 31 mmol/L    Anion Gap 10 9 - 17 mmol/L    Alkaline Phosphatase 86 40 - 129 U/L    ALT 12 5 - 41 U/L    AST 24 <40 U/L    Total Bilirubin 1.28 (H) 0.3 - 1.2 mg/dL    Total Protein 6.4 6.4 - 8.3 g/dL    Alb 2.8 (L) 3.5 - 5.2 g/dL    Albumin/Globulin Ratio 0.8 (L) 1.0 - 2.5    GFR Non-African American >60 >60 mL/min    GFR African American >60 >60 mL/min    GFR Comment          GFR Staging NOT REPORTED    Ferritin    Collection Time: 11/23/20  1:04 PM   Result Value Ref Range    Ferritin 395 30 - 400 ug/L   D-Dimer, Quantitative    Collection Time: 11/23/20  1:04 PM   Result Value Ref Range    D-Dimer, Quant 2.86 mg/L FEU   Vitamin D 25 Hydroxy    Collection Time: 11/23/20  1:04 PM   Result Value Ref Range    Vit D, 25-Hydroxy 40.8 30.0 - 100.0 ng/mL   Troponin    Collection Time: 11/23/20  1:04 PM   Result Value Ref Range    Troponin, High Sensitivity 102 (HH) 0 - 22 ng/L    Troponin T NOT REPORTED <0.03 ng/mL    Troponin Interp NOT REPORTED    APTT    Collection Time: 11/23/20  1:04 PM   Result Value Ref Range    PTT 78.2 (H) 20.5 - 30.5 sec   Magnesium    Collection Time: 11/23/20  1:04 PM   Result Value Ref Range    Magnesium 2.1 1.6 - 2.6 mg/dL       Imaging/Diagnostics:  Xr Chest Portable    Result Date: 11/23/2020  Stable exam     Ct Chest Pulmonary Embolism W Contrast    Result Date: 11/23/2020  No central pulmonary embolus. Peripheral branches are not well evaluated secondary to motion. Increased ground-glass opacity and parenchymal opacities throughout the lungs either due to developing pneumonia or edema. There is a small left-sided pleural effusion. By report there is a history of COVID-19 infection Underlying pulmonary fibrosis again noted. Assessment :      Hospital Problems           Last Modified POA    * (Principal) Acute hypoxemic respiratory failure due to COVID-19 ([facility] [address] 11/23/2020 Yes    Dyslipidemia 11/23/2020 Yes    Benign prostatic hyperplasia with urinary obstruction 11/23/2020 Yes    Hypertrophic nonobstructive cardiomyopathy ([address] 11/23/2020 Yes    Iron (Fe) deficiency anemia 11/23/2020 Yes    Benign essential HTN 11/23/2020 Yes    CHF (congestive heart failure), NYHA class II, acute on chronic, combined ([facility] [address] 11/23/2020 Yes    Occupational pulmonary disease 11/23/2020 Yes          Plan:     Patient status inpatient in the Covid ICU    1. COVID-19 viral infection: Continue isolation precautions, infectious disease consult. Treatment modalities per their recommendations. Continue Decadron  2. History of pulmonary fibrosis: Secondary to occupational exposure  3. Acute hypoxemic respiratory failure: Patient currently tolerating nonrebreather. Pulmonary has been consulted, BiPAP/high flow ordered if needed  4. History of atrial fibrillation: Status post cardioversion approximately 5 weeks ago: Cardiology again consulted for help with assistance. On Betapace, home dose of Xarelto on hold secondary to heparin infusion.   EKGs from emergency department reviewed initially showing A. fib with RVR, patient receive d one-time dose of metoprolol in ED, follow-up EKG demonstrating sinus tachycardia  5. Elevated troponin: Continue heparin infusion. Cardiology has been consulted. Most likely due to demand ischemia. Continue to trend troponins. Patient received high-dose ASA in emergency department  6. Essential hypertension: Stable on lisinopril, BB  7. Dyslipidemia: Continue statin therapy  8. Acute on chronic systolic and diastolic CHF, with exacerbation and hypertrophic nonobstructive cardiomyopathy: Likely worsened by COVID-19 viral infection. Continue to monitor closely. Increase diuretic therapy. Monitor I/O. BNP 3472 on admit. Continue BB  9. Hypokalemia: Replace as ordered  10. BPH: Continue Flomax  11. History of colon cancer  12. GI/DVT prophylaxis: Heparin infusion, Pepcid  13. PT, OT  14. CODE STATUS: Full code    Consultations:   IP CONSULT TO HOSPITALIST  IP CONSULT TO CARDIOLOGY  IP CONSULT TO INFECTIOUS DISEASES  IP CONSULT TO PULMONOLOGY  IP CONSULT TO CARDIOLOGY    Patient is admitted as inpatient status because of co-morbidities listed above, severity of signs and symptoms as outlined, requirement for current medical therapies and most importantly because of direct risk to patient if care not provided in a hospital setting. Expected length of stay > 48 hours.     Daisey Meigs, APRN - NP  11/23/2020  4:48 PM    Copy sent to Dr. Hui Rollins MD

## 2020-11-24 LAB
ABO/RH: NORMAL
ABSOLUTE EOS #: 0 K/UL (ref 0–0.4)
ABSOLUTE IMMATURE GRANULOCYTE: 0 K/UL (ref 0–0.3)
ABSOLUTE LYMPH #: 0.47 K/UL (ref 1–4.8)
ABSOLUTE MONO #: 0.63 K/UL (ref 0.1–0.8)
ALBUMIN SERPL-MCNC: 2.6 G/DL (ref 3.5–5.2)
ALBUMIN/GLOBULIN RATIO: 0.8 (ref 1–2.5)
ALP BLD-CCNC: 72 U/L (ref 40–129)
ALT SERPL-CCNC: 10 U/L (ref 5–41)
ANION GAP SERPL CALCULATED.3IONS-SCNC: 12 MMOL/L (ref 9–17)
ANTIBODY SCREEN: NEGATIVE
ARM BAND NUMBER: NORMAL
AST SERPL-CCNC: 17 U/L
BASOPHILS # BLD: 0 % (ref 0–2)
BASOPHILS ABSOLUTE: 0 K/UL (ref 0–0.2)
BILIRUB SERPL-MCNC: 1.1 MG/DL (ref 0.3–1.2)
BLOOD BANK SPECIMEN: NORMAL
BUN BLDV-MCNC: 22 MG/DL (ref 8–23)
BUN/CREAT BLD: ABNORMAL (ref 9–20)
CALCIUM SERPL-MCNC: 8.2 MG/DL (ref 8.6–10.4)
CHLORIDE BLD-SCNC: 103 MMOL/L (ref 98–107)
CO2: 25 MMOL/L (ref 20–31)
CREAT SERPL-MCNC: 0.61 MG/DL (ref 0.7–1.2)
D-DIMER QUANTITATIVE: 0.68 MG/L FEU
DIFFERENTIAL TYPE: ABNORMAL
EKG ATRIAL RATE: 131 BPM
EKG Q-T INTERVAL: 352 MS
EKG QRS DURATION: 84 MS
EKG QTC CALCULATION (BAZETT): 499 MS
EKG R AXIS: -16 DEGREES
EKG T AXIS: 33 DEGREES
EKG VENTRICULAR RATE: 121 BPM
EOSINOPHILS RELATIVE PERCENT: 0 % (ref 1–4)
EXPIRATION DATE: NORMAL
GFR AFRICAN AMERICAN: >60 ML/MIN
GFR NON-AFRICAN AMERICAN: >60 ML/MIN
GFR SERPL CREATININE-BSD FRML MDRD: ABNORMAL ML/MIN/{1.73_M2}
GFR SERPL CREATININE-BSD FRML MDRD: ABNORMAL ML/MIN/{1.73_M2}
GLUCOSE BLD-MCNC: 136 MG/DL (ref 70–99)
HCT VFR BLD CALC: 37.5 % (ref 40.7–50.3)
HEMOGLOBIN: 11.3 G/DL (ref 13–17)
IMMATURE GRANULOCYTES: 0 %
LYMPHOCYTES # BLD: 3 % (ref 24–44)
MAGNESIUM: 2.3 MG/DL (ref 1.6–2.6)
MCH RBC QN AUTO: 25.3 PG (ref 25.2–33.5)
MCHC RBC AUTO-ENTMCNC: 30.1 G/DL (ref 28.4–34.8)
MCV RBC AUTO: 83.9 FL (ref 82.6–102.9)
MONOCYTES # BLD: 4 % (ref 1–7)
MORPHOLOGY: NORMAL
NRBC AUTOMATED: 0 PER 100 WBC
PARTIAL THROMBOPLASTIN TIME: 29 SEC (ref 20.5–30.5)
PARTIAL THROMBOPLASTIN TIME: 29.8 SEC (ref 20.5–30.5)
PARTIAL THROMBOPLASTIN TIME: 36 SEC (ref 20.5–30.5)
PDW BLD-RTO: 19.4 % (ref 11.8–14.4)
PLATELET # BLD: 305 K/UL (ref 138–453)
PLATELET ESTIMATE: ABNORMAL
PMV BLD AUTO: 9.2 FL (ref 8.1–13.5)
POTASSIUM SERPL-SCNC: 3.5 MMOL/L (ref 3.7–5.3)
RBC # BLD: 4.47 M/UL (ref 4.21–5.77)
RBC # BLD: ABNORMAL 10*6/UL
SEG NEUTROPHILS: 93 % (ref 36–66)
SEGMENTED NEUTROPHILS ABSOLUTE COUNT: 14.6 K/UL (ref 1.8–7.7)
SODIUM BLD-SCNC: 140 MMOL/L (ref 135–144)
TOTAL PROTEIN: 5.9 G/DL (ref 6.4–8.3)
TROPONIN INTERP: ABNORMAL
TROPONIN T: ABNORMAL NG/ML
TROPONIN, HIGH SENSITIVITY: 46 NG/L (ref 0–22)
TROPONIN, HIGH SENSITIVITY: 51 NG/L (ref 0–22)
TROPONIN, HIGH SENSITIVITY: 56 NG/L (ref 0–22)
WBC # BLD: 15.7 K/UL (ref 3.5–11.3)
WBC # BLD: ABNORMAL 10*3/UL

## 2020-11-24 PROCEDURE — 2700000000 HC OXYGEN THERAPY PER DAY

## 2020-11-24 PROCEDURE — 93005 ELECTROCARDIOGRAM TRACING: CPT | Performed by: NURSE PRACTITIONER

## 2020-11-24 PROCEDURE — XW13325 TRANSFUSION OF CONVALESCENT PLASMA (NONAUTOLOGOUS) INTO PERIPHERAL VEIN, PERCUTANEOUS APPROACH, NEW TECHNOLOGY GROUP 5: ICD-10-PCS | Performed by: INTERNAL MEDICINE

## 2020-11-24 PROCEDURE — 2060000000 HC ICU INTERMEDIATE R&B

## 2020-11-24 PROCEDURE — 85379 FIBRIN DEGRADATION QUANT: CPT

## 2020-11-24 PROCEDURE — 86900 BLOOD TYPING SEROLOGIC ABO: CPT

## 2020-11-24 PROCEDURE — 99232 SBSQ HOSP IP/OBS MODERATE 35: CPT | Performed by: NURSE PRACTITIONER

## 2020-11-24 PROCEDURE — 99233 SBSQ HOSP IP/OBS HIGH 50: CPT | Performed by: INTERNAL MEDICINE

## 2020-11-24 PROCEDURE — 86901 BLOOD TYPING SEROLOGIC RH(D): CPT

## 2020-11-24 PROCEDURE — 85025 COMPLETE CBC W/AUTO DIFF WBC: CPT

## 2020-11-24 PROCEDURE — 6360000002 HC RX W HCPCS: Performed by: NURSE PRACTITIONER

## 2020-11-24 PROCEDURE — 80053 COMPREHEN METABOLIC PANEL: CPT

## 2020-11-24 PROCEDURE — 84484 ASSAY OF TROPONIN QUANT: CPT

## 2020-11-24 PROCEDURE — 93010 ELECTROCARDIOGRAM REPORT: CPT | Performed by: INTERNAL MEDICINE

## 2020-11-24 PROCEDURE — 94761 N-INVAS EAR/PLS OXIMETRY MLT: CPT

## 2020-11-24 PROCEDURE — 83735 ASSAY OF MAGNESIUM: CPT

## 2020-11-24 PROCEDURE — 6370000000 HC RX 637 (ALT 250 FOR IP): Performed by: NURSE PRACTITIONER

## 2020-11-24 PROCEDURE — 85730 THROMBOPLASTIN TIME PARTIAL: CPT

## 2020-11-24 PROCEDURE — 36415 COLL VENOUS BLD VENIPUNCTURE: CPT

## 2020-11-24 PROCEDURE — 2500000003 HC RX 250 WO HCPCS: Performed by: INTERNAL MEDICINE

## 2020-11-24 PROCEDURE — 86850 RBC ANTIBODY SCREEN: CPT

## 2020-11-24 PROCEDURE — 2580000003 HC RX 258: Performed by: NURSE PRACTITIONER

## 2020-11-24 PROCEDURE — 2580000003 HC RX 258: Performed by: INTERNAL MEDICINE

## 2020-11-24 RX ORDER — FUROSEMIDE 10 MG/ML
20 INJECTION INTRAMUSCULAR; INTRAVENOUS 2 TIMES DAILY
Status: DISCONTINUED | OUTPATIENT
Start: 2020-11-24 | End: 2020-11-30

## 2020-11-24 RX ORDER — 0.9 % SODIUM CHLORIDE 0.9 %
20 INTRAVENOUS SOLUTION INTRAVENOUS ONCE
Status: DISCONTINUED | OUTPATIENT
Start: 2020-11-24 | End: 2020-12-07 | Stop reason: HOSPADM

## 2020-11-24 RX ORDER — 0.9 % SODIUM CHLORIDE 0.9 %
30 INTRAVENOUS SOLUTION INTRAVENOUS PRN
Status: DISCONTINUED | OUTPATIENT
Start: 2020-11-24 | End: 2020-12-07 | Stop reason: HOSPADM

## 2020-11-24 RX ADMIN — HEPARIN SODIUM 4000 UNITS: 1000 INJECTION INTRAVENOUS; SUBCUTANEOUS at 12:00

## 2020-11-24 RX ADMIN — REMDESIVIR 200 MG: 5 INJECTION INTRAVENOUS at 18:33

## 2020-11-24 RX ADMIN — Medication 10 ML: at 16:41

## 2020-11-24 RX ADMIN — Medication 2000 UNITS: at 09:05

## 2020-11-24 RX ADMIN — TAMSULOSIN HYDROCHLORIDE 0.4 MG: 0.4 CAPSULE ORAL at 09:04

## 2020-11-24 RX ADMIN — FUROSEMIDE 20 MG: 10 INJECTION, SOLUTION INTRAMUSCULAR; INTRAVENOUS at 16:41

## 2020-11-24 RX ADMIN — DEXAMETHASONE 6 MG: 4 TABLET ORAL at 09:04

## 2020-11-24 RX ADMIN — FAMOTIDINE 20 MG: 20 TABLET, FILM COATED ORAL at 09:04

## 2020-11-24 RX ADMIN — RIVAROXABAN 20 MG: 20 TABLET, FILM COATED ORAL at 16:43

## 2020-11-24 RX ADMIN — FUROSEMIDE 20 MG: 10 INJECTION, SOLUTION INTRAMUSCULAR; INTRAVENOUS at 09:05

## 2020-11-24 RX ADMIN — ACETAMINOPHEN 650 MG: 325 TABLET ORAL at 14:03

## 2020-11-24 RX ADMIN — SODIUM CHLORIDE, PRESERVATIVE FREE 10 ML: 5 INJECTION INTRAVENOUS at 20:26

## 2020-11-24 RX ADMIN — HEPARIN SODIUM 14 UNITS/KG/HR: 10000 INJECTION, SOLUTION INTRAVENOUS at 09:39

## 2020-11-24 RX ADMIN — SODIUM CHLORIDE, PRESERVATIVE FREE 10 ML: 5 INJECTION INTRAVENOUS at 09:05

## 2020-11-24 RX ADMIN — ATORVASTATIN CALCIUM 40 MG: 80 TABLET, FILM COATED ORAL at 20:23

## 2020-11-24 RX ADMIN — HEPARIN SODIUM 4000 UNITS: 1000 INJECTION INTRAVENOUS; SUBCUTANEOUS at 02:21

## 2020-11-24 ASSESSMENT — PAIN SCALES - GENERAL
PAINLEVEL_OUTOF10: 0
PAINLEVEL_OUTOF10: 5

## 2020-11-24 NOTE — PROGRESS NOTES
Occupational Therapy    Occupational Therapy Not Seen Note    DATE: 2020  Name: Sky Singleton  : 1955  MRN: [id number]    Patient not available for Occupational Therapy due to: Other: Pt covid+, about to receive plasma per RN at 1330 hours, hold OT eval.    Next Scheduled Treatment: Attempt on  as appropriate.     Electronically signed by Puma Frias OT on 2020 at 1:33 PM

## 2020-11-24 NOTE — PROGRESS NOTES
[facility] -Progress Note COVID 19 Patient  Today's Date and Time: 11/24/2020, 11:51 AM    Impression :   · COVID 19 Suspect  · COVID 19 Confirmed Infection  · Covid tests:  · 11-23-20: Positive  · Atrial fibrillation with RVR: S/P cardioversion   · Pulmonary Fibrosis: secondary to Occupational exposure. 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 · Hypertrophic non obstructive cardiomyopathy. · Hx of Colon cancer  · BPH  Recommendations:   · Monitor off antibiotics  · Clinical Research will approach patient to explore if he qualifies for any of the COVID 19 treatment protocols. · Will start remdesivir  · CRP and LD on 11/23/20 were elevated. Continue Decadron. Started on 11/23 Stop date:12/13/20  · Plasma infusion. Two units infused 11-24-10    Medical Decision Making/Summary/Discussion:11/24/2020     · Patient admitted with suspected COVID 19 infection  · Covid test confirmed positive. · Associated problems with congestive heart failure, pulmonary fibrosis, hypoxia. Infection Control Recommendations   · Universal Precautions  · Airborne isolation  · Droplet Isolation    Antimicrobial Stewardship Recommendations     · Discontinuation of therapy  Coordination of Outpatient Care:   · Estimated Length of IV antimicrobials:TBD  · Patient will need Midline Catheter Insertion: TBD  · Patient will need PICC line Insertion: No  · Patient will need: Home IV , [place],  SNF,  LTAC:TBD  · Patient will need outpatient wound care:No    Chief complaint/reason for consultation:   · Concern for COVID infection      History of Present Illness:   Mary Kay Gardner is a 77y.o.-year-old  male who was initially admitted on 11/23/2020. Patient seen at the request of [address] [place] [address]. INITIAL HISTORY:    Patient presented through ER with complaints of onset of shortness of breath.   Located onset of symptoms on 11/22/2020 with worsening through the day leading to his seeking help at the emergency room. The patient had been recently admitted on 10/17/2020 through 10/26/2020 at [place] [address] because of the presence of congestive heart failure NYHAA class II with acute on chronic combined heart failure. He also suffers from atrial fibrillation and hypertrophic nonobstructive cardiomyopathy, benign essential hypertension. His previous diagnosis includes occupational pulmonary fibrosis. During that admission he was also found to have suffered from mycoplasma pneumonia and was treated with antibiotics. He reports being seen at the [facility], after his admission to [place] [address],  where he was cardioverted because of the atrial fibrillation. He was also found to be in heart failure and was a started on diuretics. When the patient was evaluated in the emergency room he was found to be hypoxic and was treated with BiPAP which improved his oxygen saturation rate. He was tested for Covid and was found to be positive on 11/23/2020. Chest x-ray:  · 11/23/2020 persistent bilateral pulmonary infiltrates with associated pulmonary vascular congestion. Unchanged from films of 10/23/2020    Chest CT:  · 11/23/2020: Pulmonary fibrosis. No pulmonary embolus. Increased groundglass opacities and parenchymal opacities throughout both lungs when compared with films of 10/21/2020    Patient admitted because of concerns with COVID 19.    CURRENT EVALUATION : 11/24/2020    Afebrile  VS stable,intermittent episodes of hypotension and bradycardia. Lasix dose decreased to 20 mg now. Leukocytosis present. Up trending. Day 2 Decadron PO   Remdesivir started 11-24-20   Consent for plasma obtained. 2 Units of Plasma ordered. On Heparin for Atrial Fibrillation. Patient exhibiting respiratory distress. Yes  Respiratory secretions: No    Patient receiving supplemental oxygen. Yes BiPAP, FiO2 of 80% yesterday.  Currently on Non rebreather mask 15L O2.  02 sat 96%-->80%  RR 32-->22    QTc: NEWS Score: 0-4 Low risk group; 5-6: Medium risk group; 7 or above: High risk group  Parameters 3 2 1 0 1 2 3   Age    < 72   ? 65   RR ? 8  9-11 12-20  21-24 ? 25   O2 Sats ? 91 92-93 94-95 ? 96      Suppl O2  Yes  No      SBP ? 90  101-110 111-219   ? 220   HR ? 40  41-50 51-90  111-130 ? 131   Consciousness    Alert   Drowsiness, lethargy, or confusion   Temperature ? 35.0 C (95.0 F)  35.1-36.0 C 95.1-96.9 F 36.1-38.0 C 97.0-100.4 F 38.1-39.0 C 100.5-102.3 F ? 39.1 C ? 102.4 F      NEWS Score:   11/23/2020: 9 risk   11/24/20: 11 high Risk    Overall Daily Picture:      Worsening    Presence of secondary bacterial Infection:    No   Additional antibiotics: No    Labs, X rays reviewed: 11/24/2020    BUN: 16-->22  Cr: 0.62-->0.61    WBC: 14.1-->15.7  Hb: 12.0-->11.3  Plat: 282-->305    Bilirubin 1.28  Alk phos 86  ALT 12  AST 24    Absolute Neutrophils: 13.1  Absolute Lymphocytes: 0.59  Neutrophil/Lymphocyte Ratio: 22.2 high risk    CRP: 182 (10-17-20) -->204(11/23/20)  Ferritin: 395(11/23/20)  LDH: 521(11/23/20)    Pro Calcitonin:0.29( 10/17/20)  Troponin high-sensitivity 102  proBNP 3472    Cultures:  Urine:  ·   Blood:  · 11/23/20: x2: No growth  Sputum :  ·   Wound:       CXR:   · 11/23/2020 pulmonary fibrosis with interstitial edema. Scattered infiltrates bilaterally      CAT:  · 11/23/2020: Pulmonary fibrosis. No pulmonary embolus. Increased groundglass opacities and parenchymal opacities throughout both lungs when compared with films of 10/21/2020            Discussed with patient, RN, CC, IM. I have personally reviewed the past medical history, past surgical history, medications, social history, and family history, and I have updated the database accordingly.   Past Medical History:     Past Medical History:   Diagnosis Date    Atrial fibrillation ([address]     Back pain, chronic     Hill's esophagus 06/18/2019    BPH (benign prostatic hyperplasia)     Cancer (HCC) colon-rectal    Cocaine abuse in remission [facility])     1970's    ED (erectile dysfunction) 4/2/2015    GERD (gastroesophageal reflux disease)     GI bleed 12/5/2018    Hernia     History of colon cancer     Melena     Migraines     Murmur, cardiac        Past Surgical  History:     Past Surgical History:   Procedure Laterality Date    CARDIAC CATHETERIZATION  11/29/2018    Non-obstructive CAD    CARDIOVERSION  2020    COLECTOMY      2nd colectomy, Colostomy and reversed [facility] COLECTOMY      1st time [place]    COLONOSCOPY      COLONOSCOPY  07/18/2016    COLONOSCOPY N/A 12/6/2018    COLONOSCOPY DIAGNOSTIC performed by Rajat Murphy MD at [address] Right 2009    inguinal    KNEE SURGERY Right 1970's    arthrotomy    TONSILLECTOMY      TOTAL KNEE ARTHROPLASTY Right 1/8/2019    KNEE TOTAL ARTHROPLASTY performed by Tray Patrick MD at [facility] TRANSESOPHAGEAL ECHOCARDIOGRAM  11/29/2018    UPPER GASTROINTESTINAL ENDOSCOPY N/A 12/5/2018    EGD DIAGNOSTIC ONLY performed by Rajat Murphy MD at [facility] N/A 6/18/2019    MCGUIRE'S       Medications:      furosemide  20 mg Intravenous BID    sodium chloride  20 mL Intravenous Once    atorvastatin  40 mg Oral Daily    [Held by provider] lisinopril  20 mg Oral Daily    [Held by provider] sotalol  80 mg Oral BID    tamsulosin  0.4 mg Oral Daily    sodium chloride flush  10 mL Intravenous 2 times per day    famotidine  20 mg Oral Daily    dexamethasone  6 mg Oral Daily    Vitamin D  2,000 Units Oral Daily    heparin (porcine)  4,000 Units Intravenous Once       Social History:     Social History     Socioeconomic History    Marital status: Single     Spouse name: Not on file    Number of children: Not on file    Years of education: Not on file    Highest education level: Not on file   Occupational History    Not on file   Social Needs    Financial resource vertigo. Cardiovascular: No chest pain or palpitations. Shortness of breath. [place]  Lung: Shortness of breath, dry cough. No sputum production  Abdomen: No nausea, vomiting, diarrhea, or abdominal pain. Jhon Founds No cramps. Genitourinary: No increased urinary frequency, or dysuria. No hematuria. No suprapubic or CVA pain  Musculoskeletal: No muscle aches or pains. No joint effusions, swelling or deformities  Hematologic: No bleeding or bruising. Neurologic: No headache, weakness, numbness, or tingling. Integument: No rash, no ulcers. Psychiatric: No depression. Endocrine: No polyuria, no polydipsia, no polyphagia. Physical Examination :     Patient Vitals for the past 8 hrs:   BP Temp Temp src Pulse Resp SpO2 Weight   11/24/20 0905 123/81 96.5 °F (35.8 °C) Axillary 59 22 (!) 80 % --   11/24/20 0600 -- -- -- -- -- -- 167 lb 5.3 oz (75.9 kg)     General Appearance: Awake, alert, and in no apparent distress  Head:  Normocephalic, no trauma  Eyes: Pupils equal, round, reactive to light; sclera anicteric; conjunctivae pink. No embolic phenomena. ENT: Oropharynx clear, without erythema, exudate, or thrush. No tenderness of sinuses. Mouth/throat: mucosa pink and moist. No lesions. Dentition in good repair. Neck:Supple, without lymphadenopathy. Thyroid normal, No bruits. Pulmonary/Chest: ,Distant breath sounds, decreased breath sounds at the bases   cardiovascular: Irreegular rate and rhythm without murmurs, rubs, or gallops. Abdomen: Soft, non tender. Bowel sounds normal. No organomegaly  All four Extremities: No cyanosis, clubbing, edema, or effusions. Neurologic: No gross sensory or motor deficits. Skin: Warm and dry with good turgor. No signs of peripheral arterial or venous insufficiency. No ulcerations. No open wounds.     Medical Decision Making -Laboratory:   I have independently reviewed/ordered the following labs:    CBC with Differential:   Recent Labs     11/23/20  1304 11/24/20  0126   WBC 14.1* 15.7*   HGB secondary to    respiratory motion artifact.         Patchy ground-glass and parenchymal opacities are seen in the left upper and    left lower lobe.         On the right patchy ground-glass and parenchymal opacities are seen in the    right upper, right middle and right lower lobe.  There is mild fusiform    bronchiectasis. Ginger Cheers is trace left-sided pleural effusion.         There is mild underlying pulmonary fibrosis.  There is underlying    bronchiectasis         Right adrenal gland is normal.  Left adrenal gland is normal.         1 mm calcification is seen in the left kidney         Spurring is seen in the spine.  Spurring is seen in the shoulder joints         Small soft tissue nodule is seen posteriorly in the subcutaneous fat    measuring 2.1 cm, likely sebaceous cyst              Impression    No central pulmonary embolus.  Peripheral branches are not well evaluated    secondary to motion.         Increased ground-glass opacity and parenchymal opacities throughout the lungs    either due to developing pneumonia or edema.  There is a small left-sided    pleural effusion.  By report there is a history of COVID-19 infection         Underlying pulmonary fibrosis again noted.                EXAMINATION:    ONE XRAY VIEW OF THE CHEST         11/23/2020 8:02 am         COMPARISON:    10/19/2020         HISTORY:    ORDERING SYSTEM PROVIDED HISTORY: SOB    TECHNOLOGIST PROVIDED HISTORY:    SOB    Reason for Exam: uprt port chest         FINDINGS:    Cardiomediastinal silhouette is stable.  Bilateral pulmonary infiltrates    persist unchanged and may represent pulmonary edema versus atypical pneumonia.              Impression    Stable exam        Medical Decision [id number]-Empsu:       Medical Decision Making-Other:     Note:  · Labs, medications, radiologic studies were reviewed with personal review of films  · Large amounts of data were reviewed  · Discussed with nursing Staff, Discharge planner  · Infection Control and Prevention measures reviewed  · All prior entries were reviewed  · Administer medications as ordered  · Prognosis: Guarded  · Discharge planning reviewed  · Follow up as outpatient. Thank you for allowing us to participate in the care of this patient. Please call with questions. Jose Miguel Quiñonez MD     ATTESTATION:    I have discussed the case, including pertinent history and exam findings with the residents and students. I have seen and examined the patient and the key elements of the encounter have been performed by me. I was present when the student obtained his information or examined the patient. I have reviewed the laboratory data, other diagnostic studies and discussed them with the residents. I have updated the medical record where necessary. I agree with the assessment, plan and orders as documented by the resident/ student.     Anjel French MD.    Pager: [phone] - Office: [phone]

## 2020-11-24 NOTE — PROGRESS NOTES
11/24/20 1548   Oxygen Therapy/Pulse Ox   O2 Device High flow nasal cannula   O2 Flow Rate (L/min) 20 L/min  (patient c/o of flow of 40 too high, decreased to 20 and 80%)   FiO2  80 %   Resp 19   SpO2 95 %

## 2020-11-24 NOTE — PROGRESS NOTES
Physical Therapy  DATE: 2020    NAME: Sky Singleton  MRN: [id number]   : 1955    Patient not seen this date for Physical Therapy due to:  [] Blood transfusion in progress  [] Hemodialysis  [] Patient Declined  [] Spine Precautions   [] Strict Bedrest  [] Surgery/ Procedure  [] Testing      [] Other        [x] PT is being discontinued at this time. HOLD Pt eval due to positive Covid-19. [] PT is being discontinued at this time due to declining physical/ medical status. Therapy is not appropriate at this time.     Giacomo Oneill, SPT

## 2020-11-24 NOTE — PROGRESS NOTES
[facility]  Office: [address], DO, Dominic Lee, DO, Uriel Barajas, DO, Joanne Sin Blood, DO, Joe Ness MD, Aarti Cagle MD, Sage Hamm MD, Robin South MD, Dereck Schofield MD, Nnamdi Evans MD, Fabiola Archibald MD, Tori Romero MD, Breezy Garcia MD, Deric Vela, DO, Maria D Vital MD, Sandy Herring MD, Jolynn Duval DO, Gopi Kang MD,  Reymundo Tse, DO, Renetta Carrillo MD, Shelton Garces MD, Martine Gasca [facility], [facility], CNP, Danny Martínez, CNP, Chacho Cintron, CNS, Noah Barthel, CNP, Brent Jhaveri, CNP, Tete Del Valle, CNP, Breana Ocampo, CNP, Marietta Santamaria, CNP, Baldemar Cason PA-C, [facility], [facility], Evelina Dewitt, CNP, Chelsy Stokes, CNP, Baljit Cristina, CNP, Gabriel Degroot, CNP, Elda Page, CNP         [facility]   [address] [facility]    Progress Note    11/24/2020    7:51 AM    Name:   Georgiana Mcneal  MRN:     [id number]     Acct:      [de-identified]   Room:   [address] [place] [address] Day:  1  Admit Date:  11/23/2020  6:44 AM    PCP:   Dilcia Heard MD  Code Status:  Full Code    Subjective:     C/C:   Chief Complaint   Patient presents with    Shortness of Breath     Interval History Status: not changed. Patient seen and evaluated in room resting in bed in no acute distress. He continues on nonrebreather, tolerating well. No acute events overnight. Heparin infusion continues for elevated troponins. Would anticipate cardiology discontinuing and patient resuming home dose of Xarelto for atrial fibrillation. Episode of bradycardia overnight. Lasix dose decreased. Lisinopril and Betapace placed on hold until cardiology evaluates patient. EKG ordered. Consent for plasma obtained    Brief History:     Per records:    Georgiana Mcneal is a 77 y.o.  Non-/non  male who presents with Shortness of Breath   and is admitted to the hospital for the management of Acute hypoxemic respiratory failure due to COVID-19 [facility]).    Patient is a 57-year-old male who was recently seen and evaluated by resident teaching service for acute on chronic combined systolic and diastolic CHF exacerbation with atrial fibrillation with RVR. Patient was successfully cardioverted. Medication adjustment was completed. He was discharged home. Unfortunately he represents to the emergency department on 11/23 with very similar symptoms including chest pain and shortness of breath. On presentation patient was tachypneic, hypertensive and O2 saturations on room air were 44%. He was placed on BiPAP support and admitted for further work-up and evaluation     Chest x-ray demonstrating multifocal pneumonia. Covid positive with a D-dimer of 2.86, CTA showing known underlying pulmonary fibrosis without evidence of central pulmonary embolus. Lab work demonstrating above-mentioned BNP 3472, troponin I 02, white count 14.1, hemoglobin 12 potassium 3.2. Bradycardic and hypotensive overnight. Benradine Parker continues   To smoke consent obtained on 11/24.  2 units ordered discussed with infectious disease    Review of Systems:     Constitutional:  negative for chills, fevers, sweats  Respiratory: + cough, +dyspnea on exertion, +shortness of breath,-  wheezing  Cardiovascular:  negative for chest pain, chest pressure/discomfort, lower extremity edema, palpitations  Gastrointestinal:  negative for abdominal pain, constipation, diarrhea, nausea, vomiting  Neurological:  negative for dizziness, headache    Medications: Allergies: Allergies   Allergen Reactions    Adhesive Tape Other (See Comments)     Blister badly     Codeine Nausea Only     Other reaction(s): Other: See Comments  NAUSEA  Other reaction(s):  Other: See Comments  NAUSEA    Penicillins Swelling     As a baby  Other reaction(s): Unknown  As a baby       Current Meds:   Scheduled Meds:    atorvastatin  40 mg Oral Daily    [Held by provider] furosemide  20 mg Oral Daily    lisinopril  20 mg Oral Daily    sotalol  80 mg Oral BID    tamsulosin  0.4 mg Oral Daily    sodium chloride flush  10 mL Intravenous 2 times per day    famotidine  20 mg Oral Daily    dexamethasone  6 mg Oral Daily    Vitamin D  2,000 Units Oral Daily    heparin (porcine)  4,000 Units Intravenous Once    furosemide  40 mg Intravenous BID     Continuous Infusions:    heparin (PORCINE) Infusion 14 Units/kg/hr (20 0221)     PRN Meds: albuterol **AND** [] ipratropium, sodium chloride flush, potassium chloride **OR** potassium alternative oral replacement **OR** potassium chloride, magnesium sulfate, acetaminophen **OR** acetaminophen, polyethylene glycol, promethazine **OR** ondansetron, nicotine, albuterol sulfate HFA, heparin (porcine), heparin (porcine), dextromethorphan-guaiFENesin    Data:     Past Medical History:   has a past medical history of At[facility] ([facility] Back pain, chronic, Hill's esophagus, BPH (benign prostatic hyperplasia), Cancer ([facility] Cocaine abuse in remission [facility]), ED (erectile dysfunction), GERD (gastroesophageal reflux disease), GI bleed, Hernia, History of colon cancer, Melena, Migraines, and Murmur, cardiac. Social History:   reports that he quit smoking about 49 years ago. He has a 0.50 pack-year smoking history. He has never used smokeless tobacco. He reports current alcohol use of about 12.0 standard drinks of alcohol per week. He reports that he does not use drugs.      Family History:   Family History   Problem Relation Age of Onset    Diabetes Mother     Heart Attack Father     Heart Disease Father     Heart Disease Brother        Vitals:  /74   Pulse (!) 49   Temp 97.7 °F (36.5 °C) (Axillary)   Resp 26   Ht 6' (1.829 m)   Wt 173 lb 11.6 oz (78.8 kg)   SpO2 91%   BMI 23.56 kg/m²   Temp (24hrs), Av.8 °F (36.6 °C), Min:97.6 °F (36.4 °C), Max:98 °F (36.7 °C)    Recent Labs     20  0650   POCGLU 151*       I/O SPECIAL 10CC R HAND 11/23/2020 07:00 AM    SPECIAL 20CC F FA 11/23/2020 07:00 AM     Lab Results   Component Value Date/Time    CULTURE NO GROWTH 16 HOURS 11/23/2020 07:00 AM    CULTURE NO GROWTH 17 HOURS 11/23/2020 07:00 AM       Radiology:  Xr Chest Portable    Result Date: 11/23/2020  Stable exam     Ct Chest Pulmonary Embolism W Contrast    Result Date: 11/23/2020  No central pulmonary embolus. Peripheral branches are not well evaluated secondary to motion. Increased ground-glass opacity and parenchymal opacities throughout the lungs either due to developing pneumonia or edema. There is a small left-sided pleural effusion. By report there is a history of COVID-19 infection Underlying pulmonary fibrosis again noted. Physical Examination:        General appearance:  alert, cooperative and no distress  Mental Status:  oriented to person, place and time and normal affect  Lungs:  clear to auscultation bilaterally, normal effort  Heart: Irregular rate and rhythm, no murmur  Abdomen:  soft, nontender, nondistended, normal bowel sounds, no masses, hepatomegaly, splenomegaly  Extremities:  no edema, redness, tenderness in the calves  Skin:  no gross lesions, rashes, induration    Assessment:        Hospital Problems           Last Modified POA    * (Principal) Acute hypoxemic respiratory failure due to COVID-19 ([facility] 11/23/2020 Yes    Dyslipidemia 11/23/2020 Yes    Benign prostatic hyperplasia with urinary obstruction 11/23/2020 Yes    Hypertrophic nonobstructive cardiomyopathy ([address] 11/23/2020 Yes    Iron (Fe) deficiency anemia 11/23/2020 Yes    Benign essential HTN 11/23/2020 Yes    CHF (congestive heart failure), NYHA class II, acute on chronic, combined ([address] 11/23/2020 Yes    Occupational pulmonary disease 11/23/2020 Yes          Plan:        1. COVID-19 viral infection: Continue isolation precautions, infectious disease consult. Treatment modalities per their recommendations.   Continue Decadron, white count elevated and downtrending, BCX2 NGTD with initial elevated lactic acid, now normal.  on admit. Discussion with infectious disease. Plasma consent with 2 Units ordered, discussed with ID   2. History of pulmonary fibrosis: Secondary to occupational exposure  3. Acute hypoxemic respiratory failure: Patient currently tolerating nonrebreather. Pulmonary has been consulted, BiPAP/high flow ordered if needed  4. History of atrial fibrillation: Status post cardioversion approximately 5 weeks ago: Cardiology again consulted for help with assistance. On Betapace, home dose of Xarelto on hold secondary to heparin infusion. EKGs from emergency department reviewed initially showing A. fib with RVR, patient receive d one-time dose of metoprolol in ED, follow-up EKG demonstrating sinus tachycardia. Patient hypotensive and bradycardic overnight  5. Elevated troponin: 35>>91>>102>>69>>64>>56. Continue heparin infusion. Cardiology has been consulted. Most likely due to demand ischemia. Continue to trend troponins. Patient received high-dose ASA in emergency department. 6. Essential hypertension: Hold lisinopril and beta-blocker until cardiology evaluates patient   7. Dyslipidemia: Continue statin therapy  8. Acute on chronic systolic and diastolic CHF, with exacerbation and hypertrophic nonobstructive cardiomyopathy: Likely worsened by COVID-19 viral infection. Continue to monitor closely. Increase diuretic therapy. Monitor I/O. BNP 3472 on admit. Continue BB  9. Hypokalemia: Replace as ordered  10. BPH: Continue Flomax  11. History of colon cancer  12.  GI/DVT prophylaxis: Heparin infusion, Pepcid  13. PT, OT    MIRI Ramachandran NP  11/24/2020  7:51 AM

## 2020-11-24 NOTE — PROGRESS NOTES
Writer asked primary if they would like him to stay on non-rebreather or initiate Bi-PAP and hi-flow.  stated to initiate hi-flow O2. When writer informed RT they stated they do not have hi-flow available at this time. Writer informed Art Moya NP and she stated the non-rebreather was fine for now. O2 saturation is currently 95% and no signs of distress are noted. Will continue to monitor.

## 2020-11-24 NOTE — PROGRESS NOTES
Writer titrated 8pm heparin drip off of wrong lab. Abisai Doss has been notified and [facility] has been completed. 2am titration was completed correctly.

## 2020-11-24 NOTE — CARE COORDINATION
Case Management Initial Discharge Plan  Brian Garcia,             Met with:spouse/SO to discuss discharge plans. Information verified: address, contacts, phone number, , insurance Yes    Emergency Contact/Next of Kin name & number: Laura Javed (significant other x43 years) [phone]    PCP: Jena Key MD  Date of last visit: 2020 per [employer] Provider: [facility] Medicare, Medicaid    Discharge Planning    Living Arrangements:  Spouse/Significant Other   Support Systems:  Spouse/Significant Other    Home has 2 stories  3 stairs to climb to get into front door, 13 stairs to climb to reach second floor  Location of bedroom/bathroom in home second story    Patient able to perform ADL's:Independent    Current Services (outpatient & in home) DME  DME equipment: oxygen, cane, walker  DME provider: Martha Potter    Receiving oral anticoagulation therapy? Yes - Eliquis    If indicated:   Physician managing anticoagulation treatment: n/a  Where does patient obtain lab work for ATC treatment? n/a      Potential Assistance Needed:  Home Care    Patient agreeable to home care: No  [place] of choice provided:  n/a    Prior SNF/Rehab Placement and Facility:    Agreeable to SNF/Rehab: No  [place] of choice provided: n/a     Evaluation: n/a    Expected Discharge date:  20    Patient expects to be discharged to:  home  Follow Up Appointment: Best Day/ Time: Tuesday AM    Transportation provider: self or significant other  Transportation arrangements needed for discharge: No     Readmission Risk              Risk of Unplanned Readmission:        13             Does patient have a readmission risk score greater than 14?: No  If yes, follow-up appointment must be made within 7 days of discharge.      Goals of Care: breathe easier      Discharge Plan: Home with significant other, has home oxygen [place]    Home pharmacy: [facility] on [address]      Electronically signed by Marquis Mcdermott RN on 11/24/20 at 6:09 PM EST

## 2020-11-24 NOTE — PLAN OF CARE
Problem: Airway Clearance - Ineffective  Goal: Achieve or maintain patent airway  Outcome: Met This Shift     Problem: Gas Exchange - Impaired  Goal: Absence of hypoxia  Outcome: Ongoing  Goal: Promote optimal lung function  Outcome: Ongoing     Problem: Breathing Pattern - Ineffective  Goal: Ability to achieve and maintain a regular respiratory rate  Outcome: Ongoing     Problem:  Body Temperature -  Risk of, Imbalanced  Goal: Ability to maintain a body temperature within defined limits  Outcome: Ongoing  Goal: Will regain or maintain usual level of consciousness  Outcome: Ongoing  Goal: Complications related to the disease process, condition or treatment will be avoided or minimized  Outcome: Ongoing     Problem: Isolation Precautions - Risk of Spread of Infection  Goal: Prevent transmission of infection  Outcome: Ongoing

## 2020-11-25 LAB
ABSOLUTE EOS #: <0.03 K/UL (ref 0–0.44)
ABSOLUTE IMMATURE GRANULOCYTE: 0.09 K/UL (ref 0–0.3)
ABSOLUTE LYMPH #: 0.85 K/UL (ref 1.1–3.7)
ABSOLUTE MONO #: 0.77 K/UL (ref 0.1–1.2)
ALBUMIN SERPL-MCNC: 2.6 G/DL (ref 3.5–5.2)
ALBUMIN/GLOBULIN RATIO: 0.9 (ref 1–2.5)
ALP BLD-CCNC: 63 U/L (ref 40–129)
ALT SERPL-CCNC: 15 U/L (ref 5–41)
ANION GAP SERPL CALCULATED.3IONS-SCNC: 8 MMOL/L (ref 9–17)
AST SERPL-CCNC: 20 U/L
BASOPHILS # BLD: 0 % (ref 0–2)
BASOPHILS ABSOLUTE: 0.03 K/UL (ref 0–0.2)
BILIRUB SERPL-MCNC: 0.59 MG/DL (ref 0.3–1.2)
BLD PROD TYP BPU: NORMAL
BLD PROD TYP BPU: NORMAL
BUN BLDV-MCNC: 31 MG/DL (ref 8–23)
BUN/CREAT BLD: ABNORMAL (ref 9–20)
CALCIUM SERPL-MCNC: 8.4 MG/DL (ref 8.6–10.4)
CHLORIDE BLD-SCNC: 103 MMOL/L (ref 98–107)
CO2: 27 MMOL/L (ref 20–31)
CREAT SERPL-MCNC: 0.53 MG/DL (ref 0.7–1.2)
D-DIMER QUANTITATIVE: 0.94 MG/L FEU
DIFFERENTIAL TYPE: ABNORMAL
DISPENSE STATUS BLOOD BANK: NORMAL
DISPENSE STATUS BLOOD BANK: NORMAL
EKG ATRIAL RATE: 49 BPM
EKG P AXIS: 40 DEGREES
EKG P-R INTERVAL: 170 MS
EKG Q-T INTERVAL: 610 MS
EKG QRS DURATION: 90 MS
EKG QTC CALCULATION (BAZETT): 551 MS
EKG R AXIS: -15 DEGREES
EKG T AXIS: -39 DEGREES
EKG VENTRICULAR RATE: 49 BPM
EOSINOPHILS RELATIVE PERCENT: 0 % (ref 1–4)
GFR AFRICAN AMERICAN: >60 ML/MIN
GFR NON-AFRICAN AMERICAN: >60 ML/MIN
GFR SERPL CREATININE-BSD FRML MDRD: ABNORMAL ML/MIN/{1.73_M2}
GFR SERPL CREATININE-BSD FRML MDRD: ABNORMAL ML/MIN/{1.73_M2}
GLUCOSE BLD-MCNC: 104 MG/DL (ref 70–99)
HCT VFR BLD CALC: 35.3 % (ref 40.7–50.3)
HEMOGLOBIN: 10.1 G/DL (ref 13–17)
IMMATURE GRANULOCYTES: 1 %
LYMPHOCYTES # BLD: 6 % (ref 24–43)
MAGNESIUM: 2.5 MG/DL (ref 1.6–2.6)
MCH RBC QN AUTO: 26.2 PG (ref 25.2–33.5)
MCHC RBC AUTO-ENTMCNC: 28.6 G/DL (ref 28.4–34.8)
MCV RBC AUTO: 91.5 FL (ref 82.6–102.9)
MONOCYTES # BLD: 5 % (ref 3–12)
NRBC AUTOMATED: 0 PER 100 WBC
PDW BLD-RTO: 19.9 % (ref 11.8–14.4)
PLATELET # BLD: 423 K/UL (ref 138–453)
PLATELET ESTIMATE: ABNORMAL
PMV BLD AUTO: 10.4 FL (ref 8.1–13.5)
POTASSIUM SERPL-SCNC: 3.5 MMOL/L (ref 3.7–5.3)
RBC # BLD: 3.86 M/UL (ref 4.21–5.77)
RBC # BLD: ABNORMAL 10*6/UL
SEG NEUTROPHILS: 88 % (ref 36–65)
SEGMENTED NEUTROPHILS ABSOLUTE COUNT: 12.65 K/UL (ref 1.5–8.1)
SODIUM BLD-SCNC: 138 MMOL/L (ref 135–144)
TOTAL PROTEIN: 5.6 G/DL (ref 6.4–8.3)
TRANSFUSION STATUS: NORMAL
TRANSFUSION STATUS: NORMAL
UNIT DIVISION: 0
UNIT DIVISION: 0
UNIT NUMBER: NORMAL
UNIT NUMBER: NORMAL
WBC # BLD: 14.4 K/UL (ref 3.5–11.3)
WBC # BLD: ABNORMAL 10*3/UL

## 2020-11-25 PROCEDURE — 2060000000 HC ICU INTERMEDIATE R&B

## 2020-11-25 PROCEDURE — 97535 SELF CARE MNGMENT TRAINING: CPT

## 2020-11-25 PROCEDURE — 97530 THERAPEUTIC ACTIVITIES: CPT

## 2020-11-25 PROCEDURE — 83735 ASSAY OF MAGNESIUM: CPT

## 2020-11-25 PROCEDURE — 6370000000 HC RX 637 (ALT 250 FOR IP): Performed by: NURSE PRACTITIONER

## 2020-11-25 PROCEDURE — 99232 SBSQ HOSP IP/OBS MODERATE 35: CPT | Performed by: NURSE PRACTITIONER

## 2020-11-25 PROCEDURE — 97162 PT EVAL MOD COMPLEX 30 MIN: CPT

## 2020-11-25 PROCEDURE — 2700000000 HC OXYGEN THERAPY PER DAY

## 2020-11-25 PROCEDURE — 2580000003 HC RX 258: Performed by: INTERNAL MEDICINE

## 2020-11-25 PROCEDURE — 80053 COMPREHEN METABOLIC PANEL: CPT

## 2020-11-25 PROCEDURE — 36415 COLL VENOUS BLD VENIPUNCTURE: CPT

## 2020-11-25 PROCEDURE — 99233 SBSQ HOSP IP/OBS HIGH 50: CPT | Performed by: INTERNAL MEDICINE

## 2020-11-25 PROCEDURE — 6360000002 HC RX W HCPCS: Performed by: NURSE PRACTITIONER

## 2020-11-25 PROCEDURE — 85025 COMPLETE CBC W/AUTO DIFF WBC: CPT

## 2020-11-25 PROCEDURE — 2500000003 HC RX 250 WO HCPCS: Performed by: INTERNAL MEDICINE

## 2020-11-25 PROCEDURE — 97166 OT EVAL MOD COMPLEX 45 MIN: CPT

## 2020-11-25 PROCEDURE — 93010 ELECTROCARDIOGRAM REPORT: CPT | Performed by: INTERNAL MEDICINE

## 2020-11-25 PROCEDURE — 94761 N-INVAS EAR/PLS OXIMETRY MLT: CPT

## 2020-11-25 PROCEDURE — 2580000003 HC RX 258: Performed by: NURSE PRACTITIONER

## 2020-11-25 PROCEDURE — 85379 FIBRIN DEGRADATION QUANT: CPT

## 2020-11-25 RX ADMIN — FUROSEMIDE 20 MG: 10 INJECTION, SOLUTION INTRAMUSCULAR; INTRAVENOUS at 18:20

## 2020-11-25 RX ADMIN — SODIUM CHLORIDE, PRESERVATIVE FREE 10 ML: 5 INJECTION INTRAVENOUS at 08:55

## 2020-11-25 RX ADMIN — SODIUM CHLORIDE, PRESERVATIVE FREE 10 ML: 5 INJECTION INTRAVENOUS at 19:50

## 2020-11-25 RX ADMIN — RIVAROXABAN 20 MG: 20 TABLET, FILM COATED ORAL at 18:20

## 2020-11-25 RX ADMIN — POTASSIUM CHLORIDE 40 MEQ: 1500 TABLET, EXTENDED RELEASE ORAL at 16:31

## 2020-11-25 RX ADMIN — Medication 2000 UNITS: at 08:54

## 2020-11-25 RX ADMIN — REMDESIVIR 100 MG: 5 INJECTION INTRAVENOUS at 19:50

## 2020-11-25 RX ADMIN — ATORVASTATIN CALCIUM 40 MG: 80 TABLET, FILM COATED ORAL at 19:50

## 2020-11-25 RX ADMIN — LISINOPRIL 20 MG: 10 TABLET ORAL at 08:54

## 2020-11-25 RX ADMIN — DEXAMETHASONE 6 MG: 4 TABLET ORAL at 08:55

## 2020-11-25 RX ADMIN — SOTALOL HYDROCHLORIDE 80 MG: 80 TABLET ORAL at 08:54

## 2020-11-25 RX ADMIN — FUROSEMIDE 20 MG: 10 INJECTION, SOLUTION INTRAMUSCULAR; INTRAVENOUS at 08:55

## 2020-11-25 RX ADMIN — TAMSULOSIN HYDROCHLORIDE 0.4 MG: 0.4 CAPSULE ORAL at 08:54

## 2020-11-25 RX ADMIN — FAMOTIDINE 20 MG: 20 TABLET, FILM COATED ORAL at 08:54

## 2020-11-25 ASSESSMENT — PAIN SCALES - GENERAL
PAINLEVEL_OUTOF10: 0
PAINLEVEL_OUTOF10: 1
PAINLEVEL_OUTOF10: 0

## 2020-11-25 NOTE — PROGRESS NOTES
Pt was satting in low 80's, asymptomatic. Called RT. Rt increased high flow from 20/90 to 25/90. Pt satting in low 90's high 80's. 41

## 2020-11-25 NOTE — PROGRESS NOTES
[facility] -Progress Note COVID 19 Patient  Today's Date and Time: 11/25/2020, 1:36 PM    Impression :   · COVID 19 Suspect  · COVID 19 Confirmed Infection  · Covid tests:  · 11-23-20: Positive  · Atrial fibrillation with RVR: S/P cardioversion   · Pulmonary Fibrosis: secondary to Occupational exposure. 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 · Hypertrophic non obstructive cardiomyopathy. · Hx of Colon cancer  · BPH  Recommendations:   · Monitor off antibiotics  · Clinical Research will approach patient to explore if he qualifies for any of the COVID 19 treatment protocols. · Remdesivir started 11-24-20. Stop date 11-28-20  · CRP and LD on 11/23/20 were elevated. Continue Decadron. Started on 11/23 Stop date:12/3/20  · Plasma infusion. Two units infused 11-24-10    Medical Decision Making/Summary/Discussion:11/25/2020     · Patient admitted with suspected COVID 19 infection  · Covid test confirmed positive. · Associated problems with congestive heart failure, pulmonary fibrosis, hypoxia. Infection Control Recommendations   · Universal Precautions  · Airborne isolation  · Droplet Isolation    Antimicrobial Stewardship Recommendations     · Discontinuation of therapy  Coordination of Outpatient Care:   · Estimated Length of IV antimicrobials:TBD  · Patient will need Midline Catheter Insertion: TBD  · Patient will need PICC line Insertion: No  · Patient will need: Home IV , [place],  SNF,  LTAC:TBD  · Patient will need outpatient wound care:No    Chief complaint/reason for consultation:   · Concern for COVID infection      History of Present Illness:   Joel aGlvez is a 77y.o.-year-old  male who was initially admitted on 11/23/2020. Patient seen at the request of [address] [place] [address]. INITIAL HISTORY:    Patient presented through ER with complaints of onset of shortness of breath.   Located onset of symptoms on 11/22/2020 with worsening through the day leading to his seeking help at the emergency room. The patient had been recently admitted on 10/17/2020 through 10/26/2020 at [facility] because of the presence of congestive heart failure NYHAA class II with acute on chronic combined heart failure. He also suffers from atrial fibrillation and hypertrophic nonobstructive cardiomyopathy, benign essential hypertension. His previous diagnosis includes occupational pulmonary fibrosis. During that admission he was also found to have suffered from mycoplasma pneumonia and was treated with antibiotics. He reports being seen at the [facility], after his admission to [facility],  where he was cardioverted because of the atrial fibrillation. He was also found to be in heart failure and was a started on diuretics. When the patient was evaluated in the emergency room he was found to be hypoxic and was treated with BiPAP which improved his oxygen saturation rate. He was tested for Covid and was found to be positive on 11/23/2020. Chest x-ray:  · 11/23/2020 persistent bilateral pulmonary infiltrates with associated pulmonary vascular congestion. Unchanged from films of 10/23/2020    Chest CT:  · 11/23/2020: Pulmonary fibrosis. No pulmonary embolus. Increased groundglass opacities and parenchymal opacities throughout both lungs when compared with films of 10/21/2020    Patient admitted because of concerns with COVID 19.    CURRENT EVALUATION : 11/25/2020    Afebrile  VS stable  Bradycardic     Leukocytosis present. Up trending. On Decadron. Decadron 6 mg PO q day. Stop date 12-2-20  Remdesivir started 11-24-20   Consent for plasma obtained. 2 Units of Plasma transfused 11-24-20    On Heparin for Atrial Fibrillation. Patient exhibiting respiratory distress. Yes  Respiratory secretions: No    Patient receiving supplemental oxygen. Yes BiPAP, FiO2 of 80% yesterday.  Currently on Non rebreather mask 15L O2.--> High flow at 25 L with FIO2 100%  02 sat 06/18/2019    BPH (benign prostatic hyperplasia)     Cancer (HCC)     colon-rectal    Cocaine abuse in remission [facility])     1970's    ED (erectile dysfunction) 4/2/2015    GERD (gastroesophageal reflux disease)     GI bleed 12/5/2018    Hernia     History of colon cancer     Melena     Migraines     Murmur, cardiac        Past Surgical  History:     Past Surgical History:   Procedure Laterality Date    CARDIAC CATHETERIZATION  11/29/2018    Non-obstructive CAD    CARDIOVERSION  2020    COLECTOMY      2nd colectomy, Colostomy and reversed [facility] COLECTOMY      1st time [place]    COLONOSCOPY      COLONOSCOPY  07/18/2016    COLONOSCOPY N/A 12/6/2018    COLONOSCOPY DIAGNOSTIC performed by Yelitza Oakes MD at [address] [facility] Right 2009    inguinal    KNEE SURGERY Right 1970's    arthrotomy    TONSILLECTOMY      TOTAL KNEE ARTHROPLASTY Right 1/8/2019    KNEE TOTAL ARTHROPLASTY performed by Dick Johnson MD at [facility] TRANSESOPHAGEAL ECHOCARDIOGRAM  11/29/2018    UPPER GASTROINTESTINAL ENDOSCOPY N/A 12/5/2018    EGD DIAGNOSTIC ONLY performed by Yelitza Oakes MD at [address] [place] [address] N/A 6/18/2019    MCGUIRE'S       Medications:      furosemide  20 mg Intravenous BID    sodium chloride  20 mL Intravenous Once    rivaroxaban  20 mg Oral Daily    remdesivir IVPB  100 mg Intravenous Q24H    atorvastatin  40 mg Oral Daily    lisinopril  20 mg Oral Daily    sotalol  80 mg Oral BID    tamsulosin  0.4 mg Oral Daily    sodium chloride flush  10 mL Intravenous 2 times per day    famotidine  20 mg Oral Daily    dexamethasone  6 mg Oral Daily    Vitamin D  2,000 Units Oral Daily       Social History:     Social History     Socioeconomic History    Marital status: Single     Spouse name: Not on file    Number of children: Not on file    Years of education: Not on file    Highest education level: Not on file   Occupational History  Not on file   Social Needs    Financial resource strain: Not on file    Food insecurity     Worry: Not on file     Inability: Not on file    Transportation needs     Medical: Not on file     Non-medical: Not on file   Tobacco Use    Smoking status: Former Smoker     Packs/day: 0.50     Years: 1.00     Pack years: 0.50     Last attempt to quit:      Years since quittin.9    Smokeless tobacco: Never Used    Tobacco comment: stated never actually really smoked only inhaled    Substance and Sexual Activity    Alcohol use: Yes     Alcohol/week: 12.0 standard drinks     Types: 10 Standard drinks or equivalent, 2 Shots of liquor per week     Comment: 2-3 times a week    Drug use: No     Types: Other-see comments     Comment: Cocaine use in past in     Sexual activity: Yes     Partners: Female   Lifestyle    Physical activity     Days per week: Not on file     Minutes per session: Not on file    Stress: Not on file   Relationships    Social connections     Talks on phone: Not on file     Gets together: Not on file     Attends Restorationist service: Not on file     Active member of club or organization: Not on file     Attends meetings of clubs or organizations: Not on file     Relationship status: Not on file    Intimate partner violence     Fear of current or ex partner: Not on file     Emotionally abused: Not on file     Physically abused: Not on file     Forced sexual activity: Not on file   Other Topics Concern    Not on file   Social History Narrative    Not on file       Family History:     Family History   Problem Relation Age of Onset    Diabetes Mother     Heart Attack Father     Heart Disease Father     Heart Disease Brother         Allergies:   Adhesive tape; Codeine; and Penicillins     Review of Systems:       Constitutional: No fevers or chills. No systemic complaints  Head: No headaches  Eyes: No double vision or blurry vision. No conjunctival inflammation.   ENT: No sore throat or runny nose. . No hearing loss, tinnitus or vertigo. Cardiovascular: No chest pain or palpitations. Shortness of breath. [place]  Lung: Shortness of breath, dry cough. No sputum production  Abdomen: No nausea, vomiting, diarrhea, or abdominal pain. Pat Lynnette No cramps. Genitourinary: No increased urinary frequency, or dysuria. No hematuria. No suprapubic or CVA pain  Musculoskeletal: No muscle aches or pains. No joint effusions, swelling or deformities  Hematologic: No bleeding or bruising. Neurologic: No headache, weakness, numbness, or tingling. Integument: No rash, no ulcers. Psychiatric: No depression. Endocrine: No polyuria, no polydipsia, no polyphagia. Physical Examination :     Patient Vitals for the past 8 hrs:   BP Temp Resp SpO2   11/25/20 1014 -- -- 20 93 %   11/25/20 0800 121/62 97.7 °F (36.5 °C) 18 --     General Appearance: Awake, alert, and in no apparent distress  Head:  Normocephalic, no trauma  Eyes: Pupils equal, round, reactive to light; sclera anicteric; conjunctivae pink. No embolic phenomena. ENT: Oropharynx clear, without erythema, exudate, or thrush. No tenderness of sinuses. Mouth/throat: mucosa pink and moist. No lesions. Dentition in good repair. Neck:Supple, without lymphadenopathy. Thyroid normal, No bruits. Pulmonary/Chest: ,Distant breath sounds, decreased breath sounds at the bases   cardiovascular: Irreegular rate and rhythm without murmurs, rubs, or gallops. Abdomen: Soft, non tender. Bowel sounds normal. No organomegaly  All four Extremities: No cyanosis, clubbing, edema, or effusions. Neurologic: No gross sensory or motor deficits. Skin: Warm and dry with good turgor. No signs of peripheral arterial or venous insufficiency. No ulcerations. No open wounds.     Medical Decision Making -Laboratory:   I have independently reviewed/ordered the following labs:    CBC with Differential:   Recent Labs     11/24/20  0126 11/25/20  0546   WBC 15.7* 14.4*   HGB 11.3* 10.1*   HCT Prevention measures reviewed  · All prior entries were reviewed  · Administer medications as ordered  · Prognosis: Guarded  · Discharge planning reviewed  · Follow up as outpatient. Thank you for allowing us to participate in the care of this patient. Please call with questions.     Neli Desai MD         Pager: [phone] - Office: [phone]

## 2020-11-25 NOTE — RESEARCH
Evaluated for Protocol BI [id number] COVID 19 study. Oral BI [id number] versus placebo. Excluded due to QTc>450.

## 2020-11-25 NOTE — PLAN OF CARE
Problem: Falls - Risk of:  Goal: Will remain free from falls  Description: Will remain free from falls  Outcome: Met This Shift  Note: Patient's bed remained locked and in lowest position throughout shift. Patient belonings and call light remain within reach. 2/4 side rails are up, and non-skid footwear is on.         Problem: Gas Exchange - Impaired  Goal: Absence of hypoxia  Outcome: Ongoing     Problem: Breathing Pattern - Ineffective  Goal: Ability to achieve and maintain a regular respiratory rate  Outcome: Ongoing     Electronically signed by Darlene Dacosta RN on 11/25/2020 at 1:10 AM

## 2020-11-25 NOTE — PROGRESS NOTES
Physical Therapy    Facility/Department: [facility] 4A STEPDOWN  Initial Assessment    NAME: Meagan Mead  : 1955  MRN: [id number]    Date of Service: 2020    Discharge Recommendations: No further therapy required at discharge. PT Equipment Recommendations  Equipment Needed: No    Assessment   Body structures, Functions, Activity limitations: Decreased endurance  Assessment: The pt is grossly independent with mobility, limited by respiratory status. PT will monitor pt's status throughout stay to ensure continued independence. Prognosis: Good  Decision Making: Medium Complexity  PT Education: Goals;PT Role;Plan of Care; Functional Mobility Training  REQUIRES PT FOLLOW UP: Yes  Activity Tolerance  Activity Tolerance: Patient limited by endurance       Patient Diagnosis(es): The primary encounter diagnosis was COVID-19. A diagnosis of Hypoxia was also pertinent to this visit. has a past medical history of Atrial fibrillation ([facility] [address] Back pain, chronic, Hill's esophagus, BPH (benign prostatic hyperplasia), Cancer ([facility] [address] Cocaine abuse in remission [facility]), ED (erectile dysfunction), GERD (gastroesophageal reflux disease), GI bleed, Hernia, History of colon cancer, Melena, Migraines, and Murmur, cardiac.   has a past surgical history that includes Tonsillectomy; Colonoscopy; colectomy; Colonoscopy (2016); knee surgery (Right, ); transesophageal echocardiogram (2018); Upper gastrointestinal endoscopy (N/A, 2018); Colonoscopy (N/A, 2018); hernia repair (Right, ); Cardiac catheterization (2018); colectomy; Total knee arthroplasty (Right, 2019); Upper gastrointestinal endoscopy (N/A, 2019); and Cardioversion ().     Restrictions  Restrictions/Precautions  Restrictions/Precautions: Isolation(COVID+)  Required Braces or Orthoses?: No  Position Activity Restriction  Other position/activity restrictions: up with assist  Vision/Hearing  Vision: Impaired  Vision Exceptions: Wears glasses for reading  Hearing: Exceptions to [facility]  Hearing Exceptions: Hard of hearing/hearing concerns(R ear deficits)     Subjective  General  Patient assessed for rehabilitation services?: Yes  Response To Previous Treatment: Not applicable  Family / Caregiver Present: No  Follows Commands: Within Functional Limits  Subjective  Subjective: RN and pt agreeable to PT. Pt supine in bed upon arrival, pleasant and cooperative throughout.   Pain Screening  Patient Currently in Pain: Denies  Vital Signs  Patient Currently in Pain: Denies       Orientation  Orientation  Overall Orientation Status: Within Functional Limits  Social/Functional History  Social/Functional History  Lives With: Spouse  Type of Home: House  Home Layout: Two level, Laundry in basement, Bed/Bath upstairs(14 steps to second floor)  Home Access: Stairs to enter with rails  Entrance Stairs - Number of Steps: 3  Entrance Stairs - Rails: Both  Bathroom Shower/Tub: Tub/Shower unit  Bathroom Toilet: Standard  Bathroom Equipment: Grab bars in shower, Shower chair, Toilet raiser  Home Equipment: Rolling walker, Oxygen, Cane(pt reports O2 at night)  ADL Assistance: [address]: Independent  Homemaking Responsibilities: Yes  Ambulation Assistance: Independent  Transfer Assistance: Independent  Active : Yes  Mode of Transportation: Truck  Occupation: Retired  Leisure & Hobbies: [facility]  Additional Comments: Pt reports wife is in good health, able to assist prn  Cognition   Cognition  Overall Cognitive Status: WFL    Objective          Joint Mobility  Spine: WFL  ROM RLE: WFL  ROM LLE: WFL  ROM RUE: WFL  ROM LUE: WFL  Strength RLE  Strength RLE: WFL  Strength LLE  Strength LLE: WFL  Strength RUE  Strength RUE: WFL  Strength LUE  Strength LUE: WFL  Tone RLE  RLE Tone: Normotonic  Tone LLE  LLE Tone: Normotonic  Motor Control  Gross Motor?: WFL  Sensation  Overall Sensation Status: WFL  Bed mobility  Supine to Sit: Modified independent  Scooting: Independent  Comment: HOB elevated. Pt supine upon arrival, retired to bedside chair following mobility. Transfers  Sit to Stand: Independent  Stand to sit:  Independent  Stand Pivot Transfers: Independent  Comment: SPO2 decreased to 89% with standing  Ambulation  Ambulation?: Yes  Ambulation 1  Surface: level tile  Device: No Device  Other Apparatus: (high flow)  Assistance: Independent  Quality of Gait: steady, no LOB  Distance: 5ft  Comments: SPO2 decreased to 87%, distance limited by high flow  Stairs/Curb  Stairs?: No     Balance  Posture: Good  Sitting - Static: Good  Sitting - Dynamic: Good  Standing - Static: Good  Standing - Dynamic: Good  Comments: standing balance assessed without AD        Plan   Plan  Times per week: Monitor, assess when off high-flow  Current Treatment Recommendations: Gait Training, Endurance Training  Safety Devices  Type of devices: Nurse notified, Call light within reach, Left in chair  Restraints  Initially in place: No      AM-PAC Score  AM-PAC Inpatient Mobility Raw Score : 23 (11/25/20 1055)  AM-PAC Inpatient T-Scale Score : 56.93 (11/25/20 1055)  Mobility Inpatient CMS 0-100% Score: 11.2 (11/25/20 1055)  Mobility Inpatient CMS G-Code Modifier : CI (11/25/20 1055)          Goals  Short term goals  Time Frame for Short term goals: 6 visits  Short term goal 1: Ambulate 300ft with SPO2 >90%  Short term goal 2: Perform HEP independently       Therapy Time   Individual Concurrent Group Co-treatment   Time In 1025         Time Out 1050         Minutes 25         Timed Code Treatment Minutes: 8 Minutes       David Wang PT

## 2020-11-25 NOTE — PROGRESS NOTES
gastrointestinal endoscopy (N/A, 6/18/2019); and Cardioversion (2020). Restrictions  Restrictions/Precautions  Restrictions/Precautions: Isolation, Fall Risk(COVID+)  Required Braces or Orthoses?: No  Position Activity Restriction  Other position/activity restrictions: up with assist    Subjective   General  Patient assessed for rehabilitation services?: Yes  Family / Caregiver Present: No  General Comment  Comments: RN ok'd pt for OT/PT eval this date.  Pt agreeable to session and pleasant/cooperative throughout  Patient Currently in Pain: Denies  Vital Signs  Resp: 21  Patient Currently in Pain: Denies  Oxygen Therapy  SpO2: 97 %  Pulse Oximeter Device Mode: Continuous  Pulse Oximeter Device Location: Finger  O2 Device: High flow nasal cannula  FiO2 : 90 %  O2 Flow Rate (L/min): 20 L/min     Social/Functional History  Social/Functional History  Lives With: Spouse  Type of Home: House  Home Layout: Two level, Laundry in basement, Bed/Bath upstairs(14 steps to second floor)  Home Access: Stairs to enter with rails  Entrance Stairs - Number of Steps: 3  Entrance Stairs - Rails: Both  Bathroom Shower/Tub: Tub/Shower unit  Bathroom Toilet: Standard  Bathroom Equipment: Grab bars in shower, Shower chair, Toilet raiser  Home Equipment: Rolling walker, Oxygen, Cane(pt reports O2 at night)  ADL Assistance: [address]: Independent  Homemaking Responsibilities: Yes  Ambulation Assistance: Independent  Transfer Assistance: Independent  Active : Yes  Mode of Transportation: Truck  Occupation: Retired  Leisure & Hobbies: [address]  Additional Comments: Pt reports wife is in good health, able to assist prn       Objective   Vision: Impaired  Vision Exceptions: Wears glasses for reading  Hearing: Exceptions to [facility] [employer] [facility]  Hearing Exceptions: Hard of hearing/hearing concerns(R ear deficits)      Balance  Sitting Balance: Independent  Standing Balance: Stand by assistance  Functional Mobility  Functional - Mobility Device: No device  Activity: Other  Assist Level: Stand by assistance  Functional Mobility Comments: Pt completed functional mobility from bed > chair with SBA for safety. Pt SpO2 dropped to 87% following short functional mobility however recovered to 94% in <1 minute     ADL  Feeding: Independent;Setup  Grooming: Independent;Setup  UE Bathing: Setup; Independent  LE Bathing: Setup; Independent  UE Dressing: Setup; Independent  LE Dressing: Setup; Independent  Toileting: Setup; Independent  Additional Comments: pt setup at end of session for ADL task; able to complete independently  Tone RUE  RUE Tone: Normotonic  Tone LUE  LUE Tone: Normotonic  Coordination  Movements Are Fluid And Coordinated: Yes    Bed mobility  Supine to Sit: Modified independent  Scooting: Independent  Comment: HOB elevated.  Pt retired to recliner at end of session  Transfers  Sit to stand: Stand by assistance  Stand to sit: Stand by assistance    Cognition  Overall Cognitive Status: WFL       Sensation  Overall Sensation Status: WFL        LUE AROM (degrees)  LUE AROM : WFL  Left Hand AROM (degrees)  Left Hand AROM: WFL  RUE AROM (degrees)  RUE AROM : WFL  Right Hand AROM (degrees)  Right Hand AROM: WFL  LUE Strength  Gross LUE Strength: WFL  RUE Strength  Gross RUE Strength: WFL                   AM-PAC Score        AM-[facility] Inpatient Daily Activity Raw Score: 24 (11/25/20 1534)  AM-PAC Inpatient ADL T-Scale Score : 57.54 (11/25/20 1534)  ADL Inpatient CMS 0-100% Score: 0 (11/25/20 1534)  ADL Inpatient CMS G-Code Modifier : CH (11/25/20 1534)    Therapy Time   Individual Concurrent Group Co-treatment   Time In 1025         Time Out 1054         Minutes 29         Timed Code Treatment Minutes: [address]       Bettina Olszewski, OTR/L

## 2020-11-25 NOTE — ADT AUTH CERT
Utilization Reviews         COVID POSITIVE by Cliff Gunter RN         Review Status  Review Entered    In Primary  11/25/2020 09:24        Criteria Review    The illness suspected to be related to the Coronavirus (COVID-19)? Yes  Has the member been tested for the COVID-19? Yes   If Yes, what are the results of the COVID-19? Positive  What is the severity of the members condition (i.e. Isolation, Ventilator use)?   ISOLATION  HFNC  REMDESIVIR  CONVALESCENT PLASMA  IV STEROIDS          Respiratory Failure GRG - Care Day 2 (11/24/2020) by Cliff Gunter RN         Review Status  Review Entered    Completed  11/25/2020 09:20        Criteria Review       Care Day: 2 Care Date: 11/24/2020 Level of Care: Intermediate Care    Guideline Day 2    Level Of Care    (X) ICU or ventilator-capable area    11/25/2020 9:18 AM EST by Jonna Landa      INTERMEDIATE FLOOR WITH TELE    Clinical Status    (X) * Weaning assessment performed    11/25/2020 9:18 AM EST by Jonna Landa      NA  ON HFNC   11/24/20 0905   96.5 (35.8)   22   59   123/81   15   80% NRM   11/24/20 1400   97.5 (36.4)   28   48   113/62  100% 40 LPM HFNC  11/24/20 1525   97.3 (36.3)   18   49   122/71 97%  % 40 LPM HFNC    Interventions    (X) Inpatient interventions continue    11/25/2020 9:20 AM EST by Jonna Landa      heparin (PORCINE) [id number] Units/kg/hr (11/24/20 0221)    11/25/2020 9:18 AM EST by Jonna Landa      Vitamin D 2,000 UnitsOralDailyheparin (porcine) 4,000 UnitsIntravenousOnce  furosemide 40 mgIntravenousBID    11/25/2020 9:18 AM EST by Jonna aLnda      SEE REVIEW NOTES  HFNC  TELE  atorvastatin 40 mgOralDaily  furosemide 20 mgOralDaily  lisinopril 20 mgOralDaily  sotalol 80 mgOralBID  tamsulosin 0.4 mgOralDaily  famotidine 20 mgOralDaily  dexamethasone 6 mgOralDaily    (X) Weaning trials attempted    11/25/2020 9:18 AM EST by Jonna Landa      HFNC CONT 100-80% ON 40 LPM    * Milestone Additional Notes    11/24/2020       ===INTERNAL MED NOTE    Interval History Status: not changed.          Patient seen and evaluated in room resting in bed in no acute distress.  He continues on nonrebreather, tolerating well.  No acute events overnight.  Heparin infusion continues for elevated troponins.  Would anticipate cardiology discontinuing and patient resuming home dose of Xarelto for atrial fibrillation. Episode of bradycardia overnight.  Lasix dose decreased.  Lisinopril and Betapace placed on hold until cardiology evaluates patient. Ailyn Cabral ordered.  Consent for plasma obtained         Chest x-ray demonstrating multifocal pneumonia. Covid positive with a D-dimer of 2.86, CTA showing known underlying pulmonary fibrosis without evidence of central pulmonary embolus.  Lab work demonstrating above-mentioned BNP 3472, troponin I 02, white count 14.1, hemoglobin 12 potassium 3. 2.           Bradycardic and hypotensive overnight.      Asaf Zamora continues     To smoke consent obtained on 11/24.  2 units ordered discussed with infectious disease       General appearance:  alert, cooperative and no distress    Mental Status:  oriented to person, place and time and normal affect    Lungs:  clear to auscultation bilaterally, normal effort    Heart: Irregular rate and rhythm, no murmur    Abdomen:  soft, nontender, nondistended, normal bowel sounds, no masses, hepatomegaly, splenomegaly    Extremities:  no edema, redness, tenderness in the calves    Skin:  no gross lesions, rashes, induration       Plan:            1. COVID-19 viral infection: Continue isolation precautions, infectious disease consult.  Treatment modalities per their recommendations.  Continue Decadron, white count elevated and downtrending, BCX2 NGTD with initial elevated lactic acid, now normal.  on admit.  Discussion with infectious disease.  Plasma consent with 2 Units ordered, discussed with ID     2. History of pulmonary fibrosis: Secondary to occupational exposure    3. Acute hypoxemic respiratory failure: Patient currently tolerating Jeris Carrillo has been consulted, BiPAP/high flow ordered if needed    4. History of atrial fibrillation: Status post cardioversion approximately 5 weeks ago: Cardiology again consulted for help with assistance.  On Betapace, home dose of Xarelto on hold secondary to heparin infusion.  EKGs from emergency department reviewed initially showing A. fib with RVR, patient receive d one-time dose of metoprolol in ED, follow-up EKG demonstrating sinus tachycardia.  Patient hypotensive and bradycardic overnight    5. Elevated troponin: 35>>91>>102>>69>>64>>56. Continue heparin infusion.  Cardiology has been consulted.  Most likely due to demand ischemia.  Continue to trend troponins. Patient received high-dose ASA in emergency department.     6. Essential hypertension: Hold lisinopril and beta-blocker until cardiology evaluates patient     7. Dyslipidemia: Continue statin therapy    8. Acute on chronic systolic and diastolic CHF, with exacerbation and hypertrophic nonobstructive cardiomyopathy: Likely worsened by COVID-19 viral infection.  Continue to monitor closely.  Increase diuretic therapy.  Monitor I/O.  BNP 3472 on admit.  Continue BB    9. Hypokalemia: Replace as ordered    10. BPH: Continue Flomax    11. History of colon cancer    12. GI/DVT prophylaxis: Heparin infusion, Pepcid    13. PT, OT       ===CARDIO NOTE                                   Clinical Changes /Abnormalities:  Report per Mellisa Kemp RN.  For careful stewardship of limited PPE during COVID-19 pandemic my physical exam was deferred. For physical exam, please see today's physical from primary team or ICU team.  SB on tele HR in the 45s       Plan of Treatment:    1. AFib with RVR now SB  HR 40s  Will hold sotalol with HR less than 60. Will d/c heparin drip.  Will start xarelto    2.  NSTEMI likely secondary to [facility] ischemia vs type I  Troponin trending down. Continue heparin drip.   Reviewed with Dr. Emmanuel Mei d/c heparin drip and plan for OP followup in cardiology clinic     3. HTN Controlled.       4. Covid pneumonia treatment per primary service       ===ID NOTE       Impression :    COVID 23 Suspect    COVID 19 Confirmed Infection    Covid tests:    o 11-23-20: Positive    Atrial fibrillation with RVR: S/P cardioversion    Pulmonary Fibrosis: secondary to Occupational exposure. Acute on Chronic Systolic and Diastolic CHF. Hypertrophic non obstructive cardiomyopathy. Hx of Colon cancer    BPH    Recommendations:    Monitor off antibiotics    Clinical Research will approach patient to explore if he qualifies for any of the COVID 19 treatment protocols. Will start remdesivir    CRP and LD on 11/23/20 were elevated. Continue Decadron. Started on 11/23 Stop date:12/13/20    Plasma infusion. Two units infused 11-24-10         Medical Decision Making/Summary/Discussion:11/24/2020         Patient admitted with suspected COVID 19 infection    Covid test confirmed positive. Associated problems with congestive heart failure, pulmonary fibrosis, hypoxia. Infection Control Recommendations    Turner Precautions    Airborne isolation    Droplet Isolation       CURRENT EVALUATION : 11/24/2020         Afebrile    VS stable,intermittent episodes of hypotension and bradycardia. Lasix dose decreased to 20 mg now.         Leukocytosis present. Up trending.         Day 2 Decadron PO    Remdesivir started 11-24-20     Consent for plasma obtained. 2 Units of Plasma ordered.         On Heparin for Atrial Fibrillation.         Patient exhibiting respiratory distress.  Yes    Respiratory secretions: No         Patient receiving supplemental oxygen. Yes BiPAP, FiO2 of 80% yesterday.  Currently on Non rebreather mask 15L O2.    02 sat 96%-->80%    RR 32-->22          Results for Jesus Bravo (MRN [id number]) as of 11/25/2020 09:22 11/24/2020 01:26    Sodium: 140    Potassium: 3.5 (L)    Chloride: 103    CO2: 25    BUN: 22    Creatinine: 0.61 (L)    Bun/Cre Ratio: NOT REPORTED    Anion Gap: 12    GFR Non-: >60    GFR African American: >60    Magnesium: 2.3    Glucose: 136 (H)    Calcium: 8.2 (L)    Albumin/Globulin Ratio: 0.8 (L)    Total Protein: 5.9 (L)    GFR Comment: Pend    GFR Staging: NOT REPORTED    Troponin T: NOT REPORTED    Troponin, High Sensitivity: 56 (HH)    Troponin Interp: NOT REPORTED    Albumin: 2.6 (L)    Alk Phos: 72    ALT: 10    AST: 17    Bilirubin: 1.10    WBC: 15.7 (H)    RBC: 4.47    Hemoglobin Quant: 11.3 (L)    Hematocrit: 37.5 (L)    MCV: 83.9    MCH: 25.3    MCHC: 30.1    MPV: 9.2    RDW: 19.4 (H)    Platelet Count: 621    Platelet Estimate: NOT REPORTED    Absolute Mono #: 0.63    Eosinophils %: 0 (L)    Basophils Absolute: 0.00    Differential Type: NOT REPORTED    Seg Neutrophils: 93 (H)    Segs Absolute: 14.60 (H)    Lymphocytes: 3 (L)    Absolute Lymph #: 0.47 (L)    Monocytes: 4    Absolute Eos #: 0.00    Basophils: 0    Immature Granulocytes: 0    WBC Morphology: NOT REPORTED    RBC Morphology: NOT REPORTED    Morphology: Normal    PTT: 29.0    D-Dimer, Quant: 0.68    Absolute Immature Granulocyte: 0.00    NRBC Automated: 0.0       11/24/2020 03:23    PTT: 36.0 (H)       11/24/2020 09:12    EKG 12-LEAD: Rpt    Atrial Rate: 49    P Axis: 40    P-R Interval: 170    Q-T Interval: 610    QRS Duration: 90    QTc Calculation (Bazett): 551    R Axis: -15    T Axis: -39    Ventricular Rate: 49    Sinus bradycardia    Nonspecific T wave abnormality    Prolonged QT    Abnormal ECG    When compared with ECG of 23-NOV-2020 11:19,    Sinus rhythm has replaced Atrial fibrillation    Vent.  rate has decreased BY  72 BPM    T wave inversion now evident in Inferior leads    T wave inversion now evident in Lateral leads       11/24/2020 09:15    Dispense Status: TRANSFUSED    Transfusion Status: OK TO TRANSFUSE    Product Code: Fresh Plasma    Unit Number: [id number]    PREPARE COVID-19 CONVALESCENT PLASMA: Rpt    Unit Divison: 00       11/24/2020 09:15    Dispense Status: TRANSFUSED    Transfusion Status: OK TO TRANSFUSE    Product Code: Fresh Plasma    Unit Number: [id number]    Unit Divison: 00 11/24/2020 11:07    Troponin, High Sensitivity: 51 (H)       PTT: 29.8    ABO Rh: A POSITIVE    Antibody Screen: NEGATIVE    Arm Band Number: [id number]    Expiration Date: 11/27/2020,2359 11/24/2020 16:24    Troponin, High Sensitivity: 46 (H)

## 2020-11-25 NOTE — PROGRESS NOTES
Recent Labs     11/23/20  0707   INR 1.4     DIAGNOSTIC DATA  EKG:   NSR, Biatrial enlargement  ECHO: 10/20/20   Summary  Left ventricle is normal in size Global left ventricular systolic function  is moderately reduced Estimated ejection fraction is 35 % . Mostly global hypokinesis with minor regional variation. Grade I (mild) left ventricular diastolic dysfunction. Left atrium is moderately dilated. Aortic leaflet calcification with Moderate Aortic Stenosis, maybe  underestimated due to poor LVEF. Thickened mitral valve leaflets. Mild to moderate mitral regurgitation. Trivial tricuspid regurgitation. Estimated right ventricular systolic  pressure is 81EMFA. IVC dilated but unable to assess respiratory collapse.     Cardiac Angiography: 2018  Procedure Summary      Non-obstructive CAD.   Normal LV systolic function.      Recommendations      Medical therapy as needed.   Risk factor modification. Objective:   Vitals: /74   Pulse 52   Temp 97.9 °F (36.6 °C) (Axillary)   Resp 20   Ht 6' (1.829 m)   Wt 167 lb 5.3 oz (75.9 kg)   SpO2 94%   BMI 22.69 kg/m²   For careful stewardship of limited PPE during COVID-19 pandemic my physical exam was deferred. For physical exam, please see today's physical from primary team or ICU team.         Assessment / Acute Cardiac Problems:   1. A FIB with RVR now SB  2. Covid 19 +  3. Hypertension  4.  Acute on chronic systolic and diastolic HF    Patient Active Problem List:     Dyslipidemia     ED (erectile dysfunction)     Incomplete bladder emptying     Benign prostatic hyperplasia with urinary obstruction     History of colon cancer     Atrial fibrillation with normal ventricular rate (HCC)     Anemia     Pallor of optic disc     Presbyopia     Incisional hernia, without obstruction or gangrene     Hypertrophic nonobstructive cardiomyopathy (HCC)     Iron (Fe) deficiency anemia     Primary osteoarthritis of right knee     Benign essential HTN     History of malignant neoplasm of rectum     Hill's esophagus     CHF (congestive heart failure), NYHA class II, acute on chronic, combined (HCC)     Hypoxia     Dyspnea and respiratory abnormalities     Occupational pulmonary disease     Sinus bradycardia     Acute hypoxemic respiratory failure due to COVID-19 [facility])      Plan of Treatment:   1. AFib with RVR now SB  HR 50. Will hold sotalol with HR less than 60. Continue xarelto  2. NSTEMI likely secondary to demand ischemia vs type I  Troponin trending down. Reviewed with Dr. Thanh Sosa. Plan for OP followup in cardiology clinic Continue statin, BB, ACE. 3. LVrEF  EF 35% on ECHO 10/20/2020  Not in fluid overload. Continue BB, ACE and Lasix. Follow K and replace per scale   4. HTN Controlled. Continue lisinopril and sotalol with parameters. 5. Keep K > 4.0 and Mag  >2.0  Replace K today per scale  6. Covid pneumonia treatment per primary service. 7. Cardiology will sign off and follow in OP clinic when underlying issues resolved.      Electronically signed by MIRI Espinosa NP on 11/25/2020 at [address] [place] [address].  [phone]

## 2020-11-25 NOTE — PROGRESS NOTES
[facility]  Office: [address], DO, Castilloluis alberto Rene, DO, Bart Westbrook, DO, Thompson Valerio Blood, DO, John James MD, Willie Mendoza MD, Corazon Louise MD, Forest Eckert MD, Hugo Pitts MD, Khanh Rodriguez MD, Jennifer Hu MD, Dylan Blackwell MD, Mbonu Fleet Cowden, MD, Mariangel Elliott DO, Inga Santamaria MD, Renee Eubanks MD, Yessy Sweet, DO, Ramos Briggs MD,  Fausto Stewart DO, Bradford Stern MD, Amina Marc MD, Maria Teresa Ram, [facility], [facility], CNP, Ana Lilia Moreira, CNP, Pao Mcarthur, [facility], Abilio Stevens, CNP, Etta Patel, CNP, Zachary Perera, CNP, Syed Tariq, CNP, Natalie Hinojosa, CNP, GRICELDA SinghC, Betsy Toro, [facility] [place], Donna Carter, CNP, Tip Gasca, CNP, Margaret Chau, CNP, Yudelka Mcnulty, CNP, Riley Cid, [facility]   [address] [facility]    Progress Note    11/25/2020    12:53 PM    Name:   Mamadou Garcia  MRN:     [id number]     Acct:      [de-identified]   Room:   [address] [place] [address] Day:  2  Admit Date:  11/23/2020  6:44 AM    PCP:   Pura Krabbe, MD  Code Status:  Full Code    Subjective:     C/C:   Chief Complaint   Patient presents with    Shortness of Breath     Interval History Status: not changed. Continues to remain bradycardic. 100% FiO2 at 25 L high flow nasal cannula, tolerating well without patient reported shortness of breath or other symptomology at this time. Still endorses lack of appetite. Remdesivir started 11/24, plasma initiated 11/24. Brief History:     Per records:    Mamadou Garcia is a 77 y.o. Non-/non  male who presents with Shortness of Breath   and is admitted to the hospital for the management of Acute hypoxemic respiratory failure due to COVID-19 [facility]).      Patient is a 68-year-old male who was recently seen and evaluated by resident teaching service for acute on chronic combined systolic and diastolic CHF exacerbation with atrial fibrillation with mg Oral Daily    sodium chloride flush  10 mL Intravenous 2 times per day    famotidine  20 mg Oral Daily    dexamethasone  6 mg Oral Daily    Vitamin D  2,000 Units Oral Daily     Continuous Infusions:     PRN Meds: sodium chloride, albuterol **AND** [] ipratropium, sodium chloride flush, potassium chloride **OR** potassium alternative oral replacement **OR** potassium chloride, magnesium sulfate, acetaminophen **OR** acetaminophen, polyethylene glycol, promethazine **OR** [DISCONTINUED] ondansetron, nicotine, albuterol sulfate HFA, dextromethorphan-guaiFENesin    Data:     Past Medical History:   has a past medical history of [facility] Back pain, chronic, Hill's esophagus, BPH (benign prostatic hyperplasia), [facility] Cocaine abuse in remission [facility]), ED (erectile dysfunction), GERD (gastroesophageal reflux disease), GI bleed, Hernia, History of colon cancer, Melena, Migraines, and Murmur, cardiac. Social History:   reports that he quit smoking about 49 years ago. He has a 0.50 pack-year smoking history. He has never used smokeless tobacco. He reports current alcohol use of about 12.0 standard drinks of alcohol per week. He reports that he does not use drugs. Family History:   Family History   Problem Relation Age of Onset    Diabetes Mother     Heart Attack Father     Heart Disease Father     Heart Disease Brother        Vitals:  /62   Pulse 52   Temp 97.7 °F (36.5 °C)   Resp 20   Ht 6' (1.829 m)   Wt 167 lb 5.3 oz (75.9 kg)   SpO2 93%   BMI 22.69 kg/m²   Temp (24hrs), Av.5 °F (36.4 °C), Min:97.1 °F (36.2 °C), Max:98 °F (36.7 °C)    Recent Labs     20  0650   POCGLU 151*       I/O (24Hr):     Intake/Output Summary (Last 24 hours) at 2020 1253  Last data filed at 2020 1014  Gross per 24 hour   Intake 1681 ml   Output 500 ml   Net 1181 ml       Labs:  Hematology:  Recent Labs     20  0707 20  1304 20  1940 FIO2 NOT REPORTED 11/23/2020     Lab Results   Component Value Date/Time    SPECIAL 10CC R HAND 11/23/2020 07:00 AM    SPECIAL 20CC F FA 11/23/2020 07:00 AM     Lab Results   Component Value Date/Time    CULTURE NO GROWTH 2 DAYS 11/23/2020 07:00 AM    CULTURE NO GROWTH 2 DAYS 11/23/2020 07:00 AM       Radiology:  Xr Chest Portable    Result Date: 11/23/2020  Stable exam     Ct Chest Pulmonary Embolism W Contrast    Result Date: 11/23/2020  No central pulmonary embolus. Peripheral branches are not well evaluated secondary to motion. Increased ground-glass opacity and parenchymal opacities throughout the lungs either due to developing pneumonia or edema. There is a small left-sided pleural effusion. By report there is a history of COVID-19 infection Underlying pulmonary fibrosis again noted. Physical Examination:        General appearance:  alert, cooperative and no distress  Mental Status:  oriented to person, place and time and normal affect  Lungs: Diminished posteriorly, tachypneic, high flow O2  Heart: Bradycardic, no murmur  Abdomen:  soft, nontender, nondistended, normal bowel sounds, no masses, hepatomegaly, splenomegaly  Extremities:  no edema, redness, tenderness in the calves  Skin:  no gross lesions, rashes, induration    Assessment:        Hospital Problems           Last Modified POA    * (Principal) Acute hypoxemic respiratory failure due to COVID-19 ([address] 11/23/2020 Yes    Dyslipidemia 11/23/2020 Yes    Benign prostatic hyperplasia with urinary obstruction 11/23/2020 Yes    Hypertrophic nonobstructive cardiomyopathy ([address] 11/23/2020 Yes    Iron (Fe) deficiency anemia 11/23/2020 Yes    Benign essential HTN 11/23/2020 Yes    CHF (congestive heart failure), NYHA class II, acute on chronic, combined ([address] 11/23/2020 Yes    Occupational pulmonary disease 11/23/2020 Yes    COVID-19 11/24/2020 Yes          Plan:        1.  COVID-19 viral infection: Continue isolation precautions, infectious disease consult. Plasma consent on 11/24, [place] started 11/24. Continue Decadron, white count elevated and downtrending, BCX2 NGTD with initial elevated lactic acid, now normal.  on admit. 2. History of pulmonary fibrosis: Secondary to occupational exposure  3. Acute hypoxemic respiratory failure: Initially nonrebreather, transition to high flow 90% FiO2 25 L  4. Atrial fibrillation: Status post cardioversion approximately 5 weeks ago: Cardiology following. Betapace with paramaters, home dose of Xarelto restarted. EKGs from emergency department reviewed initially showing A. fib with RVR, patient receive d one-time dose of metoprolol in ED, follow-up EKG demonstrating sinus tachycardia. Now bradycardic  5. Elevated troponin: 35>>91>>102>>69>>64>>56. Heparin discontinued per cardiology  Most likely due to demand ischemia. 6. Essential hypertension: fluctuating, continue to hold lisinopril and beta-blocker   7. Dyslipidemia: Continue statin therapy  8. Acute on chronic systolic and diastolic CHF, with exacerbation and hypertrophic nonobstructive cardiomyopathy: Likely worsened by COVID-19 viral infection. Continue to monitor closely. Diuresis twice daily Monitor I/O. BNP 3472 on admit. BB on hold secndary to bradycardia. Slight positive fluid balance  9. Hypokalemia: Replace as ordered  10. BPH: Continue Flomax  11. History of colon cancer  12.  GI/DVT prophylaxis: Heparin infusion, Pepcid  13. PT, OT    Christina Sellers, APRN - NP  11/25/2020  12:53 PM

## 2020-11-26 LAB
ABSOLUTE EOS #: <0.03 K/UL (ref 0–0.44)
ABSOLUTE IMMATURE GRANULOCYTE: 0.06 K/UL (ref 0–0.3)
ABSOLUTE LYMPH #: 1 K/UL (ref 1.1–3.7)
ABSOLUTE MONO #: 0.88 K/UL (ref 0.1–1.2)
ALBUMIN SERPL-MCNC: 2.4 G/DL (ref 3.5–5.2)
ALBUMIN/GLOBULIN RATIO: 0.8 (ref 1–2.5)
ALP BLD-CCNC: 64 U/L (ref 40–129)
ALT SERPL-CCNC: 16 U/L (ref 5–41)
ANION GAP SERPL CALCULATED.3IONS-SCNC: 9 MMOL/L (ref 9–17)
AST SERPL-CCNC: 17 U/L
BASOPHILS # BLD: 0 % (ref 0–2)
BASOPHILS ABSOLUTE: <0.03 K/UL (ref 0–0.2)
BILIRUB SERPL-MCNC: 0.6 MG/DL (ref 0.3–1.2)
BUN BLDV-MCNC: 26 MG/DL (ref 8–23)
BUN/CREAT BLD: ABNORMAL (ref 9–20)
CALCIUM SERPL-MCNC: 8.1 MG/DL (ref 8.6–10.4)
CHLORIDE BLD-SCNC: 106 MMOL/L (ref 98–107)
CO2: 27 MMOL/L (ref 20–31)
CREAT SERPL-MCNC: 0.53 MG/DL (ref 0.7–1.2)
D-DIMER QUANTITATIVE: 1.19 MG/L FEU
DIFFERENTIAL TYPE: ABNORMAL
EOSINOPHILS RELATIVE PERCENT: 0 % (ref 1–4)
GFR AFRICAN AMERICAN: >60 ML/MIN
GFR NON-AFRICAN AMERICAN: >60 ML/MIN
GFR SERPL CREATININE-BSD FRML MDRD: ABNORMAL ML/MIN/{1.73_M2}
GFR SERPL CREATININE-BSD FRML MDRD: ABNORMAL ML/MIN/{1.73_M2}
GLUCOSE BLD-MCNC: 84 MG/DL (ref 70–99)
HCT VFR BLD CALC: 35.7 % (ref 40.7–50.3)
HEMOGLOBIN: 10.7 G/DL (ref 13–17)
IMMATURE GRANULOCYTES: 1 %
LYMPHOCYTES # BLD: 9 % (ref 24–43)
MCH RBC QN AUTO: 26 PG (ref 25.2–33.5)
MCHC RBC AUTO-ENTMCNC: 30 G/DL (ref 28.4–34.8)
MCV RBC AUTO: 86.9 FL (ref 82.6–102.9)
MONOCYTES # BLD: 8 % (ref 3–12)
NRBC AUTOMATED: 0 PER 100 WBC
PDW BLD-RTO: 19.6 % (ref 11.8–14.4)
PLATELET # BLD: 370 K/UL (ref 138–453)
PLATELET ESTIMATE: ABNORMAL
PMV BLD AUTO: 9.8 FL (ref 8.1–13.5)
POTASSIUM SERPL-SCNC: 3.6 MMOL/L (ref 3.7–5.3)
RBC # BLD: 4.11 M/UL (ref 4.21–5.77)
RBC # BLD: ABNORMAL 10*6/UL
SEG NEUTROPHILS: 82 % (ref 36–65)
SEGMENTED NEUTROPHILS ABSOLUTE COUNT: 9.56 K/UL (ref 1.5–8.1)
SODIUM BLD-SCNC: 142 MMOL/L (ref 135–144)
TOTAL PROTEIN: 5.5 G/DL (ref 6.4–8.3)
WBC # BLD: 11.5 K/UL (ref 3.5–11.3)
WBC # BLD: ABNORMAL 10*3/UL

## 2020-11-26 PROCEDURE — 94761 N-INVAS EAR/PLS OXIMETRY MLT: CPT

## 2020-11-26 PROCEDURE — 80053 COMPREHEN METABOLIC PANEL: CPT

## 2020-11-26 PROCEDURE — 2060000000 HC ICU INTERMEDIATE R&B

## 2020-11-26 PROCEDURE — 6370000000 HC RX 637 (ALT 250 FOR IP): Performed by: NURSE PRACTITIONER

## 2020-11-26 PROCEDURE — 2500000003 HC RX 250 WO HCPCS: Performed by: INTERNAL MEDICINE

## 2020-11-26 PROCEDURE — 36415 COLL VENOUS BLD VENIPUNCTURE: CPT

## 2020-11-26 PROCEDURE — 6360000002 HC RX W HCPCS: Performed by: NURSE PRACTITIONER

## 2020-11-26 PROCEDURE — 2700000000 HC OXYGEN THERAPY PER DAY

## 2020-11-26 PROCEDURE — 85379 FIBRIN DEGRADATION QUANT: CPT

## 2020-11-26 PROCEDURE — 99233 SBSQ HOSP IP/OBS HIGH 50: CPT | Performed by: INTERNAL MEDICINE

## 2020-11-26 PROCEDURE — 99232 SBSQ HOSP IP/OBS MODERATE 35: CPT | Performed by: INTERNAL MEDICINE

## 2020-11-26 PROCEDURE — 2580000003 HC RX 258: Performed by: INTERNAL MEDICINE

## 2020-11-26 PROCEDURE — 2580000003 HC RX 258: Performed by: NURSE PRACTITIONER

## 2020-11-26 PROCEDURE — 85025 COMPLETE CBC W/AUTO DIFF WBC: CPT

## 2020-11-26 RX ADMIN — DEXAMETHASONE 6 MG: 4 TABLET ORAL at 08:54

## 2020-11-26 RX ADMIN — LISINOPRIL 20 MG: 10 TABLET ORAL at 08:55

## 2020-11-26 RX ADMIN — SODIUM CHLORIDE, PRESERVATIVE FREE 10 ML: 5 INJECTION INTRAVENOUS at 19:54

## 2020-11-26 RX ADMIN — Medication 2000 UNITS: at 08:55

## 2020-11-26 RX ADMIN — FUROSEMIDE 20 MG: 10 INJECTION, SOLUTION INTRAMUSCULAR; INTRAVENOUS at 18:26

## 2020-11-26 RX ADMIN — RIVAROXABAN 20 MG: 20 TABLET, FILM COATED ORAL at 18:26

## 2020-11-26 RX ADMIN — TAMSULOSIN HYDROCHLORIDE 0.4 MG: 0.4 CAPSULE ORAL at 08:55

## 2020-11-26 RX ADMIN — SODIUM CHLORIDE, PRESERVATIVE FREE 10 ML: 5 INJECTION INTRAVENOUS at 08:55

## 2020-11-26 RX ADMIN — FUROSEMIDE 20 MG: 10 INJECTION, SOLUTION INTRAMUSCULAR; INTRAVENOUS at 08:55

## 2020-11-26 RX ADMIN — REMDESIVIR 100 MG: 5 INJECTION INTRAVENOUS at 18:26

## 2020-11-26 RX ADMIN — FAMOTIDINE 20 MG: 20 TABLET, FILM COATED ORAL at 08:55

## 2020-11-26 RX ADMIN — ATORVASTATIN CALCIUM 40 MG: 80 TABLET, FILM COATED ORAL at 19:54

## 2020-11-26 ASSESSMENT — PAIN SCALES - GENERAL: PAINLEVEL_OUTOF10: 0

## 2020-11-26 NOTE — PROGRESS NOTES
Pulmonary/Critical Care Daily Progress Note    Patient's name:  Annalisa Bruno  Medical Record Number: [id number]  Patient's account/billing number: [de-identified]  Patient's YOB: 1955  Age: 77 y.o. Date of Admission: 11/23/2020  6:44 AM  Date of Consult: 11/26/2020      Primary Care Physician: Valerie Setel MD      Code Status: Full Code    Reason for consult: Acute hypoxemic respiratory failure secondary to COVID-19 pneumonia      HISTORY OF PRESENT ILLNESS:   History was obtained from chart review and the patient. Mr. Annalisa Bruno is a 77 y.o. white gentleman was admitted with shortness of breath. Patient recently discharged from hospital after having an acute on chronic combined systolic and diastolic CHF and atrial fibrillation with RVR. Chest x-ray showed worsening bilateral infiltrates and CT scan showed some infiltrates along with underlying pulmonary fibrosis but no blood clot was seen. Patient had a Covid test that was positive along with elevated serum BNP and D-dimer of 2.86. Patient was placed on 100% nonrebreather mask  Patient shortness of breath is much improved with using 100% nonrebreather mask  Has a dry cough  Also has some orthopnea    INTERVAL HISTORY;  Desaturates even with talking. No other symptoms. Dry cough. Remains on Remdesvir and decadron. FiO2 90% HF. Lungs reportedly clear.   I/O -1/1`L  Past Medical History:        Diagnosis Date    Atrial fibrillation ([address]     Back pain, chronic     Hill's esophagus 06/18/2019    BPH (benign prostatic hyperplasia)     Cancer (HCC)     colon-rectal    Cocaine abuse in remission [facility])     1970's    ED (erectile dysfunction) 4/2/2015    GERD (gastroesophageal reflux disease)     GI bleed 12/5/2018    Hernia     History of colon cancer     Melena     Migraines     Murmur, cardiac        Past Surgical History:        Procedure Laterality Date    CARDIAC CATHETERIZATION  11/29/2018    Non-obstructive CAD    CARDIOVERSION  2020    COLECTOMY      2nd colectomy, Colostomy and reversed [facility] COLECTOMY      1st time Render René    COLONOSCOPY      COLONOSCOPY  07/18/2016    COLONOSCOPY N/A 12/6/2018    COLONOSCOPY DIAGNOSTIC performed by Stefan Ram MD at [address] Right 2009    inguinal    KNEE SURGERY Right 1970's    arthrotomy    TONSILLECTOMY      TOTAL KNEE ARTHROPLASTY Right 1/8/2019    KNEE TOTAL ARTHROPLASTY performed by Risa Wiseman MD at [address] TRANSESOPHAGEAL ECHOCARDIOGRAM  11/29/2018    UPPER GASTROINTESTINAL ENDOSCOPY N/A 12/5/2018    EGD DIAGNOSTIC ONLY performed by Stefan Ram MD at [facility] N/A 6/18/2019    [facility]       Allergies: Allergies   Allergen Reactions    Adhesive Tape Other (See Comments)     Blister badly     Codeine Nausea Only     Other reaction(s): Other: See Comments  NAUSEA  Other reaction(s): Other: See Comments  NAUSEA    Penicillins Swelling     As a baby  Other reaction(s): Unknown  As a baby         Home Meds:   Prior to Admission medications    Medication Sig Start Date End Date Taking?  Authorizing Provider   atorvastatin (LIPITOR) 40 MG tablet Take 1 tablet by mouth daily 11/16/20  Yes Cassandra Ladd MD   omeprazole (PRILOSEC) 40 MG delayed release capsule Take 1 capsule by mouth daily 11/16/20  Yes Cassandra Ladd MD   lisinopril (PRINIVIL;ZESTRIL) 5 MG tablet take 1 tablet by mouth once daily 10/26/20  Yes Historical Provider, MD   sotalol (BETAPACE) 80 MG tablet take 1 tablet by mouth every 12 hours 10/28/20  Yes Historical Provider, MD   ibuprofen (ADVIL;MOTRIN) 800 MG tablet Take 1 tablet by mouth every 6 hours if needed for pain 10/27/20  Yes Cassandra Ladd MD   furosemide (LASIX) 20 MG tablet Take 1 tablet by mouth daily 10/26/20  Yes Fide Ragsdale MD   tamsulosin (FLOMAX) 0.4 MG capsule Take 1 capsule by mouth daily 10/15/20  Yes Cassandra Echevarria MD   rivaroxaban (XARELTO) 20 MG TABS tablet Take 20 mg by mouth daily (with breakfast)    Yes Historical Provider, MD       Family History:       Problem Relation Age of Onset    Diabetes Mother     Heart Attack Father     Heart Disease Father     Heart Disease Brother          Social History:   TOBACCO:   reports that he quit smoking about 49 years ago. He has a 0.50 pack-year smoking history. He has never used smokeless tobacco.  ETOH:   reports current alcohol use of about 12.0 standard drinks of alcohol per week. DRUGS:  reports no history of drug use.   OCCUPATION: Noncontributory          REVIEW OF SYSTEMS:    Review of Systems -   General ROS: Completed and except as mentioned above were negative   Psychological ROS:  Completed and except as mentioned above were negative  Ophthalmic ROS:  Completed and except as mentioned above were negative  ENT ROS:  Completed and except as mentioned above were negative  Allergy and Immunology ROS:  Completed and except as mentioned above were negative  Hematological and Lymphatic ROS:  Completed and except as mentioned above were negative  Endocrine ROS: Completed and except as mentioned above were negative  Breast ROS:  Completed and except as mentioned above were negative  Respiratory ROS:  Completed and except as mentioned above were negative  Cardiovascular ROS:  Completed and except as mentioned above were negative  Gastrointestinal ROS: Completed and except as mentioned above were negative  Genito-Urinary ROS:  Completed and except as mentioned above were negative  Musculoskeletal ROS:  Completed and except as mentioned above were negative  Neurological ROS:  Completed and except as mentioned above were negative  Dermatological ROS:  Completed and except as mentioned above were negative          Physical Exam:    Vitals: /72   Pulse 50   Temp 97.8 °F (36.6 °C) (Axillary)   Resp 14   Ht 6' (1.829 m)   Wt 163 lb 5.8 oz (74.1 kg)   SpO2 91%   BMI 22.16 kg/m²     Last Body weight:   Wt Readings from Last 3 Encounters:   11/26/20 163 lb 5.8 oz (74.1 kg)   11/16/20 178 lb (80.7 kg)   11/10/20 173 lb 3.2 oz (78.6 kg)       Body Mass Index : Body mass index is 22.16 kg/m². Intake and Output summary:     Intake/Output Summary (Last 24 hours) at 11/26/2020 1311  Last data filed at 11/26/2020 1238  Gross per 24 hour   Intake 650 ml   Output 1400 ml   Net -750 ml       Physical Examination:   PHYSICAL EXAMINATION:  Vitals:    11/26/20 0758 11/26/20 1131 11/26/20 1149 11/26/20 1153   BP: (!) 142/70   127/72   Pulse: 54 53 52 50   Resp: 21 20 14   Temp: 97.7 °F (36.5 °C)   97.8 °F (36.6 °C)   TempSrc: Axillary   Axillary   SpO2: 92% 94%  91%   Weight:       Height:         Due to the current efforts to prevent transmission of COVID-19 and also the need to preserve PPE for other caregivers, a face-to-face encounter with the patient was not performed. That being said, all relevant records and diagnostic tests were reviewed, including laboratory results and imaging. Patient was evaluated from the window, called on the phone, and chart reviewed, disc w  involved healthcare workers. Patient appears comfortable on 100% nonrebreather mask  Not using accessory musculature for breathing        Laboratory findings:-    CBC:   Recent Labs     11/26/20  0651   WBC 11.5*   HGB 10.7*        BMP:    Recent Labs     11/24/20  0126 11/25/20  0546 11/26/20  0651    138 142   K 3.5* 3.5* 3.6*    103 106   CO2 25 27 27   BUN 22 31* 26*   CREATININE 0.61* 0.53* 0.53*   GLUCOSE 136* 104* 84     S. Calcium:  Recent Labs     11/26/20  0651   CALCIUM 8.1*     S. Ionized Calcium:No results for input(s): IONCA in the last 72 hours. S. Magnesium:  Recent Labs     11/25/20  0546   MG 2.5     S. Phosphorus:No results for input(s): PHOS in the last 72 hours.   S. Glucose:  No results for input(s): POCGLU in the last 72 hours. Glycosylated hemoglobin A1C: No results for input(s): LABA1C in the last 72 hours. INR:   No results for input(s): INR in the last 72 hours. Hepatic functions:   Recent Labs     11/26/20  0651   ALKPHOS 64   ALT 16   AST 17   PROT 5.5*   BILITOT 0.60   LABALBU 2.4*     Pancreatic functions:No results for input(s): LACTA, AMYLASE in the last 72 hours. S. Lactic Acid: No results for input(s): LACTA in the last 72 hours. Cardiac enzymes:No results for input(s): CKTOTAL, CKMB, CKMBINDEX, TROPONINI in the last 72 hours. BNP:No results for input(s): BNP in the last 72 hours. Lipid profile: No results for input(s): CHOL, TRIG, HDL, LDLCALC in the last 72 hours. Invalid input(s): LDL  Blood Gases: No results found for: PH, PCO2, PO2, HCO3, O2SAT  Thyroid functions:   Lab Results   Component Value Date    TSH 4.28 10/17/2020            Radiological reports:  CT scan of the chest showed no pulmonary embolism but showed bilateral pulmonary infiltrates and small left pleural effusion    Chest x-ray showed bilateral pulmonary infiltrates that are unchanged from before       Assessment and Plan     Principal Problem:    Acute hypoxemic respiratory failure due to COVID-19 [facility])  Active Problems:    Dyslipidemia    Benign prostatic hyperplasia with urinary obstruction    Hypertrophic nonobstructive cardiomyopathy (HCC)    Iron (Fe) deficiency anemia    Benign essential HTN    CHF (congestive heart failure), NYHA class II, acute on chronic, combined ([address]    Occupational pulmonary disease    COVID-19  Resolved Problems:    * No resolved hospital problems.  *        Assessment:     Acute hypoxic respiratory failure   Acute respiratory distress syndrome   COVID 19 infection/pneumonia   Patient may also have a competent of acute on chronic combined congestive heart failure   Hypokalemia   Leukocytosis secondary to infection   Mild anemia    Plan:     Continue Airborne isolation   Continue HFNC   Monitor input/output, with a goal of even/negative fluid balance   ID consultation   Continue Decadron, remdesivir   Monitor CRP, LDH, AST/ALT/ferritin/ D-dimer   Continue supportive care   Pt on eliquis  Glycemic control per primary service    Management as per guidance provided by hospital policy and prevailing evidence based medicine, during the COVID-19 pandemic emergency. This patient was evaluated in the context of the global SARS-CoV-2 (COVID-19) pandemic, which necessitated considerations that the patient either has COVID-19 infection or is at risk of infection with COVID-19. Institutional protocols and algorithms that pertain to the evaluation & management of patients with COVID-19 or those at risk for COVID-19 are in a state of rapid changes based on information released by regulatory bodies including the CDC and federal and state organizations. These policies and algorithms were followed during the patient's care. Please note that this chart was generated using voice recognition Dragon dictation software. Although every effort was made to ensure the accuracy of this automated transcription, some errors in transcription may have occurred. Thank you for having us involved in the care of your patient. Please call us if you have any questions or concerns.       Keesha Praada M.D.            11/26/2020, 1:11 PM

## 2020-11-26 NOTE — PLAN OF CARE
Problem: Airway Clearance - Ineffective  Goal: Achieve or maintain patent airway  Outcome: Ongoing     Problem: Gas Exchange - Impaired  Goal: Absence of hypoxia  Outcome: Ongoing     Problem: Breathing Pattern - Ineffective  Goal: Ability to achieve and maintain a regular respiratory rate  Outcome: Ongoing     Problem: Falls - Risk of:  Goal: Will remain free from falls  Description: Will remain free from falls  Outcome: Ongoing

## 2020-11-26 NOTE — PROGRESS NOTES
[facility] -Progress Note COVID 19 Patient  Today's Date and Time: 11/26/2020, 3:41 PM    Impression :   · COVID 23 Suspect  · COVID 19 Confirmed Infection  · Covid tests:  · 11-23-20: Positive  · covid pneumonia  · Atrial fibrillation with RVR: S/P cardioversion   · Pulmonary Fibrosis: secondary to Occupational exposure. 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 · Hypertrophic non obstructive cardiomyopathy. · Hx of Colon cancer  · BPH  Recommendations:   · Leukocytosis from steroids  · Remdesivir 11/24 until 11/20  · Tolerated 2 units of plasma 11/24  · Clinical Research will approach patient to explore if he qualifies for any of the COVID 19 treatment protocols. · CRP and LD on 11/23/20 were elevated. Continue Decadron. Started on 11/23 Stop date:12/3/20      Medical Decision Making/Summary/Discussion:11/26/2020     · Patient admitted with suspected COVID 19 infection  · Covid test confirmed positive. · Associated problems with congestive heart failure, pulmonary fibrosis, hypoxia. Infection Control Recommendations   · Universal Precautions  · Airborne isolation  · Droplet Isolation    Antimicrobial Stewardship Recommendations     · Discontinuation of therapy  Coordination of Outpatient Care:   · Estimated Length of IV antimicrobials:TBD  · Patient will need Midline Catheter Insertion: TBD  · Patient will need PICC line Insertion: No  · Patient will need: Home IV , [place],  SNF,  LTAC:TBD  · Patient will need outpatient wound care:No    Chief complaint/reason for consultation:   · Concern for COVID infection      History of Present Illness:   Ailin Collins is a 77y.o.-year-old  male who was initially admitted on 11/23/2020. Patient seen at the request of [address] [place] [address]. INITIAL HISTORY:    Patient presented through ER with complaints of onset of shortness of breath.   Located onset of symptoms on 11/22/2020 with worsening through the day leading to his seeking help at the emergency room. The patient had been recently admitted on 10/17/2020 through 10/26/2020 at [facility] because of the presence of congestive heart failure NYHAA class II with acute on chronic combined heart failure. He also suffers from atrial fibrillation and hypertrophic nonobstructive cardiomyopathy, benign essential hypertension. His previous diagnosis includes occupational pulmonary fibrosis. During that admission he was also found to have suffered from mycoplasma pneumonia and was treated with antibiotics. He reports being seen at the [facility], after his admission to [facility],  where he was cardioverted because of the atrial fibrillation. He was also found to be in heart failure and was a started on diuretics. When the patient was evaluated in the emergency room he was found to be hypoxic and was treated with BiPAP which improved his oxygen saturation rate. He was tested for Covid and was found to be positive on 11/23/2020. Chest x-ray:  · 11/23/2020 persistent bilateral pulmonary infiltrates with associated pulmonary vascular congestion. Unchanged from films of 10/23/2020    Chest CT:  · 11/23/2020: Pulmonary fibrosis. No pulmonary embolus. Increased groundglass opacities and parenchymal opacities throughout both lungs when compared with films of 10/21/2020    Patient admitted because of concerns with COVID 19.    CURRENT EVALUATION : 11/26/2020    No fever or chills, abdomen is soft, no diarrhea   85 % high flow   No body aches  Feels the same    Tolerating remdesivir, liver enzymes normal  White count 14, from Decadron      Decadron 6 mg PO q day. Stop date 12-2-20  Remdesivir started 11-24-20   Consent for plasma obtained. 2 Units of Plasma transfused 11-24-20    On Heparin for Atrial Fibrillation. Patient exhibiting respiratory distress. Yes  Respiratory secretions: No    Patient receiving supplemental oxygen. Yes BiPAP, FiO2 of 80% yesterday. Currently on Non rebreather mask 15L O2.--> High flow at 25 L with FIO2 100%  02 sat 96%-->80-->93%  RR 32-->22-->20    QTc:           NEWS Score: 0-4 Low risk group; 5-6: Medium risk group; 7 or above: High risk group  Parameters 3 2 1 0 1 2 3   Age    < 65   ? 65   RR ? 8  9-11 12-20  21-24 ? 25   O2 Sats ? 91 92-93 94-95 ? 96      Suppl O2  Yes  No      SBP ? 90  101-110 111-219   ? 220   HR ? 40  41-50 51-90  111-130 ? 131   Consciousness    Alert   Drowsiness, lethargy, or confusion   Temperature ? 35.0 C (95.0 F)  35.1-36.0 C 95.1-96.9 F 36.1-38.0 C 97.0-100.4 F 38.1-39.0 C 100.5-102.3 F ? 39.1 C ? 102.4 F      NEWS Score:   11/23/2020: 9 risk   11/24/20: 11 high Risk    Overall Daily Picture:      Worsening    Presence of secondary bacterial Infection:    No   Additional antibiotics: No    Labs, X rays reviewed: 11/26/2020    BUN: 16-->22-->31  Cr: 0.62-->0.61-->0.53    WBC: 14.1-->15.7--.14.4  Hb: 12.0-->11.3-->10.1  Plat: 282-->305-->423    Bilirubin 1.28  Alk phos 86  ALT 12  AST 24    Absolute Neutrophils: 13.1  Absolute Lymphocytes: 0.59  Neutrophil/Lymphocyte Ratio: 22.2 high risk    CRP: 182 (10-17-20) -->204 (11/23/20)  Ferritin: 395(11/23/20)  LDH: 521(11/23/20)    Pro Calcitonin:0.29( 10/17/20)  Troponin high-sensitivity 102  proBNP 3472    Cultures:  Urine:  ·   Blood:  · 11/23/20: x2: No growth  Sputum :  ·   Wound:       CXR:   · 11/23/2020 pulmonary fibrosis with interstitial edema. Scattered infiltrates bilaterally      CAT:  · 11/23/2020: Pulmonary fibrosis. No pulmonary embolus. Increased groundglass opacities and parenchymal opacities throughout both lungs when compared with films of 10/21/2020            Discussed with patient, RN, CC, IM. I have personally reviewed the past medical history, past surgical history, medications, social history, and family history, and I have updated the database accordingly.   Past Medical History:     Past Medical History:   Diagnosis Date    Atrial fibrillation ([facility] [address]     Back pain, chronic     Mcguire's esophagus 06/18/2019    BPH (benign prostatic hyperplasia)     Cancer (HCC)     colon-rectal    Cocaine abuse in remission [facility])     1970's    ED (erectile dysfunction) 4/2/2015    GERD (gastroesophageal reflux disease)     GI bleed 12/5/2018    Hernia     History of colon cancer     Melena     Migraines     Murmur, cardiac        Past Surgical  History:     Past Surgical History:   Procedure Laterality Date    CARDIAC CATHETERIZATION  11/29/2018    Non-obstructive CAD    CARDIOVERSION  2020    COLECTOMY      2nd colectomy, Colostomy and reversed [facility] COLECTOMY      1st time [place]    COLONOSCOPY      COLONOSCOPY  07/18/2016    COLONOSCOPY N/A 12/6/2018    COLONOSCOPY DIAGNOSTIC performed by Stefan Ram MD at [place] [address] Right 2009    inguinal    KNEE SURGERY Right 1970's    arthrotomy    TONSILLECTOMY      TOTAL KNEE ARTHROPLASTY Right 1/8/2019    KNEE TOTAL ARTHROPLASTY performed by Risa Wiseman MD at [facility] TRANSESOPHAGEAL ECHOCARDIOGRAM  11/29/2018    UPPER GASTROINTESTINAL ENDOSCOPY N/A 12/5/2018    EGD DIAGNOSTIC ONLY performed by Stefan Ram MD at [address] [place] [address] N/A 6/18/2019    MCGUIRE'S       Medications:      furosemide  20 mg Intravenous BID    sodium chloride  20 mL Intravenous Once    rivaroxaban  20 mg Oral Daily    remdesivir IVPB  100 mg Intravenous Q24H    atorvastatin  40 mg Oral Daily    lisinopril  20 mg Oral Daily    sotalol  80 mg Oral BID    tamsulosin  0.4 mg Oral Daily    sodium chloride flush  10 mL Intravenous 2 times per day    famotidine  20 mg Oral Daily    dexamethasone  6 mg Oral Daily    Vitamin D  2,000 Units Oral Daily       Social History:     Social History     Socioeconomic History    Marital status: Single     Spouse name: Not on file    Number of children: Not on file    Years of education: Not on file    Highest education level: Not on file   Occupational History    Not on file   Social Needs    Financial resource strain: Not on file    Food insecurity     Worry: Not on file     Inability: Not on file    Transportation needs     Medical: Not on file     Non-medical: Not on file   Tobacco Use    Smoking status: Former Smoker     Packs/day: 0.50     Years: 1.00     Pack years: 0.50     Last attempt to quit:      Years since quittin.9    Smokeless tobacco: Never Used    Tobacco comment: stated never actually really smoked only inhaled    Substance and Sexual Activity    Alcohol use: Yes     Alcohol/week: 12.0 standard drinks     Types: 10 Standard drinks or equivalent, 2 Shots of liquor per week     Comment: 2-3 times a week    Drug use: No     Types: Other-see comments     Comment: Cocaine use in past in     Sexual activity: Yes     Partners: Female   Lifestyle    Physical activity     Days per week: Not on file     Minutes per session: Not on file    Stress: Not on file   Relationships    Social connections     Talks on phone: Not on file     Gets together: Not on file     Attends Nondenominational service: Not on file     Active member of club or organization: Not on file     Attends meetings of clubs or organizations: Not on file     Relationship status: Not on file    Intimate partner violence     Fear of current or ex partner: Not on file     Emotionally abused: Not on file     Physically abused: Not on file     Forced sexual activity: Not on file   Other Topics Concern    Not on file   Social History Narrative    Not on file       Family History:     Family History   Problem Relation Age of Onset    Diabetes Mother     Heart Attack Father     Heart Disease Father     Heart Disease Brother         Allergies:   Adhesive tape; Codeine; and Penicillins     Review of Systems:       Constitutional: No fevers or chills.  No systemic complaints  Head: No headaches  Eyes: No double vision or blurry vision. No conjunctival inflammation. ENT: No sore throat or runny nose. . No hearing loss, tinnitus or vertigo. Cardiovascular: No chest pain or palpitations. Shortness of breath. [place]  Lung: Shortness of breath, dry cough. No sputum production  Abdomen: Nausea or diarrhea  Genitourinary: No increased urinary frequency, or dysuria. No hematuria. No suprapubic or CVA pain  Musculoskeletal: No muscle aches or pains. No joint effusions, swelling or deformities  Hematologic: No bleeding or bruising. Neurologic: No headache, weakness, numbness, or tingling. Integument: No rash. Psychiatric: No depression. Endocrine: No polyuria, no polydipsia, no polyphagia. Physical Examination :     Patient Vitals for the past 8 hrs:   BP Temp Temp src Pulse Resp SpO2   11/26/20 1516 -- -- -- -- 23 91 %   11/26/20 1153 127/72 97.8 °F (36.6 °C) Axillary 50 14 91 %   11/26/20 1149 -- -- -- 52 -- --   11/26/20 1131 -- -- -- 53 20 94 %   11/26/20 0758 (!) 142/70 97.7 °F (36.5 °C) Axillary 54 21 92 %     General Appearance: Awake, alert, and in no apparent distress  Head:  Normocephalic, no trauma  Eyes: Pupils equal, round, reactive to light; sclera anicteric; conjunctivae pink. No embolic phenomena. ENT: Oropharynx clear, without erythema, exudate, or thrush. No tenderness of sinuses. Mouth/throat: mucosa pink and moist. No lesions. Dentition in good repair. Neck:Supple, without lymphadenopathy. Thyroid normal, No bruits. Pulmonary/Chest: , Clear lungs  cardiovascular: Irregular heart rate  Abdomen: Soft, non tender. Bowel sounds normal. No organomegaly  All four Extremities: No cyanosis, clubbing, edema, or effusions. Neurologic: No gross sensory or motor deficits. Skin: Warm and dry with good turgor. No signs of peripheral arterial or venous insufficiency. No ulcerations. No open wounds.     Medical Decision Making -Laboratory:   I have independently reviewed/ordered the following labs:    CBC with Differential:   Recent Labs     11/25/20  0546 11/26/20  0651   WBC 14.4* 11.5*   HGB 10.1* 10.7*   HCT 35.3* 35.7*    370   LYMPHOPCT 6* 9*   MONOPCT 5 8     BMP:   Recent Labs     11/24/20  0126 11/25/20  0546 11/26/20  0651    138 142   K 3.5* 3.5* 3.6*    103 106   CO2 25 27 27   BUN 22 31* 26*   CREATININE 0.61* 0.53* 0.53*   MG 2.3 2.5  --      Hepatic Function Panel:   Recent Labs     11/25/20  0546 11/26/20  0651   PROT 5.6* 5.5*   LABALBU 2.6* 2.4*   BILITOT 0.59 0.60   ALKPHOS 63 64   ALT 15 16   AST 20 17     No results for input(s): RPR in the last 72 hours. No results for input(s): HIV in the last 72 hours. No results for input(s): BC in the last 72 hours. Lab Results   Component Value Date    MUCUS NOT REPORTED 11/23/2020    RBC 4.11 11/26/2020    TRICHOMONAS NOT REPORTED 11/23/2020    WBC 11.5 11/26/2020    YEAST NOT REPORTED 11/23/2020    TURBIDITY CLEAR 11/23/2020     Lab Results   Component Value Date    CREATININE 0.53 11/26/2020    GLUCOSE 84 11/26/2020       Medical Decision Making-Imaging:     EXAMINATION:    CTA OF THE CHEST 11/23/2020 7:42 am         TECHNIQUE:    CTA of the chest was performed after the administration of intravenous    contrast.  Multiplanar reformatted images are provided for review.  MIP    images are provided for review. Dose modulation, iterative reconstruction,    and/or weight based adjustment of the mA/kV was utilized to reduce the    radiation dose to as low as reasonably achievable.         COMPARISON:    High-resolution chest CT 10 03/20/2020         CT PA 10/21/2020         HISTORY:    ORDERING SYSTEM PROVIDED HISTORY: r/o PE    TECHNOLOGIST PROVIDED HISTORY:    r/o PE    Reason for Exam: sob    Acuity: Acute              Type of Exam: Initial         FINDINGS:    No intimal flap is seen in aorta. .  Small mediastinal nodes are noted,    similar to prior         No embolus is seen the central, right, left main pulmonary artery.  No    embolus is seen in the proximal segmental branches.  Distal segmental    branches and subsegmental branches are not well evaluated secondary to    respiratory motion artifact.         Patchy ground-glass and parenchymal opacities are seen in the left upper and    left lower lobe.         On the right patchy ground-glass and parenchymal opacities are seen in the    right upper, right middle and right lower lobe.  There is mild fusiform    bronchiectasis. Susana Knuckles is trace left-sided pleural effusion.         There is mild underlying pulmonary fibrosis.  There is underlying    bronchiectasis         Right adrenal gland is normal.  Left adrenal gland is normal.         1 mm calcification is seen in the left kidney         Spurring is seen in the spine.  Spurring is seen in the shoulder joints         Small soft tissue nodule is seen posteriorly in the subcutaneous fat    measuring 2.1 cm, likely sebaceous cyst              Impression    No central pulmonary embolus.  Peripheral branches are not well evaluated    secondary to motion.         Increased ground-glass opacity and parenchymal opacities throughout the lungs    either due to developing pneumonia or edema.  There is a small left-sided    pleural effusion.  By report there is a history of COVID-19 infection         Underlying pulmonary fibrosis again noted.                EXAMINATION:    ONE XRAY VIEW OF THE CHEST         11/23/2020 8:02 am         COMPARISON:    10/19/2020         HISTORY:    ORDERING SYSTEM PROVIDED HISTORY: SOB    TECHNOLOGIST PROVIDED HISTORY:    SOB    Reason for Exam: uprt port chest         FINDINGS:    Cardiomediastinal silhouette is stable.  Bilateral pulmonary infiltrates    persist unchanged and may represent pulmonary edema versus atypical pneumonia.              Impression    Stable exam        Medical Decision Tgiisl-Lwiithgb-Jnnor:       Medical Decision Making-Other:     Note:  · Labs, medications, radiologic studies were reviewed with personal review of films  · Large amounts of data were reviewed  · Discussed with nursing Staff, Discharge planner  · Infection Control and Prevention measures reviewed  · All prior entries were reviewed  · Administer medications as ordered  · Prognosis: Guarded  · Discharge planning reviewed  · Follow up as outpatient. Thank you for allowing us to participate in the care of this patient. Please call with questions.     Dominga Fuller MD

## 2020-11-26 NOTE — PROGRESS NOTES
[facility]  Office: [address], DO, Alberto Gear, DO, Nuvia Payton, DO, Ana Kendal Blood, DO, Shiloh Colin MD, Preston Steele MD, Marvin Braga MD, Dragan Del Valle MD, Karan Zuniga MD, Zoë Plummer MD, Massiel Qiu MD, Marissa Forrest MD, Breezy Colin MD, Denise Patiño, DO, Tariq Ferrara MD, Cierra Arizmendi MD, Edward Vela, DO, Valorie Villafana MD,  Brisa Atkins, DO, Magan Bunch MD, Audrey Turner MD, Allison Hammer, [facility], [facility] Richardoss, CNP, Michael Beavers, CNP, Steffanie Mask, CNS, Vancleave Patrick, CNP, Cherrie Can, CNP, Micah Huitron, CNP, Garth Santizo, CNP, Neil Zhou, CNP, Lendel Nissen, PA-C, Crystal Finnegan, [facility], Myrtle Gu, CNP, Katelyn Hickey, CNP, Rhea Kahn, CNP, Jackeline Manzano, CNP, Douglas Pena, CNP         [facility]   [address] [facility]    Progress Note    11/26/2020    11:13 AM    Name:   Raven Olson  MRN:     [id number]     Acct:      [de-identified]   Room:   [facility]0401-01   Day:  3  Admit Date:  11/23/2020  6:44 AM    PCP:   Dawson Dubose MD  Code Status:  Full Code    Subjective:     C/C:   Chief Complaint   Patient presents with    Shortness of Breath     Interval History Status: not changed. Patient seen and examined  Patient feels weak short of breath complaining of cough  Patient denies fever chest pain    I am seeing the patient for shortness of breath        Medications: Allergies: Allergies   Allergen Reactions    Adhesive Tape Other (See Comments)     Blister badly     Codeine Nausea Only     Other reaction(s): Other: See Comments  NAUSEA  Other reaction(s):  Other: See Comments  NAUSEA    Penicillins Swelling     As a baby  Other reaction(s): Unknown  As a baby       Current Meds:   Scheduled Meds:    furosemide  20 mg Intravenous BID    sodium chloride  20 mL Intravenous Once    rivaroxaban  20 mg Oral Daily    remdesivir IVPB  100 mg Intravenous Q24H    atorvastatin  40 mg Oral Daily    lisinopril  20 mg Oral Daily    sotalol  80 mg Oral BID    tamsulosin  0.4 mg Oral Daily    sodium chloride flush  10 mL Intravenous 2 times per day    famotidine  20 mg Oral Daily    dexamethasone  6 mg Oral Daily    Vitamin D  2,000 Units Oral Daily     Continuous Infusions:   PRN Meds: sodium chloride, albuterol **AND** [] ipratropium, sodium chloride flush, potassium chloride **OR** potassium alternative oral replacement **OR** potassium chloride, magnesium sulfate, acetaminophen **OR** acetaminophen, polyethylene glycol, promethazine **OR** [DISCONTINUED] ondansetron, nicotine, albuterol sulfate HFA, dextromethorphan-guaiFENesin    Data:     Past Medical History:   has a past medical history of [facility] fibrillation ([facility] Back pain, chronic, Hill's esophagus, BPH (benign prostatic hyperplasia), Cancer ([facility] Cocaine abuse in remission [facility]), ED (erectile dysfunction), GERD (gastroesophageal reflux disease), GI bleed, Hernia, History of colon cancer, Melena, Migraines, and Murmur, cardiac. Social History:   reports that he quit smoking about 49 years ago. He has a 0.50 pack-year smoking history. He has never used smokeless tobacco. He reports current alcohol use of about 12.0 standard drinks of alcohol per week. He reports that he does not use drugs. Family History:   Family History   Problem Relation Age of Onset    Diabetes Mother     Heart Attack Father     Heart Disease Father     Heart Disease Brother        Vitals:  BP (!) 142/70   Pulse 54   Temp 97.7 °F (36.5 °C) (Axillary)   Resp 21   Ht 6' (1.829 m)   Wt 163 lb 5.8 oz (74.1 kg)   SpO2 (!) 89%   BMI 22.16 kg/m²   Temp (24hrs), Av.7 °F (36.5 °C), Min:97.5 °F (36.4 °C), Max:97.8 °F (36.6 °C)    No results for input(s): POCGLU in the last 72 hours. I/O (24Hr):     Intake/Output Summary (Last 24 hours) at 2020 1113  Last data filed at 2020 0500  Gross per 24 hour   Intake 650 ml   Output 1100 ml   Net -450 ml       Labs:  Hematology:  Recent Labs     11/23/20 1940 11/24/20 0126 11/25/20 0546 11/26/20  0651   WBC  --  15.7* 14.4* 11.5*   RBC  --  4.47 3.86* 4.11*   HGB  --  11.3* 10.1* 10.7*   HCT  --  37.5* 35.3* 35.7*   MCV  --  83.9 91.5 86.9   MCH  --  25.3 26.2 26.0   MCHC  --  30.1 28.6 30.0   RDW  --  19.4* 19.9* 19.6*   PLT  --  305 423 370   MPV  --  9.2 10.4 9.8   .8*  --   --   --    DDIMER  --  0.68 0.94 1.19     Chemistry:  Recent Labs     11/23/20  1304  11/24/20  0126 11/24/20  1107 11/24/20  1624 11/25/20  0546 11/26/20  0651     --  140  --   --  138 142   K 3.2*  --  3.5*  --   --  3.5* 3.6*     --  103  --   --  103 106   CO2 27  --  25  --   --  27 27   GLUCOSE 154*  --  136*  --   --  104* 84   BUN 16  --  22  --   --  31* 26*   CREATININE 0.62*  --  0.61*  --   --  0.53* 0.53*   MG 2.1  --  2.3  --   --  2.5  --    ANIONGAP 10  --  12  --   --  8* 9   LABGLOM >60  --  >60  --   --  >60 >60   GFRAA >60  --  >60  --   --  >60 >60   CALCIUM 8.1*  --  8.2*  --   --  8.4* 8.1*   TROPHS 102*   < > 56* 51* 46*  --   --     < > = values in this interval not displayed.      Recent Labs     11/23/20 1940 11/24/20 0126 11/25/20 0546 11/26/20  0651   PROT  --  5.9* 5.6* 5.5*   LABALBU  --  2.6* 2.6* 2.4*   AST  --  17 20 17   ALT  --  10 15 16   *  --   --   --    ALKPHOS  --  72 63 64   BILITOT  --  1.10 0.59 0.60     ABG:  Lab Results   Component Value Date    POCPH 7.519 11/23/2020    POCPCO2 32.2 11/23/2020    POCPO2 47.0 11/23/2020    POCHCO3 26.2 11/23/2020    NBEA NOT REPORTED 11/23/2020    PBEA 4 11/23/2020    QID6TBY 27 11/23/2020    KRKM7GCB 87 11/23/2020    FIO2 NOT REPORTED 11/23/2020     Lab Results   Component Value Date/Time    SPECIAL 10CC R HAND 11/23/2020 07:00 AM    SPECIAL 20CC F FA 11/23/2020 07:00 AM     Lab Results   Component Value Date/Time    CULTURE NO GROWTH 3 DAYS 11/23/2020 07:00 AM    CULTURE NO GROWTH 3 DAYS 11/23/2020 07:00 AM       Radiology:  Xr Chest Portable    Result Date: 11/23/2020  Stable exam     Ct Chest Pulmonary Embolism W Contrast    Result Date: 11/23/2020  No central pulmonary embolus. Peripheral branches are not well evaluated secondary to motion. Increased ground-glass opacity and parenchymal opacities throughout the lungs either due to developing pneumonia or edema. There is a small left-sided pleural effusion. By report there is a history of COVID-19 infection Underlying pulmonary fibrosis again noted.        Physical Examination:        General appearance:  Alert,   Lungs: Decreased air entry bilateral    Heart:  regular rate and rhythm, no murmur  Abdomen:  soft, nontender, nondistended, normal bowel sounds, no masses, hepatomegaly, splenomegaly  Extremities:  no edema, redness, tenderness in the calves  Skin:  no gross lesions, rashes, induration    Assessment:        Hospital Problems           Last Modified POA    * (Principal) Acute hypoxemic respiratory failure due to COVID-19 ([address] 11/23/2020 Yes    Dyslipidemia 11/23/2020 Yes    Benign prostatic hyperplasia with urinary obstruction 11/23/2020 Yes    Hypertrophic nonobstructive cardiomyopathy ([address] 11/23/2020 Yes    Iron (Fe) deficiency anemia 11/23/2020 Yes    Benign essential HTN 11/23/2020 Yes    CHF (congestive heart failure), NYHA class II, acute on chronic, combined ([address] 11/23/2020 Yes    Occupational pulmonary disease 11/23/2020 Yes    COVID-19 11/24/2020 Yes          Plan:          COVID-19 pneumonia status post Decadron and remdesivir      History of pulmonary fibrosis monitor      Cute on top of chronic hypoxemic respiratory failure most likely related to COVID-19 infection and pneumonia      A. fib with RVR status post cardioversion in the past continue Xarelto Betapace    Bradycardia monitor    NSTEMI type II secondary to demand ischemia treated with heparin drip currently heparin drip was stopped    Hypertension monitor closely      Dyslipidemia statin      Due to type of chronic combined systolic and diastolic CHF exacerbation with hypertrophic nonobstructive cardiomyopathy treated with diuresis      Hypokalemia replace    Greg Tavarez MD  11/26/2020  11:13 AM

## 2020-11-27 LAB
ABSOLUTE EOS #: 0.03 K/UL (ref 0–0.44)
ABSOLUTE IMMATURE GRANULOCYTE: 0.09 K/UL (ref 0–0.3)
ABSOLUTE LYMPH #: 1.17 K/UL (ref 1.1–3.7)
ABSOLUTE MONO #: 0.91 K/UL (ref 0.1–1.2)
ALBUMIN SERPL-MCNC: 2.6 G/DL (ref 3.5–5.2)
ALBUMIN/GLOBULIN RATIO: 0.8 (ref 1–2.5)
ALP BLD-CCNC: 64 U/L (ref 40–129)
ALT SERPL-CCNC: 19 U/L (ref 5–41)
ANION GAP SERPL CALCULATED.3IONS-SCNC: 9 MMOL/L (ref 9–17)
AST SERPL-CCNC: 18 U/L
BASOPHILS # BLD: 0 % (ref 0–2)
BASOPHILS ABSOLUTE: <0.03 K/UL (ref 0–0.2)
BILIRUB SERPL-MCNC: 0.71 MG/DL (ref 0.3–1.2)
BUN BLDV-MCNC: 26 MG/DL (ref 8–23)
BUN/CREAT BLD: ABNORMAL (ref 9–20)
CALCIUM SERPL-MCNC: 8.1 MG/DL (ref 8.6–10.4)
CHLORIDE BLD-SCNC: 106 MMOL/L (ref 98–107)
CO2: 31 MMOL/L (ref 20–31)
CREAT SERPL-MCNC: 0.59 MG/DL (ref 0.7–1.2)
D-DIMER QUANTITATIVE: 1.22 MG/L FEU
DIFFERENTIAL TYPE: ABNORMAL
EKG ATRIAL RATE: 80 BPM
EKG P AXIS: 47 DEGREES
EKG P-R INTERVAL: 160 MS
EKG Q-T INTERVAL: 404 MS
EKG QRS DURATION: 86 MS
EKG QTC CALCULATION (BAZETT): 465 MS
EKG R AXIS: -25 DEGREES
EKG T AXIS: 28 DEGREES
EKG VENTRICULAR RATE: 80 BPM
EOSINOPHILS RELATIVE PERCENT: 0 % (ref 1–4)
GFR AFRICAN AMERICAN: >60 ML/MIN
GFR NON-AFRICAN AMERICAN: >60 ML/MIN
GFR SERPL CREATININE-BSD FRML MDRD: ABNORMAL ML/MIN/{1.73_M2}
GFR SERPL CREATININE-BSD FRML MDRD: ABNORMAL ML/MIN/{1.73_M2}
GLUCOSE BLD-MCNC: 77 MG/DL (ref 70–99)
HCT VFR BLD CALC: 36.9 % (ref 40.7–50.3)
HEMOGLOBIN: 11.2 G/DL (ref 13–17)
IMMATURE GRANULOCYTES: 1 %
LYMPHOCYTES # BLD: 10 % (ref 24–43)
MAGNESIUM: 2.1 MG/DL (ref 1.6–2.6)
MCH RBC QN AUTO: 25.3 PG (ref 25.2–33.5)
MCHC RBC AUTO-ENTMCNC: 30.4 G/DL (ref 28.4–34.8)
MCV RBC AUTO: 83.5 FL (ref 82.6–102.9)
MONOCYTES # BLD: 8 % (ref 3–12)
NRBC AUTOMATED: 0 PER 100 WBC
PDW BLD-RTO: 19 % (ref 11.8–14.4)
PLATELET # BLD: 396 K/UL (ref 138–453)
PLATELET ESTIMATE: ABNORMAL
PMV BLD AUTO: 9.5 FL (ref 8.1–13.5)
POTASSIUM SERPL-SCNC: 3.4 MMOL/L (ref 3.7–5.3)
RBC # BLD: 4.42 M/UL (ref 4.21–5.77)
RBC # BLD: ABNORMAL 10*6/UL
SEG NEUTROPHILS: 81 % (ref 36–65)
SEGMENTED NEUTROPHILS ABSOLUTE COUNT: 9.15 K/UL (ref 1.5–8.1)
SODIUM BLD-SCNC: 146 MMOL/L (ref 135–144)
TOTAL PROTEIN: 5.7 G/DL (ref 6.4–8.3)
WBC # BLD: 11.4 K/UL (ref 3.5–11.3)
WBC # BLD: ABNORMAL 10*3/UL

## 2020-11-27 PROCEDURE — 36415 COLL VENOUS BLD VENIPUNCTURE: CPT

## 2020-11-27 PROCEDURE — 2580000003 HC RX 258: Performed by: NURSE PRACTITIONER

## 2020-11-27 PROCEDURE — 97530 THERAPEUTIC ACTIVITIES: CPT

## 2020-11-27 PROCEDURE — 99233 SBSQ HOSP IP/OBS HIGH 50: CPT | Performed by: INTERNAL MEDICINE

## 2020-11-27 PROCEDURE — 2060000000 HC ICU INTERMEDIATE R&B

## 2020-11-27 PROCEDURE — 80053 COMPREHEN METABOLIC PANEL: CPT

## 2020-11-27 PROCEDURE — 99232 SBSQ HOSP IP/OBS MODERATE 35: CPT | Performed by: INTERNAL MEDICINE

## 2020-11-27 PROCEDURE — 2700000000 HC OXYGEN THERAPY PER DAY

## 2020-11-27 PROCEDURE — 2500000003 HC RX 250 WO HCPCS: Performed by: INTERNAL MEDICINE

## 2020-11-27 PROCEDURE — 97110 THERAPEUTIC EXERCISES: CPT

## 2020-11-27 PROCEDURE — 94761 N-INVAS EAR/PLS OXIMETRY MLT: CPT

## 2020-11-27 PROCEDURE — 6360000002 HC RX W HCPCS: Performed by: NURSE PRACTITIONER

## 2020-11-27 PROCEDURE — 85025 COMPLETE CBC W/AUTO DIFF WBC: CPT

## 2020-11-27 PROCEDURE — 6370000000 HC RX 637 (ALT 250 FOR IP): Performed by: NURSE PRACTITIONER

## 2020-11-27 PROCEDURE — 2580000003 HC RX 258: Performed by: INTERNAL MEDICINE

## 2020-11-27 PROCEDURE — 85379 FIBRIN DEGRADATION QUANT: CPT

## 2020-11-27 PROCEDURE — 83735 ASSAY OF MAGNESIUM: CPT

## 2020-11-27 RX ADMIN — SOTALOL HYDROCHLORIDE 80 MG: 80 TABLET ORAL at 20:40

## 2020-11-27 RX ADMIN — ATORVASTATIN CALCIUM 40 MG: 80 TABLET, FILM COATED ORAL at 20:41

## 2020-11-27 RX ADMIN — SODIUM CHLORIDE, PRESERVATIVE FREE 10 ML: 5 INJECTION INTRAVENOUS at 08:32

## 2020-11-27 RX ADMIN — SODIUM CHLORIDE, PRESERVATIVE FREE 10 ML: 5 INJECTION INTRAVENOUS at 20:41

## 2020-11-27 RX ADMIN — REMDESIVIR 100 MG: 5 INJECTION INTRAVENOUS at 18:59

## 2020-11-27 RX ADMIN — RIVAROXABAN 20 MG: 20 TABLET, FILM COATED ORAL at 18:30

## 2020-11-27 RX ADMIN — POTASSIUM CHLORIDE 40 MEQ: 1500 TABLET, EXTENDED RELEASE ORAL at 10:14

## 2020-11-27 RX ADMIN — FUROSEMIDE 20 MG: 10 INJECTION, SOLUTION INTRAMUSCULAR; INTRAVENOUS at 18:32

## 2020-11-27 RX ADMIN — FUROSEMIDE 20 MG: 10 INJECTION, SOLUTION INTRAMUSCULAR; INTRAVENOUS at 08:32

## 2020-11-27 RX ADMIN — Medication 2000 UNITS: at 08:33

## 2020-11-27 RX ADMIN — SOTALOL HYDROCHLORIDE 80 MG: 80 TABLET ORAL at 08:32

## 2020-11-27 RX ADMIN — TAMSULOSIN HYDROCHLORIDE 0.4 MG: 0.4 CAPSULE ORAL at 08:32

## 2020-11-27 RX ADMIN — FAMOTIDINE 20 MG: 20 TABLET, FILM COATED ORAL at 08:31

## 2020-11-27 RX ADMIN — DEXAMETHASONE 6 MG: 4 TABLET ORAL at 08:31

## 2020-11-27 RX ADMIN — LISINOPRIL 20 MG: 10 TABLET ORAL at 08:32

## 2020-11-27 ASSESSMENT — PAIN SCALES - GENERAL
PAINLEVEL_OUTOF10: 0

## 2020-11-27 NOTE — PROGRESS NOTES
Physical Therapy  Facility/Department: [address] STEPDOWN  Daily Treatment Note  NAME: Raven Olson  : 1955  MRN: [id number]    Date of Service: 2020    Discharge Recommendations:  Patient would benefit from continued therapy after discharge   PT Equipment Recommendations  Equipment Needed: No    Assessment   Body structures, Functions, Activity limitations: Decreased endurance  Assessment: The pt is grossly independent with mobility, limited by HiFlo O2 / respiratory status. PT will monitor pt's status throughout stay to ensure continued independence. Prognosis: Good  PT Education: Goals;PT Role;Plan of Care; Functional Mobility Training;General Safety;Home Exercise Program;Pressure Relief; Injury Prevention;Transfer Training  Patient Education: Pursed lip breathing ; Seated and Standing Ex to improve endurance  REQUIRES PT FOLLOW UP: Yes  Activity Tolerance  Activity Tolerance: Patient limited by endurance     Patient Diagnosis(es): The primary encounter diagnosis was COVID-19. A diagnosis of Hypoxia was also pertinent to this visit. has a past medical history of Atrial fibrillation ([facility] Back pain, chronic, Hill's esophagus, BPH (benign prostatic hyperplasia), [facility] Cocaine abuse in remission [facility]), ED (erectile dysfunction), GERD (gastroesophageal reflux disease), GI bleed, Hernia, History of colon cancer, Melena, Migraines, and Murmur, cardiac.   has a past surgical history that includes Tonsillectomy; Colonoscopy; colectomy; Colonoscopy (2016); knee surgery (Right, 's); transesophageal echocardiogram (2018); Upper gastrointestinal endoscopy (N/A, 2018); Colonoscopy (N/A, 2018); hernia repair (Right, ); Cardiac catheterization (2018); colectomy; Total knee arthroplasty (Right, 2019); Upper gastrointestinal endoscopy (N/A, 2019); and Cardioversion ().     Restrictions  Restrictions/Precautions  Restrictions/Precautions: Isolation, Fall Risk(COVID+)  Required Braces or Orthoses?: No  Position Activity Restriction  Other position/activity restrictions: up with assist     Subjective   General  Chart Reviewed: Yes  Response To Previous Treatment: Patient with no complaints from previous session. Family / Caregiver Present: No  Subjective  Subjective: RN and pt agreeable to PT. Pt supine in bed upon arrival, pleasant and cooperative throughout. Pain Screening  Patient Currently in Pain: Denies  Vital Signs  Patient Currently in Pain: Denies       Orientation  Orientation  Overall Orientation Status: Within Normal Limits    Objective   Bed mobility  Supine to Sit: Modified independent  Sit to Supine: Modified independent  Transfers  Sit to Stand: Independent  Stand to sit:  Independent  Ambulation  Ambulation?: Yes  Ambulation 1  Surface: level tile  Device: No Device  Assistance: Independent  Quality of Gait: steady, no LOB  Distance: ~50ft  Stairs/Curb  Stairs?: No     Balance  Sitting - Static: Good  Sitting - Dynamic: Good  Standing - Static: Good  Standing - Dynamic: Good  Exercises  Straight Leg Raise: Standing BLE x10  Heelslides: BLE x10  Hip Flexion: Standing marches BLE x10 ; Standing squats x10  Hip Abduction: Standing BLE x10  Knee Long Arc Quad: BLE x10  Ankle Pumps: BLE x10       Goals  Short term goals  Time Frame for Short term goals: 6 visits  Short term goal 1: Ambulate 300ft with SPO2 >90%  Short term goal 2: Perform HEP independently    Plan    Plan  Times per week: Monitor, assess when off high-flow  Current Treatment Recommendations: Gait Training, Endurance Training  Safety Devices  Type of devices: Nurse notified, Call light within reach, Left in bed, Gait belt, All fall risk precautions in place  Restraints  Initially in place: No     Therapy Time   Individual Concurrent Group Co-treatment   Time In 1327         Time Out 1412         Minutes 45         Timed Code Treatment Minutes: Moisés Morris PTA

## 2020-11-27 NOTE — PROGRESS NOTES
Physician Progress Note      PATIENT:               Ashlyn Mendez  CSN #:                  [id number]  :                       1955  ADMIT DATE:       2020 6:44 AM  DISCH DATE:  RESPONDING  PROVIDER #:        Lopez Soria MD          QUERY TEXT:    Dear [facility] Team,    Pt admitted with COVID-19 pneumonia and noted to have WBC 16.7, Lactic acid   3.8, .8, , D-dimer 2.86, and acute respiratory failure with   initial RR 25-34. If possible, please document in progress notes and   discharge summary if you are evaluating and/or treating: The medical record reflects the following:  Risk Factors: COVID 19 pneumonia  Clinical Indicators:  WBC 16.7, Lactic acid 3.8, .8, ,   D-dimer 2.86  Acute respiratory failure with initial RR 25-34  Treatment: IV Remdesivir, Decadron, ID consult, Plasma 2 units    Thank you, Hank Camacho RN, CDS. Please call with any questions [phone]    M-F 6a-2:30p  Options provided:  -- Sepsis due to COVID-19 present on admission  -- COVID-19 without sepsis  -- Other - I will add my own diagnosis  -- Disagree - Not applicable / Not valid  -- Disagree - Clinically unable to determine / Unknown  -- Refer to Clinical Documentation Reviewer    PROVIDER RESPONSE TEXT:    This patient has sepsis due to COVID-19 present on admission.     Query created by: Julisa Mcclain on 2020 1:55 PM      Electronically signed by:  Lopez Soria MD 2020 1:31 PM

## 2020-11-27 NOTE — PROGRESS NOTES
[facility]  Office: [address] [facility], DO, Loydkristen Stillgeorgia, DO, Alecia Healy, DO, Kandice Carson Blood, DO, Jeny Luevano MD, Gian Benitez MD, Deb Mathew MD, Arash Trujillo MD, Payton Sanchez MD, Michelle Ji MD, Linsey Lee MD, Eva Bonds MD, Breezy Kim MD, Alberto Jacobo, DO, Justyna Norris MD, Elizabeth Mon MD, Jose Alfredo Stuart, DO, Tulio Palafox MD,  Lm Pacheco, DO, Sravanthi Montalvo MD, Memo Arias MD, Barry Smallwood, [facility], [facility] Aman, CNP, Rox Cisse, CNP, Luiz Coe, CNS, Jigna Ulloa, CNP, Milvia Guallpa, CNP, Floresita Yates, CNP, Chago Mckenna, CNP, Gurjit Romo, CNP, GRICELDA HernandezC, Baldo Nguyen, [facility], Danny Valentino, CNP, Nancy Taylor, CNP, Placido Renee, CNP, Agnieszka Mayes, CNP, Reynold Caceres, CNP         [facility]   [address] [facility]    Progress Note    11/27/2020    11:29 AM    Name:   Evangelina Nance  MRN:     [id number]     Acct:      [de-identified]   Room:   [facility]/0401-01   Day:  4  Admit Date:  11/23/2020  6:44 AM    PCP:   Arcelia Car MD  Code Status:  Full Code    Subjective:     C/C:   Chief Complaint   Patient presents with    Shortness of Breath     Interval History Status: Improved    Patient seen and examined  Patient feels weak short of breath complaining of cough but better than yesterday  Patient denies fever chest pain    I am seeing the patient for shortness of breath    Medications: Allergies: Allergies   Allergen Reactions    Adhesive Tape Other (See Comments)     Blister badly     Codeine Nausea Only     Other reaction(s): Other: See Comments  NAUSEA  Other reaction(s):  Other: See Comments  NAUSEA    Penicillins Swelling     As a baby  Other reaction(s): Unknown  As a baby       Current Meds:   Scheduled Meds:    furosemide  20 mg Intravenous BID    sodium chloride  20 mL Intravenous Once    rivaroxaban  20 mg Oral Daily    remdesivir IVPB  100 mg Intravenous Q24H    atorvastatin  40 mg Oral Daily    lisinopril  20 mg Oral Daily    sotalol  80 mg Oral BID    tamsulosin  0.4 mg Oral Daily    sodium chloride flush  10 mL Intravenous 2 times per day    famotidine  20 mg Oral Daily    dexamethasone  6 mg Oral Daily    Vitamin D  2,000 Units Oral Daily     Continuous Infusions:   PRN Meds: sodium chloride, albuterol **AND** [] ipratropium, sodium chloride flush, potassium chloride **OR** potassium alternative oral replacement **OR** potassium chloride, magnesium sulfate, acetaminophen **OR** acetaminophen, polyethylene glycol, promethazine **OR** [DISCONTINUED] ondansetron, nicotine, albuterol sulfate HFA, dextromethorphan-guaiFENesin    Data:     Past Medical History:   has a past medical history of [facility] Back pain, chronic, Hill's esophagus, BPH (benign prostatic hyperplasia), Cancer ([facility] Cocaine abuse in remission [facility]), ED (erectile dysfunction), GERD (gastroesophageal reflux disease), GI bleed, Hernia, History of colon cancer, Melena, Migraines, and Murmur, cardiac. Social History:   reports that he quit smoking about 49 years ago. He has a 0.50 pack-year smoking history. He has never used smokeless tobacco. He reports current alcohol use of about 12.0 standard drinks of alcohol per week. He reports that he does not use drugs. Family History:   Family History   Problem Relation Age of Onset    Diabetes Mother     Heart Attack Father     Heart Disease Father     Heart Disease Brother        Vitals:  /76   Pulse 80   Temp 98.5 °F (36.9 °C) (Axillary)   Resp 20   Ht 6' (1.829 m)   Wt 165 lb 12.6 oz (75.2 kg)   SpO2 92%   BMI 22.48 kg/m²   Temp (24hrs), Av.1 °F (36.7 °C), Min:97.5 °F (36.4 °C), Max:99 °F (37.2 °C)    No results for input(s): POCGLU in the last 72 hours. I/O (24Hr):     Intake/Output Summary (Last 24 hours) at 2020 1129  Last data filed at 2020 0500  Gross per 24 hour   Intake 860 ml   Output 1350 ml   Net -490 ml       Labs:  Hematology:  Recent Labs     11/25/20  0546 11/26/20  0651 11/27/20  0721   WBC 14.4* 11.5* 11.4*   RBC 3.86* 4.11* 4.42   HGB 10.1* 10.7* 11.2*   HCT 35.3* 35.7* 36.9*   MCV 91.5 86.9 83.5   MCH 26.2 26.0 25.3   MCHC 28.6 30.0 30.4   RDW 19.9* 19.6* 19.0*    370 396   MPV 10.4 9.8 9.5   DDIMER 0.94 1.19 1.22     Chemistry:  Recent Labs     11/24/20  1624 11/25/20  0546 11/26/20 0651 11/27/20  0721   NA  --  138 142 146*   K  --  3.5* 3.6* 3.4*   CL  --  103 106 106   CO2  --  27 27 31   GLUCOSE  --  104* 84 77   BUN  --  31* 26* 26*   CREATININE  --  0.53* 0.53* 0.59*   MG  --  2.5  --  2.1   ANIONGAP  --  8* 9 9   LABGLOM  --  >60 >60 >60   GFRAA  --  >60 >60 >60   CALCIUM  --  8.4* 8.1* 8.1*   TROPHS 46*  --   --   --      Recent Labs     11/25/20  0546 11/26/20 0651 11/27/20  0721   PROT 5.6* 5.5* 5.7*   LABALBU 2.6* 2.4* 2.6*   AST 20 17 18   ALT 15 16 19   ALKPHOS 63 64 64   BILITOT 0.59 0.60 0.71     ABG:  Lab Results   Component Value Date    POCPH 7.519 11/23/2020    POCPCO2 32.2 11/23/2020    POCPO2 47.0 11/23/2020    POCHCO3 26.2 11/23/2020    NBEA NOT REPORTED 11/23/2020    PBEA 4 11/23/2020    FHL6FFC 27 11/23/2020    HIJL7KCD 87 11/23/2020    FIO2 NOT REPORTED 11/23/2020     Lab Results   Component Value Date/Time    SPECIAL 10CC R HAND 11/23/2020 07:00 AM    SPECIAL 20CC F FA 11/23/2020 07:00 AM     Lab Results   Component Value Date/Time    CULTURE NO GROWTH 4 DAYS 11/23/2020 07:00 AM    CULTURE NO GROWTH 4 DAYS 11/23/2020 07:00 AM       Radiology:  Xr Chest Portable    Result Date: 11/23/2020  Stable exam     Ct Chest Pulmonary Embolism W Contrast    Result Date: 11/23/2020  No central pulmonary embolus. Peripheral branches are not well evaluated secondary to motion. Increased ground-glass opacity and parenchymal opacities throughout the lungs either due to developing pneumonia or edema. There is a small left-sided pleural effusion.   By report there is a history of COVID-19 infection Underlying pulmonary fibrosis again noted.        Physical Examination:        General appearance:  Alert,   Lungs: Decreased air entry bilateral    Heart:  regular rate and rhythm, no murmur  Abdomen:  soft, nontender, nondistended, normal bowel sounds, no masses, hepatomegaly, splenomegaly  Extremities:  no edema, redness, tenderness in the calves  Skin:  no gross lesions, rashes, induration    Assessment:        Hospital Problems           Last Modified POA    * (Principal) Acute hypoxemic respiratory failure due to COVID-19 ([address] 11/23/2020 Yes    Dyslipidemia 11/23/2020 Yes    Benign prostatic hyperplasia with urinary obstruction 11/23/2020 Yes    Hypertrophic nonobstructive cardiomyopathy ([address] 11/23/2020 Yes    Iron (Fe) deficiency anemia 11/23/2020 Yes    Benign essential HTN 11/23/2020 Yes    CHF (congestive heart failure), NYHA class II, acute on chronic, combined ([address] 11/23/2020 Yes    Occupational pulmonary disease 11/23/2020 Yes    COVID-19 11/24/2020 Yes          Plan:          COVID-19 pneumonia status post Decadron and remdesivir  Improved    History of pulmonary fibrosis monitor      aCute on top of chronic hypoxemic respiratory failure most likely related to COVID-19 infection and pneumonia      A. fib with RVR status post cardioversion in the past continue oral anticoagulation Betapace    Bradycardia monitor    NSTEMI type II secondary to demand ischemia treated with heparin drip currently heparin drip was stopped    Hypertension monitor closely      Dyslipidemia statin      Acute to type of chronic combined systolic and diastolic CHF exacerbation with hypertrophic nonobstructive cardiomyopathy treated with diuresis      Hypokalemia replace    Neha Tolentino MD  11/27/2020  11:29 AM

## 2020-11-27 NOTE — PROGRESS NOTES
Pulmonary/Critical Care Daily Progress Note    Patient's name:  Brian Garcia  Medical Record Number: [id number]  Patient's account/billing number: [de-identified]  Patient's YOB: 1955  Age: 77 y.o. Date of Admission: 11/23/2020  6:44 AM  Date of Consult: 11/27/2020      Primary Care Physician: Jena Key MD      Code Status: Full Code    Reason for consult: Acute hypoxemic respiratory failure secondary to COVID-19 pneumonia      HISTORY OF PRESENT ILLNESS:   History was obtained from chart review and the patient. Mr. Brian Garcia is a 77 y.o. white gentleman was admitted with shortness of breath. Patient recently discharged from hospital after having an acute on chronic combined systolic and diastolic CHF and atrial fibrillation with RVR. Chest x-ray showed worsening bilateral infiltrates and CT scan showed some infiltrates along with underlying pulmonary fibrosis but no blood clot was seen. Patient had a Covid test that was positive along with elevated serum BNP and D-dimer of 2.86. Patient was placed on 100% nonrebreather mask  Patient shortness of breath is much improved with using 100% nonrebreather mask  Has a dry cough  Also has some orthopnea    INTERVAL HISTORY;  Spirits better. Wife brought in Thanksgiving dinner. FiO2 down to 85%. Desats with any activity. No fever.  I/O -1L.  K 3.4  Past Medical History:        Diagnosis Date    Atrial fibrillation ([address]     Back pain, chronic     Hill's esophagus 06/18/2019    BPH (benign prostatic hyperplasia)     Cancer (HCC)     colon-rectal    Cocaine abuse in remission [facility])     1970's    ED (erectile dysfunction) 4/2/2015    GERD (gastroesophageal reflux disease)     GI bleed 12/5/2018    Hernia     History of colon cancer     Melena     Migraines     Murmur, cardiac        Past Surgical History:        Procedure Laterality Date    CARDIAC CATHETERIZATION  11/29/2018 Non-obstructive CAD    CARDIOVERSION  2020    COLECTOMY      2nd colectomy, Colostomy and reversed [facility] COLECTOMY      1st time Ksenia    COLONOSCOPY      COLONOSCOPY  07/18/2016    COLONOSCOPY N/A 12/6/2018    COLONOSCOPY DIAGNOSTIC performed by Everardo Klein MD at [address] Right 2009    inguinal    KNEE SURGERY Right 1970's    arthrotomy    TONSILLECTOMY      TOTAL KNEE ARTHROPLASTY Right 1/8/2019    KNEE TOTAL ARTHROPLASTY performed by Santy Robles MD at [facility] TRANSESOPHAGEAL ECHOCARDIOGRAM  11/29/2018    UPPER GASTROINTESTINAL ENDOSCOPY N/A 12/5/2018    EGD DIAGNOSTIC ONLY performed by Everardo Klein MD at [facility] N/A 6/18/2019    [facility]S       Allergies: Allergies   Allergen Reactions    Adhesive Tape Other (See Comments)     Blister badly     Codeine Nausea Only     Other reaction(s): Other: See Comments  NAUSEA  Other reaction(s): Other: See Comments  NAUSEA    Penicillins Swelling     As a baby  Other reaction(s): Unknown  As a baby         Home Meds:   Prior to Admission medications    Medication Sig Start Date End Date Taking?  Authorizing Provider   atorvastatin (LIPITOR) 40 MG tablet Take 1 tablet by mouth daily 11/16/20  Yes Cassandra Barron MD   omeprazole (PRILOSEC) 40 MG delayed release capsule Take 1 capsule by mouth daily 11/16/20  Yes Cassandra Barron MD   lisinopril (PRINIVIL;ZESTRIL) 5 MG tablet take 1 tablet by mouth once daily 10/26/20  Yes Historical Provider, MD   sotalol (BETAPACE) 80 MG tablet take 1 tablet by mouth every 12 hours 10/28/20  Yes Historical Provider, MD   ibuprofen (ADVIL;MOTRIN) 800 MG tablet Take 1 tablet by mouth every 6 hours if needed for pain 10/27/20  Yes Cassandra Barron MD   furosemide (LASIX) 20 MG tablet Take 1 tablet by mouth daily 10/26/20  Yes Yoly Lipscomb MD   tamsulosin (FLOMAX) 0.4 MG capsule Take 1 capsule by mouth daily 10/15/20  Yes Cassandra Rayray Berg MD   rivaroxaban (XARELTO) 20 MG TABS tablet Take 20 mg by mouth daily (with breakfast)    Yes Historical Provider, MD       Family History:       Problem Relation Age of Onset    Diabetes Mother     Heart Attack Father     Heart Disease Father     Heart Disease Brother          Social History:   TOBACCO:   reports that he quit smoking about 49 years ago. He has a 0.50 pack-year smoking history. He has never used smokeless tobacco.  ETOH:   reports current alcohol use of about 12.0 standard drinks of alcohol per week. DRUGS:  reports no history of drug use.   OCCUPATION: Noncontributory          REVIEW OF SYSTEMS:    Review of Systems -   General ROS: Completed and except as mentioned above were negative   Psychological ROS:  Completed and except as mentioned above were negative  Ophthalmic ROS:  Completed and except as mentioned above were negative  ENT ROS:  Completed and except as mentioned above were negative  Allergy and Immunology ROS:  Completed and except as mentioned above were negative  Hematological and Lymphatic ROS:  Completed and except as mentioned above were negative  Endocrine ROS: Completed and except as mentioned above were negative  Breast ROS:  Completed and except as mentioned above were negative  Respiratory ROS:  Completed and except as mentioned above were negative  Cardiovascular ROS:  Completed and except as mentioned above were negative  Gastrointestinal ROS: Completed and except as mentioned above were negative  Genito-Urinary ROS:  Completed and except as mentioned above were negative  Musculoskeletal ROS:  Completed and except as mentioned above were negative  Neurological ROS:  Completed and except as mentioned above were negative  Dermatological ROS:  Completed and except as mentioned above were negative          Physical Exam:    Vitals: /64   Pulse (!) 45   Temp 97.7 °F (36.5 °C) (Axillary)   Resp 20   Ht 6' (1.829 m)   Wt 165 lb 12.6 oz (75.2 kg) SpO2 99%   BMI 22.48 kg/m²     Last Body weight:   Wt Readings from Last 3 Encounters:   11/26/20 165 lb 12.6 oz (75.2 kg)   11/16/20 178 lb (80.7 kg)   11/10/20 173 lb 3.2 oz (78.6 kg)       Body Mass Index : Body mass index is 22.48 kg/m². Intake and Output summary:     Intake/Output Summary (Last 24 hours) at 11/27/2020 1425  Last data filed at 11/27/2020 0500  Gross per 24 hour   Intake 860 ml   Output 750 ml   Net 110 ml       Physical Examination:   PHYSICAL EXAMINATION:  Vitals:    11/27/20 0458 11/27/20 0745 11/27/20 0854 11/27/20 1148   BP: 130/79 123/76  108/64   Pulse: (!) 47 80  (!) 45   Resp: 22 20 20 20   Temp: 99 °F (37.2 °C) 98.5 °F (36.9 °C)  97.7 °F (36.5 °C)   TempSrc: Axillary Axillary  Axillary   SpO2: 94% 95% 92% 99%   Weight:       Height:         Due to the current efforts to prevent transmission of COVID-19 and also the need to preserve PPE for other caregivers, a face-to-face encounter with the patient was not performed. That being said, all relevant records and diagnostic tests were reviewed, including laboratory results and imaging. Patient was evaluated from the window, called on the phone, and chart reviewed, disc w  involved healthcare workers. Patient appears comfortable on 100% nonrebreather mask  Not using accessory musculature for breathing        Laboratory findings:-    CBC:   Recent Labs     11/27/20  0721   WBC 11.4*   HGB 11.2*        BMP:    Recent Labs     11/25/20  0546 11/26/20  0651 11/27/20  0721    142 146*   K 3.5* 3.6* 3.4*    106 106   CO2 27 27 31   BUN 31* 26* 26*   CREATININE 0.53* 0.53* 0.59*   GLUCOSE 104* 84 77     S. Calcium:  Recent Labs     11/27/20  0721   CALCIUM 8.1*     S. Ionized Calcium:No results for input(s): IONCA in the last 72 hours. S. Magnesium:  Recent Labs     11/27/20  0721   MG 2.1     S. Phosphorus:No results for input(s): PHOS in the last 72 hours.   S. Glucose:  No results for input(s): POCGLU in the last 72 hours. Glycosylated hemoglobin A1C: No results for input(s): LABA1C in the last 72 hours. INR:   No results for input(s): INR in the last 72 hours. Hepatic functions:   Recent Labs     11/27/20  0721   ALKPHOS 64   ALT 19   AST 18   PROT 5.7*   BILITOT 0.71   LABALBU 2.6*     Pancreatic functions:No results for input(s): LACTA, AMYLASE in the last 72 hours. S. Lactic Acid: No results for input(s): LACTA in the last 72 hours. Cardiac enzymes:No results for input(s): CKTOTAL, CKMB, CKMBINDEX, TROPONINI in the last 72 hours. BNP:No results for input(s): BNP in the last 72 hours. Lipid profile: No results for input(s): CHOL, TRIG, HDL, LDLCALC in the last 72 hours. Invalid input(s): LDL  Blood Gases: No results found for: PH, PCO2, PO2, HCO3, O2SAT  Thyroid functions:   Lab Results   Component Value Date    TSH 4.28 10/17/2020            Radiological reports:  CT scan of the chest showed no pulmonary embolism but showed bilateral pulmonary infiltrates and small left pleural effusion    Chest x-ray showed bilateral pulmonary infiltrates that are unchanged from before       Assessment and Plan     Principal Problem:    Acute hypoxemic respiratory failure due to COVID-19 [facility])  Active Problems:    Dyslipidemia    Benign prostatic hyperplasia with urinary obstruction    Hypertrophic nonobstructive cardiomyopathy (HCC)    Iron (Fe) deficiency anemia    Benign essential HTN    CHF (congestive heart failure), NYHA class II, acute on chronic, combined ([address]    Occupational pulmonary disease    COVID-19  Resolved Problems:    * No resolved hospital problems.  *        Assessment:     Acute hypoxic respiratory failure   Acute respiratory distress syndrome   COVID 19 infection/pneumonia   Patient may also have a competent of acute on chronic combined congestive heart failure   Hypokalemia   Leukocytosis secondary to infection   Mild anemia    Plan:     Continue Airborne isolation   Continue HFNC   Monitor input/output, with a goal of even/negative fluid balance   ID consultation   Continue Decadron, remdesivir   Monitor CRP, LDH, AST/ALT/ferritin/ D-dimer   Continue supportive care   Pt on eliquis  Glycemic control per primary service    Management as per guidance provided by hospital policy and prevailing evidence based medicine, during the COVID-19 pandemic emergency. This patient was evaluated in the context of the global SARS-CoV-2 (COVID-19) pandemic, which necessitated considerations that the patient either has COVID-19 infection or is at risk of infection with COVID-19. Institutional protocols and algorithms that pertain to the evaluation & management of patients with COVID-19 or those at risk for COVID-19 are in a state of rapid changes based on information released by regulatory bodies including the CDC and federal and state organizations. These policies and algorithms were followed during the patient's care. Please note that this chart was generated using voice recognition Dragon dictation software. Although every effort was made to ensure the accuracy of this automated transcription, some errors in transcription may have occurred. Thank you for having us involved in the care of your patient. Please call us if you have any questions or concerns.       Lanny Dickens M.D.            11/27/2020, 2:25 PM

## 2020-11-28 LAB
ABSOLUTE EOS #: 0.04 K/UL (ref 0–0.44)
ABSOLUTE IMMATURE GRANULOCYTE: 0.14 K/UL (ref 0–0.3)
ABSOLUTE LYMPH #: 1.33 K/UL (ref 1.1–3.7)
ABSOLUTE MONO #: 0.8 K/UL (ref 0.1–1.2)
ALBUMIN SERPL-MCNC: 2.4 G/DL (ref 3.5–5.2)
ALBUMIN/GLOBULIN RATIO: 0.7 (ref 1–2.5)
ALP BLD-CCNC: 63 U/L (ref 40–129)
ALT SERPL-CCNC: 21 U/L (ref 5–41)
ANION GAP SERPL CALCULATED.3IONS-SCNC: 6 MMOL/L (ref 9–17)
AST SERPL-CCNC: 16 U/L
BASOPHILS # BLD: 0 % (ref 0–2)
BASOPHILS ABSOLUTE: 0.03 K/UL (ref 0–0.2)
BILIRUB SERPL-MCNC: 0.59 MG/DL (ref 0.3–1.2)
BUN BLDV-MCNC: 26 MG/DL (ref 8–23)
BUN/CREAT BLD: ABNORMAL (ref 9–20)
CALCIUM SERPL-MCNC: 8.2 MG/DL (ref 8.6–10.4)
CHLORIDE BLD-SCNC: 108 MMOL/L (ref 98–107)
CO2: 25 MMOL/L (ref 20–31)
CREAT SERPL-MCNC: 0.52 MG/DL (ref 0.7–1.2)
D-DIMER QUANTITATIVE: 0.89 MG/L FEU
DIFFERENTIAL TYPE: ABNORMAL
EOSINOPHILS RELATIVE PERCENT: 0 % (ref 1–4)
GFR AFRICAN AMERICAN: >60 ML/MIN
GFR NON-AFRICAN AMERICAN: >60 ML/MIN
GFR SERPL CREATININE-BSD FRML MDRD: ABNORMAL ML/MIN/{1.73_M2}
GFR SERPL CREATININE-BSD FRML MDRD: ABNORMAL ML/MIN/{1.73_M2}
GLUCOSE BLD-MCNC: 84 MG/DL (ref 70–99)
HCT VFR BLD CALC: 38.6 % (ref 40.7–50.3)
HEMOGLOBIN: 11.6 G/DL (ref 13–17)
IMMATURE GRANULOCYTES: 1 %
LYMPHOCYTES # BLD: 12 % (ref 24–43)
MCH RBC QN AUTO: 25.5 PG (ref 25.2–33.5)
MCHC RBC AUTO-ENTMCNC: 30.1 G/DL (ref 28.4–34.8)
MCV RBC AUTO: 84.8 FL (ref 82.6–102.9)
MONOCYTES # BLD: 7 % (ref 3–12)
NRBC AUTOMATED: 0 PER 100 WBC
PDW BLD-RTO: 19.4 % (ref 11.8–14.4)
PLATELET # BLD: 424 K/UL (ref 138–453)
PLATELET ESTIMATE: ABNORMAL
PMV BLD AUTO: 9.7 FL (ref 8.1–13.5)
POTASSIUM SERPL-SCNC: 4 MMOL/L (ref 3.7–5.3)
RBC # BLD: 4.55 M/UL (ref 4.21–5.77)
RBC # BLD: ABNORMAL 10*6/UL
SEG NEUTROPHILS: 80 % (ref 36–65)
SEGMENTED NEUTROPHILS ABSOLUTE COUNT: 8.91 K/UL (ref 1.5–8.1)
SODIUM BLD-SCNC: 139 MMOL/L (ref 135–144)
TOTAL PROTEIN: 5.8 G/DL (ref 6.4–8.3)
WBC # BLD: 11.3 K/UL (ref 3.5–11.3)
WBC # BLD: ABNORMAL 10*3/UL

## 2020-11-28 PROCEDURE — 6360000002 HC RX W HCPCS: Performed by: NURSE PRACTITIONER

## 2020-11-28 PROCEDURE — 6370000000 HC RX 637 (ALT 250 FOR IP): Performed by: NURSE PRACTITIONER

## 2020-11-28 PROCEDURE — 99232 SBSQ HOSP IP/OBS MODERATE 35: CPT | Performed by: INTERNAL MEDICINE

## 2020-11-28 PROCEDURE — 94761 N-INVAS EAR/PLS OXIMETRY MLT: CPT

## 2020-11-28 PROCEDURE — 99233 SBSQ HOSP IP/OBS HIGH 50: CPT | Performed by: INTERNAL MEDICINE

## 2020-11-28 PROCEDURE — 85025 COMPLETE CBC W/AUTO DIFF WBC: CPT

## 2020-11-28 PROCEDURE — 2060000000 HC ICU INTERMEDIATE R&B

## 2020-11-28 PROCEDURE — 85379 FIBRIN DEGRADATION QUANT: CPT

## 2020-11-28 PROCEDURE — 2500000003 HC RX 250 WO HCPCS: Performed by: INTERNAL MEDICINE

## 2020-11-28 PROCEDURE — 36415 COLL VENOUS BLD VENIPUNCTURE: CPT

## 2020-11-28 PROCEDURE — 2700000000 HC OXYGEN THERAPY PER DAY

## 2020-11-28 PROCEDURE — 2580000003 HC RX 258: Performed by: NURSE PRACTITIONER

## 2020-11-28 PROCEDURE — 2580000003 HC RX 258: Performed by: INTERNAL MEDICINE

## 2020-11-28 PROCEDURE — 80053 COMPREHEN METABOLIC PANEL: CPT

## 2020-11-28 RX ORDER — CALCIUM CARBONATE 200(500)MG
1000 TABLET,CHEWABLE ORAL 3 TIMES DAILY PRN
Status: DISCONTINUED | OUTPATIENT
Start: 2020-11-28 | End: 2020-12-07 | Stop reason: HOSPADM

## 2020-11-28 RX ADMIN — SODIUM CHLORIDE, PRESERVATIVE FREE 10 ML: 5 INJECTION INTRAVENOUS at 21:48

## 2020-11-28 RX ADMIN — TAMSULOSIN HYDROCHLORIDE 0.4 MG: 0.4 CAPSULE ORAL at 07:47

## 2020-11-28 RX ADMIN — FUROSEMIDE 20 MG: 10 INJECTION, SOLUTION INTRAMUSCULAR; INTRAVENOUS at 07:47

## 2020-11-28 RX ADMIN — DEXAMETHASONE 4 MG: 4 TABLET ORAL at 07:47

## 2020-11-28 RX ADMIN — LISINOPRIL 20 MG: 10 TABLET ORAL at 07:47

## 2020-11-28 RX ADMIN — Medication 2000 UNITS: at 07:47

## 2020-11-28 RX ADMIN — SODIUM CHLORIDE, PRESERVATIVE FREE 10 ML: 5 INJECTION INTRAVENOUS at 07:49

## 2020-11-28 RX ADMIN — REMDESIVIR 100 MG: 5 INJECTION INTRAVENOUS at 21:35

## 2020-11-28 RX ADMIN — RIVAROXABAN 20 MG: 20 TABLET, FILM COATED ORAL at 16:31

## 2020-11-28 RX ADMIN — FAMOTIDINE 20 MG: 20 TABLET, FILM COATED ORAL at 07:47

## 2020-11-28 RX ADMIN — FUROSEMIDE 20 MG: 10 INJECTION, SOLUTION INTRAMUSCULAR; INTRAVENOUS at 16:30

## 2020-11-28 RX ADMIN — ATORVASTATIN CALCIUM 40 MG: 80 TABLET, FILM COATED ORAL at 21:34

## 2020-11-28 ASSESSMENT — PAIN SCALES - GENERAL
PAINLEVEL_OUTOF10: 0

## 2020-11-28 NOTE — PROGRESS NOTES
in the last 72 hours. DIAGNOSTIC DATA  EKG:   NSR, Biatrial enlargement  ECHO: 10/20/20   Summary  Left ventricle is normal in size Global left ventricular systolic function  is moderately reduced Estimated ejection fraction is 35 % . Mostly global hypokinesis with minor regional variation. Grade I (mild) left ventricular diastolic dysfunction. Left atrium is moderately dilated. Aortic leaflet calcification with Moderate Aortic Stenosis, maybe  underestimated due to poor LVEF. Thickened mitral valve leaflets. Mild to moderate mitral regurgitation. Trivial tricuspid regurgitation. Estimated right ventricular systolic  pressure is 53FHGW. IVC dilated but unable to assess respiratory collapse.     Cardiac Angiography: 2018  Procedure Summary      Non-obstructive CAD.   Normal LV systolic function.      Recommendations      Medical therapy as needed.   Risk factor modification. Objective:   Vitals: /69   Pulse 55   Temp 97.9 °F (36.6 °C) (Axillary)   Resp 20   Ht 6' (1.829 m)   Wt 164 lb 10.9 oz (74.7 kg)   SpO2 95%   BMI 22.34 kg/m²   For careful stewardship of limited PPE during COVID-19 pandemic my physical exam was deferred. For physical exam, please see today's physical from primary team or ICU team.         Assessment / Acute Cardiac Problems:   1. A FIB with RVR now SB  2. Bradycardia   3. Covid 19 +  4. Hypertension  5.  Acute on chronic systolic and diastolic HF    Patient Active Problem List:     Dyslipidemia     ED (erectile dysfunction)     Incomplete bladder emptying     Benign prostatic hyperplasia with urinary obstruction     History of colon cancer     Atrial fibrillation with normal ventricular rate (HCC)     Anemia     Pallor of optic disc     Presbyopia     Incisional hernia, without obstruction or gangrene     Hypertrophic nonobstructive cardiomyopathy (HCC)     Iron (Fe) deficiency anemia     Primary osteoarthritis of right knee     Benign essential HTN     History of malignant neoplasm of rectum     Hill's esophagus     CHF (congestive heart failure), NYHA class II, acute on chronic, combined (HCC)     Hypoxia     Dyspnea and respiratory abnormalities     Occupational pulmonary disease     Sinus bradycardia     Acute hypoxemic respiratory failure due to COVID-19 [facility])      Plan of Treatment:   1. AFib with RVR now SB  HR 40s- 50s. Will hold sotalol with HR less than 60. Continue xarelto  2. Bradycardia. Hold sotalol for HR less than 60   Continue to monitor. 3. NSTEMI likely secondary to demand ischemia vs type I  Troponin trending down. Reviewed previously with Dr. Mark Dueñas. Plan for OP followup in cardiology clinic Continue statin, BB, ACE. 4. LVrEF  EF 35% on ECHO 10/20/2020  Not in fluid overload. Continue BB, ACE and Lasix. 5. HTN Controlled. Continue lisinopril and sotalol with parameters. 6. Keep K > 4.0 and Mag  >2.0  Stable. 7. Covid pneumonia treatment per primary service.       Electronically signed by MIRI Wood NP on 11/28/2020 at 10:24 [address] [place] [address].  [phone]

## 2020-11-28 NOTE — PLAN OF CARE
Problem: Loneliness or Risk for Loneliness  Goal: Demonstrate positive use of time alone when socialization is not possible  Outcome: Ongoing     Problem: Falls - Risk of:  Goal: Will remain free from falls  Description: Will remain free from falls  Outcome: Ongoing     Problem: OXYGENATION/RESPIRATORY FUNCTION  Goal: Patient will maintain patent airway  11/28/2020 0535 by Celi Green RN  Outcome: Ongoing     Problem: OXYGENATION/RESPIRATORY FUNCTION  Goal: Patient will achieve/maintain normal respiratory rate/effort  Description: Respiratory rate and effort will be within normal limits for the patient  11/28/2020 0535 by Celi Green RN  Outcome: Ongoing     Problem: Isolation Precautions - Risk of Spread of Infection  Goal: Prevent transmission of infection  Outcome: Ongoing

## 2020-11-28 NOTE — PROGRESS NOTES
[facility]  Office: [address], DO, Suzan Daugherty, DO, Dave Patee, DO, Valentine Green Blood, DO, Edgar Butcher MD, Rosanne Swenson MD, Mikey Birmingham MD, Karel Gill MD, Long Elder MD, Enrie Daniels MD, Yana Fields MD, Joshua Bui MD, Breezy Christine MD, Chanelle Paulino, DO, Jina Adame MD, Yuliana Frankel MD, Yolie Scarce, DO, Dewey Molina MD,  Estrella Rota, DO, Annette Callahan MD, Tracy Singh MD, Fernie Hardy, [facility], [facility] Hernandez, CNP, Destin Juarez, CNP, Bladimir Siddiqi, CNS, Nilesh Oro, CNP, Griselda Evans, CNP, Radames Lyles, CNP, Heddy Sacks, CNP, Analilia Saez, CNP, Rhonda Jeffries PA-C, Bay Ocampo DNP, Jumana Nicole, CNP, Alia Ellis, CNP, Nelson Mares, CNP, Joaquim Arango, CNP, Jerrell Soliz, [facility]   [address] [facility]    Progress Note    11/28/2020    9:37 AM    Name:   Junior Pantoja  MRN:     [id number]     Acct:      [de-identified]   Room:   [facility] [place] [address] Day:  5  Admit Date:  11/23/2020  6:44 AM    PCP:   Gisela Metcalf MD  Code Status:  Full Code    Subjective:     C/C:   Chief Complaint   Patient presents with    Shortness of Breath     Interval History Status: Improved    Patient seen and examined  Patient feels better today  Patient denies fever chest pain    I am seeing the patient for shortness of breath    Medications: Allergies: Allergies   Allergen Reactions    Adhesive Tape Other (See Comments)     Blister badly     Codeine Nausea Only     Other reaction(s): Other: See Comments  NAUSEA  Other reaction(s):  Other: See Comments  NAUSEA    Penicillins Swelling     As a baby  Other reaction(s): Unknown  As a baby       Current Meds:   Scheduled Meds:    furosemide  20 mg Intravenous BID    sodium chloride  20 mL Intravenous Once    rivaroxaban  20 mg Oral Daily    remdesivir IVPB  100 mg Intravenous Q24H    atorvastatin  40 mg Oral Daily    lisinopril  20 Labs:  Hematology:  Recent Labs     11/26/20  0651 11/27/20  0721 11/28/20  0555   WBC 11.5* 11.4* 11.3   RBC 4.11* 4.42 4.55   HGB 10.7* 11.2* 11.6*   HCT 35.7* 36.9* 38.6*   MCV 86.9 83.5 84.8   MCH 26.0 25.3 25.5   MCHC 30.0 30.4 30.1   RDW 19.6* 19.0* 19.4*    396 424   MPV 9.8 9.5 9.7   DDIMER 1.19 1.22 0.89     Chemistry:  Recent Labs     11/26/20  0651 11/27/20  0721 11/28/20  0555    146* 139   K 3.6* 3.4* 4.0    106 108*   CO2 27 31 25   GLUCOSE 84 77 84   BUN 26* 26* 26*   CREATININE 0.53* 0.59* 0.52*   MG  --  2.1  --    ANIONGAP 9 9 6*   LABGLOM >60 >60 >60   GFRAA >60 >60 >60   CALCIUM 8.1* 8.1* 8.2*     Recent Labs     11/26/20  0651 11/27/20  0721 11/28/20  0555   PROT 5.5* 5.7* 5.8*   LABALBU 2.4* 2.6* 2.4*   AST 17 18 16   ALT 16 19 21   ALKPHOS 64 64 63   BILITOT 0.60 0.71 0.59     ABG:  Lab Results   Component Value Date    POCPH 7.519 11/23/2020    POCPCO2 32.2 11/23/2020    POCPO2 47.0 11/23/2020    POCHCO3 26.2 11/23/2020    NBEA NOT REPORTED 11/23/2020    PBEA 4 11/23/2020    YZI6ZGG 27 11/23/2020    NSMT4TIU 87 11/23/2020    FIO2 NOT REPORTED 11/23/2020     Lab Results   Component Value Date/Time    SPECIAL 10CC R HAND 11/23/2020 07:00 AM    SPECIAL 20CC F FA 11/23/2020 07:00 AM     Lab Results   Component Value Date/Time    CULTURE NO GROWTH 5 DAYS 11/23/2020 07:00 AM    CULTURE NO GROWTH 5 DAYS 11/23/2020 07:00 AM       Radiology:  Xr Chest Portable    Result Date: 11/23/2020  Stable exam     Ct Chest Pulmonary Embolism W Contrast    Result Date: 11/23/2020  No central pulmonary embolus. Peripheral branches are not well evaluated secondary to motion. Increased ground-glass opacity and parenchymal opacities throughout the lungs either due to developing pneumonia or edema. There is a small left-sided pleural effusion. By report there is a history of COVID-19 infection Underlying pulmonary fibrosis again noted.        Physical Examination:        General appearance:

## 2020-11-28 NOTE — PROGRESS NOTES
[facility] -Progress Note COVID 19 Patient  Today's Date and Time: 11/27/2020, 7:43 PM    Impression :   · COVID 19 Suspect  · COVID 19 Confirmed Infection  · Covid tests:  · 11-23-20: Positive  · Atrial fibrillation with RVR: S/P cardioversion   · Pulmonary Fibrosis: secondary to Occupational exposure. 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 · Hypertrophic non obstructive cardiomyopathy. · Hx of Colon cancer  · BPH  Recommendations:   · Monitor off antibiotics  · Clinical Research will approach patient to explore if he qualifies for any of the COVID 19 treatment protocols. · Remdesivir started 11-24-20. Stop date 11-28-20  · CRP and LD on 11/23/20 were elevated. Continue Decadron. Started on 11/23 Stop date:12/3/20  · Plasma infusion. Two units infused 11-24-10    Medical Decision Making/Summary/Discussion:11/27/2020     · Patient admitted with suspected COVID 19 infection  · Covid test confirmed positive. · Associated problems with congestive heart failure, pulmonary fibrosis, hypoxia. Infection Control Recommendations   · Universal Precautions  · Airborne isolation  · Droplet Isolation    Antimicrobial Stewardship Recommendations     · Discontinuation of therapy  Coordination of Outpatient Care:   · Estimated Length of IV antimicrobials:TBD  · Patient will need Midline Catheter Insertion: TBD  · Patient will need PICC line Insertion: No  · Patient will need: Home IV , [place],  SNF,  LTAC:TBD  · Patient will need outpatient wound care:No    Chief complaint/reason for consultation:   · Concern for COVID infection      History of Present Illness:   Karina Manuel is a 77y.o.-year-old  male who was initially admitted on 11/23/2020. Patient seen at the request of [address] [place] [address]. INITIAL HISTORY:    Patient presented through ER with complaints of onset of shortness of breath.   Located onset of symptoms on 11/22/2020 with worsening through the day leading to his seeking help at the emergency room. The patient had been recently admitted on 10/17/2020 through 10/26/2020 at [facility] because of the presence of congestive heart failure NYHAA class II with acute on chronic combined heart failure. He also suffers from atrial fibrillation and hypertrophic nonobstructive cardiomyopathy, benign essential hypertension. His previous diagnosis includes occupational pulmonary fibrosis. During that admission he was also found to have suffered from mycoplasma pneumonia and was treated with antibiotics. He reports being seen at the [facility], after his admission to [facility],  where he was cardioverted because of the atrial fibrillation. He was also found to be in heart failure and was a started on diuretics. When the patient was evaluated in the emergency room he was found to be hypoxic and was treated with BiPAP which improved his oxygen saturation rate. He was tested for Covid and was found to be positive on 11/23/2020. Chest x-ray:  · 11/23/2020 persistent bilateral pulmonary infiltrates with associated pulmonary vascular congestion. Unchanged from films of 10/23/2020    Chest CT:  · 11/23/2020: Pulmonary fibrosis. No pulmonary embolus. Increased groundglass opacities and parenchymal opacities throughout both lungs when compared with films of 10/21/2020    Patient admitted because of concerns with COVID 19.    CURRENT EVALUATION : 11/27/2020    Afebrile  VS stable  Bradycardic     Leukocytosis present. Up trending. On Decadron. Decadron 6 mg PO q day. Stop date 12-2-20  Remdesivir started 11-24-20   Consent for plasma obtained. 2 Units of Plasma transfused 11-24-20    On Heparin for Atrial Fibrillation. Patient exhibiting respiratory distress. Yes  Respiratory secretions: No    Patient receiving supplemental oxygen. Yes.  Currently on High flow  at 25-->20 L with FIO2 100--> 85 %    02 sat 96%-->80-->93-->97%  RR 32-->22-->20    QTc: NEWS Score: 0-4 Low risk group; 5-6: Medium risk group; 7 or above: High risk group  Parameters 3 2 1 0 1 2 3   Age    < 72   ? 65   RR ? 8  9-11 12-20  21-24 ? 25   O2 Sats ? 91 92-93 94-95 ? 96      Suppl O2  Yes  No      SBP ? 90  101-110 111-219   ? 220   HR ? 40  41-50 51-90  111-130 ? 131   Consciousness    Alert   Drowsiness, lethargy, or confusion   Temperature ? 35.0 C (95.0 F)  35.1-36.0 C 95.1-96.9 F 36.1-38.0 C 97.0-100.4 F 38.1-39.0 C 100.5-102.3 F ? 39.1 C ? 102.4 F      NEWS Score:   11/23/2020: 9 risk   11/24/20: 11 high Risk    Overall Daily Picture:      Unchanged to mild improvement    Presence of secondary bacterial Infection:    No   Additional antibiotics: No    Labs, X rays reviewed: 11/27/2020    BUN: 16-->22-->31-->26  Cr: 0.62-->0.61-->0.53-->0.59    WBC: 14.1-->15.7-->11.4  Hb: 12.0-->11.3-->10.1-->11.2  Plat: 282-->305-->423-->396    Bilirubin 1.28  Alk phos 86  ALT 12  AST 24    Absolute Neutrophils: 13.1  Absolute Lymphocytes: 0.59  Neutrophil/Lymphocyte Ratio: 22.2 high risk    CRP: 182 (10-17-20) -->204 (11/23/20)  Ferritin: 395(11/23/20)  LDH: 521(11/23/20)    Pro Calcitonin:0.29( 10/17/20)  Troponin high-sensitivity 102  proBNP 3472    Cultures:  Urine:  ·   Blood:  · 11/23/20: x2: No growth  Sputum :  ·   Wound:       CXR:   · 11/23/2020 pulmonary fibrosis with interstitial edema. Scattered infiltrates bilaterally      CAT:  · 11/23/2020: Pulmonary fibrosis. No pulmonary embolus. Increased groundglass opacities and parenchymal opacities throughout both lungs when compared with films of 10/21/2020            Discussed with patient, RN, CC, IM. I have personally reviewed the past medical history, past surgical history, medications, social history, and family history, and I have updated the database accordingly.   Past Medical History:     Past Medical History:   Diagnosis Date    Atrial fibrillation (HCC)     Back pain, chronic     Hill's esophagus 06/18/2019    BPH (benign prostatic hyperplasia)     Cancer (HCC)     colon-rectal    Cocaine abuse in remission [facility])     1970's    ED (erectile dysfunction) 4/2/2015    GERD (gastroesophageal reflux disease)     GI bleed 12/5/2018    Hernia     History of colon cancer     Melena     Migraines     Murmur, cardiac        Past Surgical  History:     Past Surgical History:   Procedure Laterality Date    CARDIAC CATHETERIZATION  11/29/2018    Non-obstructive CAD    CARDIOVERSION  2020    COLECTOMY      2nd colectomy, Colostomy and reversed [facility] COLECTOMY      1st time Ksenia    COLONOSCOPY      COLONOSCOPY  07/18/2016    COLONOSCOPY N/A 12/6/2018    COLONOSCOPY DIAGNOSTIC performed by Shahab Dave MD at [address] Right 2009    inguinal    KNEE SURGERY Right 1970's    arthrotomy    TONSILLECTOMY      TOTAL KNEE ARTHROPLASTY Right 1/8/2019    KNEE TOTAL ARTHROPLASTY performed by Ebonie Austin MD at [facility] TRANSESOPHAGEAL ECHOCARDIOGRAM  11/29/2018    UPPER GASTROINTESTINAL ENDOSCOPY N/A 12/5/2018    EGD DIAGNOSTIC ONLY performed by Shahab Dave MD at [facility] N/A 6/18/2019    [facility]       Medications:      furosemide  20 mg Intravenous BID    sodium chloride  20 mL Intravenous Once    rivaroxaban  20 mg Oral Daily    remdesivir IVPB  100 mg Intravenous Q24H    atorvastatin  40 mg Oral Daily    lisinopril  20 mg Oral Daily    sotalol  80 mg Oral BID    tamsulosin  0.4 mg Oral Daily    sodium chloride flush  10 mL Intravenous 2 times per day    famotidine  20 mg Oral Daily    dexamethasone  6 mg Oral Daily    Vitamin D  2,000 Units Oral Daily       Social History:     Social History     Socioeconomic History    Marital status: Single     Spouse name: Not on file    Number of children: Not on file    Years of education: Not on file    Highest education level: Not on file Occupational History    Not on file   Social Needs    Financial resource strain: Not on file    Food insecurity     Worry: Not on file     Inability: Not on file    Transportation needs     Medical: Not on file     Non-medical: Not on file   Tobacco Use    Smoking status: Former Smoker     Packs/day: 0.50     Years: 1.00     Pack years: 0.50     Last attempt to quit:      Years since quittin.9    Smokeless tobacco: Never Used    Tobacco comment: stated never actually really smoked only inhaled    Substance and Sexual Activity    Alcohol use: Yes     Alcohol/week: 12.0 standard drinks     Types: 10 Standard drinks or equivalent, 2 Shots of liquor per week     Comment: 2-3 times a week    Drug use: No     Types: Other-see comments     Comment: Cocaine use in past in     Sexual activity: Yes     Partners: Female   Lifestyle    Physical activity     Days per week: Not on file     Minutes per session: Not on file    Stress: Not on file   Relationships    Social connections     Talks on phone: Not on file     Gets together: Not on file     Attends [employer] service: Not on file     Active member of club or organization: Not on file     Attends meetings of clubs or organizations: Not on file     Relationship status: Not on file    Intimate partner violence     Fear of current or ex partner: Not on file     Emotionally abused: Not on file     Physically abused: Not on file     Forced sexual activity: Not on file   Other Topics Concern    Not on file   Social History Narrative    Not on file       Family History:     Family History   Problem Relation Age of Onset    Diabetes Mother     Heart Attack Father     Heart Disease Father     Heart Disease Brother         Allergies:   Adhesive tape; Codeine; and Penicillins     Review of Systems:       Constitutional: No fevers or chills. No systemic complaints  Head: No headaches  Eyes: No double vision or blurry vision.  No conjunctival inflammation. ENT: No sore throat or runny nose. . No hearing loss, tinnitus or vertigo. Cardiovascular: No chest pain or palpitations. Shortness of breath. CLAYTON  Lung: Shortness of breath, dry cough. No sputum production  Abdomen: No nausea, vomiting, diarrhea, or abdominal pain. Pamalee Bucker No cramps. Genitourinary: No increased urinary frequency, or dysuria. No hematuria. No suprapubic or CVA pain  Musculoskeletal: No muscle aches or pains. No joint effusions, swelling or deformities  Hematologic: No bleeding or bruising. Neurologic: No headache, weakness, numbness, or tingling. Integument: No rash, no ulcers. Psychiatric: No depression. Endocrine: No polyuria, no polydipsia, no polyphagia. Physical Examination :     Patient Vitals for the past 8 hrs:   BP Temp Temp src Pulse Resp SpO2   11/27/20 1643 -- -- -- -- 19 97 %   11/27/20 1558 111/70 97.8 °F (36.6 °C) Axillary (!) 47 19 99 %   11/27/20 1148 108/64 97.7 °F (36.5 °C) Axillary (!) 45 20 99 %     General Appearance: Awake, alert, and in no apparent distress  Head:  Normocephalic, no trauma  Eyes: Pupils equal, round, reactive to light; sclera anicteric; conjunctivae pink. No embolic phenomena. ENT: Oropharynx clear, without erythema, exudate, or thrush. No tenderness of sinuses. Mouth/throat: mucosa pink and moist. No lesions. Dentition in good repair. Neck:Supple, without lymphadenopathy. Thyroid normal, No bruits. Pulmonary/Chest: ,Distant breath sounds, decreased breath sounds at the bases   cardiovascular: Irreegular rate and rhythm without murmurs, rubs, or gallops. Abdomen: Soft, non tender. Bowel sounds normal. No organomegaly  All four Extremities: No cyanosis, clubbing, edema, or effusions. Neurologic: No gross sensory or motor deficits. Skin: Warm and dry with good turgor. No signs of peripheral arterial or venous insufficiency. No ulcerations. No open wounds.     Medical Decision Making -Laboratory:   I have independently reviewed/ordered the following labs:    CBC with Differential:   Recent Labs     11/26/20  0651 11/27/20  0721   WBC 11.5* 11.4*   HGB 10.7* 11.2*   HCT 35.7* 36.9*    396   LYMPHOPCT 9* 10*   MONOPCT 8 8     BMP:   Recent Labs     11/25/20  0546 11/26/20  0651 11/27/20  0721    142 146*   K 3.5* 3.6* 3.4*    106 106   CO2 27 27 31   BUN 31* 26* 26*   CREATININE 0.53* 0.53* 0.59*   MG 2.5  --  2.1     Hepatic Function Panel:   Recent Labs     11/26/20  0651 11/27/20  0721   PROT 5.5* 5.7*   LABALBU 2.4* 2.6*   BILITOT 0.60 0.71   ALKPHOS 64 64   ALT 16 19   AST 17 18     No results for input(s): RPR in the last 72 hours. No results for input(s): HIV in the last 72 hours. No results for input(s): BC in the last 72 hours. Lab Results   Component Value Date    MUCUS NOT REPORTED 11/23/2020    RBC 4.42 11/27/2020    TRICHOMONAS NOT REPORTED 11/23/2020    WBC 11.4 11/27/2020    YEAST NOT REPORTED 11/23/2020    TURBIDITY CLEAR 11/23/2020     Lab Results   Component Value Date    CREATININE 0.59 11/27/2020    GLUCOSE 77 11/27/2020       Medical Decision Making-Imaging:     EXAMINATION:    CTA OF THE CHEST 11/23/2020 7:42 am         TECHNIQUE:    CTA of the chest was performed after the administration of intravenous    contrast.  Multiplanar reformatted images are provided for review.  MIP    images are provided for review. Dose modulation, iterative reconstruction,    and/or weight based adjustment of the mA/kV was utilized to reduce the    radiation dose to as low as reasonably achievable.         COMPARISON:    High-resolution chest CT 10 03/20/2020         CT PA 10/21/2020         HISTORY:    ORDERING SYSTEM PROVIDED HISTORY: r/o PE    TECHNOLOGIST PROVIDED HISTORY:    r/o PE    Reason for Exam: sob    Acuity: Acute              Type of Exam: Initial         FINDINGS:    No intimal flap is seen in aorta. .  Small mediastinal nodes are noted,    similar to prior         No embolus is seen the central, right, left main pulmonary artery.  No    embolus is seen in the proximal segmental branches.  Distal segmental    branches and subsegmental branches are not well evaluated secondary to    respiratory motion artifact.         Patchy ground-glass and parenchymal opacities are seen in the left upper and    left lower lobe.         On the right patchy ground-glass and parenchymal opacities are seen in the    right upper, right middle and right lower lobe.  There is mild fusiform    bronchiectasis. Sue Agapito is trace left-sided pleural effusion.         There is mild underlying pulmonary fibrosis.  There is underlying    bronchiectasis         Right adrenal gland is normal.  Left adrenal gland is normal.         1 mm calcification is seen in the left kidney         Spurring is seen in the spine.  Spurring is seen in the shoulder joints         Small soft tissue nodule is seen posteriorly in the subcutaneous fat    measuring 2.1 cm, likely sebaceous cyst              Impression    No central pulmonary embolus.  Peripheral branches are not well evaluated    secondary to motion.         Increased ground-glass opacity and parenchymal opacities throughout the lungs    either due to developing pneumonia or edema.  There is a small left-sided    pleural effusion.  By report there is a history of COVID-19 infection         Underlying pulmonary fibrosis again noted.                EXAMINATION:    ONE XRAY VIEW OF THE CHEST         11/23/2020 8:02 am         COMPARISON:    10/19/2020         HISTORY:    ORDERING SYSTEM PROVIDED HISTORY: SOB    TECHNOLOGIST PROVIDED HISTORY:    SOB    Reason for Exam: uprt port chest         FINDINGS:    Cardiomediastinal silhouette is stable.  Bilateral pulmonary infiltrates    persist unchanged and may represent pulmonary edema versus atypical pneumonia.              Impression    Stable exam        Medical Decision [id number]:       Medical Decision Making-Other:     Note:  · Labs, medications, radiologic studies were reviewed with personal review of films  · Large amounts of data were reviewed  · Discussed with nursing Staff, Discharge planner  · Infection Control and Prevention measures reviewed  · All prior entries were reviewed  · Administer medications as ordered  · Prognosis: Guarded  · Discharge planning reviewed  · Follow up as outpatient. Thank you for allowing us to participate in the care of this patient. Please call with questions.     Michele Hsieh MD         Pager: [phone] - Office: [phone]

## 2020-11-28 NOTE — PROGRESS NOTES
Pulmonary/Critical Care Daily Progress Note    Patient's name:  Annalisa Bruno  Medical Record Number: [id number]  Patient's account/billing number: [de-identified]  Patient's YOB: 1955  Age: 77 y.o. Date of Admission: 11/23/2020  6:44 AM  Date of Consult: 11/28/2020      Primary Care Physician: Valerie Steel MD      Code Status: Full Code    Reason for consult: Acute hypoxemic respiratory failure secondary to COVID-19 pneumonia      HISTORY OF PRESENT ILLNESS:   History was obtained from chart review and the patient. Mr. Annalisa Bruno is a 77 y.o. white gentleman was admitted with shortness of breath. Patient recently discharged from hospital after having an acute on chronic combined systolic and diastolic CHF and atrial fibrillation with RVR. Chest x-ray showed worsening bilateral infiltrates and CT scan showed some infiltrates along with underlying pulmonary fibrosis but no blood clot was seen. Patient had a Covid test that was positive along with elevated serum BNP and D-dimer of 2.86. Patient was placed on 100% nonrebreather mask  Patient shortness of breath is much improved with using 100% nonrebreather mask  Has a dry cough  Also has some orthopnea    INTERVAL HISTORY; Up in chair. Animated. FiO2 decreased to 65% and desats less severe. Suggests improvement. Labs reviewed. I/O +600ml since adm  Past Medical History:        Diagnosis Date    Atrial fibrillation ([address]     Back pain, chronic     Hill's esophagus 06/18/2019    BPH (benign prostatic hyperplasia)     Cancer (HCC)     colon-rectal    Cocaine abuse in remission [facility])     1970's    ED (erectile dysfunction) 4/2/2015    GERD (gastroesophageal reflux disease)     GI bleed 12/5/2018    Hernia     History of colon cancer     Melena     Migraines     Murmur, cardiac        Past Surgical History:        Procedure Laterality Date    CARDIAC CATHETERIZATION  11/29/2018 Non-obstructive CAD    CARDIOVERSION  2020    COLECTOMY      2nd colectomy, Colostomy and reversed [facility] COLECTOMY      1st time Ksenia    COLONOSCOPY      COLONOSCOPY  07/18/2016    COLONOSCOPY N/A 12/6/2018    COLONOSCOPY DIAGNOSTIC performed by Ana Rosa Marquez MD at [address] Right 2009    inguinal    KNEE SURGERY Right 1970's    arthrotomy    TONSILLECTOMY      TOTAL KNEE ARTHROPLASTY Right 1/8/2019    KNEE TOTAL ARTHROPLASTY performed by Ash Castillo MD at [address] TRANSESOPHAGEAL ECHOCARDIOGRAM  11/29/2018    UPPER GASTROINTESTINAL ENDOSCOPY N/A 12/5/2018    EGD DIAGNOSTIC ONLY performed by Ana Rosa Marquez MD at [facility] N/A 6/18/2019    [facility]       Allergies: Allergies   Allergen Reactions    Adhesive Tape Other (See Comments)     Blister badly     Codeine Nausea Only     Other reaction(s): Other: See Comments  NAUSEA  Other reaction(s): Other: See Comments  NAUSEA    Penicillins Swelling     As a baby  Other reaction(s): Unknown  As a baby         Home Meds:   Prior to Admission medications    Medication Sig Start Date End Date Taking?  Authorizing Provider   atorvastatin (LIPITOR) 40 MG tablet Take 1 tablet by mouth daily 11/16/20  Yes Cassandra Talley MD   omeprazole (PRILOSEC) 40 MG delayed release capsule Take 1 capsule by mouth daily 11/16/20  Yes Cassandra Talley MD   lisinopril (PRINIVIL;ZESTRIL) 5 MG tablet take 1 tablet by mouth once daily 10/26/20  Yes Historical Provider, MD   sotalol (BETAPACE) 80 MG tablet take 1 tablet by mouth every 12 hours 10/28/20  Yes Historical Provider, MD   ibuprofen (ADVIL;MOTRIN) 800 MG tablet Take 1 tablet by mouth every 6 hours if needed for pain 10/27/20  Yes Cassandra Talley MD   furosemide (LASIX) 20 MG tablet Take 1 tablet by mouth daily 10/26/20  Yes Dean Roberto MD   tamsulosin (FLOMAX) 0.4 MG capsule Take 1 capsule by mouth daily 10/15/20  Yes Cassandra BMI 22.34 kg/m²     Last Body weight:   Wt Readings from Last 3 Encounters:   11/28/20 164 lb 10.9 oz (74.7 kg)   11/16/20 178 lb (80.7 kg)   11/10/20 173 lb 3.2 oz (78.6 kg)       Body Mass Index : Body mass index is 22.34 kg/m². Intake and Output summary:     Intake/Output Summary (Last 24 hours) at 11/28/2020 1819  Last data filed at 11/28/2020 1433  Gross per 24 hour   Intake 1696 ml   Output 575 ml   Net 1121 ml       Physical Examination:   PHYSICAL EXAMINATION:  Vitals:    11/28/20 0912 11/28/20 1130 11/28/20 1615 11/28/20 1616   BP:  (!) 114/59     Pulse:  68     Resp: 20 (!) 33  26   Temp:  98.1 °F (36.7 °C) 97 °F (36.1 °C)    TempSrc:  Axillary Oral    SpO2: 95% (!) 75%  96%   Weight:       Height:         Due to the current efforts to prevent transmission of COVID-19 and also the need to preserve PPE for other caregivers, a face-to-face encounter with the patient was not performed. That being said, all relevant records and diagnostic tests were reviewed, including laboratory results and imaging. Patient was evaluated from the window, called on the phone, and chart reviewed, disc w  involved healthcare workers. Patient appears comfortable on 100% nonrebreather mask  Not using accessory musculature for breathing        Laboratory findings:-    CBC:   Recent Labs     11/28/20  0555   WBC 11.3   HGB 11.6*        BMP:    Recent Labs     11/26/20  0651 11/27/20  0721 11/28/20  0555    146* 139   K 3.6* 3.4* 4.0    106 108*   CO2 27 31 25   BUN 26* 26* 26*   CREATININE 0.53* 0.59* 0.52*   GLUCOSE 84 77 84     S. Calcium:  Recent Labs     11/28/20  0555   CALCIUM 8.2*     S. Ionized Calcium:No results for input(s): IONCA in the last 72 hours. S. Magnesium:  Recent Labs     11/27/20  0721   MG 2.1     S. Phosphorus:No results for input(s): PHOS in the last 72 hours. S. Glucose:  No results for input(s): POCGLU in the last 72 hours.   Glycosylated hemoglobin A1C: No results for input(s): LABA1C in the last 72 hours. INR:   No results for input(s): INR in the last 72 hours. Hepatic functions:   Recent Labs     11/28/20  0555   ALKPHOS 63   ALT 21   AST 16   PROT 5.8*   BILITOT 0.59   LABALBU 2.4*     Pancreatic functions:No results for input(s): LACTA, AMYLASE in the last 72 hours. S. Lactic Acid: No results for input(s): LACTA in the last 72 hours. Cardiac enzymes:No results for input(s): CKTOTAL, CKMB, CKMBINDEX, TROPONINI in the last 72 hours. BNP:No results for input(s): BNP in the last 72 hours. Lipid profile: No results for input(s): CHOL, TRIG, HDL, LDLCALC in the last 72 hours. Invalid input(s): LDL  Blood Gases: No results found for: PH, PCO2, PO2, HCO3, O2SAT  Thyroid functions:   Lab Results   Component Value Date    TSH 4.28 10/17/2020            Radiological reports:  CT scan of the chest showed no pulmonary embolism but showed bilateral pulmonary infiltrates and small left pleural effusion    Chest x-ray showed bilateral pulmonary infiltrates that are unchanged from before       Assessment and Plan     Principal Problem:    Acute hypoxemic respiratory failure due to COVID-19 [facility])  Active Problems:    Dyslipidemia    Benign prostatic hyperplasia with urinary obstruction    Hypertrophic nonobstructive cardiomyopathy (HCC)    Iron (Fe) deficiency anemia    Benign essential HTN    CHF (congestive heart failure), NYHA class II, acute on chronic, combined ([address]    Occupational pulmonary disease    COVID-19  Resolved Problems:    * No resolved hospital problems.  *        Assessment:     Acute hypoxic respiratory failure   Acute respiratory distress syndrome   COVID 19 infection/pneumonia   Patient may also have a competent of acute on chronic combined congestive heart failure   Hypokalemia   Leukocytosis secondary to infection   Mild anemia    Plan:     Continue Airborne isolation   Continue HFNC   Can discharge home when oxygen flow rate down to 4lpm  [employer]

## 2020-11-28 NOTE — PLAN OF CARE
Problem: Airway Clearance - Ineffective  Goal: Achieve or maintain patent airway  11/28/2020 1720 by Angeli Coley RCP  Outcome: Ongoing     Problem: Gas Exchange - Impaired  Goal: Absence of hypoxia  11/28/2020 1720 by Angeli Coley RCP  Outcome: Ongoing     Problem: Gas Exchange - Impaired  Goal: Promote optimal lung function  11/28/2020 1720 by Angeli Coley RCP  Outcome: Ongoing     Problem: Breathing Pattern - Ineffective  Goal: Ability to achieve and maintain a regular respiratory rate  11/28/2020 1720 by Angeli Coley RCP  Outcome: Ongoing     Problem: OXYGENATION/RESPIRATORY FUNCTION  Goal: Patient will maintain patent airway  11/28/2020 1720 by Angeli Coley RCP  Outcome: Ongoing     Problem: OXYGENATION/RESPIRATORY FUNCTION  Goal: Patient will achieve/maintain normal respiratory rate/effort  Description: Respiratory rate and effort will be within normal limits for the patient  11/28/2020 1720 by Angeli Coley RCP  Outcome: Ongoing

## 2020-11-28 NOTE — PROGRESS NOTES
[facility] -Progress Note COVID 19 Patient  Today's Date and Time: 11/28/2020, 5:07 PM    Impression :   · COVID 19 Suspect  · COVID 19 Confirmed Infection. · Covid tests:  · 11-23-20: Positive. · Atrial fibrillation with RVR: S/P cardioversion   · Pulmonary Fibrosis: secondary to Occupational exposure. 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 · Hypertrophic non obstructive cardiomyopathy. · Hx of Colon cancer  · BPH. Recommendations:   · Monitor off antibiotics. · Clinical Research will approach patient to explore if he qualifies for any of the COVID 19 treatment protocols. · Remdesivir started 11-24-20. Stop date 11-28-20  · CRP and LD on 11/23/20 were elevated. Continue Decadron. Started on 11/23 Stop date:12/3/20  · Plasma infusion. Two units infused 11-24-10    Medical Decision Making/Summary/Discussion:11/28/2020     · Patient admitted with suspected COVID 19 infection  · Covid test confirmed positive. · Associated problems with congestive heart failure, pulmonary fibrosis, hypoxia. Infection Control Recommendations   · Universal Precautions  · Airborne isolation  · Droplet Isolation    Antimicrobial Stewardship Recommendations     · Discontinuation of therapy  Coordination of Outpatient Care:   · Estimated Length of IV antimicrobials:TBD  · Patient will need Midline Catheter Insertion: TBD  · Patient will need PICC line Insertion: No  · Patient will need: Home IV , [place],  SNF,  LTAC:TBD  · Patient will need outpatient wound care:No    Chief complaint/reason for consultation:   · Concern for COVID infection      History of Present Illness:   Annmarie Bosworth is a 77y.o.-year-old  male who was initially admitted on 11/23/2020. Patient seen at the request of [address] [place] [address]. INITIAL HISTORY:    Patient presented through ER with complaints of onset of shortness of breath.   Located onset of symptoms on 11/22/2020 with worsening through the day leading to his seeking help at the emergency room. The patient had been recently admitted on 10/17/2020 through 10/26/2020 at [facility] because of the presence of congestive heart failure NYHAA class II with acute on chronic combined heart failure. He also suffers from atrial fibrillation and hypertrophic nonobstructive cardiomyopathy, benign essential hypertension. His previous diagnosis includes occupational pulmonary fibrosis. During that admission he was also found to have suffered from mycoplasma pneumonia and was treated with antibiotics. He reports being seen at [facility], after his admission to [facility],  where he was cardioverted because of the atrial fibrillation. He was also found to be in heart failure and was a started on diuretics. When the patient was evaluated in the emergency room he was found to be hypoxic and was treated with BiPAP which improved his oxygen saturation rate. He was tested for Covid and was found to be positive on 11/23/2020. Chest x-ray:  · 11/23/2020 persistent bilateral pulmonary infiltrates with associated pulmonary vascular congestion. Unchanged from films of 10/23/2020    Chest CT:  · 11/23/2020: Pulmonary fibrosis. No pulmonary embolus. Increased groundglass opacities and parenchymal opacities throughout both lungs when compared with films of 10/21/2020    Patient admitted because of concerns with COVID 19.    CURRENT EVALUATION : 11/28/2020    Afebrile  VS stable  Bradycardic     Leukocytosis present. Up trending. On Decadron day 6    Decadron 6 mg PO q day. Stop date 12-2-20  Remdesivir started 11-24-20 . Consent for plasma obtained. 2 Units of Plasma transfused 11-24-20    On Heparin for Atrial Fibrillation. Patient exhibiting respiratory distress. Yes  Respiratory secretions: No    Patient receiving supplemental oxygen. Yes.  Currently on High flow  at 25-->20-->20L with FIO2 100--> 85-->75-->65%    02 sat 96%-->80-->93-->97-->96  RR 32-->22-->20-->26. QTc:       NEWS Score: 0-4 Low risk group; 5-6: Medium risk group; 7 or above: High risk group  Parameters 3 2 1 0 1 2 3   Age    < 65   ? 65   RR ? 8  9-11 12-20  21-24 ? 25   O2 Sats ? 91 92-93 94-95 ? 96      Suppl O2  Yes  No      SBP ? 90  101-110 111-219   ? 220   HR ? 40  41-50 51-90  111-130 ? 131   Consciousness    Alert   Drowsiness, lethargy, or confusion   Temperature ? 35.0 C (95.0 F)  35.1-36.0 C 95.1-96.9 F 36.1-38.0 C 97.0-100.4 F 38.1-39.0 C 100.5-102.3 F ? 39.1 C ? 102.4 F      NEWS Score:   11/23/2020: 9 risk   11/24/20: 11 high Risk    Overall Daily Picture:      Unchanged to mild improvement    Presence of secondary bacterial Infection:    No   Additional antibiotics: No    Labs, X rays reviewed: 11/28/2020    BUN: 16-->22-->31-->26-->26  Cr: 0.62-->0.61-->0.53-->0.59-->0. 52. WBC: 14.1-->15.7-->11.4-->11.3  Hb: 12.0-->11.3-->10.1-->11.2-->11.6  Plat: 282-->305-->423-->396-->424    Bilirubin 1.28  Alk phos 86  ALT 12  AST 24    Absolute Neutrophils: 13.1. Absolute Lymphocytes: 0.59. Neutrophil/Lymphocyte Ratio: 22.2 high risk. CRP: 182 (10-17-20) -->204 (11/23/20)  Ferritin: 395(11/23/20)  LDH: 521(11/23/20)    Pro Calcitonin:0.29( 10/17/20)  Troponin high-sensitivity 102  proBNP 3472    Cultures:  Urine:  ·   Blood:  · 11/23/20: x2: No growth  Sputum :  ·   Wound:       CXR:   · 11/23/2020 pulmonary fibrosis with interstitial edema. Scattered infiltrates bilaterally      CAT:  · 11/23/2020: Pulmonary fibrosis. No pulmonary embolus. Increased groundglass opacities and parenchymal opacities throughout both lungs when compared with films of 10/21/2020            Discussed with patient, RN, CC, IM. I have personally reviewed the past medical history, past surgical history, medications, social history, and family history, and I have updated the database accordingly.   Past Medical History:     Past Medical History: Diagnosis Date    Atrial fibrillation ([facility]     Back pain, chronic     Mcguire's esophagus 06/18/2019    BPH (benign prostatic hyperplasia)     Cancer (HCC)     colon-rectal    Cocaine abuse in remission [facility])     1970's    ED (erectile dysfunction) 4/2/2015    GERD (gastroesophageal reflux disease)     GI bleed 12/5/2018    Hernia     History of colon cancer     Melena     Migraines     Murmur, cardiac        Past Surgical  History:     Past Surgical History:   Procedure Laterality Date    CARDIAC CATHETERIZATION  11/29/2018    Non-obstructive CAD    CARDIOVERSION  2020    COLECTOMY      2nd colectomy, Colostomy and reversed [facility] COLECTOMY      1st time [place]    COLONOSCOPY      COLONOSCOPY  07/18/2016    COLONOSCOPY N/A 12/6/2018    COLONOSCOPY DIAGNOSTIC performed by Magdy Melton MD at [address] [facility] Right 2009    inguinal    KNEE SURGERY Right 1970's    arthrotomy    TONSILLECTOMY      TOTAL KNEE ARTHROPLASTY Right 1/8/2019    KNEE TOTAL ARTHROPLASTY performed by Americo Garcia MD at [address] TRANSESOPHAGEAL ECHOCARDIOGRAM  11/29/2018    UPPER GASTROINTESTINAL ENDOSCOPY N/A 12/5/2018    EGD DIAGNOSTIC ONLY performed by Magdy Melton MD at [address] [facility] N/A 6/18/2019    MCGUIRE'S       Medications:      furosemide  20 mg Intravenous BID    sodium chloride  20 mL Intravenous Once    rivaroxaban  20 mg Oral Daily    remdesivir IVPB  100 mg Intravenous Q24H    atorvastatin  40 mg Oral Daily    lisinopril  20 mg Oral Daily    sotalol  80 mg Oral BID    tamsulosin  0.4 mg Oral Daily    sodium chloride flush  10 mL Intravenous 2 times per day    famotidine  20 mg Oral Daily    dexamethasone  6 mg Oral Daily    Vitamin D  2,000 Units Oral Daily       Social History:     Social History     Socioeconomic History    Marital status: Single     Spouse name: Not on file    Number of children: Not on file    headaches  Eyes: No double vision or blurry vision. No conjunctival inflammation. ENT: No sore throat or runny nose. . No hearing loss, tinnitus or vertigo. Cardiovascular: No chest pain or palpitations. Shortness of breath. CLAYTON  Lung: Shortness of breath, dry cough. No sputum production  Abdomen: No nausea, vomiting, diarrhea, or abdominal pain. Graylon Dylan No cramps. Genitourinary: No increased urinary frequency, or dysuria. No hematuria. No suprapubic or CVA pain  Musculoskeletal: No muscle aches or pains. No joint effusions, swelling or deformities  Hematologic: No bleeding or bruising. Neurologic: No headache, weakness, numbness, or tingling. Integument: No rash, no ulcers. Psychiatric: No depression. Endocrine: No polyuria, no polydipsia, no polyphagia. Physical Examination :     Patient Vitals for the past 8 hrs:   BP Temp Temp src Pulse Resp SpO2   11/28/20 1616 -- -- -- -- 26 96 %   11/28/20 1130 (!) 114/59 98.1 °F (36.7 °C) Axillary 68 (!) 33 (!) 75 %   11/28/20 0912 -- -- -- -- 20 95 %     General Appearance: Awake, alert, and in no apparent distress  Head:  Normocephalic, no trauma  Eyes: Pupils equal, round, reactive to light; sclera anicteric; conjunctivae pink. No embolic phenomena. ENT: Oropharynx clear, without erythema, exudate, or thrush. No tenderness of sinuses. Mouth/throat: mucosa pink and moist. No lesions. Dentition in good repair. Neck:Supple, without lymphadenopathy. Thyroid normal, No bruits. Pulmonary/Chest: ,Distant breath sounds, decreased breath sounds at the bases   cardiovascular: Irreegular rate and rhythm without murmurs, rubs, or gallops. Abdomen: Soft, non tender. Bowel sounds normal. No organomegaly  All four Extremities: No cyanosis, clubbing, edema, or effusions. Neurologic: No gross sensory or motor deficits. Skin: Warm and dry with good turgor. No signs of peripheral arterial or venous insufficiency. No ulcerations. No open wounds.     Medical Decision Making -Laboratory:   I have independently reviewed/ordered the following labs:    CBC with Differential:   Recent Labs     11/27/20  0721 11/28/20  0555   WBC 11.4* 11.3   HGB 11.2* 11.6*   HCT 36.9* 38.6*    424   LYMPHOPCT 10* 12*   MONOPCT 8 7     BMP:   Recent Labs     11/27/20  0721 11/28/20  0555   * 139   K 3.4* 4.0    108*   CO2 31 25   BUN 26* 26*   CREATININE 0.59* 0.52*   MG 2.1  --      Hepatic Function Panel:   Recent Labs     11/27/20  0721 11/28/20  0555   PROT 5.7* 5.8*   LABALBU 2.6* 2.4*   BILITOT 0.71 0.59   ALKPHOS 64 63   ALT 19 21   AST 18 16     No results for input(s): RPR in the last 72 hours. No results for input(s): HIV in the last 72 hours. No results for input(s): BC in the last 72 hours. Lab Results   Component Value Date    MUCUS NOT REPORTED 11/23/2020    RBC 4.55 11/28/2020    TRICHOMONAS NOT REPORTED 11/23/2020    WBC 11.3 11/28/2020    YEAST NOT REPORTED 11/23/2020    TURBIDITY CLEAR 11/23/2020     Lab Results   Component Value Date    CREATININE 0.52 11/28/2020    GLUCOSE 84 11/28/2020       Medical Decision Making-Imaging:     EXAMINATION:    CTA OF THE CHEST 11/23/2020 7:42 am         TECHNIQUE:    CTA of the chest was performed after the administration of intravenous    contrast.  Multiplanar reformatted images are provided for review.  MIP    images are provided for review. Dose modulation, iterative reconstruction,    and/or weight based adjustment of the mA/kV was utilized to reduce the    radiation dose to as low as reasonably achievable.         COMPARISON:    High-resolution chest CT 10 03/20/2020         CT PA 10/21/2020         HISTORY:    ORDERING SYSTEM PROVIDED HISTORY: r/o PE    TECHNOLOGIST PROVIDED HISTORY:    r/o PE    Reason for Exam: sob    Acuity: Acute              Type of Exam: Initial         FINDINGS:    No intimal flap is seen in aorta. .  Small mediastinal nodes are noted,    similar to prior         No embolus is seen the central, right, left main pulmonary artery.  No    embolus is seen in the proximal segmental branches.  Distal segmental    branches and subsegmental branches are not well evaluated secondary to    respiratory motion artifact.         Patchy ground-glass and parenchymal opacities are seen in the left upper and    left lower lobe.         On the right patchy ground-glass and parenchymal opacities are seen in the    right upper, right middle and right lower lobe.  There is mild fusiform    bronchiectasis. Suann Pillar is trace left-sided pleural effusion.         There is mild underlying pulmonary fibrosis.  There is underlying    bronchiectasis         Right adrenal gland is normal.  Left adrenal gland is normal.         1 mm calcification is seen in the left kidney         Spurring is seen in the spine.  Spurring is seen in the shoulder joints         Small soft tissue nodule is seen posteriorly in the subcutaneous fat    measuring 2.1 cm, likely sebaceous cyst              Impression    No central pulmonary embolus.  Peripheral branches are not well evaluated    secondary to motion.         Increased ground-glass opacity and parenchymal opacities throughout the lungs    either due to developing pneumonia or edema.  There is a small left-sided    pleural effusion.  By report there is a history of COVID-19 infection         Underlying pulmonary fibrosis again noted.                EXAMINATION:    ONE XRAY VIEW OF THE CHEST         11/23/2020 8:02 am         COMPARISON:    10/19/2020         HISTORY:    ORDERING SYSTEM PROVIDED HISTORY: SOB    TECHNOLOGIST PROVIDED HISTORY:    SOB    Reason for Exam: uprt port chest         FINDINGS:    Cardiomediastinal silhouette is stable.  Bilateral pulmonary infiltrates    persist unchanged and may represent pulmonary edema versus atypical pneumonia.              Impression    Stable exam        Medical Decision Qugpyj-Jabuibbp-Ifoil:       Medical Decision Making-Other:     Note:  · Labs, medications, radiologic studies were reviewed with personal review of films  · Large amounts of data were reviewed  · Discussed with nursing Staff, Discharge planner  · Infection Control and Prevention measures reviewed  · All prior entries were reviewed  · Administer medications as ordered  · Prognosis: Guarded  · Discharge planning reviewed  · Follow up as outpatient. Thank you for allowing us to participate in the care of this patient. Please call with questions. Zack Louis MD     ATTESTATION:    I have discussed the case, including pertinent history and exam findings with the residents and students. I have seen and examined the patient and the key elements of the encounter have been performed by me. I was present when the student obtained his information or examined the patient. I have reviewed the laboratory data, other diagnostic studies and discussed them with the residents. I have updated the medical record where necessary. I agree with the assessment, plan and orders as documented by the resident/ student.     Gertrude Velasquez MD.      Pager: [phone] - Office: [phone]

## 2020-11-29 LAB
ABSOLUTE EOS #: 0.12 K/UL (ref 0–0.44)
ABSOLUTE IMMATURE GRANULOCYTE: 0.17 K/UL (ref 0–0.3)
ABSOLUTE LYMPH #: 1.44 K/UL (ref 1.1–3.7)
ABSOLUTE MONO #: 0.91 K/UL (ref 0.1–1.2)
ALBUMIN SERPL-MCNC: 2.4 G/DL (ref 3.5–5.2)
ALBUMIN/GLOBULIN RATIO: 0.8 (ref 1–2.5)
ALP BLD-CCNC: 61 U/L (ref 40–129)
ALT SERPL-CCNC: 18 U/L (ref 5–41)
ANION GAP SERPL CALCULATED.3IONS-SCNC: 7 MMOL/L (ref 9–17)
AST SERPL-CCNC: 12 U/L
BASOPHILS # BLD: 0 % (ref 0–2)
BASOPHILS ABSOLUTE: 0.03 K/UL (ref 0–0.2)
BILIRUB SERPL-MCNC: 0.51 MG/DL (ref 0.3–1.2)
BUN BLDV-MCNC: 27 MG/DL (ref 8–23)
BUN/CREAT BLD: ABNORMAL (ref 9–20)
CALCIUM SERPL-MCNC: 8.1 MG/DL (ref 8.6–10.4)
CHLORIDE BLD-SCNC: 104 MMOL/L (ref 98–107)
CO2: 25 MMOL/L (ref 20–31)
CREAT SERPL-MCNC: 0.6 MG/DL (ref 0.7–1.2)
CULTURE: NORMAL
CULTURE: NORMAL
D-DIMER QUANTITATIVE: 0.87 MG/L FEU
DIFFERENTIAL TYPE: ABNORMAL
EOSINOPHILS RELATIVE PERCENT: 1 % (ref 1–4)
GFR AFRICAN AMERICAN: >60 ML/MIN
GFR NON-AFRICAN AMERICAN: >60 ML/MIN
GFR SERPL CREATININE-BSD FRML MDRD: ABNORMAL ML/MIN/{1.73_M2}
GFR SERPL CREATININE-BSD FRML MDRD: ABNORMAL ML/MIN/{1.73_M2}
GLUCOSE BLD-MCNC: 87 MG/DL (ref 70–99)
HCT VFR BLD CALC: 37.8 % (ref 40.7–50.3)
HEMOGLOBIN: 11.2 G/DL (ref 13–17)
IMMATURE GRANULOCYTES: 1 %
LYMPHOCYTES # BLD: 12 % (ref 24–43)
Lab: NORMAL
Lab: NORMAL
MCH RBC QN AUTO: 25.3 PG (ref 25.2–33.5)
MCHC RBC AUTO-ENTMCNC: 29.6 G/DL (ref 28.4–34.8)
MCV RBC AUTO: 85.3 FL (ref 82.6–102.9)
MONOCYTES # BLD: 7 % (ref 3–12)
NRBC AUTOMATED: 0 PER 100 WBC
PDW BLD-RTO: 19.5 % (ref 11.8–14.4)
PLATELET # BLD: 468 K/UL (ref 138–453)
PLATELET ESTIMATE: ABNORMAL
PMV BLD AUTO: 9.7 FL (ref 8.1–13.5)
POTASSIUM SERPL-SCNC: 3.6 MMOL/L (ref 3.7–5.3)
RBC # BLD: 4.43 M/UL (ref 4.21–5.77)
RBC # BLD: ABNORMAL 10*6/UL
SEG NEUTROPHILS: 79 % (ref 36–65)
SEGMENTED NEUTROPHILS ABSOLUTE COUNT: 9.69 K/UL (ref 1.5–8.1)
SODIUM BLD-SCNC: 136 MMOL/L (ref 135–144)
SPECIMEN DESCRIPTION: NORMAL
SPECIMEN DESCRIPTION: NORMAL
TOTAL PROTEIN: 5.5 G/DL (ref 6.4–8.3)
WBC # BLD: 12.4 K/UL (ref 3.5–11.3)
WBC # BLD: ABNORMAL 10*3/UL

## 2020-11-29 PROCEDURE — 6370000000 HC RX 637 (ALT 250 FOR IP): Performed by: NURSE PRACTITIONER

## 2020-11-29 PROCEDURE — 2700000000 HC OXYGEN THERAPY PER DAY

## 2020-11-29 PROCEDURE — 6360000002 HC RX W HCPCS: Performed by: NURSE PRACTITIONER

## 2020-11-29 PROCEDURE — 80053 COMPREHEN METABOLIC PANEL: CPT

## 2020-11-29 PROCEDURE — 99232 SBSQ HOSP IP/OBS MODERATE 35: CPT | Performed by: INTERNAL MEDICINE

## 2020-11-29 PROCEDURE — 2060000000 HC ICU INTERMEDIATE R&B

## 2020-11-29 PROCEDURE — 99233 SBSQ HOSP IP/OBS HIGH 50: CPT | Performed by: INTERNAL MEDICINE

## 2020-11-29 PROCEDURE — 85025 COMPLETE CBC W/AUTO DIFF WBC: CPT

## 2020-11-29 PROCEDURE — 2580000003 HC RX 258: Performed by: NURSE PRACTITIONER

## 2020-11-29 PROCEDURE — 36415 COLL VENOUS BLD VENIPUNCTURE: CPT

## 2020-11-29 PROCEDURE — 85379 FIBRIN DEGRADATION QUANT: CPT

## 2020-11-29 PROCEDURE — 94761 N-INVAS EAR/PLS OXIMETRY MLT: CPT

## 2020-11-29 RX ORDER — LISINOPRIL 10 MG/1
10 TABLET ORAL DAILY
Status: DISCONTINUED | OUTPATIENT
Start: 2020-11-29 | End: 2020-12-01

## 2020-11-29 RX ORDER — POTASSIUM CHLORIDE 20 MEQ/1
40 TABLET, EXTENDED RELEASE ORAL ONCE
Status: COMPLETED | OUTPATIENT
Start: 2020-11-29 | End: 2020-11-29

## 2020-11-29 RX ADMIN — Medication 2000 UNITS: at 09:40

## 2020-11-29 RX ADMIN — FUROSEMIDE 20 MG: 10 INJECTION, SOLUTION INTRAMUSCULAR; INTRAVENOUS at 09:38

## 2020-11-29 RX ADMIN — FUROSEMIDE 20 MG: 10 INJECTION, SOLUTION INTRAMUSCULAR; INTRAVENOUS at 17:02

## 2020-11-29 RX ADMIN — POTASSIUM CHLORIDE 40 MEQ: 1500 TABLET, EXTENDED RELEASE ORAL at 09:39

## 2020-11-29 RX ADMIN — SODIUM CHLORIDE, PRESERVATIVE FREE 10 ML: 5 INJECTION INTRAVENOUS at 09:34

## 2020-11-29 RX ADMIN — DEXAMETHASONE 6 MG: 4 TABLET ORAL at 09:37

## 2020-11-29 RX ADMIN — RIVAROXABAN 20 MG: 20 TABLET, FILM COATED ORAL at 17:02

## 2020-11-29 RX ADMIN — TAMSULOSIN HYDROCHLORIDE 0.4 MG: 0.4 CAPSULE ORAL at 09:39

## 2020-11-29 RX ADMIN — ATORVASTATIN CALCIUM 40 MG: 80 TABLET, FILM COATED ORAL at 20:54

## 2020-11-29 RX ADMIN — SODIUM CHLORIDE, PRESERVATIVE FREE 10 ML: 5 INJECTION INTRAVENOUS at 20:09

## 2020-11-29 RX ADMIN — FAMOTIDINE 20 MG: 20 TABLET, FILM COATED ORAL at 09:38

## 2020-11-29 ASSESSMENT — PAIN SCALES - GENERAL
PAINLEVEL_OUTOF10: 0
PAINLEVEL_OUTOF10: 0

## 2020-11-29 NOTE — PROGRESS NOTES
[facility]  Office: [address], , Naasaroj Garcia, DO, Kory Cottrell, DO, Syeda Finnegan, DO, Valiant Sicard, MD, Joey Balderas MD, Nick Ray MD, Keri Urrutia MD, Antolin Patton MD, Craig Mares MD, Harriet Van MD, Ean Munoz MD, Breezy Renee MD, Tabitha Prather DO, Genaro Pride MD, Espinoza Virk MD, Gogo Diaz DO, Nuria Smith MD,  Laura Sue, DO, Gabbi Lopez MD, Radha Landers MD, Nathan Wakefield, [facility], [facility], [facility], Mellisa Holliday, [facility], Awais Lu, CNS, Veena Mancera, CNP, Selena Méndez, CNP, Karmen Oconnor, CNP, Jes Smith, CNP, Ani Basilio, CNP, Omaira Castaneda PA-C, Laura Meeks, [facility], Raji Brown, CNP, Livan King, CNP, Liliam Baxter, CNP, Priti Johnson, CNP, Severino Roblero, [facility]   [address] [facility]    Progress Note    11/29/2020    9:57 AM    Name:   Martin Anderson  MRN:     [id number]     Acct:      [de-identified]   Room:   [facility]0401-01   Day:  6  Admit Date:  11/23/2020  6:44 AM    PCP:   Zarina Seo MD  Code Status:  Full Code    Subjective:     C/C:   Chief Complaint   Patient presents with    Shortness of Breath     Interval History Status: Improved    Patient seen and examined  Patient had episodes of bradycardia  Continue to wean off oxygen  Patient denies fever chest pain    I am seeing the patient for shortness of breath    Medications: Allergies: Allergies   Allergen Reactions    Adhesive Tape Other (See Comments)     Blister badly     Codeine Nausea Only     Other reaction(s): Other: See Comments  NAUSEA  Other reaction(s):  Other: See Comments  NAUSEA    Penicillins Swelling     As a baby  Other reaction(s): Unknown  As a baby       Current Meds:   Scheduled Meds:    lisinopril  10 mg Oral Daily    furosemide  20 mg Intravenous BID    sodium chloride  20 mL Intravenous Once    rivaroxaban  20 mg Oral Daily    atorvastatin  40 mg Oral Daily    sotalol  80 mg Oral BID    tamsulosin  0.4 mg Oral Daily    sodium chloride flush  10 mL Intravenous 2 times per day    famotidine  20 mg Oral Daily    dexamethasone  6 mg Oral Daily    Vitamin D  2,000 Units Oral Daily     Continuous Infusions:   PRN Meds: calcium carbonate, sodium chloride, albuterol **AND** [] ipratropium, sodium chloride flush, potassium chloride **OR** potassium alternative oral replacement **OR** potassium chloride, magnesium sulfate, acetaminophen **OR** acetaminophen, polyethylene glycol, promethazine **OR** [DISCONTINUED] ondansetron, nicotine, albuterol sulfate HFA, dextromethorphan-guaiFENesin    Data:     Past Medical History:   has a past medical history of Atrial fibrillation ([facility] Back pain, chronic, Hill's esophagus, BPH (benign prostatic hyperplasia), Cancer ([facility] Cocaine abuse in remission [facility]), ED (erectile dysfunction), GERD (gastroesophageal reflux disease), GI bleed, Hernia, History of colon cancer, Melena, Migraines, and Murmur, cardiac. Social History:   reports that he quit smoking about 49 years ago. He has a 0.50 pack-year smoking history. He has never used smokeless tobacco. He reports current alcohol use of about 12.0 standard drinks of alcohol per week. He reports that he does not use drugs. Family History:   Family History   Problem Relation Age of Onset    Diabetes Mother     Heart Attack Father     Heart Disease Father     Heart Disease Brother        Vitals:  /70   Pulse (!) 46   Temp 97.9 °F (36.6 °C) (Oral)   Resp 22   Ht 6' (1.829 m)   Wt 164 lb 10.9 oz (74.7 kg)   SpO2 95%   BMI 22.34 kg/m²   Temp (24hrs), Av.5 °F (36.4 °C), Min:97 °F (36.1 °C), Max:98.1 °F (36.7 °C)    No results for input(s): POCGLU in the last 72 hours. I/O (24Hr):     Intake/Output Summary (Last 24 hours) at 2020 0908  Last data filed at 2020 0934  Gross per 24 hour   Intake 1400 ml   Output 525 ml   Net 875 ml Labs:  Hematology:  Recent Labs     11/27/20  0721 11/28/20  0555 11/29/20  0548   WBC 11.4* 11.3 12.4*   RBC 4.42 4.55 4.43   HGB 11.2* 11.6* 11.2*   HCT 36.9* 38.6* 37.8*   MCV 83.5 84.8 85.3   MCH 25.3 25.5 25.3   MCHC 30.4 30.1 29.6   RDW 19.0* 19.4* 19.5*    424 468*   MPV 9.5 9.7 9.7   DDIMER 1.22 0.89 0.87     Chemistry:  Recent Labs     11/27/20 0721 11/28/20  0555 11/29/20  0548   * 139 136   K 3.4* 4.0 3.6*    108* 104   CO2 31 25 25   GLUCOSE 77 84 87   BUN 26* 26* 27*   CREATININE 0.59* 0.52* 0.60*   MG 2.1  --   --    ANIONGAP 9 6* 7*   LABGLOM >60 >60 >60   GFRAA >60 >60 >60   CALCIUM 8.1* 8.2* 8.1*     Recent Labs     11/27/20  0721 11/28/20  0555 11/29/20  0548   PROT 5.7* 5.8* 5.5*   LABALBU 2.6* 2.4* 2.4*   AST 18 16 12   ALT 19 21 18   ALKPHOS 64 63 61   BILITOT 0.71 0.59 0.51     ABG:  Lab Results   Component Value Date    POCPH 7.519 11/23/2020    POCPCO2 32.2 11/23/2020    POCPO2 47.0 11/23/2020    POCHCO3 26.2 11/23/2020    NBEA NOT REPORTED 11/23/2020    PBEA 4 11/23/2020    XBO2SEV 27 11/23/2020    HCRW8ECE 87 11/23/2020    FIO2 NOT REPORTED 11/23/2020     Lab Results   Component Value Date/Time    SPECIAL 10CC R HAND 11/23/2020 07:00 AM    SPECIAL 20CC F FA 11/23/2020 07:00 AM     Lab Results   Component Value Date/Time    CULTURE NO GROWTH 6 DAYS 11/23/2020 07:00 AM    CULTURE NO GROWTH 6 DAYS 11/23/2020 07:00 AM       Radiology:  Xr Chest Portable    Result Date: 11/23/2020  Stable exam     Ct Chest Pulmonary Embolism W Contrast    Result Date: 11/23/2020  No central pulmonary embolus. Peripheral branches are not well evaluated secondary to motion. Increased ground-glass opacity and parenchymal opacities throughout the lungs either due to developing pneumonia or edema. There is a small left-sided pleural effusion. By report there is a history of COVID-19 infection Underlying pulmonary fibrosis again noted.        Physical Examination:        General appearance: Alert,   Lungs: Decreased air entry bilateral    Heart:  regular rate and rhythm, no murmur  Abdomen:  soft, nontender, nondistended, normal bowel sounds, no masses, hepatomegaly, splenomegaly  Extremities:  no edema, redness, tenderness in the calves  Skin:  no gross lesions, rashes, induration    Assessment:        Hospital Problems           Last Modified POA    * (Principal) Acute hypoxemic respiratory failure due to [facility] 11/23/2020 Yes    Dyslipidemia 11/23/2020 Yes    Benign prostatic hyperplasia with urinary obstruction 11/23/2020 Yes    Hypertrophic nonobstructive cardiomyopathy ([facility] 11/23/2020 Yes    Iron (Fe) deficiency anemia 11/23/2020 Yes    Benign essential HTN 11/23/2020 Yes    CHF (congestive heart failure), NYHA class II, acute on chronic, combined ([facility] 11/23/2020 Yes    Occupational pulmonary disease 11/23/2020 Yes    COVID-19 11/24/2020 Yes          Plan:          COVID-19 pneumonia status post Decadron and remdesivir  Improved    History of pulmonary fibrosis monitor      aCute on top of chronic hypoxemic respiratory failure most likely related to COVID-19 infection and pneumonia  Wean off oxygen    A. fib with RVR status post cardioversion in the past continue oral anticoagulation Betapace    Worsening bradycardia patient currently on beta-blocker cardiology notified Betapace was held    NSTEMI type II secondary to demand ischemia treated with heparin drip currently heparin drip was stopped    Hypertension monitor closely      Dyslipidemia statin      Acute to type of chronic combined systolic and diastolic CHF exacerbation with hypertrophic nonobstructive cardiomyopathy treated with diuresis    Leukocytosis monitor      Hypokalemia replaced    Yee Crespo MD  11/29/2020  9:57 AM

## 2020-11-29 NOTE — PROGRESS NOTES
[facility] -Progress Note COVID 19 Patient  Today's Date and Time: 11/29/2020, 2:57 PM    Impression :   · COVID 19 Suspect  · COVID 19 Confirmed Infection. · Covid tests:  · 11-23-20: Positive. · Atrial fibrillation with RVR: S/P cardioversion   · Pulmonary Fibrosis: secondary to Occupational exposure. 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 · Hypertrophic non obstructive cardiomyopathy. · Hx of Colon cancer  · BPH. Recommendations:   · Monitor off antibiotics. · Clinical Research will approach patient to explore if he qualifies for any of the COVID 19 treatment protocols. · Remdesivir. Completed treatment on 11/28/20. · CRP and LD on 11/23/20 were elevated. Continue Decadron. Started on 11/23 Stop date:12/2/20  · Plasma infusion. Two units infused 11-24-10    Medical Decision Making/Summary/Discussion:11/29/2020     · Patient admitted with suspected COVID 19 infection  · Covid test confirmed positive. · Associated problems with congestive heart failure, pulmonary fibrosis, hypoxia. Infection Control Recommendations   · Universal Precautions  · Airborne isolation  · Droplet Isolation    Antimicrobial Stewardship Recommendations     · Discontinuation of therapy  Coordination of Outpatient Care:   · Estimated Length of IV antimicrobials:TBD  · Patient will need Midline Catheter Insertion: TBD  · Patient will need PICC line Insertion: No  · Patient will need: Home IV , [place],  SNF,  LTAC:TBD  · Patient will need outpatient wound care:No    Chief complaint/reason for consultation:   · Concern for COVID infection      History of Present Illness:   Deborah Gerardo is a 77y.o.-year-old  male who was initially admitted on 11/23/2020. Patient seen at the request of [address] [place] [address]. INITIAL HISTORY:    Patient presented through ER with complaints of onset of shortness of breath.   Located onset of symptoms on 11/22/2020 with worsening through the day leading to his seeking help at the emergency room. The patient had been recently admitted on 10/17/2020 through 10/26/2020 at [facility] because of the presence of congestive heart failure NYHAA class II with acute on chronic combined heart failure. He also suffers from atrial fibrillation and hypertrophic nonobstructive cardiomyopathy, benign essential hypertension. His previous diagnosis includes occupational pulmonary fibrosis. During that admission he was also found to have suffered from mycoplasma pneumonia and was treated with antibiotics. He reports being seen at the [facility], after his admission to [facility],  where he was cardioverted because of the atrial fibrillation. He was also found to be in heart failure and was a started on diuretics. When the patient was evaluated in the emergency room he was found to be hypoxic and was treated with BiPAP which improved his oxygen saturation rate. He was tested for Covid and was found to be positive on 11/23/2020. Chest x-ray:  · 11/23/2020 persistent bilateral pulmonary infiltrates with associated pulmonary vascular congestion. Unchanged from films of 10/23/2020    Chest CT:  · 11/23/2020: Pulmonary fibrosis. No pulmonary embolus. Increased groundglass opacities and parenchymal opacities throughout both lungs when compared with films of 10/21/2020    Patient admitted because of concerns with COVID 19.    CURRENT EVALUATION : 11/29/2020    Afebrile  VS stable   On Heparin for Atrial Fibrillation. He is bradycardic, Betapace was held. Acute on chronic combined systolic and diastolic CHF exacerbation with hypertrophic non obstructive cardiomyopathy. On IVLasix 20 mg.    Leukocytosis present. Up trending. On Decadron day 7. Stop date 12-2-20  Completed Remdesivir on 11/28/20. Consent for plasma obtained. 2 Units of Plasma transfused 11-24-20    Patient exhibiting respiratory distress.   Yes  Respiratory secretions: No    Patient receiving supplemental oxygen. Yes. Currently on High flow  Nasal canula at 25-->20-->20L with FIO2 100--> 85-->75-->65-->50%    02 sat 96%-->80-->93-->97-->96-->87%  RR 32-->22-->20-->26-->22    QTc:       NEWS Score: 0-4 Low risk group; 5-6: Medium risk group; 7 or above: High risk group  Parameters 3 2 1 0 1 2 3   Age    < 65   ? 65   RR ? 8  9-11 12-20  21-24 ? 25   O2 Sats ? 91 92-93 94-95 ? 96      Suppl O2  Yes  No      SBP ? 90  101-110 111-219   ? 220   HR ? 40  41-50 51-90  111-130 ? 131   Consciousness    Alert   Drowsiness, lethargy, or confusion   Temperature ? 35.0 C (95.0 F)  35.1-36.0 C 95.1-96.9 F 36.1-38.0 C 97.0-100.4 F 38.1-39.0 C 100.5-102.3 F ? 39.1 C ? 102.4 F      NEWS Score:   11/23/2020: 9 risk   11/24/20: 11 high Risk   11/29/20:12 high risk    Overall Daily Picture:      Unchanged    Presence of secondary bacterial Infection:    No   Additional antibiotics: No    Labs, X rays reviewed: 11/29/2020    BUN: 16-->22-->31-->26-->26-->27  Cr: 0.62-->0.61-->0.53-->0.59-->0.52-->0.60    WBC: 14.1-->15.7-->11.4-->11.3-->12.4  Hb: 12.0-->11.3-->10.1-->11.2-->11.6-->11.2  Plat: 282-->305-->423-->396-->424->468    Bilirubin 1.28-->0.51  Alk phos 86-->61  ALT 12-->18  AST 24-->12    Absolute Neutrophils: 13.1.-->9.69  Absolute Lymphocytes: 0.59.-->1.44  Neutrophil/Lymphocyte Ratio: 22.2 high risk.-->6.72high risk    CRP: 182 (10-17-20) -->204.8 (11/23/20)  Ferritin: 395(11/23/20)  LDH: 521(11/23/20)    Pro Calcitonin:0.29( 10/17/20)  Troponin high-sensitivity 102  proBNP 3472    Cultures:  Urine:  ·   Blood:  · 11/23/20: x2: No growth  Sputum :  ·   Wound:       CXR:   · 11/23/2020 pulmonary fibrosis with interstitial edema. Scattered infiltrates bilaterally      CAT:  · 11/23/2020: Pulmonary fibrosis. No pulmonary embolus.   Increased groundglass opacities and parenchymal opacities throughout both lungs when compared with films of 10/21/2020            Discussed with patient, RN, CC, IM.    I have personally reviewed the past medical history, past surgical history, medications, social history, and family history, and I have updated the database accordingly.   Past Medical History:     Past Medical History:   Diagnosis Date    Atrial fibrillation ([address]     Back pain, chronic     Mcguire's esophagus 06/18/2019    BPH (benign prostatic hyperplasia)     Cancer (HCC)     colon-rectal    Cocaine abuse in remission [facility])     1970's    ED (erectile dysfunction) 4/2/2015    GERD (gastroesophageal reflux disease)     GI bleed 12/5/2018    Hernia     History of colon cancer     Melena     Migraines     Murmur, cardiac        Past Surgical  History:     Past Surgical History:   Procedure Laterality Date    CARDIAC CATHETERIZATION  11/29/2018    Non-obstructive CAD    CARDIOVERSION  2020    COLECTOMY      2nd colectomy, Colostomy and reversed [facility] COLECTOMY      1st time Ksenia    COLONOSCOPY      COLONOSCOPY  07/18/2016    COLONOSCOPY N/A 12/6/2018    COLONOSCOPY DIAGNOSTIC performed by Damon Goodrich MD at [address] Right 2009    inguinal    KNEE SURGERY Right 1970's    arthrotomy    TONSILLECTOMY      TOTAL KNEE ARTHROPLASTY Right 1/8/2019    KNEE TOTAL ARTHROPLASTY performed by Wanda Wren MD at [facility] TRANSESOPHAGEAL ECHOCARDIOGRAM  11/29/2018    UPPER GASTROINTESTINAL ENDOSCOPY N/A 12/5/2018    EGD DIAGNOSTIC ONLY performed by Damon Goodrich MD at [address] [place] [address] N/A 6/18/2019    MCGUIRE'S       Medications:      lisinopril  10 mg Oral Daily    furosemide  20 mg Intravenous BID    sodium chloride  20 mL Intravenous Once    rivaroxaban  20 mg Oral Daily    atorvastatin  40 mg Oral Daily    sotalol  80 mg Oral BID    tamsulosin  0.4 mg Oral Daily    sodium chloride flush  10 mL Intravenous 2 times per day    famotidine  20 mg Oral Daily    dexamethasone  6 mg Oral Daily    Vitamin D 2,000 Units Oral Daily       Social History:     Social History     Socioeconomic History    Marital status: Single     Spouse name: Not on file    Number of children: Not on file    Years of education: Not on file    Highest education level: Not on file   Occupational History    Not on file   Social Needs    Financial resource strain: Not on file    Food insecurity     Worry: Not on file     Inability: Not on file    Transportation needs     Medical: Not on file     Non-medical: Not on file   Tobacco Use    Smoking status: Former Smoker     Packs/day: 0.50     Years: 1.00     Pack years: 0.50     Last attempt to quit:      Years since quittin.9    Smokeless tobacco: Never Used    Tobacco comment: stated never actually really smoked only inhaled    Substance and Sexual Activity    Alcohol use: Yes     Alcohol/week: 12.0 standard drinks     Types: 10 Standard drinks or equivalent, 2 Shots of liquor per week     Comment: 2-3 times a week    Drug use: No     Types:  Other-see comments     Comment: Cocaine use in past in     Sexual activity: Yes     Partners: Female   Lifestyle    Physical activity     Days per week: Not on file     Minutes per session: Not on file    Stress: Not on file   Relationships    Social connections     Talks on phone: Not on file     Gets together: Not on file     Attends Buddhist service: Not on file     Active member of club or organization: Not on file     Attends meetings of clubs or organizations: Not on file     Relationship status: Not on file    Intimate partner violence     Fear of current or ex partner: Not on file     Emotionally abused: Not on file     Physically abused: Not on file     Forced sexual activity: Not on file   Other Topics Concern    Not on file   Social History Narrative    Not on file       Family History:     Family History   Problem Relation Age of Onset    Diabetes Mother     Heart Attack Father     Heart Disease Father    Michelle Her Heart Disease Brother         Allergies:   Adhesive tape; Codeine; and Penicillins     Review of Systems:       Constitutional: No fevers or chills. No systemic complaints  Head: No headaches  Eyes: No double vision or blurry vision. No conjunctival inflammation. ENT: No sore throat or runny nose. . No hearing loss, tinnitus or vertigo. Cardiovascular: No chest pain or palpitations. Shortness of breath. CLAYTON  Lung: Shortness of breath, dry cough. No sputum production  Abdomen: No nausea, vomiting, diarrhea, or abdominal pain. Bula Dayhoff No cramps. Genitourinary: No increased urinary frequency, or dysuria. No hematuria. No suprapubic or CVA pain  Musculoskeletal: No muscle aches or pains. No joint effusions, swelling or deformities  Hematologic: No bleeding or bruising. Neurologic: No headache, weakness, numbness, or tingling. Integument: No rash, no ulcers. Psychiatric: No depression. Endocrine: No polyuria, no polydipsia, no polyphagia. Physical Examination :     Patient Vitals for the past 8 hrs:   BP Temp Temp src Pulse Resp SpO2   11/29/20 1220 -- 96.8 °F (36 °C) Oral -- -- --   11/29/20 1117 -- -- -- -- 25 91 %   11/29/20 0824 -- -- -- -- 22 --   11/29/20 0811 -- 97.9 °F (36.6 °C) Oral -- -- --   11/29/20 0810 101/63 -- -- 57 21 (!) 88 %     General Appearance: Awake, alert, and in no apparent distress  Head:  Normocephalic, no trauma  Eyes: Pupils equal, round, reactive to light; sclera anicteric; conjunctivae pink. No embolic phenomena. ENT: Oropharynx clear, without erythema, exudate, or thrush. No tenderness of sinuses. Mouth/throat: mucosa pink and moist. No lesions. Dentition in good repair. Neck:Supple, without lymphadenopathy. Thyroid normal, No bruits. Pulmonary/Chest: ,Distant breath sounds, decreased breath sounds at the bases   cardiovascular: Irreegular rate and rhythm without murmurs, rubs, or gallops. Abdomen: Soft, non tender.  Bowel sounds normal. No organomegaly  All four Extremities: No cyanosis, clubbing, edema, or effusions. Neurologic: No gross sensory or motor deficits. Skin: Warm and dry with good turgor. No signs of peripheral arterial or venous insufficiency. No ulcerations. No open wounds. Medical Decision Making -Laboratory:   I have independently reviewed/ordered the following labs:    CBC with Differential:   Recent Labs     11/28/20  0555 11/29/20  0548   WBC 11.3 12.4*   HGB 11.6* 11.2*   HCT 38.6* 37.8*    468*   LYMPHOPCT 12* 12*   MONOPCT 7 7     BMP:   Recent Labs     11/27/20  0721 11/28/20  0555 11/29/20  0548   * 139 136   K 3.4* 4.0 3.6*    108* 104   CO2 31 25 25   BUN 26* 26* 27*   CREATININE 0.59* 0.52* 0.60*   MG 2.1  --   --      Hepatic Function Panel:   Recent Labs     11/28/20  0555 11/29/20  0548   PROT 5.8* 5.5*   LABALBU 2.4* 2.4*   BILITOT 0.59 0.51   ALKPHOS 63 61   ALT 21 18   AST 16 12     No results for input(s): RPR in the last 72 hours. No results for input(s): HIV in the last 72 hours. No results for input(s): BC in the last 72 hours. Lab Results   Component Value Date    MUCUS NOT REPORTED 11/23/2020    RBC 4.43 11/29/2020    TRICHOMONAS NOT REPORTED 11/23/2020    WBC 12.4 11/29/2020    YEAST NOT REPORTED 11/23/2020    TURBIDITY CLEAR 11/23/2020     Lab Results   Component Value Date    CREATININE 0.60 11/29/2020    GLUCOSE 87 11/29/2020       Medical Decision Making-Imaging:     EXAMINATION:    CTA OF THE CHEST 11/23/2020 7:42 am         TECHNIQUE:    CTA of the chest was performed after the administration of intravenous    contrast.  Multiplanar reformatted images are provided for review.  MIP    images are provided for review.  Dose modulation, iterative reconstruction,    and/or weight based adjustment of the mA/kV was utilized to reduce the    radiation dose to as low as reasonably achievable.         COMPARISON:    High-resolution chest CT 10 03/20/2020         CT PA 10/21/2020         HISTORY:    ORDERING SYSTEM PROVIDED HISTORY: r/o PE    TECHNOLOGIST PROVIDED HISTORY:    r/o PE    Reason for Exam: sob    Acuity: Acute              Type of Exam: Initial         FINDINGS:    No intimal flap is seen in aorta. .  Small mediastinal nodes are noted,    similar to prior         No embolus is seen the central, right, left main pulmonary artery.  No    embolus is seen in the proximal segmental branches.  Distal segmental    branches and subsegmental branches are not well evaluated secondary to    respiratory motion artifact.         Patchy ground-glass and parenchymal opacities are seen in the left upper and    left lower lobe.         On the right patchy ground-glass and parenchymal opacities are seen in the    right upper, right middle and right lower lobe.  There is mild fusiform    bronchiectasis. Franklin Nimisha is trace left-sided pleural effusion.         There is mild underlying pulmonary fibrosis.  There is underlying    bronchiectasis         Right adrenal gland is normal.  Left adrenal gland is normal.         1 mm calcification is seen in the left kidney         Spurring is seen in the spine.  Spurring is seen in the shoulder joints         Small soft tissue nodule is seen posteriorly in the subcutaneous fat    measuring 2.1 cm, likely sebaceous cyst              Impression    No central pulmonary embolus.  Peripheral branches are not well evaluated    secondary to motion.         Increased ground-glass opacity and parenchymal opacities throughout the lungs    either due to developing pneumonia or edema. Franklin Nimisha is a small left-sided    pleural effusion.  By report there is a history of COVID-19 infection         Underlying pulmonary fibrosis again noted.                EXAMINATION:    ONE XRAY VIEW OF THE CHEST         11/23/2020 8:02 am         COMPARISON:    10/19/2020         HISTORY:    ORDERING SYSTEM PROVIDED HISTORY: SOB    TECHNOLOGIST PROVIDED HISTORY:    SOB    Reason for Exam: uprt port chest         FINDINGS: Cardiomediastinal silhouette is stable.  Bilateral pulmonary infiltrates    persist unchanged and may represent pulmonary edema versus atypical pneumonia.              Impression    Stable exam        Medical Decision [id number]:       Medical Decision Making-Other:     Note:  · Labs, medications, radiologic studies were reviewed with personal review of films  · Large amounts of data were reviewed  · Discussed with nursing Staff, Discharge planner  · Infection Control and Prevention measures reviewed  · All prior entries were reviewed  · Administer medications as ordered  · Prognosis: Guarded  · Discharge planning reviewed  · Follow up as outpatient. Thank you for allowing us to participate in the care of this patient. Please call with questions. Anna Rojas MD     ATTESTATION:    I have discussed the case, including pertinent history and exam findings with the residents and students. I have seen and examined the patient and the key elements of the encounter have been performed by me. I was present when the student obtained his information or examined the patient. I have reviewed the laboratory data, other diagnostic studies and discussed them with the residents. I have updated the medical record where necessary. I agree with the assessment, plan and orders as documented by the resident/ student.     Miroslava Blakely MD.        Pager: [phone] - Office: [phone]

## 2020-11-29 NOTE — PROGRESS NOTES
[facility]  Progress Note                   Date:   11/29/2020  Patient name: Mamadou Garcia  Date of admission:  11/23/2020  6:44 AM  MRN:   [id number]  YOB: 1955  PCP: Pura Krabbe, MD    Reason for Admission: Acute hypoxemic respiratory failure due to COVID-19 ([facility] [U07.1, J96.01]    Subjective:       Clinical Changes /Abnormalities:  Report per Jluis Borrero. Patient denies chest pain. For careful stewardship of limited PPE during COVID-19 pandemic my physical exam was deferred. For physical exam, please see today's physical from primary team or ICU team.  SB on tele HR in the 46s Asymptomatic      Review of Systems    Medications:   Scheduled Meds:   furosemide  20 mg Intravenous BID    sodium chloride  20 mL Intravenous Once    rivaroxaban  20 mg Oral Daily    atorvastatin  40 mg Oral Daily    lisinopril  20 mg Oral Daily    sotalol  80 mg Oral BID    tamsulosin  0.4 mg Oral Daily    sodium chloride flush  10 mL Intravenous 2 times per day    famotidine  20 mg Oral Daily    dexamethasone  6 mg Oral Daily    Vitamin D  2,000 Units Oral Daily     Continuous Infusions:    CBC:   Recent Labs     11/27/20  0721 11/28/20  0555 11/29/20  0548   WBC 11.4* 11.3 12.4*   HGB 11.2* 11.6* 11.2*    424 468*     BMP:    Recent Labs     11/27/20  0721 11/28/20  0555 11/29/20  0548   * 139 136   K 3.4* 4.0 3.6*    108* 104   CO2 31 25 25   BUN 26* 26* 27*   CREATININE 0.59* 0.52* 0.60*   GLUCOSE 77 84 87     Hepatic:  Recent Labs     11/27/20  0721 11/28/20  0555 11/29/20  0548   AST 18 16 12   ALT 19 21 18   BILITOT 0.71 0.59 0.51   ALKPHOS 64 63 61     Troponin:   No results for input(s): TROPHS in the last 72 hours. BNP: No results for input(s): BNP in the last 72 hours. Lipids: No results for input(s): CHOL, HDL in the last 72 hours. Invalid input(s): LDLCALCU  INR:   No results for input(s): INR in the last 72 hours.   DIAGNOSTIC DATA  EKG:   NSR, Biatrial enlargement  ECHO: 10/20/20   Summary  Left ventricle is normal in size Global left ventricular systolic function  is moderately reduced Estimated ejection fraction is 35 % . Mostly global hypokinesis with minor regional variation. Grade I (mild) left ventricular diastolic dysfunction. Left atrium is moderately dilated. Aortic leaflet calcification with Moderate Aortic Stenosis, maybe  underestimated due to poor LVEF. Thickened mitral valve leaflets. Mild to moderate mitral regurgitation. Trivial tricuspid regurgitation. Estimated right ventricular systolic  pressure is 49QHUF. IVC dilated but unable to assess respiratory collapse.     Cardiac Angiography: 2018  Procedure Summary      Non-obstructive CAD.   Normal LV systolic function.      Recommendations      Medical therapy as needed.   Risk factor modification. Objective:   Vitals: /70   Pulse (!) 46   Temp 97.2 °F (36.2 °C) (Axillary)   Resp 21   Ht 6' (1.829 m)   Wt 164 lb 10.9 oz (74.7 kg)   SpO2 95%   BMI 22.34 kg/m²   For careful stewardship of limited PPE during COVID-19 pandemic my physical exam was deferred. For physical exam, please see today's physical from primary team or ICU team.         Assessment / Acute Cardiac Problems:   1. A FIB with RVR now SB  2. Bradycardia   3. Covid 19 +  4. Hypertension  5.  Acute on chronic systolic and diastolic HF    Patient Active Problem List:     Dyslipidemia     ED (erectile dysfunction)     Incomplete bladder emptying     Benign prostatic hyperplasia with urinary obstruction     History of colon cancer     Atrial fibrillation with normal ventricular rate (HCC)     Anemia     Pallor of optic disc     Presbyopia     Incisional hernia, without obstruction or gangrene     Hypertrophic nonobstructive cardiomyopathy (HCC)     Iron (Fe) deficiency anemia     Primary osteoarthritis of right knee     Benign essential HTN     History of malignant neoplasm of rectum     Hill's esophagus CHF (congestive heart failure), NYHA class II, acute on chronic, combined (HCC)     Hypoxia     Dyspnea and respiratory abnormalities     Occupational pulmonary disease     Sinus bradycardia     Acute hypoxemic respiratory failure due to COVID-19 [facility])      Plan of Treatment:   1. AFib with RVR now SB  HR 50s. Will hold sotalol with HR less than 60. Continue xarelto  2. Bradycardia. HR 50s asymptomatic. Hold sotalol for HR less than 60   Continue to monitor. 3. NSTEMI likely secondary to demand ischemia vs type I  Troponin trending down. Reviewed previously with Dr. Martha Hines. Plan for OP followup in cardiology clinic Continue statin, BB, ACE. 4. LVrEF  EF 35% on ECHO 10/20/2020  Not in fluid overload. Continue BB, ACE and Lasix. 5. HTN Controlled. Continue lisinopril and sotalol with parameters. 6. Keep K > 4.0 and Mag  >2.0  K 3.6 Will order one time dose. 7. Covid pneumonia treatment per primary service.       Electronically signed by MIRI Shin NP on 11/29/2020 at 8:02 AM  [address]  [phone]

## 2020-11-29 NOTE — PROGRESS NOTES
Laterality Date    CARDIAC CATHETERIZATION  11/29/2018    Non-obstructive CAD    CARDIOVERSION  2020    COLECTOMY      2nd colectomy, Colostomy and reversed [facility] COLECTOMY      1st time Ksenia    COLONOSCOPY      COLONOSCOPY  07/18/2016    COLONOSCOPY N/A 12/6/2018    COLONOSCOPY DIAGNOSTIC performed by Tacos Marie MD at [address] Right 2009    inguinal    KNEE SURGERY Right 1970's    arthrotomy    TONSILLECTOMY      TOTAL KNEE ARTHROPLASTY Right 1/8/2019    KNEE TOTAL ARTHROPLASTY performed by Curt Ronquillo MD at [address] TRANSESOPHAGEAL ECHOCARDIOGRAM  11/29/2018    UPPER GASTROINTESTINAL ENDOSCOPY N/A 12/5/2018    EGD DIAGNOSTIC ONLY performed by Tacos Marie MD at [facility] N/A 6/18/2019    [facility]       Allergies: Allergies   Allergen Reactions    Adhesive Tape Other (See Comments)     Blister badly     Codeine Nausea Only     Other reaction(s): Other: See Comments  NAUSEA  Other reaction(s): Other: See Comments  NAUSEA    Penicillins Swelling     As a baby  Other reaction(s): Unknown  As a baby         Home Meds:   Prior to Admission medications    Medication Sig Start Date End Date Taking?  Authorizing Provider   atorvastatin (LIPITOR) 40 MG tablet Take 1 tablet by mouth daily 11/16/20  Yes Cassandra Kasper MD   omeprazole (PRILOSEC) 40 MG delayed release capsule Take 1 capsule by mouth daily 11/16/20  Yes Cassandra Kasper MD   lisinopril (PRINIVIL;ZESTRIL) 5 MG tablet take 1 tablet by mouth once daily 10/26/20  Yes Historical Provider, MD   sotalol (BETAPACE) 80 MG tablet take 1 tablet by mouth every 12 hours 10/28/20  Yes Historical Provider, MD   ibuprofen (ADVIL;MOTRIN) 800 MG tablet Take 1 tablet by mouth every 6 hours if needed for pain 10/27/20  Yes Cassandra Kasper MD   furosemide (LASIX) 20 MG tablet Take 1 tablet by mouth daily 10/26/20  Yes Kirsty Bradford MD   tamsulosin (FLOMAX) 0.4 MG (1.829 m)   Wt 164 lb 10.9 oz (74.7 kg)   SpO2 92%   BMI 22.34 kg/m²     Last Body weight:   Wt Readings from Last 3 Encounters:   11/28/20 164 lb 10.9 oz (74.7 kg)   11/16/20 178 lb (80.7 kg)   11/10/20 173 lb 3.2 oz (78.6 kg)       Body Mass Index : Body mass index is 22.34 kg/m². Intake and Output summary:     Intake/Output Summary (Last 24 hours) at 11/29/2020 1631  Last data filed at 11/29/2020 0934  Gross per 24 hour   Intake 400 ml   Output 525 ml   Net -125 ml       Physical Examination:   PHYSICAL EXAMINATION:  Vitals:    11/29/20 0824 11/29/20 1117 11/29/20 1220 11/29/20 1518   BP:       Pulse:       Resp: 22 25  22   Temp:   96.8 °F (36 °C)    TempSrc:   Oral    SpO2:  91%  92%   Weight:       Height:         Due to the current efforts to prevent transmission of COVID-19 and also the need to preserve PPE for other caregivers, a face-to-face encounter with the patient was not performed. That being said, all relevant records and diagnostic tests were reviewed, including laboratory results and imaging. Patient was evaluated from the window, called on the phone, and chart reviewed, disc w  involved healthcare workers. Patient appears comfortable on 100% nonrebreather mask  Not using accessory musculature for breathing        Laboratory findings:-    CBC:   Recent Labs     11/29/20  0548   WBC 12.4*   HGB 11.2*   *     BMP:    Recent Labs     11/27/20  0721 11/28/20  0555 11/29/20  0548   * 139 136   K 3.4* 4.0 3.6*    108* 104   CO2 31 25 25   BUN 26* 26* 27*   CREATININE 0.59* 0.52* 0.60*   GLUCOSE 77 84 87     S. Calcium:  Recent Labs     11/29/20  0548   CALCIUM 8.1*     S. Ionized Calcium:No results for input(s): IONCA in the last 72 hours. S. Magnesium:  Recent Labs     11/27/20  0721   MG 2.1     S. Phosphorus:No results for input(s): PHOS in the last 72 hours. S. Glucose:  No results for input(s): POCGLU in the last 72 hours.   Glycosylated hemoglobin A1C: No results for input(s): LABA1C in the last 72 hours. INR:   No results for input(s): INR in the last 72 hours. Hepatic functions:   Recent Labs     11/29/20  0548   ALKPHOS 61   ALT 18   AST 12   PROT 5.5*   BILITOT 0.51   LABALBU 2.4*     Pancreatic functions:No results for input(s): LACTA, AMYLASE in the last 72 hours. S. Lactic Acid: No results for input(s): LACTA in the last 72 hours. Cardiac enzymes:No results for input(s): CKTOTAL, CKMB, CKMBINDEX, TROPONINI in the last 72 hours. BNP:No results for input(s): BNP in the last 72 hours. Lipid profile: No results for input(s): CHOL, TRIG, HDL, LDLCALC in the last 72 hours. Invalid input(s): LDL  Blood Gases: No results found for: PH, PCO2, PO2, HCO3, O2SAT  Thyroid functions:   Lab Results   Component Value Date    TSH 4.28 10/17/2020            Radiological reports:  CT scan of the chest showed no pulmonary embolism but showed bilateral pulmonary infiltrates and small left pleural effusion    Chest x-ray showed bilateral pulmonary infiltrates that are unchanged from before       Assessment and Plan     Principal Problem:    Acute hypoxemic respiratory failure due to COVID-19 [facility])  Active Problems:    Dyslipidemia    Benign prostatic hyperplasia with urinary obstruction    Hypertrophic nonobstructive cardiomyopathy (HCC)    Iron (Fe) deficiency anemia    Benign essential HTN    CHF (congestive heart failure), NYHA class II, acute on chronic, combined ([address]    Occupational pulmonary disease    COVID-19  Resolved Problems:    * No resolved hospital problems.  *        Assessment:     Acute hypoxic respiratory failure   Acute respiratory distress syndrome   COVID 19 infection/pneumonia   Patient may also have a competent of acute on chronic combined congestive heart failure   Hypokalemia   Leukocytosis secondary to infection   Mild anemia    Plan:     Continue Airborne isolation   Continue HFNC   Can discharge home when oxygen flow rate down to 4lpm, hopefully 24-48hrs.  Monitor input/output, with a goal of even/negative fluid balance   Continue Decadron;remdesivir completed.  Pt on eliquis      Management as per guidance provided by hospital policy and prevailing evidence based medicine, during the COVID-19 pandemic emergency. This patient was evaluated in the context of the global SARS-CoV-2 (COVID-19) pandemic, which necessitated considerations that the patient either has COVID-19 infection or is at risk of infection with COVID-19. Institutional protocols and algorithms that pertain to the evaluation & management of patients with COVID-19 or those at risk for COVID-19 are in a state of rapid changes based on information released by regulatory bodies including the CDC and federal and state organizations. These policies and algorithms were followed during the patient's care. Please note that this chart was generated using voice recognition Dragon dictation software. Although every effort was made to ensure the accuracy of this automated transcription, some errors in transcription may have occurred. Thank you for having us involved in the care of your patient. Please call us if you have any questions or concerns.       Xu Farris M.D.            11/29/2020, 4:31 PM

## 2020-11-29 NOTE — PLAN OF CARE
Problem: Airway Clearance - Ineffective  Goal: Achieve or maintain patent airway  11/28/2020 1954 by Yuliya Todd RN  Outcome: Ongoing  11/28/2020 1720 by Anuradha Lorenz RCP  Outcome: Ongoing     Problem: Gas Exchange - Impaired  Goal: Absence of hypoxia  11/28/2020 1954 by Yuliya Todd RN  Outcome: Ongoing  11/28/2020 1720 by Anuradha Lorenz RCP  Outcome: Ongoing  Goal: Promote optimal lung function  11/28/2020 1954 by Yuliya Todd RN  Outcome: Ongoing  11/28/2020 1720 by Anuradha Lorenz RCP  Outcome: Ongoing     Problem: Breathing Pattern - Ineffective  Goal: Ability to achieve and maintain a regular respiratory rate  11/28/2020 1954 by Yuliya Todd RN  Outcome: Ongoing  11/28/2020 1720 by Anuradha Lorenz RCP  Outcome: Ongoing     Problem:  Body Temperature -  Risk of, Imbalanced  Goal: Ability to maintain a body temperature within defined limits  Outcome: Ongoing  Goal: Will regain or maintain usual level of consciousness  Outcome: Ongoing  Goal: Complications related to the disease process, condition or treatment will be avoided or minimized  Outcome: Ongoing     Problem: Isolation Precautions - Risk of Spread of Infection  Goal: Prevent transmission of infection  Outcome: Ongoing     Problem: Nutrition Deficits  Goal: Optimize nutrtional status  Outcome: Ongoing     Problem: Risk for Fluid Volume Deficit  Goal: Maintain normal heart rhythm  Outcome: Ongoing  Goal: Maintain absence of muscle cramping  Outcome: Ongoing  Goal: Maintain normal serum potassium, sodium, calcium, phosphorus, and pH  Outcome: Ongoing     Problem: Loneliness or Risk for Loneliness  Goal: Demonstrate positive use of time alone when socialization is not possible  Outcome: Ongoing     Problem: Fatigue  Goal: Verbalize increase energy and improved vitality  Outcome: Ongoing     Problem: Patient Education: Go to Patient Education Activity  Goal: Patient/Family Education  Outcome: Ongoing     Problem: Falls - Risk of:  Goal: Will remain free from falls  Description: Will remain free from falls  Outcome: Ongoing  Goal: Absence of physical injury  Description: Absence of physical injury  Outcome: Ongoing     Problem: OXYGENATION/RESPIRATORY FUNCTION  Goal: Patient will maintain patent airway  11/28/2020 1954 by Reji Saravia RN  Outcome: Ongoing  11/28/2020 1720 by Magan Sanchez RCP  Outcome: Ongoing  Goal: Patient will achieve/maintain normal respiratory rate/effort  Description: Respiratory rate and effort will be within normal limits for the patient  11/28/2020 1954 by Reji Saravia RN  Outcome: Ongoing  11/28/2020 1720 by Magan Sanchez RCP  Outcome: Ongoing

## 2020-11-30 LAB
ABSOLUTE EOS #: 0.07 K/UL (ref 0–0.44)
ABSOLUTE IMMATURE GRANULOCYTE: 0.15 K/UL (ref 0–0.3)
ABSOLUTE LYMPH #: 1.35 K/UL (ref 1.1–3.7)
ABSOLUTE MONO #: 0.82 K/UL (ref 0.1–1.2)
ALBUMIN SERPL-MCNC: 2.4 G/DL (ref 3.5–5.2)
ALBUMIN/GLOBULIN RATIO: 0.7 (ref 1–2.5)
ALP BLD-CCNC: 65 U/L (ref 40–129)
ALT SERPL-CCNC: 16 U/L (ref 5–41)
ANION GAP SERPL CALCULATED.3IONS-SCNC: 8 MMOL/L (ref 9–17)
AST SERPL-CCNC: 11 U/L
BASOPHILS # BLD: 0 % (ref 0–2)
BASOPHILS ABSOLUTE: <0.03 K/UL (ref 0–0.2)
BILIRUB SERPL-MCNC: 0.65 MG/DL (ref 0.3–1.2)
BUN BLDV-MCNC: 23 MG/DL (ref 8–23)
BUN/CREAT BLD: ABNORMAL (ref 9–20)
CALCIUM SERPL-MCNC: 8.2 MG/DL (ref 8.6–10.4)
CHLORIDE BLD-SCNC: 103 MMOL/L (ref 98–107)
CO2: 26 MMOL/L (ref 20–31)
CREAT SERPL-MCNC: 0.52 MG/DL (ref 0.7–1.2)
D-DIMER QUANTITATIVE: 0.57 MG/L FEU
DIFFERENTIAL TYPE: ABNORMAL
EOSINOPHILS RELATIVE PERCENT: 1 % (ref 1–4)
GFR AFRICAN AMERICAN: >60 ML/MIN
GFR NON-AFRICAN AMERICAN: >60 ML/MIN
GFR SERPL CREATININE-BSD FRML MDRD: ABNORMAL ML/MIN/{1.73_M2}
GFR SERPL CREATININE-BSD FRML MDRD: ABNORMAL ML/MIN/{1.73_M2}
GLUCOSE BLD-MCNC: 95 MG/DL (ref 70–99)
HCT VFR BLD CALC: 39.5 % (ref 40.7–50.3)
HEMOGLOBIN: 11.7 G/DL (ref 13–17)
IMMATURE GRANULOCYTES: 1 %
LYMPHOCYTES # BLD: 12 % (ref 24–43)
MCH RBC QN AUTO: 25.2 PG (ref 25.2–33.5)
MCHC RBC AUTO-ENTMCNC: 29.6 G/DL (ref 28.4–34.8)
MCV RBC AUTO: 85.1 FL (ref 82.6–102.9)
MONOCYTES # BLD: 7 % (ref 3–12)
MYOGLOBIN: <21 NG/ML (ref 28–72)
NRBC AUTOMATED: 0 PER 100 WBC
PDW BLD-RTO: 19.7 % (ref 11.8–14.4)
PLATELET # BLD: 488 K/UL (ref 138–453)
PLATELET ESTIMATE: ABNORMAL
PMV BLD AUTO: 9.9 FL (ref 8.1–13.5)
POTASSIUM SERPL-SCNC: 4 MMOL/L (ref 3.7–5.3)
RBC # BLD: 4.64 M/UL (ref 4.21–5.77)
RBC # BLD: ABNORMAL 10*6/UL
SEG NEUTROPHILS: 79 % (ref 36–65)
SEGMENTED NEUTROPHILS ABSOLUTE COUNT: 8.69 K/UL (ref 1.5–8.1)
SODIUM BLD-SCNC: 137 MMOL/L (ref 135–144)
TOTAL PROTEIN: 5.7 G/DL (ref 6.4–8.3)
TROPONIN INTERP: ABNORMAL
TROPONIN T: ABNORMAL NG/ML
TROPONIN, HIGH SENSITIVITY: 22 NG/L (ref 0–22)
WBC # BLD: 11.1 K/UL (ref 3.5–11.3)
WBC # BLD: ABNORMAL 10*3/UL

## 2020-11-30 PROCEDURE — 6370000000 HC RX 637 (ALT 250 FOR IP): Performed by: NURSE PRACTITIONER

## 2020-11-30 PROCEDURE — 6370000000 HC RX 637 (ALT 250 FOR IP): Performed by: INTERNAL MEDICINE

## 2020-11-30 PROCEDURE — 85025 COMPLETE CBC W/AUTO DIFF WBC: CPT

## 2020-11-30 PROCEDURE — 83874 ASSAY OF MYOGLOBIN: CPT

## 2020-11-30 PROCEDURE — 99233 SBSQ HOSP IP/OBS HIGH 50: CPT | Performed by: INTERNAL MEDICINE

## 2020-11-30 PROCEDURE — 84484 ASSAY OF TROPONIN QUANT: CPT

## 2020-11-30 PROCEDURE — 2580000003 HC RX 258: Performed by: NURSE PRACTITIONER

## 2020-11-30 PROCEDURE — 85379 FIBRIN DEGRADATION QUANT: CPT

## 2020-11-30 PROCEDURE — 6360000002 HC RX W HCPCS: Performed by: NURSE PRACTITIONER

## 2020-11-30 PROCEDURE — 2500000003 HC RX 250 WO HCPCS: Performed by: INTERNAL MEDICINE

## 2020-11-30 PROCEDURE — 36415 COLL VENOUS BLD VENIPUNCTURE: CPT

## 2020-11-30 PROCEDURE — 94761 N-INVAS EAR/PLS OXIMETRY MLT: CPT

## 2020-11-30 PROCEDURE — 2060000000 HC ICU INTERMEDIATE R&B

## 2020-11-30 PROCEDURE — 99232 SBSQ HOSP IP/OBS MODERATE 35: CPT | Performed by: INTERNAL MEDICINE

## 2020-11-30 PROCEDURE — 2700000000 HC OXYGEN THERAPY PER DAY

## 2020-11-30 PROCEDURE — 93005 ELECTROCARDIOGRAM TRACING: CPT | Performed by: INTERNAL MEDICINE

## 2020-11-30 PROCEDURE — 80053 COMPREHEN METABOLIC PANEL: CPT

## 2020-11-30 RX ORDER — GUAIFENESIN 600 MG/1
600 TABLET, EXTENDED RELEASE ORAL 2 TIMES DAILY
Status: DISCONTINUED | OUTPATIENT
Start: 2020-11-30 | End: 2020-12-07 | Stop reason: HOSPADM

## 2020-11-30 RX ORDER — METOPROLOL TARTRATE 5 MG/5ML
2.5 INJECTION INTRAVENOUS EVERY 6 HOURS PRN
Status: DISCONTINUED | OUTPATIENT
Start: 2020-11-30 | End: 2020-12-07 | Stop reason: HOSPADM

## 2020-11-30 RX ORDER — FUROSEMIDE 20 MG/1
20 TABLET ORAL 2 TIMES DAILY
Status: DISCONTINUED | OUTPATIENT
Start: 2020-11-30 | End: 2020-12-01

## 2020-11-30 RX ADMIN — FUROSEMIDE 20 MG: 10 INJECTION, SOLUTION INTRAMUSCULAR; INTRAVENOUS at 07:33

## 2020-11-30 RX ADMIN — DEXAMETHASONE 8 MG: 4 TABLET ORAL at 07:32

## 2020-11-30 RX ADMIN — Medication 2000 UNITS: at 07:32

## 2020-11-30 RX ADMIN — SODIUM CHLORIDE, PRESERVATIVE FREE 10 ML: 5 INJECTION INTRAVENOUS at 07:33

## 2020-11-30 RX ADMIN — SODIUM CHLORIDE, PRESERVATIVE FREE 10 ML: 5 INJECTION INTRAVENOUS at 20:05

## 2020-11-30 RX ADMIN — METOPROLOL TARTRATE 2.5 MG: 1 INJECTION, SOLUTION INTRAVENOUS at 19:38

## 2020-11-30 RX ADMIN — FAMOTIDINE 20 MG: 20 TABLET, FILM COATED ORAL at 07:33

## 2020-11-30 RX ADMIN — RIVAROXABAN 20 MG: 20 TABLET, FILM COATED ORAL at 16:43

## 2020-11-30 RX ADMIN — GUAIFENESIN 600 MG: 600 TABLET, EXTENDED RELEASE ORAL at 11:36

## 2020-11-30 RX ADMIN — LISINOPRIL 10 MG: 10 TABLET ORAL at 07:34

## 2020-11-30 RX ADMIN — TAMSULOSIN HYDROCHLORIDE 0.4 MG: 0.4 CAPSULE ORAL at 07:32

## 2020-11-30 RX ADMIN — FUROSEMIDE 20 MG: 20 TABLET ORAL at 16:43

## 2020-11-30 RX ADMIN — ATORVASTATIN CALCIUM 40 MG: 80 TABLET, FILM COATED ORAL at 20:04

## 2020-11-30 RX ADMIN — GUAIFENESIN 600 MG: 600 TABLET, EXTENDED RELEASE ORAL at 20:05

## 2020-11-30 ASSESSMENT — PAIN SCALES - GENERAL: PAINLEVEL_OUTOF10: 0

## 2020-11-30 NOTE — PROGRESS NOTES
Comprehensive Nutrition Assessment    Type and Reason for Visit:  Initial(LOS)    Nutrition Recommendations/Plan: Continue current diet. Encourage/monitor PO intakes as tolerated. Provide ONS as/if needed. Nutrition Assessment:  Chart reviewed based on length of stay. Pt admitted for SOB and cough - with COVID. PMHx of CHD, GERD, and colon-rectal cancer. RN reports pt eating % of meals. Noted pt also talking fluids well. Per chart review, weight loss of 12.6% x 9 months. Labs/Meds reviewed. Malnutrition Assessment:  Malnutrition Status: At risk for malnutrition (Comment)    Context:  Acute Illness     Findings of the 6 clinical characteristics of malnutrition:  Energy Intake:  Mild decrease in energy intake (Comment)  Weight Loss:  No significant weight loss(12.6% x 9 months per EHR)     Body Fat Loss:  Unable to assess     Muscle Mass Loss:  Unable to assess    Fluid Accumulation:  No significant fluid accumulation     Strength:  Not Performed    Estimated Daily Nutrient Needs:  Energy (kcal):  25-28 kcal/kg = 2195-7112 kcals/day; Weight Used for Energy Requirements:  Admission   Protein (g):  1.0-1.3 gm/kg =  gm pro/day; Weight Used for Protein Requirements:  Ideal          Nutrition Related Findings:  Labs reviewed. Meds reviewed: Lasix, Vitamin D. Last BM 11/28.       Wounds:  None       Current Nutrition Therapies:    DIET GENERAL; No Added Salt (3-4 GM)    Anthropometric Measures:  · Height: 6' (182.9 cm)  · Current Body Weight: 167 lb 1.7 oz (75.8 kg)   · Admission Body Weight: 173 lb 11.6 oz (78.8 kg)    · Usual Body Weight: 191 lb 3.2 oz (86.7 kg)(2/12/20 bedscale per chart review)     · Ideal Body Weight: 178 lbs; % Ideal Body Weight 93.9 %   · BMI: 22.7  · BMI Categories: Normal Weight (BMI 22.0 to 24.9) age over 72       Nutrition Diagnosis:   · Increased nutrient needs related to (current medical conditon) as evidenced by (improving PO intakes)    Nutrition Interventions:   Food and/or Nutrient Delivery:  Continue Current Diet(Provide ONS as/if needed with meals.)  Nutrition Education/Counseling:  No recommendation at this time   Coordination of Nutrition Care:  Continue to monitor while inpatient    Goals:  Oral intakes to meet % of estimated nutrition needs. - Goal Set    Nutrition Monitoring and Evaluation:   Food/Nutrient Intake Outcomes:  Food and Nutrient Intake  Physical Signs/Symptoms Outcomes:  Biochemical Data, GI Status, Fluid Status or Edema, Hemodynamic Status, Nutrition Focused Physical Findings, Weight, Skin     Electronically signed by Michelle Gooden RD, LD on 11/30/20 at 1:46 PM EST    Contact: [phone]

## 2020-11-30 NOTE — PROGRESS NOTES
[facility] -Progress Note COVID 19 Patient  Today's Date and Time: 11/30/2020, 2:16 PM    Impression :   · COVID 19 Suspect  · COVID 19 Confirmed Infection. · Covid tests:  · 11-23-20: Positive. · Atrial fibrillation with RVR: S/P cardioversion   · Pulmonary Fibrosis: secondary to Occupational exposure. 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 · Hypertrophic non obstructive cardiomyopathy. · Hx of Colon cancer  · BPH. Recommendations:   · Monitor off antibiotics. · Clinical Research will approach patient to explore if he qualifies for any of the COVID 19 treatment protocols. · Remdesivir. Completed treatment on 11/28/20. · CRP and LD on 11/23/20 were elevated. Continue Decadron. Started on 11/23 Stop date:12/2/20  · Plasma infusion. Two units infused 11-24-10    Medical Decision Making/Summary/Discussion:11/30/2020     · Patient admitted with suspected COVID 19 infection  · Covid test confirmed positive. · Associated problems with congestive heart failure, pulmonary fibrosis, hypoxia. Infection Control Recommendations   · Universal Precautions  · Airborne isolation  · Droplet Isolation    Antimicrobial Stewardship Recommendations     · Discontinuation of therapy  Coordination of Outpatient Care:   · Estimated Length of IV antimicrobials:TBD  · Patient will need Midline Catheter Insertion: TBD  · Patient will need PICC line Insertion: No  · Patient will need: Home IV , [place],  SNF,  LTAC:TBD  · Patient will need outpatient wound care:No    Chief complaint/reason for consultation:   · Concern for COVID infection      History of Present Illness:   Reid Monroe is a 77y.o.-year-old  male who was initially admitted on 11/23/2020. Patient seen at the request of [address] [place] [address]. INITIAL HISTORY:    Patient presented through ER with complaints of onset of shortness of breath.   Located onset of symptoms on 11/22/2020 with worsening through the day leading to his seeking help at the emergency room. The patient had been recently admitted on 10/17/2020 through 10/26/2020 at [facility] because of the presence of congestive heart failure NYHAA class II with acute on chronic combined heart failure. He also suffers from atrial fibrillation and hypertrophic nonobstructive cardiomyopathy, benign essential hypertension. His previous diagnosis includes occupational pulmonary fibrosis. During that admission he was also found to have suffered from mycoplasma pneumonia and was treated with antibiotics. He reports being seen at the [facility], after his admission to [facility],  where he was cardioverted because of the atrial fibrillation. He was also found to be in heart failure and was a started on diuretics. When the patient was evaluated in the emergency room he was found to be hypoxic and was treated with BiPAP which improved his oxygen saturation rate. He was tested for Covid and was found to be positive on 11/23/2020. Chest x-ray:  · 11/23/2020 persistent bilateral pulmonary infiltrates with associated pulmonary vascular congestion. Unchanged from films of 10/23/2020    Chest CT:  · 11/23/2020: Pulmonary fibrosis. No pulmonary embolus. Increased groundglass opacities and parenchymal opacities throughout both lungs when compared with films of 10/21/2020    Patient admitted because of concerns with COVID 19.    CURRENT EVALUATION : 11/30/2020    Afebrile  VS stable, hypotensive  On Heparin for Atrial Fibrillation. He is bradycardic. Acute on chronic combined systolic and diastolic CHF exacerbation with hypertrophic non obstructive cardiomyopathy. On  Po Lasix 20 mg.    Leukocytosis present. Up trending. On Decadron day 8. Stop date 12-2-20  Completed Remdesivir on 11/28/20. Consent for plasma obtained. 2 Units of Plasma transfused 11-24-20    Patient exhibiting respiratory distress.   Yes  Respiratory secretions: No    Patient receiving supplemental oxygen. Yes. Currently on High flow  Nasal canula at 25-->20-->20L with FIO2 100--> 85-->75-->65-->50-->45%    02 sat 96%-->80-->93-->97-->96-->87%  RR 32-->22-->20-->26-->22    QTc:       NEWS Score: 0-4 Low risk group; 5-6: Medium risk group; 7 or above: High risk group  Parameters 3 2 1 0 1 2 3   Age    < 65   ? 65   RR ? 8  9-11 12-20  21-24 ? 25   O2 Sats ? 91 92-93 94-95 ? 96      Suppl O2  Yes  No      SBP ? 90  101-110 111-219   ? 220   HR ? 40  41-50 51-90  111-130 ? 131   Consciousness    Alert   Drowsiness, lethargy, or confusion   Temperature ? 35.0 C (95.0 F)  35.1-36.0 C 95.1-96.9 F 36.1-38.0 C 97.0-100.4 F 38.1-39.0 C 100.5-102.3 F ? 39.1 C ? 102.4 F      NEWS Score:   11/23/2020: 9 risk   11/24/20: 11 high Risk   11/29/20:12 high risk    Overall Daily Picture:      Unchanged    Presence of secondary bacterial Infection:    No   Additional antibiotics: No    Labs, X rays reviewed: 11/30/2020    BUN: 16-->22-->31-->26-->26-->27  Cr: 0.62-->0.61-->0.53-->0.59-->0.52-->0.60    WBC: 14.1-->15.7-->11.4-->11.3-->12.4  Hb: 12.0-->11.3-->10.1-->11.2-->11.6-->11.2  Plat: 282-->305-->423-->396-->424->468    Bilirubin 1.28-->0.51  Alk phos 86-->61  ALT 12-->18  AST 24-->12    Absolute Neutrophils: 13.1.-->9.69  Absolute Lymphocytes: 0.59.-->1.44  Neutrophil/Lymphocyte Ratio: 22.2 high risk.-->6.72high risk    CRP: 182 (10-17-20) -->204.8 (11/23/20)  Ferritin: 395(11/23/20)  LDH: 521(11/23/20)    Pro Calcitonin:0.29( 10/17/20)  Troponin high-sensitivity 102  proBNP 3472    Cultures:  Urine:  ·   Blood:  · 11/23/20: x2: No growth  Sputum :  ·   Wound:       CXR:   · 11/23/2020 pulmonary fibrosis with interstitial edema. Scattered infiltrates bilaterally      CAT:  · 11/23/2020: Pulmonary fibrosis. No pulmonary embolus.   Increased groundglass opacities and parenchymal opacities throughout both lungs when compared with films of 10/21/2020            Discussed with patient, RN, CC, IM. I have personally reviewed the past medical history, past surgical history, medications, social history, and family history, and I have updated the database accordingly.   Past Medical History:     Past Medical History:   Diagnosis Date    Atrial fibrillation ([address]     Back pain, chronic     Mcguire's esophagus 06/18/2019    BPH (benign prostatic hyperplasia)     Cancer (HCC)     colon-rectal    Cocaine abuse in remission [facility])     1970's    ED (erectile dysfunction) 4/2/2015    GERD (gastroesophageal reflux disease)     GI bleed 12/5/2018    Hernia     History of colon cancer     Melena     Migraines     Murmur, cardiac        Past Surgical  History:     Past Surgical History:   Procedure Laterality Date    CARDIAC CATHETERIZATION  11/29/2018    Non-obstructive CAD    CARDIOVERSION  2020    COLECTOMY      2nd colectomy, Colostomy and reversed [facility] COLECTOMY      1st time Ksenia    COLONOSCOPY      COLONOSCOPY  07/18/2016    COLONOSCOPY N/A 12/6/2018    COLONOSCOPY DIAGNOSTIC performed by Neftali Mathew MD at [address] Right 2009    inguinal    KNEE SURGERY Right 1970's    arthrotomy    TONSILLECTOMY      TOTAL KNEE ARTHROPLASTY Right 1/8/2019    KNEE TOTAL ARTHROPLASTY performed by Bill Sims MD at [facility] TRANSESOPHAGEAL ECHOCARDIOGRAM  11/29/2018    UPPER GASTROINTESTINAL ENDOSCOPY N/A 12/5/2018    EGD DIAGNOSTIC ONLY performed by Neftali Mathew MD at [address] [place] [address] N/A 6/18/2019    MCGUIRE'S       Medications:      guaiFENesin  600 mg Oral BID    furosemide  20 mg Oral BID    lisinopril  10 mg Oral Daily    sodium chloride  20 mL Intravenous Once    rivaroxaban  20 mg Oral Daily    atorvastatin  40 mg Oral Daily    tamsulosin  0.4 mg Oral Daily    sodium chloride flush  10 mL Intravenous 2 times per day    famotidine  20 mg Oral Daily    dexamethasone  6 mg Oral Daily    Vitamin D 2,000 Units Oral Daily       Social History:     Social History     Socioeconomic History    Marital status: Single     Spouse name: Not on file    Number of children: Not on file    Years of education: Not on file    Highest education level: Not on file   Occupational History    Not on file   Social Needs    Financial resource strain: Not on file    Food insecurity     Worry: Not on file     Inability: Not on file    Transportation needs     Medical: Not on file     Non-medical: Not on file   Tobacco Use    Smoking status: Former Smoker     Packs/day: 0.50     Years: 1.00     Pack years: 0.50     Last attempt to quit:      Years since quittin.9    Smokeless tobacco: Never Used    Tobacco comment: stated never actually really smoked only inhaled    Substance and Sexual Activity    Alcohol use: Yes     Alcohol/week: 12.0 standard drinks     Types: 10 Standard drinks or equivalent, 2 Shots of liquor per week     Comment: 2-3 times a week    Drug use: No     Types:  Other-see comments     Comment: Cocaine use in past in     Sexual activity: Yes     Partners: Female   Lifestyle    Physical activity     Days per week: Not on file     Minutes per session: Not on file    Stress: Not on file   Relationships    Social connections     Talks on phone: Not on file     Gets together: Not on file     Attends Jain service: Not on file     Active member of club or organization: Not on file     Attends meetings of clubs or organizations: Not on file     Relationship status: Not on file    Intimate partner violence     Fear of current or ex partner: Not on file     Emotionally abused: Not on file     Physically abused: Not on file     Forced sexual activity: Not on file   Other Topics Concern    Not on file   Social History Narrative    Not on file       Family History:     Family History   Problem Relation Age of Onset    Diabetes Mother     Heart Attack Father     Heart Disease Father    [facility] Heart Disease Brother         Allergies:   Adhesive tape; Codeine; and Penicillins     Review of Systems:       Constitutional: No fevers or chills. No systemic complaints  Head: No headaches  Eyes: No double vision or blurry vision. No conjunctival inflammation. ENT: No sore throat or runny nose. . No hearing loss, tinnitus or vertigo. Cardiovascular: No chest pain or palpitations. Shortness of breath. CLAYTON  Lung: Shortness of breath, dry cough. No sputum production  Abdomen: No nausea, vomiting, diarrhea, or abdominal pain. Griselda Graven No cramps. Genitourinary: No increased urinary frequency, or dysuria. No hematuria. No suprapubic or CVA pain  Musculoskeletal: No muscle aches or pains. No joint effusions, swelling or deformities  Hematologic: No bleeding or bruising. Neurologic: No headache, weakness, numbness, or tingling. Integument: No rash, no ulcers. Psychiatric: No depression. Endocrine: No polyuria, no polydipsia, no polyphagia. Physical Examination :     Patient Vitals for the past 8 hrs:   BP Temp Temp src Pulse Resp SpO2 Height   11/30/20 1338 -- -- -- -- -- -- 6' (1.829 m)   11/30/20 1024 -- -- -- -- 17 (!) 88 % --   11/30/20 0828 -- 97.6 °F (36.4 °C) Axillary -- -- -- --   11/30/20 0815 -- -- -- 62 28 (!) 87 % --   11/30/20 0727 110/68 -- -- 55 20 90 % --     General Appearance: Awake, alert, and in no apparent distress  Head:  Normocephalic, no trauma  Eyes: Pupils equal, round, reactive to light; sclera anicteric; conjunctivae pink. No embolic phenomena. ENT: Oropharynx clear, without erythema, exudate, or thrush. No tenderness of sinuses. Mouth/throat: mucosa pink and moist. No lesions. Dentition in good repair. Neck:Supple, without lymphadenopathy. Thyroid normal, No bruits. Pulmonary/Chest: ,Distant breath sounds, decreased breath sounds at the bases   cardiovascular: Irreegular rate and rhythm without murmurs, rubs, or gallops. Abdomen: Soft, non tender.  Bowel sounds normal. No organomegaly  All four Extremities: No cyanosis, clubbing, edema, or effusions. Neurologic: No gross sensory or motor deficits. Skin: Warm and dry with good turgor. No signs of peripheral arterial or venous insufficiency. No ulcerations. No open wounds. Medical Decision Making -Laboratory:   I have independently reviewed/ordered the following labs:    CBC with Differential:   Recent Labs     11/29/20  0548 11/30/20  0524   WBC 12.4* 11.1   HGB 11.2* 11.7*   HCT 37.8* 39.5*   * 488*   LYMPHOPCT 12* 12*   MONOPCT 7 7     BMP:   Recent Labs     11/29/20  0548 11/30/20  0524    137   K 3.6* 4.0    103   CO2 25 26   BUN 27* 23   CREATININE 0.60* 0.52*     Hepatic Function Panel:   Recent Labs     11/29/20 0548 11/30/20  0524   PROT 5.5* 5.7*   LABALBU 2.4* 2.4*   BILITOT 0.51 0.65   ALKPHOS 61 65   ALT 18 16   AST 12 11     No results for input(s): RPR in the last 72 hours. No results for input(s): HIV in the last 72 hours. No results for input(s): BC in the last 72 hours. Lab Results   Component Value Date    MUCUS NOT REPORTED 11/23/2020    RBC 4.64 11/30/2020    TRICHOMONAS NOT REPORTED 11/23/2020    WBC 11.1 11/30/2020    YEAST NOT REPORTED 11/23/2020    TURBIDITY CLEAR 11/23/2020     Lab Results   Component Value Date    CREATININE 0.52 11/30/2020    GLUCOSE 95 11/30/2020       Medical Decision Making-Imaging:     EXAMINATION:    CTA OF THE CHEST 11/23/2020 7:42 am         TECHNIQUE:    CTA of the chest was performed after the administration of intravenous    contrast.  Multiplanar reformatted images are provided for review.  MIP    images are provided for review.  Dose modulation, iterative reconstruction,    and/or weight based adjustment of the mA/kV was utilized to reduce the    radiation dose to as low as reasonably achievable.         COMPARISON:    High-resolution chest CT 10 03/20/2020         CT PA 10/21/2020         HISTORY:    ORDERING SYSTEM PROVIDED HISTORY: r/o PE TECHNOLOGIST PROVIDED HISTORY:    r/o PE    Reason for Exam: sob    Acuity: Acute              Type of Exam: Initial         FINDINGS:    No intimal flap is seen in aorta. .  Small mediastinal nodes are noted,    similar to prior         No embolus is seen the central, right, left main pulmonary artery.  No    embolus is seen in the proximal segmental branches.  Distal segmental    branches and subsegmental branches are not well evaluated secondary to    respiratory motion artifact.         Patchy ground-glass and parenchymal opacities are seen in the left upper and    left lower lobe.         On the right patchy ground-glass and parenchymal opacities are seen in the    right upper, right middle and right lower lobe.  There is mild fusiform    bronchiectasis. Juanetta Cornet is trace left-sided pleural effusion.         There is mild underlying pulmonary fibrosis.  There is underlying    bronchiectasis         Right adrenal gland is normal.  Left adrenal gland is normal.         1 mm calcification is seen in the left kidney         Spurring is seen in the spine.  Spurring is seen in the shoulder joints         Small soft tissue nodule is seen posteriorly in the subcutaneous fat    measuring 2.1 cm, likely sebaceous cyst              Impression    No central pulmonary embolus.  Peripheral branches are not well evaluated    secondary to motion.         Increased ground-glass opacity and parenchymal opacities throughout the lungs    either due to developing pneumonia or edema. Juanetta Cornet is a small left-sided    pleural effusion.  By report there is a history of COVID-19 infection         Underlying pulmonary fibrosis again noted.                EXAMINATION:    ONE XRAY VIEW OF THE CHEST         11/23/2020 8:02 am         COMPARISON:    10/19/2020         HISTORY:    ORDERING SYSTEM PROVIDED HISTORY: SOB    TECHNOLOGIST PROVIDED HISTORY:    SOB    Reason for Exam: uprt port chest         FINDINGS:    Cardiomediastinal silhouette is stable.  Bilateral pulmonary infiltrates    persist unchanged and may represent pulmonary edema versus atypical pneumonia.              Impression    Stable exam        Medical Decision Uuhoau-Znvlazoy-Mzses:       Medical Decision Making-Other:     Note:  · Labs, medications, radiologic studies were reviewed with personal review of films  · Large amounts of data were reviewed  · Discussed with nursing Staff, Discharge planner  · Infection Control and Prevention measures reviewed  · All prior entries were reviewed  · Administer medications as ordered  · Prognosis: Guarded  · Discharge planning reviewed  · Follow up as outpatient. Thank you for allowing us to participate in the care of this patient. Please call with questions.     Arnoldo Claude, MD           Pager: [phone] - Office: [phone]

## 2020-11-30 NOTE — PROGRESS NOTES
[facility]  Progress Note                   Date:   11/30/2020  Patient name: Richard Barboza  Date of admission:  11/23/2020  6:44 AM  MRN:   [id number]  YOB: 1955  PCP: Jena Stephens MD    Reason for Admission: Acute hypoxemic respiratory failure due to COVID-19 ([address] [U07.1, J96.01]    Subjective:       Clinical Changes /Abnormalities: Case discussed with RN. No acute CV issues/concerns overnight. Remains on high flow oxygen, now down to 45% SB on tele HR in the 50-60s, down into 40's with sleep. Asymptomatic      Review of Systems    Medications:   Scheduled Meds:   lisinopril  10 mg Oral Daily    furosemide  20 mg Intravenous BID    sodium chloride  20 mL Intravenous Once    rivaroxaban  20 mg Oral Daily    atorvastatin  40 mg Oral Daily    sotalol  80 mg Oral BID    tamsulosin  0.4 mg Oral Daily    sodium chloride flush  10 mL Intravenous 2 times per day    famotidine  20 mg Oral Daily    dexamethasone  6 mg Oral Daily    Vitamin D  2,000 Units Oral Daily     Continuous Infusions:    CBC:   Recent Labs     11/28/20  0555 11/29/20 0548 11/30/20 0524   WBC 11.3 12.4* 11.1   HGB 11.6* 11.2* 11.7*    468* 488*     BMP:    Recent Labs     11/28/20 0555 11/29/20 0548 11/30/20 0524    136 137   K 4.0 3.6* 4.0   * 104 103   CO2 25 25 26   BUN 26* 27* 23   CREATININE 0.52* 0.60* 0.52*   GLUCOSE 84 87 95     Hepatic:  Recent Labs     11/28/20  0555 11/29/20 0548 11/30/20 0524   AST 16 12 11   ALT 21 18 16   BILITOT 0.59 0.51 0.65   ALKPHOS 63 61 65     Troponin:   No results for input(s): TROPHS in the last 72 hours. BNP: No results for input(s): BNP in the last 72 hours. Lipids: No results for input(s): CHOL, HDL in the last 72 hours. Invalid input(s): LDLCALCU  INR:   No results for input(s): INR in the last 72 hours.   DIAGNOSTIC DATA  EKG:   NSR, Biatrial enlargement  ECHO: 10/20/20   Summary  Left ventricle is normal in size Global left ventricular systolic function  is moderately reduced Estimated ejection fraction is 35 % . Mostly global hypokinesis with minor regional variation. Grade I (mild) left ventricular diastolic dysfunction. Left atrium is moderately dilated. Aortic leaflet calcification with Moderate Aortic Stenosis, maybe  underestimated due to poor LVEF. Thickened mitral valve leaflets. Mild to moderate mitral regurgitation. Trivial tricuspid regurgitation. Estimated right ventricular systolic  pressure is 59DVGP. IVC dilated but unable to assess respiratory collapse.     Cardiac Angiography: 2018  Procedure Summary      Non-obstructive CAD.   Normal LV systolic function.      Recommendations      Medical therapy as needed.   Risk factor modification. Objective:   Vitals: /68   Pulse 62   Temp 97.6 °F (36.4 °C) (Axillary)   Resp 28   Ht 6' (1.829 m)   Wt 167 lb 1.7 oz (75.8 kg)   SpO2 (!) 87%   BMI 22.66 kg/m²   For careful stewardship of limited PPE during COVID-19 pandemic my physical exam was deferred. For physical exam, please see today's physical from primary team or ICU team.         Assessment / Acute Cardiac Problems:   1. A FIB with RVR now SB  2. Acute on chronic systolic CHF exacerbation  3. Covid 19 +  4. Hypertension  5.  Non-obstructive CAD on cath 2018    Patient Active Problem List:     Dyslipidemia     ED (erectile dysfunction)     Incomplete bladder emptying     Benign prostatic hyperplasia with urinary obstruction     History of colon cancer     Atrial fibrillation with normal ventricular rate (HCC)     Anemia     Pallor of optic disc     Presbyopia     Incisional hernia, without obstruction or gangrene     Hypertrophic nonobstructive cardiomyopathy (HCC)     Iron (Fe) deficiency anemia     Primary osteoarthritis of right knee     Benign essential HTN     History of malignant neoplasm of rectum     Hill's esophagus     CHF (congestive heart failure), NYHA class II, acute on chronic,

## 2020-11-30 NOTE — PLAN OF CARE
Nutrition Problem #1: Increased nutrient needs  Intervention: Food and/or Nutrient Delivery: Continue Current Diet(Provide ONS as/if needed with meals.)  Nutritional Goals: Oral intakes to meet % of estimated nutrition needs.

## 2020-11-30 NOTE — PLAN OF CARE
Problem: Airway Clearance - Ineffective  Goal: Achieve or maintain patent airway  Outcome: Ongoing     Problem: Gas Exchange - Impaired  Goal: Absence of hypoxia  Outcome: Ongoing  Goal: Promote optimal lung function  Outcome: Ongoing     Problem: Breathing Pattern - Ineffective  Goal: Ability to achieve and maintain a regular respiratory rate  Outcome: Ongoing     Problem: Body Temperature -  Risk of, Imbalanced  Goal: Ability to maintain a body temperature within defined limits  Outcome: Ongoing  Goal: Will regain or maintain usual level of consciousness  Outcome: Ongoing  Goal: Complications related to the disease process, condition or treatment will be avoided or minimized  Outcome: Ongoing     Problem: Isolation Precautions - Risk of Spread of Infection  Goal: Prevent transmission of infection  Outcome: Ongoing     Problem: Nutrition Deficits  Goal: Optimize nutrtional status  Outcome: Ongoing     Problem: Risk for Fluid Volume Deficit  Goal: Maintain normal heart rhythm  Outcome: Ongoing  Goal: Maintain absence of muscle cramping  Outcome: Ongoing  Goal: Maintain normal serum potassium, sodium, calcium, phosphorus, and pH  Outcome: Ongoing     Problem: Loneliness or Risk for Loneliness  Goal: Demonstrate positive use of time alone when socialization is not possible  Outcome: Ongoing     Problem: Fatigue  Goal: Verbalize increase energy and improved vitality  Outcome: Ongoing     Problem: Patient Education: Go to Patient Education Activity  Goal: Patient/Family Education  Outcome: Ongoing     Problem: Falls - Risk of:  Goal: Will remain free from falls  Description: Will remain free from falls  Outcome: Ongoing  Note: Patient assessed for fall risk and fall precautions initiated as needed. Patient and family  instructed about safety devices and allowed to make decisions related to safey. Environment kept free of clutter and adequate lighting provided. Bed in lowest position with brakes locked.  Call light in reach. Patient ID band correct and in place. Patient transferred with appropriate methods. Will continue to monitor. Goal: Absence of physical injury  Description: Absence of physical injury  Outcome: Ongoing     Problem: OXYGENATION/RESPIRATORY FUNCTION  Goal: Patient will maintain patent airway  Outcome: Ongoing  Goal: Patient will achieve/maintain normal respiratory rate/effort  Description: Respiratory rate and effort will be within normal limits for the patient  Outcome: Ongoing     Problem: Pain:  Goal: Pain level will decrease  Description: Pain level will decrease  Outcome: Ongoing  Note: Pt able to communicate needs for pain medications and states satisfaction with outcome.     Goal: Control of acute pain  Description: Control of acute pain  Outcome: Ongoing  Goal: Control of chronic pain  Description: Control of chronic pain  Outcome: Ongoing

## 2020-11-30 NOTE — PROGRESS NOTES
Pulmonary Progress Note    CC:  COVID -19 PNEUMONIA   Subjective: On high flow 45 l   fio2 is dec to 45 %   He is no tachypnic but desaturates with minimal exertions . No fever         Review of Systems -  General ROS: fatigue  ENT ROS: negative for - headaches, oral lesions or sore throat  Cardiovascular ROS: no chest pain , orthopnea or pnd   Gastrointestinal ROS: no abdominal pain, change in bowel habits, or black or bloody stools  Neuro - no blurry vision , no loc .  No focal weakness       Immunization   Immunization History   Administered Date(s) Administered    Influenza Vaccine, unspecified formulation 01/16/2014, 01/03/2017, 12/12/2017    Influenza Virus Vaccine 01/16/2014, 10/26/2014, 12/04/2015    Influenza, High Dose (Fluzone 65 yrs and older) 09/27/2018    Influenza, Agnes Sue, 6 mo and older, IM (Fluzone, Flulaval) 12/12/2017    Influenza, Quadv, IM, (6 mo and older Fluzone, Flulaval, Fluarix and 3 yrs and older Afluria) 10/23/2014, 01/03/2017    Influenza, Quadv, IM, PF (6 mo and older Fluzone, Flulaval, Fluarix, and 3 yrs and older Afluria) 10/23/2014    Influenza, Quadv, adjuvanted, 65 yrs +, IM, PF (Fluad) 11/16/2020    Influenza, Triv, inactivated, subunit, adjuvanted, IM (Fluad 65 yrs and older) 11/08/2019          PAST MEDICAL HISTORY:       Diagnosis Date    Atrial fibrillation (HCC)     Back pain, chronic     Hill's esophagus 06/18/2019    BPH (benign prostatic hyperplasia)     Cancer (HCC)     colon-rectal    Cocaine abuse in remission ([facility]     1970's    ED (erectile dysfunction) 4/2/2015    GERD (gastroesophageal reflux disease)     GI bleed 12/5/2018    Hernia     History of colon cancer     Melena     Migraines     Murmur, cardiac          Family History:       Problem Relation Age of Onset    Diabetes Mother     Heart Attack Father     Heart Disease Father     Heart Disease Brother        SURGICAL HISTORY:   Past Surgical History:   Procedure Laterality Date  CARDIAC CATHETERIZATION  11/29/2018    Non-obstructive CAD    CARDIOVERSION  2020    COLECTOMY      2nd colectomy, Colostomy and reversed [facility] COLECTOMY      1st time Ksenia    COLONOSCOPY      COLONOSCOPY  07/18/2016    COLONOSCOPY N/A 12/6/2018    COLONOSCOPY DIAGNOSTIC performed by Dolly Kaur MD at [address] Right 2009    inguinal    KNEE SURGERY Right 1970's    arthrotomy    TONSILLECTOMY      TOTAL KNEE ARTHROPLASTY Right 1/8/2019    KNEE TOTAL ARTHROPLASTY performed by Timoteo Bacon MD at [address] TRANSESOPHAGEAL ECHOCARDIOGRAM  11/29/2018    UPPER GASTROINTESTINAL ENDOSCOPY N/A 12/5/2018    EGD DIAGNOSTIC ONLY performed by Dolly Kaur MD at [facility] N/A 6/18/2019    [facility]GUIRE'S              TOBACCO:   reports that he quit smoking about 49 years ago. He has a 0.50 pack-year smoking history. He has never used smokeless tobacco.  ETOH:   reports current alcohol use of about 12.0 standard drinks of alcohol per week. ALLERGIES:    Allergies   Allergen Reactions    Adhesive Tape Other (See Comments)     Blister badly     Codeine Nausea Only     Other reaction(s): Other: See Comments  NAUSEA  Other reaction(s): Other: See Comments  NAUSEA    Penicillins Swelling     As a baby  Other reaction(s): Unknown  As a baby       Home Meds:   Prior to Admission medications    Medication Sig Start Date End Date Taking?  Authorizing Provider   atorvastatin (LIPITOR) 40 MG tablet Take 1 tablet by mouth daily 11/16/20  Yes Cassandra Jorgensen MD   omeprazole (PRILOSEC) 40 MG delayed release capsule Take 1 capsule by mouth daily 11/16/20  Yes Cassandra Jorgensen MD   lisinopril (PRINIVIL;ZESTRIL) 5 MG tablet take 1 tablet by mouth once daily 10/26/20  Yes Historical Provider, MD   sotalol (BETAPACE) 80 MG tablet take 1 tablet by mouth every 12 hours 10/28/20  Yes Historical Provider, MD   ibuprofen (ADVIL;MOTRIN) 800 MG tablet Take 1 tablet by mouth every 6 hours if needed for pain 10/27/20  Yes Cassandra Carrizales MD   furosemide (LASIX) 20 MG tablet Take 1 tablet by mouth daily 10/26/20  Yes Susan Valenzuela MD   tamsulosin [facility]) 0.4 MG capsule Take 1 capsule by mouth daily 10/15/20  Yes Margy Ornelas MD   rivaroxaban (XARELTO) 20 MG TABS tablet Take 20 mg by mouth daily (with breakfast)    Yes Daryl Palacios MD         Intake/Output Summary (Last 24 hours) at 11/30/2020 1437  Last data filed at 11/30/2020 1420  Gross per 24 hour   Intake 1700 ml   Output 1790 ml   Net -90 ml         Diet   DIET GENERAL; No Added Salt (3-4 GM)    Vitals:   /68   Pulse 62   Temp 97.6 °F (36.4 °C) (Axillary)   Resp 17   Ht 6' (1.829 m)   Wt 167 lb 1.7 oz (75.8 kg)   SpO2 (!) 88%   BMI 22.66 kg/m²  on         I/O (24 Hours)    Patient Vitals for the past 8 hrs:   BP Temp Temp src Pulse Resp SpO2 Height   11/30/20 1338 -- -- -- -- -- -- 6' (1.829 m)   11/30/20 1024 -- -- -- -- 17 (!) 88 % --   11/30/20 0828 -- 97.6 °F (36.4 °C) Axillary -- -- -- --   11/30/20 0815 -- -- -- 62 28 (!) 87 % --   11/30/20 0727 110/68 -- -- 55 20 90 % --       Intake/Output Summary (Last 24 hours) at 11/30/2020 1437  Last data filed at 11/30/2020 1420  Gross per 24 hour   Intake 1700 ml   Output 1790 ml   Net -90 ml     I/O last 3 completed shifts:   In: 1200 [P.O.:1200]  Out: 1790 [Urine:1790]   Date 11/30/20 0000 - 11/30/20 2359   Shift 6276-2084 9584-0755 6154-8185 24 Hour Total   INTAKE   P.O.(mL/kg/hr)  900  900   Shift Total(mL/kg)  900(11.9)  900(11.9)   OUTPUT   Urine(mL/kg/hr) 890(1.5)   890   Shift Total(mL/kg) 890(11.7)   890(11.7)   Weight (kg) 75.8 75.8 75.8 75.8     Patient Vitals for the past 96 hrs (Last 3 readings):   Weight   11/30/20 0600 167 lb 1.7 oz (75.8 kg)   11/28/20 0539 164 lb 10.9 oz (74.7 kg)   11/26/20 2305 165 lb 12.6 oz (75.2 kg)          PHYSICAL EXAMINATION:  I have discussed the care of Monique Hernandez  including pertinent history and exam findings,  with the nursing staff I have seen  the patient and the key elements of all parts of the encounter have been performed by me. For careful stewardship of limited PPE during COVID- pandemic my physical exam was deferred. .        Medications:    Scheduled Meds:   guaiFENesin  600 mg Oral BID    furosemide  20 mg Oral BID    lisinopril  10 mg Oral Daily    sodium chloride  20 mL Intravenous Once    rivaroxaban  20 mg Oral Daily    atorvastatin  40 mg Oral Daily    tamsulosin  0.4 mg Oral Daily    sodium chloride flush  10 mL Intravenous 2 times per day    famotidine  20 mg Oral Daily    dexamethasone  6 mg Oral Daily    Vitamin D  2,000 Units Oral Daily       Continuous Infusions:      PRN Meds:  calcium carbonate, sodium chloride, albuterol **AND** [] ipratropium, sodium chloride flush, potassium chloride **OR** potassium alternative oral replacement **OR** potassium chloride, magnesium sulfate, acetaminophen **OR** acetaminophen, polyethylene glycol, promethazine **OR** [DISCONTINUED] ondansetron, nicotine, albuterol sulfate HFA, dextromethorphan-guaiFENesin    Labs:  CBC:   Recent Labs     20  0555 20  0548 20  0524   WBC 11.3 12.4* 11.1   HGB 11.6* 11.2* 11.7*   HCT 38.6* 37.8* 39.5*   MCV 84.8 85.3 85.1    468* 488*     BMP:   Recent Labs     20  0555 20  0548 20  05    136 137   K 4.0 3.6* 4.0   * 104 103   CO2 25 25 26   BUN 26* 27* 23   CREATININE 0.52* 0.60* 0.52*     LIVER PROFILE:   Recent Labs     20  0555 20  0548 20  0524   AST 16 12 11   ALT 21 18 16   BILITOT 0.59 0.51 0.65   ALKPHOS 63 61 65     PT/INR: No results for input(s): PROTIME, INR in the last 72 hours. APTT: No results for input(s): APTT in the last 72 hours.   UA:No results for input(s): NITRITE, COLORU, PHUR, LABCAST, WBCUA, RBCUA, MUCUS, TRICHOMONAS, YEAST, BACTERIA, CLARITYU, SPECGRAV, LEUKOCYTESUR, UROBILINOGEN, BILIRUBINUR, BLOODU, GLUCOSEU, AMORPHOUS in the last 72 hours. Invalid input(s): KETONESU  No results for input(s): PHART, CDM1ZDF, PO2ART in the last 72 hours. Films:  No results found. LOS: 7          Vent Information  Skin Assessment: Clean, dry, & intact  Equipment Changed: Humidification  FiO2 : (S) 45 %  SpO2: (!) 88 %  SpO2/FiO2 ratio: 195.56  I Time/ I Time %: 0.9 s  Humidification Source: Heated wire  Humidification Temp: 33  Humidification Temp Measured: 33  Circuit Condensation: Drained  Mask Type: Full face mask  Mask Size: Large     PaO2/FiO2 RATIO:  No results for input(s): POCPO2 in the last 72 hours. FiO2 : (S) 45 %       LABS:  ABGs:   No results for input(s): POCPH, POCPCO2, POCPO2, POCHCO3, QFRU0RWM in the last 72 hours. ASSESSMENT:  Principal Problem:    Acute hypoxemic respiratory failure due to COVID-19 [facility])  Active Problems:    Dyslipidemia    Benign prostatic hyperplasia with urinary obstruction    Hypertrophic nonobstructive cardiomyopathy (HCC)    Iron (Fe) deficiency anemia    Benign essential HTN    CHF (congestive heart failure), NYHA class II, acute on chronic, combined ([facility]    Occupational pulmonary disease    COVID-19  Resolved Problems:    * No resolved hospital problems.  *        Acute hypoxic respiratory failure secondary to COVID 19   Bilateral multifocal pneumonia due to COVID 19 infection superimposed on pulmonary fibrosis    Covid -19 pandemic emergency     PLAN:    PRONE :       [x] No    [] Yes    REMDESIVIR:             [] No    [x] Yes    DEXAMETHASONE : [] No    [x] Yes    CONVALESCENT PLASMA : [] No    [x] Yes 11/14/20  keep sats >92 % wean high flow keep   Airborne isolation and droplet precautions to be continued  Continue supportive care   Cont treatment with medications for COVID-19  REMDESIVIR  Completed 11/28/20  DEXAMETHASONE    Will obtain xray chest and ABG as needed  Patient is on appropriate DVT - xarelto  and stress ulcer prophylaxis as allowed by the medical condition  Recommend de-escalate labs   Treatment plan Discussed with nursing staff in detail , all questions answered . Electronically signed by Silvestre Borges MD on 11/30/2020 at 2:37 PM       This patient was evaluated in the context of the global SARS-CoV-2 (COVID-19) pandemic, which necessitated considerations that the patient either has COVID-19 infection or is at risk of infection with COVID-19. Institutional protocols and algorithms that pertain to the evaluation & management of patients with COVID-19 or those at risk for COVID-19 are in a state of rapid changes based on information released by regulatory bodies including the CDC and federal and state organizations. These policies and algorithms were followed during the patient's care. Please note that this chart was generated using voice recognition Dragon dictation software. Although every effort was made to ensure the accuracy of this automated transcription, some errors in transcription may have occurred.

## 2020-11-30 NOTE — PROGRESS NOTES
20 mL Intravenous Once    rivaroxaban  20 mg Oral Daily    atorvastatin  40 mg Oral Daily    sotalol  80 mg Oral BID    tamsulosin  0.4 mg Oral Daily    sodium chloride flush  10 mL Intravenous 2 times per day    famotidine  20 mg Oral Daily    dexamethasone  6 mg Oral Daily    Vitamin D  2,000 Units Oral Daily     Continuous Infusions:   PRN Meds: calcium carbonate, sodium chloride, albuterol **AND** [] ipratropium, sodium chloride flush, potassium chloride **OR** potassium alternative oral replacement **OR** potassium chloride, magnesium sulfate, acetaminophen **OR** acetaminophen, polyethylene glycol, promethazine **OR** [DISCONTINUED] ondansetron, nicotine, albuterol sulfate HFA, dextromethorphan-guaiFENesin    Data:     Past Medical History:   has a past medical history of Atrial fibrillation ([facility] Back pain, chronic, Hill's esophagus, BPH (benign prostatic hyperplasia), Cancer ([facility] Cocaine abuse in remission [facility]), ED (erectile dysfunction), GERD (gastroesophageal reflux disease), GI bleed, Hernia, History of colon cancer, Melena, Migraines, and Murmur, cardiac. Social History:   reports that he quit smoking about 49 years ago. He has a 0.50 pack-year smoking history. He has never used smokeless tobacco. He reports current alcohol use of about 12.0 standard drinks of alcohol per week. He reports that he does not use drugs. Family History:   Family History   Problem Relation Age of Onset    Diabetes Mother     Heart Attack Father     Heart Disease Father     Heart Disease Brother        Vitals:  /68   Pulse 62   Temp 97.6 °F (36.4 °C) (Axillary)   Resp 17   Ht 6' (1.829 m)   Wt 167 lb 1.7 oz (75.8 kg)   SpO2 (!) 88%   BMI 22.66 kg/m²   Temp (24hrs), Av °F (36.7 °C), Min:96.8 °F (36 °C), Max:98.9 °F (37.2 °C)    No results for input(s): POCGLU in the last 72 hours. I/O (24Hr):     Intake/Output Summary (Last 24 hours) at 2020 1039  Last data filed at noted.       Physical Examination:        General appearance:  Alert,   Lungs: Decreased air entry bilateral    Heart:  regular rate and rhythm, no murmur  Abdomen:  soft, nontender, nondistended, normal bowel sounds, no masses, hepatomegaly, splenomegaly  Extremities:  no edema, redness, tenderness in the calves  Skin:  no gross lesions, rashes, induration    Assessment:        Hospital Problems           Last Modified POA    * (Principal) Acute hypoxemic respiratory failure due to COVID-19 ([facility] 11/23/2020 Yes    Dyslipidemia 11/23/2020 Yes    Benign prostatic hyperplasia with urinary obstruction 11/23/2020 Yes    Hypertrophic nonobstructive cardiomyopathy ([facility] 11/23/2020 Yes    Iron (Fe) deficiency anemia 11/23/2020 Yes    Benign essential HTN 11/23/2020 Yes    CHF (congestive heart failure), NYHA class II, acute on chronic, combined ([facility] [address] 11/23/2020 Yes    Occupational pulmonary disease 11/23/2020 Yes    COVID-19 11/24/2020 Yes          Plan:          COVID-19 pneumonia status post Decadron and remdesivir  Improved    History of pulmonary fibrosis monitor      aCute on top of chronic hypoxemic respiratory failure most likely related to COVID-19 infection and pneumonia  Wean off oxygen    A. fib with RVR status post cardioversion in the past continue oral anticoagulation Betapace    Worsening bradycardia patient currently on beta-blocker cardiology notified Betapace was held continue to hold sotalol if heart rate less than 60    NSTEMI type II secondary to demand ischemia treated with heparin drip currently heparin drip was stopped    Hypertension monitor closely      Dyslipidemia statin      Acute to type of chronic combined systolic and diastolic CHF exacerbation with hypertrophic nonobstructive cardiomyopathy treated with diuresis    Leukocytosis monitor      Hypokalemia replaced    May benefit from Hermes Ervin MD  11/30/2020  10:39 AM

## 2020-12-01 ENCOUNTER — APPOINTMENT (OUTPATIENT)
Dept: GENERAL RADIOLOGY | Age: 65
DRG: 871 | End: 2020-12-01
Payer: MEDICARE

## 2020-12-01 LAB
ABSOLUTE EOS #: 0.03 K/UL (ref 0–0.44)
ABSOLUTE IMMATURE GRANULOCYTE: 0.22 K/UL (ref 0–0.3)
ABSOLUTE LYMPH #: 1.26 K/UL (ref 1.1–3.7)
ABSOLUTE MONO #: 1.02 K/UL (ref 0.1–1.2)
ALBUMIN SERPL-MCNC: 2.4 G/DL (ref 3.5–5.2)
ALBUMIN/GLOBULIN RATIO: 0.8 (ref 1–2.5)
ALP BLD-CCNC: 65 U/L (ref 40–129)
ALT SERPL-CCNC: 14 U/L (ref 5–41)
ANION GAP SERPL CALCULATED.3IONS-SCNC: 8 MMOL/L (ref 9–17)
AST SERPL-CCNC: 11 U/L
BASOPHILS # BLD: 0 % (ref 0–2)
BASOPHILS ABSOLUTE: 0.04 K/UL (ref 0–0.2)
BILIRUB SERPL-MCNC: 0.6 MG/DL (ref 0.3–1.2)
BNP INTERPRETATION: ABNORMAL
BUN BLDV-MCNC: 24 MG/DL (ref 8–23)
BUN/CREAT BLD: ABNORMAL (ref 9–20)
CALCIUM SERPL-MCNC: 8.3 MG/DL (ref 8.6–10.4)
CHLORIDE BLD-SCNC: 105 MMOL/L (ref 98–107)
CO2: 23 MMOL/L (ref 20–31)
CREAT SERPL-MCNC: 0.54 MG/DL (ref 0.7–1.2)
D-DIMER QUANTITATIVE: 0.51 MG/L FEU
DIFFERENTIAL TYPE: ABNORMAL
EKG ATRIAL RATE: 366 BPM
EKG ATRIAL RATE: 60 BPM
EKG P AXIS: 36 DEGREES
EKG P-R INTERVAL: 154 MS
EKG Q-T INTERVAL: 342 MS
EKG Q-T INTERVAL: 432 MS
EKG QRS DURATION: 84 MS
EKG QRS DURATION: 84 MS
EKG QTC CALCULATION (BAZETT): 432 MS
EKG QTC CALCULATION (BAZETT): 460 MS
EKG R AXIS: -13 DEGREES
EKG R AXIS: -18 DEGREES
EKG T AXIS: -15 DEGREES
EKG T AXIS: -26 DEGREES
EKG VENTRICULAR RATE: 109 BPM
EKG VENTRICULAR RATE: 60 BPM
EOSINOPHILS RELATIVE PERCENT: 0 % (ref 1–4)
GFR AFRICAN AMERICAN: >60 ML/MIN
GFR NON-AFRICAN AMERICAN: >60 ML/MIN
GFR SERPL CREATININE-BSD FRML MDRD: ABNORMAL ML/MIN/{1.73_M2}
GFR SERPL CREATININE-BSD FRML MDRD: ABNORMAL ML/MIN/{1.73_M2}
GLUCOSE BLD-MCNC: 114 MG/DL (ref 70–99)
HCT VFR BLD CALC: 39.7 % (ref 40.7–50.3)
HEMOGLOBIN: 12 G/DL (ref 13–17)
IMMATURE GRANULOCYTES: 2 %
LACTIC ACID, SEPSIS WHOLE BLOOD: 2.6 MMOL/L (ref 0.5–1.9)
LACTIC ACID, SEPSIS: ABNORMAL MMOL/L (ref 0.5–1.9)
LYMPHOCYTES # BLD: 10 % (ref 24–43)
MCH RBC QN AUTO: 25.5 PG (ref 25.2–33.5)
MCHC RBC AUTO-ENTMCNC: 30.2 G/DL (ref 28.4–34.8)
MCV RBC AUTO: 84.3 FL (ref 82.6–102.9)
MONOCYTES # BLD: 8 % (ref 3–12)
MYOGLOBIN: 21 NG/ML (ref 28–72)
MYOGLOBIN: <21 NG/ML (ref 28–72)
NRBC AUTOMATED: 0 PER 100 WBC
PDW BLD-RTO: 19.5 % (ref 11.8–14.4)
PLATELET # BLD: 524 K/UL (ref 138–453)
PLATELET ESTIMATE: ABNORMAL
PMV BLD AUTO: 9.4 FL (ref 8.1–13.5)
POTASSIUM SERPL-SCNC: 4.3 MMOL/L (ref 3.7–5.3)
PRO-BNP: 980 PG/ML
RBC # BLD: 4.71 M/UL (ref 4.21–5.77)
RBC # BLD: ABNORMAL 10*6/UL
SEG NEUTROPHILS: 80 % (ref 36–65)
SEGMENTED NEUTROPHILS ABSOLUTE COUNT: 10.66 K/UL (ref 1.5–8.1)
SODIUM BLD-SCNC: 136 MMOL/L (ref 135–144)
TOTAL PROTEIN: 5.5 G/DL (ref 6.4–8.3)
TROPONIN INTERP: ABNORMAL
TROPONIN INTERP: ABNORMAL
TROPONIN T: ABNORMAL NG/ML
TROPONIN T: ABNORMAL NG/ML
TROPONIN, HIGH SENSITIVITY: 30 NG/L (ref 0–22)
TROPONIN, HIGH SENSITIVITY: 32 NG/L (ref 0–22)
WBC # BLD: 13.2 K/UL (ref 3.5–11.3)
WBC # BLD: ABNORMAL 10*3/UL

## 2020-12-01 PROCEDURE — 2700000000 HC OXYGEN THERAPY PER DAY

## 2020-12-01 PROCEDURE — 2580000003 HC RX 258: Performed by: NURSE PRACTITIONER

## 2020-12-01 PROCEDURE — 99233 SBSQ HOSP IP/OBS HIGH 50: CPT | Performed by: INTERNAL MEDICINE

## 2020-12-01 PROCEDURE — 2500000003 HC RX 250 WO HCPCS: Performed by: INTERNAL MEDICINE

## 2020-12-01 PROCEDURE — 80053 COMPREHEN METABOLIC PANEL: CPT

## 2020-12-01 PROCEDURE — 6370000000 HC RX 637 (ALT 250 FOR IP): Performed by: NURSE PRACTITIONER

## 2020-12-01 PROCEDURE — 36415 COLL VENOUS BLD VENIPUNCTURE: CPT

## 2020-12-01 PROCEDURE — 83874 ASSAY OF MYOGLOBIN: CPT

## 2020-12-01 PROCEDURE — 6370000000 HC RX 637 (ALT 250 FOR IP): Performed by: INTERNAL MEDICINE

## 2020-12-01 PROCEDURE — 2060000000 HC ICU INTERMEDIATE R&B

## 2020-12-01 PROCEDURE — 87040 BLOOD CULTURE FOR BACTERIA: CPT

## 2020-12-01 PROCEDURE — 94761 N-INVAS EAR/PLS OXIMETRY MLT: CPT

## 2020-12-01 PROCEDURE — 85379 FIBRIN DEGRADATION QUANT: CPT

## 2020-12-01 PROCEDURE — 83880 ASSAY OF NATRIURETIC PEPTIDE: CPT

## 2020-12-01 PROCEDURE — 84484 ASSAY OF TROPONIN QUANT: CPT

## 2020-12-01 PROCEDURE — 6360000002 HC RX W HCPCS: Performed by: INTERNAL MEDICINE

## 2020-12-01 PROCEDURE — 74018 RADEX ABDOMEN 1 VIEW: CPT

## 2020-12-01 PROCEDURE — 6360000002 HC RX W HCPCS: Performed by: NURSE PRACTITIONER

## 2020-12-01 PROCEDURE — 71045 X-RAY EXAM CHEST 1 VIEW: CPT

## 2020-12-01 PROCEDURE — 93005 ELECTROCARDIOGRAM TRACING: CPT | Performed by: INTERNAL MEDICINE

## 2020-12-01 PROCEDURE — 85025 COMPLETE CBC W/AUTO DIFF WBC: CPT

## 2020-12-01 PROCEDURE — 83605 ASSAY OF LACTIC ACID: CPT

## 2020-12-01 PROCEDURE — 2580000003 HC RX 258: Performed by: INTERNAL MEDICINE

## 2020-12-01 RX ORDER — FUROSEMIDE 10 MG/ML
20 INJECTION INTRAMUSCULAR; INTRAVENOUS 2 TIMES DAILY
Status: DISCONTINUED | OUTPATIENT
Start: 2020-12-01 | End: 2020-12-03

## 2020-12-01 RX ADMIN — ATORVASTATIN CALCIUM 40 MG: 80 TABLET, FILM COATED ORAL at 20:43

## 2020-12-01 RX ADMIN — FAMOTIDINE 20 MG: 20 TABLET, FILM COATED ORAL at 08:17

## 2020-12-01 RX ADMIN — SODIUM CHLORIDE, PRESERVATIVE FREE 10 ML: 5 INJECTION INTRAVENOUS at 08:05

## 2020-12-01 RX ADMIN — DEXAMETHASONE 6 MG: 4 TABLET ORAL at 08:17

## 2020-12-01 RX ADMIN — DOXYCYCLINE 100 MG: 100 INJECTION, POWDER, LYOPHILIZED, FOR SOLUTION INTRAVENOUS at 22:32

## 2020-12-01 RX ADMIN — Medication 2000 UNITS: at 08:17

## 2020-12-01 RX ADMIN — DOXYCYCLINE 100 MG: 100 INJECTION, POWDER, LYOPHILIZED, FOR SOLUTION INTRAVENOUS at 11:49

## 2020-12-01 RX ADMIN — AZTREONAM 1 G: 1 INJECTION, POWDER, LYOPHILIZED, FOR SOLUTION INTRAMUSCULAR; INTRAVENOUS at 18:15

## 2020-12-01 RX ADMIN — FUROSEMIDE 20 MG: 20 TABLET ORAL at 08:17

## 2020-12-01 RX ADMIN — RIVAROXABAN 20 MG: 20 TABLET, FILM COATED ORAL at 16:49

## 2020-12-01 RX ADMIN — GUAIFENESIN 600 MG: 600 TABLET, EXTENDED RELEASE ORAL at 20:35

## 2020-12-01 RX ADMIN — TAMSULOSIN HYDROCHLORIDE 0.4 MG: 0.4 CAPSULE ORAL at 08:17

## 2020-12-01 RX ADMIN — AZTREONAM 1 G: 1 INJECTION, POWDER, LYOPHILIZED, FOR SOLUTION INTRAMUSCULAR; INTRAVENOUS at 11:49

## 2020-12-01 RX ADMIN — ACETAMINOPHEN 650 MG: 325 TABLET ORAL at 20:35

## 2020-12-01 RX ADMIN — GUAIFENESIN 600 MG: 600 TABLET, EXTENDED RELEASE ORAL at 08:04

## 2020-12-01 RX ADMIN — FUROSEMIDE 20 MG: 10 INJECTION, SOLUTION INTRAMUSCULAR; INTRAVENOUS at 16:48

## 2020-12-01 RX ADMIN — SODIUM CHLORIDE, PRESERVATIVE FREE 10 ML: 5 INJECTION INTRAVENOUS at 20:37

## 2020-12-01 ASSESSMENT — PAIN SCALES - GENERAL
PAINLEVEL_OUTOF10: 0
PAINLEVEL_OUTOF10: 3
PAINLEVEL_OUTOF10: 0

## 2020-12-01 NOTE — PROGRESS NOTES
[facility]  Office: [address], DO, Jarret Stillgeorgia, DO, Alecia Healy, DO, Kandice Carson Blood, DO, Jeny Luevano MD, Gian Benitez MD, Deb Mathew MD, Arash Trujlilo MD, Payton Sanchez MD, Michelle Ji MD, Linsey Lee MD, Eva Bonds MD, Breezy Kim MD, Alberto Jacobo, DO, Justyna Norris MD, Elizabeth Mon MD, Jose Alfredo Stuart, DO, Tulio Palafox MD,  Lm Pacheco, DO, Sravanthi Montalvo MD, Memo Arias MD, Barry Smallwood, [facility], [facility] HospitalCHERY Newton, CNP, Rox Cisse, CNP, Luiz Coe, CNS, Jigna Ulloa, CNP, Milvia Guallpa, CNP, Floresita Yates, CNP, Chago Mckenna, CNP, Gurjit Romo, CNP, GRICELDA HernandezC, Baldo Nguyen, [facility], [place]), Danny Valentino, CNP, Nancy Taylor, CNP, Placido Renee, CNP, Agnieszka Mayes, CNP, [facility], [address] [place] [address]    Progress Note    12/1/2020    10:06 AM    Name:   Evangelina Nance  MRN:     [id number]     Acct:      [de-identified]   Room:   [facility] [place] [address] Day:  8  Admit Date:  11/23/2020  6:44 AM    PCP:   Arcelia Car MD  Code Status:  Full Code    Subjective:     C/C:   Chief Complaint   Patient presents with    Shortness of Breath     Interval History Status: Improved    Patient seen and examined  Patient had episodes of bradycardia  Improved  Hold sotalol if heart rate less than 60  Continue to wean off oxygen  Patient have intermittent episodes of A. fib with RVR on 11/30/2020  Patient denies fever chest pain    I am seeing the patient for shortness of breath    Medications: Allergies: Allergies   Allergen Reactions    Adhesive Tape Other (See Comments)     Blister badly     Codeine Nausea Only     Other reaction(s): Other: See Comments  NAUSEA  Other reaction(s):  Other: See Comments  NAUSEA    Penicillins Swelling     As a baby  Other reaction(s): Unknown  As a baby       Current Meds:   Scheduled Meds:    guaiFENesin  600 mg Oral BID    furosemide  20 mg Oral BID    lisinopril  10 mg Oral Daily    sodium chloride  20 mL Intravenous Once    rivaroxaban  20 mg Oral Daily    atorvastatin  40 mg Oral Daily    tamsulosin  0.4 mg Oral Daily    sodium chloride flush  10 mL Intravenous 2 times per day    famotidine  20 mg Oral Daily    dexamethasone  6 mg Oral Daily    Vitamin D  2,000 Units Oral Daily     Continuous Infusions:   PRN Meds: metoprolol, calcium carbonate, sodium chloride, albuterol **AND** [] ipratropium, sodium chloride flush, potassium chloride **OR** potassium alternative oral replacement **OR** potassium chloride, magnesium sulfate, acetaminophen **OR** acetaminophen, polyethylene glycol, promethazine **OR** [DISCONTINUED] ondansetron, nicotine, albuterol sulfate HFA, dextromethorphan-guaiFENesin    Data:     Past Medical History:   has a past medical history of Atrial fibrillation ([facility] Back pain, chronic, Hill's esophagus, BPH (benign prostatic hyperplasia), Cancer ([facility] Cocaine abuse in remission [facility]), ED (erectile dysfunction), GERD (gastroesophageal reflux disease), GI bleed, Hernia, History of colon cancer, Melena, Migraines, and Murmur, cardiac. Social History:   reports that he quit smoking about 49 years ago. He has a 0.50 pack-year smoking history. He has never used smokeless tobacco. He reports current alcohol use of about 12.0 standard drinks of alcohol per week. He reports that he does not use drugs. Family History:   Family History   Problem Relation Age of Onset    Diabetes Mother     Heart Attack Father     Heart Disease Father     Heart Disease Brother        Vitals:  BP 91/60   Pulse 59   Temp 96.8 °F (36 °C) (Axillary)   Resp 20   Ht 6' (1.829 m)   Wt 167 lb 1.7 oz (75.8 kg)   SpO2 94%   BMI 22.66 kg/m²   Temp (24hrs), Av.8 °F (36.6 °C), Min:96.8 °F (36 °C), Max:98.4 °F (36.9 °C)    No results for input(s): POCGLU in the last 72 hours. I/O (24Hr):     Intake/Output Summary (Last 24 hours) at ground-glass opacity and parenchymal opacities throughout the lungs either due to developing pneumonia or edema. There is a small left-sided pleural effusion. By report there is a history of COVID-19 infection Underlying pulmonary fibrosis again noted.        Physical Examination:        General appearance:  Alert,   Lungs: Decreased air entry bilateral    Heart:  regular rate and rhythm, no murmur  Abdomen:  soft, nontender, nondistended, normal bowel sounds, no masses, hepatomegaly, splenomegaly  Extremities:  no edema, redness, tenderness in the calves  Skin:  no gross lesions, rashes, induration    Assessment:        Hospital Problems           Last Modified POA    * (Principal) Acute hypoxemic respiratory failure due to COVID-19 ([facility] 11/23/2020 Yes    Dyslipidemia 11/23/2020 Yes    Benign prostatic hyperplasia with urinary obstruction 11/23/2020 Yes    Hypertrophic nonobstructive cardiomyopathy ([facility] 11/23/2020 Yes    Iron (Fe) deficiency anemia 11/23/2020 Yes    Benign essential HTN 11/23/2020 Yes    CHF (congestive heart failure), NYHA class II, acute on chronic, combined ([facility] 11/23/2020 Yes    Occupational pulmonary disease 11/23/2020 Yes    COVID-19 11/24/2020 Yes          Plan:          COVID-19 pneumonia status post Decadron and remdesivir  Worsening today chest x-ray    History of pulmonary fibrosis monitor      aCute on top of chronic hypoxemic respiratory failure most likely related to COVID-19 infection and pneumonia  Wean off oxygen    A. fib with RVR status post cardioversion in the past continue oral anticoagulation Betapace    Worsening bradycardia patient currently on beta-blocker cardiology notified Betapace was held continue to hold sotalol if heart rate less than 60    NSTEMI type II secondary to demand ischemia treated with heparin drip currently heparin drip was stopped medical management    Hypertension monitor closely      Dyslipidemia statin      Acute to type of chronic combined systolic and diastolic CHF exacerbation with hypertrophic nonobstructive cardiomyopathy treated with diuresis    Leukocytosis worsening today started empiric IV antibiotics pending culture      Hypokalemia replaced    May benefit from Hermes Ervin MD  12/1/2020  10:06 AM

## 2020-12-01 NOTE — PROGRESS NOTES
leading to his seeking help at the emergency room. The patient had been recently admitted on 10/17/2020 through 10/26/2020 at [facility] because of the presence of congestive heart failure NYHAA class II with acute on chronic combined heart failure. He also suffers from atrial fibrillation and hypertrophic nonobstructive cardiomyopathy, benign essential hypertension. His previous diagnosis includes occupational pulmonary fibrosis. During that admission he was also found to have suffered from mycoplasma pneumonia and was treated with antibiotics. He reports being seen at the [facility], after his admission to [facility],  where he was cardioverted because of the atrial fibrillation. He was also found to be in heart failure and was a started on diuretics. When the patient was evaluated in the emergency room he was found to be hypoxic and was treated with BiPAP which improved his oxygen saturation rate. He was tested for Covid and was found to be positive on 11/23/2020. Chest x-ray:  · 11/23/2020 persistent bilateral pulmonary infiltrates with associated pulmonary vascular congestion. Unchanged from films of 10/23/2020    Chest CT:  · 11/23/2020: Pulmonary fibrosis. No pulmonary embolus. Increased groundglass opacities and parenchymal opacities throughout both lungs when compared with films of 10/21/2020    Patient admitted because of concerns with COVID 19.    CURRENT EVALUATION : 12/1/2020    Afebrile  VS stable, hypotensive  On Heparin for Atrial Fibrillation. He is bradycardic. Acute on chronic combined systolic and diastolic CHF exacerbation with hypertrophic non obstructive cardiomyopathy. On  Po Lasix 20 mg.    Leukocytosis present. Up trending. On Decadron day 8. Stop date 12-2-20  Completed Remdesivir on 11/28/20. Consent for plasma obtained. 2 Units of Plasma transfused 11-24-20    Patient exhibiting respiratory distress.   Yes  Respiratory secretions: No    Patient receiving IM.    I have personally reviewed the past medical history, past surgical history, medications, social history, and family history, and I have updated the database accordingly.   Past Medical History:     Past Medical History:   Diagnosis Date    Atrial fibrillation ([address]     Back pain, chronic     Mcguire's esophagus 06/18/2019    BPH (benign prostatic hyperplasia)     Cancer (HCC)     colon-rectal    Cocaine abuse in remission [facility])     1970's    ED (erectile dysfunction) 4/2/2015    GERD (gastroesophageal reflux disease)     GI bleed 12/5/2018    Hernia     History of colon cancer     Melena     Migraines     Murmur, cardiac        Past Surgical  History:     Past Surgical History:   Procedure Laterality Date    CARDIAC CATHETERIZATION  11/29/2018    Non-obstructive CAD    CARDIOVERSION  2020    COLECTOMY      2nd colectomy, Colostomy and reversed [facility] COLECTOMY      1st time Ksenia    COLONOSCOPY      COLONOSCOPY  07/18/2016    COLONOSCOPY N/A 12/6/2018    COLONOSCOPY DIAGNOSTIC performed by Serafin Hernandez MD at [address] Right 2009    inguinal    KNEE SURGERY Right 1970's    arthrotomy    TONSILLECTOMY      TOTAL KNEE ARTHROPLASTY Right 1/8/2019    KNEE TOTAL ARTHROPLASTY performed by Alcides Pool MD at [address] TRANSESOPHAGEAL ECHOCARDIOGRAM  11/29/2018    UPPER GASTROINTESTINAL ENDOSCOPY N/A 12/5/2018    EGD DIAGNOSTIC ONLY performed by Serafin Hernandez MD at [address] [place] [address] N/A 6/18/2019    MCGUIRE'S       Medications:      doxycycline (VIBRAMYCIN) IV  100 mg Intravenous Q12H    aztreonam  1 g Intravenous Q8H    furosemide  20 mg Intravenous BID    guaiFENesin  600 mg Oral BID    sodium chloride  20 mL Intravenous Once    rivaroxaban  20 mg Oral Daily    atorvastatin  40 mg Oral Daily    tamsulosin  0.4 mg Oral Daily    sodium chloride flush  10 mL Intravenous 2 times per day    famotidine  20 mg Oral Daily    dexamethasone  6 mg Oral Daily    Vitamin D  2,000 Units Oral Daily       Social History:     Social History     Socioeconomic History    Marital status: Single     Spouse name: Not on file    Number of children: Not on file    Years of education: Not on file    Highest education level: Not on file   Occupational History    Not on file   Social Needs    Financial resource strain: Not on file    Food insecurity     Worry: Not on file     Inability: Not on file    Transportation needs     Medical: Not on file     Non-medical: Not on file   Tobacco Use    Smoking status: Former Smoker     Packs/day: 0.50     Years: 1.00     Pack years: 0.50     Last attempt to quit:      Years since quittin.9    Smokeless tobacco: Never Used    Tobacco comment: stated never actually really smoked only inhaled    Substance and Sexual Activity    Alcohol use: Yes     Alcohol/week: 12.0 standard drinks     Types: 10 Standard drinks or equivalent, 2 Shots of liquor per week     Comment: 2-3 times a week    Drug use: No     Types:  Other-see comments     Comment: Cocaine use in past in 's    Sexual activity: Yes     Partners: Female   Lifestyle    Physical activity     Days per week: Not on file     Minutes per session: Not on file    Stress: Not on file   Relationships    Social connections     Talks on phone: Not on file     Gets together: Not on file     Attends Religion service: Not on file     Active member of club or organization: Not on file     Attends meetings of clubs or organizations: Not on file     Relationship status: Not on file    Intimate partner violence     Fear of current or ex partner: Not on file     Emotionally abused: Not on file     Physically abused: Not on file     Forced sexual activity: Not on file   Other Topics Concern    Not on file   Social History Narrative    Not on file       Family History:     Family History   Problem Relation Age of Onset    Diabetes Mother     Heart Attack Father     Heart Disease Father     Heart Disease Brother         Allergies:   Adhesive tape; Codeine; and Penicillins     Review of Systems:       Constitutional: No fevers or chills. No systemic complaints  Head: No headaches  Eyes: No double vision or blurry vision. No conjunctival inflammation. ENT: No sore throat or runny nose. . No hearing loss, tinnitus or vertigo. Cardiovascular: No chest pain or palpitations. Shortness of breath. CLAYTON  Lung: Shortness of breath, dry cough. No sputum production  Abdomen: No nausea, vomiting, diarrhea, or abdominal pain. Shaaron Money No cramps. Genitourinary: No increased urinary frequency, or dysuria. No hematuria. No suprapubic or CVA pain  Musculoskeletal: No muscle aches or pains. No joint effusions, swelling or deformities  Hematologic: No bleeding or bruising. Neurologic: No headache, weakness, numbness, or tingling. Integument: No rash, no ulcers. Psychiatric: No depression. Endocrine: No polyuria, no polydipsia, no polyphagia. Physical Examination :     Patient Vitals for the past 8 hrs:   BP Temp Temp src Pulse Resp SpO2   12/01/20 1601 96/67 97.7 °F (36.5 °C) Axillary 73 30 (!) 89 %   12/01/20 1600 96/67 -- -- 66 26 95 %   12/01/20 1458 -- -- -- 64 24 94 %   12/01/20 1300 96/66 97.9 °F (36.6 °C) Oral 61 20 94 %   12/01/20 1145 -- 97.8 °F (36.6 °C) Axillary -- -- --     General Appearance: Awake, alert, and in no apparent distress  Head:  Normocephalic, no trauma  Eyes: Pupils equal, round, reactive to light; sclera anicteric; conjunctivae pink. No embolic phenomena. ENT: Oropharynx clear, without erythema, exudate, or thrush. No tenderness of sinuses. Mouth/throat: mucosa pink and moist. No lesions. Dentition in good repair. Neck:Supple, without lymphadenopathy. Thyroid normal, No bruits.   Pulmonary/Chest: ,Distant breath sounds, decreased breath sounds at the bases   cardiovascular: Irreegular rate and rhythm without murmurs, rubs, or gallops. Abdomen: Soft, non tender. Bowel sounds normal. No organomegaly  All four Extremities: No cyanosis, clubbing, edema, or effusions. Neurologic: No gross sensory or motor deficits. Skin: Warm and dry with good turgor. No signs of peripheral arterial or venous insufficiency. No ulcerations. No open wounds. Medical Decision Making -Laboratory:   I have independently reviewed/ordered the following labs:    CBC with Differential:   Recent Labs     11/30/20 0524 12/01/20  0016   WBC 11.1 13.2*   HGB 11.7* 12.0*   HCT 39.5* 39.7*   * 524*   LYMPHOPCT 12* 10*   MONOPCT 7 8     BMP:   Recent Labs     11/30/20 0524 12/01/20  0016    136   K 4.0 4.3    105   CO2 26 23   BUN 23 24*   CREATININE 0.52* 0.54*     Hepatic Function Panel:   Recent Labs     11/30/20 0524 12/01/20  0016   PROT 5.7* 5.5*   LABALBU 2.4* 2.4*   BILITOT 0.65 0.60   ALKPHOS 65 65   ALT 16 14   AST 11 11     No results for input(s): RPR in the last 72 hours. No results for input(s): HIV in the last 72 hours. No results for input(s): BC in the last 72 hours. Lab Results   Component Value Date    MUCUS NOT REPORTED 11/23/2020    RBC 4.71 12/01/2020    TRICHOMONAS NOT REPORTED 11/23/2020    WBC 13.2 12/01/2020    YEAST NOT REPORTED 11/23/2020    TURBIDITY CLEAR 11/23/2020     Lab Results   Component Value Date    CREATININE 0.54 12/01/2020    GLUCOSE 114 12/01/2020       Medical Decision Making-Imaging:     EXAMINATION:    CTA OF THE CHEST 11/23/2020 7:42 am         TECHNIQUE:    CTA of the chest was performed after the administration of intravenous    contrast.  Multiplanar reformatted images are provided for review.  MIP    images are provided for review.  Dose modulation, iterative reconstruction,    and/or weight based adjustment of the mA/kV was utilized to reduce the    radiation dose to as low as reasonably achievable.         COMPARISON:    High-resolution chest CT 10 03/20/2020         CT PA 10/21/2020      HISTORY:    ORDERING SYSTEM PROVIDED HISTORY: r/o PE    TECHNOLOGIST PROVIDED HISTORY:    r/o PE    Reason for Exam: sob    Acuity: Acute              Type of Exam: Initial         FINDINGS:    No intimal flap is seen in aorta. .  Small mediastinal nodes are noted,    similar to prior         No embolus is seen the central, right, left main pulmonary artery.  No    embolus is seen in the proximal segmental branches.  Distal segmental    branches and subsegmental branches are not well evaluated secondary to    respiratory motion artifact.         Patchy ground-glass and parenchymal opacities are seen in the left upper and    left lower lobe.         On the right patchy ground-glass and parenchymal opacities are seen in the    right upper, right middle and right lower lobe.  There is mild fusiform    bronchiectasis. Juris Parris is trace left-sided pleural effusion.         There is mild underlying pulmonary fibrosis.  There is underlying    bronchiectasis         Right adrenal gland is normal.  Left adrenal gland is normal.         1 mm calcification is seen in the left kidney         Spurring is seen in the spine.  Spurring is seen in the shoulder joints         Small soft tissue nodule is seen posteriorly in the subcutaneous fat    measuring 2.1 cm, likely sebaceous cyst              Impression    No central pulmonary embolus.  Peripheral branches are not well evaluated    secondary to motion.         Increased ground-glass opacity and parenchymal opacities throughout the lungs    either due to developing pneumonia or edema. Juris Parris is a small left-sided    pleural effusion.  By report there is a history of COVID-19 infection         Underlying pulmonary fibrosis again noted.                EXAMINATION:    ONE XRAY VIEW OF THE CHEST         11/23/2020 8:02 am         COMPARISON:    10/19/2020         HISTORY:    ORDERING SYSTEM PROVIDED HISTORY: SOB    TECHNOLOGIST PROVIDED HISTORY:    SOB    Reason for Exam: uprt port chest         FINDINGS:    Cardiomediastinal silhouette is stable.  Bilateral pulmonary infiltrates    persist unchanged and may represent pulmonary edema versus atypical pneumonia.              Impression    Stable exam        Medical Decision [id number]:       Medical Decision Making-Other:     Note:  · Labs, medications, radiologic studies were reviewed with personal review of films  · Large amounts of data were reviewed  · Discussed with nursing Staff, Discharge planner  · Infection Control and Prevention measures reviewed  · All prior entries were reviewed  · Administer medications as ordered  · Prognosis: Guarded  · Discharge planning reviewed  · Follow up as outpatient. Thank you for allowing us to participate in the care of this patient. Please call with questions.     Jennifer Jean-Baptiste MD           Pager: [phone] - Office: [phone]

## 2020-12-01 NOTE — PLAN OF CARE
Problem: Airway Clearance - Ineffective  Goal: Achieve or maintain patent airway  Outcome: Ongoing     Problem: Gas Exchange - Impaired  Goal: Absence of hypoxia  Outcome: Ongoing  Goal: Promote optimal lung function  Outcome: Ongoing     Problem: Breathing Pattern - Ineffective  Goal: Ability to achieve and maintain a regular respiratory rate  Outcome: Ongoing     Problem:  Body Temperature -  Risk of, Imbalanced  Goal: Ability to maintain a body temperature within defined limits  Outcome: Ongoing  Goal: Will regain or maintain usual level of consciousness  Outcome: Ongoing  Goal: Complications related to the disease process, condition or treatment will be avoided or minimized  Outcome: Ongoing     Problem: Isolation Precautions - Risk of Spread of Infection  Goal: Prevent transmission of infection  Outcome: Ongoing     Problem: Nutrition Deficits  Goal: Optimize nutrtional status  Outcome: Ongoing     Problem: Risk for Fluid Volume Deficit  Goal: Maintain normal heart rhythm  Outcome: Ongoing  Goal: Maintain absence of muscle cramping  Outcome: Ongoing  Goal: Maintain normal serum potassium, sodium, calcium, phosphorus, and pH  Outcome: Ongoing     Problem: Loneliness or Risk for Loneliness  Goal: Demonstrate positive use of time alone when socialization is not possible  Outcome: Ongoing     Problem: Fatigue  Goal: Verbalize increase energy and improved vitality  Outcome: Ongoing     Problem: Patient Education: Go to Patient Education Activity  Goal: Patient/Family Education  Outcome: Ongoing     Problem: Falls - Risk of:  Goal: Will remain free from falls  Description: Will remain free from falls  Outcome: Ongoing  Goal: Absence of physical injury  Description: Absence of physical injury  Outcome: Ongoing     Problem: OXYGENATION/RESPIRATORY FUNCTION  Goal: Patient will maintain patent airway  12/1/2020 0220 by Tyra Bishop, RN  Outcome: Ongoing  11/30/2020 2047 by Juju Godoy RCP  Outcome: Ongoing  Goal: Patient will achieve/maintain normal respiratory rate/effort  Description: Respiratory rate and effort will be within normal limits for the patient  12/1/2020 0220 by Daisy Ignacio RN  Outcome: Ongoing  11/30/2020 2047 by Matias Cortez RCP  Outcome: Ongoing     Problem: Pain:  Goal: Pain level will decrease  Description: Pain level will decrease  Outcome: Ongoing  Goal: Control of acute pain  Description: Control of acute pain  Outcome: Ongoing  Goal: Control of chronic pain  Description: Control of chronic pain  Outcome: Ongoing     Problem: Nutrition  Goal: Optimal nutrition therapy  12/1/2020 0220 by Daisy Ignacio RN  Outcome: Ongoing  11/30/2020 1347 by Aldo Palacios RD, LD  Outcome: Ongoing  Note: Nutrition Problem #1: Increased nutrient needs  Intervention: Food and/or Nutrient Delivery: Continue Current Diet(Provide ONS as/if needed with meals.)  Nutritional Goals: Oral intakes to meet % of estimated nutrition needs.

## 2020-12-01 NOTE — PROGRESS NOTES
Pulmonary Progress Note    CC:  COVID -19 PNEUMONIA   Subjective:  Still on 20 l   45 % fio2 no wob   Over night had afib with rvr           Review of Systems -  General ROS: fatigue  ENT ROS: negative for - headaches, oral lesions or sore throat  Cardiovascular ROS: no chest pain , orthopnea or pnd   Gastrointestinal ROS: no abdominal pain, change in bowel habits, or black or bloody stools  Neuro - no blurry vision , no loc .  No focal weakness       Immunization   Immunization History   Administered Date(s) Administered    Influenza Vaccine, unspecified formulation 01/16/2014, 01/03/2017, 12/12/2017    Influenza Virus Vaccine 01/16/2014, 10/26/2014, 12/04/2015    Influenza, High Dose (Fluzone 65 yrs and older) 09/27/2018    Influenza, [place], 6 mo and older, IM (Fluzone, Flulaval) 12/12/2017    Influenza, Quadv, IM, (6 mo and older Fluzone, Flulaval, Fluarix and 3 yrs and older Afluria) 10/23/2014, 01/03/2017    Influenza, Quadv, IM, PF (6 mo and older Fluzone, Flulaval, Fluarix, and 3 yrs and older Afluria) 10/23/2014    Influenza, Quadv, adjuvanted, 65 yrs +, IM, PF (Fluad) 11/16/2020    Influenza, Triv, inactivated, subunit, adjuvanted, IM (Fluad 65 yrs and older) 11/08/2019          PAST MEDICAL HISTORY:       Diagnosis Date    Atrial fibrillation (HCC)     Back pain, chronic     Hill's esophagus 06/18/2019    BPH (benign prostatic hyperplasia)     Cancer (HCC)     colon-rectal    Cocaine abuse in remission ([facility] [address]     1970's    ED (erectile dysfunction) 4/2/2015    GERD (gastroesophageal reflux disease)     GI bleed 12/5/2018    Hernia     History of colon cancer     Melena     Migraines     Murmur, cardiac          Family History:       Problem Relation Age of Onset    Diabetes Mother     Heart Attack Father     Heart Disease Father     Heart Disease Brother        SURGICAL HISTORY:   Past Surgical History:   Procedure Laterality Date    CARDIAC CATHETERIZATION  11/29/2018 Non-obstructive CAD    CARDIOVERSION  2020    COLECTOMY      2nd colectomy, Colostomy and reversed [facility] COLECTOMY      1st time Ksenia    COLONOSCOPY      COLONOSCOPY  07/18/2016    COLONOSCOPY N/A 12/6/2018    COLONOSCOPY DIAGNOSTIC performed by Serafin Hernandez MD at [address] Right 2009    inguinal    KNEE SURGERY Right 1970's    arthrotomy    TONSILLECTOMY      TOTAL KNEE ARTHROPLASTY Right 1/8/2019    KNEE TOTAL ARTHROPLASTY performed by Alcides Pool MD at [address] TRANSESOPHAGEAL ECHOCARDIOGRAM  11/29/2018    UPPER GASTROINTESTINAL ENDOSCOPY N/A 12/5/2018    [facility]GD DIAGNOSTIC [facility] [address] [facility] N[facility]A 6/18/[facility]    [facility]              TOBACCO:   reports that he quit smoking about 49 years ago. He has a 0.50 pack-year smoking history. He has never used smokeless tobacco.  ETOH:   reports current alcohol use of about 12.0 standard drinks of alcohol per week. ALLERGIES:    Allergies   Allergen Reactions    Adhesive Tape Other (See Comments)     Blister badly     Codeine Nausea Only     Other reaction(s): Other: See Comments  NAUSEA  Other reaction(s): Other: See Comments  NAUSEA    Penicillins Swelling     As a baby  Other reaction(s): Unknown  As a baby       Home Meds:   Prior to Admission medications    Medication Sig Start Date End Date Taking?  Authorizing Provider   atorvastatin (LIPITOR) 40 MG tablet Take 1 tablet by mouth daily 11/16/20  Yes Cassandra Tello MD   omeprazole (PRILOSEC) 40 MG delayed release capsule Take 1 capsule by mouth daily 11/16/20  Yes Cassandra Tello MD   lisinopril (PRINIVIL;ZESTRIL) 5 MG tablet take 1 tablet by mouth once daily 10/26/20  Yes Historical Provider, MD   sotalol (BETAPACE) 80 MG tablet take 1 tablet by mouth every 12 hours 10/28/20  Yes Historical Provider, MD   ibuprofen (ADVIL;MOTRIN) 800 MG tablet Take 1 tablet by mouth every 6 hours if needed for pain 10/27/20  Yes Cassandra Portillo MD   furosemide (LASIX) 20 MG tablet Take 1 tablet by mouth daily 10/26/20  Yes Floriston Officer, MD   tamsulosin [facility]) 0.4 MG capsule Take 1 capsule by mouth daily 10/15/20  Yes Jose Luis Real MD   rivaroxaban (XARELTO) 20 MG TABS tablet Take 20 mg by mouth daily (with breakfast)    Yes Historical Provider, MD         Intake/Output Summary (Last 24 hours) at 12/1/2020 1658  Last data filed at 12/1/2020 0424  Gross per 24 hour   Intake --   Output 280 ml   Net -280 ml         Diet   DIET GENERAL; No Added Salt (3-4 GM)    Vitals:   BP 96/67   Pulse 73   Temp 97.7 °F (36.5 °C) (Axillary)   Resp 30   Ht 6' (1.829 m)   Wt 167 lb 1.7 oz (75.8 kg)   SpO2 (!) 89%   BMI 22.66 kg/m²  on         I/O (24 Hours)    Patient Vitals for the past 8 hrs:   BP Temp Temp src Pulse Resp SpO2   12/01/20 1601 96/67 97.7 °F (36.5 °C) Axillary 73 30 (!) 89 %   12/01/20 1600 96/67 -- -- 66 26 95 %   12/01/20 1458 -- -- -- 64 24 94 %   12/01/20 1300 96/66 97.9 °F (36.6 °C) Oral 61 20 94 %   12/01/20 1145 -- 97.8 °F (36.6 °C) Axillary -- -- --       Intake/Output Summary (Last 24 hours) at 12/1/2020 1658  Last data filed at 12/1/2020 0424  Gross per 24 hour   Intake --   Output 280 ml   Net -280 ml     I/O last 3 completed shifts:  In: -   Out: 980 [Urine:980]   Date 12/01/20 0000 - 12/01/20 2359   Shift 4276-6060 2145-5693 1701-6501 24 Hour Total   INTAKE   Shift Total(mL/kg)       OUTPUT   Urine(mL/kg/hr) 280(0.5)   280   Shift Total(mL/kg) 280(3.7)   280(3.7)   Weight (kg) 75.8 75.8 75.8 75.8     Patient Vitals for the past 96 hrs (Last 3 readings):   Weight   11/30/20 0600 167 lb 1.7 oz (75.8 kg)   11/28/20 0539 164 lb 10.9 oz (74.7 kg)          PHYSICAL EXAMINATION:  I have discussed the care of Wilfrido Estevez  including pertinent history and exam findings,  with the nursing staff I have seen  the patient and the key elements of all parts of the encounter have been performed by me. For careful stewardship of limited PPE during COVID-19 pandemic my physical exam was deferred. .        Medications:    Scheduled Meds:   doxycycline (VIBRAMYCIN) IV  100 mg Intravenous Q12H    aztreonam  1 g Intravenous Q8H    furosemide  20 mg Intravenous BID    guaiFENesin  600 mg Oral BID    sodium chloride  20 mL Intravenous Once    rivaroxaban  20 mg Oral Daily    atorvastatin  40 mg Oral Daily    tamsulosin  0.4 mg Oral Daily    sodium chloride flush  10 mL Intravenous 2 times per day    famotidine  20 mg Oral Daily    dexamethasone  6 mg Oral Daily    Vitamin D  2,000 Units Oral Daily       Continuous Infusions:      PRN Meds:  metoprolol, calcium carbonate, sodium chloride, albuterol **AND** [] ipratropium, sodium chloride flush, potassium chloride **OR** potassium alternative oral replacement **OR** potassium chloride, magnesium sulfate, acetaminophen **OR** acetaminophen, polyethylene glycol, promethazine **OR** [DISCONTINUED] ondansetron, nicotine, albuterol sulfate HFA, dextromethorphan-guaiFENesin    Labs:  CBC:   Recent Labs     20  0548 20  0524 20  0016   WBC 12.4* 11.1 13.2*   HGB 11.2* 11.7* 12.0*   HCT 37.8* 39.5* 39.7*   MCV 85.3 85.1 84.3   * 488* 524*     BMP:   Recent Labs     20  0548 20  0524 20  0016    137 136   K 3.6* 4.0 4.3    103 105   CO2 25 26 23   BUN 27* 23 24*   CREATININE 0.60* 0.52* 0.54*     LIVER PROFILE:   Recent Labs     20  0548 20  0524 20  0016   AST 12 11 11   ALT 18 16 14   BILITOT 0.51 0.65 0.60   ALKPHOS 61 65 65     PT/INR: No results for input(s): PROTIME, INR in the last 72 hours. APTT: No results for input(s): APTT in the last 72 hours.   UA:No results for input(s): NITRITE, COLORU, PHUR, LABCAST, WBCUA, RBCUA, MUCUS, TRICHOMONAS, YEAST, BACTERIA, CLARITYU, SPECGRAV, LEUKOCYTESUR, UROBILINOGEN, BILIRUBINUR, BLOODU, GLUCOSEU, AMORPHOUS in the last 72 hours.    Invalid input(s): KETONESU  No results for input(s): PHART, KEO2OXE, PO2ART in the last 72 hours. Films:  No results found. LOS: 8          Vent Information  Skin Assessment: Clean, dry, & intact  Equipment Changed: Humidification  FiO2 : 45 %  SpO2: (!) 89 %  SpO2/FiO2 ratio: 197.78  I Time/ I Time %: 0.9 s  Humidification Source: Heated wire  Humidification Temp: 33  Humidification Temp Measured: 33  Circuit Condensation: Drained  Mask Type: Full face mask  Mask Size: Large     PaO2/FiO2 RATIO:  No results for input(s): POCPO2 in the last 72 hours. FiO2 : 45 %       LABS:  ABGs:   No results for input(s): POCPH, POCPCO2, POCPO2, POCHCO3, JOAE1XZC in the last 72 hours. ASSESSMENT:  Principal Problem:    Acute hypoxemic respiratory failure due to COVID-19 [facility])  Active Problems:    Dyslipidemia    Benign prostatic hyperplasia with urinary obstruction    Hypertrophic nonobstructive cardiomyopathy (HCC)    Iron (Fe) deficiency anemia    Benign essential HTN    CHF (congestive heart failure), NYHA class II, acute on chronic, combined ([facility] [address]    Occupational pulmonary disease    COVID-19  Resolved Problems:    * No resolved hospital problems.  *   afib      Acute hypoxic respiratory failure secondary to COVID 19   Bilateral multifocal pneumonia due to COVID 19 infection superimposed on pulmonary fibrosis    Covid -19 pandemic emergency     PLAN:    PRONE :       [x] No    [] Yes    REMDESIVIR:             [] No    [x] Yes    DEXAMETHASONE : [] No    [x] Yes    CONVALESCENT PLASMA : [] No    [x] Yes 11/14/20  fio2 is stable at 45 % - 20 l   Xray chest shows increase left pleural fluid   BNP elevated   Will change to lasix 20 mg iv bid for now   Continue Decadron   Antibiotics per primary   Follow cultures   Airborne isolation and droplet precautions to be continued  Continue supportive care   Alexi Darling  Completed 11/28/20  Will obtain xray chest and ABG as needed  Patient is on appropriate DVT - xarelto  and stress ulcer prophylaxis as allowed by the medical condition  Treatment plan Discussed with nursing staff in detail , all questions answered . Electronically signed by Eliseo Sanchez MD on 12/1/2020 at 4:58 PM       This patient was evaluated in the context of the global SARS-CoV-2 (COVID-19) pandemic, which necessitated considerations that the patient either has COVID-19 infection or is at risk of infection with COVID-19. Institutional protocols and algorithms that pertain to the evaluation & management of patients with COVID-19 or those at risk for COVID-19 are in a state of rapid changes based on information released by regulatory bodies including the CDC and federal and state organizations. These policies and algorithms were followed during the patient's care. Please note that this chart was generated using voice recognition Dragon dictation software. Although every effort was made to ensure the accuracy of this automated transcription, some errors in transcription may have occurred.

## 2020-12-02 LAB
ABSOLUTE EOS #: 0.06 K/UL (ref 0–0.44)
ABSOLUTE IMMATURE GRANULOCYTE: 0.16 K/UL (ref 0–0.3)
ABSOLUTE LYMPH #: 1.51 K/UL (ref 1.1–3.7)
ABSOLUTE MONO #: 0.97 K/UL (ref 0.1–1.2)
ALBUMIN SERPL-MCNC: 2.4 G/DL (ref 3.5–5.2)
ALBUMIN/GLOBULIN RATIO: 0.8 (ref 1–2.5)
ALP BLD-CCNC: 61 U/L (ref 40–129)
ALT SERPL-CCNC: 13 U/L (ref 5–41)
ANION GAP SERPL CALCULATED.3IONS-SCNC: 11 MMOL/L (ref 9–17)
AST SERPL-CCNC: 11 U/L
BASOPHILS # BLD: 0 % (ref 0–2)
BASOPHILS ABSOLUTE: 0.03 K/UL (ref 0–0.2)
BILIRUB SERPL-MCNC: 0.68 MG/DL (ref 0.3–1.2)
BUN BLDV-MCNC: 24 MG/DL (ref 8–23)
BUN/CREAT BLD: ABNORMAL (ref 9–20)
CALCIUM SERPL-MCNC: 8.4 MG/DL (ref 8.6–10.4)
CHLORIDE BLD-SCNC: 101 MMOL/L (ref 98–107)
CO2: 25 MMOL/L (ref 20–31)
CREAT SERPL-MCNC: 0.53 MG/DL (ref 0.7–1.2)
D-DIMER QUANTITATIVE: 0.45 MG/L FEU
DIFFERENTIAL TYPE: ABNORMAL
EOSINOPHILS RELATIVE PERCENT: 1 % (ref 1–4)
GFR AFRICAN AMERICAN: >60 ML/MIN
GFR NON-AFRICAN AMERICAN: >60 ML/MIN
GFR SERPL CREATININE-BSD FRML MDRD: ABNORMAL ML/MIN/{1.73_M2}
GFR SERPL CREATININE-BSD FRML MDRD: ABNORMAL ML/MIN/{1.73_M2}
GLUCOSE BLD-MCNC: 91 MG/DL (ref 70–99)
HCT VFR BLD CALC: 38.7 % (ref 40.7–50.3)
HEMOGLOBIN: 11.6 G/DL (ref 13–17)
IMMATURE GRANULOCYTES: 1 %
LYMPHOCYTES # BLD: 13 % (ref 24–43)
MCH RBC QN AUTO: 25.6 PG (ref 25.2–33.5)
MCHC RBC AUTO-ENTMCNC: 30 G/DL (ref 28.4–34.8)
MCV RBC AUTO: 85.2 FL (ref 82.6–102.9)
MONOCYTES # BLD: 9 % (ref 3–12)
NRBC AUTOMATED: 0 PER 100 WBC
PDW BLD-RTO: 19.9 % (ref 11.8–14.4)
PLATELET # BLD: 485 K/UL (ref 138–453)
PLATELET ESTIMATE: ABNORMAL
PMV BLD AUTO: 9.7 FL (ref 8.1–13.5)
POTASSIUM SERPL-SCNC: 4.5 MMOL/L (ref 3.7–5.3)
RBC # BLD: 4.54 M/UL (ref 4.21–5.77)
RBC # BLD: ABNORMAL 10*6/UL
SEG NEUTROPHILS: 76 % (ref 36–65)
SEGMENTED NEUTROPHILS ABSOLUTE COUNT: 8.54 K/UL (ref 1.5–8.1)
SODIUM BLD-SCNC: 137 MMOL/L (ref 135–144)
TOTAL PROTEIN: 5.3 G/DL (ref 6.4–8.3)
WBC # BLD: 11.3 K/UL (ref 3.5–11.3)
WBC # BLD: ABNORMAL 10*3/UL

## 2020-12-02 PROCEDURE — 94761 N-INVAS EAR/PLS OXIMETRY MLT: CPT

## 2020-12-02 PROCEDURE — 2700000000 HC OXYGEN THERAPY PER DAY

## 2020-12-02 PROCEDURE — 6370000000 HC RX 637 (ALT 250 FOR IP): Performed by: NURSE PRACTITIONER

## 2020-12-02 PROCEDURE — 99232 SBSQ HOSP IP/OBS MODERATE 35: CPT | Performed by: NURSE PRACTITIONER

## 2020-12-02 PROCEDURE — 2500000003 HC RX 250 WO HCPCS: Performed by: INTERNAL MEDICINE

## 2020-12-02 PROCEDURE — 99233 SBSQ HOSP IP/OBS HIGH 50: CPT | Performed by: INTERNAL MEDICINE

## 2020-12-02 PROCEDURE — 2060000000 HC ICU INTERMEDIATE R&B

## 2020-12-02 PROCEDURE — 6360000002 HC RX W HCPCS: Performed by: INTERNAL MEDICINE

## 2020-12-02 PROCEDURE — 6370000000 HC RX 637 (ALT 250 FOR IP): Performed by: INTERNAL MEDICINE

## 2020-12-02 PROCEDURE — 6360000002 HC RX W HCPCS: Performed by: NURSE PRACTITIONER

## 2020-12-02 PROCEDURE — 85025 COMPLETE CBC W/AUTO DIFF WBC: CPT

## 2020-12-02 PROCEDURE — 85379 FIBRIN DEGRADATION QUANT: CPT

## 2020-12-02 PROCEDURE — 80053 COMPREHEN METABOLIC PANEL: CPT

## 2020-12-02 PROCEDURE — 2580000003 HC RX 258: Performed by: NURSE PRACTITIONER

## 2020-12-02 PROCEDURE — 2580000003 HC RX 258: Performed by: INTERNAL MEDICINE

## 2020-12-02 PROCEDURE — 36415 COLL VENOUS BLD VENIPUNCTURE: CPT

## 2020-12-02 RX ORDER — LORAZEPAM 2 MG/ML
0.5 INJECTION INTRAMUSCULAR ONCE
Status: DISCONTINUED | OUTPATIENT
Start: 2020-12-02 | End: 2020-12-07 | Stop reason: HOSPADM

## 2020-12-02 RX ADMIN — GUAIFENESIN 600 MG: 600 TABLET, EXTENDED RELEASE ORAL at 20:05

## 2020-12-02 RX ADMIN — FUROSEMIDE 20 MG: 10 INJECTION, SOLUTION INTRAMUSCULAR; INTRAVENOUS at 18:08

## 2020-12-02 RX ADMIN — METOPROLOL TARTRATE 2.5 MG: 1 INJECTION, SOLUTION INTRAVENOUS at 17:40

## 2020-12-02 RX ADMIN — GUAIFENESIN 600 MG: 600 TABLET, EXTENDED RELEASE ORAL at 08:17

## 2020-12-02 RX ADMIN — SODIUM CHLORIDE, PRESERVATIVE FREE 10 ML: 5 INJECTION INTRAVENOUS at 08:18

## 2020-12-02 RX ADMIN — Medication 2000 UNITS: at 08:17

## 2020-12-02 RX ADMIN — DEXAMETHASONE: 4 TABLET ORAL at 08:17

## 2020-12-02 RX ADMIN — AZTREONAM 1 G: 1 INJECTION, POWDER, LYOPHILIZED, FOR SOLUTION INTRAMUSCULAR; INTRAVENOUS at 10:20

## 2020-12-02 RX ADMIN — DOXYCYCLINE 100 MG: 100 INJECTION, POWDER, LYOPHILIZED, FOR SOLUTION INTRAVENOUS at 10:21

## 2020-12-02 RX ADMIN — RIVAROXABAN 20 MG: 20 TABLET, FILM COATED ORAL at 16:52

## 2020-12-02 RX ADMIN — AZTREONAM 1 G: 1 INJECTION, POWDER, LYOPHILIZED, FOR SOLUTION INTRAMUSCULAR; INTRAVENOUS at 02:05

## 2020-12-02 RX ADMIN — FAMOTIDINE 20 MG: 20 TABLET, FILM COATED ORAL at 08:17

## 2020-12-02 RX ADMIN — SODIUM CHLORIDE, PRESERVATIVE FREE 10 ML: 5 INJECTION INTRAVENOUS at 20:05

## 2020-12-02 RX ADMIN — FUROSEMIDE 20 MG: 10 INJECTION, SOLUTION INTRAMUSCULAR; INTRAVENOUS at 08:17

## 2020-12-02 RX ADMIN — ATORVASTATIN CALCIUM 40 MG: 80 TABLET, FILM COATED ORAL at 20:05

## 2020-12-02 RX ADMIN — TAMSULOSIN HYDROCHLORIDE 0.4 MG: 0.4 CAPSULE ORAL at 08:18

## 2020-12-02 ASSESSMENT — PAIN SCALES - GENERAL
PAINLEVEL_OUTOF10: 4
PAINLEVEL_OUTOF10: 0

## 2020-12-02 ASSESSMENT — ENCOUNTER SYMPTOMS
EYES NEGATIVE: 1
RESPIRATORY NEGATIVE: 1
GASTROINTESTINAL NEGATIVE: 1

## 2020-12-02 ASSESSMENT — PAIN DESCRIPTION - LOCATION: LOCATION: ABDOMEN;CHEST

## 2020-12-02 ASSESSMENT — PAIN DESCRIPTION - DESCRIPTORS: DESCRIPTORS: PRESSURE

## 2020-12-02 NOTE — PROGRESS NOTES
Patient stood up at bedside to use urinal and had a run of SVT then went into Afib. writer notified cardiology as well as primary. Writer also administer PRN medication. Patient has concerns and primary will follow up with them questions .

## 2020-12-02 NOTE — PROGRESS NOTES
Pulmonary Progress Note    CC:  COVID -19 PNEUMONIA   Subjective:  High flow but fio2 dec and flow rate dec   Sitting in chair         Review of Systems -  General ROS: fatigue  ENT ROS: negative for - headaches, oral lesions or sore throat  Cardiovascular ROS: no chest pain , orthopnea or pnd   Gastrointestinal ROS: no abdominal pain, change in bowel habits, or black or bloody stools  Neuro - no blurry vision , no loc .  No focal weakness       Immunization   Immunization History   Administered Date(s) Administered    Influenza Vaccine, unspecified formulation 01/16/2014, 01/03/2017, 12/12/2017    Influenza Virus Vaccine 01/16/2014, 10/26/2014, 12/04/2015    Influenza, High Dose (Fluzone 65 yrs and older) 09/27/2018    Influenza, Ruma Childes, 6 mo and older, IM (Fluzone, Flulaval) 12/12/2017    Influenza, Quadv, IM, (6 mo and older Fluzone, Flulaval, Fluarix and 3 yrs and older Afluria) 10/23/2014, 01/03/2017    Influenza, Quadv, IM, PF (6 mo and older Fluzone, Flulaval, Fluarix, and 3 yrs and older Afluria) 10/23/2014    Influenza, Quadv, adjuvanted, 65 yrs +, IM, PF (Fluad) 11/16/2020    Influenza, Triv, inactivated, subunit, adjuvanted, IM (Fluad 65 yrs and older) 11/08/2019          PAST MEDICAL HISTORY:       Diagnosis Date    Atrial fibrillation (HCC)     Back pain, chronic     Hill's esophagus 06/18/2019    BPH (benign prostatic hyperplasia)     Cancer (HCC)     colon-rectal    Cocaine abuse in remission ([facility] [address]     1970's    ED (erectile dysfunction) 4/2/2015    GERD (gastroesophageal reflux disease)     GI bleed 12/5/2018    Hernia     History of colon cancer     Melena     Migraines     Murmur, cardiac          Family History:       Problem Relation Age of Onset    Diabetes Mother     Heart Attack Father     Heart Disease Father     Heart Disease Brother        SURGICAL HISTORY:   Past Surgical History:   Procedure Laterality Date    CARDIAC CATHETERIZATION  11/29/2018 Non-obstructive CAD    CARDIOVERSION  2020    COLECTOMY      2nd colectomy, Colostomy and reversed [facility] COLECTOMY      1st time Ksenia    COLONOSCOPY      COLONOSCOPY  07/18/2016    COLONOSCOPY N/A 12/6/2018    COLONOSCOPY DIAGNOSTIC performed by Stormy Price MD at [address] Right 2009    inguinal    KNEE SURGERY Right 1970's    arthrotomy    TONSILLECTOMY      TOTAL KNEE ARTHROPLASTY Right 1/8/2019    KNEE TOTAL ARTHROPLASTY performed by Mandy Spencer MD at [address] TRANSESOPHAGEAL ECHOCARDIOGRAM  11/29/2018    UPPER GASTROINTESTINAL ENDOSCOPY N/A 12/5/2018    EGD DIAGNOSTIC ONLY performed by Stormy Price MD at [address] N/A 6/18/2019    [facility]              TOBACCO:   reports that he quit smoking about 49 years ago. He has a 0.50 pack-year smoking history. He has never used smokeless tobacco.  ETOH:   reports current alcohol use of about 12.0 standard drinks of alcohol per week. ALLERGIES:    Allergies   Allergen Reactions    Adhesive Tape Other (See Comments)     Blister badly     Codeine Nausea Only     Other reaction(s): Other: See Comments  NAUSEA  Other reaction(s): Other: See Comments  NAUSEA    Penicillins Swelling     As a baby  Other reaction(s): Unknown  As a baby       Home Meds:   Prior to Admission medications    Medication Sig Start Date End Date Taking?  Authorizing Provider   atorvastatin (LIPITOR) 40 MG tablet Take 1 tablet by mouth daily 11/16/20  Yes Cassandra Henry MD   omeprazole (PRILOSEC) 40 MG delayed release capsule Take 1 capsule by mouth daily 11/16/20  Yes Cassandra Henry MD   lisinopril (PRINIVIL;ZESTRIL) 5 MG tablet take 1 tablet by mouth once daily 10/26/20  Yes Historical Provider, MD   sotalol (BETAPACE) 80 MG tablet take 1 tablet by mouth every 12 hours 10/28/20  Yes Historical Provider, MD   ibuprofen (ADVIL;MOTRIN) 800 MG tablet Take 1 tablet by mouth every 6 hours pandemic my physical exam was deferred. .        Medications:    Scheduled Meds:   furosemide  20 mg Intravenous BID    guaiFENesin  600 mg Oral BID    sodium chloride  20 mL Intravenous Once    rivaroxaban  20 mg Oral Daily    atorvastatin  40 mg Oral Daily    tamsulosin  0.4 mg Oral Daily    sodium chloride flush  10 mL Intravenous 2 times per day    famotidine  20 mg Oral Daily    Vitamin D  2,000 Units Oral Daily       Continuous Infusions:      PRN Meds:  metoprolol, calcium carbonate, sodium chloride, albuterol **AND** [] ipratropium, sodium chloride flush, potassium chloride **OR** potassium alternative oral replacement **OR** potassium chloride, magnesium sulfate, acetaminophen **OR** acetaminophen, polyethylene glycol, promethazine **OR** [DISCONTINUED] ondansetron, nicotine, albuterol sulfate HFA, dextromethorphan-guaiFENesin    Labs:  CBC:   Recent Labs     20  0016 20  0606   WBC 11.1 13.2* 11.3   HGB 11.7* 12.0* 11.6*   HCT 39.5* 39.7* 38.7*   MCV 85.1 84.3 85.2   * 524* 485*     BMP:   Recent Labs     20  0520  0016 20  0606    136 137   K 4.0 4.3 4.5    105 101   CO2 26 23 25   BUN 23 24* 24*   CREATININE 0.52* 0.54* 0.53*     LIVER PROFILE:   Recent Labs     20  0016 20  0606   AST 11 11 11   ALT 16 14 13   BILITOT 0.65 0.60 0.68   ALKPHOS 65 65 61     PT/INR: No results for input(s): PROTIME, INR in the last 72 hours. APTT: No results for input(s): APTT in the last 72 hours. UA:No results for input(s): NITRITE, COLORU, PHUR, LABCAST, WBCUA, RBCUA, MUCUS, TRICHOMONAS, YEAST, BACTERIA, CLARITYU, SPECGRAV, LEUKOCYTESUR, UROBILINOGEN, BILIRUBINUR, BLOODU, GLUCOSEU, AMORPHOUS in the last 72 hours. Invalid input(s): KETONESU  No results for input(s): PHART, RIE2WAL, PO2ART in the last 72 hours. Films:  No results found.        LOS: 9          Vent Information  Skin Assessment: Clean, This patient was evaluated in the context of the global SARS-CoV-2 (COVID-19) pandemic, which necessitated considerations that the patient either has COVID-19 infection or is at risk of infection with COVID-19. Institutional protocols and algorithms that pertain to the evaluation & management of patients with COVID-19 or those at risk for COVID-19 are in a state of rapid changes based on information released by regulatory bodies including the CDC and federal and state organizations. These policies and algorithms were followed during the patient's care. Please note that this chart was generated using voice recognition Dragon dictation software. Although every effort was made to ensure the accuracy of this automated transcription, some errors in transcription may have occurred.

## 2020-12-02 NOTE — PLAN OF CARE
Problem: Airway Clearance - Ineffective  Goal: Achieve or maintain patent airway  Outcome: Ongoing     Problem: Gas Exchange - Impaired  Goal: Absence of hypoxia  Outcome: Ongoing  Goal: Promote optimal lung function  Outcome: Ongoing     Problem: Breathing Pattern - Ineffective  Goal: Ability to achieve and maintain a regular respiratory rate  Outcome: Ongoing     Problem:  Body Temperature -  Risk of, Imbalanced  Goal: Ability to maintain a body temperature within defined limits  Outcome: Ongoing  Goal: Will regain or maintain usual level of consciousness  Outcome: Ongoing  Goal: Complications related to the disease process, condition or treatment will be avoided or minimized  Outcome: Ongoing     Problem: Isolation Precautions - Risk of Spread of Infection  Goal: Prevent transmission of infection  Outcome: Ongoing     Problem: Nutrition Deficits  Goal: Optimize nutrtional status  Outcome: Ongoing     Problem: Risk for Fluid Volume Deficit  Goal: Maintain normal heart rhythm  Outcome: Ongoing  Goal: Maintain absence of muscle cramping  Outcome: Ongoing  Goal: Maintain normal serum potassium, sodium, calcium, phosphorus, and pH  Outcome: Ongoing     Problem: Loneliness or Risk for Loneliness  Goal: Demonstrate positive use of time alone when socialization is not possible  Outcome: Ongoing     Problem: Fatigue  Goal: Verbalize increase energy and improved vitality  Outcome: Ongoing     Problem: Patient Education: Go to Patient Education Activity  Goal: Patient/Family Education  Outcome: Ongoing     Problem: Falls - Risk of:  Goal: Will remain free from falls  Description: Will remain free from falls  Outcome: Ongoing  Goal: Absence of physical injury  Description: Absence of physical injury  Outcome: Ongoing     Problem: OXYGENATION/RESPIRATORY FUNCTION  Goal: Patient will maintain patent airway  Outcome: Ongoing  Goal: Patient will achieve/maintain normal respiratory rate/effort  Description: Respiratory rate and effort will be within normal limits for the patient  Outcome: Ongoing     Problem: Pain:  Goal: Pain level will decrease  Description: Pain level will decrease  Outcome: Ongoing  Goal: Control of acute pain  Description: Control of acute pain  Outcome: Ongoing  Goal: Control of chronic pain  Description: Control of chronic pain  Outcome: Ongoing     Problem: Nutrition  Goal: Optimal nutrition therapy  Outcome: Ongoing

## 2020-12-02 NOTE — PROGRESS NOTES
[facility]  Progress Note    Corrine Flynn  MRN: [id number]  Date: 12/2/2020  LOS: 9     Reason for F/U :   COVID-19 virus infection    Impression :   1. COVID-19 virus infection  · COVID-19 + 11/23/2020  2. Atrial fibrillation with rapid ventricular response, status post cardioversion  3. Pulmonary fibrosis secondary to occupational exposure  4. Acute on chronic systolic and diastolic CHF  5. Hypertrophic nonobstructive cardiomyopathy  6. History of colon cancer  7. BPH    Recommendations:   · Patient was started on aztreonam and doxycycline per primary service 12/1/2020; however, I suspect patient's clinical picture is related to COVID-19, I will stop antibiotics. · Blood cultures were sent 12/1/2020 per primary service  · Patient is status post remdesivir, 11/28/2020  · Patient continues on dexamethasone through 12/2/2020  · The patient remains on high flow oxygen per nasal cannula but requirement has decreased. · Clinical Research will approach patient to explore if he qualifies for any of the COVID 19 treatment protocols. · Patient received convalescent plasma, 2 units 11/24/2020    Infection Control Recommendations:   Droplet plus precautions    Discharge Planning:   Estimated Length of IV antimicrobials: TBD  Patient will need Midline Catheter Insertion/ PICC line Insertion: No  Patient will need: Home IV , [place],  SNF,  LTAC: Undetermined  Patient willneed outpatient wound care: No    Medical Decision making / Summary of Stay:   Corrine Flynn is a 77y.o.-year-old  male who was initially admitted on 11/23/2020. Patient seen at the request of .     INITIAL HISTORY:     Patient presented through ER with complaints of onset of shortness of breath.   Located onset of symptoms on 11/22/2020 with worsening through the day leading to his seeking help at the emergency room.     The patient had been recently admitted on 10/17/2020 through 10/26/2020 at [facility]. Micky Sullivan because of the presence of congestive heart failure NYHAA class II with acute on chronic combined heart failure. He also suffers from atrial fibrillation and hypertrophic nonobstructive cardiomyopathy, benign essential hypertension. His previous diagnosis includes occupational pulmonary fibrosis. During that admission he was also found to have suffered from mycoplasma pneumonia and was treated with antibiotics.     He reports being seen at the [facility], after his admission to [facility],  where he was cardioverted because of the atrial fibrillation. He was also found to be in heart failure and was a started on diuretics.     When the patient was evaluated in the emergency room he was found to be hypoxic and was treated with BiPAP which improved his oxygen saturation rate.     He was tested for Covid and was found to be positive on 2020.     Chest x-ray:  · 2020 persistent bilateral pulmonary infiltrates with associated pulmonary vascular congestion. Unchanged from films of 10/23/2020     Chest CT:  · 2020: Pulmonary fibrosis. No pulmonary embolus. Increased groundglass opacities and parenchymal opacities throughout both lungs when compared with films of 10/21/2020     Patient admitted because of concerns with COVID 19.    · Patient admitted with suspected COVID 19 infection  · Covid test confirmed positive.    · Associated problems with congestive heart failure, pulmonary fibrosis, hypoxia  · Started on aztreonam and doxycycline per primary service 2020      Current evaluation:2020    /73   Pulse 62   Temp 97.6 °F (36.4 °C) (Axillary)   Resp 24   Ht 6' (1.829 m)   Wt 166 lb 14.2 oz (75.7 kg)   SpO2 (!) 85%   BMI 22.63 kg/m²     Temperature Range: Temp: 97.6 °F (36.4 °C) Temp  Av.8 °F (36.6 °C)  Min: 97.6 °F (36.4 °C)  Max: 97.9 °F (36.6 °C)    The patient was seen and evaluated sitting up in bed  Patient remains on high flow oxygen per nasal cannula, 35% FiO2, 15 L oxygen  He denies shortness of breath  No fevers or chills. Review of Systems   Constitutional: Negative. HENT: Negative. Eyes: Negative. Respiratory: Negative. Cardiovascular: Negative. Gastrointestinal: Negative. Genitourinary: Negative. Musculoskeletal: Negative. Skin: Negative. Neurological: Negative. Psychiatric/Behavioral: Negative. Physical Examination :     Physical Exam  Constitutional:       General: He is not in acute distress. Appearance: Normal appearance. He is normal weight. HENT:      Head: Normocephalic and atraumatic. Neck:      Musculoskeletal: Normal range of motion and neck supple. Cardiovascular:      Rate and Rhythm: Normal rate and regular rhythm. Pulmonary:      Effort: Pulmonary effort is normal. No respiratory distress. Breath sounds: Normal breath sounds. Abdominal:      General: Abdomen is flat. Bowel sounds are normal.      Palpations: Abdomen is soft. Musculoskeletal: Normal range of motion. Skin:     General: Skin is warm and dry. Neurological:      General: No focal deficit present. Mental Status: He is alert and oriented to person, place, and time. Mental status is at baseline.    Psychiatric:         Mood and Affect: Mood normal.         Behavior: Behavior normal.         Laboratory data:   I have independently reviewed the followinglabs:  CBC with Differential:   Recent Labs     12/01/20  0016 12/02/20  0606   WBC 13.2* 11.3   HGB 12.0* 11.6*   HCT 39.7* 38.7*   * 485*   LYMPHOPCT 10* 13*   MONOPCT 8 9     BMP:   Recent Labs     12/01/20  0016 12/02/20  0606    137   K 4.3 4.5    101   CO2 23 25   BUN 24* 24*   CREATININE 0.54* 0.53*     Hepatic Function Panel:   Recent Labs     12/01/20  0016 12/02/20  0606   PROT 5.5* 5.3*   LABALBU 2.4* 2.4*   BILITOT 0.60 0.68   ALKPHOS 65 61   ALT 14 13   AST 11 11         Lab Results   Component Value Date    PROCAL 0.29 10/17/2020 Collected: 12/01/20 1744    Order Status: Completed  Specimen: Blood  Updated: 12/02/20 0859     Specimen Description  . BLOOD     Special Requests  LEFT WRIST 8ML     Culture  NO GROWTH 14 HOURS    Culture, Respiratory [[id number]]      Order Status: No result  Specimen: Sputum Expectorated     Culture, Blood 2 [[id number]]   Collected: 11/23/20 0700    Order Status: Completed  Specimen: Blood  Updated: 11/29/20 0704     Specimen Description  . BLOOD     Special Requests  20CC F FA     Culture  NO GROWTH 6 DAYS    Culture, Blood 1 [[id number]]   Collected: 11/23/20 0700    Order Status: Completed  Specimen: Blood  Updated: 11/29/20 0704     Specimen Description  . BLOOD     Special Requests  10CC R HAND     Culture  NO GROWTH 6 DAYS          Medications:      doxycycline (VIBRAMYCIN) IV  100 mg Intravenous Q12H    aztreonam  1 g Intravenous Q8H    furosemide  20 mg Intravenous BID    guaiFENesin  600 mg Oral BID    sodium chloride  20 mL Intravenous Once    rivaroxaban  20 mg Oral Daily    atorvastatin  40 mg Oral Daily    tamsulosin  0.4 mg Oral Daily    sodium chloride flush  10 mL Intravenous 2 times per day    famotidine  20 mg Oral Daily    Vitamin D  2,000 Units Oral Daily           [facility]  [address] [place] [address]  Perfect Serve messaging  OFFICE: [phone]      Electronically signed by [address] [place] [address], APRN - CNP on 12/2/2020 at 10:50 AM  Thank you for allowing us to participate in the care of this patient. Please call with questions. This note iscreated with the assistance of a speech recognition program.  While intending to generate a document that actually reflects the content of the visit, the document can still have some errors including those of syntax andsound a like substitutions which may escape proof reading. In such instances, actual meaning can be extrapolated by contextual diversion.

## 2020-12-02 NOTE — PROGRESS NOTES
[facility]  Office: [address], DO, Junior Kras, DO, Marion Bang, DO, Pawel Carl Blood, DO, Elizabeth Lorenzo MD, Holland Carlisle MD, Tootie Iglesias MD, Philip Mcguire MD, Niki Stallings MD, Cristiano King MD, John Zheng MD, Rosie Kay MD, Breezy Swenson MD, Ashlie Diaz, DO, Diana Mcdonough MD, Jay Ponce MD, Caleb Oro, DO, Prosper Ortega MD,  Joe Zacairas, DO, Genaro Mann MD, Negin Cedeño MD, Daquan Sifuentes [facility], [facility], CNP, Reese Richardson, CNP, Ayleen Matthew, CNS, She Baldwin, CNP, Eliecer Padilla, CNP, Lynette Hartman, CNP, Sandrita Kwok, CNP, Erum Martell, CNP, Abigail Heredia PA-C, Leandra Talbot, [facility], Kanu Rivas, CNP, Orethan Soda, CNP, Wyrivera Husbands, CNP, Colletta Moros, CNP, Hugh Elaine, [address] [place] [address]    Progress Note    12/2/2020    10:16 AM    Name:   Karina Manuel  MRN:     [id number]     Acct:      [de-identified]   Room:   [address] [place] [address] Day:  9  Admit Date:  11/23/2020  6:44 AM    PCP:   Danny Salguero MD  Code Status:  Full Code    Subjective:     C/C:   Chief Complaint   Patient presents with    Shortness of Breath     Interval History Status: No change    Patient seen and examined  Shortness of breath improved  Hold sotalol if heart rate less than 60  Continue to wean off oxygen  Patient have intermittent episodes of A. fib with RVR on 11/30/2020  Patient denies fever chest pain    I am seeing the patient for shortness of breath    Medications: Allergies: Allergies   Allergen Reactions    Adhesive Tape Other (See Comments)     Blister badly     Codeine Nausea Only     Other reaction(s): Other: See Comments  NAUSEA  Other reaction(s):  Other: See Comments  NAUSEA    Penicillins Swelling     As a baby  Other reaction(s): Unknown  As a baby       Current Meds:   Scheduled Meds:    doxycycline (VIBRAMYCIN) IV  100 mg Intravenous Q12H    aztreonam  1 g Intravenous Q8H    furosemide  20 mg Intravenous BID    guaiFENesin  600 mg Oral BID    sodium chloride  20 mL Intravenous Once    rivaroxaban  20 mg Oral Daily    atorvastatin  40 mg Oral Daily    tamsulosin  0.4 mg Oral Daily    sodium chloride flush  10 mL Intravenous 2 times per day    famotidine  20 mg Oral Daily    Vitamin D  2,000 Units Oral Daily     Continuous Infusions:   PRN Meds: metoprolol, calcium carbonate, sodium chloride, albuterol **AND** [] ipratropium, sodium chloride flush, potassium chloride **OR** potassium alternative oral replacement **OR** potassium chloride, magnesium sulfate, acetaminophen **OR** acetaminophen, polyethylene glycol, promethazine **OR** [DISCONTINUED] ondansetron, nicotine, albuterol sulfate HFA, dextromethorphan-guaiFENesin    Data:     Past Medical History:   has a past medical history of Atrial fibrillation ([facility] Back pain, chronic, Hill's esophagus, BPH (benign prostatic hyperplasia), Cancer ([facility] Cocaine abuse in remission [facility]), ED (erectile dysfunction), GERD (gastroesophageal reflux disease), GI bleed, Hernia, History of colon cancer, Melena, Migraines, and Murmur, cardiac. Social History:   reports that he quit smoking about 49 years ago. He has a 0.50 pack-year smoking history. He has never used smokeless tobacco. He reports current alcohol use of about 12.0 standard drinks of alcohol per week. He reports that he does not use drugs. Family History:   Family History   Problem Relation Age of Onset    Diabetes Mother     Heart Attack Father     Heart Disease Father     Heart Disease Brother        Vitals:  /73   Pulse 62   Temp 97.6 °F (36.4 °C) (Axillary)   Resp 24   Ht 6' (1.829 m)   Wt 166 lb 14.2 oz (75.7 kg)   SpO2 (!) 85%   BMI 22.63 kg/m²   Temp (24hrs), Av.8 °F (36.6 °C), Min:97.6 °F (36.4 °C), Max:97.9 °F (36.6 °C)    No results for input(s): POCGLU in the last 72 hours. I/O (24Hr):     Intake/Output Summary (Last 24 hours) at 12/2/2020 1016  Last data filed at 12/2/2020 0400  Gross per 24 hour   Intake 890 ml   Output 300 ml   Net 590 ml       Labs:  Hematology:  Recent Labs     11/30/20 0524 12/01/20  0016 12/02/20  0606   WBC 11.1 13.2* 11.3   RBC 4.64 4.71 4.54   HGB 11.7* 12.0* 11.6*   HCT 39.5* 39.7* 38.7*   MCV 85.1 84.3 85.2   MCH 25.2 25.5 25.6   MCHC 29.6 30.2 30.0   RDW 19.7* 19.5* 19.9*   * 524* 485*   MPV 9.9 9.4 9.7   DDIMER 0.57 0.51 0.45     Chemistry:  Recent Labs     11/30/20 0524 11/30/20  1932 12/01/20 0016 12/01/20  0842 12/02/20  0606     --  136  --  137   K 4.0  --  4.3  --  4.5     --  105  --  101   CO2 26  --  23  --  25   GLUCOSE 95  --  114*  --  91   BUN 23  --  24*  --  24*   CREATININE 0.52*  --  0.54*  --  0.53*   ANIONGAP 8*  --  8*  --  11   LABGLOM >60  --  >60  --  >60   GFRAA >60  --  >60  --  >60   CALCIUM 8.2*  --  8.3*  --  8.4*   PROBNP  --   --   --  980*  --    TROPHS  --  22 32* 30*  --    MYOGLOBIN  --  <21* <21* 21*  --      Recent Labs     11/30/20 0524 12/01/20 0016 12/02/20  0606   PROT 5.7* 5.5* 5.3*   LABALBU 2.4* 2.4* 2.4*   AST 11 11 11   ALT 16 14 13   ALKPHOS 65 65 61   BILITOT 0.65 0.60 0.68     ABG:  Lab Results   Component Value Date    POCPH 7.519 11/23/2020    POCPCO2 32.2 11/23/2020    POCPO2 47.0 11/23/2020    POCHCO3 26.2 11/23/2020    NBEA NOT REPORTED 11/23/2020    PBEA 4 11/23/2020    CTR6OUR 27 11/23/2020    VGAO4JOQ 87 11/23/2020    FIO2 NOT REPORTED 11/23/2020     Lab Results   Component Value Date/Time    SPECIAL LEFT WRIST 8ML 12/01/2020 05:44 PM     Lab Results   Component Value Date/Time    CULTURE NO GROWTH 14 HOURS 12/01/2020 05:44 PM       Radiology:  Xr Chest Portable    Result Date: 11/23/2020  Stable exam     Ct Chest Pulmonary Embolism W Contrast    Result Date: 11/23/2020  No central pulmonary embolus. Peripheral branches are not well evaluated secondary to motion.  Increased ground-glass opacity and parenchymal opacities throughout the lungs either due to developing pneumonia or edema. There is a small left-sided pleural effusion. By report there is a history of COVID-19 infection Underlying pulmonary fibrosis again noted.        Physical Examination:        General appearance:  Alert,   Lungs: Decreased air entry bilateral    Heart:  regular rate and rhythm, no murmur  Abdomen:  soft, nontender, nondistended, normal bowel sounds, no masses, hepatomegaly, splenomegaly  Extremities:  no edema, redness, tenderness in the calves  Skin:  no gross lesions, rashes, induration    Assessment:        Hospital Problems           Last Modified POA    * (Principal) Acute hypoxemic respiratory failure due to COVID-19 ([facility] 11/23/2020 Yes    Dyslipidemia 11/23/2020 Yes    Benign prostatic hyperplasia with urinary obstruction 11/23/2020 Yes    Hypertrophic nonobstructive cardiomyopathy ([facility] 11/23/2020 Yes    Iron (Fe) deficiency anemia 11/23/2020 Yes    Benign essential HTN 11/23/2020 Yes    CHF (congestive heart failure), NYHA class II, acute on chronic, combined ([facility] 11/23/2020 Yes    Occupational pulmonary disease 11/23/2020 Yes    COVID-19 11/24/2020 Yes          Plan:          COVID-19 pneumonia status post Decadron and remdesivir  Worsening today chest x-ray    History of pulmonary fibrosis monitor      aCute on top of chronic hypoxemic respiratory failure most likely related to COVID-19 infection and pneumonia  Wean off oxygen    A. fib with RVR status post cardioversion in the past continue oral anticoagulation Betapace     bradycardia patient currently on beta-blocker cardiology notified Betapace was held continue to hold sotalol if heart rate less than 60    NSTEMI type II secondary to demand ischemia treated with heparin drip currently heparin drip was stopped medical management    Hypertension monitor closely      Dyslipidemia statin      Acute to type of chronic combined systolic and diastolic CHF exacerbation with hypertrophic nonobstructive cardiomyopathy treated with diuresis    Pulmonary edema developed during hospitalization IV diuresis    Leukocytosis worsening today started empiric IV antibiotics pending culture      Hypokalemia replaced        Chaka Santizo MD  12/2/2020  10:16 AM

## 2020-12-03 LAB
ABSOLUTE EOS #: 0.12 K/UL (ref 0–0.4)
ABSOLUTE IMMATURE GRANULOCYTE: 0.24 K/UL (ref 0–0.3)
ABSOLUTE LYMPH #: 1.65 K/UL (ref 1–4.8)
ABSOLUTE MONO #: 1.06 K/UL (ref 0.1–0.8)
ALBUMIN SERPL-MCNC: 2.7 G/DL (ref 3.5–5.2)
ALBUMIN/GLOBULIN RATIO: 0.9 (ref 1–2.5)
ALP BLD-CCNC: 67 U/L (ref 40–129)
ALT SERPL-CCNC: 13 U/L (ref 5–41)
ANION GAP SERPL CALCULATED.3IONS-SCNC: 8 MMOL/L (ref 9–17)
AST SERPL-CCNC: 11 U/L
BASOPHILS # BLD: 0 % (ref 0–2)
BASOPHILS ABSOLUTE: 0 K/UL (ref 0–0.2)
BILIRUB SERPL-MCNC: 0.73 MG/DL (ref 0.3–1.2)
BNP INTERPRETATION: ABNORMAL
BUN BLDV-MCNC: 23 MG/DL (ref 8–23)
BUN/CREAT BLD: ABNORMAL (ref 9–20)
CALCIUM SERPL-MCNC: 8.4 MG/DL (ref 8.6–10.4)
CHLORIDE BLD-SCNC: 101 MMOL/L (ref 98–107)
CO2: 25 MMOL/L (ref 20–31)
CREAT SERPL-MCNC: 0.61 MG/DL (ref 0.7–1.2)
D-DIMER QUANTITATIVE: 0.4 MG/L FEU
DIFFERENTIAL TYPE: ABNORMAL
EOSINOPHILS RELATIVE PERCENT: 1 % (ref 1–4)
GFR AFRICAN AMERICAN: >60 ML/MIN
GFR NON-AFRICAN AMERICAN: >60 ML/MIN
GFR SERPL CREATININE-BSD FRML MDRD: ABNORMAL ML/MIN/{1.73_M2}
GFR SERPL CREATININE-BSD FRML MDRD: ABNORMAL ML/MIN/{1.73_M2}
GLUCOSE BLD-MCNC: 93 MG/DL (ref 70–99)
HCT VFR BLD CALC: 42.7 % (ref 40.7–50.3)
HEMOGLOBIN: 13.1 G/DL (ref 13–17)
IMMATURE GRANULOCYTES: 2 %
LYMPHOCYTES # BLD: 14 % (ref 24–44)
MCH RBC QN AUTO: 25.8 PG (ref 25.2–33.5)
MCHC RBC AUTO-ENTMCNC: 30.7 G/DL (ref 28.4–34.8)
MCV RBC AUTO: 84.2 FL (ref 82.6–102.9)
MONOCYTES # BLD: 9 % (ref 1–7)
MORPHOLOGY: ABNORMAL
NRBC AUTOMATED: 0 PER 100 WBC
PDW BLD-RTO: 20.4 % (ref 11.8–14.4)
PLATELET # BLD: 536 K/UL (ref 138–453)
PLATELET ESTIMATE: ABNORMAL
PMV BLD AUTO: 9.7 FL (ref 8.1–13.5)
POTASSIUM SERPL-SCNC: 4.6 MMOL/L (ref 3.7–5.3)
PRO-BNP: 727 PG/ML
RBC # BLD: 5.07 M/UL (ref 4.21–5.77)
RBC # BLD: ABNORMAL 10*6/UL
SEG NEUTROPHILS: 74 % (ref 36–66)
SEGMENTED NEUTROPHILS ABSOLUTE COUNT: 8.73 K/UL (ref 1.8–7.7)
SODIUM BLD-SCNC: 134 MMOL/L (ref 135–144)
TOTAL PROTEIN: 5.8 G/DL (ref 6.4–8.3)
WBC # BLD: 11.8 K/UL (ref 3.5–11.3)
WBC # BLD: ABNORMAL 10*3/UL

## 2020-12-03 PROCEDURE — 85379 FIBRIN DEGRADATION QUANT: CPT

## 2020-12-03 PROCEDURE — 2700000000 HC OXYGEN THERAPY PER DAY

## 2020-12-03 PROCEDURE — 99233 SBSQ HOSP IP/OBS HIGH 50: CPT | Performed by: INTERNAL MEDICINE

## 2020-12-03 PROCEDURE — 6370000000 HC RX 637 (ALT 250 FOR IP): Performed by: INTERNAL MEDICINE

## 2020-12-03 PROCEDURE — 6370000000 HC RX 637 (ALT 250 FOR IP): Performed by: NURSE PRACTITIONER

## 2020-12-03 PROCEDURE — 83880 ASSAY OF NATRIURETIC PEPTIDE: CPT

## 2020-12-03 PROCEDURE — 80053 COMPREHEN METABOLIC PANEL: CPT

## 2020-12-03 PROCEDURE — 2580000003 HC RX 258: Performed by: NURSE PRACTITIONER

## 2020-12-03 PROCEDURE — 2060000000 HC ICU INTERMEDIATE R&B

## 2020-12-03 PROCEDURE — 2500000003 HC RX 250 WO HCPCS: Performed by: INTERNAL MEDICINE

## 2020-12-03 PROCEDURE — 99232 SBSQ HOSP IP/OBS MODERATE 35: CPT | Performed by: NURSE PRACTITIONER

## 2020-12-03 PROCEDURE — 6360000002 HC RX W HCPCS: Performed by: INTERNAL MEDICINE

## 2020-12-03 PROCEDURE — 94761 N-INVAS EAR/PLS OXIMETRY MLT: CPT

## 2020-12-03 PROCEDURE — 36415 COLL VENOUS BLD VENIPUNCTURE: CPT

## 2020-12-03 PROCEDURE — 85025 COMPLETE CBC W/AUTO DIFF WBC: CPT

## 2020-12-03 RX ORDER — FUROSEMIDE 10 MG/ML
40 INJECTION INTRAMUSCULAR; INTRAVENOUS DAILY
Status: DISCONTINUED | OUTPATIENT
Start: 2020-12-04 | End: 2020-12-04

## 2020-12-03 RX ADMIN — METOPROLOL TARTRATE 2.5 MG: 1 INJECTION, SOLUTION INTRAVENOUS at 05:25

## 2020-12-03 RX ADMIN — Medication 2000 UNITS: at 09:27

## 2020-12-03 RX ADMIN — SODIUM CHLORIDE, PRESERVATIVE FREE 10 ML: 5 INJECTION INTRAVENOUS at 21:00

## 2020-12-03 RX ADMIN — GUAIFENESIN 600 MG: 600 TABLET, EXTENDED RELEASE ORAL at 09:27

## 2020-12-03 RX ADMIN — ATORVASTATIN CALCIUM 40 MG: 80 TABLET, FILM COATED ORAL at 21:58

## 2020-12-03 RX ADMIN — ACETAMINOPHEN 650 MG: 325 TABLET ORAL at 21:59

## 2020-12-03 RX ADMIN — RIVAROXABAN 20 MG: 20 TABLET, FILM COATED ORAL at 16:04

## 2020-12-03 RX ADMIN — GUAIFENESIN 600 MG: 600 TABLET, EXTENDED RELEASE ORAL at 21:59

## 2020-12-03 RX ADMIN — FAMOTIDINE 20 MG: 20 TABLET, FILM COATED ORAL at 09:27

## 2020-12-03 RX ADMIN — TAMSULOSIN HYDROCHLORIDE 0.4 MG: 0.4 CAPSULE ORAL at 09:27

## 2020-12-03 RX ADMIN — FUROSEMIDE 20 MG: 10 INJECTION, SOLUTION INTRAMUSCULAR; INTRAVENOUS at 16:05

## 2020-12-03 RX ADMIN — DILTIAZEM HYDROCHLORIDE 30 MG: 30 TABLET, FILM COATED ORAL at 12:00

## 2020-12-03 RX ADMIN — FUROSEMIDE 20 MG: 10 INJECTION, SOLUTION INTRAMUSCULAR; INTRAVENOUS at 09:27

## 2020-12-03 RX ADMIN — SODIUM CHLORIDE, PRESERVATIVE FREE 10 ML: 5 INJECTION INTRAVENOUS at 09:14

## 2020-12-03 RX ADMIN — METOPROLOL TARTRATE 2.5 MG: 1 INJECTION, SOLUTION INTRAVENOUS at 13:43

## 2020-12-03 ASSESSMENT — PAIN DESCRIPTION - LOCATION: LOCATION: NECK

## 2020-12-03 ASSESSMENT — PAIN - FUNCTIONAL ASSESSMENT: PAIN_FUNCTIONAL_ASSESSMENT: ACTIVITIES ARE NOT PREVENTED

## 2020-12-03 ASSESSMENT — PAIN DESCRIPTION - PAIN TYPE: TYPE: ACUTE PAIN

## 2020-12-03 ASSESSMENT — ENCOUNTER SYMPTOMS
RESPIRATORY NEGATIVE: 1
GASTROINTESTINAL NEGATIVE: 1
EYES NEGATIVE: 1

## 2020-12-03 ASSESSMENT — PAIN SCALES - GENERAL
PAINLEVEL_OUTOF10: 3
PAINLEVEL_OUTOF10: 0
PAINLEVEL_OUTOF10: 0

## 2020-12-03 ASSESSMENT — PAIN DESCRIPTION - ONSET: ONSET: OTHER (COMMENT)

## 2020-12-03 ASSESSMENT — PAIN DESCRIPTION - FREQUENCY: FREQUENCY: INTERMITTENT

## 2020-12-03 ASSESSMENT — PAIN DESCRIPTION - PROGRESSION: CLINICAL_PROGRESSION: NOT CHANGED

## 2020-12-03 ASSESSMENT — PAIN DESCRIPTION - DESCRIPTORS: DESCRIPTORS: ACHING

## 2020-12-03 NOTE — PROGRESS NOTES
Pulmonary Progress Note    CC:  COVID -19 PNEUMONIA   Subjective:  IMPROVED   ON 5 L LOW FLOW NC         Review of Systems -  General ROS: fatigue  ENT ROS: negative for - headaches, oral lesions or sore throat  Cardiovascular ROS: no chest pain , orthopnea or pnd   Gastrointestinal ROS: no abdominal pain, change in bowel habits, or black or bloody stools  Neuro - no blurry vision , no loc .  No focal weakness       Immunization   Immunization History   Administered Date(s) Administered    Influenza Vaccine, unspecified formulation 01/16/2014, 01/03/2017, 12/12/2017    Influenza Virus Vaccine 01/16/2014, 10/26/2014, 12/04/2015    Influenza, High Dose (Fluzone 65 yrs and older) 09/27/2018    Influenza, Kody Starch, 6 mo and older, IM (Fluzone, Flulaval) 12/12/2017    Influenza, Quadv, IM, (6 mo and older Fluzone, Flulaval, Fluarix and 3 yrs and older Afluria) 10/23/2014, 01/03/2017    Influenza, Quadv, IM, PF (6 mo and older Fluzone, Flulaval, Fluarix, and 3 yrs and older Afluria) 10/23/2014    Influenza, Quadv, adjuvanted, 65 yrs +, IM, PF (Fluad) 11/16/2020    Influenza, Triv, inactivated, subunit, adjuvanted, IM (Fluad 65 yrs and older) 11/08/2019          PAST MEDICAL HISTORY:       Diagnosis Date    Atrial fibrillation (HCC)     Back pain, chronic     Hill's esophagus 06/18/2019    BPH (benign prostatic hyperplasia)     Cancer (HCC)     colon-rectal    Cocaine abuse in remission ([facility] [address]     1970's    ED (erectile dysfunction) 4/2/2015    GERD (gastroesophageal reflux disease)     GI bleed 12/5/2018    Hernia     History of colon cancer     Melena     Migraines     Murmur, cardiac          Family History:       Problem Relation Age of Onset    Diabetes Mother     Heart Attack Father     Heart Disease Father     Heart Disease Brother        SURGICAL HISTORY:   Past Surgical History:   Procedure Laterality Date    CARDIAC CATHETERIZATION  11/29/2018    Non-obstructive CAD    CARDIOVERSION 2020    COLECTOMY      2nd colectomy, Colostomy and reversed [facility] COLECTOMY      1st time Ksenia    COLONOSCOPY      COLONOSCOPY  07/18/2016    COLONOSCOPY N/A 12/6/2018    COLONOSCOPY DIAGNOSTIC performed by Stormy Price MD at [address] Right 2009    inguinal    KNEE SURGERY Right 1970's    arthrotomy    TONSILLECTOMY      TOTAL KNEE ARTHROPLASTY Right 1/8/2019    KNEE TOTAL ARTHROPLASTY performed by Mandy Spencer MD at [address] TRANSESOPHAGEAL ECHOCARDIOGRAM  11/29/2018    UPPER GASTROINTESTINAL ENDOSCOPY N/A 12/5/2018    EGD DIAGNOSTIC ONLY performed by Stormy Price MD at [facility] N/A 6/18/2019    [facility]RE'S              TOBACCO:   reports that he quit smoking about 49 years ago. He has a 0.50 pack-year smoking history. He has never used smokeless tobacco.  ETOH:   reports current alcohol use of about 12.0 standard drinks of alcohol per week. ALLERGIES:    Allergies   Allergen Reactions    Adhesive Tape Other (See Comments)     Blister badly     Codeine Nausea Only     Other reaction(s): Other: See Comments  NAUSEA  Other reaction(s): Other: See Comments  NAUSEA    Penicillins Swelling     As a baby  Other reaction(s): Unknown  As a baby       Home Meds:   Prior to Admission medications    Medication Sig Start Date End Date Taking?  Authorizing Provider   atorvastatin (LIPITOR) 40 MG tablet Take 1 tablet by mouth daily 11/16/20  Yes Cassandra Henry MD   omeprazole (PRILOSEC) 40 MG delayed release capsule Take 1 capsule by mouth daily 11/16/20  Yes Cassandra Henry MD   lisinopril (PRINIVIL;ZESTRIL) 5 MG tablet take 1 tablet by mouth once daily 10/26/20  Yes Historical Provider, MD   sotalol (BETAPACE) 80 MG tablet take 1 tablet by mouth every 12 hours 10/28/20  Yes Historical Provider, MD   ibuprofen (ADVIL;MOTRIN) 800 MG tablet Take 1 tablet by mouth every 6 hours if needed for pain 10/27/20  Yes Cassandra Georgia Ladd MD   furosemide (LASIX) 20 MG tablet Take 1 tablet by mouth daily 10/26/20  Yes Fide Ragsdale MD   tamsulosin [facility]) 0.4 MG capsule Take 1 capsule by mouth daily 10/15/20  Yes Kaylyn Fothergill, MD   rivaroxaban (XARELTO) 20 MG TABS tablet Take 20 mg by mouth daily (with breakfast)    Yes Historical Provider, MD         Intake/Output Summary (Last 24 hours) at 12/3/2020 1900  Last data filed at 12/3/2020 0605  Gross per 24 hour   Intake 530 ml   Output 550 ml   Net -20 ml         Diet   DIET GENERAL; No Added Salt (3-4 GM)  Dietary Nutrition Supplements: Standard High Calorie Oral Supplement    Vitals:   /72   Pulse 73   Temp 97.4 °F (36.3 °C) (Axillary)   Resp 22   Ht 6' (1.829 m)   Wt 164 lb 3.9 oz (74.5 kg)   SpO2 95%   BMI 22.28 kg/m²  on         I/O (24 Hours)    Patient Vitals for the past 8 hrs:   BP Temp Temp src Pulse Resp SpO2   12/03/20 1632 -- 97.4 °F (36.3 °C) Axillary -- -- --   12/03/20 1600 102/72 -- -- 73 22 95 %   12/03/20 1211 -- 98.7 °F (37.1 °C) Oral -- -- --       Intake/Output Summary (Last 24 hours) at 12/3/2020 1900  Last data filed at 12/3/2020 0605  Gross per 24 hour   Intake 530 ml   Output 550 ml   Net -20 ml     I/O last 3 completed shifts: In: 1130 [P.O.:1120; I.V.:10]  Out: 550 [Urine:550]   Date 12/03/20 0000 - 12/03/20 2359   Shift 0889-8350 3576-2594 3409-9406 24 Hour Total   INTAKE   P.O.(mL/kg/hr) 520(0.9)   520   I. V.(mL/kg) 10(0.1)   10(0.1)   Shift Total(mL/kg) 530(7.1)   530(7.1)   OUTPUT   Urine(mL/kg/hr) 550(0.9)   550   Shift Total(mL/kg) 550(7.4)   550(7.4)   Weight (kg) 74.5 74.5 74.5 74.5     Patient Vitals for the past 96 hrs (Last 3 readings):   Weight   12/03/20 0605 164 lb 3.9 oz (74.5 kg)   12/02/20 0209 166 lb 14.2 oz (75.7 kg)   11/30/20 0600 167 lb 1.7 oz (75.8 kg)          PHYSICAL EXAMINATION:  I have discussed the care of Evangelina Nance  including pertinent history and exam findings,  with the nursing staff I have seen  the patient and the key elements of all parts of the encounter have been performed by me. For careful stewardship of limited PPE during COVID-19 pandemic my physical exam was deferred. .        Medications:    Scheduled Meds:   dilTIAZem  30 mg Oral 3 times per day    [START ON 2020] furosemide  40 mg Intravenous Daily    LORazepam  0.5 mg Intravenous Once    guaiFENesin  600 mg Oral BID    sodium chloride  20 mL Intravenous Once    rivaroxaban  20 mg Oral Daily    atorvastatin  40 mg Oral Daily    tamsulosin  0.4 mg Oral Daily    sodium chloride flush  10 mL Intravenous 2 times per day    famotidine  20 mg Oral Daily    Vitamin D  2,000 Units Oral Daily       Continuous Infusions:      PRN Meds:  metoprolol, calcium carbonate, sodium chloride, albuterol **AND** [] ipratropium, sodium chloride flush, potassium chloride **OR** potassium alternative oral replacement **OR** potassium chloride, magnesium sulfate, acetaminophen **OR** acetaminophen, polyethylene glycol, promethazine **OR** [DISCONTINUED] ondansetron, nicotine, albuterol sulfate HFA, dextromethorphan-guaiFENesin    Labs:  CBC:   Recent Labs     20  0016 20  0606 20  0435   WBC 13.2* 11.3 11.8*   HGB 12.0* 11.6* 13.1   HCT 39.7* 38.7* 42.7   MCV 84.3 85.2 84.2   * 485* 536*     BMP:   Recent Labs     20  0016 20  0606 20  0435    137 134*   K 4.3 4.5 4.6    101 101   CO2 23 25 25   BUN 24* 24* 23   CREATININE 0.54* 0.53* 0.61*     LIVER PROFILE:   Recent Labs     20  0016 20  0606 20  0435   AST 11 11 11   ALT 14 13 13   BILITOT 0.60 0.68 0.73   ALKPHOS 65 61 67     PT/INR: No results for input(s): PROTIME, INR in the last 72 hours. APTT: No results for input(s): APTT in the last 72 hours.   UA:No results for input(s): NITRITE, COLORU, PHUR, LABCAST, [address], RBCUA, MUCUS, TRICHOMONAS, YEAST, BACTERIA, CLARITYU, SPECGRAV, LEUKOCYTESUR, [address], BILIRUBINUR, BLOODU, GLUCOSEU, AMORPHOUS in the last 72 hours. Invalid input(s): KETONESU  No results for input(s): PHART, WXL0LMU, PO2ART in the last 72 hours. Films:  No results found. LOS: 10          Vent Information  Skin Assessment: Clean, dry, & intact  Equipment Changed: Humidification  FiO2 : 35 %  SpO2: 95 %  SpO2/FiO2 ratio: 262.86  I Time/ I Time %: 0.9 s  Humidification Source: Heated wire  Humidification Temp: 33  Humidification Temp Measured: 33  Circuit Condensation: Drained  Mask Type: Full face mask  Mask Size: Large     PaO2/FiO2 RATIO:  No results for input(s): POCPO2 in the last 72 hours. FiO2 : 35 %       LABS:  ABGs:   No results for input(s): POCPH, POCPCO2, POCPO2, POCHCO3, IENC7PIT in the last 72 hours. ASSESSMENT:  Principal Problem:    Acute hypoxemic respiratory failure due to COVID-19 [facility])  Active Problems:    Dyslipidemia    Benign prostatic hyperplasia with urinary obstruction    Hypertrophic nonobstructive cardiomyopathy (HCC)    Iron (Fe) deficiency anemia    Benign essential HTN    CHF (congestive heart failure), NYHA class II, acute on chronic, combined ([facility] [address]    Occupational pulmonary disease    COVID-19  Resolved Problems:    * No resolved hospital problems.  *   afib      Acute hypoxic respiratory failure secondary to COVID 19   Bilateral multifocal pneumonia due to COVID 19 infection superimposed on pulmonary fibrosis    Covid -19 pandemic emergency     PLAN:    PRONE :       [x] No    [] Yes    REMDESIVIR:             [] No    [x] Yes    DEXAMETHASONE : [] No    [x] Yes    CONVALESCENT PLASMA : [] No    [x] Yes 11/14/20  OFF HIGH FLOW   NOW ON LOW FLOW 5 L   BNP DEC   LASIX NOW 40 MG IV DAILY   PLAN DC TO HOME ONCE LOW FLOW O2 VIA NC 4 L OR LESS   Follow cultures   Airborne isolation and droplet precautions to be continued  Continue supportive care   REMDESIVIR  AND DECADRON Completed   Will obtain xray chest and ABG as needed  Patient is on appropriate DVT - xarelto  and stress ulcer prophylaxis as allowed by the medical condition    Treatment plan Discussed with nursing staff in detail , all questions answered . Electronically signed by Haley Perez MD on 12/3/2020 at 7:00 PM       This patient was evaluated in the context of the global SARS-CoV-2 (COVID-19) pandemic, which necessitated considerations that the patient either has COVID-19 infection or is at risk of infection with COVID-19. Institutional protocols and algorithms that pertain to the evaluation & management of patients with COVID-19 or those at risk for COVID-19 are in a state of rapid changes based on information released by regulatory bodies including the CDC and federal and state organizations. These policies and algorithms were followed during the patient's care. Please note that this chart was generated using voice recognition Dragon dictation software. Although every effort was made to ensure the accuracy of this automated transcription, some errors in transcription may have occurred.

## 2020-12-03 NOTE — PROGRESS NOTES
[facility]  Progress Note    Larisa Del Toro  MRN: [id number]  Date: 12/3/2020  LOS: 10     Reason for F/U :   COVID-19 virus infection    Impression :   1. COVID-19 virus infection  · COVID-19 + 11/23/2020  2. Atrial fibrillation with rapid ventricular response, status post cardioversion  · Back in RVR 12/3/2020  3. Pulmonary fibrosis secondary to occupational exposure  4. Acute on chronic systolic and diastolic CHF  5. Hypertrophic nonobstructive cardiomyopathy  6. History of colon cancer  7. BPH    Recommendations:   · Patient was started on aztreonam and doxycycline per primary service 12/1/2020; however, I suspect patient's clinical picture is related to COVID-19, I will stop antibiotics. · Blood cultures were sent 12/1/2020 per primary service  · Patient is status post remdesivir, 11/28/2020  · Patient is status post dexamethasone, 12/2/2020  · The patient has been transitioned to 4L oxygen per nasal cannula this morning  · Clinical Research will approach patient to explore if he qualifies for any of the COVID 19 treatment protocols. · Patient received convalescent plasma, 2 units 11/24/2020    Infection Control Recommendations:   Droplet plus precautions    Discharge Planning:     Patient will need Midline Catheter Insertion/ PICC line Insertion: No  Patient will need: Home IV , [place],  SNF,  LTAC: Undetermined  Patient willneed outpatient wound care: No    Medical Decision making / Summary of Stay:   Larisa Del Toro is a 77y.o.-year-old  male who was initially admitted on 11/23/2020. Patient seen at the request of .     INITIAL HISTORY:     Patient presented through ER with complaints of onset of shortness of breath.   Located onset of symptoms on 11/22/2020 with worsening through the day leading to his seeking help at the emergency room.     The patient had been recently admitted on 10/17/2020 through 10/26/2020 at [facility] because of the presence of congestive heart failure NYHAA class II with acute on chronic combined heart failure. He also suffers from atrial fibrillation and hypertrophic nonobstructive cardiomyopathy, benign essential hypertension. His previous diagnosis includes occupational pulmonary fibrosis. During that admission he was also found to have suffered from mycoplasma pneumonia and was treated with antibiotics.     He reports being seen at the [facility], after his admission to [facility],  where he was cardioverted because of the atrial fibrillation. He was also found to be in heart failure and was a started on diuretics.     When the patient was evaluated in the emergency room he was found to be hypoxic and was treated with BiPAP which improved his oxygen saturation rate.     He was tested for Covid and was found to be positive on 2020.     Chest x-ray:  · 2020 persistent bilateral pulmonary infiltrates with associated pulmonary vascular congestion. Unchanged from films of 10/23/2020     Chest CT:  · 2020: Pulmonary fibrosis. No pulmonary embolus. Increased groundglass opacities and parenchymal opacities throughout both lungs when compared with films of 10/21/2020     Patient admitted because of concerns with COVID 19.    · Patient admitted with suspected COVID 19 infection  · Covid test confirmed positive. · Associated problems with congestive heart failure, pulmonary fibrosis, hypoxia  · Started on aztreonam and doxycycline per primary service 2020      Current evaluation:12/3/2020    BP 84/70   Pulse 92   Temp 97.2 °F (36.2 °C) (Axillary)   Resp 25   Ht 6' (1.829 m)   Wt 164 lb 3.9 oz (74.5 kg)   SpO2 92%   BMI 22.28 kg/m²     Temperature Range: Temp: 97.2 °F (36.2 °C) Temp  Av.7 °F (36.5 °C)  Min: 97.2 °F (36.2 °C)  Max: 98.4 °F (36.9 °C)    The patient was seen and evaluated sitting up in chair  Patient transitioned to 4L oxygen per nasal cannula this morning.   Patient did go into atrial fibrillation rapid ventricular response later this morning with slight decrease in oxygenation. Oxygen increased to 6 L per nasal cannula. He denies shortness of breath. He does report some chest palpitations. No fevers or chills. Review of Systems   Constitutional: Negative. HENT: Negative. Eyes: Negative. Respiratory: Negative. Cardiovascular: Positive for palpitations. Gastrointestinal: Negative. Genitourinary: Negative. Musculoskeletal: Negative. Skin: Negative. Neurological: Negative. Psychiatric/Behavioral: Negative. Physical Examination :     Physical Exam  Constitutional:       General: He is not in acute distress. Appearance: Normal appearance. He is normal weight. HENT:      Head: Normocephalic and atraumatic. Neck:      Musculoskeletal: Normal range of motion and neck supple. Cardiovascular:      Rate and Rhythm: Tachycardia present. Rhythm irregularly irregular. Pulmonary:      Effort: Pulmonary effort is normal. No respiratory distress. Breath sounds: Normal breath sounds. Abdominal:      General: Abdomen is flat. Bowel sounds are normal.      Palpations: Abdomen is soft. Musculoskeletal: Normal range of motion. Skin:     General: Skin is warm and dry. Neurological:      General: No focal deficit present. Mental Status: He is alert and oriented to person, place, and time. Mental status is at baseline.    Psychiatric:         Mood and Affect: Mood normal.         Behavior: Behavior normal.         Laboratory data:   I have independently reviewed the followinglabs:  CBC with Differential:   Recent Labs     12/02/20  0606 12/03/20  0435   WBC 11.3 11.8*   HGB 11.6* 13.1   HCT 38.7* 42.7   * 536*   LYMPHOPCT 13* 14*   MONOPCT 9 9*     BMP:   Recent Labs     12/02/20  0606 12/03/20  0435    134*   K 4.5 4.6    101   CO2 25 25   BUN 24* 23   CREATININE 0.53* 0.61*     Hepatic Function Panel:   Recent Labs 12/02/20  0606 12/03/20  0435   PROT 5.3* 5.8*   LABALBU 2.4* 2.7*   BILITOT 0.68 0.73   ALKPHOS 61 67   ALT 13 13   AST 11 11         Lab Results   Component Value Date    PROCAL 0.29 10/17/2020     Lab Results   Component Value Date    .8 11/23/2020    .7 10/17/2020     Lab Results   Component Value Date    SEDRATE 82 (H) 10/22/2020         Lab Results   Component Value Date    DDIMER 0.40 12/03/2020    DDIMER 0.45 12/02/2020    DDIMER 0.51 12/01/2020    DDIMER 0.57 11/30/2020    DDIMER 0.87 11/29/2020     Lab Results   Component Value Date    FERRITIN 395 11/23/2020    FERRITIN 238 10/17/2020    FERRITIN 16 12/05/2018    FERRITIN 52 03/20/2015     Lab Results   Component Value Date     11/23/2020     No results found for: FIBRINOGEN    Results in Past 30 Days  Result Component Current Result Ref Range Previous Result Ref Range   SARS-CoV-2      (11/23/2020)  Not in Time Range          (11/23/2020)       DETECTED (A) (11/23/2020) Not Detected       Lab Results   Component Value Date    COVID19 DETECTED 11/23/2020    COVID19 Not Detected 10/17/2020    COVID19 Not Detected 10/17/2020       No results for input(s): VANCOTROUGH in the last 72 hours. Imaging Studies:     No new imaging    Cultures:     Culture, Blood 1 [[id number]]   Collected: 12/01/20 1744    Order Status: Completed  Specimen: Blood  Updated: 12/03/20 0025     Specimen Description  . BLOOD     Special Requests  LEFT WRIST 8ML     Culture  NO GROWTH 2 DAYS    Culture, Blood 1 [[id number]]   Collected: 12/01/20 1510    Order Status: Completed  Specimen: Blood  Updated: 12/03/20 0025     Specimen Description  . BLOOD     Special Requests  R AC 11 ML     Culture  NO GROWTH 2 DAYS      Medications:      dilTIAZem  30 mg Oral 3 times per day    LORazepam  0.5 mg Intravenous Once    furosemide  20 mg Intravenous BID    guaiFENesin  600 mg Oral BID    sodium chloride  20 mL Intravenous Once    rivaroxaban  20 mg Oral Daily   

## 2020-12-03 NOTE — PROGRESS NOTES
Comprehensive Nutrition Assessment    Type and Reason for Visit:  Initial(LOS)    Nutrition Recommendations/Plan:   - Continue current diet - encourage/monitor PO intakes as tolerated. - Will provide Ensure Enlive ONS in chocolate x 1 per day at breakfast.    - Monitor labs, intakes, and plan of care. Nutrition Assessment:  Spoke to pt on phone d/t airborne and isolation precautions. Pt states he is doing alright and his appetite is \"okay\". Reports he has been eating well at meals but does state d/t hx of rectal cancer he has diarrhea after eating. Pt consuming more than 75% of meals. Pt states he has been recieving an Ensure on his meal trays once in awhile. Reports he likes the chocolate flavor and would like to recieve on his breakfast tray. States -175 lb - reports he thinks he is about that same weight. Labs reviewed: Na+134 mmol/L. Malnutrition Assessment:  Malnutrition Status: At risk for malnutrition (Comment)    Context:  Acute Illness     Findings of the 6 clinical characteristics of malnutrition:  Energy Intake:  Mild decrease in energy intake (Comment)  Weight Loss:  (12.6% x 9 months per EHR)     Body Fat Loss:  1 - Mild body fat loss Orbital   Muscle Mass Loss:  Unable to assess    Fluid Accumulation:  No significant fluid accumulation     Strength:  Not Performed    Estimated Daily Nutrient Needs:  Energy (kcal):  25-28 kcal/kg = 8330-5960 kcals/day; Weight Used for Energy Requirements:  Admission   Protein (g):  1.0-1.3 gm/kg =  gm pro/day; Weight Used for Protein Requirements:  Ideal          Nutrition Related Findings:  Labs reviewed: Na+134(L). Meds reviewed: Lasix, Vitamin D. Last BM 12/1.       Wounds:  None       Current Nutrition Therapies:    DIET GENERAL; No Added Salt (3-4 GM)  Dietary Nutrition Supplements: Standard High Calorie Oral Supplement    Anthropometric Measures:  · Height: 6' (182.9 cm)  · Current Body Weight: 164 lb 3.9 oz (74.5 kg) · Admission Body Weight: 173 lb 11.6 oz (78.8 kg)    · Usual Body Weight: 191 lb 3.2 oz (86.7 kg)(2/12/20 per chart review - pt reports -175 lb)     · Ideal Body Weight: 178 lbs; % Ideal Body Weight 93.9 %   · BMI: 22.3  · BMI Categories: Normal Weight (BMI 22.0 to 24.9) age over 72       Nutrition Diagnosis:   · Inadequate oral intake related to (current medical condition) as evidenced by weight loss(variable PO intakes, need for ONS)    Nutrition Interventions:   Food and/or Nutrient Delivery:  Continue Current Diet, Start Oral Nutrition Supplement  Nutrition Education/Counseling:  No recommendation at this time   Coordination of Nutrition Care:  Continue to monitor while inpatient    Goals:  Oral intakes to meet % of estimated nutrition needs.  - Progressing    Nutrition Monitoring and Evaluation:   Food/Nutrient Intake Outcomes:  Food and Nutrient Intake, Supplement Intake  Physical Signs/Symptoms Outcomes:  Biochemical Data, GI Status, Fluid Status or Edema, Hemodynamic Status, Nutrition Focused Physical Findings, Skin, Weight     Electronically signed by Marcela Paget, RD, LD on 12/3/20 at 1:37 PM EST    Contact: [phone]

## 2020-12-03 NOTE — PLAN OF CARE
Problem: Gas Exchange - Impaired  Goal: Absence of hypoxia  Outcome: Ongoing  Intervention: Monitor oxygen saturation  Note:   PROVIDE ADEQUATE OXYGENATION WITH ACCEPTABLE SP02/ABG'S    [x]  IDENTIFY APPROPRIATE OXYGEN THERAPY  [x]   MONITOR SP02/ABG'S AS NEEDED   [x]   PATIENT EDUCATION AS NEEDED     Goal: Promote optimal lung function  Outcome: Ongoing     Problem: Breathing Pattern - Ineffective  Goal: Ability to achieve and maintain a regular respiratory rate  Outcome: Ongoing     Problem: OXYGENATION/RESPIRATORY FUNCTION  Goal: Patient will maintain patent airway  Outcome: Ongoing  Goal: Patient will achieve/maintain normal respiratory rate/effort  Description: Respiratory rate and effort will be within normal limits for the patient  Outcome: Ongoing

## 2020-12-03 NOTE — PROGRESS NOTES
Patient has been up to chair today and tolerated . Patient is now on O2 at 5L via NC and is in the 90%. The past several days patient has been in and out of Afib , writer has notified cardiology as well as primary. Primary did give new orders for Cardizem which was administer by mouth. Patient converted back to NSR abut 4PM this shift. Writer did contact cardiology early this am and asked if patient would be rounded and and was told \"yes! \" Sanjay Lucio has not seen cardiology and did send another message. At this time patient is resting in bed, call light in reach and safety measure are met. Mele Kruse

## 2020-12-03 NOTE — PLAN OF CARE
Problem: Gas Exchange - Impaired  Goal: Promote optimal lung function  12/3/2020 0606 by Lilia Cockayne, RN  Outcome: Ongoing     Problem: Breathing Pattern - Ineffective  Goal: Ability to achieve and maintain a regular respiratory rate  12/3/2020 0606 by Lilia Cockayne, RN  Outcome: Ongoing     Problem: Isolation Precautions - Risk of Spread of Infection  Goal: Prevent transmission of infection  Outcome: Ongoing     Problem: Airway Clearance - Ineffective  Goal: Achieve or maintain patent airway  Outcome: Ongoing

## 2020-12-03 NOTE — PROGRESS NOTES
Unknown  As a baby       Current Meds:   Scheduled Meds:    LORazepam  0.5 mg Intravenous Once    furosemide  20 mg Intravenous BID    guaiFENesin  600 mg Oral BID    sodium chloride  20 mL Intravenous Once    rivaroxaban  20 mg Oral Daily    atorvastatin  40 mg Oral Daily    tamsulosin  0.4 mg Oral Daily    sodium chloride flush  10 mL Intravenous 2 times per day    famotidine  20 mg Oral Daily    Vitamin D  2,000 Units Oral Daily     Continuous Infusions:   PRN Meds: metoprolol, calcium carbonate, sodium chloride, albuterol **AND** [] ipratropium, sodium chloride flush, potassium chloride **OR** potassium alternative oral replacement **OR** potassium chloride, magnesium sulfate, acetaminophen **OR** acetaminophen, polyethylene glycol, promethazine **OR** [DISCONTINUED] ondansetron, nicotine, albuterol sulfate HFA, dextromethorphan-guaiFENesin    Data:     Past Medical History:   has a past medical history of Atrial fibrillation ([facility] Back pain, chronic, Hill's esophagus, BPH (benign prostatic hyperplasia), Cancer ([facility] Cocaine abuse in remission [facility]), ED (erectile dysfunction), GERD (gastroesophageal reflux disease), GI bleed, Hernia, History of colon cancer, Melena, Migraines, and Murmur, cardiac. Social History:   reports that he quit smoking about 49 years ago. He has a 0.50 pack-year smoking history. He has never used smokeless tobacco. He reports current alcohol use of about 12.0 standard drinks of alcohol per week. He reports that he does not use drugs.      Family History:   Family History   Problem Relation Age of Onset    Diabetes Mother     Heart Attack Father     Heart Disease Father     Heart Disease Brother        Vitals:  BP 84/70   Pulse 92   Temp 97.2 °F (36.2 °C) (Axillary)   Resp 25   Ht 6' (1.829 m)   Wt 164 lb 3.9 oz (74.5 kg)   SpO2 92%   BMI 22.28 kg/m²   Temp (24hrs), Av.7 °F (36.5 °C), Min:97.2 °F (36.2 °C), Max:98.4 °F (36.9 °C)    No results for input(s): POCGLU in the last 72 hours. I/O (24Hr): Intake/Output Summary (Last 24 hours) at 12/3/2020 1028  Last data filed at 12/3/2020 1680  Gross per 24 hour   Intake 1130 ml   Output 550 ml   Net 580 ml       Labs:  Hematology:  Recent Labs     12/01/20 0016 12/02/20 0606 12/03/20  0435   WBC 13.2* 11.3 11.8*   RBC 4.71 4.54 5.07   HGB 12.0* 11.6* 13.1   HCT 39.7* 38.7* 42.7   MCV 84.3 85.2 84.2   MCH 25.5 25.6 25.8   MCHC 30.2 30.0 30.7   RDW 19.5* 19.9* 20.4*   * 485* 536*   MPV 9.4 9.7 9.7   DDIMER 0.51 0.45 0.40     Chemistry:  Recent Labs     11/30/20  1932 12/01/20 0016 12/01/20  0842 12/02/20 0606 12/03/20  0435   NA  --  136  --  137 134*   K  --  4.3  --  4.5 4.6   CL  --  105  --  101 101   CO2  --  23  --  25 25   GLUCOSE  --  114*  --  91 93   BUN  --  24*  --  24* 23   CREATININE  --  0.54*  --  0.53* 0.61*   ANIONGAP  --  8*  --  11 8*   LABGLOM  --  >60  --  >60 >60   GFRAA  --  >60  --  >60 >60   CALCIUM  --  8.3*  --  8.4* 8.4*   PROBNP  --   --  980*  --  727*   TROPHS 22 32* 30*  --   --    MYOGLOBIN <21* <21* 21*  --   --      Recent Labs     12/01/20 0016 12/02/20 0606 12/03/20  0435   PROT 5.5* 5.3* 5.8*   LABALBU 2.4* 2.4* 2.7*   AST 11 11 11   ALT 14 13 13   ALKPHOS 65 61 67   BILITOT 0.60 0.68 0.73     ABG:  Lab Results   Component Value Date    POCPH 7.519 11/23/2020    POCPCO2 32.2 11/23/2020    POCPO2 47.0 11/23/2020    POCHCO3 26.2 11/23/2020    NBEA NOT REPORTED 11/23/2020    PBEA 4 11/23/2020    FYL9UTK 27 11/23/2020    OEFN8AZH 87 11/23/2020    FIO2 NOT REPORTED 11/23/2020     Lab Results   Component Value Date/Time    SPECIAL LEFT WRIST 8ML 12/01/2020 05:44 PM     Lab Results   Component Value Date/Time    CULTURE NO GROWTH 2 DAYS 12/01/2020 05:44 PM       Radiology:  Xr Chest Portable    Result Date: 11/23/2020  Stable exam     Ct Chest Pulmonary Embolism W Contrast    Result Date: 11/23/2020  No central pulmonary embolus.   Peripheral branches are not well evaluated secondary to motion. Increased ground-glass opacity and parenchymal opacities throughout the lungs either due to developing pneumonia or edema. There is a small left-sided pleural effusion. By report there is a history of COVID-19 infection Underlying pulmonary fibrosis again noted.        Physical Examination:        General appearance:  Alert,   Lungs: Decreased air entry bilateral    Heart:  regular rate and rhythm, no murmur  Abdomen:  soft, nontender, nondistended, normal bowel sounds, no masses, hepatomegaly, splenomegaly  Extremities:  no edema, redness, tenderness in the calves  Skin:  no gross lesions, rashes, induration    Assessment:        Hospital Problems           Last Modified POA    * (Principal) Acute hypoxemic respiratory failure due to COVID-19 ([facility] 11/23/2020 Yes    Dyslipidemia 11/23/2020 Yes    Benign prostatic hyperplasia with urinary obstruction 11/23/2020 Yes    Hypertrophic nonobstructive cardiomyopathy ([facility] 11/23/2020 Yes    Iron (Fe) deficiency anemia 11/23/2020 Yes    Benign essential HTN 11/23/2020 Yes    CHF (congestive heart failure), NYHA class II, acute on chronic, combined ([facility] 11/23/2020 Yes    Occupational pulmonary disease 11/23/2020 Yes    COVID-19 11/24/2020 Yes          Plan:          COVID-19 pneumonia status post Decadron and remdesivir  Worsening today chest x-ray    History of pulmonary fibrosis monitor      aCute on top of chronic hypoxemic respiratory failure most likely related to COVID-19 infection and pneumonia  Wean off oxygen    A. fib with RVR status post cardioversion in the past continue oral anticoagulation   Added Cardizem as patient has episodes of A. fib with RVR hold Cardizem if heart rate less than 65     bradycardia monitor    NSTEMI type II secondary to demand ischemia treated with heparin drip currently heparin drip was stopped medical management    Hypertension monitor closely      Dyslipidemia statin      Acute to type of chronic combined systolic and diastolic CHF exacerbation with hypertrophic nonobstructive cardiomyopathy treated with diuresis    Pulmonary edema developed during hospitalization IV diuresis    DC IV antibiotics      Hypokalemia replaced        Yamilex Bautista MD  12/3/2020  10:28 AM

## 2020-12-04 LAB
ABSOLUTE EOS #: 0.41 K/UL (ref 0–0.44)
ABSOLUTE IMMATURE GRANULOCYTE: 0.27 K/UL (ref 0–0.3)
ABSOLUTE LYMPH #: 2.74 K/UL (ref 1.1–3.7)
ABSOLUTE MONO #: 1.51 K/UL (ref 0.1–1.2)
ALBUMIN SERPL-MCNC: 3.2 G/DL (ref 3.5–5.2)
ALBUMIN/GLOBULIN RATIO: 1 (ref 1–2.5)
ALP BLD-CCNC: 78 U/L (ref 40–129)
ALT SERPL-CCNC: 17 U/L (ref 5–41)
ANION GAP SERPL CALCULATED.3IONS-SCNC: 8 MMOL/L (ref 9–17)
AST SERPL-CCNC: 14 U/L
BASOPHILS # BLD: 1 % (ref 0–2)
BASOPHILS ABSOLUTE: 0.14 K/UL (ref 0–0.2)
BILIRUB SERPL-MCNC: 1 MG/DL (ref 0.3–1.2)
BUN BLDV-MCNC: 33 MG/DL (ref 8–23)
BUN/CREAT BLD: ABNORMAL (ref 9–20)
CALCIUM SERPL-MCNC: 8.6 MG/DL (ref 8.6–10.4)
CHLORIDE BLD-SCNC: 101 MMOL/L (ref 98–107)
CO2: 30 MMOL/L (ref 20–31)
CREAT SERPL-MCNC: 0.81 MG/DL (ref 0.7–1.2)
D-DIMER QUANTITATIVE: 0.4 MG/L FEU
DIFFERENTIAL TYPE: ABNORMAL
EOSINOPHILS RELATIVE PERCENT: 3 % (ref 1–4)
GFR AFRICAN AMERICAN: >60 ML/MIN
GFR NON-AFRICAN AMERICAN: >60 ML/MIN
GFR SERPL CREATININE-BSD FRML MDRD: ABNORMAL ML/MIN/{1.73_M2}
GFR SERPL CREATININE-BSD FRML MDRD: ABNORMAL ML/MIN/{1.73_M2}
GLUCOSE BLD-MCNC: 84 MG/DL (ref 70–99)
HCT VFR BLD CALC: 47.1 % (ref 40.7–50.3)
HEMOGLOBIN: 14.2 G/DL (ref 13–17)
IMMATURE GRANULOCYTES: 2 %
LYMPHOCYTES # BLD: 20 % (ref 24–43)
MAGNESIUM: 2.2 MG/DL (ref 1.6–2.6)
MCH RBC QN AUTO: 25.6 PG (ref 25.2–33.5)
MCHC RBC AUTO-ENTMCNC: 30.1 G/DL (ref 28.4–34.8)
MCV RBC AUTO: 84.9 FL (ref 82.6–102.9)
MONOCYTES # BLD: 11 % (ref 3–12)
MORPHOLOGY: ABNORMAL
NRBC AUTOMATED: 0 PER 100 WBC
PDW BLD-RTO: 21 % (ref 11.8–14.4)
PLATELET # BLD: 600 K/UL (ref 138–453)
PLATELET ESTIMATE: ABNORMAL
PMV BLD AUTO: 9.8 FL (ref 8.1–13.5)
POTASSIUM SERPL-SCNC: 4.3 MMOL/L (ref 3.7–5.3)
RBC # BLD: 5.55 M/UL (ref 4.21–5.77)
RBC # BLD: ABNORMAL 10*6/UL
SEG NEUTROPHILS: 63 % (ref 36–65)
SEGMENTED NEUTROPHILS ABSOLUTE COUNT: 8.63 K/UL (ref 1.5–8.1)
SODIUM BLD-SCNC: 139 MMOL/L (ref 135–144)
TOTAL PROTEIN: 6.3 G/DL (ref 6.4–8.3)
WBC # BLD: 13.7 K/UL (ref 3.5–11.3)
WBC # BLD: ABNORMAL 10*3/UL

## 2020-12-04 PROCEDURE — 6360000002 HC RX W HCPCS: Performed by: INTERNAL MEDICINE

## 2020-12-04 PROCEDURE — 80053 COMPREHEN METABOLIC PANEL: CPT

## 2020-12-04 PROCEDURE — 6360000002 HC RX W HCPCS: Performed by: NURSE PRACTITIONER

## 2020-12-04 PROCEDURE — 2580000003 HC RX 258: Performed by: NURSE PRACTITIONER

## 2020-12-04 PROCEDURE — 99233 SBSQ HOSP IP/OBS HIGH 50: CPT | Performed by: INTERNAL MEDICINE

## 2020-12-04 PROCEDURE — 2060000000 HC ICU INTERMEDIATE R&B

## 2020-12-04 PROCEDURE — 99232 SBSQ HOSP IP/OBS MODERATE 35: CPT | Performed by: INTERNAL MEDICINE

## 2020-12-04 PROCEDURE — 85379 FIBRIN DEGRADATION QUANT: CPT

## 2020-12-04 PROCEDURE — 36415 COLL VENOUS BLD VENIPUNCTURE: CPT

## 2020-12-04 PROCEDURE — 83735 ASSAY OF MAGNESIUM: CPT

## 2020-12-04 PROCEDURE — 6370000000 HC RX 637 (ALT 250 FOR IP): Performed by: NURSE PRACTITIONER

## 2020-12-04 PROCEDURE — 2700000000 HC OXYGEN THERAPY PER DAY

## 2020-12-04 PROCEDURE — 2580000003 HC RX 258: Performed by: INTERNAL MEDICINE

## 2020-12-04 PROCEDURE — 6370000000 HC RX 637 (ALT 250 FOR IP): Performed by: INTERNAL MEDICINE

## 2020-12-04 PROCEDURE — 94761 N-INVAS EAR/PLS OXIMETRY MLT: CPT

## 2020-12-04 PROCEDURE — 2500000003 HC RX 250 WO HCPCS: Performed by: INTERNAL MEDICINE

## 2020-12-04 PROCEDURE — 85025 COMPLETE CBC W/AUTO DIFF WBC: CPT

## 2020-12-04 RX ORDER — BUMETANIDE 1 MG/1
1 TABLET ORAL DAILY
Status: DISCONTINUED | OUTPATIENT
Start: 2020-12-05 | End: 2020-12-07 | Stop reason: HOSPADM

## 2020-12-04 RX ORDER — DIGOXIN 125 MCG
125 TABLET ORAL DAILY
Status: DISCONTINUED | OUTPATIENT
Start: 2020-12-05 | End: 2020-12-07 | Stop reason: HOSPADM

## 2020-12-04 RX ORDER — DIGOXIN 0.25 MG/ML
250 INJECTION INTRAMUSCULAR; INTRAVENOUS ONCE
Status: COMPLETED | OUTPATIENT
Start: 2020-12-04 | End: 2020-12-04

## 2020-12-04 RX ORDER — 0.9 % SODIUM CHLORIDE 0.9 %
500 INTRAVENOUS SOLUTION INTRAVENOUS ONCE
Status: COMPLETED | OUTPATIENT
Start: 2020-12-04 | End: 2020-12-04

## 2020-12-04 RX ORDER — DILTIAZEM HYDROCHLORIDE 5 MG/ML
5 INJECTION INTRAVENOUS ONCE
Status: DISCONTINUED | OUTPATIENT
Start: 2020-12-04 | End: 2020-12-04

## 2020-12-04 RX ADMIN — DIGOXIN 250 MCG: 0.25 INJECTION INTRAMUSCULAR; INTRAVENOUS at 11:02

## 2020-12-04 RX ADMIN — ACETAMINOPHEN 650 MG: 325 TABLET ORAL at 08:08

## 2020-12-04 RX ADMIN — FAMOTIDINE 20 MG: 20 TABLET, FILM COATED ORAL at 08:00

## 2020-12-04 RX ADMIN — Medication 2000 UNITS: at 08:07

## 2020-12-04 RX ADMIN — RIVAROXABAN 20 MG: 20 TABLET, FILM COATED ORAL at 17:36

## 2020-12-04 RX ADMIN — SODIUM CHLORIDE 500 ML: 0.9 INJECTION, SOLUTION INTRAVENOUS at 16:32

## 2020-12-04 RX ADMIN — DILTIAZEM HYDROCHLORIDE 30 MG: 30 TABLET, FILM COATED ORAL at 00:00

## 2020-12-04 RX ADMIN — DILTIAZEM HYDROCHLORIDE 30 MG: 30 TABLET, FILM COATED ORAL at 08:08

## 2020-12-04 RX ADMIN — SODIUM CHLORIDE, PRESERVATIVE FREE 10 ML: 5 INJECTION INTRAVENOUS at 08:07

## 2020-12-04 RX ADMIN — FUROSEMIDE 40 MG: 10 INJECTION, SOLUTION INTRAMUSCULAR; INTRAVENOUS at 08:00

## 2020-12-04 RX ADMIN — GUAIFENESIN 600 MG: 600 TABLET, EXTENDED RELEASE ORAL at 20:12

## 2020-12-04 RX ADMIN — TAMSULOSIN HYDROCHLORIDE 0.4 MG: 0.4 CAPSULE ORAL at 08:07

## 2020-12-04 RX ADMIN — AMIODARONE HYDROCHLORIDE 1 MG/MIN: 50 INJECTION, SOLUTION INTRAVENOUS at 17:14

## 2020-12-04 RX ADMIN — GUAIFENESIN 600 MG: 600 TABLET, EXTENDED RELEASE ORAL at 08:00

## 2020-12-04 RX ADMIN — METOPROLOL TARTRATE 2.5 MG: 1 INJECTION, SOLUTION INTRAVENOUS at 08:19

## 2020-12-04 RX ADMIN — ATORVASTATIN CALCIUM 40 MG: 80 TABLET, FILM COATED ORAL at 20:10

## 2020-12-04 RX ADMIN — AMIODARONE HYDROCHLORIDE 150 MG: 50 INJECTION, SOLUTION INTRAVENOUS at 16:49

## 2020-12-04 ASSESSMENT — PAIN SCALES - GENERAL
PAINLEVEL_OUTOF10: 0
PAINLEVEL_OUTOF10: 0

## 2020-12-04 NOTE — PROGRESS NOTES
Pulmonary Progress Note    CC:  COVID -19 PNEUMONIA   Subjective: Has A. fib with RVR intermittently  Started on digoxin  On 4 L nasal cannula  No work of breathing        Review of Systems -  General ROS: fatigue  ENT ROS: negative for - headaches, oral lesions or sore throat  Cardiovascular ROS: no chest pain , orthopnea or pnd   Gastrointestinal ROS: no abdominal pain, change in bowel habits, or black or bloody stools  Neuro - no blurry vision , no loc .  No focal weakness       Immunization   Immunization History   Administered Date(s) Administered    Influenza Vaccine, unspecified formulation 01/16/2014, 01/03/2017, 12/12/2017    Influenza Virus Vaccine 01/16/2014, 10/26/2014, 12/04/2015    Influenza, High Dose (Fluzone 65 yrs and older) 09/27/2018    Influenza, Terrie Mikaela, 6 mo and older, IM (Fluzone, Flulaval) 12/12/2017    Influenza, Quadv, IM, (6 mo and older Fluzone, Flulaval, Fluarix and 3 yrs and older Afluria) 10/23/2014, 01/03/2017    Influenza, Quadv, IM, PF (6 mo and older Fluzone, Flulaval, Fluarix, and 3 yrs and older Afluria) 10/23/2014    Influenza, Quadv, adjuvanted, 65 yrs +, IM, PF (Fluad) 11/16/2020    Influenza, Triv, inactivated, subunit, adjuvanted, IM (Fluad 65 yrs and older) 11/08/2019          PAST MEDICAL HISTORY:       Diagnosis Date    Atrial fibrillation (HCC)     Back pain, chronic     Hill's esophagus 06/18/2019    BPH (benign prostatic hyperplasia)     Cancer (HCC)     colon-rectal    Cocaine abuse in remission ([facility]     1970's    ED (erectile dysfunction) 4/2/2015    GERD (gastroesophageal reflux disease)     GI bleed 12/5/2018    Hernia     History of colon cancer     Melena     Migraines     Murmur, cardiac          Family History:       Problem Relation Age of Onset    Diabetes Mother     Heart Attack Father     Heart Disease Father     Heart Disease Brother        SURGICAL HISTORY:   Past Surgical History:   Procedure Laterality Date    CARDIAC CATHETERIZATION  11/29/2018    Non-obstructive CAD    CARDIOVERSION  2020    COLECTOMY      2nd colectomy, Colostomy and reversed [facility] COLECTOMY      1st time Ksenia    COLONOSCOPY      COLONOSCOPY  07/18/2016    COLONOSCOPY N/A 12/6/2018    COLONOSCOPY DIAGNOSTIC performed by Del Davis MD at [address] Right 2009    inguinal    KNEE SURGERY Right 1970's    arthrotomy    TONSILLECTOMY      TOTAL KNEE ARTHROPLASTY Right 1/8/2019    KNEE TOTAL ARTHROPLASTY performed by Padilla Leigh MD at [address] TRANSESOPHAGEAL ECHOCARDIOGRAM  11/29/2018    UPPER GASTROINTESTINAL ENDOSCOPY N/A 12/5/2018    EGD DIAGNOSTIC ONLY performed by Del Davis MD at [address] [facility] N/A 6/18/2019    MC[facility]RE'S              TOBACCO:   reports that he quit smoking about 49 years ago. He has a 0.50 pack-year smoking history. He has never used smokeless tobacco.  ETOH:   reports current alcohol use of about 12.0 standard drinks of alcohol per week. ALLERGIES:    Allergies   Allergen Reactions    Adhesive Tape Other (See Comments)     Blister badly     Codeine Nausea Only     Other reaction(s): Other: See Comments  NAUSEA  Other reaction(s): Other: See Comments  NAUSEA    Penicillins Swelling     As a baby  Other reaction(s): Unknown  As a baby       Home Meds:   Prior to Admission medications    Medication Sig Start Date End Date Taking?  Authorizing Provider   atorvastatin (LIPITOR) 40 MG tablet Take 1 tablet by mouth daily 11/16/20  Yes Cassandra Robledo MD   omeprazole (PRILOSEC) 40 MG delayed release capsule Take 1 capsule by mouth daily 11/16/20  Yes Cassandra Robledo MD   lisinopril (PRINIVIL;ZESTRIL) 5 MG tablet take 1 tablet by mouth once daily 10/26/20  Yes Historical Provider, MD   sotalol (BETAPACE) 80 MG tablet take 1 tablet by mouth every 12 hours 10/28/20  Yes Historical Provider, MD   ibuprofen (ADVIL;MOTRIN) 800 MG tablet Take 1 tablet by mouth every 6 hours if needed for pain 10/27/20  Yes Cassandra Flores MD   furosemide (LASIX) 20 MG tablet Take 1 tablet by mouth daily 10/26/20  Yes August Davis MD   tamsulosin [facility]) 0.4 MG capsule Take 1 capsule by mouth daily 10/15/20  Yes Ruben Rowe MD   rivaroxaban (XARELTO) 20 MG TABS tablet Take 20 mg by mouth daily (with breakfast)    Yes Historical Provider, MD         Intake/Output Summary (Last 24 hours) at 12/4/2020 1354  Last data filed at 12/4/2020 0000  Gross per 24 hour   Intake --   Output 750 ml   Net -750 ml         Diet   DIET GENERAL; No Added Salt (3-4 GM)  Dietary Nutrition Supplements: Standard High Calorie Oral Supplement    Vitals:   BP (!) 94/58   Pulse 117   Temp 97 °F (36.1 °C) (Axillary)   Resp 26   Ht 6' (1.829 m)   Wt 164 lb 3.9 oz (74.5 kg)   SpO2 93%   BMI 22.28 kg/m²  on         I/O (24 Hours)    Patient Vitals for the past 8 hrs:   BP Temp Temp src Pulse Resp SpO2   12/04/20 1045 (!) 94/58 -- -- 117 26 93 %   12/04/20 0845 -- -- -- 100 25 98 %   12/04/20 0815 -- -- -- 148 30 (!) 80 %   12/04/20 0751 114/67 97 °F (36.1 °C) Axillary 67 19 96 %   12/04/20 0715 -- -- -- 67 22 94 %   12/04/20 0600 -- 97.5 °F (36.4 °C) Axillary -- -- --       Intake/Output Summary (Last 24 hours) at 12/4/2020 1354  Last data filed at 12/4/2020 0000  Gross per 24 hour   Intake --   Output 750 ml   Net -750 ml     I/O last 3 completed shifts:  In: -   Out: 750 [Urine:750]   Date 12/04/20 0000 - 12/04/20 2359   Shift 3061-2266 3640-2868 9125-2593 24 Hour Total   INTAKE   Shift Total(mL/kg)       OUTPUT   Urine(mL/kg/hr) 750(1.3)   750   Shift Total(mL/kg) 750(10.1)   750(10.1)   Weight (kg) 74.5 74.5 74.5 74.5     Patient Vitals for the past 96 hrs (Last 3 readings):   Weight   12/03/20 0605 164 lb 3.9 oz (74.5 kg)   12/02/20 0209 166 lb 14.2 oz (75.7 kg)          PHYSICAL EXAMINATION:  I have discussed the care of Mary Kay Gardner  including pertinent history and exam findings,  with the nursing staff I have seen  the patient and the key elements of all parts of the encounter have been performed by me. For careful stewardship of limited PPE during COVID-19 pandemic my physical exam was deferred. .        Medications:    Scheduled Meds:   [START ON 2020] digoxin  125 mcg Oral Daily    [START ON 2020] bumetanide  1 mg Oral Daily    LORazepam  0.5 mg Intravenous Once    guaiFENesin  600 mg Oral BID    sodium chloride  20 mL Intravenous Once    rivaroxaban  20 mg Oral Daily    atorvastatin  40 mg Oral Daily    tamsulosin  0.4 mg Oral Daily    sodium chloride flush  10 mL Intravenous 2 times per day    famotidine  20 mg Oral Daily    Vitamin D  2,000 Units Oral Daily       Continuous Infusions:      PRN Meds:  metoprolol, calcium carbonate, sodium chloride, albuterol **AND** [] ipratropium, sodium chloride flush, potassium chloride **OR** potassium alternative oral replacement **OR** potassium chloride, magnesium sulfate, acetaminophen **OR** acetaminophen, polyethylene glycol, promethazine **OR** [DISCONTINUED] ondansetron, nicotine, albuterol sulfate HFA, dextromethorphan-guaiFENesin    Labs:  CBC:   Recent Labs     20  0620  04320  0703   WBC 11.3 11.8* 13.7*   HGB 11.6* 13.1 14.2   HCT 38.7* 42.7 47.1   MCV 85.2 84.2 84.9   * 536* 600*     BMP:   Recent Labs     20  0620  0435 20  0703    134* 139   K 4.5 4.6 4.3    101 101   CO2 25 25 30   BUN 24* 23 33*   CREATININE 0.53* 0.61* 0.81     LIVER PROFILE:   Recent Labs     20  0606 20  0435 20  0703   AST 11 11 14   ALT 13 13 17   BILITOT 0.68 0.73 1.00   ALKPHOS 61 67 78     PT/INR: No results for input(s): PROTIME, INR in the last 72 hours. APTT: No results for input(s): APTT in the last 72 hours.   UA:No results for input(s): NITRITE, COLORU, PHUR, LABCAST, WBCUA, RBCUA, MUCUS, TRICHOMONAS, YEAST, BACTERIA, CLARITYU, Rhea Reach, UROBILINOGEN, BILIRUBINUR, BLOODU, GLUCOSEU, AMORPHOUS in the last 72 hours. Invalid input(s): KETONESU  No results for input(s): PHART, QRX3VUU, PO2ART in the last 72 hours. Films:  No results found. LOS: 11          Vent Information  Skin Assessment: Clean, dry, & intact  Equipment Changed: Humidification  FiO2 : 35 %  SpO2: 93 %  SpO2/FiO2 ratio: 262.86  I Time/ I Time %: 0.9 s  Humidification Source: Heated wire  Humidification Temp: 33  Humidification Temp Measured: 33  Circuit Condensation: Drained  Mask Type: Full face mask  Mask Size: Large     PaO2/FiO2 RATIO:  No results for input(s): POCPO2 in the last 72 hours. FiO2 : 35 %       LABS:  ABGs:   No results for input(s): POCPH, POCPCO2, POCPO2, POCHCO3, [id number] in the last 72 hours. ASSESSMENT:  Principal Problem:    Acute hypoxemic respiratory failure due to COVID-19 [facility])  Active Problems:    Dyslipidemia    Benign prostatic hyperplasia with urinary obstruction    Hypertrophic nonobstructive cardiomyopathy (HCC)    Iron (Fe) deficiency anemia    Benign essential HTN    CHF (congestive heart failure), NYHA class II, acute on chronic, combined ([facility]    Occupational pulmonary disease    COVID-19  Resolved Problems:    * No resolved hospital problems.  *   afib      Acute hypoxic respiratory failure secondary to COVID 19   Bilateral multifocal pneumonia due to COVID 19 infection superimposed on pulmonary fibrosis    Covid -19 pandemic emergency     PLAN:    PRONE :       [x] No    [] Yes    REMDESIVIR:             [] No    [x] Yes    DEXAMETHASONE : [] No    [x] Yes    CONVALESCENT PLASMA : [] No    [x] Yes 11/14/20  OFF HIGH FLOW   NOW ON LOW FLOW 4 L   Change to Bumex 1 mg daily from tomorrow morning  Treatment of A. fib per cardiology  PLAN DC TO HOME ONCE LOW FLOW O2 VIA NC 4 L OR LESS     Airborne isolation and droplet precautions to be continued  Continue supportive care   REMDESIVIR  AND DECADRON

## 2020-12-04 NOTE — PROGRESS NOTES
[facility] -Progress Note COVID 19 Patient  Today's Date and Time: 12/4/2020, 11:17 AM    Impression :   · COVID 19 Suspect  · COVID 19 Confirmed Infection. · Covid tests:  · 11-23-20: Positive. · Atrial fibrillation with RVR: S/P cardioversion   · Pulmonary Fibrosis: secondary to Occupational exposure. 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 · Hypertrophic non obstructive cardiomyopathy. · Hx of Colon cancer  · BPH. Recommendations:   · Monitor off antibiotics. · Remdesivir. Completed treatment on 11/28/20. · Completed Decadron. Started on 11/23 Stop date:12/2/20  · Plasma infusion. Two units infused 11-24-10    Medical Decision Making/Summary/Discussion:12/4/2020     · Patient admitted with suspected COVID 19 infection  · Covid test confirmed positive. · Associated problems with congestive heart failure, pulmonary fibrosis, hypoxia. Infection Control Recommendations   · Universal Precautions  · Airborne isolation  · Droplet Isolation    Antimicrobial Stewardship Recommendations     · Discontinuation of therapy  Coordination of Outpatient Care:   · Estimated Length of IV antimicrobials:TBD  · Patient will need Midline Catheter Insertion: TBD  · Patient will need PICC line Insertion: No  · Patient will need: Home IV , [place],  SNF,  LTAC:TBD  · Patient will need outpatient wound care:No    Chief complaint/reason for consultation:   · Concern for COVID infection      History of Present Illness:   Lorene Cooper is a 77y.o.-year-old  male who was initially admitted on 11/23/2020. Patient seen at the request of [address] [place] [address]. INITIAL HISTORY:    Patient presented through ER with complaints of onset of shortness of breath. Located onset of symptoms on 11/22/2020 with worsening through the day leading to his seeking help at the emergency room.     The patient had been recently admitted on 10/17/2020 through 10/26/2020 at [facility] because of the presence of congestive heart failure NYHAA class II with acute on chronic combined heart failure. He also suffers from atrial fibrillation and hypertrophic nonobstructive cardiomyopathy, benign essential hypertension. His previous diagnosis includes occupational pulmonary fibrosis. During that admission he was also found to have suffered from mycoplasma pneumonia and was treated with antibiotics. He reports being seen at the [facility], after his admission to [place] [id number],  where he was cardioverted because of the atrial fibrillation. He was also found to be in heart failure and was a started on diuretics. When the patient was evaluated in the emergency room he was found to be hypoxic and was treated with BiPAP which improved his oxygen saturation rate. He was tested for Covid and was found to be positive on 11/23/2020. Chest x-ray:  · 11/23/2020 persistent bilateral pulmonary infiltrates with associated pulmonary vascular congestion. Unchanged from films of 10/23/2020    Chest CT:  · 11/23/2020: Pulmonary fibrosis. No pulmonary embolus. Increased groundglass opacities and parenchymal opacities throughout both lungs when compared with films of 10/21/2020    Patient admitted because of concerns with COVID 19.    CURRENT EVALUATION : 12/4/2020    Afebrile  VS stable, hypotensive  On Rivaroxaban and cardizem for Atrial Fibrillation. Awiting further improvement in oxygenation to allow discharge. Acute on chronic combined systolic and diastolic CHF exacerbation with hypertrophic non obstructive cardiomyopathy. On  IV  Lasix 40 mg. Switched to Bumex 1mg oral tommorow. Leukocytosis present. Up trending. Completed Decadron 12-2-20  Completed Remdesivir on 11/28/20.  2 Units of Plasma transfused 11-24-20    Blood cultures on 12/1/20 show no growth. Patient exhibiting respiratory distress. Yes  Respiratory secretions: No    Patient receiving supplemental oxygen. Yes.   High flow  Nasal canula-->nasal cannula 4L O2.  FIO2 100--> 85-->75-->65-->50-->45%    02 sat 96%-->80-->93-->97-->96-->87-->98%  RR 32-->22-->20-->26-->22-->25    QTc:       NEWS Score: 0-4 Low risk group; 5-6: Medium risk group; 7 or above: High risk group  Parameters 3 2 1 0 1 2 3   Age    < 65   ? 65   RR ? 8  9-11 12-20  21-24 ? 25   O2 Sats ? 91 92-93 94-95 ? 96      Suppl O2  Yes  No      SBP ? 90  101-110 111-219   ? 220   HR ? 40  41-50 51-90  111-130 ? 131   Consciousness    Alert   Drowsiness, lethargy, or confusion   Temperature ? 35.0 C (95.0 F)  35.1-36.0 C 95.1-96.9 F 36.1-38.0 C 97.0-100.4 F 38.1-39.0 C 100.5-102.3 F ? 39.1 C ? 102.4 F      NEWS Score:   11/23/2020: 9 risk   11/24/20: 11 high Risk   11/29/20:12 high risk   12/4/20: 8 high risk    Overall Daily Picture:      Improving. Presence of secondary bacterial Infection:    No   Additional antibiotics: No    Labs, X rays reviewed: 12/4/2020    BUN: 16-->22-->31-->26-->26-->27-->33  Cr: 0.62-->0.61-->0.53-->0.59-->0.52-->0.60-->0.81    WBC: 14.1-->15.7-->11.4-->11.3-->12.4-->11.8>13.7  Hb: 12.0-->11.3-->10.1-->11.2-->11.6-->11.2-->14.2  Plat: 282-->305-->423-->396-->424->468-->600    Bilirubin 1.28-->0.51  Alk phos 86-->61  ALT 12-->18  AST 24-->12    Absolute Neutrophils: 13.1.-->9.69  Absolute Lymphocytes: 0.59.-->1.44  Neutrophil/Lymphocyte Ratio: 22.2 high risk.-->6.72high risk    CRP: 182 (10-17-20) -->204.8 (11/23/20)  Ferritin: 395(11/23/20)  LDH: 521(11/23/20)    Pro Calcitonin:0.29( 10/17/20)  Troponin high-sensitivity 102  proBNP 3472    Cultures:  Urine:  ·   Blood:  · 11/23/20: x2: No growth  · 12/1/20: x2: No growth  Sputum :  ·   Wound:       CXR:   · 11/23/2020 pulmonary fibrosis with interstitial edema. Scattered infiltrates bilaterally      CAT:  · 11/23/2020: Pulmonary fibrosis. No pulmonary embolus.   Increased groundglass opacities and parenchymal opacities throughout both lungs when compared with films of 10/21/2020            Discussed with patient, RN, CC, IM. I have personally reviewed the past medical history, past surgical history, medications, social history, and family history, and I have updated the database accordingly.   Past Medical History:     Past Medical History:   Diagnosis Date    Atrial fibrillation ([address]     Back pain, chronic     Mcguire's esophagus 06/18/2019    BPH (benign prostatic hyperplasia)     Cancer (HCC)     colon-rectal    Cocaine abuse in remission [facility])     1970's    ED (erectile dysfunction) 4/2/2015    GERD (gastroesophageal reflux disease)     GI bleed 12/5/2018    Hernia     History of colon cancer     Melena     Migraines     Murmur, cardiac        Past Surgical  History:     Past Surgical History:   Procedure Laterality Date    CARDIAC CATHETERIZATION  11/29/2018    Non-obstructive CAD    CARDIOVERSION  2020    COLECTOMY      2nd colectomy, Colostomy and reversed [facility] COLECTOMY      1st time Ksenia    COLONOSCOPY      COLONOSCOPY  07/18/2016    COLONOSCOPY N/A 12/6/2018    COLONOSCOPY DIAGNOSTIC performed by Danyell Mc MD at [address] [facility] Right 2009    inguinal    KNEE SURGERY Right 1970's    arthrotomy    TONSILLECTOMY      TOTAL KNEE ARTHROPLASTY Right 1/8/2019    KNEE TOTAL ARTHROPLASTY performed by Cherelle Irizarry MD at [facility] TRANSESOPHAGEAL ECHOCARDIOGRAM  11/29/2018    UPPER GASTROINTESTINAL ENDOSCOPY N/A 12/5/2018    EGD DIAGNOSTIC ONLY performed by Danyell Mc MD at [facility] N/A 6/18/2019    MCGUIRE'S       Medications:      [START ON 12/5/2020] digoxin  125 mcg Oral Daily    [START ON 12/5/2020] bumetanide  1 mg Oral Daily    LORazepam  0.5 mg Intravenous Once    guaiFENesin  600 mg Oral BID    sodium chloride  20 mL Intravenous Once    rivaroxaban  20 mg Oral Daily    atorvastatin  40 mg Oral Daily    tamsulosin  0.4 mg Oral Daily    sodium chloride flush  10 mL Intravenous 2 times per day    famotidine  20 mg Oral Daily    Vitamin D  2,000 Units Oral Daily       Social History:     Social History     Socioeconomic History    Marital status: Single     Spouse name: Not on file    Number of children: Not on file    Years of education: Not on file    Highest education level: Not on file   Occupational History    Not on file   Social Needs    Financial resource strain: Not on file    Food insecurity     Worry: Not on file     Inability: Not on file    Transportation needs     Medical: Not on file     Non-medical: Not on file   Tobacco Use    Smoking status: Former Smoker     Packs/day: 0.50     Years: 1.00     Pack years: 0.50     Last attempt to quit:      Years since quittin.9    Smokeless tobacco: Never Used    Tobacco comment: stated never actually really smoked only inhaled    Substance and Sexual Activity    Alcohol use: Yes     Alcohol/week: 12.0 standard drinks     Types: 10 Standard drinks or equivalent, 2 Shots of liquor per week     Comment: 2-3 times a week    Drug use: No     Types:  Other-see comments     Comment: Cocaine use in past in     Sexual activity: Yes     Partners: Female   Lifestyle    Physical activity     Days per week: Not on file     Minutes per session: Not on file    Stress: Not on file   Relationships    Social connections     Talks on phone: Not on file     Gets together: Not on file     Attends Quaker service: Not on file     Active member of club or organization: Not on file     Attends meetings of clubs or organizations: Not on file     Relationship status: Not on file    Intimate partner violence     Fear of current or ex partner: Not on file     Emotionally abused: Not on file     Physically abused: Not on file     Forced sexual activity: Not on file   Other Topics Concern    Not on file   Social History Narrative    Not on file       Family History:     Family History   Problem Relation Age of Onset    Diabetes Mother     Heart Attack Father     Heart Disease Father     Heart Disease Brother         Allergies:   Adhesive tape; Codeine; and Penicillins     Review of Systems:       Constitutional: No fevers or chills. No systemic complaints  Head: No headaches  Eyes: No double vision or blurry vision. No conjunctival inflammation. ENT: No sore throat or runny nose. . No hearing loss, tinnitus or vertigo. Cardiovascular: No chest pain or palpitations. Shortness of breath. CLAYTON  Lung: Shortness of breath, dry cough. No sputum production  Abdomen: No nausea, vomiting, diarrhea, or abdominal pain. Heddy  No cramps. Genitourinary: No increased urinary frequency, or dysuria. No hematuria. No suprapubic or CVA pain  Musculoskeletal: No muscle aches or pains. No joint effusions, swelling or deformities  Hematologic: No bleeding or bruising. Neurologic: No headache, weakness, numbness, or tingling. Integument: No rash, no ulcers. Psychiatric: No depression. Endocrine: No polyuria, no polydipsia, no polyphagia. Physical Examination :     Patient Vitals for the past 8 hrs:   BP Temp Temp src Pulse Resp SpO2   12/04/20 0845 -- -- -- 100 25 98 %   12/04/20 0815 -- -- -- 148 30 (!) 80 %   12/04/20 0751 114/67 97 °F (36.1 °C) Axillary 67 19 96 %   12/04/20 0715 -- -- -- 67 22 94 %   12/04/20 0600 -- 97.5 °F (36.4 °C) Axillary -- -- --     General Appearance: Awake, alert, and in no apparent distress  Head:  Normocephalic, no trauma  Eyes: Pupils equal, round, reactive to light; sclera anicteric; conjunctivae pink. No embolic phenomena. ENT: Oropharynx clear, without erythema, exudate, or thrush. No tenderness of sinuses. Mouth/throat: mucosa pink and moist. No lesions. Dentition in good repair. Neck:Supple, without lymphadenopathy. Thyroid normal, No bruits.   Pulmonary/Chest: ,Distant breath sounds, decreased breath sounds at the bases   cardiovascular: Irreegular rate and rhythm without murmurs, HISTORY:    ORDERING SYSTEM PROVIDED HISTORY: r/o PE    TECHNOLOGIST PROVIDED HISTORY:    r/o PE    Reason for Exam: sob    Acuity: Acute              Type of Exam: Initial         FINDINGS:    No intimal flap is seen in aorta. .  Small mediastinal nodes are noted,    similar to prior         No embolus is seen the central, right, left main pulmonary artery.  No    embolus is seen in the proximal segmental branches.  Distal segmental    branches and subsegmental branches are not well evaluated secondary to    respiratory motion artifact.         Patchy ground-glass and parenchymal opacities are seen in the left upper and    left lower lobe.         On the right patchy ground-glass and parenchymal opacities are seen in the    right upper, right middle and right lower lobe.  There is mild fusiform    bronchiectasis. Jose Honour is trace left-sided pleural effusion.         There is mild underlying pulmonary fibrosis.  There is underlying    bronchiectasis         Right adrenal gland is normal.  Left adrenal gland is normal.         1 mm calcification is seen in the left kidney         Spurring is seen in the spine.  Spurring is seen in the shoulder joints         Small soft tissue nodule is seen posteriorly in the subcutaneous fat    measuring 2.1 cm, likely sebaceous cyst              Impression    No central pulmonary embolus.  Peripheral branches are not well evaluated    secondary to motion.         Increased ground-glass opacity and parenchymal opacities throughout the lungs    either due to developing pneumonia or edema. Jose Honour is a small left-sided    pleural effusion.  By report there is a history of COVID-19 infection         Underlying pulmonary fibrosis again noted.                EXAMINATION:    ONE XRAY VIEW OF THE CHEST         11/23/2020 8:02 am         COMPARISON:    10/19/2020         HISTORY:    ORDERING SYSTEM PROVIDED HISTORY: SOB    TECHNOLOGIST PROVIDED HISTORY:    SOB    Reason for Exam: uprt port chest         FINDINGS:    Cardiomediastinal silhouette is stable.  Bilateral pulmonary infiltrates    persist unchanged and may represent pulmonary edema versus atypical pneumonia.              Impression    Stable exam        Medical Decision Jjkled-[id number]-Hvadh:       Medical Decision Making-Other:     Note:  · Labs, medications, radiologic studies were reviewed with personal review of films  · Large amounts of data were reviewed  · Discussed with nursing Staff, Discharge planner  · Infection Control and Prevention measures reviewed  · All prior entries were reviewed  · Administer medications as ordered  · Prognosis: Guarded  · Discharge planning reviewed  · Follow up as outpatient. Thank you for allowing us to participate in the care of this patient. Please call with questions.     Marion Woods           Pager: [phone] - Office: [phone]

## 2020-12-04 NOTE — CARE COORDINATION
TRANSITIONAL CARE PLANNING/ 2 Rehab [facility] Day: 11    Reason for Admission: Acute hypoxemic respiratory failure due to COVID-19 ([facility] [U07.1, J96.01]     Treatment Plan of Care: cardiology following afib/fluter, covid pnemonia tx, nephro and pulmonology following, O2 4L nc,     Tests/Procedures still needed: stress test as OP once acute issues resolve,     Barriers to Discharge: med clearance    Readmission Risk              Risk of Unplanned Readmission:        13            Patient goals/Treatment Preferences/Transitional Plan: Goal is home with Home O2, O2 NC 4L currently, will need Cardiology clearance and stress test as OP    Referrals Made:     Follow Up needed:

## 2020-12-04 NOTE — PROGRESS NOTES
DATE: 2020    NAME: Kait Simons  MRN: [id number]   : 1955    Patient not seen this date for Physical Therapy due to:  [] Blood transfusion in progress  [] Hemodialysis  [] Patient Declined  [] Spine Precautions   [] Strict Bedrest  [] Surgery/ Procedure  [] Testing      [x] Other Pt not appropriate at this time, d/t A-fib       [] PT is being discontinued at this time. Patient independent. No further needs. [] PT is being discontinued at this time due to declining physical/ medical status. Therapy is not appropriate at this time.     Aron Gaitan, PTA

## 2020-12-04 NOTE — PROGRESS NOTES
[facility]  Office: [address], DO, Francesumer King, DO, Margothdalton Crowell, DO, Dayanara Siddiquil Blood, DO, Radhames Graham MD, Jermaine Ortega MD, Darryle So, MD, Brittney Cao MD, Veronica Trent MD, Armand Howe MD, Espinoza Jones MD, Shakila Romero MD, Breezy Tejada MD, Vito Goff, DO, Mauri Hutchison MD, Gudelia Enciso MD, Arnol Adorno DO, Chapito Quijano MD,  Anh Bill DO, Daiana Ledesma MD, Sofia Donaldson MD, Inés Chung, [facility], [facility], CNP, Darin Zhang, CNP, Gerardo Luciano, CNS, Palomo Urban, CNP, Ethan Stewart, CNP, Liliana Brody, CNP, Amador Julian, CNP, Stacy Morrow, CNP, GRICELDA CuetoC, Fiona Murdock, MARIA ISABEL, Luke Sabillon, CNP, Robert Swan, CNP, Zoltan Katz, CNP, Tawana Rubinstein, CNP, Landen Longoria, CNP         [facility]   [address] [facility]    Progress Note    12/4/2020    10:18 AM    Name:   Michael Kasper  MRN:     [id number]     Acct:      [de-identified]   Room:   [facility] Day:  6  Admit Date:  11/23/2020  6:44 AM    PCP:   Suzi Fraga MD  Code Status:  Full Code    Subjective:     C/C:   Chief Complaint   Patient presents with    Shortness of Breath     Interval History Status:    Patient seen and examined  Shortness of breath has improved patient of high flow currently on 5 L of oxygen  Patient denies fever chest pain    I am seeing the patient for shortness of breath    Medications: Allergies: Allergies   Allergen Reactions    Adhesive Tape Other (See Comments)     Blister badly     Codeine Nausea Only     Other reaction(s): Other: See Comments  NAUSEA  Other reaction(s):  Other: See Comments  NAUSEA    Penicillins Swelling     As a baby  Other reaction(s): Unknown  As a baby       Current Meds:   Scheduled Meds:    digoxin  250 mcg Intravenous Once    [START ON 12/5/2020] digoxin  125 mcg Oral Daily    furosemide  40 mg Intravenous Daily    LORazepam  0.5 mg Intravenous Once 12/4/2020 0000  Gross per 24 hour   Intake --   Output 750 ml   Net -750 ml       Labs:  Hematology:  Recent Labs     12/02/20  0606 12/03/20 0435 12/04/20  0703   WBC 11.3 11.8* 13.7*   RBC 4.54 5.07 5.55   HGB 11.6* 13.1 14.2   HCT 38.7* 42.7 47.1   MCV 85.2 84.2 84.9   MCH 25.6 25.8 25.6   MCHC 30.0 30.7 30.1   RDW 19.9* 20.4* 21.0*   * 536* 600*   MPV 9.7 9.7 9.8   DDIMER 0.45 0.40 0.40     Chemistry:  Recent Labs     12/02/20 0606 12/03/20 0435 12/04/20  0703    134* 139   K 4.5 4.6 4.3    101 101   CO2 25 25 30   GLUCOSE 91 93 84   BUN 24* 23 33*   CREATININE 0.53* 0.61* 0.81   ANIONGAP 11 8* 8*   LABGLOM >60 >60 >60   GFRAA >60 >60 >60   CALCIUM 8.4* 8.4* 8.6   PROBNP  --  727*  --      Recent Labs     12/02/20  0606 12/03/20 0435 12/04/20  0703   PROT 5.3* 5.8* 6.3*   LABALBU 2.4* 2.7* 3.2*   AST 11 11 14   ALT 13 13 17   ALKPHOS 61 67 78   BILITOT 0.68 0.73 1.00     ABG:  Lab Results   Component Value Date    POCPH 7.519 11/23/2020    POCPCO2 32.2 11/23/2020    POCPO2 47.0 11/23/2020    POCHCO3 26.2 11/23/2020    NBEA NOT REPORTED 11/23/2020    PBEA 4 11/23/2020    AFZ7NZD 27 11/23/2020    SWDK8GSA 87 11/23/2020    FIO2 NOT REPORTED 11/23/2020     Lab Results   Component Value Date/Time    SPECIAL LEFT WRIST 8ML 12/01/2020 05:44 PM     Lab Results   Component Value Date/Time    CULTURE NO GROWTH 3 DAYS 12/01/2020 05:44 PM       Radiology:  Xr Chest Portable    Result Date: 11/23/2020  Stable exam     Ct Chest Pulmonary Embolism W Contrast    Result Date: 11/23/2020  No central pulmonary embolus. Peripheral branches are not well evaluated secondary to motion. Increased ground-glass opacity and parenchymal opacities throughout the lungs either due to developing pneumonia or edema. There is a small left-sided pleural effusion. By report there is a history of COVID-19 infection Underlying pulmonary fibrosis again noted.        Physical Examination:        General appearance:  Alert, Lungs: Decreased air entry bilateral    Heart:  regular rate and rhythm, no murmur  Abdomen:  soft, nontender, nondistended, normal bowel sounds, no masses, hepatomegaly, splenomegaly  Extremities:  no edema, redness, tenderness in the calves  Skin:  no gross lesions, rashes, induration    Assessment:        Hospital Problems           Last Modified POA    * (Principal) Acute hypoxemic respiratory failure due to COVID-19 [facility]) 11/23/2020 Yes    Dyslipidemia 11/23/2020 Yes    Benign prostatic hyperplasia with urinary obstruction 11/23/2020 Yes    Hypertrophic nonobstructive cardiomyopathy ([facility] [address] 11/23/2020 Yes    Iron (Fe) deficiency anemia 11/23/2020 Yes    Benign essential HTN 11/23/2020 Yes    CHF (congestive heart failure), NYHA class II, acute on chronic, combined ([facility] 11/23/2020 Yes    Occupational pulmonary disease 11/23/2020 Yes    COVID-19 11/24/2020 Yes          Plan:          COVID-19 pneumonia status post Decadron and remdesivir  Improved    History of pulmonary fibrosis monitor      aCute on top of chronic hypoxemic respiratory failure most likely related to COVID-19 infection and pneumonia  Wean off oxygen    A. fib with RVR status post cardioversion in the past continue oral anticoagulation   Added Cardizem as patient has episodes of A. fib with RVR hold Cardizem if heart rate less than 65     bradycardia monitor    NSTEMI type II secondary to demand ischemia treated with heparin drip currently heparin drip was stopped medical management    Hypertension monitor closely      Dyslipidemia statin      Acute to type of chronic combined systolic and diastolic CHF exacerbation with hypertrophic nonobstructive cardiomyopathy treated with diuresis    Pulmonary edema developed during hospitalization IV diuresis    DC IV antibiotics      Hypokalemia replaced    Anticipate discharge in 1 to 2 days once oxygen requirement is reasonable around 3 Omero Salmeron MD  12/4/2020  10:18 AM

## 2020-12-04 NOTE — PROGRESS NOTES
Summary  Left ventricle is normal in size Global left ventricular systolic function  is moderately reduced Estimated ejection fraction is 35 % . Mostly global hypokinesis with minor regional variation. Grade I (mild) left ventricular diastolic dysfunction. Left atrium is moderately dilated. Aortic leaflet calcification with Moderate Aortic Stenosis, maybe  underestimated due to poor LVEF. Thickened mitral valve leaflets. Mild to moderate mitral regurgitation. Trivial tricuspid regurgitation. Estimated right ventricular systolic  pressure is [id number]. IVC dilated but unable to assess respiratory collapse.     Cardiac Angiography: 2018  Procedure Summary      Non-obstructive CAD.   Normal LV systolic function.      Recommendations      Medical therapy as needed.   Risk factor modification. Objective:   Vitals: /67   Pulse 100   Temp 97 °F (36.1 °C) (Axillary)   Resp 25   Ht 6' (1.829 m)   Wt 164 lb 3.9 oz (74.5 kg)   SpO2 98%   BMI 22.28 kg/m²     For careful stewardship of limited PPE during COVID-19 pandemic my physical exam was deferred. For physical exam, please see today's physical from primary team or ICU team.         Assessment / Acute Cardiac Problems:   1. A FIB with RVR, was SB now back into Afib with elevated rates  2. Acute on chronic systolic CHF exacerbation  3. Covid 19 +  4. Hypertension  5.  Non-obstructive CAD on cath 2018    Patient Active Problem List:     Dyslipidemia     ED (erectile dysfunction)     Incomplete bladder emptying     Benign prostatic hyperplasia with urinary obstruction     History of colon cancer     Atrial fibrillation with normal ventricular rate (HCC)     Anemia     Pallor of optic disc     Presbyopia     Incisional hernia, without obstruction or gangrene     Hypertrophic nonobstructive cardiomyopathy (HCC)     Iron (Fe) deficiency anemia     Primary osteoarthritis of right knee     Benign essential HTN     History of malignant neoplasm of rectum Hill's esophagus     CHF (congestive heart failure), NYHA class II, acute on chronic, combined (HCC)     Hypoxia     Dyspnea and respiratory abnormalities     Occupational pulmonary disease     Sinus bradycardia     Acute hypoxemic respiratory failure due to COVID-19 [facility])      Plan of Treatment:   1. Paroxysmal Afib with some bradycardia on Sotalol and now back into Afib/flutter with episodes of RVR. Started on Cardizem. Given LVEF 35% will switch Cardizem to Digoxin and monitor rate. Consider low dose BB pending rate control and BP. Continue xarelto  2. NSTEMI likely secondary to demand ischemia. No plans for inpatient ischemic work-up at this time. Will consider stress test as OP once acute issues resolve. Continue statin. No chest pain or ECG changes. 3. LVrEF  EF 35% on ECHO 10/20/2020 - negative fluid balance. Continue Lasix - switch to PO tomorrow. No BB d/t bradycardia and no ACE at this time d/t hypotension - will reassess in AM.   4. HTN Controlled/hypotensive. Monitor with med changes as above. 5. Keep K > 4.0 and Mag  >2.0   6. Covid pneumonia treatment per primary service.       Electronically signed by MIRI Crandall CNP on 12/4/2020 at [address] [place] [address].  [phone]

## 2020-12-04 NOTE — PROGRESS NOTES
Writer to bedside as patient in Afib and sustaining 150-170 greater than 10 minutes. Writer administer PRN lopressor given as ordered. Patient is having dizziness and increased SOB , non rebreather applied and BP was taken . Patient states\" his body don't feel well. \" Cardiology and primary notified

## 2020-12-05 ENCOUNTER — APPOINTMENT (OUTPATIENT)
Dept: GENERAL RADIOLOGY | Age: 65
DRG: 871 | End: 2020-12-05
Payer: MEDICARE

## 2020-12-05 LAB
ABSOLUTE EOS #: 0.52 K/UL (ref 0–0.44)
ABSOLUTE IMMATURE GRANULOCYTE: 0.26 K/UL (ref 0–0.3)
ABSOLUTE LYMPH #: 1.94 K/UL (ref 1.1–3.7)
ABSOLUTE MONO #: 1.16 K/UL (ref 0.1–1.2)
ALBUMIN SERPL-MCNC: 2.5 G/DL (ref 3.5–5.2)
ALBUMIN/GLOBULIN RATIO: 0.9 (ref 1–2.5)
ALP BLD-CCNC: 74 U/L (ref 40–129)
ALT SERPL-CCNC: 13 U/L (ref 5–41)
ANION GAP SERPL CALCULATED.3IONS-SCNC: 7 MMOL/L (ref 9–17)
AST SERPL-CCNC: 13 U/L
BASOPHILS # BLD: 1 % (ref 0–2)
BASOPHILS ABSOLUTE: 0.13 K/UL (ref 0–0.2)
BILIRUB SERPL-MCNC: 1.21 MG/DL (ref 0.3–1.2)
BUN BLDV-MCNC: 24 MG/DL (ref 8–23)
BUN/CREAT BLD: ABNORMAL (ref 9–20)
CALCIUM SERPL-MCNC: 7.9 MG/DL (ref 8.6–10.4)
CHLORIDE BLD-SCNC: 100 MMOL/L (ref 98–107)
CO2: 26 MMOL/L (ref 20–31)
CREAT SERPL-MCNC: 0.63 MG/DL (ref 0.7–1.2)
D-DIMER QUANTITATIVE: 0.41 MG/L FEU
DIFFERENTIAL TYPE: ABNORMAL
DIGOXIN DATE LAST DOSE: ABNORMAL
DIGOXIN DOSE AMOUNT: ABNORMAL
DIGOXIN DOSE TIME: ABNORMAL
DIGOXIN LEVEL: 0.4 NG/ML (ref 0.5–2)
EOSINOPHILS RELATIVE PERCENT: 4 % (ref 1–4)
GFR AFRICAN AMERICAN: >60 ML/MIN
GFR NON-AFRICAN AMERICAN: >60 ML/MIN
GFR SERPL CREATININE-BSD FRML MDRD: ABNORMAL ML/MIN/{1.73_M2}
GFR SERPL CREATININE-BSD FRML MDRD: ABNORMAL ML/MIN/{1.73_M2}
GLUCOSE BLD-MCNC: 81 MG/DL (ref 70–99)
HCT VFR BLD CALC: 41.4 % (ref 40.7–50.3)
HEMOGLOBIN: 12.8 G/DL (ref 13–17)
IMMATURE GRANULOCYTES: 2 %
LYMPHOCYTES # BLD: 15 % (ref 24–43)
MAGNESIUM: 2.1 MG/DL (ref 1.6–2.6)
MCH RBC QN AUTO: 26 PG (ref 25.2–33.5)
MCHC RBC AUTO-ENTMCNC: 30.9 G/DL (ref 28.4–34.8)
MCV RBC AUTO: 84.1 FL (ref 82.6–102.9)
MONOCYTES # BLD: 9 % (ref 3–12)
MORPHOLOGY: ABNORMAL
MORPHOLOGY: ABNORMAL
NRBC AUTOMATED: 0 PER 100 WBC
PDW BLD-RTO: 20.5 % (ref 11.8–14.4)
PLATELET # BLD: 447 K/UL (ref 138–453)
PLATELET ESTIMATE: ABNORMAL
PMV BLD AUTO: 9.8 FL (ref 8.1–13.5)
POTASSIUM SERPL-SCNC: 4 MMOL/L (ref 3.7–5.3)
RBC # BLD: 4.92 M/UL (ref 4.21–5.77)
RBC # BLD: ABNORMAL 10*6/UL
SEG NEUTROPHILS: 69 % (ref 36–65)
SEGMENTED NEUTROPHILS ABSOLUTE COUNT: 8.89 K/UL (ref 1.5–8.1)
SODIUM BLD-SCNC: 133 MMOL/L (ref 135–144)
TOTAL PROTEIN: 5.3 G/DL (ref 6.4–8.3)
WBC # BLD: 12.9 K/UL (ref 3.5–11.3)
WBC # BLD: ABNORMAL 10*3/UL

## 2020-12-05 PROCEDURE — 99232 SBSQ HOSP IP/OBS MODERATE 35: CPT | Performed by: INTERNAL MEDICINE

## 2020-12-05 PROCEDURE — 2500000003 HC RX 250 WO HCPCS: Performed by: INTERNAL MEDICINE

## 2020-12-05 PROCEDURE — 2060000000 HC ICU INTERMEDIATE R&B

## 2020-12-05 PROCEDURE — 80162 ASSAY OF DIGOXIN TOTAL: CPT

## 2020-12-05 PROCEDURE — 2580000003 HC RX 258: Performed by: INTERNAL MEDICINE

## 2020-12-05 PROCEDURE — 6370000000 HC RX 637 (ALT 250 FOR IP): Performed by: INTERNAL MEDICINE

## 2020-12-05 PROCEDURE — 71045 X-RAY EXAM CHEST 1 VIEW: CPT

## 2020-12-05 PROCEDURE — 36415 COLL VENOUS BLD VENIPUNCTURE: CPT

## 2020-12-05 PROCEDURE — 6360000002 HC RX W HCPCS: Performed by: INTERNAL MEDICINE

## 2020-12-05 PROCEDURE — 6360000002 HC RX W HCPCS: Performed by: NURSE PRACTITIONER

## 2020-12-05 PROCEDURE — 83735 ASSAY OF MAGNESIUM: CPT

## 2020-12-05 PROCEDURE — 80053 COMPREHEN METABOLIC PANEL: CPT

## 2020-12-05 PROCEDURE — 85025 COMPLETE CBC W/AUTO DIFF WBC: CPT

## 2020-12-05 PROCEDURE — 6370000000 HC RX 637 (ALT 250 FOR IP): Performed by: NURSE PRACTITIONER

## 2020-12-05 PROCEDURE — 2580000003 HC RX 258: Performed by: NURSE PRACTITIONER

## 2020-12-05 PROCEDURE — 2700000000 HC OXYGEN THERAPY PER DAY

## 2020-12-05 PROCEDURE — 85379 FIBRIN DEGRADATION QUANT: CPT

## 2020-12-05 PROCEDURE — 99233 SBSQ HOSP IP/OBS HIGH 50: CPT | Performed by: INTERNAL MEDICINE

## 2020-12-05 RX ORDER — DIGOXIN 0.25 MG/ML
125 INJECTION INTRAMUSCULAR; INTRAVENOUS ONCE
Status: COMPLETED | OUTPATIENT
Start: 2020-12-05 | End: 2020-12-05

## 2020-12-05 RX ORDER — 0.9 % SODIUM CHLORIDE 0.9 %
500 INTRAVENOUS SOLUTION INTRAVENOUS ONCE
Status: COMPLETED | OUTPATIENT
Start: 2020-12-05 | End: 2020-12-05

## 2020-12-05 RX ORDER — DIGOXIN 0.25 MG/ML
125 INJECTION INTRAMUSCULAR; INTRAVENOUS DAILY
Status: DISCONTINUED | OUTPATIENT
Start: 2020-12-05 | End: 2020-12-05

## 2020-12-05 RX ADMIN — BUMETANIDE 1 MG: 1 TABLET ORAL at 08:32

## 2020-12-05 RX ADMIN — METOPROLOL TARTRATE 2.5 MG: 1 INJECTION, SOLUTION INTRAVENOUS at 02:39

## 2020-12-05 RX ADMIN — RIVAROXABAN 20 MG: 20 TABLET, FILM COATED ORAL at 18:05

## 2020-12-05 RX ADMIN — GUAIFENESIN 600 MG: 600 TABLET, EXTENDED RELEASE ORAL at 08:32

## 2020-12-05 RX ADMIN — DIGOXIN 125 MCG: 125 TABLET ORAL at 08:32

## 2020-12-05 RX ADMIN — TAMSULOSIN HYDROCHLORIDE 0.4 MG: 0.4 CAPSULE ORAL at 08:33

## 2020-12-05 RX ADMIN — SODIUM CHLORIDE 500 ML: 0.9 INJECTION, SOLUTION INTRAVENOUS at 10:57

## 2020-12-05 RX ADMIN — AMIODARONE HYDROCHLORIDE 0.5 MG/MIN: 50 INJECTION, SOLUTION INTRAVENOUS at 01:48

## 2020-12-05 RX ADMIN — GUAIFENESIN 600 MG: 600 TABLET, EXTENDED RELEASE ORAL at 20:16

## 2020-12-05 RX ADMIN — DIGOXIN 125 MCG: 0.25 INJECTION INTRAMUSCULAR; INTRAVENOUS at 14:34

## 2020-12-05 RX ADMIN — FAMOTIDINE 20 MG: 20 TABLET, FILM COATED ORAL at 08:32

## 2020-12-05 RX ADMIN — Medication 2000 UNITS: at 08:33

## 2020-12-05 RX ADMIN — SODIUM CHLORIDE, PRESERVATIVE FREE 10 ML: 5 INJECTION INTRAVENOUS at 08:33

## 2020-12-05 RX ADMIN — ATORVASTATIN CALCIUM 40 MG: 80 TABLET, FILM COATED ORAL at 20:16

## 2020-12-05 ASSESSMENT — ENCOUNTER SYMPTOMS
VOMITING: 0
TROUBLE SWALLOWING: 0
SHORTNESS OF BREATH: 1
DIARRHEA: 0
SORE THROAT: 0
NAUSEA: 0
COUGH: 0
CONSTIPATION: 0
TACHYPNEA: 1
ABDOMINAL PAIN: 0
WHEEZING: 0

## 2020-12-05 ASSESSMENT — PAIN SCALES - GENERAL
PAINLEVEL_OUTOF10: 0

## 2020-12-05 NOTE — PROGRESS NOTES
PULMONARY & CRITICAL CARE MEDICINE PROGRESS NOTE     Patient:  Monique Hernandez  MRN: [id number]  Admit date: 2020  Primary Care Physician: Prashanth Hernandez MD  Consulting Physician: Alberto Sullivan MD  CODE Status: Full Code  LOS: 12     SUBJECTIVE     CHIEF COMPLAINT/REASON FOR INITIAL CONSULT: Acute respiratory failure/COVID-19 infection    BRIEF HOSPITAL COURSE:   The patient is a 77 y.o. male who was was admitted with shortness of breath. Patient recently discharged from hospital after having an acute on chronic combined systolic and diastolic CHF and atrial fibrillation with RVR. Chest x-ray showed worsening bilateral infiltrates and CT scan showed some infiltrates along with underlying pulmonary fibrosis but no blood clot was seen. Patient had a Covid test that was positive along with elevated serum BNP and D-dimer of 2.86. Patient was placed on 100% nonrebreather mask    INTERVAL HISTORY:  20  Patient is currently on NC@ O2 Flow Rate (L/min)  Av L/min  Min: 5 L/min  Max: 5 L/min  T-max 97.8, white count is 12.9  He is on amiodarone infusion for A. fib  No overnight issues reported  Is completed remdesivir and Decadron    REVIEW OF SYSTEMS:  Review of Systems   Constitutional: Positive for fatigue. Negative for appetite change, chills, fever and unexpected weight change. HENT: Negative for congestion, postnasal drip, sore throat and trouble swallowing. Eyes: Negative for visual disturbance. Respiratory: Positive for shortness of breath. Negative for cough and wheezing. Cardiovascular: Positive for palpitations. Negative for chest pain and leg swelling. Gastrointestinal: Negative for abdominal pain, constipation, diarrhea, nausea and vomiting. Genitourinary: Negative for difficulty urinating, dysuria and frequency. Musculoskeletal: Negative for arthralgias and joint swelling. Skin: Negative for rash. Allergic/Immunologic: Negative for immunocompromised state. Neurological: Positive for weakness. Negative for dizziness, speech difficulty and headaches. Hematological: Negative for adenopathy. Psychiatric/Behavioral: Negative for behavioral problems and sleep disturbance. OBJECTIVE     PaO2/FiO2 RATIO:  No results for input(s): POCPO2 in the last 72 hours.    FiO2 : 35 %     VITAL SIGNS:   LAST:  BP 92/61   Pulse 120   Temp 97.8 °F (36.6 °C) (Oral)   Resp 25   Ht 6' (1.829 m)   Wt 163 lb 5.8 oz (74.1 kg)   SpO2 96%   BMI 22.16 kg/m²   8-24 HR RANGE:  TEMP Temp  Av.4 °F (36.3 °C)  Min: 97.1 °F (36.2 °C)  Max: 97.8 °F (78.9 °C)   BP Systolic (26OVG), ERS:48 , Min:83 , EEP:221      Diastolic (71JWI), PRN:48, Min:56, Max:73     PULSE Pulse  Av.9  Min: 90  Max: 131   RR Resp  Av.5  Min: 24  Max: 25   O2 SAT SpO2  Av.5 %  Min: 95 %  Max: 96 %   OXYGEN DELIVERY O2 Flow Rate (L/min)  Av L/min  Min: 5 L/min  Max: 5 L/min        SYSTEMIC EXAMINATION:   General appearance -comfortable, no acute distress  Mental status - awake & alert, follows commands  Eyes - pupils equal and reactive, sclera anicteric  Mouth - mucous membranes moist, pharynx normal without lesions  Neck - supple, no significant adenopathy, carotids upstroke normal bilaterally, no bruits  Chest -breath sounds bilaterally clear, no rhonchi or wheezing  Heart - normal rate, regular rhythm, normal S1, S2, no murmurs, rubs, clicks or gallops  Abdomen - soft, nontender, nondistended, no masses or organomegaly  Neurological - non-focal  Extremities - peripheral pulses normal, no pedal edema, no clubbing or cyanosis  Skin - normal coloration and turgor, no rashes, no suspicious skin lesions noted     DATA REVIEW     Medications: Current Inpatient  Scheduled Meds:   digoxin  125 mcg Intravenous Once    digoxin  125 mcg Oral Daily    bumetanide  1 mg Oral Daily    LORazepam  0.5 mg Intravenous Once    guaiFENesin  600 mg Oral BID    sodium chloride  20 mL Intravenous Once    rivaroxaban  20 mg Oral Daily    atorvastatin  40 mg Oral Daily    tamsulosin  0.4 mg Oral Daily    sodium chloride flush  10 mL Intravenous 2 times per day    famotidine  20 mg Oral Daily    Vitamin D  2,000 Units Oral Daily     Continuous Infusions:   amiodarone 0.5 mg/min (12/05/20 0148)       INPUT/OUTPUT:  In: 0847 [I.V.:1134]  Out: 746 [Urine:475]  Date 12/05/20 0000 - 12/05/20 2359   Shift 3892-7151 3957-6577 6770-1368 24 Hour Total   INTAKE   I.V.(mL/kg) 6743(18.2)   3883(47.3)   Shift Total(mL/kg) 3844(26.3)   7957(58.6)   OUTPUT   Urine(mL/kg/hr) 275(0.5)   275   Shift Total(mL/kg) 275(3.7)   275(3.7)   Weight (kg) 74.1 74.1 74.1 74.1        LABS:  ABGs:   No results for input(s): POCPH, POCPCO2, POCPO2, POCHCO3, HAZE3ONC in the last 72 hours. CBC:   Recent Labs     12/03/20 0435 12/04/20  0703 12/05/20  0641   WBC 11.8* 13.7* 12.9*   HGB 13.1 14.2 12.8*   HCT 42.7 47.1 41.4   MCV 84.2 84.9 84.1   * 600* 447   LYMPHOPCT 14* 20* 15*   RBC 5.07 5.55 4.92   MCH 25.8 25.6 26.0   MCHC 30.7 30.1 30.9   RDW 20.4* 21.0* 20.5*     CRP:   No results for input(s): CRP in the last 72 hours. LDH:   No results for input(s): LDH in the last 72 hours. BMP:   Recent Labs     12/03/20 0435 12/04/20  0703 12/05/20  0641   * 139 133*   K 4.6 4.3 4.0    101 100   CO2 25 30 26   BUN 23 33* 24*   CREATININE 0.61* 0.81 0.63*   GLUCOSE 93 84 81     Liver Function Test:   Recent Labs     12/03/20 0435 12/04/20  0703 12/05/20  0641   PROT 5.8* 6.3* 5.3*   LABALBU 2.7* 3.2* 2.5*   ALT 13 17 13   AST 11 14 13   ALKPHOS 67 78 74   BILITOT 0.73 1.00 1.21*     Coagulation Profile:   No results for input(s): INR, PROTIME, APTT in the last 72 hours. D-Dimer:  Recent Labs     12/03/20  0435 12/04/20  0703 12/05/20  0641   DDIMER 0.40 0.40 0.41     Ferritin:    No results for input(s): FERRITIN in the last 72 hours. Lactic Acid:  No results for input(s): LACTA in the last 72 hours.   Cardiac Enzymes:  No results for input(s): CKTOTAL, CKMB, CKMBINDEX, TROPONINI in the last 72 hours. Invalid input(s): TROPONIN, HSTROP  BNP/ProBNP:   Recent Labs     12/03/20  0435   PROBNP 727*     Triglycerides:  No results for input(s): TRIG in the last 72 hours. Microbiology:  No results for input(s): SPECDESC, SPECDESC, SPECIAL, CULTURE, CULTURE, STATUS, ORG, CDIFFTOXPCR, CAMPYLOBPCR, SALMONELLAPC, SHIGAPCR, SHIGELLAPCR, MPNEUG, MPNEUM, LACTOQL in the last 72 hours. Pathology:    Radiology Reports:  XR CHEST PORTABLE   Final Result   Diffuse peripheral airspace opacities bilaterally concerning for multifocal   infectious process. XR ABDOMEN (KUB) (SINGLE AP VIEW)   Final Result   A moderate amount of stool was scattered in nondistended colon. No fecal   impaction was noted. No obstruction or ileus. Bilateral lower lobe fibrosis and/or pneumonia was noted. XR CHEST (SINGLE VIEW FRONTAL)   Final Result   Slight worsening in pulmonary opacities in the left perihilar area. Stable   cardiomegaly and bilateral effusions. Pattern favors pulmonary edema with   atypical.  Osseous structures are stable. No extrapleural air or midline   shift. CT CHEST PULMONARY EMBOLISM W CONTRAST   Final Result   No central pulmonary embolus. Peripheral branches are not well evaluated   secondary to motion. Increased ground-glass opacity and parenchymal opacities throughout the lungs   either due to developing pneumonia or edema. There is a small left-sided   pleural effusion. By report there is a history of COVID-19 infection      Underlying pulmonary fibrosis again noted.          XR CHEST PORTABLE   Final Result   Stable exam              Echocardiogram:   Results for orders placed during the hospital encounter of 10/17/20   ECHO Complete 2D W Doppler W Color    Narrative   Summary  Left ventricle is normal in size Global left ventricular systolic function  is moderately reduced Estimated ejection fraction is 35 % . Mostly global hypokinesis with minor regional variation. Grade I (mild) left ventricular diastolic dysfunction. Left atrium is moderately dilated. Aortic leaflet calcification with Moderate Aortic Stenosis, maybe  underestimated due to poor LVEF. Thickened mitral valve leaflets. Mild to moderate mitral regurgitation. Trivial tricuspid regurgitation. Estimated right ventricular systolic  pressure is [id number]. IVC dilated but unable to assess respiratory collapse. ASSESSMENT AND PLAN     Assessment:    Acute hypoxic respiratory failure  Bilateral multifocal pneumonia due to COVID 19 infection  Sepsis due to COVID 19  Paroxysmal atrial fibrillation with rapid ventricular response  Hypertrophic obstructive cardiomyopathy  Leukocytosis  Benign essential hypertension  Congestive cardiac failure    Plan:    I personally interviewed/examined the patient; reviewed interval history, interpreted all available radiographic and laboratory data at the time of service. Patient is hemodynamically stable and is currently saturating well on nasal cannula  Continue supplemental oxygen to keep oxygen saturation >90%  Encourage incentive spirometry  Continue pulmonary toilet, aspiration precautions and bronchodilators  Continue amiodarone infusion as per cardiology recommendations  Monitor I/O, electrolytes with a goal of even/negative fluid balance  Tolerating oral diet  Stress ulcer prophylaxis  Continue to monitor CBC, coagulation profile, transfuse as indicated  Chemical DVT prophylaxis as per protocol  Antimicrobials reviewed; completed remdesivir on 11/28, currently being monitored off antimicrobials  Monitor CRP, LDH, AST/ALT, D-Dimer, Ferritin  Glycemic control appropriate  Completed Decadron course on 12/2  Physical/occupational therapy    Holly Melendez M.D.    Pulmonary and Critical Care Medicine           12/5/2020, 2:11 PM    This patient was evaluated in the context of the global SARS-CoV-2 (COVID-19) pandemic, which necessitated considerations that the patient either has COVID-19 infection or is at risk of infection with COVID-19. Institutional protocols and algorithms that pertain to the evaluation & management of patients with COVID-19 or those at risk for COVID-19 are in a state of rapid changes based on information released by regulatory bodies including the CDC and federal and state organizations. These policies and algorithms were followed during the patient's care. Please note that this chart was generated using voice recognition Dragon dictation software. Although every effort was made to ensure the accuracy of this automated transcription, some errors in transcription may have occurred.

## 2020-12-05 NOTE — PLAN OF CARE
Problem: Airway Clearance - Ineffective  Goal: Achieve or maintain patent airway  Outcome: Ongoing     Problem: Gas Exchange - Impaired  Goal: Absence of hypoxia  Outcome: Ongoing     Problem: Gas Exchange - Impaired  Goal: Promote optimal lung function  Outcome: Ongoing     Problem: Breathing Pattern - Ineffective  Goal: Ability to achieve and maintain a regular respiratory rate  Outcome: Ongoing     Problem: Body Temperature -  Risk of, Imbalanced  Goal: Ability to maintain a body temperature within defined limits  Outcome: Ongoing     Problem: Body Temperature -  Risk of, Imbalanced  Goal: Will regain or maintain usual level of consciousness  Outcome: Ongoing     Problem:  Body Temperature -  Risk of, Imbalanced  Goal: Complications related to the disease process, condition or treatment will be avoided or minimized  Outcome: Ongoing   Electronically signed by Veronica Brush RN on 12/5/2020 at 2:23 PM

## 2020-12-05 NOTE — PROGRESS NOTES
[facility] -Progress Note COVID 19 Patient  Today's Date and Time: 12/5/2020, 8:18 AM    Impression :   · COVID 19 Suspect  · COVID 19 Confirmed Infection. · Covid tests:  · 11-23-20: Positive. · Atrial fibrillation with RVR: S/P cardioversion   · Pulmonary Fibrosis: secondary to Occupational exposure. 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 · Hypertrophic non obstructive cardiomyopathy. · Hx of Colon cancer  · BPH. Recommendations:   · Monitor off antibiotics. · Remdesivir. Completed treatment on 11/28/20. · Completed Decadron. Started on 11/23 Stop date:12/2/20  · Plasma infusion. Two units infused 11-24-10    Medical Decision Making/Summary/Discussion:12/5/2020     · Patient admitted with suspected COVID 19 infection  · Covid test confirmed positive. · Associated problems with congestive heart failure, pulmonary fibrosis, hypoxia. Infection Control Recommendations   · Universal Precautions  · Airborne isolation  · Droplet Isolation    Antimicrobial Stewardship Recommendations     · Discontinuation of therapy  Coordination of Outpatient Care:   · Estimated Length of IV antimicrobials:TBD  · Patient will need Midline Catheter Insertion: TBD  · Patient will need PICC line Insertion: No  · Patient will need: Home IV , [place],  SNF,  LTAC:TBD  · Patient will need outpatient wound care:No    Chief complaint/reason for consultation:   · Concern for COVID infection      History of Present Illness:   Ailin Collins is a 77y.o.-year-old  male who was initially admitted on 11/23/2020. Patient seen at the request of [address] [place] [address]. INITIAL HISTORY:    Patient presented through ER with complaints of onset of shortness of breath. Located onset of symptoms on 11/22/2020 with worsening through the day leading to his seeking help at the emergency room.     The patient had been recently admitted on 10/17/2020 through 10/26/2020 at [facility] because of the presence of congestive heart failure NYHAA class II with acute on chronic combined heart failure. He also suffers from atrial fibrillation and hypertrophic nonobstructive cardiomyopathy, benign essential hypertension. His previous diagnosis includes occupational pulmonary fibrosis. During that admission he was also found to have suffered from mycoplasma pneumonia and was treated with antibiotics. He reports being seen at the [facility], after his admission to [facility],  where he was cardioverted because of the atrial fibrillation. He was also found to be in heart failure and was a started on diuretics. When the patient was evaluated in the emergency room he was found to be hypoxic and was treated with BiPAP which improved his oxygen saturation rate. He was tested for Covid and was found to be positive on 11/23/2020. Chest x-ray:  · 11/23/2020 persistent bilateral pulmonary infiltrates with associated pulmonary vascular congestion. Unchanged from films of 10/23/2020    Chest CT:  · 11/23/2020: Pulmonary fibrosis. No pulmonary embolus. Increased groundglass opacities and parenchymal opacities throughout both lungs when compared with films of 10/21/2020    Patient admitted because of concerns with COVID 19.    CURRENT EVALUATION : 12/5/2020    Afebrile  VS stable, hypotensive, tacchycardic  On Rivaroxaban and amiodarone for Atrial Fibrillation. Awiting further improvement in oxygenation to allow discharge. Acute on chronic combined systolic and diastolic CHF exacerbation with hypertrophic non obstructive cardiomyopathy. On Bumex 1mg oral for fluid retention. Leukocytosis. Bilirubinemia uptrending  Completed Decadron 12-2-20  Completed Remdesivir on 11/28/20.  2 Units of Plasma transfused 11-24-20    Blood cultures on 12/1/20 show no growth. Patient exhibiting respiratory distress. Yes  Respiratory secretions: No    Patient receiving supplemental oxygen. Yes.   High flow  Nasal canula-->nasal cannula 5L O2.  FIO2 100--> 85-->75-->65-->50-->45-->35  02 sat 96%-->80-->93-->97-->96-->87-->98-->94  RR 32-->22-->20-->26-->22-->23    QTc:       NEWS Score: 0-4 Low risk group; 5-6: Medium risk group; 7 or above: High risk group  Parameters 3 2 1 0 1 2 3   Age    < 65   ? 65   RR ? 8  9-11 12-20  21-24 ? 25   O2 Sats ? 91 92-93 94-95 ? 96      Suppl O2  Yes  No      SBP ? 90  101-110 111-219   ? 220   HR ? 40  41-50 51-90  111-130 ? 131   Consciousness    Alert   Drowsiness, lethargy, or confusion   Temperature ? 35.0 C (95.0 F)  35.1-36.0 C 95.1-96.9 F 36.1-38.0 C 97.0-100.4 F 38.1-39.0 C 100.5-102.3 F ? 39.1 C ? 102.4 F      NEWS Score:   11/23/2020: 9 risk   11/24/20: 11 high Risk   11/29/20:12 high risk   12/4/20: 8 high risk    Overall Daily Picture:      Improving. Presence of secondary bacterial Infection:    No   Additional antibiotics: No    Labs, X rays reviewed: 12/5/2020    BUN: 16-->22-->31-->26-->26-->27-->33-->24  Cr: 0.62-->0.61-->0.53-->0.59-->0.52-->0.60-->0.81-->0.63    WBC: 14.1-->15.7-->11.4-->11.3-->12.4-->11.8-->13.7-->12.9  Hb: 12.0-->11.3-->10.1-->11.2-->11.6-->11.2-->14.2-->12.8  Plat: 282-->305-->423-->396-->424->468-->600-->447    Bilirubin 0.60-->0.68-->0.73-->1.00-->1.21  Alk phos 86-->61-->74  ALT 12-->18-->13  AST 24-->12-->13    Absolute Neutrophils: 13.1.-->9.69  Absolute Lymphocytes: 0.59.-->1.44  Neutrophil/Lymphocyte Ratio: 22.2 high risk.-->6.72high risk    CRP: 182 (10-17-20) -->204.8 (11/23/20)  Ferritin: 395(11/23/20)  LDH: 521(11/23/20)    Pro Calcitonin:0.29( 10/17/20)  Troponin high-sensitivity 102  proBNP 3472    Cultures:  Urine:  ·   Blood:  · 11/23/20: x2: No growth  · 12/1/20: x2: No growth  Sputum :  ·   Wound:       CXR:   · 11/23/2020 pulmonary fibrosis with interstitial edema. Scattered infiltrates bilaterally      CAT:  · 11/23/2020: Pulmonary fibrosis. No pulmonary embolus.   Increased groundglass opacities and mg Oral Daily    sodium chloride flush  10 mL Intravenous 2 times per day    famotidine  20 mg Oral Daily    Vitamin D  2,000 Units Oral Daily       Social History:     Social History     Socioeconomic History    Marital status: Single     Spouse name: Not on file    Number of children: Not on file    Years of education: Not on file    Highest education level: Not on file   Occupational History    Not on file   Social Needs    Financial resource strain: Not on file    Food insecurity     Worry: Not on file     Inability: Not on file    Transportation needs     Medical: Not on file     Non-medical: Not on file   Tobacco Use    Smoking status: Former Smoker     Packs/day: 0.50     Years: 1.00     Pack years: 0.50     Last attempt to quit:      Years since quittin.9    Smokeless tobacco: Never Used    Tobacco comment: stated never actually really smoked only inhaled    Substance and Sexual Activity    Alcohol use: Yes     Alcohol/week: 12.0 standard drinks     Types: 10 Standard drinks or equivalent, 2 Shots of liquor per week     Comment: 2-3 times a week    Drug use: No     Types:  Other-see comments     Comment: Cocaine use in past in     Sexual activity: Yes     Partners: Female   Lifestyle    Physical activity     Days per week: Not on file     Minutes per session: Not on file    Stress: Not on file   Relationships    Social connections     Talks on phone: Not on file     Gets together: Not on file     Attends Hoahaoism service: Not on file     Active member of club or organization: Not on file     Attends meetings of clubs or organizations: Not on file     Relationship status: Not on file    Intimate partner violence     Fear of current or ex partner: Not on file     Emotionally abused: Not on file     Physically abused: Not on file     Forced sexual activity: Not on file   Other Topics Concern    Not on file   Social History Narrative    Not on file       Family History: Family History   Problem Relation Age of Onset    Diabetes Mother     Heart Attack Father     Heart Disease Father     Heart Disease Brother         Allergies:   Adhesive tape; Codeine; and Penicillins     Review of Systems:       Constitutional: No fevers or chills. No systemic complaints  Head: No headaches  Eyes: No double vision or blurry vision. No conjunctival inflammation. ENT: No sore throat or runny nose. . No hearing loss, tinnitus or vertigo. Cardiovascular: No chest pain or palpitations. Shortness of breath. [place]  Lung: Shortness of breath, dry cough. No sputum production  Abdomen: No nausea, vomiting, diarrhea, or abdominal pain. [place] No cramps. Genitourinary: No increased urinary frequency, or dysuria. No hematuria. No suprapubic or CVA pain  Musculoskeletal: No muscle aches or pains. No joint effusions, swelling or deformities  Hematologic: No bleeding or bruising. Neurologic: No headache, weakness, numbness, or tingling. Integument: No rash, no ulcers. Psychiatric: No depression. Endocrine: No polyuria, no polydipsia, no polyphagia. Physical Examination :     Patient Vitals for the past 8 hrs:   BP Temp Temp src Pulse Resp SpO2 Weight   12/05/20 0600 -- -- -- -- -- -- 163 lb 5.8 oz (74.1 kg)   12/05/20 0410 92/73 97.1 °F (36.2 °C) Axillary 115 25 91 % --     General Appearance: Awake, alert, and in no apparent distress  Head:  Normocephalic, no trauma  Eyes: Pupils equal, round, reactive to light; sclera anicteric; conjunctivae pink. No embolic phenomena. ENT: Oropharynx clear, without erythema, exudate, or thrush. No tenderness of sinuses. Mouth/throat: mucosa pink and moist. No lesions. Dentition in good repair. Neck:Supple, without lymphadenopathy. Thyroid normal, No bruits. Pulmonary/Chest: ,Distant breath sounds, decreased breath sounds at the bases   cardiovascular: Irreegular rate and rhythm without murmurs, rubs, or gallops. Abdomen: Soft, non tender.  Bowel sounds normal. No organomegaly  All four Extremities: No cyanosis, clubbing, edema, or effusions. Neurologic: No gross sensory or motor deficits. Skin: Warm and dry with good turgor. No signs of peripheral arterial or venous insufficiency. No ulcerations. No open wounds. Medical Decision Making -Laboratory:   I have independently reviewed/ordered the following labs:    CBC with Differential:   Recent Labs     12/04/20  0703 12/05/20  0641   WBC 13.7* 12.9*   HGB 14.2 12.8*   HCT 47.1 41.4   * 447   LYMPHOPCT 20* PENDING   MONOPCT 11 PENDING     BMP:   Recent Labs     12/04/20  0703 12/05/20  0641    133*   K 4.3 4.0    100   CO2 30 26   BUN 33* 24*   CREATININE 0.81 0.63*   MG 2.2  --      Hepatic Function Panel:   Recent Labs     12/04/20  0703 12/05/20  0641   PROT 6.3* 5.3*   LABALBU 3.2* 2.5*   BILITOT 1.00 1.21*   ALKPHOS 78 74   ALT 17 13   AST 14 13     No results for input(s): RPR in the last 72 hours. No results for input(s): HIV in the last 72 hours. No results for input(s): BC in the last 72 hours. Lab Results   Component Value Date    MUCUS NOT REPORTED 11/23/2020    RBC 4.92 12/05/2020    TRICHOMONAS NOT REPORTED 11/23/2020    WBC 12.9 12/05/2020    YEAST NOT REPORTED 11/23/2020    TURBIDITY CLEAR 11/23/2020     Lab Results   Component Value Date    CREATININE 0.63 12/05/2020    GLUCOSE 81 12/05/2020       Medical Decision Making-Imaging:     EXAMINATION:    CTA OF THE CHEST 11/23/2020 7:42 am         TECHNIQUE:    CTA of the chest was performed after the administration of intravenous    contrast.  Multiplanar reformatted images are provided for review.  MIP    images are provided for review.  Dose modulation, iterative reconstruction,    and/or weight based adjustment of the mA/kV was utilized to reduce the    radiation dose to as low as reasonably achievable.         COMPARISON:    High-resolution chest CT 10 03/20/2020         CT PA 10/21/2020         HISTORY:    ORDERING SYSTEM PROVIDED HISTORY: r/o PE    TECHNOLOGIST PROVIDED HISTORY:    r/o PE    Reason for Exam: sob    Acuity: Acute              Type of Exam: Initial         FINDINGS:    No intimal flap is seen in aorta. .  Small mediastinal nodes are noted,    similar to prior         No embolus is seen the central, right, left main pulmonary artery.  No    embolus is seen in the proximal segmental branches.  Distal segmental    branches and subsegmental branches are not well evaluated secondary to    respiratory motion artifact.         Patchy ground-glass and parenchymal opacities are seen in the left upper and    left lower lobe.         On the right patchy ground-glass and parenchymal opacities are seen in the    right upper, right middle and right lower lobe.  There is mild fusiform    bronchiectasis. Melchor Fail is trace left-sided pleural effusion.         There is mild underlying pulmonary fibrosis.  There is underlying    bronchiectasis         Right adrenal gland is normal.  Left adrenal gland is normal.         1 mm calcification is seen in the left kidney         Spurring is seen in the spine.  Spurring is seen in the shoulder joints         Small soft tissue nodule is seen posteriorly in the subcutaneous fat    measuring 2.1 cm, likely sebaceous cyst              Impression    No central pulmonary embolus.  Peripheral branches are not well evaluated    secondary to motion.         Increased ground-glass opacity and parenchymal opacities throughout the lungs    either due to developing pneumonia or edema. Melchor Fail is a small left-sided    pleural effusion.  By report there is a history of COVID-19 infection         Underlying pulmonary fibrosis again noted.                EXAMINATION:    ONE XRAY VIEW OF THE CHEST         11/23/2020 8:02 am         COMPARISON:    10/19/2020         HISTORY:    ORDERING SYSTEM PROVIDED HISTORY: SOB    TECHNOLOGIST PROVIDED HISTORY:    SOB    Reason for Exam: uprt port chest         FINDINGS: Cardiomediastinal silhouette is stable.  Bilateral pulmonary infiltrates    persist unchanged and may represent pulmonary edema versus atypical pneumonia.              Impression    Stable exam        Medical Decision [id number]:       Medical Decision Making-Other:     Note:  · Labs, medications, radiologic studies were reviewed with personal review of films  · Large amounts of data were reviewed  · Discussed with nursing Staff, Discharge planner  · Infection Control and Prevention measures reviewed  · All prior entries were reviewed  · Administer medications as ordered  · Prognosis: Guarded  · Discharge planning reviewed  · Follow up as outpatient. Thank you for allowing us to participate in the care of this patient. Please call with questions. Melinda Stephen     .

## 2020-12-05 NOTE — PROGRESS NOTES
[facility]  Office: [address], , Isamarlyubov King DO, Margoth Crowell, DO, Dayanara Siddiquil Blood, DO, Radhames Graham MD, Jermaine Ortega MD, Darryle So, MD, Brittney Cao MD, Veronica Trent MD, Armand Howe MD, Espinoza Jones MD, Shakila Romero MD, Breezy Tejada MD, Vito Goff DO, Mauri Hutchison MD, Gudelia Enciso MD, Arnol Adorno DO, Chapito Quijano MD,  Anh Bill DO, Daiana Ledesma MD, Sofia Donaldson MD, Inés Chung, [facility], [facility], CNP, Darin Zhang, CNP, Gerardo Luciano, CNS, Palomo Urban, CNP, Ethan Stewart, CNP, Liliana Brody, CNP, Amador Julian, CNP, Stacy Morrow, CNP, GRICELDA CuetoC, Fiona Murdock, MARIA ISABEL, Luke Sabillon, CNP, Robert Swan, CNP, Zoltan Katz, CNP, Tawana Rubinstein, CNP, Landen Longoria, CNP         [facility]   [address] [facility]    Progress Note    12/5/2020    10:32 AM    Name:   Michael Kasper  MRN:     [id number]     Acct:      [de-identified]   Room:   [facility]0401-01   Day:  15  Admit Date:  11/23/2020  6:44 AM    PCP:   Suzi Fraga MD  Code Status:  Full Code    Subjective:     C/C:   Chief Complaint   Patient presents with    Shortness of Breath     Interval History Status:    Patient seen and examined  Had episode of a fib started on amiodarone  Patient denies fever chest pain    I am seeing the patient for shortness of breath    Medications: Allergies: Allergies   Allergen Reactions    Adhesive Tape Other (See Comments)     Blister badly     Codeine Nausea Only     Other reaction(s): Other: See Comments  NAUSEA  Other reaction(s):  Other: See Comments  NAUSEA    Penicillins Swelling     As a baby  Other reaction(s): Unknown  As a baby       Current Meds:   Scheduled Meds:    digoxin  125 mcg Oral Daily    bumetanide  1 mg Oral Daily    LORazepam  0.5 mg Intravenous Once    guaiFENesin  600 mg Oral BID    sodium chloride  20 mL Intravenous Once    rivaroxaban  20 mg Oral Daily    atorvastatin  40 mg Oral Daily    tamsulosin  0.4 mg Oral Daily    sodium chloride flush  10 mL Intravenous 2 times per day    famotidine  20 mg Oral Daily    Vitamin D  2,000 Units Oral Daily     Continuous Infusions:    amiodarone 0.5 mg/min (20 0148)     PRN Meds: metoprolol, calcium carbonate, sodium chloride, albuterol **AND** [] ipratropium, sodium chloride flush, potassium chloride **OR** potassium alternative oral replacement **OR** potassium chloride, magnesium sulfate, acetaminophen **OR** acetaminophen, polyethylene glycol, promethazine **OR** [DISCONTINUED] ondansetron, nicotine, albuterol sulfate HFA, dextromethorphan-guaiFENesin    Data:     Past Medical History:   has a past medical history of Atrial fibrillation ([facility] Back pain, chronic, Hill's esophagus, BPH (benign prostatic hyperplasia), Cancer ([facility] Cocaine abuse in remission [facility]), ED (erectile dysfunction), GERD (gastroesophageal reflux disease), GI bleed, Hernia, History of colon cancer, Melena, Migraines, and Murmur, cardiac. Social History:   reports that he quit smoking about 49 years ago. He has a 0.50 pack-year smoking history. He has never used smokeless tobacco. He reports current alcohol use of about 12.0 standard drinks of alcohol per week. He reports that he does not use drugs. Family History:   Family History   Problem Relation Age of Onset    Diabetes Mother     Heart Attack Father     Heart Disease Father     Heart Disease Brother        Vitals:  BP (!) 84/56   Pulse 131   Temp 97.7 °F (36.5 °C) (Oral)   Resp 24   Ht 6' (1.829 m)   Wt 163 lb 5.8 oz (74.1 kg)   SpO2 95%   BMI 22.16 kg/m²   Temp (24hrs), Av.4 °F (36.3 °C), Min:97.1 °F (36.2 °C), Max:97.7 °F (36.5 °C)    No results for input(s): POCGLU in the last 72 hours. I/O (24Hr):     Intake/Output Summary (Last 24 hours) at 2020 1032  Last data filed at 2020 0410  Gross per 24 hour   Intake 1134 ml   Output 475 ml   Net 659 ml       Labs:  Hematology:  Recent Labs     12/03/20 0435 12/04/20  0703 12/05/20  0641   WBC 11.8* 13.7* 12.9*   RBC 5.07 5.55 4.92   HGB 13.1 14.2 12.8*   HCT 42.7 47.1 41.4   MCV 84.2 84.9 84.1   MCH 25.8 25.6 26.0   MCHC 30.7 30.1 30.9   RDW 20.4* 21.0* 20.5*   * 600* 447   MPV 9.7 9.8 9.8   DDIMER 0.40 0.40 0.41     Chemistry:  Recent Labs     12/03/20 0435 12/04/20  0703 12/05/20  0641   * 139 133*   K 4.6 4.3 4.0    101 100   CO2 25 30 26   GLUCOSE 93 84 81   BUN 23 33* 24*   CREATININE 0.61* 0.81 0.63*   MG  --  2.2  --    ANIONGAP 8* 8* 7*   LABGLOM >60 >60 >60   GFRAA >60 >60 >60   CALCIUM 8.4* 8.6 7.9*   PROBNP 727*  --   --      Recent Labs     12/03/20 0435 12/04/20  0703 12/05/20  0641   PROT 5.8* 6.3* 5.3*   LABALBU 2.7* 3.2* 2.5*   AST 11 14 13   ALT 13 17 13   ALKPHOS 67 78 74   BILITOT 0.73 1.00 1.21*     ABG:  Lab Results   Component Value Date    POCPH 7.519 11/23/2020    POCPCO2 32.2 11/23/2020    POCPO2 47.0 11/23/2020    POCHCO3 26.2 11/23/2020    NBEA NOT REPORTED 11/23/2020    PBEA 4 11/23/2020    SWH1ZTU 27 11/23/2020    XOLP0MOA 87 11/23/2020    FIO2 NOT REPORTED 11/23/2020     Lab Results   Component Value Date/Time    SPECIAL LEFT WRIST 8ML 12/01/2020 05:44 PM     Lab Results   Component Value Date/Time    CULTURE NO GROWTH 4 DAYS 12/01/2020 05:44 PM       Radiology:  Xr Chest Portable    Result Date: 11/23/2020  Stable exam     Ct Chest Pulmonary Embolism W Contrast    Result Date: 11/23/2020  No central pulmonary embolus. Peripheral branches are not well evaluated secondary to motion. Increased ground-glass opacity and parenchymal opacities throughout the lungs either due to developing pneumonia or edema. There is a small left-sided pleural effusion. By report there is a history of COVID-19 infection Underlying pulmonary fibrosis again noted.        Physical Examination:        General appearance:  Alert,   Lungs: Decreased air entry bilateral Heart:  regular rate and rhythm, no murmur  Abdomen:  soft, nontender, nondistended, normal bowel sounds, no masses, hepatomegaly, splenomegaly  Extremities:  no edema, redness, tenderness in the calves  Skin:  no gross lesions, rashes, induration    Assessment:        Hospital Problems           Last Modified POA    * (Principal) Acute hypoxemic respiratory failure due to COVID-19 ([facility] 11/23/2020 Yes    Dyslipidemia 11/23/2020 Yes    Benign prostatic hyperplasia with urinary obstruction 11/23/2020 Yes    Hypertrophic nonobstructive cardiomyopathy ([facility] 11/23/2020 Yes    Iron (Fe) deficiency anemia 11/23/2020 Yes    Benign essential HTN 11/23/2020 Yes    CHF (congestive heart failure), NYHA class II, acute on chronic, combined [facility] 11/23/2020 Yes    Occupational pulmonary disease 11/23/2020 Yes    COVID-19 11/24/2020 Yes          Plan:          COVID-19 pneumonia status post Decadron and remdesivir  Improved    History of pulmonary fibrosis monitor      aCute on top of chronic hypoxemic respiratory failure most likely related to COVID-19 infection and pneumonia  Wean off oxygen    A. fib with RVR status post cardioversion in the past continue oral anticoagulation   Started on amiodaron drip     bradycardia monitor    NSTEMI type II secondary to demand ischemia treated with heparin drip currently heparin drip was stopped medical management    Hypertension monitor closely      Dyslipidemia statin      Acute to type of chronic combined systolic and diastolic CHF exacerbation with hypertrophic nonobstructive cardiomyopathy treated with diuresis    Pulmonary edema developed during hospitalization IV diuresis    DC IV antibiotics      Hypokalemia replaced            Ann Perry MD  12/5/2020  10:32 AM

## 2020-12-05 NOTE — PROGRESS NOTES
[facility] -Progress Note COVID 19 Patient  Today's Date and Time: 12/5/2020, 1:48 PM    Impression :   · COVID 19 Suspect  · COVID 19 Confirmed Infection. · Covid tests:  · 11-23-20: Positive. · Atrial fibrillation with RVR: S/P cardioversion   · Pulmonary Fibrosis: secondary to Occupational exposure. 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 · Hypertrophic non obstructive cardiomyopathy. · Hx of Colon cancer  · BPH. Recommendations:   · Monitor off antibiotics. · Remdesivir. Completed treatment on 11/28/20. · Completed Decadron. Started on 11/23 Stop date:12/2/20  · Plasma infusion. Two units infused 11-24-10    Medical Decision Making/Summary/Discussion:12/5/2020     · Patient admitted with suspected COVID 19 infection  · Covid test confirmed positive. · Associated problems with congestive heart failure, pulmonary fibrosis, hypoxia. Infection Control Recommendations   · Universal Precautions  · Airborne isolation  · Droplet Isolation    Antimicrobial Stewardship Recommendations     · Discontinuation of therapy  Coordination of Outpatient Care:   · Estimated Length of IV antimicrobials:TBD  · Patient will need Midline Catheter Insertion: TBD  · Patient will need PICC line Insertion: No  · Patient will need: Home IV , [place],  SNF,  LTAC:TBD  · Patient will need outpatient wound care:No    Chief complaint/reason for consultation:   · Concern for COVID infection      History of Present Illness:   Annmarie Bosworth is a 77y.o.-year-old  male who was initially admitted on 11/23/2020. Patient seen at the request of [address] [place] [address]. INITIAL HISTORY:    Patient presented through ER with complaints of onset of shortness of breath. Located onset of symptoms on 11/22/2020 with worsening through the day leading to his seeking help at the emergency room.     The patient had been recently admitted on 10/17/2020 through 10/26/2020 at [facility] because of the presence of congestive heart failure NYHAA class II with acute on chronic combined heart failure. He also suffers from atrial fibrillation and hypertrophic nonobstructive cardiomyopathy, benign essential hypertension. His previous diagnosis includes occupational pulmonary fibrosis. During that admission he was also found to have suffered from mycoplasma pneumonia and was treated with antibiotics. He reports being seen at the [facility], after his admission to [facility],  where he was cardioverted because of the atrial fibrillation. He was also found to be in heart failure and was a started on diuretics. When the patient was evaluated in the emergency room he was found to be hypoxic and was treated with BiPAP which improved his oxygen saturation rate. He was tested for Covid and was found to be positive on 11/23/2020. Chest x-ray:  · 11/23/2020 persistent bilateral pulmonary infiltrates with associated pulmonary vascular congestion. Unchanged from films of 10/23/2020    Chest CT:  · 11/23/2020: Pulmonary fibrosis. No pulmonary embolus. Increased groundglass opacities and parenchymal opacities throughout both lungs when compared with films of 10/21/2020    Patient admitted because of concerns with COVID 19.    CURRENT EVALUATION : 12/5/2020    Afebrile  He feels much better not short of breath anymore he is on nasal cannula. He does not have any body aches  Getting treated for atrial fibrillation    LFT and wbc normal      Acute on chronic combined systolic and diastolic CHF exacerbation with hypertrophic non obstructive cardiomyopathy. On Bumex 1mg oral for fluid retention. Completed Decadron 12-2-20  Completed Remdesivir on 11/28/20.  2 Units of Plasma transfused 11-24-20      Patient exhibiting respiratory distress. Yes  Respiratory secretions: No    Patient receiving supplemental oxygen. Yes.   High flow  Nasal canula-->nasal cannula 5L O2.  FIO2 100--> 85-->75-->65-->50-->45-->35  02 sat 96%-->80-->93-->97-->96-->87-->98-->94  RR 32-->22-->20-->26-->22-->23    QTc:       NEWS Score: 0-4 Low risk group; 5-6: Medium risk group; 7 or above: High risk group  Parameters 3 2 1 0 1 2 3   Age    < 65   ? 65   RR ? 8  9-11 12-20  21-24 ? 25   O2 Sats ? 91 92-93 94-95 ? 96      Suppl O2  Yes  No      SBP ? 90  101-110 111-219   ? 220   HR ? 40  41-50 51-90  111-130 ? 131   Consciousness    Alert   Drowsiness, lethargy, or confusion   Temperature ? 35.0 C (95.0 F)  35.1-36.0 C 95.1-96.9 F 36.1-38.0 C 97.0-100.4 F 38.1-39.0 C 100.5-102.3 F ? 39.1 C ? 102.4 F      NEWS Score:   11/23/2020: 9 risk   11/24/20: 11 high Risk   11/29/20:12 high risk   12/4/20: 8 high risk    Overall Daily Picture:      Improving. Presence of secondary bacterial Infection:    No   Additional antibiotics: No    Labs, X rays reviewed: 12/5/2020    BUN: 16-->22-->31-->26-->26-->27-->33-->24  Cr: 0.62-->0.61-->0.53-->0.59-->0.52-->0.60-->0.81-->0.63    WBC: 14.1-->15.7-->11.4-->11.3-->12.4-->11.8-->13.7-->12.9  Hb: 12.0-->11.3-->10.1-->11.2-->11.6-->11.2-->14.2-->12.8  Plat: 282-->305-->423-->396-->424->468-->600-->447    Bilirubin 0.60-->0.68-->0.73-->1.00-->1.21  Alk phos 86-->61-->74  ALT 12-->18-->13  AST 24-->12-->13    Absolute Neutrophils: 13.1.-->9.69  Absolute Lymphocytes: 0.59.-->1.44  Neutrophil/Lymphocyte Ratio: 22.2 high risk.-->6.72high risk    CRP: 182 (10-17-20) -->204.8 (11/23/20)  Ferritin: 395(11/23/20)  LDH: 521(11/23/20)    Pro Calcitonin:0.29( 10/17/20)  Troponin high-sensitivity 102  proBNP 3472    Cultures:  Urine:  ·   Blood:  · 11/23/20: x2: No growth  · 12/1/20: x2: No growth  Sputum :  ·   Wound:       CXR:   · 11/23/2020 pulmonary fibrosis with interstitial edema. Scattered infiltrates bilaterally      CAT:  · 11/23/2020: Pulmonary fibrosis. No pulmonary embolus.   Increased groundglass opacities and parenchymal opacities throughout both lungs when compared with films of mL Intravenous 2 times per day    famotidine  20 mg Oral Daily    Vitamin D  2,000 Units Oral Daily       Social History:     Social History     Socioeconomic History    Marital status: Single     Spouse name: Not on file    Number of children: Not on file    Years of education: Not on file    Highest education level: Not on file   Occupational History    Not on file   Social Needs    Financial resource strain: Not on file    Food insecurity     Worry: Not on file     Inability: Not on file    Transportation needs     Medical: Not on file     Non-medical: Not on file   Tobacco Use    Smoking status: Former Smoker     Packs/day: 0.50     Years: 1.00     Pack years: 0.50     Last attempt to quit:      Years since quittin.9    Smokeless tobacco: Never Used    Tobacco comment: stated never actually really smoked only inhaled    Substance and Sexual Activity    Alcohol use: Yes     Alcohol/week: 12.0 standard drinks     Types: 10 Standard drinks or equivalent, 2 Shots of liquor per week     Comment: 2-3 times a week    Drug use: No     Types:  Other-see comments     Comment: Cocaine use in past in     Sexual activity: Yes     Partners: Female   Lifestyle    Physical activity     Days per week: Not on file     Minutes per session: Not on file    Stress: Not on file   Relationships    Social connections     Talks on phone: Not on file     Gets together: Not on file     Attends Bahai service: Not on file     Active member of club or organization: Not on file     Attends meetings of clubs or organizations: Not on file     Relationship status: Not on file    Intimate partner violence     Fear of current or ex partner: Not on file     Emotionally abused: Not on file     Physically abused: Not on file     Forced sexual activity: Not on file   Other Topics Concern    Not on file   Social History Narrative    Not on file       Family History:     Family History   Problem Relation Age of Onset    Diabetes Mother     Heart Attack Father     Heart Disease Father     Heart Disease Brother         Allergies:   Adhesive tape; Codeine; and Penicillins     Review of Systems:       Constitutional: Feels much better in general, his aches are better his breathing is improved  Head: No headaches  Eyes: No double vision or blurry vision. No conjunctival inflammation. ENT: No sore throat or runny nose. . No hearing loss, tinnitus or vertigo. Cardiovascular: No chest pain or palpitations. Shortness of breath. [place]  Lung: Not short of breath  Abdomen: No nausea, vomiting, diarrhea, or abdominal pain. [place] No cramps. Genitourinary: No increased urinary frequency, or dysuria. No hematuria. No suprapubic or CVA pain  Musculoskeletal: No muscle aches or pains. No joint effusions, swelling or deformities  Hematologic: No bleeding or bruising. Neurologic: No headache, weakness, numbness, or tingling. Integument: No rash, no ulcers. Psychiatric: No depression. Endocrine: No polyuria, no polydipsia, no polyphagia. Physical Examination :     Patient Vitals for the past 8 hrs:   BP Temp Temp src Pulse Resp SpO2 Weight   12/05/20 1245 92/61 97.8 °F (36.6 °C) Oral 120 25 96 % --   12/05/20 0819 (!) 84/56 97.7 °F (36.5 °C) Oral 131 24 95 % --   12/05/20 0600 -- -- -- -- -- -- 163 lb 5.8 oz (74.1 kg)     General Appearance: Awake, alert, and in no apparent distress  Head: Nontraumatic  Eyes: Pupils equal, round, reactive to light; sclera anicteric; conjunctivae pink. No embolic phenomena. ENT: Oropharynx clear, without erythema, exudate, or thrush. No tenderness of sinuses. Oral thrush  Neck:Supple, without lymphadenopathy. Thyroid normal, No bruits. Pulmonary/Chest: ,Distant breath sounds, decreased breath sounds at the bases   cardiovascular: Irreegular rate and rhythm without murmurs, rubs, or gallops. Abdomen: Soft, non tender.  Bowel sounds normal. No organomegaly  All four Extremities: No cyanosis, clubbing, Exam: sob    Acuity: Acute              Type of Exam: Initial         FINDINGS:    No intimal flap is seen in aorta. .  Small mediastinal nodes are noted,    similar to prior         No embolus is seen the central, right, left main pulmonary artery.  No    embolus is seen in the proximal segmental branches.  Distal segmental    branches and subsegmental branches are not well evaluated secondary to    respiratory motion artifact.         Patchy ground-glass and parenchymal opacities are seen in the left upper and    left lower lobe.         On the right patchy ground-glass and parenchymal opacities are seen in the    right upper, right middle and right lower lobe.  There is mild fusiform    bronchiectasis. Dannie Reyes is trace left-sided pleural effusion.         There is mild underlying pulmonary fibrosis.  There is underlying    bronchiectasis         Right adrenal gland is normal.  Left adrenal gland is normal.         1 mm calcification is seen in the left kidney         Spurring is seen in the spine.  Spurring is seen in the shoulder joints         Small soft tissue nodule is seen posteriorly in the subcutaneous fat    measuring 2.1 cm, likely sebaceous cyst              Impression    No central pulmonary embolus.  Peripheral branches are not well evaluated    secondary to motion.         Increased ground-glass opacity and parenchymal opacities throughout the lungs    either due to developing pneumonia or edema.  There is a small left-sided    pleural effusion.  By report there is a history of COVID-19 infection         Underlying pulmonary fibrosis again noted.                EXAMINATION:    ONE XRAY VIEW OF THE CHEST         11/23/2020 8:02 am         COMPARISON:    10/19/2020         HISTORY:    ORDERING SYSTEM PROVIDED HISTORY: SOB    TECHNOLOGIST PROVIDED HISTORY:    SOB    Reason for Exam: uprt port chest         FINDINGS:    Cardiomediastinal silhouette is stable.  Bilateral pulmonary infiltrates    persist unchanged and may represent pulmonary edema versus atypical pneumonia.              Impression    Stable exam        Medical Decision Bijgrw-Xqbolicn-Zyebn:       Medical Decision Making-Other:     Note:  · Labs, medications, radiologic studies were reviewed with personal review of films  · Large amounts of data were reviewed  · Discussed with nursing Staff, Discharge planner  · Infection Control and Prevention measures reviewed  · All prior entries were reviewed  · Administer medications as ordered  · Prognosis: Guarded  · Discharge planning reviewed  · Follow up as outpatient. Thank you for allowing us to participate in the care of this patient. Please call with questions. Dasha Whitaker MD     .    I have discussed the care of the patient, including pertinent history and exam findings,  with the student- I have seen and examined the patient and the key elements of all parts of the encounter have been performed by me. I agree with the assessment, plan and orders as documented by the student.     Letha Pollack, Infectious Diseases

## 2020-12-06 LAB
ABSOLUTE EOS #: 0.24 K/UL (ref 0–0.4)
ABSOLUTE IMMATURE GRANULOCYTE: 0.24 K/UL (ref 0–0.3)
ABSOLUTE LYMPH #: 1.3 K/UL (ref 1–4.8)
ABSOLUTE MONO #: 0.71 K/UL (ref 0.1–0.8)
ALBUMIN SERPL-MCNC: 2.4 G/DL (ref 3.5–5.2)
ALBUMIN/GLOBULIN RATIO: 0.9 (ref 1–2.5)
ALP BLD-CCNC: 81 U/L (ref 40–129)
ALT SERPL-CCNC: 11 U/L (ref 5–41)
ANION GAP SERPL CALCULATED.3IONS-SCNC: 9 MMOL/L (ref 9–17)
AST SERPL-CCNC: 12 U/L
BASOPHILS # BLD: 0 % (ref 0–2)
BASOPHILS ABSOLUTE: 0 K/UL (ref 0–0.2)
BILIRUB SERPL-MCNC: 0.87 MG/DL (ref 0.3–1.2)
BUN BLDV-MCNC: 18 MG/DL (ref 8–23)
BUN/CREAT BLD: ABNORMAL (ref 9–20)
CALCIUM SERPL-MCNC: 7.8 MG/DL (ref 8.6–10.4)
CHLORIDE BLD-SCNC: 103 MMOL/L (ref 98–107)
CO2: 25 MMOL/L (ref 20–31)
CREAT SERPL-MCNC: 0.58 MG/DL (ref 0.7–1.2)
D-DIMER QUANTITATIVE: 0.34 MG/L FEU
DIFFERENTIAL TYPE: ABNORMAL
EOSINOPHILS RELATIVE PERCENT: 2 % (ref 1–4)
GFR AFRICAN AMERICAN: >60 ML/MIN
GFR NON-AFRICAN AMERICAN: >60 ML/MIN
GFR SERPL CREATININE-BSD FRML MDRD: ABNORMAL ML/MIN/{1.73_M2}
GFR SERPL CREATININE-BSD FRML MDRD: ABNORMAL ML/MIN/{1.73_M2}
GLUCOSE BLD-MCNC: 81 MG/DL (ref 70–99)
HCT VFR BLD CALC: 41 % (ref 40.7–50.3)
HEMOGLOBIN: 12.3 G/DL (ref 13–17)
IMMATURE GRANULOCYTES: 2 %
LYMPHOCYTES # BLD: 11 % (ref 24–44)
MCH RBC QN AUTO: 26 PG (ref 25.2–33.5)
MCHC RBC AUTO-ENTMCNC: 30 G/DL (ref 28.4–34.8)
MCV RBC AUTO: 86.7 FL (ref 82.6–102.9)
MONOCYTES # BLD: 6 % (ref 1–7)
MORPHOLOGY: ABNORMAL
NRBC AUTOMATED: 0 PER 100 WBC
PDW BLD-RTO: 20.4 % (ref 11.8–14.4)
PLATELET # BLD: 390 K/UL (ref 138–453)
PLATELET ESTIMATE: ABNORMAL
PMV BLD AUTO: 10.2 FL (ref 8.1–13.5)
POTASSIUM SERPL-SCNC: 3.7 MMOL/L (ref 3.7–5.3)
RBC # BLD: 4.73 M/UL (ref 4.21–5.77)
RBC # BLD: ABNORMAL 10*6/UL
SEG NEUTROPHILS: 79 % (ref 36–66)
SEGMENTED NEUTROPHILS ABSOLUTE COUNT: 9.31 K/UL (ref 1.8–7.7)
SODIUM BLD-SCNC: 137 MMOL/L (ref 135–144)
TOTAL PROTEIN: 5.2 G/DL (ref 6.4–8.3)
WBC # BLD: 11.8 K/UL (ref 3.5–11.3)
WBC # BLD: ABNORMAL 10*3/UL

## 2020-12-06 PROCEDURE — 6370000000 HC RX 637 (ALT 250 FOR IP): Performed by: NURSE PRACTITIONER

## 2020-12-06 PROCEDURE — 80053 COMPREHEN METABOLIC PANEL: CPT

## 2020-12-06 PROCEDURE — 85379 FIBRIN DEGRADATION QUANT: CPT

## 2020-12-06 PROCEDURE — 99233 SBSQ HOSP IP/OBS HIGH 50: CPT | Performed by: INTERNAL MEDICINE

## 2020-12-06 PROCEDURE — 99232 SBSQ HOSP IP/OBS MODERATE 35: CPT | Performed by: INTERNAL MEDICINE

## 2020-12-06 PROCEDURE — 94761 N-INVAS EAR/PLS OXIMETRY MLT: CPT

## 2020-12-06 PROCEDURE — 6370000000 HC RX 637 (ALT 250 FOR IP): Performed by: INTERNAL MEDICINE

## 2020-12-06 PROCEDURE — 36415 COLL VENOUS BLD VENIPUNCTURE: CPT

## 2020-12-06 PROCEDURE — 2060000000 HC ICU INTERMEDIATE R&B

## 2020-12-06 PROCEDURE — 2580000003 HC RX 258: Performed by: NURSE PRACTITIONER

## 2020-12-06 PROCEDURE — 85025 COMPLETE CBC W/AUTO DIFF WBC: CPT

## 2020-12-06 PROCEDURE — 2700000000 HC OXYGEN THERAPY PER DAY

## 2020-12-06 RX ORDER — AMIODARONE HYDROCHLORIDE 200 MG/1
200 TABLET ORAL DAILY
Status: DISCONTINUED | OUTPATIENT
Start: 2020-12-06 | End: 2020-12-07

## 2020-12-06 RX ADMIN — DIGOXIN 125 MCG: 125 TABLET ORAL at 08:54

## 2020-12-06 RX ADMIN — GUAIFENESIN 600 MG: 600 TABLET, EXTENDED RELEASE ORAL at 08:54

## 2020-12-06 RX ADMIN — Medication 2000 UNITS: at 08:55

## 2020-12-06 RX ADMIN — SODIUM CHLORIDE, PRESERVATIVE FREE 10 ML: 5 INJECTION INTRAVENOUS at 08:54

## 2020-12-06 RX ADMIN — AMIODARONE HYDROCHLORIDE 200 MG: 200 TABLET ORAL at 10:35

## 2020-12-06 RX ADMIN — GUAIFENESIN 600 MG: 600 TABLET, EXTENDED RELEASE ORAL at 20:08

## 2020-12-06 RX ADMIN — FAMOTIDINE 20 MG: 20 TABLET, FILM COATED ORAL at 08:54

## 2020-12-06 RX ADMIN — ATORVASTATIN CALCIUM 40 MG: 80 TABLET, FILM COATED ORAL at 20:08

## 2020-12-06 RX ADMIN — SODIUM CHLORIDE, PRESERVATIVE FREE 10 ML: 5 INJECTION INTRAVENOUS at 20:08

## 2020-12-06 RX ADMIN — BUMETANIDE 1 MG: 1 TABLET ORAL at 08:54

## 2020-12-06 RX ADMIN — RIVAROXABAN 20 MG: 20 TABLET, FILM COATED ORAL at 18:15

## 2020-12-06 RX ADMIN — ACETAMINOPHEN 650 MG: 325 TABLET ORAL at 16:15

## 2020-12-06 RX ADMIN — TAMSULOSIN HYDROCHLORIDE 0.4 MG: 0.4 CAPSULE ORAL at 08:55

## 2020-12-06 RX ADMIN — ACETAMINOPHEN 650 MG: 325 TABLET ORAL at 00:13

## 2020-12-06 ASSESSMENT — PAIN SCALES - GENERAL
PAINLEVEL_OUTOF10: 0
PAINLEVEL_OUTOF10: 6
PAINLEVEL_OUTOF10: 0
PAINLEVEL_OUTOF10: 0
PAINLEVEL_OUTOF10: 3
PAINLEVEL_OUTOF10: 0

## 2020-12-06 ASSESSMENT — ENCOUNTER SYMPTOMS
TROUBLE SWALLOWING: 0
ABDOMINAL PAIN: 0
NAUSEA: 0
WHEEZING: 0
COUGH: 0
SHORTNESS OF BREATH: 1
VOMITING: 0
DIARRHEA: 0
SORE THROAT: 0
CONSTIPATION: 0

## 2020-12-06 ASSESSMENT — PAIN DESCRIPTION - PAIN TYPE: TYPE: ACUTE PAIN

## 2020-12-06 ASSESSMENT — PAIN DESCRIPTION - LOCATION: LOCATION: GENERALIZED

## 2020-12-06 NOTE — PROGRESS NOTES
[facility]  Office: [address], DO, Yusuf Bailey, DO, Tc Zëo, DO, Estee Gaviriabaron Finnegan, DO, Hilario Constantino MD, Bam Mehta MD, Bhargav Evans MD, Brigido Aviles MD, Justino Louis MD, Beryle Marseille, MD, Ciro Pallas, MD, Jass Beltran MD, Breezy Sanz MD, Santino Thorne, DO, Johanna Burrell MD, Jeni Wallis MD, Priti Rocha DO, Madi Ryder MD,  Lilliam Hawkins DO, Benjamin Cardenas MD, Mary Anne Youngblood MD, Preethi Penn, [facility], [facility], Avon Aman, [facility], Skyler Spain CNP, Reji Hammer, CNS, Jose Marin, CNP, Ronit Mejia, CNP, Diana Brumfield, CNP, Carole Burkett, CNP, Erik Velasquez, CNP, Mai Bronson PA-C, Mortimer Guardian, [facility], Te Garcia, CNP, Lavonne Frankel, CNP, Carole Marinelli, CNP, Hang Khan, CNP, Margaret Polanco, [address] [place] [address]    Progress Note    12/6/2020    9:47 AM    Name:   Meagan Mead  MRN:     [id number]     Acct:      [de-identified]   Room:   [address] [place] [address] Day:  15  Admit Date:  11/23/2020  6:44 AM    PCP:   Dereck Prather MD  Code Status:  Full Code    Subjective:     C/C:   Chief Complaint   Patient presents with    Shortness of Breath     Interval History Status:    Patient seen and examined  Patient still on amiodarone drip converted to sinus rhythm at 3 AM this morning  Patient denies fever chest pain    I am seeing the patient for shortness of breath    Medications: Allergies: Allergies   Allergen Reactions    Adhesive Tape Other (See Comments)     Blister badly     Codeine Nausea Only     Other reaction(s): Other: See Comments  NAUSEA  Other reaction(s):  Other: See Comments  NAUSEA    Penicillins Swelling     As a baby  Other reaction(s): Unknown  As a baby       Current Meds:   Scheduled Meds:    digoxin  125 mcg Oral Daily    bumetanide  1 mg Oral Daily    LORazepam  0.5 mg Intravenous Once    guaiFENesin  600 mg Oral BID    sodium chloride  20 mL Intravenous Once    rivaroxaban  20 mg Oral Daily    atorvastatin  40 mg Oral Daily    tamsulosin  0.4 mg Oral Daily    sodium chloride flush  10 mL Intravenous 2 times per day    famotidine  20 mg Oral Daily    Vitamin D  2,000 Units Oral Daily     Continuous Infusions:    amiodarone 0.5 mg/min (20 0148)     PRN Meds: metoprolol, calcium carbonate, sodium chloride, sodium chloride flush, potassium chloride **OR** potassium alternative oral replacement **OR** potassium chloride, magnesium sulfate, acetaminophen **OR** acetaminophen, polyethylene glycol, promethazine **OR** [DISCONTINUED] ondansetron, nicotine, dextromethorphan-guaiFENesin    Data:     Past Medical History:   has a past medical history of Atrial fibrillation ([facility] Back pain, chronic, Hill's esophagus, BPH (benign prostatic hyperplasia), Cancer ([facility] Cocaine abuse in remission [facility]), ED (erectile dysfunction), GERD (gastroesophageal reflux disease), GI bleed, Hernia, History of colon cancer, Melena, Migraines, and Murmur, cardiac. Social History:   reports that he quit smoking about 49 years ago. He has a 0.50 pack-year smoking history. He has never used smokeless tobacco. He reports current alcohol use of about 12.0 standard drinks of alcohol per week. He reports that he does not use drugs. Family History:   Family History   Problem Relation Age of Onset    Diabetes Mother     Heart Attack Father     Heart Disease Father     Heart Disease Brother        Vitals:  /61   Pulse 66   Temp 97.8 °F (36.6 °C) (Axillary)   Resp 22   Ht 6' (1.829 m)   Wt 166 lb 0.1 oz (75.3 kg)   SpO2 99%   BMI 22.51 kg/m²   Temp (24hrs), Av.8 °F (36.6 °C), Min:97.5 °F (36.4 °C), Max:98.1 °F (36.7 °C)    No results for input(s): POCGLU in the last 72 hours. I/O (24Hr):     Intake/Output Summary (Last 24 hours) at 2020 0954  Last data filed at 2020 0354  Gross per 24 hour   Intake 1248.86 ml   Output 375 ml   Net 873.86 ml       Labs:  Hematology:  Recent Labs     12/04/20  0703 12/05/20  0641 12/06/20  0612   WBC 13.7* 12.9* 11.8*   RBC 5.55 4.92 4.73   HGB 14.2 12.8* 12.3*   HCT 47.1 41.4 41.0   MCV 84.9 84.1 86.7   MCH 25.6 26.0 26.0   MCHC 30.1 30.9 30.0   RDW 21.0* 20.5* 20.4*   * 447 390   MPV 9.8 9.8 10.2   DDIMER 0.40 0.41 0.34     Chemistry:  Recent Labs     12/04/20  0703 12/05/20  0641 12/06/20  0612    133* 137   K 4.3 4.0 3.7    100 103   CO2 30 26 25   GLUCOSE 84 81 81   BUN 33* 24* 18   CREATININE 0.81 0.63* 0.58*   MG 2.2 2.1  --    ANIONGAP 8* 7* 9   LABGLOM >60 >60 >60   GFRAA >60 >60 >60   CALCIUM 8.6 7.9* 7.8*   DIGOXIN  --  0.4*  --      Recent Labs     12/04/20  0703 12/05/20  0641 12/06/20  0612   PROT 6.3* 5.3* 5.2*   LABALBU 3.2* 2.5* 2.4*   AST 14 13 12   ALT 17 13 11   ALKPHOS 78 74 81   BILITOT 1.00 1.21* 0.87     ABG:  Lab Results   Component Value Date    POCPH 7.519 11/23/2020    POCPCO2 32.2 11/23/2020    POCPO2 47.0 11/23/2020    POCHCO3 26.2 11/23/2020    NBEA NOT REPORTED 11/23/2020    PBEA 4 11/23/2020    OOA9AXJ 27 11/23/2020    ACPU4QCG 87 11/23/2020    FIO2 NOT REPORTED 11/23/2020     Lab Results   Component Value Date/Time    SPECIAL LEFT WRIST 8ML 12/01/2020 05:44 PM     Lab Results   Component Value Date/Time    CULTURE NO GROWTH 5 DAYS 12/01/2020 05:44 PM       Radiology:  Xr Chest Portable    Result Date: 11/23/2020  Stable exam     Ct Chest Pulmonary Embolism W Contrast    Result Date: 11/23/2020  No central pulmonary embolus. Peripheral branches are not well evaluated secondary to motion. Increased ground-glass opacity and parenchymal opacities throughout the lungs either due to developing pneumonia or edema. There is a small left-sided pleural effusion. By report there is a history of COVID-19 infection Underlying pulmonary fibrosis again noted.        Physical Examination:        General appearance:  Alert,   Lungs: Decreased air entry bilateral improved  Heart: regular rate and rhythm, no murmur  Abdomen:  soft, nontender, nondistended, normal bowel sounds, no masses, hepatomegaly, splenomegaly  Extremities:  no edema, redness, tenderness in the calves  Skin:  no gross lesions, rashes, induration    Assessment:        Hospital Problems           Last Modified POA    * (Principal) Acute hypoxemic respiratory failure due to COVID-19 ([facility] 11/23/2020 Yes    Dyslipidemia 11/23/2020 Yes    Benign prostatic hyperplasia with urinary obstruction 11/23/2020 Yes    Hypertrophic nonobstructive cardiomyopathy ([facility] 11/23/2020 Yes    Iron (Fe) deficiency anemia 11/23/2020 Yes    Benign essential HTN 11/23/2020 Yes    CHF (congestive heart failure), NYHA class II, acute on chronic, combined ([facility] 11/23/2020 Yes    Occupational pulmonary disease 11/23/2020 Yes    COVID-19 11/24/2020 Yes          Plan:          COVID-19 pneumonia status post Decadron and remdesivir  Improved    History of pulmonary fibrosis monitor      aCute on top of chronic hypoxemic respiratory failure most likely related to COVID-19 infection and pneumonia  Wean off oxygen    A. fib with RVR status post cardioversion in the past continue oral anticoagulation   Currently on amiodarone drip status post digoxin pending cardiology follow recommendation anticipate switch to oral amiodarone today and discharge in a.m.     bradycardia monitor    NSTEMI type II secondary to demand ischemia treated with heparin drip currently heparin drip was stopped medical management    Hypertension monitor closely      Dyslipidemia statin      Acute to type of chronic combined systolic and diastolic CHF exacerbation with hypertrophic nonobstructive cardiomyopathy treated with diuresis    Pulmonary edema developed during hospitalization IV diuresis    DC IV antibiotics      Hypokalemia replaced            Tariq Ferrara MD  12/6/2020  9:47 AM

## 2020-12-06 NOTE — PLAN OF CARE
Problem: Airway Clearance - Ineffective  Goal: Achieve or maintain patent airway  Outcome: Ongoing     Problem: Gas Exchange - Impaired  Goal: Absence of hypoxia  Outcome: Ongoing  Goal: Promote optimal lung function  Outcome: Ongoing     Problem: Breathing Pattern - Ineffective  Goal: Ability to achieve and maintain a regular respiratory rate  Outcome: Ongoing     Problem:  Body Temperature -  Risk of, Imbalanced  Goal: Ability to maintain a body temperature within defined limits  Outcome: Ongoing  Goal: Will regain or maintain usual level of consciousness  Outcome: Ongoing  Goal: Complications related to the disease process, condition or treatment will be avoided or minimized  Outcome: Ongoing   Electronically signed by Rayna Talbot RN on 12/6/2020 at 2:04 PM

## 2020-12-06 NOTE — PROGRESS NOTES
[facility]  Progress Note                   Date:   12/6/2020  Patient name: Martin Anderson  Date of admission:  11/23/2020  6:44 AM  MRN:   [id number]  YOB: 1955  PCP: Zarina Seo MD    Reason for Admission: Acute hypoxemic respiratory failure due to COVID-19 ([facility] [address] [U07.1, J96.01]    Subjective:       Clinical Changes /Abnormalities: Converted to SR around 0300. Per RN denies chest pain. Labs, vitals, & tele reviewed. No other acute CV issues/concerns overnight. Review of Systems    Medications:   Scheduled Meds:   digoxin  125 mcg Oral Daily    bumetanide  1 mg Oral Daily    LORazepam  0.5 mg Intravenous Once    guaiFENesin  600 mg Oral BID    sodium chloride  20 mL Intravenous Once    rivaroxaban  20 mg Oral Daily    atorvastatin  40 mg Oral Daily    tamsulosin  0.4 mg Oral Daily    sodium chloride flush  10 mL Intravenous 2 times per day    famotidine  20 mg Oral Daily    Vitamin D  2,000 Units Oral Daily     Continuous Infusions:   amiodarone 0.5 mg/min (12/05/20 0148)     CBC:   Recent Labs     12/04/20  0703 12/05/20  0641 12/06/20  0612   WBC 13.7* 12.9* 11.8*   HGB 14.2 12.8* 12.3*   * 447 390     BMP:    Recent Labs     12/04/20  0703 12/05/20  0641 12/06/20  0612    133* 137   K 4.3 4.0 3.7    100 103   CO2 30 26 25   BUN 33* 24* 18   CREATININE 0.81 0.63* 0.58*   GLUCOSE 84 81 81     Hepatic:  Recent Labs     12/04/20  0703 12/05/20  0641 12/06/20  0612   AST 14 13 12   ALT 17 13 11   BILITOT 1.00 1.21* 0.87   ALKPHOS 78 74 81     Troponin:   No results for input(s): TROPHS in the last 72 hours. BNP: No results for input(s): BNP in the last 72 hours. Lipids: No results for input(s): CHOL, HDL in the last 72 hours. Invalid input(s): LDLCALCU  INR:   No results for input(s): INR in the last 72 hours.   DIAGNOSTIC DATA  EKG:   NSR, Biatrial enlargement  ECHO: 10/20/20   Summary  Left ventricle is normal in size Global left ventricular systolic function  is moderately reduced Estimated ejection fraction is 35 % . Mostly global hypokinesis with minor regional variation. Grade I (mild) left ventricular diastolic dysfunction. Left atrium is moderately dilated. Aortic leaflet calcification with Moderate Aortic Stenosis, maybe  underestimated due to poor LVEF. Thickened mitral valve leaflets. Mild to moderate mitral regurgitation. Trivial tricuspid regurgitation. Estimated right ventricular systolic  pressure is 35GKVB. IVC dilated but unable to assess respiratory collapse.     Cardiac Angiography: 2018  Procedure Summary      Non-obstructive CAD.   Normal LV systolic function.      Recommendations      Medical therapy as needed.   Risk factor modification. Objective:   Vitals: /61   Pulse 66   Temp 97.8 °F (36.6 °C) (Axillary)   Resp 22   Ht 6' (1.829 m)   Wt 166 lb 0.1 oz (75.3 kg)   SpO2 99%   BMI 22.51 kg/m²     For careful stewardship of limited PPE during COVID-19 pandemic my physical exam was deferred. For physical exam, please see today's physical from primary team or ICU team.         Assessment / Acute Cardiac Problems:   1. Paroxysmal A FIB with RVR  2. Acute on chronic systolic CHF exacerbation  3. Covid 19 +  4. Hypertension  5.  Non-obstructive CAD on cath 2018    Patient Active Problem List:     Dyslipidemia     ED (erectile dysfunction)     Incomplete bladder emptying     Benign prostatic hyperplasia with urinary obstruction     History of colon cancer     Atrial fibrillation with normal ventricular rate (HCC)     Anemia     Pallor of optic disc     Presbyopia     Incisional hernia, without obstruction or gangrene     Hypertrophic nonobstructive cardiomyopathy (HCC)     Iron (Fe) deficiency anemia     Primary osteoarthritis of right knee     Benign essential HTN     History of malignant neoplasm of rectum     Hill's esophagus     CHF (congestive heart failure), NYHA class II, acute on chronic, combined ([address]     Hypoxia     Dyspnea and respiratory abnormalities     Occupational pulmonary disease     Sinus bradycardia     Acute hypoxemic respiratory failure due to COVID-19 [facility])      Plan of Treatment:   1. Paroxysmal Afib with some bradycardia on Sotalol in and out of Afib. Now converted again to SR. Will switch Amio to PO. Continue PO BB & Dig. Continue xarelto  2. NSTEMI likely secondary to demand ischemia. No plans for inpatient ischemic work-up at this time. Will consider stress test as OP once acute issues resolve. Continue statin. No chest pain or ECG changes. 3. LVrEF  EF 35% on ECHO 10/20/2020 - negative fluid balance. PO Bumex. No BB d/t bradycardia and no ACE at this time d/t hypotension - Poss CMP r/t RVR   4. HTN Controlled/hypotensive. Monitor with med changes as above. 5. Keep K > 4.0 and Mag  >2.0   6. Covid pneumonia treatment per primary service. 7. Please call with further questions/concerns.       Electronically signed by MIRI Red CNP on 12/6/2020 at 9:57 AM  [address]  [phone]

## 2020-12-06 NOTE — PROGRESS NOTES
PULMONARY & CRITICAL CARE MEDICINE PROGRESS NOTE     Patient:  Lornee Cooper  MRN: [id number]  Admit date: 2020  Primary Care Physician: Padmaja Carlos MD  Consulting Physician: Lianet Garber MD  CODE Status: Full Code  LOS: 13     SUBJECTIVE     CHIEF COMPLAINT/REASON FOR INITIAL CONSULT: Acute respiratory failure/COVID-19 infection    BRIEF HOSPITAL COURSE:   The patient is a 77 y.o. male who was was admitted with shortness of breath. Patient recently discharged from hospital after having an acute on chronic combined systolic and diastolic CHF and atrial fibrillation with RVR. Chest x-ray showed worsening bilateral infiltrates and CT scan showed some infiltrates along with underlying pulmonary fibrosis but no blood clot was seen. Patient had a Covid test that was positive along with elevated serum BNP and D-dimer of 2.86. Patient was placed on 100% nonrebreather mask    INTERVAL HISTORY:  20  Patient is currently on NC@ O2 Flow Rate (L/min)  Av L/min  Min: 4 L/min  Max: 4 L/min  T-max is 98.2, white count is 11.8  He has been weaned off amiodarone infusion  He is already completed remdesivir and Decadron    REVIEW OF SYSTEMS:  Review of Systems   Constitutional: Positive for fatigue. Negative for appetite change, chills, fever and unexpected weight change. HENT: Negative for congestion, postnasal drip, sore throat and trouble swallowing. Eyes: Negative for visual disturbance. Respiratory: Positive for shortness of breath. Negative for cough and wheezing. Cardiovascular: Positive for palpitations. Negative for chest pain and leg swelling. Gastrointestinal: Negative for abdominal pain, constipation, diarrhea, nausea and vomiting. Genitourinary: Negative for difficulty urinating, dysuria and frequency. Musculoskeletal: Negative for arthralgias and joint swelling. Skin: Negative for rash. Allergic/Immunologic: Negative for immunocompromised state.    Neurological: Positive for weakness. Negative for dizziness, speech difficulty and headaches. Hematological: Negative for adenopathy. Psychiatric/Behavioral: Negative for behavioral problems and sleep disturbance. OBJECTIVE     PaO2/FiO2 RATIO:  No results for input(s): POCPO2 in the last 72 hours. FiO2 : 35 %     VITAL SIGNS:   LAST:  /82   Pulse 75   Temp 98.2 °F (36.8 °C) (Axillary)   Resp 26   Ht 6' (1.829 m)   Wt 166 lb 0.1 oz (75.3 kg)   SpO2 98%   BMI 22.51 kg/m²   8-24 HR RANGE:  TEMP Temp  Av.9 °F (36.6 °C)  Min: 97.5 °F (36.4 °C)  Max: 98.2 °F (99.5 °C)   BP Systolic (30YFA), CPE:697 , Min:87 , MLQ:279      Diastolic (24DEY), MAX:91, Min:56, Max:86     PULSE Pulse  Av.3  Min: 64  Max: 128   RR Resp  Av.5  Min: 26  Max: 27   O2 SAT SpO2  Av.5 %  Min: 97 %  Max: 98 %   OXYGEN DELIVERY O2 Flow Rate (L/min)  Av L/min  Min: 4 L/min  Max: 4 L/min        SYSTEMIC EXAMINATION:   Physical exam was deferred to conserve PPE and limit exposure to COVID-19    DATA REVIEW     Medications: Current Inpatient  Scheduled Meds:   amiodarone  200 mg Oral Daily    digoxin  125 mcg Oral Daily    bumetanide  1 mg Oral Daily    LORazepam  0.5 mg Intravenous Once    guaiFENesin  600 mg Oral BID    sodium chloride  20 mL Intravenous Once    rivaroxaban  20 mg Oral Daily    atorvastatin  40 mg Oral Daily    tamsulosin  0.4 mg Oral Daily    sodium chloride flush  10 mL Intravenous 2 times per day    famotidine  20 mg Oral Daily    Vitamin D  2,000 Units Oral Daily     Continuous Infusions:      INPUT/OUTPUT:  In: 159.9 [I.V.:159.9]  Out: -   Date 20 - 20   Shift  6586-5029 8347-5779 24 Hour Total   INTAKE   I.V.(mL/kg) 159. 9(2.1)   159. 9(2.1)   Shift Total(mL/kg) 159. 9(2.1)   159. 9(2.1)   OUTPUT   Shift Total(mL/kg)       Weight (kg) 75.3 75.3 75.3 75.3        LABS:  ABGs:   No results for input(s): POCPH, POCPCO2, POCPO2, POCHCO3, AKNQ0MRA in the last 72 hours.  CBC:   Recent Labs     12/04/20  0703 12/05/20  0641 12/06/20 0612   WBC 13.7* 12.9* 11.8*   HGB 14.2 12.8* 12.3*   HCT 47.1 41.4 41.0   MCV 84.9 84.1 86.7   * 447 390   LYMPHOPCT 20* 15* 11*   RBC 5.55 4.92 4.73   MCH 25.6 26.0 26.0   MCHC 30.1 30.9 30.0   RDW 21.0* 20.5* 20.4*     CRP:   No results for input(s): CRP in the last 72 hours. LDH:   No results for input(s): LDH in the last 72 hours. BMP:   Recent Labs     12/04/20  0703 12/05/20 0641 12/06/20 0612    133* 137   K 4.3 4.0 3.7    100 103   CO2 30 26 25   BUN 33* 24* 18   CREATININE 0.81 0.63* 0.58*   GLUCOSE 84 81 81     Liver Function Test:   Recent Labs     12/04/20  0703 12/05/20  0641 12/06/20 0612   PROT 6.3* 5.3* 5.2*   LABALBU 3.2* 2.5* 2.4*   ALT 17 13 11   AST 14 13 12   ALKPHOS 78 74 81   BILITOT 1.00 1.21* 0.87     Coagulation Profile:   No results for input(s): INR, PROTIME, APTT in the last 72 hours. D-Dimer:  Recent Labs     12/04/20  0703 12/05/20 0641 12/06/20 0612   DDIMER 0.40 0.41 0.34     Ferritin:    No results for input(s): FERRITIN in the last 72 hours. Lactic Acid:  No results for input(s): LACTA in the last 72 hours. Cardiac Enzymes:  No results for input(s): CKTOTAL, CKMB, CKMBINDEX, TROPONINI in the last 72 hours. Invalid input(s): TROPONIN, HSTROP  BNP/ProBNP:   No results for input(s): BNP, PROBNP in the last 72 hours. Triglycerides:  No results for input(s): TRIG in the last 72 hours. Microbiology:  No results for input(s): SPECDESC, SPECDESC, SPECIAL, CULTURE, CULTURE, STATUS, ORG, CDIFFTOXPCR, CAMPYLOBPCR, SALMONELLAPC, SHIGAPCR, SHIGELLAPCR, MPNEUG, MPNEUM, LACTOQL in the last 72 hours. Pathology:    Radiology Reports:  XR CHEST PORTABLE   Final Result   Diffuse peripheral airspace opacities bilaterally concerning for multifocal   infectious process. XR ABDOMEN (KUB) (SINGLE AP VIEW)   Final Result   A moderate amount of stool was scattered in nondistended colon. No fecal   impaction was noted. No obstruction or ileus. Bilateral lower lobe fibrosis and/or pneumonia was noted. XR CHEST (SINGLE VIEW FRONTAL)   Final Result   Slight worsening in pulmonary opacities in the left perihilar area. Stable   cardiomegaly and bilateral effusions. Pattern favors pulmonary edema with   atypical.  Osseous structures are stable. No extrapleural air or midline   shift. CT CHEST PULMONARY EMBOLISM W CONTRAST   Final Result   No central pulmonary embolus. Peripheral branches are not well evaluated   secondary to motion. Increased ground-glass opacity and parenchymal opacities throughout the lungs   either due to developing pneumonia or edema. There is a small left-sided   pleural effusion. By report there is a history of COVID-19 infection      Underlying pulmonary fibrosis again noted. XR CHEST PORTABLE   Final Result   Stable exam              Echocardiogram:   Results for orders placed during the hospital encounter of 10/17/20   ECHO Complete 2D W Doppler W Color    Narrative   Summary  Left ventricle is normal in size Global left ventricular systolic function  is moderately reduced Estimated ejection fraction is 35 % . Mostly global hypokinesis with minor regional variation. Grade I (mild) left ventricular diastolic dysfunction. Left atrium is moderately dilated. Aortic leaflet calcification with Moderate Aortic Stenosis, maybe  underestimated due to poor LVEF. Thickened mitral valve leaflets. Mild to moderate mitral regurgitation. Trivial tricuspid regurgitation. Estimated right ventricular systolic  pressure is 84DMTH. IVC dilated but unable to assess respiratory collapse.           ASSESSMENT AND PLAN     Assessment:    Acute hypoxic respiratory failure  Bilateral multifocal pneumonia due to COVID 19 infection  Sepsis due to COVID 19  Paroxysmal atrial fibrillation with rapid ventricular response  Hypertrophic obstructive

## 2020-12-07 ENCOUNTER — APPOINTMENT (OUTPATIENT)
Dept: GENERAL RADIOLOGY | Age: 65
DRG: 871 | End: 2020-12-07
Payer: MEDICARE

## 2020-12-07 ENCOUNTER — APPOINTMENT (OUTPATIENT)
Dept: CT IMAGING | Age: 65
DRG: 871 | End: 2020-12-07
Payer: MEDICARE

## 2020-12-07 ENCOUNTER — HOSPITAL ENCOUNTER (INPATIENT)
Age: 65
LOS: 3 days | Discharge: HOME | DRG: 871 | End: 2020-12-10
Attending: EMERGENCY MEDICINE | Admitting: FAMILY MEDICINE
Payer: MEDICARE

## 2020-12-07 VITALS
HEART RATE: 87 BPM | SYSTOLIC BLOOD PRESSURE: 105 MMHG | RESPIRATION RATE: 24 BRPM | TEMPERATURE: 98.2 F | WEIGHT: 165 LBS | OXYGEN SATURATION: 94 % | HEIGHT: 72 IN | BODY MASS INDEX: 22.35 KG/M2 | DIASTOLIC BLOOD PRESSURE: 78 MMHG

## 2020-12-07 PROBLEM — J18.9 PNEUMONIA: Status: ACTIVE | Noted: 2020-12-07

## 2020-12-07 LAB
ABSOLUTE EOS #: 0 K/UL (ref 0–0.44)
ABSOLUTE EOS #: 0.39 K/UL (ref 0–0.44)
ABSOLUTE IMMATURE GRANULOCYTE: 0.1 K/UL (ref 0–0.3)
ABSOLUTE IMMATURE GRANULOCYTE: 0.22 K/UL (ref 0–0.3)
ABSOLUTE LYMPH #: 0.65 K/UL (ref 1.1–3.7)
ABSOLUTE LYMPH #: 1.55 K/UL (ref 1.1–3.7)
ABSOLUTE MONO #: 1.07 K/UL (ref 0.1–1.2)
ABSOLUTE MONO #: 1.31 K/UL (ref 0.1–1.2)
ALBUMIN SERPL-MCNC: 2.5 G/DL (ref 3.5–5.2)
ALBUMIN SERPL-MCNC: 3 G/DL (ref 3.5–5.2)
ALBUMIN/GLOBULIN RATIO: 0.8 (ref 1–2.5)
ALBUMIN/GLOBULIN RATIO: 0.9 (ref 1–2.5)
ALLEN TEST: ABNORMAL
ALP BLD-CCNC: 106 U/L (ref 40–129)
ALP BLD-CCNC: 91 U/L (ref 40–129)
ALT SERPL-CCNC: 11 U/L (ref 5–41)
ALT SERPL-CCNC: 14 U/L (ref 5–41)
ANION GAP SERPL CALCULATED.3IONS-SCNC: 10 MMOL/L (ref 9–17)
ANION GAP SERPL CALCULATED.3IONS-SCNC: 15 MMOL/L (ref 9–17)
ANION GAP: 10 MMOL/L (ref 7–16)
AST SERPL-CCNC: 16 U/L
AST SERPL-CCNC: 19 U/L
BASOPHILS # BLD: 0 % (ref 0–2)
BASOPHILS # BLD: 0 % (ref 0–2)
BASOPHILS ABSOLUTE: 0 K/UL (ref 0–0.2)
BASOPHILS ABSOLUTE: 0 K/UL (ref 0–0.2)
BILIRUB SERPL-MCNC: 0.66 MG/DL (ref 0.3–1.2)
BILIRUB SERPL-MCNC: 0.66 MG/DL (ref 0.3–1.2)
BNP INTERPRETATION: NORMAL
BUN BLDV-MCNC: 16 MG/DL (ref 8–23)
BUN BLDV-MCNC: 20 MG/DL (ref 8–23)
BUN/CREAT BLD: ABNORMAL (ref 9–20)
BUN/CREAT BLD: ABNORMAL (ref 9–20)
C-REACTIVE PROTEIN: 60.2 MG/L (ref 0–5)
CALCIUM SERPL-MCNC: 8.3 MG/DL (ref 8.6–10.4)
CALCIUM SERPL-MCNC: 8.7 MG/DL (ref 8.6–10.4)
CHLORIDE BLD-SCNC: 103 MMOL/L (ref 98–107)
CHLORIDE BLD-SCNC: 96 MMOL/L (ref 98–107)
CO2: 26 MMOL/L (ref 20–31)
CO2: 27 MMOL/L (ref 20–31)
CREAT SERPL-MCNC: 0.57 MG/DL (ref 0.7–1.2)
CREAT SERPL-MCNC: 0.82 MG/DL (ref 0.7–1.2)
CULTURE: NORMAL
CULTURE: NORMAL
D-DIMER QUANTITATIVE: 0.34 MG/L FEU
D-DIMER QUANTITATIVE: 0.61 MG/L FEU
DIFFERENTIAL TYPE: ABNORMAL
DIFFERENTIAL TYPE: ABNORMAL
EOSINOPHILS RELATIVE PERCENT: 0 % (ref 1–4)
EOSINOPHILS RELATIVE PERCENT: 4 % (ref 1–4)
FERRITIN: 357 UG/L (ref 30–400)
FIBRINOGEN: 528 MG/DL (ref 140–420)
FIO2: ABNORMAL
GFR AFRICAN AMERICAN: >60 ML/MIN
GFR AFRICAN AMERICAN: >60 ML/MIN
GFR NON-AFRICAN AMERICAN: >60 ML/MIN
GFR SERPL CREATININE-BSD FRML MDRD: >60 ML/MIN
GFR SERPL CREATININE-BSD FRML MDRD: ABNORMAL ML/MIN/{1.73_M2}
GFR SERPL CREATININE-BSD FRML MDRD: NORMAL ML/MIN/{1.73_M2}
GLUCOSE BLD-MCNC: 149 MG/DL (ref 74–100)
GLUCOSE BLD-MCNC: 166 MG/DL (ref 70–99)
GLUCOSE BLD-MCNC: 83 MG/DL (ref 70–99)
HCO3 VENOUS: 31 MMOL/L (ref 22–29)
HCT VFR BLD CALC: 41 % (ref 40.7–50.3)
HCT VFR BLD CALC: 44.1 % (ref 40.7–50.3)
HEMOGLOBIN: 12.4 G/DL (ref 13–17)
HEMOGLOBIN: 13.9 G/DL (ref 13–17)
IMMATURE GRANULOCYTES: 1 %
IMMATURE GRANULOCYTES: 1 %
LACTATE DEHYDROGENASE: 302 U/L (ref 135–225)
LACTIC ACID, WHOLE BLOOD: 3.4 MMOL/L (ref 0.7–2.1)
LACTIC ACID: ABNORMAL MMOL/L
LYMPHOCYTES # BLD: 16 % (ref 24–43)
LYMPHOCYTES # BLD: 3 % (ref 24–43)
Lab: NORMAL
Lab: NORMAL
MCH RBC QN AUTO: 26 PG (ref 25.2–33.5)
MCH RBC QN AUTO: 26.1 PG (ref 25.2–33.5)
MCHC RBC AUTO-ENTMCNC: 30.2 G/DL (ref 28.4–34.8)
MCHC RBC AUTO-ENTMCNC: 31.5 G/DL (ref 28.4–34.8)
MCV RBC AUTO: 82.9 FL (ref 82.6–102.9)
MCV RBC AUTO: 86 FL (ref 82.6–102.9)
MODE: ABNORMAL
MONOCYTES # BLD: 11 % (ref 3–12)
MONOCYTES # BLD: 6 % (ref 3–12)
MORPHOLOGY: ABNORMAL
MORPHOLOGY: ABNORMAL
NEGATIVE BASE EXCESS, VEN: ABNORMAL (ref 0–2)
NRBC AUTOMATED: 0 PER 100 WBC
NRBC AUTOMATED: 0 PER 100 WBC
O2 DEVICE/FLOW/%: ABNORMAL
O2 SAT, VEN: 76 % (ref 60–85)
PATIENT TEMP: ABNORMAL
PCO2, VEN: 46.9 MM HG (ref 41–51)
PDW BLD-RTO: 20.2 % (ref 11.8–14.4)
PDW BLD-RTO: 20.4 % (ref 11.8–14.4)
PH VENOUS: 7.43 (ref 7.32–7.43)
PLATELET # BLD: 387 K/UL (ref 138–453)
PLATELET # BLD: 484 K/UL (ref 138–453)
PLATELET ESTIMATE: ABNORMAL
PLATELET ESTIMATE: ABNORMAL
PMV BLD AUTO: 10 FL (ref 8.1–13.5)
PMV BLD AUTO: 10 FL (ref 8.1–13.5)
PO2, VEN: 40.4 MM HG (ref 30–50)
POC CHLORIDE: 97 MMOL/L (ref 98–107)
POC CREATININE: 0.86 MG/DL (ref 0.51–1.19)
POC HEMATOCRIT: 46 % (ref 41–53)
POC HEMOGLOBIN: 15.6 G/DL (ref 13.5–17.5)
POC IONIZED CALCIUM: 1.11 MMOL/L (ref 1.15–1.33)
POC LACTIC ACID: 1.93 MMOL/L (ref 0.56–1.39)
POC PCO2 TEMP: ABNORMAL MM HG
POC PH TEMP: ABNORMAL
POC PO2 TEMP: ABNORMAL MM HG
POC POTASSIUM: 4.7 MMOL/L (ref 3.5–4.5)
POC SODIUM: 138 MMOL/L (ref 138–146)
POSITIVE BASE EXCESS, VEN: 6 (ref 0–3)
POTASSIUM SERPL-SCNC: 3.9 MMOL/L (ref 3.7–5.3)
POTASSIUM SERPL-SCNC: 4 MMOL/L (ref 3.7–5.3)
PRO-BNP: 288 PG/ML
PROCALCITONIN: 0.14 NG/ML
RBC # BLD: 4.77 M/UL (ref 4.21–5.77)
RBC # BLD: 5.32 M/UL (ref 4.21–5.77)
RBC # BLD: ABNORMAL 10*6/UL
RBC # BLD: ABNORMAL 10*6/UL
SAMPLE SITE: ABNORMAL
SEG NEUTROPHILS: 68 % (ref 36–65)
SEG NEUTROPHILS: 90 % (ref 36–65)
SEGMENTED NEUTROPHILS ABSOLUTE COUNT: 19.62 K/UL (ref 1.5–8.1)
SEGMENTED NEUTROPHILS ABSOLUTE COUNT: 6.59 K/UL (ref 1.5–8.1)
SODIUM BLD-SCNC: 138 MMOL/L (ref 135–144)
SODIUM BLD-SCNC: 139 MMOL/L (ref 135–144)
SPECIMEN DESCRIPTION: NORMAL
SPECIMEN DESCRIPTION: NORMAL
TOTAL CO2, VENOUS: 33 MMOL/L (ref 23–30)
TOTAL PROTEIN: 5.7 G/DL (ref 6.4–8.3)
TOTAL PROTEIN: 6.5 G/DL (ref 6.4–8.3)
TROPONIN INTERP: ABNORMAL
TROPONIN T: ABNORMAL NG/ML
TROPONIN, HIGH SENSITIVITY: 24 NG/L (ref 0–22)
VITAMIN D 25-HYDROXY: 51.2 NG/ML (ref 30–100)
WBC # BLD: 21.8 K/UL (ref 3.5–11.3)
WBC # BLD: 9.7 K/UL (ref 3.5–11.3)
WBC # BLD: ABNORMAL 10*3/UL
WBC # BLD: ABNORMAL 10*3/UL

## 2020-12-07 PROCEDURE — 2580000003 HC RX 258: Performed by: STUDENT IN AN ORGANIZED HEALTH CARE EDUCATION/TRAINING PROGRAM

## 2020-12-07 PROCEDURE — 85730 THROMBOPLASTIN TIME PARTIAL: CPT

## 2020-12-07 PROCEDURE — 86140 C-REACTIVE PROTEIN: CPT

## 2020-12-07 PROCEDURE — 82306 VITAMIN D 25 HYDROXY: CPT

## 2020-12-07 PROCEDURE — 84484 ASSAY OF TROPONIN QUANT: CPT

## 2020-12-07 PROCEDURE — 2580000003 HC RX 258: Performed by: NURSE PRACTITIONER

## 2020-12-07 PROCEDURE — 84295 ASSAY OF SERUM SODIUM: CPT

## 2020-12-07 PROCEDURE — 84145 PROCALCITONIN (PCT): CPT

## 2020-12-07 PROCEDURE — 85610 PROTHROMBIN TIME: CPT

## 2020-12-07 PROCEDURE — 83615 LACTATE (LD) (LDH) ENZYME: CPT

## 2020-12-07 PROCEDURE — 71260 CT THORAX DX C+: CPT

## 2020-12-07 PROCEDURE — 36415 COLL VENOUS BLD VENIPUNCTURE: CPT

## 2020-12-07 PROCEDURE — 82330 ASSAY OF CALCIUM: CPT

## 2020-12-07 PROCEDURE — 71045 X-RAY EXAM CHEST 1 VIEW: CPT

## 2020-12-07 PROCEDURE — 99239 HOSP IP/OBS DSCHRG MGMT >30: CPT | Performed by: INTERNAL MEDICINE

## 2020-12-07 PROCEDURE — 96365 THER/PROPH/DIAG IV INF INIT: CPT

## 2020-12-07 PROCEDURE — 96374 THER/PROPH/DIAG INJ IV PUSH: CPT

## 2020-12-07 PROCEDURE — 84132 ASSAY OF SERUM POTASSIUM: CPT

## 2020-12-07 PROCEDURE — 6360000002 HC RX W HCPCS: Performed by: STUDENT IN AN ORGANIZED HEALTH CARE EDUCATION/TRAINING PROGRAM

## 2020-12-07 PROCEDURE — 6370000000 HC RX 637 (ALT 250 FOR IP): Performed by: STUDENT IN AN ORGANIZED HEALTH CARE EDUCATION/TRAINING PROGRAM

## 2020-12-07 PROCEDURE — 6370000000 HC RX 637 (ALT 250 FOR IP): Performed by: INTERNAL MEDICINE

## 2020-12-07 PROCEDURE — 85025 COMPLETE CBC W/AUTO DIFF WBC: CPT

## 2020-12-07 PROCEDURE — 6360000004 HC RX CONTRAST MEDICATION: Performed by: STUDENT IN AN ORGANIZED HEALTH CARE EDUCATION/TRAINING PROGRAM

## 2020-12-07 PROCEDURE — 93005 ELECTROCARDIOGRAM TRACING: CPT | Performed by: STUDENT IN AN ORGANIZED HEALTH CARE EDUCATION/TRAINING PROGRAM

## 2020-12-07 PROCEDURE — 83605 ASSAY OF LACTIC ACID: CPT

## 2020-12-07 PROCEDURE — 99285 EMERGENCY DEPT VISIT HI MDM: CPT

## 2020-12-07 PROCEDURE — 6370000000 HC RX 637 (ALT 250 FOR IP): Performed by: NURSE PRACTITIONER

## 2020-12-07 PROCEDURE — 87040 BLOOD CULTURE FOR BACTERIA: CPT

## 2020-12-07 PROCEDURE — 82803 BLOOD GASES ANY COMBINATION: CPT

## 2020-12-07 PROCEDURE — 82435 ASSAY OF BLOOD CHLORIDE: CPT

## 2020-12-07 PROCEDURE — 80053 COMPREHEN METABOLIC PANEL: CPT

## 2020-12-07 PROCEDURE — 83880 ASSAY OF NATRIURETIC PEPTIDE: CPT

## 2020-12-07 PROCEDURE — 94660 CPAP INITIATION&MGMT: CPT

## 2020-12-07 PROCEDURE — 82728 ASSAY OF FERRITIN: CPT

## 2020-12-07 PROCEDURE — 85014 HEMATOCRIT: CPT

## 2020-12-07 PROCEDURE — 82947 ASSAY GLUCOSE BLOOD QUANT: CPT

## 2020-12-07 PROCEDURE — 85379 FIBRIN DEGRADATION QUANT: CPT

## 2020-12-07 PROCEDURE — 85384 FIBRINOGEN ACTIVITY: CPT

## 2020-12-07 PROCEDURE — 99233 SBSQ HOSP IP/OBS HIGH 50: CPT | Performed by: INTERNAL MEDICINE

## 2020-12-07 PROCEDURE — 2060000000 HC ICU INTERMEDIATE R&B

## 2020-12-07 PROCEDURE — 82565 ASSAY OF CREATININE: CPT

## 2020-12-07 PROCEDURE — 96367 TX/PROPH/DG ADDL SEQ IV INF: CPT

## 2020-12-07 RX ORDER — ACETAMINOPHEN 500 MG
1000 TABLET ORAL ONCE
Status: COMPLETED | OUTPATIENT
Start: 2020-12-07 | End: 2020-12-07

## 2020-12-07 RX ORDER — DIGOXIN 125 MCG
125 TABLET ORAL DAILY
Qty: 30 TABLET | Refills: 0 | Status: SHIPPED | OUTPATIENT
Start: 2020-12-08 | End: 2021-05-24 | Stop reason: SDUPTHER

## 2020-12-07 RX ORDER — ASPIRIN 81 MG/1
324 TABLET, CHEWABLE ORAL ONCE
Status: COMPLETED | OUTPATIENT
Start: 2020-12-07 | End: 2020-12-07

## 2020-12-07 RX ORDER — NICOTINE 21 MG/24HR
1 PATCH, TRANSDERMAL 24 HOURS TRANSDERMAL DAILY PRN
Status: DISCONTINUED | OUTPATIENT
Start: 2020-12-07 | End: 2020-12-10 | Stop reason: HOSPADM

## 2020-12-07 RX ORDER — SODIUM CHLORIDE, SODIUM LACTATE, POTASSIUM CHLORIDE, AND CALCIUM CHLORIDE .6; .31; .03; .02 G/100ML; G/100ML; G/100ML; G/100ML
500 INJECTION, SOLUTION INTRAVENOUS ONCE
Status: COMPLETED | OUTPATIENT
Start: 2020-12-07 | End: 2020-12-07

## 2020-12-07 RX ORDER — TAMSULOSIN HYDROCHLORIDE 0.4 MG/1
0.4 CAPSULE ORAL DAILY
Status: DISCONTINUED | OUTPATIENT
Start: 2020-12-08 | End: 2020-12-10 | Stop reason: HOSPADM

## 2020-12-07 RX ORDER — ACETAMINOPHEN 650 MG/1
650 SUPPOSITORY RECTAL EVERY 6 HOURS PRN
Status: DISCONTINUED | OUTPATIENT
Start: 2020-12-07 | End: 2020-12-10 | Stop reason: HOSPADM

## 2020-12-07 RX ORDER — DIGOXIN 125 MCG
125 TABLET ORAL DAILY
Status: DISCONTINUED | OUTPATIENT
Start: 2020-12-08 | End: 2020-12-10 | Stop reason: HOSPADM

## 2020-12-07 RX ORDER — BUMETANIDE 1 MG/1
1 TABLET ORAL DAILY
Status: DISCONTINUED | OUTPATIENT
Start: 2020-12-08 | End: 2020-12-10 | Stop reason: HOSPADM

## 2020-12-07 RX ORDER — ATORVASTATIN CALCIUM 40 MG/1
40 TABLET, FILM COATED ORAL DAILY
Status: DISCONTINUED | OUTPATIENT
Start: 2020-12-08 | End: 2020-12-10 | Stop reason: HOSPADM

## 2020-12-07 RX ORDER — MAGNESIUM SULFATE 1 G/100ML
1 INJECTION INTRAVENOUS ONCE
Status: COMPLETED | OUTPATIENT
Start: 2020-12-07 | End: 2020-12-07

## 2020-12-07 RX ORDER — VITAMIN B COMPLEX
2000 TABLET ORAL DAILY
Status: DISCONTINUED | OUTPATIENT
Start: 2020-12-08 | End: 2020-12-10 | Stop reason: HOSPADM

## 2020-12-07 RX ORDER — AMIODARONE HYDROCHLORIDE 200 MG/1
200 TABLET ORAL DAILY
Qty: 30 TABLET | Refills: 3 | Status: ON HOLD | OUTPATIENT
Start: 2020-12-21 | End: 2021-01-14 | Stop reason: HOSPADM

## 2020-12-07 RX ORDER — SODIUM CHLORIDE 0.9 % (FLUSH) 0.9 %
10 SYRINGE (ML) INJECTION EVERY 12 HOURS SCHEDULED
Status: DISCONTINUED | OUTPATIENT
Start: 2020-12-07 | End: 2020-12-10 | Stop reason: HOSPADM

## 2020-12-07 RX ORDER — AMIODARONE HYDROCHLORIDE 200 MG/1
200 TABLET ORAL 2 TIMES DAILY
Qty: 27 TABLET | Refills: 0 | Status: SHIPPED | OUTPATIENT
Start: 2020-12-07 | End: 2021-01-12 | Stop reason: ALTCHOICE

## 2020-12-07 RX ORDER — AMIODARONE HYDROCHLORIDE 200 MG/1
200 TABLET ORAL 2 TIMES DAILY
Status: DISCONTINUED | OUTPATIENT
Start: 2020-12-07 | End: 2020-12-07 | Stop reason: HOSPADM

## 2020-12-07 RX ORDER — AMIODARONE HYDROCHLORIDE 200 MG/1
200 TABLET ORAL DAILY
Qty: 30 TABLET | Refills: 0 | Status: SHIPPED | OUTPATIENT
Start: 2020-12-08 | End: 2020-12-07

## 2020-12-07 RX ORDER — ONDANSETRON 2 MG/ML
4 INJECTION INTRAMUSCULAR; INTRAVENOUS EVERY 6 HOURS PRN
Status: DISCONTINUED | OUTPATIENT
Start: 2020-12-07 | End: 2020-12-10 | Stop reason: HOSPADM

## 2020-12-07 RX ORDER — ALBUTEROL SULFATE 90 UG/1
2 AEROSOL, METERED RESPIRATORY (INHALATION) 4 TIMES DAILY PRN
Qty: 1 INHALER | Refills: 0 | Status: SHIPPED | OUTPATIENT
Start: 2020-12-07 | End: 2021-12-14

## 2020-12-07 RX ORDER — METHYLPREDNISOLONE SODIUM SUCCINATE 125 MG/2ML
125 INJECTION, POWDER, LYOPHILIZED, FOR SOLUTION INTRAMUSCULAR; INTRAVENOUS ONCE
Status: COMPLETED | OUTPATIENT
Start: 2020-12-07 | End: 2020-12-07

## 2020-12-07 RX ORDER — SODIUM CHLORIDE 0.9 % (FLUSH) 0.9 %
10 SYRINGE (ML) INJECTION PRN
Status: DISCONTINUED | OUTPATIENT
Start: 2020-12-07 | End: 2020-12-10 | Stop reason: HOSPADM

## 2020-12-07 RX ORDER — PROMETHAZINE HYDROCHLORIDE 12.5 MG/1
12.5 TABLET ORAL EVERY 6 HOURS PRN
Status: DISCONTINUED | OUTPATIENT
Start: 2020-12-07 | End: 2020-12-10 | Stop reason: HOSPADM

## 2020-12-07 RX ORDER — GUAIFENESIN 600 MG/1
600 TABLET, EXTENDED RELEASE ORAL 2 TIMES DAILY
Qty: 14 TABLET | Refills: 0 | Status: SHIPPED | OUTPATIENT
Start: 2020-12-07 | End: 2021-01-12

## 2020-12-07 RX ORDER — ACETAMINOPHEN 325 MG/1
650 TABLET ORAL EVERY 6 HOURS PRN
Status: DISCONTINUED | OUTPATIENT
Start: 2020-12-07 | End: 2020-12-10 | Stop reason: HOSPADM

## 2020-12-07 RX ORDER — BUMETANIDE 1 MG/1
1 TABLET ORAL DAILY
Qty: 30 TABLET | Refills: 0 | Status: ON HOLD | OUTPATIENT
Start: 2020-12-08 | End: 2021-01-14 | Stop reason: HOSPADM

## 2020-12-07 RX ORDER — AMIODARONE HYDROCHLORIDE 200 MG/1
200 TABLET ORAL 2 TIMES DAILY
Status: DISCONTINUED | OUTPATIENT
Start: 2020-12-08 | End: 2020-12-10 | Stop reason: HOSPADM

## 2020-12-07 RX ADMIN — SODIUM CHLORIDE, PRESERVATIVE FREE 10 ML: 5 INJECTION INTRAVENOUS at 08:50

## 2020-12-07 RX ADMIN — MAGNESIUM SULFATE HEPTAHYDRATE 1 G: 1 INJECTION, SOLUTION INTRAVENOUS at 20:30

## 2020-12-07 RX ADMIN — FAMOTIDINE 20 MG: 20 TABLET, FILM COATED ORAL at 08:50

## 2020-12-07 RX ADMIN — TAMSULOSIN HYDROCHLORIDE 0.4 MG: 0.4 CAPSULE ORAL at 08:50

## 2020-12-07 RX ADMIN — VANCOMYCIN HYDROCHLORIDE 1750 MG: 100 INJECTION, POWDER, LYOPHILIZED, FOR SOLUTION INTRAVENOUS at 22:04

## 2020-12-07 RX ADMIN — AMIODARONE HYDROCHLORIDE 200 MG: 200 TABLET ORAL at 08:49

## 2020-12-07 RX ADMIN — ACETAMINOPHEN 1000 MG: 500 TABLET ORAL at 21:15

## 2020-12-07 RX ADMIN — ASPIRIN 324 MG: 81 TABLET, CHEWABLE ORAL at 18:23

## 2020-12-07 RX ADMIN — IOPAMIDOL 75 ML: 755 INJECTION, SOLUTION INTRAVENOUS at 21:34

## 2020-12-07 RX ADMIN — DIGOXIN 125 MCG: 125 TABLET ORAL at 08:49

## 2020-12-07 RX ADMIN — GUAIFENESIN 600 MG: 600 TABLET, EXTENDED RELEASE ORAL at 08:50

## 2020-12-07 RX ADMIN — CEFEPIME 2 G: 2 INJECTION, POWDER, FOR SOLUTION INTRAVENOUS at 19:56

## 2020-12-07 RX ADMIN — Medication 2000 UNITS: at 08:50

## 2020-12-07 RX ADMIN — SODIUM CHLORIDE, POTASSIUM CHLORIDE, SODIUM LACTATE AND CALCIUM CHLORIDE 500 ML: 600; 310; 30; 20 INJECTION, SOLUTION INTRAVENOUS at 18:55

## 2020-12-07 RX ADMIN — ENOXAPARIN SODIUM 70 MG: 80 INJECTION SUBCUTANEOUS at 22:26

## 2020-12-07 RX ADMIN — BUMETANIDE 1 MG: 1 TABLET ORAL at 08:49

## 2020-12-07 RX ADMIN — METHYLPREDNISOLONE SODIUM SUCCINATE 125 MG: 125 INJECTION, POWDER, FOR SOLUTION INTRAMUSCULAR; INTRAVENOUS at 18:24

## 2020-12-07 ASSESSMENT — ENCOUNTER SYMPTOMS
ABDOMINAL PAIN: 1
VOMITING: 0
SHORTNESS OF BREATH: 1
RHINORRHEA: 0
CHEST TIGHTNESS: 1

## 2020-12-07 ASSESSMENT — PAIN SCALES - GENERAL
PAINLEVEL_OUTOF10: 0
PAINLEVEL_OUTOF10: 0

## 2020-12-07 ASSESSMENT — HEART SCORE: ECG: 1

## 2020-12-07 NOTE — DISCHARGE SUMMARY
[facility]  Office: [address], DO, Karin Zach, DO, Ginojr Shirley, DO, José Frias Blood, DO, Amarilys Kruse MD, Joe Horne MD, Greg Knox MD, Igor Patel MD, Alireza Calderon MD, Bhavesh Riggs MD, Roxana Nuñez MD, Laverne Jaimes MD, Breezy Dinero MD, Stella Dobbins, DO, Bryant Saba MD, Beverley Louie MD, No Lyons DO, Franky Babb MD,  Pauly Herrera, DO, Martin Bunch MD, Fausto Trevino MD, Geo Rodriguez, [facility], [facility], CNP, Suzanne Fitzgerald, CNP, Damari Trevino, CNS, Harlan Mars, CNP, Alia Flores, CNP, Chevy Holloway, CNP, Darrel Roberts, CNP, Brea Urrutia, CNP, Catheryn Leventhal, PA-MICHELLE, Jena Mares, [facility], Nabeel Denton, CNP, Gil Dang, CNP, Anitha Anderson, CNP, Power Minor, CNP, Colleen Bernard, [facility]    Discharge Summary     Patient ID: Richard Barboza  :  1955   MRN: [id number]     ACCOUNT:  [de-identified]   Patient's PCP: Jena Stephens MD  Admit Date: 2020   Discharge Date: 2020     Length of Stay: 14  Code Status:  Full Code  Admitting Physician: Bryant Saba MD  Discharge Physician: Bryant Saba MD     Active Discharge Diagnoses:     Hospital Problem Lists:  Principal Problem:    Acute hypoxemic respiratory failure due to COVID-19 [facility])  Active Problems:    Dyslipidemia    Benign prostatic hyperplasia with urinary obstruction    Hypertrophic nonobstructive cardiomyopathy (HCC)    Iron (Fe) deficiency anemia    Benign essential HTN    CHF (congestive heart failure), NYHA class II, acute on chronic, combined ([facility] [address]    Occupational pulmonary disease    COVID-19  Resolved Problems:    * No resolved hospital problems. *      Admission Condition:  poor     Discharged Condition: fair    Hospital Stay:     Hospital Course:   Richard Barboza is a 77 y.o. male who was admitted for the management of   Acute hypoxemic respiratory failure due to COVID-19 ([facility] [address] , presented to ER with Shortness of Breath      COVID-19 pneumonia status post Decadron and remdesivir  Improved     History of pulmonary fibrosis monitor follow-up with pulmonology as outpatient        aCute on top of chronic hypoxemic respiratory failure most likely related to COVID-19 infection and pneumonia  Wean off oxygen     A. fib with RVR status post cardioversion in the past continue oral anticoagulation   Started on amiodaron drip patient will be discharged on oral amiodarone follow-up with cardiology in 1 week      bradycardia resolved     NSTEMI type II secondary to demand ischemia treated with heparin drip currently heparin drip was stopped medical management follow-up with cardiology as outpatient     Hypertension monitor closely DC lisinopril patient had low blood pressure reevaluate as outpatient        Dyslipidemia statin        Acute to type of chronic combined systolic and diastolic CHF exacerbation with hypertrophic nonobstructive cardiomyopathy treated with diuresis     Pulmonary edema developed during hospitalization IV diuresis patient will be discharged on oral diuretics       Hypokalemia replaced repeat CMP in 1 week    Physical exam     Alert  Chest: Clear to auscultation bilateral  Abdomen: Nontender nondistended  CVS: S1-S2  Neurology: Moves extremity    Significant therapeutic interventions:     Significant Diagnostic Studies:   Labs / Micro:  CBC:   Lab Results   Component Value Date    WBC 9.7 12/07/2020    RBC 4.77 12/07/2020    HGB 12.4 12/07/2020    HCT 41.0 12/07/2020    MCV 86.0 12/07/2020    MCH 26.0 12/07/2020    MCHC 30.2 12/07/2020    RDW 20.4 12/07/2020     12/07/2020     BMP:    Lab Results   Component Value Date    GLUCOSE 83 12/07/2020     12/07/2020    K 3.9 12/07/2020     12/07/2020    CO2 26 12/07/2020    ANIONGAP 10 12/07/2020    BUN 16 12/07/2020    CREATININE 0.57 12/07/2020    BUNCRER NOT REPORTED 12/07/2020    CALCIUM 8.3 12/07/2020    LABGLOM >60 12/07/2020    GFRAA >60 12/07/2020    GFR      12/07/2020    GFR NOT REPORTED 12/07/2020     HFP:    Lab Results   Component Value Date    PROT 5.7 12/07/2020     CMP:    Lab Results   Component Value Date    GLUCOSE 83 12/07/2020     12/07/2020    K 3.9 12/07/2020     12/07/2020    CO2 26 12/07/2020    BUN 16 12/07/2020    CREATININE 0.57 12/07/2020    ANIONGAP 10 12/07/2020    ALKPHOS 91 12/07/2020    ALT 11 12/07/2020    AST 16 12/07/2020    BILITOT 0.66 12/07/2020    LABALBU 2.5 12/07/2020    ALBUMIN 0.8 12/07/2020    LABGLOM >60 12/07/2020    GFRAA >60 12/07/2020    GFR      12/07/2020    GFR NOT REPORTED 12/07/2020    PROT 5.7 12/07/2020    CALCIUM 8.3 12/07/2020     PT/INR:    Lab Results   Component Value Date    PROTIME 14.2 11/23/2020    INR 1.4 11/23/2020     PTT:   Lab Results   Component Value Date    APTT 29.8 11/24/2020     FLP:    Lab Results   Component Value Date    CHOL 200 01/03/2017    TRIG 165 01/03/2017    HDL 36 03/10/2020     U/A:    Lab Results   Component Value Date    COLORU YELLOW 11/23/2020    TURBIDITY CLEAR 11/23/2020    SPECGRAV 1.042 11/23/2020    HGBUR NEGATIVE 11/23/2020    PHUR 8.0 11/23/2020    PROTEINU NEGATIVE  Verified by sulfosalicylic acid test. 24/46/7182    GLUCOSEU NEGATIVE 11/23/2020    KETUA NEGATIVE 11/23/2020    BILIRUBINUR NEGATIVE 11/23/2020    BILIRUBINUR negative 02/27/2020    UROBILINOGEN Normal 11/23/2020    NITRU NEGATIVE 11/23/2020    LEUKOCYTESUR NEGATIVE 11/23/2020     TSH:    Lab Results   Component Value Date    TSH 4.28 10/17/2020        Radiology:  Xr Chest (single View Frontal)    Result Date: 12/1/2020  Slight worsening in pulmonary opacities in the left perihilar area. Stable cardiomegaly and bilateral effusions. Pattern favors pulmonary edema with atypical.  Osseous structures are stable. No extrapleural air or midline shift.      Xr Abdomen (kub) (single Ap View)    Result Date: 12/1/2020  A moderate amount of stool was scattered in nondistended colon. No fecal impaction was noted. No obstruction or ileus. Bilateral lower lobe fibrosis and/or pneumonia was noted. Xr Chest Portable    Result Date: 12/5/2020  Diffuse peripheral airspace opacities bilaterally concerning for multifocal infectious process. Consultations:    Consults:     Final Specialist Recommendations/Findings:   IP CONSULT TO HOSPITALIST  IP CONSULT TO CARDIOLOGY  IP CONSULT TO INFECTIOUS DISEASES  IP CONSULT TO PULMONOLOGY  IP CONSULT TO CARDIOLOGY  IP CONSULT TO HOME CARE NEEDS      The patient was seen and examined on day of discharge and this discharge summary is in conjunction with any daily progress note from day of discharge.     Discharge plan:     Disposition: Home    Physician Follow Up:     Jose Francisco Torres MD  [facility]  [facility] [address] [facility]    In 1 week      Chingjurgen Shafferer, [address] [place] [address] [address]  [facility] [address]  [phone]    In 1 week         Requiring Further Evaluation/Follow Up POST HOSPITALIZATION/Incidental Findings:     Diet: cardiac diet    Activity: As tolerated    Instructions to Patient:     Discharge Medications:      Medication List      START taking these medications    albuterol sulfate  (90 Base) MCG/ACT inhaler  Commonly known as:  Ventolin HFA  Inhale 2 puffs into the lungs 4 times daily as needed for Wheezing     amiodarone 200 MG tablet  Commonly known as:  CORDARONE  Take 1 tablet by mouth daily  Start taking on:  December 8, 2020     bumetanide 1 MG tablet  Commonly known as:  BUMEX  Take 1 tablet by mouth daily  Start taking on:  December 8, 2020     digoxin 125 MCG tablet  Commonly known as:  LANOXIN  Take 1 tablet by mouth daily  Start taking on:  December 8, 2020     guaiFENesin 600 MG extended release tablet  Commonly known as:  MUCINEX  Take 1 tablet by mouth 2 times daily        CONTINUE taking these medications    atorvastatin 40 MG tablet  Commonly known as:

## 2020-12-07 NOTE — ED NOTES
Pt came to the ED with SOB. Pt was admitted 14 days ago with COVID. Pt was discharged at 1 pm today. Pt C/O of SOB and chest pain. Pt initial SPO2 was at 67%. Pt is currently on 15L of O2 on a non-rebreather sating at 100%. Pt appears to be anxious. Pt is A/O x 4, RR tachypneic but is maintaining well on non-rebreather. Pt is on full cardiac monitor. Pt appears to be breathing better. Call within reach. Will continue to monitor and reassess.       Tomasz Roberts  12/07/20 [id number]

## 2020-12-07 NOTE — PROGRESS NOTES
Comprehensive Nutrition Assessment    Type and Reason for Visit:  Reassess    Nutrition Recommendations/Plan:   - Continue current diet with Ensure Enlive ONS x 1 per day - encourage/monitor PO intakes as tolerated. - Monitor labs, intakes, and plan of care. Nutrition Assessment:  Planning for discharge today. Pt reports his appetite remains \"okay\" and he has been drinking the Ensure supplements he is recieving x 1 per day. Labs/Meds reviewed. Malnutrition Assessment:  Malnutrition Status: At risk for malnutrition (Comment)    Context:  Acute Illness     Findings of the 6 clinical characteristics of malnutrition:  Energy Intake:  Mild decrease in energy intake (Comment)  Weight Loss:  (12.6% x 9 months per EHR)     Body Fat Loss:  1 - Mild body fat loss Orbital   Muscle Mass Loss:  Unable to assess    Fluid Accumulation:  No significant fluid accumulation     Strength:  Not Performed    Estimated Daily Nutrient Needs:  Energy (kcal):  25-28 kcal/kg = 8090-7944 kcals/day; Weight Used for Energy Requirements:  Admission   Protein (g):  1.0-1.3 gm/kg =  gm pro/day; Weight Used for Protein Requirements:  Ideal          Nutrition Related Findings:  Labs reviewed. Meds reviewed: Vitamin D, Bumex. Last BM 12/6.       Wounds:  None       Current Nutrition Therapies:    DIET GENERAL; No Added Salt (3-4 GM)  Dietary Nutrition Supplements: Standard High Calorie Oral Supplement    Anthropometric Measures:  · Height: 6' (182.9 cm)  · Current Body Weight: 165 lb (74.8 kg)   · Admission Body Weight: 173 lb 11.6 oz (78.8 kg)    · Usual Body Weight: 191 lb 3.2 oz (86.7 kg)(2/12/20 per chart review - pt reports -175 lb)     · Ideal Body Weight: 178 lbs; % Ideal Body Weight 93.9 %   · BMI: 22.4  · BMI Categories: Normal Weight (BMI 22.0 to 24.9) age over 72       Nutrition Diagnosis:   · Inadequate oral intake related to (current medical condition) as evidenced by weight loss(variable PO intakes, need for ONS)    Nutrition Interventions:   Food and/or Nutrient Delivery:  Continue Current Diet, Continue Oral Nutrition Supplement  Nutrition Education/Counseling:  No recommendation at this time   Coordination of Nutrition Care:  Continue to monitor while inpatient    Goals:  Oral intakes to meet % of estimated nutrition needs.        Nutrition Monitoring and Evaluation:   Food/Nutrient Intake Outcomes:  Food and Nutrient Intake, Supplement Intake  Physical Signs/Symptoms Outcomes:  Biochemical Data, GI Status, Fluid Status or Edema, Hemodynamic Status, Nutrition Focused Physical Findings, Skin, Weight     Electronically signed by Michael Kurtz RD, LD on 12/7/20 at 1:23 PM EST    Contact: [phone]

## 2020-12-07 NOTE — PROGRESS NOTES
[facility] -Progress Note COVID 19 Patient  Today's Date and Time: 12/7/2020, 1:11 PM    Impression :   · COVID 19 Suspect  · COVID 19 Confirmed Infection. · Covid tests:  · 11-23-20: Positive. · Atrial fibrillation with RVR: S/P cardioversion   · Pulmonary Fibrosis: secondary to Occupational exposure. 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 · Hypertrophic non obstructive cardiomyopathy. · Hx of Colon cancer  · BPH. Recommendations:   · Monitor off antibiotics. · Remdesivir. Completed treatment on 11/28/20. · Completed Decadron. Started on 11/23 Stop date:12/2/20  · Plasma infusion. Two units infused 11-24-20  · Will need quarantine until 12-14-20    Medical Decision Making/Summary/Discussion:12/7/2020     · Patient admitted with suspected COVID 19 infection  · Covid test confirmed positive. · Associated problems with congestive heart failure, pulmonary fibrosis, hypoxia. Infection Control Recommendations   · Universal Precautions  · Airborne isolation  · Droplet Isolation    Antimicrobial Stewardship Recommendations     · Discontinuation of therapy  Coordination of Outpatient Care:   · Estimated Length of IV antimicrobials:TBD  · Patient will need Midline Catheter Insertion: TBD  · Patient will need PICC line Insertion: No  · Patient will need: Home IV , [place],  SNF,  LTAC:TBD  · Patient will need outpatient wound care:No    Chief complaint/reason for consultation:   · Concern for COVID infection      History of Present Illness:   Wilfrido Estevez is a 77y.o.-year-old  male who was initially admitted on 11/23/2020. Patient seen at the request of [address] [place] [address]. INITIAL HISTORY:    Patient presented through ER with complaints of onset of shortness of breath. Located onset of symptoms on 11/22/2020 with worsening through the day leading to his seeking help at the emergency room.     The patient had been recently admitted on 10/17/2020 [facility] because of the presence of congestive heart failure NYHAA class II with acute on chronic combined heart failure. He also suffers from atrial fibrillation and hypertrophic nonobstructive cardiomyopathy, benign essential hypertension. His previous diagnosis includes occupational pulmonary fibrosis. During that admission he was also found to have suffered from mycoplasma pneumonia and was treated with antibiotics. He reports being seen at the [facility], after his admission to [place] [address],  where he was cardioverted because of the atrial fibrillation. He was also found to be in heart failure and was a started on diuretics. When the patient was evaluated in the emergency room he was found to be hypoxic and was treated with BiPAP which improved his oxygen saturation rate. He was tested for Covid and was found to be positive on 11/23/2020. Chest x-ray:  · 11/23/2020 persistent bilateral pulmonary infiltrates with associated pulmonary vascular congestion. Unchanged from films of 10/23/2020    Chest CT:  · 11/23/2020: Pulmonary fibrosis. No pulmonary embolus. Increased groundglass opacities and parenchymal opacities throughout both lungs when compared with films of 10/21/2020    Patient admitted because of concerns with COVID 19.    CURRENT EVALUATION : 12/7/2020    Afebrile  VS stable  Paroxysmal A fib  On amiodarone for Atrial Fibrillation. Plans for discharge later today. Acute on chronic combined systolic and diastolic CHF exacerbation with hypertrophic non obstructive cardiomyopathy. On  Bumex 1 mg     Completed Decadron 12-2-20  Completed Remdesivir on 11/28/20.  2 Units of Plasma transfused 11-24-20    Blood cultures on 12/1/20 show no growth. Patient exhibiting respiratory distress. Yes  Respiratory secretions: No    Patient receiving supplemental oxygen. Yes. nasal cannula 4L O2.  RR 32-->20-->26-->22-->24  02 sat 93-->97-->96-->87-->98-->94%      QTc: NEWS Score: 0-4 Low risk group; 5-6: Medium risk group; 7 or above: High risk group  Parameters 3 2 1 0 1 2 3   Age    < 72   ? 65   RR ? 8  9-11 12-20  21-24 ? 25   O2 Sats ? 91 92-93 94-95 ? 96      Suppl O2  Yes  No      SBP ? 90  101-110 111-219   ? 220   HR ? 40  41-50 51-90  111-130 ? 131   Consciousness    Alert   Drowsiness, lethargy, or confusion   Temperature ? 35.0 C (95.0 F)  35.1-36.0 C 95.1-96.9 F 36.1-38.0 C 97.0-100.4 F 38.1-39.0 C 100.5-102.3 F ? 39.1 C ? 102.4 F      NEWS Score:   11/23/2020: 9 risk   11/24/20: 11 high Risk   11/29/20:12 high risk   12/4/20: 8 high risk    Overall Daily Picture:      Improving. Presence of secondary bacterial Infection:    No   Additional antibiotics: No    Labs, X rays reviewed: 12/7/2020    BUN: 26-->26-->27-->33-->16  Cr: 0.52-->0.60-->0.81-->0.57    WBC: 14.1-->11.3-->12.4-->11.8>13.7-->9.7  Hb: 11.2-->11.6-->11.2-->14.2-->12.4  Plat: 396-->424->468-->600-->387    Bilirubin 1.28-->0.51  Alk phos 86-->61  ALT 12-->18  AST 24-->12    Absolute Neutrophils: 13.1.-->9.69  Absolute Lymphocytes: 0.59.-->1.44  Neutrophil/Lymphocyte Ratio: 22.2 high risk.-->6.72high risk    CRP: 182 (10-17-20) -->204.8 (11/23/20)  Ferritin: 395(11/23/20)  LDH: 521(11/23/20)    Pro Calcitonin:0.29( 10/17/20)  Troponin high-sensitivity 102  proBNP 3472    Cultures:  Urine:  ·   Blood:  · 11/23/20: x2: No growth  · 12/1/20: x2: No growth  Sputum :  ·   Wound:       CXR:   · 11/23/2020 pulmonary fibrosis with interstitial edema. Scattered infiltrates bilaterally      CAT:  · 11/23/2020: Pulmonary fibrosis. No pulmonary embolus. Increased groundglass opacities and parenchymal opacities throughout both lungs when compared with films of 10/21/2020            Discussed with patient, RN, CC, IM.     I have personally reviewed the past medical history, past surgical history, medications, social history, and family history, and I have updated the database accordingly.   Past Medical History:     Past Medical History:   Diagnosis Date    Atrial fibrillation ([address]     Back pain, chronic     Mcguire's esophagus 06/18/2019    BPH (benign prostatic hyperplasia)     Cancer (HCC)     colon-rectal    Cocaine abuse in remission [facility])     1970's    ED (erectile dysfunction) 4/2/2015    GERD (gastroesophageal reflux disease)     GI bleed 12/5/2018    Hernia     History of colon cancer     Melena     Migraines     Murmur, cardiac        Past Surgical  History:     Past Surgical History:   Procedure Laterality Date    CARDIAC CATHETERIZATION  11/29/2018    Non-obstructive CAD    CARDIOVERSION  2020    COLECTOMY      2nd colectomy, Colostomy and reversed [facility] COLECTOMY      1st time [facility]    COLONOSCOPY      COLONOSCOPY  07/18/2016    COLONOSCOPY N/A 12/6/2018    COLONOSCOPY DIAGNOSTIC performed by Serafin Hernandez MD at [address] Right 2009    inguinal    KNEE SURGERY Right 1970's    arthrotomy    TONSILLECTOMY      TOTAL KNEE ARTHROPLASTY Right 1/8/2019    KNEE TOTAL ARTHROPLASTY performed by Alcides Pool MD at [facility] TRANSESOPHAGEAL ECHOCARDIOGRAM  11/29/2018    UPPER GASTROINTESTINAL ENDOSCOPY N/A 12/5/2018    EGD DIAGNOSTIC ONLY performed by Serafin Hernandez MD at [facility] N/A 6/18/2019    MCGUIRE'S       Medications:      amiodarone  200 mg Oral BID    digoxin  125 mcg Oral Daily    bumetanide  1 mg Oral Daily    LORazepam  0.5 mg Intravenous Once    guaiFENesin  600 mg Oral BID    sodium chloride  20 mL Intravenous Once    rivaroxaban  20 mg Oral Daily    atorvastatin  40 mg Oral Daily    tamsulosin  0.4 mg Oral Daily    sodium chloride flush  10 mL Intravenous 2 times per day    famotidine  20 mg Oral Daily    Vitamin D  2,000 Units Oral Daily       Social History:     Social History     Socioeconomic History    Marital status: Single     Spouse name: Not on file    Number of children: Not on file    Years of education: Not on file    Highest education level: Not on file   Occupational History    Not on file   Social Needs    Financial resource strain: Not on file    Food insecurity     Worry: Not on file     Inability: Not on file    Transportation needs     Medical: Not on file     Non-medical: Not on file   Tobacco Use    Smoking status: Former Smoker     Packs/day: 0.50     Years: 1.00     Pack years: 0.50     Last attempt to quit:      Years since quittin.9    Smokeless tobacco: Never Used    Tobacco comment: stated never actually really smoked only inhaled    Substance and Sexual Activity    Alcohol use: Yes     Alcohol/week: 12.0 standard drinks     Types: 10 Standard drinks or equivalent, 2 Shots of liquor per week     Comment: 2-3 times a week    Drug use: No     Types:  Other-see comments     Comment: Cocaine use in past in     Sexual activity: Yes     Partners: Female   Lifestyle    Physical activity     Days per week: Not on file     Minutes per session: Not on file    Stress: Not on file   Relationships    Social connections     Talks on phone: Not on file     Gets together: Not on file     Attends Congregational service: Not on file     Active member of club or organization: Not on file     Attends meetings of clubs or organizations: Not on file     Relationship status: Not on file    Intimate partner violence     Fear of current or ex partner: Not on file     Emotionally abused: Not on file     Physically abused: Not on file     Forced sexual activity: Not on file   Other Topics Concern    Not on file   Social History Narrative    Not on file       Family History:     Family History   Problem Relation Age of Onset    Diabetes Mother     Heart Attack Father     Heart Disease Father     Heart Disease Brother         Allergies:   Adhesive tape; Codeine; and Penicillins     Review of Systems:       Constitutional: No fevers or chills. No systemic complaints  Head: No headaches  Eyes: No double vision or blurry vision. No conjunctival inflammation. ENT: No sore throat or runny nose. . No hearing loss, tinnitus or vertigo. Cardiovascular: No chest pain or palpitations. Shortness of breath. CLAYTON  Lung: Shortness of breath, dry cough. No sputum production  Abdomen: No nausea, vomiting, diarrhea, or abdominal pain. Graylon Dylan No cramps. Genitourinary: No increased urinary frequency, or dysuria. No hematuria. No suprapubic or CVA pain  Musculoskeletal: No muscle aches or pains. No joint effusions, swelling or deformities  Hematologic: No bleeding or bruising. Neurologic: No headache, weakness, numbness, or tingling. Integument: No rash, no ulcers. Psychiatric: No depression. Endocrine: No polyuria, no polydipsia, no polyphagia. Physical Examination :     Patient Vitals for the past 8 hrs:   BP Temp Temp src Pulse Resp SpO2   12/07/20 1154 105/78 98.2 °F (36.8 °C) Oral 87 24 94 %   12/07/20 0836 107/86 98.3 °F (36.8 °C) Oral 88 23 93 %     General Appearance: Awake, alert, and in no apparent distress  Head:  Normocephalic, no trauma  Eyes: Pupils equal, round, reactive to light; sclera anicteric; conjunctivae pink. No embolic phenomena. ENT: Oropharynx clear, without erythema, exudate, or thrush. No tenderness of sinuses. Mouth/throat: mucosa pink and moist. No lesions. Dentition in good repair. Neck:Supple, without lymphadenopathy. Thyroid normal, No bruits. Pulmonary/Chest: ,Distant breath sounds, decreased breath sounds at the bases   cardiovascular: Irreegular rate and rhythm without murmurs, rubs, or gallops. Abdomen: Soft, non tender. Bowel sounds normal. No organomegaly  All four Extremities: No cyanosis, clubbing, edema, or effusions. Neurologic: No gross sensory or motor deficits. Skin: Warm and dry with good turgor. No signs of peripheral arterial or venous insufficiency. No ulcerations. No open wounds.     Medical Decision Making -Laboratory:   I have independently reviewed/ordered the following labs:    CBC with Differential:   Recent Labs     12/06/20  0612 12/07/20  0513   WBC 11.8* 9.7   HGB 12.3* 12.4*   HCT 41.0 41.0    387   LYMPHOPCT 11* 16*   MONOPCT 6 11     BMP:   Recent Labs     12/05/20  0641 12/06/20  0612 12/07/20  0513   * 137 139   K 4.0 3.7 3.9    103 103   CO2 26 25 26   BUN 24* 18 16   CREATININE 0.63* 0.58* 0.57*   MG 2.1  --   --      Hepatic Function Panel:   Recent Labs     12/06/20  0612 12/07/20  0513   PROT 5.2* 5.7*   LABALBU 2.4* 2.5*   BILITOT 0.87 0.66   ALKPHOS 81 91   ALT 11 11   AST 12 16     No results for input(s): RPR in the last 72 hours. No results for input(s): HIV in the last 72 hours. No results for input(s): BC in the last 72 hours. Lab Results   Component Value Date    MUCUS NOT REPORTED 11/23/2020    RBC 4.77 12/07/2020    TRICHOMONAS NOT REPORTED 11/23/2020    WBC 9.7 12/07/2020    YEAST NOT REPORTED 11/23/2020    TURBIDITY CLEAR 11/23/2020     Lab Results   Component Value Date    CREATININE 0.57 12/07/2020    GLUCOSE 83 12/07/2020       Medical Decision Making-Imaging:     EXAMINATION:    CTA OF THE CHEST 11/23/2020 7:42 am         TECHNIQUE:    CTA of the chest was performed after the administration of intravenous    contrast.  Multiplanar reformatted images are provided for review.  MIP    images are provided for review. Dose modulation, iterative reconstruction,    and/or weight based adjustment of the mA/kV was utilized to reduce the    radiation dose to as low as reasonably achievable.         COMPARISON:    High-resolution chest CT 10 03/20/2020         CT PA 10/21/2020         HISTORY:    ORDERING SYSTEM PROVIDED HISTORY: r/o PE    TECHNOLOGIST PROVIDED HISTORY:    r/o PE    Reason for Exam: sob    Acuity: Acute              Type of Exam: Initial         FINDINGS:    No intimal flap is seen in aorta. .  Small mediastinal nodes are noted,    similar to Decision Making-Other:     Note:  · Labs, medications, radiologic studies were reviewed with personal review of films  · Large amounts of data were reviewed  · Discussed with nursing Staff, Discharge planner  · Infection Control and Prevention measures reviewed  · All prior entries were reviewed  · Administer medications as ordered  · Prognosis: Guarded  · Discharge planning reviewed  · Follow up as outpatient. Thank you for allowing us to participate in the care of this patient. Please call with questions.     Florentin Burch MD           Pager: [phone] - Office: [phone]

## 2020-12-07 NOTE — DISCHARGE INSTR - COC
Continuity of Care Form    Patient Name: James Christy   :  1955  MRN:  [id number]    Admit date:  2020  Discharge date:  ***    Code Status Order: Full Code   Advance Directives:   Advance Care Flowsheet Documentation       Date/Time Healthcare Directive Type of Healthcare Directive Copy in [address] Agent's Name Healthcare Agent's Phone Number    20 1610  No, patient does not have an advance directive for healthcare treatment -- -- -- -- --            Admitting Physician:  Amy Canseco MD  PCP: Robert Naidu MD    Discharging Nurse: [facility] Unit/Room#: 5941/7766-70  Discharging Unit Phone Number: ***    Emergency Contact:   Extended Emergency Contact Information  Primary Emergency Contact: Susie Garcia  Address: [place] Phone: [phone]  Work Phone: [phone]  Mobile Phone: [phone]  Relation: Other  Secondary Emergency Contact: Elan Miller  Address: [address] Phone: [phone]  Work Phone: [phone]  Mobile Phone: [phone]  Relation: Brother/Sister    Past Surgical History:  Past Surgical History:   Procedure Laterality Date    CARDIAC CATHETERIZATION  2018    Non-obstructive CAD    CARDIOVERSION  2020    COLECTOMY      2nd colectomy, Colostomy and reversed [facility]      1st time [facility]    COLONOSCOPY      COLONOSCOPY  2016    COLONOSCOPY N/A 2018    COLONOSCOPY DIAGNOSTIC performed by Ana Rosa Marquez MD at [address] [place] [address] Right     inguinal    KNEE SURGERY Right 1970's    arthrotomy    TONSILLECTOMY      TOTAL KNEE ARTHROPLASTY Right 2019    KNEE TOTAL ARTHROPLASTY performed by Ash Castillo MD at [address]    TRANSESOPHAGEAL ECHOCARDIOGRAM  2018    UPPER GASTROINTESTINAL ENDOSCOPY N/A 2018    EGD DIAGNOSTIC ONLY performed by Ana Rosa Marquez MD at [facility]    UPPER GASTROINTESTINAL ENDOSCOPY N/A 6/18/2019    MCGUIRE'S       Immunization History:   Immunization History   Administered Date(s) Administered    Influenza Vaccine, unspecified formulation 01/16/2014, 01/03/2017, 12/12/2017    Influenza Virus Vaccine 01/16/2014, 10/26/2014, 12/04/2015    Influenza, High Dose (Fluzone 65 yrs and older) 09/27/2018    Influenza, Quadv, 6 mo and older, IM (Fluzone, Flulaval) 12/12/2017    Influenza, Quadv, IM, (6 mo and older Fluzone, Flulaval, Fluarix and 3 yrs and older Afluria) 10/23/2014, 01/03/2017    Influenza, Quadv, IM, PF (6 mo and older Fluzone, Flulaval, Fluarix, and 3 yrs and older Afluria) 10/23/2014    Influenza, Quadv, adjuvanted, 65 yrs +, IM, PF (Fluad) 11/16/2020    Influenza, Triv, inactivated, subunit, adjuvanted, IM (Fluad 65 yrs and older) 11/08/2019       Active Problems:  Patient Active Problem List   Diagnosis Code    Dyslipidemia E78.5    ED (erectile dysfunction) N52.9    Incomplete bladder emptying R33.9    Benign prostatic hyperplasia with urinary obstruction N40.1, N13.8    History of colon cancer Z85.038    Atrial fibrillation with normal ventricular rate (HCC) I48.91    Anemia D64.9    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    Presbyopia H52.4    Incisional hernia, without obstruction or gangrene K43.2    Hypertrophic nonobstructive cardiomyopathy (HCC) I42.2    Iron (Fe) deficiency anemia D50.9    Primary osteoarthritis of right knee M17.11    Benign essential HTN I10    History of malignant neoplasm of rectum Z85.048    Mcguire's esophagus K22.70    CHF (congestive heart failure), NYHA class II, acute on chronic, combined ([facility] I50.43    Hypoxia R09.02    Dyspnea and respiratory abnormalities R06.00, R06.89    Occupational pulmonary disease J98.4    Sinus bradycardia R00.1    Acute hypoxemic respiratory failure due to COVID-19 (HCC) U07.1, J96.01    COVID-19 U07.1       Isolation/Infection:   Isolation            Droplet Plus          Patient Impairments/Disabilities:688439873:::0}    Nutrition Therapy:  Current Nutrition Therapy:   [address] Diet List:207571619:::0}    Routes of Feeding: {[facility] DME Other Feedings:822350374:::0}  Liquids: {Slp liquid thickness:32935}  Daily Fluid Restriction: {P DME Yes amt example:402324933:::0}  Last Modified Barium Swallow with Video (Video Swallowing Test): {Done Not Done EFRR:360863916:::0}    Treatments at the Time of Hospital Discharge:   Respiratory Treatments: ***  Oxygen Therapy:  {Therapy; copd oxygen:74496:::0}  Ventilator:    {[facility] Vent List:321111816:::0}    Rehab Therapies: {THERAPEUTIC INTERVENTION:7062337414}  Weight Bearing Status/Restrictions: {[facility] Weight Bearin:::0}  Other Medical Equipment (for information only, NOT a DME order):  {EQUIPMENT:524280756}  Other Treatments: ***    Patient's personal belongings (please select all that are sent with patient):  {[facility] DME Belongings:936781702:::0}    RN SIGNATURE:  {Esignature:031875719:::0}    CASE MANAGEMENT/SOCIAL WORK SECTION    Inpatient Status Date: ***    Readmission Risk Assessment Score:  Readmission Risk              Risk of Unplanned Readmission:        15           Discharging to Facility/ Agency   Name:   Address:  Phone:  Fax:    Dialysis Facility (if applicable)   Name:  Address:  Dialysis Schedule:  Phone:  Fax:    / signature: {Esignature:727367506:::0}    PHYSICIAN SECTION    Prognosis: Fair    Condition at Discharge: Stable    Rehab Potential (if transferring to Rehab): Good    Recommended Labs or Other Treatments After Discharge: CBC CMP in 1 week chest x-ray in 4 weeks    Physician Certification: I certify the above information and transfer of Mary Kay Gardner  is necessary for the continuing treatment of the diagnosis listed and that he requires Home Care for less 30 days.      Update Admission H&P: No change in H&P    PHYSICIAN SIGNATURE:  Electronically signed by Elver Ritter MD on 20 at 10:09 AM EST

## 2020-12-07 NOTE — CARE COORDINATION
Discharge order noted. Patient up independently in room and has home oxygen Adrian Arevalo). Plan for home today with significant other.      Discharge [address] [facility]  Clinical Case Management Department  Written by: Sherman Mcclain RN    Patient Name: Giuliana Williamson  Attending Provider: No att. providers found  Admit Date: 2020  6:44 AM  MRN: [id number]  Account: [de-identified]                     : 1955  Discharge Date: 2020      Disposition: home    Sherman Mcclain RN

## 2020-12-07 NOTE — ED PROVIDER NOTES
[facility]  ED  Emergency Department Encounter  EmergencyMedicine Resident     Pt Name:William Waymon Dance  MRN: [id number]  [place] 1955  Date of evaluation: 12/7/20  PCP:  Barbra Brady MD    [address] [place] [address]       Chief Complaint   Patient presents with    Shortness of Breath    Respiratory Distress      Pt was  at 67%        HISTORY OF PRESENT ILLNESS  (Location/Symptom, Timing/Onset, Context/Setting, Quality, Duration, Modifying Factors, Severity.)      Jil Pritchett is a 77 y.o. male who presents with shortness of breath and chest pain. Transition discharge from the hospital after a 14-day stay for COVID-19. Patient states she was discharged apparently 1300 over the last 4 hours he has had worsening this difficulty breathing and associated chest pain. Patient is on 2 L nasal cannula at baseline he is coming to us with a saturation of 67% on his home 2 L. Patient states that he had titrated his oxygen at home up to 5 L without improvement prompting him to present to the emergency department today. No Diaphoresis does endorse abdominal pain no vomiting. Has had lack of appetite. PAST MEDICAL / SURGICAL / SOCIAL / FAMILY HISTORY      has a past medical history of Atrial fibrillation ([address] Back pain, chronic, Hill's esophagus, BPH (benign prostatic hyperplasia), Cancer ([facility] [address] Cocaine abuse in remission [facility]), ED (erectile dysfunction), GERD (gastroesophageal reflux disease), GI bleed, Hernia, History of colon cancer, Melena, Migraines, and Murmur, cardiac.       has a past surgical history that includes Tonsillectomy; Colonoscopy; colectomy; Colonoscopy (07/18/2016); knee surgery (Right, 1970's); transesophageal echocardiogram (11/29/2018); Upper gastrointestinal endoscopy (N/A, 12/5/2018); Colonoscopy (N/A, 12/6/2018); hernia repair (Right, 2009); Cardiac catheterization (11/29/2018); colectomy; Total knee arthroplasty (Right, 1/8/2019);  Upper gastrointestinal endoscopy (N/A, 2019); and Cardioversion (). Social History     Socioeconomic History    Marital status: Single     Spouse name: Not on file    Number of children: Not on file    Years of education: Not on file    Highest education level: Not on file   Occupational History    Not on file   Social Needs    Financial resource strain: Not on file    Food insecurity     Worry: Not on file     Inability: Not on file    Transportation needs     Medical: Not on file     Non-medical: Not on file   Tobacco Use    Smoking status: Former Smoker     Packs/day: 0.50     Years: 1.00     Pack years: 0.50     Last attempt to quit: 1971     Years since quittin.9    Smokeless tobacco: Never Used    Tobacco comment: stated never actually really smoked only inhaled    Substance and Sexual Activity    Alcohol use: Yes     Alcohol/week: 12.0 standard drinks     Types: 10 Standard drinks or equivalent, 2 Shots of liquor per week     Comment: 2-3 times a week    Drug use: No     Types:  Other-see comments     Comment: Cocaine use in past in 's    Sexual activity: Yes     Partners: Female   Lifestyle    Physical activity     Days per week: Not on file     Minutes per session: Not on file    Stress: Not on file   Relationships    Social connections     Talks on phone: Not on file     Gets together: Not on file     Attends Latter-day service: Not on file     Active member of club or organization: Not on file     Attends meetings of clubs or organizations: Not on file     Relationship status: Not on file    Intimate partner violence     Fear of current or ex partner: Not on file     Emotionally abused: Not on file     Physically abused: Not on file     Forced sexual activity: Not on file   Other Topics Concern    Not on file   Social History Narrative    Not on file       Family History   Problem Relation Age of Onset    Diabetes Mother     Heart Attack Father     Heart Disease Father     Heart Disease Brother        Allergies:  Adhesive tape; Codeine; and Penicillins    Home Medications:  Prior to Admission medications    Medication Sig Start Date End Date Taking? Authorizing Provider   bumetanide (BUMEX) 1 MG tablet Take 1 tablet by mouth daily 12/8/20   Chaka Santizo MD   digoxin (LANOXIN) 125 MCG tablet Take 1 tablet by mouth daily 12/8/20   Chaka Santizo MD   guaiFENesin (MUCINEX) 600 MG extended release tablet Take 1 tablet by mouth 2 times daily 12/7/20   Chaka Santizo MD   albuterol sulfate HFA (VENTOLIN HFA) 108 (90 Base) MCG/ACT inhaler Inhale 2 puffs into the lungs 4 times daily as needed for Wheezing 12/7/20   Chaka Santizo MD   amiodarone (CORDARONE) 200 MG tablet Take 1 tablet by mouth 2 times daily 12/7/20   MIRI Piña CNP   amiodarone (CORDARONE) 200 MG tablet Take 1 tablet by mouth daily Start on 12/21 after taking 200mg PO twice daily for 14 days. 12/21/20   MIRI Piña CNP   atorvastatin (LIPITOR) 40 MG tablet Take 1 tablet by mouth daily 11/16/20   Cassandra Lundberg MD   omeprazole (PRILOSEC) 40 MG delayed release capsule Take 1 capsule by mouth daily 11/16/20   Cassandra Lundberg MD   [facility]) 0.4 MG capsule Take 1 capsule by mouth daily 10/15/20   Cassandra Lundberg MD   rivaroxaban (XARELTO) 20 MG TABS tablet Take 20 mg by mouth daily (with breakfast)     Historical Provider, MD       REVIEW OF SYSTEMS    (2-9 systems for level 4, 10 or more for level 5)      Review of Systems   Constitutional: Positive for fatigue. HENT: Negative for rhinorrhea. Respiratory: Positive for chest tightness and shortness of breath. Cardiovascular: Negative for leg swelling. Gastrointestinal: Positive for abdominal pain. Negative for vomiting. Genitourinary: Negative for difficulty urinating. Musculoskeletal: Negative for neck pain and neck stiffness. Skin: Negative for pallor. Neurological: Positive for weakness. Psychiatric/Behavioral: Negative for agitation. PHYSICAL EXAM   (up to 7 for level 4, 8 or more for level 5)      INITIAL VITALS:   /71   Pulse 102   Temp 100.9 °F (38.3 °C) (Axillary)   Resp (!) 36   Ht 6' (1.829 m)   Wt 165 lb (74.8 kg)   SpO2 94%   BMI 22.38 kg/m²     Physical Exam  Vitals signs and nursing note reviewed. Constitutional:       General: He is in acute distress. Appearance: He is ill-appearing. He is not diaphoretic. HENT:      Head: Normocephalic. Right Ear: External ear normal.      Left Ear: External ear normal.      Nose: Nose normal.      Mouth/Throat:      Mouth: Mucous membranes are moist.   Eyes:      Extraocular Movements: Extraocular movements intact. Neck:      Musculoskeletal: Normal range of motion. Cardiovascular:      Rate and Rhythm: Normal rate. Pulses: Normal pulses. Pulmonary:      Effort: Respiratory distress present. Comments: Speaking is short 2 word sentences with accessory muscle use  Chest:      Chest wall: Tenderness present. Abdominal:      Palpations: Abdomen is soft. Tenderness: There is no guarding. Musculoskeletal: Normal range of motion. Right lower leg: No edema. Left lower leg: No edema. Skin:     General: Skin is warm. Capillary Refill: Capillary refill takes less than 2 seconds. Neurological:      Mental Status: He is oriented to person, place, and time.    Psychiatric:         Mood and Affect: Mood normal.         DIFFERENTIAL  DIAGNOSIS     PLAN (LABS / IMAGING / EKG):  Orders Placed This Encounter   Procedures    XR CHEST PORTABLE    CBC Auto Differential    Comprehensive Metabolic Panel w/ Reflex to MG    Fibrinogen    Procalcitonin    C-Reactive Protein    Lactate Dehydrogenase    Ferritin    D-Dimer, Quantitative    Troponin    Lactic Acid, Plasma    Vitamin D 25 Hydroxy    Brain Natriuretic Peptide    Droplet Plus Isolation    BIPAP    EKG 12 lead MEDICATIONS ORDERED:  Orders Placed This Encounter   Medications    methylPREDNISolone sodium (SOLU-MEDROL) injection 125 mg    aspirin chewable tablet 324 mg    lactated ringers bolus    DISCONTD: aztreonam (AZACTAM) 2 g IVPB mini-bag     Order Specific Question:   Has the patient tolerated cephalosporin products in the past?     Answer:   No    cefepime (MAXIPIME) 2 g IVPB minibag     Order Specific Question:   Antimicrobial Indications     Answer:   Upper Respiratory Infection     Order Specific Question:   Antimicrobial Indications     Answer:   Pneumonia (HAP)    vancomycin (VANCOCIN) 1750 mg in dextrose 5% 500 mL IVPB     Order Specific Question:   Antimicrobial Indications     Answer:   Pneumonia (HAP)    vancomycin (VANCOCIN) intermittent dosing (placeholder)     Order Specific Question:   Antimicrobial Indications     Answer:   Pneumonia (HAP)    vancomycin (VANCOCIN) 1250 mg in dextrose 5 % 250 mL IVPB     Order Specific Question:   Antimicrobial Indications     Answer:   Pneumonia (HAP)    magnesium sulfate 1 g in dextrose 5% 100 mL IVPB    acetaminophen (TYLENOL) tablet 1,000 mg    iopamidol (ISOVUE-370) 76 % injection 75 mL    enoxaparin (LOVENOX) injection 70 mg    DISCONTD: vancomycin (VANCOCIN) 1,500 mg in dextrose 5 % 250 mL IVPB     Pharmacy to dose and follow/manage per Dr Karina Donis    cefepime (MAXIPIME) 2 g IVPB minibag     Order Specific Question:   Antimicrobial Indications     Answer:   Pneumonia (HAP)    amiodarone (CORDARONE) tablet 200 mg    atorvastatin (LIPITOR) tablet 40 mg    bumetanide (BUMEX) tablet 1 mg    digoxin (LANOXIN) tablet 125 mcg    tamsulosin (FLOMAX) capsule 0.4 mg    sodium chloride flush 0.9 % injection 10 mL    sodium chloride flush 0.9 % injection 10 mL    OR Linked Order Group     acetaminophen (TYLENOL) tablet 650 mg     acetaminophen (TYLENOL) suppository 650 mg    magnesium hydroxide (MILK OF MAGNESIA) 400 MG/5ML suspension 30 mL    OR Linked Order Group     promethazine (PHENERGAN) tablet 12.5 mg     ondansetron (ZOFRAN) injection 4 mg    nicotine (NICODERM CQ) 21 MG/24HR 1 patch     If indicated/pateint smokes    DISCONTD: azithromycin (ZITHROMAX) 500 mg in dextrose 5% 250 mL IVPB     Order Specific Question:   Antimicrobial Indications     Answer:   Pneumonia (CAP)    DISCONTD: cefTRIAXone (ROCEPHIN) 1 g IVPB in 50 mL D5W minibag     IVPB     Order Specific Question:   Antimicrobial Indications     Answer:   Pneumonia (CAP)    enoxaparin (LOVENOX) injection 70 mg    dexamethasone (DECADRON) tablet 6 mg    Vitamin D (CHOLECALCIFEROL) tablet 2,000 Units       DDX: covid, chf, copd, acs, atypical cp    DIAGNOSTIC RESULTS / EMERGENCY DEPARTMENT COURSE / MDM   LAB RESULTS:  Results for orders placed or performed during the hospital encounter of 12/07/20   Culture, Blood 1    Specimen: Blood   Result Value Ref Range    Specimen Description . BLOOD     Special Requests LT FA 10ML     Culture NO GROWTH 3 HOURS    CBC Auto Differential   Result Value Ref Range    WBC 21.8 (H) 3.5 - 11.3 k/uL    RBC 5.32 4.21 - 5.77 m/uL    Hemoglobin 13.9 13.0 - 17.0 g/dL    Hematocrit 44.1 40.7 - 50.3 %    MCV 82.9 82.6 - 102.9 fL    MCH 26.1 25.2 - 33.5 pg    MCHC 31.5 28.4 - 34.8 g/dL    RDW 20.2 (H) 11.8 - 14.4 %    Platelets 055 (H) 018 - 453 k/uL    MPV 10.0 8.1 - 13.5 fL    NRBC Automated 0.0 0.0 per 100 WBC    Differential Type NOT REPORTED     WBC Morphology NOT REPORTED     RBC Morphology NOT REPORTED     Platelet Estimate NOT REPORTED     Immature Granulocytes 1 (H) 0 %    Seg Neutrophils 90 (H) 36 - 65 %    Lymphocytes 3 (L) 24 - 43 %    Monocytes 6 3 - 12 %    Eosinophils % 0 (L) 1 - 4 %    Basophils 0 0 - 2 %    Absolute Immature Granulocyte 0.22 0.00 - 0.30 k/uL    Segs Absolute 19.62 (H) 1.50 - 8.10 k/uL    Absolute Lymph # 0.65 (L) 1.10 - 3.70 k/uL    Absolute Mono # 1.31 (H) 0.10 - 1.20 k/uL    Absolute Eos # 0.00 0.00 - 0.44 k/uL Basophils Absolute 0.00 0.00 - 0.20 k/uL    Morphology ANISOCYTOSIS PRESENT    Comprehensive Metabolic Panel w/ Reflex to MG   Result Value Ref Range    Glucose 166 (H) 70 - 99 mg/dL    BUN 20 8 - 23 mg/dL    CREATININE 0.82 0.70 - 1.20 mg/dL    Bun/Cre Ratio NOT REPORTED 9 - 20    Calcium 8.7 8.6 - 10.4 mg/dL    Sodium 138 135 - 144 mmol/L    Potassium 4.0 3.7 - 5.3 mmol/L    Chloride 96 (L) 98 - 107 mmol/L    CO2 27 20 - 31 mmol/L    Anion Gap 15 9 - 17 mmol/L    Alkaline Phosphatase 106 40 - 129 U/L    ALT 14 5 - 41 U/L    AST 19 <40 U/L    Total Bilirubin 0.66 0.3 - 1.2 mg/dL    Total Protein 6.5 6.4 - 8.3 g/dL    Alb 3.0 (L) 3.5 - 5.2 g/dL    Albumin/Globulin Ratio 0.9 (L) 1.0 - 2.5    GFR Non-African American >60 >60 mL/min    GFR African American >60 >60 mL/min    GFR Comment          GFR Staging NOT REPORTED    Fibrinogen   Result Value Ref Range    Fibrinogen 528 (H) 140 - 420 mg/dL   Procalcitonin   Result Value Ref Range    Procalcitonin 0.14 (H) <0.09 ng/mL   C-Reactive Protein   Result Value Ref Range    CRP 60.2 (H) 0.0 - 5.0 mg/L   Lactate Dehydrogenase   Result Value Ref Range     (H) 135 - 225 U/L   Ferritin   Result Value Ref Range    Ferritin 357 30 - 400 ug/L   D-Dimer, Quantitative   Result Value Ref Range    D-Dimer, Quant 0.61 mg/L FEU   Troponin   Result Value Ref Range    Troponin, High Sensitivity 24 (H) 0 - 22 ng/L    Troponin T NOT REPORTED <0.03 ng/mL    Troponin Interp NOT REPORTED    Lactic Acid, Plasma   Result Value Ref Range    Lactic Acid NOT REPORTED mmol/L    Lactic Acid, Whole Blood 3.4 (H) 0.7 - 2.1 mmol/L   Vitamin D 25 Hydroxy   Result Value Ref Range    Vit D, 25-Hydroxy 51.2 30.0 - 100.0 ng/mL   Brain Natriuretic Peptide   Result Value Ref Range    Pro- <300 pg/mL    BNP Interpretation Pro-BNP Reference Range:    CBC Auto Differential   Result Value Ref Range    WBC 13.5 (H) 3.5 - 11.3 k/uL    RBC 4.35 4.21 - 5.77 m/uL    Hemoglobin 11.4 (L) 13.0 - 17.0 g/dL Hematocrit 36.4 (L) 40.7 - 50.3 %    MCV 83.7 82.6 - 102.9 fL    MCH 26.2 25.2 - 33.5 pg    MCHC 31.3 28.4 - 34.8 g/dL    RDW 20.2 (H) 11.8 - 14.4 %    Platelets 282 944 - 782 k/uL    MPV 9.7 8.1 - 13.5 fL    NRBC Automated 0.0 0.0 per 100 WBC    Differential Type NOT REPORTED     WBC Morphology NOT REPORTED     RBC Morphology NOT REPORTED     Platelet Estimate NOT REPORTED     Immature Granulocytes 2 (H) 0 %    Seg Neutrophils 95 (H) 36 - 66 %    Lymphocytes 2 (L) 24 - 44 %    Monocytes 1 1 - 7 %    Eosinophils % 0 (L) 1 - 4 %    Basophils 0 0 - 2 %    Absolute Immature Granulocyte 0.27 0.00 - 0.30 k/uL    Segs Absolute 12.82 (H) 1.8 - 7.7 k/uL    Absolute Lymph # 0.27 (L) 1.0 - 4.8 k/uL    Absolute Mono # 0.14 0.1 - 0.8 k/uL    Absolute Eos # 0.00 0.0 - 0.4 k/uL    Basophils Absolute 0.00 0.0 - 0.2 k/uL    Morphology ANISOCYTOSIS PRESENT     Morphology MICROCYTOSIS PRESENT    Comprehensive Metabolic Panel w/ Reflex to MG   Result Value Ref Range    Glucose 197 (H) 70 - 99 mg/dL    BUN 20 8 - 23 mg/dL    CREATININE 0.68 (L) 0.70 - 1.20 mg/dL    Bun/Cre Ratio NOT REPORTED 9 - 20    Calcium 8.1 (L) 8.6 - 10.4 mg/dL    Sodium 132 (L) 135 - 144 mmol/L    Potassium 4.2 3.7 - 5.3 mmol/L    Chloride 98 98 - 107 mmol/L    CO2 25 20 - 31 mmol/L    Anion Gap 9 9 - 17 mmol/L    Alkaline Phosphatase 85 40 - 129 U/L    ALT 12 5 - 41 U/L    AST 15 <40 U/L    Total Bilirubin 0.40 0.3 - 1.2 mg/dL    Total Protein 5.7 (L) 6.4 - 8.3 g/dL    Alb 2.7 (L) 3.5 - 5.2 g/dL    Albumin/Globulin Ratio 0.9 (L) 1.0 - 2.5    GFR Non-African American >60 >60 mL/min    GFR African American >60 >60 mL/min    GFR Comment          GFR Staging NOT REPORTED    Protime-INR   Result Value Ref Range    Protime 13.1 (H) 9.0 - 12.0 sec    INR 1.3    APTT   Result Value Ref Range    PTT 29.1 20.5 - 30.5 sec   Fibrinogen   Result Value Ref Range    Fibrinogen 451 (H) 140 - 420 mg/dL   D-Dimer, Quantitative   Result Value Ref Range    D-Dimer, Quant 0.30 mg/L FEU   C-Reactive Protein   Result Value Ref Range    CRP 57.6 (H) 0.0 - 5.0 mg/L   Troponin   Result Value Ref Range    Troponin, High Sensitivity 20 0 - 22 ng/L    Troponin T NOT REPORTED <0.03 ng/mL    Troponin Interp NOT REPORTED        IMPRESSION: Neatly ill appearing nondiaphoretic 76-year-old male with acute respiratory distress patient speaking 1-2 short short sentences patient is markedly dyspneic use accessory muscle use satting 67% on his home 2 L nasal cannula with good waveform. Patient recently discharged for Covid pneumonia plan will be broad work-up Covid labs will initiate RT protocols including BiPAP and reevaluate    RADIOLOGY:  Xr Chest Portable    Result Date: 12/7/2020  EXAMINATION: ONE XRAY VIEW OF THE CHEST 12/7/2020 6:21 pm COMPARISON: December 5, 2020 HISTORY: ORDERING SYSTEM PROVIDED HISTORY: sob TECHNOLOGIST PROVIDED HISTORY: sob Reason for Exam: port Upright FINDINGS: Persistent increased interstitial opacities. No focal consolidation. Cardiomegaly. Persistent interstitial opacities which are nonspecific and may represent pneumonia versus chronic changes. Ct Chest Pulmonary Embolism W Contrast    Result Date: 12/7/2020  EXAMINATION: CTA OF THE CHEST 12/7/2020 9:13 pm TECHNIQUE: CTA of the chest was performed after the administration of intravenous contrast.  Multiplanar reformatted images are provided for review. MIP images are provided for review. Dose modulation, iterative reconstruction, and/or weight based adjustment of the mA/kV was utilized to reduce the radiation dose to as low as reasonably achievable. COMPARISON: November 23, 2020 HISTORY: ORDERING SYSTEM PROVIDED HISTORY: sating 67% on 2L recent covid admission elevated d dimer TECHNOLOGIST PROVIDED HISTORY: sating 67% on 2L recent covid admission elevated d dimer Reason for Exam: covid +, discharged today Acuity: Unknown Type of Exam: Ongoing FINDINGS: Pulmonary Arteries:  There is tiny intraluminal filling defect in the right lower lobar pulmonary artery (series 3, images 127-130) consistent with tiny nonocclusive pulmonary embolus. The pulmonary arteries are normal in caliber. Mediastinum: Stable prominent reactive mediastinal and bilateral hilar lymph nodes. No enlarging or enlarged thoracic lymph node by CT criteria. Trace pericardial effusion. Lungs/pleura: There has been mild improvement in the bilateral ground-glass opacities in the bilateral upper lobes with nearly resolved small parenchymal consolidation in the periphery of the right upper-middle lobe. Peripheral coarse reticular densities and ground-glass opacities throughout the lungs especially in the lower lobes are not significantly changed. Resolved trace bilateral pleural effusions. Upper Abdomen: Limited images of the upper abdomen are unremarkable. Soft Tissues/Bones: No acute bone or soft tissue abnormality. Tiny nonocclusive pulmonary embolus in the right lower lobar pulmonary artery. Overall mild improvement in the bilateral ground-glass opacities and nearly resolved parenchymal consolidation in the right lung. EKG  Sinus rhythm at a rate of 100 leftward axis prolonged  normal R wave progression upright T in V1 and ST depression in lead II not seen in contiguous leads nonspecific ECG. All EKG's are interpreted by the Emergency Department Physician who either signs or Co-signs this chart in the absence of a cardiologist.    EMERGENCY DEPARTMENT COURSE:  ED Course as of Dec 08 0050   [facility] Dec 07, 2020   1751 Seen and evaluated sating 67% with good waveform on 2L NC.  Workup initiated RT paged    [BG]   1814 Sating 89% on bipap at 45 fio2    [BG]   1825 Given history of hypertrophic cardiomyopathy and poor ef in 30's will give 500cc bolus only at this time    [BG]   1826 Images reviewed    [BG]   1832 Will ordered ct pe given elevated dimer, dyspnea and recent covid admission making him high risk for a hypercoagulable state    [BG]   1853 Per discussion with pharmacist patient is tolerated cephalosporins in the past we will continue with cefepime    [BG]   2005 Dispo pending ct pe results    [BG]   2209 Started on lovenox and admitted to inter[facility]    [BG]      ED Course User Index  [BG] Mercedez Arenas DO     PROCEDURES:  none    CONSULTS:  None    CRITICAL CARE:  Please see attending note    FINAL IMPRESSION      1. Respiratory distress          DISPOSITION / PLAN     DISPOSITION  admitted      PATIENT REFERRED TO:  No follow-up provider specified.     DISCHARGE MEDICATIONS:  Current Discharge Medication List          Mercedez Arenas DO  Emergency Medicine Resident    (Please note that portions of thisnote were completed with a voice recognition program.  Efforts were made to edit the dictations but occasionally words are mis-transcribed.)        Mercedez Arenas DO  Resident  12/08/20 [id number]

## 2020-12-07 NOTE — ED PROVIDER NOTES
[facility]  Emergency Department  Faculty Attestation     I performed a history and physical examination of the patient and discussed management with the resident. I reviewed the residents note and agree with the documented findings and plan of care. Any areas of disagreement are noted on the chart. I was personally present for the key portions of any procedures. I have documented in the chart those procedures where I was not present during the key portions. I have reviewed the emergency nurses triage note. I agree with the chief complaint, past medical history, past surgical history, allergies, medications, social and family history as documented unless otherwise noted below. For Physician Assistant/ Nurse Practitioner cases/documentation I have personally evaluated this patient and have completed at least one if not all key elements of the E/M (history, physical exam, and MDM). Additional findings are as noted.       Primary Care Physician:  Tyson Johnson MD    Screenings:  [unfilled]    CHIEF COMPLAINT       Chief Complaint   Patient presents with    Shortness of Breath    Respiratory Distress      Pt was  at 67%        RECENT VITALS:   Temp: 100.9 °F (38.3 °C),  Pulse: 99, Resp: (!) 36, BP: 133/71    LABS:  Labs Reviewed   LACTIC ACID, PLASMA - Abnormal; Notable for the following components:       Result Value    Lactic Acid, Whole Blood 3.4 (*)     All other components within normal limits   CBC WITH AUTO DIFFERENTIAL   COMPREHENSIVE METABOLIC PANEL W/ REFLEX TO MG FOR LOW K   FIBRINOGEN   PROCALCITONIN   C-REACTIVE PROTEIN   LACTATE DEHYDROGENASE   FERRITIN   D-DIMER, QUANTITATIVE   TROPONIN   VITAMIN D 25 HYDROXY   BRAIN NATRIURETIC PEPTIDE   BLOOD GAS, VENOUS   CBC WITH AUTO DIFFERENTIAL   COMPREHENSIVE METABOLIC PANEL W/ REFLEX TO MG FOR LOW K   FIBRINOGEN   D-DIMER, QUANTITATIVE       Radiology  XR CHEST PORTABLE    (Results Pending)       CRITICAL CARE: There was a high probability of clinically significant/life threatening deterioration in this patient's condition which required my urgent intervention. Total critical care time was none minutes. This excludes any time for separately reportable procedures. EKG:   EKG Interpretation    Interpreted by me    Rhythm: normal sinus   Rate: Tachycardic  Axis: Left  Ectopy: none  Conduction: Long QTc  ST Segments: no acute change  T Waves: Inversion high lateral  Q Waves: none  Probable U wave    Clinical Impression: Nonspecific, suspect electrolyte abnormality, consider ischemia    Attending Physician Additional  Notes    Patient was just discharged today after 14-day admission for Covid with hypoxemia. He has a history of lung disease, CHF, hypertrophic cardiomyopathy, paroxysmal atrial fibrillation, chronically on 2 L oxygen. He had full infectious disease recommended therapy during his stay. Improvement in his chest x-ray. His clinical status improved such that he was able to tolerate walking in his room with O2 was set at 2 L/min. Today however during the ride home and then much worse at home he had no ability to stand, felt as though he had significant weakness diffusely like his legs would give out. He had significant difficulty breathing. Here on arrival his oxygen saturation was 67% on 2 L nasal cannula with a good waveform. On BiPAP he remains tachypneic. No obvious wheezing. No JVD or gallop. He does have accessory muscle use. Unable to speak more than several words due to dyspnea. No edema cords Homans or calf tenderness. Temperature remains slightly elevated. Impression is respiratory distress, respiratory failure, rule out metabolic disorder regarding weakness. Plan is laboratory studies, fluid hydration, chest x-ray, BiPAP, review records, anticipate admission. Brad Stevens MD, [email or web address] [facility],[address]  Attending Emergency  Physician               Isael Short MD  12/07/20 5356

## 2020-12-08 PROBLEM — J84.10 PULMONARY FIBROSIS (HCC): Status: ACTIVE | Noted: 2020-12-08

## 2020-12-08 PROBLEM — J96.01 ACUTE RESPIRATORY FAILURE WITH HYPOXIA (HCC): Status: ACTIVE | Noted: 2020-12-08

## 2020-12-08 LAB
ABSOLUTE EOS #: 0 K/UL (ref 0–0.4)
ABSOLUTE EOS #: 0 K/UL (ref 0–0.44)
ABSOLUTE IMMATURE GRANULOCYTE: 0.09 K/UL (ref 0–0.3)
ABSOLUTE IMMATURE GRANULOCYTE: 0.27 K/UL (ref 0–0.3)
ABSOLUTE LYMPH #: 0.27 K/UL (ref 1–4.8)
ABSOLUTE LYMPH #: 0.6 K/UL (ref 1.1–3.7)
ABSOLUTE MONO #: 0.14 K/UL (ref 0.1–0.8)
ABSOLUTE MONO #: 0.17 K/UL (ref 0.1–1.2)
ALBUMIN SERPL-MCNC: 2.4 G/DL (ref 3.5–5.2)
ALBUMIN SERPL-MCNC: 2.7 G/DL (ref 3.5–5.2)
ALBUMIN/GLOBULIN RATIO: 0.8 (ref 1–2.5)
ALBUMIN/GLOBULIN RATIO: 0.9 (ref 1–2.5)
ALP BLD-CCNC: 82 U/L (ref 40–129)
ALP BLD-CCNC: 85 U/L (ref 40–129)
ALT SERPL-CCNC: 11 U/L (ref 5–41)
ALT SERPL-CCNC: 12 U/L (ref 5–41)
ANION GAP SERPL CALCULATED.3IONS-SCNC: 11 MMOL/L (ref 9–17)
ANION GAP SERPL CALCULATED.3IONS-SCNC: 9 MMOL/L (ref 9–17)
AST SERPL-CCNC: 14 U/L
AST SERPL-CCNC: 15 U/L
BASOPHILS # BLD: 0 % (ref 0–2)
BASOPHILS # BLD: 0 % (ref 0–2)
BASOPHILS ABSOLUTE: 0 K/UL (ref 0–0.2)
BASOPHILS ABSOLUTE: 0 K/UL (ref 0–0.2)
BILIRUB SERPL-MCNC: 0.36 MG/DL (ref 0.3–1.2)
BILIRUB SERPL-MCNC: 0.4 MG/DL (ref 0.3–1.2)
BUN BLDV-MCNC: 17 MG/DL (ref 8–23)
BUN BLDV-MCNC: 20 MG/DL (ref 8–23)
BUN/CREAT BLD: ABNORMAL (ref 9–20)
BUN/CREAT BLD: ABNORMAL (ref 9–20)
C-REACTIVE PROTEIN: 57.6 MG/L (ref 0–5)
CALCIUM SERPL-MCNC: 8.1 MG/DL (ref 8.6–10.4)
CALCIUM SERPL-MCNC: 8.4 MG/DL (ref 8.6–10.4)
CHLORIDE BLD-SCNC: 103 MMOL/L (ref 98–107)
CHLORIDE BLD-SCNC: 98 MMOL/L (ref 98–107)
CO2: 24 MMOL/L (ref 20–31)
CO2: 25 MMOL/L (ref 20–31)
CREAT SERPL-MCNC: 0.52 MG/DL (ref 0.7–1.2)
CREAT SERPL-MCNC: 0.68 MG/DL (ref 0.7–1.2)
D-DIMER QUANTITATIVE: 0.3 MG/L FEU
D-DIMER QUANTITATIVE: 0.3 MG/L FEU
DIFFERENTIAL TYPE: ABNORMAL
DIFFERENTIAL TYPE: ABNORMAL
EKG ATRIAL RATE: 100 BPM
EKG P AXIS: 50 DEGREES
EKG P-R INTERVAL: 172 MS
EKG Q-T INTERVAL: 398 MS
EKG QRS DURATION: 82 MS
EKG QTC CALCULATION (BAZETT): 513 MS
EKG R AXIS: -43 DEGREES
EKG T AXIS: 49 DEGREES
EKG VENTRICULAR RATE: 100 BPM
EOSINOPHILS RELATIVE PERCENT: 0 % (ref 1–4)
EOSINOPHILS RELATIVE PERCENT: 0 % (ref 1–4)
FIBRINOGEN: 451 MG/DL (ref 140–420)
FIBRINOGEN: 481 MG/DL (ref 140–420)
GFR AFRICAN AMERICAN: >60 ML/MIN
GFR AFRICAN AMERICAN: >60 ML/MIN
GFR NON-AFRICAN AMERICAN: >60 ML/MIN
GFR NON-AFRICAN AMERICAN: >60 ML/MIN
GFR SERPL CREATININE-BSD FRML MDRD: ABNORMAL ML/MIN/{1.73_M2}
GLUCOSE BLD-MCNC: 139 MG/DL (ref 70–99)
GLUCOSE BLD-MCNC: 197 MG/DL (ref 70–99)
HCT VFR BLD CALC: 36.4 % (ref 40.7–50.3)
HCT VFR BLD CALC: 38.8 % (ref 40.7–50.3)
HEMOGLOBIN: 11.4 G/DL (ref 13–17)
HEMOGLOBIN: 11.9 G/DL (ref 13–17)
IMMATURE GRANULOCYTES: 1 %
IMMATURE GRANULOCYTES: 2 %
INR BLD: 1.2
INR BLD: 1.3
LYMPHOCYTES # BLD: 2 % (ref 24–44)
LYMPHOCYTES # BLD: 7 % (ref 24–43)
MCH RBC QN AUTO: 25.9 PG (ref 25.2–33.5)
MCH RBC QN AUTO: 26.2 PG (ref 25.2–33.5)
MCHC RBC AUTO-ENTMCNC: 30.7 G/DL (ref 28.4–34.8)
MCHC RBC AUTO-ENTMCNC: 31.3 G/DL (ref 28.4–34.8)
MCV RBC AUTO: 83.7 FL (ref 82.6–102.9)
MCV RBC AUTO: 84.5 FL (ref 82.6–102.9)
MONOCYTES # BLD: 1 % (ref 1–7)
MONOCYTES # BLD: 2 % (ref 3–12)
MORPHOLOGY: ABNORMAL
NRBC AUTOMATED: 0 PER 100 WBC
NRBC AUTOMATED: 0 PER 100 WBC
PARTIAL THROMBOPLASTIN TIME: 28.7 SEC (ref 20.5–30.5)
PARTIAL THROMBOPLASTIN TIME: 29.1 SEC (ref 20.5–30.5)
PDW BLD-RTO: 20.2 % (ref 11.8–14.4)
PDW BLD-RTO: 20.4 % (ref 11.8–14.4)
PLATELET # BLD: 348 K/UL (ref 138–453)
PLATELET # BLD: 349 K/UL (ref 138–453)
PLATELET ESTIMATE: ABNORMAL
PLATELET ESTIMATE: ABNORMAL
PMV BLD AUTO: 9.7 FL (ref 8.1–13.5)
PMV BLD AUTO: 9.9 FL (ref 8.1–13.5)
POTASSIUM SERPL-SCNC: 4.2 MMOL/L (ref 3.7–5.3)
POTASSIUM SERPL-SCNC: 4.3 MMOL/L (ref 3.7–5.3)
PROCALCITONIN: 0.24 NG/ML
PROTHROMBIN TIME: 12.2 SEC (ref 9–12)
PROTHROMBIN TIME: 13.1 SEC (ref 9–12)
RBC # BLD: 4.35 M/UL (ref 4.21–5.77)
RBC # BLD: 4.59 M/UL (ref 4.21–5.77)
RBC # BLD: ABNORMAL 10*6/UL
RBC # BLD: ABNORMAL 10*6/UL
SEG NEUTROPHILS: 90 % (ref 36–65)
SEG NEUTROPHILS: 95 % (ref 36–66)
SEGMENTED NEUTROPHILS ABSOLUTE COUNT: 12.82 K/UL (ref 1.8–7.7)
SEGMENTED NEUTROPHILS ABSOLUTE COUNT: 7.74 K/UL (ref 1.5–8.1)
SODIUM BLD-SCNC: 132 MMOL/L (ref 135–144)
SODIUM BLD-SCNC: 138 MMOL/L (ref 135–144)
TOTAL PROTEIN: 5.6 G/DL (ref 6.4–8.3)
TOTAL PROTEIN: 5.7 G/DL (ref 6.4–8.3)
TROPONIN INTERP: NORMAL
TROPONIN T: NORMAL NG/ML
TROPONIN, HIGH SENSITIVITY: 20 NG/L (ref 0–22)
WBC # BLD: 13.5 K/UL (ref 3.5–11.3)
WBC # BLD: 8.6 K/UL (ref 3.5–11.3)
WBC # BLD: ABNORMAL 10*3/UL
WBC # BLD: ABNORMAL 10*3/UL

## 2020-12-08 PROCEDURE — 6360000002 HC RX W HCPCS: Performed by: NURSE PRACTITIONER

## 2020-12-08 PROCEDURE — 97166 OT EVAL MOD COMPLEX 45 MIN: CPT

## 2020-12-08 PROCEDURE — 6370000000 HC RX 637 (ALT 250 FOR IP): Performed by: NURSE PRACTITIONER

## 2020-12-08 PROCEDURE — 2580000003 HC RX 258: Performed by: STUDENT IN AN ORGANIZED HEALTH CARE EDUCATION/TRAINING PROGRAM

## 2020-12-08 PROCEDURE — 84145 PROCALCITONIN (PCT): CPT

## 2020-12-08 PROCEDURE — 2580000003 HC RX 258: Performed by: NURSE PRACTITIONER

## 2020-12-08 PROCEDURE — 85025 COMPLETE CBC W/AUTO DIFF WBC: CPT

## 2020-12-08 PROCEDURE — 80053 COMPREHEN METABOLIC PANEL: CPT

## 2020-12-08 PROCEDURE — 85384 FIBRINOGEN ACTIVITY: CPT

## 2020-12-08 PROCEDURE — 2700000000 HC OXYGEN THERAPY PER DAY

## 2020-12-08 PROCEDURE — 99233 SBSQ HOSP IP/OBS HIGH 50: CPT | Performed by: INTERNAL MEDICINE

## 2020-12-08 PROCEDURE — 85730 THROMBOPLASTIN TIME PARTIAL: CPT

## 2020-12-08 PROCEDURE — 85379 FIBRIN DEGRADATION QUANT: CPT

## 2020-12-08 PROCEDURE — 97161 PT EVAL LOW COMPLEX 20 MIN: CPT

## 2020-12-08 PROCEDURE — 99223 1ST HOSP IP/OBS HIGH 75: CPT | Performed by: FAMILY MEDICINE

## 2020-12-08 PROCEDURE — 97530 THERAPEUTIC ACTIVITIES: CPT

## 2020-12-08 PROCEDURE — 99222 1ST HOSP IP/OBS MODERATE 55: CPT | Performed by: INTERNAL MEDICINE

## 2020-12-08 PROCEDURE — 93010 ELECTROCARDIOGRAM REPORT: CPT | Performed by: INTERNAL MEDICINE

## 2020-12-08 PROCEDURE — 6360000002 HC RX W HCPCS: Performed by: STUDENT IN AN ORGANIZED HEALTH CARE EDUCATION/TRAINING PROGRAM

## 2020-12-08 PROCEDURE — 2060000000 HC ICU INTERMEDIATE R&B

## 2020-12-08 PROCEDURE — 85610 PROTHROMBIN TIME: CPT

## 2020-12-08 RX ORDER — CALCIUM CARBONATE 200(500)MG
1000 TABLET,CHEWABLE ORAL 3 TIMES DAILY PRN
Status: DISCONTINUED | OUTPATIENT
Start: 2020-12-08 | End: 2020-12-10 | Stop reason: HOSPADM

## 2020-12-08 RX ADMIN — ENOXAPARIN SODIUM 70 MG: 80 INJECTION SUBCUTANEOUS at 08:18

## 2020-12-08 RX ADMIN — Medication 1250 MG: at 13:29

## 2020-12-08 RX ADMIN — CEFEPIME 2 G: 2 INJECTION, POWDER, FOR SOLUTION INTRAVENOUS at 08:18

## 2020-12-08 RX ADMIN — TAMSULOSIN HYDROCHLORIDE 0.4 MG: 0.4 CAPSULE ORAL at 08:18

## 2020-12-08 RX ADMIN — AMIODARONE HYDROCHLORIDE 200 MG: 200 TABLET ORAL at 20:22

## 2020-12-08 RX ADMIN — DESMOPRESSIN ACETATE 40 MG: 0.2 TABLET ORAL at 08:18

## 2020-12-08 RX ADMIN — BUMETANIDE 1 MG: 1 TABLET ORAL at 08:18

## 2020-12-08 RX ADMIN — Medication 2000 UNITS: at 08:18

## 2020-12-08 RX ADMIN — DEXAMETHASONE 6 MG: 4 TABLET ORAL at 08:18

## 2020-12-08 RX ADMIN — AMIODARONE HYDROCHLORIDE 200 MG: 200 TABLET ORAL at 00:53

## 2020-12-08 RX ADMIN — AMIODARONE HYDROCHLORIDE 200 MG: 200 TABLET ORAL at 08:18

## 2020-12-08 RX ADMIN — CEFEPIME 2 G: 2 INJECTION, POWDER, FOR SOLUTION INTRAVENOUS at 20:22

## 2020-12-08 RX ADMIN — ANTACID TABLETS 1000 MG: 500 TABLET, CHEWABLE ORAL at 21:59

## 2020-12-08 RX ADMIN — ENOXAPARIN SODIUM 70 MG: 80 INJECTION SUBCUTANEOUS at 20:22

## 2020-12-08 RX ADMIN — DIGOXIN 125 MCG: 125 TABLET ORAL at 08:18

## 2020-12-08 RX ADMIN — SODIUM CHLORIDE, PRESERVATIVE FREE 10 ML: 5 INJECTION INTRAVENOUS at 20:24

## 2020-12-08 ASSESSMENT — ENCOUNTER SYMPTOMS
RHINORRHEA: 0
BACK PAIN: 0
COUGH: 0
VOMITING: 0
WHEEZING: 0
CONSTIPATION: 0
NAUSEA: 0
ABDOMINAL PAIN: 0
SINUS PRESSURE: 0
SHORTNESS OF BREATH: 1
VOICE CHANGE: 0
DIARRHEA: 0
CHOKING: 0
CHEST TIGHTNESS: 0

## 2020-12-08 ASSESSMENT — PAIN SCALES - GENERAL: PAINLEVEL_OUTOF10: 0

## 2020-12-08 NOTE — CONSULTS
Today's Date: 12/8/2020  Patient Name: Zee Dewitt  Date of admission: 12/7/2020  5:40 PM  Patient's age: 77 y.o., 1955  Admission Dx: Pneumonia [J18.9]    Reason for Consult: management recommendations  Requesting Physician: Malathi Pedraza MD    CHIEF COMPLAINT: Dyspnea    History Obtained From:  patient, electronic medical record    HISTORY OF PRESENT ILLNESS:      The patient is a 77 y.o.  male who is admitted to the hospital for worsening shortness of breath. The patient has been in and out of the hospital for various reasons related to his cardiac decompensation as well as CO VID infection. The patient was diagnosed with atrial fibrillation was started on anticoagulation with Eliquis but he underwent cardioversion recently. The patient has been on Xarelto and has been compliant on it as an outpatient however because of admission and discharges, it is documented that it was delayed or held a couple of times. Upon evaluation, the patient was found to have a nonocclusive pulmonary embolism. Patient is admitted and we are asked to assess him for anticoagulation. The patient is seen and evaluated. He is in isolation because of CO VID infection. He said he is feeling better on anticoagulation. He is afebrile. Past Medical History:   has a past medical history of Atrial fibrillation ([facility] Back pain, chronic, Hill's esophagus, BPH (benign prostatic hyperplasia), Cancer ([facility] [address] [facility] Cocaine abuse in remission [facility]), ED (erectile dysfunction), GERD (gastroesophageal reflux disease), GI bleed, Hernia, History of colon cancer, Melena, Migraines, and Murmur, cardiac. Past Surgical History:   has a past surgical history that includes Tonsillectomy; Colonoscopy; colectomy; Colonoscopy (07/18/2016); knee surgery (Right, 1970's); transesophageal echocardiogram (11/29/2018); Upper gastrointestinal endoscopy (N/A, 12/5/2018); Colonoscopy (N/A, 12/6/2018); hernia repair (Right, 2009);  Cardiac catheterization (2018); colectomy; Total knee arthroplasty (Right, 2019); Upper gastrointestinal endoscopy (N/A, 2019); and Cardioversion (). Medications:    Reviewed in Epic     Allergies:  Adhesive tape; Codeine; and Penicillins    Social History:   reports that he quit smoking about 49 years ago. He has a 0.50 pack-year smoking history. He has never used smokeless tobacco. He reports current alcohol use of about 12.0 standard drinks of alcohol per week. He reports that he does not use drugs. Family History: family history includes Diabetes in his mother; Heart Attack in his father; Heart Disease in his brother and father. REVIEW OF SYSTEMS:    Constitutional: No fever or chills. No night sweats, no weight loss   Eyes: No eye discharge, double vision, or eye pain   HEENT: negative for sore mouth, sore throat, hoarseness and voice change   Respiratory: Worsening dyspnea, cough but no hemoptysis  Cardiovascular: Dyspnea, no palpitation. No PND  Gastrointestinal: negative for nausea, vomiting, diarrhea, constipation, abdominal pain, Dysphagia, hematemesis and hematochezia   Genitourinary: negative for frequency, dysuria, nocturia, urinary incontinence, and hematuria   Integument: negative for rash, skin lesions, bruises.    Hematologic/Lymphatic: negative for easy bruising, bleeding, lymphadenopathy, or petechiae   Endocrine: negative for heat or cold intolerance,weight changes, change in bowel habits and hair loss   Musculoskeletal: negative for myalgias, arthralgias, pain, joint swelling,and bone pain   Neurological: negative for headaches, dizziness, seizures, weakness, numbness    PHYSICAL EXAM:      BP (!) 142/75   Pulse 85   Temp 98.1 °F (36.7 °C) (Oral)   Resp 27   Ht 6' (1.829 m)   Wt 165 lb (74.8 kg)   SpO2 98%   BMI 22.38 kg/m²    Temp (24hrs), Av.7 °F (36.5 °C), Min:96.8 °F (36 °C), Max:98.1 °F (36.7 °C)    General appearance - well appearing, no in pain or distress has been in and out of the hospital for various reason and there is documentation that it was held/today with multiple times    RECOMMENDATIONS:  1. I do not think the patient failed Xarelto and I think because of his recent hospitalization, he has been bedbound and his medication was held a couple of times. 2. I am recommending to go back to DOAC anticoagulant. He is not interested in go back to Xarelto, will change him to Eliquis  3. Other option including warfarin and Lovenox were discussed with the patient but he is more interested in Eliquis  4. Okay to discharge once stable from my perspective      Discussed with patient and Nurse. Thank you for asking us to see this patient.     CHALO NIETO [facility] MD Sav  Hematologist/Medical Oncologist  Cell: [phone]

## 2020-12-08 NOTE — ED NOTES
Pt to CT with RN returned to room all questions answered. No other complaints at this time, call light in reach.      Korina Davidson RN  12/07/20 8989

## 2020-12-08 NOTE — PROGRESS NOTES
Comprehensive Nutrition Assessment    Type and Reason for Visit:  Initial    Nutrition Recommendations/Plan:   - Continue current General diet. Encourage/monitor intakes as tolerated. - Will provide Ensure Enlive ONS x 1 per day. Nutrition Assessment:  Pt known to writer from previous admission - recently discharged yesterday and readmitted for increased SOB and respiratory distress. Pt tested positive for COVID-19 on 11/23/30. PMHx of CHF, GERD, and colo-rectal cancer. Pt reports having diarrhea after eating usually. Pt with variable PO intakes and \"okay\" appetite. Consumed more than 50% of breakfast today. Reports -175 lb. Will provide Ensure x 1 per day in chocolate. Labs/Meds reviewed. Malnutrition Assessment:  Malnutrition Status:  Insufficient data    Context:  Acute Illness     Findings of the 6 clinical characteristics of malnutrition:  Energy Intake:  1 - 75% or less of estimated energy requirements for 7 or more days  Weight Loss:  (13.6% x 9 months per EHR)     Body Fat Loss:  1 - Mild body fat loss Orbital   Muscle Mass Loss:  Unable to assess    Fluid Accumulation:  No significant fluid accumulation     Strength:  Not Performed    Estimated Daily Nutrient Needs:  Energy (kcal):  25-28 kcal/kg = 4526-3407 kcals/day; Weight Used for Energy Requirements:  Admission   Protein (g):  1.0-1.3 gm/kg =  gm pro/day; Weight Used for Protein Requirements:  Ideal          Nutrition Related Findings:  Labs reviewed. Meds reviewed: Vitamin D, Bumex. Wounds:  None       Current Nutrition Therapies:    DIET GENERAL;     Anthropometric Measures:  · Height: 6' (182.9 cm)  · Current Body Weight: 165 lb (74.8 kg)   · Admission Body Weight: 165 lb (74.8 kg)    · Usual Body Weight: 191 lb 3.2 oz (86.7 kg)(2/12/20 per chart review - pt reports -175 lb)     · Ideal Body Weight: 178 lbs; % Ideal Body Weight 92.7 %   · BMI: 22.4  · BMI Categories: Normal Weight (BMI 22.0 to 24.9) age over 72       Nutrition Diagnosis:   · Inadequate oral intake related to (current medical condition, loss of appetite) as evidenced by poor intake prior to admission, weight loss(variable PO intakes, need for ONS)    Nutrition Interventions:   Food and/or Nutrient Delivery:  Continue Current Diet, Start Oral Nutrition Supplement  Nutrition Education/Counseling:  No recommendation at this time   Coordination of Nutrition Care:  Continue to monitor while inpatient    Goals:  Oral intakes to meet at least 75% of estimated nutrition needs.        Nutrition Monitoring and Evaluation:   Food/Nutrient Intake Outcomes:  Food and Nutrient Intake, Supplement Intake  Physical Signs/Symptoms Outcomes:  Biochemical Data, GI Status, Fluid Status or Edema, Hemodynamic Status, Nutrition Focused Physical Findings, Skin, Weight     Discharge Planning:    Continue current diet, Continue Oral Nutrition Supplement     Electronically signed by Ksihore Bliss RD, CARLEY on 12/8/20 at 1:13 PM EST    Contact: [phone]

## 2020-12-08 NOTE — PROGRESS NOTES
Occupational Therapy   Occupational Therapy Initial Assessment  Date: 2020   Patient Name: Jeannette Jacome  MRN: [id number]     : 1955    Date of Service: 2020    Discharge Recommendations: Pt very SOB, fatigues when static standing, CTA. Patient would benefit from continued therapy after discharge  OT Equipment Recommendations  Other: CTA    Assessment   Performance deficits / Impairments: Decreased functional mobility ; Decreased endurance;Decreased ADL status; Decreased high-level IADLs;Decreased balance  Prognosis: Good  Decision Making: Medium Complexity  OT Education: Energy Conservation;Plan of Care;OT Role  REQUIRES OT FOLLOW UP: Yes  Activity Tolerance  Activity Tolerance: Patient limited by fatigue  Activity Tolerance: Extremely fatigued with any activity, SOB, good strength noted  Safety Devices  Safety Devices in place: Yes  Type of devices: Nurse notified;Call light within reach; All fall risk precautions in place; Left in bed  Restraints  Initially in place: No           Patient Diagnosis(es): The encounter diagnosis was Respiratory distress. has a past medical history of [facility] ([facility] Back pain, chronic, Hill's esophagus, BPH (benign prostatic hyperplasia), Cancer [facility] Cocaine abuse in remission [facility]), ED (erectile dysfunction), GERD (gastroesophageal reflux disease), GI bleed, Hernia, History of colon cancer, Melena, Migraines, and Murmur, cardiac.   has a past surgical history that includes Tonsillectomy; Colonoscopy; colectomy; Colonoscopy (2016); knee surgery (Right, 's); transesophageal echocardiogram (2018); Upper gastrointestinal endoscopy (N/A, 2018); Colonoscopy (N/A, 2018); hernia repair (Right, ); Cardiac catheterization (2018); colectomy; Total knee arthroplasty (Right, 2019); Upper gastrointestinal endoscopy (N/A, 2019); and Cardioversion (). Restrictions  Restrictions/Precautions  Restrictions/Precautions: Isolation, Fall Risk, General Precautions, Up as Tolerated  Required Braces or Orthoses?: No  Position Activity Restriction  Other position/activity restrictions:  \"Tiny, non-occlusive PE\"-on Lovenox    Subjective   General  Patient assessed for rehabilitation services?: Yes  Family / Caregiver Present: No  Diagnosis: Covid+, SOB  Patient Currently in Pain: Denies  Pain Assessment  Pain Assessment: 0-10  Pain Level: 0  Vital Signs  Patient Currently in Pain: Denies  Social/Functional History  Social/Functional History  Lives With: Spouse  Type of Home: House  Home Layout: Two level, Laundry in basement, Bed/Bath upstairs  Home Access: Stairs to enter with rails  Entrance Stairs - Number of Steps: 3  Entrance Stairs - Rails: Both  Bathroom Shower/Tub: Tub/Shower unit  Bathroom Toilet: Standard  Bathroom Equipment: Grab bars in shower, Shower chair, Toilet raiser  Home Equipment: Rolling walker, Oxygen, Cane(Pt went home with 2L O2)  ADL Assistance: Independent(Prior to first hospitalization)  Homemaking Assistance: Independent  Homemaking Responsibilities: Yes  Ambulation Assistance: Independent  Transfer Assistance: Independent  Active : Yes  Mode of Transportation: Truck  Occupation: Retired  Leisure & Hobbies: [address]  Additional Comments: Pt reports wife is in good health, able to assist prn, however, wife having hip replacement on 12/18 per pt     Objective   Vision: Impaired  Vision Exceptions: Wears glasses for reading  Hearing: Exceptions to [facility]  Hearing Exceptions: Hard of hearing/hearing concerns(R ear deficits)    Orientation  Overall Orientation Status: Within Functional Limits     Balance  Sitting Balance: Supervision  Standing Balance: Stand by assistance  Standing Balance  Time: 1 min total  Activity: Pt stood bedside x2 this date, became SOB, fatigued extremely quickly  Functional Mobility  Functional Mobility Comments: bathing/dressing activity with increased time and SUP, 1 rest break PRN  Short term goal 4: demo EC/WS tech's during func activity with 1 vc  Short term goal 5: demo good safety awareness during func mob around room using LRD and mod I     Therapy Time   Individual Concurrent Group Co-treatment   Time In 0950         Time Out 1012         Minutes 22         Timed Code Treatment Minutes: 12 Minutes       Joey Hernandez, OTR/L

## 2020-12-08 NOTE — PLAN OF CARE
Problem: Airway Clearance - Ineffective  Goal: Achieve or maintain patent airway  Outcome: Ongoing     Problem: Gas Exchange - Impaired  Goal: Absence of hypoxia  Outcome: Ongoing  Goal: Promote optimal lung function  Outcome: Ongoing     Problem: Breathing Pattern - Ineffective  Goal: Ability to achieve and maintain a regular respiratory rate  Outcome: Ongoing     Problem:  Body Temperature -  Risk of, Imbalanced  Goal: Ability to maintain a body temperature within defined limits  Outcome: Ongoing  Goal: Will regain or maintain usual level of consciousness  Outcome: Ongoing  Goal: Complications related to the disease process, condition or treatment will be avoided or minimized  Outcome: Ongoing     Problem: Isolation Precautions - Risk of Spread of Infection  Goal: Prevent transmission of infection  Outcome: Ongoing     Problem: Nutrition Deficits  Goal: Optimize nutrtional status  Outcome: Ongoing     Problem: Risk for Fluid Volume Deficit  Goal: Maintain normal heart rhythm  Outcome: Ongoing  Goal: Maintain absence of muscle cramping  Outcome: Ongoing  Goal: Maintain normal serum potassium, sodium, calcium, phosphorus, and pH  Outcome: Ongoing     Problem: Loneliness or Risk for Loneliness  Goal: Demonstrate positive use of time alone when socialization is not possible  Outcome: Ongoing     Problem: Fatigue  Goal: Verbalize increase energy and improved vitality  Outcome: Ongoing     Problem: Patient Education: Go to Patient Education Activity  Goal: Patient/Family Education  Outcome: Ongoing     Problem: Gas Exchange - Impaired:  Goal: Levels of oxygenation will improve  Description: Levels of oxygenation will improve  Outcome: Ongoing     Problem: Falls - Risk of:  Goal: Will remain free from falls  Description: Will remain free from falls  Outcome: Ongoing  Goal: Absence of physical injury  Description: Absence of physical injury  Outcome: Ongoing     Problem: Nutrition  Goal: Optimal nutrition therapy  12/8/2020 1323 by Michael Jeffries RN  Outcome: Ongoing  12/8/2020 1314 by Sulema Mcneal RD, LD  Outcome: Ongoing  Note: Nutrition Problem #1: Inadequate oral intake  Intervention: Food and/or Nutrient Delivery: Continue Current Diet, Start Oral Nutrition Supplement  Nutritional Goals: Oral intakes to meet at least 75% of estimated nutrition needs.    Electronically signed by Michael Jeffries RN on 12/8/2020 at 1:23 PM

## 2020-12-08 NOTE — CARE COORDINATION
Case Management Initial Discharge Plan  [place],             Spoke with pt over the phone to discuss discharge plans. Information verified: address, contacts, phone number, , insurance Yes    Emergency Contact/Next of Kin name & number: Neptali Wilson, [address] [facility]    PCP: Dereck Prather MD  Date of last visit: 11-    Insurance Provider: [facility] Medicare    Discharge Planning    Living Arrangements:  Spouse/Significant Other   Support Systems:  Spouse/Significant Other, Children, Family Members    Home has 2 stories  10 stairs to climb to get into front door, 14 stairs to climb to reach second floor  Location of bedroom/bathroom in home 2nd    Patient able to perform ADL's:Independent    Current Services (outpatient & in home) none  DME equipment: home O2  DME provider: [address]    Receiving oral anticoagulation therapy? Yes  xarelto  If indicated:   Physician managing anticoagulation treatment:   Where does patient obtain lab work for ATC treatment? n/a      Potential Assistance Needed:  N/A    Patient agreeable to home care: No  [place] of choice provided:  n/a    Prior SNF/Rehab Placement and Facility: none  Agreeable to SNF/Rehab: No  [place] of choice provided: n/a     Evaluation: no    Expected Discharge date:  20    Patient expects to be discharged to:  home with SO and O2  Follow Up Appointment: Best Day/ Time:      Transportation provider: GEOFF  Transportation arrangements needed for discharge: No    Readmission Risk              Risk of Unplanned Readmission:        22             Does patient have a readmission risk score greater than 14?: Yes  If yes, follow-up appointment must be made within 7 days of discharge.      Goals of Care: Improved breathing      Discharge Plan: Home with SO and home O2          Electronically signed by Florence Kelley RN on 20 at 4:46 PM EST

## 2020-12-08 NOTE — ED NOTES
ED to inpatient nurses report    Chief Complaint   Patient presents with    Shortness of Breath    Respiratory Distress      Pt was  at 67%       Present to ED from home  LOC: alert and orientated to name, place, date  Vital signs   Vitals:    12/07/20 2046 12/07/20 2101 12/07/20 2116 12/07/20 2209   BP: 118/74 109/69 115/71 94/65   Pulse: 76 69 68 66   Resp:       Temp:       TempSrc:       SpO2: 96% 98% 97% 96%   Weight:       Height:          Oxygen Baseline 5L    Current needs required bipap   LDAs:   Peripheral IV 12/07/20 Left Antecubital (Active)   Site Assessment Clean;Dry; Intact 12/07/20 1801   Line Status Brisk blood return 12/07/20 1801   Dressing Status Clean;Dry; Intact 12/07/20 1801     Mobility: Independent  Pending ED orders: none  Present condition: stable  Code Status: Full Code  Consults:  [x]  Hospitalist  Completed  [] yes [] no  []  Medicine  Completed  [] yes [] No  []  Cardiology  Completed  [] yes [] No  []  GI   Completed  [] yes [] No  []  Neurology  Completed  [] yes [] No  []  Nephrology Completed  [] yes [] No  []  Vascular  Completed  [] yes [] No   []  Surgery  Completed  [] yes [] No   []  Urology  Completed  [] yes [] No   []  Plastics  Completed  [] yes [] No   []  ENT  Completed  [] yes [] No   []  Other   Completed  [] yes [] No  Pertinent event(s) Pt was discharged today, home for 1 hr and SOB worsening pt arrived back to ED.   Pertinent event(s)   Electronically signed by Ambreen Ramirez RN on 12/7/2020 at 10:22 PM       Art Mack RN  12/07/20 1394

## 2020-12-08 NOTE — PLAN OF CARE
Nutrition Problem #1: Inadequate oral intake  Intervention: Food and/or Nutrient Delivery: Continue Current Diet, Start Oral Nutrition Supplement  Nutritional Goals: Oral intakes to meet at least 75% of estimated nutrition needs.

## 2020-12-08 NOTE — PROGRESS NOTES
10/26/2020 at [facility] because of the presence of congestive heart failure NYHAA class II with acute on chronic combined heart failure. He also suffers from atrial fibrillation and hypertrophic nonobstructive cardiomyopathy, benign essential hypertension. His previous diagnosis includes occupational pulmonary fibrosis. During that admission he was also found to have suffered from mycoplasma pneumonia and was treated with antibiotics. He reports being seen at the [facility], after his admission to [facility],  where he was cardioverted because of the atrial fibrillation. He was also found to be in heart failure and was a started on diuretics. When the patient was evaluated in the emergency room he was found to be hypoxic and was treated with BiPAP which improved his oxygen saturation rate. He was tested for Covid and was found to be positive on 11/23/2020. Chest x-ray:  · 11/23/2020 persistent bilateral pulmonary infiltrates with associated pulmonary vascular congestion. Unchanged from films of 10/23/2020    Chest CT:  · 11/23/2020: Pulmonary fibrosis. No pulmonary embolus. Increased groundglass opacities and parenchymal opacities throughout both lungs when compared with films of 10/21/2020    Patient admitted because of concerns with COVID 19.    CURRENT EVALUATION : 12/8/2020    Afebrile  VS stable  Paroxysmal A fib  On amiodarone for Atrial Fibrillation. Plans for discharge later today. Acute on chronic combined systolic and diastolic CHF exacerbation with hypertrophic non obstructive cardiomyopathy. On  Bumex 1 mg     Completed Decadron 12-2-20  Completed Remdesivir on 11/28/20.  2 Units of Plasma transfused 11-24-20    Blood cultures on 12/1/20 show no growth. Patient exhibiting respiratory distress. Yes  Respiratory secretions: No    Patient receiving supplemental oxygen. Yes. nasal cannula 4L O2.  RR 32-->20-->26-->22-->24  02 sat 93-->97-->96-->87-->98-->94%      QTc: NEWS Score: 0-4 Low risk group; 5-6: Medium risk group; 7 or above: High risk group  Parameters 3 2 1 0 1 2 3   Age    < 72   ? 65   RR ? 8  9-11 12-20  21-24 ? 25   O2 Sats ? 91 92-93 94-95 ? 96      Suppl O2  Yes  No      SBP ? 90  101-110 111-219   ? 220   HR ? 40  41-50 51-90  111-130 ? 131   Consciousness    Alert   Drowsiness, lethargy, or confusion   Temperature ? 35.0 C (95.0 F)  35.1-36.0 C 95.1-96.9 F 36.1-38.0 C 97.0-100.4 F 38.1-39.0 C 100.5-102.3 F ? 39.1 C ? 102.4 F      NEWS Score:   11/23/2020: 9 risk   11/24/20: 11 high Risk   11/29/20:12 high risk   12/4/20: 8 high risk    Overall Daily Picture:      Improving. Presence of secondary bacterial Infection:    No   Additional antibiotics: No    Labs, X rays reviewed: 12/8/2020    BUN: 26-->26-->27-->33-->16  Cr: 0.52-->0.60-->0.81-->0.57    WBC: 14.1-->11.3-->12.4-->11.8>13.7-->9.7  Hb: 11.2-->11.6-->11.2-->14.2-->12.4  Plat: 396-->424->468-->600-->387    Bilirubin 1.28-->0.51  Alk phos 86-->61  ALT 12-->18  AST 24-->12    Absolute Neutrophils: 13.1.-->9.69  Absolute Lymphocytes: 0.59.-->1.44  Neutrophil/Lymphocyte Ratio: 22.2 high risk.-->6.72high risk    CRP: 182 (10-17-20) -->204.8 (11/23/20)  Ferritin: 395(11/23/20)  LDH: 521(11/23/20)    Pro Calcitonin:0.29( 10/17/20)  Troponin high-sensitivity 102  proBNP 3472    Cultures:  Urine:  ·   Blood:  · 11/23/20: x2: No growth  · 12/1/20: x2: No growth  Sputum :  ·   Wound:       CXR:   · 11/23/2020 pulmonary fibrosis with interstitial edema. Scattered infiltrates bilaterally      CAT:  · 11/23/2020: Pulmonary fibrosis. No pulmonary embolus. Increased groundglass opacities and parenchymal opacities throughout both lungs when compared with films of 10/21/2020            Discussed with patient, RN, CC, IM.     I have personally reviewed the past medical history, past surgical history, medications, social history, and family history, and I have updated the database accordingly.   Past Medical History:     Past Medical History:   Diagnosis Date    Atrial fibrillation ([address]     Back pain, chronic     Mcguire's esophagus 06/18/2019    BPH (benign prostatic hyperplasia)     Cancer (HCC)     colon-rectal    Cocaine abuse in remission [facility])     1970's    ED (erectile dysfunction) 4/2/2015    GERD (gastroesophageal reflux disease)     GI bleed 12/5/2018    Hernia     History of colon cancer     Melena     Migraines     Murmur, cardiac        Past Surgical  History:     Past Surgical History:   Procedure Laterality Date    CARDIAC CATHETERIZATION  11/29/2018    Non-obstructive CAD    CARDIOVERSION  2020    COLECTOMY      2nd colectomy, Colostomy and reversed [facility] COLECTOMY      1st time Ksenia    COLONOSCOPY      COLONOSCOPY  07/18/2016    COLONOSCOPY N/A 12/6/2018    COLONOSCOPY DIAGNOSTIC performed by Stormy Price MD at [address] Right 2009    inguinal    KNEE SURGERY Right 1970's    arthrotomy    TONSILLECTOMY      TOTAL KNEE ARTHROPLASTY Right 1/8/2019    KNEE TOTAL ARTHROPLASTY performed by Mandy Spencer MD at [address] TRANSESOPHAGEAL ECHOCARDIOGRAM  11/29/2018    UPPER GASTROINTESTINAL ENDOSCOPY N/A 12/5/2018    EGD DIAGNOSTIC ONLY performed by Stormy Price MD at [facility] N/A 6/18/2019    MCGUIRE'S       Medications:      vancomycin (VANCOCIN) intermittent dosing (placeholder)   Other RX Placeholder    vancomycin  1,250 mg Intravenous Q12H    cefepime  2 g Intravenous Q12H    amiodarone  200 mg Oral BID    atorvastatin  40 mg Oral Daily    bumetanide  1 mg Oral Daily    digoxin  125 mcg Oral Daily    tamsulosin  0.4 mg Oral Daily    sodium chloride flush  10 mL Intravenous 2 times per day    enoxaparin  1 mg/kg Subcutaneous BID    dexamethasone  6 mg Oral Daily    Vitamin D  2,000 Units Oral Daily       Social History:     Social History     Socioeconomic History    Marital status: Single     Spouse name: Not on file    Number of children: Not on file    Years of education: Not on file    Highest education level: Not on file   Occupational History    Not on file   Social Needs    Financial resource strain: Not on file    Food insecurity     Worry: Not on file     Inability: Not on file    Transportation needs     Medical: Not on file     Non-medical: Not on file   Tobacco Use    Smoking status: Former Smoker     Packs/day: 0.50     Years: 1.00     Pack years: 0.50     Last attempt to quit:      Years since quittin.9    Smokeless tobacco: Never Used    Tobacco comment: stated never actually really smoked only inhaled    Substance and Sexual Activity    Alcohol use: Yes     Alcohol/week: 12.0 standard drinks     Types: 10 Standard drinks or equivalent, 2 Shots of liquor per week     Comment: 2-3 times a week    Drug use: No     Types:  Other-see comments     Comment: Cocaine use in past in     Sexual activity: Yes     Partners: Female   Lifestyle    Physical activity     Days per week: Not on file     Minutes per session: Not on file    Stress: Not on file   Relationships    Social connections     Talks on phone: Not on file     Gets together: Not on file     Attends Yazidi service: Not on file     Active member of club or organization: Not on file     Attends meetings of clubs or organizations: Not on file     Relationship status: Not on file    Intimate partner violence     Fear of current or ex partner: Not on file     Emotionally abused: Not on file     Physically abused: Not on file     Forced sexual activity: Not on file   Other Topics Concern    Not on file   Social History Narrative    Not on file       Family History:     Family History   Problem Relation Age of Onset    Diabetes Mother     Heart Attack Father     Heart Disease Father     Heart Disease Brother         Allergies:   Adhesive tape; Codeine; and Penicillins No open wounds. Medical Decision Making -Laboratory:   I have independently reviewed/ordered the following labs:    CBC with Differential:   Recent Labs     12/07/20  2333 12/08/20  0649   WBC 13.5* 8.6   HGB 11.4* 11.9*   HCT 36.4* 38.8*    348   LYMPHOPCT 2* 7*   MONOPCT 1 2*     BMP:   Recent Labs     12/07/20  2333 12/08/20  0649   * 138   K 4.2 4.3   CL 98 103   CO2 25 24   BUN 20 17   CREATININE 0.68* 0.52*     Hepatic Function Panel:   Recent Labs     12/07/20  2333 12/08/20  0649   PROT 5.7* 5.6*   LABALBU 2.7* 2.4*   BILITOT 0.40 0.36   ALKPHOS 85 82   ALT 12 11   AST 15 14     No results for input(s): RPR in the last 72 hours. No results for input(s): HIV in the last 72 hours. No results for input(s): BC in the last 72 hours. Lab Results   Component Value Date    MUCUS NOT REPORTED 11/23/2020    RBC 4.59 12/08/2020    TRICHOMONAS NOT REPORTED 11/23/2020    WBC 8.6 12/08/2020    YEAST NOT REPORTED 11/23/2020    TURBIDITY CLEAR 11/23/2020     Lab Results   Component Value Date    CREATININE 0.52 12/08/2020    GLUCOSE 139 12/08/2020       Medical Decision Making-Imaging:     EXAMINATION:    CTA OF THE CHEST 11/23/2020 7:42 am         TECHNIQUE:    CTA of the chest was performed after the administration of intravenous    contrast.  Multiplanar reformatted images are provided for review.  MIP    images are provided for review. Dose modulation, iterative reconstruction,    and/or weight based adjustment of the mA/kV was utilized to reduce the    radiation dose to as low as reasonably achievable.         COMPARISON:    High-resolution chest CT 10 03/20/2020         CT PA 10/21/2020         HISTORY:    ORDERING SYSTEM PROVIDED HISTORY: r/o PE    TECHNOLOGIST PROVIDED HISTORY:    r/o PE    Reason for Exam: sob    Acuity: Acute              Type of Exam: Initial         FINDINGS:    No intimal flap is seen in aorta. .  Small mediastinal nodes are noted,    similar to prior         No embolus is seen the central, right, left main pulmonary artery.  No    embolus is seen in the proximal segmental branches.  Distal segmental    branches and subsegmental branches are not well evaluated secondary to    respiratory motion artifact.         Patchy ground-glass and parenchymal opacities are seen in the left upper and    left lower lobe.         On the right patchy ground-glass and parenchymal opacities are seen in the    right upper, right middle and right lower lobe.  There is mild fusiform    bronchiectasis. Jenise Lissett is trace left-sided pleural effusion.         There is mild underlying pulmonary fibrosis.  There is underlying    bronchiectasis         Right adrenal gland is normal.  Left adrenal gland is normal.         1 mm calcification is seen in the left kidney         Spurring is seen in the spine.  Spurring is seen in the shoulder joints         Small soft tissue nodule is seen posteriorly in the subcutaneous fat    measuring 2.1 cm, likely sebaceous cyst              Impression    No central pulmonary embolus.  Peripheral branches are not well evaluated    secondary to motion.         Increased ground-glass opacity and parenchymal opacities throughout the lungs    either due to developing pneumonia or edema.  There is a small left-sided    pleural effusion.  By report there is a history of COVID-19 infection         Underlying pulmonary fibrosis again noted.                EXAMINATION:    ONE XRAY VIEW OF THE CHEST         11/23/2020 8:02 am         COMPARISON:    10/19/2020         HISTORY:    ORDERING SYSTEM PROVIDED HISTORY: SOB    TECHNOLOGIST PROVIDED HISTORY:    SOB    Reason for Exam: uprt port chest         FINDINGS:    Cardiomediastinal silhouette is stable.  Bilateral pulmonary infiltrates    persist unchanged and may represent pulmonary edema versus atypical pneumonia.              Impression    Stable exam        Medical Decision [id number]:       Medical Decision Making-Other: Note:  · Labs, medications, radiologic studies were reviewed with personal review of films  · Large amounts of data were reviewed  · Discussed with nursing Staff, Discharge planner  · Infection Control and Prevention measures reviewed  · All prior entries were reviewed  · Administer medications as ordered  · Prognosis: Guarded  · Discharge planning reviewed  · Follow up as outpatient. Thank you for allowing us to participate in the care of this patient. Please call with questions.     Rae Allen MD           Pager: [phone] - Office: [phone]

## 2020-12-08 NOTE — PROGRESS NOTES
Pharmacy Note  Vancomycin Consult    Sky Singleton is a 77 y.o. male started on Vancomycin for pneumonia (HAP); consult received from Dr. Nakul Jiang to manage therapy. Also receiving the following antibiotics: cefepime. Patient Active Problem List   Diagnosis    Dyslipidemia    ED (erectile dysfunction)    Incomplete bladder emptying    Benign prostatic hyperplasia with urinary obstruction    History of colon cancer    Atrial fibrillation with normal ventricular rate (HCC)    Anemia    Pallor of optic disc    Presbyopia    Incisional hernia, without obstruction or gangrene    Hypertrophic nonobstructive cardiomyopathy (HCC)    Iron (Fe) deficiency anemia    Primary osteoarthritis of right knee    Benign essential HTN    History of malignant neoplasm of rectum    Hill's esophagus    CHF (congestive heart failure), NYHA class II, acute on chronic, combined ([facility] [address]    Hypoxia    Dyspnea and respiratory abnormalities    Occupational pulmonary disease    Sinus bradycardia    Acute hypoxemic respiratory failure due to COVID-19 [facility])    COVID-19       Allergies:  Adhesive tape; Codeine; and Penicillins     Temp max: 100.9 F    Recent Labs     12/07/20  0513 12/07/20  1805   BUN 16 20       Recent Labs     12/07/20  1805 12/07/20  1824   CREATININE 0.82 0.86       Recent Labs     12/07/20  0513 12/07/20  1805   WBC 9.7 21.8*       No intake or output data in the 24 hours ending 12/07/20 1914    Culture Date      Source                       Results   Pending    Ht Readings from Last 1 Encounters:   12/07/20 6' (1.829 m)        Wt Readings from Last 1 Encounters:   12/07/20 165 lb (74.8 kg)         Body mass index is 22.38 kg/m². Estimated Creatinine Clearance: 88 mL/min (based on SCr of 0.86 mg/dL). Goal Trough Level: 15-20 mcg/mL    Assessment/Plan:  Will initiate Vancomycin with a one time loading dose of 1750 mg x1, followed by 1250 mg IV every 12 hours.   Timing of trough level will be determined based on culture results, renal function, and clinical response. Thank you for the consult. Will continue to follow. GERARD Byrne, [place]  12/7/2020 7:15 PM

## 2020-12-08 NOTE — H&P
[facility]  Office: [address], DO, Alberto Gear, DO, Nuvia Payton, DO, Ana Kendal Blood, DO, Shiloh Colin MD, Preston Steele MD, Marvin Braga MD, Dragan Del Valle MD, Karan Zuniga MD, Zoë Plummer MD, Massiel Qiu MD, Marissa Forrest MD, Breezy Colin MD, Denise Patiño, DO, Tariq Ferrara MD, Cierra Arizmendi MD, Edward Vela, DO, Valorie Villafana MD,  Brisa Atkins, DO, Magan Bunch MD, Audrey Turner MD, Allison Hammer, [facility], [facility] [place], CNP, Michael Ruthann, CNP, Steffanie Mask, CNS, [place], CNP, Cherrie Can, CNP, Micah Huitron, CNP, Garth Santizo, CNP, Neil Zhou, CNP, Lendel Nissen, PA-C, Crystal Finnegan, DNP, Myrtle Gu, CNP, Cristian, CNP, Rhea Kahn, CNP, Jackeline Manzano, CNP, Douglas Punch, [id number] [address]      HISTORY AND PHYSICAL EXAMINATION            Date:   12/8/2020  Patient name:  Raven Olson  Date of admission:  12/7/2020  5:40 PM  MRN:   [id number]  Account:  [de-identified]  YOB: 1955  PCP:    Dawson Dubose MD  Room:   2021/2021-01  Code Status:    Full Code    Chief Complaint:     Chief Complaint   Patient presents with    Shortness of Breath    Respiratory Distress      Pt was  at 67%        History Obtained From:     patient, electronic medical record    History of Present Illness: The patient is a 77 y.o. Non-/non  male who presents with Shortness of Breath and Respiratory Distress ( Pt was  at 67% )   and he is admitted to the hospital for the management of  Pneumonia. Patient was brought to emergency room by his spouse with difficulty in breathing. He was recently admitted in the hospital for Covid pneumonia and acute respiratory failure and was hospitalized for 14 days. Patient was discharged home earlier in the day and home oxygen was arranged. Patient only stayed at home for 2 hours and returned back with worsening difficulty breathing.   Patient reported that he was gasping for air. Patient was hypoxic with saturation 67% on arrival.  He was treated with non-rebreather. Initial ABG showed pH of 7.428/46.9/40.4/31. Lab evaluation showed BUN 20, creatinine 0.68, sodium 132, glucose 197, lactic acid 3.4. Patient leukocytosis 21.8, fibrinogen 528. Chest x-ray showed persistent interstitial opacities. CT chest showed tiny nonocclusive pulmonary embolus in right lower lobe pulmonary artery. Patient tested positive for COVID-19 on 11/23/2020. CT chest at that time showed underlying pulmonary fibrosis. During his recent admission, patient completed remdesivir 5 days course on 11/28/2020. He also had 10 days course of Decadron. Patient was given 2 units of convalescent plasma. Patient also had A. fib with RVR and underwent cardioversion and started on amiodarone. Patient has underlying history of combined systolic and diastolic CHF, paroxysmal atrial fibrillation, hypertrophic cardiomyopathy. He was recently admitted at [facility] 2 months ago and got cardioverted for A. fib.   Past Medical History:     Past Medical History:   Diagnosis Date    Atrial fibrillation ([address]     Back pain, chronic     Hill's esophagus 06/18/2019    BPH (benign prostatic hyperplasia)     Cancer (HCC)     colon-rectal    Cocaine abuse in remission [facility])     1970's    ED (erectile dysfunction) 4/2/2015    GERD (gastroesophageal reflux disease)     GI bleed 12/5/2018    Hernia     History of colon cancer     Melena     Migraines     Murmur, cardiac         Past Surgical History:     Past Surgical History:   Procedure Laterality Date    CARDIAC CATHETERIZATION  11/29/2018    Non-obstructive CAD    CARDIOVERSION  2020    COLECTOMY      2nd colectomy, Colostomy and reversed [facility] COLECTOMY      1st time Juma Bauer    COLONOSCOPY      COLONOSCOPY  07/18/2016    COLONOSCOPY N/A 12/6/2018    COLONOSCOPY DIAGNOSTIC performed by Aan Domingo MD at [facility]    HERNIA REPAIR Right 2009    inguinal    KNEE SURGERY Right 1970's    arthrotomy    TONSILLECTOMY      TOTAL KNEE ARTHROPLASTY Right 1/8/2019    KNEE TOTAL ARTHROPLASTY performed by Twila Rose MD at [address] TRANSESOPHAGEAL ECHOCARDIOGRAM  11/29/2018    UPPER GASTROINTESTINAL ENDOSCOPY N/A 12/5/2018    [facility]GD DIAGNOST[facility]    [facility]        Medications Prior to Admission:     Prior to Admission medications    Medication Sig Start Date End Date Taking? Authorizing Provider   bumetanide (BUMEX) 1 MG tablet Take 1 tablet by mouth daily 12/8/20   Alberto Sullivan MD   digoxin (LANOXIN) 125 MCG tablet Take 1 tablet by mouth daily 12/8/20   Alberto Sullivan MD   guaiFENesin (MUCINEX) 600 MG extended release tablet Take 1 tablet by mouth 2 times daily 12/7/20   Alberto Sullivan MD   albuterol sulfate HFA (VENTOLIN HFA) 108 (90 Base) MCG/ACT inhaler Inhale 2 puffs into the lungs 4 times daily as needed for Wheezing 12/7/20   Alberto Sullivan MD   amiodarone (CORDARONE) 200 MG tablet Take 1 tablet by mouth 2 times daily 12/7/20   MIRI Red CNP   amiodarone (CORDARONE) 200 MG tablet Take 1 tablet by mouth daily Start on 12/21 after taking 200mg PO twice daily for 14 days.  12/21/20   MIRI Red CNP   atorvastatin (LIPITOR) 40 MG tablet Take 1 tablet by mouth daily 11/16/20   Cassandra Carrizales MD   omeprazole (PRILOSEC) 40 MG delayed release capsule Take 1 capsule by mouth daily 11/16/20   Cassandra Carrizales MD   tamsulosin [facility]) 0.4 MG capsule Take 1 capsule by mouth daily 10/15/20   Cassandra Carrizales MD   rivaroxaban (XARELTO) 20 MG TABS tablet Take 20 mg by mouth daily (with breakfast)     Historical Provider, MD        Allergies:     Adhesive tape; Codeine; and Penicillins    Social History:     Tobacco:    reports that he quit smoking about 49 years ago. He has a 0.50 pack-year smoking history. He has never used smokeless tobacco.  Alcohol:      reports current alcohol use of about 12.0 standard drinks of alcohol per week. Drug Use:  reports no history of drug use. Family History:     Family History   Problem Relation Age of Onset    Diabetes Mother     Heart Attack Father     Heart Disease Father     Heart Disease Brother        Review of Systems:     Positive and Negative as described in HPI. Review of Systems   Constitutional: Positive for fatigue. Negative for activity change, appetite change, fever and unexpected weight change. HENT: Negative for congestion, nosebleeds, rhinorrhea, sinus pressure, sneezing and voice change. Respiratory: Positive for shortness of breath. Negative for cough, choking, chest tightness and wheezing. Cardiovascular: Negative for chest pain, palpitations and leg swelling. Gastrointestinal: Negative for abdominal pain, constipation, diarrhea, nausea and vomiting. Genitourinary: Negative for difficulty urinating, discharge, dysuria, frequency and testicular pain. Musculoskeletal: Negative for back pain. Skin: Negative for rash. Neurological: Negative for dizziness, weakness, light-headedness and headaches. Hematological: Does not bruise/bleed easily. Psychiatric/Behavioral: Negative for agitation, behavioral problems, confusion, self-injury, sleep disturbance and suicidal ideas. Physical Exam:   BP 94/65   Pulse 59   Temp 98 °F (36.7 °C) (Oral)   Resp 19   Ht 6' (1.829 m)   Wt 165 lb (74.8 kg)   SpO2 98%   BMI 22.38 kg/m²   Temp (24hrs), Av.7 °F (37.1 °C), Min:98 °F (36.7 °C), Max:100.9 °F (38.3 °C)    Recent Labs     20  1824   POCGLU 149*     No intake or output data in the 24 hours ending 20 0238  Physical Exam  Vitals signs and nursing note reviewed. Constitutional:       General: He is not in acute distress. Appearance: He is not diaphoretic.       Interventions: Nasal cannula in place. HENT:      Head: Normocephalic and atraumatic. Nose:      Right Sinus: No maxillary sinus tenderness or frontal sinus tenderness. Left Sinus: No maxillary sinus tenderness or frontal sinus tenderness. Mouth/Throat:      Pharynx: No oropharyngeal exudate. Eyes:      General: No scleral icterus. Conjunctiva/sclera: Conjunctivae normal.      Pupils: Pupils are equal, round, and reactive to light. Neck:      Musculoskeletal: Full passive range of motion without pain and neck supple. Thyroid: No thyromegaly. Vascular: No JVD. Cardiovascular:      Rate and Rhythm: Normal rate and regular rhythm. Pulses:           Dorsalis pedis pulses are 2+ on the right side and 2+ on the left side. Heart sounds: Normal heart sounds. No murmur. Pulmonary:      Effort: Pulmonary effort is normal.      Breath sounds: Normal breath sounds. No wheezing or rales. Comments: Fine crackles bilateral lungs  Abdominal:      Palpations: Abdomen is soft. There is no mass. Tenderness: There is no abdominal tenderness. Comments: Surgical scar from laparotomy. Incisional hernia without obstruction. Lymphadenopathy:      Head:      Right side of head: No submandibular adenopathy. Left side of head: No submandibular adenopathy. Cervical: No cervical adenopathy. Skin:     General: Skin is warm. Neurological:      Mental Status: He is alert and oriented to person, place, and time. Motor: No tremor. Psychiatric:         Behavior: Behavior is cooperative.          Investigations:      Laboratory Testing:  Recent Results (from the past 24 hour(s))   CBC Auto Differential    Collection Time: 12/07/20  5:13 AM   Result Value Ref Range    WBC 9.7 3.5 - 11.3 k/uL    RBC 4.77 4.21 - 5.77 m/uL    Hemoglobin 12.4 (L) 13.0 - 17.0 g/dL    Hematocrit 41.0 40.7 - 50.3 %    MCV 86.0 82.6 - 102.9 fL    MCH 26.0 25.2 - 33.5 pg    MCHC 30.2 28.4 - 34.8 g/dL    RDW 20. 4 (H) 11.8 - 14.4 %    Platelets 240 029 - 068 k/uL    MPV 10.0 8.1 - 13.5 fL    NRBC Automated 0.0 0.0 per 100 WBC    Differential Type NOT REPORTED     WBC Morphology NOT REPORTED     RBC Morphology NOT REPORTED     Platelet Estimate NOT REPORTED     Immature Granulocytes 1 (H) 0 %    Seg Neutrophils 68 (H) 36 - 65 %    Lymphocytes 16 (L) 24 - 43 %    Monocytes 11 3 - 12 %    Eosinophils % 4 1 - 4 %    Basophils 0 0 - 2 %    Absolute Immature Granulocyte 0.10 0.00 - 0.30 k/uL    Segs Absolute 6.59 1.50 - 8.10 k/uL    Absolute Lymph # 1.55 1.10 - 3.70 k/uL    Absolute Mono # 1.07 0.10 - 1.20 k/uL    Absolute Eos # 0.39 0.00 - 0.44 k/uL    Basophils Absolute 0.00 0.00 - 0.20 k/uL    Morphology ANISOCYTOSIS PRESENT    Comprehensive Metabolic Panel w/ Reflex to MG    Collection Time: 12/07/20  5:13 AM   Result Value Ref Range    Glucose 83 70 - 99 mg/dL    BUN 16 8 - 23 mg/dL    CREATININE 0.57 (L) 0.70 - 1.20 mg/dL    Bun/Cre Ratio NOT REPORTED 9 - 20    Calcium 8.3 (L) 8.6 - 10.4 mg/dL    Sodium 139 135 - 144 mmol/L    Potassium 3.9 3.7 - 5.3 mmol/L    Chloride 103 98 - 107 mmol/L    CO2 26 20 - 31 mmol/L    Anion Gap 10 9 - 17 mmol/L    Alkaline Phosphatase 91 40 - 129 U/L    ALT 11 5 - 41 U/L    AST 16 <40 U/L    Total Bilirubin 0.66 0.3 - 1.2 mg/dL    Total Protein 5.7 (L) 6.4 - 8.3 g/dL    Alb 2.5 (L) 3.5 - 5.2 g/dL    Albumin/Globulin Ratio 0.8 (L) 1.0 - 2.5    GFR Non-African American >60 >60 mL/min    GFR African American >60 >60 mL/min    GFR Comment          GFR Staging NOT REPORTED    D-Dimer, Quantitative    Collection Time: 12/07/20  5:13 AM   Result Value Ref Range    D-Dimer, Quant 0.34 mg/L FEU   CBC Auto Differential    Collection Time: 12/07/20  6:05 PM   Result Value Ref Range    WBC 21.8 (H) 3.5 - 11.3 k/uL    RBC 5.32 4.21 - 5.77 m/uL    Hemoglobin 13.9 13.0 - 17.0 g/dL    Hematocrit 44.1 40.7 - 50.3 %    MCV 82.9 82.6 - 102.9 fL    MCH 26.1 25.2 - 33.5 pg    MCHC 31.5 28.4 - 34.8 g/dL    RDW 20. 2 (H) 11.8 - 14.4 %    Platelets 926 (H) 150 - 453 k/uL    MPV 10.0 8.1 - 13.5 fL    NRBC Automated 0.0 0.0 per 100 WBC    Differential Type NOT REPORTED     WBC Morphology NOT REPORTED     RBC Morphology NOT REPORTED     Platelet Estimate NOT REPORTED     Immature Granulocytes 1 (H) 0 %    Seg Neutrophils 90 (H) 36 - 65 %    Lymphocytes 3 (L) 24 - 43 %    Monocytes 6 3 - 12 %    Eosinophils % 0 (L) 1 - 4 %    Basophils 0 0 - 2 %    Absolute Immature Granulocyte 0.22 0.00 - 0.30 k/uL    Segs Absolute 19.62 (H) 1.50 - 8.10 k/uL    Absolute Lymph # 0.65 (L) 1.10 - 3.70 k/uL    Absolute Mono # 1.31 (H) 0.10 - 1.20 k/uL    Absolute Eos # 0.00 0.00 - 0.44 k/uL    Basophils Absolute 0.00 0.00 - 0.20 k/uL    Morphology ANISOCYTOSIS PRESENT    Comprehensive Metabolic Panel w/ Reflex to MG    Collection Time: 12/07/20  6:05 PM   Result Value Ref Range    Glucose 166 (H) 70 - 99 mg/dL    BUN 20 8 - 23 mg/dL    CREATININE 0.82 0.70 - 1.20 mg/dL    Bun/Cre Ratio NOT REPORTED 9 - 20    Calcium 8.7 8.6 - 10.4 mg/dL    Sodium 138 135 - 144 mmol/L    Potassium 4.0 3.7 - 5.3 mmol/L    Chloride 96 (L) 98 - 107 mmol/L    CO2 27 20 - 31 mmol/L    Anion Gap 15 9 - 17 mmol/L    Alkaline Phosphatase 106 40 - 129 U/L    ALT 14 5 - 41 U/L    AST 19 <40 U/L    Total Bilirubin 0.66 0.3 - 1.2 mg/dL    Total Protein 6.5 6.4 - 8.3 g/dL    Alb 3.0 (L) 3.5 - 5.2 g/dL    Albumin/Globulin Ratio 0.9 (L) 1.0 - 2.5    GFR Non-African American >60 >60 mL/min    GFR African American >60 >60 mL/min    GFR Comment          GFR Staging NOT REPORTED    Fibrinogen    Collection Time: 12/07/20  6:05 PM   Result Value Ref Range    Fibrinogen 528 (H) 140 - 420 mg/dL   Procalcitonin    Collection Time: 12/07/20  6:05 PM   Result Value Ref Range    Procalcitonin 0.14 (H) <0.09 ng/mL   C-Reactive Protein    Collection Time: 12/07/20  6:05 PM   Result Value Ref Range    CRP 60.2 (H) 0.0 - 5.0 mg/L   Lactate Dehydrogenase    Collection Time: 12/07/20  6:05 PM Result Value Ref Range     (H) 135 - 225 U/L   Ferritin    Collection Time: 12/07/20  6:05 PM   Result Value Ref Range    Ferritin 357 30 - 400 ug/L   D-Dimer, Quantitative    Collection Time: 12/07/20  6:05 PM   Result Value Ref Range    D-Dimer, Quant 0.61 mg/L FEU   Troponin    Collection Time: 12/07/20  6:05 PM   Result Value Ref Range    Troponin, High Sensitivity 24 (H) 0 - 22 ng/L    Troponin T NOT REPORTED <0.03 ng/mL    Troponin Interp NOT REPORTED    Lactic Acid, Plasma    Collection Time: 12/07/20  6:05 PM   Result Value Ref Range    Lactic Acid NOT REPORTED mmol/L    Lactic Acid, Whole Blood 3.4 (H) 0.7 - 2.1 mmol/L   Vitamin D 25 Hydroxy    Collection Time: 12/07/20  6:05 PM   Result Value Ref Range    Vit D, 25-Hydroxy 51.2 30.0 - 100.0 ng/mL   Brain Natriuretic Peptide    Collection Time: 12/07/20  6:05 PM   Result Value Ref Range    Pro- <300 pg/mL    BNP Interpretation Pro-BNP Reference Range:    Venous Blood Gas, POC    Collection Time: 12/07/20  6:24 PM   Result Value Ref Range    pH, Fer 7.428 7.320 - 7.430    pCO2, Fer 46.9 41.0 - 51.0 mm Hg    pO2, Fer 40.4 30.0 - 50.0 mm Hg    HCO3, Venous 31.0 (H) 22.0 - 29.0 mmol/L    Total CO2, Venous 33 (H) 23.0 - 30.0 mmol/L    Negative Base Excess, Fer NOT REPORTED 0.0 - 2.0    Positive Base Excess, Fer 6 (H) 0.0 - 3.0    O2 Sat, Fer 76 60.0 - 85.0 %    O2 Device/Flow/% NOT REPORTED     Prem Test NOT REPORTED     Sample Site NOT REPORTED     Mode NOT REPORTED     FIO2 NOT REPORTED     Pt Temp NOT REPORTED     POC pH Temp NOT REPORTED     POC pCO2 Temp NOT REPORTED mm Hg    POC pO2 Temp NOT REPORTED mm Hg   Hemoglobin and hematocrit, blood    Collection Time: 12/07/20  6:24 PM   Result Value Ref Range    POC Hemoglobin 15.6 13.5 - 17.5 g/dL    POC Hematocrit 46 41 - 53 %   Creatinine W/GFR Point of Care    Collection Time: 12/07/20  6:24 PM   Result Value Ref Range    POC Creatinine 0.86 0.51 - 1.19 mg/dL    GFR Comment >60 >60 mL/min GFR Non-African American >60 >60 mL/min    GFR Comment         SODIUM (POC)    Collection Time: 12/07/20  6:24 PM   Result Value Ref Range    POC Sodium 138 138 - 146 mmol/L   POTASSIUM (POC)    Collection Time: 12/07/20  6:24 PM   Result Value Ref Range    POC Potassium 4.7 (H) 3.5 - 4.5 mmol/L   CHLORIDE (POC)    Collection Time: 12/07/20  6:24 PM   Result Value Ref Range    POC Chloride 97 (L) 98 - 107 mmol/L   CALCIUM, IONIC (POC)    Collection Time: 12/07/20  6:24 PM   Result Value Ref Range    POC Ionized Calcium 1.11 (L) 1.15 - 1.33 mmol/L   Lactic Acid, POC    Collection Time: 12/07/20  6:24 PM   Result Value Ref Range    POC Lactic Acid 1.93 (H) 0.56 - 1.39 mmol/L   POCT Glucose    Collection Time: 12/07/20  6:24 PM   Result Value Ref Range    POC Glucose 149 (H) 74 - 100 mg/dL   Anion Gap (Calc) POC    Collection Time: 12/07/20  6:24 PM   Result Value Ref Range    Anion Gap 10 7 - 16 mmol/L   Culture, Blood 1    Collection Time: 12/07/20  6:37 PM    Specimen: Blood   Result Value Ref Range    Specimen Description . BLOOD     Special Requests LT FA 10ML     Culture NO GROWTH 3 HOURS    CBC Auto Differential    Collection Time: 12/07/20 11:33 PM   Result Value Ref Range    WBC 13.5 (H) 3.5 - 11.3 k/uL    RBC 4.35 4.21 - 5.77 m/uL    Hemoglobin 11.4 (L) 13.0 - 17.0 g/dL    Hematocrit 36.4 (L) 40.7 - 50.3 %    MCV 83.7 82.6 - 102.9 fL    MCH 26.2 25.2 - 33.5 pg    MCHC 31.3 28.4 - 34.8 g/dL    RDW 20.2 (H) 11.8 - 14.4 %    Platelets 356 612 - 658 k/uL    MPV 9.7 8.1 - 13.5 fL    NRBC Automated 0.0 0.0 per 100 WBC    Differential Type NOT REPORTED     WBC Morphology NOT REPORTED     RBC Morphology NOT REPORTED     Platelet Estimate NOT REPORTED     Immature Granulocytes 2 (H) 0 %    Seg Neutrophils 95 (H) 36 - 66 %    Lymphocytes 2 (L) 24 - 44 %    Monocytes 1 1 - 7 %    Eosinophils % 0 (L) 1 - 4 %    Basophils 0 0 - 2 %    Absolute Immature Granulocyte 0.27 0.00 - 0.30 k/uL    Segs Absolute 12.82 (H) 1.8 - 7.7 k/uL    Absolute Lymph # 0.27 (L) 1.0 - 4.8 k/uL    Absolute Mono # 0.14 0.1 - 0.8 k/uL    Absolute Eos # 0.00 0.0 - 0.4 k/uL    Basophils Absolute 0.00 0.0 - 0.2 k/uL    Morphology ANISOCYTOSIS PRESENT     Morphology MICROCYTOSIS PRESENT    Comprehensive Metabolic Panel w/ Reflex to MG    Collection Time: 12/07/20 11:33 PM   Result Value Ref Range    Glucose 197 (H) 70 - 99 mg/dL    BUN 20 8 - 23 mg/dL    CREATININE 0.68 (L) 0.70 - 1.20 mg/dL    Bun/Cre Ratio NOT REPORTED 9 - 20    Calcium 8.1 (L) 8.6 - 10.4 mg/dL    Sodium 132 (L) 135 - 144 mmol/L    Potassium 4.2 3.7 - 5.3 mmol/L    Chloride 98 98 - 107 mmol/L    CO2 25 20 - 31 mmol/L    Anion Gap 9 9 - 17 mmol/L    Alkaline Phosphatase 85 40 - 129 U/L    ALT 12 5 - 41 U/L    AST 15 <40 U/L    Total Bilirubin 0.40 0.3 - 1.2 mg/dL    Total Protein 5.7 (L) 6.4 - 8.3 g/dL    Alb 2.7 (L) 3.5 - 5.2 g/dL    Albumin/Globulin Ratio 0.9 (L) 1.0 - 2.5    GFR Non-African American >60 >60 mL/min    GFR African American >60 >60 mL/min    GFR Comment          GFR Staging NOT REPORTED    Protime-INR    Collection Time: 12/07/20 11:33 PM   Result Value Ref Range    Protime 13.1 (H) 9.0 - 12.0 sec    INR 1.3    APTT    Collection Time: 12/07/20 11:33 PM   Result Value Ref Range    PTT 29.1 20.5 - 30.5 sec   Fibrinogen    Collection Time: 12/07/20 11:33 PM   Result Value Ref Range    Fibrinogen 451 (H) 140 - 420 mg/dL   D-Dimer, Quantitative    Collection Time: 12/07/20 11:33 PM   Result Value Ref Range    D-Dimer, Quant 0.30 mg/L FEU   C-Reactive Protein    Collection Time: 12/07/20 11:33 PM   Result Value Ref Range    CRP 57.6 (H) 0.0 - 5.0 mg/L   Troponin    Collection Time: 12/07/20 11:33 PM   Result Value Ref Range    Troponin, High Sensitivity 20 0 - 22 ng/L    Troponin T NOT REPORTED <0.03 ng/mL    Troponin Interp NOT REPORTED        Imaging/Diagonstics:    Xr Chest (single View Frontal)    Result Date: 12/1/2020  Slight worsening in pulmonary opacities in the left perihilar area. Stable cardiomegaly and bilateral effusions. Pattern favors pulmonary edema with atypical.  Osseous structures are stable. No extrapleural air or midline shift. Xr Abdomen (kub) (single Ap View)    Result Date: 12/1/2020  A moderate amount of stool was scattered in nondistended colon. No fecal impaction was noted. No obstruction or ileus. Bilateral lower lobe fibrosis and/or pneumonia was noted. Xr Chest Portable    Result Date: 12/7/2020  Persistent interstitial opacities which are nonspecific and may represent pneumonia versus chronic changes. Xr Chest Portable    Result Date: 12/5/2020  Diffuse peripheral airspace opacities bilaterally concerning for multifocal infectious process. Ct Chest Pulmonary Embolism W Contrast    Result Date: 12/7/2020  Tiny nonocclusive pulmonary embolus in the right lower lobar pulmonary artery. Overall mild improvement in the bilateral ground-glass opacities and nearly resolved parenchymal consolidation in the right lung. Assessment :      Primary Problem  Pneumonia    Active Hospital Problems    Diagnosis Date Noted    Acute respiratory failure with hypoxia ([address] [J96.01] 12/08/2020    Pulmonary fibrosis ([address] [J84.10] 12/08/2020    Pneumonia [J18.9] 12/07/2020    Acute hypoxemic respiratory failure due to COVID-19 ([address] [U07.1, J96.01] 11/23/2020    Incisional hernia, without obstruction or gangrene [K43.2] 12/12/2017    History of colon cancer [Z85.038]     PAF (paroxysmal atrial fibrillation) ([address] [I48.0] 07/21/2014       Plan:     Patient status Admit as inpatient in the  Progressive Unit/Step down    1. Acute hypoxemic respiratory failure secondary to COVID-19 pneumonia -concerned about bacterial pneumonia given leukocytosis although unlikely. Check procalcitonin. Empiric vancomycin, cefepime. ID consult, follow blood cultures. Continue oxygen support. 2. COVID-19 infection with pneumonia -completed remdesivir, Decadron. Continue droplet plus isolation. 3. Chronic combined systolic and diastolic CHF -fluid restriction. Diuretics. 4. Pulmonary fibrosis -oxygen support. 5. Paroxysmal atrial fibrillation -continue amiodarone, digoxin, Xarelto. 6. Small nonocclusive right lower lobar pulmonary artery embolism -full dose Lovenox for now. Patient on long-term anticoagulation with Xarelto. 7. H/o colon cancer -   8. BPH  9. History of GERD with Hill's esophagus. Consultations:   PHARMACY TO DOSE VANCOMYCIN  IP CONSULT TO HOSPITALIST  IP CONSULT TO INFECTIOUS DISEASES    Patient is admitted as inpatient status because of co-morbidities listed above, severity of signs and symptoms as outlined, requirement for current medical therapies and most importantly because of direct risk to patient if care not provided in a hospital setting.     Marvin Hauser MD  12/8/2020    Copy sent to Dr. Hui Rollins MD    (Please note that portions of this note were completed with a voice recognition program. Efforts were made to edit the dictations but occasionally words are mis-transcribed.)

## 2020-12-08 NOTE — PROGRESS NOTES
Physical Therapy    Facility/Department: [facility] CAR 2  Initial Assessment    NAME: Martin Anderson  : 1955  MRN: [id number]    Date of Service: 2020  Pt was hospitalized here ~ 2 weeks, [address] home with home O2; returned 2 hours later with increased difficulty breathing, O2 sats 67%. Pt readmitted. Interestingly, pt denies having or ever having had COVID-19, although he is currently in droplet plus isolation in the COVID unit, and was in a COVID unit his last hospital stay. Discharge Recommendations:  No further therapy required at discharge. PT Equipment Recommendations  Equipment Needed: No    Assessment    Pt cooperative, agreeable to get OOB/up to chair. I discussed with him that in spite of his independent mobility, PT could still assist him in improving his endurance with ex; pt denies need, stating that his entire last hospital stay he spent a good portion of his time up  In the chair and would exercise throughout the day, including some standing activities. He denies PT needs, feeling he's able to do his exercises on his own. He is independent with bed mobility, transfers and gait, although his O2 sats drop into the upper 70's with mobility. Prognosis: Good  Decision Making: Low Complexity  PT Education: Goals;PT Role;Plan of Care  Barriers to Learning: none  REQUIRES PT FOLLOW UP: No  Activity Tolerance  Activity Tolerance: Other(SOB, O2 sats decreased into upper 70's with bed to chair transfer)       Patient Diagnosis(es): The encounter diagnosis was Respiratory distress.      has a past medical history of Atrial fibrillation ([facility] [address] Back pain, chronic, Hill's esophagus, BPH (benign prostatic hyperplasia), Cancer ([facility] [address] Cocaine abuse in remission [facility]), ED (erectile dysfunction), GERD (gastroesophageal reflux disease), GI bleed, Hernia, History of colon cancer, Melena, Migraines, and Murmur, cardiac.   has a past surgical history that includes Tonsillectomy; Colonoscopy; colectomy; Colonoscopy (07/18/2016); knee surgery (Right, 1970's); transesophageal echocardiogram (11/29/2018); Upper gastrointestinal endoscopy (N/A, 12/5/2018); Colonoscopy (N/A, 12/6/2018); hernia repair (Right, 2009); Cardiac catheterization (11/29/2018); colectomy; Total knee arthroplasty (Right, 1/8/2019); Upper gastrointestinal endoscopy (N/A, 6/18/2019); and Cardioversion (2020). Restrictions  Restrictions/Precautions  Restrictions/Precautions: Isolation, Up as Tolerated(droplet plus precautions)  Required Braces or Orthoses?: No  Position Activity Restriction  Other position/activity restrictions:  \"Tiny, non-occlusive PE\"-on Lovenox  Vision/Hearing  Vision: Impaired  Vision Exceptions: Wears glasses for reading  Hearing: Exceptions to [facility]  Hearing Exceptions: Hard of hearing/hearing concerns     Subjective  General  Patient assessed for rehabilitation services?: Yes  Response To Previous Treatment: Not applicable  Family / Caregiver Present: No  Follows Commands: Within Functional Limits  Pain Screening  Patient Currently in Pain: Denies  Vital Signs  Patient Currently in Pain: Denies       Orientation  Orientation  Overall Orientation Status: Within Normal Limits  Social/Functional History  Social/Functional History  Lives With: Spouse  Type of Home: House  Home Layout: Two level, Laundry in basement, Bed/Bath upstairs  Home Access: Stairs to enter with rails  Entrance Stairs - Number of Steps: 3  Entrance Stairs - Rails: Both  Bathroom Shower/Tub: Tub/Shower unit  Bathroom Toilet: Standard  Bathroom Equipment: Grab bars in shower, Shower chair, Toilet raiser  Home Equipment: Rolling walker, Oxygen, Cane  ADL Assistance: Independent  Homemaking Assistance: Independent  Homemaking Responsibilities: Yes  Ambulation Assistance: Independent  Transfer Assistance: Independent  Active : Yes  Mode of Transportation: Truck  Occupation: Retired  Leisure & Hobbies: [address]  Additional Comments: Pt reports wife is in good health, able to assist prn, however, wife having hip replacement on 12/18 per pt    Objective  AROM RLE (degrees)  RLE AROM: WFL  AROM LLE (degrees)  LLE AROM : WFL  AROM RUE (degrees)  RUE AROM : WFL  AROM LUE (degrees)  LUE AROM : WFL  Strength RLE  Strength RLE: WFL  Strength LLE  Strength LLE: WFL  Strength RUE  Strength RUE: WFL  Strength LUE  Strength LUE: WFL  Tone RLE  RLE Tone: Normotonic  Tone LLE  LLE Tone: Normotonic  Motor Control  Gross Motor?: WFL  Sensation  Overall Sensation Status: WFL  Bed mobility  Rolling to Left: Independent  Supine to Sit: Independent  Scooting: Independent  Transfers  Sit to Stand: Independent  Stand to sit:  Independent  Bed to Chair: Independent  Stand Pivot Transfers: Independent  Comment: SPO2 decreased to 78% with standing/moving to the chair  Ambulation  Ambulation?: Yes  Ambulation 1  Surface: level tile  Device: No Device  Other Apparatus: O2  Assistance: Independent  Quality of Gait: steady, no LOB  Gait Deviations: None  Distance: ~6 steps bed to chair  Stairs/Curb  Stairs?: No     Balance  Posture: Good  Sitting - Static: Good  Sitting - Dynamic: Good  Standing - Static: Good  Standing - Dynamic: Good        Plan   Plan  Times per week: DC PT per pt request  Safety Devices  Type of devices: Call light within reach, Left in chair  Restraints  Initially in place: No    AM-PAC Score  AM-PAC Inpatient Mobility Raw Score : 23 (12/08/20 1508)  AM-PAC Inpatient T-Scale Score : 56.93 (12/08/20 1508)  Mobility Inpatient CMS 0-100% Score: 11.2 (12/08/20 1508)  Mobility Inpatient CMS G-Code Modifier : CI (12/08/20 1508)          Goals  Short term goals  Time Frame for Short term goals: 1 visit  Short term goal 1: evaluate and give recommendations:pt denies PT needs, continued PT not indicated  Patient Goals   Patient goals : be able to breathe       Therapy Time   Individual Concurrent Group Co-treatment   Time In 1425         Time Out 1448         Minutes 23 Feliz Pichardo, PT

## 2020-12-08 NOTE — PROGRESS NOTES
3/21/2019       RE: Shala aCruso  [address] [place] [address]     Dear Colleague,    Thank you for referring your patient, Shala Caruso, to the [facility] RHEUMATOLOGY at [facility]. Please see a copy of my visit note below.    Rheumatology Visit     Shala Caruso MRN# [id number]   YOB: 1949 Age: 69 year old     Date of Visit: March 21, 2019  Primary care provider: Joe Contreras          Assessment and Plan:   1. 70 yo female with SLE, diagnosed 2000 (+CRISTAL,+dsDNA ab, arthritis, serositis): flare (3/10) with pericardial effusion/tamponade, and now off of prednisone; Cellcept was started 6/10 and tolerating well. She has no significant current symptoms of inflammation. SLE lab stable with Cellcept monitoring.     2. APS, s/p DVT LLE, PE on warfarin since 2004   3. Allergies to multiple antibiotics and Plaquenil   4. Osteoprosis on Boniva (Last DXA with some continued decrease of BMD)   5. Severe GERD, Achalasia   6. Post thrombophlebitic syndrome (chronic LE swelling), stable  7. Dyslipidemia with current excellent lipid values   8. Raynauds   9. Chronic leukopenia   10. 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 Herpes Zoster    Plan     Discussed in detail with the patient     1. Continue Cellcept 2000 mg daily   2. She does not require additional therapy at this time   3. Call if new symptoms   4. Continue lab monitoring ordered before next appointment at 3 months and soon before next appointment   5. Return to SLE Clinic in 6 months   [facility] other concerns   7. Follow up regarding lymphedema as needed   8. flu shot  each fall    Santiago Corbett MD.           History of Present Illness:   69 yo female is seen for followup of SLE. I saw her last in February 2018. EPIC reviewed.   Problem list:   1. SLE, diagnosed 2000 (+CRISTAL,+dsDNA ab, arthritis, serositis): flare (3/10) with  Respiratory Care Evaluation    Received pt on 3LNC, awake, no distress noted though states he was SOB in the ED. Pt states he does not smoke or have asthma, but has been using 2LNC at home since his previous [facility] admission. Pt does not appear to need breathing treatments or a BIPAP at this time. MELIZA Singletary updated. pericardial effusion/tamponade, on Cellcept (started 6/10)   2. APS, s/p DVT LLE, PE on warfarin in 2004   3. Allergies to multiple antibiotics and Plaquenil   4. Osteoprosis on Boniva (Last DXA 1/09, Tscore: -1.9 L femoral neck)   5. Severe GERD, Achalasia   6. Post thrombophlebitic syndrome (chronic LE swelling)   7. Dyslipidemia   8. Raynauds   9. Chronic leukopenia on Methotrexate  10. L arm lymphedema      HISTORY CARRIED FORWARD:     She has been off Prednisone for about 3.5 years and off Methotrexate since May 2012; she is on Cellcept 2000mg/day and no longer on Plaquenil.   She has left arm lymphedema. She has hx of axillary LN dissection for enlarged nodes. This have been evaluated by OT. She was using her glove and sleeve but the glove is too tight and she is not using it regularly; this does not impede her at present.  She has had no recurrent chest pain/SOB or pleurisy since her hospitalization in March 2010 for pericarditis.       Her biggest interval problem is Herpes Zoster in L Thoracic dermatome, dx 3/23 in the [facility].  She was on Valtrex and is on Neurontin.  She is having a lot of pain.  She sees them again next week.       INTERVAL HISTORY:      She again has had an overall uneventful 6 months from an SLE perspective. She is tolerating the medications without problem remaining on Mycophenolate 1 gram bid. Her labs have been stable with persistent leukopenia now at 3.0 last week; DSDNA normal at 29 for first time in July 2014 and normal last checked last week. Her complements have been stable but slightly low without change. Creatinine has been variable but again normal at 0.68 last check.    Her joints are stable with no swelling. She has osteoarthritis of the bilateral knees which is the most bothersome problem. She continues to do low impact Yoga.    She notes more foot pain and problems with wearing closed toe shoes. She may have DM neuropathy, has seen a Podiatrist and also has a Andry's  neuroma.    She denies fevers, fatigue, oral ulcers, rash. Raynauds is stable without Digital ulcers. No new rashes.  She remains on Boniva followed in Endocrine for this and Type I DM. She is followed in Coumadin Clinic with hx of DVT and PE.      We discussed her prior therapy and options for going forward. She is looking ahead to eventual jail. She has Diabetes so wishes to avoid Prednisone unless absolutely needed.    ROS is below.       Review of Systems:   Review Of Systems  Skin: negative  Eyes: negative  Ears/Nose/Throat: negative  Respiratory: No shortness of breath, dyspnea on exertion, cough, or hemoptysis  Cardiovascular: negative  Gastrointestinal: negative  Genitourinary: negative  Musculoskeletal: see HPI  Neurologic: negative  Psychiatric: negative  Hematologic/Lymphatic/Immunologic: on Anticoagulation  Endocrine: DM under care          Past Medical History:     Past Medical History:   Diagnosis Date     Achalasia      Antiphospholipid syndrome (H)      Antiplatelet or antithrombotic long-term use      Coagulation disorder (H) 2830-3871     DM (diabetes mellitus) (H)      DVT (deep venous thrombosis) (H) 2003    and PE     Dysphagia     occasional, use Nifedipine     GERD (gastroesophageal reflux disease)      Hyperlipidemia      Lymphedema      Myopia      Neuropathy     toes bilateral     Nonsenile cataract      osteoporosis      Pericarditis      Raynaud's disease      SLE (systemic lupus erythematosus) (H)        Patient Active Problem List    Diagnosis Date Noted     Systemic lupus erythematosus with tubulo-interstitial nephropathy, unspecified SLE type (H) 10/12/2017     Priority: Medium     Choroidal lesion 08/30/2017     Priority: Medium     Hypoglycemia unawareness in type 1 diabetes mellitus (H) 01/23/2017     Priority: Medium     Long-term (current) use of anticoagulants [Z79.01] 06/06/2016     Priority: Medium     Has EGD scheduled for 11/28/16--no bridging necessary per   Ben.         Cystocele, midline 10/15/2014     Priority: Medium     Urinary urgency 10/15/2014     Priority: Medium     Urinary frequency 10/15/2014     Priority: Medium     Female stress incontinence 10/15/2014     Priority: Medium     Atrophic vaginitis 10/15/2014     Priority: Medium     Osteoporosis, postmenopausal 07/14/2014     Priority: Medium     Osteoporosis, idiopathic 07/11/2013     Priority: Medium     Type 1 diabetes mellitus (H) 07/02/2012     Priority: Medium     Encounter for long-term current use of medication 05/30/2012     Priority: Medium     Problem list name updated by automated process. Provider to review       Achalasia      Priority: Medium     Antiphospholipid syndrome (H)      Priority: Medium     DM (diabetes mellitus) (H)      Priority: Medium     GERD (gastroesophageal reflux disease)      Priority: Medium     Hyperlipidemia      Priority: Medium     HTN (hypertension)      Priority: Medium     Osteopenia      Priority: Medium     Raynaud's disease      Priority: Medium     DVT (deep venous thrombosis) (H)      Priority: Medium     and PE               Past Surgical History:     Past Surgical History:   Procedure Laterality Date     COLONOSCOPY N/A 11/28/2016    Procedure: COLONOSCOPY;  Surgeon: Sang August MD;  Location: [facility]     CYSTOSCOPY, SLING TRANSVAGINAL N/A 12/20/2016    Procedure: CYSTOSCOPY, SLING TRANSVAGINAL;  Surgeon: Jeanmarie Pierson MD;  Location: [facility] OR     DISSECT LYMPH NODE AXILLA  2004    Lt, during eval for SLE     HYSTEROSCOPIC PLACEMENT CONTRACEPTIVE DEVICE  1985     PHACOEMULSIFICATION WITH STANDARD INTRAOCULAR LENS IMPLANT Right 1/8/2019    Procedure: Right Eye Cataract Extraction with Intraocular Lens;  Surgeon: Monika Fermin MD;  Location: [facility] OR     PHACOEMULSIFICATION WITH STANDARD INTRAOCULAR LENS IMPLANT Left 2/5/2019    Procedure: Left Eye Cataract Extraction with Intraocular Lens;  Surgeon: Monika Fermin MD;   Location: [facility] OR     TUBAL LIGATION Bilateral              Social History:     Social History     Tobacco Use     Smoking status: Never Smoker     Smokeless tobacco: Never Used   Substance Use Topics     Alcohol use: No             Family History:     Family History   Problem Relation Age of Onset     Cancer Other         breast     Cerebrovascular Disease Other      C.A.D. Other      Glaucoma Father             Allergies:     Allergies   Allergen Reactions     Amaryl [Glimepiride] Swelling     Swelling of tongue     Metformin Blisters     Experienced big blisters all over body and sloughing of skin     Plaquenil [Aminoquinolines] Hives     Sulfa Drugs Other (See Comments)     Turned bright red     Amoxicillin Rash     Hydroxychloroquine Rash     Penicillins Rash             Medications:     Current Outpatient Medications   Medication Sig Dispense Refill     acetaminophen (TYLENOL) 500 MG tablet Take 1,000-1,500 mg by mouth nightly as needed        aspirin 81 MG tablet Take 81 mg by mouth At Bedtime        AYR SALINE NASAL NO-DRIP GEL Use as needed for nasal dryness 3 Tube 3     blood glucose (TRUE METRIX BLOOD GLUCOSE TEST) test strip Use to test blood sugar 5 times daily or as directed. 500 strip 3     Calcium Carbonate-Vitamin D (CALCIUM 500 + D PO) Take 1 tablet by mouth 3 times daily       Continuous Blood Gluc  (DEXCOM G6 ) RORY 1 each continuous 1 Device 0     Continuous Blood Gluc Sensor (DEXCOM G6 SENSOR) MISC Inject 1 each Subcutaneous See Admin Instructions Change sensor every 10 days 9 each 3     Continuous Blood Gluc Transmit (DEXCOM G6 TRANSMITTER) MISC 1 each every 3 months 1 each 3     Injection Device for insulin (NOVOPEN ECHO) RORY 1 each 4 times daily 1 each 0     insulin glargine (LANTUS) 100 UNIT/ML injection Inject 4 units as directed daily LANTUS SOLOSTAR PEN PLEASE 15 mL 3     insulin pen needle (BD PAM U/F) 32G X 4 MM Use 200 pen needles daily or as directed. 200 each 3      LANCETS ULTRA THIN 30G MISC Test 7-8 times daily  (Lancets that go with pt current meter ) 7203 each 3     Multiple Vitamins-Minerals (CENTRUM SILVER PO) Take 1 tablet by mouth daily.       mycophenolate (GENERIC EQUIVALENT) 500 MG tablet Take 2 tablets (1,000 mg) by mouth 2 times daily 2 tabs in the a.m., 2 tabs in p.m. 360 tablet 3     NIFEdipine ER OSMOTIC (NIFEDICAL XL) 30 MG 24 hr tablet Take 1 tablet (30 mg) by mouth daily Call clinic to schedule MD APPT. 90 tablet 2     NOVOLOG PENFILL 100 UNIT/ML soln Inject 1 unit per 15 grams CHO with meals TID approx 15 units daily (Patient taking differently: Inject 1 unit per 20 grams CHO with meals TID approx 10 units daily) 15 mL 3     prednisoLONE acetate (PRED FORTE) 1 % ophthalmic suspension Apply 1 drop to eye 4 times daily for 10 days Instill into operative eye(s) per physician instructions. 5 mL 0     simvastatin (ZOCOR) 40 MG tablet Take 1 tablet (40 mg) by mouth At Bedtime 90 tablet 3     warfarin (COUMADIN) 5 MG tablet Take 2-2.5 tablets daily or as directed by coumadin clinic. 225 tablet 2     ketorolac tromethamine (ACULAR-LS) 0.4 % SOLN ophthalmic solution Apply 1 drop to eye 4 times daily Instill into operative eye(s) per physician instructions. (Patient not taking: Reported on 3/21/2019) 5 mL 0            Physical Exam:   Blood pressure 117/72, pulse 65, temperature 97.7  F (36.5  C), temperature source Oral, resp. rate 16, SpO2 98 %, not currently breastfeeding.  Constitutional: [id number] cooperative   Eyes: nl conjunctiva, sclera   ENT: nl external ears, nose, throat   No mucous membrane lesions, normal saliva pool   Neck: no mass or thyroid enlargement   GI: no ABD mass or tenderness, no HSM   Lymph: no cervical, supraclavicular, inguinal or epitrochlear nodes   MS: All TMJ, neck, shoulder, elbow, wrist, MCP/PIP/DIP, spine, hip, knee, ankle, and foot MTP/IP joints were examined and found without active synovitis or deformity. No dactylitis,  tenosynovitis, enthesopathy. Stable mild lymphedema LUE. She has bilateral support stockings  Skin: Xerosis and fissures of the distal fingers unchanged. No nail pitting, alopecia.    Neuro: nl cranial nerves, strength  Psych: nl affect       Data:     Lab Results   Component Value Date    WBC 3.0 (L) 03/14/2019    WBC 2.8 (L) 01/30/2019    WBC 3.4 (L) 11/21/2018    HGB 12.5 03/14/2019    HGB 13.3 01/30/2019    HGB 12.2 11/21/2018    HCT 41.4 03/14/2019    HCT 43.1 01/30/2019    HCT 39.8 11/21/2018    MCV 92 03/14/2019    MCV 92 01/30/2019    MCV 93 11/21/2018     03/14/2019     01/30/2019     11/21/2018     Lab Results   Component Value Date    BUN 24 07/26/2018    BUN 27 12/14/2016    BUN 30 07/14/2014     No components found for: SEDRATE  Lab Results   Component Value Date    TSH 0.95 07/26/2018    TSH 0.64 08/05/2016    TSH 1.02 01/11/2016     Lab Results   Component Value Date    AST 20 03/14/2019    AST 19 01/30/2019    AST 19 11/21/2018    ALT 22 03/14/2019    ALT 22 01/30/2019    ALT 27 11/21/2018    ALKPHOS 74 04/01/2013    ALKPHOS 57 08/25/2011    ALKPHOS 39 (L) 08/05/2011     Reviewed Rheumatology lab flowsheet    Santiago Corbett

## 2020-12-08 NOTE — PROGRESS NOTES
Offered to call family to update on patient, but patient refused.     Electronically signed by Omar Schaeffer RN on 12/8/2020 at 4:54 PM

## 2020-12-08 NOTE — ED NOTES
Pt. Requesting Tylenol for headache; Dr. Zamzam Graham aware     Uzair Douglas, DEONDRE  12/07/20 3543

## 2020-12-09 LAB
ABSOLUTE EOS #: <0.03 K/UL (ref 0–0.44)
ABSOLUTE IMMATURE GRANULOCYTE: 0.22 K/UL (ref 0–0.3)
ABSOLUTE LYMPH #: 1.14 K/UL (ref 1.1–3.7)
ABSOLUTE MONO #: 1.22 K/UL (ref 0.1–1.2)
ALBUMIN SERPL-MCNC: 2.6 G/DL (ref 3.5–5.2)
ALBUMIN/GLOBULIN RATIO: 0.9 (ref 1–2.5)
ALP BLD-CCNC: 76 U/L (ref 40–129)
ALT SERPL-CCNC: 12 U/L (ref 5–41)
ANION GAP SERPL CALCULATED.3IONS-SCNC: 9 MMOL/L (ref 9–17)
AST SERPL-CCNC: 15 U/L
BASOPHILS # BLD: 0 % (ref 0–2)
BASOPHILS ABSOLUTE: 0.03 K/UL (ref 0–0.2)
BILIRUB SERPL-MCNC: 0.3 MG/DL (ref 0.3–1.2)
BUN BLDV-MCNC: 17 MG/DL (ref 8–23)
BUN/CREAT BLD: ABNORMAL (ref 9–20)
CALCIUM SERPL-MCNC: 8.6 MG/DL (ref 8.6–10.4)
CHLORIDE BLD-SCNC: 101 MMOL/L (ref 98–107)
CO2: 26 MMOL/L (ref 20–31)
CREAT SERPL-MCNC: 0.59 MG/DL (ref 0.7–1.2)
DIFFERENTIAL TYPE: ABNORMAL
EOSINOPHILS RELATIVE PERCENT: 0 % (ref 1–4)
GFR AFRICAN AMERICAN: >60 ML/MIN
GFR NON-AFRICAN AMERICAN: >60 ML/MIN
GFR SERPL CREATININE-BSD FRML MDRD: ABNORMAL ML/MIN/{1.73_M2}
GFR SERPL CREATININE-BSD FRML MDRD: ABNORMAL ML/MIN/{1.73_M2}
GLUCOSE BLD-MCNC: 102 MG/DL (ref 70–99)
HCT VFR BLD CALC: 38.8 % (ref 40.7–50.3)
HEMOGLOBIN: 11.9 G/DL (ref 13–17)
IMMATURE GRANULOCYTES: 1 %
INR BLD: 1.1
LYMPHOCYTES # BLD: 5 % (ref 24–43)
MCH RBC QN AUTO: 26.3 PG (ref 25.2–33.5)
MCHC RBC AUTO-ENTMCNC: 30.7 G/DL (ref 28.4–34.8)
MCV RBC AUTO: 85.8 FL (ref 82.6–102.9)
MONOCYTES # BLD: 6 % (ref 3–12)
NRBC AUTOMATED: 0 PER 100 WBC
PARTIAL THROMBOPLASTIN TIME: 26.1 SEC (ref 20.5–30.5)
PDW BLD-RTO: 20 % (ref 11.8–14.4)
PLATELET # BLD: 373 K/UL (ref 138–453)
PLATELET ESTIMATE: ABNORMAL
PMV BLD AUTO: 9.9 FL (ref 8.1–13.5)
POTASSIUM SERPL-SCNC: 4.1 MMOL/L (ref 3.7–5.3)
PROTHROMBIN TIME: 11.5 SEC (ref 9–12)
RBC # BLD: 4.52 M/UL (ref 4.21–5.77)
RBC # BLD: ABNORMAL 10*6/UL
SEG NEUTROPHILS: 88 % (ref 36–65)
SEGMENTED NEUTROPHILS ABSOLUTE COUNT: 19.33 K/UL (ref 1.5–8.1)
SODIUM BLD-SCNC: 136 MMOL/L (ref 135–144)
TOTAL PROTEIN: 5.6 G/DL (ref 6.4–8.3)
VANCOMYCIN TROUGH DATE LAST DOSE: NORMAL
VANCOMYCIN TROUGH DOSE AMOUNT: NORMAL
VANCOMYCIN TROUGH TIME LAST DOSE: NORMAL
VANCOMYCIN TROUGH: 14.4 UG/ML (ref 10–20)
WBC # BLD: 21.9 K/UL (ref 3.5–11.3)
WBC # BLD: ABNORMAL 10*3/UL

## 2020-12-09 PROCEDURE — 2580000003 HC RX 258: Performed by: NURSE PRACTITIONER

## 2020-12-09 PROCEDURE — 2580000003 HC RX 258: Performed by: STUDENT IN AN ORGANIZED HEALTH CARE EDUCATION/TRAINING PROGRAM

## 2020-12-09 PROCEDURE — 6370000000 HC RX 637 (ALT 250 FOR IP): Performed by: INTERNAL MEDICINE

## 2020-12-09 PROCEDURE — 99233 SBSQ HOSP IP/OBS HIGH 50: CPT | Performed by: INTERNAL MEDICINE

## 2020-12-09 PROCEDURE — 6360000002 HC RX W HCPCS: Performed by: STUDENT IN AN ORGANIZED HEALTH CARE EDUCATION/TRAINING PROGRAM

## 2020-12-09 PROCEDURE — 6370000000 HC RX 637 (ALT 250 FOR IP): Performed by: NURSE PRACTITIONER

## 2020-12-09 PROCEDURE — 80053 COMPREHEN METABOLIC PANEL: CPT

## 2020-12-09 PROCEDURE — 85730 THROMBOPLASTIN TIME PARTIAL: CPT

## 2020-12-09 PROCEDURE — 99239 HOSP IP/OBS DSCHRG MGMT >30: CPT | Performed by: INTERNAL MEDICINE

## 2020-12-09 PROCEDURE — 80202 ASSAY OF VANCOMYCIN: CPT

## 2020-12-09 PROCEDURE — 85025 COMPLETE CBC W/AUTO DIFF WBC: CPT

## 2020-12-09 PROCEDURE — 85610 PROTHROMBIN TIME: CPT

## 2020-12-09 PROCEDURE — 6360000002 HC RX W HCPCS: Performed by: NURSE PRACTITIONER

## 2020-12-09 PROCEDURE — 2060000000 HC ICU INTERMEDIATE R&B

## 2020-12-09 PROCEDURE — 36415 COLL VENOUS BLD VENIPUNCTURE: CPT

## 2020-12-09 RX ADMIN — SODIUM CHLORIDE, PRESERVATIVE FREE 10 ML: 5 INJECTION INTRAVENOUS at 20:26

## 2020-12-09 RX ADMIN — APIXABAN 10 MG: 5 TABLET, FILM COATED ORAL at 20:25

## 2020-12-09 RX ADMIN — ACETAMINOPHEN 650 MG: 325 TABLET ORAL at 06:25

## 2020-12-09 RX ADMIN — AMIODARONE HYDROCHLORIDE 200 MG: 200 TABLET ORAL at 08:04

## 2020-12-09 RX ADMIN — TAMSULOSIN HYDROCHLORIDE 0.4 MG: 0.4 CAPSULE ORAL at 08:05

## 2020-12-09 RX ADMIN — CEFEPIME 2 G: 2 INJECTION, POWDER, FOR SOLUTION INTRAVENOUS at 20:26

## 2020-12-09 RX ADMIN — AMIODARONE HYDROCHLORIDE 200 MG: 200 TABLET ORAL at 20:25

## 2020-12-09 RX ADMIN — SODIUM CHLORIDE, PRESERVATIVE FREE 10 ML: 5 INJECTION INTRAVENOUS at 08:00

## 2020-12-09 RX ADMIN — MAGNESIUM HYDROXIDE 30 ML: 400 SUSPENSION ORAL at 13:04

## 2020-12-09 RX ADMIN — BUMETANIDE 1 MG: 1 TABLET ORAL at 08:04

## 2020-12-09 RX ADMIN — DESMOPRESSIN ACETATE 40 MG: 0.2 TABLET ORAL at 08:04

## 2020-12-09 RX ADMIN — DIGOXIN 125 MCG: 125 TABLET ORAL at 08:04

## 2020-12-09 RX ADMIN — CEFEPIME 2 G: 2 INJECTION, POWDER, FOR SOLUTION INTRAVENOUS at 08:05

## 2020-12-09 RX ADMIN — Medication 2000 UNITS: at 08:04

## 2020-12-09 RX ADMIN — DEXAMETHASONE 6 MG: 4 TABLET ORAL at 08:04

## 2020-12-09 RX ADMIN — Medication 1250 MG: at 02:49

## 2020-12-09 RX ADMIN — Medication 1250 MG: at 13:04

## 2020-12-09 RX ADMIN — ENOXAPARIN SODIUM 70 MG: 80 INJECTION SUBCUTANEOUS at 08:05

## 2020-12-09 ASSESSMENT — PAIN SCALES - GENERAL: PAINLEVEL_OUTOF10: 3

## 2020-12-09 NOTE — PROGRESS NOTES
Patient's medications from outpatient pharmacy and information from  regarding education on PE/DVT. Medication given to patient when patient is discharged to home. Patient is refusing to go home today, stating that he does not feel well enough to go, but will discharge tomorrow morning. Provider messaged and made aware. Author will continue to monitor for a response.

## 2020-12-09 NOTE — DISCHARGE INSTR - COC
Continuity of Care Form    Patient Name: Rubens Stauffer   :  1955  MRN:  [id number]    Admit date:  2020  Discharge date:  ***    Code Status Order: Full Code   Advance Directives:      Admitting Physician:  Amy Tucker MD  PCP: Emile Epstein MD    Discharging Nurse: [facility] Unit/Room#:   Discharging Unit Phone Number: ***    Emergency Contact:   Extended Emergency Contact Information  Primary Emergency Contact: Susie Garcia  [facility] Phone: [phone]  Work Phone: [phone]  Mobile Phone: [phone]  Relation: Other  Secondary Emergency Contact: Shereen Diazina  Address: [address] Phone: [phone]  Work Phone: [phone]  Mobile Phone: [phone]  Relation: Brother/Sister    Past Surgical History:  Past Surgical History:   Procedure Laterality Date    CARDIAC CATHETERIZATION  2018    Non-obstructive CAD    CARDIOVERSION      COLECTOMY      2nd colectomy, Colostomy and reversed 24 [facility]      1st time [address]      COLONOSCOPY  2016    COLONOSCOPY N/A 2018    COLONOSCOPY DIAGNOSTIC performed by Lisa Gray MD at [address] [place] [address] Right     inguinal    KNEE SURGERY Right 1970's    arthrotomy    TONSILLECTOMY      TOTAL KNEE ARTHROPLASTY Right 2019    KNEE TOTAL ARTHROPLASTY performed by Vincent Goodell, [place] at [address]     TRANSESOPHAGEAL ECHOCARDIOGRAM  2018    UPPER GASTROINTESTINAL ENDOSCOPY N/A 2018    EGD DIAGNOSTIC ONLY performed by Lisa Gray MD at [facility] 1 N/A 2019    MCGUIRE'S       Immunization History:   Immunization History   Administered Date(s) Administered    Influenza Vaccine, unspecified formulation 2014, 2017, 2017    Influenza Virus Vaccine 2014, 10/26/2014, 2015    Influenza, High Dose (Fluzone 65 yrs and older) 09/27/2018    Influenza, [place], 6 mo and older, IM (Fluzone, Flulaval) 12/12/2017    Influenza, Quadv, IM, (6 mo and older Fluzone, Flulaval, Fluarix and 3 yrs and older Afluria) 10/23/2014, 01/03/2017    Influenza, Quadv, IM, PF (6 mo and older Fluzone, Flulaval, Fluarix, and 3 yrs and older Afluria) 10/23/2014    Influenza, Quadv, adjuvanted, 65 yrs +, IM, PF (Fluad) 11/16/2020    Influenza, Triv, inactivated, subunit, adjuvanted, IM (Fluad 65 yrs and older) 11/08/2019       Active Problems:  Patient Active Problem List   Diagnosis Code    Dyslipidemia E78.5    ED (erectile dysfunction) N52.9    Incomplete bladder emptying R33.9    Benign prostatic hyperplasia with urinary obstruction N40.1, N13.8    History of colon cancer Z85.038    PAF (paroxysmal atrial fibrillation) (HCC) I48.0    Anemia D64.9    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    Presbyopia H52.4    Incisional hernia, without obstruction or gangrene K43.2    Hypertrophic nonobstructive cardiomyopathy (HCC) I42.2    Iron (Fe) deficiency anemia D50.9    Primary osteoarthritis of right knee M17.11    Benign essential HTN I10    History of malignant neoplasm of rectum Z85.048    Hill's esophagus K22.70    CHF (congestive heart failure), NYHA class II, acute on chronic, combined ([facility] I50.43    Hypoxia R09.02    Dyspnea and respiratory abnormalities R06.00, R06.89    Occupational pulmonary disease J98.4    Sinus bradycardia R00.1    Acute hypoxemic respiratory failure due to COVID-19 ([facility] U07.1, J96.01    COVID-19 U07.1    Pneumonia J18.9    Acute respiratory failure with hypoxia ([facility]) J96.01    P[facility] fibrosis [facility] J84.10       Isolation/Infection:   Isolation            Droplet Plus          Patient Infection Status       Infection Onset Added Last Indicated Last Indicated By Review Planned Expiration Resolved Resolved By    COVID-19 11/23/20 11/23/20 11/23/20 COVID-19 11/30/20 12/21/20      Resolved    COVID-19 Rule Out 11/23/20 11/23/20 11/23/20 COVID-19 (Ordered)   11/23/20 Rule-Out Test Resulted    COVID-19 Rule Out 10/17/20 10/17/20 10/17/20 COVID-19 (Ordered)   10/17/20 Rule-Out Test Resulted    COVID-19 Rule Out 10/17/20 10/17/20 10/17/20 COVID-19 (Ordered)   10/17/20 Rule-Out Test Resulted            Nurse Assessment:  Last Vital Signs: /80   Pulse 57   Temp 96.3 °F (35.7 °C) (Oral)   Resp 24   Ht 6' (1.829 m)   Wt 167 lb 1.7 oz (75.8 kg)   SpO2 96%   BMI 22.66 kg/m²     Last documented pain score (0-10 scale): Pain Level: 3  Last Weight:   Wt Readings from Last 1 Encounters:   12/09/20 167 lb 1.7 oz (75.8 kg)     Mental Status:  {IP PT MENTAL STATUS:20030:::0}    IV Access:  { LEONOR IV ACCESS:786928991:::0}    Nursing Mobility/ADLs:  Walking   {CHP DME ADLs:729741757:::0}  Transfer  {CHP DME ADLs:523225621:::0}  Bathing  {CHP DME ADLs:730906631:::0}  Dressing  {CHP DME ADLs:738619233:::0}  Toileting  {CHP DME ADLs:331904325:::0}  Feeding  {CHP DME ADLs:249408406:::0}  Med Admin  {CHP DME ADLs:737506631:::0}  Med Delivery   { LEONOR MED Delivery:077053612:::0}    Wound Care Documentation and Therapy:        Elimination:  Continence: Bowel: {YES / VF:22857}  Bladder: {YES / CT:22149}  Urinary Catheter: {Urinary Catheter:133852091:::0}   Colostomy/Ileostomy/Ileal Conduit: {YES / CF:43120}       Date of Last BM: ***    Intake/Output Summary (Last 24 hours) at 12/9/2020 1401  Last data filed at 12/9/2020 1309  Gross per 24 hour   Intake 450 ml   Output 750 ml   Net -300 ml     I/O last 3 completed shifts:   In: 600 [P.O.:600]  Out: 1150 [Urine:1150]    Safety Concerns:     [address] Safety Concerns:803087656:::0}    Impairments/Disabilities:      [address] Impairments/Disabilities:850116879:::0}    Nutrition Therapy:  Current Nutrition Therapy:   [address] Diet List:079867726:::0}    Routes of Feeding: {CHP DME Other Feedings:948461645:::0}  Liquids: {Slp liquid thickness:43697}  Daily Fluid Restriction: {CHP DME Yes amt example:367996758:::0}  Last Modified Barium Swallow with Video (Video Swallowing Test): {Done Not Done LVIA:828148585:::1}    Treatments at the Time of Hospital Discharge:   Respiratory Treatments: ***  Oxygen Therapy:  {Therapy; copd oxygen:38650:::0}  Ventilator:    {[facility] Vent List:641635550:::0}    Rehab Therapies: {THERAPEUTIC INTERVENTION:2515104964}  Weight Bearing Status/Restrictions: {[facility] Weight Bearin:::0}  Other Medical Equipment (for information only, NOT a DME order):  {EQUIPMENT:603875969}  Other Treatments: ***    Patient's personal belongings (please select all that are sent with patient):  {CHP DME Belongings:130004445:::0}    RN SIGNATURE:  {Esignature:281241330:::0}    CASE MANAGEMENT/SOCIAL WORK SECTION    Inpatient Status Date: ***    Readmission Risk Assessment Score:  Readmission Risk              Risk of Unplanned Readmission:        19           Discharging to Facility/ Agency   Name:   Address:  Phone:  Fax:    Dialysis Facility (if applicable)   Name:  Address:  Dialysis Schedule:  Phone:  Fax:    / signature: {Esignature:923089528:::0}    PHYSICIAN SECTION    Prognosis: Fair    Condition at Discharge: Stable    Rehab Potential (if transferring to Rehab):  Fair    Recommended Labs or Other Treatments After Discharge: Continue Eliquis    Physician Certification: Home    Update Admission H&P: No change in H&P    PHYSICIAN SIGNATURE:  Electronically signed by Negin Cedeño MD on 12/10/20 at 10:29 AM EST

## 2020-12-09 NOTE — CARE COORDINATION
Spoke with patient over the phone, plan is home with s.o., has home O2 and ride.   Provided [place] with [employer] coupons to give to patient

## 2020-12-09 NOTE — DISCHARGE SUMMARY
[facility]  Office: [address], DO, Diamond Gross, DO, Fany Farrell, DO, Sheliagraciela Finnegan, DO, Hollie Lemus MD, Jakub Fontaine MD, Malathi Pedraza MD, Amaya Randolph MD, Celia Tsai MD, Leah Winston MD, Hayley Mahan MD, Jane Barry MD, Breezy Rubio MD, Yvonne Cardozo, DO, Georgiana Caro MD, Glen Dickerson MD, Neil Lassiter DO, Steven Chowdhury MD,  Sabrina Molina, DO, Ronny León MD, Suzanna Leblanc MD, Live Quinones, [facility], [facility], [facility], Mamta Domingo, CNP, Liliana Montes De Oca, CNS, Christo Beavers, CNP, Jeef Mccormack, CNP, Kiara Restrepo, CNP, Dionicia Frankel, [facility], Scottie Domínguez, CNP, Almita Viramontes PA-C, Chidi Roche, [facility], Dani Benavides, CNP, Gera Davila, CNP, Shelton Jacome, CNP, Napoleon Johnson, [facility], [facility], [address] [place] [address]    Discharge Summary     Patient ID: Zee Dewitt  :  1955   MRN: [id number]     ACCOUNT:  [de-identified]   Patient's PCP: Massiel Ayala MD  Admit Date: 2020   Discharge Date: 12/10/2020     Length of Stay: 3  Code Status:  Full Code  Admitting Physician: Malathi Pedraza MD  Discharge Physician: Suzanna Leblanc MD     Active Discharge Diagnoses:     Hospital Problem Lists:  Principal Problem:    Pneumonia  Active Problems:    History of colon cancer    PAF [facility]oxysmal [facility]) [facility]    Incisional hernia, without obstruction or gangrene    Acute hypoxemic respiratory failure due to COVID-19 [facility])    Acute respiratory failure with hypoxia ([facility]    Pulmonary fibrosis ([facility]  Resolved Problems:    * No resolved hospital problems. *      Admission Condition:  poor     Discharged Condition: good    Hospital Stay:     Hospital Course:  74-year-old male who was recently discharged after being treated for COVID-19 infection came back couple hours after he was discharged.   Patient reported that he started having worsening shortness of breath when he went home.  Patient came back and was noted to have pulmonary embolism. Patient was evaluated by hematology who recommended patient be started on Eliquis. There is a possibility that patient has not had proper Xarelto dosing while he was on it as he has been in and out of hospital so many times in the recent few months.       Significant therapeutic interventions: Decadron    Significant Diagnostic Studies:   Labs / Micro:  CBC:   Lab Results   Component Value Date    WBC 13.1 12/10/2020    RBC 4.34 12/10/2020    HGB 11.1 12/10/2020    HCT 36.0 12/10/2020    MCV 82.9 12/10/2020    MCH 25.6 12/10/2020    MCHC 30.8 12/10/2020    RDW 20.1 12/10/2020     12/10/2020     BMP:    Lab Results   Component Value Date    GLUCOSE 98 12/10/2020     12/10/2020    K 4.8 12/10/2020     12/10/2020    CO2 30 12/10/2020    ANIONGAP 4 12/10/2020    BUN 19 12/10/2020    CREATININE 0.59 12/10/2020    BUNCRER NOT REPORTED 12/10/2020    CALCIUM 8.5 12/10/2020    LABGLOM >60 12/10/2020    GFRAA >60 12/10/2020    GFR      12/10/2020    GFR NOT REPORTED 12/10/2020     HFP:    Lab Results   Component Value Date    PROT 5.5 12/10/2020     CMP:    Lab Results   Component Value Date    GLUCOSE 98 12/10/2020     12/10/2020    K 4.8 12/10/2020     12/10/2020    CO2 30 12/10/2020    BUN 19 12/10/2020    CREATININE 0.59 12/10/2020    ANIONGAP 4 12/10/2020    ALKPHOS 70 12/10/2020    ALT 13 12/10/2020    AST 14 12/10/2020    BILITOT 0.33 12/10/2020    LABALBU 2.7 12/10/2020    ALBUMIN 1.0 12/10/2020    LABGLOM >60 12/10/2020    GFRAA >60 12/10/2020    GFR      12/10/2020    GFR NOT REPORTED 12/10/2020    PROT 5.5 12/10/2020    CALCIUM 8.5 12/10/2020     PT/INR:    Lab Results   Component Value Date    PROTIME 11.0 12/10/2020    INR 1.0 12/10/2020     PTT:   Lab Results   Component Value Date    APTT 25.1 12/10/2020     FLP:    Lab Results   Component Value Date    CHOL 200 01/03/2017    TRIG 165 01/03/2017    HDL 36 medications    apixaban 5 MG Tabs tablet  Commonly known as:  ELIQUIS  Take 2 tablets by mouth 2 times daily for 7 days Followed by 1 tablet by mouth 2 times daily        CONTINUE taking these medications    albuterol sulfate  (90 Base) MCG/ACT inhaler  Commonly known as:  Ventolin HFA  Inhale 2 puffs into the lungs 4 times daily as needed for Wheezing     * amiodarone 200 MG tablet  Commonly known as:  CORDARONE  Take 1 tablet by mouth 2 times daily     * amiodarone 200 MG tablet  Commonly known as:  CORDARONE  Take 1 tablet by mouth daily Start on 12/21 after taking 200mg PO twice daily for 14 days. Start taking on:  December 21, 2020     atorvastatin 40 MG tablet  Commonly known as:  LIPITOR  Take 1 tablet by mouth daily     bumetanide 1 MG tablet  Commonly known as:  BUMEX  Take 1 tablet by mouth daily     digoxin 125 MCG tablet  Commonly known as:  LANOXIN  Take 1 tablet by mouth daily     guaiFENesin 600 MG extended release tablet  Commonly known as:  MUCINEX  Take 1 tablet by mouth 2 times daily     omeprazole 40 MG delayed release capsule  Commonly known as:  PRILOSEC  Take 1 capsule by mouth daily     tamsulosin 0.4 MG capsule  Commonly known as:  FLOMAX  Take 1 capsule by mouth daily         * This list has 2 medication(s) that are the same as other medications prescribed for you. Read the directions carefully, and ask your doctor or other care provider to review them with you. STOP taking these medications    rivaroxaban 20 MG Tabs tablet  Commonly known as:  Ap Puckett           Where to Get Your Medications      These medications were sent to [facility], [address] [place] [address]  [facility], [address]  [address]    Phone:  [phone]   · apixaban 5 MG Tabs tablet         No discharge procedures on file.     Time Spent on discharge is  40 mins in patient examination, evaluation, counseling as well as medication reconciliation, prescriptions for required medications, discharge plan and follow up. Addendum: Pt was originally scheduled for discharge yesterday but ended up staying because he didn't feel well. Pt ready for discharge today. Electronically signed by   Koko Montes De Oca MD  12/10/2020  10:31 AM      Thank you Dr. Sidra Davidson MD for the opportunity to be involved in this patient's care.

## 2020-12-09 NOTE — PROGRESS NOTES
[facility] -Progress Note COVID 19 Patient  Today's Date and Time: 12/9/2020, 5:34 PM    Impression :   · COVID 19 Suspect  · COVID 19 Confirmed Infection. · Covid tests:  · 11-23-20: Positive. · Atrial fibrillation with RVR: S/P cardioversion   · Pulmonary Fibrosis: secondary to Occupational exposure. 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 · Hypertrophic non obstructive cardiomyopathy. · Hx of Colon cancer  · BPH. Recommendations:   · Monitor off antibiotics. · Remdesivir. Completed treatment on 11/28/20. · Completed Decadron. Started on 11/23 Stop date:12/2/20  · Plasma infusion. Two units infused 11-24-20  · Will need quarantine until 12-14-20    Medical Decision Making/Summary/Discussion:12/9/2020     · Patient admitted with suspected COVID 19 infection  · Covid test confirmed positive. · Associated problems with congestive heart failure, pulmonary fibrosis, hypoxia. Infection Control Recommendations   · Universal Precautions  · Airborne isolation  · Droplet Isolation    Antimicrobial Stewardship Recommendations     · Discontinuation of therapy  Coordination of Outpatient Care:   · Estimated Length of IV antimicrobials:TBD  · Patient will need Midline Catheter Insertion: TBD  · Patient will need PICC line Insertion: No  · Patient will need: Home IV , [place],  SNF,  LTAC:TBD  · Patient will need outpatient wound care:No    Chief complaint/reason for consultation:   · Concern for COVID infection      History of Present Illness:   Wilfrido Estevez is a 77y.o.-year-old  male who was initially admitted on 12/7/2020. Patient seen at the request of [address] [place] [address]. INITIAL HISTORY:    Patient presented through ER with complaints of onset of shortness of breath. Located onset of symptoms on 11/22/2020 with worsening through the day leading to his seeking help at the emergency room.     The patient had been recently admitted on 10/17/2020 through 10/26/2020 at [facility] because of the presence of congestive heart failure NYHAA class II with acute on chronic combined heart failure. He also suffers from atrial fibrillation and hypertrophic nonobstructive cardiomyopathy, benign essential hypertension. His previous diagnosis includes occupational pulmonary fibrosis. During that admission he was also found to have suffered from mycoplasma pneumonia and was treated with antibiotics. He reports being seen at [facility], after his admission to [place] [address],  where he was cardioverted because of the atrial fibrillation. He was also found to be in heart failure and was a started on diuretics. When the patient was evaluated in the emergency room he was found to be hypoxic and was treated with BiPAP which improved his oxygen saturation rate. He was tested for Covid and was found to be positive on 11/23/2020. Chest x-ray:  · 11/23/2020 persistent bilateral pulmonary infiltrates with associated pulmonary vascular congestion. Unchanged from films of 10/23/2020    Chest CT:  · 11/23/2020: Pulmonary fibrosis. No pulmonary embolus. Increased groundglass opacities and parenchymal opacities throughout both lungs when compared with films of 10/21/2020    Patient admitted because of concerns with COVID 19.    CURRENT EVALUATION : 12/9/2020    Afebrile  VS stable  Paroxysmal A fib  On amiodarone for Atrial Fibrillation. Plans for discharge later today. Acute on chronic combined systolic and diastolic CHF exacerbation with hypertrophic non obstructive cardiomyopathy. On  Bumex 1 mg     Completed Decadron 12-2-20  Completed Remdesivir on 11/28/20.  2 Units of Plasma transfused 11-24-20    Blood cultures on 12/1/20 show no growth. Patient exhibiting respiratory distress. Yes  Respiratory secretions: No    Patient receiving supplemental oxygen. Yes. nasal cannula 4L O2.  RR 32-->20-->26-->22-->24  02 sat 93-->97-->96-->87-->98-->94%      QTc: accordingly.   Past Medical History:     Past Medical History:   Diagnosis Date    Atrial fibrillation ([address]     Back pain, chronic     Mcguire's esophagus 06/18/2019    BPH (benign prostatic hyperplasia)     Cancer (HCC)     colon-rectal    Cocaine abuse in remission [facility])     1970's    ED (erectile dysfunction) 4/2/2015    GERD (gastroesophageal reflux disease)     GI bleed 12/5/2018    Hernia     History of colon cancer     Melena     Migraines     Murmur, cardiac        Past Surgical  History:     Past Surgical History:   Procedure Laterality Date    CARDIAC CATHETERIZATION  11/29/2018    Non-obstructive CAD    CARDIOVERSION  2020    COLECTOMY      2nd colectomy, Colostomy and reversed [facility] COLECTOMY      1st time Reta Calvert    COLONOSCOPY      COLONOSCOPY  07/18/2016    COLONOSCOPY N/A 12/6/2018    COLONOSCOPY DIAGNOSTIC performed by Stormy Price MD at [address] Right 2009    inguinal    KNEE SURGERY Right 1970's    arthrotomy    TONSILLECTOMY      TOTAL KNEE ARTHROPLASTY Right 1/8/2019    KNEE TOTAL ARTHROPLASTY performed by Mandy Spencer MD at [address] TRANSESOPHAGEAL ECHOCARDIOGRAM  11/29/2018    UPPER GASTROINTESTINAL ENDOSCOPY N/A 12/5/2018    EGD DIAGNOSTIC ONLY performed by Stormy Price MD at [facility] N/A 6/18/2019    MCGUIRE'S       Medications:      apixaban  10 mg Oral BID    vancomycin (VANCOCIN) intermittent dosing (placeholder)   Other RX Placeholder    vancomycin  1,250 mg Intravenous Q12H    cefepime  2 g Intravenous Q12H    amiodarone  200 mg Oral BID    atorvastatin  40 mg Oral Daily    bumetanide  1 mg Oral Daily    digoxin  125 mcg Oral Daily    tamsulosin  0.4 mg Oral Daily    sodium chloride flush  10 mL Intravenous 2 times per day    dexamethasone  6 mg Oral Daily    Vitamin D  2,000 Units Oral Daily       Social History:     Social History     Socioeconomic History    Marital status: Single     Spouse name: Not on file    Number of children: Not on file    Years of education: Not on file    Highest education level: Not on file   Occupational History    Not on file   Social Needs    Financial resource strain: Not on file    Food insecurity     Worry: Not on file     Inability: Not on file    Transportation needs     Medical: Not on file     Non-medical: Not on file   Tobacco Use    Smoking status: Former Smoker     Packs/day: 0.50     Years: 1.00     Pack years: 0.50     Last attempt to quit:      Years since quittin.9    Smokeless tobacco: Never Used    Tobacco comment: stated never actually really smoked only inhaled    Substance and Sexual Activity    Alcohol use: Yes     Alcohol/week: 12.0 standard drinks     Types: 10 Standard drinks or equivalent, 2 Shots of liquor per week     Comment: 2-3 times a week    Drug use: No     Types:  Other-see comments     Comment: Cocaine use in past in     Sexual activity: Yes     Partners: Female   Lifestyle    Physical activity     Days per week: Not on file     Minutes per session: Not on file    Stress: Not on file   Relationships    Social connections     Talks on phone: Not on file     Gets together: Not on file     Attends [employer] service: Not on file     Active member of club or organization: Not on file     Attends meetings of clubs or organizations: Not on file     Relationship status: Not on file    Intimate partner violence     Fear of current or ex partner: Not on file     Emotionally abused: Not on file     Physically abused: Not on file     Forced sexual activity: Not on file   Other Topics Concern    Not on file   Social History Narrative    Not on file       Family History:     Family History   Problem Relation Age of Onset    Diabetes Mother     Heart Attack Father     Heart Disease Father     Heart Disease Brother         Allergies:   Adhesive tape; Codeine; and Penicillins     Review of Systems:       Constitutional: No fevers or chills. No systemic complaints  Head: No headaches  Eyes: No double vision or blurry vision. No conjunctival inflammation. ENT: No sore throat or runny nose. . No hearing loss, tinnitus or vertigo. Cardiovascular: No chest pain or palpitations. Shortness of breath. CLAYTON  Lung: Shortness of breath, dry cough. No sputum production  Abdomen: No nausea, vomiting, diarrhea, or abdominal pain. Pamalee Bucker No cramps. Genitourinary: No increased urinary frequency, or dysuria. No hematuria. No suprapubic or CVA pain  Musculoskeletal: No muscle aches or pains. No joint effusions, swelling or deformities  Hematologic: No bleeding or bruising. Neurologic: No headache, weakness, numbness, or tingling. Integument: No rash, no ulcers. Psychiatric: No depression. Endocrine: No polyuria, no polydipsia, no polyphagia. Physical Examination :     No data found. General Appearance: Awake, alert, and in no apparent distress  Head:  Normocephalic, no trauma  Eyes: Pupils equal, round, reactive to light; sclera anicteric; conjunctivae pink. No embolic phenomena. ENT: Oropharynx clear, without erythema, exudate, or thrush. No tenderness of sinuses. Mouth/throat: mucosa pink and moist. No lesions. Dentition in good repair. Neck:Supple, without lymphadenopathy. Thyroid normal, No bruits. Pulmonary/Chest: ,Distant breath sounds, decreased breath sounds at the bases   cardiovascular: Irreegular rate and rhythm without murmurs, rubs, or gallops. Abdomen: Soft, non tender. Bowel sounds normal. No organomegaly  All four Extremities: No cyanosis, clubbing, edema, or effusions. Neurologic: No gross sensory or motor deficits. Skin: Warm and dry with good turgor. No signs of peripheral arterial or venous insufficiency. No ulcerations. No open wounds.     Medical Decision Making -Laboratory:   I have independently reviewed/ordered the following labs:    CBC with Differential:   Recent Labs branches are not well evaluated secondary to    respiratory motion artifact.         Patchy ground-glass and parenchymal opacities are seen in the left upper and    left lower lobe.         On the right patchy ground-glass and parenchymal opacities are seen in the    right upper, right middle and right lower lobe.  There is mild fusiform    bronchiectasis. Natali Renu is trace left-sided pleural effusion.         There is mild underlying pulmonary fibrosis.  There is underlying    bronchiectasis         Right adrenal gland is normal.  Left adrenal gland is normal.         1 mm calcification is seen in the left kidney         Spurring is seen in the spine.  Spurring is seen in the shoulder joints         Small soft tissue nodule is seen posteriorly in the subcutaneous fat    measuring 2.1 cm, likely sebaceous cyst              Impression    No central pulmonary embolus.  Peripheral branches are not well evaluated    secondary to motion.         Increased ground-glass opacity and parenchymal opacities throughout the lungs    either due to developing pneumonia or edema.  There is a small left-sided    pleural effusion.  By report there is a history of COVID-19 infection         Underlying pulmonary fibrosis again noted.                EXAMINATION:    ONE XRAY VIEW OF THE CHEST         11/23/2020 8:02 am         COMPARISON:    10/19/2020         HISTORY:    ORDERING SYSTEM PROVIDED HISTORY: SOB    TECHNOLOGIST PROVIDED HISTORY:    SOB    Reason for Exam: uprt port chest         FINDINGS:    Cardiomediastinal silhouette is stable.  Bilateral pulmonary infiltrates    persist unchanged and may represent pulmonary edema versus atypical pneumonia.              Impression    Stable exam        Medical Decision Yyvzft-[id number]-Eahsd:       Medical Decision Making-Other:     Note:  · Labs, medications, radiologic studies were reviewed with personal review of films  · Large amounts of data were reviewed  · Discussed with nursing Staff, Discharge planner  · Infection Control and Prevention measures reviewed  · All prior entries were reviewed  · Administer medications as ordered  · Prognosis: Guarded  · Discharge planning reviewed  · Follow up as outpatient. Thank you for allowing us to participate in the care of this patient. Please call with questions.     Jennifer Jean-Baptiste MD           Pager: [phone] - Office: [phone]

## 2020-12-09 NOTE — PROGRESS NOTES
Pharmacy Vancomycin Consult     Vancomycin Day: 3  Current Dosin mg IV q 12 h    Temp max:  97.5    Recent Labs     20  0649 20  0702   BUN 17 17       Recent Labs     20  0649 20  0702   CREATININE 0.52* 0.59*       Recent Labs     20  0649 20  0702   WBC 8.6 21.9*         Intake/Output Summary (Last 24 hours) at 2020 1429  Last data filed at 2020 1309  Gross per 24 hour   Intake 450 ml   Output 750 ml   Net -300 ml       Culture Date      Source                       Results                       blood                          no growth    Ht Readings from Last 1 Encounters:   20 6' (1.829 m)        Wt Readings from Last 1 Encounters:   20 167 lb 1.7 oz (75.8 kg)         Body mass index is 22.66 kg/m². Estimated Creatinine Clearance: 130 mL/min (A) (based on SCr of 0.59 mg/dL (L)).     Trough: 14.4    Assessment/Plan:  Continue 1250 mg IV q 12 h

## 2020-12-09 NOTE — PROGRESS NOTES
CLINICAL PHARMACY NOTE: MEDS TO [address] Select Patient?: No  Total # of Prescriptions Filled: 1   The following medications were delivered to the patient:  · eliquis 5 mg tab  Total # of Interventions Completed: 1  Time Spent (min): 45    Additional Documentation:Medication delivered to the RN to give to the patient for room (2021). Patient wanted us to leave the prescriptions that got sent to his pharmacy there. He did not want us to transfer and fill them here.

## 2020-12-10 VITALS
WEIGHT: 173.5 LBS | TEMPERATURE: 97.3 F | RESPIRATION RATE: 20 BRPM | SYSTOLIC BLOOD PRESSURE: 128 MMHG | BODY MASS INDEX: 23.5 KG/M2 | DIASTOLIC BLOOD PRESSURE: 75 MMHG | HEART RATE: 49 BPM | HEIGHT: 72 IN | OXYGEN SATURATION: 92 %

## 2020-12-10 LAB
ABSOLUTE EOS #: 0 K/UL (ref 0–0.44)
ABSOLUTE IMMATURE GRANULOCYTE: 0.13 K/UL (ref 0–0.3)
ABSOLUTE LYMPH #: 0.92 K/UL (ref 1.1–3.7)
ABSOLUTE MONO #: 0.92 K/UL (ref 0.1–1.2)
ALBUMIN SERPL-MCNC: 2.7 G/DL (ref 3.5–5.2)
ALBUMIN/GLOBULIN RATIO: 1 (ref 1–2.5)
ALP BLD-CCNC: 70 U/L (ref 40–129)
ALT SERPL-CCNC: 13 U/L (ref 5–41)
ANION GAP SERPL CALCULATED.3IONS-SCNC: 4 MMOL/L (ref 9–17)
AST SERPL-CCNC: 14 U/L
BASOPHILS # BLD: 0 % (ref 0–2)
BASOPHILS ABSOLUTE: 0 K/UL (ref 0–0.2)
BILIRUB SERPL-MCNC: 0.33 MG/DL (ref 0.3–1.2)
BUN BLDV-MCNC: 19 MG/DL (ref 8–23)
BUN/CREAT BLD: ABNORMAL (ref 9–20)
CALCIUM SERPL-MCNC: 8.5 MG/DL (ref 8.6–10.4)
CHLORIDE BLD-SCNC: 103 MMOL/L (ref 98–107)
CO2: 30 MMOL/L (ref 20–31)
CREAT SERPL-MCNC: 0.59 MG/DL (ref 0.7–1.2)
D-DIMER QUANTITATIVE: 0.22 MG/L FEU
DIFFERENTIAL TYPE: ABNORMAL
EOSINOPHILS RELATIVE PERCENT: 0 % (ref 1–4)
FIBRINOGEN: 376 MG/DL (ref 140–420)
GFR AFRICAN AMERICAN: >60 ML/MIN
GFR NON-AFRICAN AMERICAN: >60 ML/MIN
GFR SERPL CREATININE-BSD FRML MDRD: ABNORMAL ML/MIN/{1.73_M2}
GFR SERPL CREATININE-BSD FRML MDRD: ABNORMAL ML/MIN/{1.73_M2}
GLUCOSE BLD-MCNC: 98 MG/DL (ref 70–99)
HCT VFR BLD CALC: 36 % (ref 40.7–50.3)
HEMOGLOBIN: 11.1 G/DL (ref 13–17)
IMMATURE GRANULOCYTES: 1 %
INR BLD: 1
LACTIC ACID, WHOLE BLOOD: 1.2 MMOL/L (ref 0.7–2.1)
LYMPHOCYTES # BLD: 7 % (ref 24–43)
MCH RBC QN AUTO: 25.6 PG (ref 25.2–33.5)
MCHC RBC AUTO-ENTMCNC: 30.8 G/DL (ref 28.4–34.8)
MCV RBC AUTO: 82.9 FL (ref 82.6–102.9)
MONOCYTES # BLD: 7 % (ref 3–12)
MORPHOLOGY: ABNORMAL
NRBC AUTOMATED: 0 PER 100 WBC
PARTIAL THROMBOPLASTIN TIME: 25.1 SEC (ref 20.5–30.5)
PDW BLD-RTO: 20.1 % (ref 11.8–14.4)
PLATELET # BLD: 312 K/UL (ref 138–453)
PLATELET ESTIMATE: ABNORMAL
PMV BLD AUTO: 9.8 FL (ref 8.1–13.5)
POTASSIUM SERPL-SCNC: 4.8 MMOL/L (ref 3.7–5.3)
PROTHROMBIN TIME: 11 SEC (ref 9–12)
RBC # BLD: 4.34 M/UL (ref 4.21–5.77)
RBC # BLD: ABNORMAL 10*6/UL
SEG NEUTROPHILS: 85 % (ref 36–65)
SEGMENTED NEUTROPHILS ABSOLUTE COUNT: 11.13 K/UL (ref 1.5–8.1)
SODIUM BLD-SCNC: 137 MMOL/L (ref 135–144)
TOTAL PROTEIN: 5.5 G/DL (ref 6.4–8.3)
WBC # BLD: 13.1 K/UL (ref 3.5–11.3)
WBC # BLD: ABNORMAL 10*3/UL

## 2020-12-10 PROCEDURE — 85610 PROTHROMBIN TIME: CPT

## 2020-12-10 PROCEDURE — 6370000000 HC RX 637 (ALT 250 FOR IP): Performed by: INTERNAL MEDICINE

## 2020-12-10 PROCEDURE — 80053 COMPREHEN METABOLIC PANEL: CPT

## 2020-12-10 PROCEDURE — 6360000002 HC RX W HCPCS: Performed by: NURSE PRACTITIONER

## 2020-12-10 PROCEDURE — 99233 SBSQ HOSP IP/OBS HIGH 50: CPT | Performed by: INTERNAL MEDICINE

## 2020-12-10 PROCEDURE — 36415 COLL VENOUS BLD VENIPUNCTURE: CPT

## 2020-12-10 PROCEDURE — 6360000002 HC RX W HCPCS: Performed by: STUDENT IN AN ORGANIZED HEALTH CARE EDUCATION/TRAINING PROGRAM

## 2020-12-10 PROCEDURE — 85730 THROMBOPLASTIN TIME PARTIAL: CPT

## 2020-12-10 PROCEDURE — 85025 COMPLETE CBC W/AUTO DIFF WBC: CPT

## 2020-12-10 PROCEDURE — 85384 FIBRINOGEN ACTIVITY: CPT

## 2020-12-10 PROCEDURE — 2580000003 HC RX 258: Performed by: STUDENT IN AN ORGANIZED HEALTH CARE EDUCATION/TRAINING PROGRAM

## 2020-12-10 PROCEDURE — 6370000000 HC RX 637 (ALT 250 FOR IP): Performed by: NURSE PRACTITIONER

## 2020-12-10 PROCEDURE — 99239 HOSP IP/OBS DSCHRG MGMT >30: CPT | Performed by: INTERNAL MEDICINE

## 2020-12-10 PROCEDURE — 83605 ASSAY OF LACTIC ACID: CPT

## 2020-12-10 PROCEDURE — 85379 FIBRIN DEGRADATION QUANT: CPT

## 2020-12-10 PROCEDURE — 2580000003 HC RX 258: Performed by: NURSE PRACTITIONER

## 2020-12-10 RX ADMIN — AMIODARONE HYDROCHLORIDE 200 MG: 200 TABLET ORAL at 09:21

## 2020-12-10 RX ADMIN — TAMSULOSIN HYDROCHLORIDE 0.4 MG: 0.4 CAPSULE ORAL at 09:22

## 2020-12-10 RX ADMIN — CEFEPIME 2 G: 2 INJECTION, POWDER, FOR SOLUTION INTRAVENOUS at 09:30

## 2020-12-10 RX ADMIN — DIGOXIN 125 MCG: 125 TABLET ORAL at 09:22

## 2020-12-10 RX ADMIN — SODIUM CHLORIDE, PRESERVATIVE FREE 10 ML: 5 INJECTION INTRAVENOUS at 09:30

## 2020-12-10 RX ADMIN — APIXABAN 10 MG: 5 TABLET, FILM COATED ORAL at 09:21

## 2020-12-10 RX ADMIN — Medication 2000 UNITS: at 09:22

## 2020-12-10 RX ADMIN — BUMETANIDE 1 MG: 1 TABLET ORAL at 09:21

## 2020-12-10 RX ADMIN — Medication 1250 MG: at 03:12

## 2020-12-10 RX ADMIN — DESMOPRESSIN ACETATE 40 MG: 0.2 TABLET ORAL at 09:21

## 2020-12-10 RX ADMIN — DEXAMETHASONE 6 MG: 4 TABLET ORAL at 09:22

## 2020-12-10 NOTE — PROGRESS NOTES
[facility]  Office: [address], DO, Umair La, DO, Liza Penasco, DO, Twila Sagastume Blood, DO, Izaiah England MD, Dinesh Roberts MD, Sunitha Eason MD, Igor Eason MD, Ramírez Hanna MD, Mikala Horton MD, Kirill Gomez MD, Daniel Baker MD, Breezy Florian MD, Deonna Maldonado DO, Mayra Herbert MD, Margarito Gilliam MD, Savannah Phoenix DO, Hang Moreira MD,  Trisha Villalobos DO, Shikha Jorgensen MD, Steve Ríos MD, César Eddy, [facility], [facility], [facility], Jacinto Dubose, CNP, Monet Flores, CNS, Santino Espinoza, CNP, Kelin Hamlin, CNP, Violette Tan, CNP, Helene Navarrete, CNP, Sherman Stiles, CNP, Kathy Cross PA-C, Mary Clay, [facility], Sultana Hinojosa, CNP, Lieutenant Moreno, CNP, Rebecca Villafana, CNP, Stone Mahmood, CNP, Patricia Haney, [facility]   [address]    Progress Note    12/10/2020    10:29 AM    Name:   Joaquín Hess  MRN:     [id number]     Acct:      [de-identified]   Room:   2021/2021-01   Day:  3  Admit Date:  12/7/2020  5:40 PM    PCP:   Parsih Ribera MD  Code Status:  Full Code    Subjective:     C/C:   Chief Complaint   Patient presents with    Shortness of Breath    Respiratory Distress      Pt was  at 67%      Interval History Status: improved. Pt was suppose to leave yesterday but didn't feel well towards the end of the day. Reports feeling well today. Brief History:     See discharge summary dictated yesterday. Review of Systems:     Constitutional:  negative for chills, fevers, sweats  Respiratory:  negative for cough, dyspnea on exertion, shortness of breath, wheezing  Cardiovascular:  negative for chest pain, chest pressure/discomfort, lower extremity edema, palpitations  Gastrointestinal:  negative for abdominal pain, constipation, diarrhea, nausea, vomiting  Neurological:  negative for dizziness, headache    Medications: Allergies:     Allergies   Allergen Reactions    Adhesive < > = values in this interval not displayed. ABG:  Lab Results   Component Value Date    POCPH 7.519 11/23/2020    POCPCO2 32.2 11/23/2020    POCPO2 47.0 11/23/2020    POCHCO3 26.2 11/23/2020    NBEA NOT REPORTED 11/23/2020    PBEA 4 11/23/2020    EOC3PEX 27 11/23/2020    XMLQ3JUU 87 11/23/2020    FIO2 NOT REPORTED 12/07/2020     Lab Results   Component Value Date/Time    SPECIAL RT HAND 20ML 12/07/2020 11:25 PM     Lab Results   Component Value Date/Time    CULTURE NO GROWTH 2 DAYS 12/07/2020 11:25 PM       Radiology:  Xr Chest Portable    Result Date: 12/7/2020  Persistent interstitial opacities which are nonspecific and may represent pneumonia versus chronic changes. Xr Chest Portable    Result Date: 12/5/2020  Diffuse peripheral airspace opacities bilaterally concerning for multifocal infectious process. Ct Chest Pulmonary Embolism W Contrast    Result Date: 12/8/2020  Tiny nonocclusive pulmonary embolus in the right lower lobar pulmonary artery. Overall mild improvement in the bilateral ground-glass opacities and nearly resolved parenchymal consolidation in the right lung. Physical Examination:        General appearance:  alert, cooperative and no distress  Mental Status:  oriented to person, place and time and normal affect  Lungs:  clear to auscultation bilaterally, normal effort  Heart:  regular rate and rhythm, no murmur  Abdomen:  soft, nontender, nondistended, normal bowel sounds, no masses, hepatomegaly, splenomegaly  Extremities:  no edema, redness, tenderness in the calves  Skin:  no gross lesions, rashes, induration    Assessment:        Hospital Problems           Last Modified POA    * (Principal) Pneumonia 12/9/2020 Yes    History of colon cancer 12/9/2020 Yes    PAF (paroxysmal atrial fibrillation) ([facility] 12/9/2020 Yes    Overview Signed 11/2/2017  9:33 AM by Shavon Walker     Overview:   past hx of arrythmia's. pre-operatively had episode of Afib. cardiology consulted.  started on lopressor. no afib         Incisional hernia, without obstruction or gangrene 12/9/2020 Yes    Acute hypoxemic respiratory failure due to COVID-19 [facility]) 12/9/2020 Yes    Acute respiratory failure with hypoxia ([facility] 12/9/2020 Yes    Pulmonary fibrosis ([facility] 12/9/2020 Yes          Plan:        1. Pt doing well. Wasn't able to leave yesterday. Ready for discharge today. Refer to discharge summary for details.      Memo Arias MD  12/10/2020  10:29 AM

## 2020-12-10 NOTE — PLAN OF CARE
Problem: Airway Clearance - Ineffective  Goal: Achieve or maintain patent airway  12/10/2020 1151 by Kyle Martino RN  Outcome: Completed  12/10/2020 1007 by Kyle Martino RN  Outcome: Ongoing     Problem: Gas Exchange - Impaired  Goal: Absence of hypoxia  12/10/2020 1151 by Kyle Martino RN  Outcome: Completed  12/10/2020 1007 by Kyle Martino RN  Outcome: Ongoing  Goal: Promote optimal lung function  12/10/2020 1151 by Kyle Martino RN  Outcome: Completed  12/10/2020 1007 by Kyle Martino RN  Outcome: Ongoing     Problem: Breathing Pattern - Ineffective  Goal: Ability to achieve and maintain a regular respiratory rate  12/10/2020 1151 by Kyle Martino RN  Outcome: Completed  12/10/2020 1007 by Kyle Martino RN  Outcome: Ongoing     Problem:  Body Temperature -  Risk of, Imbalanced  Goal: Ability to maintain a body temperature within defined limits  12/10/2020 1151 by Kyle Martino RN  Outcome: Completed  12/10/2020 1007 by Kyle Martino RN  Outcome: Ongoing  Goal: Will regain or maintain usual level of consciousness  12/10/2020 1151 by Kyle Martino RN  Outcome: Completed  12/10/2020 1007 by Kyle Martino RN  Outcome: Ongoing  Goal: Complications related to the disease process, condition or treatment will be avoided or minimized  12/10/2020 1151 by Kyle Martino RN  Outcome: Completed  12/10/2020 1007 by Kyle Martino RN  Outcome: Ongoing     Problem: Isolation Precautions - Risk of Spread of Infection  Goal: Prevent transmission of infection  12/10/2020 1151 by Kyle Martino RN  Outcome: Completed  12/10/2020 1007 by Kyle Martino RN  Outcome: Ongoing     Problem: Nutrition Deficits  Goal: Optimize nutrtional status  12/10/2020 1151 by Kyle Martino RN  Outcome: Completed  12/10/2020 1007 by Kyle Martino RN  Outcome: Ongoing     Problem: Risk for Fluid Volume Deficit  Goal: Maintain normal heart rhythm  12/10/2020 1151 by Kyle Martino RN  Outcome: Completed  12/10/2020 1007 by Kyle Martino RN  Outcome: Completed  12/10/2020 1007 by Gian Connolly, RN  Outcome: Ongoing  Goal: Absence of falls  12/10/2020 1151 by Gian Connolly RN  Outcome: Completed  12/10/2020 1007 by Gian Connolly, RN  Outcome: Ongoing

## 2020-12-10 NOTE — PROGRESS NOTES
Updated Dr. Vandana Delgado as to patient's request for discharge at  and is also requesting a referral to [facility] for care. Dr. Vandana Delgado will review this with the patient.

## 2020-12-10 NOTE — PROGRESS NOTES
[facility] -Progress Note COVID 19 Patient  Today's Date and Time: 12/10/2020, 12:08 PM    Impression :   · COVID 19 Suspect  · COVID 19 Confirmed Infection. · Covid tests:  · 11-23-20: Positive. · Atrial fibrillation with RVR: S/P cardioversion   · Pulmonary Fibrosis: secondary to Occupational exposure. 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 · Hypertrophic non obstructive cardiomyopathy. · Hx of Colon cancer  · BPH. Recommendations:   · Monitor off antibiotics. · Remdesivir. Completed treatment on 11/28/20. · Completed Decadron. Started on 11/23 Stop date:12/2/20  · Plasma infusion. Two units infused 11-24-20  · Will need quarantine until 12-14-20  · Will need follow-up with the pulmonary service    Medical Decision Making/Summary/Discussion:12/10/2020     · Patient admitted with suspected COVID 19 infection  · Covid test confirmed positive. · Associated problems with congestive heart failure, pulmonary fibrosis, hypoxia. Infection Control Recommendations   · Universal Precautions  · Airborne isolation  · Droplet Isolation    Antimicrobial Stewardship Recommendations     · Discontinuation of therapy  Coordination of Outpatient Care:   · Estimated Length of IV antimicrobials:TBD  · Patient will need Midline Catheter Insertion: TBD  · Patient will need PICC line Insertion: No  · Patient will need: Home IV , [place],  SNF,  LTAC:TBD  · Patient will need outpatient wound care:No    Chief complaint/reason for consultation:   · Concern for COVID infection      History of Present Illness:   Wilfrido Estevez is a 77y.o.-year-old  male who was initially admitted on 12/7/2020. Patient seen at the request of [address] [place] [address]. INITIAL HISTORY:    Patient presented through ER with complaints of onset of shortness of breath. Located onset of symptoms on 11/22/2020 with worsening through the day leading to his seeking help at the emergency room.     The patient had been recently admitted on 10/17/2020 through 10/26/2020 at [place] [address] because of the presence of congestive heart failure NYHAA class II with acute on chronic combined heart failure. He also suffers from atrial fibrillation and hypertrophic nonobstructive cardiomyopathy, benign essential hypertension. His previous diagnosis includes occupational pulmonary fibrosis. During that admission he was also found to have suffered from mycoplasma pneumonia and was treated with antibiotics. He reports being seen at the [facility], after his admission to [place] [address],  where he was cardioverted because of the atrial fibrillation. He was also found to be in heart failure and was a started on diuretics. When the patient was evaluated in the emergency room he was found to be hypoxic and was treated with BiPAP which improved his oxygen saturation rate. He was tested for Covid and was found to be positive on 11/23/2020. Chest x-ray:  · 11/23/2020 persistent bilateral pulmonary infiltrates with associated pulmonary vascular congestion. Unchanged from films of 10/23/2020    Chest CT:  · 11/23/2020: Pulmonary fibrosis. No pulmonary embolus. Increased groundglass opacities and parenchymal opacities throughout both lungs when compared with films of 10/21/2020    Patient admitted because of concerns with COVID 19.    CURRENT EVALUATION : 12/10/2020    Afebrile  VS stable  Paroxysmal A fib  On amiodarone for Atrial Fibrillation. Plans for discharge later today. Acute on chronic combined systolic and diastolic CHF exacerbation with hypertrophic non obstructive cardiomyopathy. On  Bumex 1 mg     Completed Decadron 12-2-20  Completed Remdesivir on 11/28/20.  2 Units of Plasma transfused 11-24-20    Blood cultures on 12/1/20 show no growth. Patient exhibiting respiratory distress. Yes  Respiratory secretions: No    Patient receiving supplemental oxygen. Yes. nasal cannula 4-->2 L O2.  RR history, and I have updated the database accordingly.   Past Medical History:     Past Medical History:   Diagnosis Date    Atrial fibrillation ([address]     Back pain, chronic     Mcguire's esophagus 06/18/2019    BPH (benign prostatic hyperplasia)     Cancer (HCC)     colon-rectal    Cocaine abuse in remission [facility])     1970's    ED (erectile dysfunction) 4/2/2015    GERD (gastroesophageal reflux disease)     GI bleed 12/5/2018    Hernia     History of colon cancer     Melena     Migraines     Murmur, cardiac        Past Surgical  History:     Past Surgical History:   Procedure Laterality Date    CARDIAC CATHETERIZATION  11/29/2018    Non-obstructive CAD    CARDIOVERSION  2020    COLECTOMY      2nd colectomy, Colostomy and reversed [facility] COLECTOMY      1st time Ksenia    COLONOSCOPY      COLONOSCOPY  07/18/2016    COLONOSCOPY N/A 12/6/2018    COLONOSCOPY DIAGNOSTIC performed by Tacos Marie MD at [address] Right 2009    inguinal    KNEE SURGERY Right 1970's    arthrotomy    TONSILLECTOMY      TOTAL KNEE ARTHROPLASTY Right 1/8/2019    KNEE TOTAL ARTHROPLASTY performed by Ranjith Kemp MD at [facility] TRANSESOPHAGEAL ECHOCARDIOGRAM  11/29/2018    UPPER GASTROINTESTINAL ENDOSCOPY N/A 12/5/2018    EGD DIAGNOSTIC ONLY performed by Tacos Marie MD at [facility] N/A 6/18/2019    MCGUIRE'S       Medications:      apixaban  10 mg Oral BID    vancomycin (VANCOCIN) intermittent dosing (placeholder)   Other RX Placeholder    vancomycin  1,250 mg Intravenous Q12H    cefepime  2 g Intravenous Q12H    amiodarone  200 mg Oral BID    atorvastatin  40 mg Oral Daily    bumetanide  1 mg Oral Daily    digoxin  125 mcg Oral Daily    tamsulosin  0.4 mg Oral Daily    sodium chloride flush  10 mL Intravenous 2 times per day    dexamethasone  6 mg Oral Daily    Vitamin D  2,000 Units Oral Daily       Social History: Social History     Socioeconomic History    Marital status: Single     Spouse name: Not on file    Number of children: Not on file    Years of education: Not on file    Highest education level: Not on file   Occupational History    Not on file   Social Needs    Financial resource strain: Not on file    Food insecurity     Worry: Not on file     Inability: Not on file    Transportation needs     Medical: Not on file     Non-medical: Not on file   Tobacco Use    Smoking status: Former Smoker     Packs/day: 0.50     Years: 1.00     Pack years: 0.50     Last attempt to quit:      Years since quittin.9    Smokeless tobacco: Never Used    Tobacco comment: stated never actually really smoked only inhaled    Substance and Sexual Activity    Alcohol use: Yes     Alcohol/week: 12.0 standard drinks     Types: 10 Standard drinks or equivalent, 2 Shots of liquor per week     Comment: 2-3 times a week    Drug use: No     Types:  Other-see comments     Comment: Cocaine use in past in     Sexual activity: Yes     Partners: Female   Lifestyle    Physical activity     Days per week: Not on file     Minutes per session: Not on file    Stress: Not on file   Relationships    Social connections     Talks on phone: Not on file     Gets together: Not on file     Attends Yazdanism service: Not on file     Active member of club or organization: Not on file     Attends meetings of clubs or organizations: Not on file     Relationship status: Not on file    Intimate partner violence     Fear of current or ex partner: Not on file     Emotionally abused: Not on file     Physically abused: Not on file     Forced sexual activity: Not on file   Other Topics Concern    Not on file   Social History Narrative    Not on file       Family History:     Family History   Problem Relation Age of Onset    Diabetes Mother     Heart Attack Father     Heart Disease Father     Heart Disease Brother         Allergies: Adhesive tape; Codeine; and Penicillins     Review of Systems:       Constitutional: No fevers or chills. No systemic complaints  Head: No headaches  Eyes: No double vision or blurry vision. No conjunctival inflammation. ENT: No sore throat or runny nose. . No hearing loss, tinnitus or vertigo. Cardiovascular: No chest pain or palpitations. Shortness of breath. CLAYTON  Lung: Shortness of breath, dry cough. No sputum production  Abdomen: No nausea, vomiting, diarrhea, or abdominal pain. Kacie Money No cramps. Genitourinary: No increased urinary frequency, or dysuria. No hematuria. No suprapubic or CVA pain  Musculoskeletal: No muscle aches or pains. No joint effusions, swelling or deformities  Hematologic: No bleeding or bruising. Neurologic: No headache, weakness, numbness, or tingling. Integument: No rash, no ulcers. Psychiatric: No depression. Endocrine: No polyuria, no polydipsia, no polyphagia. Physical Examination :     Patient Vitals for the past 8 hrs:   Temp Temp src Resp SpO2   12/10/20 0915 97.3 °F (36.3 °C) Oral 20 92 %     General Appearance: Awake, alert, and in no apparent distress  Head:  Normocephalic, no trauma  Eyes: Pupils equal, round, reactive to light; sclera anicteric; conjunctivae pink. No embolic phenomena. ENT: Oropharynx clear, without erythema, exudate, or thrush. No tenderness of sinuses. Mouth/throat: mucosa pink and moist. No lesions. Dentition in good repair. Neck:Supple, without lymphadenopathy. Thyroid normal, No bruits. Pulmonary/Chest: ,Distant breath sounds, decreased breath sounds at the bases   cardiovascular: Irreegular rate and rhythm without murmurs, rubs, or gallops. Abdomen: Soft, non tender. Bowel sounds normal. No organomegaly  All four Extremities: No cyanosis, clubbing, edema, or effusions. Neurologic: No gross sensory or motor deficits. Skin: Warm and dry with good turgor. No signs of peripheral arterial or venous insufficiency. No ulcerations.  No open central, right, left main pulmonary artery.  No    embolus is seen in the proximal segmental branches.  Distal segmental    branches and subsegmental branches are not well evaluated secondary to    respiratory motion artifact.         Patchy ground-glass and parenchymal opacities are seen in the left upper and    left lower lobe.         On the right patchy ground-glass and parenchymal opacities are seen in the    right upper, right middle and right lower lobe.  There is mild fusiform    bronchiectasis. Suann Pillar is trace left-sided pleural effusion.         There is mild underlying pulmonary fibrosis.  There is underlying    bronchiectasis         Right adrenal gland is normal.  Left adrenal gland is normal.         1 mm calcification is seen in the left kidney         Spurring is seen in the spine.  Spurring is seen in the shoulder joints         Small soft tissue nodule is seen posteriorly in the subcutaneous fat    measuring 2.1 cm, likely sebaceous cyst              Impression    No central pulmonary embolus.  Peripheral branches are not well evaluated    secondary to motion.         Increased ground-glass opacity and parenchymal opacities throughout the lungs    either due to developing pneumonia or edema.  There is a small left-sided    pleural effusion.  By report there is a history of COVID-19 infection         Underlying pulmonary fibrosis again noted.                EXAMINATION:    ONE XRAY VIEW OF THE CHEST         11/23/2020 8:02 am         COMPARISON:    10/19/2020         HISTORY:    ORDERING SYSTEM PROVIDED HISTORY: SOB    TECHNOLOGIST PROVIDED HISTORY:    SOB    Reason for Exam: uprt port chest         FINDINGS:    Cardiomediastinal silhouette is stable.  Bilateral pulmonary infiltrates    persist unchanged and may represent pulmonary edema versus atypical pneumonia.              Impression    Stable exam        Medical Decision [id number]:       Medical Decision Making-Other: Note:  · Labs, medications, radiologic studies were reviewed with personal review of films  · Large amounts of data were reviewed  · Discussed with nursing Staff, Discharge planner  · Infection Control and Prevention measures reviewed  · All prior entries were reviewed  · Administer medications as ordered  · Prognosis: Guarded  · Discharge planning reviewed  · Follow up as outpatient. Thank you for allowing us to participate in the care of this patient. Please call with questions.     Maria Luisa Harrell MD           Pager: [phone] - Office: [phone]

## 2020-12-10 NOTE — CARE COORDINATION
Discharge [facility] Case Management Department  Written by: Arley Michaels RN    Patient Name: Rubens Stauffer  Attending Provider: Heath Rouse MD  Admit Date: 2020  5:40 PM  MRN: [id number]  Account: [de-identified]                     : 1955  Discharge Date:  12/10/2020        Disposition: home    Arley Michaels RN

## 2020-12-10 NOTE — PROGRESS NOTES
Discharge instructions reviewed with all questions asked and answered by patient. Patient verbalizes satisfaction with discharge and left under own power without incident.

## 2020-12-11 ENCOUNTER — CARE COORDINATION (OUTPATIENT)
Dept: CASE MANAGEMENT | Age: 65
End: 2020-12-11

## 2020-12-11 NOTE — CARE COORDINATION
Amanda 45 Transitions Initial Follow Up Call    Call within 2 business days of discharge: Yes    Patient: Michael Kasper Patient : 1955   MRN: [id number]  Reason for Admission: Respiratory distress/Covid-19 monitoring  Discharge Date: 12/10/20 RARS: Readmission Risk Score: 22      Last Discharge [facility]       Complaint Diagnosis Description Type Department Provider    20 Shortness of Breath; Respiratory Distress Respiratory distress ED to Hosp-Admission (Discharged) (ADMITTED) KATI CAR 2 Sofia Donaldson MD; Jayden French. .. Spoke with: [facility]    Was able to contact Chris Said for initial Covid-19 monitoring. He stated that he was doing \"fair\". He stated that he continues with shortness of breath with activity and has an occasional cough. He denied fever/chills, abdominal pain, diarrhea, or headache. He said that he wants a referral to [facility] to tell him what is wrong with his lungs. He received his new medication and has follow up with PCP on . Encouraged him to speak with PCP about referral to [facility].     Non-face-to-face services provided:  Scheduled appointment with PCP-  Obtained and reviewed discharge summary and/or continuity of care documents  Assessment and support for treatment adherence and medication management-reviewed       Challenges to be reviewed by the provider   Additional needs identified to be addressed with provider No  none    Discussed COVID-19 related testing which was available at this time. Test results were positive. Patient informed of results, if available? Yes         Method of communication with provider : none    Advance Care Planning:   Does patient have an Advance Directive:  reviewed and current. Was this a readmission?  Yes  Patient stated reason for admission: Shortness of breath  Patients top risk factors for readmission: functional physical ability and medical factors. Plan for next call: symptom management-covid follow up  CTN provided contact information for future needs.           Care Transitions 24 Hour Call    Do you have any ongoing symptoms?:  Yes  Patient-reported symptoms:  Shortness of Breath  Do you have a copy of your discharge instructions?:  Yes  Do you have all of your prescriptions and are they filled?:  Yes  Have you been contacted by a [address]?:  No  Have you scheduled your follow up appointment?:  Yes  How are you going to get to your appointment?:  Car - family or friend to transport  Were you discharged with any Home Care or Post Acute Services:  No  Do you feel like you have everything you need to keep you well at home?:  Yes  Care Transitions Interventions         Follow Up  Future Appointments   Date Time Provider Yves Corralesi   2/16/2021  8:45 [facility]   2/24/2021 10:30 AM STV CT  STVZ CT SCAN STV Radiolog   3/8/2021 10:00 AM SCHEDULE, P RESPIRATORY SPEC Resp Spec [facility]   3/8/2021 10:30 AM Emmanuel Huber MD Resp Spec Gianfranco Celeste RN

## 2020-12-13 LAB
CULTURE: NORMAL
Lab: NORMAL
SPECIMEN DESCRIPTION: NORMAL

## 2020-12-14 ENCOUNTER — CARE COORDINATION (OUTPATIENT)
Dept: CASE MANAGEMENT | Age: 65
End: 2020-12-14

## 2020-12-14 LAB
CULTURE: NORMAL
Lab: NORMAL
SPECIMEN DESCRIPTION: NORMAL

## 2020-12-14 NOTE — CARE COORDINATION
Amanda 45 Transitions Follow Up Call    2020    Patient: Joaquín Hess  Patient : 1955   MRN: [id number]  Reason for Admission: Respiratory distress/Covid-19 monitoring  Discharge Date: 12/10/20 RARS: Readmission Risk Score: 22         Spoke with: Joaquín Hess    Was able to contact Elvis Rojas for Covid-19 monitoring. He stated that today is his last day of quarantine. He continues with the shortness of breath that he has had since August.  He endorses a dry cough, but no other viral symptoms. He said that since his breathing started to get bad, he has lost 35-40 lbs and is weak. He does not have an appointment with his PCP until Feb.  Encouraged him to call to get a referral to [facility] for his shortness of breath.VU. No further concerns or questions. Needs to be reviewed by the provider   Additional needs identified to be addressed with provider No  none  Discussed COVID-19 related testing which was available at this time. Test results were positive. Patient informed of results, if available? Yes         Method of communication with provider : none    Care Transition Nurse (CTN) contacted the patient by telephone to follow up after admission on 20. Verified name and  with patient as identifiers. Addressed changes since last contact: symptom management-covid follow up  Discharged needs reviewed: none  Follow up appointment completed? No    Advance Care Planning:   Does patient have an Advance Directive:  reviewed and current. CTN reviewed discharge instructions, medical action plan and red flags with patient and discussed any barriers to care and/or understanding of plan of care after discharge. Discussed appropriate site of care based on symptoms and resources available to patient including: PCP and When to call 911. The patient agrees to contact the PCP office for questions related to their healthcare.      Patients top risk factors for readmission: medical condition  Interventions to address risk factors: Assessment and support for treatment adherence and medication management-reviewed    Discussed follow-up appointments. If no appointment was previously scheduled, appointment scheduling offered: No Is follow up appointment scheduled within 7 days of discharge? No  Non-[facility] follow up appointment(s):     Plan for follow-up call in 7-10 days based on severity of symptoms and risk factors. Plan for next call: symptom management-covid follow up  CTN provided contact information for future needs. Care Transitions Subsequent and Final Call    Subsequent and Final Calls  Do you have any ongoing symptoms?: Yes  Onset of Patient-reported symptoms: In the past 7 days  Patient-reported symptoms: Shortness of Breath  Have your medications changed?: No  Do you have any questions related to your medications?: No  Do you currently have any active services?: No  Do you have any needs or concerns that I can assist you with?: No  Care Transitions Interventions  Other Interventions:            Follow Up  Future Appointments   Date Time Provider Yves Henriquez   2/16/2021  8:45 AM [facility]   2/24/2021 10:30 AM STV CT  STVZ CT SCAN STV Radiolog   3/8/2021 10:00 AM SCHEDULE, [facility] RESPIRATORY SPEC Resp Spec [facility]   3/8/2021 10:30 AM Felipa Fernandez MD Resp Spec Jessica Russell RN

## 2020-12-16 ENCOUNTER — TELEPHONE (OUTPATIENT)
Dept: FAMILY MEDICINE CLINIC | Age: 65
End: 2020-12-16

## 2020-12-23 ENCOUNTER — CARE COORDINATION (OUTPATIENT)
Dept: CASE MANAGEMENT | Age: 65
End: 2020-12-23

## 2020-12-23 NOTE — CARE COORDINATION
Amanda 45 Transitions Follow Up Call    2020    Patient: Larisa Del Toro  Patient : 1955   MRN: [id number]  Reason for Admission: Respiratory distress/Covid-19 monitoring  Discharge Date: 12/10/20 RARS: Readmission Risk Score: 22         1st attempt to reach patient for Covid-19 monitoring. [facility] requesting return call. Contact information provided. Young Balbuena returned call. He stated that he continues to have shortness of breath and cough. He can not get an appointment with PCP until . He will attempt to get a referral then. He said that [facility] already said that they will take him on as a patient, they just need the hospital records, which he said he is try to get. He is not having any viral symptoms. Informed of final outreach. Needs to be reviewed by the provider    Additional needs identified to be addressed with provider No  none  Discussed COVID-19 related testing which was available at this time. Test results were positive. Patient informed of results, if available? Yes           Method of communication with provider : none     Care Transition Nurse (CTN) contacted the patient by telephone to follow up after admission on 20. Verified name and  with patient as identifiers.     Addressed changes since last contact: symptom management-covid follow up  Discharged needs reviewed: none  Follow up appointment completed? No     Advance Care Planning:   Does patient have an Advance Directive:  reviewed and current.      CTN reviewed discharge instructions, medical action plan and red flags with patient and discussed any barriers to care and/or understanding of plan of care after discharge. Discussed appropriate site of care based on symptoms and resources available to patient including: PCP and When to call 911.  The patient agrees to contact the PCP office for questions related to their healthcare.      Patients top risk factors for readmission: medical condition  Interventions to address risk factors: Assessment and support for treatment adherence and medication management-reviewed     Discussed follow-up appointments. If no appointment was previously scheduled, appointment scheduling offered: No Is follow up appointment scheduled within 7 days of discharge? No  Non-[facility] follow up appointment(s):      Plan for follow-up call in 7-10 days based on severity of symptoms and risk factors. Plan for next call: symptom management-covid follow up  CTN provided contact information for future needs. Care Transitions Subsequent and Final Call    Subsequent and Final Calls  Care Transitions Interventions  Other Interventions:            Follow Up  Future Appointments   Date Time Provider Yves Henriquez   1/19/2021  9:45 AM Cassandra Arceo MD [facility]   2/24/2021 10:30 AM STV CT  STVZ CT SCAN STV Radiolog   3/8/2021 10:00 AM SCHEDULE, P RESPIRATORY SPEC Resp Spec [facility]   3/8/2021 10:30 AM Derrek Lanier MD Resp Spec Ioana Reid RN

## 2021-01-12 ENCOUNTER — APPOINTMENT (OUTPATIENT)
Dept: CT IMAGING | Age: 66
DRG: 312 | End: 2021-01-12
Payer: MEDICARE

## 2021-01-12 ENCOUNTER — APPOINTMENT (OUTPATIENT)
Dept: GENERAL RADIOLOGY | Age: 66
DRG: 312 | End: 2021-01-12
Payer: MEDICARE

## 2021-01-12 ENCOUNTER — HOSPITAL ENCOUNTER (INPATIENT)
Age: 66
LOS: 2 days | Discharge: HOME | DRG: 312 | End: 2021-01-14
Attending: EMERGENCY MEDICINE | Admitting: INTERNAL MEDICINE
Payer: MEDICARE

## 2021-01-12 DIAGNOSIS — R55 SYNCOPE AND COLLAPSE: Primary | ICD-10-CM

## 2021-01-12 DIAGNOSIS — R06.02 SHORTNESS OF BREATH: ICD-10-CM

## 2021-01-12 DIAGNOSIS — R63.4 WEIGHT LOSS: ICD-10-CM

## 2021-01-12 DIAGNOSIS — R07.81 RIB PAIN ON LEFT SIDE: ICD-10-CM

## 2021-01-12 LAB
ABSOLUTE EOS #: 0.3 K/UL (ref 0–0.4)
ABSOLUTE IMMATURE GRANULOCYTE: ABNORMAL K/UL (ref 0–0.3)
ABSOLUTE LYMPH #: 1.4 K/UL (ref 1–4.8)
ABSOLUTE MONO #: 0.7 K/UL (ref 0.1–1.3)
ALBUMIN SERPL-MCNC: 3.6 G/DL (ref 3.5–5.2)
ALBUMIN/GLOBULIN RATIO: ABNORMAL (ref 1–2.5)
ALP BLD-CCNC: 99 U/L (ref 40–129)
ALT SERPL-CCNC: 13 U/L (ref 5–41)
ANION GAP SERPL CALCULATED.3IONS-SCNC: 7 MMOL/L (ref 9–17)
AST SERPL-CCNC: 19 U/L
BASOPHILS # BLD: 0 % (ref 0–2)
BASOPHILS ABSOLUTE: 0 K/UL (ref 0–0.2)
BILIRUB SERPL-MCNC: 0.49 MG/DL (ref 0.3–1.2)
BILIRUBIN URINE: NEGATIVE
BNP INTERPRETATION: NORMAL
BUN BLDV-MCNC: 16 MG/DL (ref 8–23)
BUN/CREAT BLD: ABNORMAL (ref 9–20)
C-REACTIVE PROTEIN: 11.1 MG/L (ref 0–5)
CALCIUM SERPL-MCNC: 9.3 MG/DL (ref 8.6–10.4)
CHLORIDE BLD-SCNC: 96 MMOL/L (ref 98–107)
CO2: 31 MMOL/L (ref 20–31)
COLOR: YELLOW
COMMENT UA: NORMAL
CREAT SERPL-MCNC: 0.69 MG/DL (ref 0.7–1.2)
D-DIMER QUANTITATIVE: 0.57 MG/L FEU (ref 0–0.59)
DIFFERENTIAL TYPE: ABNORMAL
EOSINOPHILS RELATIVE PERCENT: 3 % (ref 0–4)
FERRITIN: 119 UG/L (ref 30–400)
FIBRINOGEN: 570 MG/DL (ref 210–530)
GFR AFRICAN AMERICAN: >60 ML/MIN
GFR NON-AFRICAN AMERICAN: >60 ML/MIN
GFR SERPL CREATININE-BSD FRML MDRD: ABNORMAL ML/MIN/{1.73_M2}
GFR SERPL CREATININE-BSD FRML MDRD: ABNORMAL ML/MIN/{1.73_M2}
GLUCOSE BLD-MCNC: 155 MG/DL (ref 70–99)
GLUCOSE URINE: NEGATIVE
HCT VFR BLD CALC: 39.9 % (ref 41–53)
HEMOGLOBIN: 12.9 G/DL (ref 13.5–17.5)
IMMATURE GRANULOCYTES: ABNORMAL %
INR BLD: 1.7
KETONES, URINE: NEGATIVE
LACTATE DEHYDROGENASE: 206 U/L (ref 135–225)
LEUKOCYTE ESTERASE, URINE: NEGATIVE
LYMPHOCYTES # BLD: 13 % (ref 24–44)
MCH RBC QN AUTO: 26.3 PG (ref 26–34)
MCHC RBC AUTO-ENTMCNC: 32.2 G/DL (ref 31–37)
MCV RBC AUTO: 81.6 FL (ref 80–100)
MONOCYTES # BLD: 7 % (ref 1–7)
MYOGLOBIN: <21 NG/ML (ref 28–72)
NITRITE, URINE: NEGATIVE
NRBC AUTOMATED: ABNORMAL PER 100 WBC
PARTIAL THROMBOPLASTIN TIME: 41.8 SEC (ref 24–36)
PDW BLD-RTO: 19.2 % (ref 11.5–14.9)
PH UA: 7.5 (ref 5–8)
PLATELET # BLD: 347 K/UL (ref 150–450)
PLATELET ESTIMATE: ABNORMAL
PMV BLD AUTO: 7.5 FL (ref 6–12)
POTASSIUM SERPL-SCNC: 3.6 MMOL/L (ref 3.7–5.3)
PRO-BNP: 134 PG/ML
PROCALCITONIN: 0.1 NG/ML
PROTEIN UA: NEGATIVE
PROTHROMBIN TIME: 19.6 SEC (ref 11.8–14.6)
RBC # BLD: 4.89 M/UL (ref 4.5–5.9)
RBC # BLD: ABNORMAL 10*6/UL
SEG NEUTROPHILS: 77 % (ref 36–66)
SEGMENTED NEUTROPHILS ABSOLUTE COUNT: 7.7 K/UL (ref 1.3–9.1)
SODIUM BLD-SCNC: 134 MMOL/L (ref 135–144)
SPECIFIC GRAVITY UA: 1.01 (ref 1–1.03)
TOTAL PROTEIN: 7.3 G/DL (ref 6.4–8.3)
TROPONIN INTERP: ABNORMAL
TROPONIN INTERP: NORMAL
TROPONIN T: ABNORMAL NG/ML
TROPONIN T: NORMAL NG/ML
TROPONIN, HIGH SENSITIVITY: 14 NG/L (ref 0–22)
TROPONIN, HIGH SENSITIVITY: 18 NG/L (ref 0–22)
TSH SERPL DL<=0.05 MIU/L-ACNC: 4.7 MIU/L (ref 0.3–5)
TURBIDITY: CLEAR
URINE HGB: NEGATIVE
UROBILINOGEN, URINE: NORMAL
WBC # BLD: 10.1 K/UL (ref 3.5–11)
WBC # BLD: ABNORMAL 10*3/UL

## 2021-01-12 PROCEDURE — 93005 ELECTROCARDIOGRAM TRACING: CPT | Performed by: PHYSICIAN ASSISTANT

## 2021-01-12 PROCEDURE — 2580000003 HC RX 258: Performed by: PHYSICIAN ASSISTANT

## 2021-01-12 PROCEDURE — 6360000004 HC RX CONTRAST MEDICATION: Performed by: PHYSICIAN ASSISTANT

## 2021-01-12 PROCEDURE — 84443 ASSAY THYROID STIM HORMONE: CPT

## 2021-01-12 PROCEDURE — 6370000000 HC RX 637 (ALT 250 FOR IP): Performed by: NURSE PRACTITIONER

## 2021-01-12 PROCEDURE — 99285 EMERGENCY DEPT VISIT HI MDM: CPT

## 2021-01-12 PROCEDURE — 86140 C-REACTIVE PROTEIN: CPT

## 2021-01-12 PROCEDURE — 83880 ASSAY OF NATRIURETIC PEPTIDE: CPT

## 2021-01-12 PROCEDURE — 85610 PROTHROMBIN TIME: CPT

## 2021-01-12 PROCEDURE — 83615 LACTATE (LD) (LDH) ENZYME: CPT

## 2021-01-12 PROCEDURE — 80053 COMPREHEN METABOLIC PANEL: CPT

## 2021-01-12 PROCEDURE — 71260 CT THORAX DX C+: CPT

## 2021-01-12 PROCEDURE — 85379 FIBRIN DEGRADATION QUANT: CPT

## 2021-01-12 PROCEDURE — 85025 COMPLETE CBC W/AUTO DIFF WBC: CPT

## 2021-01-12 PROCEDURE — 85730 THROMBOPLASTIN TIME PARTIAL: CPT

## 2021-01-12 PROCEDURE — 84484 ASSAY OF TROPONIN QUANT: CPT

## 2021-01-12 PROCEDURE — 70450 CT HEAD/BRAIN W/O DYE: CPT

## 2021-01-12 PROCEDURE — 2580000003 HC RX 258: Performed by: NURSE PRACTITIONER

## 2021-01-12 PROCEDURE — 74177 CT ABD & PELVIS W/CONTRAST: CPT

## 2021-01-12 PROCEDURE — 81003 URINALYSIS AUTO W/O SCOPE: CPT

## 2021-01-12 PROCEDURE — 82728 ASSAY OF FERRITIN: CPT

## 2021-01-12 PROCEDURE — 71045 X-RAY EXAM CHEST 1 VIEW: CPT

## 2021-01-12 PROCEDURE — 83874 ASSAY OF MYOGLOBIN: CPT

## 2021-01-12 PROCEDURE — 2060000000 HC ICU INTERMEDIATE R&B

## 2021-01-12 PROCEDURE — 36415 COLL VENOUS BLD VENIPUNCTURE: CPT

## 2021-01-12 PROCEDURE — 85384 FIBRINOGEN ACTIVITY: CPT

## 2021-01-12 PROCEDURE — 84145 PROCALCITONIN (PCT): CPT

## 2021-01-12 RX ORDER — MAGNESIUM SULFATE 1 G/100ML
1 INJECTION INTRAVENOUS PRN
Status: DISCONTINUED | OUTPATIENT
Start: 2021-01-12 | End: 2021-01-14 | Stop reason: HOSPADM

## 2021-01-12 RX ORDER — SODIUM CHLORIDE 0.9 % (FLUSH) 0.9 %
10 SYRINGE (ML) INJECTION PRN
Status: DISCONTINUED | OUTPATIENT
Start: 2021-01-12 | End: 2021-01-14 | Stop reason: HOSPADM

## 2021-01-12 RX ORDER — MORPHINE SULFATE 2 MG/ML
2 INJECTION, SOLUTION INTRAMUSCULAR; INTRAVENOUS
Status: DISCONTINUED | OUTPATIENT
Start: 2021-01-12 | End: 2021-01-14 | Stop reason: HOSPADM

## 2021-01-12 RX ORDER — SODIUM CHLORIDE 0.9 % (FLUSH) 0.9 %
10 SYRINGE (ML) INJECTION EVERY 12 HOURS SCHEDULED
Status: DISCONTINUED | OUTPATIENT
Start: 2021-01-12 | End: 2021-01-14 | Stop reason: HOSPADM

## 2021-01-12 RX ORDER — ACETAMINOPHEN 325 MG/1
650 TABLET ORAL EVERY 4 HOURS PRN
Status: DISCONTINUED | OUTPATIENT
Start: 2021-01-12 | End: 2021-01-14 | Stop reason: HOSPADM

## 2021-01-12 RX ORDER — PROMETHAZINE HYDROCHLORIDE 25 MG/1
12.5 TABLET ORAL EVERY 6 HOURS PRN
Status: DISCONTINUED | OUTPATIENT
Start: 2021-01-12 | End: 2021-01-14 | Stop reason: HOSPADM

## 2021-01-12 RX ORDER — ATORVASTATIN CALCIUM 40 MG/1
40 TABLET, FILM COATED ORAL DAILY
Status: DISCONTINUED | OUTPATIENT
Start: 2021-01-12 | End: 2021-01-14 | Stop reason: HOSPADM

## 2021-01-12 RX ORDER — POTASSIUM CHLORIDE 20 MEQ/1
40 TABLET, EXTENDED RELEASE ORAL PRN
Status: DISCONTINUED | OUTPATIENT
Start: 2021-01-12 | End: 2021-01-14 | Stop reason: HOSPADM

## 2021-01-12 RX ORDER — 0.9 % SODIUM CHLORIDE 0.9 %
80 INTRAVENOUS SOLUTION INTRAVENOUS ONCE
Status: COMPLETED | OUTPATIENT
Start: 2021-01-12 | End: 2021-01-12

## 2021-01-12 RX ORDER — ALBUTEROL SULFATE 2.5 MG/3ML
2.5 SOLUTION RESPIRATORY (INHALATION) EVERY 4 HOURS PRN
Status: DISCONTINUED | OUTPATIENT
Start: 2021-01-12 | End: 2021-01-14 | Stop reason: HOSPADM

## 2021-01-12 RX ORDER — POLYETHYLENE GLYCOL 3350 17 G/17G
17 POWDER, FOR SOLUTION ORAL DAILY PRN
Status: DISCONTINUED | OUTPATIENT
Start: 2021-01-12 | End: 2021-01-14 | Stop reason: HOSPADM

## 2021-01-12 RX ORDER — FUROSEMIDE 20 MG/1
20 TABLET ORAL DAILY
Status: ON HOLD | COMMUNITY
End: 2021-01-12

## 2021-01-12 RX ORDER — MORPHINE SULFATE 4 MG/ML
4 INJECTION, SOLUTION INTRAMUSCULAR; INTRAVENOUS
Status: DISCONTINUED | OUTPATIENT
Start: 2021-01-12 | End: 2021-01-14 | Stop reason: HOSPADM

## 2021-01-12 RX ORDER — AMIODARONE HYDROCHLORIDE 200 MG/1
200 TABLET ORAL DAILY
Status: DISCONTINUED | OUTPATIENT
Start: 2021-01-12 | End: 2021-01-14

## 2021-01-12 RX ORDER — DIGOXIN 125 MCG
125 TABLET ORAL DAILY
Status: DISCONTINUED | OUTPATIENT
Start: 2021-01-12 | End: 2021-01-14 | Stop reason: HOSPADM

## 2021-01-12 RX ORDER — ONDANSETRON 2 MG/ML
4 INJECTION INTRAMUSCULAR; INTRAVENOUS EVERY 6 HOURS PRN
Status: DISCONTINUED | OUTPATIENT
Start: 2021-01-12 | End: 2021-01-14 | Stop reason: HOSPADM

## 2021-01-12 RX ORDER — LISINOPRIL 5 MG/1
5 TABLET ORAL DAILY
Status: ON HOLD | COMMUNITY
End: 2021-01-12

## 2021-01-12 RX ORDER — PANTOPRAZOLE SODIUM 40 MG/1
40 TABLET, DELAYED RELEASE ORAL
Status: DISCONTINUED | OUTPATIENT
Start: 2021-01-13 | End: 2021-01-14 | Stop reason: HOSPADM

## 2021-01-12 RX ORDER — TAMSULOSIN HYDROCHLORIDE 0.4 MG/1
0.4 CAPSULE ORAL DAILY
Status: DISCONTINUED | OUTPATIENT
Start: 2021-01-12 | End: 2021-01-14 | Stop reason: HOSPADM

## 2021-01-12 RX ORDER — HYDROCODONE BITARTRATE AND ACETAMINOPHEN 5; 325 MG/1; MG/1
1 TABLET ORAL EVERY 4 HOURS PRN
Status: DISCONTINUED | OUTPATIENT
Start: 2021-01-12 | End: 2021-01-14 | Stop reason: HOSPADM

## 2021-01-12 RX ORDER — HYDROCODONE BITARTRATE AND ACETAMINOPHEN 5; 325 MG/1; MG/1
2 TABLET ORAL EVERY 4 HOURS PRN
Status: DISCONTINUED | OUTPATIENT
Start: 2021-01-12 | End: 2021-01-14 | Stop reason: HOSPADM

## 2021-01-12 RX ORDER — POTASSIUM CHLORIDE 7.45 MG/ML
10 INJECTION INTRAVENOUS PRN
Status: DISCONTINUED | OUTPATIENT
Start: 2021-01-12 | End: 2021-01-14 | Stop reason: HOSPADM

## 2021-01-12 RX ORDER — SODIUM CHLORIDE 9 MG/ML
INJECTION, SOLUTION INTRAVENOUS CONTINUOUS
Status: DISCONTINUED | OUTPATIENT
Start: 2021-01-12 | End: 2021-01-13

## 2021-01-12 RX ORDER — BUMETANIDE 1 MG/1
1 TABLET ORAL DAILY
Status: DISCONTINUED | OUTPATIENT
Start: 2021-01-13 | End: 2021-01-14 | Stop reason: HOSPADM

## 2021-01-12 RX ORDER — SOTALOL HYDROCHLORIDE 80 MG/1
80 TABLET ORAL EVERY 12 HOURS
Status: ON HOLD | COMMUNITY
End: 2021-01-12

## 2021-01-12 RX ADMIN — SODIUM CHLORIDE 80 ML: 9 INJECTION, SOLUTION INTRAVENOUS at 14:19

## 2021-01-12 RX ADMIN — IOPAMIDOL 75 ML: 755 INJECTION, SOLUTION INTRAVENOUS at 14:18

## 2021-01-12 RX ADMIN — APIXABAN 5 MG: 5 TABLET, FILM COATED ORAL at 20:59

## 2021-01-12 RX ADMIN — ATORVASTATIN CALCIUM 40 MG: 40 TABLET, FILM COATED ORAL at 20:59

## 2021-01-12 RX ADMIN — SODIUM CHLORIDE: 9 INJECTION, SOLUTION INTRAVENOUS at 23:18

## 2021-01-12 RX ADMIN — Medication 10 ML: at 20:59

## 2021-01-12 RX ADMIN — Medication 10 ML: at 14:18

## 2021-01-12 ASSESSMENT — PAIN SCALES - GENERAL
PAINLEVEL_OUTOF10: 0
PAINLEVEL_OUTOF10: 8

## 2021-01-12 ASSESSMENT — PAIN DESCRIPTION - PAIN TYPE: TYPE: ACUTE PAIN

## 2021-01-12 ASSESSMENT — PAIN DESCRIPTION - DESCRIPTORS: DESCRIPTORS: ACHING;SORE

## 2021-01-12 ASSESSMENT — PAIN DESCRIPTION - LOCATION: LOCATION: RIB CAGE

## 2021-01-12 NOTE — ED NOTES
Admission Dx: [facility]    Pts Chief Complaints on Arrival: SOB, Fall    ADL's - Self-care    Pending Diagnostics:  none    Residence PTA: single story home    Special Considerations/Circumstances:  Pt is on home O2 at 3L [place].     Vitals: Current vital signs:  /68   Pulse 59   Temp 97.5 °F (36.4 °C)   Resp 28   Ht 5' 9\" (1.753 m)   Wt 145 lb (65.8 kg)   SpO2 99%   BMI 21.41 kg/m²                MEWS Score: 1            Nghia Whelan, RN  01/12/21 [id number]

## 2021-01-12 NOTE — PROGRESS NOTES
Medication History completed:    New medications: Eliquis, sotalol, furosemide, and lisinopril    Medications discontinued: guaifenesin     Changes to dosing: none    Stated allergies: as listed    Other pertinent information: verified Eliquis dose is 5 mg daily as well as other medications with [employer].      Thank you,  Aurea Gutierrez candidate 2021

## 2021-01-12 NOTE — ED NOTES
30 minutes given to PCU RN to call ED RN with questions. Pt transported to floor via ED cart. The following was reviewed with receiving RN:   Current vital signs:  /63   Pulse 59   Temp 97.8 °F (36.6 °C) (Oral)   Resp 25   Ht 5' 9\" (1.753 m)   Wt 145 lb (65.8 kg)   SpO2 100%   BMI 21.41 kg/m²                MEWS Score: 2     Any medication or safety alerts were reviewed. Any pending diagnostics and notifications were also reviewed, as well as any safety concerns or issues, abnormal labs, abnormal imaging, and abnormal assessment findings. Questions were answered.             Juancarlos Morgan RN  01/12/21 0428

## 2021-01-12 NOTE — ED PROVIDER NOTES
[address]  eMERGENCY dEPARTMENT eNCOUnter      Pt Name: Rhea Lloyd  MRN: [id number]  [place] 1955  Date of evaluation: 1/12/2021  Provider: Cherry De La Rosa PA-C    CHIEF COMPLAINT       Chief Complaint   Patient presents with    Shortness of Breath    Rib Pain     left, s/p fall    Weight Loss     35lbs in 1 month           HISTORY OF PRESENT ILLNESS  (Location/Symptom, Timing/Onset, Context/Setting, Quality, Duration, Modifying Factors, Severity.)   Rhea Lloyd is a 77 y.o. male who presents to the emergency department with complaints of increased shortness of breath, syncope, weight loss. Patient states his shortness of breath has gradually gotten worse. States he was admitted for Covid in late late November/early December. Patient reports he was sent home with 2 L of oxygen. Reports over the past few days he has felt dizzy with some episodes of syncope and falling. Patient states he did bump his head and injured his left shoulder and left ribs. Only, patient states he will become short of breath with trying to eat or drink. States it feels like his food is getting stuck in his stomach. Patient states sometimes it also feel like his lungs are going to collapse. Patient reports some right-sided abdominal pain but states this is chronic from his colorectal surgery. Additionally, patient states he has lost 35 pounds in the past month. States he is eating 3 meals a day. He denies any nausea, vomiting, chest pain, headache, dizziness, lightheadedness, back or neck pain, numbness, tingling, weakness. Pt is on eliquis for A fib. No other complaints. Nursing Notes were reviewed. REVIEW OF SYSTEMS    (2-9 systems for level 4, 10 or more for level 5)     Review of Systems   Weight loss  Syncope  Fall  Shoulder pain  Rib pain  Shortness of breath    Except as noted above the remainder of the review of systems was reviewed and negative.        PAST MEDICAL HISTORY     Past Medical History:   Diagnosis Date    Atrial fibrillation ([address]     Back pain, chronic     Mcguire's esophagus 06/18/2019    BPH (benign prostatic hyperplasia)     Cancer (HCC)     colon-rectal    Cocaine abuse in remission [facility])     1970's    ED (erectile dysfunction) 4/2/2015    GERD (gastroesophageal reflux disease)     GI bleed 12/5/2018    Hernia     History of colon cancer     Melena     Migraines     Murmur, cardiac      None otherwise stated in nurses notes    SURGICAL HISTORY       Past Surgical History:   Procedure Laterality Date    CARDIAC CATHETERIZATION  11/29/2018    Non-obstructive CAD    CARDIOVERSION  2020    COLECTOMY      2nd colectomy, Colostomy and reversed [facility] COLECTOMY      1st time [address]    COLONOSCOPY      COLONOSCOPY  07/18/2016    COLONOSCOPY N/A 12/6/2018    COLONOSCOPY DIAGNOSTIC performed by Margaret Gayle MD at [place] [address] Right 2009    inguinal    KNEE SURGERY Right 1970's    arthrotomy    TONSILLECTOMY      TOTAL KNEE ARTHROPLASTY Right 1/8/2019    KNEE TOTAL ARTHROPLASTY performed by Jada Louis MD at [address] TRANSESOPHAGEAL ECHOCARDIOGRAM  11/29/2018    UPPER GASTROINTESTINAL ENDOSCOPY N/A 12/5/2018    EGD DIAGNOSTIC ONLY performed by Margaret Gayle MD at [address] [facility] N/A 6/18/2019    MCGUIRE'S     None otherwise stated in nurses notes    CURRENT MEDICATIONS       Previous Medications    ALBUTEROL SULFATE HFA (VENTOLIN HFA) 108 (90 BASE) MCG/ACT INHALER    Inhale 2 puffs into the lungs 4 times daily as needed for Wheezing    AMIODARONE (CORDARONE) 200 MG TABLET    Take 1 tablet by mouth daily Start on 12/21 after taking 200mg PO twice daily for 14 days.     APIXABAN (ELIQUIS) 5 MG TABS TABLET    Take 5 mg by mouth daily    ATORVASTATIN (LIPITOR) 40 MG TABLET    Take 1 tablet by mouth daily    BUMETANIDE (BUMEX) 1 MG TABLET    Take 1 tablet by mouth daily    DIGOXIN (LANOXIN) 125 MCG TABLET    Take 1 tablet by mouth daily    FUROSEMIDE (LASIX) 20 MG TABLET    Take 20 mg by mouth daily    LISINOPRIL (PRINIVIL;ZESTRIL) 5 MG TABLET    Take 5 mg by mouth daily    OMEPRAZOLE (PRILOSEC) 40 MG DELAYED RELEASE CAPSULE    Take 1 capsule by mouth daily    SOTALOL (BETAPACE) 80 MG TABLET    Take 80 mg by mouth every 12 hours    TAMSULOSIN (FLOMAX) 0.4 MG CAPSULE    Take 1 capsule by mouth daily       ALLERGIES     Adhesive tape, Codeine, and Penicillins    FAMILY HISTORY           Problem Relation Age of Onset    Diabetes Mother     Heart Attack Father     Heart Disease Father     Heart Disease Brother      Family Status   Relation Name Status    Mother      Father      Brother        None otherwise stated in nurses notes    SOCIAL HISTORY      reports that he quit smoking about 50 years ago. He has a 0.50 pack-year smoking history. He has never used smokeless tobacco. He reports current alcohol use of about 12.0 standard drinks of alcohol per week. He reports that he does not use drugs. lives at home with others     PHYSICAL EXAM    (up to 7 for level 4, 8 or more for level 5)     ED Triage Vitals [21 1212]   BP Temp Temp src Pulse Resp SpO2 Height Weight   (!) 119/92 97.5 °F (36.4 °C) -- 99 16 92 % 5' 9\" (1.753 m) 145 lb (65.8 kg)       Physical Exam  Vitals signs and nursing note reviewed. Constitutional:       General: He is awake. Appearance: Normal appearance. He is well-developed and normal weight. Interventions: He is not intubated. Nasal cannula in place. HENT:      Head: No raccoon eyes, Parra's sign, abrasion, contusion, right periorbital erythema or left periorbital erythema. Eyes:      Extraocular Movements: Extraocular movements intact. Pupils: Pupils are equal, round, and reactive to light. Neck:      Musculoskeletal: Full passive range of motion without pain and normal range of motion.  No spinous process tenderness or muscular tenderness. Cardiovascular:      Rate and Rhythm: Normal rate and regular rhythm. Heart sounds: S1 normal and S2 normal. Murmur present. Pulmonary:      Effort: Pulmonary effort is normal. No tachypnea, bradypnea, accessory muscle usage or respiratory distress. He is not intubated. Breath sounds: Normal breath sounds and air entry. No stridor. No decreased breath sounds, wheezing, rhonchi or rales. Comments: On 3L nasal cannula. Speaking in full sentences. No resp distress. Chest:      Chest wall: Tenderness (left lower ribs) present. Abdominal:      General: Abdomen is flat. Palpations: Abdomen is soft. Tenderness: There is abdominal tenderness in the right lower quadrant. There is no right CVA tenderness, left CVA tenderness, guarding or rebound. Negative signs include Velasquez's sign, Rovsing's sign and McBurney's sign. Lymphadenopathy:      Cervical: No cervical adenopathy. Skin:     General: Skin is warm. Capillary Refill: Capillary refill takes less than 2 seconds. Findings: No bruising. Neurological:      General: No focal deficit present. Mental Status: He is alert and oriented to person, place, and time. Mental status is at baseline. Cranial Nerves: Cranial nerves are intact. Sensory: Sensation is intact. Motor: Motor function is intact. Coordination: Coordination is intact. Gait: Gait is intact. Psychiatric:         Behavior: Behavior is cooperative. DIAGNOSTIC RESULTS     EKG: All EKG's are interpreted by the Emergency Department Physician who either signs or Co-signs this chart in the absence of a cardiologist.        RADIOLOGY:   All plain film, CT, MRI, and formal ultrasound images (except ED bedside ultrasound) are read by the radiologist, see reports below, unless otherwise noted in MDM or here.    CT ABDOMEN PELVIS W IV CONTRAST Additional Contrast? None   Final Result   No acute traumatic injury to the abdomen and pelvis      A 0.3 cm nonobstructing stone in the lower pole of the left kidney. CT CHEST PULMONARY EMBOLISM W CONTRAST   Final Result   No evidence of pulmonary embolism      Extensive pulmonary fibrosis with resolving superimposed ground-glass   opacities in the left upper lobe when compared to the prior exam      Coronary artery/atherosclerotic disease      Bullous emphysema         CT HEAD WO CONTRAST   Final Result   Age-related volume loss      Minimal bilateral ethmoid sinus mucosal density      No acute intracranial abnormalities         XR CHEST PORTABLE   Final Result   Persistent interstitial and ground-glass opacities mid lower zones, as better   demonstrated on recent CT chest.  No obvious acute finding, large pleural   effusion or fracture identified in the chest.             No results found.           LABS:  Labs Reviewed   CBC WITH AUTO DIFFERENTIAL - Abnormal; Notable for the following components:       Result Value    Hemoglobin 12.9 (*)     Hematocrit 39.9 (*)     RDW 19.2 (*)     Seg Neutrophils 77 (*)     Lymphocytes 13 (*)     All other components within normal limits   COMPREHENSIVE METABOLIC PANEL W/ REFLEX TO MG FOR LOW K - Abnormal; Notable for the following components:    Glucose 155 (*)     CREATININE 0.69 (*)     Sodium 134 (*)     Potassium 3.6 (*)     Chloride 96 (*)     Anion Gap 7 (*)     All other components within normal limits   APTT - Abnormal; Notable for the following components:    PTT 41.8 (*)     All other components within normal limits   PROTIME-INR - Abnormal; Notable for the following components:    Protime 19.6 (*)     All other components within normal limits   C-REACTIVE PROTEIN - Abnormal; Notable for the following components:    CRP 11.1 (*)     All other components within normal limits   FIBRINOGEN - Abnormal; Notable for the following components:    Fibrinogen 570 (*)     All other components within normal limits   PROCALCITONIN - Abnormal; Notable for the following components:    Procalcitonin 0.10 (*)     All other components within normal limits   TROPONIN   D-DIMER, QUANTITATIVE   BRAIN NATRIURETIC PEPTIDE   TSH WITH REFLEX   URINE RT REFLEX TO CULTURE   LACTATE DEHYDROGENASE   FERRITIN   TROP/MYOGLOBIN       All other labs were within normal range or not returned as of this dictation. EMERGENCY DEPARTMENT COURSE and DIFFERENTIAL DIAGNOSIS/MDM:   Vitals:    Vitals:    01/12/21 1515 01/12/21 1530 01/12/21 1545 01/12/21 1600   BP: (!) 110/59 108/65 113/69 109/68   Pulse: 64 62 59 59   Resp: 29 22 28    Temp:       SpO2: 96%  97% 99%   Weight:       Height:             Patient instructed to return to the emergency room if symptoms worsen, return, or any other concern right away which is agreed by the patient    ED MEDS:  Orders Placed This Encounter   Medications    iopamidol (ISOVUE-370) 76 % injection 75 mL    0.9 % sodium chloride bolus    sodium chloride flush 0.9 % injection 10 mL         CONSULTS:  IP CONSULT TO INTERNAL MEDICINE  IP CONSULT TO PULMONOLOGY  IP CONSULT TO PULMONOLOGY    PROCEDURES:  EKG Interpretation    Interpreted by emergency department physician    Rhythm: atrial fibrillation - controlled  Rate: normal  Axis: normal  Ectopy: none  Conduction: normal  ST Segments: abnormality   T Waves: abnormality   Q Waves: none        EKG  Impression: Atrial Fibrillation, Septal infarct age undetermined, ST & T wave abnormality, prolonged QT, Abnormal ECG          FINAL IMPRESSION      1. Syncope and collapse    2. Shortness of breath    3. Rib pain on left side    4. Weight loss          DISPOSITION/PLAN   DISPOSITION Admitted    PATIENT REFERRED TO:  No follow-up provider specified. DISCHARGE MEDICATIONS:  New Prescriptions    No medications on file         Summation      Patient Course:  Hx of Afib, cardiac murmur, colon cancer presents with complaints of increased SOB, weight loss, syncope, and falls.   Increased fall and several episodes of syncope over the past few days. Pt was positive for COVID on 11/23. Admitted twice for hypoxia. On 3L home oxygen. C/O left rib pain after fall. On eliquis. Did bump head. CT head unremarkable. No leukocytosis. Stable Hgb. Elevated ddimer at 0.57. Ct chest PE study negative for PE. Normal BNP. CT abd/ pelv W contrast obtained due to trauma, on eliquis. Scan is negative. First troponin 15. Controlled a fib on EKG. No stemi. Reviewed by Dr. Alberto Rock. Pt has been sating around 96-99% on 3L. He is stable, well appearing. No resp distress on exam.     Discussed case with Dr. Steffany Byrne. Will admit. Consulted Dr. Usha Rocha. Pt does not need to go to COVID unit. Will not retest for COVID. Dr. Uhsa Rocha requesting ECHO. Pt updated on plan and agreeable with plan. Pt stable. ED Medications administered this visit:    Medications   sodium chloride flush 0.9 % injection 10 mL (10 mLs Intravenous Given 1/12/21 1418)   iopamidol (ISOVUE-370) 76 % injection 75 mL (75 mLs Intravenous Given 1/12/21 1418)   0.9 % sodium chloride bolus (0 mLs Intravenous Stopped 1/12/21 1449)       New Prescriptions from this visit:    New Prescriptions    No medications on file       Follow-up:  No follow-up provider specified. Final Impression:   1. Syncope and collapse    2. Shortness of breath    3. Rib pain on left side    4.  Weight loss               (Please note that portions of this note were completed with a voice recognition program.  Efforts were made to edit the dictations but occasionally words are mis-transcribed.)      (Please note that portions of this note were completed with a voice recognition program.  Efforts were made to edit the dictations but occasionally words are mis-transcribed.)    Larry Hayes, [id number] [facility]  01/12/21 [id number] [facility] Ander Hunt PA-C  01/12/21 1617       Alejandro Alpers, MD  01/18/21 [id number]

## 2021-01-12 NOTE — CONSULTS
[facility]   CONSULT NOTE:      DATE OF CONSULT 1/12/2021    REASON FOR CONSULTATION:  Dyspnea, hypoxia, history of Covid pneumonia      PCP RTUNG BROWN MD     CHIEF COMPLAINT: Passing out and falling    HISTORY OF PRESENT ILLNESS:     Santino Mayen is a 57-year-old male was problems began in August of this year when he reported increasing dyspnea with exertion. At that time, he had not had any fevers or chills or chest pain. He is a lifelong non-smoker and has never been diagnosed with any underlying pulmonary disease. In mid-September, he went to [facility] and was admitted. Pulmonary did not see him. However infectious disease saw him and felt that he had Covid pneumonia. He also had an episode of nonsustained ventricular tachycardia and cardiology did see him at that time. Covid testing was negative, but his inflammatory markers were elevated and his CAT scan was consistent with Covid findings. I have not personally reviewed the CAT scan. Patient was treated with Decadron and remdesivir. He also was treated with Rocephin for possibly secondary pneumonia. Does not appear he was discharged with any option at that time. He was then readmitted in October and November to [facility] with acute hypoxic respiratory failure. He was on BiPAP and high flow oxygen therapy. It was felt that he had acute combined systolic and diastolic heart failure. An echo showed an EF of 35%. He was discharged with oxygen at 3 L nasal cannula. He now presents after falling multiple times in the last 3 days. He says he knows when he is going to fall. He denies any chest pain or cough that is causing him to fall. He states that he is wearing his oxygen. He denies any fevers, chills, hemoptysis, or sputum production.       CT of the chest done today shows no evidence of pulmonary embolism but it shows extensive bilateral pulmonary fibrosis, with extensive honeycombing the infiltrative/groundglass process that was seen on his last CAT scan in November is actually improved especially on the left side. Patient also complains of a significant amount of weight loss. Feels he is lost about 45 pounds since August.    He has a history of atrial fibrillation. He sees the [facility] cardiology group. ALLERGIES:  Allergies   Allergen Reactions    Adhesive Tape Other (See Comments)     Blister badly     Codeine Nausea Only     Other reaction(s): Other: See Comments  NAUSEA  Other reaction(s): Other: See Comments  NAUSEA    Penicillins Swelling     As a baby  Other reaction(s): Unknown  As a baby       HOME MEDICATIONS:  Not in a hospital admission.       PAST MEDICAL HISTORY:  Past Medical History:   Diagnosis Date    Atrial fibrillation ([address]     Back pain, chronic     Hill's esophagus 06/18/2019    BPH (benign prostatic hyperplasia)     Cancer (HCC)     colon-rectal    Cocaine abuse in remission [facility])     1970's    ED (erectile dysfunction) 4/2/2015    GERD (gastroesophageal reflux disease)     GI bleed 12/5/2018    Hernia     History of colon cancer     Melena     Migraines     Murmur, cardiac        PAST SURGICAL HISTORY:  Past Surgical History:   Procedure Laterality Date    CARDIAC CATHETERIZATION  11/29/2018    Non-obstructive CAD    CARDIOVERSION  2020    COLECTOMY      2nd colectomy, Colostomy and reversed [facility] COLECTOMY      1st time [place]    COLONOSCOPY      COLONOSCOPY  07/18/2016    COLONOSCOPY N/A 12/6/2018    COLONOSCOPY DIAGNOSTIC performed by Siobhan Case MD at [address] Right 2009    inguinal    KNEE SURGERY Right 1970's    arthrotomy    TONSILLECTOMY      TOTAL KNEE ARTHROPLASTY Right 1/8/2019    KNEE TOTAL ARTHROPLASTY performed by Mey Beasley MD at [address] TRANSESOPHAGEAL ECHOCARDIOGRAM  11/29/2018    UPPER GASTROINTESTINAL ENDOSCOPY N/A 12/5/2018    EGD DIAGNOSTIC ONLY performed by Radha Krause MD at [address] [facility] N/A 2019    ROGERIO          SOCIAL HISTORY:  Social History     Socioeconomic History    Marital status: Single     Spouse name: Not on file    Number of children: Not on file    Years of education: Not on file    Highest education level: Not on file   Occupational History    Not on file   Social Needs    Financial resource strain: Not on file    Food insecurity     Worry: Not on file     Inability: Not on file    Transportation needs     Medical: Not on file     Non-medical: Not on file   Tobacco Use    Smoking status: Former Smoker     Packs/day: 0.50     Years: 1.00     Pack years: 0.50     Quit date:      Years since quittin.0    Smokeless tobacco: Never Used    Tobacco comment: stated never actually really smoked only inhaled    Substance and Sexual Activity    Alcohol use: Yes     Alcohol/week: 12.0 standard drinks     Types: 10 Standard drinks or equivalent, 2 Shots of liquor per week     Comment: 2-3 times a week    Drug use: No     Types:  Other-see comments     Comment: Cocaine use in past in     Sexual activity: Yes     Partners: Female   Lifestyle    Physical activity     Days per week: Not on file     Minutes per session: Not on file    Stress: Not on file   Relationships    Social connections     Talks on phone: Not on file     Gets together: Not on file     Attends Sabianism service: Not on file     Active member of club or organization: Not on file     Attends meetings of clubs or organizations: Not on file     Relationship status: Not on file    Intimate partner violence     Fear of current or ex partner: Not on file     Emotionally abused: Not on file     Physically abused: Not on file     Forced sexual activity: Not on file   Other Topics Concern    Not on file   Social History Narrative    Not on file       FAMILY HISTORY:  Family History   Problem Relation Age of Onset  Diabetes Mother     Heart Attack Father     Heart Disease Father     Heart Disease Brother        REVIEW OF SYSTEMS:  All other systems reviewed and are negative. PHYSICAL EXAM:  Vital Signs Blood pressure 109/68, pulse 59, temperature 97.5 °F (36.4 °C), resp. rate 28, height 5' 9\" (1.753 m), weight 145 lb (65.8 kg), SpO2 99 %. Oxygen Amount and Delivery:      Admission Weight Weight: 145 lb (65.8 kg)    General Appearance   Middle-aged gentleman in no acute respiratory distress but looks fatigued  Head  Normocephalic, without obvious abnormality, atraumatic    Eyes  conjunctivae/corneas clear. PERRL, EOM's intact. Fundi benign. ENT oral cavity is clear  Neck  no adenopathy, no carotid bruit, no JVD, supple, symmetrical, trachea midline and thyroid not enlarged, symmetric, no tenderness/mass/nodules  Lungs diffuse bilateral Velcro rales extending two thirds of the way up; there is no wheezes or rhonchi  Heart: regular rate and rhythm, S1, S2 normal, no murmur, click, rub or gallop  Abdomen  soft, non-tender; bowel sounds normal; no masses,  no organomegaly  Extremities  No cyanosis clubbing or edema  Skin  Skin color, texture, turgor normal. No rashes or lesions  Neurologic: Alert and oriented X 3, normal strength and tone.          Imaging        Lab Review  CBC     Lab Results   Component Value Date    WBC 10.1 01/12/2021    RBC 4.89 01/12/2021    HGB 12.9 01/12/2021    HCT 39.9 01/12/2021     01/12/2021    MCV 81.6 01/12/2021    MCH 26.3 01/12/2021    MCHC 32.2 01/12/2021    RDW 19.2 01/12/2021    LYMPHOPCT 13 01/12/2021    MONOPCT 7 01/12/2021    BASOPCT 0 01/12/2021    MONOSABS 0.70 01/12/2021    LYMPHSABS 1.40 01/12/2021    EOSABS 0.30 01/12/2021    BASOSABS 0.00 01/12/2021    DIFFTYPE NOT REPORTED 01/12/2021       BMP   Lab Results   Component Value Date     01/12/2021    K 3.6 01/12/2021    CL 96 01/12/2021    CO2 31 01/12/2021    BUN 16 01/12/2021    CREATININE 0.69 01/12/2021 GLUCOSE 155 01/12/2021    CALCIUM 9.3 01/12/2021       LFTS  Lab Results   Component Value Date    ALKPHOS 99 01/12/2021    ALT 13 01/12/2021    AST 19 01/12/2021    PROT 7.3 01/12/2021    BILITOT 0.49 01/12/2021    BILIDIR <0.08 12/05/2018    IBILI CANNOT BE CALCULATED 12/05/2018    LABALBU 3.6 01/12/2021       INR  Recent Labs     01/12/21  1221   PROTIME 19.6*   INR 1.7       APTT  Recent Labs     01/12/21  1221   APTT 41.8*       Lactic Acid  Lab Results   Component Value Date    LACTA NOT REPORTED 12/07/2020    LACTA NOT REPORTED 10/17/2020    LACTA 1.0 08/11/2014        PRO-BNP   Recent Labs     01/12/21  1221   PROBNP 134           ABGs: No results found for: PHART, PO2ART, [id number]    Lab Results   Component Value Date    MODE NOT REPORTED 12/07/2020         Impression    Syncope with falls  Acute on chronic hypoxic respiratory failure  Pulmonary fibrosis  History of COVID-19, treated in September empirically at [address] [place] [address], positive test in November 2020  Cardiomyopathy with an EF of 35% based on echo from October 2020  Non-smoker    Plan:      Admit to progressive unit  Does not need droplet plus isolation  Repeat limited echo to see if his EF has improved or worsened, interestingly his proBNP is normal  Will do 6-minute walk and see if he desaturates with his 3 L of oxygen  Check carotid Dopplers  Eventual outpatient pulmonary function testing  May need open lung biopsy  Suggest outpatient referral to [address] [place] [address] on antibiotics, patient has not had a fever does not have a white count

## 2021-01-13 PROBLEM — Z79.01 CHRONIC ANTICOAGULATION: Status: ACTIVE | Noted: 2021-01-13

## 2021-01-13 PROBLEM — E43 SEVERE MALNUTRITION (HCC): Status: ACTIVE | Noted: 2021-01-13

## 2021-01-13 LAB
ANION GAP SERPL CALCULATED.3IONS-SCNC: 6 MMOL/L (ref 9–17)
BUN BLDV-MCNC: 15 MG/DL (ref 8–23)
BUN/CREAT BLD: ABNORMAL (ref 9–20)
CALCIUM SERPL-MCNC: 8.8 MG/DL (ref 8.6–10.4)
CHLORIDE BLD-SCNC: 103 MMOL/L (ref 98–107)
CO2: 33 MMOL/L (ref 20–31)
CREAT SERPL-MCNC: 0.67 MG/DL (ref 0.7–1.2)
DIGOXIN DATE LAST DOSE: NORMAL
DIGOXIN DOSE AMOUNT: 125
DIGOXIN DOSE TIME: 1949
DIGOXIN LEVEL: 0.9 NG/ML (ref 0.5–2)
EKG ATRIAL RATE: 92 BPM
EKG Q-T INTERVAL: 400 MS
EKG QRS DURATION: 100 MS
EKG QTC CALCULATION (BAZETT): 464 MS
EKG R AXIS: -23 DEGREES
EKG T AXIS: -39 DEGREES
EKG VENTRICULAR RATE: 81 BPM
GFR AFRICAN AMERICAN: >60 ML/MIN
GFR NON-AFRICAN AMERICAN: >60 ML/MIN
GFR SERPL CREATININE-BSD FRML MDRD: ABNORMAL ML/MIN/{1.73_M2}
GFR SERPL CREATININE-BSD FRML MDRD: ABNORMAL ML/MIN/{1.73_M2}
GLUCOSE BLD-MCNC: 88 MG/DL (ref 70–99)
HCT VFR BLD CALC: 35.4 % (ref 41–53)
HEMOGLOBIN: 11.3 G/DL (ref 13.5–17.5)
LV EF: 55 %
LVEF MODALITY: NORMAL
MAGNESIUM: 2 MG/DL (ref 1.6–2.6)
MCH RBC QN AUTO: 26.3 PG (ref 26–34)
MCHC RBC AUTO-ENTMCNC: 31.8 G/DL (ref 31–37)
MCV RBC AUTO: 82.8 FL (ref 80–100)
NRBC AUTOMATED: ABNORMAL PER 100 WBC
PDW BLD-RTO: 19 % (ref 11.5–14.9)
PHOSPHORUS: 3.6 MG/DL (ref 2.5–4.5)
PLATELET # BLD: 311 K/UL (ref 150–450)
PMV BLD AUTO: 7.5 FL (ref 6–12)
POTASSIUM SERPL-SCNC: 4.1 MMOL/L (ref 3.7–5.3)
RBC # BLD: 4.28 M/UL (ref 4.5–5.9)
SODIUM BLD-SCNC: 142 MMOL/L (ref 135–144)
WBC # BLD: 7.8 K/UL (ref 3.5–11)

## 2021-01-13 PROCEDURE — 80048 BASIC METABOLIC PNL TOTAL CA: CPT

## 2021-01-13 PROCEDURE — 2580000003 HC RX 258: Performed by: PHYSICIAN ASSISTANT

## 2021-01-13 PROCEDURE — 80162 ASSAY OF DIGOXIN TOTAL: CPT

## 2021-01-13 PROCEDURE — 2060000000 HC ICU INTERMEDIATE R&B

## 2021-01-13 PROCEDURE — 36415 COLL VENOUS BLD VENIPUNCTURE: CPT

## 2021-01-13 PROCEDURE — 83735 ASSAY OF MAGNESIUM: CPT

## 2021-01-13 PROCEDURE — 85027 COMPLETE CBC AUTOMATED: CPT

## 2021-01-13 PROCEDURE — 97162 PT EVAL MOD COMPLEX 30 MIN: CPT

## 2021-01-13 PROCEDURE — 93880 EXTRACRANIAL BILAT STUDY: CPT

## 2021-01-13 PROCEDURE — 99223 1ST HOSP IP/OBS HIGH 75: CPT | Performed by: INTERNAL MEDICINE

## 2021-01-13 PROCEDURE — 6370000000 HC RX 637 (ALT 250 FOR IP): Performed by: NURSE PRACTITIONER

## 2021-01-13 PROCEDURE — 97530 THERAPEUTIC ACTIVITIES: CPT

## 2021-01-13 PROCEDURE — 97116 GAIT TRAINING THERAPY: CPT

## 2021-01-13 PROCEDURE — 84100 ASSAY OF PHOSPHORUS: CPT

## 2021-01-13 PROCEDURE — 93010 ELECTROCARDIOGRAM REPORT: CPT | Performed by: INTERNAL MEDICINE

## 2021-01-13 PROCEDURE — 93306 TTE W/DOPPLER COMPLETE: CPT

## 2021-01-13 PROCEDURE — 97166 OT EVAL MOD COMPLEX 45 MIN: CPT

## 2021-01-13 RX ADMIN — APIXABAN 5 MG: 5 TABLET, FILM COATED ORAL at 09:00

## 2021-01-13 RX ADMIN — Medication 10 ML: at 20:42

## 2021-01-13 RX ADMIN — TAMSULOSIN HYDROCHLORIDE 0.4 MG: 0.4 CAPSULE ORAL at 09:00

## 2021-01-13 RX ADMIN — AMIODARONE HYDROCHLORIDE 200 MG: 200 TABLET ORAL at 09:00

## 2021-01-13 RX ADMIN — APIXABAN 5 MG: 5 TABLET, FILM COATED ORAL at 20:42

## 2021-01-13 RX ADMIN — PANTOPRAZOLE SODIUM 40 MG: 40 TABLET, DELAYED RELEASE ORAL at 05:17

## 2021-01-13 RX ADMIN — ATORVASTATIN CALCIUM 40 MG: 40 TABLET, FILM COATED ORAL at 09:00

## 2021-01-13 RX ADMIN — Medication 10 ML: at 09:00

## 2021-01-13 ASSESSMENT — ENCOUNTER SYMPTOMS
WHEEZING: 0
SORE THROAT: 0
SHORTNESS OF BREATH: 1
COUGH: 0
CONSTIPATION: 0
BACK PAIN: 0
NAUSEA: 0
VOMITING: 0
ABDOMINAL PAIN: 0
DIARRHEA: 0

## 2021-01-13 ASSESSMENT — PAIN SCALES - GENERAL
PAINLEVEL_OUTOF10: 0
PAINLEVEL_OUTOF10: 0

## 2021-01-13 NOTE — CARE COORDINATION
DISCHARGE PLANNING NOTE:    Prescription for thigh high compression stockings was faxed to [facility] and will be delivered to patients bedside on Thursday.      Electronically signed by Gurvinder Kong RN on 1/13/2021 at 4:09 PM

## 2021-01-13 NOTE — PROGRESS NOTES
Cardiac catheterization (11/29/2018); colectomy; Total knee arthroplasty (Right, 1/8/2019); Upper gastrointestinal endoscopy (N/A, 6/18/2019); and Cardioversion (2020). Restrictions  Restrictions/Precautions  Restrictions/Precautions: Fall Risk(peripheral IV left forearm, O2 at 3 L, up w/assist, hx (+) covid in Nov/Dec)  Required Braces or Orthoses?: No  Implants present? : Metal implants(right TKR)  Position Activity Restriction  Other position/activity restrictions: up w/ assist  Vision/Hearing  Vision: Impaired  Vision Exceptions: Wears glasses for reading  Hearing: Exceptions to [facility]  Hearing Exceptions: Hard of hearing/hearing concerns; No hearing aid     Subjective  General  Chart Reviewed: Yes  Patient assessed for rehabilitation services?: Yes  Additional Pertinent Hx: Cherelle Lamb is a 77 y.o. male who presents to the emergency department with complaints of increased shortness of breath, syncope, weight loss. Patient states his shortness of breath has gradually gotten worse. States he was admitted for Covid in late late November/early December. Patient reports he was sent home with 2 L of oxygen. PtReports over the past few days he has felt dizzy with some episodes of syncope and falling. Patient states he did bump his head and injured his left shoulder and left ribs. Response To Previous Treatment: Not applicable  Family / Caregiver Present: No  Referring Practitioner: Jake Clarke CNP  Referral Date : 01/12/21  Diagnosis: syncope and collapse  Follows Commands: Within Functional Limits  Other (Comment): OK per nurse Angel  General Comment  Comments: pt had (+) COVID infection in Nov/ Dec.  Subjective  Subjective: Pt reports that he has fallen 3 X in recent days due to feeling faint. Pt reports that he injured his left shoulder and ribs when he fell. Pt reports 35# weight loss in the last month.   Pain Screening  Patient Currently in Pain: Denies  Vital Signs  Patient Currently in Pain: Denies       Orientation  Orientation  Overall Orientation Status: Within Functional Limits  Social/Functional History  Social/Functional History  Lives With: Significant other  Type of Home: House  Home Layout: Two level, Bed/Bath upstairs  Home Access: Stairs to enter with rails  Entrance Stairs - Number of Steps: 2 steps w/ left rail to enter; 14 steps w/ left rail to 2nd floor and bath  Entrance Stairs - Rails: Left  Bathroom Shower/Tub: Tub/Shower unit, Shower chair with back  Bathroom Toilet: Standard(w/ raiser)  Bathroom Equipment: Shower chair, Grab bars in shower, Toilet raiser  Home Equipment: Oxygen, Rolling walker, Standard walker, Cane, Quad cane(O2 at 2 L PRN)  ADL Assistance: Independent  Homemaking Assistance: Needs assistance(share duties w/ SO)  Homemaking Responsibilities: Yes  Ambulation Assistance: Independent(hx \"Fainting\" 3 times recently; no device, limited distances due to SOB)  Transfer Assistance: Independent  Active : Yes  Mode of Transportation: Truck  Occupation: Unemployed  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  IADL Comments: sleeps in a flat bed  Additional Comments: SO is still off work for 1 more week (recent THR surgery)  Cognition        Objective     Observation/Palpation  Observation: peripheral IV left forearm, O2 at 3 L, up w/assist, hx (+) covid in Nov/Dec    AROM RLE (degrees)  RLE AROM: WFL  AROM LLE (degrees)  LLE AROM : WFL  AROM RUE (degrees)  RUE General AROM: see OT for UE assessment  AROM LUE (degrees)  LUE General AROM: see OT for UE assessment  Strength RLE  Comment: grossly 4+/5  Strength LLE  Comment: grossly 4+/5  Strength RUE  Comment: see OT for UE assessment  Strength LUE  Comment: see OT for UE assessment     Sensation  Overall Sensation Status: WFL(denies)  Bed mobility  Rolling to Left: Modified independent  Supine to Sit: Modified independent  Scooting: Modified independent  Comment: HOB elevated for above activities; orthostatic Blood pressures taken: supine 108/65 O2 sat 96% HR 56; seated 99/65 O2 sat 92% HR 58; standing BP 90/51 O2 sat 88-89% HR 65;  O2 sat post walk 85% HR 60  Transfers  Sit to Stand: Stand by assistance  Stand to sit: Stand by assistance  Bed to Chair: Stand by assistance(no device)  Ambulation  Ambulation?: Yes  Ambulation 1  Surface: level tile  Device: No Device  Other Apparatus: O2(3 L)  Assistance: Contact guard assistance  Gait Deviations: None  Distance: 15' around the bed  Comments: FALL RISK- initial C/O lightheadedness w/ standing  Stairs/Curb  Stairs?: No     Balance  Sitting - Static: Good  Sitting - Dynamic: Good  Standing - Static: Good(no device)  Standing - Dynamic: Good;-(no device)        Plan   Plan  Times per week: 3-5 treatments/ 5 days  Times per day: (3-5 treatments/ 5 days)  Specific instructions for Next Treatment: advance gait distance using O2  and progress to steps, instruct in HEP  Current Treatment Recommendations: Transfer Training, Endurance Training, Strengthening, Patient/Caregiver Education & Training, Balance Training, Gait Training, Home Exercise Program, Safety Education & Training, Stair training, Functional Mobility Training  Safety Devices  Type of devices:  All fall risk precautions in place, Gait belt, Patient at risk for falls, Call light within reach, Chair alarm in place, Left in chair, Nurse notified(nurse Hendricks)    G-Code       OutComes Score                                                  AM-PAC Score  AM-PAC Inpatient Mobility Raw Score : 18 (01/13/21 0910)  AM-PAC Inpatient T-Scale Score : 43.63 (01/13/21 0910)  Mobility Inpatient CMS 0-100% Score: 46.58 (01/13/21 0910)  Mobility Inpatient CMS G-Code Modifier : CK (01/13/21 0910)          Goals  Short term goals  Time Frame for Short term goals: 3-5 treatments/ 5 days  Short term goal 1: pt to tolerate 1/2 hour of therapuetic exercise and activity keeping O2 sats above 90%  Short term goal 2: pt to demonstrate good technique for LE strengthening exercises and energy conservation techniques  Short term goal 3: pt to demonstrate independent bed mobility from a flat surface  Short term goal 4: pt to demonstrate independent transfers using O2 as directed  Short term goal 5: pt to demonstrate independent gait 150' for community ambulation using O2 as directed  Short term goal 6: pt to demonstrate good balance for ambulation  Short term goal 7: pt to demonstrate ability to ascend/ descend 2 steps w/ rail w/ SBA x 1 to enter/ exit the home using O2 as directed  Patient Goals   Patient goals : return home w/ SO       Therapy Time   Individual Concurrent Group Co-treatment   Time In 0910         Time Out 0933         Minutes 23         Timed Code Treatment Minutes: [address] [facility], PT

## 2021-01-13 NOTE — PROGRESS NOTES
Patient arrived to the unit alert and oriented. Call light in reach. Vital signs stable and heart monitor on.

## 2021-01-13 NOTE — PLAN OF CARE
Problem: Falls - Risk of:  Goal: Will remain free from falls  1/13/2021 1813 by Preston Sever, RN  Outcome: Ongoing     Problem: SAFETY  Goal: Free from accidental physical injury  Outcome: Ongoing     Problem: DAILY CARE  Goal: Daily care needs are met  1/13/2021 1813 by Preston Sever, RN  Outcome: Ongoing     Problem: PAIN  Goal: Patient's pain/discomfort is manageable  1/13/2021 1813 by Preston Sever, RN  Outcome: Ongoing     Problem: KNOWLEDGE DEFICIT  Goal: Patient/S.O. demonstrates understanding of disease process, treatment plan, medications, and discharge instructions.   Outcome: Ongoing     Problem: DISCHARGE BARRIERS  Goal: Patient's continuum of care needs are met  Outcome: Ongoing     Problem: Infection:  Goal: Will remain free from infection  Outcome: Ongoing     Problem: Safety:  Goal: Free from accidental physical injury  Outcome: Ongoing     Problem: Discharge Planning:  Goal: Patients continuum of care needs are met  Outcome: Ongoing     Problem: Skin Integrity:  Goal: Skin integrity will stabilize  Outcome: Ongoing     Problem: Nutrition  Goal: Optimal nutrition therapy  Outcome: Ongoing

## 2021-01-13 NOTE — PROGRESS NOTES
[facility]    Occupational Therapy Evaluation  Date: 21  Patient Name: Vera Melendrez       Room: [facility]4/3547-97  MRN: [id number]  Account: [de-identified]   : 1955  (77 y.o.) Gender: male     Discharge Recommendations: The patient's needs are being met with no further Occupational Therapy recommended at discharge. Equipment Needed: (TBD)    Referring Practitioner: Dr. Court Reddy  Diagnosis: Syncope and collapse  Additional Pertinent Hx: CHF, recent hx of COVID    Treatment Diagnosis: Impaired self-care status  Past Medical History:  has a past medical history of Atrial fibrillation ([facility] Back pain, chronic, Hlil's esophagus, BPH (benign prostatic hyperplasia), [facility] Cocaine abuse in remission [facility]), ED (erectile dysfunction), GERD (gastroesophageal reflux disease), GI bleed, Hernia, History of colon cancer, Melena, Migraines, and Murmur, cardiac. Past Surgical History:   has a past surgical history that includes Tonsillectomy; Colonoscopy; colectomy; Colonoscopy (2016); knee surgery (Right, ); transesophageal echocardiogram (2018); Upper gastrointestinal endoscopy (N/A, 2018); Colonoscopy (N/A, 2018); hernia repair (Right, ); Cardiac catheterization (2018); colectomy; Total knee arthroplasty (Right, 2019); Upper gastrointestinal endoscopy (N/A, 2019); and Cardioversion ().     Restrictions  Restrictions/Precautions: Fall Risk(peripheral IV left forearm, O2 at 3 L, up w/assist, hx (+) covid in Nov/Dec)  Implants present? : Metal implants(right TKR)  Required Braces or Orthoses?: No     Vitals  Temp: 97.5 °F (36.4 °C)  Pulse: 58  Resp: 16  BP: 101/62  Height: 5' 9\" (175.3 cm)  Weight: 144 lb 8 oz (65.5 kg)  BMI (Calculated): 21.4  Oxygen Therapy  SpO2: 95 %  Pulse Oximeter Device Mode: Intermittent  Pulse Oximeter Device Location: Finger  O2 Device: Nasal cannula  O2 Flow Rate (L/min): 3 L/min  Blood Pressure Lying: Independent  Grooming: Setup  UE Bathing: Setup  LE Bathing: Contact guard assistance  UE Dressing: Setup  LE Dressing: Contact guard assistance  Toileting: Contact guard assistance  Additional Comments: SBA/CGA for safety with all standing tasks 2* low BP and recent hx of syncope/falls. All other areas based on pt report, observation, and clinical reasoning. UE Function  LUE Strength  Gross LUE Strength: WFL  LUE Strength Comment: Some soreness noted in L shoulder from recent fall but strength WFL     LUE Tone: Normotonic     LUE AROM (degrees)  LUE AROM : WFL  LUE General AROM: Some soreness noted in L shoulder from recent fall but AROM WFL     Left Hand AROM (degrees)  Left Hand AROM: WFL     RUE Strength  Gross RUE Strength: WFL      RUE Tone: Normotonic     RUE AROM (degrees)  RUE AROM : WFL     Right Hand AROM (degrees)  Right Hand AROM: WFL    Fine Motor Skills  Coordination  Movements Are Fluid And Coordinated: Yes     Mobility  Supine to Sit: Modified independent       Balance  Sitting Balance: Supervision  Standing Balance: Contact guard assistance(SBA/CGA depending on task and level of support)     Bed mobility  Rolling to Left: Modified independent  Supine to Sit: Modified independent  Scooting: Modified independent  Comment: HOB elevated for above activities; orthostatic BP taken: supine 108/65, O2 96%, HR 56; seated 99/65, O2 92%, HR 58; standing BP 90/51, O2 88-89%, HR 65     Transfers  Stand Step Transfers: Contact guard assistance  Stand Pivot Transfers: Contact guard assistance  Sit to stand: Supervision  Stand to sit: Supervision  Transfer Comments: No device  Functional Activity Tolerance  Functional Activity Tolerance:  Tolerates < 10 min exercise w/ changes in vital signs  Additional Comments: BP decreases with position change and SpO2 drops with minimal exertion while on 3L    Assessment  Performance deficits / Impairments: Decreased functional mobility , Decreased ADL status, Decreased endurance, Decreased balance, Decreased high-level IADLs  Treatment Diagnosis: Impaired self-care status  Prognosis: Good  Decision Making: Medium Complexity  REQUIRES OT FOLLOW UP: Yes  Activity Tolerance: Patient limited by fatigue, Treatment limited secondary to medical complications (free text)  Activity Tolerance: Decreased BP with position change, decreased SpO2 with minimal exertion while on 3L    Goals  Patient Goals   Patient goals : Return home with A as needed from [facility]. Short term goals  Time Frame for Short term goals: By Discharge  Short term goal 1: Pt will complete BADL's with Modified IND and Good safety using EC/WS techiques as needed to maintain vitals WFL. Short term goal 2: Pt will complete toilet transfer and toileting with Modified IND and Good safety using least restrictive device. Short term goal 3: Pt will V/D at least 3 fall prevention techniques that he can utilize in his home environment. Short term goal 4: Pt will actively participate in 15-20 minutes of therapeutic exercise/activity to promote increased independence and safety with self-care and mobility.     Plan  Safety Devices  Safety Devices in place: Yes  Type of devices: Patient at risk for falls, Gait belt, Left in chair, Call light within reach, Chair alarm in place     Plan  Times per week: 5  Times per day: Daily  Current Treatment Recommendations: Balance Training, Functional Mobility Training, Endurance Training, Safety Education & Training, Patient/Caregiver Education & Training, Equipment Evaluation, Education, & procurement, Self-Care / ADL, Home Management Training    Equipment Recommendations  Equipment Needed: (TBD)  OT Individual Minutes  Time In: 9650  Time Out: 9285  Minutes: 23    Electronically signed by Chadwick Cintron on 1/13/21 at 11:08 AM EST

## 2021-01-13 NOTE — PROGRESS NOTES
Pulmonary Progress Note  [facility]      Patient - Hilda Robles,  Age - 77 y.o.    - 1955      Room Number - /-01   N -  [id number]   [facility] # - [de-identified]  Date of Admission -  2021 12:05 PM        Consulting [place] MD  Primary Care Physician - Aubrey Diallo MD     SUBJECTIVE   He was sitting without his oxygen his saturation on room air was 82%! OBJECTIVE   VITALS    height is 5' 9\" (1.753 m) and weight is 144 lb 8 oz (65.5 kg). His axillary temperature is 97.5 °F (36.4 °C). His blood pressure is 101/62 and his pulse is 58. His respiration is 16 and oxygen saturation is 95%. Body mass index is 21.34 kg/m². Temperature Range: Temp: 97.5 °F (36.4 °C) Temp  Av.6 °F (36.4 °C)  Min: 97.4 °F (36.3 °C)  Max: 97.8 °F (36.6 °C)  BP Range:  Systolic (97LKY), LZA:929 , Min:92 , WII:498     Diastolic (27UNT), TYP:95, Min:55, Max:76    Pulse Range: Pulse  Av.9  Min: 62  Max: 82  Respiration Range: Resp  Av.1  Min: 14  Max: 37  Current Pulse Ox[de-identified]  SpO2: 95 %  24HR Pulse Ox Range:  SpO2  Av.6 %  Min: 94 %  Max: 100 %  Oxygen Amount and Delivery: O2 Flow Rate (L/min): 3 L/min    Wt Readings from Last 3 Encounters:   21 144 lb 8 oz (65.5 kg)   12/10/20 173 lb 8 oz (78.7 kg)   20 165 lb (74.8 kg)       I/O (24 Hours)    Intake/Output Summary (Last 24 hours) at 2021 1358  Last data filed at 2021 0903  Gross per 24 hour   Intake 1303.33 ml   Output 400 ml   Net 903.33 ml       EXAM     General Appearance  Awake, alert, oriented, in no acute distress  HEENT - normocephalic, atraumatic.  []  Mallampati  [] Crowded airway   [] Macroglossia  []  Retrognathia  [] Micrognathia  []  Normal tongue size []  Normal Bite  [] Justo sign positive    Neck - Supple,  trachea midline   Lungs -diffuse bilateral crackles  Cardiovascular - Heart sounds are normal.  Regular rate and rhythm   Abdomen - Soft, nontender, nondistended, no masses or organomegaly  Neurologic - There are no focal motor or sensory deficits  Skin - No bruising or bleeding  Extremities - No clubbing, cyanosis, edema    MEDS      sodium chloride flush  10 mL Intravenous 2 times per day    amiodarone  200 mg Oral Daily    apixaban  5 mg Oral BID    atorvastatin  40 mg Oral Daily    pantoprazole  40 mg Oral QAM AC    tamsulosin  0.4 mg Oral Daily    digoxin  125 mcg Oral Daily    [Held by provider] bumetanide  1 mg Oral Daily       perflutren lipid microspheres, sodium chloride flush, sodium chloride flush, acetaminophen, HYDROcodone 5 mg - acetaminophen **OR** HYDROcodone 5 mg - acetaminophen, morphine **OR** morphine, albuterol, potassium chloride **OR** potassium alternative oral replacement **OR** potassium chloride, magnesium sulfate, promethazine **OR** ondansetron, polyethylene glycol    LABS   CBC   Recent Labs     01/13/21  0536   WBC 7.8   HGB 11.3*   HCT 35.4*   MCV 82.8        BMP:   Lab Results   Component Value Date     01/13/2021    K 4.1 01/13/2021     01/13/2021    CO2 33 01/13/2021    BUN 15 01/13/2021    LABALBU 3.6 01/12/2021    CREATININE 0.67 01/13/2021    CALCIUM 8.8 01/13/2021    GFRAA >60 01/13/2021    LABGLOM >60 01/13/2021     ABGs:No results found for: PHART, PO2ART, KRG2ZVD   Lab Results   Component Value Date    MODE NOT REPORTED 12/07/2020     Ionized Calcium:  No results found for: IONCA  Magnesium:    Lab Results   Component Value Date    MG 2.0 01/13/2021     Phosphorus:    Lab Results   Component Value Date    PHOS 3.6 01/13/2021        LIVER PROFILE   Recent Labs     01/12/21  1221   AST 19   ALT 13   BILITOT 0.49   ALKPHOS 99     INR   Recent Labs     01/12/21  1221   INR 1.7     PTT   Lab Results   Component Value Date    APTT 41.8 (H) 01/12/2021         RADIOLOGY     (See actual reports for details)    ASSESSMENT/PLAN   Principal Problem:    Syncope and collapse  Active Problems:    Benign prostatic hyperplasia with urinary obstruction    PAF (paroxysmal atrial fibrillation) (HCC)    Benign essential HTN    CHF (congestive heart failure), NYHA class II, acute on chronic, combined ([address]    Pulmonary fibrosis (HCC)    Chronic anticoagulation    Severe malnutrition ([address]  Resolved Problems:    * No resolved hospital problems.  *    Surprisingly, his echo shows normal LV function  We are dealing with primarily pulmonary issues  He needs an evaluation at [facility]-I have asked that we try to get an appointment ASAP  I told him that he needs to wear his oxygen at all time  Awaiting carotid Dopplers  Will need to do 6-minute walk and see if he desaturates with oxygen during ambulation  Electronically signed by Aly Mack MD on 1/13/2021 at 1:58 PM

## 2021-01-13 NOTE — CONSULTS
Comprehensive Nutrition Assessment    Type and Reason for Visit:  Positive Nutrition Screen(Wt loss)    Nutrition Recommendations/Plan: Continue cardiac diet. Start Ensure [place]. Nutrition Assessment:  Pt admitted for syncope and collapse. States a 40lb wt loss since 8/20 despite normal intake. Patient appears to have lost a significant amount of muscle. Will start Ensure [place]. Malnutrition Assessment:  Malnutrition Status:  Severe malnutrition    Context:  Acute Illness     Findings of the 6 clinical characteristics of malnutrition:  Energy Intake:  No significant decrease in energy intake  Weight Loss:  7 - Greater than 7.5% over 3 months     Body Fat Loss:  7 - Moderate body fat loss Orbital, Buccal region   Muscle Mass Loss:  7 - Moderate muscle mass loss Temples (temporalis), Clavicles (pectoralis & deltoids)  Fluid Accumulation:  No significant fluid accumulation     Strength:  Not Performed    Estimated Daily Nutrient Needs:  Energy (kcal):  2395-1712 based on 28-30kcal/kg admission wt of 65.5kg; Weight Used for Energy Requirements:  Admission     Protein (g):  85-90 based on 1.25-1.4g/kg current wt 65.5kg; Weight Used for Protein Requirements:  Current            Nutrition Related Findings:  No edema      Wounds:  None       Current Nutrition Therapies:    DIET CARDIAC; Dietary Nutrition Supplements: Standard High Calorie Oral Supplement    Anthropometric Measures:  · Height: 5' 9\" (175.3 cm)  · Admission Body Weight: 144 lb 6.4 oz (65.5 kg)    · Ideal Body Weight: 160 lbs;  · BMI Categories: Normal Weight (BMI 18.5-24. 9)       Nutrition Diagnosis:   · Severe malnutrition related to catabolic illness as evidenced by weight loss 7.5% in 3 months, severe loss of subcutaneous fat, severe muscle loss      Nutrition Interventions:   Food and/or Nutrient Delivery:  Continue Current Diet, Start Oral Nutrition Supplement  Nutrition Education/Counseling:  Education not indicated   Coordination of Nutrition Care:  Continue to monitor while inpatient    Goals:  Estimated protein-energy needs will be met from all sources       Nutrition Monitoring and Evaluation:   Behavioral-Environmental Outcomes:  None Identified   Food/Nutrient Intake Outcomes:  Diet Advancement/Tolerance, Supplement Intake  Physical Signs/Symptoms Outcomes:  Biochemical Data, Fluid Status or Edema, Nutrition Focused Physical Findings, Skin, Weight     Discharge Planning: Too soon to determine     Jocy Martinez RD, LD  [phone]    Some areas of assessment may be incomplete due to standard COVID-19 Precautions.

## 2021-01-13 NOTE — CARE COORDINATION
CASE MANAGEMENT NOTE:    Admission Date:  1/12/2021 Chin Cruz is a 77 y.o.  male    Admitted for : Syncope and collapse [R55]    Met with:  Patient    PCP:  Dr. Cristopher Barajas:  Mary Anne Hooks:  independently at home             Current Services PTA:  No    Is patient agreeable to VNS: No    Freedom of choice provided:  Yes    List of [address] provided: No    VNS chosen:  No    DME:  straight cane, walker, wheelchair and shower chair    Home Oxygen: Yes 24/7 through [facility]. Nebulizer: No    CPAP/BIPAP: No    Supplier: N/A    Potential Assistance Needed: Yes    SNF needed: No    Freedom of choice and list provided: NA    Pharmacy:  [facility]       Does Patient want to use MEDS to BEDS? No    Is patient currently receiving oral anticoagulation therapy? Yes - Eliquis PTA    Is the Patient an [facility] with Readmission Risk Score greater than 14%? No  If yes, pt needs a follow up appointment made within 7 days. Family Members/Caregivers that pt would like involved in their care:    Yes    If yes, list name here:  Akira Mackenzie and sister Rohit Coe:  Patient             Is patient in Isolation/One on One/Altered Mental Status? No  If yes, skip next question. If no, would they like an I-Pad to  use? NA  If yes, call [phone]. Discharge Plan:  1/13: [facility] - Patient is from 2-story home with 2nd floor bed/bath. States he is independent and drives. DME - cane, walker, SC, BSC, Home O2 24/7 ([facility]). Eliquis PTA. Declines need for VNS. Carotids, ECHO, PT/OT. Needs outpatient appt scheduled with [facility] for open biopsy - will work on this tomorrow. Thigh high stockings ordered through [facility] and will be delivered to bedside tomorrow.  Follow for home O2 tommy. Efren Jauregui                 Electronically signed by: Presley Diaz RN on 1/13/2021 at 4:00 PM

## 2021-01-13 NOTE — H&P
[address] [facility] [place]     HISTORY AND PHYSICAL EXAMINATION            Date:   1/13/2021  Patientname:  Yvette Ford  Date of admission:  1/12/2021 12:05 PM  MRN:   [id number]  Account:  [de-identified]  YOB: 1955  PCP:    Tania So MD  Room:   2092/2092-01  Code Status:    Full Code    CHIEF COMPLAINT     Chief Complaint   Patient presents with    Shortness of Breath    Rib Pain     left, s/p fall    Weight Loss     35lbs in 1 month       HISTORY OF PRESENT ILLNESS  (Character, Onset, Location, Duration,  Exacerbating/RelievingFactors, Radiation,   Associated Symptoms, Severity )      The patient is a 77 y.o.  male, with a history of atrial fibrillation, colorectal cancer, pulmonary fibrosis, CHF, COVID-19 infection (November 2020), and DVT, who presents with shortness of breath, left-sided rib pain, and weight loss. According to patient, he has had 3 syncopal episodes over the past 5 days. Reports that with each incident, he rises from a sitting position and takes a few steps. Then he becomes lightheaded, feels as if he cannot breathe, and wakes up on the floor a few minutes later. Symptoms are associated with rib and shoulder pain from falling out and unexpected weight loss of 35 pounds over the past month. Denies change in appetite and oral intake. Denies fever, chills, abdominal pain, nausea, vomiting, diarrhea, and urinary symptoms. Shortness of breath is aggravated with eating, drinking, and minimal activity, despite wearing oxygen. There are no alleviating factors. Symptoms are reported as intermittent and moderate to severe. Patient reports that he has a daily sensation of not being able to breathe. States that these episodes began several months prior, resulting in multiple hospitalizations.   Upon review of EHR, patient was admitted in September 2020 to [facility] and was treated with Decadron and remdesivir for [place] pneumonia, though his rapid Covid swab was negative on 9/17 and 9/19. Patient failed a nocturnal oxygen study and was discharged home with O2. Patient was treated in the ED at [facility] for A. fib on 10/15 and discharged home. On 10/17, patient was admitted to [facility] for hypoxia and was noted to have O2 sat of 65% on room air on arrival.  Additionally, patient was started on Lasix for CHF and prednisone 40 mg daily for pulmonary fibrosis. COVID-19 test was negative on admission. On 11/23/2020, patient was again admitted to [facility] and did have a positive Covid test, along with CHF. He was discharged on 12/7. Patient reportedly was home for only a few hours and then returned to the ED for worsening shortness of breath. At that time he was found to be positive for a pulmonary emboli. His Xarelto was changed to Eliquis prior to being discharged on 12/10. Patient reports that he has lost 40 pounds throughout this ordeal, including much of his muscle mass. He is concerned for the cause of his increasing shortness of breath, which he feels everyone relates to Covid. However, reports that symptoms started months before his Covid positive test.    PAST MEDICAL HISTORY   Patient  has a past medical history of Atrial fibrillation ([facility] [address] Back pain, chronic, Hill's esophagus, BPH (benign prostatic hyperplasia), Cancer ([facility] [address] Cocaine abuse in remission [facility]), ED (erectile dysfunction), GERD (gastroesophageal reflux disease), GI bleed, Hernia, History of colon cancer, Melena, Migraines, and Murmur, cardiac. PAST SURGICAL HISTORY    Patient  has a past surgical history that includes Tonsillectomy; Colonoscopy; colectomy; Colonoscopy (07/18/2016); knee surgery (Right, 1970's); transesophageal echocardiogram (11/29/2018); Upper gastrointestinal endoscopy (N/A, 12/5/2018); Colonoscopy (N/A, 12/6/2018); hernia repair (Right, 2009); Cardiac catheterization (11/29/2018); colectomy;  Total knee arthroplasty (Right, 1/8/2019); Upper gastrointestinal endoscopy (N/A, 6/18/2019); and Cardioversion (2020). FAMILY HISTORY    Patient family history includes Diabetes in his mother; Heart Attack in his father; Heart Disease in his brother and father. SOCIAL HISTORY    Patient  reports that he quit smoking about 50 years ago. He has a 0.50 pack-year smoking history. He has never used smokeless tobacco. He reports current alcohol use of about 12.0 standard drinks of alcohol per week. He reports that he does not use drugs. HOME MEDICATIONS        Prior to Admission medications    Medication Sig Start Date End Date Taking? Authorizing Provider   apixaban (ELIQUIS) 5 MG TABS tablet Take 5 mg by mouth daily   Yes Historical Provider, MD   bumetanide (BUMEX) 1 MG tablet Take 1 tablet by mouth daily 12/8/20  Yes Demetria Hull MD   digoxin (LANOXIN) 125 MCG tablet Take 1 tablet by mouth daily 12/8/20  Yes Demetria Hull MD   albuterol sulfate HFA (VENTOLIN HFA) 108 (90 Base) MCG/ACT inhaler Inhale 2 puffs into the lungs 4 times daily as needed for Wheezing 12/7/20  Yes Demetria Hull MD   amiodarone (CORDARONE) 200 MG tablet Take 1 tablet by mouth daily Start on 12/21 after taking 200mg PO twice daily for 14 days. Patient taking differently: Take 200 mg by mouth daily  12/21/20  Yes Mariya Jiang, APRN - CNP   atorvastatin (LIPITOR) 40 MG tablet Take 1 tablet by mouth daily 11/16/20  Yes Cassandra Mclain MD   omeprazole (PRILOSEC) 40 MG delayed release capsule Take 1 capsule by mouth daily 11/16/20  Yes Cassandra Mclain MD   tamsulosin [facility]) 0.4 MG capsule Take 1 capsule by mouth daily 10/15/20  Yes Cassandra Mclain MD       ALLERGIES      Adhesive tape, Codeine, and Penicillins    REVIEW OF SYSTEMS     Review of Systems   Constitutional: Positive for unexpected weight change. Negative for chills, diaphoresis and fever. HENT: Negative for congestion and sore throat.     Respiratory: Positive for shortness of breath. Negative for cough and wheezing. Cardiovascular: Negative for chest pain, palpitations and leg swelling. Gastrointestinal: Negative for abdominal pain, constipation, diarrhea, nausea and vomiting. Genitourinary: Negative for dysuria, frequency and urgency. Musculoskeletal: Negative for back pain and myalgias. Left shoulder and rib pain from recent fall   Skin: Negative for rash. Neurological: Positive for syncope and light-headedness. Negative for dizziness, weakness and headaches. Psychiatric/Behavioral: The patient is not nervous/anxious. PHYSICAL EXAM      /74   Pulse 57   Temp 97.6 °F (36.4 °C) (Oral)   Resp 15   Ht 5' 9\" (1.753 m)   Wt 144 lb 8 oz (65.5 kg)   SpO2 96%   BMI 21.34 kg/m²  Body mass index is 21.34 kg/m². Physical Exam  Constitutional:       General: He is not in acute distress. Appearance: He is well-developed. He is not diaphoretic. HENT:      Head: Normocephalic and atraumatic. Mouth/Throat:      Mouth: Mucous membranes are dry. Eyes:      Conjunctiva/sclera: Conjunctivae normal.      Pupils: Pupils are equal, round, and reactive to light. Neck:      Musculoskeletal: Normal range of motion and neck supple. Trachea: No tracheal deviation. Cardiovascular:      Rate and Rhythm: Normal rate and regular rhythm. Heart sounds: Murmur present. No friction rub. No gallop. Pulmonary:      Effort: Pulmonary effort is normal. No respiratory distress. Breath sounds: Normal breath sounds. No wheezing or rales. Chest:      Chest wall: Tenderness present. Abdominal:      General: Bowel sounds are normal. There is no distension. Palpations: Abdomen is soft. Tenderness: There is no abdominal tenderness. There is no guarding. Musculoskeletal: Normal range of motion. General: No tenderness. Lymphadenopathy:      Cervical: No cervical adenopathy. Skin:     General: Skin is warm and dry. Coloration: Skin is not pale. Findings: No erythema or rash. Neurological:      Mental Status: He is alert and oriented to person, place, and time. Motor: No seizure activity. Coordination: Coordination normal.   Psychiatric:         Behavior: Behavior normal.         Thought Content: Thought content normal.       DIAGNOSTICS      EKG:   EKG 12 Lead [[id number]]    Collected: 01/12/21 1232    Updated: 01/12/21 1317     Ventricular Rate 81 BPM    Atrial Rate 92 BPM    QRS Duration 100 ms    Q-T Interval 400 ms    QTc Calculation (Bazett) 464 ms    R Axis -23 degrees    T Axis -39 degrees   Narrative:     *Poor data quality, interpretation may be adversely affected   Atrial fibrillation   Septal infarct (cited on or before 12-JAN-2021)   Marked ST abnormality, possible inferior subendocardial injury   Abnormal ECG   When compared with ECG of 12-JAN-2021 12:32, (unconfirmed)   Serial changes of Septal infarct Present     Labs:  CBC:   Recent Labs     01/12/21  1221   WBC 10.1   HGB 12.9*        BMP:    Recent Labs     01/12/21  1221   *   K 3.6*   CL 96*   CO2 31   BUN 16   CREATININE 0.69*   GLUCOSE 155*     S. Calcium:  Recent Labs     01/12/21  1221   CALCIUM 9.3     S. Ionized Calcium:No results for input(s): IONCA in the last 72 hours. S. Magnesium:No results for input(s): MG in the last 72 hours. S. Phosphorus:No results for input(s): PHOS in the last 72 hours. S. Glucose:No results for input(s): POCGLU in the last 72 hours.   Glycosylated hemoglobin A1C:   Lab Results   Component Value Date    LABA1C 5.0 01/10/2018     Hepatic:   Recent Labs     01/12/21  1221   AST 19   ALT 13   ALKPHOS 99     CARDIAC ENZY:   Recent Labs     01/12/21  1221 01/12/21  1712   TROPHS 14 18   MYOGLOBIN  --  <21*     INR:   Recent Labs     01/12/21  1221   INR 1.7     BNP:   Recent Labs     01/12/21  1221   PROBNP 134      ABGs: No results for input(s): PH, PCO2, PO2, HCO3, O2SAT in the last 72 hours. Lipids: No results for input(s): CHOL, TRIG, HDL, LDLCALC in the last 72 hours. Invalid input(s): LDL  Pancreatic functions:No results for input(s): LIPASE, AMYLASE in the last 72 hours. Tutu Garcia: No results for input(s): LACTA in the last 72 hours. Thyroid functions:   Lab Results   Component Value Date    TSH 4.70 01/12/2021      U/A:  Recent Labs     01/12/21  1315   COLORU YELLOW   SPECGRAV 1.009   LEUKOCYTESUR NEGATIVE   GLUCOSEU NEGATIVE       Imaging/Diagonstics:     Ct Head Wo Contrast    Result Date: 1/12/2021  EXAMINATION: CT OF THE HEAD WITHOUT CONTRAST  1/12/2021 1:03 pm TECHNIQUE: CT of the head was performed without the administration of intravenous contrast. Dose modulation, iterative reconstruction, and/or weight based adjustment of the mA/kV was utilized to reduce the radiation dose to as low as reasonably achievable. COMPARISON: Cranial MRI September 28, 2010 HISTORY: ORDERING SYSTEM PROVIDED HISTORY: dizziness, syncope TECHNOLOGIST PROVIDED HISTORY: dizziness, syncope Reason for Exam: dizziness, syncope Acuity: Unknown Type of Exam: Unknown Additional signs and symptoms: PT STATES HE HAS BEEN PASSING OUT FOR THE LAST 3 DAYS FINDINGS: BRAIN/VENTRICLES: There is no acute intracranial hemorrhage, mass effect or midline shift. No abnormal extra-axial fluid collection. The gray-white differentiation is maintained without evidence of an acute infarct. There is no evidence of hydrocephalus. There is mild ventricular dilatation with proportionate sulcal prominence. ORBITS: The visualized portion of the orbits demonstrate no acute abnormality. SINUSES: There is minimal bilateral ethmoid sinus mucosal density. Otherwise clear paranasal sinuses SOFT TISSUES/SKULL:  No acute abnormality of the visualized skull or soft tissues.      Age-related volume loss Minimal bilateral ethmoid sinus mucosal density No acute intracranial abnormalities     Ct Abdomen Pelvis W Iv Contrast Additional Contrast? None    Result Date: 1/12/2021  EXAMINATION: CT OF THE ABDOMEN AND PELVIS WITH CONTRAST 1/12/2021 2:20 pm TECHNIQUE: CT of the abdomen and pelvis was performed with the administration of intravenous contrast. Multiplanar reformatted images are provided for review. Dose modulation, iterative reconstruction, and/or weight based adjustment of the mA/kV was utilized to reduce the radiation dose to as low as reasonably achievable. COMPARISON: None. HISTORY: ORDERING SYSTEM PROVIDED HISTORY: fall, left lower rib and abd pain. on eliquis TECHNOLOGIST PROVIDED HISTORY: fall, left lower rib and abd pain. on eliquis Reason for Exam: Diagnosed with Covid three months ago, has been feeling bad ever since, no one can tell him what is wrong with him Acuity: Unknown Type of Exam: Unknown FINDINGS: Lower Chest: Chronic interstitial densities and fibrosis in the lower lungs. Also, subsegmental pleural based density in the right lower lobe. Please refer to the report of CT scan of the chest for details. Coronary artery calcifications. Borderline cardiomegaly. Organs: Liver, spleen, pancreas, adrenals, and right kidney unremarkable. A 0.3 cm nonobstructing stone noted in the lower pole of the left kidney. Lesle Gulling GI/Bowel: No definite cholelithiasis. Normal appendix. Moderate amount of retained stool in the colon with no evidence of bowel obstruction. Status post partial resection of the small bowel and sigmoid with anastomosis. Pelvis: Mildly enlarged prostate gland causing mass effect on the neck of the incompletely distended urinary bladder. Small fat containing left inguinal hernia. Peritoneum/Retroperitoneum: Vascular calcification with no abdominal aortic aneurysm. No free air or free fluid. No adenopathy. Bones/Soft Tissues: No acute fracture or dislocation in the regional skeleton. Multilevel thoracolumbar spondylosis with mild-to-moderate levoscoliosis. Mild osteoarthritis of the bilateral hip joints.      No acute traumatic injury to the abdomen and pelvis A 0.3 cm nonobstructing stone in the lower pole of the left kidney. Xr Chest Portable    Result Date: 1/12/2021  EXAMINATION: ONE XRAY VIEW OF THE CHEST 1/12/2021 12:44 pm COMPARISON: CT chest from 12/07/2020, and previous AP chest from the same day. HISTORY: ORDERING SYSTEM PROVIDED HISTORY: sob, left rib pain after fall TECHNOLOGIST PROVIDED HISTORY: sob, left rib pain after fall Reason for Exam: sob, left rib pain after fall h/o CHF and Covid Acuity: Acute Type of Exam: Initial Additional signs and symptoms: sob, left rib pain after fall h/o CHF and Covid History of substance abuse, Hill's esophagus, GERD, and colon cancer. FINDINGS: Overlying ECG monitor leads and gown snaps. Cardiomediastinal shadow stable. Unchanged interstitial and peripheral ground-glass airspace abnormalities mid lower lung zones. No large pleural effusion or pneumothorax. Bones stable. No rib or other fracture in the chest appreciated. Persistent interstitial and ground-glass opacities mid lower zones, as better demonstrated on recent CT chest.  No obvious acute finding, large pleural effusion or fracture identified in the chest.     Ct Chest Pulmonary Embolism W Contrast    Result Date: 1/12/2021  EXAMINATION: CTA OF THE CHEST 1/12/2021 11:20 am TECHNIQUE: CTA of the chest was performed after the administration of intravenous contrast.  Multiplanar reformatted images are provided for review. MIP images are provided for review. Dose modulation, iterative reconstruction, and/or weight based adjustment of the mA/kV was utilized to reduce the radiation dose to as low as reasonably achievable. COMPARISON: 12/07/2020 HISTORY: ORDERING SYSTEM PROVIDED HISTORY: elevated ddimer. shortness of breath. fall left rib pain TECHNOLOGIST PROVIDED HISTORY: elevated ddimer. shortness of breath.  fall left rib pain Reason for Exam: patient was diagnosed with Covid 3 months ago and is still not feeling better, states he has overall feeling terrible since Acuity: Unknown Type of Exam: Unknown FINDINGS: Pulmonary Arteries: Pulmonary arteries are adequately opacified for evaluation. No evidence of intraluminal filling defect to suggest pulmonary embolism. Main pulmonary artery is normal in caliber. Mediastinum: There are stable multiple nonspecific lymph nodes seen in the middle mediastinum. No suspicious lymphadenopathy. Lungs/pleura: Hyperinflated with bullous changes. . Extensive patchy areas of pulmonary fibrosis with honeycombing. Mildly improved superimposed ground-glass opacities in the left upper lobe. .  No suspicious pulmonary masses are noted. No pleural effusions are identified. Cardiomediastinal Structures: Coronary arterial calcifications are seen. Intimal calcifications associated with the thoracic aorta. Cardiac chambers are mildly enlarged Upper Abdomen: Small hiatal hernia Soft Tissues/Bones: There are hypertrophic degenerative changes in the thoracic spine. No acute osseous abnormalities. No evidence of pulmonary embolism Extensive pulmonary fibrosis with resolving superimposed ground-glass opacities in the left upper lobe when compared to the prior exam Coronary artery/atherosclerotic disease Bullous emphysema     ASSESSMENT  and  PLAN     Principal Problem:    Syncope and collapse  Active Problems:    PAF (paroxysmal atrial fibrillation) (HCC)    Pulmonary fibrosis (HCC)    Chronic anticoagulation  Resolved Problems:    * No resolved hospital problems.  *    Plan:    Syncope and collapse  -Check orthostatic VS q shift x2  --Initial reading + orthostatic changes  ---has been taking extra doses of diuretic to help breathing  -Hold Bumex for now  -Begin NS @ 100ml/hr overnight  --reevaluate in am  -EKG - A-Fib - rate controlled  -2D Echocardiogram in am   -Neuro checks q 4 hours  -PT & OT eval and treat  -Check CBC, BMP, Mg, Phos, TSH, UA w/reflex culture    Pulmonary Fibrosis   -CT chest - Extensive pulmonary fibrosis  --resolving ground glass opacities in BREE  ---Bullous emphysema  -Continue O2 to keep sats >92%  --Pulmonology consulted in ED    Paroxysmal a. Fib  -chronically anticoagulated on Eliquis  --Reports compliance  -D-dimer - 0.57  --CT chest negative for PE  -TSH 4.70    Consultations:     IP CONSULT TO INTERNAL MEDICINE  IP CONSULT TO PULMONOLOGY  IP CONSULT TO PULMONOLOGY      MIRI Barnhart - CNP   1/13/2021  4:26 AM    [facility]ing [address] [place] [address], [address] [place] [address].    Phone [phone]

## 2021-01-13 NOTE — PLAN OF CARE
Problem: Falls - Risk of:  Goal: Will remain free from falls  Description: Will remain free from falls  Outcome: Met This Shift  Pt assessed as a fall risk this shift. Remains free from falls and accidental injury at this time. Fall precautions in place, including falling star sign and fall risk band on pt. Floor free from obstacles, and bed is locked and in lowest position. Adequate lighting provided. Pt encouraged to call before getting OOB for any need. Bed alarm activated. Will continue to monitor needs during hourly rounding, and reinforce education on use of call light. Problem: DAILY CARE  Goal: Daily care needs are met  Outcome: Met This Shift  Patient and staff currently meeting all patient's daily care needs. Patient encouraged to participate and complete all ADLs per self. Will continue to monitor for opportunities for patient to meet all ADLs per self. Problem: PAIN  Goal: Patient's pain/discomfort is manageable  Outcome: Met This Shift   Pt medicated with pain medication prn. Assessed all pain characteristics including level, type, location, frequency, and onset. Non-pharmacologic interventions offered to pt as well. Pt states pain is tolerable at this time. Will continue to monitor.

## 2021-01-14 VITALS
BODY MASS INDEX: 22.47 KG/M2 | RESPIRATION RATE: 18 BRPM | SYSTOLIC BLOOD PRESSURE: 111 MMHG | WEIGHT: 151.7 LBS | TEMPERATURE: 97.2 F | HEART RATE: 55 BPM | HEIGHT: 69 IN | DIASTOLIC BLOOD PRESSURE: 70 MMHG | OXYGEN SATURATION: 97 %

## 2021-01-14 LAB
ANION GAP SERPL CALCULATED.3IONS-SCNC: 6 MMOL/L (ref 9–17)
BUN BLDV-MCNC: 15 MG/DL (ref 8–23)
BUN/CREAT BLD: ABNORMAL (ref 9–20)
CALCIUM SERPL-MCNC: 9.1 MG/DL (ref 8.6–10.4)
CHLORIDE BLD-SCNC: 103 MMOL/L (ref 98–107)
CO2: 30 MMOL/L (ref 20–31)
CREAT SERPL-MCNC: 0.59 MG/DL (ref 0.7–1.2)
GFR AFRICAN AMERICAN: >60 ML/MIN
GFR NON-AFRICAN AMERICAN: >60 ML/MIN
GFR SERPL CREATININE-BSD FRML MDRD: ABNORMAL ML/MIN/{1.73_M2}
GFR SERPL CREATININE-BSD FRML MDRD: ABNORMAL ML/MIN/{1.73_M2}
GLUCOSE BLD-MCNC: 89 MG/DL (ref 70–99)
HCT VFR BLD CALC: 34.7 % (ref 41–53)
HEMOGLOBIN: 11.3 G/DL (ref 13.5–17.5)
MCH RBC QN AUTO: 26.6 PG (ref 26–34)
MCHC RBC AUTO-ENTMCNC: 32.7 G/DL (ref 31–37)
MCV RBC AUTO: 81.2 FL (ref 80–100)
NRBC AUTOMATED: ABNORMAL PER 100 WBC
PDW BLD-RTO: 18.9 % (ref 11.5–14.9)
PLATELET # BLD: 299 K/UL (ref 150–450)
PMV BLD AUTO: 7.4 FL (ref 6–12)
POTASSIUM SERPL-SCNC: 4 MMOL/L (ref 3.7–5.3)
RBC # BLD: 4.27 M/UL (ref 4.5–5.9)
SODIUM BLD-SCNC: 139 MMOL/L (ref 135–144)
WBC # BLD: 8.2 K/UL (ref 3.5–11)

## 2021-01-14 PROCEDURE — 97110 THERAPEUTIC EXERCISES: CPT

## 2021-01-14 PROCEDURE — 36415 COLL VENOUS BLD VENIPUNCTURE: CPT

## 2021-01-14 PROCEDURE — 80048 BASIC METABOLIC PNL TOTAL CA: CPT

## 2021-01-14 PROCEDURE — 6370000000 HC RX 637 (ALT 250 FOR IP): Performed by: PHYSICIAN ASSISTANT

## 2021-01-14 PROCEDURE — 6370000000 HC RX 637 (ALT 250 FOR IP): Performed by: NURSE PRACTITIONER

## 2021-01-14 PROCEDURE — 97530 THERAPEUTIC ACTIVITIES: CPT

## 2021-01-14 PROCEDURE — 85027 COMPLETE CBC AUTOMATED: CPT

## 2021-01-14 PROCEDURE — 6370000000 HC RX 637 (ALT 250 FOR IP): Performed by: INTERNAL MEDICINE

## 2021-01-14 PROCEDURE — 99233 SBSQ HOSP IP/OBS HIGH 50: CPT | Performed by: INTERNAL MEDICINE

## 2021-01-14 PROCEDURE — 2580000003 HC RX 258: Performed by: PHYSICIAN ASSISTANT

## 2021-01-14 RX ADMIN — ACETAMINOPHEN 650 MG: 325 TABLET, FILM COATED ORAL at 09:26

## 2021-01-14 RX ADMIN — AMIODARONE HYDROCHLORIDE 200 MG: 200 TABLET ORAL at 11:22

## 2021-01-14 RX ADMIN — APIXABAN 5 MG: 5 TABLET, FILM COATED ORAL at 09:26

## 2021-01-14 RX ADMIN — ATORVASTATIN CALCIUM 40 MG: 40 TABLET, FILM COATED ORAL at 09:26

## 2021-01-14 RX ADMIN — TAMSULOSIN HYDROCHLORIDE 0.4 MG: 0.4 CAPSULE ORAL at 09:26

## 2021-01-14 RX ADMIN — DIGOXIN 125 MCG: 125 TABLET ORAL at 09:26

## 2021-01-14 RX ADMIN — PANTOPRAZOLE SODIUM 40 MG: 40 TABLET, DELAYED RELEASE ORAL at 05:22

## 2021-01-14 RX ADMIN — Medication 10 ML: at 09:26

## 2021-01-14 RX ADMIN — ACETAMINOPHEN 650 MG: 325 TABLET, FILM COATED ORAL at 00:29

## 2021-01-14 ASSESSMENT — PAIN SCALES - GENERAL
PAINLEVEL_OUTOF10: 5
PAINLEVEL_OUTOF10: 5
PAINLEVEL_OUTOF10: 0

## 2021-01-14 NOTE — PLAN OF CARE
Problem: Falls - Risk of:  Goal: Will remain free from falls  Description: Will remain free from falls  1/14/2021 0449 by Anaya Haywood RN  Outcome: Met This Shift  No falls noted this shift. Patient ambulates with x1 staff assistance without difficulty. Bed kept in low position. Safe environment maintained. Bedside table & call light in reach. Uses call light appropriately when needing assistance. Problem: DAILY CARE  Goal: Daily care needs are met  1/14/2021 0449 by Anaya Haywood RN  Outcome: Met This Shift   Patient and staff currently meeting all patient's daily care needs. Patient encouraged to participate and complete all ADLs per self. Will continue to monitor for opportunities for patient to meet all ADLs per self. Problem: PAIN  Goal: Patient's pain/discomfort is manageable  1/14/2021 0449 by Anaya Haywood RN  Outcome: Met This Shift   Pt medicated with pain medication prn. Assessed all pain characteristics including level, type, location, frequency, and onset. Non-pharmacologic interventions offered to pt as well. Pt states pain is tolerable at this time. Will continue to monitor. Problem: Daily Care:  Goal: Daily care needs are met  Description: Daily care needs are met  1/14/2021 0449 by Anaya Haywood RN  Outcome: Met This Shift  Patient and staff currently meeting all patient's daily care needs. Patient encouraged to participate and complete all ADLs per self. Will continue to monitor for opportunities for patient to meet all ADLs per self.

## 2021-01-14 NOTE — PROGRESS NOTES
Pulmonary Progress Note  [facility]      Patient - Cherelle Lamb,  Age - 77 y.o.    - 1955      Room Number - -01   N -  [id number]   [facility]  Date of Admission -  2021 12:05 PM        Consulting Lenin Lu MD  Primary Care Physician - Jo Marshall MD     SUBJECTIVE   He looks comfortable no further episodes of syncope    OBJECTIVE   VITALS    height is 5' 9\" (1.753 m) and weight is 151 lb 11.2 oz (68.8 kg). His oral temperature is 97.7 °F (36.5 °C). His blood pressure is 114/65 and his pulse is 57. His respiration is 15 and oxygen saturation is 93%. Body mass index is 22.4 kg/m². Temperature Range: Temp: 97.7 °F (36.5 °C) Temp  Av.7 °F (36.5 °C)  Min: 97.3 °F (36.3 °C)  Max: 98.1 °F (36.7 °C)  BP Range:  Systolic (95GQA), CKU:461 , Min:114 , BKF:243     Diastolic (32PUC), JYN:09, Min:63, Max:80    Pulse Range: Pulse  Av.3  Min: 62  Max: 61  Respiration Range: Resp  Avg: 15.5  Min: 15  Max: 16  Current Pulse Ox[de-identified]  SpO2: 93 %  24HR Pulse Ox Range:  SpO2  Av %  Min: 91 %  Max: 97 %  Oxygen Amount and Delivery: O2 Flow Rate (L/min): 2 L/min    Wt Readings from Last 3 Encounters:   21 151 lb 11.2 oz (68.8 kg)   12/10/20 173 lb 8 oz (78.7 kg)   20 165 lb (74.8 kg)       I/O (24 Hours)    Intake/Output Summary (Last 24 hours) at 2021 1403  Last data filed at 2021 0828  Gross per 24 hour   Intake 1190 ml   Output 400 ml   Net 790 ml       EXAM     General Appearance  Awake, alert, oriented, in no acute distress  HEENT - normocephalic, atraumatic.  []  Mallampati  [] Crowded airway   [] Macroglossia  []  Retrognathia  [] Micrognathia  []  Normal tongue size []  Normal Bite  [] Lanier sign positive    Neck - Supple,  trachea midline   Lungs -crackles bilaterally, baseline  Cardiovascular - Heart sounds are normal.  Regular rate and rhythm Abdomen - Soft, nontender, nondistended, no masses or organomegaly  Neurologic - There are no focal motor or sensory deficits  Skin - No bruising or bleeding  Extremities - No clubbing, cyanosis, edema    MEDS      sodium chloride flush  10 mL Intravenous 2 times per day    apixaban  5 mg Oral BID    atorvastatin  40 mg Oral Daily    pantoprazole  40 mg Oral QAM AC    tamsulosin  0.4 mg Oral Daily    digoxin  125 mcg Oral Daily    [Held by provider] bumetanide  1 mg Oral Daily       perflutren lipid microspheres, sodium chloride flush, sodium chloride flush, acetaminophen, HYDROcodone 5 mg - acetaminophen **OR** HYDROcodone 5 mg - acetaminophen, morphine **OR** morphine, albuterol, potassium chloride **OR** potassium alternative oral replacement **OR** potassium chloride, magnesium sulfate, promethazine **OR** ondansetron, polyethylene glycol    LABS   CBC   Recent Labs     01/14/21  0544   WBC 8.2   HGB 11.3*   HCT 34.7*   MCV 81.2        BMP:   Lab Results   Component Value Date     01/14/2021    K 4.0 01/14/2021     01/14/2021    CO2 30 01/14/2021    BUN 15 01/14/2021    LABALBU 3.6 01/12/2021    CREATININE 0.59 01/14/2021    CALCIUM 9.1 01/14/2021    GFRAA >60 01/14/2021    LABGLOM >60 01/14/2021     ABGs:No results found for: PHART, PO2ART, DJH9SMZ   Lab Results   Component Value Date    MODE NOT REPORTED 12/07/2020     Ionized Calcium:  No results found for: IONCA  Magnesium:    Lab Results   Component Value Date    MG 2.0 01/13/2021     Phosphorus:    Lab Results   Component Value Date    PHOS 3.6 01/13/2021        LIVER PROFILE   Recent Labs     01/12/21  1221   AST 19   ALT 13   BILITOT 0.49   ALKPHOS 99     INR   Recent Labs     01/12/21  1221   INR 1.7     PTT   Lab Results   Component Value Date    APTT 41.8 (H) 01/12/2021         RADIOLOGY     (See actual reports for details)    ASSESSMENT/PLAN   Principal Problem:    Syncope and collapse  Active Problems:    Benign prostatic hyperplasia with urinary obstruction    PAF (paroxysmal atrial fibrillation) (HCC)    Benign essential HTN    CHF (congestive heart failure), NYHA class II, acute on chronic, combined ([address]    Pulmonary fibrosis (HCC)    Chronic anticoagulation    Severe malnutrition ([address]  Resolved Problems:    * No resolved hospital problems. *    Carotid Dopplers are negative  Echo actually shows improved systolic function  We will do home oxygen evaluation, he may need higher levels of oxygen with exercise  He is very forgetful and I am concerned that he will not wear his oxygen 24 hours a day and take it off for short periods of time  [place] clinic appointment has been set up (they called his home)  He can follow-up in the office in 4 weeks locally    Electronically signed by Jona Benavides MD on 1/14/2021 at 2:03 PM    Home oxygen evaluation results noted, he does need increased amount of oxygen with exertion.   A face-to-face discussion was done on the benefits of oxygen therapy and he is agreeable

## 2021-01-14 NOTE — DISCHARGE INSTR - COC
Continuity of Care Form    Patient Name: Twan Asif   :  1955  MRN:  [id number]    Admit date:  2021  Discharge date:  ***    Code Status Order: Full Code   Advance Directives:   Advance Care Flowsheet Documentation       Date/Time Healthcare Directive Type of Healthcare Directive Copy in [address] Agent's Name Healthcare Agent's Phone Number    21 1832  Yes, patient has an advance directive for healthcare treatment                      Admitting Physician:  Rachel Schwartz MD  PCP: Nydia Bush MD    Discharging Nurse: [facility] Unit/Room#: -  Discharging Unit Phone Number: ***    Emergency Contact:   Extended Emergency Contact Information  Primary Emergency Contact: Susie Garcia  Address: [address] [facility] [address] [place] [address] Phone: [phone]  Work Phone: [phone]  Mobile Phone: [phone]  Relation: Other  Secondary Emergency Contact: Zeke Lorenz  Address: [address] Phone: [phone]  Work Phone: [phone]  Mobile Phone: [phone]  Relation: Brother/Sister    Past Surgical History:  Past Surgical History:   Procedure Laterality Date    CARDIAC CATHETERIZATION  2018    Non-obstructive CAD    CARDIOVERSION  2020    COLECTOMY      2nd colectomy, Colostomy and reversed [facility]      1st time Mark Dragon    COLONOSCOPY      COLONOSCOPY  2016    COLONOSCOPY N/A 2018    COLONOSCOPY DIAGNOSTIC performed by Nora Edwards MD at [address] [place] [address] Right 2009    inguinal    KNEE SURGERY Right 1970's    arthrotomy    TONSILLECTOMY      TOTAL KNEE ARTHROPLASTY Right 2019    KNEE TOTAL ARTHROPLASTY performed by Merari Mccord MD at [address]    TRANSESOPHAGEAL ECHOCARDIOGRAM  2018    UPPER GASTROINTESTINAL ENDOSCOPY N/A 2018    EGD DIAGNOSTIC ONLY performed by Nora Edwards MD at [facility] UPPER GASTROINTESTINAL ENDOSCOPY N/A 6/18/2019    MCGUIRE'S       Immunization History:   Immunization History   Administered Date(s) Administered    Influenza Vaccine, unspecified formulation 01/16/2014, 01/03/2017, 12/12/2017    Influenza Virus Vaccine 01/16/2014, 10/26/2014, 12/04/2015    Influenza, High Dose (Fluzone 65 yrs and older) 09/27/2018    Influenza, Quadv, 6 mo and older, IM (Fluzone, Flulaval) 12/12/2017    Influenza, Quadv, IM, (6 mo and older Fluzone, Flulaval, Fluarix and 3 yrs and older Afluria) 10/23/2014, 01/03/2017    Influenza, Quadv, IM, PF (6 mo and older Fluzone, Flulaval, Fluarix, and 3 yrs and older Afluria) 10/23/2014    Influenza, Quadv, adjuvanted, 65 yrs +, IM, PF (Fluad) 11/16/2020    Influenza, Triv, inactivated, subunit, adjuvanted, IM (Fluad 65 yrs and older) 11/08/2019       Active Problems:  Patient Active Problem List   Diagnosis Code    Dyslipidemia E78.5    ED (erectile dysfunction) N52.9    Incomplete bladder emptying R33.9    Benign prostatic hyperplasia with urinary obstruction N40.1, N13.8    History of colon cancer Z85.038    PAF (paroxysmal atrial fibrillation) (HCC) I48.0    Anemia D64.9    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    Presbyopia H52.4    Incisional hernia, without obstruction or gangrene K43.2    Hypertrophic nonobstructive cardiomyopathy (HCC) I42.2    Iron (Fe) deficiency anemia D50.9    Primary osteoarthritis of right knee M17.11    Benign essential HTN I10    History of malignant neoplasm of rectum Z85.048    Mcguire's esophagus K22.70    CHF (congestive heart failure), NYHA class II, acute on chronic, combined ([facility] I50.43    Hypoxia R09.02    Dyspnea and respiratory abnormalities R06.00, R06.89    Occupational pulmonary disease J98.4    Sinus bradycardia R00.1    Acute hypoxemic respiratory failure due to COVID-19 ([facility] U07.1, J96.01    COVID-19 U07.1    Pneumonia J18.9    Acute respiratory failure with hypoxia (HCC) J96.01 Pulmonary fibrosis (HCC) J84.10    Syncope and collapse R55    Chronic anticoagulation Z79.01    Severe malnutrition ([facility] E43       Isolation/Infection:   Isolation            No Isolation          Patient Infection Status       Infection Onset Added Last Indicated Last Indicated By Review Planned Expiration Resolved Resolved By    None active    Resolved    COVID-19 20 COVID-19   20     COVID-19 Rule Out 20 COVID-19 (Ordered)   20 Rule-Out Test Resulted    COVID-19 Rule Out 10/17/20 10/17/20 10/17/20 COVID-19 (Ordered)   10/17/20 Rule-Out Test Resulted    COVID-19 Rule Out 10/17/20 10/17/20 10/17/20 COVID-19 (Ordered)   10/17/20 Rule-Out Test Resulted            Nurse Assessment:  Last Vital Signs: /65   Pulse 57   Temp 97.7 °F (36.5 °C) (Oral)   Resp 15   Ht 5' 9\" (1.753 m)   Wt 151 lb 11.2 oz (68.8 kg)   SpO2 93%   BMI 22.40 kg/m²     Last documented pain score (0-10 scale): Pain Level: 5  Last Weight:   Wt Readings from Last 1 Encounters:   21 151 lb 11.2 oz (68.8 kg)     Mental Status:  {IP PT MENTAL STATUS::::0}    IV Access:  { LEONOR IV ACCESS:290419487:::0}    Nursing Mobility/ADLs:  Walking   {CHP DME ADLs:743548981:::0}  Transfer  {CHP DME ADLs:258485627:::0}  Bathing  {CHP DME ADLs:135865362:::0}  Dressing  {CHP DME ADLs:442583735:::0}  Toileting  {CHP DME ADLs:121698804:::0}  Feeding  {CHP DME ADLs:682344995:::0}  Med Admin  {CHP DME ADLs:938897707:::0}  Med Delivery   { LEONOR MED Delivery:183437358:::0}    Wound Care Documentation and Therapy:        Elimination:  Continence:    Bowel: {YES / DF:43778}  Bladder: {YES / QR:20949}  Urinary Catheter: {Urinary Catheter:250810053:::0}   Colostomy/Ileostomy/Ileal Conduit: {YES / AS:07719}       Date of Last BM: ***    Intake/Output Summary (Last 24 hours) at 2021 1250  Last data filed at 2021 0828  Gross per 24 hour   Intake 1190 ml   Output 400 ml Net 790 ml     I/O last 3 completed shifts:   In: 0268 [P.O.:1305]  Out: 600 [Urine:600]    Safety Concerns:     [address] Safety Concerns:851006646:::0}    Impairments/Disabilities:      [address] Impairments/Disabilities:478733578:::0}    Nutrition Therapy:  Current Nutrition Therapy:   [address] Diet List:274042845:::0}    Routes of Feeding: {[facility] DME Other Feedings:445310833:::0}  Liquids: {Slp liquid thickness:43607}  Daily Fluid Restriction: {CHP DME Yes amt example:636481749:::0}  Last Modified Barium Swallow with Video (Video Swallowing Test): {Done Not Done IRCU:578023685:::6}    Treatments at the Time of Hospital Discharge:   Respiratory Treatments: ***  Oxygen Therapy:  {Therapy; copd oxygen:26928:::0}  Ventilator:    {[facility] Vent List:414685729:::0}    Rehab Therapies: {THERAPEUTIC INTERVENTION:9670869851}  Weight Bearing Status/Restrictions: [address]  Weight Bearin:::0}  Other Medical Equipment (for information only, NOT a DME order):  {EQUIPMENT:449399909}  Other Treatments: ***    Patient's personal belongings (please select all that are sent with patient):  {CHP DME Belongings:878165698:::0}    RN SIGNATURE:  {Esignature:335694193:::0}    CASE MANAGEMENT/SOCIAL WORK SECTION    Inpatient Status Date: ***    Readmission Risk Assessment Score:  Readmission Risk              Risk of Unplanned Readmission:        23           Discharging to Facility/ Agency   Name:   Address:  Phone:  Fax:    Dialysis Facility (if applicable)   Name:  Address:  Dialysis Schedule:  Phone:  Fax:    / signature: {Esignature:008944438:::0}    PHYSICIAN SECTION    Prognosis: {Prognosis:7286044049:::0}    Condition at Discharge: [address] Patient Condition:319339095:::0}    Rehab Potential (if transferring to Rehab): {Prognosis:5265103223:::0}    Recommended Labs or Other Treatments After Discharge: *** Physician Certification: I certify the above information and transfer of Katerine Pineda  is necessary for the continuing treatment of the diagnosis listed and that he requires {Admit to Appropriate Level of Care:62701:::0} for {GREATER/LESS:120626992} 30 days.      Update Admission H&P: {CHP DME Changes in HandP:273030138:::0}    PHYSICIAN SIGNATURE:  Electronically signed by Eusebio Martínez MD on 1/14/21 at 12:50 PM EST

## 2021-01-14 NOTE — PROGRESS NOTES
[facility]   INPATIENT OCCUPATIONAL THERAPY  PROGRESS NOTE  Date: 2021  Patient Name: Yvette Ford      Room:   MRN: [id number]    : 1955  (77 y.o.) Gender: male     Discharge Recommendations:  Further Occupational Therapy is recommended upon facility discharge. Equipment Needed: (TBD)    Referring Practitioner: Dr. Victoriano Cross  Diagnosis: Syncope and collapse  General  Chart Reviewed: Yes  Patient assessed for rehabilitation services?: Yes  Additional Pertinent Hx: CHF, recent hx of COVID  Family / Caregiver Present: No  Referring Practitioner: Dr. Victoriano Cross  Diagnosis: Syncope and collapse    Restrictions  Restrictions/Precautions: Fall Risk(peripheral IV left forearm, O2 at 3 L, up w/assist, hx (+) covid in Nov/Dec)  Implants present? : Metal implants(right TKR)  Other position/activity restrictions: up w/ assist  Required Braces or Orthoses?: No      Subjective  Subjective: \"I'm here because I was fainting at home\"  Comments: O2 drops to low 80's with minimal exertion, pt alert and cooperative  Patient Currently in Pain: No  Overall Orientation Status: Within Functional Limits  Patient Observation  Observations: pt completes BUE strengthening exercises sitting EOB. pt O2 SATs drop to low 80's with min exertion on 2L, NC. RT in room and increases O2 to 4L and SATs stay above 91%. During functgional mobility in hallway O2 SATs drops to 76% on 3L, RT increases O2 to 4L. pt requires sitting RBand utilizing purse lip breathing tech ~2 minutes to recover to above 90%.  Pt states that he knows when his SATs drop below 80% because his lungs feel \"like they're collapsing\"       Objective  Cognition  Overall Cognitive Status: WFL  Bed mobility  Rolling to Left: Modified independent  Supine to Sit: Modified independent  Scooting: Modified independent  Balance  Sitting Balance: Supervision  Standing Balance: Stand by assistance  Standing Balance  Time: 3-4 min c no Mobility Training, Endurance Training, Safety Education & Training, Patient/Caregiver Education & Training, Equipment Evaluation, Education, & procurement, Self-Care / ADL, Home Management Training      Goals  Short term goals  Time Frame for Short term goals: By Discharge  Short term goal 1: Pt will complete BADL's with Modified IND and Good safety using EC/WS techiques as needed to maintain vitals WFL. Short term goal 2: Pt will complete toilet transfer and toileting with Modified IND and Good safety using least restrictive device. Short term goal 3: Pt will V/D at least 3 fall prevention techniques that he can utilize in his home environment. Short term goal 4: Pt will actively participate in 15-20 minutes of therapeutic exercise/activity to promote increased independence and safety with self-care and mobility.     OT Individual Minutes  Time In: 3029  Time Out: 2536  Minutes: 30      Electronically signed by KAYLA Robles on 1/14/21 at 2:46 PM EST

## 2021-01-14 NOTE — PROGRESS NOTES
Home Oxygen Evaluation    Home Oxygen Evaluation completed. Patient is on 3 liters per minute via nasal cannula. Resting SpO2 = 84%  Increased O2 to 4 lpm. SpO2 90% at rest  SpO2 on 3 lpm  with exercise = 77%  SpO2 on  4 lpm with exercise = 82%  Patient returned to room.  O2 increased to 5 lpm. SpO2 95% at rest  O2 decreased to 4 lpm at rest  Respiratory Therapy recommends that patient use 5 lpm during time of exertion       Dandre Antonio  2:22 PM

## 2021-01-14 NOTE — CARE COORDINATION
DISCHARGE PLANNING NOTE:    Patient is current with [employer] for oxygen per Kath Atkins. Patient qualifies for O2 at 4L at rest and 5L with exertion. New prescription, O2 eval, facesheet, and face to face were faxed to [facility] at [phone].     Electronically signed by Arnie Neal RN on 1/14/2021 at 3:03 PM

## 2021-01-14 NOTE — CONSULTS
[facility]   Consult Note                 Date:   1/14/2021  Date of admission:  1/12/2021 12:05 PM  MRN:   [id number]  YOB: 1955    Reason for Consult: Syncope    HISTORY OF PRESENT ILLNESS:    The patient is a 77 y.o.  male who is admitted to the hospital for for syncope. According to him , he was trying to get out of bed patient felt dizziness was knowing that he will be blackout patient tried to sit on the bed back but fell down and had some abrasion on the arms patient lost consciousness for sometimes. According to him this is his third time it always happen with changing in posture with prodromal symptoms of dizziness denies any palpitations denies any chest pains or pressures. With this admission patient was on diuretic which was discontinued by Dr. Santhosh Vaz. Marked orthostatic changes. Patient also had a history of moderate aortic stenosis history of heart cath with minimal disease in the past  Patient has been diagnosed with pulmonary fibrosis on O2      Past Medical History:   has a past medical history of Atrial fibrillation ([facility] [address] Back pain, chronic, Hill's esophagus, BPH (benign prostatic hyperplasia), Cancer ([facility] [address] Cocaine abuse in remission [facility]), ED (erectile dysfunction), GERD (gastroesophageal reflux disease), GI bleed, Hernia, History of colon cancer, Melena, Migraines, and Murmur, cardiac. Past Surgical History:   has a past surgical history that includes Tonsillectomy; Colonoscopy; colectomy; Colonoscopy (07/18/2016); knee surgery (Right, 1970's); transesophageal echocardiogram (11/29/2018); Upper gastrointestinal endoscopy (N/A, 12/5/2018); Colonoscopy (N/A, 12/6/2018); hernia repair (Right, 2009); Cardiac catheterization (11/29/2018); colectomy; Total knee arthroplasty (Right, 1/8/2019); Upper gastrointestinal endoscopy (N/A, 6/18/2019); and Cardioversion (2020).      Home Medications:    Prior to Admission medications    Medication Sig Start Date End Date Taking? Authorizing Provider   apixaban (ELIQUIS) 5 MG TABS tablet Take 5 mg by mouth daily   Yes Historical Provider, MD   bumetanide (BUMEX) 1 MG tablet Take 1 tablet by mouth daily 12/8/20  Yes Ryley Rodriges MD   digoxin (LANOXIN) 125 MCG tablet Take 1 tablet by mouth daily 12/8/20  Yes Ryley Rodriges MD   albuterol sulfate HFA (VENTOLIN HFA) 108 (90 Base) MCG/ACT inhaler Inhale 2 puffs into the lungs 4 times daily as needed for Wheezing 12/7/20  Yes Ryley Rodriges MD   amiodarone (CORDARONE) 200 MG tablet Take 1 tablet by mouth daily Start on 12/21 after taking 200mg PO twice daily for 14 days. Patient taking differently: Take 200 mg by mouth daily  12/21/20  Yes MIRI Dougherty - CNP   atorvastatin (LIPITOR) 40 MG tablet Take 1 tablet by mouth daily 11/16/20  Yes Cassandra Crow MD   omeprazole (PRILOSEC) 40 MG delayed release capsule Take 1 capsule by mouth daily 11/16/20  Yes Cassandra Crow MD   tamsulosin [facility]) 0.4 MG capsule Take 1 capsule by mouth daily 10/15/20  Yes Cassandra Crow MD       Current Medications: Scheduled Meds:   sodium chloride flush  10 mL Intravenous 2 times per day    amiodarone  200 mg Oral Daily    apixaban  5 mg Oral BID    atorvastatin  40 mg Oral Daily    pantoprazole  40 mg Oral QAM AC    tamsulosin  0.4 mg Oral Daily    digoxin  125 mcg Oral Daily    [Held by provider] bumetanide  1 mg Oral Daily     Continuous Infusions:  PRN Meds:.perflutren lipid microspheres, sodium chloride flush, sodium chloride flush, acetaminophen, HYDROcodone 5 mg - acetaminophen **OR** HYDROcodone 5 mg - acetaminophen, morphine **OR** morphine, albuterol, potassium chloride **OR** potassium alternative oral replacement **OR** potassium chloride, magnesium sulfate, promethazine **OR** ondansetron, polyethylene glycol     Allergies:  Adhesive tape, Codeine, and Penicillins    Social History:   reports that he quit smoking about 50 years ago.  He has a 0.50 pack-year smoking history. He has never used smokeless tobacco. He reports current alcohol use of about 12.0 standard drinks of alcohol per week. He reports that he does not use drugs. Family History: family history includes Diabetes in his mother; Heart Attack in his father; Heart Disease in his brother and father. Review of Systems   CONSTITUTIONAL:  negative for fevers, chills, fatigue and malaise    EYES:  negative for discharge    HEENT:  negative for epistaxis and sore throat    RESPIRATORY:  negative for cough, shortness of breath, wheezing    CARDIOVASCULAR:  negative for chest pain, palpitations,+  syncope, edema    GASTROINTESTINAL:  negative for nausea, vomiting, diarrhea, constipation, abdominal pain    GENITOURINARY:  negative for incontinence    MUSCULOSKELETAL:  negative for neck or back pain    NEUROLOGICAL:  negative for headaches, seizures and double vision   PSYCHIATRIC:  negative               PHYSICAL EXAM:    Blood pressure 114/65, pulse 57, temperature 97.7 °F (36.5 °C), temperature source Oral, resp. rate 15, height 5' 9\" (1.753 m), weight 151 lb 11.2 oz (68.8 kg), SpO2 93 %. CONSTITUTIONAL: AOx4,  apparent malnourished, on O2  HEAD: normocephalic, atraumatic   EYES: PERRLA, EOMI   ENT: moist mucous membranes, uvula midline   NECK:  symmetric, no midline tenderness to palpation   LUNGS: clear to auscultation bilaterally   CARDIOVASCULAR: regular rate and rhythm, ejection systolic murmur  ABDOMEN: Soft, non-tender, non-distended with normal active bowel sounds   SKIN: no rash       DATA:    ECG:AFIB     ECHO:1/13/21  Summary  Left ventricle is normal in size and wall thickness. Global left ventricular systolic function is normal. Estimated LV EF >55 %. Left atrium is mildly dilated. Aortic leaflet calcification with moderate stenosis.   Mild mitral regurgitation.         ECHO: 10/20/20   Summary  Left ventricle is normal in size Global left ventricular systolic function  is moderately reduced Estimated ejection fraction is 35 % . Mostly global hypokinesis with minor regional variation. Grade I (mild) left ventricular diastolic dysfunction. Left atrium is moderately dilated. Aortic leaflet calcification with Moderate Aortic Stenosis, maybe  underestimated due to poor LVEF. Thickened mitral valve leaflets. Mild to moderate mitral regurgitation. Trivial tricuspid regurgitation. Estimated right ventricular systolic  pressure is 55DBPU. IVC dilated but unable to assess respiratory collapse.     Cardiac Angiography: 2018  Procedure Summary      Non-obstructive CAD.   Normal LV systolic function. Labs:     CBC:   Recent Labs     01/13/21  0536 01/14/21  0544   WBC 7.8 8.2   HGB 11.3* 11.3*   HCT 35.4* 34.7*    299     BMP:   Recent Labs     01/13/21  0536 01/14/21  0544    139   K 4.1 4.0   CO2 33* 30   BUN 15 15   CREATININE 0.67* 0.59*   LABGLOM >60 >60   GLUCOSE 88 89     BNP: No results for input(s): BNP in the last 72 hours. PT/INR:   Recent Labs     01/12/21  1221   PROTIME 19.6*   INR 1.7     APTT:  Recent Labs     01/12/21  1221   APTT 41.8*     CARDIAC ENZYMES:No results for input(s): CKTOTAL, CKMB, CKMBINDEX, TROPONINI in the last 72 hours.   FASTING LIPID PANEL:  Lab Results   Component Value Date    HDL 36 03/10/2020    TRIG 165 01/03/2017     LIVER PROFILE:  Recent Labs     01/12/21  1221   AST 19   ALT 13   LABALBU 3.6       Intake/Output Summary (Last 24 hours) at 1/14/2021 1130  Last data filed at 1/14/2021 9806  Gross per 24 hour   Intake 1190 ml   Output 400 ml   Net 790 ml       IMPRESSION:    Patient Active Problem List   Diagnosis    Dyslipidemia    ED (erectile dysfunction)    Incomplete bladder emptying    Benign prostatic hyperplasia with urinary obstruction    History of colon cancer    PAF (paroxysmal atrial fibrillation) (HCC)    Anemia    Pallor of optic disc    Presbyopia    Incisional hernia, without obstruction or gangrene    Hypertrophic nonobstructive cardiomyopathy (HCC)    Iron (Fe) deficiency anemia    Primary osteoarthritis of right knee    Benign essential HTN    History of malignant neoplasm of rectum    Hill's esophagus    CHF (congestive heart failure), NYHA class II, acute on chronic, combined (HCC)    Hypoxia    Dyspnea and respiratory abnormalities    Occupational pulmonary disease    Sinus bradycardia    Acute hypoxemic respiratory failure due to COVID-19 ([address]    COVID-19    Pneumonia    Acute respiratory failure with hypoxia (HCC)    Pulmonary fibrosis (HCC)    Syncope and collapse    Chronic anticoagulation    Severe malnutrition (HCC)           RECOMMENDATIONS:  Syncope, with marked orthostatic changes from lying 109/61    To 88/53 also with moderate aortic stenosis, hypoxia from pulmonary fibrosis, atrial fib with fast rate all these can contribute. At this time agree to DC diuretic. Chronic A. fib on rate control and anticoagulation. Watch for any bleeding any falls  Patient is on amiodarone all had already had a pulmonary fibrosis I have DC'd it  Continue compression stocking  Cerebral fluid intake with Powerade or Gatorade  Patient need 48-hour of Holter monitor on discharge if he feels much better  Patient is advised in details to use walker or take time with sudden change in posture  If still continue to have dizziness/syncope then he need further work-up for his aortic stenosis whether need TAVR versus SAVR  Patient can be discharged from cardiology point and follow in the office      Discussed with patient and nursing.     Deana Camacho, [id number]        [phone]

## 2021-01-14 NOTE — CARE COORDINATION
DISCHARGE PLANNING NOTE:    Patient informed me that he already received a call from [facility] to get appointment scheduled but they couldn't schedule since patient is still in the hospital    Awaiting home O2 eval - 6 minute walk    Thigh high stockings were deliverer by [facility] to patients bedside and patient is currently wearing them    Electronically signed by Asmita Wilcox RN on 1/14/2021 at 2:22 PM

## 2021-01-14 NOTE — FLOWSHEET NOTE
[facility] noted [facility] to see what [facility] can do for him.      01/14/21 1708   Encounter Summary   Services provided to: Patient   Referral/Consult From: Rounding   Continue Visiting   (1-14-21)   Complexity of Encounter Low   Length of Encounter 15 minutes   Routine   Type Initial   Assessment Calm; Approachable   Intervention Active listening;Explored feelings, thoughts, concerns;Sustaining presence/ Ministry of presence; Discussed illness/injury and it's impact   Outcome Expressed gratitude;Engaged in conversation;Expressed feelings/needs/concerns;Coping;Receptive

## 2021-01-14 NOTE — PROGRESS NOTES
[place] [address] Internal Medicine    Progress Note     1/14/2021    10:17 AM    Name:   René Nelson  MRN:     [id number]     Acct:      [de-identified]   Room:   2092/2092-01   Day:  2  Admit Date:  1/12/2021 12:05 PM    PCP:   Dorota Narayan MD  Code Status:  Full Code    Subjective:     C/C:   Chief Complaint   Patient presents with    Shortness of Breath    Rib Pain     left, s/p fall    Weight Loss     35lbs in 1 month     Principal Problem:    Syncope and collapse  Active Problems:    Benign prostatic hyperplasia with urinary obstruction    PAF (paroxysmal atrial fibrillation) (HCC)    Benign essential HTN    CHF (congestive heart failure), NYHA class II, acute on chronic, combined ([facility] [facility]    Pulmonary fibrosis ([facility] [facility]    Chronic anticoagulation    Severe malnutrition ([facility] [address] [facility]  Resolved Problems:    * No resolved hospital problems. *      Interval History Status: improved.        Patient past medical history multiple medical problem which include congestive heart failure, atrial fibrillation status post cardioversion, history of colorectal cancer, patient admitted worsening shortness of breath and weight loss, syncopal episode  Found positive for orthostatic hypotension  Started on thigh-high compression stocking  Echo done, concerning for moderate degree of aortic stenosis     Significant last 24 hr data reviewed ;   Vitals:    01/13/21 2037 01/14/21 0013 01/14/21 0015 01/14/21 0919   BP: 135/80  125/63 114/65   Pulse: 61  58 57   Resp: 15  16 15   Temp: 97.8 °F (36.6 °C)  98.1 °F (36.7 °C) 97.7 °F (36.5 °C)   TempSrc: Oral  Axillary Oral   SpO2: 91%  91% 93%   Weight:  151 lb 11.2 oz (68.8 kg)     Height:          Recent Results (from the past 24 hour(s))   Basic Metabolic Panel w/ Reflex to MG    Collection Time: 01/14/21  5:44 AM   Result Value Ref Range    Glucose 89 70 - 99 mg/dL    BUN 15 8 - 23 mg/dL    CREATININE 0.59 (L) 0.70 - 1.20 mg/dL    Bun/Cre Ratio NOT REPORTED 9 - 20    Calcium 9.1 8.6 - 10.4 mg/dL    Sodium 139 135 - 144 mmol/L    Potassium 4.0 3.7 - 5.3 mmol/L    Chloride 103 98 - 107 mmol/L    CO2 30 20 - 31 mmol/L    Anion Gap 6 (L) 9 - 17 mmol/L    GFR Non-African American >60 >60 mL/min    GFR African American >60 >60 mL/min    GFR Comment          GFR Staging NOT REPORTED    CBC    Collection Time: 01/14/21  5:44 AM   Result Value Ref Range    WBC 8.2 3.5 - 11.0 k/uL    RBC 4.27 (L) 4.5 - 5.9 m/uL    Hemoglobin 11.3 (L) 13.5 - 17.5 g/dL    Hematocrit 34.7 (L) 41 - 53 %    MCV 81.2 80 - 100 fL    MCH 26.6 26 - 34 pg    MCHC 32.7 31 - 37 g/dL    RDW 18.9 (H) 11.5 - 14.9 %    Platelets 034 613 - 511 k/uL    MPV 7.4 6.0 - 12.0 fL    NRBC Automated NOT REPORTED per 100 WBC     No results for input(s): POCGLU in the last 72 hours. Echo Complete 2d W Doppler W Color    Result Date: 1/13/2021  [facility] Transthoracic Echocardiography Report (TTE)  Patient Name [facility]     Date of Study               01/13/2021               Lockie Brush   Date of      1955  Gender                      Male  Birth   Age          77 year(s)  Race                           Room Number  2092        Height:                     69 inch, 175.26 cm   Corporate ID [id number]    Weight:                     145 pounds, 65.8 kg  #   Patient Acct [de-identified]   BSA:          1.8 m^2       BMI:      21.41  #                                                              kg/m^2   MR #         [id number]      Sonographer                 Rosanne Biggs   Accession #  [id number]  Interpreting Physician      [address] [place] [address]   Fellow                   Referring Nurse                           Practitioner   Interpreting             Referring Physician         Jacqui Benavides  Fellow  Type of Study   TTE procedure:2D Echocardiogram, M-Mode, Doppler, Color Doppler.   Procedure Date Date: 01/13/2021 Start: 11:09 AM Study Location: [facility] Technical Quality: Fair visualization Indications:Syncope and Shortness of breath. History / Tech. Comments: AFib, Aortic stenosis, CHF, Covid, Cancer Patient Status: Inpatient Height: 69 inches Weight: 145 pounds BSA: 1.8 m^2 BMI: 21.41 kg/m^2 Rhythm: Sinus bradycardia HR: 54 bpm BP: 119/74 mmHg Allergies   - Penicillin. - Codeine. CONCLUSIONS Summary Left ventricle is normal in size and wall thickness. Global left ventricular systolic function is normal. Estimated LV EF >55 %. Left atrium is mildly dilated. Aortic leaflet calcification with moderate stenosis. Mild mitral regurgitation. Signature ----------------------------------------------------------------------------  Electronically signed by Rosanne Biggs(Sonographer) on 01/13/2021 11:42  AM ---------------------------------------------------------------------------- ----------------------------------------------------------------------------  Electronically signed by Steven Hooker(Interpreting physician) on 01/13/2021  12:15 PM ---------------------------------------------------------------------------- FINDINGS Left Atrium Left atrium is mildly dilated. Left Ventricle Left ventricle is normal in size and wall thickness. Global left ventricular systolic function is normal. Estimated LV EF >55 %. Right Atrium Right atrium is normal in size. Right Ventricle Normal right ventricular size and function. Mitral Valve No obvious valvular abnormality seen. Mild mitral regurgitation. Aortic Valve Aortic leaflet calcification with moderate stenosis. Peak instantaneous gradient 44 mmHg and mean gradient 22 mmHg. No aortic insufficiency. Tricuspid Valve Tricuspid valve was not well visualized. Insignificant tricuspid regurgitation, unable to estimate RVSP. Pulmonic Valve Pulmonic valve was not well visualized. No evidence of pulmonic insufficiency or stenosis. Pericardial Effusion No significant pericardial effusion is seen. Pleural Effusion No pleural effusion seen.  Miscellaneous Normal aortic root dimension. E/E' average = 5.9. M-mode / 2D Measurements & Calculations:   LVIDd:4.76 cm(3.7 - 5.6 cm)      Diastolic BGAMWV:234 ml  XITEP:3.24 cm(2.2 - 4.0 cm)      Systolic BLINWM:50.1 ml  UAXE:6.23 cm(0.6 - 1.1 cm)       Aortic Root:3.2 cm(2.0 - 3.7 cm)  LVPWd:0.92 cm(0.6 - 1.1 cm)      LA Dimension: 3.6 cm(1.9 - 4.0 cm)  Fractional Shortenin.82 %    LA volume/Index: 87.4 ml /49m^2  Calculated LVEF (%): 76.02 %     LVOT:2 cm   Mitral:                              Aortic   Peak E-Wave: 0.57 m/s                Peak Velocity: 3.34 m/s  Peak A-Wave: 0.74 m/s                Mean Velocity: 2.17 m/s  E/A Ratio: 0.77                      Peak Gradient: 44.62 mmHg  Peak Gradient: 1.31 mmHg             Mean Gradient: 22 mmHg  Deceleration Time: 236 msec                                        Area (continuity): 1.13 cm^2                                       AV VTI: 63.4 cm  Septal Wall E' velocity:0.08 m/s Lateral Wall E' velocity:0.12 m/s    Ct Head Wo Contrast    Result Date: 2021  EXAMINATION: CT OF THE HEAD WITHOUT CONTRAST  2021 1:03 pm TECHNIQUE: CT of the head was performed without the administration of intravenous contrast. Dose modulation, iterative reconstruction, and/or weight based adjustment of the mA/kV was utilized to reduce the radiation dose to as low as reasonably achievable. COMPARISON: Cranial MRI 2010 HISTORY: ORDERING SYSTEM PROVIDED HISTORY: dizziness, syncope TECHNOLOGIST PROVIDED HISTORY: dizziness, syncope Reason for Exam: dizziness, syncope Acuity: Unknown Type of Exam: Unknown Additional signs and symptoms: PT STATES HE HAS BEEN PASSING OUT FOR THE LAST 3 DAYS FINDINGS: BRAIN/VENTRICLES: There is no acute intracranial hemorrhage, mass effect or midline shift. No abnormal extra-axial fluid collection. The gray-white differentiation is maintained without evidence of an acute infarct. There is no evidence of hydrocephalus.  There is mild ventricular dilatation with proportionate sulcal prominence. ORBITS: The visualized portion of the orbits demonstrate no acute abnormality. SINUSES: There is minimal bilateral ethmoid sinus mucosal density. Otherwise clear paranasal sinuses SOFT TISSUES/SKULL:  No acute abnormality of the visualized skull or soft tissues. Age-related volume loss Minimal bilateral ethmoid sinus mucosal density No acute intracranial abnormalities     Ct Abdomen Pelvis W Iv Contrast Additional Contrast? None    Result Date: 1/12/2021  EXAMINATION: CT OF THE ABDOMEN AND PELVIS WITH CONTRAST 1/12/2021 2:20 pm TECHNIQUE: CT of the abdomen and pelvis was performed with the administration of intravenous contrast. Multiplanar reformatted images are provided for review. Dose modulation, iterative reconstruction, and/or weight based adjustment of the mA/kV was utilized to reduce the radiation dose to as low as reasonably achievable. COMPARISON: None. HISTORY: ORDERING SYSTEM PROVIDED HISTORY: fall, left lower rib and abd pain. on eliquis TECHNOLOGIST PROVIDED HISTORY: fall, left lower rib and abd pain. on eliquis Reason for Exam: Diagnosed with Covid three months ago, has been feeling bad ever since, no one can tell him what is wrong with him Acuity: Unknown Type of Exam: Unknown FINDINGS: Lower Chest: Chronic interstitial densities and fibrosis in the lower lungs. Also, subsegmental pleural based density in the right lower lobe. Please refer to the report of CT scan of the chest for details. Coronary artery calcifications. Borderline cardiomegaly. Organs: Liver, spleen, pancreas, adrenals, and right kidney unremarkable. A 0.3 cm nonobstructing stone noted in the lower pole of the left kidney. Trude Roers GI/Bowel: No definite cholelithiasis. Normal appendix. Moderate amount of retained stool in the colon with no evidence of bowel obstruction. Status post partial resection of the small bowel and sigmoid with anastomosis.  Pelvis: Mildly enlarged prostate gland causing Gender                  Male   Age           77 year(s)  Race                       Room Number   2092        Height:                 69 inch, 175.26 cm   Corporate ID  [id number]    Weight:                 145 pounds, 65.8 kg  #   Patient Acct  [de-identified]   BSA:        1.8 m^2     BMI:       21.41 kg/m^2  #   MR #          [id number]      Sonographer             Roberto Tan   Accession #   [id number]  Interpreting Physician  Tomás Youngblood   Referring                 Referring Physician     Prema Duran  Nurse  Practitioner  Procedure Type of Study:   Cerebral: Carotid, Carotid Scan Bilateral.  Indications for Study:Syncope. Patient Status: In Patient. Technical Quality:Adequate visualization. Conclusions   Summary   Mild <50% stenosis of the right internal carotid artery. Moderate 50-69% stenosis of the left internal carotid artery. Patent vertebral arteries bilaterally with antegrade flow. Signature   ----------------------------------------------------------------  Electronically signed by Roberto Tan(Sonographer) on  01/13/2021 03:58 PM  ----------------------------------------------------------------   ----------------------------------------------------------------  Electronically signed by Good JuarezInterpreting physician)  on 01/13/2021 10:26 PM  ----------------------------------------------------------------  Findings:   Right Impression:                     Left Impression:   The common carotid artery has an      The common carotid artery has an  irregular heterogeneous plaque        irregular heterogeneous plaque  causing a <50% stenosis based on      causing a <50% stenosis. velocities. The carotid bulb has an irregular  The carotid bulb has an irregular     heterogeneous plaque causing a <50%  calcific plaque causing a <50%        stenosis. stenosis.                                         The external carotid artery has an  the external carotid artery is        irregular heterogeneous plaque  difficult to visualize in grayscale,  causing a <50% stenosis. however color and Doppler indicate a  <50% stenosis based on velocities. There is severe spectral broadening                                        of the external carotid artery with  There is minimal spectral broadening  peak velocity elevation. of the external carotid artery  without peak velocity elevation. The internal carotid artery has an                                        irregular heterogeneous plaque  The internal carotid artery has an    causing a 50-69% stenosis based on  irregular heterogeneous plaque        velocities. causing a <50% stenosis based on  velocities. Severe spectral broadening of the                                        left internal carotid artery with  Moderate spectral broadening of the   elevation of peak systolic and end  right internal carotid artery without diastolic velocities. elevation of peak systolic and end  diastolic velocities. The vertebral artery is patent with                                        antegrade flow. The vertebral artery is patent with  antegrade flow. Allergies   - Allergy:Penicillin(Drug). - Allergy:Codeine(Drug). Velocities are measured in cm/s ; Diameters are measured in cm Carotid Right Measurements +------------+-------+-------+--------+-------+------------+---------------+ ! Location    ! PSV    ! EDV    ! Angle   ! RI     !%Stenosis   ! Tortuosity     ! +------------+-------+-------+--------+-------+------------+---------------+ ! Prox CCA    !80.9   !14.3   !        !0.82   !            !               ! +------------+-------+-------+--------+-------+------------+---------------+ ! Mid CCA     !72     !16.3   !        !0.77   !            !               ! +------------+-------+-------+--------+-------+------------+---------------+ ! Dist CCA    !68.7   !17.3   !        !0.75   ! !               ! +------------+-------+-------+--------+-------+------------+---------------+ ! Bulb        !65.3   !13     !        !0.8    ! !               ! +------------+-------+-------+--------+-------+------------+---------------+ ! Prox ICA    !113    !25.7   !        !0.77   !            !               ! +------------+-------+-------+--------+-------+------------+---------------+ ! Mid ICA     !119    !29.2   !        !0.75   !            !               ! +------------+-------+-------+--------+-------+------------+---------------+ ! Dist ICA    !88.3   !28.3   !        !0.68   !            !               ! +------------+-------+-------+--------+-------+------------+---------------+ ! Prox ECA    !97.5   !5.28   !        !0.95   !            !               ! +------------+-------+-------+--------+-------+------------+---------------+ ! Vertebral   !72.9   !11.1   !        !0.85   !            !               ! +------------+-------+-------+--------+-------+------------+---------------+   - There is antegrade vertebral flow noted on the right side. - Additional Measurements:ICAPSV/CCAPSV 1.47. ICAEDV/CCAEDV 2.04. Carotid Left Measurements +------------+-------+-------+--------+-------+------------+---------------+ ! Location    ! PSV    ! EDV    ! Angle   ! RI     !%Stenosis   ! Tortuosity     ! +------------+-------+-------+--------+-------+------------+---------------+ ! Prox CCA    !86.5   !21     !        !0.76   !            !               ! +------------+-------+-------+--------+-------+------------+---------------+ ! Mid CCA     !111    !21     !        !0.81   !            !               ! +------------+-------+-------+--------+-------+------------+---------------+ ! Dist CCA    !136    !18.4   !        !0.86   !            !               ! +------------+-------+-------+--------+-------+------------+---------------+ ! Bulb        !123    !23     !        !0.81   !            ! ! +------------+-------+-------+--------+-------+------------+---------------+ ! Prox ICA    !166    !42.6   !        !0.74   !            !               ! +------------+-------+-------+--------+-------+------------+---------------+ ! Mid ICA     ! 67.7   !24.2   !        !0.64   !            !               ! +------------+-------+-------+--------+-------+------------+---------------+ ! Dist ICA    !92.6   !36     !        !0.61   !            !               ! +------------+-------+-------+--------+-------+------------+---------------+ ! Prox ECA    !138    !16.9   !        !0.88   !            !               ! +------------+-------+-------+--------+-------+------------+---------------+ ! Vertebral   !84.3   !18.3   !        !0.78   !            !               ! +------------+-------+-------+--------+-------+------------+---------------+   - There is antegrade vertebral flow noted on the left side. - Additional Measurements:ICAPSV/CCAPSV 1.92. ICAEDV/CCAEDV 2.03. HPI:   See history in H and P      Review of Systems:     Constitutional:  negative for chills, fevers, sweats  Respiratory: Cough, shortness of breath  Cardiovascular:  negative for chest pain, chest pressure/discomfort, lower extremity edema, palpitations  Gastrointestinal:  negative for abdominal pain, constipation, diarrhea, nausea, vomiting  Neurological:  negative for dizziness, headache  Data:     Past Medical History:  no change     Social History:  no change    Family History: @no change    Vitals:      I/O (24Hr):     Intake/Output Summary (Last 24 hours) at 1/14/2021 1017  Last data filed at 1/14/2021 0828  Gross per 24 hour   Intake 1190 ml   Output 400 ml   Net 790 ml       Labs:    URINE ANALYSIS: No results found for: LABURIN     CBC:  Lab Results   Component Value Date    WBC 8.2 01/14/2021    HGB 11.3 01/14/2021     01/14/2021        BMP:    Lab Results   Component Value Date     01/14/2021    K 4.0 01/14/2021    CL 103 01/14/2021    CO2 30 01/14/2021    BUN 15 01/14/2021    CREATININE 0.59 01/14/2021    GLUCOSE 89 01/14/2021      LIVER PROFILE:  Lab Results   Component Value Date    ALT 13 01/12/2021    AST 19 01/12/2021    PROT 7.3 01/12/2021    BILITOT 0.49 01/12/2021    BILIDIR <0.08 12/05/2018    LABALBU 3.6 01/12/2021               Radiology:      Physical Examination:        General appearance:  alert, cooperative and no distress  Mental Status:  oriented to person, place and time and normal affect  Lungs: Air entry bilateral decreased, bilateral rales present. Heart:  regular rate and rhythm, no murmur  Abdomen:  soft, nontender, nondistended, normal bowel sounds, no masses, hepatomegaly, splenomegaly  Extremities:  no edema, redness, tenderness in the calves, bilateral clubbing present  Skin:  no gross lesions, rashes, induration    Assessment:        Primary Problem  Syncope and collapse    Active Hospital Problems    Diagnosis Date Noted    Chronic anticoagulation [Z79.01] 01/13/2021    Severe malnutrition ([facility] [E43] 01/13/2021    Syncope and collapse [R55] 01/12/2021    Pulmonary fibrosis ([facility] [J84.10] 12/08/2020    CHF (congestive heart failure), NYHA class II, acute on chronic, combined ([address] [I50.43] 10/17/2020    Benign essential HTN [I10]     Benign prostatic hyperplasia with urinary obstruction [N40.1, N13.8] 04/02/2015    PAF (paroxysmal atrial fibrillation) ([facility] [address] [I48.0] 07/21/2014       Plan:        Patient admitted with worsening shortness of breath, weight loss.   CT chest, concerning for bilateral honeycombing, traction bronchiectasis, concerning for pulmonary fibrosis  Patient will need open lung biopsy, will need referral to tertiary care center   working to get appointment at [facility]  Echocardiogram done yesterday, concerning for moderate degree of aortic stenosis, discussed with cardiologist,  Likely discharge later today on oxygen with outpatient evaluation with [facility]. Medications: Allergies: Allergies   Allergen Reactions    Adhesive Tape Other (See Comments)     Blister badly     Codeine Nausea Only     Other reaction(s): Other: See Comments  NAUSEA  Other reaction(s):  Other: See Comments  NAUSEA    Penicillins Swelling     As a baby  Other reaction(s): Unknown  As a baby       Current Meds:   Scheduled Meds:    sodium chloride flush  10 mL Intravenous 2 times per day    amiodarone  200 mg Oral Daily    apixaban  5 mg Oral BID    atorvastatin  40 mg Oral Daily    pantoprazole  40 mg Oral QAM AC    tamsulosin  0.4 mg Oral Daily    digoxin  125 mcg Oral Daily    [Held by provider] bumetanide  1 mg Oral Daily     Continuous Infusions:   PRN Meds: perflutren lipid microspheres, sodium chloride flush, sodium chloride flush, acetaminophen, HYDROcodone 5 mg - acetaminophen **OR** HYDROcodone 5 mg - acetaminophen, morphine **OR** morphine, albuterol, potassium chloride **OR** potassium alternative oral replacement **OR** potassium chloride, magnesium sulfate, promethazine **OR** ondansetron, polyethylene glycol       Rachel Schwartz MD  1/14/2021  10:17 AM

## 2021-01-15 NOTE — PROGRESS NOTES
Physician Progress Note      PATIENT:               Alexis Hector  [facility] #:                  [id number]  :                       1955  ADMIT DATE:       2021 12:05 PM  [address] DATE:        2021 5:53 PM  RESPONDING  PROVIDER #:        Teodoro Chaparro MD          QUERY TEXT:    Patient admitted with syncope. noted to have hypoxia w/ Pulm fibrosis, carotid   stenosis, afib fast rate & orthostatic hypotension. . If possible, please   document in progress notes and discharge summary if you are evaluating and/or   treating any of the following: The medical record reflects the following:  Risk Factors: 77 yr old male, afib, pulm fibrosis, carotid stenosis  Clinical Indicators: Per cardio notes Syncope, with marked orthostatic changes   from lying 109/61 to 88/53 also with moderate aortic stenosis, hypoxia from   pulmonary fibrosis, atrial fib with fast rate all these can contribute  Treatment: Admit, cardio & pulm consult, orthostatic BP checks, carotid   dopplers, echo  Options provided:  -- Syncope due to Carotid Stenosis  -- Syncope due to a-fib  -- Syncope due to orthostatic hypotension  -- Syncope due to hypoxia from pulmonary fibrosis  -- Syncope due to all of the above  -- Other - I will add my own diagnosis  -- Disagree - Not applicable / Not valid  -- Disagree - Clinically unable to determine / Unknown  -- Refer to Clinical Documentation Reviewer    PROVIDER RESPONSE TEXT:    The syncope is due to orthostatic hypotension. Query created by:  Bonnie Luna on 2021 1:25 PM      Electronically signed by:  Teodoro Chaparro MD 1/15/2021 1:17 PM

## 2021-01-18 ENCOUNTER — TELEPHONE (OUTPATIENT)
Dept: FAMILY MEDICINE CLINIC | Age: 66
End: 2021-01-18

## 2021-01-18 NOTE — TELEPHONE ENCOUNTER
[facility] Transitions Initial Follow Up Call    Outreach made within 2 business days of discharge: Yes    Patient: Sue Pain Patient : 1955   MRN: [id number]  Reason for Admission: There are no discharge diagnoses documented for the most recent discharge.   Discharge Date: 21       Spoke with: [facility]    Discharge department/facility: [facility]         Scheduled appointment with PCP within 7-14 days    Follow Up  Future Appointments   Date Time Provider Yves Henriquez   2021  9:45 AM Cassandra Villanueva MD [facility] FP [facility]   2021 10:30 AM STV CT  [facility] CT SCAN STV Radiolog   3/8/2021 10:00 AM SCHEDULE, [facility] RESPIRATORY SPEC Resp Spec [facility]   3/8/2021 10:30 AM Santos Mosquera MD Resp Spec [place], [place]

## 2021-01-18 NOTE — TELEPHONE ENCOUNTER
Amanda 45 Transitions Initial Follow Up Call    Outreach made within 2 business days of discharge: Yes    Patient: Sue Pain Patient : 1955   MRN: [id number]  Reason for Admission: There are no discharge diagnoses documented for the most recent discharge. Discharge Date: 21       Spoke with: patient    Discharge department/facility: [facility] Interactive Patient Contact:  Was patient able to fill all prescriptions: No:   Was patient instructed to bring all medications to the follow-up visit: Yes  Is patient taking all medications as directed in the discharge summary?  Yes  Does patient understand their discharge instructions: Yes  Does patient have questions or concerns that need addressed prior to 7-14 day follow up office visit: no    Scheduled appointment with PCP within 7-14 days    Follow Up  Future Appointments   Date Time Provider Yves Henriquez   2021  9:45 AM Cassandra Villanueva MD [facility]   2021 10:30 AM STV CT  [facility] CT SCAN STV Radiolog   3/8/2021 10:00 AM SCHEDULE, [facility] RESPIRATORY SPEC Resp Spec [facility]   3/8/2021 10:30 AM Santos Mosquera MD Resp Spec [facility]       [place], [place]

## 2021-01-19 ENCOUNTER — OFFICE VISIT (OUTPATIENT)
Dept: FAMILY MEDICINE CLINIC | Age: 66
End: 2021-01-19
Payer: MEDICARE

## 2021-01-19 VITALS
DIASTOLIC BLOOD PRESSURE: 60 MMHG | BODY MASS INDEX: 24.07 KG/M2 | WEIGHT: 163 LBS | HEART RATE: 67 BPM | SYSTOLIC BLOOD PRESSURE: 92 MMHG | TEMPERATURE: 98.6 F | OXYGEN SATURATION: 95 %

## 2021-01-19 DIAGNOSIS — Z12.11 COLON CANCER SCREENING: Primary | ICD-10-CM

## 2021-01-19 DIAGNOSIS — I48.0 PAF (PAROXYSMAL ATRIAL FIBRILLATION) (HCC): ICD-10-CM

## 2021-01-19 DIAGNOSIS — I50.43 CHF (CONGESTIVE HEART FAILURE), NYHA CLASS II, ACUTE ON CHRONIC, COMBINED (HCC): ICD-10-CM

## 2021-01-19 DIAGNOSIS — I42.2 HYPERTROPHIC NONOBSTRUCTIVE CARDIOMYOPATHY (HCC): ICD-10-CM

## 2021-01-19 DIAGNOSIS — J96.01 ACUTE RESPIRATORY FAILURE WITH HYPOXIA (HCC): ICD-10-CM

## 2021-01-19 DIAGNOSIS — J84.10 PULMONARY FIBROSIS (HCC): ICD-10-CM

## 2021-01-19 DIAGNOSIS — J96.01 ACUTE HYPOXEMIC RESPIRATORY FAILURE DUE TO COVID-19 (HCC): ICD-10-CM

## 2021-01-19 DIAGNOSIS — I95.89 OTHER SPECIFIED HYPOTENSION: ICD-10-CM

## 2021-01-19 DIAGNOSIS — U07.1 ACUTE HYPOXEMIC RESPIRATORY FAILURE DUE TO COVID-19 (HCC): ICD-10-CM

## 2021-01-19 DIAGNOSIS — E43 SEVERE MALNUTRITION (HCC): ICD-10-CM

## 2021-01-19 PROCEDURE — 1111F DSCHRG MED/CURRENT MED MERGE: CPT | Performed by: INTERNAL MEDICINE

## 2021-01-19 PROCEDURE — 99496 TRANSJ CARE MGMT HIGH F2F 7D: CPT | Performed by: INTERNAL MEDICINE

## 2021-01-19 RX ORDER — FLUDROCORTISONE ACETATE 0.1 MG/1
0.1 TABLET ORAL DAILY
Qty: 30 TABLET | Refills: 3 | Status: SHIPPED | OUTPATIENT
Start: 2021-01-19 | End: 2021-02-22 | Stop reason: ALTCHOICE

## 2021-01-19 ASSESSMENT — PATIENT HEALTH QUESTIONNAIRE - PHQ9
7. TROUBLE CONCENTRATING ON THINGS, SUCH AS READING THE NEWSPAPER OR WATCHING TELEVISION: 0
2. FEELING DOWN, DEPRESSED OR HOPELESS: 2
8. MOVING OR SPEAKING SO SLOWLY THAT OTHER PEOPLE COULD HAVE NOTICED. OR THE OPPOSITE, BEING SO FIGETY OR RESTLESS THAT YOU HAVE BEEN MOVING AROUND A LOT MORE THAN USUAL: 0
SUM OF ALL RESPONSES TO PHQ QUESTIONS 1-9: 7
5. POOR APPETITE OR OVEREATING: 0
6. FEELING BAD ABOUT YOURSELF - OR THAT YOU ARE A FAILURE OR HAVE LET YOURSELF OR YOUR FAMILY DOWN: 0
10. IF YOU CHECKED OFF ANY PROBLEMS, HOW DIFFICULT HAVE THESE PROBLEMS MADE IT FOR YOU TO DO YOUR WORK, TAKE CARE OF THINGS AT HOME, OR GET ALONG WITH OTHER PEOPLE: 0
SUM OF ALL RESPONSES TO PHQ QUESTIONS 1-9: 7
3. TROUBLE FALLING OR STAYING ASLEEP: 1

## 2021-01-19 NOTE — PROGRESS NOTES
Post-Discharge Transitional Care Management Services or Hospital Follow Up      Jennifer Barbosa   YOB: 1955    Date of Office Visit:  1/19/2021  Date of Hospital Admission: 1/12/21  Date of Hospital Discharge: 1/14/21  Risk of hospital readmission (high >=14%. Medium >=10%) :Readmission Risk Score: 23      Care management risk score Rising risk (score 2-5) and Complex Care (Scores >=6): 2     Non face to face  following discharge, date last encounter closed (first attempt may have been earlier): 1/18/2021  2:45 PM    Call initiated 2 business days of discharge: Yes    Patient Active Problem List   Diagnosis    Dyslipidemia    ED (erectile dysfunction)    Incomplete bladder emptying    Benign prostatic hyperplasia with urinary obstruction    History of colon cancer    PAF (paroxysmal atrial fibrillation) (HCC)    Anemia    Pallor of optic disc    Presbyopia    Incisional hernia, without obstruction or gangrene    Hypertrophic nonobstructive cardiomyopathy (HCC)    Iron (Fe) deficiency anemia    Primary osteoarthritis of right knee    Benign essential HTN    History of malignant neoplasm of rectum    Hill's esophagus    CHF (congestive heart failure), NYHA class II, acute on chronic, combined ([address]    Hypoxia    Dyspnea and respiratory abnormalities    Occupational pulmonary disease    Sinus bradycardia    Acute hypoxemic respiratory failure due to COVID-19 ([address]    COVID-19    Pneumonia    Acute respiratory failure with hypoxia (HCC)    Pulmonary fibrosis (HCC)    Syncope and collapse    Chronic anticoagulation    Severe malnutrition (HCC)       Allergies   Allergen Reactions    Adhesive Tape Other (See Comments)     Blister badly     Codeine Nausea Only     Other reaction(s): Other: See Comments  NAUSEA  Other reaction(s):  Other: See Comments  NAUSEA    Penicillins Swelling     As a baby  Other reaction(s): Unknown  As a baby       Medications listed as ordered at the time of discharge from hospital   Lakhwinder Mustafa   Home Medication Instructions BLAYNE:    Printed on:01/19/21 1000   Medication Information                      albuterol sulfate HFA (VENTOLIN HFA) 108 (90 Base) MCG/ACT inhaler  Inhale 2 puffs into the lungs 4 times daily as needed for Wheezing             apixaban (ELIQUIS) 5 MG TABS tablet  Take 5 mg by mouth daily             atorvastatin (LIPITOR) 40 MG tablet  Take 1 tablet by mouth daily             digoxin (LANOXIN) 125 MCG tablet  Take 1 tablet by mouth daily             omeprazole (PRILOSEC) 40 MG delayed release capsule  Take 1 capsule by mouth daily             tamsulosin (FLOMAX) 0.4 MG capsule  Take 1 capsule by mouth daily                   Medications marked \"taking\" at this time  Outpatient Medications Marked as Taking for the 1/19/21 encounter (Office Visit) with Melissa Lundberg MD   Medication Sig Dispense Refill    apixaban (ELIQUIS) 5 MG TABS tablet Take 5 mg by mouth daily      digoxin (LANOXIN) 125 MCG tablet Take 1 tablet by mouth daily 30 tablet 0    albuterol sulfate HFA (VENTOLIN HFA) 108 (90 Base) MCG/ACT inhaler Inhale 2 puffs into the lungs 4 times daily as needed for Wheezing 1 Inhaler 0    atorvastatin (LIPITOR) 40 MG tablet Take 1 tablet by mouth daily 90 tablet 1    omeprazole (PRILOSEC) 40 MG delayed release capsule Take 1 capsule by mouth daily 90 capsule 1    tamsulosin (FLOMAX) 0.4 MG capsule Take 1 capsule by mouth daily 90 capsule 0        Medications patient taking as of now reconciled against medications ordered at time of hospital discharge: Yes    Chief Complaint   Patient presents with    Follow-Up from Hospital     lung infection    Hypotension     BP is low today       History of Present illness - Follow up of Hospital diagnosis(es): admitted with recurrent syncope. Found to be hypoxic on admission. Echo showed improved systolic function. He was diagnosed with pulmonary fibrosis.  Home oxygen evaluation masses or organomegaly  Extremities: no cyanosis, clubbing or edema  Musculoskeletal: normal range of motion, no joint swelling, deformity or tenderness  Neurologic: reflexes normal and symmetric, no cranial nerve deficit, gait, coordination and speech normal    Assessment/Plan:  1. Colon cancer screening  - Cologuard (For External Results Only); Future    2. Severe malnutrition ([facility]  - MS DISCHARGE MEDS RECONCILED W/ CURRENT OUTPATIENT MED LIST    3. Pulmonary fibrosis ([facility]  - Handicap [facility]; by Does not apply route Expires 1/2026  Dispense: 1 each; Refill: 0  - MS DISCHARGE MEDS RECONCILED W/ CURRENT OUTPATIENT MED LIST    4. Acute respiratory failure with hypoxia (HCC)  Oxygen supplementation as ordered. 5. Acute hypoxemic respiratory failure due to COVID-19 [facility])  Continue oxygen supplementation  - [facility]; by Does not apply route Expires 1/2026  Dispense: 1 each; Refill: 0    6. CHF (congestive heart failure), NYHA class II, acute on chronic, combined [facility])  Follow-up cardiology. - [facility]; by Does not apply route Expires 1/2026  Dispense: 1 each; Refill: 0    7. PAF (paroxysmal atrial fibrillation) ([facility]  Rate controlled and anticoagulated at this time. 8. Hypertrophic nonobstructive cardiomyopathy ([facility]  Follow-up cardiology. 9. Other specified hypotension  - fludrocortisone (FLORINEF) 0.1 MG tablet; Take 1 tablet by mouth daily  Dispense: 30 tablet;  Refill: 3        Medical Decision Making: high complexity

## 2021-01-19 NOTE — PATIENT INSTRUCTIONS
On 1/25, decreased amiodarone to 1/2 pill (100mg) every other day; then to every two days on 2/1/21, then stop the following week

## 2021-01-19 NOTE — PROGRESS NOTES
Pt is doing a follow up from being [facility] due to breathing issues. He would like a Handicap placard.   He was told to stop Amiodarone but pharmacist said to wean not stop all at once, he is taking 1/2 tablet

## 2021-02-18 RX ORDER — IBUPROFEN 800 MG/1
TABLET ORAL
Qty: 120 TABLET | Refills: 1 | OUTPATIENT
Start: 2021-02-18

## 2021-02-22 ENCOUNTER — OFFICE VISIT (OUTPATIENT)
Dept: FAMILY MEDICINE CLINIC | Age: 66
End: 2021-02-22
Payer: MEDICARE

## 2021-02-22 VITALS
TEMPERATURE: 97.1 F | HEART RATE: 63 BPM | SYSTOLIC BLOOD PRESSURE: 120 MMHG | DIASTOLIC BLOOD PRESSURE: 80 MMHG | WEIGHT: 165.2 LBS | BODY MASS INDEX: 24.4 KG/M2 | OXYGEN SATURATION: 100 %

## 2021-02-22 DIAGNOSIS — I48.0 PAF (PAROXYSMAL ATRIAL FIBRILLATION) (HCC): ICD-10-CM

## 2021-02-22 DIAGNOSIS — I42.2 HYPERTROPHIC NONOBSTRUCTIVE CARDIOMYOPATHY (HCC): ICD-10-CM

## 2021-02-22 DIAGNOSIS — J84.10 PULMONARY FIBROSIS (HCC): Primary | ICD-10-CM

## 2021-02-22 DIAGNOSIS — I50.43 CHF (CONGESTIVE HEART FAILURE), NYHA CLASS II, ACUTE ON CHRONIC, COMBINED (HCC): ICD-10-CM

## 2021-02-22 DIAGNOSIS — M17.11 PRIMARY OSTEOARTHRITIS OF RIGHT KNEE: ICD-10-CM

## 2021-02-22 PROCEDURE — 1036F TOBACCO NON-USER: CPT | Performed by: INTERNAL MEDICINE

## 2021-02-22 PROCEDURE — 4040F PNEUMOC VAC/ADMIN/RCVD: CPT | Performed by: INTERNAL MEDICINE

## 2021-02-22 PROCEDURE — G8420 CALC BMI NORM PARAMETERS: HCPCS | Performed by: INTERNAL MEDICINE

## 2021-02-22 PROCEDURE — 99214 OFFICE O/P EST MOD 30 MIN: CPT | Performed by: INTERNAL MEDICINE

## 2021-02-22 PROCEDURE — 3017F COLORECTAL CA SCREEN DOC REV: CPT | Performed by: INTERNAL MEDICINE

## 2021-02-22 PROCEDURE — G8484 FLU IMMUNIZE NO ADMIN: HCPCS | Performed by: INTERNAL MEDICINE

## 2021-02-22 PROCEDURE — G8427 DOCREV CUR MEDS BY ELIG CLIN: HCPCS | Performed by: INTERNAL MEDICINE

## 2021-02-22 PROCEDURE — 1123F ACP DISCUSS/DSCN MKR DOCD: CPT | Performed by: INTERNAL MEDICINE

## 2021-02-22 RX ORDER — IBUPROFEN 800 MG/1
800 TABLET ORAL 3 TIMES DAILY PRN
Qty: 90 TABLET | Refills: 0 | Status: SHIPPED | OUTPATIENT
Start: 2021-02-22 | End: 2021-04-15

## 2021-02-22 NOTE — PROGRESS NOTES
Subjective:       Patient ID:     Alejandra Renee is a 77 y.o. male who presents for   Chief Complaint   Patient presents with    1 Month Follow-Up    COPD       HPI:  Nursing note reviewed and discussed with patient. Here for follow-up today   Dizziness from last visit resolved after florinef for two days, so he has stopped taking it  He has been talking to pulm at [facility], will be going there next week for visit and testing   His breathing continues to get worse, 3LPM at rest and needing 5LPM when walking. He is using 5LPM when sleeping. He would like a refill on ibuporofen for joint pain. Aware of increased GI bleeding risk with ibuprofen and Eliquis, he states nothing else works as well for his joint pain. Patient's medications, allergies, past medical, surgical, social and family histories were reviewed and updated as appropriate.     Past Medical History:   Diagnosis Date    Atrial fibrillation ([address]     Back pain, chronic     Hill's esophagus 06/18/2019    Benign essential HTN     BPH (benign prostatic hyperplasia)     Cancer (HCC)     colon-rectal    Cocaine abuse in [facility])     1970's    ED (erectile dysfunction) 4/2/2015    GERD (gastroesophageal reflux disease)     GI bleed 12/5/2018    Hernia     History of colon cancer     Melena     Migraines     Murmur, cardiac      Past Surgical History:   Procedure Laterality Date    CARDIAC CATHETERIZATION  11/29/2018    Non-obstructive CAD    CARDIOVERSION  2020    COLECTOMY      2nd colectomy, Colostomy and reversed [facility] COLECTOMY      1st time Ksenia    COLONOSCOPY      COLONOSCOPY  07/18/2016    COLONOSCOPY N/A 12/6/2018    COLONOSCOPY DIAGNOSTIC performed by Domenica Schmitt MD at [address] Right 2009    inguinal    KNEE SURGERY Right 1970's    arthrotomy    TONSILLECTOMY      TOTAL KNEE ARTHROPLASTY Right 1/8/2019    KNEE TOTAL ARTHROPLASTY performed by Edwin Salvador MD at [facility]    TRANSESOPHAGEAL ECHOCARDIOGRAM  2018    UPPER GASTROINTESTINAL ENDOSCOPY N/A 2018    EGD DIAGNOSTIC ONLY performed by Evan Pires MD at [address] [place] [address] N/A 2019    [facility]RE'S       Social History     Tobacco Use    Smoking status: Former Smoker     Packs/day: 0.50     Years: 1.00     Pack years: 0.50     Quit date:      Years since quittin.1    Smokeless tobacco: Never Used    Tobacco comment: stated never actually really smoked only inhaled    Substance Use Topics    Alcohol use: Yes     Alcohol/week: 12.0 standard drinks     Types: 10 Standard drinks or equivalent, 2 Shots of liquor per week     Comment: 2-3 times a week      Patient Active Problem List   Diagnosis    Dyslipidemia    ED (erectile dysfunction)    Incomplete bladder emptying    Benign prostatic hyperplasia with urinary obstruction    History of colon cancer    PAF (paroxysmal atrial fibrillation) (HCC)    Anemia    Pallor of optic disc    Presbyopia    Incisional hernia, without obstruction or gangrene    Hypertrophic nonobstructive cardiomyopathy (HCC)    Iron (Fe) deficiency anemia    Primary osteoarthritis of right knee    History of malignant neoplasm of rectum    Mcguire's esophagus    CHF (congestive heart failure), NYHA class II, acute on chronic, combined (HCC)    Hypoxia    Dyspnea and respiratory abnormalities    Occupational pulmonary disease    Sinus bradycardia    Acute hypoxemic respiratory failure due to COVID-19 ([facility]    COVID-19    Pneumonia    Acute respiratory failure with hypoxia (HCC)    Pulmonary fibrosis (HCC)    Syncope and collapse    Chronic anticoagulation    Severe malnutrition ([facility]         Prior to Visit Medications    Medication Sig Taking?  Authorizing Provider   apixaban (ELIQUIS) 5 MG TABS tablet Take 1 tablet by mouth 2 times daily Yes Cassandra Marie MD   fludrocortisone (FLORINEF) 0.1 MG tablet Take 1 tablet by mouth daily Yes Annalisa Enriquez MD   Handicap Placard MISC by Does not apply route Expires 1/2026 Yes Cassandra Singh MD   digoxin (LANOXIN) 125 MCG tablet Take 1 tablet by mouth daily Yes Tip Green MD   albuterol sulfate HFA (VENTOLIN HFA) 108 (90 Base) MCG/ACT inhaler Inhale 2 puffs into the lungs 4 times daily as needed for Wheezing Yes Tip Green MD   atorvastatin (LIPITOR) 40 MG tablet Take 1 tablet by mouth daily Yes Annalisa Enriquez MD   omeprazole (PRILOSEC) 40 MG delayed release capsule Take 1 capsule by mouth daily Yes Annalisa Enriquez MD   tamsulosin (FLOMAX) 0.4 MG capsule Take 1 capsule by mouth daily Yes Annalisa Enriquez MD     Review of Systems  Review of Systems   Constitutional: Negative for fatigue, fever and unexpected weight change. Respiratory: Positive for shortness of breath. Negative for cough, choking, chest tightness and wheezing. Cardiovascular: Negative for chest pain, palpitations and leg swelling. Gastrointestinal: Negative for abdominal pain, anal bleeding, blood in stool, constipation, diarrhea, nausea and vomiting. Endocrine: Negative. Musculoskeletal: Negative for joint swelling and myalgias. Skin: Negative. Neurological: Negative for dizziness. Psychiatric/Behavioral: Negative for sleep disturbance. All other systems reviewed and are negative. Objective:       Physical Exam:  /80 (Site: Left Upper Arm, Position: Sitting, Cuff Size: Medium Adult)   Pulse 63   Temp 97.1 °F (36.2 °C) (Temporal)   Wt 165 lb 3.2 oz (74.9 kg)   SpO2 100% Comment: pt is on 5 liters of oxygen  BMI 24.40 kg/m²    Wt Readings from Last 3 Encounters:   02/22/21 165 lb 3.2 oz (74.9 kg)   01/19/21 163 lb (73.9 kg)   01/14/21 151 lb 11.2 oz (68.8 kg)       Physical Exam  Vitals signs and nursing note reviewed. Constitutional:       General: He is not in acute distress. Appearance: He is well-developed. He is not ill-appearing.    Eyes: General: Lids are normal. Vision grossly intact. Cardiovascular:      Rate and Rhythm: Normal rate and regular rhythm. Heart sounds: Normal heart sounds, S1 normal and S2 normal. No murmur. No friction rub. No gallop. Pulmonary:      Effort: No respiratory distress. Breath sounds: Normal breath sounds. No wheezing. Abdominal:      General: Bowel sounds are normal.      Palpations: Abdomen is soft. There is no mass. Tenderness: There is no abdominal tenderness. There is no guarding. Musculoskeletal: Normal range of motion. Skin:     General: Skin is warm and dry. Capillary Refill: Capillary refill takes less than 2 seconds. Neurological:      General: No focal deficit present. Mental Status: He is alert and oriented to person, place, and time. Data Review  pulm data in [address] reviewed        Assessment/Plan:      1. Pulmonary fibrosis (HCC)  Would like portable concentrator   F/u pulm at [facility]   - [facility] Order for Home Oxygen as OP    2. PAF (paroxysmal atrial fibrillation) ([facility])  Rate controlled and anticoagulated     3. Primary osteoarthritis of right knee  - ibuprofen (ADVIL;MOTRIN) 800 MG tablet; Take 1 tablet by mouth 3 times daily as needed for Pain  Dispense: 90 tablet; Refill: 0    4. Hypertrophic nonobstructive cardiomyopathy ([facility]  F/u cardiology     5.  CHF (congestive heart failure), NYHA class II, acute on chronic, combined ([address]  Continue current regimen            Health Maintenance Due   Topic Date Due    Shingles Vaccine (1 of 2) 08/11/2003    Pneumococcal 65+ years Vaccine (1 of 1 - PPSV23) 08/11/2018    Annual Wellness Visit (AWV)  05/29/2019    Colon cancer screen colonoscopy  12/06/2020       Electronically signed by Janene Vega MD on 2/22/2021 at 4:01 PM

## 2021-02-22 NOTE — PROGRESS NOTES
Pt is here today for a 1 month follow up. He was given fludrocortisone (FLORINEF) 0.1 MG on 1/19/2021 but only took for 2 days. He states BP increased when he took. Pt is requesting  mg as needed. He states his bones hurt.

## 2021-02-26 ENCOUNTER — TELEPHONE (OUTPATIENT)
Dept: FAMILY MEDICINE CLINIC | Age: 66
End: 2021-02-26

## 2021-02-26 NOTE — TELEPHONE ENCOUNTER
Spoke with patient in regards to not being able to get a portable  O2 concentrator. I spoke with Marciano Hopson at [employer] and as of now they do not have any available due to covid. She said we can check with other companies but there is not many out there due to Covid.

## 2021-03-01 ENCOUNTER — TELEPHONE (OUTPATIENT)
Dept: FAMILY MEDICINE CLINIC | Age: 66
End: 2021-03-01

## 2021-03-01 DIAGNOSIS — I42.2 HYPERTROPHIC NONOBSTRUCTIVE CARDIOMYOPATHY (HCC): ICD-10-CM

## 2021-03-01 DIAGNOSIS — I48.0 PAF (PAROXYSMAL ATRIAL FIBRILLATION) (HCC): Primary | ICD-10-CM

## 2021-03-01 NOTE — TELEPHONE ENCOUNTER
Last visit: 02/22/21   Last Med refill:   Does patient have enough medication for 72 hours: Yes    Next Visit Date:  Future Appointments   Date Time Provider Yves [place]   3/2/2021  2:00 PM STV CT ROOM 1 [facility]VZ CT SCAN STV Radiolog   3/8/2021 10:00 AM SCHEDULE, [facility] RESPIRATORY SPEC Resp Spec [facility]   3/8/2021 10:30 AM Derick Espinosa MD Resp Spec Melany Gip   5/24/2021  2:00 PM Cassandra Heck  [address] Maintenance   Topic Date Due    Shingles Vaccine (1 of 2) 08/11/2003    Pneumococcal 65+ years Vaccine (1 of 1 - PPSV23) 08/11/2018    Annual Wellness Visit (AWV)  05/29/2019    Colon cancer screen colonoscopy  12/06/2020    Lipid screen  03/10/2021    COVID-19 Vaccine (1 of 2) 04/22/2021 (Originally 8/11/1971)    DTaP/Tdap/Td vaccine (1 - Tdap) 08/18/2021 (Originally [birth year]/11/1972)    Flu vaccine  Completed    AAA screen  Completed    Hepatitis C screen  Completed    Hepatitis A vaccine  Aged Out    Hepatitis B vaccine  Aged Out    Hib vaccine  Aged Out    Meningococcal (ACWY) vaccine  Aged Out       Hemoglobin A1C (%)   Date Value   01/10/2018 5.0             ( goal A1C is < 7)   No results found for: LABMICR  LDL Cholesterol (mg/dL)   Date Value   03/10/2020 82   10/01/2018 81       (goal LDL is <100)   AST (U/L)   Date Value   01/12/2021 19     ALT (U/L)   Date Value   01/12/2021 13     BUN (mg/dL)   Date Value   01/14/2021 15     BP Readings from Last 3 Encounters:   02/22/21 120/80   01/19/21 92/60   01/14/21 111/70          (goal 120/80)    All Future Testing planned in [facility]  Lab Frequency Next Occurrence   CT CHEST LOW DOSE (LDCT) Once 03/04/2021   Cologuard (For External Results Only) Once 02/27/2021               Patient Active Problem List:     Dyslipidemia     ED (erectile dysfunction)     Incomplete bladder emptying     Benign prostatic hyperplasia with urinary obstruction     History of colon cancer     PAF (paroxysmal atrial fibrillation) (HCC)     Anemia Pallor of optic disc     Presbyopia     Incisional hernia, without obstruction or gangrene     Hypertrophic nonobstructive cardiomyopathy (HCC)     Iron (Fe) deficiency anemia     Primary osteoarthritis of right knee     History of malignant neoplasm of rectum     Hill's esophagus     CHF (congestive heart failure), NYHA class II, acute on chronic, combined (HCC)     Hypoxia     Dyspnea and respiratory abnormalities     Occupational pulmonary disease     Sinus bradycardia     Acute hypoxemic respiratory failure due to COVID-19 [facility])     COVID-19     Pneumonia     Acute respiratory failure with hypoxia (HCC)     Pulmonary fibrosis (HCC)     Syncope and collapse     Chronic anticoagulation     Severe malnutrition ([facility]

## 2021-03-02 ENCOUNTER — HOSPITAL ENCOUNTER (OUTPATIENT)
Dept: CT IMAGING | Age: 66
Discharge: HOME | End: 2021-03-04
Payer: MEDICARE

## 2021-03-02 DIAGNOSIS — J84.10 PULMONARY FIBROSIS (HCC): ICD-10-CM

## 2021-03-02 PROCEDURE — 71250 CT THORAX DX C-: CPT

## 2021-03-02 NOTE — TELEPHONE ENCOUNTER
Spoke with patient and states he has looked into all these places. States they want him to pay for the o2 instead of go through insurance. Advised patient to talk to his pulmonologist and see if they have any suggestions. States he will be seeing one of them soon and will discuss it then.

## 2021-03-06 ASSESSMENT — VISUAL ACUITY: OU: 1

## 2021-03-06 ASSESSMENT — ENCOUNTER SYMPTOMS
CHEST TIGHTNESS: 0
BLOOD IN STOOL: 0
SHORTNESS OF BREATH: 1
CHOKING: 0
COUGH: 0
WHEEZING: 0
VOMITING: 0
ANAL BLEEDING: 0
NAUSEA: 0
DIARRHEA: 0
ABDOMINAL PAIN: 0
CONSTIPATION: 0

## 2021-03-08 ENCOUNTER — OFFICE VISIT (OUTPATIENT)
Dept: PULMONOLOGY | Age: 66
End: 2021-03-08
Payer: MEDICARE

## 2021-03-08 VITALS
BODY MASS INDEX: 23.1 KG/M2 | TEMPERATURE: 97.7 F | WEIGHT: 165 LBS | SYSTOLIC BLOOD PRESSURE: 119 MMHG | HEART RATE: 71 BPM | OXYGEN SATURATION: 95 % | HEIGHT: 71 IN | DIASTOLIC BLOOD PRESSURE: 71 MMHG

## 2021-03-08 DIAGNOSIS — J84.10 PULMONARY FIBROSIS (HCC): Primary | ICD-10-CM

## 2021-03-08 DIAGNOSIS — Z79.01 CHRONIC ANTICOAGULATION: ICD-10-CM

## 2021-03-08 DIAGNOSIS — Z99.81 ON HOME O2: ICD-10-CM

## 2021-03-08 DIAGNOSIS — J47.9 BRONCHIECTASIS WITHOUT COMPLICATION (HCC): ICD-10-CM

## 2021-03-08 DIAGNOSIS — J96.11 CHRONIC RESPIRATORY FAILURE WITH HYPOXIA (HCC): ICD-10-CM

## 2021-03-08 DIAGNOSIS — I48.0 PAF (PAROXYSMAL ATRIAL FIBRILLATION) (HCC): ICD-10-CM

## 2021-03-08 DIAGNOSIS — R09.02 HYPOXIA: ICD-10-CM

## 2021-03-08 PROCEDURE — 3017F COLORECTAL CA SCREEN DOC REV: CPT | Performed by: INTERNAL MEDICINE

## 2021-03-08 PROCEDURE — G8420 CALC BMI NORM PARAMETERS: HCPCS | Performed by: INTERNAL MEDICINE

## 2021-03-08 PROCEDURE — 99214 OFFICE O/P EST MOD 30 MIN: CPT | Performed by: INTERNAL MEDICINE

## 2021-03-08 PROCEDURE — 1036F TOBACCO NON-USER: CPT | Performed by: INTERNAL MEDICINE

## 2021-03-08 PROCEDURE — G8427 DOCREV CUR MEDS BY ELIG CLIN: HCPCS | Performed by: INTERNAL MEDICINE

## 2021-03-08 PROCEDURE — 4040F PNEUMOC VAC/ADMIN/RCVD: CPT | Performed by: INTERNAL MEDICINE

## 2021-03-08 PROCEDURE — 1123F ACP DISCUSS/DSCN MKR DOCD: CPT | Performed by: INTERNAL MEDICINE

## 2021-03-08 PROCEDURE — G8484 FLU IMMUNIZE NO ADMIN: HCPCS | Performed by: INTERNAL MEDICINE

## 2021-03-08 ASSESSMENT — SLEEP AND FATIGUE QUESTIONNAIRES
HOW LIKELY ARE YOU TO NOD OFF OR FALL ASLEEP WHILE LYING DOWN TO REST IN THE AFTERNOON WHEN CIRCUMSTANCES PERMIT: 3
HOW LIKELY ARE YOU TO NOD OFF OR FALL ASLEEP WHILE SITTING QUIETLY AFTER LUNCH WITHOUT ALCOHOL: 0
HOW LIKELY ARE YOU TO NOD OFF OR FALL ASLEEP WHILE WATCHING TV: 1
HOW LIKELY ARE YOU TO NOD OFF OR FALL ASLEEP WHILE SITTING AND READING: 0

## 2021-03-08 NOTE — PROGRESS NOTES
Bubba Mcintyre  3/8/2021    Chief Complaint   Patient presents with    Follow-up     pulmonary fibrosis    Chronic respiratory failure    Bubba Mcintyre  presented for follow up for since his discharge from [facility] where he was admitted because acute exacerbation of his lung disease which was caused by pulmonary fibrosis. He has a chronic respiratory failure secondary to pulmonary fibrosis. He is on home oxygen. He usually home oxygen 24 hours a day. He is lung disease due to the fibrosis is advanced and almost grade 3 through 4. No evidence of acute exacerbation or acute infection. Sputum is mucoid no blood in it is not purulent. Not noted any fever chills or rigor. Patient when he was in the hospital had a mycoplasma pneumonia infection which was treated with antibiotics. He also had traction bronchiectasis secondary to pulmonary fibrosis. He does have chronic cor pulmonale secondary to lower lung disease. No pulmonary edema no pedal edema. He uses oxygen 24 hours a day. Because of the advanced pulmonary fibrosis and is marked compromise of symptoms patient was [facility] for possible lung transplant. He is being evaluated today for the lung transplant but he is not on the list at this time. Atrial fibrillation has been rate controlled he is on anticoagulation. No thromboembolic process. Review of Systems -  General ROS: negative for - chills, fatigue, fever or weight loss  ENT ROS: negative for - headaches, oral lesions or sore throat  Cardiovascular ROS: no chest pain , orthopnea or pnd   Gastrointestinal ROS: no abdominal pain, change in bowel habits, or black or bloody stools  Skin - no rash   Neuro - no blurry vision , no loc .  No focal weakness   msk - no jt tenderness or swelling does have clubbing of the fingers  Vascular - no claudication , rest completed and negative   Lymphatic - complete and negative   Hematology - oncology - complete and Murmur, cardiac        Family History:       Problem Relation Age of Onset    Diabetes Mother     Heart Attack Father     Heart Disease Father     Heart Disease Brother        SURGICAL HISTORY:   Past Surgical History:   Procedure Laterality Date    CARDIAC CATHETERIZATION  2018    Non-obstructive CAD    CARDIOVERSION  2020    COLECTOMY      2nd colectomy, Colostomy and reversed [address] COLECTOMY      1st time [facility]    COLONOSCOPY      COLONOSCOPY  2016    COLONOSCOPY N/A 2018    COLONOSCOPY DIAGNOSTIC performed by Evan Pires MD at [address] Right     inguinal    KNEE SURGERY Right 1970's    arthrotomy    TONSILLECTOMY      TOTAL KNEE ARTHROPLASTY Right 2019    KNEE TOTAL ARTHROPLASTY performed by Katelyn Salazar MD at [facility] TRANSESOPHAGEAL ECHOCARDIOGRAM  2018    UPPER GASTROINTESTINAL ENDOSCOPY N/A 2018    EGD DIAGNOSTIC ONLY performed by Evan Pires MD at [facility] N/A 2019    MCGUIRE'S              Not in a hospital admission. Allergies   Allergen Reactions    Adhesive Tape Other (See Comments)     Blister badly     Codeine Nausea Only     Other reaction(s): Other: See Comments  NAUSEA  Other reaction(s): Other: See Comments  NAUSEA    Penicillins Swelling     As a baby  Other reaction(s): Unknown  As a baby     Social History     Tobacco Use   Smoking Status Former Smoker    Packs/day: 0.50    Years: 1.00    Pack years: 0.50    Quit date: Angelina Rodas Years since quittin.2   Smokeless Tobacco Never Used   Tobacco Comment    stated never actually really smoked only inhaled      Prior to Admission medications    Medication Sig Start Date End Date Taking?  Authorizing Provider   apixaban (ELIQUIS) 5 MG TABS tablet Take 1 tablet by mouth 2 times daily 3/1/21  Yes Cassandra Marie MD   ibuprofen (ADVIL;MOTRIN) 800 MG tablet Take 1 tablet by mouth 3 times daily as needed for Pain 2/22/21  Yes Cassandra Zuleta MD   Handsandie Velasquez MISC by Does not apply route Expires 1/2026 1/19/21  Yes Cassandra Zuleta MD   digoxin [facility]) 125 MCG tablet Take 1 tablet by mouth daily 12/8/20  Yes Damian Valerio MD   atorvastatin (LIPITOR) 40 MG tablet Take 1 tablet by mouth daily 11/16/20  Yes Arpit Serrano MD   omeprazole (PRILOSEC) 40 MG delayed release capsule Take 1 capsule by mouth daily 11/16/20  Yes Cassandra Zuleta MD   tamsulosin [facility]) 0.4 MG capsule Take 1 capsule by mouth daily 10/15/20  Yes Cassandra Zuleta MD   albuterol sulfate HFA (VENTOLIN HFA) 108 (90 Base) MCG/ACT inhaler Inhale 2 puffs into the lungs 4 times daily as needed for Wheezing  Patient not taking: Reported on 3/8/2021 12/7/20   Damian Valerio MD         Physical Exam  General Appearance:    Alert, cooperative, no distress, appears stated age patient is on supplemental oxygen. He has grade 3 to grade 4 effort dyspnea. He is short of breath and using accessory muscles even at rest.   Head:    Normocephalic, without obvious abnormality, atraumatic   Eye examination does not reveal any Rafy syndrome or jaundice    Noted nose revealed no polyps no sinus tenderness    Throat examination is unremarkable    Ear examination is normal   :    Neck:   Supple, symmetrical, trachea midline, no adenopathy;     thyroid:  no enlargement/tenderness/nodules; no carotid    bruit or JVD neck is not short of fat. Back:     Symmetric, no curvature, ROM normal, no CVA tenderness   Lungs:    Diameter is not increased percussion note is impaired all over both sides the lung expansion is decreased there are present bilateral crackles consistent with interstitial lung disease. No evidence of any pleural effusion is noted.    Chest Wall:    No tenderness or deformity      Heart:   Irregular rate and rhythm, S1 and S2 normal, no murmur, rub        or gallop no rvh due to atrial fibrillation which is rate controlled Abdomen:                                                 Pulses:                                            Lymph nodes:                    Neurologic:                  Soft, non-tender, bowel sounds active all four quadrants,     no masses, no organomegaly         2+ and symmetric all extremities            Cervical, supraclavicular not enlarged or matted or tender      CNII-XII intact, normal strength 5/5 . Sensation grossly normal  and reflexes normal 2+  throughout     Clubbing Yes  Lower ext edema No1+   [] , 2 +  [] , 3+   []  Upper ext edema No       Musculoskeletal - no joint swelling or tenderness or synovitis               /71 (Site: Left Upper Arm, Position: Sitting, Cuff Size: Medium Adult)   Pulse 71   Temp 97.7 °F (36.5 °C) (Temporal)   Ht 5' 11\" (1.803 m)   Wt 165 lb (74.8 kg)   SpO2 95% Comment: 3lpm  BMI 23.01 kg/m²     CXR  Bilateral interstitial lung disease      CT Scans  CT scan confirmed the presence of bilateral interstitial lung disease with traction bronchiectasis no pleural effusions are noted no large masses are noted no lymph nodes are noted no masses are noted    Echo  Recent echo available        Assessment   Diagnosis Orders   1. Pulmonary fibrosis ([address]     2. Hypoxia     3. Chronic anticoagulation     4. PAF (paroxysmal atrial fibrillation) ([address]     5. Chronic respiratory failure with hypoxia (HCC)     6. On home O2     7. Bronchiectasis without complication ([address]         Plan:  Patient has interstitial lung disease involving both lung and all lobes. This is most likely due to idiopathic pulmonary fibrosis which has significantly progress and cause, impairment of respiration. At this stage I do not think the medications are going to be of any help.   The only choice the patient may have visible lung transplant is a single lung or bilateral.  I will not be surprised that he may require a heart and lung transplant together because of the cardiac involvement. However this lisinopril after the transplant team.    I advised the patient to continue use of home oxygen 24 hours a day. We will arrange for him to have the oxygen converter so that he does not require to carry his oxygen cylinders with him. He will continue treatment of atrial fibrillation with medication as well as anticoagulation. We discussed for the advantages and disadvantages of the oxygen use and also the various risks involved. He already have had flu vaccine and Covid vaccines. There is no evidence of any chest infection. Electronically signed by Andrei Carroll MD on   3/8/21 at 12:26 PM EST  Please note that this chart was generated using voice recognition Dragon dictation software. Although every effort was made to ensure the accuracy of this automated transcription, some errors in transcription may have occurred.

## 2021-03-09 ENCOUNTER — TELEPHONE (OUTPATIENT)
Dept: PULMONOLOGY | Age: 66
End: 2021-03-09

## 2021-03-11 ENCOUNTER — HOSPITAL ENCOUNTER (OUTPATIENT)
Dept: NON INVASIVE DIAGNOSTICS | Age: 66
Discharge: HOME | End: 2021-03-11
Payer: MEDICARE

## 2021-03-11 DIAGNOSIS — R06.09 OTHER FORMS OF DYSPNEA: ICD-10-CM

## 2021-03-11 DIAGNOSIS — R00.1 BRADYCARDIA: ICD-10-CM

## 2021-03-11 DIAGNOSIS — Z01.810 PREOP CARDIOVASCULAR EXAM: ICD-10-CM

## 2021-03-11 DIAGNOSIS — R94.31 ABNORMAL EKG: ICD-10-CM

## 2021-03-11 LAB
LV EF: 61 %
LVEF MODALITY: NORMAL

## 2021-03-11 PROCEDURE — 93306 TTE W/DOPPLER COMPLETE: CPT

## 2021-03-29 ENCOUNTER — HOSPITAL ENCOUNTER (OUTPATIENT)
Dept: PULMONOLOGY | Age: 66
Setting detail: THERAPIES SERIES
Discharge: HOME | End: 2021-03-29
Payer: MEDICARE

## 2021-03-29 VITALS — WEIGHT: 169 LBS | BODY MASS INDEX: 23.57 KG/M2

## 2021-03-29 PROCEDURE — G0239 OTH RESP PROC, GROUP: HCPCS

## 2021-03-29 NOTE — PROGRESS NOTES
Completed pt evaluation including 6 minute walk test for participation in the pulmonary rehab program.  Initial walk test was stopped after 1:55 due to desaturation to 85% with O2 at 4 lpm.  SpO2 to 91% in 45 seconds. Pt admitted to dizziness after test was stopped also. Walk test was repeated with O2 at 6 lpm requiring 2 rest periods, on for 10 seconds and the second for 1 minute. No c/o of dizziness.

## 2021-04-07 ENCOUNTER — APPOINTMENT (OUTPATIENT)
Dept: PULMONOLOGY | Age: 66
End: 2021-04-07
Payer: MEDICARE

## 2021-04-12 ENCOUNTER — HOSPITAL ENCOUNTER (OUTPATIENT)
Dept: PULMONOLOGY | Age: 66
Setting detail: THERAPIES SERIES
Discharge: HOME | End: 2021-04-12
Payer: MEDICARE

## 2021-04-12 VITALS — BODY MASS INDEX: 23.43 KG/M2 | WEIGHT: 168 LBS

## 2021-04-12 PROCEDURE — G0239 OTH RESP PROC, GROUP: HCPCS

## 2021-04-14 ENCOUNTER — HOSPITAL ENCOUNTER (OUTPATIENT)
Dept: PULMONOLOGY | Age: 66
Setting detail: THERAPIES SERIES
Discharge: HOME | End: 2021-04-14
Payer: MEDICARE

## 2021-04-15 DIAGNOSIS — M17.11 PRIMARY OSTEOARTHRITIS OF RIGHT KNEE: ICD-10-CM

## 2021-04-15 RX ORDER — IBUPROFEN 800 MG/1
TABLET ORAL
Qty: 90 TABLET | Refills: 1 | Status: SHIPPED | OUTPATIENT
Start: 2021-04-15 | End: 2021-07-21

## 2021-04-15 NOTE — TELEPHONE ENCOUNTER
Last visit: 2/22/21  Last Med refill: 2/22/21  Does patient have enough medication for 72 hours: No    Next Visit Date:  Future Appointments   Date Time Provider Yves Henriquez   4/19/2021 10:00 AM STC PULM REHAB RM 8 STCZ [facility]   4/21/2021 10:00 AM STC PULM REHAB RM 8 [facility]CZ [facility]   4/26/2021 10:00 AM STC PULM REHAB RM 8 STCZ [facility]   4/28/2021 10:00 AM STC PULM REHAB RM 8 STCZ [facility]   5/3/2021 10:00 AM STC PULM REHAB RM 8 STCZ [facility]UL[facility]   5/5/2021 10:00 AM STC PULM REHAB RM 8 STCZ [facility]   5/10/2021 10:00 AM STC PULM REHAB RM 8 STCZ [facility]   5/12/2021 10:00 AM STC PULM REHAB RM 8 STCZ [facility]   5/17/2021 10:00 AM STC PULM REHAB RM 8 STCZ [facility]   5/19/2021 10:00 AM STC PULM REHAB RM 8 STCZ [facility]UL[facility]   5/24/2021 10:00 AM STC PULM REHAB RM 8 STCZ [facility]UL[facility]   5/24/2021  2:00 PM Cassandra Vizcaino MD [facility]   5/26/2021 10:00 AM STC PULM REHAB RM 8 STCZ [facility]UL[facility]   5/31/2021 10:00 AM STC PULM REHAB RM 8 STCZ [facility]UL[facility]   6/2/2021 10:00 AM STC PULM REHAB RM 8 STCZ [facility]UL[facility]   6/7/2021 10:00 AM STC PULM REHAB RM 8 STCZ [facility]UL[facility]   6/9/2021 10:00 AM STC PULM REHAB RM 8 STCZ [facility]   6/14/2021 10:00 AM STC PULM REHAB RM 8 STCZ [facility]UL[facility]   6/16/2021 10:00 AM STC PULM REHAB RM 8 STCZ PUL[facility]   6/21/2021 10:00 AM STC PULM REHAB RM 8 STCZ PULM [facility]   6/23/2021 10:00 AM STC PULM REHAB RM 8 STCZ PULM [facility]   6/28/2021 10:00 AM STC PULM REHAB RM 8 STCZ PULM [facility]   6/30/2021 10:00 AM STC PULM REHAB RM 8 STCZ PULM [facility]   7/5/2021 10:00 AM STC PULM REHAB RM 8 STCZ PULM [facility]   7/7/2021 10:00 AM STC PULM REHAB RM 8 STCZ PULM [facility]   7/12/2021 10:00 AM STC PULM REHAB RM 8 STCZ PULM [facility]   7/14/2021 10:00 AM STC PULM REHAB RM 8 STCZ PULM [facility]   7/19/2021 10:00 AM STC PULM REHAB RM 8 STCZ PULM [facility]   7/21/2021 10:00 AM STC PULM REHAB RM [address] [facility]   7/26/2021 10:00 AM STC PULM REHAB RM 8 STCZ PULM [facility]   7/28/2021 10:00 AM STC PULM REHAB RM 8 STCZ PULM [facility]   8/2/2021 10:00 AM STC PULM REHAB RM 8 STCZ PULM [facility]   8/4/2021 10:00 AM STC PULM REHAB RM 8 STCZ PULM [facility]   8/9/2021 10:00 AM STC PULM REHAB RM 8 STCZ PULM [facility]   8/11/2021 10:00 AM STC PULM REHAB RM [facility] Maintenance   Topic Date Due    Shingles Vaccine (1 of 2) Never done    Pneumococcal 65+ years Vaccine (1 of 1 - PPSV23) Never done   [employer] Visit (AWV)  Never done    Colon cancer screen colonoscopy  12/06/2020    Lipid screen  03/10/2021    DTaP/Tdap/Td vaccine (1 - Tdap) 08/18/2021 (Originally 8/11/1974)    COVID-19 Vaccine (2 - Pfizer 2-dose series) 04/30/2021    Flu vaccine  Completed    AAA screen  Completed    Hepatitis C screen  Completed    Hepatitis A vaccine  Aged Out    Hepatitis B vaccine  Aged Out    Hib vaccine  Aged Out    Meningococcal (ACWY) vaccine  Aged Out       Hemoglobin A1C (%)   Date Value   01/10/2018 5.0             ( goal A1C is < 7)   No results found for: LABMICR  LDL Cholesterol (mg/dL)   Date Value   03/10/2020 82   10/01/2018 81       (goal LDL is <100)   AST (U/L)   Date Value   01/12/2021 19     ALT (U/L)   Date Value   01/12/2021 13     BUN (mg/dL)   Date Value   01/14/2021 15     BP Readings from Last 3 Encounters:   03/08/21 119/71   02/22/21 120/80   01/19/21 92/60          (goal 120/80)    All Future Testing planned in [facility]  Lab Frequency Next Occurrence   Cologuard (For External Results Only) Once 05/19/2021               Patient Active Problem List:     Dyslipidemia     ED (erectile dysfunction)     Incomplete bladder emptying     Benign prostatic hyperplasia with urinary obstruction     History of colon cancer     PAF (paroxysmal atrial fibrillation) (HCC)     Anemia     Pallor of optic disc     Presbyopia     Incisional hernia, without obstruction or gangrene     Hypertrophic nonobstructive cardiomyopathy (HCC)     Iron (Fe) deficiency anemia     Primary osteoarthritis of right knee     History of malignant neoplasm of rectum     Hill's esophagus     CHF (congestive heart failure), NYHA class II, acute on chronic, combined (HCC)     Hypoxia     Dyspnea and respiratory abnormalities     Occupational pulmonary disease     Sinus bradycardia     Acute hypoxemic respiratory failure due to COVID-19 [facility])     COVID-19     Pneumonia     Acute respiratory failure with hypoxia (HCC)     Pulmonary fibrosis (HCC)     Syncope and collapse     Chronic anticoagulation     Severe malnutrition ([facility]

## 2021-04-19 ENCOUNTER — HOSPITAL ENCOUNTER (OUTPATIENT)
Dept: PULMONOLOGY | Age: 66
Setting detail: THERAPIES SERIES
Discharge: HOME | End: 2021-04-19
Payer: MEDICARE

## 2021-04-21 ENCOUNTER — HOSPITAL ENCOUNTER (OUTPATIENT)
Dept: PULMONOLOGY | Age: 66
Setting detail: THERAPIES SERIES
Discharge: HOME | End: 2021-04-21
Payer: MEDICARE

## 2021-04-21 VITALS — BODY MASS INDEX: 23.91 KG/M2 | WEIGHT: 171.4 LBS

## 2021-04-21 PROCEDURE — G0239 OTH RESP PROC, GROUP: HCPCS

## 2021-04-21 NOTE — PROGRESS NOTES
*Instructed pt on exercise equipment use & safety. *Reviewed reconditioning exercises w/ application of PLB DB &RB  *Started exercise w/ use of proper breathing techniques ,AIDA scales & pacing work. Bill required queueing to use pacing and PLB with exercises. *Instruct on lung diagnosis & disease process. O2 use & respiratory medications dose,frequency & technique help alleviate symptoms.

## 2021-04-24 ENCOUNTER — APPOINTMENT (OUTPATIENT)
Dept: CT IMAGING | Age: 66
DRG: 281 | End: 2021-04-24
Payer: MEDICARE

## 2021-04-24 ENCOUNTER — APPOINTMENT (OUTPATIENT)
Dept: GENERAL RADIOLOGY | Age: 66
End: 2021-04-24
Payer: MEDICARE

## 2021-04-24 ENCOUNTER — HOSPITAL ENCOUNTER (INPATIENT)
Age: 66
LOS: 3 days | Discharge: NURSING, REHAB OR LONG-TERM CARE | DRG: 281 | End: 2021-04-27
Attending: EMERGENCY MEDICINE | Admitting: INTERNAL MEDICINE
Payer: MEDICARE

## 2021-04-24 ENCOUNTER — APPOINTMENT (OUTPATIENT)
Dept: GENERAL RADIOLOGY | Age: 66
DRG: 281 | End: 2021-04-24
Payer: MEDICARE

## 2021-04-24 ENCOUNTER — HOSPITAL ENCOUNTER (EMERGENCY)
Age: 66
Discharge: ANOTHER HOSPITAL | End: 2021-04-24
Attending: EMERGENCY MEDICINE
Payer: MEDICARE

## 2021-04-24 VITALS
SYSTOLIC BLOOD PRESSURE: 167 MMHG | HEART RATE: 78 BPM | HEIGHT: 72 IN | OXYGEN SATURATION: 93 % | DIASTOLIC BLOOD PRESSURE: 87 MMHG | TEMPERATURE: 97.9 F | WEIGHT: 172 LBS | RESPIRATION RATE: 23 BRPM | BODY MASS INDEX: 23.3 KG/M2

## 2021-04-24 DIAGNOSIS — I16.1 HYPERTENSIVE EMERGENCY: Primary | ICD-10-CM

## 2021-04-24 DIAGNOSIS — I21.3 ST ELEVATION MYOCARDIAL INFARCTION (STEMI), UNSPECIFIED ARTERY (HCC): Primary | ICD-10-CM

## 2021-04-24 LAB
ABO/RH: NORMAL
ABSOLUTE EOS #: 0.3 K/UL (ref 0–0.4)
ABSOLUTE IMMATURE GRANULOCYTE: ABNORMAL K/UL (ref 0–0.3)
ABSOLUTE LYMPH #: 1.8 K/UL (ref 1–4.8)
ABSOLUTE MONO #: 0.5 K/UL (ref 0.1–1.3)
ALBUMIN SERPL-MCNC: 3.9 G/DL (ref 3.5–5.2)
ALBUMIN/GLOBULIN RATIO: ABNORMAL (ref 1–2.5)
ALP BLD-CCNC: 120 U/L (ref 40–129)
ALT SERPL-CCNC: 16 U/L (ref 5–41)
ANION GAP SERPL CALCULATED.3IONS-SCNC: 5 MMOL/L (ref 9–17)
ANTIBODY SCREEN: NEGATIVE
ARM BAND NUMBER: NORMAL
AST SERPL-CCNC: 20 U/L
BASOPHILS # BLD: 1 % (ref 0–2)
BASOPHILS ABSOLUTE: 0 K/UL (ref 0–0.2)
BILIRUB SERPL-MCNC: 0.56 MG/DL (ref 0.3–1.2)
BNP INTERPRETATION: ABNORMAL
BUN BLDV-MCNC: 17 MG/DL (ref 8–23)
BUN/CREAT BLD: ABNORMAL (ref 9–20)
CALCIUM SERPL-MCNC: 9.3 MG/DL (ref 8.6–10.4)
CHLORIDE BLD-SCNC: 101 MMOL/L (ref 98–107)
CO2: 32 MMOL/L (ref 20–31)
CREAT SERPL-MCNC: 0.64 MG/DL (ref 0.7–1.2)
DIFFERENTIAL TYPE: ABNORMAL
EOSINOPHILS RELATIVE PERCENT: 4 % (ref 0–4)
EXPIRATION DATE: NORMAL
GFR AFRICAN AMERICAN: >60 ML/MIN
GFR NON-AFRICAN AMERICAN: >60 ML/MIN
GFR SERPL CREATININE-BSD FRML MDRD: ABNORMAL ML/MIN/{1.73_M2}
GFR SERPL CREATININE-BSD FRML MDRD: ABNORMAL ML/MIN/{1.73_M2}
GLUCOSE BLD-MCNC: 128 MG/DL (ref 70–99)
HCT VFR BLD CALC: 38 % (ref 40.7–50.3)
HCT VFR BLD CALC: 40.6 % (ref 41–53)
HEMOGLOBIN: 11.6 G/DL (ref 13–17)
HEMOGLOBIN: 12.9 G/DL (ref 13.5–17.5)
IMMATURE GRANULOCYTES: ABNORMAL %
INR BLD: 1.7
LYMPHOCYTES # BLD: 26 % (ref 24–44)
MCH RBC QN AUTO: 25.4 PG (ref 26–34)
MCH RBC QN AUTO: 25.7 PG (ref 25.2–33.5)
MCHC RBC AUTO-ENTMCNC: 30.5 G/DL (ref 28.4–34.8)
MCHC RBC AUTO-ENTMCNC: 31.8 G/DL (ref 31–37)
MCV RBC AUTO: 79.7 FL (ref 80–100)
MCV RBC AUTO: 84.1 FL (ref 82.6–102.9)
MONOCYTES # BLD: 7 % (ref 1–7)
NRBC AUTOMATED: 0 PER 100 WBC
NRBC AUTOMATED: ABNORMAL PER 100 WBC
PARTIAL THROMBOPLASTIN TIME: >120 SEC (ref 20.5–30.5)
PDW BLD-RTO: 14.4 % (ref 11.8–14.4)
PDW BLD-RTO: 15.4 % (ref 11.5–14.9)
PLATELET # BLD: 290 K/UL (ref 150–450)
PLATELET # BLD: 292 K/UL (ref 138–453)
PLATELET ESTIMATE: ABNORMAL
PMV BLD AUTO: 10 FL (ref 8.1–13.5)
PMV BLD AUTO: 7 FL (ref 6–12)
POTASSIUM SERPL-SCNC: 4.1 MMOL/L (ref 3.7–5.3)
PRO-BNP: 1190 PG/ML
PROTHROMBIN TIME: 20.3 SEC (ref 11.8–14.6)
RBC # BLD: 4.52 M/UL (ref 4.21–5.77)
RBC # BLD: 5.09 M/UL (ref 4.5–5.9)
RBC # BLD: ABNORMAL 10*6/UL
SEG NEUTROPHILS: 62 % (ref 36–66)
SEGMENTED NEUTROPHILS ABSOLUTE COUNT: 4.3 K/UL (ref 1.3–9.1)
SODIUM BLD-SCNC: 138 MMOL/L (ref 135–144)
TOTAL PROTEIN: 7.4 G/DL (ref 6.4–8.3)
TROPONIN INTERP: ABNORMAL
TROPONIN INTERP: ABNORMAL
TROPONIN T: ABNORMAL NG/ML
TROPONIN T: ABNORMAL NG/ML
TROPONIN, HIGH SENSITIVITY: 27 NG/L (ref 0–22)
TROPONIN, HIGH SENSITIVITY: 28 NG/L (ref 0–22)
WBC # BLD: 7 K/UL (ref 3.5–11)
WBC # BLD: 9.7 K/UL (ref 3.5–11.3)
WBC # BLD: ABNORMAL 10*3/UL

## 2021-04-24 PROCEDURE — 6360000004 HC RX CONTRAST MEDICATION: Performed by: STUDENT IN AN ORGANIZED HEALTH CARE EDUCATION/TRAINING PROGRAM

## 2021-04-24 PROCEDURE — 99285 EMERGENCY DEPT VISIT HI MDM: CPT

## 2021-04-24 PROCEDURE — 84484 ASSAY OF TROPONIN QUANT: CPT

## 2021-04-24 PROCEDURE — 73030 X-RAY EXAM OF SHOULDER: CPT

## 2021-04-24 PROCEDURE — 93005 ELECTROCARDIOGRAM TRACING: CPT | Performed by: EMERGENCY MEDICINE

## 2021-04-24 PROCEDURE — 80053 COMPREHEN METABOLIC PANEL: CPT

## 2021-04-24 PROCEDURE — 86900 BLOOD TYPING SEROLOGIC ABO: CPT

## 2021-04-24 PROCEDURE — 71045 X-RAY EXAM CHEST 1 VIEW: CPT

## 2021-04-24 PROCEDURE — 2060000000 HC ICU INTERMEDIATE R&B

## 2021-04-24 PROCEDURE — 6370000000 HC RX 637 (ALT 250 FOR IP): Performed by: STUDENT IN AN ORGANIZED HEALTH CARE EDUCATION/TRAINING PROGRAM

## 2021-04-24 PROCEDURE — 6370000000 HC RX 637 (ALT 250 FOR IP): Performed by: EMERGENCY MEDICINE

## 2021-04-24 PROCEDURE — 6360000002 HC RX W HCPCS: Performed by: STUDENT IN AN ORGANIZED HEALTH CARE EDUCATION/TRAINING PROGRAM

## 2021-04-24 PROCEDURE — 85730 THROMBOPLASTIN TIME PARTIAL: CPT

## 2021-04-24 PROCEDURE — 93005 ELECTROCARDIOGRAM TRACING: CPT | Performed by: INTERNAL MEDICINE

## 2021-04-24 PROCEDURE — 96375 TX/PRO/DX INJ NEW DRUG ADDON: CPT

## 2021-04-24 PROCEDURE — C9248 INJ, CLEVIDIPINE BUTYRATE: HCPCS | Performed by: STUDENT IN AN ORGANIZED HEALTH CARE EDUCATION/TRAINING PROGRAM

## 2021-04-24 PROCEDURE — 83880 ASSAY OF NATRIURETIC PEPTIDE: CPT

## 2021-04-24 PROCEDURE — 6360000002 HC RX W HCPCS: Performed by: EMERGENCY MEDICINE

## 2021-04-24 PROCEDURE — 99223 1ST HOSP IP/OBS HIGH 75: CPT | Performed by: INTERNAL MEDICINE

## 2021-04-24 PROCEDURE — 85025 COMPLETE CBC W/AUTO DIFF WBC: CPT

## 2021-04-24 PROCEDURE — 86850 RBC ANTIBODY SCREEN: CPT

## 2021-04-24 PROCEDURE — 71275 CT ANGIOGRAPHY CHEST: CPT

## 2021-04-24 PROCEDURE — 2500000003 HC RX 250 WO HCPCS: Performed by: STUDENT IN AN ORGANIZED HEALTH CARE EDUCATION/TRAINING PROGRAM

## 2021-04-24 PROCEDURE — 2580000003 HC RX 258: Performed by: STUDENT IN AN ORGANIZED HEALTH CARE EDUCATION/TRAINING PROGRAM

## 2021-04-24 PROCEDURE — 36415 COLL VENOUS BLD VENIPUNCTURE: CPT

## 2021-04-24 PROCEDURE — 96374 THER/PROPH/DIAG INJ IV PUSH: CPT

## 2021-04-24 PROCEDURE — 96365 THER/PROPH/DIAG IV INF INIT: CPT

## 2021-04-24 PROCEDURE — 86901 BLOOD TYPING SEROLOGIC RH(D): CPT

## 2021-04-24 PROCEDURE — 85610 PROTHROMBIN TIME: CPT

## 2021-04-24 PROCEDURE — 85027 COMPLETE CBC AUTOMATED: CPT

## 2021-04-24 RX ORDER — NITROGLYCERIN 0.4 MG/1
0.4 TABLET SUBLINGUAL ONCE
Status: COMPLETED | OUTPATIENT
Start: 2021-04-24 | End: 2021-04-24

## 2021-04-24 RX ORDER — 0.9 % SODIUM CHLORIDE 0.9 %
1000 INTRAVENOUS SOLUTION INTRAVENOUS ONCE
Status: DISCONTINUED | OUTPATIENT
Start: 2021-04-24 | End: 2021-04-24

## 2021-04-24 RX ORDER — FENTANYL CITRATE 50 UG/ML
50 INJECTION, SOLUTION INTRAMUSCULAR; INTRAVENOUS ONCE
Status: COMPLETED | OUTPATIENT
Start: 2021-04-24 | End: 2021-04-24

## 2021-04-24 RX ORDER — FENTANYL CITRATE 50 UG/ML
25 INJECTION, SOLUTION INTRAMUSCULAR; INTRAVENOUS
Status: DISCONTINUED | OUTPATIENT
Start: 2021-04-24 | End: 2021-04-27 | Stop reason: HOSPADM

## 2021-04-24 RX ORDER — HEPARIN SODIUM 10000 [USP'U]/100ML
5-30 INJECTION, SOLUTION INTRAVENOUS CONTINUOUS
Status: DISCONTINUED | OUTPATIENT
Start: 2021-04-24 | End: 2021-04-24 | Stop reason: HOSPADM

## 2021-04-24 RX ORDER — SODIUM CHLORIDE 0.9 % (FLUSH) 0.9 %
5-40 SYRINGE (ML) INJECTION EVERY 12 HOURS SCHEDULED
Status: DISCONTINUED | OUTPATIENT
Start: 2021-04-24 | End: 2021-04-27 | Stop reason: HOSPADM

## 2021-04-24 RX ORDER — ATORVASTATIN CALCIUM 80 MG/1
40 TABLET, FILM COATED ORAL DAILY
Status: DISCONTINUED | OUTPATIENT
Start: 2021-04-24 | End: 2021-04-27 | Stop reason: HOSPADM

## 2021-04-24 RX ORDER — ACETAMINOPHEN 650 MG/1
650 SUPPOSITORY RECTAL EVERY 6 HOURS PRN
Status: DISCONTINUED | OUTPATIENT
Start: 2021-04-24 | End: 2021-04-27 | Stop reason: HOSPADM

## 2021-04-24 RX ORDER — HEPARIN SODIUM 1000 [USP'U]/ML
4000 INJECTION, SOLUTION INTRAVENOUS; SUBCUTANEOUS PRN
Status: DISCONTINUED | OUTPATIENT
Start: 2021-04-24 | End: 2021-04-25

## 2021-04-24 RX ORDER — MORPHINE SULFATE 4 MG/ML
4 INJECTION, SOLUTION INTRAMUSCULAR; INTRAVENOUS ONCE
Status: COMPLETED | OUTPATIENT
Start: 2021-04-24 | End: 2021-04-24

## 2021-04-24 RX ORDER — ONDANSETRON 2 MG/ML
4 INJECTION INTRAMUSCULAR; INTRAVENOUS EVERY 6 HOURS PRN
Status: DISCONTINUED | OUTPATIENT
Start: 2021-04-24 | End: 2021-04-27 | Stop reason: HOSPADM

## 2021-04-24 RX ORDER — HEPARIN SODIUM 10000 [USP'U]/100ML
5-30 INJECTION, SOLUTION INTRAVENOUS CONTINUOUS
Status: DISCONTINUED | OUTPATIENT
Start: 2021-04-24 | End: 2021-04-25

## 2021-04-24 RX ORDER — DIGOXIN 125 MCG
125 TABLET ORAL DAILY
Status: DISCONTINUED | OUTPATIENT
Start: 2021-04-24 | End: 2021-04-27 | Stop reason: HOSPADM

## 2021-04-24 RX ORDER — 0.9 % SODIUM CHLORIDE 0.9 %
1000 INTRAVENOUS SOLUTION INTRAVENOUS ONCE
Status: COMPLETED | OUTPATIENT
Start: 2021-04-24 | End: 2021-04-24

## 2021-04-24 RX ORDER — METOPROLOL TARTRATE 5 MG/5ML
5 INJECTION INTRAVENOUS EVERY 6 HOURS PRN
Status: DISCONTINUED | OUTPATIENT
Start: 2021-04-24 | End: 2021-04-27 | Stop reason: HOSPADM

## 2021-04-24 RX ORDER — FENTANYL CITRATE 50 UG/ML
50 INJECTION, SOLUTION INTRAMUSCULAR; INTRAVENOUS ONCE
Status: DISCONTINUED | OUTPATIENT
Start: 2021-04-24 | End: 2021-04-27 | Stop reason: HOSPADM

## 2021-04-24 RX ORDER — IPRATROPIUM BROMIDE AND ALBUTEROL SULFATE 2.5; .5 MG/3ML; MG/3ML
1 SOLUTION RESPIRATORY (INHALATION) 4 TIMES DAILY
Status: DISCONTINUED | OUTPATIENT
Start: 2021-04-24 | End: 2021-04-25

## 2021-04-24 RX ORDER — NICARDIPINE HYDROCHLORIDE 0.1 MG/ML
3-15 INJECTION INTRAVENOUS CONTINUOUS
Status: DISCONTINUED | OUTPATIENT
Start: 2021-04-24 | End: 2021-04-24

## 2021-04-24 RX ORDER — SODIUM CHLORIDE 9 MG/ML
25 INJECTION, SOLUTION INTRAVENOUS PRN
Status: DISCONTINUED | OUTPATIENT
Start: 2021-04-24 | End: 2021-04-27 | Stop reason: HOSPADM

## 2021-04-24 RX ORDER — HEPARIN SODIUM 1000 [USP'U]/ML
80 INJECTION, SOLUTION INTRAVENOUS; SUBCUTANEOUS ONCE
Status: COMPLETED | OUTPATIENT
Start: 2021-04-24 | End: 2021-04-24

## 2021-04-24 RX ORDER — PROMETHAZINE HYDROCHLORIDE 12.5 MG/1
12.5 TABLET ORAL EVERY 6 HOURS PRN
Status: DISCONTINUED | OUTPATIENT
Start: 2021-04-24 | End: 2021-04-27 | Stop reason: HOSPADM

## 2021-04-24 RX ORDER — HEPARIN SODIUM 1000 [USP'U]/ML
2000 INJECTION, SOLUTION INTRAVENOUS; SUBCUTANEOUS PRN
Status: DISCONTINUED | OUTPATIENT
Start: 2021-04-24 | End: 2021-04-25

## 2021-04-24 RX ORDER — POLYETHYLENE GLYCOL 3350 17 G/17G
17 POWDER, FOR SOLUTION ORAL DAILY PRN
Status: DISCONTINUED | OUTPATIENT
Start: 2021-04-24 | End: 2021-04-27 | Stop reason: HOSPADM

## 2021-04-24 RX ORDER — TAMSULOSIN HYDROCHLORIDE 0.4 MG/1
0.4 CAPSULE ORAL DAILY
Status: DISCONTINUED | OUTPATIENT
Start: 2021-04-24 | End: 2021-04-27 | Stop reason: HOSPADM

## 2021-04-24 RX ORDER — ACETAMINOPHEN 325 MG/1
650 TABLET ORAL EVERY 6 HOURS PRN
Status: DISCONTINUED | OUTPATIENT
Start: 2021-04-24 | End: 2021-04-27 | Stop reason: HOSPADM

## 2021-04-24 RX ORDER — HEPARIN SODIUM 1000 [USP'U]/ML
40 INJECTION, SOLUTION INTRAVENOUS; SUBCUTANEOUS PRN
Status: DISCONTINUED | OUTPATIENT
Start: 2021-04-24 | End: 2021-04-24 | Stop reason: HOSPADM

## 2021-04-24 RX ORDER — ASPIRIN 81 MG/1
324 TABLET, CHEWABLE ORAL ONCE
Status: COMPLETED | OUTPATIENT
Start: 2021-04-24 | End: 2021-04-24

## 2021-04-24 RX ORDER — SODIUM CHLORIDE 0.9 % (FLUSH) 0.9 %
5-40 SYRINGE (ML) INJECTION PRN
Status: DISCONTINUED | OUTPATIENT
Start: 2021-04-24 | End: 2021-04-27 | Stop reason: HOSPADM

## 2021-04-24 RX ORDER — HEPARIN SODIUM 1000 [USP'U]/ML
80 INJECTION, SOLUTION INTRAVENOUS; SUBCUTANEOUS PRN
Status: DISCONTINUED | OUTPATIENT
Start: 2021-04-24 | End: 2021-04-24 | Stop reason: HOSPADM

## 2021-04-24 RX ADMIN — FENTANYL CITRATE 25 MCG: 50 INJECTION, SOLUTION INTRAMUSCULAR; INTRAVENOUS at 23:56

## 2021-04-24 RX ADMIN — ACETAMINOPHEN 650 MG: 325 TABLET ORAL at 22:47

## 2021-04-24 RX ADMIN — NITROGLYCERIN 0.4 MG: 0.4 TABLET SUBLINGUAL at 12:50

## 2021-04-24 RX ADMIN — HEPARIN SODIUM 6240 UNITS: 1000 INJECTION INTRAVENOUS; SUBCUTANEOUS at 13:40

## 2021-04-24 RX ADMIN — FENTANYL CITRATE 50 MCG: 50 INJECTION, SOLUTION INTRAMUSCULAR; INTRAVENOUS at 15:49

## 2021-04-24 RX ADMIN — ONDANSETRON 4 MG: 2 INJECTION INTRAMUSCULAR; INTRAVENOUS at 19:34

## 2021-04-24 RX ADMIN — HEPARIN SODIUM AND DEXTROSE 18 UNITS/KG/HR: 10000; 5 INJECTION INTRAVENOUS at 13:40

## 2021-04-24 RX ADMIN — SODIUM CHLORIDE 1000 ML: 9 INJECTION, SOLUTION INTRAVENOUS at 18:10

## 2021-04-24 RX ADMIN — ATORVASTATIN CALCIUM 40 MG: 80 TABLET, FILM COATED ORAL at 17:16

## 2021-04-24 RX ADMIN — ASPIRIN 324 MG: 81 TABLET, CHEWABLE ORAL at 12:50

## 2021-04-24 RX ADMIN — MORPHINE SULFATE 4 MG: 4 INJECTION, SOLUTION INTRAMUSCULAR; INTRAVENOUS at 12:51

## 2021-04-24 RX ADMIN — NICARDIPINE HYDROCHLORIDE 5 MG/HR: 0.1 INJECTION INTRAVENOUS at 15:48

## 2021-04-24 RX ADMIN — NITROGLYCERIN 0.4 MG: 0.4 TABLET SUBLINGUAL at 13:30

## 2021-04-24 RX ADMIN — HEPARIN SODIUM AND DEXTROSE 8 UNITS/KG/HR: 10000; 5 INJECTION INTRAVENOUS at 15:54

## 2021-04-24 RX ADMIN — DILTIAZEM HYDROCHLORIDE 5 MG/HR: 5 INJECTION INTRAVENOUS at 18:13

## 2021-04-24 RX ADMIN — IOPAMIDOL 100 ML: 755 INJECTION, SOLUTION INTRAVENOUS at 16:26

## 2021-04-24 ASSESSMENT — ENCOUNTER SYMPTOMS
SHORTNESS OF BREATH: 0
CHEST TIGHTNESS: 0
ABDOMINAL PAIN: 0
DIARRHEA: 0
VOMITING: 0
DIARRHEA: 0
BACK PAIN: 0
SORE THROAT: 0
CONSTIPATION: 0
EYE PAIN: 0
SINUS PAIN: 0
SORE THROAT: 0
COUGH: 0
NAUSEA: 0
NAUSEA: 0
ABDOMINAL PAIN: 0
EYE PAIN: 0
VOMITING: 0

## 2021-04-24 ASSESSMENT — PAIN DESCRIPTION - ORIENTATION: ORIENTATION: LEFT

## 2021-04-24 ASSESSMENT — PAIN SCALES - GENERAL
PAINLEVEL_OUTOF10: 9
PAINLEVEL_OUTOF10: 6
PAINLEVEL_OUTOF10: 10
PAINLEVEL_OUTOF10: 10

## 2021-04-24 ASSESSMENT — PAIN DESCRIPTION - PAIN TYPE: TYPE: ACUTE PAIN

## 2021-04-24 ASSESSMENT — PAIN DESCRIPTION - LOCATION: LOCATION: SHOULDER

## 2021-04-24 NOTE — PROGRESS NOTES
[facility] HANDOFF       Handoff taken on the following patient from prior Attending Physician:  Pt Name: Luke Zamora  PCP:  Uday Egan MD    Attestation  I was available and discussed any additional care issues that arose and coordinated the management plans with the resident(s) caring for the patient during my duty period. Any areas of disagreement with resident's documentation of care or procedures are noted on the chart. I was personally present for the key portions of any/all procedures during my duty period. I have documented in the chart those procedures where I was not present during the key portions.          CHIEF COMPLAINT       Chief Complaint   Patient presents with    Chest Injury     pt was seen at [address] [place] [address] today for chest pain and EKG showed stemi,          CURRENT MEDICATIONS     Previous Medications  Previous Medications    ALBUTEROL SULFATE HFA (VENTOLIN HFA) 108 (90 BASE) MCG/ACT INHALER    Inhale 2 puffs into the lungs 4 times daily as needed for Wheezing    APIXABAN (ELIQUIS) 5 MG TABS TABLET    Take 1 tablet by mouth 2 times daily    ATORVASTATIN (LIPITOR) 40 MG TABLET    Take 1 tablet by mouth daily    DIGOXIN (LANOXIN) 125 MCG TABLET    Take 1 tablet by mouth daily    HANDICAP PLACARD MISC    by Does not apply route Expires 1/2026    IBUPROFEN (ADVIL;MOTRIN) 800 MG TABLET    take 1 tablet by mouth three times a day AS NEEDED FOR PAIN    OMEPRAZOLE (PRILOSEC) 40 MG DELAYED RELEASE CAPSULE    Take 1 capsule by mouth daily    TAMSULOSIN (FLOMAX) 0.4 MG CAPSULE    Take 1 capsule by mouth daily       Encounter Medications  Orders Placed This Encounter   Medications    fentaNYL (SUBLIMAZE) injection 50 mcg    heparin (porcine) injection 4,000 Units    heparin (porcine) injection 2,000 Units    heparin 25,000 units in dextrose 5% 250 mL (premix) infusion    niCARdipine (CARDENE) 20 mg in 0.9 % sodium chloride 200 mL solution       ALLERGIES     is allergic to adhesive tape; codeine; and penicillins. RECENT VITALS:   Temp: 97.5 °F (36.4 °C),  Pulse: 78, Resp: 21, BP: (!) 173/98    RADIOLOGY:   XR SHOULDER LEFT (MIN 2 VIEWS)    (Results Pending)       LABS:  Labs Reviewed   CBC - Abnormal; Notable for the following components:       Result Value    Hemoglobin 11.6 (*)     Hematocrit 38.0 (*)     All other components within normal limits   TROPONIN - Abnormal; Notable for the following components:    Troponin, High Sensitivity 28 (*)     All other components within normal limits   APTT   APTT   APTT           PLAN/ TASKS OUTSTANDING     Admission      (Please note that portions of this note were completed with a voice recognition program.  Efforts were made to edit the dictations but occasionally words are mis-transcribed. )    Vicente MD, Vikki Armstrong.   Attending Emergency Physician

## 2021-04-24 NOTE — ED PROVIDER NOTES
smoking about 50 years ago. He has a 0.50 pack-year smoking history. He has never used smokeless tobacco. He reports current alcohol use of about 12.0 standard drinks of alcohol per week. He reports that he does not use drugs. PHYSICAL EXAM     INITIAL VITALS: BP (!) 134/95   Pulse 83   Temp 97.9 °F (36.6 °C) (Oral)   Resp 21   Ht 6' (1.829 m)   Wt 172 lb (78 kg)   SpO2 92%   BMI 23.33 kg/m²    Physical Exam  Vitals signs and nursing note reviewed. Constitutional:       Comments: On arrival patient appeared uncomfortable but not in any acute distress. HENT:      Head: Normocephalic and atraumatic. Right Ear: External ear normal.      Left Ear: External ear normal.   Eyes:      Conjunctiva/sclera: Conjunctivae normal.      Pupils: Pupils are equal, round, and reactive to light. Neck:      Musculoskeletal: Normal range of motion and neck supple. Vascular: No JVD. Trachea: No tracheal deviation. Cardiovascular:      Rate and Rhythm: Normal rate and regular rhythm. Heart sounds: Murmur present. Pulmonary:      Effort: Pulmonary effort is normal. No respiratory distress. Breath sounds: Normal breath sounds. No stridor. Chest:      Comments: Tender over the left chest and shoulder. Abdominal:      General: Bowel sounds are normal. There is no distension. Palpations: Abdomen is soft. Tenderness: There is no abdominal tenderness. Musculoskeletal:         General: No tenderness or deformity. Comments: ROM limited in the LUE due to pain. Skin:     General: Skin is warm and dry. Neurological:      Mental Status: He is oriented to person, place, and time. Cranial Nerves: No cranial nerve deficit. Coordination: Coordination normal.         MEDICAL DECISION MAKING:   Assessment:  Ольга Webster is a 77 y.o. male who presents with chest and shoulder pain.        ED Course/MDM:   Patient arrived markedly hypertensive in the 220/180s with active chest pain. Cardiac, aortic, pulmonary pathology considered. Patient with good pulses in bilateral upper extremities with blood pressures grossly equal. Portable chest with no obvious marked mediastinum widening to indicate aortic dissection. Given active chest pain and elevated BP, given morphine, asa, ntg SL on arrival.    First EKG on arrival (13:15) NSR with some ST depressions noted in leads V2, V3. During EKG patient was noted to become bradycardic to the 40s. ON my evaluation the patient was pale, diaphoretic, and complaining of worse chest pain and nausea. BP was noted to be 90/60. Given 1 mg atropine with HR improved to the 80s and /90s. After atropine given telemetry was showing frequent ectopic beats which appeared to have significant ST elevation with concerning tombstone morphology. Repeat 12 lead with poor quality due to profound diaphoresis but I am concerned about developing ST elevation in leads II, III, aVF. Auto-launch procedure for STEMI initiated to transfer to [facility]. Discussed with cardiologist Dr Nirav Chavez. He recommends transfer to [address] [place] [address]. Accepted by ED physician Dr. Ben Lindsay. ED Course as of Apr 24 1409   Sat Apr 24, 2021   1342 [employer] initiated for STEMI. Discussed with cardiology and ED attending at [facility]. S/p asa, morphine, nitro. Heparin gtt initiated. [MK]      ED Course User Index  [MK] Baldo Walters MD     Critical Care  CRITICAL CARE: There was a high probability of clinically significant/life threatening deterioration in this patient's condition which required my urgent intervention. Total critical care time was 25 minutes. This excludes any time for separately reportable procedures.        Procedures    DIAGNOSTIC RESULTS   EKG: All EKG's are interpreted by the Emergency Department Physician who either signs or Co-signs this chart inthe absence of a cardiologist.      RADIOLOGY:All plain film, CT, MRI, and formal ultrasound images (except ED bedside ultrasound) are read by the radiologist, see reports below, unless otherwise noted in MDM or here. XR CHEST PORTABLE   Final Result   Stable appearing ground-glass opacities and pulmonary fibrosis. CTA CHEST W CONTRAST    (Results Pending)     LABS: All lab results were reviewed by myself, and all abnormals are listed below.   Labs Reviewed   BRAIN NATRIURETIC PEPTIDE - Abnormal; Notable for the following components:       Result Value    Pro-BNP 1,190 (*)     All other components within normal limits   CBC WITH AUTO DIFFERENTIAL - Abnormal; Notable for the following components:    Hemoglobin 12.9 (*)     Hematocrit 40.6 (*)     MCV 79.7 (*)     MCH 25.4 (*)     RDW 15.4 (*)     All other components within normal limits   COMPREHENSIVE METABOLIC PANEL - Abnormal; Notable for the following components:    Glucose 128 (*)     CREATININE 0.64 (*)     CO2 32 (*)     Anion Gap 5 (*)     All other components within normal limits   TROPONIN - Abnormal; Notable for the following components:    Troponin, High Sensitivity 27 (*)     All other components within normal limits   PROTIME-INR - Abnormal; Notable for the following components:    Protime 20.3 (*)     All other components within normal limits   TROPONIN   CBC   PROTIME-INR   HEPARIN LEVEL/ANTI-XA   HEPARIN LEVEL/ANTI-XA   APTT   APTT   APTT   TYPE AND SCREEN     EMERGENCY DEPARTMENT COURSE:   Vitals:    Vitals:    04/24/21 1300 04/24/21 1320 04/24/21 1326 04/24/21 1330   BP: 89/65 (!) 154/100 (!) 179/109 (!) 134/95   Pulse: 51 83     Resp: 26 30 21    Temp:       TempSrc:       SpO2: 95% 94% 91% 92%   Weight:       Height:           The patient was given the following medications while in the emergency department:  Orders Placed This Encounter   Medications    DISCONTD: 0.9 % sodium chloride bolus    nitroGLYCERIN (NITROSTAT) SL tablet 0.4 mg    morphine sulfate (PF) injection 4 mg    aspirin chewable tablet 324 mg    heparin (porcine) injection 6,240 Units    heparin (porcine) injection 6,240 Units    heparin (porcine) injection 3,120 Units    heparin 25,000 units in dextrose 5% 250 mL (premix) infusion     CONSULTS:  None    FINAL IMPRESSION      1. ST elevation myocardial infarction (STEMI), unspecified artery (HCC)          DISPOSITION/PLAN   DISPOSITION        PATIENT REFERRED TO:  No follow-up provider specified.   DISCHARGE MEDICATIONS:  New Prescriptions    No medications on file     Satinder Swift MD  AttendingEmergency Physician                        Luis Fisher MD  04/24/21 4275

## 2021-04-24 NOTE — ED NOTES
Report given to Ammon Gallagher RN from [facility] ED. Report method by phone. The following was reviewed with receiving RN:   Current vital signs:  BP (!) 167/87   Pulse 78   Temp 97.9 °F (36.6 °C) (Oral)   Resp 23   Ht 6' (1.829 m)   Wt 172 lb (78 kg)   SpO2 93%   BMI 23.33 kg/m²                MEWS Score: 4     Any medication or safety alerts were reviewed. Any pending diagnostics and notifications were also reviewed, as well as any safety concerns or issues, abnormal labs, abnormal imaging, and abnormal assessment findings. Questions were answered.             Sorin Nichole RN  04/24/21 0315

## 2021-04-24 NOTE — CARE COORDINATION
Case Management Initial Discharge Plan  Mickey Monte,             Met with:patient to discuss discharge plans. Information verified: address, contacts, phone number, , insurance Yes    Emergency Contact/Next of Kin name & number: Cathryn Soares (s/o) [phone]    PCP: Jerardo Connors MD  Date of last visit: 1 month ago    Insurance Provider: [facility] Medicare, Medicaid    Discharge Planning    Living Arrangements:  Spouse/Significant Other   Support Systems:  Spouse/Significant Other    Home has 2 stories  3 (with rails) stairs to climb to get into front door, 1 flight stairs to climb to reach second floor  Location of bedroom/bathroom in home 2nd floor. Patient able to perform ADL's:Independent    Current Services (outpatient & in home) none  DME equipment: cane, shower chair, elevated toilet seat, Osygen  DME provider: [facility]    Receiving oral anticoagulation therapy? Yes-Eliquis    If indicated:   Physician managing anticoagulation treatment:   Where does patient obtain lab work for ATC treatment? Potential Assistance Needed:       Patient agreeable to home care: No  [place] of choice provided:  n/a    Prior SNF/Rehab Placement and Facility:   Agreeable to SNF/Rehab: No  [place] of choice provided: n/a     Evaluation: no    Expected Discharge date:  21    Patient expects to be discharged to:  return to home, no skilled needs at present time. Follow Up Appointment: Best Day/ Time:      Transportation provider: self  Transportation arrangements needed for discharge: No, Pierre Marcum will transport. Readmission Risk              Risk of Unplanned Readmission:        19             Does patient have a readmission risk score greater than 14?: Yes  If yes, follow-up appointment must be made within 7 days of discharge. Goals of Care: to feel better.       Discharge Plan: return to home, no skilled needs at present time          Electronically signed by Dileep Miranda RN on 4/24/21 at 6:17 PM EDT

## 2021-04-24 NOTE — ED TRIAGE NOTES
Pt reports to the ED via EMS with c/o Chest pain. Per ems pt was seen at [address] this morning for chest pain along with Lt shoulder pain, EKG showed stemi so they wanted him transferred here, pt was given 324 of Asprin, 4mg of morphine and nitro and he went hypotensive. Pt was started on heparin, pt is A&Ox4, RR even and non labored.

## 2021-04-24 NOTE — ED NOTES
Called to bedside for cardiac rate in 30s. Patient diaphoretic, gray in color, nauseated. Dr Alysa Yun  to room. Pacer pads placed. Atropine 1mg given. 2nd IV access obtained by Matt Michelle RN. Repeat EKG obtained.       Soraya Radford RN  04/24/21 1811
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
## 2021-04-24 NOTE — ED NOTES
Bed: 17  Expected date:   Expected time:   Means of arrival:   Comments:     Perez Sweet RN  04/24/21 7612

## 2021-04-24 NOTE — ED NOTES
4/24/2021 2:42 PM    Patient: Alexis Sullivan, 77 y.o., male Race:[facility]  Patient Address: [facility] [address]    Sending Facility:  [] [facility]  [] [facility]  [x] [facility]  [] [facility]  [] [facility]  [] Other:    Sending Physician: DR. Marisol Hammer     Patient Phone #: [phone]   Insurance: Payor: Pravin Cooper /  /  /   Magaly Both:  []  Yes   [x]  No  Emergency Contact: Extended Emergency Contact Information  Primary Emergency Contact: Susie Garcia  Address: [address]it RodriguezTiffany [address] [place] [address] Phone: [phone]  Work Phone: [phone]  Mobile Phone: [phone]  Relation: Other  Secondary Emergency Contact: Marcy Parikh  Address: [address] Phone: [phone]  Work Phone: [phone]  Mobile Phone: [phone]  Relation: Brother/Sister  MRN: [id number]    YOB: 1955  Primary Care Physician: Daniella Yoo MD  Advance Directive: [x] Full Code   []DNR-CC   []DNR-CCA    [facility] Crew: Gabrielle Julian - CT Tech, Keith - Paramedic, Linda - RN    Receiving Facility:  [x] [facility]  [] [facility]  [] [facility]  [] Other:  Receiving Physician: Dr. Korin Pleitez    Reason for transfer:   [x]   Speciality care required, not available at sending facility   []   Pt/family request   []   Physician request    []   Other:    Response Code   [x] 2  [] 3  []   Change  [] 2  [] 3   Transport Code   [x] 2  [] 3  []   Change  [] 2  [] 3     Call Received 4/24/2021 1330   Dispatched 4/24/2021 1330   [address] 4/24/2021 1344   Arrived Scene 4/24/2021 1348   At Patient 4/24/2021 1351   Departed Scene 4/24/2021 [address] 4/24/2021 1425   Departed Hospital 4/24/2021 1440   In Service 4/24/2021 1440     Mileage:  [address] [place] [address]   Total Miles 6       Allergies  is allergic to adhesive tape; codeine; and penicillins. Medications  Prior to Admission medications    Medication Sig Start Date End Date Taking?  Authorizing Provider   ibuprofen (ADVIL;MOTRIN) 800 MG tablet take 1 tablet by mouth three times a day AS NEEDED FOR PAIN 4/15/21   Cassandra Bender MD   apixaban (ELIQUIS) 5 MG TABS tablet Take 1 tablet by mouth 2 times daily 3/1/21   Cassandra Bender MD   Daniel Velasquez MISC by Does not apply route Expires 1/2026 1/19/21   Cassandra Bender MD   digoxin (LANOXIN) 125 MCG tablet Take 1 tablet by mouth daily 12/8/20   Stefania Roblero MD   albuterol sulfate HFA (VENTOLIN HFA) 108 (90 Base) MCG/ACT inhaler Inhale 2 puffs into the lungs 4 times daily as needed for Wheezing  Patient not taking: Reported on 3/8/2021 12/7/20   Stefania Roblero MD   atorvastatin (LIPITOR) 40 MG tablet Take 1 tablet by mouth daily 11/16/20   Cassandra Bender MD   omeprazole (PRILOSEC) 40 MG delayed release capsule Take 1 capsule by mouth daily 11/16/20   Cassandra Bender MD   [facility] 0.4 MG capsule Take 1 capsule by mouth daily 10/15/20   Cassandra Bender MD    Scheduled Meds:  Continuous Infusions:  PRN Meds:.  Past Medical History   has a past medical history of Atrial fibrillation ([facility] Back pain, chronic, Hill's esophagus, Benign essential HTN, BPH (benign prostatic hyperplasia), Cancer ([facility] Chronic idiopathic pulmonary fibrosis ([facility] Cocaine abuse in remission [facility]), ED (erectile dysfunction), GERD (gastroesophageal reflux disease), GI bleed, Hernia, History of colon cancer, Melena, Migraines, and Murmur, cardiac.     Patient Weight: 170lbs   []   Estimated   [x]   Stated        VITALS          Time BP Pulse   Resp  Rate N-normal  S-shallow  D-deep Monitor SpO2 O2    LPM BGL   Temp Pain   pta 162/96 70 14 n sr 97 4  97.9    1406 149/81 76 16 n sr 95 4   8   1416 160/93 75 14 n sr 96 4   8   1425 173/76 70 14 n sr 96 4   8                   Pupils GCS   Time R L E  4 V  5 M  6 T  15   pta   4 5 6 15   1406 2 2 4 5 6 15   1416 2 2 4 5 6 15   1425 2 2 4 5 6 15                  Narrative:    Dispatched to inter-facility transfer by [employer]. Arrived to find Amirah Ramirez, 77 y.o., male c/o L arm pain 8/10. Report received from Shahram Colón at patients side. HPI - Pt states he fell 2 months prior and has had L arm pain since. Pt states pain was unbearable today resulting in his ED visit. Pt. States pain radiates to neck and back. Called to [facility] for Stemi (inferior)  transport to [facility]. While in care at [facility] Pt was given ASA, NTG, and Morphine for pain and Hypertension, resulting in Bradycardia, hypotension and diaphoresis. This was resolved with Rx. Pt. Is currently on a heparin Drip. Patient received the following medications prior to [facility] arrival:ASA, NTG, Morpine, Atropine, Heparin  Patient has the following lines/drains prior to [facility] arrival:18g,R FA. 18g, R HA  Patient has the following airway placed prior to [facility] arrival:none    Patient was transferred to cot via 4 person sheet lift and secured with straps x3. Zoll ECG, SpO2, and NIBP applied and monitored throughout encounter. HOB maintained at 30 degrees. INFUSIONS        Time Medication Name Concentration Route Rate Dose Alaris Pump or gravity   pta Heparin 25,000 units in d5% 250 mL  PIV 14ml/hr 3.95ml admin. On receiving pt. Pump                                  Patient is being transported to [facility] for tertiary Cardiac care unavailable at sending hospital.   During transport patient rests comfortably. Cabin temp maintained at 70-72 °F throughout transport. Patient was transferred to bed 17 via 4 person sheet lift. . Report, care, and all paperwork from sending facility turned over to [facility] at bedside. Physical assessment performed:    Neuro: Pt.  Is AnO x 4   Resp: lungs clear to ascultation bilaterally, normal effort  Cardiac: regular rate, no murmur, 12 lead ECG shows inferior STEMI  Muscular/skeletal/peripheral vascular: no edema, no redness or tenderness in the calves, pulses present in 4 extremities   Abd/GI/: abd flat, soft, non tender, bowel sounds present x 4 quadrants   Skin: normal color, warm, dry      IV Lines:  Time Type Site/Route Size # Attempts Performed By   pta piv R FA 18  edrn   pta piv R ESPINOZA 18  edrn                     Total Amount of IV Fluids Infused:5ml    MEDS / ELECTRICAL RX   Time Therapy Dosage Route Response Preformed By   8288 Heparin 25408h 14ml/hr IVPump none [facility] Notification:    Report called to -  [] [facility]  [] [facility]  [] [facility]  [] Other:     []    Med Channel:     [x]    Cell Phone     Med Control Orders Received:   [x] No  []  Yes:     Med Control Physician (if on line medical direction received)  -      Bedside Report Given To:   Elie Paz [x]  RN   []   MD   []   DO    Hospital Team Alert:   []    Trauma Alert    [x]    STEMI Alert    []    RACE Alert      Description Of Valuables: none    Patient Valuables:   []    With Patient    []    Not Recieved    []    ER / Lab     Call Outcome:   [x]    Transport to Facility    []    Transported by other     []    Cancelled    []    Patient Refusal    []                [facility]  Mobile Stroke Unit    Electronically signed by Gurinder Grullon RN on 4/24/21 at 2:42 PM EDT     Gurinder Grullon RN  04/24/21 6426

## 2021-04-24 NOTE — ED PROVIDER NOTES
inversion in  III  Q Waves: none    EKG  Impression: Abnormal EKG with fluctuating ST-T wave changes however no evidence of significant ST abnormality to suggest STEMI at this time    Regulo Miranda MD      Interpreted by me      CRITICAL CARE: There was a high probability of clinically significant/life threatening deterioration in this patient's condition which required my urgent intervention. Total critical care time was 12 minutes. This excludes any time for separately reportable procedures.      Mynor Watt MD, Earline Bourne  Attending Emergency Physician        Regulo Miranda MD  04/24/21 2025

## 2021-04-25 PROBLEM — M25.512 LEFT SHOULDER PAIN: Status: ACTIVE | Noted: 2021-04-25

## 2021-04-25 PROBLEM — J84.10 PULMONARY FIBROSIS (HCC): Chronic | Status: ACTIVE | Noted: 2020-12-08

## 2021-04-25 LAB
ABSOLUTE EOS #: 0.32 K/UL (ref 0–0.44)
ABSOLUTE IMMATURE GRANULOCYTE: <0.03 K/UL (ref 0–0.3)
ABSOLUTE LYMPH #: 1.52 K/UL (ref 1.1–3.7)
ABSOLUTE MONO #: 0.65 K/UL (ref 0.1–1.2)
ANION GAP SERPL CALCULATED.3IONS-SCNC: 4 MMOL/L (ref 9–17)
BASOPHILS # BLD: 1 % (ref 0–2)
BASOPHILS ABSOLUTE: 0.04 K/UL (ref 0–0.2)
BUN BLDV-MCNC: 10 MG/DL (ref 8–23)
BUN/CREAT BLD: ABNORMAL (ref 9–20)
CALCIUM SERPL-MCNC: 8.3 MG/DL (ref 8.6–10.4)
CHLORIDE BLD-SCNC: 102 MMOL/L (ref 98–107)
CO2: 30 MMOL/L (ref 20–31)
CREAT SERPL-MCNC: 0.54 MG/DL (ref 0.7–1.2)
DIFFERENTIAL TYPE: ABNORMAL
DIGOXIN DATE LAST DOSE: NORMAL
DIGOXIN DOSE AMOUNT: NORMAL
DIGOXIN DOSE TIME: NORMAL
DIGOXIN LEVEL: 0.6 NG/ML (ref 0.5–2)
EOSINOPHILS RELATIVE PERCENT: 5 % (ref 1–4)
GFR AFRICAN AMERICAN: >60 ML/MIN
GFR NON-AFRICAN AMERICAN: >60 ML/MIN
GFR SERPL CREATININE-BSD FRML MDRD: ABNORMAL ML/MIN/{1.73_M2}
GFR SERPL CREATININE-BSD FRML MDRD: ABNORMAL ML/MIN/{1.73_M2}
GLUCOSE BLD-MCNC: 101 MG/DL (ref 70–99)
HCT VFR BLD CALC: 37.4 % (ref 40.7–50.3)
HEMOGLOBIN: 11.1 G/DL (ref 13–17)
IMMATURE GRANULOCYTES: 0 %
LYMPHOCYTES # BLD: 22 % (ref 24–43)
MAGNESIUM: 2.1 MG/DL (ref 1.6–2.6)
MCH RBC QN AUTO: 25.8 PG (ref 25.2–33.5)
MCHC RBC AUTO-ENTMCNC: 29.7 G/DL (ref 28.4–34.8)
MCV RBC AUTO: 86.8 FL (ref 82.6–102.9)
MONOCYTES # BLD: 9 % (ref 3–12)
NRBC AUTOMATED: 0 PER 100 WBC
PARTIAL THROMBOPLASTIN TIME: 28.3 SEC (ref 20.5–30.5)
PARTIAL THROMBOPLASTIN TIME: 35.5 SEC (ref 20.5–30.5)
PDW BLD-RTO: 14.7 % (ref 11.8–14.4)
PLATELET # BLD: 278 K/UL (ref 138–453)
PLATELET ESTIMATE: ABNORMAL
PMV BLD AUTO: 9.9 FL (ref 8.1–13.5)
POTASSIUM SERPL-SCNC: 3.6 MMOL/L (ref 3.7–5.3)
RBC # BLD: 4.31 M/UL (ref 4.21–5.77)
RBC # BLD: ABNORMAL 10*6/UL
SEG NEUTROPHILS: 63 % (ref 36–65)
SEGMENTED NEUTROPHILS ABSOLUTE COUNT: 4.39 K/UL (ref 1.5–8.1)
SODIUM BLD-SCNC: 136 MMOL/L (ref 135–144)
TROPONIN INTERP: ABNORMAL
TROPONIN T: ABNORMAL NG/ML
TROPONIN, HIGH SENSITIVITY: 35 NG/L (ref 0–22)
WBC # BLD: 6.9 K/UL (ref 3.5–11.3)
WBC # BLD: ABNORMAL 10*3/UL

## 2021-04-25 PROCEDURE — 80048 BASIC METABOLIC PNL TOTAL CA: CPT

## 2021-04-25 PROCEDURE — 6370000000 HC RX 637 (ALT 250 FOR IP): Performed by: STUDENT IN AN ORGANIZED HEALTH CARE EDUCATION/TRAINING PROGRAM

## 2021-04-25 PROCEDURE — 2580000003 HC RX 258: Performed by: STUDENT IN AN ORGANIZED HEALTH CARE EDUCATION/TRAINING PROGRAM

## 2021-04-25 PROCEDURE — 36415 COLL VENOUS BLD VENIPUNCTURE: CPT

## 2021-04-25 PROCEDURE — 94761 N-INVAS EAR/PLS OXIMETRY MLT: CPT

## 2021-04-25 PROCEDURE — 85730 THROMBOPLASTIN TIME PARTIAL: CPT

## 2021-04-25 PROCEDURE — 85025 COMPLETE CBC W/AUTO DIFF WBC: CPT

## 2021-04-25 PROCEDURE — 6360000002 HC RX W HCPCS: Performed by: INTERNAL MEDICINE

## 2021-04-25 PROCEDURE — 83735 ASSAY OF MAGNESIUM: CPT

## 2021-04-25 PROCEDURE — 6360000002 HC RX W HCPCS: Performed by: STUDENT IN AN ORGANIZED HEALTH CARE EDUCATION/TRAINING PROGRAM

## 2021-04-25 PROCEDURE — 80162 ASSAY OF DIGOXIN TOTAL: CPT

## 2021-04-25 PROCEDURE — 2500000003 HC RX 250 WO HCPCS: Performed by: STUDENT IN AN ORGANIZED HEALTH CARE EDUCATION/TRAINING PROGRAM

## 2021-04-25 PROCEDURE — 2060000000 HC ICU INTERMEDIATE R&B

## 2021-04-25 PROCEDURE — 93005 ELECTROCARDIOGRAM TRACING: CPT | Performed by: STUDENT IN AN ORGANIZED HEALTH CARE EDUCATION/TRAINING PROGRAM

## 2021-04-25 PROCEDURE — 84484 ASSAY OF TROPONIN QUANT: CPT

## 2021-04-25 PROCEDURE — 2700000000 HC OXYGEN THERAPY PER DAY

## 2021-04-25 RX ORDER — METHYLPREDNISOLONE 4 MG/1
4 TABLET ORAL DAILY
Status: DISCONTINUED | OUTPATIENT
Start: 2021-04-25 | End: 2021-04-25

## 2021-04-25 RX ORDER — MAGNESIUM SULFATE 1 G/100ML
1000 INJECTION INTRAVENOUS ONCE
Status: COMPLETED | OUTPATIENT
Start: 2021-04-25 | End: 2021-04-25

## 2021-04-25 RX ORDER — METHYLPREDNISOLONE 4 MG/1
4 TABLET ORAL
Status: DISCONTINUED | OUTPATIENT
Start: 2021-04-26 | End: 2021-04-27 | Stop reason: HOSPADM

## 2021-04-25 RX ORDER — HYDRALAZINE HYDROCHLORIDE 20 MG/ML
10 INJECTION INTRAMUSCULAR; INTRAVENOUS EVERY 4 HOURS PRN
Status: DISCONTINUED | OUTPATIENT
Start: 2021-04-25 | End: 2021-04-27 | Stop reason: HOSPADM

## 2021-04-25 RX ORDER — OXYCODONE HYDROCHLORIDE AND ACETAMINOPHEN 5; 325 MG/1; MG/1
1 TABLET ORAL EVERY 6 HOURS PRN
Status: DISCONTINUED | OUTPATIENT
Start: 2021-04-25 | End: 2021-04-27 | Stop reason: HOSPADM

## 2021-04-25 RX ORDER — LISINOPRIL 5 MG/1
5 TABLET ORAL DAILY
Status: DISCONTINUED | OUTPATIENT
Start: 2021-04-25 | End: 2021-04-25

## 2021-04-25 RX ORDER — BACLOFEN 10 MG/1
5 TABLET ORAL 3 TIMES DAILY
Status: DISCONTINUED | OUTPATIENT
Start: 2021-04-25 | End: 2021-04-27 | Stop reason: HOSPADM

## 2021-04-25 RX ORDER — HYDRALAZINE HYDROCHLORIDE 20 MG/ML
5 INJECTION INTRAMUSCULAR; INTRAVENOUS EVERY 6 HOURS PRN
Status: DISCONTINUED | OUTPATIENT
Start: 2021-04-25 | End: 2021-04-25

## 2021-04-25 RX ORDER — METHYLPREDNISOLONE 4 MG/1
4 TABLET ORAL NIGHTLY
Status: DISCONTINUED | OUTPATIENT
Start: 2021-04-27 | End: 2021-04-27 | Stop reason: HOSPADM

## 2021-04-25 RX ORDER — AMLODIPINE BESYLATE 5 MG/1
5 TABLET ORAL DAILY
Status: DISCONTINUED | OUTPATIENT
Start: 2021-04-25 | End: 2021-04-27 | Stop reason: HOSPADM

## 2021-04-25 RX ORDER — ASPIRIN 81 MG/1
81 TABLET, CHEWABLE ORAL DAILY
Status: DISCONTINUED | OUTPATIENT
Start: 2021-04-25 | End: 2021-04-27 | Stop reason: HOSPADM

## 2021-04-25 RX ORDER — ALBUTEROL SULFATE 90 UG/1
2 AEROSOL, METERED RESPIRATORY (INHALATION) EVERY 6 HOURS PRN
Status: DISCONTINUED | OUTPATIENT
Start: 2021-04-25 | End: 2021-04-27 | Stop reason: HOSPADM

## 2021-04-25 RX ORDER — CARVEDILOL 6.25 MG/1
6.25 TABLET ORAL 2 TIMES DAILY WITH MEALS
Status: DISCONTINUED | OUTPATIENT
Start: 2021-04-25 | End: 2021-04-26

## 2021-04-25 RX ORDER — HYDRALAZINE HYDROCHLORIDE 20 MG/ML
5 INJECTION INTRAMUSCULAR; INTRAVENOUS EVERY 4 HOURS PRN
Status: DISCONTINUED | OUTPATIENT
Start: 2021-04-25 | End: 2021-04-25

## 2021-04-25 RX ORDER — LISINOPRIL 10 MG/1
10 TABLET ORAL DAILY
Status: DISCONTINUED | OUTPATIENT
Start: 2021-04-25 | End: 2021-04-27 | Stop reason: HOSPADM

## 2021-04-25 RX ORDER — METHYLPREDNISOLONE 4 MG/1
8 TABLET ORAL NIGHTLY
Status: COMPLETED | OUTPATIENT
Start: 2021-04-26 | End: 2021-04-26

## 2021-04-25 RX ADMIN — LISINOPRIL 10 MG: 10 TABLET ORAL at 10:18

## 2021-04-25 RX ADMIN — MAGNESIUM SULFATE HEPTAHYDRATE 1000 MG: 1 INJECTION, SOLUTION INTRAVENOUS at 10:45

## 2021-04-25 RX ADMIN — BACLOFEN 5 MG: 10 TABLET ORAL at 09:50

## 2021-04-25 RX ADMIN — FENTANYL CITRATE 25 MCG: 50 INJECTION, SOLUTION INTRAMUSCULAR; INTRAVENOUS at 14:22

## 2021-04-25 RX ADMIN — METHYLPREDNISOLONE 4 MG: 4 TABLET ORAL at 12:23

## 2021-04-25 RX ADMIN — FENTANYL CITRATE 25 MCG: 50 INJECTION, SOLUTION INTRAMUSCULAR; INTRAVENOUS at 06:16

## 2021-04-25 RX ADMIN — ATORVASTATIN CALCIUM 40 MG: 80 TABLET, FILM COATED ORAL at 10:18

## 2021-04-25 RX ADMIN — FENTANYL CITRATE 25 MCG: 50 INJECTION, SOLUTION INTRAMUSCULAR; INTRAVENOUS at 02:34

## 2021-04-25 RX ADMIN — FAMOTIDINE 20 MG: 10 INJECTION INTRAVENOUS at 20:46

## 2021-04-25 RX ADMIN — SODIUM CHLORIDE, PRESERVATIVE FREE 10 ML: 5 INJECTION INTRAVENOUS at 08:00

## 2021-04-25 RX ADMIN — FENTANYL CITRATE 25 MCG: 50 INJECTION, SOLUTION INTRAMUSCULAR; INTRAVENOUS at 10:38

## 2021-04-25 RX ADMIN — BACLOFEN 5 MG: 10 TABLET ORAL at 20:47

## 2021-04-25 RX ADMIN — HYDRALAZINE HYDROCHLORIDE 10 MG: 20 INJECTION INTRAMUSCULAR; INTRAVENOUS at 22:35

## 2021-04-25 RX ADMIN — SODIUM CHLORIDE, PRESERVATIVE FREE 10 ML: 5 INJECTION INTRAVENOUS at 20:47

## 2021-04-25 RX ADMIN — BACLOFEN 5 MG: 10 TABLET ORAL at 15:37

## 2021-04-25 RX ADMIN — OXYCODONE HYDROCHLORIDE AND ACETAMINOPHEN 1 TABLET: 5; 325 TABLET ORAL at 16:02

## 2021-04-25 RX ADMIN — HYDRALAZINE HYDROCHLORIDE 5 MG: 20 INJECTION INTRAMUSCULAR; INTRAVENOUS at 20:54

## 2021-04-25 RX ADMIN — FENTANYL CITRATE 25 MCG: 50 INJECTION, SOLUTION INTRAMUSCULAR; INTRAVENOUS at 07:58

## 2021-04-25 RX ADMIN — HEPARIN SODIUM 4000 UNITS: 1000 INJECTION INTRAVENOUS; SUBCUTANEOUS at 00:49

## 2021-04-25 RX ADMIN — HEPARIN SODIUM 2000 UNITS: 1000 INJECTION INTRAVENOUS; SUBCUTANEOUS at 08:12

## 2021-04-25 RX ADMIN — FAMOTIDINE 20 MG: 10 INJECTION INTRAVENOUS at 11:35

## 2021-04-25 RX ADMIN — AMLODIPINE BESYLATE 5 MG: 5 TABLET ORAL at 22:15

## 2021-04-25 RX ADMIN — CARVEDILOL 6.25 MG: 6.25 TABLET, FILM COATED ORAL at 10:18

## 2021-04-25 RX ADMIN — OXYCODONE HYDROCHLORIDE AND ACETAMINOPHEN 1 TABLET: 5; 325 TABLET ORAL at 22:08

## 2021-04-25 RX ADMIN — ASPIRIN 81 MG: 81 TABLET, CHEWABLE ORAL at 10:18

## 2021-04-25 RX ADMIN — TAMSULOSIN HYDROCHLORIDE 0.4 MG: 0.4 CAPSULE ORAL at 10:18

## 2021-04-25 RX ADMIN — METHYLPREDNISOLONE 24 MG: 16 TABLET ORAL at 17:33

## 2021-04-25 ASSESSMENT — PAIN SCALES - GENERAL
PAINLEVEL_OUTOF10: 7
PAINLEVEL_OUTOF10: 9

## 2021-04-25 ASSESSMENT — PAIN DESCRIPTION - ORIENTATION: ORIENTATION: LEFT

## 2021-04-25 ASSESSMENT — PAIN DESCRIPTION - FREQUENCY: FREQUENCY: CONTINUOUS

## 2021-04-25 NOTE — CONSULTS
[facility]    Consult / H&P               Today's Date: 4/25/2021  Patient Name: Rosanne Bernard  Date of admission: 4/24/2021  2:33 PM  Patient's age: 77 y.o., 1955  Admission Dx: Hypertensive emergency [I16.1]    Reason for Consult:  Cardiac evaluation    Requesting Physician: Mayank Jarrell MD    CHIEF COMPLAINT:   Chest pain   History Obtained From:  patient    HISTORY OF PRESENT ILLNESS:    61-year-old male with history of A. fib, hypertension, PE, pulmonary fibrosis initially presented at [facility] with complaint of shoulder pain   As per patient he could not move his left arm. Had a back pain and and chest pain  . Patient's blood pressure was 220/180. Received aspirin and sublingual nitro. Initial EKG was concerning for ST depression in V2 and V3. Patient became diaphoretic and chest pain got worse and become hypotensive suddenly. Heart rate was in 40s and received 1 dose of atropine. Patient was transferred here for further management. Had a CTA done in the ER to rule out aortic dissection and it was negative. Patient has history of moderate aortic stenosis. Had heart cath done in 2018 which showed nonobstructive coronary artery disease. Recent echo showed ejection fraction 60% with moderate aortic stenosis. Had episode of dizziness and lightheadedness in the past.  Troponin 27 and 28. Past Medical History:   has a past medical history of Atrial fibrillation ([address] Back pain, chronic, Hill's esophagus, Benign essential HTN, BPH (benign prostatic hyperplasia), Cancer ([address] Chronic idiopathic pulmonary fibrosis ([address] Cocaine abuse in remission [facility]), ED (erectile dysfunction), GERD (gastroesophageal reflux disease), GI bleed, Hernia, History of colon cancer, Melena, Migraines, and Murmur, cardiac.     Past Surgical History:   has a past surgical history that includes Tonsillectomy; Colonoscopy; colectomy; Colonoscopy (07/18/2016); knee surgery (Right, 1970's); transesophageal echocardiogram (11/29/2018); Upper gastrointestinal endoscopy (N/A, 12/5/2018); Colonoscopy (N/A, 12/6/2018); hernia repair (Right, 2009); Cardiac catheterization (11/29/2018); colectomy; Total knee arthroplasty (Right, 1/8/2019); Upper gastrointestinal endoscopy (N/A, 6/18/2019); and Cardioversion (2020). Home Medications:    Prior to Admission medications    Medication Sig Start Date End Date Taking?  Authorizing Provider   ibuprofen (ADVIL;MOTRIN) 800 MG tablet take 1 tablet by mouth three times a day AS NEEDED FOR PAIN 4/15/21   Cassandra Robles MD   apixaban (ELIQUIS) 5 MG TABS tablet Take 1 tablet by mouth 2 times daily 3/1/21   Cassandra Robles MD   Handicap Placard MISC by Does not apply route Expires 1/2026 1/19/21   Cassandra Robles MD   digoxin (LANOXIN) 125 MCG tablet Take 1 tablet by mouth daily 12/8/20   Zora Lee MD   albuterol sulfate HFA (VENTOLIN HFA) 108 (90 Base) MCG/ACT inhaler Inhale 2 puffs into the lungs 4 times daily as needed for Wheezing  Patient not taking: Reported on 3/8/2021 12/7/20   Zora Lee MD   atorvastatin (LIPITOR) 40 MG tablet Take 1 tablet by mouth daily 11/16/20   Cassandra Robles MD   omeprazole (PRILOSEC) 40 MG delayed release capsule Take 1 capsule by mouth daily 11/16/20   Cassandra Robles MD   [facility]) 0.4 MG capsule Take 1 capsule by mouth daily 10/15/20   Cassandra Robles MD      Current Facility-Administered Medications: aspirin chewable tablet 81 mg, 81 mg, Oral, Daily  heparin (porcine) injection 4,000 Units, 4,000 Units, Intravenous, PRN  heparin (porcine) injection 2,000 Units, 2,000 Units, Intravenous, PRN  heparin 25,000 units in dextrose 5% 250 mL (premix) infusion, 5-30 Units/kg/hr, Intravenous, Continuous  ipratropium-albuterol (DUONEB) nebulizer solution 1 ampule, 1 ampule, Inhalation, 4x daily  atorvastatin (LIPITOR) tablet 40 mg, 40 mg, Oral, Daily  [Held by provider] digoxin (LANOXIN) tablet 125 mcg, hematuria. · Musculoskeletal:  As above. · Integumentary: No rash or pruritis. · Neurological: No headache, diplopia, change in muscle strength, numbness or tingling. No change in gait, balance, coordination, mood, affect, memory, mentation, behavior. · Psychiatric: No anxiety, or depression. ·       PHYSICAL EXAM:      BP (!) 158/83   Pulse 62   Temp 98.2 °F (36.8 °C) (Oral)   Resp 14   Ht 6' (1.829 m)   Wt 172 lb (78 kg)   SpO2 91%   BMI 23.33 kg/m²    Constitutional and General Appearance: alert, cooperative, no distress and appears stated age  HEENT: PERRL, no cervical lymphadenopathy. No masses palpable. Normal oral mucosa  Respiratory:  · Normal excursion and expansion without use of accessory muscles  · Resp Auscultation: Good respiratory effort. No for increased work of breathing. On auscultation: clear to auscultation bilaterally  Cardiovascular:  · The apical impulse is not displaced  · Heart tones are crisp and normal. regular S1 and S2.  ·   Abdomen:   · No masses or tenderness  · Bowel sounds present  Extremities:  ·  No Cyanosis or Clubbing  ·  Lower extremity edema: No  ·  Skin: Warm and dry  Neurological:  · Alert and oriented.   · Moves all extremities well  · No abnormalities of mood, affect, memory, mentation, or behavior are noted    DATA:    Diagnostics:      Labs:     CBC:   Recent Labs     04/24/21  1500 04/25/21  0554   WBC 9.7 6.9   HGB 11.6* 11.1*   HCT 38.0* 37.4*    278     BMP:   Recent Labs     04/24/21  1241 04/25/21  0554    136   K 4.1 3.6*   CO2 32* 30   BUN 17 10   CREATININE 0.64* 0.54*   LABGLOM >60 >60   GLUCOSE 128* 101*     PT/INR:   Recent Labs     04/24/21  1241   PROTIME 20.3*   INR 1.7     APTT:  Recent Labs     04/24/21  2337 04/25/21  0554   APTT 28.3 35.5*       FASTING LIPID PANEL:  Lab Results   Component Value Date    HDL 36 03/10/2020    TRIG 165 01/03/2017     LIVER PROFILE:  Recent Labs     04/24/21  1241   AST 20   ALT 16   LABALBU 3.9 IMPRESSION:    Patient Active Problem List   Diagnosis    Dyslipidemia    ED (erectile dysfunction)    Incomplete bladder emptying    Benign prostatic hyperplasia with urinary obstruction    History of colon cancer    PAF (paroxysmal atrial fibrillation) (HCC)    Anemia    Pallor of optic disc    Presbyopia    Incisional hernia, without obstruction or gangrene    Hypertrophic nonobstructive cardiomyopathy (HCC)    Iron (Fe) deficiency anemia    Primary osteoarthritis of right knee    History of malignant neoplasm of rectum    Hill's esophagus    CHF (congestive heart failure), NYHA class II, acute on chronic, combined ([address]    Hypoxia    Dyspnea and respiratory abnormalities    Occupational pulmonary disease    Sinus bradycardia    Acute hypoxemic respiratory failure due to COVID-19 ([address]    COVID-19    Pneumonia    Acute respiratory failure with hypoxia (HCC)    Pulmonary fibrosis (HCC)    Syncope and collapse    Chronic anticoagulation    Severe malnutrition ([address]    Hypertensive emergency    Left shoulder pain     Assessment:     1. Shoulder pain musculoskeletal   2. Atypical chest pain with no new EKG changes  3. Hypertesive emergency   4. Minimal troponin elevation 27 and 28 likely due to hypertensive emergency  5. Moderate AS   6. Cardiac cath in 2018- Minimal CAD with ostial LXc 40 % stenosis   7. H/o Afib  8. Pulmonary fibrosis        RECOMMENDATIONS:  1. Blood pressure and pain control   2. On aspirin, Lipitor, low-dose heparin. May discontinue Heparin drip. 3. Started on lisinopril 20 mg and Coreg 6.25 twice daily. 3.         No need for ischemia workup. Discussed with patient and Nurse. Electronically signed by Rob Maynard MD on 4/25/2021 at 7:19 AM    [facility]      [phone]      Attending Physician Statement  I have discussed the case of Coit Embs including pertinent history and exam findings with the resident.  I have seen

## 2021-04-25 NOTE — H&P
negative    On my assessment patient was hypertensive in 402O systolic. Tachypneic with 21-26,. Tropes 27-28, proBNP 1190    Of note patient was on diuretics 2 months ago and had a syncopal fall and struck left shoulder with abrasion. And has been having left shoulder pain on and off. Patient has difficulty ambulating left shoulder which gotten worse in the last 1 day.               Review of Systems:  General ROS: Completed and except as mentioned above were negative   HEENT ROS: Completed and except as mentioned above were negative   Allergy and Immunology ROS:  Completed and except as mentioned above were negative  Hematological and Lymphatic ROS:  Completed and except as mentioned above were negative  Respiratory ROS:  sob  Cardiovascular ROS:  Chest pain  Gastrointestinal ROS: Completed and except as mentioned above were negative  Genito-Urinary ROS:  Completed and except as mentioned above were negative  Musculoskeletal ROS:  Shoulder pain  Neurological ROS:  Completed and except as mentioned above were negative  Skin & Dermatological ROS:  Completed and except as mentioned above were negative  Psychological ROS:  Completed and except as mentioned above were negative    PAST MEDICAL HISTORY     Past Medical History:   Diagnosis Date    Atrial fibrillation ([address]     Back pain, chronic     Hill's esophagus 06/18/2019    Benign essential HTN     BPH (benign prostatic hyperplasia)     Cancer ([address]     colon-rectal    Chronic idiopathic pulmonary fibrosis ([address] 03/29/2021    Cocaine abuse in remission ([address]     1970's    ED (erectile dysfunction) 4/2/2015    GERD (gastroesophageal reflux disease)     GI bleed 12/5/2018    Hernia     History of colon cancer     Melena     Migraines     Murmur, cardiac        PAST SURGICAL HISTORY     Past Surgical History:   Procedure Laterality Date    CARDIAC CATHETERIZATION  11/29/2018    Non-obstructive CAD    CARDIOVERSION  2020    COLECTOMY      2nd colectomy, Colostomy and reversed [facility]      1st time [address] COLONOSCOPY  07/18/2016    COLONOSCOPY N/A 12/6/2018    COLONOSCOPY DIAGNOSTIC performed by Cholo Voss MD at [address] Right 2009    inguinal    KNEE SURGERY Right 1970's    arthrotomy    TONSILLECTOMY      TOTAL KNEE ARTHROPLASTY Right 1/8/2019    KNEE TOTAL ARTHROPLASTY performed by Etta Lund MD at [facility] TRANSESOPHAGEAL ECHOCARDIOGRAM  11/29/2018    UPPER GASTROINTESTINAL ENDOSCOPY N/A 12/5/2018    EGD DIAGNOSTIC ONLY performed by Cholo Voss MD at [facility] N/A 6/18/2019    [facility]       ALLERGIES     Adhesive tape, Codeine, and Penicillins    MEDICATIONS PRIOR TO ADMISSION     Prior to Admission medications    Medication Sig Start Date End Date Taking?  Authorizing Provider   ibuprofen (ADVIL;MOTRIN) 800 MG tablet take 1 tablet by mouth three times a day AS NEEDED FOR PAIN 4/15/21   Cassandra Green MD   apixaban (ELIQUIS) 5 MG TABS tablet Take 1 tablet by mouth 2 times daily 3/1/21   Cassandra Green MD   Handicap Placard MISC by Does not apply route Expires 1/2026 1/19/21   Cassandra Green MD   digoxin (LANOXIN) 125 MCG tablet Take 1 tablet by mouth daily 12/8/20   Beth Taylor MD   albuterol sulfate HFA (VENTOLIN HFA) 108 (90 Base) MCG/ACT inhaler Inhale 2 puffs into the lungs 4 times daily as needed for Wheezing  Patient not taking: Reported on 3/8/2021 12/7/20   Flaca Olivas MD   atorvastatin (LIPITOR) 40 MG tablet Take 1 tablet by mouth daily 11/16/20   Cassandra Green MD   omeprazole (PRILOSEC) 40 MG delayed release capsule Take 1 capsule by mouth daily 11/16/20   Cassandra Green MD   tamsulosin [facility]) 0.4 MG capsule Take 1 capsule by mouth daily 10/15/20   Cassandra Green MD       SOCIAL HISTORY     Tobacco: yes  Alcohol: yes  Illicits: none      FAMILY HISTORY     Family History Problem Relation Age of Onset    Diabetes Mother     Heart Attack Father     Heart Disease Father     Heart Disease Brother        PHYSICAL EXAM     Vitals: BP (!) 156/89   Pulse 61   Temp 97.5 °F (36.4 °C)   Resp 23   Ht 6' (1.829 m)   Wt 172 lb (78 kg)   SpO2 94%   BMI 23.33 kg/m²   Tmax: Temp (24hrs), Av.7 °F (36.5 °C), Min:97.5 °F (36.4 °C), Max:97.9 °F (36.6 °C)    Last Body weight:   Wt Readings from Last 3 Encounters:   21 172 lb (78 kg)   21 172 lb (78 kg)   21 171 lb 6.4 oz (77.7 kg)     Body Mass Index : Body mass index is 23.33 kg/m². Constitutional: This is a well developed, well nourished, 18.5-24.9 - Normal 77y.o. year old male who is alert, oriented, cooperative and in no apparent distress. Head:normocephalic and atraumatic. EENT:  PERRLA. No conjunctival injections. Septum was midline, mucosa was without erythema, exudates or cobblestoning. No thrush was noted. Neck: Supple without thyromegaly. No elevated JVP. Trachea was midline. Respiratory: Chest was symmetrical without dullness to percussion. Breath sounds bilaterally were clear to auscultation. There were no wheezes, rhonchi or rales. There is no intercostal retraction or use of accessory muscles. No egophony noted. Cardiovascular: Regular without murmur, clicks, gallops or rubs. Abdomen: Slightly rounded and soft without organomegaly. No rebound, rigidity or guarding was appreciated. Lymphatic: No lymphadenopathy. Musculoskeletal: Normal curvature of the spine. No gross muscle weakness. Extremities: left upper extremitity decrease mobility . Skin:  Warm and dry. Good color, turgor and pigmentation. No lesions or scars.   No cyanosis or clubbing  Neurological/Psychiatric: The patient's general behavior, level of consciousness, thought content and emotional status is normal.          INVESTIGATIONS     Laboratory Testing:     Recent Results (from the past 24 hour(s))   Brain Natriuretic Peptide    Collection Time: 04/24/21 12:41 PM   Result Value Ref Range    Pro-BNP 1,190 (H) <300 pg/mL    BNP Interpretation Pro-BNP Reference Range:    CBC Auto Differential    Collection Time: 04/24/21 12:41 PM   Result Value Ref Range    WBC 7.0 3.5 - 11.0 k/uL    RBC 5.09 4.5 - 5.9 m/uL    Hemoglobin 12.9 (L) 13.5 - 17.5 g/dL    Hematocrit 40.6 (L) 41 - 53 %    MCV 79.7 (L) 80 - 100 fL    MCH 25.4 (L) 26 - 34 pg    MCHC 31.8 31 - 37 g/dL    RDW 15.4 (H) 11.5 - 14.9 %    Platelets 018 525 - 716 k/uL    MPV 7.0 6.0 - 12.0 fL    NRBC Automated NOT REPORTED per 100 WBC    Differential Type NOT REPORTED     Immature Granulocytes NOT REPORTED 0 %    Absolute Immature Granulocyte NOT REPORTED 0.00 - 0.30 k/uL    WBC Morphology NOT REPORTED     RBC Morphology NOT REPORTED     Platelet Estimate NOT REPORTED     Seg Neutrophils 62 36 - 66 %    Lymphocytes 26 24 - 44 %    Monocytes 7 1 - 7 %    Eosinophils % 4 0 - 4 %    Basophils 1 0 - 2 %    Segs Absolute 4.30 1.3 - 9.1 k/uL    Absolute Lymph # 1.80 1.0 - 4.8 k/uL    Absolute Mono # 0.50 0.1 - 1.3 k/uL    Absolute Eos # 0.30 0.0 - 0.4 k/uL    Basophils Absolute 0.00 0.0 - 0.2 k/uL   Comprehensive Metabolic Panel    Collection Time: 04/24/21 12:41 PM   Result Value Ref Range    Glucose 128 (H) 70 - 99 mg/dL    BUN 17 8 - 23 mg/dL    CREATININE 0.64 (L) 0.70 - 1.20 mg/dL    Bun/Cre Ratio NOT REPORTED 9 - 20    Calcium 9.3 8.6 - 10.4 mg/dL    Sodium 138 135 - 144 mmol/L    Potassium 4.1 3.7 - 5.3 mmol/L    Chloride 101 98 - 107 mmol/L    CO2 32 (H) 20 - 31 mmol/L    Anion Gap 5 (L) 9 - 17 mmol/L    Alkaline Phosphatase 120 40 - 129 U/L    ALT 16 5 - 41 U/L    AST 20 <40 U/L    Total Bilirubin 0.56 0.3 - 1.2 mg/dL    Total Protein 7.4 6.4 - 8.3 g/dL    Albumin 3.9 3.5 - 5.2 g/dL    Albumin/Globulin Ratio NOT REPORTED 1.0 - 2.5    GFR Non-African American >60 >60 mL/min    GFR African American >60 >60 mL/min    GFR Comment          GFR Staging NOT REPORTED Troponin    Collection Time: 04/24/21 12:41 PM   Result Value Ref Range    Troponin, High Sensitivity 27 (H) 0 - 22 ng/L    Troponin T NOT REPORTED <0.03 ng/mL    Troponin Interp NOT REPORTED    PROTIME-INR    Collection Time: 04/24/21 12:41 PM   Result Value Ref Range    Protime 20.3 (H) 11.8 - 14.6 sec    INR 1.7    TYPE AND SCREEN    Collection Time: 04/24/21 12:57 PM   Result Value Ref Range    Expiration Date 04/27/2021,2359     Arm Band Number [id number]     ABO/Rh A POSITIVE     Antibody Screen NEGATIVE    EKG 12 Lead    Collection Time: 04/24/21  1:02 PM   Result Value Ref Range    Ventricular Rate 48 BPM    Atrial Rate 48 BPM    P-R Interval 212 ms    QRS Duration 92 ms    Q-T Interval 480 ms    QTc Calculation (Bazett) 428 ms    P Axis 46 degrees    R Axis -17 degrees    T Axis 5 degrees   CBC    Collection Time: 04/24/21  3:00 PM   Result Value Ref Range    WBC 9.7 3.5 - 11.3 k/uL    RBC 4.52 4.21 - 5.77 m/uL    Hemoglobin 11.6 (L) 13.0 - 17.0 g/dL    Hematocrit 38.0 (L) 40.7 - 50.3 %    MCV 84.1 82.6 - 102.9 fL    MCH 25.7 25.2 - 33.5 pg    MCHC 30.5 28.4 - 34.8 g/dL    RDW 14.4 11.8 - 14.4 %    Platelets 982 423 - 973 k/uL    MPV 10.0 8.1 - 13.5 fL    NRBC Automated 0.0 0.0 per 100 WBC   APTT    Collection Time: 04/24/21  3:00 PM   Result Value Ref Range    PTT >120.0 (HH) 20.5 - 30.5 sec   Troponin    Collection Time: 04/24/21  3:00 PM   Result Value Ref Range    Troponin, High Sensitivity 28 (H) 0 - 22 ng/L    Troponin T NOT REPORTED <0.03 ng/mL    Troponin Interp NOT REPORTED        Imaging:   Cta Chest W Wo Contrast    Result Date: 4/24/2021  No evidence of thoracic aortic dissection or aneurysm. Redemonstration of peripheral fibrosis within the lungs, with a usual interstitial pneumonitis pattern.  There is continued nodularity along the periphery of the lungs which is similar when compared to the previous exam.  Continued surveillance of that is recommended, with a repeat chest CT in approximately 6 months. Xr Shoulder Left (min 2 Views)    Result Date: 4/24/2021  Degenerative changes involving the left AC and glenohumeral joints without acute bony process. Xr Chest Portable    Result Date: 4/24/2021  Stable appearing ground-glass opacities and pulmonary fibrosis. ASSESSMENT & PLAN     ASSESSMENT / PLAN:     Hypertensive emergency:    · Patient on clevidipine drip and metoprolol as needed . Keep blood pressure within  483 systolic till tomorrow morning, then gradually back to normal  · Patient on heparin GTT  · CTA ruled out aortic dissection as patient had hypertension and then episode of bradycardia with 90 systolic . · Watch out for dizziness/syncope as patient has moderate aortic stenosis and on clevidipine and metoprolol      NSTEMI:  · Trop trending in 27-28,  · Trend Trop for 1 more reading  · Likely type II however because of increased blood pressure, will appreciate cardiology recommendation regarding any intervention.    · N.p.o. after midnight  · Cardiology consulted      Left-sided shoulder pain:   · History of fall 2 months ago, pain while ambulating, worsened in the last 1 day   · shoulder xray degenerative changes involving left before meals and glenohumeral joint            Paroxysmal atrial fibrillation  · Digoxin with hold because of high blood pressure and patient going into bradycardia  · Currently on heparin GTT and metoprolol as needed        Moderate aortic stenosis on echo January 13, 2021, left ventricular ejection fraction 55%   Non-obstructive CAD on cath 2018      Benign prostatic hyperplasia:  Tamsulosin 0.4 mg     Shortness of breath  likely due to pulmonary fibrosis on oxygen  · Spirometry done at [facility] shows lung diffusing capacity suggesting parenchymal or pulmonary vascular disorder  · -Lung transplant      DVT ppx:hepairn   GI ppx:  None     PT/OT/SW on board  Discharge Planning: on board   Tawanna Medrano MD   Internal Medicine Resident, PGY-1  [address], [place]  4/24/2021, 9:01 PM    I have discussed the care of Ebtarah Chi , including pertinent history and exam findings,    today with the resident. I have seen and examined the patient and the key elements of all parts of the encounter have been performed by me . I agree with the assessment, plan and orders as documented by the resident. Principal Problem:    Hypertensive emergency  Active Problems:    Benign prostatic hyperplasia with urinary obstruction    PAF (paroxysmal atrial fibrillation) (HCC)    Sinus bradycardia    Pulmonary fibrosis (HCC)    Left shoulder pain  Resolved Problems:    * No resolved hospital problems. *        Overall  course ;                                   show no change over time.         [facility]  Has readings of high blood pressure  EKG changes  Elevated troponin  Cardiology seen the patient, plan for ischemia work-up  Patient has chronic pain in neck, right arm for last couple of weeks  No noticed weakness in right arm  We will plan for MRI cervical spine  We will start patient on Medrol Dosepak          Electronically signed by Marilyn Mccann MD

## 2021-04-25 NOTE — PROGRESS NOTES
[facility]  Internal Medicine Teaching Residency Program  Inpatient Daily Progress Note  ______________________________________________________________________________    Patient: Trisha Tse  YOB: 1955   [id number]    Acct: [de-identified]     Room: [facility]-  Admit date: 4/24/2021  Today's date: 04/25/21  Number of days in the hospital: 1    SUBJECTIVE   Admitting Diagnosis: Hypertensive emergency  CC:   Pt examined at bedside. Chart & results reviewed. No acute event overnight, still complaining of left shoulder pain radiating to chest   Blood pressure 158/83, heart rate 62, respiratory rate 14, afebrile  We will switch clevidipine drip to lisinopril 5 mg     ROS:  Constitutional:  negative for chills, fevers, sweats  Respiratory:  negative for cough, dyspnea on exertion, hemoptysis, shortness of breath, wheezing  Cardiovascular:  Positive for chest pain,  Shoulder pain  Gastrointestinal:  negative for abdominal pain, constipation, diarrhea, nausea, vomiting  Neurological:  negative for dizziness, headache  BRIEF HISTORY     The patient is a pleasant 77 y.o. male with past medical history of A. fib on Eliquis and digoxin, hypertension,  CHF, PE, pulmonary fibrosis ,covid pneumonia dec/2020  , BPH     At [facility] : patient initially presented to [facility] with blood pressure of 220/180s with chest pain. Patient was given morphine aspirin sublingual nitroglycerin. EKG was showing normal sinus rhythm with ST depression in lead V2 V3. During EKG patient became diaphoretic , chest pain gotten worse and BP was noted to be 90/60 and heart rate in 40s.  1 mg atropine was given with heart rate improved to 80s and blood pressure 130 over 90s. After atropine telemetry was showing ectopic beats. Repeat EKG was done but the quality was poor because patient was diaphoretic and there was concern for ST elevation in 2 3 aVF.   Patient was intercostal retraction or use of accessory muscles. No egophony noted. Cardiovascular: Regular without murmur, clicks, gallops or rubs. Abdomen: Slightly rounded and soft without organomegaly. No rebound, rigidity or guarding was appreciated. Lymphatic: No lymphadenopathy. Musculoskeletal: Normal curvature of the spine. No gross muscle weakness. Extremities: left upper extremitity decrease mobility . Skin:  Warm and dry. Good color, turgor and pigmentation. No lesions or scars.   No cyanosis or clubbing  Neurological/Psychiatric: The patient's general behavior, level of consciousness, thought content and emotional status is normal.        Medications:  Scheduled Medications:    aspirin  81 mg Oral Daily    ipratropium-albuterol  1 ampule Inhalation 4x daily    atorvastatin  40 mg Oral Daily    [Held by provider] digoxin  125 mcg Oral Daily    tamsulosin  0.4 mg Oral Daily    sodium chloride flush  5-40 mL Intravenous 2 times per day    famotidine (PEPCID) injection  20 mg Intravenous BID    fentanNYL  50 mcg Intravenous Once     Continuous Infusions:    heparin (PORCINE) Infusion 12 Units/kg/hr (04/25/21 0051)    sodium chloride      clevidipine Stopped (04/24/21 2231)     PRN Medicationsheparin (porcine), 4,000 Units, PRN  heparin (porcine), 2,000 Units, PRN  sodium chloride flush, 5-40 mL, PRN  sodium chloride, 25 mL, PRN  promethazine, 12.5 mg, Q6H PRN    Or  ondansetron, 4 mg, Q6H PRN  polyethylene glycol, 17 g, Daily PRN  acetaminophen, 650 mg, Q6H PRN    Or  acetaminophen, 650 mg, Q6H PRN  metoprolol, 5 mg, Q6H PRN  fentanNYL, 25 mcg, Q1H PRN        Diagnostic Labs:  CBC:   Recent Labs     04/24/21  1241 04/24/21  1500 04/25/21  0554   WBC 7.0 9.7 6.9   RBC 5.09 4.52 4.31   HGB 12.9* 11.6* 11.1*   HCT 40.6* 38.0* 37.4*   MCV 79.7* 84.1 86.8   RDW 15.4* 14.4 14.7*    292 278     BMP:   Recent Labs     04/24/21  1241 04/25/21  0554    136   K 4.1 3.6*    102   CO2 32* 30   BUN 17 10   CREATININE 0.64* 0.54*     BNP: No results for input(s): BNP in the last 72 hours. PT/INR:   Recent Labs     04/24/21  1241   PROTIME 20.3*   INR 1.7     APTT:   Recent Labs     04/24/21  1500 04/24/21  2337 04/25/21  0554   APTT >120.0* 28.3 35.5*     CARDIAC ENZYMES: No results for input(s): CKMB, CKMBINDEX, TROPONINI in the last 72 hours. Invalid input(s): CKTOTAL;3  FASTING LIPID PANEL:  Lab Results   Component Value Date    CHOL 200 (H) 01/03/2017    HDL 36 (L) 03/10/2020    TRIG 165 (H) 01/03/2017     LIVER PROFILE:   Recent Labs     04/24/21  1241   AST 20   ALT 16   BILITOT 0.56   ALKPHOS 120      MICROBIOLOGY:   Lab Results   Component Value Date/Time    CULTURE NO GROWTH 6 DAYS 12/07/2020 11:25 PM       Imaging:    Cta Chest W Wo Contrast    Result Date: 4/24/2021  No evidence of thoracic aortic dissection or aneurysm. Redemonstration of peripheral fibrosis within the lungs, with a usual interstitial pneumonitis pattern. There is continued nodularity along the periphery of the lungs which is similar when compared to the previous exam.  Continued surveillance of that is recommended, with a repeat chest CT in approximately 6 months. Xr Shoulder Left (min 2 Views)    Result Date: 4/24/2021  Degenerative changes involving the left AC and glenohumeral joints without acute bony process. Xr Chest Portable    Result Date: 4/24/2021  Stable appearing ground-glass opacities and pulmonary fibrosis. ASSESSMENT & PLAN     ASSESSMENT / PLAN:      Hypertensive emergency:     · bp below 160 . Will start home dose of lisinopril 5mg and monitor  · Patient on heparin GTT  · CTA ruled out aortic dissection as patient had hypertension and then episode of bradycardia with 90 systolic .   · Patient started on lisinopril 5 mg .         NSTEMI:  · Trop trending in 27-28,  · Trend Trop for 1 more reading  · Likely type II however because of increased blood pressure, will appreciate cardiology

## 2021-04-26 ENCOUNTER — APPOINTMENT (OUTPATIENT)
Dept: GENERAL RADIOLOGY | Age: 66
DRG: 281 | End: 2021-04-26
Payer: MEDICARE

## 2021-04-26 ENCOUNTER — APPOINTMENT (OUTPATIENT)
Dept: MRI IMAGING | Age: 66
DRG: 281 | End: 2021-04-26
Payer: MEDICARE

## 2021-04-26 ENCOUNTER — HOSPITAL ENCOUNTER (OUTPATIENT)
Dept: PULMONOLOGY | Age: 66
Setting detail: THERAPIES SERIES
Discharge: HOME | End: 2021-04-26
Payer: MEDICARE

## 2021-04-26 PROBLEM — I16.1 HYPERTENSIVE EMERGENCY: Status: RESOLVED | Noted: 2021-04-24 | Resolved: 2021-04-26

## 2021-04-26 PROBLEM — M75.42 IMPINGEMENT SYNDROME OF LEFT SHOULDER: Status: ACTIVE | Noted: 2021-04-26

## 2021-04-26 PROBLEM — M48.02 CERVICAL STENOSIS OF SPINAL CANAL: Status: ACTIVE | Noted: 2021-04-26

## 2021-04-26 LAB
ABSOLUTE EOS #: <0.03 K/UL (ref 0–0.44)
ABSOLUTE IMMATURE GRANULOCYTE: <0.03 K/UL (ref 0–0.3)
ABSOLUTE LYMPH #: 0.78 K/UL (ref 1.1–3.7)
ABSOLUTE MONO #: 0.17 K/UL (ref 0.1–1.2)
ANION GAP SERPL CALCULATED.3IONS-SCNC: 6 MMOL/L (ref 9–17)
BASOPHILS # BLD: 0 % (ref 0–2)
BASOPHILS ABSOLUTE: <0.03 K/UL (ref 0–0.2)
BUN BLDV-MCNC: 13 MG/DL (ref 8–23)
BUN/CREAT BLD: ABNORMAL (ref 9–20)
CALCIUM SERPL-MCNC: 8.8 MG/DL (ref 8.6–10.4)
CHLORIDE BLD-SCNC: 101 MMOL/L (ref 98–107)
CO2: 29 MMOL/L (ref 20–31)
CREAT SERPL-MCNC: 0.53 MG/DL (ref 0.7–1.2)
DIFFERENTIAL TYPE: ABNORMAL
EKG ATRIAL RATE: 48 BPM
EKG ATRIAL RATE: 59 BPM
EKG ATRIAL RATE: 71 BPM
EKG P AXIS: 46 DEGREES
EKG P AXIS: 46 DEGREES
EKG P AXIS: 50 DEGREES
EKG P-R INTERVAL: 178 MS
EKG P-R INTERVAL: 212 MS
EKG P-R INTERVAL: 216 MS
EKG Q-T INTERVAL: 410 MS
EKG Q-T INTERVAL: 442 MS
EKG Q-T INTERVAL: 480 MS
EKG QRS DURATION: 92 MS
EKG QRS DURATION: 92 MS
EKG QRS DURATION: 94 MS
EKG QTC CALCULATION (BAZETT): 428 MS
EKG QTC CALCULATION (BAZETT): 437 MS
EKG QTC CALCULATION (BAZETT): 445 MS
EKG R AXIS: -17 DEGREES
EKG R AXIS: -25 DEGREES
EKG R AXIS: -28 DEGREES
EKG T AXIS: -7 DEGREES
EKG T AXIS: 19 DEGREES
EKG T AXIS: 5 DEGREES
EKG VENTRICULAR RATE: 48 BPM
EKG VENTRICULAR RATE: 59 BPM
EKG VENTRICULAR RATE: 71 BPM
EOSINOPHILS RELATIVE PERCENT: 0 % (ref 1–4)
GFR AFRICAN AMERICAN: >60 ML/MIN
GFR NON-AFRICAN AMERICAN: >60 ML/MIN
GFR SERPL CREATININE-BSD FRML MDRD: ABNORMAL ML/MIN/{1.73_M2}
GFR SERPL CREATININE-BSD FRML MDRD: ABNORMAL ML/MIN/{1.73_M2}
GLUCOSE BLD-MCNC: 122 MG/DL (ref 70–99)
HCT VFR BLD CALC: 39.5 % (ref 40.7–50.3)
HEMOGLOBIN: 12 G/DL (ref 13–17)
IMMATURE GRANULOCYTES: 0 %
LYMPHOCYTES # BLD: 15 % (ref 24–43)
MCH RBC QN AUTO: 25.6 PG (ref 25.2–33.5)
MCHC RBC AUTO-ENTMCNC: 30.4 G/DL (ref 28.4–34.8)
MCV RBC AUTO: 84.4 FL (ref 82.6–102.9)
MONOCYTES # BLD: 3 % (ref 3–12)
NRBC AUTOMATED: 0 PER 100 WBC
PDW BLD-RTO: 14.6 % (ref 11.8–14.4)
PLATELET # BLD: 310 K/UL (ref 138–453)
PLATELET ESTIMATE: ABNORMAL
PMV BLD AUTO: 9.8 FL (ref 8.1–13.5)
POTASSIUM SERPL-SCNC: 4.4 MMOL/L (ref 3.7–5.3)
RBC # BLD: 4.68 M/UL (ref 4.21–5.77)
RBC # BLD: ABNORMAL 10*6/UL
SEG NEUTROPHILS: 81 % (ref 36–65)
SEGMENTED NEUTROPHILS ABSOLUTE COUNT: 4.17 K/UL (ref 1.5–8.1)
SODIUM BLD-SCNC: 136 MMOL/L (ref 135–144)
WBC # BLD: 5.2 K/UL (ref 3.5–11.3)
WBC # BLD: ABNORMAL 10*3/UL

## 2021-04-26 PROCEDURE — 2580000003 HC RX 258: Performed by: STUDENT IN AN ORGANIZED HEALTH CARE EDUCATION/TRAINING PROGRAM

## 2021-04-26 PROCEDURE — APPSS45 APP SPLIT SHARED TIME 31-45 MINUTES: Performed by: REGISTERED NURSE

## 2021-04-26 PROCEDURE — 72141 MRI NECK SPINE W/O DYE: CPT

## 2021-04-26 PROCEDURE — 6370000000 HC RX 637 (ALT 250 FOR IP): Performed by: STUDENT IN AN ORGANIZED HEALTH CARE EDUCATION/TRAINING PROGRAM

## 2021-04-26 PROCEDURE — 97530 THERAPEUTIC ACTIVITIES: CPT

## 2021-04-26 PROCEDURE — 6370000000 HC RX 637 (ALT 250 FOR IP): Performed by: NURSE PRACTITIONER

## 2021-04-26 PROCEDURE — 93010 ELECTROCARDIOGRAM REPORT: CPT | Performed by: INTERNAL MEDICINE

## 2021-04-26 PROCEDURE — 72040 X-RAY EXAM NECK SPINE 2-3 VW: CPT

## 2021-04-26 PROCEDURE — 97535 SELF CARE MNGMENT TRAINING: CPT

## 2021-04-26 PROCEDURE — 2060000000 HC ICU INTERMEDIATE R&B

## 2021-04-26 PROCEDURE — 80048 BASIC METABOLIC PNL TOTAL CA: CPT

## 2021-04-26 PROCEDURE — 97165 OT EVAL LOW COMPLEX 30 MIN: CPT

## 2021-04-26 PROCEDURE — 97166 OT EVAL MOD COMPLEX 45 MIN: CPT

## 2021-04-26 PROCEDURE — 99233 SBSQ HOSP IP/OBS HIGH 50: CPT | Performed by: INTERNAL MEDICINE

## 2021-04-26 PROCEDURE — 6360000002 HC RX W HCPCS: Performed by: INTERNAL MEDICINE

## 2021-04-26 PROCEDURE — 97162 PT EVAL MOD COMPLEX 30 MIN: CPT

## 2021-04-26 PROCEDURE — 36415 COLL VENOUS BLD VENIPUNCTURE: CPT

## 2021-04-26 PROCEDURE — 2500000003 HC RX 250 WO HCPCS: Performed by: STUDENT IN AN ORGANIZED HEALTH CARE EDUCATION/TRAINING PROGRAM

## 2021-04-26 PROCEDURE — 85025 COMPLETE CBC W/AUTO DIFF WBC: CPT

## 2021-04-26 RX ORDER — LISINOPRIL 10 MG/1
10 TABLET ORAL DAILY
Qty: 30 TABLET | Refills: 3 | Status: SHIPPED | OUTPATIENT
Start: 2021-04-27 | End: 2021-06-28

## 2021-04-26 RX ORDER — CARVEDILOL 3.12 MG/1
3.12 TABLET ORAL 2 TIMES DAILY WITH MEALS
Status: DISCONTINUED | OUTPATIENT
Start: 2021-04-26 | End: 2021-04-27 | Stop reason: HOSPADM

## 2021-04-26 RX ORDER — AMLODIPINE BESYLATE 5 MG/1
5 TABLET ORAL DAILY
Qty: 30 TABLET | Refills: 3 | Status: SHIPPED | OUTPATIENT
Start: 2021-04-27 | End: 2022-02-18

## 2021-04-26 RX ORDER — METHYLPREDNISOLONE 4 MG/1
4 TABLET ORAL
Qty: 6 TABLET | Refills: 0 | Status: SHIPPED | OUTPATIENT
Start: 2021-04-27 | End: 2021-05-03

## 2021-04-26 RX ORDER — METHYLPREDNISOLONE 4 MG/1
4 TABLET ORAL NIGHTLY
Qty: 3 TABLET | Refills: 0 | Status: SHIPPED | OUTPATIENT
Start: 2021-04-27 | End: 2021-04-26

## 2021-04-26 RX ORDER — METHYLPREDNISOLONE 8 MG/1
8 TABLET ORAL NIGHTLY
Qty: 1 TABLET | Refills: 0 | Status: SHIPPED | OUTPATIENT
Start: 2021-04-26 | End: 2021-04-27

## 2021-04-26 RX ORDER — BACLOFEN 5 MG/1
5 TABLET ORAL 2 TIMES DAILY
Qty: 30 TABLET | Refills: 2 | Status: SHIPPED | OUTPATIENT
Start: 2021-04-26 | End: 2021-05-24 | Stop reason: SDUPTHER

## 2021-04-26 RX ORDER — METHYLPREDNISOLONE 4 MG/1
4 TABLET ORAL
Qty: 2 TABLET | Refills: 0 | Status: SHIPPED | OUTPATIENT
Start: 2021-04-26 | End: 2021-04-28

## 2021-04-26 RX ORDER — METHYLPREDNISOLONE 4 MG/1
4 TABLET ORAL NIGHTLY
Qty: 3 TABLET | Refills: 0 | Status: SHIPPED | OUTPATIENT
Start: 2021-04-27 | End: 2021-04-30

## 2021-04-26 RX ORDER — METHYLPREDNISOLONE 8 MG/1
8 TABLET ORAL NIGHTLY
Qty: 1 TABLET | Refills: 0 | Status: SHIPPED | OUTPATIENT
Start: 2021-04-26 | End: 2021-04-26

## 2021-04-26 RX ORDER — ASPIRIN 81 MG/1
81 TABLET, CHEWABLE ORAL DAILY
Qty: 30 TABLET | Refills: 3 | Status: SHIPPED | OUTPATIENT
Start: 2021-04-27 | End: 2021-04-27 | Stop reason: HOSPADM

## 2021-04-26 RX ORDER — METHYLPREDNISOLONE 4 MG/1
4 TABLET ORAL
Qty: 3 TABLET | Refills: 0 | Status: SHIPPED | OUTPATIENT
Start: 2021-04-27 | End: 2021-04-30

## 2021-04-26 RX ORDER — CARVEDILOL 6.25 MG/1
6.25 TABLET ORAL 2 TIMES DAILY WITH MEALS
Qty: 60 TABLET | Refills: 3 | Status: SHIPPED | OUTPATIENT
Start: 2021-04-26 | End: 2021-04-27 | Stop reason: HOSPADM

## 2021-04-26 RX ADMIN — ATORVASTATIN CALCIUM 40 MG: 80 TABLET, FILM COATED ORAL at 09:08

## 2021-04-26 RX ADMIN — TAMSULOSIN HYDROCHLORIDE 0.4 MG: 0.4 CAPSULE ORAL at 09:06

## 2021-04-26 RX ADMIN — SODIUM CHLORIDE, PRESERVATIVE FREE 10 ML: 5 INJECTION INTRAVENOUS at 20:40

## 2021-04-26 RX ADMIN — FAMOTIDINE 20 MG: 10 INJECTION INTRAVENOUS at 09:06

## 2021-04-26 RX ADMIN — BACLOFEN 5 MG: 10 TABLET ORAL at 15:00

## 2021-04-26 RX ADMIN — BACLOFEN 5 MG: 10 TABLET ORAL at 20:33

## 2021-04-26 RX ADMIN — METHYLPREDNISOLONE 4 MG: 4 TABLET ORAL at 06:33

## 2021-04-26 RX ADMIN — AMLODIPINE BESYLATE 5 MG: 5 TABLET ORAL at 09:06

## 2021-04-26 RX ADMIN — OXYCODONE HYDROCHLORIDE AND ACETAMINOPHEN 1 TABLET: 5; 325 TABLET ORAL at 18:29

## 2021-04-26 RX ADMIN — OXYCODONE HYDROCHLORIDE AND ACETAMINOPHEN 1 TABLET: 5; 325 TABLET ORAL at 11:45

## 2021-04-26 RX ADMIN — OXYCODONE HYDROCHLORIDE AND ACETAMINOPHEN 1 TABLET: 5; 325 TABLET ORAL at 05:36

## 2021-04-26 RX ADMIN — APIXABAN 5 MG: 5 TABLET, FILM COATED ORAL at 20:33

## 2021-04-26 RX ADMIN — BACLOFEN 5 MG: 10 TABLET ORAL at 09:06

## 2021-04-26 RX ADMIN — LISINOPRIL 10 MG: 10 TABLET ORAL at 09:06

## 2021-04-26 RX ADMIN — ASPIRIN 81 MG: 81 TABLET, CHEWABLE ORAL at 09:06

## 2021-04-26 RX ADMIN — APIXABAN 5 MG: 5 TABLET, FILM COATED ORAL at 14:18

## 2021-04-26 RX ADMIN — METHYLPREDNISOLONE 4 MG: 4 TABLET ORAL at 11:45

## 2021-04-26 RX ADMIN — METHYLPREDNISOLONE 4 MG: 4 TABLET ORAL at 18:29

## 2021-04-26 RX ADMIN — METHYLPREDNISOLONE 8 MG: 4 TABLET ORAL at 20:33

## 2021-04-26 RX ADMIN — SODIUM CHLORIDE, PRESERVATIVE FREE 10 ML: 5 INJECTION INTRAVENOUS at 09:06

## 2021-04-26 RX ADMIN — FAMOTIDINE 20 MG: 10 INJECTION INTRAVENOUS at 20:33

## 2021-04-26 ASSESSMENT — PAIN DESCRIPTION - LOCATION: LOCATION: SHOULDER

## 2021-04-26 ASSESSMENT — PAIN SCALES - GENERAL
PAINLEVEL_OUTOF10: 6
PAINLEVEL_OUTOF10: 6

## 2021-04-26 NOTE — PROGRESS NOTES
Physician Progress Note      PATIENT:               Elizabeth Perry  [facility] #:                  [id number]  :                       1955  ADMIT DATE:       2021 2:33 PM  [address] DATE:  RESPONDING  PROVIDER #:        Almas ELIZABETH MD          QUERY TEXT:    Pt admitted with hypertensive emergency. Pt noted to have pulmonary fibrosis   requiring home oxygen. If possible, please document in the progress notes and   discharge summary if you are evaluating and/or treating any of the following: The medical record reflects the following:  Risk Factors: Pulmonary fibrosis  Clinical Indicators: Home oxygen use  On admission O2 sat 98% on 4L/nc  Treatment: Supplemental oxygen, O2 sat monitoring    Thank you, Odilia Vazquez RN, CDS. Please call with any questions. [phone]   (moso)  Options provided:  -- Chronic respiratory failure  -- Chronic respiratory failure with hypoxia  -- Other - I will add my own diagnosis  -- Disagree - Not applicable / Not valid  -- Disagree - Clinically unable to determine / Unknown  -- Refer to Clinical Documentation Reviewer    PROVIDER RESPONSE TEXT:    This patient has chronic respiratory failure with hypoxia.     Query created by: Alvaro Campoverde on 2021 1:50 PM      Electronically signed by:  Rosales Wellington MD 2021 2:18 PM

## 2021-04-26 NOTE — PROGRESS NOTES
[facility]  Progress Note                   Date:   4/26/2021  Patient name: Klely Laura  Date of admission:  4/24/2021  2:33 PM  MRN:   [id number]  YOB: 1955  PCP: Angela Jack MD    Reason for Admission: Hypertensive emergency [I16.1]    Subjective:       Clinical Changes /Abnormalities:  Patient was seen and examined in bed in room after discussion with RNLexi LAUREANO on tele. HR 51    Review of Systems    Medications:   Scheduled Meds:   aspirin  81 mg Oral Daily    baclofen  5 mg Oral TID    lisinopril  10 mg Oral Daily    carvedilol  6.25 mg Oral BID WC    methylPREDNISolone  4 mg Oral QAM AC    methylPREDNISolone  4 mg Oral Lunch    methylPREDNISolone  4 mg Oral Dinner    methylPREDNISolone  8 mg Oral Nightly    [START ON 4/27/2021] methylPREDNISolone  4 mg Oral Nightly    amLODIPine  5 mg Oral Daily    atorvastatin  40 mg Oral Daily    [Held by provider] digoxin  125 mcg Oral Daily    tamsulosin  0.4 mg Oral Daily    sodium chloride flush  5-40 mL Intravenous 2 times per day    famotidine (PEPCID) injection  20 mg Intravenous BID    fentanNYL  50 mcg Intravenous Once     Continuous Infusions:   sodium chloride       CBC:   Recent Labs     04/24/21  1500 04/25/21  0554 04/26/21  0523   WBC 9.7 6.9 5.2   HGB 11.6* 11.1* 12.0*    278 310     BMP:    Recent Labs     04/24/21  1241 04/25/21  0554 04/26/21  0523    136 136   K 4.1 3.6* 4.4    102 101   CO2 32* 30 29   BUN 17 10 13   CREATININE 0.64* 0.54* 0.53*   GLUCOSE 128* 101* 122*     Hepatic:  Recent Labs     04/24/21  1241   AST 20   ALT 16   BILITOT 0.56   ALKPHOS 120     Troponin:   Recent Labs     04/24/21  1241 04/24/21  1500 04/25/21  0554   TROPHS 27* 28* 35*     BNP: No results for input(s): BNP in the last 72 hours. Lipids: No results for input(s): CHOL, HDL in the last 72 hours.     Invalid input(s): LDLCALCU  INR:   Recent Labs     04/24/21  1241   INR 1.7       Objective: Vitals: BP 96/66   Pulse 68   Temp 97.6 °F (36.4 °C) (Axillary)   Resp 23   Ht 6' (1.829 m)   Wt 172 lb (78 kg)   SpO2 94%   BMI 23.33 kg/m²   General appearance: alert and cooperative with exam  HEENT: Head: Normocephalic, no lesions, without obvious abnormality. Neck:no JVD, trachea midline, no adenopathy  Lungs: Clear to auscultation  Heart: Regular rate and rhythm, s1/s2 auscultated, + murmurs  Abdomen: soft, non-tender, bowel sounds active  Extremities: no edema  Neurologic: not done        Assessment / Acute Cardiac Problems:   1. Shoulder pain musculoskeletal   2. Atypical chest pain with no new EKG changes  3. Hypertesive emergency   4. Minimal troponin elevation 27 and 28 likely due to hypertensive emergency  5. Moderate AS   6. Cardiac cath in 2018- Minimal CAD with ostial LXc 40 % stenosis   7. H/o Afib  8. Pulmonary fibrosis    Patient Active Problem List:     Dyslipidemia     ED (erectile dysfunction)     Incomplete bladder emptying     Benign prostatic hyperplasia with urinary obstruction     History of colon cancer     PAF (paroxysmal atrial fibrillation) (HCC)     Anemia     Pallor of optic disc     Presbyopia     Incisional hernia, without obstruction or gangrene     Hypertrophic nonobstructive cardiomyopathy (HCC)     Iron (Fe) deficiency anemia     Primary osteoarthritis of right knee     History of malignant neoplasm of rectum     Hill's esophagus     CHF (congestive heart failure), NYHA class II, acute on chronic, combined (HCC)     Hypoxia     Dyspnea and respiratory abnormalities     Occupational pulmonary disease     Sinus bradycardia     Acute hypoxemic respiratory failure due to COVID-19 ([address]     COVID-19     Pneumonia     Acute respiratory failure with hypoxia (HCC)     Pulmonary fibrosis (HCC)     Syncope and collapse     Chronic anticoagulation     Severe malnutrition ([address]     Hypertensive emergency     Left shoulder pain      Plan of Treatment:   1.  Minimal troponin elevation likely due to HTN emergency. Currently on ASA, statin, BB, ACE. 2. HTN  BP soft today. Continue CCB and ACE. Will reduce dose of BB.   3. PAF SR/SB on tele Continue BB and will resume Eliquis. 4. Keep K > 4.0 and Mag > 2.0 stable  5.  Rest of care managed per Primary Team.       Electronically signed by MIRI Waller NP on 4/26/2021 at 12:40 PM  [address]  [phone]

## 2021-04-26 NOTE — PROGRESS NOTES
[facility]  Internal Medicine Teaching Residency Program  Inpatient Daily Progress Note  ______________________________________________________________________________    Patient: Faye Dinero  YOB: 1955   DWU:[id number]    Acct: [de-identified]     Room: [address] [place] [address]  Admit date: 4/24/2021  Today's date: 04/26/21  Number of days in the hospital: 2    SUBJECTIVE   Admitting Diagnosis: Hypertensive emergency  CC:   Pt examined at bedside. Chart & results reviewed. No acute event overnight,  Blood pressure 135/79, heart rate 62, respiratory rate 21, afebrile on 6 l nc   Currently on coreg 6.25 amlodipine 5 mg      ROS:  Constitutional:  negative for chills, fevers, sweats  Respiratory:  negative for cough, dyspnea on exertion, hemoptysis, shortness of breath, wheezing  Cardiovascular:  Positive for chest pain,  Shoulder pain  Gastrointestinal:  negative for abdominal pain, constipation, diarrhea, nausea, vomiting  Neurological:  negative for dizziness, headache  BRIEF HISTORY     The patient is a pleasant 77 y.o. male with past medical history of A. fib on Eliquis and digoxin, hypertension,  CHF, PE, pulmonary fibrosis ,covid pneumonia dec/2020  , BPH     At [facility] : patient initially presented to [facility] with blood pressure of 220/180s with chest pain. Patient was given morphine aspirin sublingual nitroglycerin. EKG was showing normal sinus rhythm with ST depression in lead V2 V3. During EKG patient became diaphoretic , chest pain gotten worse and BP was noted to be 90/60 and heart rate in 40s.  1 mg atropine was given with heart rate improved to 80s and blood pressure 130 over 90s. After atropine telemetry was showing ectopic beats. Repeat EKG was done but the quality was poor because patient was diaphoretic and there was concern for ST elevation in 2 3 aVF.   Patient was transferred to [facility] ED after discussing with  Blaine        In [facility] ED:  Patient complaining of left shoulder pain that has been there for the last 2 months but worsened from the yesterday and was radiating to back and was also having left-sided chest pain. Initially concern for aortic dissection with RCA involvement. However CTA was negative and EKG was not showing any clear evidence of STEMI. Bradycardia hypotension could have been because of mitral and aortic stenosis with preload reduction     Patient initially was on Cardene drip but got tachycardic so was placed on Cardizem infusion. Heparin was started as CTA was negative     On my assessment patient was hypertensive in 324W systolic. Tachypneic with 21-26,. Tropes 27-28, proBNP 1190     Of note patient was on diuretics 2 months ago and had a syncopal fall and struck left shoulder with abrasion. And has been having left shoulder pain on and off. Patient has difficulty ambulating left shoulder which gotten worse in the last 1 day.                OBJECTIVE     Vital Signs:  BP 96/66   Pulse 68   Temp 97.6 °F (36.4 °C) (Axillary)   Resp 23   Ht 6' (1.829 m)   Wt 172 lb (78 kg)   SpO2 94%   BMI 23.33 kg/m²     Temp (24hrs), Av.7 °F (36.5 °C), Min:97.4 °F (36.3 °C), Max:98.1 °F (36.7 °C)    No intake/output data recorded. Physical Exam:  Constitutional: This is a well developed, well nourished, 18.5-24.9 - Normal 77y.o. year old male who is alert, oriented, cooperative and in no apparent distress. Head:normocephalic and atraumatic. EENT:  PERRLA. No conjunctival injections. Septum was midline, mucosa was without erythema, exudates or cobblestoning. No thrush was noted. Neck: Supple without thyromegaly. No elevated JVP. Trachea was midline. Respiratory: Chest was symmetrical without dullness to percussion. Breath sounds bilaterally were clear to auscultation. There were no wheezes, rhonchi or rales. There is no intercostal retraction or use of accessory muscles.  No egophony noted. Cardiovascular: Regular without murmur, clicks, gallops or rubs. Abdomen: Slightly rounded and soft without organomegaly. No rebound, rigidity or guarding was appreciated. Lymphatic: No lymphadenopathy. Musculoskeletal: Normal curvature of the spine. No gross muscle weakness. Extremities: left upper extremitity decrease mobility . Skin:  Warm and dry. Good color, turgor and pigmentation. No lesions or scars.   No cyanosis or clubbing  Neurological/Psychiatric: The patient's general behavior, level of consciousness, thought content and emotional status is normal.        Medications:  Scheduled Medications:    aspirin  81 mg Oral Daily    baclofen  5 mg Oral TID    lisinopril  10 mg Oral Daily    carvedilol  6.25 mg Oral BID WC    methylPREDNISolone  4 mg Oral QAM AC    methylPREDNISolone  4 mg Oral Lunch    methylPREDNISolone  4 mg Oral Dinner    methylPREDNISolone  8 mg Oral Nightly    [START ON 4/27/2021] methylPREDNISolone  4 mg Oral Nightly    amLODIPine  5 mg Oral Daily    atorvastatin  40 mg Oral Daily    [Held by provider] digoxin  125 mcg Oral Daily    tamsulosin  0.4 mg Oral Daily    sodium chloride flush  5-40 mL Intravenous 2 times per day    famotidine (PEPCID) injection  20 mg Intravenous BID    fentanNYL  50 mcg Intravenous Once     Continuous Infusions:    sodium chloride       PRN Medicationsalbuterol sulfate HFA, 2 puff, Q6H PRN  oxyCODONE-acetaminophen, 1 tablet, Q6H PRN  hydrALAZINE, 10 mg, Q4H PRN  sodium chloride flush, 5-40 mL, PRN  sodium chloride, 25 mL, PRN  promethazine, 12.5 mg, Q6H PRN    Or  ondansetron, 4 mg, Q6H PRN  polyethylene glycol, 17 g, Daily PRN  acetaminophen, 650 mg, Q6H PRN    Or  acetaminophen, 650 mg, Q6H PRN  metoprolol, 5 mg, Q6H PRN  fentanNYL, 25 mcg, Q1H PRN        Diagnostic Labs:  CBC:   Recent Labs     04/24/21  1500 04/25/21  0554 04/26/21  0523   WBC 9.7 6.9 5.2   RBC 4.52 4.31 4.68   HGB 11.6* 11.1* 12.0*   HCT 38.0* 37.4* 39.5*   MCV 84.1 86.8 84.4   RDW 14.4 14.7* 14.6*    278 310     BMP:   Recent Labs     04/24/21  1241 04/25/21  0554 04/26/21  0523    136 136   K 4.1 3.6* 4.4    102 101   CO2 32* 30 29   BUN 17 10 13   CREATININE 0.64* 0.54* 0.53*     BNP: No results for input(s): BNP in the last 72 hours. PT/INR:   Recent Labs     04/24/21  1241   PROTIME 20.3*   INR 1.7     APTT:   Recent Labs     04/24/21  1500 04/24/21  2337 04/25/21  0554   APTT >120.0* 28.3 35.5*     CARDIAC ENZYMES: No results for input(s): CKMB, CKMBINDEX, TROPONINI in the last 72 hours. Invalid input(s): CKTOTAL;3  FASTING LIPID PANEL:  Lab Results   Component Value Date    CHOL 200 (H) 01/03/2017    HDL 36 (L) 03/10/2020    TRIG 165 (H) 01/03/2017     LIVER PROFILE:   Recent Labs     04/24/21  1241   AST 20   ALT 16   BILITOT 0.56   ALKPHOS 120      MICROBIOLOGY:   Lab Results   Component Value Date/Time    CULTURE NO GROWTH 6 DAYS 12/07/2020 11:25 PM       Imaging:    Cta Chest W Wo Contrast    Result Date: 4/24/2021  No evidence of thoracic aortic dissection or aneurysm. Redemonstration of peripheral fibrosis within the lungs, with a usual interstitial pneumonitis pattern. There is continued nodularity along the periphery of the lungs which is similar when compared to the previous exam.  Continued surveillance of that is recommended, with a repeat chest CT in approximately 6 months. Xr Shoulder Left (min 2 Views)    Result Date: 4/24/2021  Degenerative changes involving the left AC and glenohumeral joints without acute bony process. Xr Chest Portable    Result Date: 4/24/2021  Stable appearing ground-glass opacities and pulmonary fibrosis. ASSESSMENT & PLAN     ASSESSMENT / PLAN:      Hypertensive emergency:     bp below 160 . Will start home dose of lisinopril 5mg and monitor  CTA ruled out aortic dissection as patient had hypertension and then episode of bradycardia with 90 systolic .   Currently on coreg 6.25 amlodipine 5 mg          NSTEMI:  Trop trending in 27-28,  Trend Trop for 1 more reading  Likely type II however because of increased blood pressure, will appreciate cardiology recommendation regarding any intervention. N.p.o. after midnight  Cardiology consulted        Left-sided shoulder pain:   History of fall 2 months ago, pain while ambulating, worsened in the last 1 day   shoulder xray degenerative changes involving left AC  and glenohumeral joint  without acute bony process  Outpatient ortho followup              Paroxysmal atrial fibrillation  Digoxin with hold because of high blood pressure and patient going into bradycardia  Currently on coreg 6.25           Moderate aortic stenosis on echo January 13, 2021, left ventricular ejection fraction 55%   Non-obstructive CAD on cath 2018        Benign prostatic hyperplasia:  Tamsulosin 0.4 mg     Shortness of breath  likely due to pulmonary fibrosis on oxygen  Spirometry done at [facility] shows lung diffusing capacity suggesting parenchymal or pulmonary vascular disorder  -Lung transplant  . Per per patient [facility] for work-up        likey discharge today     DVT ppx:hepairn   GI ppx:  None      PT/OT/SW on board  Discharge Planning: on board     Franki Ann MD  Internal Medicine Resident, PGY-1  [facility]; [place], [place]  4/26/2021, 12:08 PM    Attestation and add on       I have discussed the care of Luke Zamora , I  ncluding pertinent history and exam findings,      4/26/21  with the resident. I have seen and examined the patient and the key elements of all parts of the encounter have been performed by me . I agree with the assessment, plan and orders as documented by the resident.        Principal Problem:    Hypertensive emergency  Active Problems:    Benign prostatic hyperplasia with urinary obstruction    PAF (paroxysmal atrial fibrillation) (HCC)    Sinus bradycardia acetaminophen, metoprolol, fentanNYL      ---       Karlene Mejia    . tdnrr  YOHANA VILLALOBOS [address], [address] [place] [address].    Phone [phone]   Fax: [phone]  Answering Service: [phone]

## 2021-04-26 NOTE — PROGRESS NOTES
Physical Therapy    Facility/Department: [facility] 4A STEPDOWN  Initial Assessment    NAME: Patito Peters  : 1955  MRN: [id number]    Date of Service: 2021  Chief Complaint   Patient presents with    Chest Injury     pt was seen at [facility] today for chest pain and EKG showed stemi,      Discharge Recommendations:    No further therapy required at discharge. Assessment   Assessment: The pt ambulated 150 ft without a device x SBA. He reported being near his baseline and doesn't need any additional PT at this time. Prognosis: Good  Decision Making: Medium Complexity  PT Education: Goals;PT Role;Plan of Care  REQUIRES PT FOLLOW UP: No  Activity Tolerance  Activity Tolerance: Patient Tolerated treatment well       Patient Diagnosis(es): The encounter diagnosis was Hypertensive emergency. has a past medical history of Atrial fibrillation ([facility] [address] Back pain, chronic, Hill's esophagus, Benign essential HTN, BPH (benign prostatic hyperplasia), Cancer ([facility] [address] Chronic idiopathic pulmonary fibrosis ([facility] [address] Cocaine abuse in remission [facility]), ED (erectile dysfunction), GERD (gastroesophageal reflux disease), GI bleed, Hernia, History of colon cancer, Melena, Migraines, and Murmur, cardiac.   has a past surgical history that includes Tonsillectomy; Colonoscopy; colectomy; Colonoscopy (2016); knee surgery (Right, 's); transesophageal echocardiogram (2018); Upper gastrointestinal endoscopy (N/A, 2018); Colonoscopy (N/A, 2018); hernia repair (Right, ); Cardiac catheterization (2018); colectomy; Total knee arthroplasty (Right, 2019); Upper gastrointestinal endoscopy (N/A, 2019); and Cardioversion ().     Restrictions  Restrictions/Precautions  Restrictions/Precautions: General Precautions  Required Braces or Orthoses?: No  Position Activity Restriction  Other position/activity restrictions: Up w/ assist, 6L NC  Vision/Hearing  Vision: Impaired  Vision Exceptions: Wears glasses for reading  Hearing: Exceptions to [facility]  Hearing Exceptions: Right hearing aid     Subjective  General  Patient assessed for rehabilitation services?: Yes  Response To Previous Treatment: Not applicable  Family / Caregiver Present: No  Follows Commands: Within Functional Limits  Pain Screening  Patient Currently in Pain: Yes  Pain Assessment  Pain Assessment: 0-10  Pain Level: 2  Pain Location: Neck; Shoulder  Vital Signs  Patient Currently in Pain: Yes     Orientation  Orientation  Overall Orientation Status: Within Normal Limits  Social/Functional History  Social/Functional History  Lives With: Significant other(fiance)  Type of Home: House  Home Layout: Two level  Home Access: Stairs to enter with rails  Entrance Stairs - Number of Steps: 3 to enter, 14 steps from 1st to 2nd floor  Entrance Stairs - Rails: Both  Bathroom Shower/Tub: Tub/Shower unit  Bathroom Toilet: Handicap height  Bathroom Equipment: Toilet raiser, Shower chair  Home Equipment: Oxygen, Wheelchair-manual, Rolling walker, Cane(2-5 L O2 at baseline, pt does not use RW, cane, wheelchair at baseline)  ADL Assistance: [address] [place] [address] Avenue: Independent  Homemaking Responsibilities: Yes  Ambulation Assistance: Independent  Transfer Assistance: Independent  Active : Yes  Mode of Transportation: Truck  Type of occupation: retired  Leisure & Hobbies: watching tv  Cognition      Objective     AROM RLE (degrees)  RLE AROM: WNL  AROM LLE (degrees)  LLE AROM : WNL  PROM RUE (degrees)  RUE PROM: WNL  AROM RUE (degrees)  RUE AROM : WNL  AROM LUE (degrees)  LUE AROM : WFL  Strength RLE  Strength RLE: WNL  Strength LLE  Strength LLE: WNL  Strength RUE  Strength RUE: WNL  Strength LUE  Strength LUE: WFL     Sensation  Overall Sensation Status: WFL  Bed mobility  Supine to Sit: Stand by assistance  Sit to Supine: Stand by assistance  Scooting: Stand by assistance  Transfers  Sit to Stand: Stand by assistance  Stand to sit: Stand by assistance  Ambulation  Ambulation?: Yes  Ambulation 1  Surface: level tile  Device: No Device  Assistance: Stand by assistance  Distance: amb 150 ft without a device x SBA     Balance  Posture: Good  Sitting - Static: Good  Sitting - Dynamic: Good  Standing - Static: Fair  Standing - Dynamic: Fair        Plan   Plan  Times per week: DC   PT  Safety Devices  Type of devices: Nurse notified, Left in bed, Call light within reach    G-Code     OutComes Score     AM-PAC Score  AM-PAC Inpatient Mobility Raw Score : 21 (04/26/21 1054)  AM-PAC Inpatient T-Scale Score : 50.25 (04/26/21 1054)  Mobility Inpatient CMS 0-100% Score: 28.97 (04/26/21 1054)  Mobility Inpatient CMS G-Code Modifier : Lyly Rush (04/26/21 1054)     Goals  Short term goals  Time Frame for Short term goals: No PT needs at this time   DC   PT     Therapy Time   Individual Concurrent Group Co-treatment   Time In 0809         Time Out 0829         Minutes [address] Daren Dickson, PT

## 2021-04-26 NOTE — PROGRESS NOTES
Occupational Therapy   Occupational Therapy Initial Assessment  Date: 2021   Patient Name: Robin Valladares  MRN: [id number]     : 1955    Date of Service: 2021     Copied from pt chart: This is a 77 y.o. male who presents to the Emergency Department with complaint of left shoulder/chest pain. Transfer from [facility] due to concerns for possible STEMI. Chief Complaint   Patient presents with    Chest Injury     pt was seen at [facility] today for chest pain and EKG showed stemi,      Past Medical History:   Diagnosis Date    Atrial fibrillation ([facility] [address]     Back pain, chronic     Hill's esophagus 2019    Benign essential HTN     BPH (benign prostatic hyperplasia)     Cancer (HCC)     colon-rectal    Chronic idiopathic pulmonary fibrosis ([facility] [address] 2021    Cocaine abuse in remission ([facility] [address]     's    ED (erectile dysfunction) 2015    GERD (gastroesophageal reflux disease)     GI bleed 2018    Hernia     History of colon cancer     Melena     Migraines     Murmur, cardiac      Discharge Recommendations: No therapy recommended at discharge. OT Equipment Recommendations  Equipment Needed: Yes  Mobility Devices: ADL Assistive Devices  ADL Assistive Devices: Sock-Aid Hard;Long-handled Sponge;Long-handled Shoe Horn    Assessment   Performance deficits / Impairments: Decreased functional mobility ; Decreased strength;Decreased endurance;Decreased high-level IADLs  Assessment: Pt completed LE dressing seated unsupported at EOB to don/doff socks, pt able to don/doff socks w/ LLE using fig 4 tech, pt unable to use fig 4 tech to don/doff RLE sock, pt able to bend forward to don/doff RLE sock w/ no LOB. Pt. completed func mob w/ no use of AD bedside<>hallway to simulate househould distances. Pt w/ no LOB noted this session, pt limited by SOB. Pt on 6L O2 NC throughout session. Per pt report, at baseline function.    Prognosis: Good  Decision Making: Medium Complexity  OT Education: OT Role;Plan of Care;Transfer Training  Patient Education: Pt educated on proper hand placement during transfers. Pt w/ good return. No Skilled OT: Independent with functional mobility; Independent with ADL's;At baseline function; Safe to return home  REQUIRES OT FOLLOW UP: No  Activity Tolerance  Activity Tolerance: Patient Tolerated treatment well;Patient limited by fatigue  Activity Tolerance: Pt limited by SOB this session  Safety Devices  Safety Devices in place: Yes  Type of devices: Call light within reach;Gait belt;Left in bed(Pt supine in bed at start/end of session. Call light in reach.)           Patient Diagnosis(es): The encounter diagnosis was Hypertensive emergency. has a past medical history of Atrial fibrillation ([facility] [address] Back pain, chronic, Hill's esophagus, Benign essential HTN, BPH (benign prostatic hyperplasia), Cancer ([facility] [address] Chronic idiopathic pulmonary fibrosis ([facility] [address] Cocaine abuse in remission [facility]), ED (erectile dysfunction), GERD (gastroesophageal reflux disease), GI bleed, Hernia, History of colon cancer, Melena, Migraines, and Murmur, cardiac.   has a past surgical history that includes Tonsillectomy; Colonoscopy; colectomy; Colonoscopy (07/18/2016); knee surgery (Right, 1970's); transesophageal echocardiogram (11/29/2018); Upper gastrointestinal endoscopy (N/A, 12/5/2018); Colonoscopy (N/A, 12/6/2018); hernia repair (Right, 2009); Cardiac catheterization (11/29/2018); colectomy; Total knee arthroplasty (Right, 1/8/2019); Upper gastrointestinal endoscopy (N/A, 6/18/2019); and Cardioversion (2020).            Restrictions  Restrictions/Precautions  Restrictions/Precautions: General Precautions  Required Braces or Orthoses?: No  Position Activity Restriction  Other position/activity restrictions: Up w/ assist, 6L NC    Subjective   General  Patient assessed for rehabilitation services?: Yes  Family / Caregiver Present: No  Diagnosis: Hypertensive emergency  General Comment  Comments: RN ok'd for OT evaluation. Pt pleasant, cooperative, agreeable. Patient Currently in Pain: Yes  Pain Assessment  Pain Assessment: 0-10  Pain Level: 2  Pain Location: Shoulder  Pain Orientation: Left  Non-Pharmaceutical Pain Intervention(s): Ambulation/Increased Activity; Distraction; Therapeutic presence  Patient response: Pt satisfied  Oxygen Therapy  SpO2: 94 %  O2 Device: Nasal cannula  O2 Flow Rate (L/min): 6 L/min  Social/Functional History  Social/Functional History  Lives With: Significant other(fiance)  Type of Home: House  Home Layout: Two level (Bed/bath setup on 2nd floor)  Home Access: Stairs to enter with rails  Entrance Stairs - Number of Steps: 3 to enter, 14 steps from 1st to 2nd floor  Entrance Stairs - Rails: Both  Bathroom Shower/Tub: Tub/Shower unit  Bathroom Toilet: Handicap height  Bathroom Equipment: Toilet raiser, Shower chair  Home Equipment: Oxygen, Wheelchair-manual, Rolling walker, Cane(2-5 L O2 at baseline, pt does not use RW, cane, wheelchair at baseline)  ADL Assistance: [address] [place] [address] Avenue: Independent  Homemaking Responsibilities: Yes  Ambulation Assistance: Independent  Transfer Assistance: Independent  Active : Yes  Mode of Transportation: Truck  Type of occupation: retired  Leisure & Hobbies: watching tv       Objective   Vision: Impaired  Vision Exceptions: Wears glasses for reading  Hearing: Exceptions to [facility]  Hearing Exceptions: Right hearing aid    Orientation  Overall Orientation Status: Within Functional Limits     Balance  Sitting Balance: Stand by assistance  Standing Balance: Stand by assistance  Standing Balance  Time: ~4min  Activity: bedside<>hallway to simulate household distances  Comment: Per pt report, limited by min SOB at end of session, pt reported being close to baseline.   Functional Mobility  Functional - Mobility Device: No device  Activity: Other(bedside<>hallway to simulate household distances)  Assist Level: Stand by assistance  Functional Mobility Comments: no AD used this session  ADL  Feeding: Independent  Grooming: Independent  UE Bathing: Modified independent   LE Bathing: Modified independent   UE Dressing: Modified independent   LE Dressing: Modified independent   Toileting: Independent  Additional Comments: Pt don/doff'd BLE socks seated unsupported at EOB. Pt able to perform fig 4 tech w/ LLE to don/doff sock, pt unable to perform fig 4 tech w/ RLE d/t increased discomfort during forward flexion. Pt able to flex at hips and don/doff RLE socks w/ no LOB. Tone RUE  RUE Tone: Normotonic  Tone LUE  LUE Tone: Normotonic  Coordination  Movements Are Fluid And Coordinated: Yes     Bed mobility  Supine to Sit: Independent  Sit to Supine: Independent  Scooting: Independent  Transfers  Sit to stand: Supervision  Stand to sit: Supervision  Transfer Comments: Pt w/ no LOB noted this session     Cognition  Overall Cognitive Status: WFL        Sensation  Overall Sensation Status: WFL        LUE AROM (degrees)  LUE AROM : Exceptions  L Shoulder Flexion 0-180: 0-90(limited by chronic pain this session)  Left Hand AROM (degrees)  Left Hand AROM: WFL  RUE AROM (degrees)  RUE AROM : WFL  Right Hand AROM (degrees)  Right Hand AROM: WFL  LUE Strength  L Hand General: 5/5  LUE Strength Comment: Did not assess d/t increased pain w/ LUE AROM  RUE Strength  Gross RUE Strength: WFL  R Hand General: 5/5                   Plan   d/c OT      AM-PAC Score        AM-[facility] Inpatient Daily Activity Raw Score: 24 (04/26/21 1333)  AM-PAC Inpatient ADL T-Scale Score : 57.54 (04/26/21 1333)  ADL Inpatient CMS 0-100% Score: 0 (04/26/21 1333)  ADL Inpatient CMS G-Code Modifier : CH (04/26/21 1333)    Goals  Pt would not benefit from further OT at this time, independent w/ ADLs, IADLs, and func mob, pt at baseline function.        Therapy Time   Individual Concurrent Group Co-treatment   Time In 0809         Time Out 0827 Minutes 18      co-tommy w/ PT   Timed Code Treatment Minutes: [facility], S/OT

## 2021-04-26 NOTE — DISCHARGE INSTR - COC
Chronic anticoagulation Z79.01    Severe malnutrition ([facility] [address] E43    Hypertensive emergency I16.1    Left shoulder pain M25.512       Isolation/Infection:   Isolation            No Isolation          Patient Infection Status       Infection Onset Added Last Indicated Last Indicated By Review Planned Expiration Resolved Resolved By    None active    Resolved    COVID-19 20 COVID-19   20     COVID-19 Rule Out 20 COVID-19 (Ordered)   20 Rule-Out Test Resulted    COVID-19 Rule Out 10/17/20 10/17/20 10/17/20 COVID-19 (Ordered)   10/17/20 Rule-Out Test Resulted    COVID-19 Rule Out 10/17/20 10/17/20 10/17/20 COVID-19 (Ordered)   10/17/20 Rule-Out Test Resulted            Nurse Assessment:  Last Vital Signs: BP 96/66   Pulse 68   Temp 97.6 °F (36.4 °C) (Axillary)   Resp 23   Ht 6' (1.829 m)   Wt 172 lb (78 kg)   SpO2 94%   BMI 23.33 kg/m²     Last documented pain score (0-10 scale): Pain Level: 6  Last Weight:   Wt Readings from Last 1 Encounters:   21 172 lb (78 kg)     Mental Status:  {IP PT MENTAL STATUS::::0}    IV Access:  { LEONOR IV ACCESS:259191276:::0}    Nursing Mobility/ADLs:  Walking   {CHP DME ADLs:735389718:::0}  Transfer  {CHP DME ADLs:069692543:::0}  Bathing  {CHP DME ADLs:417436796:::0}  Dressing  {CHP DME ADLs:379273410:::0}  Toileting  {CHP DME ADLs:552990988:::0}  Feeding  {CHP DME ADLs:740795891:::0}  Med Admin  {CHP DME ADLs:440031253:::0}  Med Delivery   { LEONOR MED Delivery:580704148:::0}    Wound Care Documentation and Therapy:        Elimination:  Continence:    Bowel: {YES / FV:14350}  Bladder: {YES / MK:03995}  Urinary Catheter: {Urinary Catheter:296346324:::0}   Colostomy/Ileostomy/Ileal Conduit: {YES / ZK:32358}       Date of Last BM: ***  No intake or output data in the 24 hours ending 21 1329  I/O last 3 completed shifts:  In: -   Out: 320 [Urine:320]    Safety Concerns:     [address] Concerns:366067797:::0}    Impairments/Disabilities:      [address] Impairments/Disabilities:369887061:::0}    Nutrition Therapy:  Current Nutrition Therapy:   [address] LEONOR Diet List:566889003:::0}    Routes of Feeding: {CHP DME Other Feedings:354974601:::0}  Liquids: {Slp liquid thickness:47206}  Daily Fluid Restriction: {CHP DME Yes amt example:455793241:::0}  Last Modified Barium Swallow with Video (Video Swallowing Test): {Done Not Done ZEKY:684972872:::2}    Treatments at the Time of Hospital Discharge:   Respiratory Treatments: ***  Oxygen Therapy:  {Therapy; copd oxygen:30198:::0}  Ventilator:    {[facility] Vent List:566471578:::0}    Rehab Therapies: {THERAPEUTIC INTERVENTION:9178968526}  Weight Bearing Status/Restrictions: {[facility] Weight Bearin:::0}  Other Medical Equipment (for information only, NOT a DME order):  {EQUIPMENT:207932017}  Other Treatments: ***    Patient's personal belongings (please select all that are sent with patient):  {P DME Belongings:156223637:::0}    RN SIGNATURE:  {Esignature:622014119:::0}    CASE MANAGEMENT/SOCIAL WORK SECTION    Inpatient Status Date: ***    Readmission Risk Assessment Score:  Readmission Risk              Risk of Unplanned Readmission:        25           Discharging to Facility/ Agency   Name:   Address:  Phone:  Fax:    Dialysis Facility (if applicable)   Name:  Address:  Dialysis Schedule:  Phone:  Fax:    / signature: {Esignature:712398721:::0}    PHYSICIAN SECTION    Prognosis: {Prognosis:2654519230:::0}    Condition at Discharge: [address] Patient Condition:698465214:::0}    Rehab Potential (if transferring to Rehab): {Prognosis:6854644622:::0}    Recommended Labs or Other Treatments After Discharge: ***    Physician Certification: I certify the above information and transfer of Faye Dinero  is necessary for the continuing treatment of the diagnosis listed and that he requires {Admit to Appropriate Level of Care:90640:::0} for {GREATER/LESS:776490746} 30 days.      Update Admission H&P: {CHP DME Changes in HandP:438167186:::0}    PHYSICIAN SIGNATURE:  Electronically signed by Alexandra Owens MD on 4/26/21 at 1:29 PM EDT

## 2021-04-26 NOTE — PROGRESS NOTES
Bill unable to attend rehab due to admission, pt will need return to rehab release when discharged from hospital.

## 2021-04-26 NOTE — CONSULTS
Department of Neurosurgery                                            Nurse Practitioner Consult Note      Reason for Consult:  Mild to moderate spinal canal stenosis  Requesting Physician:  Dr Reynaldo Daugherty  Neurosurgeon:   [] Dr. Patrice Mosley  [] Dr. Roger Alcantara  [] Dr. Gregorio Palacios  [x] Dr. Tesfaye Montgomery      History Obtained From:  patient, electronic medical record    CHIEF COMPLAINT:         Chief Complaint   Patient presents with    Chest Injury     pt was seen at [facility] today for chest pain and EKG showed stemi,        HISTORY OF PRESENT ILLNESS:       The patient is a 77 y.o. male initially presented at [facility] on 4/24 with left shoulder pain radiating into mid sternum and down left arm. Systolic blood pressure was 220/180s. There was some question of ST elevation on EKG so was transferred to [facility]. Patient has been having left shoulder pain for the last 2 months when he passed out and fell. He was evaluated by cardiology and was determined likely not STEMI. Patient has a history of A. fib and PE and is on Eliquis. Patient was also recently diagnosed with pulmonary fibrosis going through pulmonary rehab and [facility] for possible lung transplant. Patient is on home O2. Neurosurgery was consulted for MRI showing C5-6 spinal cord stenosis in setting of left arm pain and weakness. Patient states he has been having limited range of motion with left arm since his fall 2 months ago which due to pain. Pain radiates from left shoulder down lateral portion of arm but not into fingers. He is unable to lift left arm over his head due to pain. Denies any trouble with balance or fine motor movements. Pain in arm worse with movement of left arm. States he was unable to lift his arm yesterday but today is able to lift up but not over his head. Was started on percocet and medrol dose pack yesterday.    XR of left shoulder shows degenerative changes involving left AC and glenohumeral joints.     PAST MEDICAL HISTORY :       Past Medical History:        Diagnosis Date    Atrial fibrillation ([facility]     Back pain, chronic     Mcguire's esophagus 06/18/2019    Benign essential HTN     BPH (benign prostatic hyperplasia)     Cancer (HCC)     colon-rectal    Chronic idiopathic pulmonary fibrosis ([facility] 03/29/2021    Cocaine abuse in remission [facility])     1970's    ED (erectile dysfunction) 4/2/2015    GERD (gastroesophageal reflux disease)     GI bleed 12/5/2018    Hernia     History of colon cancer     Melena     Migraines     Murmur, cardiac        Past Surgical History:        Procedure Laterality Date    CARDIAC CATHETERIZATION  11/29/2018    Non-obstructive CAD    CARDIOVERSION  2020    COLECTOMY      2nd colectomy, Colostomy and reversed [facility] COLECTOMY      1st time [place]    COLONOSCOPY      COLONOSCOPY  07/18/2016    COLONOSCOPY N/A 12/6/2018    COLONOSCOPY DIAGNOSTIC performed by Valery Waters MD at [address] Right 2009    inguinal    KNEE SURGERY Right 1970's    arthrotomy    TONSILLECTOMY      TOTAL KNEE ARTHROPLASTY Right 1/8/2019    KNEE TOTAL ARTHROPLASTY performed by Ander Beckman MD at [address] TRANSESOPHAGEAL ECHOCARDIOGRAM  11/29/2018    UPPER GASTROINTESTINAL ENDOSCOPY N/A 12/5/2018    EGD DIAGNOSTIC ONLY performed by Valery Waters MD at [address] [place] [address] N/A 6/18/2019    MCGUIRE'S       Social History:   Social History     Socioeconomic History    Marital status: Single     Spouse name: Not on file    Number of children: Not on file    Years of education: Not on file    Highest education level: Not on file   Occupational History    Not on file   Social Needs    Financial resource strain: Not on file    Food insecurity     Worry: Not on file     Inability: Not on file    Transportation needs     Medical: Not on file     Non-medical: Not on file   Tobacco Use    Smoking tablet by mouth daily 4/27/21  Yes Kana Robles MD   baclofen (LIORESAL) 5 MG tablet Take 1 tablet by mouth 2 times daily 4/26/21  Yes Kana Robles MD   methylPREDNISolone (MEDROL) 4 MG tablet Take 1 tablet by mouth every morning (before breakfast) for 6 days 4/27/21 5/3/21 Yes Kana Robles MD   methylPREDNISolone (MEDROL) 4 MG tablet Take 1 tablet by mouth Daily with lunch for 3 days 4/27/21 4/30/21 Yes Kana Robles MD   methylPREDNISolone (MEDROL) 4 MG tablet Take 1 tablet by mouth Daily with supper for 2 days 4/26/21 4/28/21 Yes Kana Robles MD   methylPREDNISolone (MEDROL) 4 MG tablet Take 1 tablet by mouth nightly for 3 days 4/27/21 4/30/21 Yes Kana Robles MD   methylPREDNISolone (MEDROL) 8 MG tablet Take 1 tablet by mouth nightly for 1 day 4/26/21 4/27/21 Yes Kana Robles MD   ibuprofen (ADVIL;MOTRIN) 800 MG tablet take 1 tablet by mouth three times a day AS NEEDED FOR PAIN 4/15/21   Cassandra Eckert MD   apixaban (ELIQUIS) 5 MG TABS tablet Take 1 tablet by mouth 2 times daily 3/1/21   Cassandra Eckert MD   Handicap Placard MISC by Does not apply route Expires 1/2026 1/19/21   Cassandra Eckert MD   digoxin (LANOXIN) 125 MCG tablet Take 1 tablet by mouth daily 12/8/20   Gabrielle Thorne MD   albuterol sulfate HFA (VENTOLIN HFA) 108 (90 Base) MCG/ACT inhaler Inhale 2 puffs into the lungs 4 times daily as needed for Wheezing  Patient not taking: Reported on 3/8/2021 12/7/20   Gabrielle Thorne MD   atorvastatin (LIPITOR) 40 MG tablet Take 1 tablet by mouth daily 11/16/20   Cassandra Eckert MD   omeprazole (PRILOSEC) 40 MG delayed release capsule Take 1 capsule by mouth daily 11/16/20   Cassandra Eckert MD   tamsulosin [facility]) 0.4 MG capsule Take 1 capsule by mouth daily 10/15/20   Cassandra Eckert MD       Current Medications:   Current Facility-Administered Medications: carvedilol (COREG) tablet 3.125 mg, 3.125 mg, Oral, BID WC  apixaban (ELIQUIS) tablet 5 mg, 5 mg, Oral, BID  aspirin chewable tablet 81 mg, 81 mg, Oral, Daily  baclofen (LIORESAL) tablet 5 mg, 5 mg, Oral, TID  lisinopril (PRINIVIL;ZESTRIL) tablet 10 mg, 10 mg, Oral, Daily  albuterol sulfate  (90 Base) MCG/ACT inhaler 2 puff, 2 puff, Inhalation, Q6H PRN  oxyCODONE-acetaminophen (PERCOCET) 5-325 MG per tablet 1 tablet, 1 tablet, Oral, Q6H PRN  methylPREDNISolone (MEDROL) tablet 4 mg, 4 mg, Oral, QAM AC  methylPREDNISolone (MEDROL) tablet 4 mg, 4 mg, Oral, Lunch  methylPREDNISolone (MEDROL) tablet 4 mg, 4 mg, Oral, Dinner  methylPREDNISolone (MEDROL) tablet 8 mg, 8 mg, Oral, Nightly  [START ON 4/27/2021] methylPREDNISolone (MEDROL) tablet 4 mg, 4 mg, Oral, Nightly  amLODIPine (NORVASC) tablet 5 mg, 5 mg, Oral, Daily  hydrALAZINE (APRESOLINE) injection 10 mg, 10 mg, Intravenous, Q4H PRN  atorvastatin (LIPITOR) tablet 40 mg, 40 mg, Oral, Daily  [Held by provider] digoxin (LANOXIN) tablet 125 mcg, 125 mcg, Oral, Daily  tamsulosin (FLOMAX) capsule 0.4 mg, 0.4 mg, Oral, Daily  sodium chloride flush 0.9 % injection 5-40 mL, 5-40 mL, Intravenous, 2 times per day  sodium chloride flush 0.9 % injection 5-40 mL, 5-40 mL, Intravenous, PRN  0.9 % sodium chloride infusion, 25 mL, Intravenous, PRN  promethazine (PHENERGAN) tablet 12.5 mg, 12.5 mg, Oral, Q6H PRN **OR** ondansetron (ZOFRAN) injection 4 mg, 4 mg, Intravenous, Q6H PRN  polyethylene glycol (GLYCOLAX) packet 17 g, 17 g, Oral, Daily PRN  acetaminophen (TYLENOL) tablet 650 mg, 650 mg, Oral, Q6H PRN **OR** acetaminophen (TYLENOL) suppository 650 mg, 650 mg, Rectal, Q6H PRN  famotidine (PEPCID) injection 20 mg, 20 mg, Intravenous, BID  fentaNYL (SUBLIMAZE) injection 50 mcg, 50 mcg, Intravenous, Once  metoprolol (LOPRESSOR) injection 5 mg, 5 mg, Intravenous, Q6H PRN  fentaNYL (SUBLIMAZE) injection 25 mcg, 25 mcg, Intravenous, Q1H PRN    REVIEW OF SYSTEMS:       CONSTITUTIONAL: negative for fatigue and malaise   RESPIRATORY: negative for cough, positive for shortness of breath CARDIOVASCULAR: negative for chest pain, palpitations   GASTROINTESTINAL: negative for nausea, vomiting   GENITOURINARY: negative for incontinence   MUSCULOSKELETAL: positive for left paraspinal neck pain, negative for back pain     Review of systems otherwise negative.     PHYSICAL EXAM:       BP 96/66   Pulse 68   Temp 97.6 °F (36.4 °C) (Axillary)   Resp 23   Ht 6' (1.829 m)   Wt 172 lb (78 kg)   SpO2 94%   BMI 23.33 kg/m²       CONSTITUTIONAL: no apparent distress, appears stated age   NECK: supple, symmetric, no midline tenderness to palpation   NEUROLOGIC:  Mental Status:  Alert and oriented x3, follows all commands, speech clear           Motor Exam:    Tone:  normal    Motor exam is symmetrical 5 out of 5 all extremities bilaterally, limited left deltoid due to pain    Sensory:    Right Upper Extremity:  normal  Left Upper Extremity:  normal  Right Lower Extremity:  normal  Left Lower Extremity:  normal    Deep Tendon Reflexes:    Right Bicep:  2+  Left Bicep:  2+  Right Knee:  trace  Left Knee:  4+    Plantar Response:    Right:  downgoing  Left:  downgoing    Clonus:  absent  Schmidt's:  absent       LABS AND IMAGING:     CBC with Differential:    Lab Results   Component Value Date    WBC 5.2 04/26/2021    RBC 4.68 04/26/2021    HGB 12.0 04/26/2021    HCT 39.5 04/26/2021     04/26/2021    MCV 84.4 04/26/2021    MCH 25.6 04/26/2021    MCHC 30.4 04/26/2021    RDW 14.6 04/26/2021    LYMPHOPCT 15 04/26/2021    MONOPCT 3 04/26/2021    BASOPCT 0 04/26/2021    MONOSABS 0.17 04/26/2021    LYMPHSABS 0.78 04/26/2021    EOSABS <0.03 04/26/2021    BASOSABS <0.03 04/26/2021    DIFFTYPE NOT REPORTED 04/26/2021     BMP:    Lab Results   Component Value Date     04/26/2021    K 4.4 04/26/2021     04/26/2021    CO2 29 04/26/2021    BUN 13 04/26/2021    LABALBU 3.9 04/24/2021    CREATININE 0.53 04/26/2021    CALCIUM 8.8 04/26/2021    GFRAA >60 04/26/2021    LABGLOM >60 04/26/2021    GLUCOSE 122 04/26/2021       Radiology Review:    Mri Cervical Spine Wo Contrast    Result Date: 4/26/2021  EXAMINATION: MRI OF THE CERVICAL SPINE WITHOUT CONTRAST 4/26/2021 12:44 pm TECHNIQUE: Multiplanar multisequence MRI of the cervical spine was performed without the administration of intravenous contrast. COMPARISON: None. HISTORY: ORDERING SYSTEM PROVIDED HISTORY: cervical radiculopathy left sded shoulder pain TECHNOLOGIST PROVIDED HISTORY: cervical radiculopathy left sded shoulder pain Initial evaluation. FINDINGS: BONES/ALIGNMENT: There appears to be partial ankylosis of the C5 and C6 vertebral bodies. The vertebral body heights appear maintained. There is minimal retrolisthesis at C5-C6 and C6-C7. There is loss of disc space height with endplate irregularity as well as Modic type degenerative endplate changes at H2-M5. There appears to be a small likely atypical intraosseous hemangioma within the C6 vertebral body. The bone marrow signal otherwise demonstrates no acute abnormality. SPINAL CORD: No abnormal cord signal is seen. SOFT TISSUES: No paraspinal mass identified. C2-C3: There is a posterior disc osteophyte complex with buckling of the ligamentum flavum. No significant spinal canal stenosis. Uncovertebral joint and facet arthrosis contributes to severe right and moderate left neural foraminal narrowing. C3-C4: There is a disc bulge contributing to minimal spinal canal stenosis. Uncovertebral joint and facet arthrosis contribute to severe bilateral neural foraminal narrowing. C4-C5: No significant spinal canal stenosis. Uncovertebral joint and facet arthrosis contributes to minimal right and moderate to severe left neural foraminal narrowing. C5-C6: There is a posterior osteophyte contributing to mild-to-moderate spinal canal stenosis. Uncovertebral joint and facet arthrosis contribute to severe bilateral neural foraminal narrowing.  C6-C7: There is a posterior disc osteophyte complex contributing to mild

## 2021-04-27 VITALS
TEMPERATURE: 98 F | HEART RATE: 55 BPM | DIASTOLIC BLOOD PRESSURE: 72 MMHG | HEIGHT: 72 IN | WEIGHT: 172 LBS | OXYGEN SATURATION: 100 % | BODY MASS INDEX: 23.3 KG/M2 | RESPIRATION RATE: 14 BRPM | SYSTOLIC BLOOD PRESSURE: 130 MMHG

## 2021-04-27 LAB
ABSOLUTE EOS #: <0.03 K/UL (ref 0–0.44)
ABSOLUTE IMMATURE GRANULOCYTE: 0.04 K/UL (ref 0–0.3)
ABSOLUTE LYMPH #: 1.02 K/UL (ref 1.1–3.7)
ABSOLUTE MONO #: 0.49 K/UL (ref 0.1–1.2)
ANION GAP SERPL CALCULATED.3IONS-SCNC: 6 MMOL/L (ref 9–17)
BASOPHILS # BLD: 0 % (ref 0–2)
BASOPHILS ABSOLUTE: <0.03 K/UL (ref 0–0.2)
BUN BLDV-MCNC: 18 MG/DL (ref 8–23)
BUN/CREAT BLD: ABNORMAL (ref 9–20)
CALCIUM SERPL-MCNC: 8.7 MG/DL (ref 8.6–10.4)
CHLORIDE BLD-SCNC: 102 MMOL/L (ref 98–107)
CO2: 28 MMOL/L (ref 20–31)
CREAT SERPL-MCNC: 0.56 MG/DL (ref 0.7–1.2)
DIFFERENTIAL TYPE: ABNORMAL
EOSINOPHILS RELATIVE PERCENT: 0 % (ref 1–4)
GFR AFRICAN AMERICAN: >60 ML/MIN
GFR NON-AFRICAN AMERICAN: >60 ML/MIN
GFR SERPL CREATININE-BSD FRML MDRD: ABNORMAL ML/MIN/{1.73_M2}
GFR SERPL CREATININE-BSD FRML MDRD: ABNORMAL ML/MIN/{1.73_M2}
GLUCOSE BLD-MCNC: 103 MG/DL (ref 70–99)
HCT VFR BLD CALC: 37.8 % (ref 40.7–50.3)
HEMOGLOBIN: 11.5 G/DL (ref 13–17)
IMMATURE GRANULOCYTES: 0 %
LYMPHOCYTES # BLD: 11 % (ref 24–43)
MCH RBC QN AUTO: 25.8 PG (ref 25.2–33.5)
MCHC RBC AUTO-ENTMCNC: 30.4 G/DL (ref 28.4–34.8)
MCV RBC AUTO: 84.8 FL (ref 82.6–102.9)
MONOCYTES # BLD: 5 % (ref 3–12)
NRBC AUTOMATED: 0 PER 100 WBC
PDW BLD-RTO: 14.8 % (ref 11.8–14.4)
PLATELET # BLD: 310 K/UL (ref 138–453)
PLATELET ESTIMATE: ABNORMAL
PMV BLD AUTO: 9.5 FL (ref 8.1–13.5)
POTASSIUM SERPL-SCNC: 4.4 MMOL/L (ref 3.7–5.3)
RBC # BLD: 4.46 M/UL (ref 4.21–5.77)
RBC # BLD: ABNORMAL 10*6/UL
SEG NEUTROPHILS: 84 % (ref 36–65)
SEGMENTED NEUTROPHILS ABSOLUTE COUNT: 8.01 K/UL (ref 1.5–8.1)
SODIUM BLD-SCNC: 136 MMOL/L (ref 135–144)
WBC # BLD: 9.6 K/UL (ref 3.5–11.3)
WBC # BLD: ABNORMAL 10*3/UL

## 2021-04-27 PROCEDURE — 85025 COMPLETE CBC W/AUTO DIFF WBC: CPT

## 2021-04-27 PROCEDURE — 6370000000 HC RX 637 (ALT 250 FOR IP): Performed by: STUDENT IN AN ORGANIZED HEALTH CARE EDUCATION/TRAINING PROGRAM

## 2021-04-27 PROCEDURE — 2700000000 HC OXYGEN THERAPY PER DAY

## 2021-04-27 PROCEDURE — 6370000000 HC RX 637 (ALT 250 FOR IP): Performed by: NURSE PRACTITIONER

## 2021-04-27 PROCEDURE — 99232 SBSQ HOSP IP/OBS MODERATE 35: CPT | Performed by: INTERNAL MEDICINE

## 2021-04-27 PROCEDURE — 2580000003 HC RX 258: Performed by: STUDENT IN AN ORGANIZED HEALTH CARE EDUCATION/TRAINING PROGRAM

## 2021-04-27 PROCEDURE — 6360000002 HC RX W HCPCS: Performed by: INTERNAL MEDICINE

## 2021-04-27 PROCEDURE — 36415 COLL VENOUS BLD VENIPUNCTURE: CPT

## 2021-04-27 PROCEDURE — 80048 BASIC METABOLIC PNL TOTAL CA: CPT

## 2021-04-27 PROCEDURE — 2500000003 HC RX 250 WO HCPCS: Performed by: STUDENT IN AN ORGANIZED HEALTH CARE EDUCATION/TRAINING PROGRAM

## 2021-04-27 RX ORDER — CARVEDILOL 3.12 MG/1
3.12 TABLET ORAL 2 TIMES DAILY WITH MEALS
Qty: 60 TABLET | Refills: 3 | Status: SHIPPED | OUTPATIENT
Start: 2021-04-27 | End: 2022-02-18

## 2021-04-27 RX ORDER — ASPIRIN 81 MG/1
81 TABLET, CHEWABLE ORAL DAILY
Qty: 30 TABLET | Refills: 3 | Status: SHIPPED | OUTPATIENT
Start: 2021-04-28 | End: 2022-02-18

## 2021-04-27 RX ORDER — CARVEDILOL 3.12 MG/1
3.12 TABLET ORAL 2 TIMES DAILY WITH MEALS
Qty: 60 TABLET | Refills: 3 | OUTPATIENT
Start: 2021-04-27

## 2021-04-27 RX ADMIN — TAMSULOSIN HYDROCHLORIDE 0.4 MG: 0.4 CAPSULE ORAL at 08:37

## 2021-04-27 RX ADMIN — METHYLPREDNISOLONE 4 MG: 4 TABLET ORAL at 06:02

## 2021-04-27 RX ADMIN — ASPIRIN 81 MG: 81 TABLET, CHEWABLE ORAL at 08:36

## 2021-04-27 RX ADMIN — ATORVASTATIN CALCIUM 40 MG: 80 TABLET, FILM COATED ORAL at 08:37

## 2021-04-27 RX ADMIN — BACLOFEN 5 MG: 10 TABLET ORAL at 08:36

## 2021-04-27 RX ADMIN — APIXABAN 5 MG: 5 TABLET, FILM COATED ORAL at 08:36

## 2021-04-27 RX ADMIN — AMLODIPINE BESYLATE 5 MG: 5 TABLET ORAL at 08:36

## 2021-04-27 RX ADMIN — FAMOTIDINE 20 MG: 10 INJECTION INTRAVENOUS at 08:36

## 2021-04-27 RX ADMIN — OXYCODONE HYDROCHLORIDE AND ACETAMINOPHEN 1 TABLET: 5; 325 TABLET ORAL at 00:40

## 2021-04-27 RX ADMIN — LISINOPRIL 10 MG: 10 TABLET ORAL at 08:36

## 2021-04-27 RX ADMIN — SODIUM CHLORIDE, PRESERVATIVE FREE 10 ML: 5 INJECTION INTRAVENOUS at 08:37

## 2021-04-27 ASSESSMENT — PAIN SCALES - GENERAL: PAINLEVEL_OUTOF10: 0

## 2021-04-27 NOTE — FLOWSHEET NOTE
Assessment: The patient was calm and approachable. Intervention:  engaged in active listening.  asked if they would like prayer and informed them chaplains are available 24/7. Outcome: They engaged in the conversation. Chaplains will remain available to offer spiritual and emotional support as needed. 04/27/21 0854   Encounter Summary   Services provided to: Patient   Referral/Consult From: [facility] Family members   Continue Visiting   (04/27/21)   Complexity of Encounter Moderate   Length of Encounter 15 minutes   Spiritual Assessment Completed Yes   Routine   Type Initial   Assessment Calm; Approachable   Intervention Active listening   Outcome Engaged in conversation

## 2021-04-27 NOTE — PLAN OF CARE
4/25/21  0742 notified internal med resident the following via perfect serve: Patient rates patient 8. Discribes and an constant nagging, tight, throbbing, spasm like pain that radiates to his chest. He states it feels tight. Is it possible that patient can have a muscle relaxor ordered? Also, K this morning is 3.6. See orders. Later  Resident notified of patient having pain management issues. See orders. [id number] - Notified Dr. Angela Kam the following via perfect serve:EKG completed. Also Troponin today 35. See orders    1235pm notified Dr. Angela Kam of the following via perfect serve: FYI Patient's HR has decreased to 48-49 BPM several times but then returned to around 52-56 BPM. Patient denies CP, SOB, lighthedness, dizziness at time. L. shoulder pain still present. Other VS are as follows: SpO2 94 (5L n.c.), RR 20 /80 MAP 98 Temp 97.4. Message read.
Patient examined and images reviewed by Dr Nish Guevara  Left arm pain and numbness likely related to C5-6 spinal cord stenosis.   Recommend OP PT  Follow up in 4-6 weeks in neurosurgery clinic
Problem: Falls - Risk of:  Goal: Will remain free from falls  Description: Will remain free from falls  4/27/2021 0349 by Whitley Nicole RN  Outcome: Ongoing  4/26/2021 1448 by Clara Cheema RN  Outcome: Ongoing  Goal: Absence of physical injury  Description: Absence of physical injury  4/27/2021 0349 by Whitley Nicole RN  Outcome: Ongoing  4/26/2021 1448 by Clara Cheema RN  Outcome: Ongoing     Problem: Pain:  Goal: Pain level will decrease  Description: Pain level will decrease  4/27/2021 0349 by Whitley Nicole RN  Outcome: Ongoing  4/26/2021 1448 by Clara Cheema RN  Outcome: Ongoing  Goal: Control of acute pain  Description: Control of acute pain  4/27/2021 0349 by Whitley Nicole RN  Outcome: Ongoing  4/26/2021 1448 by Clara Cheema RN  Outcome: Ongoing  Goal: Control of chronic pain  Description: Control of chronic pain  4/27/2021 0349 by Whitley Nicole RN  Outcome: Ongoing  4/26/2021 1448 by Clara Cheema RN  Outcome: Ongoing     Problem: Gas Exchange - Impaired:  Goal: Levels of oxygenation will improve  Description: Levels of oxygenation will improve  Outcome: Ongoing     Problem: Musculor/Skeletal Functional Status  Goal: Highest potential functional level  Outcome: Ongoing
Problem: Falls - Risk of:  Goal: Will remain free from falls  Description: Will remain free from falls  Outcome: Ongoing     Problem: Falls - Risk of:  Goal: Absence of physical injury  Description: Absence of physical injury  Outcome: Ongoing     Problem: Pain:  Goal: Pain level will decrease  Description: Pain level will decrease  Outcome: Ongoing     Problem: Pain:  Goal: Control of acute pain  Description: Control of acute pain  Outcome: Ongoing     Problem: Pain:  Goal: Control of chronic pain  Description: Control of chronic pain  Outcome: Ongoing   Electronically signed by Simin Robison RN on 4/26/2021 at 2:48 PM
monitor and treat.      RR 16  Breath Sounds: clear      Bronchodilator assessment at level  1  Hyperinflation assessment at level   Secretion Management assessment at level      [x]    Bronchodilator Assessment  BRONCHODILATOR ASSESSMENT SCORE  Score 0 1 2 3 4 5   Breath Sounds   []  Patient Baseline [x]  No Wheeze good aeration []  Faint, scattered wheezing, good aeration []  Expiratory Wheezing and or moderately diminished []  Insp/Exp wheeze and/or very diminished []  Insp/Exp and/ or marked distress   Respiratory Rate   []  Patient Baseline [x]  Less than 20 [x]  Less than 20 []  20-25 []  Greater than 25 []  Greater than 25   Peak flow % of Pred or PB [x]  NA   []  Greater than 90%  []  81-90% []  71-80% []  Less than or equal to 70%  or unable to perform []  Unable due to Respiratory Distress   Dyspnea re [x]  Patient Baseline []  No SOB []  No SOB []  SOB on exertion []  SOB min activity []  At rest/acute   e FEV% Predicted       [x]  NA []  Above 69%  []  Unable []  Above 60-69%  []  Unable []  Above 50-59%  []  Unable []  Above 35-49%  []  Unable []  Less than 35%  []  Unable              GIBRAN RODRIGUEZ  11:13 AM       Intervention: Administer treatments as ordered  Note: BRONCHOSPASM/BRONCHOCONSTRICTION     [x]         IMPROVE AERATION/BREATH SOUNDS  [x]   ADMINISTER BRONCHODILATOR THERAPY AS APPROPRIATE  [x]   ASSESS BREATH SOUNDS  [x]   IMPLEMENT AEROSOL/MDI PROTOCOL  [x]   PATIENT EDUCATION AS NEEDED

## 2021-04-27 NOTE — PROGRESS NOTES
[facility]  Internal Medicine Teaching Residency Program  Inpatient Daily Progress Note  ______________________________________________________________________________    Patient: Nithya Montilla  YOB: 1955   LOAN:[id number]    Acct: [de-identified]     Room: 69/7353-  Admit date: 4/24/2021  Today's date: 04/27/21  Number of days in the hospital: 3    SUBJECTIVE   Admitting Diagnosis: Cervical stenosis of spinal canal  CC:   Pt examined at bedside. Chart & results reviewed. No acute event overnight,  Blood pressure 127/71 , heart rate 55, respiratory rate 17  afebrile on 6 l nc   Coreg 6.25 changed to 3.125 bid , amlodipine 5 mg . lisinopril 10 mg      ROS:  Constitutional:  negative for chills, fevers, sweats  Respiratory:  negative for cough, dyspnea on exertion, hemoptysis, shortness of breath, wheezing  Cardiovascular:  Positive for chest pain,  Shoulder pain  Gastrointestinal:  negative for abdominal pain, constipation, diarrhea, nausea, vomiting  Neurological:  negative for dizziness, headache  BRIEF HISTORY     The patient is a pleasant 77 y.o. male with past medical history of A. fib on Eliquis and digoxin, hypertension,  CHF, PE, pulmonary fibrosis ,covid pneumonia dec/2020  , BPH     At [facility] : patient initially presented to [facility] with blood pressure of 220/180s with chest pain. Patient was given morphine aspirin sublingual nitroglycerin. EKG was showing normal sinus rhythm with ST depression in lead V2 V3. During EKG patient became diaphoretic , chest pain gotten worse and BP was noted to be 90/60 and heart rate in 40s.  1 mg atropine was given with heart rate improved to 80s and blood pressure 130 over 90s. After atropine telemetry was showing ectopic beats. Repeat EKG was done but the quality was poor because patient was diaphoretic and there was concern for ST elevation in 2 3 aVF.   Patient was [facility] after discussing with Dr. Bard Morton ED:  Patient complaining of left shoulder pain that has been there for the last 2 months but worsened from the yesterday and was radiating to back and was also having left-sided chest pain. Initially concern for aortic dissection with RCA involvement. However CTA was negative and EKG was not showing any clear evidence of STEMI. Bradycardia hypotension could have been because of mitral and aortic stenosis with preload reduction     Patient initially was on Cardene drip but got tachycardic so was placed on Cardizem infusion. Heparin was started as CTA was negative     On my assessment patient was hypertensive in 457R systolic. Tachypneic with 21-26,. Tropes 27-28, proBNP 1190     Of note patient was on diuretics 2 months ago and had a syncopal fall and struck left shoulder with abrasion. And has been having left shoulder pain on and off. Patient has difficulty ambulating left shoulder which gotten worse in the last 1 day.                OBJECTIVE     Vital Signs:  /71   Pulse 55   Temp 97.7 °F (36.5 °C) (Axillary)   Resp 18   Ht 6' (1.829 m)   Wt 172 lb (78 kg)   SpO2 98%   BMI 23.33 kg/m²     Temp (24hrs), Av.5 °F (36.4 °C), Min:97.3 °F (36.3 °C), Max:97.7 °F (36.5 °C)    In: 760   Out: 1000 [Urine:1000]    Physical Exam:  Constitutional: This is a well developed, well nourished, 18.5-24.9 - Normal 77y.o. year old male who is alert, oriented, cooperative and in no apparent distress. Head:normocephalic and atraumatic. EENT:  PERRLA. No conjunctival injections. Septum was midline, mucosa was without erythema, exudates or cobblestoning. No thrush was noted. Neck: Supple without thyromegaly. No elevated JVP. Trachea was midline. Respiratory: Chest was symmetrical without dullness to percussion. Breath sounds bilaterally were clear to auscultation. There were no wheezes, rhonchi or rales.  There is no intercostal retraction or use of accessory muscles. No egophony noted. Cardiovascular: Regular without murmur, clicks, gallops or rubs. Abdomen: Slightly rounded and soft without organomegaly. No rebound, rigidity or guarding was appreciated. Lymphatic: No lymphadenopathy. Musculoskeletal: Normal curvature of the spine. No gross muscle weakness. Extremities: left upper extremitity decrease mobility . Skin:  Warm and dry. Good color, turgor and pigmentation. No lesions or scars.   No cyanosis or clubbing  Neurological/Psychiatric: The patient's general behavior, level of consciousness, thought content and emotional status is normal.        Medications:  Scheduled Medications:    carvedilol  3.125 mg Oral BID WC    apixaban  5 mg Oral BID    aspirin  81 mg Oral Daily    baclofen  5 mg Oral TID    lisinopril  10 mg Oral Daily    methylPREDNISolone  4 mg Oral QAM AC    methylPREDNISolone  4 mg Oral Lunch    methylPREDNISolone  4 mg Oral Dinner    methylPREDNISolone  4 mg Oral Nightly    amLODIPine  5 mg Oral Daily    atorvastatin  40 mg Oral Daily    [Held by provider] digoxin  125 mcg Oral Daily    tamsulosin  0.4 mg Oral Daily    sodium chloride flush  5-40 mL Intravenous 2 times per day    famotidine (PEPCID) injection  20 mg Intravenous BID    fentanNYL  50 mcg Intravenous Once     Continuous Infusions:    sodium chloride       PRN Medicationsalbuterol sulfate HFA, 2 puff, Q6H PRN  oxyCODONE-acetaminophen, 1 tablet, Q6H PRN  hydrALAZINE, 10 mg, Q4H PRN  sodium chloride flush, 5-40 mL, PRN  sodium chloride, 25 mL, PRN  promethazine, 12.5 mg, Q6H PRN    Or  ondansetron, 4 mg, Q6H PRN  polyethylene glycol, 17 g, Daily PRN  acetaminophen, 650 mg, Q6H PRN    Or  acetaminophen, 650 mg, Q6H PRN  metoprolol, 5 mg, Q6H PRN  fentanNYL, 25 mcg, Q1H PRN        Diagnostic Labs:  CBC:   Recent Labs     04/25/21  0554 04/26/21  0523 04/27/21  0616   WBC 6.9 5.2 9.6   RBC 4.31 4.68 4.46   HGB 11.1* 12.0* 11.5* HCT 37.4* 39.5* 37.8*   MCV 86.8 84.4 84.8   RDW 14.7* 14.6* 14.8*    310 310     BMP:   Recent Labs     04/25/21  0554 04/26/21  0523 04/27/21  0616    136 136   K 3.6* 4.4 4.4    101 102   CO2 30 29 28   BUN 10 13 18   CREATININE 0.54* 0.53* 0.56*     BNP: No results for input(s): BNP in the last 72 hours. PT/INR:   Recent Labs     04/24/21  1241   PROTIME 20.3*   INR 1.7     APTT:   Recent Labs     04/24/21  1500 04/24/21  2337 04/25/21  0554   APTT >120.0* 28.3 35.5*     CARDIAC ENZYMES: No results for input(s): CKMB, CKMBINDEX, TROPONINI in the last 72 hours. Invalid input(s): CKTOTAL;3  FASTING LIPID PANEL:  Lab Results   Component Value Date    CHOL 200 (H) 01/03/2017    HDL 36 (L) 03/10/2020    TRIG 165 (H) 01/03/2017     LIVER PROFILE:   Recent Labs     04/24/21  1241   AST 20   ALT 16   BILITOT 0.56   ALKPHOS 120      MICROBIOLOGY:   Lab Results   Component Value Date/Time    CULTURE NO GROWTH 6 DAYS 12/07/2020 11:25 PM       Imaging:    Cta Chest W Wo Contrast    Result Date: 4/24/2021  No evidence of thoracic aortic dissection or aneurysm. Redemonstration of peripheral fibrosis within the lungs, with a usual interstitial pneumonitis pattern. There is continued nodularity along the periphery of the lungs which is similar when compared to the previous exam.  Continued surveillance of that is recommended, with a repeat chest CT in approximately 6 months. Xr Shoulder Left (min 2 Views)    Result Date: 4/24/2021  Degenerative changes involving the left AC and glenohumeral joints without acute bony process. Xr Chest Portable    Result Date: 4/24/2021  Stable appearing ground-glass opacities and pulmonary fibrosis. ASSESSMENT & PLAN     ASSESSMENT / PLAN:      Hypertensive emergency:     bp below 160 . Will start home dose of lisinopril 5mg and monitor  CTA ruled out aortic dissection as patient had hypertension and then episode of bradycardia with 90 systolic .   Coreg 6.25 changed to 3.125 bid , amlodipine 5 mg . lisinopril 10 mg         NSTEMI:  Trop trending in 27-28,  Trend Trop for 1 more reading  Likely type II however because of increased blood pressure, will appreciate cardiology recommendation regarding any intervention. N.p.o. after midnight  Cardiology consulted        Left-sided shoulder pain:   History of fall 2 months ago, pain while ambulating, worsened in the last 1 day   shoulder xray degenerative changes involving left AC  and glenohumeral joint  without acute bony process  Outpatient ortho followup              Paroxysmal atrial fibrillation  Digoxin with hold because of high blood pressure and patient going into bradycardia  Currently on coreg 6.25           Moderate aortic stenosis on echo January 13, 2021, left ventricular ejection fraction 55%   Non-obstructive CAD on cath 2018        Benign prostatic hyperplasia:  Tamsulosin 0.4 mg     Shortness of breath  likely due to pulmonary fibrosis on oxygen  Spirometry done at [facility] shows lung diffusing capacity suggesting parenchymal or pulmonary vascular disorder  -Lung transplant  . Per per patient awaiting call from [facility] for work-up          Left arm pain and numbness likely due to c5 -c6 stenosis . follow up with neurosurgery in 4-6 week      DVT ppx:hepairn   GI ppx:  None      PT/OT/SW on board  Discharge Planning: on board     Koko Renner MD  Internal Medicine Resident, PGY-1  [address], [place]  4/27/2021, 11:35 AM      Attestation and add on       I have discussed the care of Robin Valladares , including pertinent history and exam findings,      4/27/21    with the resident. I have seen and examined the patient and the key elements of all parts of the encounter have been performed by me .    I agree with the assessment, plan and orders as documented by the resident.     ---- ;     [address]  Luite [address], [address] [place] [address].    Phone [phone]   Fax: [phone]  Answering Service: [phone]

## 2021-04-27 NOTE — PROGRESS NOTES
Pt was discharged home independently. Pt signed all appropriate documentation and all questions were answered. Pt had medications delivered to bedside and all appropriate information was gone over. All LDA's were removed and pt left unit with oxygen and family member without complication.    Electronically signed by Simin Robison RN on 4/27/2021 at 2:14 PM

## 2021-04-27 NOTE — PROGRESS NOTES
[facility]  Progress Note                   Date:   4/27/2021  Patient name: Kelly Laura  Date of admission:  4/24/2021  2:33 PM  MRN:   [id number]  YOB: 1955  PCP: Angela Jack MD    Reason for Admission: Hypertensive emergency [I16.1]    Subjective:       Clinical Changes /Abnormalities:  Patient was seen and examined in bed in room after discussion with RN. SR/SB on tele. HR 54-62 during exam.  Denies chest pain, SOB, or dizziness. Review of Systems    Medications:   Scheduled Meds:   carvedilol  3.125 mg Oral BID WC    apixaban  5 mg Oral BID    aspirin  81 mg Oral Daily    baclofen  5 mg Oral TID    lisinopril  10 mg Oral Daily    methylPREDNISolone  4 mg Oral QAM AC    methylPREDNISolone  4 mg Oral Lunch    methylPREDNISolone  4 mg Oral Dinner    methylPREDNISolone  4 mg Oral Nightly    amLODIPine  5 mg Oral Daily    atorvastatin  40 mg Oral Daily    [Held by provider] digoxin  125 mcg Oral Daily    tamsulosin  0.4 mg Oral Daily    sodium chloride flush  5-40 mL Intravenous 2 times per day    famotidine (PEPCID) injection  20 mg Intravenous BID    fentanNYL  50 mcg Intravenous Once     Continuous Infusions:   sodium chloride       CBC:   Recent Labs     04/25/21  0554 04/26/21  0523 04/27/21  0616   WBC 6.9 5.2 9.6   HGB 11.1* 12.0* 11.5*    310 310     BMP:    Recent Labs     04/25/21  0554 04/26/21  0523 04/27/21  0616    136 136   K 3.6* 4.4 4.4    101 102   CO2 30 29 28   BUN 10 13 18   CREATININE 0.54* 0.53* 0.56*   GLUCOSE 101* 122* 103*     Hepatic:  Recent Labs     04/24/21  1241   AST 20   ALT 16   BILITOT 0.56   ALKPHOS 120     Troponin:   Recent Labs     04/24/21  1241 04/24/21  1500 04/25/21  0554   TROPHS 27* 28* 35*     BNP: No results for input(s): BNP in the last 72 hours. Lipids: No results for input(s): CHOL, HDL in the last 72 hours.     Invalid input(s): LDLCALCU  INR:   Recent Labs     04/24/21  1241

## 2021-04-27 NOTE — PROGRESS NOTES
CLINICAL PHARMACY NOTE: MEDS TO [address] [facility] Patient?: No  Total # of Prescriptions Filled: 6   The following medications were delivered to the patient:  · Aspirin 81 mg chew tab  · Carvedilol 3.125 mg tab  · Lisinopril 10 mg tab  · Amlodipine 5mg tab  · Baclofen 10 mg tab  · methyprednisolone 4 mg tab dose pack  Total # of Interventions Completed: 1  Time Spent (min): 60    Additional Documentation:Medications delivered to the patient in room 409. Instructed the patient to follow directions on the bottles (1/2 tab)  on baclofen we gave 10 mg tab.

## 2021-04-28 ENCOUNTER — HOSPITAL ENCOUNTER (OUTPATIENT)
Dept: PULMONOLOGY | Age: 66
Setting detail: THERAPIES SERIES
Discharge: HOME | End: 2021-04-28
Payer: MEDICARE

## 2021-04-28 LAB
EKG ATRIAL RATE: 90 BPM
EKG P AXIS: 55 DEGREES
EKG P-R INTERVAL: 194 MS
EKG Q-T INTERVAL: 562 MS
EKG QRS DURATION: 86 MS
EKG QTC CALCULATION (BAZETT): 687 MS
EKG R AXIS: -25 DEGREES
EKG T AXIS: -12 DEGREES
EKG VENTRICULAR RATE: 90 BPM

## 2021-04-28 PROCEDURE — 93010 ELECTROCARDIOGRAM REPORT: CPT | Performed by: INTERNAL MEDICINE

## 2021-04-28 NOTE — PROGRESS NOTES
Spoke with Petar Linder about recent admission , he indicated he was not restricted on activity , plans on attending rehab on Monday. We will modify his workout as needed.

## 2021-04-28 NOTE — DISCHARGE SUMMARY
[address]     Department of Internal Medicine - Staff Internal Medicine Teaching Service    INPATIENT DISCHARGE SUMMARY      Patient Identification:  Amirah Ramirez is a 77 y.o. male. :  1955  MRN: [id number]     Acct: [de-identified]   PCP: Denny Ch MD  Admit Date:  2021  Discharge date and time: 2021  2:15 PM   Attending Provider: No att. providers found                                     [facility] Problem Lists:  Principal Problem:    Cervical stenosis of spinal canal  Active Problems:    Benign prostatic hyperplasia with urinary obstruction    PAF (paroxysmal atrial fibrillation) (HCC)    Anemia of chronic disease    Primary osteoarthritis of right knee    Sinus bradycardia    Pulmonary fibrosis (HCC)    Impingement syndrome of left shoulder  Resolved Problems:    Hypertensive emergency      HOSPITAL STAY     Brief Inpatient course:    Amirah Ramirez is a 77 y.o. male  with past medical history of A. fib on Eliquis and digoxin, hypertension,  CHF, PE, pulmonary fibrosis ,covid pneumonia dec/2020  , BPH     At [facility] : patient initially presented to [facility] with blood pressure of 220/180s with chest pain.  Patient was given morphine aspirin sublingual nitroglycerin.  EKG was showing normal sinus rhythm with ST depression in lead V2 V3.  During EKG patient became diaphoretic , chest pain gotten worse and BP was noted to be 90/60 and heart rate in 40s.  1 mg atropine was given with heart rate improved to 80s and blood pressure 130 over 90s.  After atropine telemetry was showing ectopic beats.  Repeat EKG was done but the quality was poor because patient was diaphoretic and there was concern for ST elevation in 2 3 aVF.  Patient was transferred to [facility] ED after discussing with Dr. Swift Officer ED:  Patient complaining of left shoulder pain that has been there for the last 2 months but worsened from the yesterday and was radiating to back and was also having left-sided chest pain. Initially concern for aortic dissection with RCA involvement.  However CTA was negative and EKG was not showing any clear evidence of STEMI.  Bradycardia hypotension could have been because of mitral and aortic stenosis with preload reduction     Patient initially was on Cardene drip but got tachycardic so was placed on Cardizem infusion. Heparin was started as CTA was negative     On my assessment patient was hypertensive in 461P systolic.  Tachypneic with 21-26,.  Tropes 27-28, proBNP 1190     Of note patient was on diuretics 2 months ago and had a syncopal fall and struck left shoulder with abrasion.  And has been having left shoulder pain on and off.  Patient has difficulty ambulating left shoulder which gotten worse in the last 1 day.         patient htn emerency resolved . Patient Coreg 6.25 changed to 3.125 bid , amlodipine 5 mg . lisinopril 10 mg     Patient shoulder involved  xray degenerative changes involving left AC  and glenohumeral joint. Outpatient follow up was set up .     Left arm pain and numbness likely due to c5 -c6 stenosis .   follow up with neurosurgery in 4-6 week       Procedures/ Significant Interventions:    None   Consults:     Consults:     Final Specialist Recommendations/Findings:   IP CONSULT TO INTERNAL MEDICINE  IP CONSULT TO CARDIOLOGY  IP CONSULT TO NEUROSURGERY      Any Hospital Acquired Infections: none    Discharge Functional Status:  stable    DISCHARGE PLAN     Disposition: home     Patient Instructions:   Discharge Medication List as of 4/27/2021  1:04 PM      START taking these medications    Details   lisinopril (PRINIVIL;ZESTRIL) 10 MG tablet Take 1 tablet by mouth daily, Disp-30 tablet, R-3Normal      amLODIPine (NORVASC) 5 MG tablet Take 1 tablet by mouth daily, Disp-30 tablet, R-3Normal      baclofen (LIORESAL) 5 MG tablet Take 1 tablet by mouth 2 times daily, Disp-30 tablet, R-2Normal         CONTINUE these medications which have CHANGED    Details   aspirin 81 MG chewable tablet Take 1 tablet by mouth daily, Disp-30 tablet, R-3Normal      carvedilol (COREG) 3.125 MG tablet Take 1 tablet by mouth 2 times daily (with meals), Disp-60 tablet, R-3Normal      !! methylPREDNISolone (MEDROL) 4 MG tablet Take 1 tablet by mouth every morning (before breakfast) for 6 days, Disp-6 tablet, R-0Normal      !! methylPREDNISolone (MEDROL) 4 MG tablet Take 1 tablet by mouth Daily with lunch for 3 days, Disp-3 tablet, R-0Normal      !! methylPREDNISolone (MEDROL) 4 MG tablet Take 1 tablet by mouth Daily with supper for 2 days, Disp-2 tablet, R-0Normal      !! methylPREDNISolone (MEDROL) 4 MG tablet Take 1 tablet by mouth nightly for 3 days, Disp-3 tablet, R-0Normal      !! methylPREDNISolone (MEDROL) 8 MG tablet Take 1 tablet by mouth nightly for 1 day, Disp-1 tablet, R-0Normal       !! - Potential duplicate medications found. Please discuss with provider.       CONTINUE these medications which have NOT CHANGED    Details   ibuprofen (ADVIL;MOTRIN) 800 MG tablet take 1 tablet by mouth three times a day AS NEEDED FOR PAIN, Disp-90 tablet, R-1Normal      apixaban (ELIQUIS) 5 MG TABS tablet Take 1 tablet by mouth 2 times daily, Disp-60 tablet, R-5Normal      Handicap Placard MISC Starting Tue 1/19/2021, Disp-1 each, R-0, PrintExpires 1/2026      digoxin (LANOXIN) 125 MCG tablet Take 1 tablet by mouth daily, Disp-30 tablet,R-0Normal      albuterol sulfate HFA (VENTOLIN HFA) 108 (90 Base) MCG/ACT inhaler Inhale 2 puffs into the lungs 4 times daily as needed for Wheezing, Disp-1 Inhaler,R-0Normal      atorvastatin (LIPITOR) 40 MG tablet Take 1 tablet by mouth daily, Disp-90 tablet,R-1Normal      omeprazole (PRILOSEC) 40 MG delayed release capsule Take 1 capsule by mouth daily, Disp-90 capsule,R-1Normal      tamsulosin (FLOMAX) 0.4 MG capsule Take 1 capsule by mouth daily, Disp-90 capsule,R-0Normal Activity: up as tolerated     Diet: cardiac diet   Follow-up:    Cassandra Saeed, [facility]  [phone]    In 4 weeks  Please follow up with neurosurgery in 4-6 weeks    [facility]  [facility]. [address]  On 5/11/2021  at 9 am f/u [address]     Opal Solorzano, [address]  [address]    In 2 weeks      Oliver SeDO ethel  [address]  [address]  [address]  [phone]    In 4 weeks        Patient Instructions: Follow up with pcp in a week . Follow up labs: none   Follow up imaging: none    Note that over 30 minutes was spent in preparing discharge papers, discussing discharge with patient, medication review, etc.      Julian Turner MD,   Internal Medicine Resident, PGY-1  [facility]; [place], [place]  4/28/2021, 11:15 AM  Attending Physician Statement  I have reviewed and edited the discharge summary of  Xander Arango AS NEEDED  ,   including pertinent history and exam findings. I have personally seen the patient and participated in discharge planning and evaluation . I have reviewed the key elements of all parts of the discharge summary . I agree with the information and plans as documented by the resident. Time spent on discharge planning ;          [] less than 30 minutes . [x]   more  than 30 minutes . Electronically signed by Tyler Vernon MD on 4/28/2021 .

## 2021-04-28 NOTE — PROGRESS NOTES
Pt left message on 4/27/21 that he has been discharged but will not be in today. Will call pt and remind him he will need a release to return to pulmonary rehab.

## 2021-05-03 ENCOUNTER — HOSPITAL ENCOUNTER (OUTPATIENT)
Dept: PULMONOLOGY | Age: 66
Setting detail: THERAPIES SERIES
Discharge: HOME | End: 2021-05-03
Payer: MEDICARE

## 2021-05-03 VITALS — WEIGHT: 167 LBS | BODY MASS INDEX: 22.65 KG/M2

## 2021-05-03 PROCEDURE — G0239 OTH RESP PROC, GROUP: HCPCS

## 2021-05-03 NOTE — PROGRESS NOTES
Trever Zamora arrived this am for his workout , again he stated he is able to workout with the exception of his arms specially his L arm due to \" pinched Nerve\" He states I know my drs' in [address]  want me to continue rehab as tolerated. We will  monitor and modify his work and increase as tolerated.

## 2021-05-05 ENCOUNTER — HOSPITAL ENCOUNTER (OUTPATIENT)
Dept: PULMONOLOGY | Age: 66
Setting detail: THERAPIES SERIES
Discharge: HOME | End: 2021-05-05
Payer: MEDICARE

## 2021-05-05 VITALS — WEIGHT: 167 LBS | BODY MASS INDEX: 22.65 KG/M2

## 2021-05-05 PROCEDURE — G0239 OTH RESP PROC, GROUP: HCPCS

## 2021-05-05 NOTE — PROGRESS NOTES
Pt stated his arm felt better after exercise on Monday. Reminded pt to stop any exercise that causes discomfort. We will continue to hold free weight exercises. He was able to complete all exercises without incident. Discussed starting to walk on the treadmill or in the arreola next week, pt was agreeable. We will continue to monitor his SpO2, modify exercises as needed, encourage PLB/DB/pacing and increase exercise time and intensity as tolerated.

## 2021-05-10 ENCOUNTER — HOSPITAL ENCOUNTER (OUTPATIENT)
Dept: PULMONOLOGY | Age: 66
Setting detail: THERAPIES SERIES
Discharge: HOME | End: 2021-05-10
Payer: MEDICARE

## 2021-05-12 ENCOUNTER — HOSPITAL ENCOUNTER (OUTPATIENT)
Dept: PULMONOLOGY | Age: 66
Setting detail: THERAPIES SERIES
Discharge: HOME | End: 2021-05-12
Payer: MEDICARE

## 2021-05-17 ENCOUNTER — HOSPITAL ENCOUNTER (OUTPATIENT)
Dept: PULMONOLOGY | Age: 66
Setting detail: THERAPIES SERIES
Discharge: HOME | End: 2021-05-17
Payer: MEDICARE

## 2021-05-17 VITALS — BODY MASS INDEX: 23.06 KG/M2 | WEIGHT: 170 LBS

## 2021-05-17 PROCEDURE — G0239 OTH RESP PROC, GROUP: HCPCS

## 2021-05-17 RX ORDER — TAMSULOSIN HYDROCHLORIDE 0.4 MG/1
CAPSULE ORAL
Qty: 90 CAPSULE | Refills: 0 | Status: SHIPPED | OUTPATIENT
Start: 2021-05-17 | End: 2021-08-13

## 2021-05-17 NOTE — TELEPHONE ENCOUNTER
Last visit: 02/22/2021  Last Med refill: 003/2021  Does patient have enough medication for 72 hours: Yes    Next Visit Date:  Future Appointments   Date Time Provider Yves Henriquez   5/19/2021 10:00 AM [facility] [address]   5/24/2021 10:00 AM ST[facility] [address]   5/24/2021  2:00 PM Cassandra Garcias MD [facility]   5/25/2021  1:00 PM MIRI Tony - CNP [facility]   5/26/2021 10:00 AM STC PULM REHAB RM 8 STCZ [facility]   5/31/2021 10:00 AM STC PULM REHAB RM 8 STCZ [facility]   6/2/2021 10:00 AM STC PULM REHAB RM 8 STCZ [facility]   6/7/2021 10:00 AM STC PULM REHAB RM 8 STCZ [facility]   6/9/2021 10:00 AM STC PULM REHAB RM 8 STCZ [facility]UL[facility]   6/14/2021 10:00 AM STC PULM REHAB RM 8 STCZ [facility]   6/16/2021 10:00 AM STC PULM REHAB RM 8 STCZ PUL[facility]   6/21/2021 10:00 AM STC PULM REHAB RM 8 STCZ [facility]UL[facility]   6/23/2021 10:00 AM STC PULM REHAB RM 8 STCZ [facility]   6/28/2021 10:00 AM STC PULM REHAB RM 8 STCZ [facility]   6/30/2021 10:00 AM STC PULM REHAB RM 8 STCZ [facility]UL[facility]   7/5/2021 10:00 AM STC PULM REHAB RM 8 STCZ [facility]UL[facility]   7/7/2021 10:00 AM STC PULM REHAB RM 8 STCZ PUL[facility]   7/12/2021 10:00 AM STC PULM REHAB RM 8 STCZ [facility]   7/14/2021 10:00 AM STC PULM REHAB RM 8 STCZ [facility]UL[facility]   7/19/2021 10:00 AM STC PULM REHAB RM 8 STCZ PULM [facility]   7/21/2021 10:00 AM STC PULM REHAB RM 8 STCZ PULM [facility]   7/26/2021 10:00 AM STC PULM REHAB RM 8 STCZ PULM [facility]   7/28/2021 10:00 AM STC PULM REHAB RM 8 STCZ PULM [facility]   8/2/2021 10:00 AM STC PULM REHAB RM 8 STCZ PULM [facility]   8/4/2021 10:00 AM STC PULM REHAB RM 8 STCZ PULM [facility]   8/9/2021 10:00 AM STC PULM REHAB RM 8 STCZ PULM [facility]   8/11/2021 10:00 AM STC PULM REHAB RM 8 STCZ PULM [facility]   8/16/2021 10:00 AM STC PULM REHAB RM 8 STCZ PULM [facility]   8/18/2021 10:00 AM STC PULM REHAB RM 8 STCZ [facility]   8/23/2021 10:00 AM STC PULM REHAB RM 8 STCZ [facility]   8/25/2021  9:00 AM ST PULM REHAB RM 8 STCZ [facility]UL[facility]   8/30/2021  9:00 AM ST PULM REHAB RM [facility] Maintenance   Topic Date Due    Shingles Vaccine (1 of 2) Never done    Pneumococcal 65+ years Vaccine (1 of 1 - PPSV23) Never done   [employer] Visit (AWV)  Never done    Colon cancer screen colonoscopy  12/06/2020    Lipid screen  03/10/2021    DTaP/Tdap/Td vaccine (1 - Tdap) 08/18/2021 (Originally 8/11/1974)    Potassium monitoring  04/27/2022    Creatinine monitoring  04/27/2022    Flu vaccine  Completed    COVID-19 Vaccine  Completed    AAA screen  Completed    Hepatitis C screen  Completed    Hepatitis A vaccine  Aged Out    Hepatitis B vaccine  Aged Out    Hib vaccine  Aged Out    Meningococcal (ACWY) vaccine  Aged Out       Hemoglobin A1C (%)   Date Value   01/10/2018 5.0             ( goal A1C is < 7)   No results found for: LABMICR  LDL Cholesterol (mg/dL)   Date Value   03/10/2020 82   10/01/2018 81       (goal LDL is <100)   AST (U/L)   Date Value   04/24/2021 20     ALT (U/L)   Date Value   04/24/2021 16     BUN (mg/dL)   Date Value   04/27/2021 18     BP Readings from Last 3 Encounters:   04/27/21 130/72   04/24/21 (!) 167/87   03/08/21 119/71          (goal 120/80)    All Future Testing planned in [facility]  Lab Frequency Next Occurrence   Cologuard (For External Results Only) Once 05/19/2021               Patient Active Problem List:     Dyslipidemia     ED (erectile dysfunction)     Incomplete bladder emptying     Benign prostatic hyperplasia with urinary obstruction     History of colon cancer     PAF (paroxysmal atrial fibrillation) (HCC)     Anemia of chronic disease     Pallor of optic disc     Presbyopia     Incisional hernia, without obstruction or gangrene     Hypertrophic nonobstructive cardiomyopathy (HCC)     Iron (Fe) deficiency anemia Primary osteoarthritis of right knee     History of malignant neoplasm of rectum     Hill's esophagus     CHF (congestive heart failure), NYHA class II, acute on chronic, combined (HCC)     Hypoxia     Dyspnea and respiratory abnormalities     Occupational pulmonary disease     Sinus bradycardia     Acute hypoxemic respiratory failure due to COVID-19 [facility])     COVID-19     Pneumonia     Acute respiratory failure with hypoxia (HCC)     Pulmonary fibrosis (HCC)     Syncope and collapse     Chronic anticoagulation     Severe malnutrition (HCC)     Cervical stenosis of spinal canal     Impingement syndrome of left shoulder

## 2021-05-17 NOTE — PROGRESS NOTES
Igor Murillo is tolerating his workout with pacing and PLB , he indicated today that his shoulders are better but no Free weights  again today.

## 2021-05-19 ENCOUNTER — HOSPITAL ENCOUNTER (OUTPATIENT)
Dept: PULMONOLOGY | Age: 66
Setting detail: THERAPIES SERIES
Discharge: HOME | End: 2021-05-19
Payer: MEDICARE

## 2021-05-19 VITALS — WEIGHT: 168 LBS | BODY MASS INDEX: 22.78 KG/M2

## 2021-05-19 PROCEDURE — G0239 OTH RESP PROC, GROUP: HCPCS

## 2021-05-19 NOTE — PROGRESS NOTES
Reggy Snellen has c/o shoulder pain today workout was modified , and was able to tolerated his workout well.

## 2021-05-20 ENCOUNTER — TELEPHONE (OUTPATIENT)
Dept: FAMILY MEDICINE CLINIC | Age: 66
End: 2021-05-20

## 2021-05-20 ENCOUNTER — TELEPHONE (OUTPATIENT)
Dept: PULMONOLOGY | Age: 66
End: 2021-05-20

## 2021-05-20 DIAGNOSIS — J96.11 CHRONIC RESPIRATORY FAILURE WITH HYPOXIA (HCC): Primary | ICD-10-CM

## 2021-05-20 NOTE — TELEPHONE ENCOUNTER
Spoke with Siobhan Mendoza and [facility] [employer]. So his insurance is fine through [place]. Working with Pierre Turner to try and see what we can do to get him smaller tanks. She doesn't think [facility] covers POCs. She will call me back.

## 2021-05-24 ENCOUNTER — HOSPITAL ENCOUNTER (OUTPATIENT)
Dept: PULMONOLOGY | Age: 66
Setting detail: THERAPIES SERIES
Discharge: HOME | End: 2021-05-24
Payer: MEDICARE

## 2021-05-24 ENCOUNTER — OFFICE VISIT (OUTPATIENT)
Dept: FAMILY MEDICINE CLINIC | Age: 66
End: 2021-05-24
Payer: MEDICARE

## 2021-05-24 ENCOUNTER — HOSPITAL ENCOUNTER (OUTPATIENT)
Age: 66
Setting detail: SPECIMEN
Discharge: HOME | End: 2021-05-24
Payer: MEDICARE

## 2021-05-24 VITALS
WEIGHT: 169.2 LBS | SYSTOLIC BLOOD PRESSURE: 118 MMHG | DIASTOLIC BLOOD PRESSURE: 80 MMHG | TEMPERATURE: 98.1 F | OXYGEN SATURATION: 98 % | HEART RATE: 56 BPM | BODY MASS INDEX: 22.95 KG/M2

## 2021-05-24 VITALS — WEIGHT: 170 LBS | BODY MASS INDEX: 23.06 KG/M2

## 2021-05-24 DIAGNOSIS — I42.2 HYPERTROPHIC NONOBSTRUCTIVE CARDIOMYOPATHY (HCC): ICD-10-CM

## 2021-05-24 DIAGNOSIS — M19.012 PRIMARY OSTEOARTHRITIS OF LEFT SHOULDER: ICD-10-CM

## 2021-05-24 DIAGNOSIS — M48.02 CERVICAL STENOSIS OF SPINE: ICD-10-CM

## 2021-05-24 DIAGNOSIS — M54.2 NECK PAIN: ICD-10-CM

## 2021-05-24 DIAGNOSIS — L72.9 BENIGN SKIN CYST: Primary | ICD-10-CM

## 2021-05-24 DIAGNOSIS — K22.70 BARRETT'S ESOPHAGUS DETERMINED BY ENDOSCOPY: ICD-10-CM

## 2021-05-24 DIAGNOSIS — I50.43 CHF (CONGESTIVE HEART FAILURE), NYHA CLASS II, ACUTE ON CHRONIC, COMBINED (HCC): ICD-10-CM

## 2021-05-24 DIAGNOSIS — I48.0 PAROXYSMAL ATRIAL FIBRILLATION (HCC): ICD-10-CM

## 2021-05-24 LAB
ALBUMIN SERPL-MCNC: 3.6 G/DL (ref 3.5–5.2)
ALBUMIN/GLOBULIN RATIO: 1.2 (ref 1–2.5)
ALP BLD-CCNC: 103 U/L (ref 40–129)
ALT SERPL-CCNC: 8 U/L (ref 5–41)
ANION GAP SERPL CALCULATED.3IONS-SCNC: 12 MMOL/L (ref 9–17)
AST SERPL-CCNC: 16 U/L
BILIRUB SERPL-MCNC: 0.58 MG/DL (ref 0.3–1.2)
BUN BLDV-MCNC: 13 MG/DL (ref 8–23)
BUN/CREAT BLD: ABNORMAL (ref 9–20)
CALCIUM SERPL-MCNC: 8.8 MG/DL (ref 8.6–10.4)
CHLORIDE BLD-SCNC: 105 MMOL/L (ref 98–107)
CO2: 25 MMOL/L (ref 20–31)
CREAT SERPL-MCNC: 0.58 MG/DL (ref 0.7–1.2)
GFR AFRICAN AMERICAN: >60 ML/MIN
GFR NON-AFRICAN AMERICAN: >60 ML/MIN
GFR SERPL CREATININE-BSD FRML MDRD: ABNORMAL ML/MIN/{1.73_M2}
GFR SERPL CREATININE-BSD FRML MDRD: ABNORMAL ML/MIN/{1.73_M2}
GLUCOSE BLD-MCNC: 121 MG/DL (ref 70–99)
POTASSIUM SERPL-SCNC: 3.9 MMOL/L (ref 3.7–5.3)
SODIUM BLD-SCNC: 142 MMOL/L (ref 135–144)
TOTAL PROTEIN: 6.7 G/DL (ref 6.4–8.3)

## 2021-05-24 PROCEDURE — G8427 DOCREV CUR MEDS BY ELIG CLIN: HCPCS | Performed by: INTERNAL MEDICINE

## 2021-05-24 PROCEDURE — 99214 OFFICE O/P EST MOD 30 MIN: CPT | Performed by: INTERNAL MEDICINE

## 2021-05-24 PROCEDURE — 3017F COLORECTAL CA SCREEN DOC REV: CPT | Performed by: INTERNAL MEDICINE

## 2021-05-24 PROCEDURE — 1036F TOBACCO NON-USER: CPT | Performed by: INTERNAL MEDICINE

## 2021-05-24 PROCEDURE — 1111F DSCHRG MED/CURRENT MED MERGE: CPT | Performed by: INTERNAL MEDICINE

## 2021-05-24 PROCEDURE — 4040F PNEUMOC VAC/ADMIN/RCVD: CPT | Performed by: INTERNAL MEDICINE

## 2021-05-24 PROCEDURE — G0239 OTH RESP PROC, GROUP: HCPCS

## 2021-05-24 PROCEDURE — G8420 CALC BMI NORM PARAMETERS: HCPCS | Performed by: INTERNAL MEDICINE

## 2021-05-24 PROCEDURE — 1123F ACP DISCUSS/DSCN MKR DOCD: CPT | Performed by: INTERNAL MEDICINE

## 2021-05-24 RX ORDER — ATORVASTATIN CALCIUM 40 MG/1
40 TABLET, FILM COATED ORAL DAILY
Qty: 90 TABLET | Refills: 1 | Status: SHIPPED | OUTPATIENT
Start: 2021-05-24 | End: 2021-11-11

## 2021-05-24 RX ORDER — OMEPRAZOLE 40 MG/1
40 CAPSULE, DELAYED RELEASE ORAL DAILY
Qty: 90 CAPSULE | Refills: 1 | Status: SHIPPED | OUTPATIENT
Start: 2021-05-24 | End: 2021-12-14 | Stop reason: SDUPTHER

## 2021-05-24 RX ORDER — BACLOFEN 5 MG/1
5 TABLET ORAL 2 TIMES DAILY
Qty: 60 TABLET | Refills: 3 | Status: SHIPPED | OUTPATIENT
Start: 2021-05-24 | End: 2021-09-30

## 2021-05-24 RX ORDER — DIGOXIN 125 MCG
125 TABLET ORAL DAILY
Qty: 90 TABLET | Refills: 1 | Status: SHIPPED | OUTPATIENT
Start: 2021-05-24 | End: 2021-11-29

## 2021-05-24 ASSESSMENT — ENCOUNTER SYMPTOMS
DIARRHEA: 0
ABDOMINAL PAIN: 0
COUGH: 0
SHORTNESS OF BREATH: 0
NAUSEA: 0
WHEEZING: 0
CONSTIPATION: 0
BLOOD IN STOOL: 0
CHEST TIGHTNESS: 0
VOMITING: 0
CHOKING: 0
ANAL BLEEDING: 0

## 2021-05-24 ASSESSMENT — VISUAL ACUITY: OU: 1

## 2021-05-24 ASSESSMENT — SOCIAL DETERMINANTS OF HEALTH (SDOH): HOW HARD IS IT FOR YOU TO PAY FOR THE VERY BASICS LIKE FOOD, HOUSING, MEDICAL CARE, AND HEATING?: SOMEWHAT HARD

## 2021-05-24 NOTE — PROGRESS NOTES
3 month follow up  He is requesting a muscle relaxer due to having LT shoulder pain from lifting oxygen tank all the time. He was prescribed Baclofen and would like it sent to [facility].

## 2021-05-24 NOTE — PROGRESS NOTES
bleed 2018    Hernia     History of colon cancer     Melena     Migraines     Murmur, cardiac      Past Surgical History:   Procedure Laterality Date    CARDIAC CATHETERIZATION  2018    Non-obstructive CAD    CARDIOVERSION  2020    COLECTOMY      2nd colectomy, Colostomy and reversed [facility] COLECTOMY      1st time [place]    COLONOSCOPY      COLONOSCOPY  2016    COLONOSCOPY N/A 2018    COLONOSCOPY DIAGNOSTIC performed by Damian Powers MD at [address] Right     inguinal    KNEE SURGERY Right 1970's    arthrotomy    TONSILLECTOMY      TOTAL KNEE ARTHROPLASTY Right 2019    KNEE TOTAL ARTHROPLASTY performed by Isabel Robles MD at [address] [facility] TRANSESOPHAGEAL ECHOCARDIOGRAM  2018    UPPER GASTROINTESTINAL ENDOSCOPY N/A 2018    EGD DIAGNOSTIC ONLY performed by Damian Powers MD at [address] [facility] N/A 2019    [facility]S       Social History     Tobacco Use    Smoking status: Former Smoker     Packs/day: 0.50     Years: 1.00     Pack years: 0.50     Quit date:      Years since quittin.4    Smokeless tobacco: Never Used    Tobacco comment: stated never actually really smoked only inhaled    Substance Use Topics    Alcohol use:  Yes     Alcohol/week: 12.0 standard drinks     Types: 2 Shots of liquor, 10 Standard drinks or equivalent per week     Comment: 2-3 times a week      Patient Active Problem List   Diagnosis    Dyslipidemia    ED (erectile dysfunction)    Incomplete bladder emptying    Benign prostatic hyperplasia with urinary obstruction    History of colon cancer    PAF (paroxysmal atrial fibrillation) (HCC)    Anemia of chronic disease    Pallor of optic disc    Presbyopia    Incisional hernia, without obstruction or gangrene    Hypertrophic nonobstructive cardiomyopathy (HCC)    Iron (Fe) deficiency anemia    Primary osteoarthritis of right knee    History of malignant neoplasm of rectum    Hill's esophagus    CHF (congestive heart failure), NYHA class II, acute on chronic, combined (HCC)    Hypoxia    Dyspnea and respiratory abnormalities    Occupational pulmonary disease    Sinus bradycardia    Acute hypoxemic respiratory failure due to COVID-19 ([facility] [address]    COVID-19    Pneumonia    Acute respiratory failure with hypoxia (HCC)    Pulmonary fibrosis (HCC)    Syncope and collapse    Chronic anticoagulation    Severe malnutrition (HCC)    Cervical stenosis of spinal canal    Impingement syndrome of left shoulder         Prior to Visit Medications    Medication Sig Taking?  Authorizing Provider   tamsulosin (FLOMAX) 0.4 MG capsule take 1 capsule by mouth once daily Yes Cassandra Amaral MD   aspirin 81 MG chewable tablet Take 1 tablet by mouth daily Yes Garret Love MD   carvedilol (COREG) 3.125 MG tablet Take 1 tablet by mouth 2 times daily (with meals) Yes Garret Love MD   lisinopril (PRINIVIL;ZESTRIL) 10 MG tablet Take 1 tablet by mouth daily Yes Wilber Fay MD   amLODIPine (NORVASC) 5 MG tablet Take 1 tablet by mouth daily Yes Wilber Fay MD   baclofen (LIORESAL) 5 MG tablet Take 1 tablet by mouth 2 times daily Yes Wilber Fay MD   ibuprofen (ADVIL;MOTRIN) 800 MG tablet take 1 tablet by mouth three times a day AS NEEDED FOR PAIN Yes Cassandra Amaral MD   apixaban (ELIQUIS) 5 MG TABS tablet Take 1 tablet by mouth 2 times daily Yes Todd Bautista MD   Handicap Placard MISC by Does not apply route Expires 1/2026 Yes Cassandra Amaral MD   digoxin (LANOXIN) 125 MCG tablet Take 1 tablet by mouth daily Yes Sergo Torre MD   atorvastatin (LIPITOR) 40 MG tablet Take 1 tablet by mouth daily Yes Todd Bautista MD   omeprazole (PRILOSEC) 40 MG delayed release capsule Take 1 capsule by mouth daily Yes Cassandra Amaral MD   albuterol sulfate HFA (VENTOLIN HFA) 108 (90 Base) MCG/ACT inhaler Inhale 2 puffs into the lungs 4 times daily as needed for Wheezing  Patient not taking: Reported on 3/8/2021  Sergo Torre MD     Review of Systems  Review of Systems   Constitutional: Negative for fatigue, fever and unexpected weight change. Respiratory: Negative for cough, choking, chest tightness, shortness of breath and wheezing. Cardiovascular: Negative for chest pain, palpitations and leg swelling. Gastrointestinal: Negative for abdominal pain, anal bleeding, blood in stool, constipation, diarrhea, nausea and vomiting. Endocrine: Negative. Musculoskeletal: Positive for neck pain and neck stiffness. Negative for joint swelling and myalgias. Skin: Negative. Neurological: Negative for dizziness. Psychiatric/Behavioral: Negative for sleep disturbance. All other systems reviewed and are negative. Objective:       Physical Exam:  /80 (Site: Left Upper Arm, Position: Sitting, Cuff Size: Medium Adult)   Pulse 56   Temp 98.1 °F (36.7 °C) (Temporal)   Wt 169 lb 3.2 oz (76.7 kg)   SpO2 98% Comment: using 4 liters of oxygen  BMI 22.95 kg/m²   Physical Exam  Vitals and nursing note reviewed. Constitutional:       General: He is not in acute distress. Appearance: He is well-developed. He is not ill-appearing. Eyes:      General: Lids are normal. Vision grossly intact. Cardiovascular:      Rate and Rhythm: Normal rate and regular rhythm. Heart sounds: Normal heart sounds, S1 normal and S2 normal. No murmur heard. No friction rub. No gallop. Pulmonary:      Effort: Pulmonary effort is normal. No respiratory distress. Breath sounds: Normal breath sounds. No wheezing. Abdominal:      General: Bowel sounds are normal.      Palpations: Abdomen is soft. There is no mass. Tenderness: There is no abdominal tenderness. There is no guarding. Musculoskeletal:         General: Normal range of motion. Skin:     General: Skin is warm and dry.       Capillary Refill: Capillary refill takes less than 2 seconds. Comments: 2 cm round nontender cyst on midline back, mobile. Neurological:      General: No focal deficit present. Mental Status: He is alert and oriented to person, place, and time. [facility] admission notes and imaging reviewed        Assessment/Plan:      1. Hill's esophagus determined by endoscopy  - omeprazole (PRILOSEC) 40 MG delayed release capsule; Take 1 capsule by mouth daily  Dispense: 90 capsule; Refill: 1    2. Paroxysmal atrial fibrillation (HCC)  - atorvastatin (LIPITOR) 40 MG tablet; Take 1 tablet by mouth daily  Dispense: 90 tablet; Refill: 1  - digoxin (LANOXIN) 125 MCG tablet; Take 1 tablet by mouth daily  Dispense: 90 tablet; Refill: 1    3. Hypertrophic nonobstructive cardiomyopathy (HCC)  - atorvastatin (LIPITOR) 40 MG tablet; Take 1 tablet by mouth daily  Dispense: 90 tablet; Refill: 1    4. Benign skin cyst  - Yana - Shahab Sinclair DO, General Surgery, [place]    5. CHF (congestive heart failure), NYHA class II, acute on chronic, combined ([facility] [address]  Continue current regimen   F/u cardiology     6. Neck pain  - Baclofen (LIORESAL) 5 MG tablet; Take 1 tablet by mouth 2 times daily  Dispense: 60 tablet; Refill: 3  - [address], [place] , Neurosurgery, [place]    7. Cervical stenosis of spine  - [address], [place], , Neurosurgery, [place]    8.  Primary osteoarthritis of left shoulder  - Yana - Ivonne Hernandez MD, Orthopedic Surgery, [facility] Maintenance Due   Topic Date Due    Shingles Vaccine (1 of 2) Never done    Pneumococcal 65+ years Vaccine (1 of 1 - PPSV23) Never done    Annual Wellness Visit (AWV)  Never done    Colon cancer screen colonoscopy  12/06/2020    Lipid screen  03/10/2021       Electronically signed by Portia Real MD on 5/24/2021 at 2:30 PM

## 2021-05-26 ENCOUNTER — HOSPITAL ENCOUNTER (OUTPATIENT)
Dept: PULMONOLOGY | Age: 66
Setting detail: THERAPIES SERIES
Discharge: HOME | End: 2021-05-26
Payer: MEDICARE

## 2021-05-26 NOTE — PROGRESS NOTES
Pt left VM message at 7:48 am stating he will not be in today due to issues with his left side. He has a referral to a physician for problem and is trying to schedule an appointment.

## 2021-05-31 ENCOUNTER — APPOINTMENT (OUTPATIENT)
Dept: PULMONOLOGY | Age: 66
End: 2021-05-31
Payer: MEDICARE

## 2021-06-02 ENCOUNTER — HOSPITAL ENCOUNTER (OUTPATIENT)
Dept: PULMONOLOGY | Age: 66
Setting detail: THERAPIES SERIES
Discharge: HOME | End: 2021-06-02
Payer: MEDICARE

## 2021-06-02 VITALS — BODY MASS INDEX: 22.89 KG/M2 | WEIGHT: 168.8 LBS

## 2021-06-02 PROCEDURE — G0239 OTH RESP PROC, GROUP: HCPCS

## 2021-06-02 NOTE — PROGRESS NOTES
Pt tolerate NuStep and treadmill well. He stated he physician encouraged him to exercise his arm as tolerated. We will continue to monitor his SpO2, encourage pacing and PLB/DB and increase exercise time and intensity as tolerated.

## 2021-06-07 ENCOUNTER — HOSPITAL ENCOUNTER (OUTPATIENT)
Dept: PULMONOLOGY | Age: 66
Setting detail: THERAPIES SERIES
Discharge: HOME | End: 2021-06-07
Payer: MEDICARE

## 2021-06-09 ENCOUNTER — APPOINTMENT (OUTPATIENT)
Dept: PULMONOLOGY | Age: 66
End: 2021-06-09
Payer: MEDICARE

## 2021-06-14 ENCOUNTER — APPOINTMENT (OUTPATIENT)
Dept: PULMONOLOGY | Age: 66
End: 2021-06-14
Payer: MEDICARE

## 2021-06-16 ENCOUNTER — APPOINTMENT (OUTPATIENT)
Dept: PULMONOLOGY | Age: 66
End: 2021-06-16
Payer: MEDICARE

## 2021-06-21 ENCOUNTER — APPOINTMENT (OUTPATIENT)
Dept: PULMONOLOGY | Age: 66
End: 2021-06-21
Payer: MEDICARE

## 2021-06-22 ENCOUNTER — TELEPHONE (OUTPATIENT)
Dept: FAMILY MEDICINE CLINIC | Age: 66
End: 2021-06-22

## 2021-06-22 NOTE — TELEPHONE ENCOUNTER
Would recommend trying to take his blood pressure medication in the evening before he goes to bed and see what his blood pressure does in the mornings. We can adjust his medication if necessary, if it remains elevated in the mornings.

## 2021-06-22 NOTE — TELEPHONE ENCOUNTER
Patient called with concern of elevated BP when he wakes up. States it is 180s -190s. He takes his BP before he takes his medication. States he takes his BP med around 8:00 AM - 9AM.  No chest pains, SOB, or headaches. States he normally feels like \"crap\". He stated his BP starts to go down, now it is around 160-170. Later in the day it will start to go down to 130s. He stated he is feeling better now, he can tell by looking at his face.      He is wondering if he needs any medication adjustments

## 2021-06-23 ENCOUNTER — APPOINTMENT (OUTPATIENT)
Dept: PULMONOLOGY | Age: 66
End: 2021-06-23
Payer: MEDICARE

## 2021-06-28 ENCOUNTER — TELEPHONE (OUTPATIENT)
Dept: FAMILY MEDICINE CLINIC | Age: 66
End: 2021-06-28

## 2021-06-28 ENCOUNTER — APPOINTMENT (OUTPATIENT)
Dept: PULMONOLOGY | Age: 66
End: 2021-06-28
Payer: MEDICARE

## 2021-06-28 RX ORDER — LISINOPRIL 10 MG/1
10 TABLET ORAL 2 TIMES DAILY
Qty: 180 TABLET | Refills: 1 | Status: SHIPPED | OUTPATIENT
Start: 2021-06-28 | End: 2021-12-14 | Stop reason: SDUPTHER

## 2021-06-28 NOTE — TELEPHONE ENCOUNTER
Pt states he started taking Lisinopril 10 mg BID. Pt states he takes 1 tablet in the morning and 1 tablet in the evening. Patient states his BP is back down to 115/80. Pt is requesting a new updated script for lisinopril 10 mg BID.

## 2021-06-30 ENCOUNTER — APPOINTMENT (OUTPATIENT)
Dept: PULMONOLOGY | Age: 66
End: 2021-06-30
Payer: MEDICARE

## 2021-07-15 ENCOUNTER — TELEPHONE (OUTPATIENT)
Dept: FAMILY MEDICINE CLINIC | Age: 66
End: 2021-07-15

## 2021-07-15 ENCOUNTER — OFFICE VISIT (OUTPATIENT)
Dept: FAMILY MEDICINE CLINIC | Age: 66
End: 2021-07-15
Payer: MEDICARE

## 2021-07-15 ENCOUNTER — HOSPITAL ENCOUNTER (OUTPATIENT)
Age: 66
Setting detail: SPECIMEN
Discharge: HOME | End: 2021-07-15
Payer: MEDICARE

## 2021-07-15 VITALS
BODY MASS INDEX: 22.6 KG/M2 | TEMPERATURE: 99 F | DIASTOLIC BLOOD PRESSURE: 68 MMHG | HEART RATE: 66 BPM | WEIGHT: 166.6 LBS | SYSTOLIC BLOOD PRESSURE: 118 MMHG | OXYGEN SATURATION: 99 %

## 2021-07-15 DIAGNOSIS — J84.10 PULMONARY FIBROSIS (HCC): ICD-10-CM

## 2021-07-15 DIAGNOSIS — K22.70 BARRETT'S ESOPHAGUS DETERMINED BY ENDOSCOPY: ICD-10-CM

## 2021-07-15 DIAGNOSIS — L02.212 CUTANEOUS ABSCESS OF BACK (ANY PART, EXCEPT BUTTOCK): ICD-10-CM

## 2021-07-15 DIAGNOSIS — J96.11 CHRONIC HYPOXEMIC RESPIRATORY FAILURE (HCC): ICD-10-CM

## 2021-07-15 DIAGNOSIS — L02.212 CUTANEOUS ABSCESS OF BACK (ANY PART, EXCEPT BUTTOCK): Primary | ICD-10-CM

## 2021-07-15 PROCEDURE — 4040F PNEUMOC VAC/ADMIN/RCVD: CPT | Performed by: INTERNAL MEDICINE

## 2021-07-15 PROCEDURE — 1036F TOBACCO NON-USER: CPT | Performed by: INTERNAL MEDICINE

## 2021-07-15 PROCEDURE — G8420 CALC BMI NORM PARAMETERS: HCPCS | Performed by: INTERNAL MEDICINE

## 2021-07-15 PROCEDURE — 3017F COLORECTAL CA SCREEN DOC REV: CPT | Performed by: INTERNAL MEDICINE

## 2021-07-15 PROCEDURE — 99214 OFFICE O/P EST MOD 30 MIN: CPT | Performed by: INTERNAL MEDICINE

## 2021-07-15 PROCEDURE — G8427 DOCREV CUR MEDS BY ELIG CLIN: HCPCS | Performed by: INTERNAL MEDICINE

## 2021-07-15 PROCEDURE — 1123F ACP DISCUSS/DSCN MKR DOCD: CPT | Performed by: INTERNAL MEDICINE

## 2021-07-15 RX ORDER — CEPHALEXIN 500 MG/1
500 CAPSULE ORAL 3 TIMES DAILY
Qty: 21 CAPSULE | Refills: 0 | Status: SHIPPED | OUTPATIENT
Start: 2021-07-15 | End: 2021-07-22

## 2021-07-15 RX ORDER — OMEPRAZOLE 40 MG/1
CAPSULE, DELAYED RELEASE ORAL
Qty: 90 CAPSULE | Refills: 1 | OUTPATIENT
Start: 2021-07-15

## 2021-07-15 ASSESSMENT — ENCOUNTER SYMPTOMS
VOMITING: 0
ABDOMINAL PAIN: 0
BLOOD IN STOOL: 0
NAUSEA: 0
COUGH: 0
DIARRHEA: 0
CHEST TIGHTNESS: 0
WHEEZING: 0
ANAL BLEEDING: 0
CHOKING: 0
SHORTNESS OF BREATH: 1
CONSTIPATION: 0

## 2021-07-15 ASSESSMENT — VISUAL ACUITY: OU: 1

## 2021-07-15 NOTE — PROGRESS NOTES
Subjective:       Patient ID:     Junior Pantoja is a 77 y.o. male who presents for   Chief Complaint   Patient presents with    Wound Check     on back possible infection       HPI:  Nursing note reviewed and discussed with patient. Lump on his back is drainign for last couple of days, very painful and tender especially when sitting back in chair. Has been noticing bloody and purulent foul-smelling discharge. No fevers, chills, nausea, vomiting, diarrhea. He reports he was supposed to get it removed by surgeon but they canceled the night before because he was on Eliquis - did not realize he needed bridging. Frustrated at non-treatment. Continues to have difficulty getting a portable oxygen concentrator - no availability. He is on 4LPM oxygen with exertion - using tanks that run out in approx 45 min, and he has shoudler and arm pain from having to carry multiple tanks and load/unload into car before he can leave the house because he runs out. Had to stop pulm rehab due to the pain, he has called multiple places and they have had no success since October 2020. Patient's medications, allergies, past medical, surgical, social and family histories were reviewed and updated as appropriate.     Past Medical History:   Diagnosis Date    Atrial fibrillation ([facility] [address]     Back pain, chronic     Hill's esophagus 06/18/2019    Benign essential HTN     BPH (benign prostatic hyperplasia)     Cancer (HCC)     colon-rectal    Chronic idiopathic pulmonary fibrosis ([facility] [address] 03/29/2021    Cocaine abuse in remission [facility])     1970's    ED (erectile dysfunction) 4/2/2015    GERD (gastroesophageal reflux disease)     GI bleed 12/5/2018    Hernia     History of colon cancer     Melena     Migraines     Murmur, cardiac      Past Surgical History:   Procedure Laterality Date    CARDIAC CATHETERIZATION  11/29/2018    Non-obstructive CAD    CARDIOVERSION  2020    COLECTOMY      2nd colectomy, Colostomy and reversed [facility]      1st time [address] COLONOSCOPY  2016    COLONOSCOPY N/A 2018    COLONOSCOPY DIAGNOSTIC performed by Shahab Dave MD at [address] Right 2009    inguinal    KNEE SURGERY Right 1970's    arthrotomy    TONSILLECTOMY      TOTAL KNEE ARTHROPLASTY Right 2019    KNEE TOTAL ARTHROPLASTY performed by Ebonie Austin MD at [address] TRANSESOPHAGEAL ECHOCARDIOGRAM  2018    UPPER GASTROINTESTINAL ENDOSCOPY N/A 2018    EGD DIAGNOSTIC ONLY performed by Shahab Dave MD at [facility] N/A 2019    [facility]       Social History     Tobacco Use    Smoking status: Former Smoker     Packs/day: 0.50     Years: 1.00     Pack years: 0.50     Quit date:      Years since quittin.5    Smokeless tobacco: Never Used    Tobacco comment: stated never actually really smoked only inhaled    Substance Use Topics    Alcohol use:  Yes     Alcohol/week: 12.0 standard drinks     Types: 2 Shots of liquor, 10 Standard drinks or equivalent per week     Comment: 2-3 times a week      Patient Active Problem List   Diagnosis    Dyslipidemia    ED (erectile dysfunction)    Incomplete bladder emptying    Benign prostatic hyperplasia with urinary obstruction    History of colon cancer    PAF (paroxysmal atrial fibrillation) (HCC)    Anemia of chronic disease    Pallor of optic disc    Presbyopia    Incisional hernia, without obstruction or gangrene    Hypertrophic nonobstructive cardiomyopathy (HCC)    Iron (Fe) deficiency anemia    Primary osteoarthritis of right knee    History of malignant neoplasm of rectum    Mcguire's esophagus    CHF (congestive heart failure), NYHA class II, acute on chronic, combined (HCC)    Hypoxia    Dyspnea and respiratory abnormalities    Occupational pulmonary disease    Sinus bradycardia    Acute hypoxemic respiratory failure due to COVID-19 [facility])    COVID-19    Pneumonia    Acute respiratory failure with hypoxia (HCC)    Pulmonary fibrosis (HCC)    Syncope and collapse    Chronic anticoagulation    Severe malnutrition (HCC)    Cervical stenosis of spinal canal    Impingement syndrome of left shoulder         Prior to Visit Medications    Medication Sig Taking? Authorizing Provider   lisinopril (PRINIVIL;ZESTRIL) 10 MG tablet Take 1 tablet by mouth 2 times daily Yes Rosa Maria Castillo MD   omeprazole (PRILOSEC) 40 MG delayed release capsule Take 1 capsule by mouth daily Yes Rosa Maria Castillo MD   atorvastatin (LIPITOR) 40 MG tablet Take 1 tablet by mouth daily Yes Rosa Maria Castillo MD   digoxin (LANOXIN) 125 MCG tablet Take 1 tablet by mouth daily Yes Rosa Maria Castillo MD   Baclofen (LIORESAL) 5 MG tablet Take 1 tablet by mouth 2 times daily Yes Cassandra Palumbo MD   tamsulosin (FLOMAX) 0.4 MG capsule take 1 capsule by mouth once daily Yes Cassandra Palumbo MD   aspirin 81 MG chewable tablet Take 1 tablet by mouth daily Yes Garret Delacruz MD   carvedilol (COREG) 3.125 MG tablet Take 1 tablet by mouth 2 times daily (with meals) Yes Garret Delacruz MD   amLODIPine (NORVASC) 5 MG tablet Take 1 tablet by mouth daily Yes Sandy Castillo MD   ibuprofen (ADVIL;MOTRIN) 800 MG tablet take 1 tablet by mouth three times a day AS NEEDED FOR PAIN Yes Cassandra Palumbo MD   apixaban (ELIQUIS) 5 MG TABS tablet Take 1 tablet by mouth 2 times daily Yes Rosa Maria Castillo MD   Handicap Placard MISC by Does not apply route Expires 1/2026 Yes Cassandra Palumbo MD   albuterol sulfate HFA (VENTOLIN HFA) 108 (90 Base) MCG/ACT inhaler Inhale 2 puffs into the lungs 4 times daily as needed for Wheezing Yes Joo Bobby MD     Review of Systems  Review of Systems   Constitutional: Negative for fatigue, fever and unexpected weight change. Respiratory: Positive for shortness of breath.  Negative for cough, choking, chest tightness and chart and Care Everywhere   Six minute walk - oxygen stopped at 10:58AM, pulseox 85% at rest without oxygen, back up to 92% with 4LPM with resolution of dyspnea        Assessment/Plan:      1. Cutaneous abscess of back (any part, except buttock)  - cephALEXin (KEFLEX) 500 MG capsule; Take 1 capsule by mouth 3 times daily for 7 days  Dispense: 21 capsule; Refill: 0  - Culture, Wound; Future    2. Pulmonary fibrosis ([facility] [address]  - DME Order for Home Oxygen as OP  - 6 Minute Walk Test; Future    3.  Chronic hypoxemic respiratory failure (HCC)  - DME Order for Home Oxygen as OP  - 6 Minute Walk Test; Future           Health Maintenance Due   Topic Date Due    Shingles Vaccine (1 of 2) Never done    Pneumococcal 65+ years Vaccine (1 of 1 - PPSV23) Never done    Annual Wellness Visit (AWV)  Never done    Colon cancer screen colonoscopy  12/06/2020    Lipid screen  03/10/2021       Electronically signed by Jena Key MD on 7/15/2021 at 10:37 AM

## 2021-07-15 NOTE — TELEPHONE ENCOUNTER
Pharmacy called to confirm patient's allergies. States they show an allergy to penicillin and cephalosporins. Informed her that our list shows penicillins. States she will confirm with the patient.

## 2021-07-15 NOTE — PROGRESS NOTES
Pt is here today due to having a spot on his back. He states is infected, draining, red and painful  He did get referral but doctor won't remove due to pt taking bleed thinners.

## 2021-07-17 LAB
CULTURE: ABNORMAL
DIRECT EXAM: ABNORMAL
DIRECT EXAM: ABNORMAL
Lab: ABNORMAL
SPECIMEN DESCRIPTION: ABNORMAL

## 2021-07-20 DIAGNOSIS — M17.11 PRIMARY OSTEOARTHRITIS OF RIGHT KNEE: ICD-10-CM

## 2021-07-20 NOTE — TELEPHONE ENCOUNTER
Last visit: 7/15/21  Last Med refill: 4/15/21  Does patient have enough medication for 72 hours: No:     Next Visit Date:  Future Appointments   Date Time Provider Yves Henriquez   7/27/2021 10:30 AM STV PFT RM 1 [facility]   11/29/2021  1:00 PM Cassandra Montana  [address] [facility]   Topic Date Due    Shingles Vaccine (1 of 2) Never done    Pneumococcal 65+ years Vaccine (1 of 1 - PPSV23) Never done   [employer] Visit (AWV)  Never done    Colon cancer screen colonoscopy  12/06/2020    Lipid screen  03/10/2021    DTaP/Tdap/Td vaccine (1 - Tdap) 08/18/2021 (Originally 8/11/1974)    Flu vaccine (1) 09/01/2021    Potassium monitoring  05/24/2022    Creatinine monitoring  05/24/2022    COVID-19 Vaccine  Completed    AAA screen  Completed    Hepatitis C screen  Completed    Hepatitis A vaccine  Aged Out    Hepatitis B vaccine  Aged Out    Hib vaccine  Aged Out    Meningococcal (ACWY) vaccine  Aged Out       Hemoglobin A1C (%)   Date Value   01/10/2018 5.0             ( goal A1C is < 7)   No results found for: LABMICR  LDL Cholesterol (mg/dL)   Date Value   03/10/2020 82   10/01/2018 81       (goal LDL is <100)   AST (U/L)   Date Value   05/24/2021 16     ALT (U/L)   Date Value   05/24/2021 8     BUN (mg/dL)   Date Value   05/24/2021 13     BP Readings from Last 3 Encounters:   07/15/21 118/68   05/24/21 118/80   04/27/21 130/72          (goal 120/80)    All Future Testing planned in [facility]  Lab Frequency Next Occurrence   6 Minute Walk Test Once 07/15/2021               Patient Active Problem List:     Dyslipidemia     ED (erectile dysfunction)     Incomplete bladder emptying     Benign prostatic hyperplasia with urinary obstruction     History of colon cancer     PAF (paroxysmal atrial fibrillation) (HCC)     Anemia of chronic disease     Pallor of optic disc     Presbyopia     Incisional hernia, without obstruction or gangrene     Hypertrophic nonobstructive cardiomyopathy (HCC)     Iron (Fe) deficiency anemia     Primary osteoarthritis of right knee     History of malignant neoplasm of rectum     Hill's esophagus     CHF (congestive heart failure), NYHA class II, acute on chronic, combined (HCC)     Hypoxia     Dyspnea and respiratory abnormalities     Occupational pulmonary disease     Sinus bradycardia     Acute hypoxemic respiratory failure due to COVID-19 [facility])     COVID-19     Pneumonia     Acute respiratory failure with hypoxia (HCC)     Pulmonary fibrosis (HCC)     Syncope and collapse     Chronic anticoagulation     Severe malnutrition (HCC)     Cervical stenosis of spinal canal     Impingement syndrome of left shoulder

## 2021-07-21 RX ORDER — IBUPROFEN 800 MG/1
TABLET ORAL
Qty: 90 TABLET | Refills: 1 | Status: SHIPPED | OUTPATIENT
Start: 2021-07-21 | End: 2021-10-15

## 2021-07-27 ENCOUNTER — HOSPITAL ENCOUNTER (OUTPATIENT)
Dept: PULMONOLOGY | Age: 66
Discharge: HOME | End: 2021-07-27
Payer: MEDICARE

## 2021-07-27 DIAGNOSIS — J84.10 PULMONARY FIBROSIS (HCC): ICD-10-CM

## 2021-07-27 DIAGNOSIS — J96.11 CHRONIC HYPOXEMIC RESPIRATORY FAILURE (HCC): ICD-10-CM

## 2021-07-27 PROCEDURE — 94618 PULMONARY STRESS TESTING: CPT

## 2021-08-04 ENCOUNTER — TELEPHONE (OUTPATIENT)
Dept: FAMILY MEDICINE CLINIC | Age: 66
End: 2021-08-04

## 2021-08-04 NOTE — TELEPHONE ENCOUNTER
Pt is requesting an order for portable oxygen concentrator be sent to  [address] [place] [address] (F: [phone]). I have printed the order, ins and last office note.  It has been faxed to [address] [place] [address] (F: [phone])

## 2021-08-13 NOTE — TELEPHONE ENCOUNTER
Last visit: 7/15/21  Last Med refill: 5/17/21  Does patient have enough medication for 72 hours: No:     Next Visit Date:  Future Appointments   Date Time Provider Yves Henriquez   11/29/2021  1:00 PM Cassandra Arceo  [address] [facility]   Topic Date Due    Shingles Vaccine (1 of 2) Never done    Pneumococcal 65+ years Vaccine (1 of 1 - PPSV23) Never done   [employer] Visit (AWV)  Never done    Colon cancer screen colonoscopy  12/06/2020    Lipid screen  03/10/2021    DTaP/Tdap/Td vaccine (1 - Tdap) 08/18/2021 (Originally 8/11/1974)    Flu vaccine (1) 09/01/2021    Potassium monitoring  05/24/2022    Creatinine monitoring  05/24/2022    COVID-19 Vaccine  Completed    AAA screen  Completed    Hepatitis C screen  Completed    Hepatitis A vaccine  Aged Out    Hepatitis B vaccine  Aged Out    Hib vaccine  Aged Out    Meningococcal (ACWY) vaccine  Aged Out       Hemoglobin A1C (%)   Date Value   01/10/2018 5.0             ( goal A1C is < 7)   No results found for: LABMICR  LDL Cholesterol (mg/dL)   Date Value   03/10/2020 82   10/01/2018 81       (goal LDL is <100)   AST (U/L)   Date Value   05/24/2021 16     ALT (U/L)   Date Value   05/24/2021 8     BUN (mg/dL)   Date Value   05/24/2021 13     BP Readings from Last 3 Encounters:   07/15/21 118/68   05/24/21 118/80   04/27/21 130/72          (goal 120/80)    All Future Testing planned in [facility]  Lab Frequency Next Occurrence               Patient Active Problem List:     Dyslipidemia     ED (erectile dysfunction)     Incomplete bladder emptying     Benign prostatic hyperplasia with urinary obstruction     History of colon cancer     PAF (paroxysmal atrial fibrillation) (HCC)     Anemia of chronic disease     Pallor of optic disc     Presbyopia     Incisional hernia, without obstruction or gangrene     Hypertrophic nonobstructive cardiomyopathy (HCC)     Iron (Fe) deficiency anemia     Primary osteoarthritis of

## 2021-08-16 RX ORDER — TAMSULOSIN HYDROCHLORIDE 0.4 MG/1
CAPSULE ORAL
Qty: 90 CAPSULE | Refills: 1 | Status: SHIPPED | OUTPATIENT
Start: 2021-08-16 | End: 2022-02-08

## 2021-08-28 DIAGNOSIS — I48.0 PAF (PAROXYSMAL ATRIAL FIBRILLATION) (HCC): ICD-10-CM

## 2021-08-28 DIAGNOSIS — I42.2 HYPERTROPHIC NONOBSTRUCTIVE CARDIOMYOPATHY (HCC): ICD-10-CM

## 2021-08-30 RX ORDER — APIXABAN 5 MG/1
TABLET, FILM COATED ORAL
Qty: 60 TABLET | Refills: 5 | Status: SHIPPED | OUTPATIENT
Start: 2021-08-30 | End: 2022-03-23

## 2021-08-30 NOTE — TELEPHONE ENCOUNTER
Last visit: 07/15/2021  Last Med refill: 08/02/2021  Does patient have enough medication for 72 hours: Yes    Next Visit Date:  Future Appointments   Date Time Provider Yves Florence   11/29/2021  1:00 PM Cassandra Mosley  [address] [facility]   Topic Date Due    DTaP/Tdap/Td vaccine (1 - Tdap) Never done    Shingles Vaccine (1 of 2) Never done    Pneumococcal 65+ years Vaccine (1 of 1 - PPSV23) Never done   [employer] Visit (AWV)  Never done    Colon cancer screen colonoscopy  12/06/2020    Lipid screen  03/10/2021    Flu vaccine (1) 09/01/2021    Potassium monitoring  05/24/2022    Creatinine monitoring  05/24/2022    COVID-19 Vaccine  Completed    AAA screen  Completed    Hepatitis C screen  Completed    Hepatitis A vaccine  Aged Out    Hepatitis B vaccine  Aged Out    Hib vaccine  Aged Out    Meningococcal (ACWY) vaccine  Aged Out       Hemoglobin A1C (%)   Date Value   01/10/2018 5.0             ( goal A1C is < 7)   No results found for: LABMICR  LDL Cholesterol (mg/dL)   Date Value   03/10/2020 82   10/01/2018 81       (goal LDL is <100)   AST (U/L)   Date Value   05/24/2021 16     ALT (U/L)   Date Value   05/24/2021 8     BUN (mg/dL)   Date Value   05/24/2021 13     BP Readings from Last 3 Encounters:   07/15/21 118/68   05/24/21 118/80   04/27/21 130/72          (goal 120/80)    All Future Testing planned in [facility]  Lab Frequency Next Occurrence               Patient Active Problem List:     Dyslipidemia     ED (erectile dysfunction)     Incomplete bladder emptying     Benign prostatic hyperplasia with urinary obstruction     History of colon cancer     PAF (paroxysmal atrial fibrillation) (HCC)     Anemia of chronic disease     Pallor of optic disc     Presbyopia     Incisional hernia, without obstruction or gangrene     Hypertrophic nonobstructive cardiomyopathy (HCC)     Iron (Fe) deficiency anemia     Primary osteoarthritis of right knee     History of malignant neoplasm of rectum     Hill's esophagus     CHF (congestive heart failure), NYHA class II, acute on chronic, combined (HCC)     Hypoxia     Dyspnea and respiratory abnormalities     Occupational pulmonary disease     Sinus bradycardia     Acute hypoxemic respiratory failure due to COVID-19 [facility])     COVID-19     Pneumonia     Acute respiratory failure with hypoxia (HCC)     Pulmonary fibrosis (HCC)     Syncope and collapse     Chronic anticoagulation     Severe malnutrition (HCC)     Cervical stenosis of spinal canal     Impingement syndrome of left shoulder

## 2021-09-29 DIAGNOSIS — M54.2 NECK PAIN: ICD-10-CM

## 2021-09-29 NOTE — TELEPHONE ENCOUNTER
Last visit: 07/15/2021  Last Med refill: 08/16/2021  Does patient have enough medication for 72 hours: No:     Next Visit Date:  Future Appointments   Date Time Provider Yves Florence   11/29/2021  1:00 PM Cassandra Henry  [address] [facility]   Topic Date Due    DTaP/Tdap/Td vaccine (1 - Tdap) Never done    Shingles Vaccine (1 of 2) Never done    Pneumococcal 65+ years Vaccine (1 of 1 - PPSV23) Never done   [employer] Visit (AWV)  Never done    Colon cancer screen colonoscopy  12/06/2020    Lipid screen  03/10/2021    Flu vaccine (1) 09/01/2021    Potassium monitoring  05/24/2022    Creatinine monitoring  05/24/2022    COVID-19 Vaccine  Completed    AAA screen  Completed    Hepatitis C screen  Completed    Hepatitis A vaccine  Aged Out    Hepatitis B vaccine  Aged Out    Hib vaccine  Aged Out    Meningococcal (ACWY) vaccine  Aged Out       Hemoglobin A1C (%)   Date Value   01/10/2018 5.0             ( goal A1C is < 7)   No results found for: LABMICR  LDL Cholesterol (mg/dL)   Date Value   03/10/2020 82   10/01/2018 81       (goal LDL is <100)   AST (U/L)   Date Value   05/24/2021 16     ALT (U/L)   Date Value   05/24/2021 8     BUN (mg/dL)   Date Value   05/24/2021 13     BP Readings from Last 3 Encounters:   07/15/21 118/68   05/24/21 118/80   04/27/21 130/72          (goal 120/80)    All Future Testing planned in [facility]  Lab Frequency Next Occurrence               Patient Active Problem List:     Dyslipidemia     ED (erectile dysfunction)     Incomplete bladder emptying     Benign prostatic hyperplasia with urinary obstruction     History of colon cancer     PAF (paroxysmal atrial fibrillation) (HCC)     Anemia of chronic disease     Pallor of optic disc     Presbyopia     Incisional hernia, without obstruction or gangrene     Hypertrophic nonobstructive cardiomyopathy (HCC)     Iron (Fe) deficiency anemia     Primary osteoarthritis of right knee History of malignant neoplasm of rectum     Hill's esophagus     CHF (congestive heart failure), NYHA class II, acute on chronic, combined (HCC)     Hypoxia     Dyspnea and respiratory abnormalities     Occupational pulmonary disease     Sinus bradycardia     Acute hypoxemic respiratory failure due to COVID-19 [facility])     COVID-19     Pneumonia     Acute respiratory failure with hypoxia (HCC)     Pulmonary fibrosis (HCC)     Syncope and collapse     Chronic anticoagulation     Severe malnutrition (HCC)     Cervical stenosis of spinal canal     Impingement syndrome of left shoulder

## 2021-09-30 RX ORDER — BACLOFEN 5 MG/1
TABLET ORAL
Qty: 60 TABLET | Refills: 3 | Status: SHIPPED | OUTPATIENT
Start: 2021-09-30 | End: 2022-01-27

## 2021-10-15 DIAGNOSIS — M17.11 PRIMARY OSTEOARTHRITIS OF RIGHT KNEE: ICD-10-CM

## 2021-10-15 RX ORDER — IBUPROFEN 800 MG/1
TABLET ORAL
Qty: 90 TABLET | Refills: 1 | Status: SHIPPED | OUTPATIENT
Start: 2021-10-15 | End: 2022-01-14

## 2021-10-15 NOTE — TELEPHONE ENCOUNTER
Last visit: 7/15/21  Last Med refill: 7/21/21  Does patient have enough medication for 72 hours: No:     Next Visit Date:  Future Appointments   Date Time Provider Yves Florence   11/29/2021  1:00 PM Cassandra Montejo  [address] [facility]   Topic Date Due    DTaP/Tdap/Td vaccine (1 - Tdap) Never done    Shingles Vaccine (1 of 2) Never done    Pneumococcal 65+ years Vaccine (1 of 1 - PPSV23) Never done   [employer] Visit (AWV)  12/04/2020    Colon cancer screen colonoscopy  12/06/2020    Lipid screen  03/10/2021    Flu vaccine (1) 09/01/2021    Potassium monitoring  05/24/2022    Creatinine monitoring  05/24/2022    COVID-19 Vaccine  Completed    AAA screen  Completed    Hepatitis C screen  Completed    Hepatitis A vaccine  Aged Out    Hepatitis B vaccine  Aged Out    Hib vaccine  Aged Out    Meningococcal (ACWY) vaccine  Aged Out       Hemoglobin A1C (%)   Date Value   01/10/2018 5.0             ( goal A1C is < 7)   No results found for: LABMICR  LDL Cholesterol (mg/dL)   Date Value   03/10/2020 82   10/01/2018 81       (goal LDL is <100)   AST (U/L)   Date Value   05/24/2021 16     ALT (U/L)   Date Value   05/24/2021 8     BUN (mg/dL)   Date Value   05/24/2021 13     BP Readings from Last 3 Encounters:   07/15/21 118/68   05/24/21 118/80   04/27/21 130/72          (goal 120/80)    All Future Testing planned in [facility]  Lab Frequency Next Occurrence               Patient Active Problem List:     Dyslipidemia     ED (erectile dysfunction)     Incomplete bladder emptying     Benign prostatic hyperplasia with urinary obstruction     History of colon cancer     PAF (paroxysmal atrial fibrillation) (HCC)     Anemia of chronic disease     Pallor of optic disc     Presbyopia     Incisional hernia, without obstruction or gangrene     Hypertrophic nonobstructive cardiomyopathy (HCC)     Iron (Fe) deficiency anemia     Primary osteoarthritis of right knee     History of malignant neoplasm of rectum     Hill's esophagus     CHF (congestive heart failure), NYHA class II, acute on chronic, combined (HCC)     Hypoxia     Dyspnea and respiratory abnormalities     Occupational pulmonary disease     Sinus bradycardia     Acute hypoxemic respiratory failure due to COVID-19 [facility])     COVID-19     Pneumonia     Acute respiratory failure with hypoxia (HCC)     Pulmonary fibrosis (HCC)     Syncope and collapse     Chronic anticoagulation     Severe malnutrition (HCC)     Cervical stenosis of spinal canal     Impingement syndrome of left shoulder

## 2021-10-22 ENCOUNTER — NURSE ONLY (OUTPATIENT)
Dept: FAMILY MEDICINE CLINIC | Age: 66
End: 2021-10-22
Payer: MEDICARE

## 2021-10-22 ENCOUNTER — HOSPITAL ENCOUNTER (OUTPATIENT)
Age: 66
Setting detail: SPECIMEN
Discharge: HOME | End: 2021-10-22
Payer: MEDICARE

## 2021-10-22 DIAGNOSIS — Z23 NEED FOR INFLUENZA VACCINATION: Primary | ICD-10-CM

## 2021-10-22 LAB
ANION GAP SERPL CALCULATED.3IONS-SCNC: 11 MMOL/L (ref 9–17)
BUN BLDV-MCNC: 17 MG/DL (ref 8–23)
BUN/CREAT BLD: ABNORMAL (ref 9–20)
CALCIUM SERPL-MCNC: 8.5 MG/DL (ref 8.6–10.4)
CHLORIDE BLD-SCNC: 101 MMOL/L (ref 98–107)
CO2: 28 MMOL/L (ref 20–31)
CREAT SERPL-MCNC: 0.65 MG/DL (ref 0.7–1.2)
GFR AFRICAN AMERICAN: >60 ML/MIN
GFR NON-AFRICAN AMERICAN: >60 ML/MIN
GFR SERPL CREATININE-BSD FRML MDRD: ABNORMAL ML/MIN/{1.73_M2}
GFR SERPL CREATININE-BSD FRML MDRD: ABNORMAL ML/MIN/{1.73_M2}
GLUCOSE BLD-MCNC: 78 MG/DL (ref 70–99)
POTASSIUM SERPL-SCNC: 4.4 MMOL/L (ref 3.7–5.3)
SODIUM BLD-SCNC: 140 MMOL/L (ref 135–144)

## 2021-10-22 PROCEDURE — G0008 ADMIN INFLUENZA VIRUS VAC: HCPCS | Performed by: NURSE PRACTITIONER

## 2021-10-22 PROCEDURE — 90694 VACC AIIV4 NO PRSRV 0.5ML IM: CPT | Performed by: NURSE PRACTITIONER

## 2021-10-22 NOTE — PROGRESS NOTES
Vaccine Information Sheet, \"Influenza - Inactivated\"  given to Carlos Ramirez, or parent/legal guardian of  Carlos Ramirez and verbalized understanding. Patient responses:    Have you ever had a reaction to a flu vaccine? No  Do you have any current illness? No  Have you ever had Guillian [place] Syndrome? No  Do you have a serious allergy to any of the following: Neomycin, Polymyxin, Thimerosal, eggs or egg products? No    Flu vaccine given per order. Please see immunization tab. Risks and benefits explained. Current VIS given.

## 2021-11-11 DIAGNOSIS — I42.2 HYPERTROPHIC NONOBSTRUCTIVE CARDIOMYOPATHY (HCC): ICD-10-CM

## 2021-11-11 DIAGNOSIS — I48.0 PAROXYSMAL ATRIAL FIBRILLATION (HCC): ICD-10-CM

## 2021-11-11 RX ORDER — ATORVASTATIN CALCIUM 40 MG/1
TABLET, FILM COATED ORAL
Qty: 90 TABLET | Refills: 1 | Status: SHIPPED | OUTPATIENT
Start: 2021-11-11 | End: 2022-04-28 | Stop reason: SDUPTHER

## 2021-11-11 NOTE — TELEPHONE ENCOUNTER
Last visit: 7/15/21  Last Med refill: 5/24/21  Does patient have enough medication for 72 hours: Yes    Next Visit Date:  Future Appointments   Date Time Provider Yves Florence   11/29/2021  1:00 PM Cassandra Moreno  [address] [facility]   Topic Date Due    DTaP/Tdap/Td vaccine (1 - Tdap) Never done    Shingles Vaccine (1 of 2) Never done    Pneumococcal 65+ years Vaccine (1 of 1 - PPSV23) Never done   [employer] Visit (AWV)  12/04/2020    Colon cancer screen colonoscopy  12/06/2020    Lipid screen  03/10/2021    COVID-19 Vaccine (3 - Booster for Pfizer series) 11/04/2021    Potassium monitoring  10/22/2022    Creatinine monitoring  10/22/2022    Flu vaccine  Completed    AAA screen  Completed    Hepatitis C screen  Completed    Hepatitis A vaccine  Aged Out    Hepatitis B vaccine  Aged Out    Hib vaccine  Aged Out    Meningococcal (ACWY) vaccine  Aged Out       Hemoglobin A1C (%)   Date Value   01/10/2018 5.0             ( goal A1C is < 7)   No results found for: LABMICR  LDL Cholesterol (mg/dL)   Date Value   03/10/2020 82   10/01/2018 81       (goal LDL is <100)   AST (U/L)   Date Value   05/24/2021 16     ALT (U/L)   Date Value   05/24/2021 8     BUN (mg/dL)   Date Value   10/22/2021 17     BP Readings from Last 3 Encounters:   07/15/21 118/68   05/24/21 118/80   04/27/21 130/72          (goal 120/80)    All Future Testing planned in [facility]  Lab Frequency Next Occurrence               Patient Active Problem List:     Dyslipidemia     ED (erectile dysfunction)     Incomplete bladder emptying     Benign prostatic hyperplasia with urinary obstruction     History of colon cancer     PAF (paroxysmal atrial fibrillation) (HCC)     Anemia of chronic disease     Pallor of optic disc     Presbyopia     Incisional hernia, without obstruction or gangrene     Hypertrophic nonobstructive cardiomyopathy (HCC)     Iron (Fe) deficiency anemia     Primary osteoarthritis of right knee     History of malignant neoplasm of rectum     Hill's esophagus     CHF (congestive heart failure), NYHA class II, acute on chronic, combined (HCC)     Hypoxia     Dyspnea and respiratory abnormalities     Occupational pulmonary disease     Sinus bradycardia     Acute hypoxemic respiratory failure due to COVID-19 [facility])     COVID-19     Pneumonia     Acute respiratory failure with hypoxia (HCC)     Pulmonary fibrosis (HCC)     Syncope and collapse     Chronic anticoagulation     Severe malnutrition (HCC)     Cervical stenosis of spinal canal     Impingement syndrome of left shoulder

## 2021-11-24 DIAGNOSIS — I48.0 PAROXYSMAL ATRIAL FIBRILLATION (HCC): ICD-10-CM

## 2021-11-24 NOTE — TELEPHONE ENCOUNTER
right knee     History of malignant neoplasm of rectum     Hill's esophagus     CHF (congestive heart failure), NYHA class II, acute on chronic, combined (HCC)     Hypoxia     Dyspnea and respiratory abnormalities     Occupational pulmonary disease     Sinus bradycardia     Acute hypoxemic respiratory failure due to COVID-19 [facility])     COVID-19     Pneumonia     Acute respiratory failure with hypoxia (HCC)     Pulmonary fibrosis (HCC)     Syncope and collapse     Chronic anticoagulation     Severe malnutrition (HCC)     Cervical stenosis of spinal canal     Impingement syndrome of left shoulder

## 2021-11-29 RX ORDER — DIGOXIN 125 MCG
TABLET ORAL
Qty: 90 TABLET | Refills: 1 | Status: SHIPPED | OUTPATIENT
Start: 2021-11-29 | End: 2022-04-28 | Stop reason: SDUPTHER

## 2021-11-30 ENCOUNTER — TELEPHONE (OUTPATIENT)
Dept: FAMILY MEDICINE CLINIC | Age: 66
End: 2021-11-30

## 2021-11-30 NOTE — TELEPHONE ENCOUNTER
Patient no-showed 11/29/21 appointment; appointment was for 6 month follow up. Phone rang fast busy, no show letter sent.  NS #1

## 2021-12-07 ENCOUNTER — TELEPHONE (OUTPATIENT)
Dept: FAMILY MEDICINE CLINIC | Age: 66
End: 2021-12-07

## 2021-12-07 NOTE — TELEPHONE ENCOUNTER
----- Message from Savannah Lionel sent at 12/6/2021  6:37 PM EST -----  Subject: Appointment Request    Reason for Call: Routine (Patient Request) No Script    QUESTIONS  Type of Appointment? Established Patient  Reason for appointment request? No appointments available during search  Additional Information for Provider? pt screen green pt is needing to be   fit into a visit pt is stating he needs his follow up on his lungs. ---------------------------------------------------------------------------  --------------  Rhett CADE  What is the best way for the office to contact you? OK to leave message on   voicemail  Preferred Call Back Phone Number? [phone]  ---------------------------------------------------------------------------  --------------  SCRIPT ANSWERS  Relationship to Patient? Self  (Is the patient requesting to see the provider for a procedure?)? No  (Is the patient requesting to see the provider urgently  today or   tomorrow. )? No  Have you been diagnosed with, awaiting test results for, or told that you   are suspected of having COVID-19 (Coronavirus)? (If patient has tested   negative or was tested as a requirement for work, school, or travel and   not based on symptoms, answer no)? No  Within the past two weeks have you developed any of the following symptoms   (answer no if symptoms have been present longer than 2 weeks or began   more than 2 weeks ago)? Fever or Chills, Cough, Shortness of breath or   difficulty breathing, Loss of taste or smell, Sore throat, Nasal   congestion, Sneezing or runny nose, Fatigue or generalized body aches   (answer no if pain is specific to a body part e.g. back pain), Diarrhea,   Headache? No  Have you had close contact with someone with COVID-19 in the last 14 days? No  (Service Expert  click yes below to proceed with [employer]   Scheduling)?  Yes

## 2021-12-14 ENCOUNTER — OFFICE VISIT (OUTPATIENT)
Dept: FAMILY MEDICINE CLINIC | Age: 66
End: 2021-12-14
Payer: MEDICARE

## 2021-12-14 VITALS
HEIGHT: 70 IN | OXYGEN SATURATION: 100 % | BODY MASS INDEX: 24.48 KG/M2 | DIASTOLIC BLOOD PRESSURE: 80 MMHG | SYSTOLIC BLOOD PRESSURE: 124 MMHG | TEMPERATURE: 98.2 F | HEART RATE: 57 BPM | WEIGHT: 171 LBS

## 2021-12-14 DIAGNOSIS — Z71.89 ACP (ADVANCE CARE PLANNING): ICD-10-CM

## 2021-12-14 DIAGNOSIS — I48.0 PAF (PAROXYSMAL ATRIAL FIBRILLATION) (HCC): ICD-10-CM

## 2021-12-14 DIAGNOSIS — R06.09 DYSPNEA ON EXERTION: ICD-10-CM

## 2021-12-14 DIAGNOSIS — J84.10 PULMONARY FIBROSIS (HCC): ICD-10-CM

## 2021-12-14 DIAGNOSIS — K22.70 BARRETT'S ESOPHAGUS DETERMINED BY ENDOSCOPY: ICD-10-CM

## 2021-12-14 DIAGNOSIS — Z13.29 SCREENING FOR THYROID DISORDER: ICD-10-CM

## 2021-12-14 DIAGNOSIS — E78.5 DYSLIPIDEMIA: ICD-10-CM

## 2021-12-14 DIAGNOSIS — K22.70 BARRETT'S ESOPHAGUS WITHOUT DYSPLASIA: ICD-10-CM

## 2021-12-14 DIAGNOSIS — H61.23 BILATERAL IMPACTED CERUMEN: ICD-10-CM

## 2021-12-14 DIAGNOSIS — I26.93 SINGLE SUBSEGMENTAL PULMONARY EMBOLISM WITHOUT ACUTE COR PULMONALE (HCC): ICD-10-CM

## 2021-12-14 DIAGNOSIS — Z00.00 ROUTINE GENERAL MEDICAL EXAMINATION AT A HEALTH CARE FACILITY: Primary | ICD-10-CM

## 2021-12-14 DIAGNOSIS — D64.9 ANEMIA, UNSPECIFIED TYPE: ICD-10-CM

## 2021-12-14 DIAGNOSIS — H54.7 VISION IMPAIRMENT: ICD-10-CM

## 2021-12-14 DIAGNOSIS — I50.43 CHF (CONGESTIVE HEART FAILURE), NYHA CLASS II, ACUTE ON CHRONIC, COMBINED (HCC): ICD-10-CM

## 2021-12-14 PROCEDURE — 4040F PNEUMOC VAC/ADMIN/RCVD: CPT | Performed by: INTERNAL MEDICINE

## 2021-12-14 PROCEDURE — 1123F ACP DISCUSS/DSCN MKR DOCD: CPT | Performed by: INTERNAL MEDICINE

## 2021-12-14 PROCEDURE — G8484 FLU IMMUNIZE NO ADMIN: HCPCS | Performed by: INTERNAL MEDICINE

## 2021-12-14 PROCEDURE — G0438 PPPS, INITIAL VISIT: HCPCS | Performed by: INTERNAL MEDICINE

## 2021-12-14 PROCEDURE — 3017F COLORECTAL CA SCREEN DOC REV: CPT | Performed by: INTERNAL MEDICINE

## 2021-12-14 RX ORDER — LISINOPRIL 10 MG/1
10 TABLET ORAL 2 TIMES DAILY
Qty: 180 TABLET | Refills: 1 | Status: ON HOLD | OUTPATIENT
Start: 2021-12-14 | End: 2022-03-02 | Stop reason: HOSPADM

## 2021-12-14 RX ORDER — OMEPRAZOLE 40 MG/1
40 CAPSULE, DELAYED RELEASE ORAL DAILY
Qty: 90 CAPSULE | Refills: 1 | Status: SHIPPED | OUTPATIENT
Start: 2021-12-14 | End: 2022-04-28 | Stop reason: SDUPTHER

## 2021-12-14 ASSESSMENT — LIFESTYLE VARIABLES
HOW OFTEN DO YOU HAVE A DRINK CONTAINING ALCOHOL: 3
AUDIT TOTAL SCORE: 3
HOW MANY STANDARD DRINKS CONTAINING ALCOHOL DO YOU HAVE ON A TYPICAL DAY: 0
HOW OFTEN DURING THE LAST YEAR HAVE YOU FOUND THAT YOU WERE NOT ABLE TO STOP DRINKING ONCE YOU HAD STARTED: 0
HAS A RELATIVE, FRIEND, DOCTOR, OR ANOTHER HEALTH PROFESSIONAL EXPRESSED CONCERN ABOUT YOUR DRINKING OR SUGGESTED YOU CUT DOWN: 0
HOW OFTEN DURING THE LAST YEAR HAVE YOU FAILED TO DO WHAT WAS NORMALLY EXPECTED FROM YOU BECAUSE OF DRINKING: 0
HOW OFTEN DURING THE LAST YEAR HAVE YOU NEEDED AN ALCOHOLIC DRINK FIRST THING IN THE MORNING TO GET YOURSELF GOING AFTER A NIGHT OF HEAVY DRINKING: 0
HOW OFTEN DO YOU HAVE SIX OR MORE DRINKS ON ONE OCCASION: 0
HOW OFTEN DURING THE LAST YEAR HAVE YOU BEEN UNABLE TO REMEMBER WHAT HAPPENED THE NIGHT BEFORE BECAUSE YOU HAD BEEN DRINKING: 0
AUDIT-C TOTAL SCORE: 3
HOW OFTEN DURING THE LAST YEAR HAVE YOU HAD A FEELING OF GUILT OR REMORSE AFTER DRINKING: 0
HAVE YOU OR SOMEONE ELSE BEEN INJURED AS A RESULT OF YOUR DRINKING: 0

## 2021-12-14 ASSESSMENT — PATIENT HEALTH QUESTIONNAIRE - PHQ9
SUM OF ALL RESPONSES TO PHQ QUESTIONS 1-9: 0
SUM OF ALL RESPONSES TO PHQ9 QUESTIONS 1 & 2: 0
SUM OF ALL RESPONSES TO PHQ QUESTIONS 1-9: 0
1. LITTLE INTEREST OR PLEASURE IN DOING THINGS: 0
2. FEELING DOWN, DEPRESSED OR HOPELESS: 0
SUM OF ALL RESPONSES TO PHQ QUESTIONS 1-9: 0

## 2021-12-14 NOTE — PROGRESS NOTES
Medicare Annual Wellness Visit  Name: Aroldo Falk Date: 2021   MRN: [id number] Sex: Male   Age: 74 y.o. Ethnicity: Non- / Non    : 1955 Race: White (non-)      Jennifer Kaplan is here for Medicare AWV    Screenings for behavioral, psychosocial and functional/safety risks, and cognitive dysfunction are all negative except as indicated below. These results, as well as other patient data from the [address] form, are documented in Flowsheets linked to this Encounter. Allergies   Allergen Reactions    Adhesive Tape Other (See Comments)     Blister badly     Codeine Nausea Only     Other reaction(s): Other: See Comments  NAUSEA  Other reaction(s): Other: See Comments  NAUSEA    Penicillins Swelling     As a baby  Other reaction(s): Unknown  As a baby    Nintedanib Diarrhea     10-15 BM daily       Prior to Visit Medications    Medication Sig Taking?  Authorizing Provider   digoxin (LANOXIN) 125 MCG tablet take 1 tablet by mouth once daily Yes Cassandra Nielsen MD   atorvastatin (LIPITOR) 40 MG tablet take 1 tablet by mouth once daily Yes MIRI Cardona NP   ibuprofen (ADVIL;MOTRIN) 800 MG tablet take 1 tablet by mouth three times a day AS NEEDED FOR PAIN with food or milk Yes Cassandra Nielsen MD   Baclofen (LIORESAL) 5 MG tablet take 1 tablet by mouth twice a day Yes Cassandra Nielsen MD   ELIQUIS 5 MG TABS tablet take 1 tablet by mouth twice a day Yes Cassandra Nielsen MD   tamsulosin (FLOMAX) 0.4 MG capsule take 1 capsule by mouth once daily Yes Cassandra Nielsen MD   lisinopril (PRINIVIL;ZESTRIL) 10 MG tablet Take 1 tablet by mouth 2 times daily Yes Cassandra Nielsen MD   omeprazole (PRILOSEC) 40 MG delayed release capsule Take 1 capsule by mouth daily Yes Cassandra Nielsen MD   amLODIPine (NORVASC) 5 MG tablet Take 1 tablet by mouth daily Yes Deirdre Pereira MD   Handicap Placard MISC by Does not apply route Expires 2026 Yes Cassandra Andrei Cabrera MD   aspirin 81 MG chewable tablet Take 1 tablet by mouth daily  Patient not taking: Reported on 12/14/2021  Garret Ceballos MD   carvedilol (COREG) 3.125 MG tablet Take 1 tablet by mouth 2 times daily (with meals)  Garret Ceballos MD       Past Medical History:   Diagnosis Date    Atrial fibrillation [facility])     Back pain, chronic     Mcguire's esophagus 06/18/2019    Benign essential HTN     BPH (benign prostatic hyperplasia)     Cancer (HCC)     colon-rectal    Chronic idiopathic pulmonary fibrosis ([facility] [address] 03/29/2021    Cocaine abuse in remission [facility])     1970's    ED (erectile dysfunction) 4/2/2015    GERD (gastroesophageal reflux disease)     GI bleed 12/5/2018    Hernia     History of colon cancer     Melena     Migraines     Murmur, cardiac        Past Surgical History:   Procedure Laterality Date    CARDIAC CATHETERIZATION  11/29/2018    Non-obstructive CAD    CARDIOVERSION  2020    COLECTOMY      2nd colectomy, Colostomy and reversed [facility] COLECTOMY      1st time Ksenia    COLONOSCOPY      COLONOSCOPY  07/18/2016    COLONOSCOPY N/A 12/6/2018    COLONOSCOPY DIAGNOSTIC performed by Jennifer Henry MD at [address] Right 2009    inguinal    KNEE SURGERY Right 1970's    arthrotomy    TONSILLECTOMY      TOTAL KNEE ARTHROPLASTY Right 1/8/2019    KNEE TOTAL ARTHROPLASTY performed by Pancho Edwards MD at [address] TRANSESOPHAGEAL ECHOCARDIOGRAM  11/29/2018    UPPER GASTROINTESTINAL ENDOSCOPY N/A 12/5/2018    EGD DIAGNOSTIC ONLY performed by Jennifer Henry MD at [address] [place] [address] N/A 6/18/2019    MCGUIRE'S       Family History   Problem Relation Age of Onset    Diabetes Mother     Heart Attack Father     Heart Disease Father     Heart Disease Brother        CareTeam (Including outside providers/suppliers regularly involved in providing care):   Patient Care Team:  Paula Mathis MD as PCP - General (Internal Medicine)  Cassandra Arzola MD as PCP - [facility] Empaneled Provider  Areli Martino MD as Consulting Physician (Gastroenterology)  Piedad Morris MD as Consulting Physician (Hematology and Oncology)  Meryle Loan, DO as Consulting Physician (General Surgery)  MIRI Lucreo - CNP (Otolaryngology)    Wt Readings from Last 3 Encounters:   12/14/21 171 lb (77.6 kg)   07/15/21 166 lb 9.6 oz (75.6 kg)   06/02/21 168 lb 12.8 oz (76.6 kg)     Vitals:    12/14/21 1730   BP: 124/80   Site: Left Upper Arm   Position: Sitting   Cuff Size: Large Adult   Pulse: 57   Temp: 98.2 °F (36.8 °C)   TempSrc: Temporal   SpO2: 100%   Weight: 171 lb (77.6 kg)   Height: 5' 10\" (1.778 m)     Body mass index is 24.54 kg/m². Based upon direct observation of the patient, evaluation of cognition reveals recent and remote memory intact. Has increasing shortness of breath, would like to get repeat CT done to reevaluate his lungs. He was able to ride his bicylce for a mile without oxygen till a week ago, now barely able to make it from one end of the house to the other without his oxygen - 2.5L. no dyspnea at rest.     General Appearance: alert and oriented to person, place and time, well developed and well- nourished, in no acute distress.  Oxygen via NC   Skin: warm and dry, no rash or erythema  Head: normocephalic and atraumatic  Eyes: pupils equal, round, and reactive to light, extraocular eye movements intact, conjunctivae normal  ENT: tympanic membrane, external ear and ear canal normal bilaterally, nose without deformity, nasal mucosa and turbinates normal without polyps  Neck: supple and non-tender without mass, no thyromegaly or thyroid nodules, no cervical lymphadenopathy  Pulmonary/Chest: clear to auscultation bilaterally- no wheezes, rales or rhonchi, normal air movement, no respiratory distress  Cardiovascular: normal rate, regular rhythm, normal S1 and S2, no murmurs, rubs, clicks, or gallops, distal pulses intact, no carotid bruits  Abdomen: soft, non-tender, non-distended, normal bowel sounds, no masses or organomegaly  Extremities: no cyanosis, clubbing or edema  Musculoskeletal: normal range of motion, no joint swelling, deformity or tenderness  Neurologic: reflexes normal and symmetric, no cranial nerve deficit, gait, coordination and speech normal    Patient's complete Health Risk Assessment and screening values have been reviewed and are found in Flowsheets. The following problems were reviewed today and where indicated follow up appointments were made and/or referrals ordered. Positive Risk Factor Screenings with Interventions:          General Health and ACP:  General  In general, how would you say your health is?: Fair  In the past 7 days, have you experienced any of the following?  New or Increased Pain, New or Increased Fatigue, Loneliness, Social Isolation, Stress or Anger?: (!) New or Increased Fatigue  Do you get the social and emotional support that you need?: Yes  Do you have a Living Will?: Yes  Advance Directives     Power of [address] [place] [address] Will ACP-Advance Directive ACP-Power of     Not on File Not on File Not on File Not on File      General Health Risk Interventions:  · Fatigue: pulmonary study ordered- CT chest   · No Living Will: ACP documents already completed- patient asked to provide copy to the office    Health Habits/Nutrition:  Health Habits/Nutrition  Do you exercise for at least 20 minutes 2-3 times per week?: Yes  Have you lost any weight without trying in the past 3 months?: No  Do you eat only one meal per day?: No  Have you seen the dentist within the past year?: (!) No  Body mass index: 24.53  Health Habits/Nutrition Interventions:  · Dental exam overdue:  patient encouraged to make appointment with his/her dentist    Hearing/Vision:  No exam data present  Hearing/Vision  Do you or your family notice any trouble with your hearing that hasn't been managed with hearing aids?: (!) Yes  Do you have difficulty driving, watching TV, or doing any of your daily activities because of your eyesight?: No  Have you had an eye exam within the past year?: (!) No  Hearing/Vision Interventions:  · Hearing concerns:  needs ears flushed  · Vision concerns:  patient encouraged to make appointment with his/her eye specialist      Personalized Preventive Plan   Current Health Maintenance Status  Immunization History   Administered Date(s) Administered    COVID-19, [place], PF, 30mcg/0.3mL 04/09/2021, 05/04/2021    Influenza Vaccine, unspecified formulation 01/16/2014, 01/03/2017, 12/12/2017    Influenza Virus Vaccine 01/16/2014, 10/26/2014, 12/04/2015    Influenza, High Dose (Fluzone 65 yrs and older) 09/27/2018    Influenza, Jamas Jointer, 6 mo and older, IM (Fluzone, Flulaval) 12/12/2017    Influenza, [place], IM, (6 mo and older Fluzone, Flulaval, Fluarix and 3 yrs and older Afluria) 10/23/2014, 01/03/2017    Influenza, Quadv, IM, PF (6 mo and older Fluzone, Flulaval, Fluarix, and 3 yrs and older Afluria) 10/23/2014    Influenza, Quadv, adjuvanted, 65 yrs +, IM, PF (Fluad) 11/16/2020, 10/22/2021    Influenza, Triv, inactivated, subunit, adjuvanted, IM (Fluad 65 yrs and older) 11/08/2019        Health Maintenance   Topic Date Due    DTaP/Tdap/Td vaccine (1 - Tdap) Never done    Shingles Vaccine (1 of 2) Never done    Pneumococcal 65+ years Vaccine (1 of 1 - PPSV23) Never done    Colon cancer screen colonoscopy  12/06/2020    Lipid screen  03/10/2021    COVID-19 Vaccine (3 - Booster for [place] series) 11/04/2021    Annual Wellness Visit (AWV)  10/15/2022    Potassium monitoring  10/22/2022    Creatinine monitoring  10/22/2022    Flu vaccine  Completed    AAA screen  Completed    Hepatitis C screen  Completed    Hepatitis A vaccine  Aged Out    Hepatitis B vaccine  Aged Out    Hib vaccine  Aged Out    Meningococcal (ACWY) vaccine  Aged Out     Recommendations for Preventive patient if incompetent. Patient was asked to complete the declaration forms, either acknowledge the forms by a public notary or an eligible witness and provide a signed copy to the practice office.   Time spent (minutes): 15

## 2021-12-20 ENCOUNTER — HOSPITAL ENCOUNTER (OUTPATIENT)
Age: 66
Setting detail: SPECIMEN
Discharge: HOME | End: 2021-12-20

## 2021-12-20 DIAGNOSIS — D64.9 ANEMIA, UNSPECIFIED TYPE: ICD-10-CM

## 2021-12-20 DIAGNOSIS — E78.5 DYSLIPIDEMIA: ICD-10-CM

## 2021-12-20 DIAGNOSIS — Z13.29 SCREENING FOR THYROID DISORDER: ICD-10-CM

## 2021-12-20 DIAGNOSIS — I50.43 CHF (CONGESTIVE HEART FAILURE), NYHA CLASS II, ACUTE ON CHRONIC, COMBINED (HCC): ICD-10-CM

## 2021-12-20 LAB
ABSOLUTE EOS #: 0.37 K/UL (ref 0–0.44)
ABSOLUTE IMMATURE GRANULOCYTE: <0.03 K/UL (ref 0–0.3)
ABSOLUTE LYMPH #: 2.21 K/UL (ref 1.1–3.7)
ABSOLUTE MONO #: 0.6 K/UL (ref 0.1–1.2)
ALBUMIN SERPL-MCNC: 4.1 G/DL (ref 3.5–5.2)
ALBUMIN/GLOBULIN RATIO: 1.2 (ref 1–2.5)
ALP BLD-CCNC: 92 U/L (ref 40–129)
ALT SERPL-CCNC: 8 U/L (ref 5–41)
ANION GAP SERPL CALCULATED.3IONS-SCNC: 16 MMOL/L (ref 9–17)
AST SERPL-CCNC: 18 U/L
BASOPHILS # BLD: 1 % (ref 0–2)
BASOPHILS ABSOLUTE: 0.05 K/UL (ref 0–0.2)
BILIRUB SERPL-MCNC: 0.66 MG/DL (ref 0.3–1.2)
BUN BLDV-MCNC: 21 MG/DL (ref 8–23)
BUN/CREAT BLD: NORMAL (ref 9–20)
CALCIUM SERPL-MCNC: 9.2 MG/DL (ref 8.6–10.4)
CHLORIDE BLD-SCNC: 103 MMOL/L (ref 98–107)
CHOLESTEROL, FASTING: 111 MG/DL
CHOLESTEROL/HDL RATIO: 3.8
CO2: 23 MMOL/L (ref 20–31)
CREAT SERPL-MCNC: 0.71 MG/DL (ref 0.7–1.2)
DIFFERENTIAL TYPE: ABNORMAL
EOSINOPHILS RELATIVE PERCENT: 5 % (ref 1–4)
GFR AFRICAN AMERICAN: >60 ML/MIN
GFR NON-AFRICAN AMERICAN: >60 ML/MIN
GFR SERPL CREATININE-BSD FRML MDRD: NORMAL ML/MIN/{1.73_M2}
GFR SERPL CREATININE-BSD FRML MDRD: NORMAL ML/MIN/{1.73_M2}
GLUCOSE BLD-MCNC: 82 MG/DL (ref 70–99)
HCT VFR BLD CALC: 40.9 % (ref 40.7–50.3)
HDLC SERPL-MCNC: 29 MG/DL
HEMOGLOBIN: 12.4 G/DL (ref 13–17)
IMMATURE GRANULOCYTES: 0 %
LDL CHOLESTEROL: 68 MG/DL (ref 0–130)
LYMPHOCYTES # BLD: 30 % (ref 24–43)
MCH RBC QN AUTO: 26.9 PG (ref 25.2–33.5)
MCHC RBC AUTO-ENTMCNC: 30.3 G/DL (ref 28.4–34.8)
MCV RBC AUTO: 88.7 FL (ref 82.6–102.9)
MONOCYTES # BLD: 8 % (ref 3–12)
NRBC AUTOMATED: 0 PER 100 WBC
PDW BLD-RTO: 14.9 % (ref 11.8–14.4)
PLATELET # BLD: 334 K/UL (ref 138–453)
PLATELET ESTIMATE: ABNORMAL
PMV BLD AUTO: 10.6 FL (ref 8.1–13.5)
POTASSIUM SERPL-SCNC: 4.4 MMOL/L (ref 3.7–5.3)
RBC # BLD: 4.61 M/UL (ref 4.21–5.77)
RBC # BLD: ABNORMAL 10*6/UL
SEG NEUTROPHILS: 56 % (ref 36–65)
SEGMENTED NEUTROPHILS ABSOLUTE COUNT: 4.22 K/UL (ref 1.5–8.1)
SODIUM BLD-SCNC: 142 MMOL/L (ref 135–144)
TOTAL PROTEIN: 7.4 G/DL (ref 6.4–8.3)
TRIGLYCERIDE, FASTING: 71 MG/DL
TSH SERPL DL<=0.05 MIU/L-ACNC: 3.93 MIU/L (ref 0.3–5)
VLDLC SERPL CALC-MCNC: ABNORMAL MG/DL (ref 1–30)
WBC # BLD: 7.5 K/UL (ref 3.5–11.3)
WBC # BLD: ABNORMAL 10*3/UL

## 2021-12-27 ENCOUNTER — HOSPITAL ENCOUNTER (OUTPATIENT)
Dept: CT IMAGING | Age: 66
Discharge: HOME | End: 2021-12-29
Payer: MEDICARE

## 2021-12-27 DIAGNOSIS — J84.10 PULMONARY FIBROSIS (HCC): ICD-10-CM

## 2021-12-27 DIAGNOSIS — R06.09 DYSPNEA ON EXERTION: ICD-10-CM

## 2021-12-27 PROCEDURE — 6360000004 HC RX CONTRAST MEDICATION: Performed by: INTERNAL MEDICINE

## 2021-12-27 PROCEDURE — 71260 CT THORAX DX C+: CPT

## 2021-12-27 PROCEDURE — 2580000003 HC RX 258: Performed by: INTERNAL MEDICINE

## 2021-12-27 RX ORDER — SODIUM CHLORIDE 0.9 % (FLUSH) 0.9 %
10 SYRINGE (ML) INJECTION PRN
Status: DISCONTINUED | OUTPATIENT
Start: 2021-12-27 | End: 2021-12-30 | Stop reason: HOSPADM

## 2021-12-27 RX ORDER — 0.9 % SODIUM CHLORIDE 0.9 %
80 INTRAVENOUS SOLUTION INTRAVENOUS ONCE
Status: COMPLETED | OUTPATIENT
Start: 2021-12-27 | End: 2021-12-27

## 2021-12-27 RX ADMIN — SODIUM CHLORIDE, PRESERVATIVE FREE 10 ML: 5 INJECTION INTRAVENOUS at 14:48

## 2021-12-27 RX ADMIN — IOPAMIDOL 75 ML: 755 INJECTION, SOLUTION INTRAVENOUS at 14:48

## 2021-12-27 RX ADMIN — SODIUM CHLORIDE 80 ML: 9 INJECTION, SOLUTION INTRAVENOUS at 14:48

## 2022-01-13 DIAGNOSIS — M17.11 PRIMARY OSTEOARTHRITIS OF RIGHT KNEE: ICD-10-CM

## 2022-01-14 RX ORDER — IBUPROFEN 800 MG/1
TABLET ORAL
Qty: 90 TABLET | Refills: 1 | Status: SHIPPED | OUTPATIENT
Start: 2022-01-14 | End: 2022-04-28 | Stop reason: SDUPTHER

## 2022-01-14 NOTE — TELEPHONE ENCOUNTER
(congestive heart failure), NYHA class II, acute on chronic, combined (HCC)     Hypoxia     Dyspnea and respiratory abnormalities     Occupational pulmonary disease     Sinus bradycardia     Acute hypoxemic respiratory failure due to COVID-19 [facility])     COVID-19     Pneumonia     Acute respiratory failure with hypoxia (HCC)     Pulmonary fibrosis (HCC)     Syncope and collapse     Chronic anticoagulation     Severe malnutrition (HCC)     Cervical stenosis of spinal canal     Impingement syndrome of left shoulder

## 2022-01-27 DIAGNOSIS — M54.2 NECK PAIN: ICD-10-CM

## 2022-01-27 RX ORDER — BACLOFEN 5 MG/1
TABLET ORAL
Qty: 180 TABLET | Refills: 1 | Status: SHIPPED | OUTPATIENT
Start: 2022-01-27

## 2022-01-27 NOTE — TELEPHONE ENCOUNTER
Last visit: 12/14/21  Last Med refill: 9/30/21  Does patient have enough medication for 72 hours: Yes    Next Visit Date:  No future appointments.     Health Maintenance   Topic Date Due    DTaP/Tdap/Td vaccine (1 - Tdap) Never done    Shingles Vaccine (1 of 2) Never done    Pneumococcal 65+ years Vaccine (1 of 1 - PPSV23) Never done    Colon cancer screen colonoscopy  12/06/2020    Depression Screen  12/14/2022    Annual Wellness Visit (AWV)  12/15/2022    Lipid screen  12/20/2022    Potassium monitoring  12/20/2022    Creatinine monitoring  12/20/2022    Flu vaccine  Completed    COVID-19 Vaccine  Completed    AAA screen  Completed    Hepatitis C screen  Completed    Hepatitis A vaccine  Aged Out    Hepatitis B vaccine  Aged Out    Hib vaccine  Aged Out    Meningococcal (ACWY) vaccine  Aged Out       Hemoglobin A1C (%)   Date Value   01/10/2018 5.0             ( goal A1C is < 7)   No results found for: LABMICR  LDL Cholesterol (mg/dL)   Date Value   12/20/2021 68   03/10/2020 82       (goal LDL is <100)   AST (U/L)   Date Value   12/20/2021 18     ALT (U/L)   Date Value   12/20/2021 8     BUN (mg/dL)   Date Value   12/20/2021 21     BP Readings from Last 3 Encounters:   12/14/21 124/80   07/15/21 118/68   05/24/21 118/80          (goal 120/80)    All Future Testing planned in [facility]  Lab Frequency Next Occurrence               Patient Active Problem List:     Dyslipidemia     ED (erectile dysfunction)     Incomplete bladder emptying     Benign prostatic hyperplasia with urinary obstruction     History of colon cancer     PAF (paroxysmal atrial fibrillation) (HCC)     Anemia of chronic disease     Pallor of optic disc     Presbyopia     Incisional hernia, without obstruction or gangrene     Hypertrophic nonobstructive cardiomyopathy (HCC)     Iron (Fe) deficiency anemia     Primary osteoarthritis of right knee     History of malignant neoplasm of rectum     Hill's esophagus     CHF (congestive heart failure), NYHA class II, acute on chronic, combined (HCC)     Hypoxia     Dyspnea and respiratory abnormalities     Occupational pulmonary disease     Sinus bradycardia     Acute hypoxemic respiratory failure due to COVID-19 [facility])     COVID-19     Pneumonia     Acute respiratory failure with hypoxia (HCC)     Pulmonary fibrosis (HCC)     Syncope and collapse     Chronic anticoagulation     Severe malnutrition (HCC)     Cervical stenosis of spinal canal     Impingement syndrome of left shoulder

## 2022-02-08 RX ORDER — TAMSULOSIN HYDROCHLORIDE 0.4 MG/1
CAPSULE ORAL
Qty: 90 CAPSULE | Refills: 1 | Status: SHIPPED | OUTPATIENT
Start: 2022-02-08 | End: 2022-08-08

## 2022-02-08 NOTE — TELEPHONE ENCOUNTER
Last visit: 12/14/21  Last Med refill: 8/16/21  Does patient have enough medication for 72 hours: Yes    Next Visit Date:  No future appointments.     Health Maintenance   Topic Date Due    DTaP/Tdap/Td vaccine (1 - Tdap) Never done    Shingles Vaccine (1 of 2) Never done    Pneumococcal 65+ years Vaccine (1 of 1 - PPSV23) Never done    Colon cancer screen colonoscopy  12/06/2020    Depression Screen  12/14/2022    Annual Wellness Visit (AWV)  12/15/2022    Lipid screen  12/20/2022    Potassium monitoring  12/20/2022    Creatinine monitoring  12/20/2022    Flu vaccine  Completed    COVID-19 Vaccine  Completed    AAA screen  Completed    Hepatitis C screen  Completed    Hepatitis A vaccine  Aged Out    Hepatitis B vaccine  Aged Out    Hib vaccine  Aged Out    Meningococcal (ACWY) vaccine  Aged Out       Hemoglobin A1C (%)   Date Value   01/10/2018 5.0             ( goal A1C is < 7)   No results found for: LABMICR  LDL Cholesterol (mg/dL)   Date Value   12/20/2021 68   03/10/2020 82       (goal LDL is <100)   AST (U/L)   Date Value   12/20/2021 18     ALT (U/L)   Date Value   12/20/2021 8     BUN (mg/dL)   Date Value   12/20/2021 21     BP Readings from Last 3 Encounters:   12/14/21 124/80   07/15/21 118/68   05/24/21 118/80          (goal 120/80)    All Future Testing planned in [facility]  Lab Frequency Next Occurrence               Patient Active Problem List:     Dyslipidemia     ED (erectile dysfunction)     Incomplete bladder emptying     Benign prostatic hyperplasia with urinary obstruction     History of colon cancer     PAF (paroxysmal atrial fibrillation) (HCC)     Anemia of chronic disease     Pallor of optic disc     Presbyopia     Incisional hernia, without obstruction or gangrene     Hypertrophic nonobstructive cardiomyopathy (HCC)     Iron (Fe) deficiency anemia     Primary osteoarthritis of right knee     History of malignant neoplasm of rectum     Hill's esophagus     CHF (congestive heart failure), NYHA class II, acute on chronic, combined (HCC)     Hypoxia     Dyspnea and respiratory abnormalities     Occupational pulmonary disease     Sinus bradycardia     Acute hypoxemic respiratory failure due to COVID-19 [facility])     COVID-19     Pneumonia     Acute respiratory failure with hypoxia (HCC)     Pulmonary fibrosis (HCC)     Syncope and collapse     Chronic anticoagulation     Severe malnutrition (HCC)     Cervical stenosis of spinal canal     Impingement syndrome of left shoulder

## 2022-02-18 ENCOUNTER — HOSPITAL ENCOUNTER (INPATIENT)
Age: 67
LOS: 13 days | Discharge: NURSING, REHAB OR LONG-TERM CARE | DRG: 521 | End: 2022-03-03
Attending: EMERGENCY MEDICINE | Admitting: INTERNAL MEDICINE
Payer: MEDICARE

## 2022-02-18 ENCOUNTER — APPOINTMENT (OUTPATIENT)
Dept: CT IMAGING | Age: 67
DRG: 521 | End: 2022-02-18
Payer: MEDICARE

## 2022-02-18 ENCOUNTER — APPOINTMENT (OUTPATIENT)
Dept: GENERAL RADIOLOGY | Age: 67
DRG: 521 | End: 2022-02-18
Payer: MEDICARE

## 2022-02-18 DIAGNOSIS — R09.02 HYPOXIA: ICD-10-CM

## 2022-02-18 DIAGNOSIS — S72.001A CLOSED FRACTURE OF NECK OF RIGHT FEMUR, INITIAL ENCOUNTER (HCC): ICD-10-CM

## 2022-02-18 DIAGNOSIS — J18.9 PNEUMONIA OF BOTH LUNGS DUE TO INFECTIOUS ORGANISM, UNSPECIFIED PART OF LUNG: Primary | ICD-10-CM

## 2022-02-18 PROBLEM — J15.9 COMMUNITY ACQUIRED BACTERIAL PNEUMONIA: Status: ACTIVE | Noted: 2022-01-01

## 2022-02-18 LAB
ABSOLUTE EOS #: 0.2 K/UL (ref 0–0.4)
ABSOLUTE IMMATURE GRANULOCYTE: ABNORMAL K/UL (ref 0–0.3)
ABSOLUTE LYMPH #: 2.6 K/UL (ref 1–4.8)
ABSOLUTE MONO #: 0.9 K/UL (ref 0.1–1.3)
ALLEN TEST: ABNORMAL
ANION GAP SERPL CALCULATED.3IONS-SCNC: 17 MMOL/L (ref 9–17)
BASOPHILS # BLD: 1 % (ref 0–2)
BASOPHILS ABSOLUTE: 0.1 K/UL (ref 0–0.2)
BUN BLDV-MCNC: 19 MG/DL (ref 8–23)
BUN/CREAT BLD: ABNORMAL (ref 9–20)
CALCIUM SERPL-MCNC: 9 MG/DL (ref 8.6–10.4)
CARBOXYHEMOGLOBIN: 1.5 % (ref 0–5)
CHLORIDE BLD-SCNC: 97 MMOL/L (ref 98–107)
CO2: 24 MMOL/L (ref 20–31)
CREAT SERPL-MCNC: 0.85 MG/DL (ref 0.7–1.2)
D-DIMER QUANTITATIVE: 1.1 MG/L FEU (ref 0–0.59)
DIFFERENTIAL TYPE: ABNORMAL
EKG ATRIAL RATE: 394 BPM
EKG Q-T INTERVAL: 282 MS
EKG QRS DURATION: 84 MS
EKG QTC CALCULATION (BAZETT): 381 MS
EKG R AXIS: -18 DEGREES
EKG T AXIS: 177 DEGREES
EKG VENTRICULAR RATE: 110 BPM
EOSINOPHILS RELATIVE PERCENT: 2 % (ref 0–4)
FIO2: ABNORMAL
GFR AFRICAN AMERICAN: >60 ML/MIN
GFR NON-AFRICAN AMERICAN: >60 ML/MIN
GFR SERPL CREATININE-BSD FRML MDRD: ABNORMAL ML/MIN/{1.73_M2}
GFR SERPL CREATININE-BSD FRML MDRD: ABNORMAL ML/MIN/{1.73_M2}
GLUCOSE BLD-MCNC: 239 MG/DL (ref 70–99)
HCO3 ARTERIAL: 25.9 MMOL/L (ref 22–26)
HCT VFR BLD CALC: 33.5 % (ref 41–53)
HCT VFR BLD CALC: 38.3 % (ref 41–53)
HEMOGLOBIN: 10.7 G/DL (ref 13.5–17.5)
HEMOGLOBIN: 12 G/DL (ref 13.5–17.5)
IMMATURE GRANULOCYTES: ABNORMAL %
LACTIC ACID, SEPSIS WHOLE BLOOD: NORMAL MMOL/L (ref 0.5–1.9)
LACTIC ACID, SEPSIS: 1.4 MMOL/L (ref 0.5–1.9)
LEGIONELLA PNEUMOPHILIA AG, URINE: NEGATIVE
LYMPHOCYTES # BLD: 22 % (ref 24–44)
MCH RBC QN AUTO: 24.8 PG (ref 26–34)
MCH RBC QN AUTO: 25 PG (ref 26–34)
MCHC RBC AUTO-ENTMCNC: 31.4 G/DL (ref 31–37)
MCHC RBC AUTO-ENTMCNC: 31.8 G/DL (ref 31–37)
MCV RBC AUTO: 77.9 FL (ref 80–100)
MCV RBC AUTO: 79.6 FL (ref 80–100)
METHEMOGLOBIN: 0.3 % (ref 0–1.9)
MODE: ABNORMAL
MONOCYTES # BLD: 8 % (ref 1–7)
NEGATIVE BASE EXCESS, ART: ABNORMAL MMOL/L (ref 0–2)
NOTIFICATION TIME: ABNORMAL
NOTIFICATION: ABNORMAL
NRBC AUTOMATED: ABNORMAL PER 100 WBC
NRBC AUTOMATED: ABNORMAL PER 100 WBC
O2 DEVICE/FLOW/%: ABNORMAL
O2 SAT, ARTERIAL: 59.5 % (ref 95–98)
OXYHEMOGLOBIN: ABNORMAL % (ref 95–98)
PATIENT TEMP: ABNORMAL
PCO2 ARTERIAL: 42.4 MMHG (ref 35–45)
PCO2, ART, TEMP ADJ: ABNORMAL (ref 35–45)
PDW BLD-RTO: 17.2 % (ref 11.5–14.9)
PDW BLD-RTO: 17.6 % (ref 11.5–14.9)
PEEP/CPAP: ABNORMAL
PH ARTERIAL: 7.39 (ref 7.35–7.45)
PH, ART, TEMP ADJ: ABNORMAL (ref 7.35–7.45)
PLATELET # BLD: 230 K/UL (ref 150–450)
PLATELET # BLD: 331 K/UL (ref 150–450)
PLATELET ESTIMATE: ABNORMAL
PMV BLD AUTO: 7.3 FL (ref 6–12)
PMV BLD AUTO: 7.8 FL (ref 6–12)
PO2 ARTERIAL: 33.4 MMHG (ref 80–100)
PO2, ART, TEMP ADJ: ABNORMAL MMHG (ref 80–100)
POSITIVE BASE EXCESS, ART: 1 MMOL/L (ref 0–2)
POTASSIUM SERPL-SCNC: 4.1 MMOL/L (ref 3.7–5.3)
PROCALCITONIN: 0.1 NG/ML
PSV: ABNORMAL
PT. POSITION: ABNORMAL
RBC # BLD: 4.3 M/UL (ref 4.5–5.9)
RBC # BLD: 4.81 M/UL (ref 4.5–5.9)
RBC # BLD: ABNORMAL 10*6/UL
RESPIRATORY RATE: ABNORMAL
SAMPLE SITE: ABNORMAL
SARS-COV-2, RAPID: NOT DETECTED
SEG NEUTROPHILS: 67 % (ref 36–66)
SEGMENTED NEUTROPHILS ABSOLUTE COUNT: 7.9 K/UL (ref 1.3–9.1)
SET RATE: ABNORMAL
SODIUM BLD-SCNC: 138 MMOL/L (ref 135–144)
SOURCE: NORMAL
SPECIMEN DESCRIPTION: NORMAL
STREP PNEUMONIAE ANTIGEN: NEGATIVE
TEXT FOR RESPIRATORY: ABNORMAL
TOTAL HB: ABNORMAL G/DL (ref 12–16)
TOTAL RATE: ABNORMAL
TROPONIN INTERP: NORMAL
TROPONIN T: NORMAL NG/ML
TROPONIN, HIGH SENSITIVITY: 22 NG/L (ref 0–22)
VT: ABNORMAL
WBC # BLD: 11.7 K/UL (ref 3.5–11)
WBC # BLD: 9.8 K/UL (ref 3.5–11)
WBC # BLD: ABNORMAL 10*3/UL

## 2022-02-18 PROCEDURE — 85379 FIBRIN DEGRADATION QUANT: CPT

## 2022-02-18 PROCEDURE — 2500000003 HC RX 250 WO HCPCS: Performed by: STUDENT IN AN ORGANIZED HEALTH CARE EDUCATION/TRAINING PROGRAM

## 2022-02-18 PROCEDURE — 73502 X-RAY EXAM HIP UNI 2-3 VIEWS: CPT

## 2022-02-18 PROCEDURE — 6360000004 HC RX CONTRAST MEDICATION: Performed by: EMERGENCY MEDICINE

## 2022-02-18 PROCEDURE — 6360000002 HC RX W HCPCS: Performed by: EMERGENCY MEDICINE

## 2022-02-18 PROCEDURE — 87635 SARS-COV-2 COVID-19 AMP PRB: CPT

## 2022-02-18 PROCEDURE — 84484 ASSAY OF TROPONIN QUANT: CPT

## 2022-02-18 PROCEDURE — 93010 ELECTROCARDIOGRAM REPORT: CPT | Performed by: INTERNAL MEDICINE

## 2022-02-18 PROCEDURE — 71045 X-RAY EXAM CHEST 1 VIEW: CPT

## 2022-02-18 PROCEDURE — 99285 EMERGENCY DEPT VISIT HI MDM: CPT

## 2022-02-18 PROCEDURE — 82805 BLOOD GASES W/O2 SATURATION: CPT

## 2022-02-18 PROCEDURE — 99223 1ST HOSP IP/OBS HIGH 75: CPT | Performed by: INTERNAL MEDICINE

## 2022-02-18 PROCEDURE — 83605 ASSAY OF LACTIC ACID: CPT

## 2022-02-18 PROCEDURE — 93005 ELECTROCARDIOGRAM TRACING: CPT | Performed by: EMERGENCY MEDICINE

## 2022-02-18 PROCEDURE — 71260 CT THORAX DX C+: CPT

## 2022-02-18 PROCEDURE — 85027 COMPLETE CBC AUTOMATED: CPT

## 2022-02-18 PROCEDURE — 80048 BASIC METABOLIC PNL TOTAL CA: CPT

## 2022-02-18 PROCEDURE — 36415 COLL VENOUS BLD VENIPUNCTURE: CPT

## 2022-02-18 PROCEDURE — 87899 AGENT NOS ASSAY W/OPTIC: CPT

## 2022-02-18 PROCEDURE — 94761 N-INVAS EAR/PLS OXIMETRY MLT: CPT

## 2022-02-18 PROCEDURE — 36600 WITHDRAWAL OF ARTERIAL BLOOD: CPT

## 2022-02-18 PROCEDURE — 84145 PROCALCITONIN (PCT): CPT

## 2022-02-18 PROCEDURE — 70450 CT HEAD/BRAIN W/O DYE: CPT

## 2022-02-18 PROCEDURE — 6360000002 HC RX W HCPCS: Performed by: STUDENT IN AN ORGANIZED HEALTH CARE EDUCATION/TRAINING PROGRAM

## 2022-02-18 PROCEDURE — 94660 CPAP INITIATION&MGMT: CPT

## 2022-02-18 PROCEDURE — 2580000003 HC RX 258: Performed by: STUDENT IN AN ORGANIZED HEALTH CARE EDUCATION/TRAINING PROGRAM

## 2022-02-18 PROCEDURE — 2700000000 HC OXYGEN THERAPY PER DAY

## 2022-02-18 PROCEDURE — 87449 NOS EACH ORGANISM AG IA: CPT

## 2022-02-18 PROCEDURE — 2580000003 HC RX 258: Performed by: EMERGENCY MEDICINE

## 2022-02-18 PROCEDURE — 86738 MYCOPLASMA ANTIBODY: CPT

## 2022-02-18 PROCEDURE — 6370000000 HC RX 637 (ALT 250 FOR IP): Performed by: STUDENT IN AN ORGANIZED HEALTH CARE EDUCATION/TRAINING PROGRAM

## 2022-02-18 PROCEDURE — 82103 ALPHA-1-ANTITRYPSIN TOTAL: CPT

## 2022-02-18 PROCEDURE — 85025 COMPLETE CBC W/AUTO DIFF WBC: CPT

## 2022-02-18 PROCEDURE — 2060000000 HC ICU INTERMEDIATE R&B

## 2022-02-18 PROCEDURE — 87040 BLOOD CULTURE FOR BACTERIA: CPT

## 2022-02-18 PROCEDURE — 96375 TX/PRO/DX INJ NEW DRUG ADDON: CPT

## 2022-02-18 PROCEDURE — 96374 THER/PROPH/DIAG INJ IV PUSH: CPT

## 2022-02-18 RX ORDER — POLYETHYLENE GLYCOL 3350 17 G/17G
17 POWDER, FOR SOLUTION ORAL DAILY PRN
Status: DISCONTINUED | OUTPATIENT
Start: 2022-02-18 | End: 2022-03-03 | Stop reason: HOSPADM

## 2022-02-18 RX ORDER — DIGOXIN 125 MCG
125 TABLET ORAL DAILY
Status: DISCONTINUED | OUTPATIENT
Start: 2022-02-19 | End: 2022-02-22

## 2022-02-18 RX ORDER — SODIUM CHLORIDE 0.9 % (FLUSH) 0.9 %
5-40 SYRINGE (ML) INJECTION EVERY 12 HOURS SCHEDULED
Status: DISCONTINUED | OUTPATIENT
Start: 2022-02-18 | End: 2022-03-03 | Stop reason: HOSPADM

## 2022-02-18 RX ORDER — MORPHINE SULFATE 4 MG/ML
4 INJECTION, SOLUTION INTRAMUSCULAR; INTRAVENOUS ONCE
Status: COMPLETED | OUTPATIENT
Start: 2022-02-18 | End: 2022-02-18

## 2022-02-18 RX ORDER — POTASSIUM CHLORIDE 29.8 MG/ML
20 INJECTION INTRAVENOUS PRN
Status: DISCONTINUED | OUTPATIENT
Start: 2022-02-18 | End: 2022-03-03 | Stop reason: HOSPADM

## 2022-02-18 RX ORDER — ACETAMINOPHEN 325 MG/1
650 TABLET ORAL EVERY 6 HOURS PRN
Status: DISCONTINUED | OUTPATIENT
Start: 2022-02-18 | End: 2022-03-03 | Stop reason: HOSPADM

## 2022-02-18 RX ORDER — 0.9 % SODIUM CHLORIDE 0.9 %
80 INTRAVENOUS SOLUTION INTRAVENOUS ONCE
Status: COMPLETED | OUTPATIENT
Start: 2022-02-18 | End: 2022-02-18

## 2022-02-18 RX ORDER — ATORVASTATIN CALCIUM 40 MG/1
40 TABLET, FILM COATED ORAL DAILY
Status: DISCONTINUED | OUTPATIENT
Start: 2022-02-19 | End: 2022-03-03 | Stop reason: HOSPADM

## 2022-02-18 RX ORDER — PANTOPRAZOLE SODIUM 40 MG/1
40 TABLET, DELAYED RELEASE ORAL
Status: DISCONTINUED | OUTPATIENT
Start: 2022-02-19 | End: 2022-02-18

## 2022-02-18 RX ORDER — CARVEDILOL 3.12 MG/1
3.12 TABLET ORAL 2 TIMES DAILY WITH MEALS
Status: DISCONTINUED | OUTPATIENT
Start: 2022-02-19 | End: 2022-02-25

## 2022-02-18 RX ORDER — MORPHINE SULFATE 2 MG/ML
1 INJECTION, SOLUTION INTRAMUSCULAR; INTRAVENOUS EVERY 4 HOURS PRN
Status: DISCONTINUED | OUTPATIENT
Start: 2022-02-18 | End: 2022-02-19

## 2022-02-18 RX ORDER — SODIUM CHLORIDE 9 MG/ML
25 INJECTION, SOLUTION INTRAVENOUS PRN
Status: DISCONTINUED | OUTPATIENT
Start: 2022-02-18 | End: 2022-03-03 | Stop reason: HOSPADM

## 2022-02-18 RX ORDER — LORAZEPAM 2 MG/ML
1 INJECTION INTRAMUSCULAR ONCE
Status: COMPLETED | OUTPATIENT
Start: 2022-02-18 | End: 2022-02-18

## 2022-02-18 RX ORDER — TAMSULOSIN HYDROCHLORIDE 0.4 MG/1
0.4 CAPSULE ORAL DAILY
Status: DISCONTINUED | OUTPATIENT
Start: 2022-02-19 | End: 2022-03-03 | Stop reason: HOSPADM

## 2022-02-18 RX ORDER — SODIUM CHLORIDE 0.9 % (FLUSH) 0.9 %
5-40 SYRINGE (ML) INJECTION PRN
Status: DISCONTINUED | OUTPATIENT
Start: 2022-02-18 | End: 2022-03-03 | Stop reason: HOSPADM

## 2022-02-18 RX ORDER — SODIUM CHLORIDE 0.9 % (FLUSH) 0.9 %
10 SYRINGE (ML) INJECTION PRN
Status: DISCONTINUED | OUTPATIENT
Start: 2022-02-18 | End: 2022-03-03 | Stop reason: HOSPADM

## 2022-02-18 RX ORDER — ASPIRIN 81 MG/1
81 TABLET, CHEWABLE ORAL DAILY
Status: DISCONTINUED | OUTPATIENT
Start: 2022-02-19 | End: 2022-03-03 | Stop reason: HOSPADM

## 2022-02-18 RX ORDER — ONDANSETRON 4 MG/1
4 TABLET, ORALLY DISINTEGRATING ORAL EVERY 8 HOURS PRN
Status: DISCONTINUED | OUTPATIENT
Start: 2022-02-18 | End: 2022-03-03 | Stop reason: HOSPADM

## 2022-02-18 RX ORDER — ONDANSETRON 2 MG/ML
4 INJECTION INTRAMUSCULAR; INTRAVENOUS EVERY 6 HOURS PRN
Status: DISCONTINUED | OUTPATIENT
Start: 2022-02-18 | End: 2022-03-03 | Stop reason: HOSPADM

## 2022-02-18 RX ORDER — POTASSIUM CHLORIDE 7.45 MG/ML
10 INJECTION INTRAVENOUS PRN
Status: DISCONTINUED | OUTPATIENT
Start: 2022-02-18 | End: 2022-03-03 | Stop reason: HOSPADM

## 2022-02-18 RX ORDER — LORAZEPAM 2 MG/ML
0.5 INJECTION INTRAMUSCULAR EVERY 6 HOURS
Status: DISCONTINUED | OUTPATIENT
Start: 2022-02-18 | End: 2022-02-21

## 2022-02-18 RX ORDER — METHYLPREDNISOLONE SODIUM SUCCINATE 40 MG/ML
40 INJECTION, POWDER, LYOPHILIZED, FOR SOLUTION INTRAMUSCULAR; INTRAVENOUS DAILY
Status: DISCONTINUED | OUTPATIENT
Start: 2022-02-19 | End: 2022-02-19

## 2022-02-18 RX ORDER — AMLODIPINE BESYLATE 5 MG/1
5 TABLET ORAL DAILY
Status: DISCONTINUED | OUTPATIENT
Start: 2022-02-19 | End: 2022-02-25

## 2022-02-18 RX ORDER — LISINOPRIL 20 MG/1
10 TABLET ORAL 2 TIMES DAILY
Status: DISCONTINUED | OUTPATIENT
Start: 2022-02-19 | End: 2022-03-03

## 2022-02-18 RX ORDER — ACETAMINOPHEN 650 MG/1
650 SUPPOSITORY RECTAL EVERY 6 HOURS PRN
Status: DISCONTINUED | OUTPATIENT
Start: 2022-02-18 | End: 2022-03-03 | Stop reason: HOSPADM

## 2022-02-18 RX ADMIN — SODIUM CHLORIDE, PRESERVATIVE FREE 10 ML: 5 INJECTION INTRAVENOUS at 20:54

## 2022-02-18 RX ADMIN — CEFTRIAXONE SODIUM 1000 MG: 1 INJECTION, POWDER, FOR SOLUTION INTRAMUSCULAR; INTRAVENOUS at 07:28

## 2022-02-18 RX ADMIN — MORPHINE SULFATE 1 MG: 2 INJECTION, SOLUTION INTRAMUSCULAR; INTRAVENOUS at 20:56

## 2022-02-18 RX ADMIN — LORAZEPAM 1 MG: 2 INJECTION INTRAMUSCULAR; INTRAVENOUS at 06:25

## 2022-02-18 RX ADMIN — MORPHINE SULFATE 4 MG: 4 INJECTION, SOLUTION INTRAMUSCULAR; INTRAVENOUS at 06:43

## 2022-02-18 RX ADMIN — ONDANSETRON 4 MG: 4 TABLET, ORALLY DISINTEGRATING ORAL at 08:54

## 2022-02-18 RX ADMIN — FAMOTIDINE 20 MG: 10 INJECTION, SOLUTION INTRAVENOUS at 20:55

## 2022-02-18 RX ADMIN — MORPHINE SULFATE 4 MG: 4 INJECTION, SOLUTION INTRAMUSCULAR; INTRAVENOUS at 08:04

## 2022-02-18 RX ADMIN — SODIUM CHLORIDE 80 ML: 9 INJECTION, SOLUTION INTRAVENOUS at 08:25

## 2022-02-18 RX ADMIN — AZITHROMYCIN 500 MG: 500 INJECTION, POWDER, LYOPHILIZED, FOR SOLUTION INTRAVENOUS at 08:00

## 2022-02-18 RX ADMIN — SODIUM CHLORIDE, PRESERVATIVE FREE 10 ML: 5 INJECTION INTRAVENOUS at 08:24

## 2022-02-18 RX ADMIN — IOVERSOL 75 ML: 741 INJECTION INTRA-ARTERIAL; INTRAVENOUS at 08:24

## 2022-02-18 RX ADMIN — FAMOTIDINE 20 MG: 10 INJECTION, SOLUTION INTRAVENOUS at 12:41

## 2022-02-18 ASSESSMENT — ENCOUNTER SYMPTOMS
RHINORRHEA: 0
WHEEZING: 0
DIARRHEA: 0
COLOR CHANGE: 0
COUGH: 0
TROUBLE SWALLOWING: 0
SORE THROAT: 0
CHEST TIGHTNESS: 0
BACK PAIN: 1
RHINORRHEA: 1
CONSTIPATION: 0
FACIAL SWELLING: 0
BLOOD IN STOOL: 0
NAUSEA: 0
COUGH: 1
EYE REDNESS: 0
EYE PAIN: 0
SINUS PRESSURE: 0
VOMITING: 0
EYE DISCHARGE: 0
BACK PAIN: 0
ABDOMINAL PAIN: 0
SHORTNESS OF BREATH: 1
WHEEZING: 1

## 2022-02-18 ASSESSMENT — PAIN SCALES - GENERAL
PAINLEVEL_OUTOF10: 7
PAINLEVEL_OUTOF10: 6
PAINLEVEL_OUTOF10: 6
PAINLEVEL_OUTOF10: 10
PAINLEVEL_OUTOF10: 10
PAINLEVEL_OUTOF10: 8

## 2022-02-18 ASSESSMENT — PAIN DESCRIPTION - ORIENTATION: ORIENTATION: RIGHT

## 2022-02-18 ASSESSMENT — PAIN DESCRIPTION - LOCATION: LOCATION: HIP

## 2022-02-18 ASSESSMENT — PAIN - FUNCTIONAL ASSESSMENT: PAIN_FUNCTIONAL_ASSESSMENT: 0-10

## 2022-02-18 ASSESSMENT — PAIN DESCRIPTION - FREQUENCY: FREQUENCY: INTERMITTENT

## 2022-02-18 NOTE — PROGRESS NOTES
Medication History completed:    New medications: none    Medications discontinued: carvedilol, aspirin, amlodipine    Changes to dosing: none    Stated allergies: As listed    Other pertinent information: Medications confirmed with [facility].      Thank you,  [facility], PharmD, BCPS  [phone]

## 2022-02-18 NOTE — ED NOTES
Pt observed on floor inside first set of double doors at 0600 after registration called for nurse assist to front. Pt stated his oxygen wasn't working and he fell. Pt A&Ox4, denies LOC. On initial assessment Pt was pale, tachypneic, fingers cyanotic and cold. Oxygen tank was set to 5lpm but was not turned on. This writer turned oxygen tank on. Pt stated he fell on his right hip and had some pain. With another nurse and instructions to Pt to not bear weight on his right leg, Pt was assisted to wheelchair and taken to room 6.  Pt was placed on a NRB for a saturation of 83% on 5lpm.        Lisa Costello, RN  02/18/22 [id number] [facility], RN  02/18/22 7600

## 2022-02-18 NOTE — H&P
[address]      [address] [facility]     HISTORY AND PHYSICAL EXAMINATION            Date:   2/18/2022  Patient name:  Clau Sexton  Date of admission:  2/18/2022  6:14 AM  MRN:   [id number]  Account:  [de-identified]  YOB: 1955  PCP:    Herb Evans MD  Room:   06/06  Code Status:    Prior    Chief Complaint:     No chief complaint on file. History Obtained From:     patient, electronic medical record    History of Present Illness:     Clau Sexton is a 67-year-old male with past medical hypertension, pulmonary fibrosis (on 5 L home O2), A. fib, arthritis, presents to the ED with worsening shortness of breath and productive cough x1 week. Symptoms are associated with right-sided pleuritic chest pain as well as spells of dizziness and numbness in the distal extremities. Patient reports he desaturates into the 46s with exertion at home. Sputum described as white and \"milky. \"     This a.m., patient felt dyspneic/dizzy and slipped and fell after walking through front door of the hospital. He also admits to previously falling outside and hitting his head. He now complains of right sided hip pain. Right sided hip X-Ray done and showing acute slightly displaced right femoral neck fracture. In the ED, pt initially tachypenic, hypertensive, and with desaturations into the 80s. CXR shows worsening airspace dz, small left sided pleural effusion, worsening multifocal PNA, and underlying fibrosis. He is admitted for management of multifocal pneumonia and acute right hip fracture.        Past Medical History:     Past Medical History:   Diagnosis Date    Atrial fibrillation ([address]     Back pain, chronic     Hill's esophagus 06/18/2019    Benign essential HTN     BPH (benign prostatic hyperplasia)     Cancer (HCC)     colon-rectal    Chronic idiopathic pulmonary fibrosis ([facility] 03/29/2021    Cocaine abuse in remission [facility])     1970's    ED (erectile dysfunction) 4/2/2015    GERD (gastroesophageal reflux disease)     GI bleed 12/5/2018    Hernia     History of colon cancer     Melena     Migraines     Murmur, cardiac         Past SurgicalHistory:     Past Surgical History:   Procedure Laterality Date    CARDIAC CATHETERIZATION  11/29/2018    Non-obstructive CAD    CARDIOVERSION  2020    COLECTOMY      2nd colectomy, Colostomy and reversed [facility] COLECTOMY      1st time [facility]    COLONOSCOPY      COLONOSCOPY  07/18/2016    COLONOSCOPY N/A 12/6/2018    COLONOSCOPY DIAGNOSTIC performed by Gonzalez Santizo MD at [address] Right 2009    inguinal    KNEE SURGERY Right 1970's    arthrotomy    TONSILLECTOMY      TOTAL KNEE ARTHROPLASTY Right 1/8/2019    KNEE TOTAL ARTHROPLASTY performed by Oriana Jhaveri MD at [address] TRANSESOPHAGEAL ECHOCARDIOGRAM  11/29/2018    UPPER GASTROINTESTINAL ENDOSCOPY N/A 12/5/2018    EGD DIAGNOSTIC ONLY performed by Gonzalez Santizo MD at [address] [place] [address] N/A 6/18/2019    [facility]        Medications Prior to Admission:     Prior to Admission medications    Medication Sig Start Date End Date Taking?  Authorizing Provider   tamsulosin (FLOMAX) 0.4 MG capsule take 1 capsule by mouth once daily 2/8/22   Cassandra Marrero MD   Baclofen (LIORESAL) 5 MG tablet take 1 tablet by mouth twice a day 1/27/22   Cassandra Marrero MD   ibuprofen (ADVIL;MOTRIN) 800 MG tablet take 1 tablet by mouth three times a day if needed for pain take with food 1/14/22   Cassandra Marrero MD   omeprazole (PRILOSEC) 40 MG delayed release capsule Take 1 capsule by mouth daily 12/14/21   Cassandra Marrero MD   lisinopril (PRINIVIL;ZESTRIL) 10 MG tablet Take 1 tablet by mouth 2 times daily 12/14/21   Cassandra Marrero MD   digoxin (LANOXIN) 125 MCG tablet take 1 tablet by mouth once daily 11/29/21   Cassandra Dukes MD   atorvastatin (LIPITOR) 40 MG tablet take 1 tablet by mouth once daily 11/11/21   MIRI Haynes NP   ELIQUIS 5 MG TABS tablet take 1 tablet by mouth twice a day 8/30/21   Cassandra Dukes MD   aspirin 81 MG chewable tablet Take 1 tablet by mouth daily  Patient not taking: Reported on 12/14/2021 4/28/21   Garret Reveles MD   carvedilol (COREG) 3.125 MG tablet Take 1 tablet by mouth 2 times daily (with meals) 4/27/21   Garret Reveles MD   amLODIPine (NORVASC) 5 MG tablet Take 1 tablet by mouth daily 4/27/21   Triny Reyes MD   Handicap Placard MISC by Does not apply route Expires 1/2026 1/19/21   Cassandra Dukes MD        Allergies:     Adhesive tape, Codeine, Penicillins, and Nintedanib    Social History:     Tobacco:    reports that he quit smoking about 51 years ago. He has a 0.50 pack-year smoking history. He has never used smokeless tobacco.  Alcohol:      reports current alcohol use of about 12.0 standard drinks of alcohol per week. Drug Use:  reports no history of drug use. Family History:     Family History   Problem Relation Age of Onset    Diabetes Mother     Heart Attack Father     Heart Disease Father     Heart Disease Brother        Review of Systems:     Positive and Negative as described in HPI. Review of Systems   Constitutional: Positive for chills. Negative for fever. HENT: Positive for rhinorrhea. Negative for sore throat. Eyes: Negative for discharge and redness. Respiratory: Positive for cough, shortness of breath and wheezing. Cardiovascular: Positive for chest pain. Negative for leg swelling. Gastrointestinal: Negative for abdominal pain and vomiting. Musculoskeletal: Positive for arthralgias and back pain. Neurological: Positive for dizziness, light-headedness and headaches. Psychiatric/Behavioral: Negative for behavioral problems and confusion.        Physical Exam:   /71 Pulse 90   Resp 28   Ht 6' (1.829 m)   Wt 170 lb (77.1 kg)   SpO2 98%   BMI 23.06 kg/m²   No data recorded. No results for input(s): POCGLU in the last 72 hours. Intake/Output Summary (Last 24 hours) at 2/18/2022 0823  Last data filed at 2/18/2022 0758  Gross per 24 hour   Intake 45.06 ml   Output --   Net 45.06 ml     Vitals:    02/18/22 0641 02/18/22 0700 02/18/22 0715 02/18/22 0730   BP:   108/67 108/71   Pulse:  96 97 90   Resp: (!) 32  (!) 34 28   SpO2: 98%  98% 98%   Weight:       Height:             Physical Exam  Constitutional:       Appearance: Normal appearance. HENT:      Head: Normocephalic. Nose: Nose normal.      Comments: Breathing mask in place  Eyes:      General: No scleral icterus. Right eye: No discharge. Left eye: No discharge. Conjunctiva/sclera: Conjunctivae normal.   Cardiovascular:      Rate and Rhythm: Normal rate. Pulmonary:      Effort: Respiratory distress present. Breath sounds: Rales present. Comments: SpO2 varies with desaturations into the 80s,  On supplemental oxygen,  SOB while speaking,  Actively coughing with deep inhalation,  Diffuse Crackles at bilateral lungs, most prominent at left lower lobe  Chest:      Chest wall: No tenderness. Abdominal:      Palpations: Abdomen is soft. Tenderness: There is no abdominal tenderness. Musculoskeletal:         General: No swelling. Comments: Limited ROM of right hip   Skin:     Coloration: Skin is not jaundiced. Findings: No bruising. Neurological:      Mental Status: He is alert and oriented to person, place, and time.    Psychiatric:         Mood and Affect: Mood normal.         Behavior: Behavior normal.         Investigations:     Laboratory Testing:  Recent Results (from the past 24 hour(s))   CBC with Auto Differential    Collection Time: 02/18/22  6:17 AM   Result Value Ref Range    WBC 11.7 (H) 3.5 - 11.0 k/uL    RBC 4.81 4.5 - 5.9 m/uL    Hemoglobin 12.0 (L) 13.5 - 17.5 g/dL    Hematocrit 38.3 (L) 41 - 53 %    MCV 79.6 (L) 80 - 100 fL    MCH 25.0 (L) 26 - 34 pg    MCHC 31.4 31 - 37 g/dL    RDW 17.6 (H) 11.5 - 14.9 %    Platelets 635 051 - 839 k/uL    MPV 7.3 6.0 - 12.0 fL    NRBC Automated NOT REPORTED per 100 WBC    Differential Type NOT REPORTED     Immature Granulocytes NOT REPORTED 0 %    Absolute Immature Granulocyte NOT REPORTED 0.00 - 0.30 k/uL    WBC Morphology NOT REPORTED     RBC Morphology NOT REPORTED     Platelet Estimate NOT REPORTED     Seg Neutrophils 67 (H) 36 - 66 %    Lymphocytes 22 (L) 24 - 44 %    Monocytes 8 (H) 1 - 7 %    Eosinophils % 2 0 - 4 %    Basophils 1 0 - 2 %    Segs Absolute 7.90 1.3 - 9.1 k/uL    Absolute Lymph # 2.60 1.0 - 4.8 k/uL    Absolute Mono # 0.90 0.1 - 1.3 k/uL    Absolute Eos # 0.20 0.0 - 0.4 k/uL    Basophils Absolute 0.10 0.0 - 0.2 k/uL   D-Dimer, Quantitative    Collection Time: 02/18/22  6:17 AM   Result Value Ref Range    D-Dimer, Quant 1.10 (H) 0.00 - 0.59 mg/L FEU   Basic Metabolic Panel    Collection Time: 02/18/22  6:17 AM   Result Value Ref Range    Glucose 239 (H) 70 - 99 mg/dL    BUN 19 8 - 23 mg/dL    CREATININE 0.85 0.70 - 1.20 mg/dL    Bun/Cre Ratio NOT REPORTED 9 - 20    Calcium 9.0 8.6 - 10.4 mg/dL    Sodium 138 135 - 144 mmol/L    Potassium 4.1 3.7 - 5.3 mmol/L    Chloride 97 (L) 98 - 107 mmol/L    CO2 24 20 - 31 mmol/L    Anion Gap 17 9 - 17 mmol/L    GFR Non-African American >60 >60 mL/min    GFR African American >60 >60 mL/min    GFR Comment          GFR Staging NOT REPORTED    Troponin    Collection Time: 02/18/22  6:17 AM   Result Value Ref Range    Troponin, High Sensitivity 22 0 - 22 ng/L    Troponin T NOT REPORTED <0.03 ng/mL    Troponin Interp NOT REPORTED    EKG 12 Lead    Collection Time: 02/18/22  6:27 AM   Result Value Ref Range    Ventricular Rate 110 BPM    Atrial Rate 394 BPM    QRS Duration 84 ms    Q-T Interval 282 ms    QTc Calculation (Bazett) 381 ms    R Axis -18 degrees    T Axis 177 degrees   COVID-19, Rapid    Collection Time: 02/18/22  6:33 AM    Specimen: Nasopharyngeal Swab   Result Value Ref Range    Specimen Description . NASOPHARYNGEAL SWAB     SARS-CoV-2, Rapid Not Detected Not Detected       Imaging/Diagnostics:  XR HIP RIGHT (2-3 VIEWS)    Result Date: 2/18/2022  EXAMINATION: ONE XRAY VIEW OF THE CHEST; TWO XRAY VIEWS OF THE RIGHT HIP 2/18/2022 6:52 am COMPARISON: 04/24/2021, 1318 hours, CT chest from 12/27/2021 HISTORY: ORDERING SYSTEM PROVIDED HISTORY: shortness of breath TECHNOLOGIST PROVIDED HISTORY: shortness of breath Reason for Exam: PT CO SOB with hemoptysis X several days. 77-year-old male with shortness of breath and hemoptysis for several days FINDINGS: Portable chest: AP portable upright view of the chest. Cardiac monitor leads overlie the chest. Underlying fibrotic changes throughout the lungs. Trachea midline. No pneumothorax. No free air. Worsening airspace disease throughout the left lung. Small left-sided pleural effusion. Cardiac and mediastinal contours remain unchanged. Stable mild cardiomegaly. Visualized osseous structures remain unchanged. Right hip: Acute slightly displaced fracture involving the right femoral neck. Underlying osteopenia. Surgical clips throughout the pelvis. Mild stool burden. Atherosclerotic calcification of the vasculature. Portable chest: 1. Worsening airspace disease throughout the left lung with small left-sided pleural effusion, likely worsening multifocal pneumonia. Follow-up is recommended to document resolution. 2. Underlying fibrosis throughout the lungs. 3. Stable mild cardiomegaly. Right hip: Acute slightly displaced right femoral neck fracture. Underlying osteopenia.      XR CHEST PORTABLE    Result Date: 2/18/2022  EXAMINATION: ONE XRAY VIEW OF THE CHEST; TWO XRAY VIEWS OF THE RIGHT HIP 2/18/2022 6:52 am COMPARISON: 04/24/2021, 1318 hours, CT chest from 12/27/2021 HISTORY: ORDERING SYSTEM PROVIDED HISTORY: shortness of breath TECHNOLOGIST PROVIDED HISTORY: shortness of breath Reason for Exam: PT CO SOB with hemoptysis X several days. 67-year-old male with shortness of breath and hemoptysis for several days FINDINGS: Portable chest: AP portable upright view of the chest. Cardiac monitor leads overlie the chest. Underlying fibrotic changes throughout the lungs. Trachea midline. No pneumothorax. No free air. Worsening airspace disease throughout the left lung. Small left-sided pleural effusion. Cardiac and mediastinal contours remain unchanged. Stable mild cardiomegaly. Visualized osseous structures remain unchanged. Right hip: Acute slightly displaced fracture involving the right femoral neck. Underlying osteopenia. Surgical clips throughout the pelvis. Mild stool burden. Atherosclerotic calcification of the vasculature. Portable chest: 1. Worsening airspace disease throughout the left lung with small left-sided pleural effusion, likely worsening multifocal pneumonia. Follow-up is recommended to document resolution. 2. Underlying fibrosis throughout the lungs. 3. Stable mild cardiomegaly. Right hip: Acute slightly displaced right femoral neck fracture. Underlying osteopenia. Assessment :      Primary Problem  Multifocal pneumonia    Active Hospital Problems    Diagnosis Date Noted    Multifocal pneumonia [J18.9] 02/18/2022    Fracture of right hip [S72.001A] 02/18/2022    Pulmonary fibrosis ([facility] [address] [J84.10] 12/08/2020    [facility] [I48.0] 07/21/2014       Plan:     Patient status [facility]    Multifocal pneumonia in setting of pulmonary fibrosis  CXR 2/18:  Worsening airspace disease throughout left lung, left-sided pleural effusion, worsening multifocal pneumonia, underlying fibrosis  Patient follows with Dr. Irena Harrell as outpatient  CT PE  Blood cultures  Respiratory cultures  Legionella, mycoplasma, strep pneumo  Procalcitonin  Azithromycin 500 mg IV every 24 hours  Ceftriaxone 1 g IV every 24 hours  Oxygen supplementation as needed  RT  PT/OT    Acute right hip fracture s/p fall  Hip x-ray: Acute slightly displaced right femoral neck fracture, underlying osteopenia  Consult Ortho  Acetaminophen as needed  CT Head (pt reports hitting head)    Atrial fibrillation  Hold home aspirin and Eliquis pending Ortho consult  Digoxin 125 mg p.o. daily  Will follow dig levels since patient on azithromycin  Magnesium and potassium replacement protocols     Hypertension  Amlodipine p.o. 5 mg daily  Carvedilol 3.125 mg p.o. twice daily  Lisinopril 10 mg p.o. twice daily    Hyperlipidemia  Atorvastatin 40 mg p.o. daily    Other home medications being continued:  Tamsulosin 0.4 mg p.o. daily    Code: DNR-CCA  DVT prophylaxis: hold for possible hip surgery  GI prophylaxis: Famotidine 20 mg IV twice daily  Diet: N.p.o. Activity: Up with assistance  Dispo: Admit to intermediate ICU      Please note: Use of a speech recognition software was used in the creation of portions of this note and dictation errors, including those of syntax and sound alike word substitutions, may have escaped proofreading. Consultations:   IP CONSULT TO PRIMARY CARE PROVIDER  IP CONSULT TO ORTHOPEDIC SURGERY  IP CONSULT TO PULMONOLOGY    Patient is admitted as inpatient status because of co-morbiditieslisted above, severity of signs and symptoms as outlined, requirement for current medical therapies and most importantly because of direct risk to patient if care not provided in a hospital setting. Chiquita Alejandre DO  2/18/2022  8:23 AM    Copy sent to Dr. Annalise León MD    Attending Physician Statement  I have discussed the care of Cassy Vilchis and I have examined the patient myselft and taken ros and hpi , including pertinent history and exam findings,  with the resident.  I have reviewed the key elements of all parts of the encounter with the resident. I agree with the assessment, plan and orders as documented by the resident.       Electronically signed by Darian Hector MD

## 2022-02-18 NOTE — PROGRESS NOTES
Consult received patient's x-rays reviewed. Patient will require surgical invention however is not medically cleared at this time.     Patient not seen in this time will be seeing her later time

## 2022-02-18 NOTE — ED PROVIDER NOTES
[address]  eMERGENCY dEPARTMENT eNCOUnter      Pt Name: Carmen Archuleta  MRN: [id number]  [place] 1955  Date of evaluation: 2/18/22      CHIEF COMPLAINT     No chief complaint on file. HISTORY OF PRESENT ILLNESS    Carmen Archuleta is a 74 y.o. male who presents complaining of shortness of breath. Patient came in the front door and evidently had a syncopal event or fell on his way in. Patient has pulmonary fibrosis and is on 5 L of oxygen normally. Patient states that his pulse ox was getting very low he was getting very short of breath today. Patient states that he is not normally very healthy but normally is able to function. Patient denies chest pain. Patient does state he gets pain in his legs which is chronic and he takes baclofen for this. Patient has not had an increase in cough and denies fever. REVIEW OF SYSTEMS       Review of Systems   Constitutional: Negative for activity change, appetite change, chills, diaphoresis and fever. HENT: Negative for congestion, ear pain, facial swelling, nosebleeds, rhinorrhea, sinus pressure, sore throat and trouble swallowing. Eyes: Negative for pain, discharge and redness. Respiratory: Positive for shortness of breath. Negative for cough, chest tightness and wheezing. Cardiovascular: Negative for chest pain, palpitations and leg swelling. Gastrointestinal: Negative for abdominal pain, blood in stool, constipation, diarrhea, nausea and vomiting. Genitourinary: Negative for difficulty urinating, dysuria, flank pain, frequency, genital sores and hematuria. Musculoskeletal: Negative for arthralgias, back pain, gait problem, joint swelling, myalgias and neck pain. Skin: Negative for color change, pallor, rash and wound. Neurological: Positive for syncope. Negative for dizziness, tremors, seizures, speech difficulty, weakness, numbness and headaches.    Psychiatric/Behavioral: Negative for confusion, decreased concentration, hallucinations, self-injury, sleep disturbance and suicidal ideas.        PAST MEDICAL HISTORY     Past Medical History:   Diagnosis Date    Atrial fibrillation ([facility]     Back pain, chronic     Mcguire's esophagus 06/18/2019    Benign essential HTN     BPH (benign prostatic hyperplasia)     Cancer (HCC)     colon-rectal    Chronic idiopathic pulmonary fibrosis ([facility] [address] [facility] 03/29/2021    Cocaine abuse in remission [facility])     1970's    ED (erectile dysfunction) 4/2/2015    GERD (gastroesophageal reflux disease)     GI bleed 12/5/2018    Hernia     History of colon cancer     Melena     Migraines     Murmur, cardiac        SURGICAL HISTORY       Past Surgical History:   Procedure Laterality Date    CARDIAC CATHETERIZATION  11/29/2018    Non-obstructive CAD    CARDIOVERSION  2020    COLECTOMY      2nd colectomy, Colostomy and reversed [facility] COLECTOMY      1st time [place]    COLONOSCOPY      COLONOSCOPY  07/18/2016    COLONOSCOPY N/A 12/6/2018    COLONOSCOPY DIAGNOSTIC performed by Latanya Mullen MD at [address] Right 2009    inguinal    KNEE SURGERY Right 1970's    arthrotomy    TONSILLECTOMY      TOTAL KNEE ARTHROPLASTY Right 1/8/2019    KNEE TOTAL ARTHROPLASTY performed by Talon Melchor MD at [address] [facility] TRANSESOPHAGEAL ECHOCARDIOGRAM  11/29/2018    UPPER GASTROINTESTINAL ENDOSCOPY N/A 12/5/2018    EGD DIAGNOSTIC ONLY performed by Latanya Mullen MD at [address] [facility] N/A 6/18/2019    MCGUIRE'S       CURRENT MEDICATIONS       Previous Medications    AMLODIPINE (NORVASC) 5 MG TABLET    Take 1 tablet by mouth daily    ASPIRIN 81 MG CHEWABLE TABLET    Take 1 tablet by mouth daily    ATORVASTATIN (LIPITOR) 40 MG TABLET    take 1 tablet by mouth once daily    BACLOFEN (LIORESAL) 5 MG TABLET    take 1 tablet by mouth twice a day    CARVEDILOL (COREG) 3.125 MG TABLET    Take 1 tablet by mouth 2 times daily (with meals) DIGOXIN (LANOXIN) 125 MCG TABLET    take 1 tablet by mouth once daily    ELIQUIS 5 MG TABS TABLET    take 1 tablet by mouth twice a day    HANDICAP PLACARD MISC    by Does not apply route Expires 1/2026    IBUPROFEN (ADVIL;MOTRIN) 800 MG TABLET    take 1 tablet by mouth three times a day if needed for pain take with food    LISINOPRIL (PRINIVIL;ZESTRIL) 10 MG TABLET    Take 1 tablet by mouth 2 times daily    OMEPRAZOLE (PRILOSEC) 40 MG DELAYED RELEASE CAPSULE    Take 1 capsule by mouth daily    TAMSULOSIN (FLOMAX) 0.4 MG CAPSULE    take 1 capsule by mouth once daily       ALLERGIES     is allergic to adhesive tape, codeine, penicillins, and nintedanib. FAMILY HISTORY     [unfilled]     SOCIAL HISTORY      reports that he quit smoking about 51 years ago. He has a 0.50 pack-year smoking history. He has never used smokeless tobacco. He reports current alcohol use of about 12.0 standard drinks of alcohol per week. He reports that he does not use drugs. PHYSICAL EXAM     INITIAL VITALS: /71   Pulse 90   Resp 28   Ht 6' (1.829 m)   Wt 170 lb (77.1 kg)   SpO2 98%   BMI 23.06 kg/m²      Physical Exam  Vitals and nursing note reviewed. Constitutional:       General: He is not in acute distress. Appearance: He is well-developed. He is not diaphoretic. HENT:      Head: Normocephalic and atraumatic. Eyes:      General: No scleral icterus. Right eye: No discharge. Left eye: No discharge. Conjunctiva/sclera: Conjunctivae normal.      Pupils: Pupils are equal, round, and reactive to light. Cardiovascular:      Rate and Rhythm: Normal rate and regular rhythm. Heart sounds: Normal heart sounds. No murmur heard. No friction rub. No gallop. Pulmonary:      Effort: Pulmonary effort is normal. Tachypnea present. No respiratory distress. Breath sounds: Normal breath sounds. No wheezing or rales. Chest:      Chest wall: No tenderness.    Abdominal:      General: Bowel sounds are normal. There is no distension. Palpations: Abdomen is soft. There is no mass. Tenderness: There is no abdominal tenderness. There is no guarding or rebound. Musculoskeletal:         General: No tenderness. Normal range of motion. Skin:     General: Skin is warm and dry. Coloration: Skin is not pale. Findings: No erythema or rash. Neurological:      Mental Status: He is alert and oriented to person, place, and time. Cranial Nerves: No cranial nerve deficit. Sensory: No sensory deficit. Motor: No abnormal muscle tone. Coordination: Coordination normal.      Deep Tendon Reflexes: Reflexes normal.   Psychiatric:         Mood and Affect: Mood is anxious. Behavior: Behavior normal.         Thought Content: Thought content normal.         Judgment: Judgment normal.         MEDICAL DECISION MAKING:     Patient appears to be more anxious than anything else. They were not able to get a oxygen saturation on him and was just concerned that he was pale so put him on a nonrebreather and brought him back. I have asked him to put him back on his normal 5 L now that we are able to get a pulse ox to try to get an actual accurate reading to know if he even needs any more oxygen than normal.  Otherwise we'll do a full work-up including cardiac since he passed out. Humeral Ativan see if it helps calm him down. DIAGNOSTIC RESULTS     EKG: All EKG's are interpreted by the Emergency Department Physician who either signs or Co-signs this chart in the absence of a cardiologist.    EKG Interpretation    Interpreted by emergency department physician    Rhythm: atrial flutter  Rate: tachycardia  Axis: normal  Ectopy: none  Conduction: normal  ST Segments: nonspecific changes  T Waves: non specific changes  Q Waves: none    Clinical Impression: EKG: nonspecific ST and T waves changes, a flutter with variable AV block, these findings are new since last EKG.       Steve PINZON Juliana Dixon MD        RADIOLOGY:All plain film, CT, MRI, and formal ultrasound images (except ED bedside ultrasound)are read by the radiologist and interpretations are directly viewed by the emergency physician. XR HIP RIGHT (2-3 VIEWS)    Result Date: 2/18/2022  EXAMINATION: ONE XRAY VIEW OF THE CHEST; TWO XRAY VIEWS OF THE RIGHT HIP 2/18/2022 6:52 am COMPARISON: 04/24/2021, 1318 hours, CT chest from 12/27/2021 HISTORY: ORDERING SYSTEM PROVIDED HISTORY: shortness of breath TECHNOLOGIST PROVIDED HISTORY: shortness of breath Reason for Exam: PT CO SOB with hemoptysis X several days. 67-year-old male with shortness of breath and hemoptysis for several days FINDINGS: Portable chest: AP portable upright view of the chest. Cardiac monitor leads overlie the chest. Underlying fibrotic changes throughout the lungs. Trachea midline. No pneumothorax. No free air. Worsening airspace disease throughout the left lung. Small left-sided pleural effusion. Cardiac and mediastinal contours remain unchanged. Stable mild cardiomegaly. Visualized osseous structures remain unchanged. Right hip: Acute slightly displaced fracture involving the right femoral neck. Underlying osteopenia. Surgical clips throughout the pelvis. Mild stool burden. Atherosclerotic calcification of the vasculature. Portable chest: 1. Worsening airspace disease throughout the left lung with small left-sided pleural effusion, likely worsening multifocal pneumonia. Follow-up is recommended to document resolution. 2. Underlying fibrosis throughout the lungs. 3. Stable mild cardiomegaly. Right hip: Acute slightly displaced right femoral neck fracture. Underlying osteopenia.      XR CHEST PORTABLE    Result Date: 2/18/2022  EXAMINATION: ONE XRAY VIEW OF THE CHEST; TWO XRAY VIEWS OF THE RIGHT HIP 2/18/2022 6:52 am COMPARISON: 04/24/2021, 1318 hours, CT chest from 12/27/2021 HISTORY: ORDERING SYSTEM PROVIDED HISTORY: shortness of breath TECHNOLOGIST PROVIDED HISTORY: shortness of breath Reason for Exam: PT CO SOB with hemoptysis X several days. 47-year-old male with shortness of breath and hemoptysis for several days FINDINGS: Portable chest: AP portable upright view of the chest. Cardiac monitor leads overlie the chest. Underlying fibrotic changes throughout the lungs. Trachea midline. No pneumothorax. No free air. Worsening airspace disease throughout the left lung. Small left-sided pleural effusion. Cardiac and mediastinal contours remain unchanged. Stable mild cardiomegaly. Visualized osseous structures remain unchanged. Right hip: Acute slightly displaced fracture involving the right femoral neck. Underlying osteopenia. Surgical clips throughout the pelvis. Mild stool burden. Atherosclerotic calcification of the vasculature. Portable chest: 1. Worsening airspace disease throughout the left lung with small left-sided pleural effusion, likely worsening multifocal pneumonia. Follow-up is recommended to document resolution. 2. Underlying fibrosis throughout the lungs. 3. Stable mild cardiomegaly. Right hip: Acute slightly displaced right femoral neck fracture. Underlying osteopenia. LABS: All lab results were reviewed bysarahlf, and all abnormals are listed below.   Labs Reviewed   CBC WITH AUTO DIFFERENTIAL - Abnormal; Notable for the following components:       Result Value    WBC 11.7 (*)     Hemoglobin 12.0 (*)     Hematocrit 38.3 (*)     MCV 79.6 (*)     MCH 25.0 (*)     RDW 17.6 (*)     Seg Neutrophils 67 (*)     Lymphocytes 22 (*)     Monocytes 8 (*)     All other components within normal limits   D-DIMER, QUANTITATIVE - Abnormal; Notable for the following components:    D-Dimer, Quant 1.10 (*)     All other components within normal limits   BASIC METABOLIC PANEL - Abnormal; Notable for the following components:    Glucose 239 (*)     Chloride 97 (*)     All other components within normal limits   COVID-19, RAPID   TROPONIN   BLOOD GAS, ARTERIAL         EMERGENCY DEPARTMENT COURSE:   Vitals:    Vitals:    02/18/22 0641 02/18/22 0700 02/18/22 0715 02/18/22 0730   BP:   108/67 108/71   Pulse:  96 97 90   Resp: (!) 32  (!) 34 28   SpO2: 98%  98% 98%   Weight:       Height:           The patient was given the following medications while in the emergency department:     Orders Placed This Encounter   Medications    LORazepam (ATIVAN) injection 1 mg    morphine sulfate (PF) injection 4 mg    cefTRIAXone (ROCEPHIN) 1000 mg IVPB in 50 mL D5W minibag     Order Specific Question:   Antimicrobial Indications     Answer:   Pneumonia (CAP)    azithromycin (ZITHROMAX) 500 mg in D5W 250ml addavial     Order Specific Question:   Antimicrobial Indications     Answer:   Pneumonia (CAP)       -------------------------  7:22 AM EST  Patient was reevaluated and he is actually complaining of severe right hip pain. We got an x-ray and noted the fracture. Because the patient is primarily a medical issue and will have to be delayed surgery because they will not build to clear him we determined that the patient should probably be admitted primarily to medicine with an Ortho consult. I spoke with Dr. Gurjit Camacho who is covering for the PCP who agrees to the admission and requests the patient go intermediate ICU because of his oxygen demand and consult with pulmonology. Residents have been notified of this. 7:30 AM EST  Spoke with Dr. Hilda Shah for pulmonary and he is okay with the current plan. 7:35 AM EST  Ortho has been notified of consult. CRITICAL CARE:     CRITICAL CARE: There was a high probability of clinically significant/life threatening deterioration in this patient's condition which required my urgent intervention. Total critical care time was 30 minutes. This excludes any time for separately reportable procedures.          CONSULTS:  IP CONSULT TO PRIMARY CARE PROVIDER  IP CONSULT TO ORTHOPEDIC SURGERY  IP CONSULT TO PULMONOLOGY    PROCEDURES:  none    FINAL IMPRESSION      1. Pneumonia of both lungs due to infectious organism, unspecified part of lung    2. Hypoxia    3. Closed fracture of neck of right femur, initial encounter [facility])          DISPOSITION/PLAN   DISPOSITION Admitted 02/18/2022 07:33:26 AM      PATIENT REFERRED TO:  No follow-up provider specified.     DISCHARGE MEDICATIONS:  New Prescriptions    No medications on file       (Please note that portions of this note were completed with a voice recognition program.  Efforts were made to edit the dictations but occasionally words are mis-transcribed.)    Cayla Carreon MD  Attending Corrine Esposito MD  02/18/22 [id number]

## 2022-02-18 NOTE — Clinical Note
Discharge Plan[de-identified] Other/[facility])   Telemetry/Cardiac Monitoring Required?: Yes   Bed request comments: intermediate icu

## 2022-02-19 ENCOUNTER — APPOINTMENT (OUTPATIENT)
Dept: CT IMAGING | Age: 67
DRG: 521 | End: 2022-02-19
Payer: MEDICARE

## 2022-02-19 ENCOUNTER — APPOINTMENT (OUTPATIENT)
Dept: GENERAL RADIOLOGY | Age: 67
DRG: 521 | End: 2022-02-19
Payer: MEDICARE

## 2022-02-19 LAB
ABSOLUTE EOS #: 0.3 K/UL (ref 0–0.4)
ABSOLUTE IMMATURE GRANULOCYTE: ABNORMAL K/UL (ref 0–0.3)
ABSOLUTE LYMPH #: 0.5 K/UL (ref 1–4.8)
ABSOLUTE MONO #: 0.9 K/UL (ref 0.1–1.3)
ALPHA-1 ANTITRYPSIN: 229 MG/DL (ref 90–200)
ANION GAP SERPL CALCULATED.3IONS-SCNC: 7 MMOL/L (ref 9–17)
BASOPHILS # BLD: 1 % (ref 0–2)
BASOPHILS ABSOLUTE: 0 K/UL (ref 0–0.2)
BNP INTERPRETATION: ABNORMAL
BUN BLDV-MCNC: 21 MG/DL (ref 8–23)
BUN/CREAT BLD: ABNORMAL (ref 9–20)
CALCIUM SERPL-MCNC: 8.5 MG/DL (ref 8.6–10.4)
CHLORIDE BLD-SCNC: 100 MMOL/L (ref 98–107)
CO2: 31 MMOL/L (ref 20–31)
CREAT SERPL-MCNC: 0.57 MG/DL (ref 0.7–1.2)
DIFFERENTIAL TYPE: ABNORMAL
DIGOXIN DATE LAST DOSE: NORMAL
DIGOXIN DOSE AMOUNT: NORMAL
DIGOXIN DOSE TIME: NORMAL
DIGOXIN LEVEL: 0.6 NG/ML (ref 0.5–2)
EOSINOPHILS RELATIVE PERCENT: 3 % (ref 0–4)
GFR AFRICAN AMERICAN: >60 ML/MIN
GFR NON-AFRICAN AMERICAN: >60 ML/MIN
GFR SERPL CREATININE-BSD FRML MDRD: ABNORMAL ML/MIN/{1.73_M2}
GFR SERPL CREATININE-BSD FRML MDRD: ABNORMAL ML/MIN/{1.73_M2}
GLUCOSE BLD-MCNC: 92 MG/DL (ref 70–99)
HCT VFR BLD CALC: 33 % (ref 41–53)
HEMOGLOBIN: 10.6 G/DL (ref 13.5–17.5)
IMMATURE GRANULOCYTES: ABNORMAL %
LYMPHOCYTES # BLD: 6 % (ref 24–44)
MCH RBC QN AUTO: 25 PG (ref 26–34)
MCHC RBC AUTO-ENTMCNC: 31.9 G/DL (ref 31–37)
MCV RBC AUTO: 78.3 FL (ref 80–100)
MONOCYTES # BLD: 9 % (ref 1–7)
NRBC AUTOMATED: ABNORMAL PER 100 WBC
PDW BLD-RTO: 17.1 % (ref 11.5–14.9)
PLATELET # BLD: 220 K/UL (ref 150–450)
PLATELET ESTIMATE: ABNORMAL
PMV BLD AUTO: 7.5 FL (ref 6–12)
POTASSIUM SERPL-SCNC: 4.3 MMOL/L (ref 3.7–5.3)
PRO-BNP: 6234 PG/ML
RBC # BLD: 4.22 M/UL (ref 4.5–5.9)
RBC # BLD: ABNORMAL 10*6/UL
SEG NEUTROPHILS: 81 % (ref 36–66)
SEGMENTED NEUTROPHILS ABSOLUTE COUNT: 7.7 K/UL (ref 1.3–9.1)
SODIUM BLD-SCNC: 138 MMOL/L (ref 135–144)
WBC # BLD: 9.4 K/UL (ref 3.5–11)
WBC # BLD: ABNORMAL 10*3/UL

## 2022-02-19 PROCEDURE — 73700 CT LOWER EXTREMITY W/O DYE: CPT

## 2022-02-19 PROCEDURE — 6360000002 HC RX W HCPCS: Performed by: STUDENT IN AN ORGANIZED HEALTH CARE EDUCATION/TRAINING PROGRAM

## 2022-02-19 PROCEDURE — 2700000000 HC OXYGEN THERAPY PER DAY

## 2022-02-19 PROCEDURE — 94761 N-INVAS EAR/PLS OXIMETRY MLT: CPT

## 2022-02-19 PROCEDURE — 99221 1ST HOSP IP/OBS SF/LOW 40: CPT | Performed by: ORTHOPAEDIC SURGERY

## 2022-02-19 PROCEDURE — 6370000000 HC RX 637 (ALT 250 FOR IP)

## 2022-02-19 PROCEDURE — 2500000003 HC RX 250 WO HCPCS: Performed by: STUDENT IN AN ORGANIZED HEALTH CARE EDUCATION/TRAINING PROGRAM

## 2022-02-19 PROCEDURE — 80048 BASIC METABOLIC PNL TOTAL CA: CPT

## 2022-02-19 PROCEDURE — 99231 SBSQ HOSP IP/OBS SF/LOW 25: CPT | Performed by: ORTHOPAEDIC SURGERY

## 2022-02-19 PROCEDURE — 80162 ASSAY OF DIGOXIN TOTAL: CPT

## 2022-02-19 PROCEDURE — 2060000000 HC ICU INTERMEDIATE R&B

## 2022-02-19 PROCEDURE — 71045 X-RAY EXAM CHEST 1 VIEW: CPT

## 2022-02-19 PROCEDURE — 36415 COLL VENOUS BLD VENIPUNCTURE: CPT

## 2022-02-19 PROCEDURE — 6360000002 HC RX W HCPCS: Performed by: INTERNAL MEDICINE

## 2022-02-19 PROCEDURE — 2580000003 HC RX 258: Performed by: STUDENT IN AN ORGANIZED HEALTH CARE EDUCATION/TRAINING PROGRAM

## 2022-02-19 PROCEDURE — 6360000002 HC RX W HCPCS

## 2022-02-19 PROCEDURE — 83880 ASSAY OF NATRIURETIC PEPTIDE: CPT

## 2022-02-19 PROCEDURE — 99233 SBSQ HOSP IP/OBS HIGH 50: CPT | Performed by: INTERNAL MEDICINE

## 2022-02-19 PROCEDURE — 85025 COMPLETE CBC W/AUTO DIFF WBC: CPT

## 2022-02-19 RX ORDER — MORPHINE SULFATE 2 MG/ML
2 INJECTION, SOLUTION INTRAMUSCULAR; INTRAVENOUS EVERY 4 HOURS PRN
Status: DISCONTINUED | OUTPATIENT
Start: 2022-02-19 | End: 2022-02-25

## 2022-02-19 RX ORDER — METHYLPREDNISOLONE SODIUM SUCCINATE 125 MG/2ML
80 INJECTION, POWDER, LYOPHILIZED, FOR SOLUTION INTRAMUSCULAR; INTRAVENOUS EVERY 8 HOURS
Status: DISCONTINUED | OUTPATIENT
Start: 2022-02-19 | End: 2022-02-20

## 2022-02-19 RX ADMIN — ASPIRIN 81 MG 81 MG: 81 TABLET ORAL at 10:16

## 2022-02-19 RX ADMIN — CEFTRIAXONE SODIUM 1000 MG: 1 INJECTION, POWDER, FOR SOLUTION INTRAMUSCULAR; INTRAVENOUS at 08:10

## 2022-02-19 RX ADMIN — LORAZEPAM 0.5 MG: 2 INJECTION INTRAMUSCULAR; INTRAVENOUS at 08:00

## 2022-02-19 RX ADMIN — CARVEDILOL 3.12 MG: 3.12 TABLET, FILM COATED ORAL at 16:18

## 2022-02-19 RX ADMIN — APIXABAN 5 MG: 5 TABLET, FILM COATED ORAL at 10:16

## 2022-02-19 RX ADMIN — FAMOTIDINE 20 MG: 10 INJECTION, SOLUTION INTRAVENOUS at 08:00

## 2022-02-19 RX ADMIN — APIXABAN 5 MG: 5 TABLET, FILM COATED ORAL at 21:22

## 2022-02-19 RX ADMIN — SODIUM CHLORIDE, PRESERVATIVE FREE 10 ML: 5 INJECTION INTRAVENOUS at 21:22

## 2022-02-19 RX ADMIN — FAMOTIDINE 20 MG: 10 INJECTION, SOLUTION INTRAVENOUS at 21:21

## 2022-02-19 RX ADMIN — LISINOPRIL 10 MG: 20 TABLET ORAL at 10:16

## 2022-02-19 RX ADMIN — MORPHINE SULFATE 2 MG: 2 INJECTION, SOLUTION INTRAMUSCULAR; INTRAVENOUS at 11:24

## 2022-02-19 RX ADMIN — METHYLPREDNISOLONE SODIUM SUCCINATE 80 MG: 125 INJECTION, POWDER, FOR SOLUTION INTRAMUSCULAR; INTRAVENOUS at 16:19

## 2022-02-19 RX ADMIN — SODIUM CHLORIDE, PRESERVATIVE FREE 10 ML: 5 INJECTION INTRAVENOUS at 08:11

## 2022-02-19 RX ADMIN — CARVEDILOL 3.12 MG: 3.12 TABLET, FILM COATED ORAL at 10:16

## 2022-02-19 RX ADMIN — DEXTROSE MONOHYDRATE 500 MG: 50 INJECTION, SOLUTION INTRAVENOUS at 09:52

## 2022-02-19 RX ADMIN — ATORVASTATIN CALCIUM 40 MG: 40 TABLET, FILM COATED ORAL at 10:16

## 2022-02-19 RX ADMIN — TAMSULOSIN HYDROCHLORIDE 0.4 MG: 0.4 CAPSULE ORAL at 10:16

## 2022-02-19 RX ADMIN — MORPHINE SULFATE 2 MG: 2 INJECTION, SOLUTION INTRAMUSCULAR; INTRAVENOUS at 21:22

## 2022-02-19 RX ADMIN — MORPHINE SULFATE 2 MG: 2 INJECTION, SOLUTION INTRAMUSCULAR; INTRAVENOUS at 16:20

## 2022-02-19 RX ADMIN — MORPHINE SULFATE 1 MG: 2 INJECTION, SOLUTION INTRAMUSCULAR; INTRAVENOUS at 07:58

## 2022-02-19 RX ADMIN — METHYLPREDNISOLONE SODIUM SUCCINATE 40 MG: 40 INJECTION, POWDER, FOR SOLUTION INTRAMUSCULAR; INTRAVENOUS at 08:00

## 2022-02-19 RX ADMIN — AMLODIPINE BESYLATE 5 MG: 5 TABLET ORAL at 10:16

## 2022-02-19 RX ADMIN — DIGOXIN 125 MCG: 125 TABLET ORAL at 10:16

## 2022-02-19 RX ADMIN — LISINOPRIL 10 MG: 20 TABLET ORAL at 21:21

## 2022-02-19 ASSESSMENT — PAIN SCALES - GENERAL
PAINLEVEL_OUTOF10: 10
PAINLEVEL_OUTOF10: 7
PAINLEVEL_OUTOF10: 9
PAINLEVEL_OUTOF10: 8

## 2022-02-19 ASSESSMENT — ENCOUNTER SYMPTOMS
SHORTNESS OF BREATH: 1
BACK PAIN: 1
COUGH: 1
EYE DISCHARGE: 0
WHEEZING: 1
RHINORRHEA: 1
ABDOMINAL PAIN: 0
EYE REDNESS: 0
VOMITING: 0
SORE THROAT: 0

## 2022-02-19 NOTE — PROGRESS NOTES
PATIENT REFUSES TO WEAR BIPAP     [x] Risks and benefits explained to patient   [x] Patient refuses to wear Bipap stating\" no\"   [x] Patient verbalizes understanding of information presented.        PROVIDE ADEQUATE OXYGENATION WITH ACCEPTABLE SP02/ABG'S    [x]  IDENTIFY APPROPRIATE OXYGEN THERAPY  [x]   MONITOR SP02/ABG'S AS NEEDED   [x]   PATIENT EDUCATION AS NEEDED

## 2022-02-19 NOTE — PROGRESS NOTES
[address]    PROGRESS NOTE             2/19/2022    10:00 AM    Name:   Nadaj Strauss  MRN:     [id number]     Acct:      [de-identified]   Room:   2016/2016-01   Day:  1  Admit Date:  2/18/2022  6:14 AM    PCP:  Rose Lehman MD  Code Status:  DNR-CCA    Subjective:     C/C: No chief complaint on file. Interval History Status: not changed. Patient was seen and examined at bedside, no acute events overnight, still complaining of leg pain will increase morphine dose to 2 mg. He is complaining of dark urine, red sputum, patient is on Eliquis, chest auscultation reveals crackles. Patient is on high flow, he is not for surgery yet, systolic murmur was appreciated, will get an echo, proBNP elevated. Pulmonary following, will add Lasix. Ortho ordered CT of hip      Brief History:     Nadja Strauss is a 52-year-old male with past medical hypertension, pulmonary fibrosis (on 5 L home O2), A. fib, arthritis, presents to the ED with worsening shortness of breath and productive cough x1 week. Symptoms are associated with right-sided pleuritic chest pain as well as spells of dizziness and numbness in the distal extremities. Patient reports he desaturates into the 46s with exertion at home. Sputum described as white and \"milky. \"      This a.m., patient felt dyspneic/dizzy and slipped and fell after walking through front door of the hospital. He also admits to previously falling outside and hitting his head. He now complains of right sided hip pain. Right sided hip X-Ray done and showing acute slightly displaced right femoral neck fracture.      In the ED, pt initially tachypenic, hypertensive, and with desaturations into the 80s.  CXR shows worsening airspace dz, small left sided pleural effusion, worsening multifocal PNA, and underlying fibrosis.      He is admitted for management of multifocal pneumonia and acute right hip fracture    Review of Systems:     Review of Systems   Constitutional: Positive for chills. Negative for fever. HENT: Positive for rhinorrhea. Negative for sore throat. Eyes: Negative for discharge and redness. Respiratory: Positive for cough, shortness of breath and wheezing. Cardiovascular: Positive for chest pain. Negative for leg swelling. Gastrointestinal: Negative for abdominal pain and vomiting. Musculoskeletal: Positive for arthralgias and back pain. Neurological: Positive for dizziness, light-headedness and headaches. Psychiatric/Behavioral: Negative for behavioral problems and confusion. Medications: Allergies: Allergies   Allergen Reactions    Adhesive Tape Other (See Comments)     Blister badly     Codeine Nausea Only     Other reaction(s): Other: See Comments  NAUSEA  Other reaction(s):  Other: See Comments  NAUSEA    Penicillins Swelling     As a baby  Other reaction(s): Unknown  As a baby    Nintedanib Diarrhea     10-15 BM daily       Current Meds:   Scheduled Meds:    sodium chloride flush  5-40 mL IntraVENous 2 times per day    famotidine (PEPCID) injection  20 mg IntraVENous BID    cefTRIAXone (ROCEPHIN) IV  1,000 mg IntraVENous Q24H    azithromycin  500 mg IntraVENous Q24H    amLODIPine  5 mg Oral Daily    atorvastatin  40 mg Oral Daily    carvedilol  3.125 mg Oral BID WC    lisinopril  10 mg Oral BID    tamsulosin  0.4 mg Oral Daily    apixaban  5 mg Oral BID    digoxin  125 mcg Oral Daily    aspirin  81 mg Oral Daily    LORazepam  0.5 mg IntraVENous Q6H    methylPREDNISolone  40 mg IntraVENous Daily     Continuous Infusions:    sodium chloride       PRN Meds: morphine, sodium chloride flush, sodium chloride flush, sodium chloride, ondansetron **OR** ondansetron, polyethylene glycol, acetaminophen **OR** acetaminophen, potassium chloride **OR** potassium chloride, magnesium sulfate    Data:     Past Medical History:   has a past medical history of Atrial fibrillation ([facility] [address] Back pain, chronic, Hill's esophagus, Benign essential HTN, BPH (benign prostatic hyperplasia), Cancer ([facility] [address] [facility] Cocaine abuse in remission [facility]), ED (erectile dysfunction), GERD (gastroesophageal reflux disease), GI bleed, Hernia, History of colon cancer, Melena, Migraines, and Murmur, cardiac. Social History:   reports that he quit smoking about 51 years ago. He has a 0.50 pack-year smoking history. He has never used smokeless tobacco. He reports current alcohol use of about 12.0 standard drinks of alcohol per week. He reports that he does not use drugs. Family History:   Family History   Problem Relation Age of Onset    Diabetes Mother     Heart Attack Father     Heart Disease Father     Heart Disease Brother        Vitals:  /89   Pulse 101   Temp 98.7 °F (37.1 °C) (Oral)   Resp (!) 34   Ht 6' (1.829 m)   Wt 163 lb 14.4 oz (74.3 kg)   SpO2 91%   BMI 22.23 kg/m²   Temp (24hrs), Av.7 °F (37.1 °C), Min:98.7 °F (37.1 °C), Max:98.7 °F (37.1 °C)    No results for input(s): POCGLU in the last 72 hours. I/O(24Hr):     Intake/Output Summary (Last 24 hours) at 2022 1000  Last data filed at 2022 0015  Gross per 24 hour   Intake 10 ml   Output 500 ml   Net -490 ml       Labs:    CBC with Differential:    Lab Results   Component Value Date    WBC 9.4 2022    RBC 4.22 2022    HGB 10.6 2022    HCT 33.0 2022     2022    MCV 78.3 2022    MCH 25.0 2022    MCHC 31.9 2022    RDW 17.1 2022    LYMPHOPCT 6 2022    MONOPCT 9 2022    BASOPCT 1 2022    MONOSABS 0.90 2022    LYMPHSABS 0.50 2022    EOSABS 0.30 2022    BASOSABS 0.00 2022    DIFFTYPE NOT REPORTED 2022     BMP:    Lab Results   Component Value Date     2022    K 4.3 2022     2022    CO2 31 2022    BUN 21 2022 LABALBU 4.1 12/20/2021    CREATININE 0.57 02/19/2022    CALCIUM 8.5 02/19/2022    GFRAA >60 02/19/2022    LABGLOM >60 02/19/2022    GLUCOSE 92 02/19/2022       Lab Results   Component Value Date/Time    SPECIAL NOT REPORTED 02/18/2022 10:58 AM     Lab Results   Component Value Date/Time    CULTURE NO GROWTH 12 HOURS 02/18/2022 10:58 AM         Radiology:    XR CHEST (SINGLE VIEW FRONTAL)    Result Date: 2/19/2022  EXAMINATION: ONE XRAY VIEW OF THE CHEST 2/19/2022 4:53 am COMPARISON: 02/18/2022 HISTORY: ORDERING SYSTEM PROVIDED HISTORY: Pulmonary Fibrosis TECHNOLOGIST PROVIDED HISTORY: Pulmonary Fibrosis Reason for Exam: Pulmonary Fibrosis Additional signs and symptoms: Pulmonary Fibrosis Relevant Medical/Surgical History: Pulmonary Fibrosis FINDINGS: Increasing ground-glass attenuation in the left lung. Interstitial opacities throughout the right lung are without appreciable change. No pneumothorax identified. The cardiac and mediastinal contours appear unchanged. Bilateral airspace disease superimposed on pulmonary fibrosis. Increasing airspace disease predominantly involving the left lung. XR HIP RIGHT (2-3 VIEWS)    Result Date: 2/18/2022  EXAMINATION: ONE XRAY VIEW OF THE CHEST; TWO XRAY VIEWS OF THE RIGHT HIP 2/18/2022 6:52 am COMPARISON: 04/24/2021, 1318 hours, CT chest from 12/27/2021 HISTORY: ORDERING SYSTEM PROVIDED HISTORY: shortness of breath TECHNOLOGIST PROVIDED HISTORY: shortness of breath Reason for Exam: PT CO SOB with hemoptysis X several days. 40-year-old male with shortness of breath and hemoptysis for several days FINDINGS: Portable chest: AP portable upright view of the chest. Cardiac monitor leads overlie the chest. Underlying fibrotic changes throughout the lungs. Trachea midline. No pneumothorax. No free air. Worsening airspace disease throughout the left lung. Small left-sided pleural effusion. Cardiac and mediastinal contours remain unchanged. Stable mild cardiomegaly. Visualized osseous structures remain unchanged. Right hip: Acute slightly displaced fracture involving the right femoral neck. Underlying osteopenia. Surgical clips throughout the pelvis. Mild stool burden. Atherosclerotic calcification of the vasculature. Portable chest: 1. Worsening airspace disease throughout the left lung with small left-sided pleural effusion, likely worsening multifocal pneumonia. Follow-up is recommended to document resolution. 2. Underlying fibrosis throughout the lungs. 3. Stable mild cardiomegaly. Right hip: Acute slightly displaced right femoral neck fracture. Underlying osteopenia. CT HEAD WO CONTRAST    Result Date: 2/18/2022  EXAMINATION: CT OF THE HEAD WITHOUT CONTRAST  2/18/2022 9:39 am TECHNIQUE: CT of the head was performed without the administration of intravenous contrast. Dose modulation, iterative reconstruction, and/or weight based adjustment of the mA/kV was utilized to reduce the radiation dose to as low as reasonably achievable. COMPARISON: 01/12/2021 HISTORY: ORDERING SYSTEM PROVIDED HISTORY: fell, hit head TECHNOLOGIST PROVIDED HISTORY: fell, hit head Reason for Exam: patient states he fell and hit his head today FINDINGS: BRAIN/VENTRICLES: There is no acute infarct or acute intracranial hemorrhage present. There is no mass effect or midline shift present. There is no ventriculomegaly or abnormal extra-axial fluid collection present. ORBITS: Limited evaluation of the orbits is unremarkable. SINUSES: The paranasal sinuses and mastoid air cells are clear. SOFT TISSUES/SKULL:  No lytic or blastic osseous lesions are identified. No acute intracranial process identified.      XR CHEST PORTABLE    Result Date: 2/18/2022  EXAMINATION: ONE XRAY VIEW OF THE CHEST; TWO XRAY VIEWS OF THE RIGHT HIP 2/18/2022 6:52 am COMPARISON: 04/24/2021, 1318 hours, CT chest from 12/27/2021 HISTORY: ORDERING SYSTEM PROVIDED HISTORY: shortness of breath TECHNOLOGIST PROVIDED HISTORY: shortness of breath Reason for Exam: PT CO SOB with hemoptysis X several days. 53-year-old male with shortness of breath and hemoptysis for several days FINDINGS: Portable chest: AP portable upright view of the chest. Cardiac monitor leads overlie the chest. Underlying fibrotic changes throughout the lungs. Trachea midline. No pneumothorax. No free air. Worsening airspace disease throughout the left lung. Small left-sided pleural effusion. Cardiac and mediastinal contours remain unchanged. Stable mild cardiomegaly. Visualized osseous structures remain unchanged. Right hip: Acute slightly displaced fracture involving the right femoral neck. Underlying osteopenia. Surgical clips throughout the pelvis. Mild stool burden. Atherosclerotic calcification of the vasculature. Portable chest: 1. Worsening airspace disease throughout the left lung with small left-sided pleural effusion, likely worsening multifocal pneumonia. Follow-up is recommended to document resolution. 2. Underlying fibrosis throughout the lungs. 3. Stable mild cardiomegaly. Right hip: Acute slightly displaced right femoral neck fracture. Underlying osteopenia. CT CHEST PULMONARY EMBOLISM W CONTRAST    Result Date: 2/18/2022  EXAMINATION: CTA OF THE CHEST 2/18/2022 7:43 am TECHNIQUE: CTA of the chest was performed after the administration of intravenous contrast.  Multiplanar reformatted images are provided for review. MIP images are provided for review. Dose modulation, iterative reconstruction, and/or weight based adjustment of the mA/kV was utilized to reduce the radiation dose to as low as reasonably achievable.  COMPARISON: 12/27/2021 HISTORY: ORDERING SYSTEM PROVIDED HISTORY: Elevated d-dimer TECHNOLOGIST PROVIDED HISTORY: Elevated d-dimer Decision Support Exception - unselect if not a suspected or confirmed emergency medical condition->Emergency Medical Condition (MA) Reason for Exam: sob, pneumonia bilat FINDINGS: Pulmonary Arteries: Pulmonary arteries show no evidence of intraluminal filling defect to suggest pulmonary embolism. Main pulmonary artery is normal in caliber. Mediastinum: Mediastinal lymphadenopathy. Cluster of AP window lymph nodes ranging from 13-15 mm slightly more prominent compared to prior where largest was 13 mm short axis. Subcarinal fullness representing adenopathy also increased. Right hilar mild lymphadenopathy increased from prior. 15 mm short axis lymph node noted on series 2, image 112 where previously there was mild soft tissue fullness. No axillary lymphadenopathy. Thyroid gland and esophagus grossly normal. Lungs/pleura: Peripheral predominant fibrosis with underlying bronchiectasis again noted which is fairly stable. Superimposed on above,interval development of new large area of ground-glass airspace disease in the left upper lobe extending almost across the anterior posterior length of the upper lobe and from the apex to the lingula. Most likely in pneumonia. On the right there is solid pleural base consolidative density 4.3 x 1.7 cm along the major fissure in the posterior segment right upper lobe. Previously was about 1.2 x 2.4 cm. Probably also worsening pneumonia but would recommend short interval 3 month follow-up. Small left pleural effusion is also new. 9 mm right upper lobe suprahilar central nodule series 4, image 46 is unchanged. No pneumothorax. Upper Abdomen: No acute process. No suspicious masses. Soft Tissues/Bones: No acute bone or soft tissue abnormality. 1. No evidence for acute pulmonary embolism. 2. There is very large new ground-glass airspace disease occupying most of the left upper lobe and new small left pleural effusion. 3. Solid pleural-based posterior segment right upper lobe airspace density along the major fissure is larger now measuring 4.3 x 1.7 cm, previously about 1.2 x 2.4 cm.   Probably also worsening pneumonia but would recommend short interval 3 month follow-up 4. 9 mm right upper lobe suprahilar central nodule is unchanged. RECOMMENDATIONS: Unavailable         Physical Examination:        Physical Exam  Constitutional:       Appearance: Normal appearance. HENT:      Head: Normocephalic. Nose: Nose normal.      Comments: Breathing mask in place  Eyes:      General: No scleral icterus. Right eye: No discharge. Left eye: No discharge. Conjunctiva/sclera: Conjunctivae normal.   Cardiovascular:      Rate and Rhythm: Normal rate. Pulmonary:      Effort: Respiratory distress present. Breath sounds: Rales present. Comments: SpO2 varies with desaturations into the 80s,  On supplemental oxygen,  SOB while speaking,  Actively coughing with deep inhalation,  Diffuse Crackles at bilateral lungs, most prominent at left lower lobe  Chest:      Chest wall: No tenderness. Abdominal:      Palpations: Abdomen is soft. Tenderness: There is no abdominal tenderness. Musculoskeletal:         General: No swelling. Comments: Limited ROM of right hip   Skin:     Coloration: Skin is not jaundiced. Findings: No bruising. Neurological:      Mental Status: He is alert and oriented to person, place, and time. Psychiatric:         Mood and Affect: Mood normal.         Behavior: Behavior normal.           Assessment:        Primary Problem  Multifocal pneumonia    Active Hospital Problems    Diagnosis Date Noted    Multifocal pneumonia [J18.9] 02/18/2022    Fracture of right hip [S72.001A] 02/18/2022    Community acquired bacterial pneumonia [J15.9] 02/18/2022    Pulmonary fibrosis ([facility] [address] [J84.10] 12/08/2020    PAF (paroxysmal atrial fibrillation) ([facility] [address] [I48.0] 07/21/2014       Plan:        Multifocal pneumonia in setting of pulmonary fibrosis  CXR 2/18:  Worsening airspace disease throughout left lung, left-sided pleural effusion, worsening multifocal pneumonia, underlying fibrosis  Patient follows with Dr. Tejas Riley as outpatient  CT chest : no evidence of PE large new ground-glass airspace disease occupying most of the left upper lobe and new small left pleural effusion  Blood cultures NGTD  Respiratory cultures pending   Legionella,  strep pneumo were  negative   Mycoplasma pending   Procalcitonin 0.10  Azithromycin 500 mg IV every 24 hours  Ceftriaxone 1 g IV every 24 hours  Oxygen supplementation as needed  RT  PT/OT  Will get an Echo      Acute right hip fracture s/p fall  Hip x-ray: Acute slightly displaced right femoral neck fracture, underlying osteopenia  Ortho on board  Acetaminophen as needed  CT Head no acute abnormalities     Atrial fibrillation  Hold home aspirin and Eliquis pending Ortho consult  Digoxin 125 mg p.o. daily  Dig levels 0.6 wnl   Magnesium and potassium replacement protocols      Hypertension  Amlodipine p.o. 5 mg daily  Carvedilol 3.125 mg p.o. twice daily  Lisinopril 10 mg p.o. twice daily     Hyperlipidemia  Atorvastatin 40 mg p.o. daily     Other home medications being continued:  Tamsulosin 0.4 mg p.o. daily     Code: DNR-CCA  DVT prophylaxis: hold for possible hip surgery  GI prophylaxis: Famotidine 20 mg IV twice daily  Diet: N.p.o. Activity: Up with assistance  Dispo: Admit to intermediate ICU       Jerrell Cruz MD  2/19/2022  10:00 AM     Attending Physician Statement  I have discussed the care of Molina Medrano and I have examined the patient myselft and taken ros and hpi , including pertinent history and exam findings,  with the resident. I have reviewed the key elements of all parts of the encounter with the resident. I agree with the assessment, plan and orders as documented by the resident.   Acute respiratory failure, due to multifocal pneumonia, pulmonary fibrosis, recent Covid, post Covid syndrome,  Needing FiO2 70%, on BiPAP, interchanging with high flow nasal cannula,  Broad-spectrum antibiotics, will get infectious disease on board  Mechanical fall and femoral neck fracture, orthopedic surgery on board, patient too sick to get the surgery at this time,  DVT prophylaxis,on eliquis, will hold if surgery planned  At this time waiting from ortho/med team clearance   Continue to monitor electrolytes,  Continue to monitor blood pressure,      Electronically signed by Stanislav Fong MD

## 2022-02-19 NOTE — PROGRESS NOTES
Writer paged & spoke to Dr. Jermaine Miller to clarify if pt would be having surgery today and if not is it ok for pt to resume regular diet;per Dr. Jermaine Miller ok for pt to resume diet

## 2022-02-19 NOTE — PLAN OF CARE
PLAN OF CARE    Second encounter with patient after receiving page that wife is at bedside with questions. Permission granted by pt to discuss all medical conditions/plans with wife who is now in ED room with pt;  Family updated and all questions answered to best of my abilities. During part of encounter, Dr. Wojciech Mathew came in and held [address] discussion with patient. Meaning of code status explained clearly and pt confirmed wish for DNR-CCA status. After Dr. Wojciech Mathew left,  Pt also mentions feeling depressed and \"sometimes wishing he could close his eyes and never wake up. \"  Pt denies having any thoughts or plans to kill himself;  Expressing that he just thinks \"things might be easier if he was dead\" but he would not kill himself. Pt offered supportive services and declines; He does not wish to speak to a , be evaluated by psych, nor start anti-depressants at this time but will let me know if he changes his mind. Will continue to monitor depressive symptoms.     Electronically signed by Lindsay Porter DO on 2/18/2022 at 9:17 PM

## 2022-02-19 NOTE — CARE COORDINATION
CASE MANAGEMENT NOTE:    Admission Date:  2/18/2022 Kelly Laura is a 74 y.o.  male    Admitted for : Hypoxia [R09.02]  Community acquired bacterial pneumonia [J15.9]  Closed fracture of neck of right femur, initial encounter ([facility] [address] [S72.001A]  Pneumonia of both lungs due to infectious organism, unspecified part of lung [J18.9]  Multifocal pneumonia [J18.9]    Met with:  Patient    PCP:  [address] [place] [address] Road:  [facility] Medicare      Is patient alert and oriented at time of discussion:  Yes    Current Residence/ Living Arrangements:  independently at home             Current Services PTA:  No    Does patient go to outpatient dialysis: No  If yes, location and chair time: NA    Is patient agreeable to VNS: Wants to wait to see what he needs after surgery    Freedom of choice provided:  No    List of [address] provided: No    VNS chosen:  Will follow for any Post op needs/rec    DME:  straight cane, walker, wheelchair and shower chair, Thigh High Compression Stockings. Home Oxygen: Yes, Wears 2-2.5 LNC , 24/7, Currently 71%, FIO2, 40 L, HFNC    Nebulizer: No    CPAP/BIPAP: No    Supplier: [employer]    Potential Assistance Needed: Yes, Will follow for any Post op needs/rec    SNF needed: Will follow    Freedom of choice and list provided: No    Pharmacy:  [address] [place] [address] on [place]       Does Patient want to use MEDS to BEDS? No    Is patient currently receiving oral anticoagulation therapy? Yes, Eliquis PTA    Is the Patient an [facility] with Readmission Risk Score greater than 14%? Yes  If yes, pt needs a follow up appointment made within 7 days. Family Members/Caregivers that pt would like involved in their care:    Yes    If yes, list name here:  Adycarrie Youngerman    Transportation Provider:  Patient             Discharge Plan:  2/19/22, Afebrile, 71% FIO2, 40 L, HFNC, C Medicare Pt. Lives in 2 story home, Bed/BR up.  DME- cane, walker, WC, SC, Oxygen, states

## 2022-02-19 NOTE — FLOWSHEET NOTE
02/19/22 1034   Encounter Summary   Services provided to: Family   Referral/Consult From: [facility] Significant other   Continue Visiting   (2-19-22)   Complexity of Encounter Low   Length of Encounter 15 minutes   Routine   Type Initial   Assessment Approachable; Anxious   Intervention Active listening;Explored feelings, thoughts, concerns;[place];Sustaining presence/ Ministry of presence; Discussed illness/injury and it's impact   Outcome Expressed gratitude;Engaged in conversation;Expressed feelings/needs/concerns;Receptive;Coping

## 2022-02-19 NOTE — CONSULTS
ORTHOPAEDIC CONSULTATION    Reason for consult  Right hip pain    HPI / Chief Complaint  Aron Dhillon is a 74 y.o. old male who presents for right hip pain secondary to fall. Patient was literally coming into the hospital as he had acute shortness of breath is and dizziness. Patient passed out coming into the hospital and fell and sustained a right hip injury. X-rays taken the emergency room show a femoral neck fracture. Past Medical History  Mason Louis  has a past medical history of Atrial fibrillation ([facility] Back pain, chronic, Hill's esophagus, Benign essential HTN, BPH (benign prostatic hyperplasia), Cancer ([facility] Chronic idiopathic pulmonary fibrosis ([facility] Cocaine abuse in remission [facility]), ED (erectile dysfunction), GERD (gastroesophageal reflux disease), GI bleed, Hernia, History of colon cancer, Melena, Migraines, and Murmur, cardiac. Past Surgical History  Mason Louis  has a past surgical history that includes Tonsillectomy; Colonoscopy; colectomy; Colonoscopy (07/18/2016); knee surgery (Right, 1970's); transesophageal echocardiogram (11/29/2018); Upper gastrointestinal endoscopy (N/A, 12/5/2018); Colonoscopy (N/A, 12/6/2018); hernia repair (Right, 2009); Cardiac catheterization (11/29/2018); colectomy; Total knee arthroplasty (Right, 1/8/2019); Upper gastrointestinal endoscopy (N/A, 6/18/2019); and Cardioversion (2020). Current Medications  Reviewed. See EMR for details. Allergies  Allergies have been reviewed. Mason Louis is allergic to adhesive tape, codeine, penicillins, and nintedanib. Social History  Mason Louis  reports that he quit smoking about 51 years ago. He has a 0.50 pack-year smoking history. He has never used smokeless tobacco. He reports current alcohol use of about 12.0 standard drinks of alcohol per week. He reports that he does not use drugs.     Family History  [facility] family history includes Diabetes in his mother; Heart Attack in his father; Heart Disease in his brother and father. Review of Systems   History obtained from the patient. Constitution: no fever or chills  Musculoskeletal: As noted in the HPI   Neurologic: no neurologic symptoms    Physical Exam  /89   Pulse 101   Temp 98.7 °F (37.1 °C) (Oral)   Resp (!) 34   Ht 6' (1.829 m)   Wt 163 lb 14.4 oz (74.3 kg)   SpO2 91%   BMI 22.23 kg/m²   General Appearance: in mild to moderate distress  Mental Status: alert, oriented to person, place, and time  Patient is able to pump his right foot up and down. He has no tenderness about his right knee. He is quite exquisitely tender with any kind of rotation to the right hip. No other findings on physical examination    Imaging Studies  X-rays taken previously at 2/18/2022 show AP lateral views of the patient's right hip. The AP view shows what appears to be in mildly displaced femoral neck fracture however the lateral view is suboptimal and cannot determine the degree of displacement on the lateral view. I plan on scheduling a CT scan in order to better delineate the fracture    Diagnostics and Labs  Relevant diagnostic, laboratory and radiological studies have been reviewed in the Electronic Medical Record. Impression and Plan  Estela Vegas is a 74 y.o. old male who is sustained a right femoral neck fracture. Based on the x-rays is difficult for me to do determine whether this is a displaced versus nondisplaced fracture as this is large and differentiating the type of surgery with fixation versus hemiarthroplasty. Therefore I will take the liberty of ordering a CT scan to better delineate the process. Patient is requiring significant oxygen in order to maintain his oxygen saturation and is relayed to me by the nurse that the internal medicine does not feel that he will be ready for surgery at least until Monday which is 2 days from now. I will obtain and reentered reviewed the patient's CT scan and await medical clearance.     Patient situation discussed with the patient as well who is with his wife who is present with him in the room. Nola Kirby MD    This note is created with the assistance of a speech recognition program.  While intending to generate adocument that actually reflects the content of the visit, the document can still have some errors including those of syntax and sound a like substitutions which may escape proof reading. It such instances, actual meaningcan be extrapolated by contextual diversion.

## 2022-02-19 NOTE — CONSULTS
[address] [facility]                 [address] [facility], [address]                                  CONSULTATION    PATIENT NAME: Lazaro Valladares                  :        1955  MED REC NO:   [id number]                              ROOM:       2016  ACCOUNT NO:   [de-identified]                           ADMIT DATE: 2022  PROVIDER:     Katharine Florez    CONSULT DATE:  2022    REASON FOR CONSULTATION:  Acute-upon-chronic hypoxemic respiratory  failure, pulmonary fibrosis, community-acquired pneumonia and right hip  fracture. HISTORY OF PRESENT ILLNESS:  The patient is a 49-year-old male. The  patient did see Dr. Maria Del Carmen Abreu about four days ago. He was referred  to [facility] and the patient was diagnosed with pulmonary  fibrosis. He felt he was a lung transplant candidate medical wise. The  patient was placed by antifibrotic agents, but had significant side  effects and this was stopped. He is on anticoagulation for atrial  fibrillation. Of note, the patient decided that he did not want to go  any other places for lung transplantation. He does not want to start  any antifibrotic agents. Apparently, he would discuss with Dr. Ramya Cabrera that he is a DNRCC-Arrest, no intubation. This is documented  on Dr. Ramya Cabrera' notes. Of note, the patient presented to the hospital  because of worsening shortness of breath and he had no cough and  described as some pinkish phlegm may be quarter sized to a half a dollar sized. Of note, he is normally  on 5 L of oxygen at home. He reportedly felt dizzy and slipped and fell  after walking through the front door of the ER and he sustained a right  femoral neck fracture. The patient also had imaging studies revealing a  CT scan of the head, which revealed no acute intracranial process. CT  scan of the chest reveals no acute pulmonary embolism.   There is  evidence of ground-glass airspace disease in the left upper lobe. There  is also an airspace density on the major fissure measuring 4.3 x 1.5 cm. The patient currently underwent blood cultures x2 and the patient was  placed on Rocephin and Zithromax. PAST MEDICAL HISTORY:  Notable for diagnosis of pulmonary fibrosis,  chronic respiratory failure on 5 L nasal cannula, history of benign  prostatic hyperplasia, history of GERD, history GI bleed in the past.    PAST SURGICAL HISTORY:  Includes multiple colonoscopies. There has been  a colectomy with a reversal in [facility]. There is a history of  tonsillectomy as well. SOCIAL HISTORY:  Former smoker, but quit 50 years ago. Alcohol use, 12  drinks per week. MEDICATIONS:  He appears to be on Eliquis 5 mg twice daily. No  nebulizer or inhaler treatments. ALLERGIES:  ADHESIVE TAPE- blistering; PENICILLIN- swelling; NINTEDANIB-  10 to 15 bowel movements daily, CODEINE- nausea. REVIEW OF SYSTEMS:  As per HPI. He mentions some chills, some wheezing  and chest pain. PHYSICAL EXAMINATION:  GENERAL:  Upon physical examination, the patient is a 40-year-old male  resting quietly. He is on a high-flow at 70%. VITAL SIGNS:  Blood pressure is 122/98, heart rate 93, respirations 24  to 28, O2 saturations 96% 3 L at FiO2 of 70%, BMI 23.1. HEENT:  No supraclavicular lymph node enlargement. LUNGS:  Crackles heard one fourth from base to apex posteriorly. CARDIOVASCULAR:  Regular rhythm. ABDOMEN:  Soft. EXTREMITIES:  No edema. LABORATORY RESULTS:  BUN and creatinine is 19/0.85, procalcitonin level  actually only 0.10, white count 11.7, hemoglobin 12, hematocrit 38.3,  platelet count is 154. D-dimer 1. 10. Venous blood gas; pH of 7.39,  pCO2 of 42, pO2 of 33. IMPRESSION:  1. Acute-upon-chronic hypoxemic respiratory failure. The patient does  not have chronic hypercapnia. 2.  Pulmonary fibrosis. 3.  Concerning for community-acquired pneumonia. 4.  Very scant hemoptysis.   5.  History of colon cancer in the past.    PLAN:  1. Continue with Eliquis. 2.  Await cultures. 3.  Rocephin and Zithromax. 4.  O2 therapy. 5. Intermediate care bed. 6.  The patient reportedly wants to be a DNRCC-Arrest, no intubation. This is on Dr. Yanet Louis' note. He will reconfirm this and go from  there. Thank you Dr. Sd Frias for the consult. Total time of 30 minutes critical  time spent on this patient.         Sue Cassidy    D: 02/18/2022 17:46:40       T: 02/18/2022 22:01:27     [email or web address]  Job#: [id number]     Doc#: [id number]    CC:

## 2022-02-19 NOTE — ED NOTES
Patient placed back on high flow nasal O2 at 40L, 74% due to not tolerating bipap.      Aristeo Houser RN  02/18/22 2014

## 2022-02-19 NOTE — PROGRESS NOTES
ICU Progress Note (Non-Vent)  [facility]    Patient - Estela Vegas,  Age - 74 y.o.    - 1955      Room Number -    N -  [id number]   [facility]  Date of Admission -  2022  6:14 AM    Events of Past 24 Hours   Patient is resting comfortably. He does not look like he is in any distress. His O2 requirements are considerable. He is on a heated high flow 70% FiO2 with 40 L/min. Vitals    height is 6' (1.829 m) and weight is 163 lb 14.4 oz (74.3 kg). His oral temperature is 97.4 °F (36.3 °C). His blood pressure is 135/89 and his pulse is 101. His respiration is 23 and oxygen saturation is 91%.        Temperature Range: Temp: 97.4 °F (36.3 °C) Temp  Av.3 °F (36.8 °C)  Min: 97.4 °F (36.3 °C)  Max: 98.7 °F (37.1 °C)  BP Range:  Systolic (66AQT), RFC:116 , Min:120 , USM:881     Diastolic (72QML), SAW:27, Min:67, Max:115    Pulse Range: Pulse  Av.5  Min: 86  Max: 117  Respiration Range: Resp  Av.3  Min: 20  Max: 36  Current Pulse Ox[de-identified]  SpO2: 91 %  24HR Pulse Ox Range:  SpO2  Av.6 %  Min: 84 %  Max: 96 %  Oxygen Amount and Delivery: O2 Flow Rate (L/min): 40 L/min    Wt Readings from Last 3 Encounters:   22 163 lb 14.4 oz (74.3 kg)   21 171 lb (77.6 kg)   07/15/21 166 lb 9.6 oz (75.6 kg)     I/O       Intake/Output Summary (Last 24 hours) at 2022 1351  Last data filed at 2022 0015  Gross per 24 hour   Intake 10 ml   Output 500 ml   Net -490 ml     DRAIN/TUBE OUTPUT       Invasive Lines   ICP PRESSURE RANGE  No data recorded  CVP PRESSURE RANGE  No data recorded      Medications      methylPREDNISolone  80 mg IntraVENous Q8H    sodium chloride flush  5-40 mL IntraVENous 2 times per day    famotidine (PEPCID) injection  20 mg IntraVENous BID    cefTRIAXone (ROCEPHIN) IV  1,000 mg IntraVENous Q24H    azithromycin  500 mg IntraVENous Q24H  amLODIPine  5 mg Oral Daily    atorvastatin  40 mg Oral Daily    carvedilol  3.125 mg Oral BID     lisinopril  10 mg Oral BID    tamsulosin  0.4 mg Oral Daily    apixaban  5 mg Oral BID    digoxin  125 mcg Oral Daily    aspirin  81 mg Oral Daily    LORazepam  0.5 mg IntraVENous Q6H     morphine, sodium chloride flush, sodium chloride flush, sodium chloride, ondansetron **OR** ondansetron, polyethylene glycol, acetaminophen **OR** acetaminophen, potassium chloride **OR** potassium chloride, magnesium sulfate  IV Drips/Infusions   sodium chloride         Diet/Nutrition   ADULT DIET; Regular; Low Sodium (2 gm); 1500 ml    Exam      Constitutional -   General Appearance  well developed, well nourished  HEENT -normocephalic, atraumatic. PERRLA  Lungs -crackles heard laterally. Cardiovascular - Heart sounds are normal.  normal rate and rhythm regular, no murmur, gallop or rub.   Abdomen - soft, nontender, nondistended,   Extremities - no cyanosis, clubbing or edema    Lab Results   CBC     Lab Results   Component Value Date    WBC 9.4 02/19/2022    RBC 4.22 02/19/2022    HGB 10.6 02/19/2022    HCT 33.0 02/19/2022     02/19/2022    MCV 78.3 02/19/2022    MCH 25.0 02/19/2022    MCHC 31.9 02/19/2022    RDW 17.1 02/19/2022    LYMPHOPCT 6 02/19/2022    MONOPCT 9 02/19/2022    BASOPCT 1 02/19/2022    MONOSABS 0.90 02/19/2022    LYMPHSABS 0.50 02/19/2022    EOSABS 0.30 02/19/2022    BASOSABS 0.00 02/19/2022    DIFFTYPE NOT REPORTED 02/19/2022       BMP   Lab Results   Component Value Date     02/19/2022    K 4.3 02/19/2022     02/19/2022    CO2 31 02/19/2022    BUN 21 02/19/2022    CREATININE 0.57 02/19/2022    GLUCOSE 92 02/19/2022       LFTS  Lab Results   Component Value Date    ALKPHOS 92 12/20/2021    ALT 8 12/20/2021    AST 18 12/20/2021    PROT 7.4 12/20/2021    BILITOT 0.66 12/20/2021    BILIDIR <0.08 12/05/2018    IBILI CANNOT BE CALCULATED 12/05/2018    LABALBU 4.1 12/20/2021       ABG ABGs:   Lab Results   Component Value Date    PHART 7.394 02/18/2022    PO2ART 33.4 02/18/2022    HSF4OEN 42.4 02/18/2022       Lab Results   Component Value Date    MODE NOT REPORTED 02/18/2022         INR  No results for input(s): PROTIME, INR in the last 72 hours. APTT  No results for input(s): APTT in the last 72 hours. Lactic Acid  Lab Results   Component Value Date    LACTA NOT REPORTED 12/07/2020    LACTA NOT REPORTED 10/17/2020    LACTA 1.0 08/11/2014        BNP   No results for input(s): BNP in the last 72 hours. Cultures       Radiology     CXR  Chest x-ray reveals persistent alveolar congestive prognosis he is left side greater than right. CT Scans  CT scan of hip done, results  (See actual reports for details)      SYSTEMS ASSESSMENT    IMPRESSION:  1. Acute-upon-chronic hypoxemic respiratory failure. The patient does  not have chronic hypercapnia. 2.  Pulmonary fibrosis. 3.  Concerning for community-acquired pneumonia. 4.  Very scant hemoptysis. 5.  History of colon cancer in the past.  CT chest upon admission no pulmonary embolism groundglass airspace disease in the left upper lobe. Continue with Rocephin and Zithromax  Titrate oxygen keep sats 89% or above. On Eliquis  On Solu-Medrol  Check proBNP,  if elevated, consider Lasix.   Patient has normal creatinine    DNR CC arrest no intubation noted    Critical Care Time   30 min    Electronically signed by Shiloh Ansari MD on 2/19/2022 at 1:51 PM

## 2022-02-20 LAB
ABSOLUTE EOS #: 0 K/UL (ref 0–0.4)
ABSOLUTE IMMATURE GRANULOCYTE: ABNORMAL K/UL (ref 0–0.3)
ABSOLUTE LYMPH #: 0.5 K/UL (ref 1–4.8)
ABSOLUTE MONO #: 0.59 K/UL (ref 0.1–1.3)
ANION GAP SERPL CALCULATED.3IONS-SCNC: 6 MMOL/L (ref 9–17)
BASOPHILS # BLD: 0 % (ref 0–2)
BASOPHILS ABSOLUTE: 0 K/UL (ref 0–0.2)
BILIRUBIN URINE: NEGATIVE
BUN BLDV-MCNC: 26 MG/DL (ref 8–23)
BUN/CREAT BLD: ABNORMAL (ref 9–20)
CALCIUM SERPL-MCNC: 8.6 MG/DL (ref 8.6–10.4)
CHLORIDE BLD-SCNC: 97 MMOL/L (ref 98–107)
CO2: 31 MMOL/L (ref 20–31)
COLOR: YELLOW
COMMENT UA: NORMAL
CREAT SERPL-MCNC: 0.56 MG/DL (ref 0.7–1.2)
DIFFERENTIAL TYPE: ABNORMAL
EOSINOPHILS RELATIVE PERCENT: 0 % (ref 0–4)
GFR AFRICAN AMERICAN: >60 ML/MIN
GFR NON-AFRICAN AMERICAN: >60 ML/MIN
GFR SERPL CREATININE-BSD FRML MDRD: ABNORMAL ML/MIN/{1.73_M2}
GFR SERPL CREATININE-BSD FRML MDRD: ABNORMAL ML/MIN/{1.73_M2}
GLUCOSE BLD-MCNC: 131 MG/DL (ref 70–99)
GLUCOSE URINE: NEGATIVE
HCT VFR BLD CALC: 33.4 % (ref 41–53)
HEMOGLOBIN: 10.9 G/DL (ref 13.5–17.5)
IMMATURE GRANULOCYTES: ABNORMAL %
KETONES, URINE: NEGATIVE
LEUKOCYTE ESTERASE, URINE: NEGATIVE
LYMPHOCYTES # BLD: 5 % (ref 24–44)
MCH RBC QN AUTO: 25.8 PG (ref 26–34)
MCHC RBC AUTO-ENTMCNC: 32.6 G/DL (ref 31–37)
MCV RBC AUTO: 79.2 FL (ref 80–100)
MONOCYTES # BLD: 6 % (ref 1–7)
MORPHOLOGY: ABNORMAL
NITRITE, URINE: NEGATIVE
NRBC AUTOMATED: ABNORMAL PER 100 WBC
PDW BLD-RTO: 16.9 % (ref 11.5–14.9)
PH UA: 6 (ref 5–8)
PLATELET # BLD: 247 K/UL (ref 150–450)
PLATELET ESTIMATE: ABNORMAL
PMV BLD AUTO: 7.9 FL (ref 6–12)
POTASSIUM SERPL-SCNC: 4.6 MMOL/L (ref 3.7–5.3)
PROTEIN UA: NEGATIVE
RBC # BLD: 4.22 M/UL (ref 4.5–5.9)
RBC # BLD: ABNORMAL 10*6/UL
SEG NEUTROPHILS: 89 % (ref 36–66)
SEGMENTED NEUTROPHILS ABSOLUTE COUNT: 8.81 K/UL (ref 1.3–9.1)
SODIUM BLD-SCNC: 134 MMOL/L (ref 135–144)
SPECIFIC GRAVITY UA: 1.01 (ref 1–1.03)
TURBIDITY: CLEAR
URINE HGB: NEGATIVE
UROBILINOGEN, URINE: NORMAL
WBC # BLD: 9.9 K/UL (ref 3.5–11)
WBC # BLD: ABNORMAL 10*3/UL

## 2022-02-20 PROCEDURE — 6370000000 HC RX 637 (ALT 250 FOR IP): Performed by: STUDENT IN AN ORGANIZED HEALTH CARE EDUCATION/TRAINING PROGRAM

## 2022-02-20 PROCEDURE — 80048 BASIC METABOLIC PNL TOTAL CA: CPT

## 2022-02-20 PROCEDURE — 99233 SBSQ HOSP IP/OBS HIGH 50: CPT | Performed by: INTERNAL MEDICINE

## 2022-02-20 PROCEDURE — 87086 URINE CULTURE/COLONY COUNT: CPT

## 2022-02-20 PROCEDURE — 94761 N-INVAS EAR/PLS OXIMETRY MLT: CPT

## 2022-02-20 PROCEDURE — 2700000000 HC OXYGEN THERAPY PER DAY

## 2022-02-20 PROCEDURE — 6370000000 HC RX 637 (ALT 250 FOR IP)

## 2022-02-20 PROCEDURE — 6360000002 HC RX W HCPCS: Performed by: STUDENT IN AN ORGANIZED HEALTH CARE EDUCATION/TRAINING PROGRAM

## 2022-02-20 PROCEDURE — 2580000003 HC RX 258: Performed by: STUDENT IN AN ORGANIZED HEALTH CARE EDUCATION/TRAINING PROGRAM

## 2022-02-20 PROCEDURE — 36415 COLL VENOUS BLD VENIPUNCTURE: CPT

## 2022-02-20 PROCEDURE — 85025 COMPLETE CBC W/AUTO DIFF WBC: CPT

## 2022-02-20 PROCEDURE — 2500000003 HC RX 250 WO HCPCS: Performed by: STUDENT IN AN ORGANIZED HEALTH CARE EDUCATION/TRAINING PROGRAM

## 2022-02-20 PROCEDURE — 2060000000 HC ICU INTERMEDIATE R&B

## 2022-02-20 PROCEDURE — 6360000002 HC RX W HCPCS: Performed by: INTERNAL MEDICINE

## 2022-02-20 PROCEDURE — 81003 URINALYSIS AUTO W/O SCOPE: CPT

## 2022-02-20 RX ORDER — FUROSEMIDE 10 MG/ML
40 INJECTION INTRAMUSCULAR; INTRAVENOUS DAILY
Status: DISCONTINUED | OUTPATIENT
Start: 2022-02-20 | End: 2022-02-24

## 2022-02-20 RX ORDER — METHYLPREDNISOLONE SODIUM SUCCINATE 125 MG/2ML
60 INJECTION, POWDER, LYOPHILIZED, FOR SOLUTION INTRAMUSCULAR; INTRAVENOUS EVERY 8 HOURS
Status: DISCONTINUED | OUTPATIENT
Start: 2022-02-20 | End: 2022-02-22

## 2022-02-20 RX ADMIN — AMLODIPINE BESYLATE 5 MG: 5 TABLET ORAL at 08:11

## 2022-02-20 RX ADMIN — FAMOTIDINE 20 MG: 10 INJECTION, SOLUTION INTRAVENOUS at 21:29

## 2022-02-20 RX ADMIN — MORPHINE SULFATE 2 MG: 2 INJECTION, SOLUTION INTRAMUSCULAR; INTRAVENOUS at 01:27

## 2022-02-20 RX ADMIN — CEFTRIAXONE SODIUM 1000 MG: 1 INJECTION, POWDER, FOR SOLUTION INTRAMUSCULAR; INTRAVENOUS at 07:04

## 2022-02-20 RX ADMIN — LISINOPRIL 10 MG: 20 TABLET ORAL at 08:11

## 2022-02-20 RX ADMIN — METHYLPREDNISOLONE SODIUM SUCCINATE 60 MG: 125 INJECTION, POWDER, FOR SOLUTION INTRAMUSCULAR; INTRAVENOUS at 16:31

## 2022-02-20 RX ADMIN — ONDANSETRON 4 MG: 2 INJECTION INTRAMUSCULAR; INTRAVENOUS at 13:27

## 2022-02-20 RX ADMIN — TAMSULOSIN HYDROCHLORIDE 0.4 MG: 0.4 CAPSULE ORAL at 08:11

## 2022-02-20 RX ADMIN — MORPHINE SULFATE 2 MG: 2 INJECTION, SOLUTION INTRAMUSCULAR; INTRAVENOUS at 21:29

## 2022-02-20 RX ADMIN — ASPIRIN 81 MG 81 MG: 81 TABLET ORAL at 08:11

## 2022-02-20 RX ADMIN — MORPHINE SULFATE 2 MG: 2 INJECTION, SOLUTION INTRAMUSCULAR; INTRAVENOUS at 17:58

## 2022-02-20 RX ADMIN — DEXTROSE MONOHYDRATE 500 MG: 50 INJECTION, SOLUTION INTRAVENOUS at 08:21

## 2022-02-20 RX ADMIN — FAMOTIDINE 20 MG: 10 INJECTION, SOLUTION INTRAVENOUS at 08:11

## 2022-02-20 RX ADMIN — SODIUM CHLORIDE, PRESERVATIVE FREE 10 ML: 5 INJECTION INTRAVENOUS at 21:23

## 2022-02-20 RX ADMIN — METHYLPREDNISOLONE SODIUM SUCCINATE 80 MG: 125 INJECTION, POWDER, FOR SOLUTION INTRAMUSCULAR; INTRAVENOUS at 00:54

## 2022-02-20 RX ADMIN — ONDANSETRON 4 MG: 2 INJECTION INTRAMUSCULAR; INTRAVENOUS at 05:11

## 2022-02-20 RX ADMIN — DIGOXIN 125 MCG: 125 TABLET ORAL at 08:14

## 2022-02-20 RX ADMIN — SODIUM CHLORIDE, PRESERVATIVE FREE 10 ML: 5 INJECTION INTRAVENOUS at 21:28

## 2022-02-20 RX ADMIN — FUROSEMIDE 40 MG: 40 INJECTION, SOLUTION INTRAMUSCULAR; INTRAVENOUS at 12:05

## 2022-02-20 RX ADMIN — MORPHINE SULFATE 2 MG: 2 INJECTION, SOLUTION INTRAMUSCULAR; INTRAVENOUS at 09:28

## 2022-02-20 RX ADMIN — MORPHINE SULFATE 2 MG: 2 INJECTION, SOLUTION INTRAMUSCULAR; INTRAVENOUS at 05:11

## 2022-02-20 RX ADMIN — CARVEDILOL 3.12 MG: 3.12 TABLET, FILM COATED ORAL at 08:11

## 2022-02-20 RX ADMIN — METHYLPREDNISOLONE SODIUM SUCCINATE 80 MG: 125 INJECTION, POWDER, FOR SOLUTION INTRAMUSCULAR; INTRAVENOUS at 08:11

## 2022-02-20 RX ADMIN — APIXABAN 5 MG: 5 TABLET, FILM COATED ORAL at 08:11

## 2022-02-20 RX ADMIN — ACETAMINOPHEN 650 MG: 325 TABLET, FILM COATED ORAL at 16:43

## 2022-02-20 RX ADMIN — ATORVASTATIN CALCIUM 40 MG: 40 TABLET, FILM COATED ORAL at 08:11

## 2022-02-20 RX ADMIN — SODIUM CHLORIDE, PRESERVATIVE FREE 10 ML: 5 INJECTION INTRAVENOUS at 08:12

## 2022-02-20 RX ADMIN — MORPHINE SULFATE 2 MG: 2 INJECTION, SOLUTION INTRAMUSCULAR; INTRAVENOUS at 13:27

## 2022-02-20 ASSESSMENT — ENCOUNTER SYMPTOMS
COUGH: 1
SHORTNESS OF BREATH: 1
VOMITING: 0
EYE PAIN: 0
SORE THROAT: 0
NAUSEA: 1
EYE REDNESS: 0

## 2022-02-20 ASSESSMENT — PAIN DESCRIPTION - LOCATION: LOCATION: HIP;LEG

## 2022-02-20 ASSESSMENT — PAIN SCALES - GENERAL
PAINLEVEL_OUTOF10: 6
PAINLEVEL_OUTOF10: 7
PAINLEVEL_OUTOF10: 6
PAINLEVEL_OUTOF10: 9
PAINLEVEL_OUTOF10: 6
PAINLEVEL_OUTOF10: 9
PAINLEVEL_OUTOF10: 10
PAINLEVEL_OUTOF10: 10

## 2022-02-20 ASSESSMENT — PAIN DESCRIPTION - ORIENTATION: ORIENTATION: RIGHT

## 2022-02-20 ASSESSMENT — PAIN DESCRIPTION - PAIN TYPE: TYPE: ACUTE PAIN

## 2022-02-20 NOTE — PROGRESS NOTES
Pulmonary Progress Note  [facility]      Patient - Nithya Montilla,  Age - 74 y.o.    - 1955      Room Number -    N -  [id number]   [facility]  Date of Admission -  2022  6:14 AM    Consulting Service/Physician   Consulting - Patti Mi MD  Primary Care Physician - Arya Cardenas-Bartolome Pascual MD     SUBJECTIVE   Patient looks like he is in brighter spirits. Currently he is on high flow. Patient's O2 status looks like it has improved. Patient is now at 60 L FiO2 at 40 L/min. Patient was on 73 L earlier    OBJECTIVE   VITALS    height is 6' (1.829 m) and weight is 163 lb 12.8 oz (74.3 kg). His oral temperature is 97.7 °F (36.5 °C). His blood pressure is 109/66 and his pulse is 92. His respiration is 20 and oxygen saturation is 98%. Body mass index is 22.22 kg/m². Temperature Range: Temp: 97.7 °F (36.5 °C) Temp  Av.8 °F (36.6 °C)  Min: 97.5 °F (36.4 °C)  Max: 98.4 °F (36.9 °C)  BP Range:  Systolic (35JIJ), RAL:295 , Min:95 , YPM:651     Diastolic (57UAI), ROBERTO:81, Min:63, Max:86    Pulse Range: Pulse  Av.8  Min: 71  Max: 95  Respiration Range: Resp  Av.8  Min: 16  Max: 34  Current Pulse Ox[de-identified]  SpO2: 98 %  24HR Pulse Ox Range:  SpO2  Av.5 %  Min: 90 %  Max: 98 %  Oxygen Amount and Delivery: O2 Flow Rate (L/min): 40 L/min    Wt Readings from Last 3 Encounters:   22 163 lb 12.8 oz (74.3 kg)   21 171 lb (77.6 kg)   07/15/21 166 lb 9.6 oz (75.6 kg)       I/O (24 Hours)    Intake/Output Summary (Last 24 hours) at 2022 1329  Last data filed at 2022 4504  Gross per 24 hour   Intake 400 ml   Output 750 ml   Net -350 ml       EXAM     General Appearance  Awake, alert, oriented, in no acute distress  HEENT - normocephalic, atraumatic. Neck - Supple,  trachea midline   Lungs -coarse breath sounds with some crackles at the bases posterior  Heart Exam:PMI normal. No lifts, heaves, or thrills. RRR.  No murmurs, clicks, gallops, or rubs  Abdomen Exam: Abdomen soft, non-tender. BS normal. No masses,    Extremity Exam: No sounds        MEDS      furosemide  40 mg IntraVENous Daily    enoxaparin  1 mg/kg SubCUTAneous BID    methylPREDNISolone  80 mg IntraVENous Q8H    sodium chloride flush  5-40 mL IntraVENous 2 times per day    famotidine (PEPCID) injection  20 mg IntraVENous BID    cefTRIAXone (ROCEPHIN) IV  1,000 mg IntraVENous Q24H    azithromycin  500 mg IntraVENous Q24H    amLODIPine  5 mg Oral Daily    atorvastatin  40 mg Oral Daily    carvedilol  3.125 mg Oral BID WC    lisinopril  10 mg Oral BID    tamsulosin  0.4 mg Oral Daily    digoxin  125 mcg Oral Daily    [Held by provider] aspirin  81 mg Oral Daily    LORazepam  0.5 mg IntraVENous Q6H      sodium chloride       morphine, sodium chloride flush, sodium chloride flush, sodium chloride, ondansetron **OR** ondansetron, polyethylene glycol, acetaminophen **OR** acetaminophen, potassium chloride **OR** potassium chloride, magnesium sulfate    LABS   CBC   Recent Labs     02/20/22  0524   WBC 9.9   HGB 10.9*   HCT 33.4*   MCV 79.2*        BMP:   Lab Results   Component Value Date     02/20/2022    K 4.6 02/20/2022    CL 97 02/20/2022    CO2 31 02/20/2022    BUN 26 02/20/2022    LABALBU 4.1 12/20/2021    CREATININE 0.56 02/20/2022    CALCIUM 8.6 02/20/2022    GFRAA >60 02/20/2022    LABGLOM >60 02/20/2022     ABGs:  Lab Results   Component Value Date    PHART 7.394 02/18/2022    PO2ART 33.4 02/18/2022    XPE9GCS 42.4 02/18/2022      Lab Results   Component Value Date    MODE NOT REPORTED 02/18/2022     Ionized Calcium:  No results found for: IONCA  Magnesium:    Lab Results   Component Value Date    MG 2.1 04/25/2021     Phosphorus:    Lab Results   Component Value Date    PHOS 3.6 01/13/2021        LIVER PROFILE No results for input(s): AST, ALT, LIPASE, BILIDIR, BILITOT, ALKPHOS in the last 72 hours. Invalid input(s):   AMYLASE,  ALB  INR No results for input(s): INR in the last 72 hours. PTT   Lab Results   Component Value Date    APTT 35.5 (H) 04/25/2021         RADIOLOGY     (See actual reports for details)    ASSESSMENT/PLAN     Patient Active Problem List   Diagnosis    Dyslipidemia    ED (erectile dysfunction)    Incomplete bladder emptying    Benign prostatic hyperplasia with urinary obstruction    History of colon cancer    PAF (paroxysmal atrial fibrillation) (HCC)    Anemia of chronic disease    Pallor of optic disc    Presbyopia    Incisional hernia, without obstruction or gangrene    Hypertrophic nonobstructive cardiomyopathy (HCC)    Iron (Fe) deficiency anemia    Primary osteoarthritis of right knee    History of malignant neoplasm of rectum    Hill's esophagus    CHF (congestive heart failure), NYHA class II, acute on chronic, combined ([address]    Hypoxia    Dyspnea and respiratory abnormalities    Occupational pulmonary disease    Sinus bradycardia    Acute hypoxemic respiratory failure due to COVID-19 ([address]    COVID-19    Pneumonia    Acute respiratory failure with hypoxia (HCC)    Pulmonary fibrosis (HCC)    Syncope and collapse    Chronic anticoagulation    Severe malnutrition (HCC)    Cervical stenosis of spinal canal    Impingement syndrome of left shoulder    Multifocal pneumonia    Fracture of right hip    Community acquired bacterial pneumonia       IMPRESSION:  1.  Acute-upon-chronic hypoxemic respiratory failure.  The patient does  not have chronic hypercapnia. 2.  Pulmonary fibrosis. 3.  Concerning for community-acquired pneumonia. CT chest upon admission no pulmonary embolism groundglass airspace disease in the left upper lobe. 4.  Very scant hemoptysis. 5.  History of colon cancer in the past.  6 right femoral neck fracture      O2 requirements are actually improving.   Continue with Rocephin and Zithromax  Decrease Solu-Medrol to 60 every 8 hours  On Lovenox treatment doses  Repeat chest x-ray in a.m. Orthopedic surgery team following for femoral neck fracture.     Follow closely  CC time 30 minutes    Electronically signed by Chris Easley MD on 2/20/2022 at 1:29 PM

## 2022-02-20 NOTE — PLAN OF CARE
Problem: Skin Integrity:  Goal: Will show no infection signs and symptoms  Description: Will show no infection signs and symptoms  2/20/2022 1142 by Elisabet Isabel RN  Outcome: Ongoing     Problem: Falls - Risk of:  Goal: Will remain free from falls  Description: Will remain free from falls  2/20/2022 1142 by Elisabet Isabel RN  Outcome: Ongoing     Problem: SAFETY  Goal: Free from accidental physical injury  2/20/2022 1142 by Elisabet Isabel RN  Outcome: Ongoing

## 2022-02-20 NOTE — PROGRESS NOTES
[address]    PROGRESS NOTE             2/20/2022    8:35 AM    Name:   Pallavi Parra  MRN:     [id number]     Acct:      [de-identified]   Room:   2016/2016-01  IP Day:  2  Admit Date:  2/18/2022  6:14 AM    PCP:  Agatha Martini MD  Code Status:  DNR-CCA    Subjective:     C/C: No chief complaint on file. Interval History Status: improved. Pt seen and examined. Reports SOB/respiration is much improved,  But continues to have intense right sided pain and externally rotated right LE.   C/o nausea but is tolerating food; on prn zofran; will not increase zofran frequency d/t hx of arrhythmia. C/o dysuria and hematuria on day of admission s/p fall with dark urine color since; Discussed with nursing staff; urine has been yaa-like color; Suspect this is 2/2 trauma from fall but pt also c/o of dysuria,   will get UA. Brief History:     Pallavi Parra is a 69-year-old male with past medical hypertension, pulmonary fibrosis (on 5 L home O2), A. fib, arthritis, presents to the ED with worsening shortness of breath and productive cough x1 week.  Symptoms are associated with right-sided pleuritic chest pain as well as spells of dizziness and numbness in the distal extremities.  Patient reports he desaturates into the 50s with exertion at home.  Sputum described as white and \"milky. \"      This a.m., patient felt dyspneic/dizzy and slipped and fell after walking through front door of the hospital. He also admits to previously falling outside and hitting his head. He now complains of right sided hip pain. Right sided hip X-Ray done and showing acute slightly displaced right femoral neck fracture.      In the ED, pt initially tachypenic, hypertensive, and with desaturations into the 80s.  CXR shows worsening airspace dz, small left sided pleural effusion, worsening multifocal PNA, and underlying fibrosis.      He is admitted for management of multifocal pneumonia and acute right hip fracture    Review of Systems:     Review of Systems   Constitutional: Negative for chills and fever. HENT: Positive for tinnitus. Negative for sore throat. Eyes: Negative for pain and redness. Respiratory: Positive for cough and shortness of breath. Cardiovascular: Negative for palpitations and leg swelling. Gastrointestinal: Positive for nausea. Negative for vomiting. Genitourinary: Positive for difficulty urinating (chronic; pt attributes to bph), dysuria and hematuria (new since after falling on day of admission). Musculoskeletal: Positive for arthralgias and gait problem. Neurological: Negative for dizziness and light-headedness. Psychiatric/Behavioral: Negative for confusion and decreased concentration. Medications: Allergies: Allergies   Allergen Reactions    Adhesive Tape Other (See Comments)     Blister badly     Codeine Nausea Only     Other reaction(s): Other: See Comments  NAUSEA  Other reaction(s):  Other: See Comments  NAUSEA    Penicillins Swelling     As a baby  Other reaction(s): Unknown  As a baby    Nintedanib Diarrhea     10-15 BM daily       Current Meds:   Scheduled Meds:    methylPREDNISolone  80 mg IntraVENous Q8H    sodium chloride flush  5-40 mL IntraVENous 2 times per day    famotidine (PEPCID) injection  20 mg IntraVENous BID    cefTRIAXone (ROCEPHIN) IV  1,000 mg IntraVENous Q24H    azithromycin  500 mg IntraVENous Q24H    amLODIPine  5 mg Oral Daily    atorvastatin  40 mg Oral Daily    carvedilol  3.125 mg Oral BID WC    lisinopril  10 mg Oral BID    tamsulosin  0.4 mg Oral Daily    apixaban  5 mg Oral BID    digoxin  125 mcg Oral Daily    aspirin  81 mg Oral Daily    LORazepam  0.5 mg IntraVENous Q6H     Continuous Infusions:    sodium chloride       PRN Meds: morphine, sodium chloride flush, sodium chloride flush, sodium chloride, ondansetron **OR** ondansetron, polyethylene glycol, acetaminophen **OR** acetaminophen, potassium chloride **OR** potassium chloride, magnesium sulfate    Data:     Past Medical History:   has a past medical history of Atrial fibrillation ([facility] Back pain, chronic, Hill's esophagus, Benign essential HTN, BPH (benign prostatic hyperplasia), Cancer ([facility] Chronic idiopathic pulmonary fibrosis ([facility] Cocaine abuse in remission [facility]), ED (erectile dysfunction), GERD (gastroesophageal reflux disease), GI bleed, Hernia, History of colon cancer, Melena, Migraines, and Murmur, cardiac. Social History:   reports that he quit smoking about 51 years ago. He has a 0.50 pack-year smoking history. He has never used smokeless tobacco. He reports current alcohol use of about 12.0 standard drinks of alcohol per week. He reports that he does not use drugs. Family History:   Family History   Problem Relation Age of Onset    Diabetes Mother     Heart Attack Father     Heart Disease Father     Heart Disease Brother        Vitals:  /66   Pulse 92   Temp 97.7 °F (36.5 °C) (Oral)   Resp 17   Ht 6' (1.829 m)   Wt 163 lb 12.8 oz (74.3 kg)   SpO2 96%   BMI 22.22 kg/m²   Temp (24hrs), Av.7 °F (36.5 °C), Min:97.4 °F (36.3 °C), Max:98.4 °F (36.9 °C)    No results for input(s): POCGLU in the last 72 hours. Vitals:    22 0600 22 0615 22 0630 22 0809   BP: 114/70   109/66   Pulse: 83 72 92    Resp: 17 16 17    Temp:       TempSrc:       SpO2: 93% 95% 96%    Weight:       Height:           I/O(24Hr):     Intake/Output Summary (Last 24 hours) at 2022 0835  Last data filed at 2022 4986  Gross per 24 hour   Intake 400 ml   Output 1050 ml   Net -650 ml       Labs:  Recent Results (from the past 24 hour(s))   Basic Metabolic Panel w/ Reflex to MG    Collection Time: 22  5:24 AM   Result Value Ref Range    Glucose 131 (H) 70 - 99 mg/dL    BUN 26 (H) 8 - 23 mg/dL    CREATININE 0.56 (L) 0.70 - 1.20 mg/dL    Bun/Cre Ratio NOT REPORTED 9 - 20    Calcium 8.6 8.6 - 10.4 mg/dL    Sodium 134 (L) 135 - 144 mmol/L    Potassium 4.6 3.7 - 5.3 mmol/L    Chloride 97 (L) 98 - 107 mmol/L    CO2 31 20 - 31 mmol/L    Anion Gap 6 (L) 9 - 17 mmol/L    GFR Non-African American >60 >60 mL/min    GFR African American >60 >60 mL/min    GFR Comment          GFR Staging NOT REPORTED    CBC with Auto Differential    Collection Time: 02/20/22  5:24 AM   Result Value Ref Range    WBC 9.9 3.5 - 11.0 k/uL    RBC 4.22 (L) 4.5 - 5.9 m/uL    Hemoglobin 10.9 (L) 13.5 - 17.5 g/dL    Hematocrit 33.4 (L) 41 - 53 %    MCV 79.2 (L) 80 - 100 fL    MCH 25.8 (L) 26 - 34 pg    MCHC 32.6 31 - 37 g/dL    RDW 16.9 (H) 11.5 - 14.9 %    Platelets 433 208 - 184 k/uL    MPV 7.9 6.0 - 12.0 fL    NRBC Automated NOT REPORTED per 100 WBC    Differential Type NOT REPORTED     Immature Granulocytes NOT REPORTED 0 %    Absolute Immature Granulocyte NOT REPORTED 0.00 - 0.30 k/uL    WBC Morphology NOT REPORTED     RBC Morphology NOT REPORTED     Platelet Estimate NOT REPORTED     Seg Neutrophils 89 (H) 36 - 66 %    Lymphocytes 5 (L) 24 - 44 %    Monocytes 6 1 - 7 %    Eosinophils % 0 0 - 4 %    Basophils 0 0 - 2 %    Segs Absolute 8.81 1.3 - 9.1 k/uL    Absolute Lymph # 0.50 (L) 1.0 - 4.8 k/uL    Absolute Mono # 0.59 0.1 - 1.3 k/uL    Absolute Eos # 0.00 0.0 - 0.4 k/uL    Basophils Absolute 0.00 0.0 - 0.2 k/uL    Morphology ANISOCYTOSIS PRESENT     Morphology MICROCYTOSIS PRESENT     Morphology FEW GIANT PLATELETS          Lab Results   Component Value Date/Time    SPECIAL NOT REPORTED 02/18/2022 10:58 AM     Lab Results   Component Value Date/Time    CULTURE NO GROWTH 1 DAY 02/18/2022 10:58 AM         Radiology:    XR CHEST (SINGLE VIEW FRONTAL)    Result Date: 2/19/2022  Bilateral airspace disease superimposed on pulmonary fibrosis. Increasing airspace disease predominantly involving the left lung. XR HIP RIGHT (2-3 VIEWS)    Result Date: 2/18/2022  Portable chest: 1.  Worsening airspace Cardiovascular:      Rate and Rhythm: Normal rate. Rhythm irregular. Pulmonary:      Effort: No respiratory distress. Breath sounds: Wheezing and rhonchi present. Comments: On high flow  Abdominal:      General: Bowel sounds are normal. There is no distension. Palpations: Abdomen is soft. Tenderness: There is no abdominal tenderness. There is no guarding. Musculoskeletal:      Right lower leg: No edema. Left lower leg: No edema. Comments: Right lower extremity is externally rotated,  Right LE ROM limited;  Pt has pain at R hip but no tenderness to gentle palpation   Skin:     General: Skin is warm and dry. Findings: No bruising or erythema. Comments: No edema or erythema on right hip   Neurological:      Mental Status: He is alert.       Comments: oriented   Psychiatric:         Behavior: Behavior normal.           Assessment:        Primary Problem  Multifocal pneumonia    Active Hospital Problems    Diagnosis Date Noted    Multifocal pneumonia [J18.9] 02/18/2022    Fracture of right hip [S72.001A] 02/18/2022    Community acquired bacterial pneumonia [J15.9] 02/18/2022    Pulmonary fibrosis ([facility] [J84.10] 12/08/2020    PAF (paroxysmal atrial fibrillation) ([facility] [I48.0] 07/21/2014       Plan:        Multifocal pneumonia in setting of pulmonary fibrosis  CT chest on admission showed no PE, groundglass airspace disease in the left upper lobe  On high flow, 70% FiO2 with 40 L/min  Blood cultures no growth x2 days  Respiratory cultures pending   Legionella,  strep pneumo were  negative   Mycoplasma pending   Procalcitonin 0.10  Azithromycin 500 mg IV every 24 hours - day 3  Ceftriaxone 1 g IV every 24 hours - day 3  Methylprednisolone IV 80 mg every 8 hours  Oxygen supplementation as needed  RT  PT/OT  Echo pending  Pulmonology on board     Acute right hip fracture s/p fall  Hip x-ray: Acute slightly displaced right femoral neck fracture, underlying osteopenia  CT right hip without contrast: Acute right femoral neck fracture  Ortho on board; recommendation pending medical clearance  Acetaminophen and morphine as needed for pain management  CT Head no acute abnormalities     Atrial fibrillation - rate controlled  On home doses of aspirin and Eliquis  Digoxin 125 mg p.o. daily  Dig levels 0.6 wnl   Magnesium and potassium replacement protocols      Hypertension  Amlodipine p.o. 5 mg daily  Carvedilol 3.125 mg p.o. twice daily => hold d/t low-nml BP  Lisinopril 10 mg p.o. twice daily     Hyperlipidemia  Atorvastatin 40 mg p.o. daily     Other home medications being continued:  Tamsulosin 0.4 mg p.o. daily     Code: DNR-CCA  DVT prophylaxis: On Eliquis  GI prophylaxis: Famotidine 20 mg IV twice daily  Diet: N.p.o. Activity: Up with assistance  Dispo: Admit to intermediate ICU      Please note: Use of a speech recognition software was used in the creation of portions of this note and dictation errors, including those of syntax and sound alike word substitutions, may have escaped proofreading. Jossy Meade DO  2/20/2022  8:35 AM       Attending Physician Statement  I have discussed the care of Ebb Chi and I have examined the patient myselft and taken ros and hpi , including pertinent history and exam findings,  with the resident. I have reviewed the key elements of all parts of the encounter with the resident. I agree with the assessment, plan and orders as documented by the resident.   Acute respiratory failure, due to multifocal pneumonia, pulmonary fibrosis, recent Covid, post Covid syndrome,  High flow nasal cannula,   Broad-spectrum antibiotics, will get infectious disease on board  Mechanical fall and femoral neck fracture, orthopedic surgery on board, patient too sick to get the surgery at this time,  DVT prophylaxis,on eliquis, will hold if surgery planned  At this time waiting from ortho/med team clearance   Continue to monitor electrolytes,  Continue to

## 2022-02-20 NOTE — PROGRESS NOTES
Pt remains on heated high flow 40L 60% which writer decreased from 70%. Pt refused bipap overnight.  Current SpO2 94%

## 2022-02-20 NOTE — PLAN OF CARE
Problem: Skin Integrity:  Goal: Will show no infection signs and symptoms  Description: Will show no infection signs and symptoms  Outcome: Ongoing  Goal: Absence of new skin breakdown  Description: Absence of new skin breakdown  Outcome: Ongoing     Problem: Falls - Risk of:  Goal: Will remain free from falls  Description: Will remain free from falls  Outcome: Ongoing  Goal: Absence of physical injury  Description: Absence of physical injury  Outcome: Ongoing     Problem: SAFETY  Goal: Free from accidental physical injury  Outcome: Ongoing  Goal: Free from intentional harm  Outcome: Ongoing     Problem: DAILY CARE  Goal: Daily care needs are met  Outcome: Ongoing     Problem: PAIN  Goal: Patient's pain/discomfort is manageable  Outcome: Ongoing     Problem: SKIN INTEGRITY  Goal: Skin integrity is maintained or improved  Outcome: Ongoing     Problem: KNOWLEDGE DEFICIT  Goal: Patient/S.O. demonstrates understanding of disease process, treatment plan, medications, and discharge instructions.   Outcome: Ongoing     Problem: DISCHARGE BARRIERS  Goal: Patient's continuum of care needs are met  Outcome: Ongoing

## 2022-02-21 ENCOUNTER — TELEPHONE (OUTPATIENT)
Dept: SURGERY | Age: 67
End: 2022-02-21

## 2022-02-21 ENCOUNTER — APPOINTMENT (OUTPATIENT)
Dept: GENERAL RADIOLOGY | Age: 67
DRG: 521 | End: 2022-02-21
Payer: MEDICARE

## 2022-02-21 ENCOUNTER — ANESTHESIA EVENT (OUTPATIENT)
Dept: OPERATING ROOM | Age: 67
DRG: 521 | End: 2022-02-21
Payer: MEDICARE

## 2022-02-21 LAB
ABSOLUTE EOS #: 0 K/UL (ref 0–0.4)
ABSOLUTE IMMATURE GRANULOCYTE: ABNORMAL K/UL (ref 0–0.3)
ABSOLUTE LYMPH #: 0.32 K/UL (ref 1–4.8)
ABSOLUTE MONO #: 0.32 K/UL (ref 0.1–1.3)
ANION GAP SERPL CALCULATED.3IONS-SCNC: 5 MMOL/L (ref 9–17)
BASOPHILS # BLD: 0 % (ref 0–2)
BASOPHILS ABSOLUTE: 0 K/UL (ref 0–0.2)
BUN BLDV-MCNC: 29 MG/DL (ref 8–23)
BUN/CREAT BLD: ABNORMAL (ref 9–20)
CALCIUM SERPL-MCNC: 8.5 MG/DL (ref 8.6–10.4)
CHLORIDE BLD-SCNC: 96 MMOL/L (ref 98–107)
CO2: 35 MMOL/L (ref 20–31)
CREAT SERPL-MCNC: 0.62 MG/DL (ref 0.7–1.2)
DIFFERENTIAL TYPE: ABNORMAL
EOSINOPHILS RELATIVE PERCENT: 0 % (ref 0–4)
GFR AFRICAN AMERICAN: >60 ML/MIN
GFR NON-AFRICAN AMERICAN: >60 ML/MIN
GFR SERPL CREATININE-BSD FRML MDRD: ABNORMAL ML/MIN/{1.73_M2}
GFR SERPL CREATININE-BSD FRML MDRD: ABNORMAL ML/MIN/{1.73_M2}
GLUCOSE BLD-MCNC: 144 MG/DL (ref 70–99)
HCT VFR BLD CALC: 33 % (ref 41–53)
HEMOGLOBIN: 10.7 G/DL (ref 13.5–17.5)
IMMATURE GRANULOCYTES: ABNORMAL %
LYMPHOCYTES # BLD: 3 % (ref 24–44)
MCH RBC QN AUTO: 25.4 PG (ref 26–34)
MCHC RBC AUTO-ENTMCNC: 32.3 G/DL (ref 31–37)
MCV RBC AUTO: 78.6 FL (ref 80–100)
MONOCYTES # BLD: 3 % (ref 1–7)
MORPHOLOGY: ABNORMAL
MORPHOLOGY: ABNORMAL
MYCOPLASMA PNEUMONIAE IGM: 0.76
NRBC AUTOMATED: ABNORMAL PER 100 WBC
PDW BLD-RTO: 16.9 % (ref 11.5–14.9)
PLATELET # BLD: 273 K/UL (ref 150–450)
PLATELET ESTIMATE: ABNORMAL
PMV BLD AUTO: 7.8 FL (ref 6–12)
POTASSIUM SERPL-SCNC: 4.5 MMOL/L (ref 3.7–5.3)
RBC # BLD: 4.2 M/UL (ref 4.5–5.9)
RBC # BLD: ABNORMAL 10*6/UL
SEG NEUTROPHILS: 94 % (ref 36–66)
SEGMENTED NEUTROPHILS ABSOLUTE COUNT: 10.16 K/UL (ref 1.3–9.1)
SODIUM BLD-SCNC: 136 MMOL/L (ref 135–144)
WBC # BLD: 10.8 K/UL (ref 3.5–11)
WBC # BLD: ABNORMAL 10*3/UL

## 2022-02-21 PROCEDURE — 85025 COMPLETE CBC W/AUTO DIFF WBC: CPT

## 2022-02-21 PROCEDURE — 6360000002 HC RX W HCPCS: Performed by: STUDENT IN AN ORGANIZED HEALTH CARE EDUCATION/TRAINING PROGRAM

## 2022-02-21 PROCEDURE — 6370000000 HC RX 637 (ALT 250 FOR IP): Performed by: STUDENT IN AN ORGANIZED HEALTH CARE EDUCATION/TRAINING PROGRAM

## 2022-02-21 PROCEDURE — 80048 BASIC METABOLIC PNL TOTAL CA: CPT

## 2022-02-21 PROCEDURE — 99222 1ST HOSP IP/OBS MODERATE 55: CPT | Performed by: INTERNAL MEDICINE

## 2022-02-21 PROCEDURE — 99233 SBSQ HOSP IP/OBS HIGH 50: CPT | Performed by: INTERNAL MEDICINE

## 2022-02-21 PROCEDURE — 2500000003 HC RX 250 WO HCPCS: Performed by: STUDENT IN AN ORGANIZED HEALTH CARE EDUCATION/TRAINING PROGRAM

## 2022-02-21 PROCEDURE — 6370000000 HC RX 637 (ALT 250 FOR IP)

## 2022-02-21 PROCEDURE — 36415 COLL VENOUS BLD VENIPUNCTURE: CPT

## 2022-02-21 PROCEDURE — 2700000000 HC OXYGEN THERAPY PER DAY

## 2022-02-21 PROCEDURE — 6360000002 HC RX W HCPCS: Performed by: INTERNAL MEDICINE

## 2022-02-21 PROCEDURE — 2580000003 HC RX 258: Performed by: STUDENT IN AN ORGANIZED HEALTH CARE EDUCATION/TRAINING PROGRAM

## 2022-02-21 PROCEDURE — 94761 N-INVAS EAR/PLS OXIMETRY MLT: CPT

## 2022-02-21 PROCEDURE — 6370000000 HC RX 637 (ALT 250 FOR IP): Performed by: INTERNAL MEDICINE

## 2022-02-21 PROCEDURE — 71045 X-RAY EXAM CHEST 1 VIEW: CPT

## 2022-02-21 PROCEDURE — 2060000000 HC ICU INTERMEDIATE R&B

## 2022-02-21 RX ORDER — LORAZEPAM 0.5 MG/1
0.5 TABLET ORAL ONCE
Status: COMPLETED | OUTPATIENT
Start: 2022-02-21 | End: 2022-02-21

## 2022-02-21 RX ADMIN — DEXTROSE MONOHYDRATE 500 MG: 50 INJECTION, SOLUTION INTRAVENOUS at 08:56

## 2022-02-21 RX ADMIN — SODIUM CHLORIDE, PRESERVATIVE FREE 10 ML: 5 INJECTION INTRAVENOUS at 09:07

## 2022-02-21 RX ADMIN — MORPHINE SULFATE 2 MG: 2 INJECTION, SOLUTION INTRAMUSCULAR; INTRAVENOUS at 20:09

## 2022-02-21 RX ADMIN — ACETAMINOPHEN 650 MG: 325 TABLET, FILM COATED ORAL at 01:19

## 2022-02-21 RX ADMIN — LORAZEPAM 0.5 MG: 0.5 TABLET ORAL at 22:03

## 2022-02-21 RX ADMIN — MORPHINE SULFATE 2 MG: 2 INJECTION, SOLUTION INTRAMUSCULAR; INTRAVENOUS at 12:36

## 2022-02-21 RX ADMIN — METHYLPREDNISOLONE SODIUM SUCCINATE 60 MG: 125 INJECTION, POWDER, FOR SOLUTION INTRAMUSCULAR; INTRAVENOUS at 08:49

## 2022-02-21 RX ADMIN — CEFTRIAXONE SODIUM 1000 MG: 1 INJECTION, POWDER, FOR SOLUTION INTRAMUSCULAR; INTRAVENOUS at 08:01

## 2022-02-21 RX ADMIN — FAMOTIDINE 20 MG: 10 INJECTION, SOLUTION INTRAVENOUS at 08:50

## 2022-02-21 RX ADMIN — ATORVASTATIN CALCIUM 40 MG: 40 TABLET, FILM COATED ORAL at 08:49

## 2022-02-21 RX ADMIN — METHYLPREDNISOLONE SODIUM SUCCINATE 60 MG: 125 INJECTION, POWDER, FOR SOLUTION INTRAMUSCULAR; INTRAVENOUS at 16:08

## 2022-02-21 RX ADMIN — ENOXAPARIN SODIUM 70 MG: 100 INJECTION SUBCUTANEOUS at 08:51

## 2022-02-21 RX ADMIN — TAMSULOSIN HYDROCHLORIDE 0.4 MG: 0.4 CAPSULE ORAL at 08:50

## 2022-02-21 RX ADMIN — FAMOTIDINE 20 MG: 10 INJECTION, SOLUTION INTRAVENOUS at 20:09

## 2022-02-21 RX ADMIN — ACETAMINOPHEN 650 MG: 325 TABLET, FILM COATED ORAL at 07:56

## 2022-02-21 RX ADMIN — SERTRALINE HYDROCHLORIDE 25 MG: 50 TABLET ORAL at 10:17

## 2022-02-21 RX ADMIN — MORPHINE SULFATE 2 MG: 2 INJECTION, SOLUTION INTRAMUSCULAR; INTRAVENOUS at 01:30

## 2022-02-21 RX ADMIN — ACETAMINOPHEN 650 MG: 325 TABLET, FILM COATED ORAL at 16:08

## 2022-02-21 RX ADMIN — MORPHINE SULFATE 2 MG: 2 INJECTION, SOLUTION INTRAMUSCULAR; INTRAVENOUS at 05:50

## 2022-02-21 RX ADMIN — DIGOXIN 125 MCG: 125 TABLET ORAL at 08:50

## 2022-02-21 RX ADMIN — METHYLPREDNISOLONE SODIUM SUCCINATE 60 MG: 125 INJECTION, POWDER, FOR SOLUTION INTRAMUSCULAR; INTRAVENOUS at 01:23

## 2022-02-21 RX ADMIN — FUROSEMIDE 40 MG: 40 INJECTION, SOLUTION INTRAMUSCULAR; INTRAVENOUS at 08:50

## 2022-02-21 RX ADMIN — SODIUM CHLORIDE, PRESERVATIVE FREE 10 ML: 5 INJECTION INTRAVENOUS at 20:09

## 2022-02-21 ASSESSMENT — PAIN SCALES - GENERAL
PAINLEVEL_OUTOF10: 10
PAINLEVEL_OUTOF10: 10
PAINLEVEL_OUTOF10: 8
PAINLEVEL_OUTOF10: 10
PAINLEVEL_OUTOF10: 5
PAINLEVEL_OUTOF10: 6
PAINLEVEL_OUTOF10: 8
PAINLEVEL_OUTOF10: 10
PAINLEVEL_OUTOF10: 6
PAINLEVEL_OUTOF10: 6
PAINLEVEL_OUTOF10: 10
PAINLEVEL_OUTOF10: 9
PAINLEVEL_OUTOF10: 10

## 2022-02-21 ASSESSMENT — ENCOUNTER SYMPTOMS
NAUSEA: 1
CONSTIPATION: 1
ABDOMINAL PAIN: 0
SHORTNESS OF BREATH: 1
EYE DISCHARGE: 0
WHEEZING: 1
SHORTNESS OF BREATH: 1
EYE REDNESS: 0

## 2022-02-21 NOTE — PROGRESS NOTES
Discussed with Dr. Fern Clifford regarding this patient who is really require a hemiarthroplasty of his right hip. He request that if and when he is cleared for surgery that we would attempt to do a spinal which hemiarthroplasty is certainly capable.   Await final okay from Dr. Fern Clifford

## 2022-02-21 NOTE — PROGRESS NOTES
ICU Progress Note (Non-Vent)  [facility]    Patient - Mitchel Mckee,  Age - 74 y.o.    - 1955      Room Number -    MRN -  [id number]   [facility]  Date of Admission -  2022  6:14 AM    Events of Past 24 Hours   He is alert and oriented, was on 12 L Salter device saturating 98%, we cut him down to 10 and then 7 while we were in the room he maintained saturation around 92%. He denies any worsening dyspnea, no longer has hemoptysis    Vitals    height is 6' (1.829 m) and weight is 163 lb 12.8 oz (74.3 kg). His oral temperature is 97.6 °F (36.4 °C). His blood pressure is 86/69 and his pulse is 94. His respiration is 24 and oxygen saturation is 98%.        Temperature Range: Temp: 97.6 °F (36.4 °C) Temp  Av.7 °F (36.5 °C)  Min: 97.4 °F (36.3 °C)  Max: 98.3 °F (36.8 °C)  BP Range:  Systolic (00JHB), NST:95 , Min:85 , MOR:095     Diastolic (16VVZ), TONY:13, Min:50, Max:81    Pulse Range: Pulse  Av  Min: 75  Max: 97  Respiration Range: Resp  Av.2  Min: 12  Max: 26  Current Pulse Ox[de-identified]  SpO2: 98 %  24HR Pulse Ox Range:  SpO2  Av.1 %  Min: 91 %  Max: 100 %  Oxygen Amount and Delivery: O2 Flow Rate (L/min): (S) 10 L/min    Wt Readings from Last 3 Encounters:   22 163 lb 12.8 oz (74.3 kg)   21 171 lb (77.6 kg)   07/15/21 166 lb 9.6 oz (75.6 kg)     I/O       Intake/Output Summary (Last 24 hours) at 2022 1418  Last data filed at 2022 1231  Gross per 24 hour   Intake --   Output 1525 ml   Net -1525 ml     DRAIN/TUBE OUTPUT       Invasive Lines   ICP PRESSURE RANGE  No data recorded  CVP PRESSURE RANGE  No data recorded      Medications      sertraline  25 mg Oral Daily    furosemide  40 mg IntraVENous Daily    enoxaparin  1 mg/kg SubCUTAneous BID    methylPREDNISolone  60 mg IntraVENous Q8H    sodium chloride flush  5-40 mL IntraVENous 2 times DIFFTYPE NOT REPORTED 02/21/2022       BMP   Lab Results   Component Value Date     02/21/2022    K 4.5 02/21/2022    CL 96 02/21/2022    CO2 35 02/21/2022    BUN 29 02/21/2022    CREATININE 0.62 02/21/2022    GLUCOSE 144 02/21/2022       LFTS  Lab Results   Component Value Date    ALKPHOS 92 12/20/2021    ALT 8 12/20/2021    AST 18 12/20/2021    PROT 7.4 12/20/2021    BILITOT 0.66 12/20/2021    BILIDIR <0.08 12/05/2018    IBILI CANNOT BE CALCULATED 12/05/2018    LABALBU 4.1 12/20/2021       ABG ABGs:   Lab Results   Component Value Date    PHART 7.394 02/18/2022    PO2ART 33.4 02/18/2022    IOW8SLV 42.4 02/18/2022       Lab Results   Component Value Date    MODE NOT REPORTED 02/18/2022         INR  No results for input(s): PROTIME, INR in the last 72 hours. APTT  No results for input(s): APTT in the last 72 hours. Lactic Acid  Lab Results   Component Value Date    LACTA NOT REPORTED 12/07/2020    LACTA NOT REPORTED 10/17/2020    LACTA 1.0 08/11/2014        BNP   No results for input(s): BNP in the last 72 hours.      Cultures       Radiology     CXR      Chest x-ray shows significant improvement in the left upper lobe infiltrate        SYSTEMS ASSESSMENT    Acute on chronic hypoxic respiratory failure  Left upper lobe pneumonia  Right femoral neck fracture  Idiopathic pulmonary fibrosis/UIP  Hemoptysis-resolved    Oxygen requirements have dramatically decreased  Do not feel that waiting any longer will improve his outcome from a pulmonary standpoint  He is going to be at high risk for the surgery needs to proceed  If possible spinal with conscious sedation may be the way to go  I would keep his oxygen saturations greater than 88%  Continue Rocephin and Zithromax  I would add incentive spirometry  Discussed in detail with Dr. Naty Lopez and the patient  He is aware that intubation may be needed but would like to avoid it if at all possible      Critical Care Time   0 min    Electronically signed by Bella Douglas Brit Hoang MD on 2/21/2022 at 2:18 PM

## 2022-02-21 NOTE — PROGRESS NOTES
Patient has been cleared for surgery. Is scheduled for right femoral hemiarthroplasty on 2/22/22. Procedure explained to patient as well as risks and benefits. Not sure if anesthesia will do spinal when patient last took Eliquis day before yesterday.   Lovenox on hold for tonight and tomorrow am

## 2022-02-21 NOTE — TELEPHONE ENCOUNTER
2/21/2022-Spoke to patient, he is interested in having the cyst removed, but he is currently in the hospital with a broken leg and hip. He would like a call back in a few weeks and will set up an appt at that time.

## 2022-02-21 NOTE — FLOWSHEET NOTE
02/21/22 1739   Encounter Summary   Services provided to: Patient   Referral/Consult From: Rounding   Complexity of Encounter Low   Length of Encounter 15 minutes   Spiritual/Buddhist   Type Spiritual support   Assessment Sleeping   Intervention Prayer

## 2022-02-21 NOTE — PROGRESS NOTES
Pulmonary  Addendum. Spoke to Dr. Lonni Severs, orthopedic surgery service, yesterday. Based upon my evaluation of his pulmonary clinical status on  Sunday, I felt that the patient's risk for general anesthesia would be too high and the need for ventilation post orthopedic surgery very high    We'll continue to follow patient's pulmonary status to see if we can stabilize or decreased his pulmonary risk for orthopedic surgery.     Arthur Pastrana MD

## 2022-02-21 NOTE — PLAN OF CARE
PLAN OF CARE    Plans for likely hemiarthroplasty of R hip tomorrow discussed with Dr. Araseli Garza. Will hold Lovenox,  Pt to be NPO at midnight,  And orders for PT/PTT in AM placed.     Electronically signed by Darlis Goodell, DO on 2/21/2022 at 4:05 PM

## 2022-02-21 NOTE — PROGRESS NOTES
[address]    PROGRESS NOTE             2/21/2022    7:58 AM    Name:   Giorgio Aldana  MRN:     [id number]     Acct:      [de-identified]   Room:   2016/2016-01  IP Day:  3  Admit Date:  2/18/2022  6:14 AM    PCP:  Terese Roy MD  Code Status:  DNR-CCA    Subjective:     C/C: No chief complaint on file. Interval History Status: improved. Pt seen and examined. Resp status and coughing improved,  But right hip pain continues to be severe; 11 out of 10. Pt continues to feel depressed; but now ok with starting antidepressant. Pt avoiding eating too much bc trying to avoid BM d/t hip pain; No BM since admission. Review of Systems:     Review of Systems   Constitutional: Positive for appetite change. Negative for fever. Eyes: Negative for discharge and redness. Respiratory: Positive for shortness of breath and wheezing. Cardiovascular: Negative for chest pain and palpitations. Gastrointestinal: Positive for constipation and nausea. Negative for abdominal pain. Genitourinary: Negative for dysuria and hematuria. Dark urine color   Musculoskeletal: Positive for arthralgias and gait problem. Neurological: Negative for dizziness and headaches. Psychiatric/Behavioral:        Depressed         Medications: Allergies: Allergies   Allergen Reactions    Adhesive Tape Other (See Comments)     Blister badly     Codeine Nausea Only     Other reaction(s): Other: See Comments  NAUSEA  Other reaction(s):  Other: See Comments  NAUSEA    Penicillins Swelling     As a baby  Other reaction(s): Unknown  As a baby    Nintedanib Diarrhea     10-15 BM daily       Current Meds:   Scheduled Meds:    furosemide  40 mg IntraVENous Daily    enoxaparin  1 mg/kg SubCUTAneous BID    methylPREDNISolone  60 mg IntraVENous Q8H    sodium chloride flush  5-40 mL IntraVENous 2 times per day    famotidine (PEPCID) injection  20 mg IntraVENous BID    cefTRIAXone (ROCEPHIN) IV  1,000 mg IntraVENous Q24H    azithromycin  500 mg IntraVENous Q24H    amLODIPine  5 mg Oral Daily    atorvastatin  40 mg Oral Daily    [Held by provider] carvedilol  3.125 mg Oral BID WC    lisinopril  10 mg Oral BID    tamsulosin  0.4 mg Oral Daily    digoxin  125 mcg Oral Daily    [Held by provider] aspirin  81 mg Oral Daily    LORazepam  0.5 mg IntraVENous Q6H     Continuous Infusions:    sodium chloride       PRN Meds: perflutren lipid microspheres, morphine, sodium chloride flush, sodium chloride flush, sodium chloride, ondansetron **OR** ondansetron, polyethylene glycol, acetaminophen **OR** acetaminophen, potassium chloride **OR** potassium chloride, magnesium sulfate    Data:     Past Medical History:   has a past medical history of Atrial fibrillation ([facility] [address] Back pain, chronic, Hill's esophagus, Benign essential HTN, BPH (benign prostatic hyperplasia), Cancer (HCC), Chronic idiopathic pulmonary fibrosis ([facility] Cocaine abuse in remission [facility]), ED (erectile dysfunction), GERD (gastroesophageal reflux disease), GI bleed, Hernia, History of colon cancer, Melena, Migraines, and Murmur, cardiac. Social History:   reports that he quit smoking about 51 years ago. He has a 0.50 pack-year smoking history. He has never used smokeless tobacco. He reports current alcohol use of about 12.0 standard drinks of alcohol per week. He reports that he does not use drugs. Family History:   Family History   Problem Relation Age of Onset    Diabetes Mother     Heart Attack Father     Heart Disease Father     Heart Disease Brother        Vitals:  BP 86/69   Pulse 94   Temp 97.8 °F (36.6 °C) (Oral)   Resp 13   Ht 6' (1.829 m)   Wt 163 lb 12.8 oz (74.3 kg)   SpO2 92%   BMI 22.22 kg/m²   Temp (24hrs), Av.7 °F (36.5 °C), Min:97.4 °F (36.3 °C), Max:98.3 °F (36.8 °C)    No results for input(s): POCGLU in the last 72 hours.   Vitals:    22 0630 02/21/22 0700 02/21/22 0730 02/21/22 0745   BP: 101/66 86/69     Pulse: 83 85  94   Resp: 15 13     Temp:   97.8 °F (36.6 °C)    TempSrc:   Oral    SpO2: 91% 92%     Weight:       Height:           I/O(24Hr): Intake/Output Summary (Last 24 hours) at 2/21/2022 0758  Last data filed at 2/21/2022 0606  Gross per 24 hour   Intake --   Output 1150 ml   Net -1150 ml       Labs:  Recent Results (from the past 24 hour(s))   Urinalysis    Collection Time: 02/20/22  6:41 PM   Result Value Ref Range    Color, UA Yellow Yellow    Turbidity UA Clear Clear    Glucose, Ur NEGATIVE NEGATIVE    Bilirubin Urine NEGATIVE NEGATIVE    Ketones, Urine NEGATIVE NEGATIVE    Specific Gravity, UA 1.012 1.000 - 1.030    Urine Hgb NEGATIVE NEGATIVE    pH, UA 6.0 5.0 - 8.0    Protein, UA NEGATIVE NEGATIVE    Urobilinogen, Urine Normal Normal    Nitrite, Urine NEGATIVE NEGATIVE    Leukocyte Esterase, Urine NEGATIVE NEGATIVE    Urinalysis Comments       Microscopic exam not performed based on chemical results unless requested in original order.    Basic Metabolic Panel w/ Reflex to MG    Collection Time: 02/21/22  3:43 AM   Result Value Ref Range    Glucose 144 (H) 70 - 99 mg/dL    BUN 29 (H) 8 - 23 mg/dL    CREATININE 0.62 (L) 0.70 - 1.20 mg/dL    Bun/Cre Ratio NOT REPORTED 9 - 20    Calcium 8.5 (L) 8.6 - 10.4 mg/dL    Sodium 136 135 - 144 mmol/L    Potassium 4.5 3.7 - 5.3 mmol/L    Chloride 96 (L) 98 - 107 mmol/L    CO2 35 (H) 20 - 31 mmol/L    Anion Gap 5 (L) 9 - 17 mmol/L    GFR Non-African American >60 >60 mL/min    GFR African American >60 >60 mL/min    GFR Comment          GFR Staging NOT REPORTED    CBC with Auto Differential    Collection Time: 02/21/22  3:43 AM   Result Value Ref Range    WBC 10.8 3.5 - 11.0 k/uL    RBC 4.20 (L) 4.5 - 5.9 m/uL    Hemoglobin 10.7 (L) 13.5 - 17.5 g/dL    Hematocrit 33.0 (L) 41 - 53 %    MCV 78.6 (L) 80 - 100 fL    MCH 25.4 (L) 26 - 34 pg    MCHC 32.3 31 - 37 g/dL    RDW 16.9 (H) 11.5 - 14.9 % 2/18/2022  Portable chest: 1. Worsening airspace disease throughout the left lung with small left-sided pleural effusion, likely worsening multifocal pneumonia. Follow-up is recommended to document resolution. 2. Underlying fibrosis throughout the lungs. 3. Stable mild cardiomegaly. Right hip: Acute slightly displaced right femoral neck fracture. Underlying osteopenia. CT CHEST PULMONARY EMBOLISM W CONTRAST    Result Date: 2/18/2022  1. No evidence for acute pulmonary embolism. 2. There is very large new ground-glass airspace disease occupying most of the left upper lobe and new small left pleural effusion. 3. Solid pleural-based posterior segment right upper lobe airspace density along the major fissure is larger now measuring 4.3 x 1.7 cm, previously about 1.2 x 2.4 cm. Probably also worsening pneumonia but would recommend short interval 3 month follow-up 4. 9 mm right upper lobe suprahilar central nodule is unchanged. RECOMMENDATIONS: Unavailable     CT HIP RIGHT WO CONTRAST    Result Date: 2/19/2022  1. Acute right femoral neck fracture. Physical Examination:        Physical Exam  Constitutional:       General: He is not in acute distress. Appearance: Normal appearance. HENT:      Head: Normocephalic and atraumatic. Nose:      Comments: NC in palce  Eyes:      General: No scleral icterus. Conjunctiva/sclera: Conjunctivae normal.   Cardiovascular:      Rate and Rhythm: Normal rate and regular rhythm. Pulmonary:      Effort: No respiratory distress. Breath sounds: Wheezing and rales present. Comments: On high flow  Abdominal:      General: Abdomen is flat. Bowel sounds are normal. There is no distension. Palpations: Abdomen is soft. Tenderness: There is no abdominal tenderness. Musculoskeletal:         General: No tenderness. Right lower leg: No edema. Left lower leg: No edema. Skin:     General: Skin is warm and dry.       Findings: No bruising or erythema. Neurological:      Mental Status: He is alert.    Psychiatric:      Comments: Appears sad, frustrated with medical condition           Assessment:        Primary Problem  Multifocal pneumonia    Active Hospital Problems    Diagnosis Date Noted    Multifocal pneumonia [J18.9] 02/18/2022    Fracture of right hip [S72.001A] 02/18/2022    Community acquired bacterial pneumonia [J15.9] 02/18/2022    Pulmonary fibrosis ([facility] [J84.10] 12/08/2020    PAF (paroxysmal atrial fibrillation) ([facility] [I48.0] 07/21/2014       Plan:         Multifocal pneumonia in setting of pulmonary fibrosis  CT chest on admission showed no PE, groundglass airspace disease in the left upper lobe  On high flow, 43% FiO2 with 40 L/min  Blood cultures no growth x2 days  Respiratory cultures pending   Legionella,  strep pneumo were  negative   Mycoplasma pending   Procalcitonin 0.10  A-1 antitrypsin 229  Azithromycin 500 mg IV every 24 hours - day 4  Ceftriaxone 1 g IV every 24 hours - day 4  Methylprednisolone IV 60 mg every 8 hours  Oxygen supplementation as needed  RT  PT/OT  Echo pending  Pulmonology on board     Acute right hip fracture s/p fall  Hip x-ray: Acute slightly displaced right femoral neck fracture, underlying osteopenia  CT right hip without contrast: Acute right femoral neck fracture  Ortho on board; recommendation pending medical clearance  Acetaminophen and morphine as needed for pain management     Atrial fibrillation - rate controlled  On home doses of aspirin and Eliquis  Digoxin 125 mg p.o. daily  Dig levels 0.6 wnl (checked since pt on azithromycin)  Magnesium and potassium replacement protocols      Hypertension  Amlodipine p.o. 5 mg daily  Carvedilol 3.125 mg p.o. twice daily => hold d/t low-nml BP  Lisinopril 10 mg p.o. twice daily     Hyperlipidemia  Atorvastatin 40 mg p.o. daily     Other home medications being continued:  Tamsulosin 0.4 mg p.o. daily     Code: DNR-CCA  DVT prophylaxis: On full-dose enoxaparin (changed from home Eliquis due to possibility of surgery)  GI prophylaxis: Famotidine 20 mg IV twice daily  Diet: low sodium  Activity: Up with assistance      Please note: Use of a speech recognition software was used in the creation of portions of this note and dictation errors, including those of syntax and sound alike word substitutions, may have escaped proofreading. Kely Shook DO  2/21/2022  7:58 AM       Attending Physician Statement  I have discussed the care of Donna Moore and I have examined the patient myselft and taken ros and hpi , including pertinent history and exam findings,  with the resident. I have reviewed the key elements of all parts of the encounter with the resident. I agree with the assessment, plan and orders as documented by the resident.   56-year-old pleasant gentleman with history of pulmonary fibrosis was on home oxygen 5 L lately has been desaturating up to 40% he drove to the emergency room and before he could get into the department he passed out likely from hypoxia broke his right hip  Patient with pulmonary fibrosis acute on chronic respiratory failure difficult situation for surgery at risk of failure to extubate post surgery and patient is adamant does not want any tracheostomy if a situation like that arises he chose to be DNR CC arrest  We will discussed with mom critical care if patient needs to be transferred to [facility] if his willing to get tracheostomy if it comes to that  Continue oxygen supportive care preop clearance pending given respiratory failure    Electronically signed by Tim Sanz MD

## 2022-02-21 NOTE — CONSULTS
[facility] -   Infectious diseases evaluation  admission date 2/18/2022    reason for consultation:   Pneumonia    Impression :   Current:  Community-acquired pneumonia  Acute right femoral neck fracture. Pulmonary fibrosis  Acute hypoxic respiratory failure    Recommendations   · Continue IV ceftriaxone  · Discontinue Zithromax after today's dose. · Procalcitonin level in a.m. · Right femoral hemiarthroplasty planned on 2/22/2022. · Follow CBC and renal function  · Supportive care    Infection Control Recommendations   · [place] Precautions      Antimicrobial Stewardship Recommendations   · Simplification of therapy  · Targeted therapy          History of Present Illness:   Initial history:  Renetta Barajas is a 74y.o.-year-old male presented to hospital on 2/18/2022 with worsening shortness of breath for several days, worse with exertion, no alleviating factors. The patient also fell ,was found to have acute right femoral neck fracture. The patient had chronic cough due to pulmonary fibrosis not increased, usually on 5 L of oxygen at home. He also had chronic legs pain. He denied fever or chills. The patient has been afebrile, no leukocytosis. He was started on IV Zithromax and Rocephin on admission that he is tolerating  Strep pneumo and Legionella antigen negative  Chest x-ray showed diffuse bilateral pneumonia worse on the left side. COVID-19 rapid test was negative. No growth on blood cultures  Interval changes  2/21/2022   He is complaining of hip pain, remains short of breath on 12 L of oxygen via [place], occasional cough, denied vomiting or diarrhea, no other complaints.   Patient Vitals for the past 8 hrs:   Temp Temp src Pulse Resp SpO2   02/21/22 1434 -- -- -- 17 94 %   02/21/22 1330 97.6 °F (36.4 °C) Oral -- -- --   02/21/22 1235 -- -- -- 24 98 %   02/21/22 1142 -- -- -- 19 98 %   02/21/22 0837 -- -- -- 24 91 %   02/21/22 0745 -- -- 94 -- --           I have personally reviewed the past medical history, past surgical history, medications, social history, and family history, and I haveupdated the database accordingly. Allergies:   Adhesive tape, Codeine, Penicillins, and Nintedanib     Review of Systems:     Review of Systems  As per history of present illness, other than above 12 system review was negative  Physical Examination :       Physical Exam  Constitutional:       Appearance: He is not toxic-appearing. HENT:      Head: Normocephalic and atraumatic. Right Ear: External ear normal.      Left Ear: External ear normal.      Mouth/Throat:      Pharynx: Oropharynx is clear. No oropharyngeal exudate. Eyes:      General: No scleral icterus. Conjunctiva/sclera: Conjunctivae normal.   Cardiovascular:      Rate and Rhythm: Normal rate and regular rhythm. Heart sounds: No murmur heard. Pulmonary:      Breath sounds: Wheezing and rhonchi present. Abdominal:      General: There is no distension. Palpations: Abdomen is soft. Musculoskeletal:      Cervical back: Neck supple. No rigidity. Right lower leg: No edema. Left lower leg: No edema. Skin:     General: Skin is warm. Coloration: Skin is not jaundiced. Neurological:      General: No focal deficit present. Mental Status: He is alert and oriented to person, place, and time.          Past Medical History:     Past Medical History:   Diagnosis Date    Atrial fibrillation ([address]     Back pain, chronic     Hill's esophagus 06/18/2019    Benign essential HTN     BPH (benign prostatic hyperplasia)     Cancer (HCC)     colon-rectal    Chronic idiopathic pulmonary fibrosis ([facility] 03/29/2021    Cocaine abuse in remission [facility])     1970's    ED (erectile dysfunction) 4/2/2015    GERD (gastroesophageal reflux disease)     GI bleed 12/5/2018    Hernia     History of colon cancer     Melena     Migraines     Murmur, cardiac        Past Surgical  History:     Past Surgical History:   Procedure Laterality Date    CARDIAC CATHETERIZATION  11/29/2018    Non-obstructive CAD    CARDIOVERSION  2020    COLECTOMY      2nd colectomy, Colostomy and reversed [facility] COLECTOMY      1st time Fernando Santacruz    COLONOSCOPY      COLONOSCOPY  07/18/2016    COLONOSCOPY N/A 12/6/2018    COLONOSCOPY DIAGNOSTIC performed by Jarred Felder MD at [address] Right 2009    inguinal    KNEE SURGERY Right 1970's    arthrotomy    TONSILLECTOMY      TOTAL KNEE ARTHROPLASTY Right 1/8/2019    KNEE TOTAL ARTHROPLASTY performed by Sugar Greenwood MD at [address] TRANSESOPHAGEAL ECHOCARDIOGRAM  11/29/2018    UPPER GASTROINTESTINAL ENDOSCOPY N/A 12/5/2018    EGD DIAGNOSTIC ONLY performed by Jarred Felder MD at [address] [facility] N/A 6/18/2019    [facility]'S       Medications:      sertraline  25 mg Oral Daily    furosemide  40 mg IntraVENous Daily    enoxaparin  1 mg/kg SubCUTAneous BID    methylPREDNISolone  60 mg IntraVENous Q8H    sodium chloride flush  5-40 mL IntraVENous 2 times per day    famotidine (PEPCID) injection  20 mg IntraVENous BID    cefTRIAXone (ROCEPHIN) IV  1,000 mg IntraVENous Q24H    azithromycin  500 mg IntraVENous Q24H    [Held by provider] amLODIPine  5 mg Oral Daily    atorvastatin  40 mg Oral Daily    [Held by provider] carvedilol  3.125 mg Oral BID WC    lisinopril  10 mg Oral BID    tamsulosin  0.4 mg Oral Daily    digoxin  125 mcg Oral Daily    [Held by provider] aspirin  81 mg Oral Daily       Social History:     Social History     Socioeconomic History    Marital status: Single     Spouse name: Not on file    Number of children: Not on file    Years of education: Not on file    Highest education level: Not on file   Occupational History    Not on file   Tobacco Use    Smoking status: Former Smoker     Packs/day: 0.50     Years: 1.00     Pack years: 0.50     Quit date: 26     Years since quittin.1    Smokeless tobacco: Never Used    Tobacco comment: stated never actually really smoked only inhaled    Vaping Use    Vaping Use: Never used   Substance and Sexual Activity    Alcohol use: Yes     Alcohol/week: 12.0 standard drinks     Types: 2 Shots of liquor, 10 Standard drinks or equivalent per week     Comment: 2-3 times a week    Drug use: No     Types: Other-see comments     Comment: Cocaine use in past in     Sexual activity: Yes     Partners: Female   Other Topics Concern    Not on file   Social History Narrative    Not on file     Social Determinants of Health     Financial Resource Strain: Medium Risk    Difficulty of Paying Living Expenses: Somewhat hard   Food Insecurity: No Food Insecurity    Worried About Running Out of Food in the Last Year: Never true    Tino of Food in the Last Year: Never true   Transportation Needs:     Lack of Transportation (Medical): Not on file    Lack of Transportation (Non-Medical):  Not on file   Physical Activity:     Days of Exercise per Week: Not on file    Minutes of Exercise per Session: Not on file   Stress:     Feeling of Stress : Not on file   Social Connections:     Frequency of Communication with Friends and Family: Not on file    Frequency of Social Gatherings with Friends and Family: Not on file    Attends Shinto Services: Not on file    Active Member of [address] [place] [address] [employer] or Organizations: Not on file    Attends Club or Organization Meetings: Not on file    Marital Status: Not on file   Intimate Partner Violence:     Fear of Current or Ex-Partner: Not on file    Emotionally Abused: Not on file    Physically Abused: Not on file    Sexually Abused: Not on file   Housing Stability:     Unable to Pay for Housing in the Last Year: Not on file    Number of [place] in the Last Year: Not on file    Unstable Housing in the Last Year: Not on file       Family History:     Family History   Problem Relation Age of Onset    Diabetes Mother     Heart Attack Father     Heart Disease Father     Heart Disease Brother       Medical Decision Making:   I have independently reviewed/ordered the following labs:    CBC with Differential:   Recent Labs     02/20/22  0524 02/21/22  0343   WBC 9.9 10.8   HGB 10.9* 10.7*   HCT 33.4* 33.0*    273   LYMPHOPCT 5* 3*   MONOPCT 6 3     BMP:  Recent Labs     02/20/22  0524 02/21/22  0343   * 136   K 4.6 4.5   CL 97* 96*   CO2 31 35*   BUN 26* 29*   CREATININE 0.56* 0.62*     Hepatic Function Panel: No results for input(s): PROT, LABALBU, BILIDIR, IBILI, BILITOT, ALKPHOS, ALT, AST in the last 72 hours. No results for input(s): RPR in the last 72 hours. No results for input(s): HIV in the last 72 hours. No results for input(s): BC in the last 72 hours. Lab Results   Component Value Date    CREATININE 0.62 02/21/2022    GLUCOSE 144 02/21/2022       Detailed results: Thank you for allowing us to participate in the care of this patient. Please call with questions. This note is created with the assistance of a speech recognition program.  While intending to generate adocument that actually reflects the content of the visit, the document can still have some errors including those of syntax and sound a like substitutions which may escape proof reading. It such instances, actual meaningcan be extrapolated by contextual diversion.     Keerthi Bender MD  Office: [phone]  Perfect serve / office [phone]

## 2022-02-21 NOTE — PLAN OF CARE
Problem: Skin Integrity:  Goal: Will show no infection signs and symptoms  Description: Will show no infection signs and symptoms  Outcome: Ongoing  Goal: Absence of new skin breakdown  Description: Absence of new skin breakdown  Outcome: Ongoing     Problem: Falls - Risk of:  Goal: Will remain free from falls  Description: Will remain free from falls  Outcome: Ongoing  Goal: Absence of physical injury  Description: Absence of physical injury  Outcome: Ongoing     Problem: SAFETY  Goal: Free from accidental physical injury  Outcome: Ongoing  Goal: Free from intentional harm  Outcome: Ongoing     Problem: DAILY CARE  Goal: Daily care needs are met  Outcome: Ongoing     Problem: PAIN  Goal: Patient's pain/discomfort is manageable  Outcome: Ongoing     Problem: SKIN INTEGRITY  Goal: Skin integrity is maintained or improved  Outcome: Ongoing     Problem: KNOWLEDGE DEFICIT  Goal: Patient/S.O. demonstrates understanding of disease process, treatment plan, medications, and discharge instructions.   Outcome: Ongoing     Problem: DISCHARGE BARRIERS  Goal: Patient's continuum of care needs are met  Outcome: Ongoing     Problem: Pain:  Goal: Pain level will decrease  Description: Pain level will decrease  Outcome: Ongoing  Goal: Control of acute pain  Description: Control of acute pain  Outcome: Ongoing  Goal: Control of chronic pain  Description: Control of chronic pain  Outcome: Ongoing

## 2022-02-21 NOTE — PROGRESS NOTES
PATIENT REFUSES TO WEAR BIPAP     [x] Risks and benefits explained to patient   [x] Patient refuses to wear Bipap stating no  [x] Patient verbalizes understanding of information presented.     PROVIDE ADEQUATE OXYGENATION WITH ACCEPTABLE SP02/ABG'S    [x]  IDENTIFY APPROPRIATE OXYGEN THERAPY  [x]   MONITOR SP02/ABG'S AS NEEDED   [x]   PATIENT EDUCATION AS NEEDED

## 2022-02-21 NOTE — ANESTHESIA PRE PROCEDURE
Department of Anesthesiology  Preprocedure Note       Name:  Alexis Sullivan   Age:  74 y.o.  :  1955                                          MRN:  [id number]         Date:  2022      Surgeon: Ok Floor):  Donn Siegel MD    Procedure: Procedure(s):  HIP FEMORAL HEMIARTHROPLASTY    Medications prior to admission:   Prior to Admission medications    Medication Sig Start Date End Date Taking?  Authorizing Provider   tamsulosin (FLOMAX) 0.4 MG capsule take 1 capsule by mouth once daily 22  Yes Cassandra Cordova Do, MD   Baclofen (LIORESAL) 5 MG tablet take 1 tablet by mouth twice a day 22  Yes Cassandra Cordova Do, MD   ibuprofen (ADVIL;MOTRIN) 800 MG tablet take 1 tablet by mouth three times a day if needed for pain take with food 22  Yes Cassandra Cordova Do, MD   omeprazole (PRILOSEC) 40 MG delayed release capsule Take 1 capsule by mouth daily 21  Yes Cassandra Cordova Do, MD   lisinopril (PRINIVIL;ZESTRIL) 10 MG tablet Take 1 tablet by mouth 2 times daily 21  Yes Cassandra Cordova Do, MD   digoxin (LANOXIN) 125 MCG tablet take 1 tablet by mouth once daily 21  Yes Cassandra Cordova Do, MD   atorvastatin (LIPITOR) 40 MG tablet take 1 tablet by mouth once daily 21  Yes MIRI Leong NP   ELIQUIS 5 MG TABS tablet take 1 tablet by mouth twice a day 21  Yes Emma Walker MD   Handicap [facility] M[facility] by Does not apply route Expires 21   Cassandra Cordova Do, MD       Current medications:    Current Facility-Administered Medications   Medication Dose Route Frequency Provider Last Rate Last Admin    perflutren lipid microspheres (DEFINITY) injection 2.2 mg  2 mL IntraVENous ONCE PRN Lois Schuler MD        sertraline (ZOLOFT) tablet 25 mg  25 mg Oral Daily Kanika Portillo MD   25 mg at 22 1017    furosemide (LASIX) injection 40 mg  40 mg IntraVENous Daily Kanika Portillo MD   40 mg at 22 0850    [Held by provider] enoxaparin (LOVENOX) injection 70 mg  1 mg/kg SubCUTAneous BID Bhargav Young MD   70 mg at 02/21/22 0851    methylPREDNISolone sodium (SOLU-MEDROL) injection 60 mg  60 mg IntraVENous Q8H Geovanni Edwards MD   60 mg at 02/21/22 1608    morphine (PF) injection 2 mg  2 mg IntraVENous Q4H PRN Radha Murphy MD   2 mg at 02/21/22 1236    sodium chloride flush 0.9 % injection 10 mL  10 mL IntraVENous PRN Nalini Valdivia MD   10 mL at 02/18/22 0824    sodium chloride flush 0.9 % injection 5-40 mL  5-40 mL IntraVENous 2 times per day Kala Andino MD   10 mL at 02/21/22 0907    sodium chloride flush 0.9 % injection 5-40 mL  5-40 mL IntraVENous PRN Kala Andino MD        0.9 % sodium chloride infusion  25 mL IntraVENous PRN Kala Andino MD        ondansetron (ZOFRAN-ODT) disintegrating tablet 4 mg  4 mg Oral Q8H PRN Kala Andino MD   4 mg at 02/18/22 0854    Or    ondansetron (ZOFRAN) injection 4 mg  4 mg IntraVENous Q6H PRN Kala Andino MD   4 mg at 02/20/22 1327    polyethylene glycol (GLYCOLAX) packet 17 g  17 g Oral Daily PRN Kala Andino MD        acetaminophen (TYLENOL) tablet 650 mg  650 mg Oral Q6H PRN Kala Andino MD   650 mg at 02/21/22 1608    Or    acetaminophen (TYLENOL) suppository 650 mg  650 mg Rectal Q6H PRN Kala Andino MD        potassium chloride 20 mEq/50 mL IVPB (Central Line)  20 mEq IntraVENous PRN Kala Andino MD        Or    potassium chloride 10 mEq/100 mL IVPB (Peripheral Line)  10 mEq IntraVENous PRN Kala Andino MD        magnesium sulfate 2,000 mg in dextrose 5 % 100 mL IVPB  2,000 mg IntraVENous PRN Kala Andino MD        famotidine (PEPCID) injection 20 mg  20 mg IntraVENous BID Kala Andino MD   20 mg at 02/21/22 0850    cefTRIAXone (ROCEPHIN) 1000 mg IVPB in 50 mL D5W minibag  1,000 mg IntraVENous Q24H Kala Andino MD   Stopped at 02/21/22 0831    azithromycin (ZITHROMAX) 500 mg in D5W 250ml addavial  500 mg IntraVENous Q24H Kala Andino MD   Stopped at 02/21/22 0956    [Held by provider] amLODIPine (NORVASC) tablet 5 mg  5 mg Oral Daily Tenisha Rivas, DO   5 mg at 02/20/22 9811    atorvastatin (LIPITOR) tablet 40 mg  40 mg Oral Daily Tenisha Rivas, DO   40 mg at 02/21/22 0849    [Held by provider] carvedilol (COREG) tablet 3.125 mg  3.125 mg Oral BID WC Tenisha Rivas, DO   3.125 mg at 02/20/22 6112    lisinopril (PRINIVIL;ZESTRIL) tablet 10 mg  10 mg Oral BID Bryan Brooks, DO   10 mg at 02/20/22 5170    tamsulosin (FLOMAX) capsule 0.4 mg  0.4 mg Oral Daily Tenisha Rivas, DO   0.4 mg at 02/21/22 0850    digoxin (LANOXIN) tablet 125 mcg  125 mcg Oral Daily Bryan Rodriguezh, DO   125 mcg at 02/21/22 0850    [Held by provider] aspirin chewable tablet 81 mg  81 mg Oral Daily Tenisha Rivas, DO   81 mg at 02/20/22 7855       Allergies: Allergies   Allergen Reactions    Adhesive Tape Other (See Comments)     Blister badly     Codeine Nausea Only     Other reaction(s): Other: See Comments  NAUSEA  Other reaction(s):  Other: See Comments  NAUSEA    Penicillins Swelling     As a baby  Other reaction(s): Unknown  As a baby    Nintedanib Diarrhea     10-15 BM daily       Problem List:    Patient Active Problem List   Diagnosis Code    Dyslipidemia E78.5    ED (erectile dysfunction) N52.9    Incomplete bladder emptying R33.9    Benign prostatic hyperplasia with urinary obstruction N40.1, N13.8    History of colon cancer Z85.038    PAF (paroxysmal atrial fibrillation) (HCC) I48.0    Anemia of chronic disease D63.8    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    Presbyopia H52.4    Incisional hernia, without obstruction or gangrene K43.2    Hypertrophic nonobstructive cardiomyopathy (HCC) I42.2    Iron (Fe) deficiency anemia D50.9    Primary osteoarthritis of right knee M17.11    History of malignant neoplasm of rectum Z85.048    Hill's esophagus K22.70    CHF (congestive heart failure), NYHA class II, acute on chronic, combined ([facility]) I50.43    Hypoxia R09.02    Dyspnea and respiratory abnormalities R06.00, R06.89    Occupational pulmonary disease J98.4    Sinus bradycardia R00.1    Acute hypoxemic respiratory failure due to COVID-19 (HCC) U07.1, J96.01    COVID-19 U07.1    Pneumonia J18.9    Acute respiratory failure with hypoxia (HCC) J96.01    Pulmonary fibrosis (HCC) J84.10    Syncope and collapse R55    Chronic anticoagulation Z79.01    Severe malnutrition (HCC) E43    Cervical stenosis of spinal canal M48.02    Impingement syndrome of left shoulder M75.42    Multifocal pneumonia J18.9    Fracture of right hip S72.001A    Community acquired bacterial pneumonia J15.9       Past Medical History:        Diagnosis Date    Atrial fibrillation (HCC)     Back pain, chronic     Hill's esophagus 06/18/2019    Benign essential HTN     BPH (benign prostatic hyperplasia)     Cancer (HCC)     colon-rectal    Chronic idiopathic pulmonary fibrosis ([address] 03/29/2021    Cocaine abuse in remission [facility])     1970's    ED (erectile dysfunction) 4/2/2015    GERD (gastroesophageal reflux disease)     GI bleed 12/5/2018    Hernia     History of colon cancer     Melena     Migraines     Murmur, cardiac        Past Surgical History:        Procedure Laterality Date    CARDIAC CATHETERIZATION  11/29/2018    Non-obstructive CAD    CARDIOVERSION  2020    COLECTOMY      2nd colectomy, Colostomy and reversed [facility] COLECTOMY      1st time [place]    COLONOSCOPY      COLONOSCOPY  07/18/2016    COLONOSCOPY N/A 12/6/2018    COLONOSCOPY DIAGNOSTIC performed by Elisabet Cortez MD at [address] Right 2009    inguinal    KNEE SURGERY Right 1970's    arthrotomy    TONSILLECTOMY      TOTAL KNEE ARTHROPLASTY Right 1/8/2019    KNEE TOTAL ARTHROPLASTY performed by Mario Talbot MD at [facility] TRANSESOPHAGEAL ECHOCARDIOGRAM  11/29/2018    UPPER GASTROINTESTINAL ENDOSCOPY N/A 12/5/2018    EGD DIAGNOSTIC ONLY performed by Elisabet Cortez MD at [facility]    UPPER GASTROINTESTINAL ENDOSCOPY N/A 2019    [facility]       Social History:    Social History     Tobacco Use    Smoking status: Former Smoker     Packs/day: 0.50     Years: 1.00     Pack years: 0.50     Quit date:      Years since quittin.1    Smokeless tobacco: Never Used    Tobacco comment: stated never actually really smoked only inhaled    Substance Use Topics    Alcohol use: Yes     Alcohol/week: 12.0 standard drinks     Types: 2 Shots of liquor, 10 Standard drinks or equivalent per week     Comment: 2-3 times a week                                Counseling given: Not Answered  Comment: stated never actually really smoked only inhaled       Vital Signs (Current):   Vitals:    22 1235 22 1330 22 1434 22 1600   BP:       Pulse:       Resp: 24  17    Temp:  97.6 °F (36.4 °C)  98 °F (36.7 °C)   TempSrc:  Oral  Axillary   SpO2: 98%  94%    Weight:       Height:                                                  BP Readings from Last 3 Encounters:   22 86/69   21 124/80   07/15/21 118/68       NPO Status:                                                                                 BMI:   Wt Readings from Last 3 Encounters:   22 163 lb 12.8 oz (74.3 kg)   21 171 lb (77.6 kg)   07/15/21 166 lb 9.6 oz (75.6 kg)     Body mass index is 22.22 kg/m².     CBC:   Lab Results   Component Value Date    WBC 10.8 2022    RBC 4.20 2022    HGB 10.7 2022    HCT 33.0 2022    MCV 78.6 2022    RDW 16.9 2022     2022       CMP:   Lab Results   Component Value Date     2022    K 4.5 2022    CL 96 2022    CO2 35 2022    BUN 29 2022    CREATININE 0.62 2022    GFRAA >60 2022    LABGLOM >60 2022    GLUCOSE 144 2022    PROT 7.4 2021    CALCIUM 8.5 2022    BILITOT 0.66 2021    ALKPHOS 92 2021    AST 18 2021    ALT 8 12/20/2021       POC Tests: No results for input(s): POCGLU, POCNA, POCK, POCCL, POCBUN, POCHEMO, POCHCT in the last 72 hours. Coags:   Lab Results   Component Value Date    PROTIME 20.3 04/24/2021    INR 1.7 04/24/2021    APTT 35.5 04/25/2021       HCG (If Applicable): No results found for: PREGTESTUR, PREGSERUM, HCG, HCGQUANT     ABGs:   Lab Results   Component Value Date    PHART 7.394 02/18/2022    PO2ART 33.4 02/18/2022    GSJ9YJQ 42.4 02/18/2022    ASS3BRY 25.9 02/18/2022    G6XPHKNV 59.5 02/18/2022        Type & Screen (If Applicable):  No results found for: LABABO, [address]    Drug/Infectious Status (If Applicable):  Lab Results   Component Value Date    HEPCAB NONREACTIVE 01/10/2018       COVID-19 Screening (If Applicable):   Lab Results   Component Value Date    COVID19 Not Detected 02/18/2022    COVID19 Not Detected 10/17/2020           Anesthesia Evaluation  Patient summary reviewed and Nursing notes reviewed no history of anesthetic complications:   Airway: Mallampati: II  TM distance: >3 FB   Neck ROM: full  Mouth opening: > = 3 FB Dental: normal exam         Pulmonary:normal exam  breath sounds clear to auscultation  (+) pneumonia: unresolved,  shortness of breath:                            ROS comment: Chronic idiopathic pulmonary fibrosis   Was on home oxygen at 5L; now on oxygen at 7L ([place])    Acute on chronic hypoxic respiratory failure  Left upper lobe pneumonia   Cardiovascular:    (+) hypertension:, valvular problems/murmurs (mod AS): AS, dysrhythmias (PAF (paroxysmal atrial fibrillation)): atrial fibrillation and atrial flutter, CHF:, murmur, hyperlipidemia      ECG reviewed  Rhythm: irregular  Rate: normal  Echocardiogram reviewed               ROS comment: Normal left ventricular systolic function. Calculated EF via Kasper's method is 60.7 %. Aortic leaflet calcification with moderate stenosis. Peak instantaneous gradient 52 mmHg and mean gradient 21 mmHg.   Trivial aortic insufficiency. Normal mitral valve structure and function. Mild to moderate mitral regurgitation. Neuro/Psych:   (+) neuromuscular disease:, headaches: migraine headaches,              ROS comment: Back pain, chronic GI/Hepatic/Renal:   (+) GERD:, renal disease (BPH (benign prostatic hyperplasia)):,          ROS comment: Hill's esophagus. Endo/Other:    (+) blood dyscrasia: anticoagulation therapy:., malignancy/cancer (colo-rectal). ROS comment: Donald Irizarry in the ER while coming in for SOB  Fracture of right hip Abdominal:             Vascular: negative vascular ROS. Other Findings:           Anesthesia Plan      general     ASA 4     (GA/possible post op Vent/ICU  DNR on hold for perioperative period )  Induction: intravenous. MIPS: Postoperative opioids intended and Prophylactic antiemetics administered. Anesthetic plan and risks discussed with patient. Plan discussed with CRNA.       Patient advised on plans for anesthesia which would most likely be general anesthesia since he has moderate AS    Possible post op ventilation discussed    His DNR status was discussed and he agreed to suspend it during the perioperative period      Annel Leyva MD   2/21/2022

## 2022-02-22 ENCOUNTER — APPOINTMENT (OUTPATIENT)
Dept: NON INVASIVE DIAGNOSTICS | Age: 67
DRG: 521 | End: 2022-02-22
Payer: MEDICARE

## 2022-02-22 ENCOUNTER — APPOINTMENT (OUTPATIENT)
Dept: GENERAL RADIOLOGY | Age: 67
DRG: 521 | End: 2022-02-22
Payer: MEDICARE

## 2022-02-22 ENCOUNTER — ANESTHESIA (OUTPATIENT)
Dept: OPERATING ROOM | Age: 67
DRG: 521 | End: 2022-02-22
Payer: MEDICARE

## 2022-02-22 VITALS — TEMPERATURE: 98.2 F | DIASTOLIC BLOOD PRESSURE: 87 MMHG | SYSTOLIC BLOOD PRESSURE: 163 MMHG | OXYGEN SATURATION: 96 %

## 2022-02-22 LAB
ABSOLUTE EOS #: 0 K/UL (ref 0–0.4)
ABSOLUTE LYMPH #: 0.2 K/UL (ref 1–4.8)
ABSOLUTE MONO #: 0.2 K/UL (ref 0.1–1.3)
ANION GAP SERPL CALCULATED.3IONS-SCNC: 0 MMOL/L (ref 9–17)
BASOPHILS # BLD: 0 % (ref 0–2)
BASOPHILS ABSOLUTE: 0 K/UL (ref 0–0.2)
BUN BLDV-MCNC: 29 MG/DL (ref 8–23)
CALCIUM SERPL-MCNC: 8.1 MG/DL (ref 8.6–10.4)
CHLORIDE BLD-SCNC: 96 MMOL/L (ref 98–107)
CO2: 39 MMOL/L (ref 20–31)
CREAT SERPL-MCNC: 0.59 MG/DL (ref 0.7–1.2)
CULTURE: NORMAL
EOSINOPHILS RELATIVE PERCENT: 0 % (ref 0–4)
GFR AFRICAN AMERICAN: >60 ML/MIN
GFR NON-AFRICAN AMERICAN: >60 ML/MIN
GFR SERPL CREATININE-BSD FRML MDRD: ABNORMAL ML/MIN/{1.73_M2}
GLUCOSE BLD-MCNC: 136 MG/DL (ref 70–99)
HCT VFR BLD CALC: 35.4 % (ref 41–53)
HEMOGLOBIN: 11.3 G/DL (ref 13.5–17.5)
INR BLD: 1.5
LV EF: 43 %
LVEF MODALITY: NORMAL
LYMPHOCYTES # BLD: 2 % (ref 24–44)
MCH RBC QN AUTO: 24.7 PG (ref 26–34)
MCHC RBC AUTO-ENTMCNC: 32 G/DL (ref 31–37)
MCV RBC AUTO: 77.2 FL (ref 80–100)
MONOCYTES # BLD: 2 % (ref 1–7)
MORPHOLOGY: ABNORMAL
PARTIAL THROMBOPLASTIN TIME: 32.6 SEC (ref 24–36)
PDW BLD-RTO: 17 % (ref 11.5–14.9)
PLATELET # BLD: 302 K/UL (ref 150–450)
PMV BLD AUTO: 7.6 FL (ref 6–12)
POTASSIUM SERPL-SCNC: 4.8 MMOL/L (ref 3.7–5.3)
PROCALCITONIN: 0.22 NG/ML
PROTHROMBIN TIME: 18.2 SEC (ref 11.8–14.6)
RBC # BLD: 4.59 M/UL (ref 4.5–5.9)
SEG NEUTROPHILS: 96 % (ref 36–66)
SEGMENTED NEUTROPHILS ABSOLUTE COUNT: 9.6 K/UL (ref 1.3–9.1)
SODIUM BLD-SCNC: 135 MMOL/L (ref 135–144)
SPECIMEN DESCRIPTION: NORMAL
WBC # BLD: 10 K/UL (ref 3.5–11)

## 2022-02-22 PROCEDURE — 99232 SBSQ HOSP IP/OBS MODERATE 35: CPT | Performed by: INTERNAL MEDICINE

## 2022-02-22 PROCEDURE — 6360000002 HC RX W HCPCS: Performed by: STUDENT IN AN ORGANIZED HEALTH CARE EDUCATION/TRAINING PROGRAM

## 2022-02-22 PROCEDURE — 6360000002 HC RX W HCPCS: Performed by: INTERNAL MEDICINE

## 2022-02-22 PROCEDURE — 93306 TTE W/DOPPLER COMPLETE: CPT

## 2022-02-22 PROCEDURE — 2720000010 HC SURG SUPPLY STERILE: Performed by: ORTHOPAEDIC SURGERY

## 2022-02-22 PROCEDURE — 6370000000 HC RX 637 (ALT 250 FOR IP): Performed by: ORTHOPAEDIC SURGERY

## 2022-02-22 PROCEDURE — 2580000003 HC RX 258: Performed by: ORTHOPAEDIC SURGERY

## 2022-02-22 PROCEDURE — 2500000003 HC RX 250 WO HCPCS: Performed by: ORTHOPAEDIC SURGERY

## 2022-02-22 PROCEDURE — 2500000003 HC RX 250 WO HCPCS: Performed by: STUDENT IN AN ORGANIZED HEALTH CARE EDUCATION/TRAINING PROGRAM

## 2022-02-22 PROCEDURE — 2580000003 HC RX 258: Performed by: STUDENT IN AN ORGANIZED HEALTH CARE EDUCATION/TRAINING PROGRAM

## 2022-02-22 PROCEDURE — C1776 JOINT DEVICE (IMPLANTABLE): HCPCS | Performed by: ORTHOPAEDIC SURGERY

## 2022-02-22 PROCEDURE — 6360000002 HC RX W HCPCS: Performed by: ANESTHESIOLOGY

## 2022-02-22 PROCEDURE — 3700000000 HC ANESTHESIA ATTENDED CARE: Performed by: ORTHOPAEDIC SURGERY

## 2022-02-22 PROCEDURE — 85025 COMPLETE CBC W/AUTO DIFF WBC: CPT

## 2022-02-22 PROCEDURE — 2709999900 HC NON-CHARGEABLE SUPPLY: Performed by: ORTHOPAEDIC SURGERY

## 2022-02-22 PROCEDURE — 6370000000 HC RX 637 (ALT 250 FOR IP): Performed by: STUDENT IN AN ORGANIZED HEALTH CARE EDUCATION/TRAINING PROGRAM

## 2022-02-22 PROCEDURE — 94761 N-INVAS EAR/PLS OXIMETRY MLT: CPT

## 2022-02-22 PROCEDURE — 2700000000 HC OXYGEN THERAPY PER DAY

## 2022-02-22 PROCEDURE — 73501 X-RAY EXAM HIP UNI 1 VIEW: CPT

## 2022-02-22 PROCEDURE — 7100000001 HC PACU RECOVERY - ADDTL 15 MIN: Performed by: ORTHOPAEDIC SURGERY

## 2022-02-22 PROCEDURE — 94660 CPAP INITIATION&MGMT: CPT

## 2022-02-22 PROCEDURE — 27236 TREAT THIGH FRACTURE: CPT | Performed by: ORTHOPAEDIC SURGERY

## 2022-02-22 PROCEDURE — 2500000003 HC RX 250 WO HCPCS

## 2022-02-22 PROCEDURE — 3600000014 HC SURGERY LEVEL 4 ADDTL 15MIN: Performed by: ORTHOPAEDIC SURGERY

## 2022-02-22 PROCEDURE — 0SRR0JZ REPLACEMENT OF RIGHT HIP JOINT, FEMORAL SURFACE WITH SYNTHETIC SUBSTITUTE, OPEN APPROACH: ICD-10-PCS | Performed by: ORTHOPAEDIC SURGERY

## 2022-02-22 PROCEDURE — 84145 PROCALCITONIN (PCT): CPT

## 2022-02-22 PROCEDURE — 99233 SBSQ HOSP IP/OBS HIGH 50: CPT | Performed by: INTERNAL MEDICINE

## 2022-02-22 PROCEDURE — 85610 PROTHROMBIN TIME: CPT

## 2022-02-22 PROCEDURE — 2580000003 HC RX 258

## 2022-02-22 PROCEDURE — 6360000002 HC RX W HCPCS: Performed by: ORTHOPAEDIC SURGERY

## 2022-02-22 PROCEDURE — 7100000000 HC PACU RECOVERY - FIRST 15 MIN: Performed by: ORTHOPAEDIC SURGERY

## 2022-02-22 PROCEDURE — 3700000001 HC ADD 15 MINUTES (ANESTHESIA): Performed by: ORTHOPAEDIC SURGERY

## 2022-02-22 PROCEDURE — 80048 BASIC METABOLIC PNL TOTAL CA: CPT

## 2022-02-22 PROCEDURE — 36415 COLL VENOUS BLD VENIPUNCTURE: CPT

## 2022-02-22 PROCEDURE — 2060000000 HC ICU INTERMEDIATE R&B

## 2022-02-22 PROCEDURE — 6360000002 HC RX W HCPCS

## 2022-02-22 PROCEDURE — 2580000003 HC RX 258: Performed by: ANESTHESIOLOGY

## 2022-02-22 PROCEDURE — 85730 THROMBOPLASTIN TIME PARTIAL: CPT

## 2022-02-22 PROCEDURE — 3600000004 HC SURGERY LEVEL 4 BASE: Performed by: ORTHOPAEDIC SURGERY

## 2022-02-22 DEVICE — IMPLANTABLE DEVICE: Type: IMPLANTABLE DEVICE | Site: HIP | Status: FUNCTIONAL

## 2022-02-22 DEVICE — INSERT FEM STD NK HIP CO CHROM TAPR ENDO II: Type: IMPLANTABLE DEVICE | Site: HIP | Status: FUNCTIONAL

## 2022-02-22 RX ORDER — SODIUM CHLORIDE 0.9 % (FLUSH) 0.9 %
5-40 SYRINGE (ML) INJECTION EVERY 12 HOURS SCHEDULED
Status: DISCONTINUED | OUTPATIENT
Start: 2022-02-22 | End: 2022-03-03 | Stop reason: HOSPADM

## 2022-02-22 RX ORDER — CALCIUM CHLORIDE 100 MG/ML
INJECTION INTRAVENOUS; INTRAVENTRICULAR PRN
Status: DISCONTINUED | OUTPATIENT
Start: 2022-02-22 | End: 2022-02-22 | Stop reason: ALTCHOICE

## 2022-02-22 RX ORDER — SODIUM CHLORIDE, SODIUM LACTATE, POTASSIUM CHLORIDE, CALCIUM CHLORIDE 600; 310; 30; 20 MG/100ML; MG/100ML; MG/100ML; MG/100ML
INJECTION, SOLUTION INTRAVENOUS CONTINUOUS
Status: DISCONTINUED | OUTPATIENT
Start: 2022-02-22 | End: 2022-02-22

## 2022-02-22 RX ORDER — ETOMIDATE 2 MG/ML
INJECTION INTRAVENOUS PRN
Status: DISCONTINUED | OUTPATIENT
Start: 2022-02-22 | End: 2022-02-22 | Stop reason: SDUPTHER

## 2022-02-22 RX ORDER — DIPHENHYDRAMINE HYDROCHLORIDE 50 MG/ML
12.5 INJECTION INTRAMUSCULAR; INTRAVENOUS
Status: COMPLETED | OUTPATIENT
Start: 2022-02-22 | End: 2022-02-22

## 2022-02-22 RX ORDER — SODIUM CHLORIDE 9 MG/ML
25 INJECTION, SOLUTION INTRAVENOUS PRN
Status: DISCONTINUED | OUTPATIENT
Start: 2022-02-22 | End: 2022-02-22 | Stop reason: HOSPADM

## 2022-02-22 RX ORDER — SODIUM CHLORIDE 0.9 % (FLUSH) 0.9 %
10 SYRINGE (ML) INJECTION PRN
Status: DISCONTINUED | OUTPATIENT
Start: 2022-02-22 | End: 2022-02-22 | Stop reason: HOSPADM

## 2022-02-22 RX ORDER — LIDOCAINE HYDROCHLORIDE 10 MG/ML
1 INJECTION, SOLUTION EPIDURAL; INFILTRATION; INTRACAUDAL; PERINEURAL
Status: DISCONTINUED | OUTPATIENT
Start: 2022-02-22 | End: 2022-02-22 | Stop reason: HOSPADM

## 2022-02-22 RX ORDER — FENTANYL CITRATE 50 UG/ML
INJECTION, SOLUTION INTRAMUSCULAR; INTRAVENOUS PRN
Status: DISCONTINUED | OUTPATIENT
Start: 2022-02-22 | End: 2022-02-22 | Stop reason: SDUPTHER

## 2022-02-22 RX ORDER — PHENYLEPHRINE HYDROCHLORIDE 10 MG/ML
INJECTION INTRAVENOUS PRN
Status: DISCONTINUED | OUTPATIENT
Start: 2022-02-22 | End: 2022-02-22 | Stop reason: SDUPTHER

## 2022-02-22 RX ORDER — METHYLPREDNISOLONE SODIUM SUCCINATE 40 MG/ML
40 INJECTION, POWDER, LYOPHILIZED, FOR SOLUTION INTRAMUSCULAR; INTRAVENOUS EVERY 8 HOURS
Status: DISCONTINUED | OUTPATIENT
Start: 2022-02-22 | End: 2022-02-23

## 2022-02-22 RX ORDER — MIDAZOLAM HYDROCHLORIDE 1 MG/ML
INJECTION INTRAMUSCULAR; INTRAVENOUS PRN
Status: DISCONTINUED | OUTPATIENT
Start: 2022-02-22 | End: 2022-02-22 | Stop reason: SDUPTHER

## 2022-02-22 RX ORDER — ONDANSETRON 2 MG/ML
4 INJECTION INTRAMUSCULAR; INTRAVENOUS
Status: DISCONTINUED | OUTPATIENT
Start: 2022-02-22 | End: 2022-02-22 | Stop reason: HOSPADM

## 2022-02-22 RX ORDER — SODIUM CHLORIDE 0.9 % (FLUSH) 0.9 %
5-40 SYRINGE (ML) INJECTION PRN
Status: DISCONTINUED | OUTPATIENT
Start: 2022-02-22 | End: 2022-03-03 | Stop reason: HOSPADM

## 2022-02-22 RX ORDER — DEXAMETHASONE SODIUM PHOSPHATE 4 MG/ML
INJECTION, SOLUTION INTRA-ARTICULAR; INTRALESIONAL; INTRAMUSCULAR; INTRAVENOUS; SOFT TISSUE PRN
Status: DISCONTINUED | OUTPATIENT
Start: 2022-02-22 | End: 2022-02-22 | Stop reason: SDUPTHER

## 2022-02-22 RX ORDER — LIDOCAINE HYDROCHLORIDE 10 MG/ML
INJECTION, SOLUTION EPIDURAL; INFILTRATION; INTRACAUDAL; PERINEURAL PRN
Status: DISCONTINUED | OUTPATIENT
Start: 2022-02-22 | End: 2022-02-22 | Stop reason: SDUPTHER

## 2022-02-22 RX ORDER — ONDANSETRON 2 MG/ML
INJECTION INTRAMUSCULAR; INTRAVENOUS PRN
Status: DISCONTINUED | OUTPATIENT
Start: 2022-02-22 | End: 2022-02-22 | Stop reason: SDUPTHER

## 2022-02-22 RX ORDER — DIGOXIN 125 MCG
125 TABLET ORAL DAILY
Status: DISCONTINUED | OUTPATIENT
Start: 2022-02-22 | End: 2022-03-03 | Stop reason: HOSPADM

## 2022-02-22 RX ORDER — MEPERIDINE HYDROCHLORIDE 25 MG/ML
12.5 INJECTION INTRAMUSCULAR; INTRAVENOUS; SUBCUTANEOUS EVERY 5 MIN PRN
Status: DISCONTINUED | OUTPATIENT
Start: 2022-02-22 | End: 2022-02-22 | Stop reason: HOSPADM

## 2022-02-22 RX ORDER — SODIUM CHLORIDE 9 MG/ML
25 INJECTION, SOLUTION INTRAVENOUS PRN
Status: DISCONTINUED | OUTPATIENT
Start: 2022-02-22 | End: 2022-03-03 | Stop reason: HOSPADM

## 2022-02-22 RX ORDER — METOCLOPRAMIDE HYDROCHLORIDE 5 MG/ML
10 INJECTION INTRAMUSCULAR; INTRAVENOUS
Status: DISCONTINUED | OUTPATIENT
Start: 2022-02-22 | End: 2022-02-22 | Stop reason: HOSPADM

## 2022-02-22 RX ORDER — SODIUM CHLORIDE 0.9 % (FLUSH) 0.9 %
5-40 SYRINGE (ML) INJECTION PRN
Status: DISCONTINUED | OUTPATIENT
Start: 2022-02-22 | End: 2022-02-22 | Stop reason: HOSPADM

## 2022-02-22 RX ORDER — SODIUM CHLORIDE 9 MG/ML
INJECTION, SOLUTION INTRAVENOUS CONTINUOUS
Status: DISCONTINUED | OUTPATIENT
Start: 2022-02-22 | End: 2022-02-22

## 2022-02-22 RX ORDER — SODIUM CHLORIDE 0.9 % (FLUSH) 0.9 %
5-40 SYRINGE (ML) INJECTION EVERY 12 HOURS SCHEDULED
Status: DISCONTINUED | OUTPATIENT
Start: 2022-02-22 | End: 2022-02-22 | Stop reason: HOSPADM

## 2022-02-22 RX ORDER — ROCURONIUM BROMIDE 10 MG/ML
INJECTION, SOLUTION INTRAVENOUS PRN
Status: DISCONTINUED | OUTPATIENT
Start: 2022-02-22 | End: 2022-02-22 | Stop reason: SDUPTHER

## 2022-02-22 RX ORDER — FENTANYL CITRATE 50 UG/ML
25 INJECTION, SOLUTION INTRAMUSCULAR; INTRAVENOUS EVERY 5 MIN PRN
Status: DISCONTINUED | OUTPATIENT
Start: 2022-02-22 | End: 2022-02-22 | Stop reason: HOSPADM

## 2022-02-22 RX ADMIN — MORPHINE SULFATE 2 MG: 2 INJECTION, SOLUTION INTRAMUSCULAR; INTRAVENOUS at 00:27

## 2022-02-22 RX ADMIN — MIDAZOLAM 2 MG: 1 INJECTION INTRAMUSCULAR; INTRAVENOUS at 14:42

## 2022-02-22 RX ADMIN — DIGOXIN 125 MCG: 125 TABLET ORAL at 21:27

## 2022-02-22 RX ADMIN — CEFTRIAXONE SODIUM 1000 MG: 1 INJECTION, POWDER, FOR SOLUTION INTRAMUSCULAR; INTRAVENOUS at 09:28

## 2022-02-22 RX ADMIN — SODIUM CHLORIDE, POTASSIUM CHLORIDE, SODIUM LACTATE AND CALCIUM CHLORIDE: 600; 310; 30; 20 INJECTION, SOLUTION INTRAVENOUS at 14:27

## 2022-02-22 RX ADMIN — FUROSEMIDE 40 MG: 40 INJECTION, SOLUTION INTRAMUSCULAR; INTRAVENOUS at 09:30

## 2022-02-22 RX ADMIN — METHYLPREDNISOLONE SODIUM SUCCINATE 60 MG: 125 INJECTION, POWDER, FOR SOLUTION INTRAMUSCULAR; INTRAVENOUS at 00:27

## 2022-02-22 RX ADMIN — ACETAMINOPHEN 650 MG: 325 TABLET, FILM COATED ORAL at 23:31

## 2022-02-22 RX ADMIN — PHENYLEPHRINE HYDROCHLORIDE 200 MCG: 10 INJECTION INTRAVENOUS at 14:55

## 2022-02-22 RX ADMIN — HYDROMORPHONE HYDROCHLORIDE 0.5 MG: 1 INJECTION, SOLUTION INTRAMUSCULAR; INTRAVENOUS; SUBCUTANEOUS at 18:06

## 2022-02-22 RX ADMIN — MORPHINE SULFATE 2 MG: 2 INJECTION, SOLUTION INTRAMUSCULAR; INTRAVENOUS at 10:40

## 2022-02-22 RX ADMIN — FLUCONAZOLE 200 MG: 2 INJECTION, SOLUTION INTRAVENOUS at 12:52

## 2022-02-22 RX ADMIN — SODIUM CHLORIDE, PRESERVATIVE FREE 10 ML: 5 INJECTION INTRAVENOUS at 20:27

## 2022-02-22 RX ADMIN — ROCURONIUM BROMIDE 40 MG: 10 INJECTION, SOLUTION INTRAVENOUS at 14:48

## 2022-02-22 RX ADMIN — METHYLPREDNISOLONE SODIUM SUCCINATE 60 MG: 125 INJECTION, POWDER, FOR SOLUTION INTRAMUSCULAR; INTRAVENOUS at 09:30

## 2022-02-22 RX ADMIN — MORPHINE SULFATE 2 MG: 2 INJECTION, SOLUTION INTRAMUSCULAR; INTRAVENOUS at 06:24

## 2022-02-22 RX ADMIN — METHYLPREDNISOLONE SODIUM SUCCINATE 40 MG: 40 INJECTION, POWDER, FOR SOLUTION INTRAMUSCULAR; INTRAVENOUS at 23:31

## 2022-02-22 RX ADMIN — CEFAZOLIN 2000 MG: 10 INJECTION, POWDER, FOR SOLUTION INTRAVENOUS at 14:53

## 2022-02-22 RX ADMIN — ACETAMINOPHEN 650 MG: 325 TABLET, FILM COATED ORAL at 00:37

## 2022-02-22 RX ADMIN — FAMOTIDINE 20 MG: 10 INJECTION, SOLUTION INTRAVENOUS at 20:27

## 2022-02-22 RX ADMIN — LIDOCAINE HYDROCHLORIDE 50 MG: 10 INJECTION, SOLUTION EPIDURAL; INFILTRATION; INTRACAUDAL; PERINEURAL at 14:47

## 2022-02-22 RX ADMIN — FAMOTIDINE 20 MG: 10 INJECTION, SOLUTION INTRAVENOUS at 09:30

## 2022-02-22 RX ADMIN — PHENYLEPHRINE HYDROCHLORIDE 200 MCG: 10 INJECTION INTRAVENOUS at 14:52

## 2022-02-22 RX ADMIN — HYDROMORPHONE HYDROCHLORIDE 0.5 MG: 1 INJECTION, SOLUTION INTRAMUSCULAR; INTRAVENOUS; SUBCUTANEOUS at 16:20

## 2022-02-22 RX ADMIN — FENTANYL CITRATE 100 MCG: 50 INJECTION, SOLUTION INTRAMUSCULAR; INTRAVENOUS at 14:47

## 2022-02-22 RX ADMIN — PHENYLEPHRINE HYDROCHLORIDE 30 MCG/MIN: 10 INJECTION INTRAVENOUS at 14:52

## 2022-02-22 RX ADMIN — FENTANYL CITRATE 50 MCG: 50 INJECTION, SOLUTION INTRAMUSCULAR; INTRAVENOUS at 14:42

## 2022-02-22 RX ADMIN — DIPHENHYDRAMINE HYDROCHLORIDE 12.5 MG: 50 INJECTION INTRAMUSCULAR; INTRAVENOUS at 16:34

## 2022-02-22 RX ADMIN — LISINOPRIL 10 MG: 20 TABLET ORAL at 20:27

## 2022-02-22 RX ADMIN — HYDROMORPHONE HYDROCHLORIDE 0.5 MG: 1 INJECTION, SOLUTION INTRAMUSCULAR; INTRAVENOUS; SUBCUTANEOUS at 21:25

## 2022-02-22 RX ADMIN — ETOMIDATE 16 MG: 40 INJECTION, SOLUTION INTRAVENOUS at 14:47

## 2022-02-22 RX ADMIN — HYDROMORPHONE HYDROCHLORIDE 0.5 MG: 1 INJECTION, SOLUTION INTRAMUSCULAR; INTRAVENOUS; SUBCUTANEOUS at 16:27

## 2022-02-22 RX ADMIN — DEXAMETHASONE SODIUM PHOSPHATE 8 MG: 4 INJECTION, SOLUTION INTRAMUSCULAR; INTRAVENOUS at 14:55

## 2022-02-22 RX ADMIN — ONDANSETRON 4 MG: 2 INJECTION INTRAMUSCULAR; INTRAVENOUS at 15:43

## 2022-02-22 RX ADMIN — ONDANSETRON 4 MG: 2 INJECTION INTRAMUSCULAR; INTRAVENOUS at 10:40

## 2022-02-22 RX ADMIN — SUGAMMADEX 200 MG: 100 INJECTION, SOLUTION INTRAVENOUS at 16:01

## 2022-02-22 ASSESSMENT — PAIN DESCRIPTION - FREQUENCY
FREQUENCY: CONTINUOUS
FREQUENCY: INTERMITTENT
FREQUENCY: CONTINUOUS
FREQUENCY: INTERMITTENT

## 2022-02-22 ASSESSMENT — PAIN DESCRIPTION - DESCRIPTORS
DESCRIPTORS: SORE
DESCRIPTORS: SORE
DESCRIPTORS: ACHING;SORE
DESCRIPTORS: ACHING
DESCRIPTORS: SORE
DESCRIPTORS: ACHING;SORE

## 2022-02-22 ASSESSMENT — PAIN DESCRIPTION - LOCATION
LOCATION: HIP
LOCATION: INCISION;HIP
LOCATION: HIP
LOCATION: HIP
LOCATION: INCISION;HIP
LOCATION: HIP

## 2022-02-22 ASSESSMENT — PULMONARY FUNCTION TESTS
PIF_VALUE: 39
PIF_VALUE: 42
PIF_VALUE: 41
PIF_VALUE: 40
PIF_VALUE: 36
PIF_VALUE: 41
PIF_VALUE: 39
PIF_VALUE: 2
PIF_VALUE: 41
PIF_VALUE: 0
PIF_VALUE: 41
PIF_VALUE: 39
PIF_VALUE: 0
PIF_VALUE: 41
PIF_VALUE: 38
PIF_VALUE: 40
PIF_VALUE: 42
PIF_VALUE: 24
PIF_VALUE: 36
PIF_VALUE: 41
PIF_VALUE: 27
PIF_VALUE: 42
PIF_VALUE: 41
PIF_VALUE: 26
PIF_VALUE: 36
PIF_VALUE: 41
PIF_VALUE: 3
PIF_VALUE: 40
PIF_VALUE: 36
PIF_VALUE: 2
PIF_VALUE: 16
PIF_VALUE: 40
PIF_VALUE: 39
PIF_VALUE: 36
PIF_VALUE: 38
PIF_VALUE: 40
PIF_VALUE: 39
PIF_VALUE: 2
PIF_VALUE: 39
PIF_VALUE: 41
PIF_VALUE: 41
PIF_VALUE: 36
PIF_VALUE: 1
PIF_VALUE: 41
PIF_VALUE: 1
PIF_VALUE: 39
PIF_VALUE: 11
PIF_VALUE: 41
PIF_VALUE: 41
PIF_VALUE: 39
PIF_VALUE: 41
PIF_VALUE: 41
PIF_VALUE: 39
PIF_VALUE: 39
PIF_VALUE: 41
PIF_VALUE: 30
PIF_VALUE: 2
PIF_VALUE: 38
PIF_VALUE: 36
PIF_VALUE: 36
PIF_VALUE: 41
PIF_VALUE: 4
PIF_VALUE: 38
PIF_VALUE: 39
PIF_VALUE: 41
PIF_VALUE: 40
PIF_VALUE: 3
PIF_VALUE: 42
PIF_VALUE: 0
PIF_VALUE: 37
PIF_VALUE: 41
PIF_VALUE: 36
PIF_VALUE: 40
PIF_VALUE: 2
PIF_VALUE: 1
PIF_VALUE: 40
PIF_VALUE: 41
PIF_VALUE: 36
PIF_VALUE: 39
PIF_VALUE: 35
PIF_VALUE: 42
PIF_VALUE: 7

## 2022-02-22 ASSESSMENT — PAIN SCALES - GENERAL
PAINLEVEL_OUTOF10: 10
PAINLEVEL_OUTOF10: 1
PAINLEVEL_OUTOF10: 2
PAINLEVEL_OUTOF10: 10
PAINLEVEL_OUTOF10: 2
PAINLEVEL_OUTOF10: 8
PAINLEVEL_OUTOF10: 10
PAINLEVEL_OUTOF10: 5
PAINLEVEL_OUTOF10: 10
PAINLEVEL_OUTOF10: 5
PAINLEVEL_OUTOF10: 4
PAINLEVEL_OUTOF10: 10

## 2022-02-22 ASSESSMENT — ENCOUNTER SYMPTOMS
NAUSEA: 1
SHORTNESS OF BREATH: 1
COUGH: 0
VOMITING: 0
EYE REDNESS: 0
WHEEZING: 0
EYE DISCHARGE: 0
BACK PAIN: 1

## 2022-02-22 ASSESSMENT — PAIN DESCRIPTION - PAIN TYPE
TYPE: SURGICAL PAIN
TYPE: ACUTE PAIN
TYPE: SURGICAL PAIN

## 2022-02-22 ASSESSMENT — PAIN DESCRIPTION - ONSET
ONSET: GRADUAL
ONSET: ON-GOING
ONSET: ON-GOING
ONSET: GRADUAL

## 2022-02-22 ASSESSMENT — PAIN DESCRIPTION - PROGRESSION
CLINICAL_PROGRESSION: GRADUALLY WORSENING
CLINICAL_PROGRESSION: NOT CHANGED
CLINICAL_PROGRESSION: GRADUALLY IMPROVING

## 2022-02-22 ASSESSMENT — PAIN - FUNCTIONAL ASSESSMENT
PAIN_FUNCTIONAL_ASSESSMENT: PREVENTS OR INTERFERES SOME ACTIVE ACTIVITIES AND ADLS
PAIN_FUNCTIONAL_ASSESSMENT: PREVENTS OR INTERFERES SOME ACTIVE ACTIVITIES AND ADLS

## 2022-02-22 ASSESSMENT — PAIN DESCRIPTION - ORIENTATION
ORIENTATION: RIGHT

## 2022-02-22 ASSESSMENT — PAIN DESCRIPTION - DIRECTION: RADIATING_TOWARDS: THIGH

## 2022-02-22 NOTE — PROGRESS NOTES
ICU Progress Note (Non-Vent)  [facility]    Patient - Kelly Laura,  Age - 74 y.o.    - 1955      Room Number -    N -  [id number]   Acct # - [de-identified]  Date of Admission -  2022  6:14 AM    Events of Past 24 Hours   Per RN, no acute overnight events. He is alert and oriented, was on 5L (decreased from 12L yesterday)  [place] device saturating 94%. He denies any worsening dyspnea, no longer has hemoptysis. Hip surgery is scheduled today for 1300. VSS - 110/76, RR 21, SpO2 94%, HR 92     Vitals    height is 6' (1.829 m) and weight is 163 lb 12.8 oz (74.3 kg). His axillary temperature is 97.5 °F (36.4 °C). His blood pressure is 138/86 and his pulse is 90. His respiration is 16 and oxygen saturation is 99%.        Temperature Range: Temp: 97.5 °F (36.4 °C) Temp  Av.5 °F (36.4 °C)  Min: 97.1 °F (36.2 °C)  Max: 98 °F (36.7 °C)  BP Range:  Systolic (35OYF), AAD:749 , Min:103 , KBA:642     Diastolic (82HFI), BBQ:76, Min:45, Max:109    Pulse Range: Pulse  Av.9  Min: 78  Max: 108  Respiration Range: Resp  Av.8  Min: 12  Max: 30  Current Pulse Ox[de-identified]  SpO2: 99 %  24HR Pulse Ox Range:  SpO2  Av %  Min: 84 %  Max: 100 %  Oxygen Amount and Delivery: O2 Flow Rate (L/min): 5 L/min    Wt Readings from Last 3 Encounters:   22 163 lb 12.8 oz (74.3 kg)   21 171 lb (77.6 kg)   07/15/21 166 lb 9.6 oz (75.6 kg)     I/O       Intake/Output Summary (Last 24 hours) at 2022 1035  Last data filed at 2022 0000  Gross per 24 hour   Intake 250 ml   Output 775 ml   Net -525 ml     DRAIN/TUBE OUTPUT       Invasive Lines   ICP PRESSURE RANGE  No data recorded  CVP PRESSURE RANGE  No data recorded      Medications      sertraline  25 mg Oral Daily    furosemide  40 mg IntraVENous Daily    [Held by provider] enoxaparin  1 mg/kg SubCUTAneous BID    methylPREDNISolone 60 mg IntraVENous Q8H    sodium chloride flush  5-40 mL IntraVENous 2 times per day    famotidine (PEPCID) injection  20 mg IntraVENous BID    cefTRIAXone (ROCEPHIN) IV  1,000 mg IntraVENous Q24H    [Held by provider] amLODIPine  5 mg Oral Daily    atorvastatin  40 mg Oral Daily    [Held by provider] carvedilol  3.125 mg Oral BID WC    lisinopril  10 mg Oral BID    tamsulosin  0.4 mg Oral Daily    digoxin  125 mcg Oral Daily    [Held by provider] aspirin  81 mg Oral Daily     perflutren lipid microspheres, morphine, sodium chloride flush, sodium chloride flush, sodium chloride, ondansetron **OR** ondansetron, polyethylene glycol, acetaminophen **OR** acetaminophen, potassium chloride **OR** potassium chloride, magnesium sulfate  IV Drips/Infusions   sodium chloride         Diet/Nutrition   Diet NPO    Exam      Constitutional - Alert, arousable  General Appearance  well developed, well nourished  HEENT -normocephalic, atraumatic. PERRLA has thrush in the oral cavity  Lungs - Chest expands equally, some coarseness in the left upper lobe. Cardiovascular - Heart sounds are normal.  normal rate and rhythm regular, no murmur, gallop or rub. Abdomen - soft, nontender, nondistended, no masses or organomegaly  Neurologic - CN II-XII are grossly intact.  There are no focal motor deficits  Skin - no bruising or bleeding  Extremities - no cyanosis, clubbing or edema    Lab Results   CBC     Lab Results   Component Value Date    WBC 10.0 02/22/2022    RBC 4.59 02/22/2022    HGB 11.3 02/22/2022    HCT 35.4 02/22/2022     02/22/2022    MCV 77.2 02/22/2022    MCH 24.7 02/22/2022    MCHC 32.0 02/22/2022    RDW 17.0 02/22/2022    LYMPHOPCT 2 02/22/2022    MONOPCT 2 02/22/2022    BASOPCT 0 02/22/2022    MONOSABS 0.20 02/22/2022    LYMPHSABS 0.20 02/22/2022    EOSABS 0.00 02/22/2022    BASOSABS 0.00 02/22/2022    DIFFTYPE NOT REPORTED 02/21/2022       BMP   Lab Results   Component Value Date     02/22/2022 K 4.8 02/22/2022    CL 96 02/22/2022    CO2 39 02/22/2022    BUN 29 02/22/2022    CREATININE 0.59 02/22/2022    GLUCOSE 136 02/22/2022       LFTS  Lab Results   Component Value Date    ALKPHOS 92 12/20/2021    ALT 8 12/20/2021    AST 18 12/20/2021    PROT 7.4 12/20/2021    BILITOT 0.66 12/20/2021    BILIDIR <0.08 12/05/2018    IBILI CANNOT BE CALCULATED 12/05/2018    LABALBU 4.1 12/20/2021       ABG ABGs:   Lab Results   Component Value Date    PHART 7.394 02/18/2022    PO2ART 33.4 02/18/2022    VNH9MWF 42.4 02/18/2022       Lab Results   Component Value Date    MODE NOT REPORTED 02/18/2022         INR  Recent Labs     02/22/22 0341   PROTIME 18.2*   INR 1.5       APTT  Recent Labs     02/22/22 0341   APTT 32.6       Lactic Acid  Lab Results   Component Value Date    LACTA NOT REPORTED 12/07/2020    LACTA NOT REPORTED 10/17/2020    LACTA 1.0 08/11/2014        BNP   No results for input(s): BNP in the last 72 hours. Cultures       Radiology     CXR      Chest x-ray shows significant improvement in the left upper lobe infiltrate        SYSTEMS ASSESSMENT    Acute on chronic hypoxic respiratory failure  Left upper lobe pneumonia  Right femoral neck fracture  Idiopathic pulmonary fibrosis/UIP  Hemoptysis-resolved    Oxygen requirements have dramatically decreased on 5L [place]   Surgery scheduled today at 1300. Maintain O2 saturation >88%   Continue Rocephin and Zithromax  continue incentive spirometry    Kody Carl, MS4     Patient seen and examined independently by me. Above discussed and I agree with medical student note except where indicated in the EMR revision history. Also see my additional comments and changes indicated by discrete font, text color, italics, and/or initials. Labs, cultures, and radiographs where available were reviewed. He is down to 5 L nasal cannula, saturating around 95%.   He looks to be comfortable and in no acute respiratory distress  I think this is the optimal time to proceed with surgery  We will decrease his Solu-Medrol to 40 every 8  Nurses tell me that he is using his incentive spirometry  Watch closely for oversedation or hypoxemia after surgery  We will check a follow-up x-ray tomorrow  Patient does have thrush and we will go ahead and order Diflucan today since he is n.p.o.   Electronically signed by Bryant Lynn MD on 2/22/2022 at 10:46 AM        Critical Care Time   0 min    Electronically signed by Bryant Lynn MD on 2/22/2022 at 10:35 AM

## 2022-02-22 NOTE — OP NOTE
Operative Note      Patient: Bob German  YOB: 1955  MRN: [id number]    Date of Procedure: 2/22/2022    Pre-Op Diagnosis: Displaced right femoral neck fracture  PT VACCINATED    Post-Op Diagnosis: Same       Procedure(s):  HIP FEMORAL HEMIARTHROPLASTY right    Surgeon(s):  Gustavo Galicia MD    Assistant:   Resident: DO Michael Sanders CST  Anesthesia: General    Estimated Blood Loss (mL): 466MH    Complications: None    Specimens:   * No specimens in log *    Implants:  Implant Name Type Inv. Item Serial No.  Lot No. LRB No. Used Action   COMPONENT UPLR [id number] HIP CO CHROM MOLYBDENUM ALLY MOD - [id number]  COMPONENT UPLR [id number] HIP CO CHROM MOLYBDENUM ALLY MOD  [employer]- [id number] Right 1 Implanted   STEM FEM DIA9MM HIP TI GRIT BLASTED CRV PRESSFIT STD OFFSET - [id number]  STEM FEM DIA9MM HIP TI GRIT BLASTED CRV PRESSFIT STD OFFSET  [employer]- [id number] Right 1 Implanted   INSERT FEM STD NK HIP CO CHROM TAPR ENDO II - [id number]  INSERT FEM STD NK HIP CO CHROM TAPR ENDO II  [employer]-[id number] Right 1 Implanted         Drains: * No LDAs found *    Findings: Displaced right femoral neck fracture    Detailed Description of Procedure:   Patient is a 23-year-old gentleman who had a syncopal episode while actually walking into the hospital and it fell. Patient is a history of pulmonary fibrosis. He was admitted 5 days ago for a right femoral neck fracture after being evaluated the emergency room. Patient required significant pulmonary optimization before he was able to obtain clearance by respiratory and also cardiologist with a recent echo that was performed today as he also has a history of mild aortic stenosis. X-rays obtained and the subsequently CT scan revealed a displaced femoral neck fracture on the right side. It was felt the patient would benefit from arthroplasty.   It was elected not to proceed with a total hip arthroplasty with his age however with his medical comorbidities felt to be better served with a hemiarthroplasty consent was obtained good comprehension of all risk and benefits. Patient is already on recent Rocephin preoperatively for his pneumonia. He was taken operative suite where general anesthesia was administered. Patient was then carefully positioned in the left lateral decubitus position with the right hip exposed with saline. The right hip and thigh and lower extremity then prepped and draped in usual fashion. Timeout was called to verify laterality. Straight lateral incision center of the greater trochanter was taken down through the subcu and the fascia and her Charnley retractor position. An anterior lateral approach was performed at the junction of anterior one third posterior two thirds of gluteus medius and splitting down and inferiorly into the gluteus minimus and subsequent of the vastus. With progressive external Tatian and dissection anteriorly and were able to gain access to the femoral neck pending a completely displaced femoral neck fracture was identified. The Cobra retractors were then positioned on the medial and lateral aspects and an osteotomy were carried out to freshen up the fracture site and this was approximately a fingerbreadth above the lesser trochanter. Fracture fragments were then removed from the area of the head and the head was identified and retrieved with a power Steinmann pin. This was then measured and a 52 ball was inserted in excellent's suction and stability was noted. The patient leg was then subsequently rotated and released posteriorly to allow replacement of a blunt retractor underneath the greater trochanter and the plate leg placed in the figure-of-four position. The femoral canal was then accessed with a cookie cutter canal finder and lateralizer. The echo broaches were then in certain and appropriate anteversion starting with a 7 broach. This was then broached up to 8 and subsequently a 9 but had ex excellent axial and rotational stability. The permanent 9 echo stem was then inserted and seated all the way excellent fixation was verified. A size 52 head with a standard TAVR was placed on the clean and dried Vinson taper and the hip was reduced with excellent stability. The abductor mechanism was closed with a #5 FiberWire suture with drill holes placed in the greater trochanter and the FiberWire passed through the hip capsule and the gluteus minimus and then tied over the top of the trochanter. The gluteus medius as well as the vastus were closed with the #1 strata fix and was running. A this was all sprayed with platelet rich plasma poor plasma spray. The leg was then abducted onto the Wilcox stand and the fascia was closed again with a #1 strata fix. Subcu was closed with a 2-0 Monocryl strata fix and Zipline for the skin. A Aquacel dressing was applied.   The patient was separately awakened none extubated to the postanesthesia care unit    Electronically signed by Sherian Leyden, MD on 2/22/2022 at 3:53 PM

## 2022-02-22 NOTE — PROGRESS NOTES
[address] [employer]    PROGRESS NOTE             2/22/2022    7:55 AM    Name:   Rosanne Bernard  MRN:     [id number]     Acct:      [de-identified]   Room:   2016/2016-01  IP Day:  4  Admit Date:  2/18/2022  6:14 AM    PCP:  Portia Real MD  Code Status:  DNR-CCA    Subjective:     C/C: No chief complaint on file. Interval History Status: improved. Pt seen and examined. No new complaints. Reports breathing and mood are better but is concerned over surgery today. O2 needs back at baseline. Plans for hip surgery today. Review of Systems:     Review of Systems   Constitutional: Positive for appetite change. Negative for chills and fever. Eyes: Negative for discharge and redness. Respiratory: Positive for shortness of breath. Negative for cough and wheezing. Cardiovascular: Negative for chest pain and leg swelling. Gastrointestinal: Positive for nausea. Negative for vomiting. Genitourinary: Negative for dysuria and hematuria. Musculoskeletal: Positive for arthralgias and back pain. Neurological: Negative for dizziness and numbness. Psychiatric/Behavioral: Negative for agitation and behavioral problems. Medications: Allergies: Allergies   Allergen Reactions    Adhesive Tape Other (See Comments)     Blister badly     Codeine Nausea Only     Other reaction(s): Other: See Comments  NAUSEA  Other reaction(s):  Other: See Comments  NAUSEA    Penicillins Swelling     As a baby  Other reaction(s): Unknown  As a baby    Nintedanib Diarrhea     10-15 BM daily       Current Meds:   Scheduled Meds:    sertraline  25 mg Oral Daily    furosemide  40 mg IntraVENous Daily    [Held by provider] enoxaparin  1 mg/kg SubCUTAneous BID    methylPREDNISolone  60 mg IntraVENous Q8H    sodium chloride flush  5-40 mL IntraVENous 2 times per day    famotidine (PEPCID) injection  20 mg IntraVENous BID    cefTRIAXone (ROCEPHIN) IV  1,000 mg IntraVENous Q24H    azithromycin  500 mg IntraVENous Q24H    [Held by provider] amLODIPine  5 mg Oral Daily    atorvastatin  40 mg Oral Daily    [Held by provider] carvedilol  3.125 mg Oral BID WC    lisinopril  10 mg Oral BID    tamsulosin  0.4 mg Oral Daily    digoxin  125 mcg Oral Daily    [Held by provider] aspirin  81 mg Oral Daily     Continuous Infusions:    sodium chloride       PRN Meds: perflutren lipid microspheres, morphine, sodium chloride flush, sodium chloride flush, sodium chloride, ondansetron **OR** ondansetron, polyethylene glycol, acetaminophen **OR** acetaminophen, potassium chloride **OR** potassium chloride, magnesium sulfate    Data:     Past Medical History:   has a past medical history of Atrial fibrillation ([facility] [address] Back pain, chronic, Hill's esophagus, Benign essential HTN, BPH (benign prostatic hyperplasia), Cancer (HCC), [facility] Cocaine abuse in remission [facility]), ED (erectile dysfunction), GERD (gastroesophageal reflux disease), GI bleed, Hernia, History of colon cancer, Melena, Migraines, and Murmur, cardiac. Social History:   reports that he quit smoking about 51 years ago. He has a 0.50 pack-year smoking history. He has never used smokeless tobacco. He reports current alcohol use of about 12.0 standard drinks of alcohol per week. He reports that he does not use drugs. Family History:   Family History   Problem Relation Age of Onset    Diabetes Mother     Heart Attack Father     Heart Disease Father     Heart Disease Brother        Vitals:  BP (!) 146/109   Pulse 106   Temp 97.2 °F (36.2 °C) (Axillary)   Resp 23   Ht 6' (1.829 m)   Wt 163 lb 12.8 oz (74.3 kg)   SpO2 98%   BMI 22.22 kg/m²   Temp (24hrs), Av.4 °F (36.3 °C), Min:97.1 °F (36.2 °C), Max:98 °F (36.7 °C)    No results for input(s): POCGLU in the last 72 hours.   Vitals:    22 0500 22 0530 22 0600 22 0630   BP: 103/81 (!) 123/93 124/83 (!) 146/109   Pulse: 91 100 92 106   Resp: 13 13 17 23   Temp:       TempSrc:       SpO2: 93% 100% 94% 98%   Weight:       Height:           I/O(24Hr):     Intake/Output Summary (Last 24 hours) at 2/22/2022 0755  Last data filed at 2/22/2022 0000  Gross per 24 hour   Intake 250 ml   Output 1175 ml   Net -925 ml       Labs:  Recent Results (from the past 24 hour(s))   Basic Metabolic Panel w/ Reflex to MG    Collection Time: 02/22/22  3:41 AM   Result Value Ref Range    Glucose 136 (H) 70 - 99 mg/dL    BUN 29 (H) 8 - 23 mg/dL    CREATININE 0.59 (L) 0.70 - 1.20 mg/dL    Calcium 8.1 (L) 8.6 - 10.4 mg/dL    Sodium 135 135 - 144 mmol/L    Potassium 4.8 3.7 - 5.3 mmol/L    Chloride 96 (L) 98 - 107 mmol/L    CO2 39 (H) 20 - 31 mmol/L    Anion Gap 0 (L) 9 - 17 mmol/L    GFR Non-African American >60 >60 mL/min    GFR African American >60 >60 mL/min    GFR Comment         CBC with Auto Differential    Collection Time: 02/22/22  3:41 AM   Result Value Ref Range    WBC 10.0 3.5 - 11.0 k/uL    RBC 4.59 4.5 - 5.9 m/uL    Hemoglobin 11.3 (L) 13.5 - 17.5 g/dL    Hematocrit 35.4 (L) 41 - 53 %    MCV 77.2 (L) 80 - 100 fL    MCH 24.7 (L) 26 - 34 pg    MCHC 32.0 31 - 37 g/dL    RDW 17.0 (H) 11.5 - 14.9 %    Platelets 573 476 - 713 k/uL    MPV 7.6 6.0 - 12.0 fL    Seg Neutrophils 96 (H) 36 - 66 %    Lymphocytes 2 (L) 24 - 44 %    Monocytes 2 1 - 7 %    Eosinophils % 0 0 - 4 %    Basophils 0 0 - 2 %    Segs Absolute 9.60 (H) 1.3 - 9.1 k/uL    Absolute Lymph # 0.20 (L) 1.0 - 4.8 k/uL    Absolute Mono # 0.20 0.1 - 1.3 k/uL    Absolute Eos # 0.00 0.0 - 0.4 k/uL    Basophils Absolute 0.00 0.0 - 0.2 k/uL    Morphology ANISOCYTOSIS PRESENT     Morphology MICROCYTOSIS PRESENT     Morphology HYPOCHROMIA PRESENT     Morphology SLT POLYCHROMASIA     Morphology 1+ ELLIPTOCYTES    Procalcitonin    Collection Time: 02/22/22  3:41 AM   Result Value Ref Range    Procalcitonin 0.22 (H) <0.09 ng/mL   Protime-INR    Collection Time: 02/22/22  3:41 AM   Result Value Ref Range    Protime 18.2 (H) 11.8 - 14.6 sec    INR 1.5    APTT    Collection Time: 02/22/22  3:41 AM   Result Value Ref Range    PTT 32.6 24.0 - 36.0 sec         Lab Results   Component Value Date/Time    SPECIAL NOT REPORTED 07/15/2021 04:33 PM     Lab Results   Component Value Date/Time    CULTURE NO SIGNIFICANT GROWTH 02/20/2022 06:41 PM         Radiology:    XR CHEST (SINGLE VIEW FRONTAL)    Result Date: 2/19/2022  Bilateral airspace disease superimposed on pulmonary fibrosis. Increasing airspace disease predominantly involving the left lung. XR HIP RIGHT (2-3 VIEWS)    Result Date: 2/18/2022  Portable chest: 1. Worsening airspace disease throughout the left lung with small left-sided pleural effusion, likely worsening multifocal pneumonia. Follow-up is recommended to document resolution. 2. Underlying fibrosis throughout the lungs. 3. Stable mild cardiomegaly. Right hip: Acute slightly displaced right femoral neck fracture. Underlying osteopenia. CT HEAD WO CONTRAST    Result Date: 2/18/2022  No acute intracranial process identified. XR CHEST PORTABLE    Result Date: 2/21/2022  Diffuse bilateral pneumonia worse on the left not significantly changed since 02/19/2022 given differences in radiographic technique. Mild cardiomegaly and possible mild pulmonary vascular congestion. XR CHEST PORTABLE    Result Date: 2/18/2022  Portable chest: 1. Worsening airspace disease throughout the left lung with small left-sided pleural effusion, likely worsening multifocal pneumonia. Follow-up is recommended to document resolution. 2. Underlying fibrosis throughout the lungs. 3. Stable mild cardiomegaly. Right hip: Acute slightly displaced right femoral neck fracture. Underlying osteopenia. CT CHEST PULMONARY EMBOLISM W CONTRAST    Result Date: 2/18/2022  1. No evidence for acute pulmonary embolism.  2. There is very large new ground-glass airspace disease occupying most of the left upper lobe and new small left pleural effusion. 3. Solid pleural-based posterior segment right upper lobe airspace density along the major fissure is larger now measuring 4.3 x 1.7 cm, previously about 1.2 x 2.4 cm. Probably also worsening pneumonia but would recommend short interval 3 month follow-up 4. 9 mm right upper lobe suprahilar central nodule is unchanged. RECOMMENDATIONS: Unavailable     CT HIP RIGHT WO CONTRAST    Result Date: 2/19/2022  1. Acute right femoral neck fracture. Physical Examination:        Physical Exam  Constitutional:       General: He is not in acute distress. Comments: Sitting up in bed   HENT:      Head: Normocephalic and atraumatic. Nose: Nose normal. No rhinorrhea. Comments: NC in place  Eyes:      General: No scleral icterus. Conjunctiva/sclera: Conjunctivae normal.   Cardiovascular:      Rate and Rhythm: Normal rate. Pulmonary:      Breath sounds: Rales (b/l, lower lungs > upper lungs) present. No wheezing. Abdominal:      General: Abdomen is flat. Palpations: Abdomen is soft. Comments: Hypoactive BS   Musculoskeletal:         General: No swelling or tenderness. Right lower leg: No edema. Left lower leg: No edema. Skin:     Coloration: Skin is not jaundiced. Findings: No bruising or erythema. Neurological:      Mental Status: Mental status is at baseline.    Psychiatric:      Comments: Better spirits than yesterday           Assessment:        Primary Problem  Multifocal pneumonia    Active Hospital Problems    Diagnosis Date Noted    Multifocal pneumonia [J18.9] 02/18/2022    Fracture of right hip [S72.001A] 02/18/2022    Community acquired bacterial pneumonia [J15.9] 02/18/2022    Pulmonary fibrosis ([facility] [address] [J84.10] 12/08/2020    PAF (paroxysmal atrial fibrillation) ([facility] [address] [I48.0] 07/21/2014       Plan:        Multifocal pneumonia in setting of pulmonary fibrosis  CT chest on admission showed no PE, groundglass airspace disease in the left upper lobe  c/w O2 supplementation as needed (baseline 5L O2)  Blood cultures no growth x2 days  Respiratory cultures pending   Negative for mycoplasma, Legionella, and strep pneumo   Procalcitonin 0.22  A-1 antitrypsin 229  Azithromycin 500 mg IV every 24 hours - day 5  Ceftriaxone 1 g IV every 24 hours - day 5  Methylprednisolone IV 60 mg every 8 hours  Furosemide 40 mg IV daily - started 2/20  Oxygen RT  PT/OT  Echo pending  Pulmonology on board  ID consulted  Incentive spirometry     Acute right hip fracture s/p fall  Hip x-ray: Acute slightly displaced right femoral neck fracture, underlying osteopenia  CT right hip without contrast: Acute right femoral neck fracture  Ortho on board; right hemiarthroplasty planned for today  Acetaminophen and morphine as needed for pain management    Depression - new  Sertraline p.o. 25 mg daily started 2/21     Atrial fibrillation - rate controlled  On home doses of aspirin and Eliquis  Digoxin 125 mg p.o. daily  Dig levels 0.6 wnl (checked since pt on azithromycin)  Magnesium and potassium replacement protocols      Hypertension  Amlodipine p.o. 5 mg daily  Carvedilol 3.125 mg p.o. twice daily => hold d/t low-nml BP  Lisinopril 10 mg p.o. twice daily     Hyperlipidemia  Atorvastatin 40 mg p.o. daily     Other home medications being continued:  Tamsulosin 0.4 mg p.o. daily     Code: DNR-CCA; DNR status to be suspended during perioperative period today  DVT prophylaxis: On full-dose enoxaparin (changed from home Eliquis due to possibility of surgery) - held since last night for procedure  GI prophylaxis: Famotidine 20 mg IV twice daily  Diet: low sodium  Activity: Up with assistance    Please note: Use of a speech recognition software was used in the creation of portions of this note and dictation errors, including those of syntax and sound alike word substitutions, may have escaped proofreading.        Jo Bonds DO  2/22/2022  7:55 AM     Attending Physician Statement  I have discussed the care of Carver Section and I have examined the patient myselft and taken ros and hpi , including pertinent history and exam findings,  with the resident. I have reviewed the key elements of all parts of the encounter with the resident. I agree with the assessment, plan and orders as documented by the resident.   pulm cleared for surg  Pt on 5 litres of oxygen  Pt has refused trach in case of failed exubation    Electronically signed by Yuliet Magdaleno MD

## 2022-02-22 NOTE — PLAN OF CARE
Problem: Skin Integrity:  Goal: Will show no infection signs and symptoms  Description: Will show no infection signs and symptoms  Outcome: Ongoing  Goal: Absence of new skin breakdown  Description: Absence of new skin breakdown  Outcome: Ongoing     Problem: Falls - Risk of:  Goal: Will remain free from falls  Description: Will remain free from falls  Outcome: Ongoing  Pt remains free from falls this shift, fall precautions this shift. Goal: Absence of physical injury  Description: Absence of physical injury  Outcome: Ongoing     Problem: SAFETY  Goal: Free from accidental physical injury  Outcome: Ongoing     Problem: SKIN INTEGRITY  Goal: Skin integrity is maintained or improved  Outcome: Ongoing  Pt remains free from new signs of skin breakdown. Pt turned q2 hours and checked for incontinence q2 hours throughout the shift. Pt educated on skin care and pressure ulcer prevention.

## 2022-02-22 NOTE — PROGRESS NOTES
[facility] -   Infectious diseases evaluation  admission date 2/18/2022    reason for consultation:   Pneumonia    Impression :   Current:  Community-acquired pneumonia  Acute right femoral neck fracture. Pulmonary fibrosis  Acute hypoxic respiratory failure    Recommendations   · Continue IV ceftriaxone  · Diflucan was started for thrush  · 5 days course of Zithromax completed 2/21/2022  · Right femoral hemiarthroplasty planned later today. · Follow CBC and renal function  · Supportive care    Infection Control Recommendations   · [place] Precautions      Antimicrobial Stewardship Recommendations   · Simplification of therapy  · Targeted therapy          History of Present Illness:   Initial history:  Renetta Barajas is a 74y.o.-year-old male presented to hospital on 2/18/2022 with worsening shortness of breath for several days, worse with exertion, no alleviating factors. The patient also fell ,was found to have acute right femoral neck fracture. The patient had chronic cough due to pulmonary fibrosis not increased, usually on 5 L of oxygen at home. He also had chronic legs pain. He denied fever or chills. The patient has been afebrile, no leukocytosis. He was started on IV Zithromax and Rocephin on admission that he is tolerating  Strep pneumo and Legionella antigen negative  Chest x-ray showed diffuse bilateral pneumonia worse on the left side. COVID-19 rapid test was negative. No growth on blood cultures  Interval changes  2/22/2022   He is complaining of hip pain, on 5 L of oxygen per nasal cannula,, denied worsening shortness of breath occasional cough, denied vomiting or diarrhea, no other complaints.   Patient Vitals for the past 8 hrs:   BP Temp Temp src Pulse Resp SpO2   02/22/22 1030 (!) 132/45 -- -- 98 17 96 %   02/22/22 1000 138/86 -- -- 90 16 99 %   02/22/22 0930 (!) 144/106 -- -- 87 19 95 %   02/22/22 0900 (!) 140/93 -- -- 94 30 92 % 02/22/22 0848 -- -- -- -- 19 98 %   02/22/22 0830 110/76 -- -- 85 13 99 %   02/22/22 0800 114/83 97.5 °F (36.4 °C) Axillary 90 13 98 %   02/22/22 0730 (!) 142/97 -- -- 96 16 98 %   02/22/22 0700 121/79 -- -- 91 12 99 %   02/22/22 0630 (!) 146/109 -- -- 106 23 98 %   02/22/22 0600 124/83 -- -- 92 17 94 %   02/22/22 0530 (!) 123/93 -- -- 100 13 100 %           I have personally reviewed the past medical history, past surgical history, medications, social history, and family history, and I haveupdated the database accordingly. Allergies:   Adhesive tape, Codeine, Penicillins, and Nintedanib     Review of Systems:     Review of Systems  As per history of present illness, other than above 12 system review was negative  Physical Examination :       Physical Exam  Constitutional:       Appearance: He is not toxic-appearing. HENT:      Head: Normocephalic and atraumatic. Right Ear: External ear normal.      Left Ear: External ear normal.      Mouth/Throat:      Pharynx: Oropharynx is clear. No oropharyngeal exudate. Comments: Thrush  Eyes:      General: No scleral icterus. Conjunctiva/sclera: Conjunctivae normal.   Cardiovascular:      Rate and Rhythm: Normal rate and regular rhythm. Heart sounds: No murmur heard. Pulmonary:      Breath sounds: Wheezing and rhonchi present. Abdominal:      General: There is no distension. Palpations: Abdomen is soft. Musculoskeletal:      Cervical back: Neck supple. No rigidity. Right lower leg: No edema. Left lower leg: No edema. Skin:     General: Skin is warm. Coloration: Skin is not jaundiced. Neurological:      General: No focal deficit present. Mental Status: He is alert and oriented to person, place, and time.          Past Medical History:     Past Medical History:   Diagnosis Date    Atrial fibrillation (HCC)     Back pain, chronic     Hill's esophagus 06/18/2019    Benign essential HTN     BPH (benign prostatic hyperplasia)     Cancer (HCC)     colon-rectal    Chronic idiopathic pulmonary fibrosis ([facility] [address] 03/29/2021    Cocaine abuse in remission [facility])     1970's    ED (erectile dysfunction) 4/2/2015    GERD (gastroesophageal reflux disease)     GI bleed 12/5/2018    Hernia     History of colon cancer     Melena     Migraines     Murmur, cardiac        Past Surgical  History:     Past Surgical History:   Procedure Laterality Date    CARDIAC CATHETERIZATION  11/29/2018    Non-obstructive CAD    CARDIOVERSION  2020    COLECTOMY      2nd colectomy, Colostomy and reversed [facility] COLECTOMY      1st time [place]    COLONOSCOPY      COLONOSCOPY  07/18/2016    COLONOSCOPY N/A 12/6/2018    COLONOSCOPY DIAGNOSTIC performed by Jeff Santos MD at [place] [address] Right 2009    inguinal    KNEE SURGERY Right 1970's    arthrotomy    TONSILLECTOMY      TOTAL KNEE ARTHROPLASTY Right 1/8/2019    KNEE TOTAL ARTHROPLASTY performed by Paula Espinoza MD at [address] [place] [address] TRANSESOPHAGEAL ECHOCARDIOGRAM  11/29/2018    UPPER GASTROINTESTINAL ENDOSCOPY N/A 12/5/2018    EGD DIAGNOSTIC ONLY performed by Jeff Santos MD at [place] [address] N/A 6/18/2019    MC[facility]RE'S       Medications:      methylPREDNISolone  40 mg IntraVENous Q8H    fluconazole  200 mg IntraVENous Q24H    sertraline  25 mg Oral Daily    furosemide  40 mg IntraVENous Daily    [Held by provider] enoxaparin  1 mg/kg SubCUTAneous BID    sodium chloride flush  5-40 mL IntraVENous 2 times per day    famotidine (PEPCID) injection  20 mg IntraVENous BID    cefTRIAXone (ROCEPHIN) IV  1,000 mg IntraVENous Q24H    [Held by provider] amLODIPine  5 mg Oral Daily    atorvastatin  40 mg Oral Daily    [Held by provider] carvedilol  3.125 mg Oral BID WC    lisinopril  10 mg Oral BID    tamsulosin  0.4 mg Oral Daily    digoxin  125 mcg Oral Daily    [Held by provider] aspirin  81 mg Oral Daily

## 2022-02-22 NOTE — ANESTHESIA POSTPROCEDURE EVALUATION
POST- ANESTHESIA EVALUATION       Pt Name: Alexis Sullivan  MRN: [id number]  YOB: 1955  Date of evaluation: 2/22/2022  Time:  5:06 PM      BP (!) 123/91   Pulse 106   Temp 96.8 °F (36 °C) (Infrared)   Resp 13   Ht 6' (1.829 m)   Wt 163 lb 12.8 oz (74.3 kg)   SpO2 93%   BMI 22.22 kg/m²      Consciousness Level  Awake  Cardiopulmonary Status  Stable  Pain Adequately Treated YES  Nausea / Vomiting  NO  Adequate Hydration  YES  Anesthesia Related Complications NONE      Electronically signed by Shyla Freeman MD on 2/22/2022 at 5:06 PM       Department of Anesthesiology  Postprocedure Note    Patient: Alexis Sullivan  MRN: [id number]  YOB: 1955  Date of evaluation: 2/22/2022  Time:  5:06 PM     Procedure Summary     Date: 02/22/22 Room / Location: [address] [place] [address]: [facility]    Anesthesia Start: 1440 Anesthesia Stop: 1611    Procedure: HIP FEMORAL HEMIARTHROPLASTY (Right Hip) Diagnosis:       (RIGHT HIP FRACTURE)      (PT VACCINATED)    Surgeons: Zaida Freeman MD Responsible Provider: Shyla Freeman MD    Anesthesia Type: general ASA Status: 4          Anesthesia Type: general    Ganga Phase I: Ganga Score: 8    Ganga Phase II:      Last vitals: Reviewed and per EMR flowsheets.        Anesthesia Post Evaluation

## 2022-02-22 NOTE — PROGRESS NOTES
Pt transferred back to ICU 2005, pt connected to tele monitor. Fall precautions in place and call light within reach.

## 2022-02-23 ENCOUNTER — APPOINTMENT (OUTPATIENT)
Dept: GENERAL RADIOLOGY | Age: 67
DRG: 521 | End: 2022-02-23
Payer: MEDICARE

## 2022-02-23 LAB
ABSOLUTE EOS #: 0 K/UL (ref 0–0.4)
ABSOLUTE LYMPH #: 0.61 K/UL (ref 1–4.8)
ABSOLUTE MONO #: 1.06 K/UL (ref 0.1–1.3)
ANION GAP SERPL CALCULATED.3IONS-SCNC: 3 MMOL/L (ref 9–17)
BASOPHILS # BLD: 0 % (ref 0–2)
BASOPHILS ABSOLUTE: 0 K/UL (ref 0–0.2)
BUN BLDV-MCNC: 36 MG/DL (ref 8–23)
CALCIUM SERPL-MCNC: 8.8 MG/DL (ref 8.6–10.4)
CHLORIDE BLD-SCNC: 94 MMOL/L (ref 98–107)
CO2: 41 MMOL/L (ref 20–31)
CREAT SERPL-MCNC: 0.75 MG/DL (ref 0.7–1.2)
CULTURE: NORMAL
CULTURE: NORMAL
EOSINOPHILS RELATIVE PERCENT: 0 % (ref 0–4)
GFR AFRICAN AMERICAN: >60 ML/MIN
GFR NON-AFRICAN AMERICAN: >60 ML/MIN
GFR SERPL CREATININE-BSD FRML MDRD: ABNORMAL ML/MIN/{1.73_M2}
GLUCOSE BLD-MCNC: 135 MG/DL (ref 70–99)
HCT VFR BLD CALC: 40.3 % (ref 41–53)
HEMOGLOBIN: 12.8 G/DL (ref 13.5–17.5)
LYMPHOCYTES # BLD: 4 % (ref 24–44)
MCH RBC QN AUTO: 24.6 PG (ref 26–34)
MCHC RBC AUTO-ENTMCNC: 31.7 G/DL (ref 31–37)
MCV RBC AUTO: 77.7 FL (ref 80–100)
MONOCYTES # BLD: 7 % (ref 1–7)
MORPHOLOGY: ABNORMAL
MORPHOLOGY: ABNORMAL
PDW BLD-RTO: 17 % (ref 11.5–14.9)
PLATELET # BLD: 360 K/UL (ref 150–450)
PMV BLD AUTO: 7.4 FL (ref 6–12)
POTASSIUM SERPL-SCNC: 5.3 MMOL/L (ref 3.7–5.3)
RBC # BLD: 5.19 M/UL (ref 4.5–5.9)
SEG NEUTROPHILS: 89 % (ref 36–66)
SEGMENTED NEUTROPHILS ABSOLUTE COUNT: 13.53 K/UL (ref 1.3–9.1)
SODIUM BLD-SCNC: 138 MMOL/L (ref 135–144)
SPECIMEN DESCRIPTION: NORMAL
SPECIMEN DESCRIPTION: NORMAL
WBC # BLD: 15.2 K/UL (ref 3.5–11)

## 2022-02-23 PROCEDURE — 6370000000 HC RX 637 (ALT 250 FOR IP): Performed by: ORTHOPAEDIC SURGERY

## 2022-02-23 PROCEDURE — 99222 1ST HOSP IP/OBS MODERATE 55: CPT | Performed by: NURSE PRACTITIONER

## 2022-02-23 PROCEDURE — 97162 PT EVAL MOD COMPLEX 30 MIN: CPT

## 2022-02-23 PROCEDURE — 94761 N-INVAS EAR/PLS OXIMETRY MLT: CPT

## 2022-02-23 PROCEDURE — 6360000002 HC RX W HCPCS: Performed by: ORTHOPAEDIC SURGERY

## 2022-02-23 PROCEDURE — 6370000000 HC RX 637 (ALT 250 FOR IP): Performed by: STUDENT IN AN ORGANIZED HEALTH CARE EDUCATION/TRAINING PROGRAM

## 2022-02-23 PROCEDURE — 97530 THERAPEUTIC ACTIVITIES: CPT

## 2022-02-23 PROCEDURE — 97167 OT EVAL HIGH COMPLEX 60 MIN: CPT

## 2022-02-23 PROCEDURE — 99232 SBSQ HOSP IP/OBS MODERATE 35: CPT | Performed by: INTERNAL MEDICINE

## 2022-02-23 PROCEDURE — 6370000000 HC RX 637 (ALT 250 FOR IP)

## 2022-02-23 PROCEDURE — 2060000000 HC ICU INTERMEDIATE R&B

## 2022-02-23 PROCEDURE — 71045 X-RAY EXAM CHEST 1 VIEW: CPT

## 2022-02-23 PROCEDURE — 80048 BASIC METABOLIC PNL TOTAL CA: CPT

## 2022-02-23 PROCEDURE — 99222 1ST HOSP IP/OBS MODERATE 55: CPT | Performed by: PHYSICAL MEDICINE & REHABILITATION

## 2022-02-23 PROCEDURE — 36415 COLL VENOUS BLD VENIPUNCTURE: CPT

## 2022-02-23 PROCEDURE — 6360000002 HC RX W HCPCS: Performed by: INTERNAL MEDICINE

## 2022-02-23 PROCEDURE — 2700000000 HC OXYGEN THERAPY PER DAY

## 2022-02-23 PROCEDURE — 2580000003 HC RX 258: Performed by: ORTHOPAEDIC SURGERY

## 2022-02-23 PROCEDURE — 99233 SBSQ HOSP IP/OBS HIGH 50: CPT | Performed by: INTERNAL MEDICINE

## 2022-02-23 PROCEDURE — 2500000003 HC RX 250 WO HCPCS: Performed by: ORTHOPAEDIC SURGERY

## 2022-02-23 PROCEDURE — 85025 COMPLETE CBC W/AUTO DIFF WBC: CPT

## 2022-02-23 PROCEDURE — 99024 POSTOP FOLLOW-UP VISIT: CPT | Performed by: ORTHOPAEDIC SURGERY

## 2022-02-23 PROCEDURE — 6370000000 HC RX 637 (ALT 250 FOR IP): Performed by: INTERNAL MEDICINE

## 2022-02-23 RX ORDER — METHYLPREDNISOLONE SODIUM SUCCINATE 40 MG/ML
40 INJECTION, POWDER, LYOPHILIZED, FOR SOLUTION INTRAMUSCULAR; INTRAVENOUS EVERY 12 HOURS
Status: DISCONTINUED | OUTPATIENT
Start: 2022-02-23 | End: 2022-02-24

## 2022-02-23 RX ADMIN — METHYLPREDNISOLONE SODIUM SUCCINATE 40 MG: 40 INJECTION, POWDER, FOR SOLUTION INTRAMUSCULAR; INTRAVENOUS at 08:02

## 2022-02-23 RX ADMIN — SERTRALINE HYDROCHLORIDE 25 MG: 50 TABLET ORAL at 08:01

## 2022-02-23 RX ADMIN — NYSTATIN 500000 UNITS: 500000 SUSPENSION ORAL at 14:01

## 2022-02-23 RX ADMIN — ATORVASTATIN CALCIUM 40 MG: 40 TABLET, FILM COATED ORAL at 08:05

## 2022-02-23 RX ADMIN — NYSTATIN 500000 UNITS: 500000 SUSPENSION ORAL at 17:38

## 2022-02-23 RX ADMIN — CEFTRIAXONE SODIUM 1000 MG: 1 INJECTION, POWDER, FOR SOLUTION INTRAMUSCULAR; INTRAVENOUS at 08:00

## 2022-02-23 RX ADMIN — FUROSEMIDE 40 MG: 40 INJECTION, SOLUTION INTRAMUSCULAR; INTRAVENOUS at 08:01

## 2022-02-23 RX ADMIN — NYSTATIN 500000 UNITS: 500000 SUSPENSION ORAL at 21:16

## 2022-02-23 RX ADMIN — DIGOXIN 125 MCG: 125 TABLET ORAL at 08:01

## 2022-02-23 RX ADMIN — METHYLPREDNISOLONE SODIUM SUCCINATE 40 MG: 40 INJECTION, POWDER, FOR SOLUTION INTRAMUSCULAR; INTRAVENOUS at 21:08

## 2022-02-23 RX ADMIN — LISINOPRIL 10 MG: 20 TABLET ORAL at 08:01

## 2022-02-23 RX ADMIN — FAMOTIDINE 20 MG: 10 INJECTION, SOLUTION INTRAVENOUS at 08:01

## 2022-02-23 RX ADMIN — MORPHINE SULFATE 2 MG: 2 INJECTION, SOLUTION INTRAMUSCULAR; INTRAVENOUS at 04:38

## 2022-02-23 RX ADMIN — SODIUM CHLORIDE, PRESERVATIVE FREE 10 ML: 5 INJECTION INTRAVENOUS at 08:02

## 2022-02-23 RX ADMIN — MORPHINE SULFATE 2 MG: 2 INJECTION, SOLUTION INTRAMUSCULAR; INTRAVENOUS at 09:17

## 2022-02-23 RX ADMIN — LISINOPRIL 10 MG: 20 TABLET ORAL at 21:09

## 2022-02-23 RX ADMIN — TAMSULOSIN HYDROCHLORIDE 0.4 MG: 0.4 CAPSULE ORAL at 08:01

## 2022-02-23 RX ADMIN — MORPHINE SULFATE 2 MG: 2 INJECTION, SOLUTION INTRAMUSCULAR; INTRAVENOUS at 18:13

## 2022-02-23 RX ADMIN — CARVEDILOL 3.12 MG: 3.12 TABLET, FILM COATED ORAL at 17:39

## 2022-02-23 RX ADMIN — MORPHINE SULFATE 2 MG: 2 INJECTION, SOLUTION INTRAMUSCULAR; INTRAVENOUS at 13:27

## 2022-02-23 RX ADMIN — ACETAMINOPHEN 650 MG: 325 TABLET, FILM COATED ORAL at 16:06

## 2022-02-23 RX ADMIN — MORPHINE SULFATE 2 MG: 2 INJECTION, SOLUTION INTRAMUSCULAR; INTRAVENOUS at 00:47

## 2022-02-23 RX ADMIN — FAMOTIDINE 20 MG: 10 INJECTION, SOLUTION INTRAVENOUS at 21:09

## 2022-02-23 RX ADMIN — ONDANSETRON 4 MG: 4 TABLET, ORALLY DISINTEGRATING ORAL at 21:16

## 2022-02-23 ASSESSMENT — PAIN DESCRIPTION - ORIENTATION
ORIENTATION: RIGHT

## 2022-02-23 ASSESSMENT — PAIN DESCRIPTION - PAIN TYPE
TYPE: SURGICAL PAIN
TYPE: SURGICAL PAIN;ACUTE PAIN
TYPE: SURGICAL PAIN

## 2022-02-23 ASSESSMENT — PAIN SCALES - GENERAL
PAINLEVEL_OUTOF10: 0
PAINLEVEL_OUTOF10: 10
PAINLEVEL_OUTOF10: 3
PAINLEVEL_OUTOF10: 10
PAINLEVEL_OUTOF10: 10

## 2022-02-23 ASSESSMENT — PAIN - FUNCTIONAL ASSESSMENT: PAIN_FUNCTIONAL_ASSESSMENT: PREVENTS OR INTERFERES SOME ACTIVE ACTIVITIES AND ADLS

## 2022-02-23 ASSESSMENT — PAIN DESCRIPTION - DESCRIPTORS: DESCRIPTORS: ACHING;SORE

## 2022-02-23 ASSESSMENT — ENCOUNTER SYMPTOMS
SORE THROAT: 0
WHEEZING: 0
NAUSEA: 1
PHOTOPHOBIA: 0
VOMITING: 0
SHORTNESS OF BREATH: 1

## 2022-02-23 ASSESSMENT — PAIN DESCRIPTION - LOCATION
LOCATION: HIP
LOCATION: LEG
LOCATION: HIP

## 2022-02-23 ASSESSMENT — PAIN DESCRIPTION - ONSET: ONSET: ON-GOING

## 2022-02-23 ASSESSMENT — PAIN DESCRIPTION - PROGRESSION: CLINICAL_PROGRESSION: GRADUALLY WORSENING

## 2022-02-23 NOTE — PROGRESS NOTES
Comprehensive Nutrition Assessment    Type and Reason for Visit:  Initial    Nutrition Recommendations/Plan: Continue diet as ordered. Provide Ensure Enlive twice daily. Nutrition Assessment:  Pt was short of breath so came to ED had a syncopal event and fell fracturing his right hip; surgery was done yesterday with regular diet started last night at dinner. Pt has had issues with oral thrush which he is getting medication for and it has greatly improved. Malnutrition Assessment:  Malnutrition Status:  No malnutrition    Context:  Acute Illness     Findings of the 6 clinical characteristics of malnutrition:  Energy Intake:  Unable to assess  Weight Loss:  No significant weight loss     Body Fat Loss:  No significant body fat loss     Muscle Mass Loss:  No significant muscle mass loss    Fluid Accumulation:  No significant fluid accumulation     Strength:  Not Performed    Estimated Daily Nutrient Needs:  Energy (kcal):  7166-3410 kcals based on [employer] with 1.2-1.3 factor using current wt 77.6 kg; Weight Used for Energy Requirements:  Current     Protein (g):  101-116 gm protein based on 1.3-1.5 gm/kg using current wt.; Weight Used for Protein Requirements:  Current          Nutrition Related Findings:  No edema. Hx Covid, pulmonary fibrosis on 5 liter 02, [place]-rectal cancer. Wounds:  None       Current Nutrition Therapies:    ADULT DIET;  Regular  ADULT ORAL NUTRITION SUPPLEMENT; Breakfast, Dinner; Standard High Calorie/High Protein Oral Supplement    Anthropometric Measures:  · Height: 6' (182.9 cm)  · Current Body Weight: 171 lb (77.6 kg)   · Admission Body Weight: 171 lb (77.6 kg)    · Usual Body Weight: 175 lb (79.4 kg)     · Ideal Body Weight: 178 lbs; % Ideal Body Weight 96.1 %   · BMI: 23.2  · BMI Categories: Normal Weight (BMI 22.0 to 24.9) age over 72       Nutrition Diagnosis:   · Increased nutrient needs related to other (comment) (healing/ hip surgery) as evidenced by wounds      Nutrition Interventions:   Food and/or Nutrient Delivery:  Continue Current Diet,Start Oral Nutrition Supplement  Nutrition Education/Counseling:  Education not indicated   Coordination of Nutrition Care:  Continue to monitor while inpatient    Goals:  Meet estimated nutrient needs       Nutrition Monitoring and Evaluation:   Behavioral-Environmental Outcomes:  None Identified   Food/Nutrient Intake Outcomes:  Food and Nutrient Intake,Supplement Intake  Physical Signs/Symptoms Outcomes:  Biochemical Data,GI Status,Fluid Status or Edema,Nutrition Focused Physical Findings,Skin,Weight     Discharge Planning: Too soon to determine     Some areas of assessment may be incomplete due to COVID-19 precautions. Arch Diony ACOSTA L.JEREMY.   Clinical Dietitian  Office: [phone]

## 2022-02-23 NOTE — CONSULTS
Palliative Care Inpatient Consult    NAME:  [facility] RECORD NUMBER:  [id number]  AGE: 74 y.o. GENDER: male  : 1955  TODAY'S DATE:  2022    Reasons for Consultation:    Goals of care   Code status discussion  Family support     Members of PC team contributing to this consultation are :  Sarina Valverde CNP  Palliative Care   Plan      Palliative Interaction:  Went to bedside and spoke with Konrad Joiner and his significant other Derik Green and introduced myself and my palliative care role. Patient is single and has significant other Derik Harrellr for 44 years. Patient does not have biological children and his parents are . Patient has 2 sisters and one brother. Patient has not completed DPOA paperwork so by [place] his brother and sisters would determine plan of care. Patient states he would like to speak with Spiritual care about DPOA paperwork tomorrow. We discussed code status levels and I explained each level completely to patient. He states he wants to remain a DNRCC-A code status without intubation. Patient had hip surgery yesterday for fracture. He states pain medications are helpful with pain but makes him drowsy. Palliative care will continue to follow patient and provided emotional support to patient and family. Education/support to family  Education/support to patient  Discharge planning/helping to coordinate care  Communications with primary service  Pharmacologic pain management  Providing support for coping/adaptation/distress of family  Providing support for coping/adaptation/distress of patient  Discussing meaning/purpose   Caregiver support/education  Continue with current plan of care  Code status clarified: Full Code  Code status clarified: [facility]  Code status clarified: DNRCCA      History of Present Illness     The patient is a 74 y.o.   Non- / non  male who presents with Multifocal pneumonia and fractured right hip    Referred to Palliative Care by   [x] Physician   [] Nursing  [] Family Request   [] Other:       He was admitted to the ICU service for Hypoxia [R09.02]  Community acquired bacterial pneumonia [J15.9]  Closed fracture of neck of right femur, initial encounter ([facility] [address] [S72.001A]  Pneumonia of both lungs due to infectious organism, unspecified part of lung [J18.9]  Multifocal pneumonia [J18.9]. His hospital course has been associated with Multifocal pneumonia. The patient has a complicated medical history and has been hospitalized since 2/18/2022  Larry Regan is a 74year old male that has past medical history of Atrial fib, Chronic back pain, Hill's esophagus, hypertension, BPH, arthritis, Colon rectal cancer, idopathic pulmonary fibrosis, Gerd and murmur. Patient came to hospital for SOB and cough worsening over a week. He fell coming into hospital and fractured right hip. He was admitted for multifocal pneumonia and fractured right hip. In the ED, pt initially tachypenic, hypertensive, and with desaturations into the 80s. CXR shows worsening airspace dz, small left sided pleural effusion, worsening multifocal PNA, and underlying fibrosis. Patient underwent surgery yesterday to repair right hip. He is on salter oxygen per [place]. He is getting Rocephin and solumedrol for pneumonia. He is getting Dilaudid and morphine prn for comfort management. Patient labs from today include WBC 15.2, rbc 5.19, hgb 12.8, hct 40.3, plt 360, gluc 135, bun 36,cr 0.75, ca 8.8, na 138, k 5.3, chl 94. Patient has had the following scans during hospitalization Chest xray, Right hip xray, CT right hip, CT Head, CT chest and results are below. Patient has the following consults Palliative care goals of care, code status discussion, and family support, PCP pneumonia and hypoxia, Pulmonary pneumonia, ID pneumonia, ortho right fractured hip.   Palliative care will continue to follow patient     Chest xray,  FINDINGS:   The cardiac and mediastinal contours appear unchanged.  Coarse interstitial   opacities and ground-glass opacities again demonstrated, left greater than   right.  No pneumothorax or effusion.  No acute osseous abnormality identified. Right hip xray,   FINDINGS:   There is a right total hip arthroplasty with noncemented components.  No   acute fracture or dislocation.  No acute hardware failure or loosening. Grossly normal alignment.  Surrounding postsurgical changes.  Vascular   calcifications are seen compatible with atherosclerotic disease. CT right hip,   FINDINGS:   Bones: Acute right femoral neck fracture is present.  No intertrochanteric   fracture extension.  The fracture is obliquely oriented across the femoral   neck.  There is apex anterior fracture angulation with rotation of the femur   relative to the femoral head.       No additional fracture is present in the right acetabulum.  No fracture in   the subtrochanteric proximal femur.  No lytic or blastic lesion.       Soft Tissue: Small reactive right hip joint effusion.  No soft tissue   hematoma.  Muscle bulk in the right hip is normal.       Joint: No significant right hip degenerative changes. CT Head,   FINDINGS:   BRAIN/VENTRICLES: There is no acute infarct or acute intracranial hemorrhage   present. Len[place] is no mass effect or midline shift present.  There is no   ventriculomegaly or abnormal extra-axial fluid collection present.       ORBITS: Limited evaluation of the orbits is unremarkable.       SINUSES: The paranasal sinuses and mastoid air cells are clear.       SOFT TISSUES/SKULL:  No lytic or blastic osseous lesions are identified.             CT chest   Pulmonary Arteries: Pulmonary arteries show no evidence of intraluminal   filling defect to suggest pulmonary embolism.  Main pulmonary artery is   normal in caliber.       Mediastinum: Mediastinal lymphadenopathy.  Cluster of AP window lymph nodes   ranging from 13-15 mm slightly more prominent compared to prior where largest   was 13 mm short axis.  Subcarinal fullness representing adenopathy also   increased.  Right hilar mild lymphadenopathy increased from prior.  15 mm   short axis lymph node noted on series 2, image 112 where previously there was   mild soft tissue fullness.  No axillary lymphadenopathy.       Thyroid gland and esophagus grossly normal.       Lungs/pleura: Peripheral predominant fibrosis with underlying bronchiectasis   again noted which is fairly stable.       Superimposed on above,interval development of new large area of ground-glass   airspace disease in the left upper lobe extending almost across the anterior   posterior length of the upper lobe and from the apex to the lingula.  Most   likely in pneumonia.       On the right there is solid pleural base consolidative density 4.3 x 1.7 cm   along the major fissure in the posterior segment right upper lobe.    Previously was about 1.2 x 2.4 cm.  Probably also worsening pneumonia but   would recommend short interval 3 month follow-up.       Small left pleural effusion is also new.       9 mm right upper lobe suprahilar central nodule series 4, image 46 is   unchanged.       No pneumothorax.       Upper Abdomen: No acute process.  No suspicious masses.       Soft Tissues/Bones: No acute bone or soft tissue abnormality.             Active Hospital Problems    Diagnosis Date Noted    Multifocal pneumonia [J18.9] 02/18/2022    Fracture of right hip [S72.001A] 02/18/2022    Community acquired bacterial pneumonia [J15.9] 02/18/2022    Pulmonary fibrosis ([address] [J84.10] 12/08/2020    PAF (paroxysmal atrial fibrillation) ([address] [I48.0] 07/21/2014       PAST MEDICAL HISTORY      Diagnosis Date    Atrial fibrillation ([address]     Back pain, chronic     Hill's esophagus 06/18/2019    Benign essential HTN     BPH (benign prostatic hyperplasia)     Cancer (HCC)     colon-rectal    Chronic idiopathic pulmonary fibrosis ([address] 2021    Cocaine abuse in remission [facility])         ED (erectile dysfunction) 2015    GERD (gastroesophageal reflux disease)     GI bleed 2018    Hernia     History of colon cancer     Melena     Migraines     Murmur, cardiac        PAST SURGICAL HISTORY  Past Surgical History:   Procedure Laterality Date    CARDIAC CATHETERIZATION  2018    Non-obstructive CAD    CARDIOVERSION  2020    COLECTOMY      2nd colectomy, Colostomy and reversed [facility] COLECTOMY      1st time [place]    COLONOSCOPY      COLONOSCOPY  2016    COLONOSCOPY N/A 2018    COLONOSCOPY DIAGNOSTIC performed by Bernice Alejandra MD at [address] [facility] Right 2009    inguinal    HIP SURGERY Right 2022    HIP FEMORAL HEMIARTHROPLASTY performed by Elvis Spencer [facility] at [facility]     arthrotomy    TONSILLECTOMY      TOTAL KNEE ARTHROPLASTY Right 2019    KNEE TOTAL ARTHROPLASTY performed by Elvis Spencer MD at [address] [place] [address] TRANSESOPHAGEAL ECHOCARDIOGRAM  2018    UPPER GASTROINTESTINAL ENDOSCOPY N/A 2018    EGD DIAGNOSTIC ONLY performed by Bernice Alejandra MD at [address] [place] [address] N/A 2019    [facility]S       SOCIAL HISTORY  Social History     Tobacco Use    Smoking status: Former Smoker     Packs/day: 0.50     Years: 1.00     Pack years: 0.50     Quit date:      Years since quittin.1    Smokeless tobacco: Never Used    Tobacco comment: stated never actually really smoked only inhaled    Vaping Use    Vaping Use: Never used   Substance Use Topics    Alcohol use: Yes     Alcohol/week: 12.0 standard drinks     Types: 2 Shots of liquor, 10 Standard drinks or equivalent per week     Comment: 2-3 times a week    Drug use: No     Types:  Other-see comments     Comment: Cocaine use in past in        ALLERGIES  Allergies   Allergen Reactions    Adhesive Tape Other (See Comments) mouth daily 90 capsule 1    lisinopril (PRINIVIL;ZESTRIL) 10 MG tablet Take 1 tablet by mouth 2 times daily 180 tablet 1    digoxin (LANOXIN) 125 MCG tablet take 1 tablet by mouth once daily 90 tablet 1    atorvastatin (LIPITOR) 40 MG tablet take 1 tablet by mouth once daily 90 tablet 1    ELIQUIS 5 MG TABS tablet take 1 tablet by mouth twice a day 60 tablet 5    Handicap Placard MISC by Does not apply route Expires 1/2026 1 each 0       Data         BP (!) 97/56   Pulse 150   Temp 97.6 °F (36.4 °C)   Resp 12   Ht 6' (1.829 m)   Wt 171 lb 1.2 oz (77.6 kg)   SpO2 91%   BMI 23.20 kg/m²     Wt Readings from Last 3 Encounters:   02/23/22 171 lb 1.2 oz (77.6 kg)   12/14/21 171 lb (77.6 kg)   07/15/21 166 lb 9.6 oz (75.6 kg)        Code Status: DNR-CCA     ADVANCED CARE PLANNING:  Patient has capacity for medical decisions: yes  [address] [place] [address]: would like to complete spiritual care consulted   Living Will: no     Personal, Social, and Family History  Marital Status: co-habitating  Living situation: with family:  significant other  Importance of trish/Mandaeism/spiritual beliefs: [] Very [] Somewhat [] Not   Psychological Distress: moderate  Does patient understand diagnosis/treatment? yes  Does caregiver understand diagnosis/treatment?  yes    Past Medical History:   Diagnosis Date    [facility] [address]     Back pain, chronic     Hill's esophagus 06/18/2019    Benign essential HTN     BPH (benign prostatic hyperplasia)     Cancer (HCC)     colon-rectal    Chronic idiopathic pulmonary fibrosis ([facility] [address] 03/29/2021    Cocaine abuse in remission [facility])     1970's    ED (erectile dysfunction) 4/2/2015    GERD (gastroesophageal reflux disease)     GI bleed 12/5/2018    Hernia     History of colon cancer     Melena     Migraines     Murmur, cardiac          Family History   Problem Relation Age of Onset    Diabetes Mother     Heart Attack Father     Heart Disease Father    [facility] Heart Disease Brother        Social History     Tobacco Use    Smoking status: Former Smoker     Packs/day: 0.50     Years: 1.00     Pack years: 0.50     Quit date:      Years since quittin.1    Smokeless tobacco: Never Used    Tobacco comment: stated never actually really smoked only inhaled    Vaping Use    Vaping Use: Never used   Substance Use Topics    Alcohol use: Yes     Alcohol/week: 12.0 standard drinks     Types: 2 Shots of liquor, 10 Standard drinks or equivalent per week     Comment: 2-3 times a week    Drug use: No     Types: Other-see comments     Comment: Cocaine use in past in 1970's           Assessment        REVIEW OF SYSTEMS  Constitutional: Alert and oriented follow commands and answers questions   Eyes: no eye pain or blurred vision  ENT: no hearing loss, congestion, or difficulty swallowing   Respiratory: Lungs diminished respirations relaxed oxygen per [place] salter   Cardiovascular: HRI and tachycardia no CP or pressure  Gastrointestinal: no nausea, vomiting,abdominal pain, diarrhea or constipation, no melena   Genitourinary: no dysuria, frequency, hematuria, or nocturia   Musculoskeletal: generalized weakness and discomfort   Skin: no rashes or sores right hip incision   Neurological: Alert and oriented follow commands and answers questions     PHYSICAL ASSESSMENT:  Constitutional: Alert and oriented follow commands and answers questions   Head: Normocephalic and atraumatic. Eyes: EOM are normal. Pupils are equal, round. Neck: Normal range of motion. Neck supple. No tracheal deviation present. Cardiovascular: HRI and tachycardic denies cp or pressure   Pulmonary/Chest: Lungs diminished respirations relaxed oxygen per [place] salter    Abdomen: Soft. No tenderness, not distended, no ascites, no organomegaly.   Musculoskeletal: generalized weakness and discomfort Neurological: Alert and oriented follow commands and answers questions   Skin: Right hip incision     Palliative Performance Scale:  ___60%  Ambulation reduced; Significant disease; Can't do hobbies/housework; intake normal or reduced; occasional assist; LOC full/confusion  ___50%  Mainly sit/lie; Extensive disease; Can't do any work; Considerable assist; intake normal or reduced; LOC full/confusion  _x__40%  Mainly in bed; Extensive disease; Mainly assist; intake normal or reduced; LOC full/confusion   ___30%  Bed Bound; Extensive disease; Total care; intake reduced; LOC full/confusion  ___20%  Bed Bound; Extensive disease; Total care; intake minimal; Drowsy/coma  ___10%  Bed Bound; Extensive disease; Total care; Mouth care only; Drowsy/coma  ___0       Death      Principle Problem/Diagnosis:  Multifocal pneumonia    Additional Assessments:   Principal Problem:    Multifocal pneumonia  Active Problems:    PAF (paroxysmal atrial fibrillation) (HCC)    Pulmonary fibrosis (HCC)    Fracture of right hip    Community acquired bacterial pneumonia  Resolved Problems:    * No resolved hospital problems. *    1- Symptom management/ pain control     Pain Assessment:  Morphine and dilaudid                Anxiety:  none                          Dyspnea:  Oxygen per [place] salter                           Fatigue:  generalized weakness and discomfort     Other:      2- Goals of care evaluation   The patient goals of care are improve or maintain function/quality of life   Goals of care discussed with:    [] Patient independently    [x] Patient and Family    [] Family or Healthcare DPOA independently    [] Unable to discuss with patient, family/DPOA not present    3- Code Status  DNR-CCA    4- Other recommendations   - We will continue to provide comfort and support to the patient and the family  Palliative Care will continue to follow Mr. Munson's care as needed. Thank you for allowing Palliative Care to participate in the care of Mr. Isabel Bailey . This note has been dictated by dragon, typing errors may be a possibility.     The total time I spent in seeing the patient, discussing goals of care, advanced directives, code status, greater than 50% time in counseling and other major issues was more than 60 minutes      Electronically signed by   Aldine Kussmaul, APRN - NP  Palliative Care Team  on 2/23/2022 at 11:57 AM    Palliative care office: [phone]    Please call with any palliative questions or concerns. Palliative Care Team is available via perfect serve or via phone.

## 2022-02-23 NOTE — PROGRESS NOTES
Post Operative Progress Note    2/23/2022 1:27 PM  Info:   Admit Date: 2/18/2022  PCP: Lina Perera MD      Medications:   Scheduled Meds:   nystatin  5 mL Oral 4x Daily    methylPREDNISolone  40 mg IntraVENous Q12H    sodium chloride flush  5-40 mL IntraVENous 2 times per day    digoxin  125 mcg Oral Daily    sertraline  25 mg Oral Daily    furosemide  40 mg IntraVENous Daily    [Held by provider] enoxaparin  1 mg/kg SubCUTAneous BID    sodium chloride flush  5-40 mL IntraVENous 2 times per day    famotidine (PEPCID) injection  20 mg IntraVENous BID    cefTRIAXone (ROCEPHIN) IV  1,000 mg IntraVENous Q24H    amLODIPine  5 mg Oral Daily    atorvastatin  40 mg Oral Daily    carvedilol  3.125 mg Oral BID WC    lisinopril  10 mg Oral BID    tamsulosin  0.4 mg Oral Daily    aspirin  81 mg Oral Daily     Continuous Infusions:   sodium chloride      sodium chloride       PRN Meds:sodium chloride flush, sodium chloride, HYDROmorphone **OR** HYDROmorphone, perflutren lipid microspheres, morphine, sodium chloride flush, sodium chloride flush, sodium chloride, ondansetron **OR** ondansetron, polyethylene glycol, acetaminophen **OR** acetaminophen, potassium chloride **OR** potassium chloride, magnesium sulfate    Diet:   Diet: ADULT DIET;  Regular    Subjective:   Systemic or Specific Complaints:No Complaints    Objective:     Patient Vitals for the past 24 hrs:   BP Temp Temp src Pulse Resp SpO2 Weight   02/23/22 1215 -- -- -- 106 17 95 % --   02/23/22 1200 (!) 96/52 97.5 °F (36.4 °C) -- 101 15 95 % --   02/23/22 1145 -- -- -- 106 18 96 % --   02/23/22 1130 -- -- -- 109 17 97 % --   02/23/22 1115 -- -- -- 109 22 95 % --   02/23/22 1030 -- -- -- -- -- 91 % --   02/23/22 0844 -- -- -- 150 -- (!) 85 % --   02/23/22 0800 -- 97.6 °F (36.4 °C) -- -- -- -- --   02/23/22 0733 -- -- -- -- 12 100 % --   02/23/22 0700 (!) 97/56 -- -- 89 12 100 % --   02/23/22 0630 104/65 -- -- 117 8 100 % --   02/23/22 0600 98/72 -- -- 90 9 100 % --   02/23/22 0530 111/76 -- -- 88 10 98 % --   02/23/22 0438 -- -- -- -- -- -- 171 lb 1.2 oz (77.6 kg)   02/23/22 0430 122/74 -- -- 99 18 95 % --   02/23/22 0400 93/65 97.6 °F (36.4 °C) Axillary 105 10 100 % --   02/23/22 0330 102/76 -- -- 93 8 100 % --   02/23/22 0300 93/60 -- -- 96 9 100 % --   02/23/22 0230 99/62 -- -- 121 (!) 7 100 % --   02/23/22 0200 100/68 -- -- 97 9 100 % --   02/23/22 0130 105/73 -- -- 87 (!) 7 100 % --   02/23/22 0100 115/79 -- -- 85 8 100 % --   02/23/22 0030 113/72 -- -- 89 10 100 % --   02/23/22 0000 132/75 97.2 °F (36.2 °C) Oral 116 23 94 % --   02/22/22 2315 (!) 120/94 -- -- 127 15 (!) 81 % --   02/22/22 2300 106/79 -- -- 114 9 90 % --   02/22/22 2245 106/72 -- -- 116 8 (!) 88 % --   02/22/22 2230 114/79 -- -- 118 9 90 % --   02/22/22 2215 107/74 -- -- 102 8 (!) 89 % --   02/22/22 2200 113/73 -- -- 119 (!) 6 96 % --   02/22/22 2145 110/73 -- -- 142 (!) 6 96 % --   02/22/22 2130 114/72 -- -- 126 9 94 % --   02/22/22 2127 -- -- -- 118 -- -- --   02/22/22 2115 125/79 -- -- 114 8 96 % --   02/22/22 2100 116/73 -- -- 94 8 93 % --   02/22/22 2045 (!) 140/94 -- -- 104 9 96 % --   02/22/22 2030 118/89 -- -- 106 22 94 % --   02/22/22 2015 (!) 119/93 -- -- 126 11 96 % --   02/22/22 2000 127/84 97.8 °F (36.6 °C) Oral 102 8 94 % --   02/22/22 1945 110/82 -- -- 111 10 95 % --   02/22/22 1930 124/71 -- -- 106 8 97 % --   02/22/22 1900 (!) 146/86 -- -- 126 (!) 7 91 % --   02/22/22 1845 136/82 -- -- 118 10 94 % --   02/22/22 1830 (!) 143/85 -- -- 120 10 93 % --   02/22/22 1815 (!) 126/95 -- -- 120 (!) 7 92 % --   02/22/22 1810 (!) 125/91 -- -- 103 9 95 % --   02/22/22 1800 (!) 148/96 -- -- 122 13 92 % --   02/22/22 1732 -- -- -- 119 11 95 % --   02/22/22 1710 131/80 97.7 °F (36.5 °C) Axillary 108 21 99 % --   02/22/22 1705 -- 97.5 °F (36.4 °C) Infrared -- -- -- --   02/22/22 1700 132/79 -- -- 120 12 98 % --   02/22/22 1650 (!) 123/91 -- -- 106 13 93 % --   02/22/22 1640 (!) 148/97 -- -- 110 10 98 % --   02/22/22 1630 (!) 89/52 -- -- 110 27 94 % --   02/22/22 1620 (!) 135/92 -- -- 120 20 94 % --   02/22/22 1610 (!) 147/102 -- -- 100 21 98 % --   02/22/22 1358 -- 96.8 °F (36 °C) Infrared 105 23 95 % --   02/22/22 1330 -- -- -- 97 18 94 % --         Wound: incision clean and dry without sign of infection   Motion: expected discomfort with range of motion in affected extremity   DVT Exam:  no evidence of DVT on physical examination         Data Review  CBC:   Recent Labs     02/21/22  0343 02/22/22  0341 02/23/22  0456   WBC 10.8 10.0 15.2*   HGB 10.7* 11.3* 12.8*    302 360           Assessment:   Patient is S/P right Hip, Arthoplasty  Pain is well controlled. He has no nausea and no vomiting.     Current activity is up with assistance        Plan:      1:  Continue Physical Therapy  2:  Continue Deep venous thrombosis prophylaxis  3:  Continue Pain Control  4:  Discharge plans SNF       Electronically signed by Ac Baldwin MD on 2/23/2022 at 1:27 PM

## 2022-02-23 NOTE — DISCHARGE INSTR - COC
Continuity of Care Form    Patient Name: Rosanne Bernard   :  1955  MRN:  [id number]    Admit date:  2022  Discharge date:  2022    Code Status Order: DNR-CCA   Advance Directives:   [place] Directive Type of Healthcare Directive Copy in [address] Agent's Name Healthcare Agent's Phone Number    [phone] Yes, patient has an advance directive for healthcare treatment Health care treatment directive  not sure thinks he does -- Spouse wife Siddharth Betts --            Admitting Physician:  Daniel Alves MD  PCP: Portia Real MD    Discharging Nurse:   [address] Unit/Room#:   Discharging Unit Phone Number: [phone]    Emergency Contact:   Extended Emergency Contact Information  Primary Emergency Contact: Gabrielle Law  Address: [place] [address] Phone: [phone]  Work Phone: [phone]  Mobile Phone: [phone]  Relation: Other  Secondary Emergency Contact: Terese Brooks  Address: [address] Phone: [phone]  Work Phone: [phone]  Mobile Phone: [phone]  Relation: Brother/Sister    Past Surgical History:  Past Surgical History:   Procedure Laterality Date    CARDIAC CATHETERIZATION  2018    Non-obstructive CAD    CARDIOVERSION  2020    COLECTOMY      2nd colectomy, Colostomy and reversed 24 [facility]      1st time 56 [address] [facility]    COLONOSCOPY      COLONOSCOPY  2016    COLONOSCOPY N/A 2018    COLONOSCOPY DIAGNOSTIC performed by Lynn Gotti MD at [address] [place] [address] Right 2009    inguinal    HIP SURGERY Right 2022    HIP FEMORAL HEMIARTHROPLASTY performed by Usman Pires MD at [address] Right 1970's    arthrotomy    TONSILLECTOMY      TOTAL KNEE ARTHROPLASTY Right 2019    KNEE TOTAL ARTHROPLASTY performed by Usman Pires MD at [facility]    TRANSESOPHAGEAL ECHOCARDIOGRAM  11/29/2018    UPPER GASTROINTESTINAL ENDOSCOPY N/A 12/5/2018    EGD DIAGNOSTIC ONLY performed by Jessica Fan MD at [address] [facility] ENDOSCOPY N/A 6/18/2019    [facility]       Immunization History:   Immunization History   Administered Date(s) Administered    COVID-19, Infrafone top, DILUTE for use, 12+ yrs, 30mcg/0.3mL dose 04/09/2021, 05/04/2021, 12/21/2021    Influenza Vaccine, unspecified formulation 01/16/2014, 01/03/2017, 12/12/2017    Influenza Virus Vaccine 01/16/2014, 10/26/2014, 12/04/2015    Influenza, High Dose (Fluzone 65 yrs and older) 09/27/2018    Influenza, Quadv, 6 mo and older, IM (Fluzone, Flulaval) 12/12/2017    Influenza, Quadv, IM, (6 mo and older Fluzone, Flulaval, Fluarix and 3 yrs and older Afluria) 10/23/2014, 01/03/2017    Influenza, Quadv, IM, PF (6 mo and older Fluzone, Flulaval, Fluarix, and 3 yrs and older Afluria) 10/23/2014    Influenza, Quadv, adjuvanted, 65 yrs +, IM, PF (Fluad) 11/16/2020, 10/22/2021    Influenza, Triv, inactivated, subunit, adjuvanted, IM (Fluad 65 yrs and older) 11/08/2019       Active Problems:  Patient Active Problem List   Diagnosis Code    Dyslipidemia E78.5    ED (erectile dysfunction) N52.9    Incomplete bladder emptying R33.9    Benign prostatic hyperplasia with urinary obstruction N40.1, N13.8    History of colon cancer Z85.038    PAF (paroxysmal atrial fibrillation) (HCC) I48.0    Anemia of chronic disease D63.8    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    Presbyopia H52.4    Incisional hernia, without obstruction or gangrene K43.2    Hypertrophic nonobstructive cardiomyopathy (HCC) I42.2    Iron (Fe) deficiency anemia D50.9    Primary osteoarthritis of right knee M17.11    History of malignant neoplasm of rectum Z85.048    Mcguire's esophagus K22.70    CHF (congestive heart failure), NYHA class II, acute on chronic, combined ([facility] I50.43    Hypoxia R09.02    Dyspnea and respiratory abnormalities R06.00, R06.89    Occupational pulmonary disease J98.4    Sinus bradycardia R00.1    Acute hypoxemic respiratory failure due to COVID-19 (HCC) U07.1, J96.01    COVID-19 U07.1    Pneumonia J18.9    Acute respiratory failure with hypoxia ([facility]) J96.01    Pulmonary fibrosis ([facility]) J84.10    Syncope and collapse R55    Chronic anticoagulation Z79.01    Severe malnutrition ([facility]) E43    Cervical stenosis of spinal canal M48.02    Impingement syndrome of left shoulder M75.42    Multifocal pneumonia J18.9    Fracture of right hip S72.001A    Community acquired bacterial pneumonia J15.9       Isolation/Infection:   Isolation            No Isolation          Patient Infection Status       Infection Onset Added Last Indicated Last Indicated By Review Planned Expiration Resolved Resolved By    None active    Resolved    COVID-19 (Rule Out) 22 COVID-19, Rapid (Ordered)   22 Rule-Out Test Resulted    COVID-19 20 COVID-19   20     COVID-19 (Rule Out) 20 COVID-19 (Ordered)   20 Rule-Out Test Resulted    COVID-19 (Rule Out) 10/17/20 10/17/20 10/17/20 COVID-19 (Ordered)   10/17/20 Rule-Out Test Resulted    COVID-19 (Rule Out) 10/17/20 10/17/20 10/17/20 COVID-19 (Ordered)   10/17/20 Rule-Out Test Resulted            Nurse Assessment:  Last Vital Signs: /70   Pulse 85   Temp 98 °F (36.7 °C)   Resp 16   Ht 6' (1.829 m)   Wt 171 lb 1.2 oz (77.6 kg)   SpO2 95%   BMI 23.20 kg/m²     Last documented pain score (0-10 scale): Pain Level: 3  Last Weight:   Wt Readings from Last 1 Encounters:   22 171 lb 1.2 oz (77.6 kg)     Mental Status:  oriented and alert    IV Access:  - None    Nursing Mobility/ADLs:  Walking   Assisted  Transfer  Assisted  Bathing  Assisted  Dressing  Assisted  Toileting  Independent  Feeding  Independent  Med Admin  Independent  Med Delivery   whole    Wound Care Documentation and Therapy: Elimination:  Continence: Bowel: Yes  Bladder: Yes  Urinary Catheter: None   Colostomy/Ileostomy/Ileal Conduit: No       Date of Last BM: 3/1/2022    Intake/Output Summary (Last 24 hours) at 2/23/2022 1718  Last data filed at 2/23/2022 1100  Gross per 24 hour   Intake 313.3 ml   Output 950 ml   Net -636.7 ml     I/O last 3 completed shifts: In: 1163.3 [P.O.:250; I.V.:560; IV Piggyback:353.3]  Out: 5465 [HLECP:7071; Blood:150]    Safety Concerns: At Risk for Falls    Impairments/Disabilities:      None    Nutrition Therapy:  Current Nutrition Therapy:   - Oral Diet:  General    Routes of Feeding: Oral  Liquids: Thin Liquids  Daily Fluid Restriction: no  Last Modified Barium Swallow with Video (Video Swallowing Test): not done    Treatments at the Time of Hospital Discharge:   Respiratory Treatments: n/a  Oxygen Therapy:  is on oxygen at 4 L/min per nasal cannula.   Ventilator:    - No ventilator support    Rehab Therapies: Physical Therapy and Occupational Therapy  Weight Bearing Status/Restrictions: No weight bearing restirctions  Other Medical Equipment (for information only, NOT a DME order):  walker  Other Treatments: n/a    Patient's personal belongings (please select all that are sent with patient):  N/a    RN SIGNATURE:  Electronically signed by Eugenio Emerson RN on 3/1/22 at 12:41 PM EST    CASE MANAGEMENT/SOCIAL WORK SECTION    Inpatient Status Date: 2/18/22    Readmission Risk Assessment Score:  Readmission Risk              Risk of Unplanned Readmission:  14           Discharging to Facility/ Agency   Name: [facility]    Phone: [phone]       Fax: [phone]          Dialysis Facility (if applicable)   Name:  Address:  Dialysis Schedule:  Phone:  Fax:    / signature: Electronically signed by LOUISA Dong on 3/1/22 at 2:30 PM EST    PHYSICIAN SECTION    Prognosis: Good    Condition at Discharge: Stable    Rehab Potential (if transferring to Rehab): Good    Recommended Labs or Other Treatments After Discharge:     Physician Certification: I certify the above information and transfer of Donna Moore  is necessary for the continuing treatment of the diagnosis listed and that he requires [facility] for less 30 days.      Update Admission H&P: No change in H&P    PHYSICIAN SIGNATURE:  Electronically signed by Kayode Wells MD on 2/28/22 at 11:57 AM EST

## 2022-02-23 NOTE — PROGRESS NOTES
Physical Therapy    [facility]  Initial Assessment    NAME: Radha Real  : 1955  MRN: [id number]    Date of Service: 2022    Discharge Recommendations:  Continue to assess pending progress,Patient would benefit from continued therapy after discharge   PT Equipment Recommendations  Other: TBD    Assessment   Body structures, Functions, Activity limitations: Decreased functional mobility ; Decreased ROM; Decreased strength;Decreased ADL status; Decreased endurance;Decreased balance; Increased pain  Assessment: Pt most limited by pain, tachycardia, O2 sats, and feeling sweaty/clammy. Pt would benefit from continued PT to improve his independence towards PLOF. Treatment Diagnosis: Impaired functional mobility 2* hip pain  Specific instructions for Next Treatment: progress up to chair, co tx, ROM/therex  Prognosis: Good  Decision Making: Medium Complexity  History: s/p HIP FEMORAL HEMIARTHROPLASTY on 22  Exam: ROM, MMT, bed mobility, posture, endurance  Clinical Presentation: Pt alert, cooperative, pleasant  Barriers to Learning: pain  REQUIRES PT FOLLOW UP: Yes  Activity Tolerance  Activity Tolerance: Patient limited by pain;Treatment limited secondary to medical complications (free text) (tachycardia, O2 sats)       Patient Diagnosis(es): The primary encounter diagnosis was Pneumonia of both lungs due to infectious organism, unspecified part of lung. Diagnoses of Hypoxia and Closed fracture of neck of right femur, initial encounter [facility]) were also pertinent to this visit.      has a past medical history of Atrial fibrillation ([facility] Back pain, chronic, Hill's esophagus, Benign essential HTN, BPH (benign prostatic hyperplasia), Cancer ([facility] Chronic idiopathic pulmonary fibrosis ([facility] Cocaine abuse in remission [facility]), ED (erectile dysfunction), GERD (gastroesophageal reflux disease), GI bleed, Hernia, History of colon cancer, Melena, Migraines, and Murmur, cardiac.   has a past surgical history that includes Tonsillectomy; Colonoscopy; colectomy; Colonoscopy (07/18/2016); knee surgery (Right, 1970's); transesophageal echocardiogram (11/29/2018); Upper gastrointestinal endoscopy (N/A, 12/5/2018); Colonoscopy (N/A, 12/6/2018); hernia repair (Right, 2009); Cardiac catheterization (11/29/2018); colectomy; Total knee arthroplasty (Right, 1/8/2019); Upper gastrointestinal endoscopy (N/A, 6/18/2019); Cardioversion (2020); and hip surgery (Right, 2/22/2022). Restrictions  Restrictions/Precautions  Restrictions/Precautions: Fall Risk,Weight Bearing (Full WBAT RLE)  Required Braces or Orthoses?: No  Implants present? : Metal implants (R hemiarthroplasty, R TKA)  Lower Extremity Weight Bearing Restrictions  Right Lower Extremity Weight Bearing: Weight Bearing As Tolerated (Full WBAT)  Position Activity Restriction  Other position/activity restrictions: Full WBAT RLE  Vision/Hearing  Vision: Impaired  Vision Exceptions: Wears glasses for reading  Hearing: Within functional limits     Subjective  General  Chart Reviewed: Yes  Patient assessed for rehabilitation services?: Yes  Family / Caregiver Present: No  Referring Practitioner: Etta Lund MD  Referral Date : 02/22/22  Diagnosis: hypoxia  Follows Commands: Within Functional Limits  Subjective  Subjective: Pt in bed, agreeable to PT OT. DEONDRE Duval  Pain Screening  Patient Currently in Pain: Yes  Pain Assessment  Pain Assessment: 0-10  Pain Level:  (\"12\")  Pain Type: Surgical pain;Acute pain  Pain Location: Leg  Pain Orientation: Right  Pain Descriptors: Aching; Sore  Pain Onset: On-going  Clinical Progression: Gradually worsening  Functional Pain Assessment: Prevents or interferes some active activities and ADLs  Non-Pharmaceutical Pain Intervention(s): Ambulation/Increased Activity; Distraction;Elevation;Repositioned; Rest  Response to Pain Intervention: Patient Satisfied  Vital Signs  Patient Currently in Pain: Yes  Oxygen Therapy  SpO2: 91 %  O2 Device: Nasal cannula  O2 Flow Rate (L/min): 5 L/min  Patient Observation  Observations: HR varied from 120s-150s due to afib. O2 sats to low 80s at edge of bed. Pt requires at least 3 minutes to recover. Limited by high HR       Orientation  Orientation  Overall Orientation Status: Within Functional Limits  Social/Functional History  Social/Functional History  Lives With: Spouse (fiance)  Type of Home: House  Home Layout: Two level  Home Access: Stairs to enter with rails  Entrance Stairs - Number of Steps: 3  Entrance Stairs - Rails: Both  Bathroom Shower/Tub: Tub/Shower unit,Curtain,Shower chair with back  Bathroom Toilet: Standard  Bathroom Equipment: Grab bars around toilet,Shower chair,Hand-held shower  Bathroom Accessibility: Walker accessible  Home Equipment: Rolling walker,Oxygen  ADL Assistance: Independent  Homemaking Assistance: Independent  Homemaking Responsibilities: Yes  Ambulation Assistance: Independent  Transfer Assistance: Independent  Active : Yes  Mode of Transportation: Truck  Occupation: Retired  Additional Comments: wife works full time - will not have 24/7 support. Cognition        Objective          AROM RLE (degrees)  RLE AROM: WFL  AROM LLE (degrees)  LLE AROM : WNL  AROM RUE (degrees)  RUE General AROM: See OT  AROM LUE (degrees)  LUE General AROM: See OT  Strength RLE  Strength RLE: WFL  Comment: Grossly 3+ to 4-/5  Strength LLE  Strength LLE: WNL  Comment: Grossly 4/5  Strength RUE  Comment: See OT  Strength LUE  Comment: See OT     Sensation  Overall Sensation Status: WFL (pt denies)  Bed mobility  Bridging: Minimal assistance  Rolling to Left: Minimal assistance  Rolling to Right: Minimal assistance  Supine to Sit: Maximum assistance (x3)  Sit to Supine: Maximum assistance;2 Person assistance  Scooting: Minimal assistance  Comment: Head of bed elevated. Pt requires increased time and assist for bed mobility.  Pt resisting therapist at edge of bed, requiring 3rd assist. Pt reports mild dizziness edge of bed. Transfers  Sit to Stand: Unable to assess  Stand to sit: Unable to assess  Comment: Due to high HR, low O2 sats and pt reporting feeling sweaty/clammy - standing was deferred. BSC brought to room and recommends 2 assist to [facility] if needed with nursing. Ambulation  Ambulation?: No  WB Status: Full WBAT RLE  Stairs/Curb  Stairs?: No     Balance  Posture: Fair  Sitting - Static: Good;-  Sitting - Dynamic: Good;-  Standing - Static:  (PRINCESS)  Standing - Dynamic:  (PRINCESS)  Comments: Pt sits edge of bed 7-8 minutes with close SBA/CGA. Focusing on pursed lip breathing for O2 sat improvement and reduction in HR. Plan   Plan  Times per week: 1-2x/day  Specific instructions for Next Treatment: progress up to chair, co tx, ROM/therex  Current Treatment Recommendations: Strengthening,ROM,Balance Training,Functional Mobility Training,Transfer Training,Endurance Training,Gait Training,Equipment Evaluation, Education, & procurement,Patient/Caregiver Education & Training,Safety Education & Training,Positioning,Pain Management  Safety Devices  Type of devices: All fall risk precautions in place,Call light within reach,Gait belt,Patient at risk for falls,Left in bed,Nurse notified (DEONDRE Stovall)    G-Code       OutComes Score                                                  AM-PAC Score  AM-PAC Inpatient Mobility Raw Score : 8 (02/23/22 0845)  AM-PAC Inpatient T-Scale Score : 28.52 (02/23/22 0845)  Mobility Inpatient CMS 0-100% Score: 86.62 (02/23/22 0845)  Mobility Inpatient CMS G-Code Modifier : CM (02/23/22 0845)          Goals  Short term goals  Time Frame for Short term goals: 7 days  Short term goal 1: Pt to demo supine<-->sit min to mod x1. Short term goal 2: Pt to perform transfers min to mod x1from varied surfaces. Short term goal 3: Pt to amb 10'-20' min to mod x1 with device. Short term goal 4: Pt to tolerate sitting upright in chair for at least 30 minutes.   Short term goal 5: Pt to reduce pain to 2-3/10 with movement to progress independence with mobility. Short term goal 6: Pt to improve BLE strength by 1/2 MMG. Short term goal 7: Pt to tolerate standing 1-2 minutes, min to mod x1 with device. Patient Goals   Patient goals :  To move better       Therapy Time   Individual Concurrent Group Co-treatment   Time In 0845         Time Out 0919         Minutes 34         Timed Code Treatment Minutes: Zackary Moreira, PT

## 2022-02-23 NOTE — PROGRESS NOTES
[address]   Occupational Therapy Evaluation  Date: 22  Patient Name: Radha Real       Room:   MRN: [id number]  Account: [de-identified]   : 1955  (74 y.o.) Gender: male     Discharge Recommendations: The patient would benefit from an intensive level of therapy after discharge from the facility. They should be able to tolerate 3-hours of Combined OT/PT/ST over 5 days/week or at least 900 minutes of  Combined Therapy over 7 days. Referring Practitioner: Domenic Hsieh MD  Diagnosis: Hypoxia, community acquired, bacterial pnemumonia, closed fx of neck of right femur s/p HIP FEMORAL HEMIARTHROPLASTY, pneumonia of both lungs due to infectious organism, multifocal pneumonia        Treatment Diagnosis: Impaired self-care status  Past Medical History:  has a past medical history of Atrial fibrillation ([facility] [address] Back pain, chronic, Hill's esophagus, Benign essential HTN, BPH (benign prostatic hyperplasia), Cancer ([facility] [address] Chronic idiopathic pulmonary fibrosis ([facility] [address] Cocaine abuse in remission [facility]), ED (erectile dysfunction), GERD (gastroesophageal reflux disease), GI bleed, Hernia, History of colon cancer, Melena, Migraines, and Murmur, cardiac. Past Surgical History:   has a past surgical history that includes Tonsillectomy; Colonoscopy; colectomy; Colonoscopy (2016); knee surgery (Right, 's); transesophageal echocardiogram (2018); Upper gastrointestinal endoscopy (N/A, 2018); Colonoscopy (N/A, 2018); hernia repair (Right, ); Cardiac catheterization (2018); colectomy; Total knee arthroplasty (Right, 2019); Upper gastrointestinal endoscopy (N/A, 2019); Cardioversion (); and hip surgery (Right, 2022).     Restrictions  Restrictions/Precautions: Fall Risk (WBAT)  Implants present? : Metal implants  Other position/activity restrictions: WBAT RLE  Required Braces or Orthoses?: No     Vitals  Temp: 97.6 °F (36.4 °C)  Pulse: 150  Resp: 12  BP: (!) 97/56  Height: 6' (182.9 cm)  Weight: 171 lb 1.2 oz (77.6 kg)  BMI (Calculated): 23.3  Oxygen Therapy  SpO2: 91 %  Pulse Oximeter Device Mode: Continuous  Pulse Oximeter Device Location: Finger,Other(comment) (R ear)  O2 Device: Nasal cannula  Skin Assessment: Clean, dry, & intact  FiO2 : 40 %  O2 Flow Rate (L/min): 5 L/min  Blood Gas  Performed?: No  Prem's Test #1: Collateral flow confirmed  Site #1: Right Radial  Site Prepped #1: Yes  Number of Attempts #1: 2  Pressure Held #1: Yes  Complications #1: None  Post-procedure #1: Standard  Specimen Status #1: Point of care  How Tolerated?: Tolerated well  Level of Consciousness: Alert (0)    Subjective  Subjective: Pt resting in bed and agreeable to OT/PT eval this AM  Comments: RN okayed pt to be seen for OT/PT eval   Overall Orientation Status: Within Functional Limits  Vision  Vision: Impaired  Vision Exceptions: Wears glasses for reading  Hearing  Hearing: Within functional limits  Social/Functional History  Lives With: Spouse (fiance)  Type of Home: House  Home Layout: Two level  Home Access: Stairs to enter with rails  Entrance Stairs - Number of Steps: 3  Entrance Stairs - Rails: Both  Bathroom Shower/Tub: Tub/Shower unit,Curtain,Shower chair with back  Bathroom Toilet: Standard  Bathroom Equipment: Grab bars around toilet,Shower chair,Hand-held shower  Bathroom Accessibility: Walker accessible  Home Equipment: Rolling walker,Oxygen  ADL Assistance: Independent  Homemaking Assistance: Independent  Homemaking Responsibilities: Yes  Ambulation Assistance: Independent  Transfer Assistance: Independent  Active : Yes  Mode of Transportation: Truck  Occupation: Retired  Additional Comments: wife works full time - will not have 24/7 support.    Pain Assessment  Pain Assessment: 0-10  Pain Level: 10 (Pt states pain is \"a 12\")    Objective      Cognition  Overall Cognitive Status: WFL   Perception  Overall Perceptual Status: WFL  Sensation  Overall Sensation Status: WFL   ADL  Feeding: Setup  Grooming: Setup  UE Bathing: Stand by assistance  LE Bathing: Maximum assistance  UE Dressing: Stand by assistance  LE Dressing: Dependent/Total  Toileting: Dependent/Total  Additional Comments: ADL scores obtained through S/OT clinical reasoning/skilled observation; Pt required Max A x3 sup<>sit. Pt sat EOB ~7-8 minutes. Vitals unstable while sitting EOB with HR erratic and up to 150 BPM range and SpO2 in low 80%-90% range. Pt reports feeling dizzy and sweaty with a pain of 12/10.  Pt laid back down with Max A x2 per DEONDRE Arellano Said request. Yamila Mcfarland monitoring pt vitals    UE Function           LUE Strength  Gross LUE Strength: Exceptions to [facility]  L Hand General: 3+/5     LUE Tone: Normotonic     LUE AROM (degrees)  LUE AROM : Exceptions  LUE General AROM: ~50 degree L shoulder flexion      Left Hand AROM (degrees)  Left Hand AROM: WFL  RUE Strength  Gross RUE Strength: Exceptions to [facility]  R Hand General: 3+/5      RUE Tone: Normotonic     RUE AROM (degrees)  RUE AROM : WFL  RUE General AROM: R shoulder ~80 degrees     Right Hand AROM (degrees)  Right Hand AROM: WFL    Fine Motor Skills  Coordination  Movements Are Fluid And Coordinated: No  Coordination and Movement description: Gross motor impairments,Left UE (~50 degrees L shoulder flexion )          LUE Edema - Circumference (cm)  LUE Edema Present?: No     RUE Edema - Circumference (cm)  RUE Edema Present?: No          Mobility  Supine to Sit: Maximum assistance (x3; due to pt stiffening of trunk )  Sit to Supine: Maximum assistance (x2)       Balance  Sitting Balance: Stand by assistance  Standing Balance: Unable to assess(comment) (Pt increased HR and decreased oxygen saturation)  Standing Balance  Comment: unable to assess   Functional Mobility  Functional Mobility Comments: unable to assess   Bed mobility  Bridging: Minimal assistance  Rolling to Left: Minimal assistance  Rolling to Right: Minimal assistance  Supine to Sit: Maximum assistance (x3; due to pt stiffening of trunk )  Sit to Supine: Maximum assistance (x2)  Scooting: Dependent/Total (2 person assist )     Transfers  Transfer Comments: unable to assess   Functional Activity Tolerance  Functional Activity Tolerance: Tolerates < 10 min exercise w/ changes in vital signs  Additional Comments: HR up to 150, SpO2 low-mid 80% range sitting EOB ~7-8 minutes; Pt returned to supine with Max A per RN Alla Palacioleans      Assessment  Assessment  Performance deficits / Impairments: Decreased functional mobility ,Decreased ADL status,Decreased ROM,Decreased strength,Decreased safe awareness,Decreased endurance,Decreased balance,Decreased high-level IADLs  Treatment Diagnosis: Impaired self-care status  Prognosis: Good  Decision Making: High Complexity  REQUIRES OT FOLLOW UP: Yes  Discharge Recommendations: Patient would benefit from continued therapy after discharge  Activity Tolerance: Patient Tolerated treatment well,Treatment limited secondary to medical complications (free text),Patient limited by pain (unstable vitals )         Functional Outcome Measures  AM-PAC Daily Activity Inpatient   How much help for putting on and taking off regular lower body clothing?: Total  How much help for Bathing?: A Lot  How much help for Toileting?: A Lot  How much help for putting on and taking off regular upper body clothing?: A Little  How much help for taking care of personal grooming?: A Little  How much help for eating meals?: A Little  AM-[facility] Inpatient Daily Activity Raw Score: 14  AM-PAC Inpatient ADL T-Scale Score : 33.39  ADL Inpatient CMS 0-100% Score: 59.67  ADL Inpatient CMS G-Code Modifier : CK       Goals  Patient Goals   Patient goals :  To go home   Short term goals  Time Frame for Short term goals: by discharge   Short term goal 1: Pt will complete LB bathing/dressing/toileting Mod A while maintaining vitals WFL and use of AE/DME/modified techniques to increase IND with self-care  Short term goal 2: Pt will verbalize/demonstrate Good understanding of fall prevention strategies to increase safety in ADL's  Short term goal 3: Pt will complete functional mobiliy/transfers Mod A with RW during functional activity to increase IND in ADL's  Short term goal 4: Pt will tolerate 15-20 minutes of functional activity while maintaining Good safety and vitals to increase strength and IND in self-care    Plan  Safety Devices  Safety Devices in place: Yes  Type of devices: Call light within reach,Patient at risk for falls,Left in bed,Nurse notified  Restraints  Initially in place: No     Plan  Times per week: 5-7 (BID)  Times per day: Twice a day  Current Treatment Recommendations: Strengthening,ROM,Balance Training,Functional Mobility Training,Pain Management,Safety Education & Training,Patient/Caregiver Education & Training,Equipment Evaluation, Education, & procurement,Self-Care / Poppy Gibbs OT Individual Minutes  Time In: 4186  Time Out: 2013  Minutes: 37    Electronically signed by CAROLINA Borges/OT on 2/23/22 at 11:17 AM EST

## 2022-02-23 NOTE — PROGRESS NOTES
ICU Progress Note (Non-Vent)  [facility]    Patient - Aron Dhillon,  Age - 74 y.o.    - 1955      Room Number -    MRN -  [id number]   Acct # - [de-identified]  Date of Admission -  2022  6:14 AM    Events of Past 24 Hours   Per RN, no acute overnight events. POD #1 of right hip femoral hemiarthroplasty   He is alert and oriented, was on 5L [place] device saturating 94%. He denies any worsening dyspnea. Afib on monitor with HR 120s. Oral thrush greatly improved. Labs reviewed. K - 5.3, WBC elevated at 15.2. CO2 elevated at 41. Repeat CXR shows no significant interval change. Vitals    height is 6' (1.829 m) and weight is 171 lb 1.2 oz (77.6 kg). His temperature is 97.6 °F (36.4 °C). His blood pressure is 97/56 (abnormal) and his pulse is 89. His respiration is 12 and oxygen saturation is 100%.        Temperature Range: Temp: 97.6 °F (36.4 °C) Temp  Av.7 °F (36.5 °C)  Min: 96.4 °F (35.8 °C)  Max: 98.2 °F (36.8 °C)  BP Range:  Systolic (67DER), GKL:862 , Min:68 , AKW:877     Diastolic (08YCY), MFF:78, Min:45, Max:115    Pulse Range: Pulse  Av.6  Min: 85  Max: 142  Respiration Range: Resp  Av.5  Min: 0  Max: 27  Current Pulse Ox[de-identified]  SpO2: 100 %  24HR Pulse Ox Range:  SpO2  Av.5 %  Min: 81 %  Max: 100 %  Oxygen Amount and Delivery: O2 Flow Rate (L/min): 5 L/min    Wt Readings from Last 3 Encounters:   22 171 lb 1.2 oz (77.6 kg)   21 171 lb (77.6 kg)   07/15/21 166 lb 9.6 oz (75.6 kg)     I/O       Intake/Output Summary (Last 24 hours) at 2022 0949  Last data filed at 2022 0800  Gross per 24 hour   Intake 913.3 ml   Output 1650 ml   Net -736.7 ml     DRAIN/TUBE OUTPUT       Invasive Lines   ICP PRESSURE RANGE  No data recorded  CVP PRESSURE RANGE  No data recorded      Medications      methylPREDNISolone  40 mg IntraVENous Q8H    fluconazole  200 mg IntraVENous Q24H    sodium chloride flush  5-40 mL IntraVENous 2 times per day    digoxin  125 mcg Oral Daily    sertraline  25 mg Oral Daily    furosemide  40 mg IntraVENous Daily    [Held by provider] enoxaparin  1 mg/kg SubCUTAneous BID    sodium chloride flush  5-40 mL IntraVENous 2 times per day    famotidine (PEPCID) injection  20 mg IntraVENous BID    cefTRIAXone (ROCEPHIN) IV  1,000 mg IntraVENous Q24H    [Held by provider] amLODIPine  5 mg Oral Daily    atorvastatin  40 mg Oral Daily    [Held by provider] carvedilol  3.125 mg Oral BID WC    lisinopril  10 mg Oral BID    tamsulosin  0.4 mg Oral Daily    [Held by provider] aspirin  81 mg Oral Daily     sodium chloride flush, sodium chloride, HYDROmorphone **OR** HYDROmorphone, perflutren lipid microspheres, morphine, sodium chloride flush, sodium chloride flush, sodium chloride, ondansetron **OR** ondansetron, polyethylene glycol, acetaminophen **OR** acetaminophen, potassium chloride **OR** potassium chloride, magnesium sulfate  IV Drips/Infusions   sodium chloride      sodium chloride         Diet/Nutrition   ADULT DIET; Regular    Exam      Constitutional - Alert, arousable  General Appearance  well developed, well nourished  HEENT -normocephalic, atraumatic. PERRLA has thrush in the oral cavity, but very improved from 02/22/22  Lungs - Chest expands equally, some coarseness in the left upper lobe. Clear breath sounds to the posterior lung bases. Cardiovascular - Heart sounds are normal.  normal rate and rhythm regular, no murmur, gallop or rub. Abdomen - soft, nontender, nondistended, no masses or organomegaly  Neurologic - CN II-XII are grossly intact.  There are no focal motor deficits  Skin - no bruising or bleeding  Extremities - no cyanosis, clubbing or edema    Lab Results   CBC     Lab Results   Component Value Date    WBC 15.2 02/23/2022    RBC 5.19 02/23/2022    HGB 12.8 02/23/2022    HCT 40.3 02/23/2022  02/23/2022    MCV 77.7 02/23/2022    MCH 24.6 02/23/2022    MCHC 31.7 02/23/2022    RDW 17.0 02/23/2022    LYMPHOPCT 4 02/23/2022    MONOPCT 7 02/23/2022    BASOPCT 0 02/23/2022    MONOSABS 1.06 02/23/2022    LYMPHSABS 0.61 02/23/2022    EOSABS 0.00 02/23/2022    BASOSABS 0.00 02/23/2022    DIFFTYPE NOT REPORTED 02/21/2022       BMP   Lab Results   Component Value Date     02/23/2022    K 5.3 02/23/2022    CL 94 02/23/2022    CO2 41 02/23/2022    BUN 36 02/23/2022    CREATININE 0.75 02/23/2022    GLUCOSE 135 02/23/2022       LFTS  Lab Results   Component Value Date    ALKPHOS 92 12/20/2021    ALT 8 12/20/2021    AST 18 12/20/2021    PROT 7.4 12/20/2021    BILITOT 0.66 12/20/2021    BILIDIR <0.08 12/05/2018    IBILI CANNOT BE CALCULATED 12/05/2018    LABALBU 4.1 12/20/2021       ABG ABGs:   Lab Results   Component Value Date    PHART 7.394 02/18/2022    PO2ART 33.4 02/18/2022    NQW8CYB 42.4 02/18/2022       Lab Results   Component Value Date    MODE NOT REPORTED 02/18/2022         INR  Recent Labs     02/22/22 0341   PROTIME 18.2*   INR 1.5       APTT  Recent Labs     02/22/22 0341   APTT 32.6       Lactic Acid  Lab Results   Component Value Date    LACTA NOT REPORTED 12/07/2020    LACTA NOT REPORTED 10/17/2020    LACTA 1.0 08/11/2014        BNP   No results for input(s): BNP in the last 72 hours. Cultures       Radiology     CXR    Shows no significant interval changes. SYSTEMS ASSESSMENT    Acute on chronic hypoxic respiratory failure  Left upper lobe pneumonia  Right femoral neck fracture  Idiopathic pulmonary fibrosis/UIP  Hemoptysis-resolved    Oxygen requirements have dramatically decreased on 5L [address]   Maintain O2 saturation >88%   Continue Rocephin and Zithromax  continue incentive spirometry  Decreased steroids to allow for proper wound healing s/p surgery.    Oral thrush secondary to steroids - start nystatin for oral thrush     Eliseo Regalado, MS4             Critical Care Time 0 min      Patient seen and examined independently by me. Above discussed and I agree with medical student note except where indicated in the EMR revision history. Also see my additional comments and changes indicated by discrete font, text color, italics, and/or initials. Labs, cultures, and radiographs where available were reviewed. Tolerated being intubated and was extubated without any respiratory issues.   Encouraged him to use his incentive spirometer  Diet should be advanced  Restart his antihypertensive and cardiac medications  Can switch to nystatin due to his ability now to take oral medications  Decrease Solu-Medrol to 40 every 12  Will need aggressive physical therapy  Lovenox on hold due to recent surgery  Updated patient and his wife; discussed with nursing staff  Can be ICU intermediate status      Electronically signed by Ronn Aguilar MD on 2/23/2022 at 11:43 AM

## 2022-02-23 NOTE — CONSULTS
Physical Medicine & Rehabilitation  Consult Note      Admitting Physician: Jacob Neely MD    Primary Care Provider: Eligio Lyons MD     Reason for Consult:  Acute Inpatient Rehabilitation    Chief Complaint: Worsening shortness of breath, fall and left hip pain    History of Present Illness:  Referring Provider is requesting an evaluation for appropriate placement upon discharge from acute care. Mr. Donna Moore is a 74 y. o.female who was admitted to [facility] on 2/18/2022 with No chief complaint on file. 59-year-old male with history of hypertension, pulmonary fibrosis on 5 L home oxygen, A. fib, arthritis, presented with worsening shortness of breath and productive cough. Symptoms associated right-sided pleuritic chest pain.,  He also felt dizzy and dyspneic and slipped and fell after walking to the front door the hospital..  Right-sided hip x-ray showed acute slight displaced right femoral neck fracture. In the ED he was tachypneic hypertensive with desaturating to 80s. Chest x-ray showed worsening airspace disease and left pleural effusion.   Admitted for management of multifocal pneumonia and acute right hip fracture    AJ-xhssijmls-wrsksifg pneumonia IV ceftriaxone complete course, Diflucan 1 dose on 2/22, nystatin suspension, complete Zithromax    Internal medicine O2 at baseline 5 L on 2//22 prior to hip surgery pains at weight postop on methylprednisolone IV every 12 hours and Lasix IV started 2/20, echo showed ejection fraction 40 to 45% with evidence of diastolic dysfunction, hip fracture status post hemiarthroplasty 2/22 for acute right femoral neck fracture using Tylenol and morphine, on fluconazole 300 mg IV every 24 hours started 2/22, resume aspirin while Eliquis    Orthopedics-status post right hip arthroplasty, continue physical therapy and DVT prophylaxis    Pulmonary oxygen requirements have decreased on 5 L ulcer decrease steroids to allow proper wound healing started nystatin    Radiology:  XR CHEST (SINGLE VIEW FRONTAL)    Result Date: 2/19/2022  Bilateral airspace disease superimposed on pulmonary fibrosis. Increasing airspace disease predominantly involving the left lung. XR HIP RIGHT (1 VIEW)    Result Date: 2/22/2022  Total hip arthropasty without acute hardware complication. XR HIP RIGHT (2-3 VIEWS)    Result Date: 2/18/2022  Portable chest:   1. Worsening airspace disease throughout the left lung with small left-sided pleural effusion, likely worsening multifocal pneumonia. Follow-up is recommended to document resolution. 2. Underlying fibrosis throughout the lungs. 3. Stable mild cardiomegaly. Right hip: Acute slightly displaced right femoral neck fracture. Underlying osteopenia. CT HEAD WO CONTRAST    Result Date: 2/18/2022  No acute intracranial process identified. XR CHEST PORTABLE    Result Date: 2/23/2022  No significant interval change. XR CHEST PORTABLE    Result Date: 2/21/2022  Diffuse bilateral pneumonia worse on the left not significantly changed since 02/19/2022 given differences in radiographic technique. Mild cardiomegaly and possible mild pulmonary vascular congestion. XR CHEST PORTABLE    Result Date: 2/18/2022  Portable chest: 1. Worsening airspace disease throughout the left lung with small left-sided pleural effusion, likely worsening multifocal pneumonia. Follow-up is recommended to document resolution. 2. Underlying fibrosis throughout the lungs. 3. Stable mild cardiomegaly. Right hip: Acute slightly displaced right femoral neck fracture. Underlying osteopenia. CT CHEST PULMONARY EMBOLISM W CONTRAST    Result Date: 2/18/2022  1. No evidence for acute pulmonary embolism. 2. There is very large new ground-glass airspace disease occupying most of the left upper lobe and new small left pleural effusion.    3. Solid pleural-based posterior segment right upper lobe airspace density along the major fissure is larger now measuring 4.3 x 1.7 cm, previously about 1.2 x 2.4 cm. Probably also worsening pneumonia but would recommend short interval 3 month follow-up 4. 9 mm right upper lobe suprahilar central nodule is unchanged. RECOMMENDATIONS: Unavailable     CT HIP RIGHT WO CONTRAST    Result Date: 2/22/2022  1. Acute right femoral neck fracture. Review of Systems:  Constitutional: negative for anorexia, chills, fatigue, fevers, sweats and weight loss  Eyes: negative for redness and visual disturbance  Ears, nose, mouth, throat, and face: negative for earaches, sore throat and tinnitus  Respiratory: negative for cough and shortness of breath  Cardiovascular: negative for chest pain, dyspnea and palpitations  Gastrointestinal: negative for abdominal pain, change in bowel habits, constipation, nausea and vomiting  Genitourinary:negative for dysuria, frequency, hesitancy and urinary incontinence  Integument/breast: negative for pruritus and rash  Musculoskeletal:negative for muscle weakness and stiff joints-right hip  Neurological: negative for dizziness, headaches and weakness  Behavioral/Psych: negative for decreased appetite, depression and fatigue    Functional History:  PTA: Independent with all activities. Current:  PT:  Restrictions/Precautions: Fall Risk,Weight Bearing (Full WBAT RLE)  Implants present? : Metal implants (R hemiarthroplasty, R TKA)  Other position/activity restrictions: Full WBAT RLE  Right Lower Extremity Weight Bearing: Weight Bearing As Tolerated (Full WBAT)   Transfers  Sit to Stand: Unable to assess  Stand to sit: Unable to assess  Comment: Due to high HR, low O2 sats and pt reporting feeling sweaty/clammy - standing was deferred. BSC brought to room and recommends 2 assist to [facility] if needed with nursing.   WB Status: Full WBAT RLE  Bed mobility  Bridging: Minimal assistance  Rolling to Left: Minimal assistance  Rolling to Right: Minimal assistance  Supine to Sit: Maximum assistance (x3)  Sit to Supine: Maximum assistance;2 Person assistance  Scooting: Minimal assistance  Comment: Head of bed elevated. Pt requires increased time and assist for bed mobility. Pt resisting therapist at edge of bed, requiring 3rd assist. Pt reports mild dizziness edge of bed. OT:   ADL  Feeding: Setup  Grooming: Setup  UE Bathing: Stand by assistance  LE Bathing: Maximum assistance  UE Dressing: Stand by assistance  LE Dressing: Dependent/Total  Toileting: Dependent/Total  Additional Comments: ADL scores obtained through S/OT clinical reasoning/skilled observation; Pt required Max A x3 sup<>sit. Pt sat EOB ~7-8 minutes. Vitals unstable while sitting EOB with HR erratic and up to 150 BPM range and SpO2 in low 80%-90% range. Pt reports feeling dizzy and sweaty with a pain of 12/10.  Pt laid back down with Max A x2 per RN [place] request. Jeremy Rosenhtal monitoring pt vitals    ST:     Past Medical History:        Diagnosis Date    [facility]rial fibrillation [facility] [address]     Back pain, chronic     Hill's esophagus 06/18/2019    Benign essential HTN     BPH (benign prostatic hyperplasia)     Cancer (HCC)     colon-rectal    Chronic idiopathic pulmonary fibrosis ([facility] [address] 03/29/2021    Cocaine abuse in remission [facility])     1970's    ED (erectile dysfunction) 4/2/2015    GERD (gastroesophageal reflux disease)     GI bleed 12/5/2018    Hernia     History of colon cancer     Melena     Migraines     Murmur, cardiac        Past Surgical History:        Procedure Laterality Date    CARDIAC CATHETERIZATION  11/29/2018    Non-obstructive CAD    CARDIOVERSION  2020    COLECTOMY      2nd colectomy, Colostomy and reversed [facility] COLECTOMY      1st time [place]    COLONOSCOPY      COLONOSCOPY  07/18/2016    COLONOSCOPY N/A 12/6/2018    COLONOSCOPY DIAGNOSTIC performed by Denice Calzada MD at [address] Right 2009    inguinal    HIP SURGERY Right 2/22/2022    HIP FEMORAL HEMIARTHROPLASTY performed by Jose Cruz Dallas MD at [facility] Right 1970's    arthrotomy    TONSILLECTOMY      TOTAL KNEE ARTHROPLASTY Right 1/8/2019    KNEE TOTAL ARTHROPLASTY performed by Bob Galvan MD at [facility] TRANSESOPHAGEAL ECHOCARDIOGRAM  11/29/2018    UPPER GASTROINTESTINAL ENDOSCOPY N/A 12/5/2018    EGD DIAGNOSTIC ONLY performed by Lena Mobley MD at [facility] N/A 6/18/2019    MCGUIRE'S       Allergies: Allergies   Allergen Reactions    Adhesive Tape Other (See Comments)     Blister badly     Codeine Nausea Only     Other reaction(s): Other: See Comments  NAUSEA  Other reaction(s):  Other: See Comments  NAUSEA    Penicillins Swelling     As a baby  Other reaction(s): Unknown  As a baby    Nintedanib Diarrhea     10-15 BM daily        Current Medications:   Current Facility-Administered Medications: nystatin (MYCOSTATIN) 323691 UNIT/ML suspension 500,000 Units, 5 mL, Oral, 4x Daily  methylPREDNISolone sodium (SOLU-MEDROL) injection 40 mg, 40 mg, IntraVENous, Q12H  sodium chloride flush 0.9 % injection 5-40 mL, 5-40 mL, IntraVENous, 2 times per day  sodium chloride flush 0.9 % injection 5-40 mL, 5-40 mL, IntraVENous, PRN  0.9 % sodium chloride infusion, 25 mL, IntraVENous, PRN  HYDROmorphone (DILAUDID) injection 0.25 mg, 0.25 mg, IntraVENous, Q3H PRN **OR** HYDROmorphone (DILAUDID) injection 0.5 mg, 0.5 mg, IntraVENous, Q3H PRN  digoxin (LANOXIN) tablet 125 mcg, 125 mcg, Oral, Daily  perflutren lipid microspheres (DEFINITY) injection 2.2 mg, 2 mL, IntraVENous, ONCE PRN  sertraline (ZOLOFT) tablet 25 mg, 25 mg, Oral, Daily  furosemide (LASIX) injection 40 mg, 40 mg, IntraVENous, Daily  [Held by provider] enoxaparin (LOVENOX) injection 70 mg, 1 mg/kg, SubCUTAneous, BID  morphine (PF) injection 2 mg, 2 mg, IntraVENous, Q4H PRN  sodium chloride flush 0.9 % injection 10 mL, 10 mL, IntraVENous, PRN  sodium chloride flush 0.9 % injection 5-40 mL, 5-40 mL, IntraVENous, 2 times per day  sodium chloride flush 0.9 % injection 5-40 mL, 5-40 mL, IntraVENous, PRN  0.9 % sodium chloride infusion, 25 mL, IntraVENous, PRN  ondansetron (ZOFRAN-ODT) disintegrating tablet 4 mg, 4 mg, Oral, Q8H PRN **OR** ondansetron (ZOFRAN) injection 4 mg, 4 mg, IntraVENous, Q6H PRN  polyethylene glycol (GLYCOLAX) packet 17 g, 17 g, Oral, Daily PRN  acetaminophen (TYLENOL) tablet 650 mg, 650 mg, Oral, Q6H PRN **OR** acetaminophen (TYLENOL) suppository 650 mg, 650 mg, Rectal, Q6H PRN  potassium chloride 20 mEq/50 mL IVPB (Central Line), 20 mEq, IntraVENous, PRN **OR** potassium chloride 10 mEq/100 mL IVPB (Peripheral Line), 10 mEq, IntraVENous, PRN  magnesium sulfate 2,000 mg in dextrose 5 % 100 mL IVPB, 2,000 mg, IntraVENous, PRN  famotidine (PEPCID) injection 20 mg, 20 mg, IntraVENous, BID  cefTRIAXone (ROCEPHIN) 1000 mg IVPB in 50 mL D5W minibag, 1,000 mg, IntraVENous, Q24H  amLODIPine (NORVASC) tablet 5 mg, 5 mg, Oral, Daily  atorvastatin (LIPITOR) tablet 40 mg, 40 mg, Oral, Daily  carvedilol (COREG) tablet 3.125 mg, 3.125 mg, Oral, BID WC  lisinopril (PRINIVIL;ZESTRIL) tablet 10 mg, 10 mg, Oral, BID  tamsulosin (FLOMAX) capsule 0.4 mg, 0.4 mg, Oral, Daily  aspirin chewable tablet 81 mg, 81 mg, Oral, Daily    Social History:  Social History     Socioeconomic History    Marital status: Single     Spouse name: Not on file    Number of children: Not on file    Years of education: Not on file    Highest education level: Not on file   Occupational History    Not on file   Tobacco Use    Smoking status: Former Smoker     Packs/day: 0.50     Years: 1.00     Pack years: 0.50     Quit date:      Years since quittin.1    Smokeless tobacco: Never Used    Tobacco comment: stated never actually really smoked only inhaled    Vaping Use    Vaping Use: Never used   Substance and Sexual Activity    Alcohol use:  Yes     Alcohol/week: 12.0 standard drinks     Types: 2 Shots of liquor, 10 Standard drinks or equivalent per week     Comment: 2-3 times a week    Drug use: No     Types: Other-see comments     Comment: Cocaine use in past in 1970's    Sexual activity: Yes     Partners: Female   Other Topics Concern    Not on file   Social History Narrative    Not on file     Social Determinants of Health     Financial Resource Strain: Medium Risk    Difficulty of Paying Living Expenses: Somewhat hard   Food Insecurity: No Food Insecurity    Worried About Running Out of Food in the Last Year: Never true    Tino of Food in the Last Year: Never true   Transportation Needs:     Lack of Transportation (Medical): Not on file    Lack of Transportation (Non-Medical):  Not on file   Physical Activity:     Days of Exercise per Week: Not on file    Minutes of Exercise per Session: Not on file   Stress:     Feeling of Stress : Not on file   Social Connections:     Frequency of Communication with Friends and Family: Not on file    Frequency of Social Gatherings with Friends and Family: Not on file    Attends Anabaptist Services: Not on file    Active Member of [address] [place] [address] or Organizations: Not on file    Attends Club or Organization Meetings: Not on file    Marital Status: Not on file   Intimate Partner Violence:     Fear of Current or Ex-Partner: Not on file    Emotionally Abused: Not on file    Physically Abused: Not on file    Sexually Abused: Not on file   Housing Stability:     Unable to Pay for Housing in the Last Year: Not on file    Number of [place] in the Last Year: Not on file    Unstable Housing in the Last Year: Not on file     Social/Functional History  Lives With: Spouse (fiance)  Type of Home: House  Home Layout: Two level  Home Access: Stairs to enter with rails  Entrance Stairs - Number of Steps: 3  Entrance Stairs - Rails: Both  Bathroom Shower/Tub: Tub/Shower unit,Curtain,Shower chair with back  [employer]: Standard  Bathroom Equipment: Grab bars around toilet,Shower chair,Hand-held shower  Bathroom Accessibility: Walker accessible  Home Equipment: Rolling walker,Oxygen  ADL Assistance: Independent  Homemaking Assistance: Independent  Homemaking Responsibilities: Yes  Ambulation Assistance: Independent  Transfer Assistance: Independent  Active : Yes  Mode of Transportation: Truck  Occupation: Retired  Additional Comments: wife works full time - will not have 24/7 support. Pain Assessment  Pain Assessment: 0-10  Pain Level: 10 (Pt states pain is \"a 12\")    Family History:       Problem Relation Age of Onset    Diabetes Mother     Heart Attack Father     Heart Disease Father     Heart Disease Brother            Physical Exam:    BP (!) 96/52   Pulse 106   Temp 97.5 °F (36.4 °C)   Resp 17   Ht 6' (1.829 m)   Wt 171 lb 1.2 oz (77.6 kg)   SpO2 95%   BMI 23.20 kg/m²     General appearance: alert, appears stated age, cooperative, and no distress  HEENT: Normocephalic, without obvious abnormality, atraumatic               Eyes: conjunctivae clear. Throat: tongue normal.               Neck:  symmetrical, trachea midline. Pulm: clear to auscultation bilaterally. Cardiac: regular rate and rhythm, no murmur. Abdomen: soft, non-tender; bowel sounds normal.  MSK: extremities normal, atraumatic, no edema, normal tone. ROM: Functional range of motion upper extremity left lower extremity distal right lower extremity  Mental status/Psych: Alert, orientedX3, thought content appropriate.,  New year, president location follows commands  Skin: Dressing over right hip    Sensory: Intact in BUE and BLE to soft and pin sensation. Motor: Muscle tone and bulk are normal bilaterally. No pronator drift. Antigravity upper extremity and left lower extremity, good strength distal right lower extremity limited proximally    Coordination: finger to nose normal bilaterally.       Diagnostics:  CBC   Lab Results   Component Value Date    WBC 15.2 02/23/2022    RBC 5.19 02/23/2022    HGB 12.8 02/23/2022    HCT 40.3 02/23/2022    MCV 77.7 02/23/2022    RDW 17.0 02/23/2022     02/23/2022     BMP    Lab Results   Component Value Date     02/23/2022    K 5.3 02/23/2022    CL 94 02/23/2022    CO2 41 02/23/2022    BUN 36 02/23/2022     Uric Acid  No components found for: URIC  VITAMIN B12 No components found for: B12  PT/INR  No results found for: PTINR      Impression: Mr. Yakov Price is a 74 y.o. male with a history of Multifocal pneumonia    1. Right femoral neck fracture status post hip arthroplasty-weightbearing as tolerated per note  2. Community-acquired pneumonia complicated by chronic pulmonary fibrosis on 5 L of oxygen at home-IV ceftriaxone, Solu-Medrol 40 mg IV every 12  3. Pain-Dilaudid, morphine-receiving frequent morphine  4. A. Fib-aspirin, digoxin  5. Hypertension/hyperlipidemia-Norvasc, Lipitor, Lasix IV daily, lisinopril  6. BPH-Flomax  7. History of Hill's esophagus-Pepcid IV twice a day  8. Thrush-Mycostatin  9. Depression-Zoloft  10. Noted DNR CC arrest  6. Potassium-trending up 4.5-4 0.8-5.3  12. Leukocytosis 15.2, trending up-questionable steroids    Recommendations:  1. Diagnosis: Right hip arthroplasty, community-acquired pneumonia  2. Therapy: Currently unable to assess transfers, high heart rate, low O2 sats max assist x3 supine to sit max assist 2 person sit to supine, ADLs max assist to dependent lower extremities, required max assist x3 supine to sit edge of bed heart rate was erratic and up to 150 and O2 sats in the low 80s  3. Medical  Necessity: As above  4. Support: Clarify  5. Rehab recommendation: Currently low level for acute inpatient rehabilitation see above therapy notes, however first day postop-will continue to monitor for improvement    -Continues to be tachycardic , observed while at rest in bed -heart rate anywhere from 100-140  -Continues on frequent morphine IV-needs to be off IV morphine and participate with therapies    6.  DVT proph: Lovenox on hold, per internal medicine home dose of aspirin and Eliquis-Eliquis not yet resumed per orders    It was my pleasure to evaluate Mitchel Mckee today. Please call with questions. America Hahn. Luis Vee MD          This note is created with the assistance of a speech recognition program.  While intending to generate a document that actually reflects the content of the visit, the document can still have some errors including those of syntax and sound a like substitutions which may escape proof reading.   In such instances, actual meaning can be extrapolated by contextual diversion

## 2022-02-23 NOTE — ACP (ADVANCE CARE PLANNING)
Advance Care Planning     Advance Care Planning (ACP) Physician/NP/PA Conversation    Date of Conversation: 2022  Conducted with: Patient with Decision Making Capacity I also spoke to significant other [place]:  Patient is single and has significant other Khoi Eaton for 44 years. Patient does not have biological children and his parents are . Patient has 2 sisters and one brother. Patient has not completed DPOA paperwork so by [place] law his brother and sisters would determine plan of care. Patient states he would like to speak with Spiritual care about DPOA paperwork tomorrow.       Care Preferences:    Hospitalization: \"If your health worsens and it becomes clear that your chance of recovery is unlikely, what would be your preference regarding hospitalization? \"  Hospitalized     Ventilation: \"If you were unable to breath on your own and your chance of recovery was unlikely, what would be your preference about the use of a ventilator (breathing machine) if it was available to you? \"  DNRCC-A no intubation     Resuscitation: \"In the event your heart stopped as a result of an underlying serious health condition, would you want attempts made to restart your heart, or would you prefer a natural death? \"  DNRCC-A no intubation     Conversation Outcomes / Follow-Up Plan:  Palliative Interaction:  Went to bedside and spoke with Mehran Mix and his significant other Khoi Eaton and introduced myself and my palliative care role.     Patient is single and has significant other Khoi Eaton for 44 years. Patient does not have biological children and his parents are . Patient has 2 sisters and one brother. Patient has not completed DPOA paperwork so by [place] law his brother and sisters would determine plan of care. Patient states he would like to speak with Spiritual care about DPOA paperwork tomorrow.     We discussed code status levels and I explained each level completely to patient.  He states he wants to remain a DNRCC-A code status without intubation.      Patient had hip surgery yesterday for fracture. He states pain medications are helpful with pain but makes him drowsy.     Palliative care will continue to follow patient and provided emotional support to patient and family.      Length of Voluntary ACP Conversation in minutes:  16 minutes    MIRI Rose - NP

## 2022-02-23 NOTE — PROGRESS NOTES
[facility] -   Infectious diseases evaluation  admission date 2/18/2022    reason for consultation:   Pneumonia    Impression :   Current:  Community-acquired pneumonia  Acute right femoral neck fracture S/P right femoral hemiarthroplasty . Pulmonary fibrosis  Acute hypoxic respiratory failure    Recommendations   · Continue IV ceftriaxone day 6 of 7  · Diflucan IV 1 dose received on 2/22/2022  · On nystatin suspension  · 5 days course of Zithromax completed 2/21/2022  · Follow CBC and renal function  · Supportive care    Infection Control Recommendations   · [place] Precautions      Antimicrobial Stewardship Recommendations   · Simplification of therapy  · Targeted therapy          History of Present Illness:   Initial history:  Nadja Strauss is a 74y.o.-year-old male presented to hospital on 2/18/2022 with worsening shortness of breath for several days, worse with exertion, no alleviating factors. The patient also fell ,was found to have acute right femoral neck fracture. The patient had chronic cough due to pulmonary fibrosis not increased, usually on 5 L of oxygen at home. He also had chronic legs pain. He denied fever or chills. The patient has been afebrile, no leukocytosis. He was started on IV Zithromax and Rocephin on admission that he is tolerating  Strep pneumo and Legionella antigen negative  Chest x-ray showed diffuse bilateral pneumonia worse on the left side. COVID-19 rapid test was negative. No growth on blood cultures  Interval changes  2/23/2022   He is feeling well, postop pain under control, on 5 L of oxygen via salter device, remains in A. fib, denied nausea or vomiting, no new complaints.   Patient Vitals for the past 8 hrs:   BP Temp Pulse Resp SpO2   02/23/22 1215 -- -- 106 17 95 %   02/23/22 1200 (!) 96/52 97.5 °F (36.4 °C) 101 15 95 %   02/23/22 1145 -- -- 106 18 96 %   02/23/22 1130 -- -- 109 17 97 %   02/23/22 1115 -- -- 109 22 95 %   02/23/22 1030 -- -- -- -- 91 %   02/23/22 0844 -- -- 150 -- (!) 85 %   02/23/22 0800 -- 97.6 °F (36.4 °C) -- -- --   02/23/22 0733 -- -- -- 12 100 %   02/23/22 0700 (!) 97/56 -- 89 12 100 %   02/23/22 0630 104/65 -- 117 8 100 %   02/23/22 0600 98/72 -- 90 9 100 %   02/23/22 0530 111/76 -- 88 10 98 %           I have personally reviewed the past medical history, past surgical history, medications, social history, and family history, and I haveupdated the database accordingly. Allergies:   Adhesive tape, Codeine, Penicillins, and Nintedanib     Review of Systems:     Review of Systems  As per history of present illness, other than above 12 system review was negative  Physical Examination :       Physical Exam  Constitutional:       Appearance: He is not toxic-appearing. HENT:      Head: Normocephalic and atraumatic. Right Ear: External ear normal.      Left Ear: External ear normal.      Mouth/Throat:      Pharynx: Oropharynx is clear. No oropharyngeal exudate. Comments: Thrush  Eyes:      General: No scleral icterus. Conjunctiva/sclera: Conjunctivae normal.   Cardiovascular:      Rate and Rhythm: Normal rate and regular rhythm. Heart sounds: No murmur heard. Pulmonary:      Breath sounds: No wheezing or rales. Comments: Coarse breath sounds bilaterally  Abdominal:      General: There is no distension. Palpations: Abdomen is soft. Musculoskeletal:      Cervical back: Neck supple. No rigidity. Right lower leg: No edema. Left lower leg: No edema. Skin:     General: Skin is warm. Coloration: Skin is not jaundiced. Neurological:      General: No focal deficit present. Mental Status: He is alert and oriented to person, place, and time.          Past Medical History:     Past Medical History:   Diagnosis Date    [facility]rial fibrillation [facility] [address]     Back pain, chronic     Hill's esophagus 06/18/2019    Benign essential HTN     BPH (benign prostatic hyperplasia)     Cancer ([facility] [address]     colon-rectal    Chronic idiopathic pulmonary fibrosis ([facility] [address] 03/29/2021    Cocaine abuse in remission [facility])     1970's    ED (erectile dysfunction) 4/2/2015    GERD (gastroesophageal reflux disease)     GI bleed 12/5/2018    Hernia     History of colon cancer     Melena     Migraines     Murmur, cardiac        Past Surgical  History:     Past Surgical History:   Procedure Laterality Date    CARDIAC CATHETERIZATION  11/29/2018    Non-obstructive CAD    CARDIOVERSION  2020    COLECTOMY      2nd colectomy, Colostomy and reversed [facility] COLECTOMY      1st time Jacqualine Faster    COLONOSCOPY      COLONOSCOPY  07/18/2016    COLONOSCOPY N/A 12/6/2018    COLONOSCOPY DIAGNOSTIC performed by Latanya Mullen MD at [address] [facility] Right 2009    inguinal    HIP SURGERY Right 2/22/2022    HIP FEMORAL HEMIARTHROPLASTY performed by Sherian Leyden, MD at [address] [facility] Right 1970's    arthrotomy    TONSILLECTOMY      TOTAL KNEE ARTHROPLASTY Right 1/8/2019    KNEE TOTAL ARTHROPLASTY performed by Sherian Leyden, MD at [address] [facility] TRANSESOPHAGEAL ECHOCARDIOGRAM  11/29/2018    UPPER GASTROINTESTINAL ENDOSCOPY N/A 12/5/2018    EGD DIAGNOSTIC ONLY performed by Latanya Mullen MD at [address] [facility] N/A 6/18/2019    [facility]S       Medications:      nystatin  5 mL Oral 4x Daily    methylPREDNISolone  40 mg IntraVENous Q12H    sodium chloride flush  5-40 mL IntraVENous 2 times per day    digoxin  125 mcg Oral Daily    sertraline  25 mg Oral Daily    furosemide  40 mg IntraVENous Daily    [Held by provider] enoxaparin  1 mg/kg SubCUTAneous BID    sodium chloride flush  5-40 mL IntraVENous 2 times per day    famotidine (PEPCID) injection  20 mg IntraVENous BID    cefTRIAXone (ROCEPHIN) IV  1,000 mg IntraVENous Q24H    amLODIPine  5 mg Oral Daily    atorvastatin  40 mg Oral Daily    carvedilol  3.125 mg Oral BID WC    lisinopril  10 mg Oral BID    tamsulosin  0.4 mg Oral Daily    aspirin  81 mg Oral Daily       Social History:     Social History     Socioeconomic History    Marital status: Single     Spouse name: Not on file    Number of children: Not on file    Years of education: Not on file    Highest education level: Not on file   Occupational History    Not on file   Tobacco Use    Smoking status: Former Smoker     Packs/day: 0.50     Years: 1.00     Pack years: 0.50     Quit date:      Years since quittin.1    Smokeless tobacco: Never Used    Tobacco comment: stated never actually really smoked only inhaled    Vaping Use    Vaping Use: Never used   Substance and Sexual Activity    Alcohol use: Yes     Alcohol/week: 12.0 standard drinks     Types: 2 Shots of liquor, 10 Standard drinks or equivalent per week     Comment: 2-3 times a week    Drug use: No     Types: Other-see comments     Comment: Cocaine use in past in     Sexual activity: Yes     Partners: Female   Other Topics Concern    Not on file   Social History Narrative    Not on file     Social Determinants of Health     Financial Resource Strain: Medium Risk    Difficulty of Paying Living Expenses: Somewhat hard   Food Insecurity: No Food Insecurity    Worried About Running Out of Food in the Last Year: Never true    Tino of Food in the Last Year: Never true   Transportation Needs:     Lack of Transportation (Medical): Not on file    Lack of Transportation (Non-Medical):  Not on file   Physical Activity:     Days of Exercise per Week: Not on file    Minutes of Exercise per Session: Not on file   Stress:     Feeling of Stress : Not on file   Social Connections:     Frequency of Communication with Friends and Family: Not on file    Frequency of Social Gatherings with Friends and Family: Not on file    Attends Yazdanism Services: Not on file    Active Member of Clubs or Organizations: Not on file    Attends Club or Organization Meetings: Not on file    Marital Status: Not on file   Intimate Partner Violence:     Fear of Current or Ex-Partner: Not on file    Emotionally Abused: Not on file    Physically Abused: Not on file    Sexually Abused: Not on file   Housing Stability:     Unable to Pay for Housing in the Last Year: Not on file    Number of Places Lived in the Last Year: Not on file    Unstable Housing in the Last Year: Not on file       Family History:     Family History   Problem Relation Age of Onset    Diabetes Mother     Heart Attack Father     Heart Disease Father     Heart Disease Brother       Medical Decision Making:   I have independently reviewed/ordered the following labs:    CBC with Differential:   Recent Labs     02/22/22  0341 02/23/22  0456   WBC 10.0 15.2*   HGB 11.3* 12.8*   HCT 35.4* 40.3*    360   LYMPHOPCT 2* 4*   MONOPCT 2 7     BMP:  Recent Labs     02/22/22  0341 02/23/22  0456    138   K 4.8 5.3   CL 96* 94*   CO2 39* 41*   BUN 29* 36*   CREATININE 0.59* 0.75     Hepatic Function Panel: No results for input(s): PROT, LABALBU, BILIDIR, IBILI, BILITOT, ALKPHOS, ALT, AST in the last 72 hours. No results for input(s): RPR in the last 72 hours. No results for input(s): HIV in the last 72 hours. No results for input(s): BC in the last 72 hours. Lab Results   Component Value Date    CREATININE 0.75 02/23/2022    GLUCOSE 135 02/23/2022       Detailed results: Thank you for allowing us to participate in the care of this patient. Please call with questions. This note is created with the assistance of a speech recognition program.  While intending to generate adocument that actually reflects the content of the visit, the document can still have some errors including those of syntax and sound a like substitutions which may escape proof reading. It such instances, actual meaningcan be extrapolated by contextual diversion.     Steph Bradley MD  Office: [phone]  Perfect serve / office [phone]

## 2022-02-23 NOTE — CARE COORDINATION
ONGOING DISCHARGE PLAN:    Patient is alert and oriented x4. Spoke with patient regarding discharge plan and patient wants to go to a rehab facility. Not sure if he would qualify for ARU. But patient [place]. POD #1 - right HIP FEMORAL HEMIARTHROPLASTY     will need PT recommendations     Remains on Iv solu medrol 40 mfg Q12hrs, Iv Rocephin     Will continue to follow for additional discharge needs.     Electronically signed by Baron Cindy RN on 2/23/2022 at 4:56 PM

## 2022-02-23 NOTE — FLOWSHEET NOTE
Writer confirmed to patient that we would complete advance directives tomorrow when he's feeling less groggy and more clear headed. He said he's doing well for now and appreciated the visit. 02/23/22 1507   Encounter Summary   Services provided to: Patient   Referral/Consult From: [address] [place] [address] Significant other;Family members   Continue Visiting   (2-23-22)   Complexity of Encounter Moderate   Length of Encounter 15 minutes   Grief and Life Adjustment   Type Adjustment to illness   Assessment Coping   Intervention Active listening;Sustaining presence/ Ministry of presence; Discussed illness/injury and it's impact   Outcome Expressed gratitude;Engaged in conversation;Expressed feelings/needs/concerns;Receptive

## 2022-02-23 NOTE — FLOWSHEET NOTE
02/22/22 2121   Treatment Team Notification   Reason for Communication Abnormal vitals   Team Member Name Dr. Tyson Rock Provider   Method of Communication Call   Response See orders   RN spoke with Dr. Xi Garcia regarding heart rate of 120-130. Updated that patient is in chronic afib. See orders.

## 2022-02-23 NOTE — PROGRESS NOTES
[address] [facility].    PROGRESS NOTE             2/23/2022    8:04 AM    Name:   Mickey Monte  MRN:     [id number]     Acct:      [de-identified]   Room:   2005/2005-01  IP Day:  5  Admit Date:  2/18/2022  6:14 AM    PCP:  Jerardo Connors MD  Code Status:  DNR-CCA    Subjective:     C/C: No chief complaint on file. Interval History Status: improved. Patient seen and examined. Hemiarthroplasty done yesterday. Patient extubated status post surgery without complications; Remains at baseline O2 requirements. Complains of abdominal pain and is overall frustrated with current situation and decreased independence in ADLs secondary to hip fracture status post surgery. Continues to endorse respiratory status is at baseline. Reports some confusion after surgery;  States he felt somewhat disoriented, had not realized what was changed and was arguing with nurses; now improved. Review of Systems:     Review of Systems   Constitutional: Negative for diaphoresis and fever. HENT: Negative for sneezing and sore throat. Eyes: Negative for photophobia and visual disturbance. Respiratory: Positive for shortness of breath (at patient's baseline). Negative for wheezing. Cardiovascular: Negative for chest pain and palpitations. Gastrointestinal: Positive for nausea. Negative for vomiting. Genitourinary: Positive for difficulty urinating (mostly 2/2 decreased ROM s/p surgery). Musculoskeletal: Positive for arthralgias and myalgias. Right hip and right LE pain   Neurological: Negative for dizziness and headaches. Psychiatric/Behavioral: Positive for confusion. Medications: Allergies: Allergies   Allergen Reactions    Adhesive Tape Other (See Comments)     Blister badly     Codeine Nausea Only     Other reaction(s): Other: See Comments  NAUSEA  Other reaction(s):  Other: See Comments  NAUSEA    Penicillins Swelling As a baby  Other reaction(s): Unknown  As a baby    Nintedanib Diarrhea     10-15 BM daily       Current Meds:   Scheduled Meds:    methylPREDNISolone  40 mg IntraVENous Q8H    fluconazole  200 mg IntraVENous Q24H    sodium chloride flush  5-40 mL IntraVENous 2 times per day    digoxin  125 mcg Oral Daily    sertraline  25 mg Oral Daily    furosemide  40 mg IntraVENous Daily    [Held by provider] enoxaparin  1 mg/kg SubCUTAneous BID    sodium chloride flush  5-40 mL IntraVENous 2 times per day    famotidine (PEPCID) injection  20 mg IntraVENous BID    cefTRIAXone (ROCEPHIN) IV  1,000 mg IntraVENous Q24H    [Held by provider] amLODIPine  5 mg Oral Daily    atorvastatin  40 mg Oral Daily    [Held by provider] carvedilol  3.125 mg Oral BID WC    lisinopril  10 mg Oral BID    tamsulosin  0.4 mg Oral Daily    [Held by provider] aspirin  81 mg Oral Daily     Continuous Infusions:    sodium chloride      sodium chloride       PRN Meds: sodium chloride flush, sodium chloride, HYDROmorphone **OR** HYDROmorphone, perflutren lipid microspheres, morphine, sodium chloride flush, sodium chloride flush, sodium chloride, ondansetron **OR** ondansetron, polyethylene glycol, acetaminophen **OR** acetaminophen, potassium chloride **OR** potassium chloride, magnesium sulfate    Data:     Past Medical History:   has a past medical history of Atrial fibrillation ([facility] [address] Back pain, chronic, Hill's esophagus, Benign essential HTN, BPH (benign prostatic hyperplasia), Cancer (HCC), Chronic idiopathic pulmonary fibrosis ([facility] [address] Cocaine abuse in remission [facility]), ED (erectile dysfunction), GERD (gastroesophageal reflux disease), GI bleed, Hernia, History of colon cancer, Melena, Migraines, and Murmur, cardiac. Social History:   reports that he quit smoking about 51 years ago. He has a 0.50 pack-year smoking history.  He has never used smokeless tobacco. He reports current alcohol use of about 12.0 standard drinks of alcohol per week. He reports that he does not use drugs. Family History:   Family History   Problem Relation Age of Onset    Diabetes Mother     Heart Attack Father     Heart Disease Father     Heart Disease Brother        Vitals:  BP (!) 97/56   Pulse 89   Temp 97.6 °F (36.4 °C) (Axillary)   Resp 12   Ht 6' (1.829 m)   Wt 171 lb 1.2 oz (77.6 kg)   SpO2 100%   BMI 23.20 kg/m²   Temp (24hrs), Av.7 °F (36.5 °C), Min:96.4 °F (35.8 °C), Max:98.2 °F (36.8 °C)    No results for input(s): POCGLU in the last 72 hours. Vitals:    22 0600 22 0630 22 0700 22 0733   BP: 98/72 104/65 (!) 97/56    Pulse: 90 117 89    Resp: 9 8 12 12   Temp:       TempSrc:       SpO2: 100% 100% 100% 100%   Weight:       Height:           I/O(24Hr):     Intake/Output Summary (Last 24 hours) at 2022 0804  Last data filed at 2022 0430  Gross per 24 hour   Intake 913.3 ml   Output 1600 ml   Net -686.7 ml       Labs:  Recent Results (from the past 24 hour(s))   Basic Metabolic Panel w/ Reflex to MG    Collection Time: 22  4:56 AM   Result Value Ref Range    Glucose 135 (H) 70 - 99 mg/dL    BUN 36 (H) 8 - 23 mg/dL    CREATININE 0.75 0.70 - 1.20 mg/dL    Calcium 8.8 8.6 - 10.4 mg/dL    Sodium 138 135 - 144 mmol/L    Potassium 5.3 3.7 - 5.3 mmol/L    Chloride 94 (L) 98 - 107 mmol/L    CO2 41 (HH) 20 - 31 mmol/L    Anion Gap 3 (L) 9 - 17 mmol/L    GFR Non-African American >60 >60 mL/min    GFR African American >60 >60 mL/min    GFR Comment         CBC with Auto Differential    Collection Time: 22  4:56 AM   Result Value Ref Range    WBC 15.2 (H) 3.5 - 11.0 k/uL    RBC 5.19 4.5 - 5.9 m/uL    Hemoglobin 12.8 (L) 13.5 - 17.5 g/dL    Hematocrit 40.3 (L) 41 - 53 %    MCV 77.7 (L) 80 - 100 fL    MCH 24.6 (L) 26 - 34 pg    MCHC 31.7 31 - 37 g/dL    RDW 17.0 (H) 11.5 - 14.9 %    Platelets 358 154 - 171 k/uL    MPV 7.4 6.0 - 12.0 fL    Seg Neutrophils 89 (H) 36 - 66 %    Lymphocytes 4 (L) 24 - 44 % Monocytes 7 1 - 7 %    Eosinophils % 0 0 - 4 %    Basophils 0 0 - 2 %    Segs Absolute 13.53 (H) 1.3 - 9.1 k/uL    Absolute Lymph # 0.61 (L) 1.0 - 4.8 k/uL    Absolute Mono # 1.06 0.1 - 1.3 k/uL    Absolute Eos # 0.00 0.0 - 0.4 k/uL    Basophils Absolute 0.00 0.0 - 0.2 k/uL    Morphology ANISOCYTOSIS PRESENT     Morphology HYPOCHROMIA PRESENT          Lab Results   Component Value Date/Time    SPECIAL NOT REPORTED 07/15/2021 04:33 PM     Lab Results   Component Value Date/Time    CULTURE NO SIGNIFICANT GROWTH 02/20/2022 06:41 PM         Radiology:    XR CHEST (SINGLE VIEW FRONTAL)    Result Date: 2/19/2022  Bilateral airspace disease superimposed on pulmonary fibrosis. Increasing airspace disease predominantly involving the left lung. XR HIP RIGHT (1 VIEW)    Result Date: 2/22/2022  Total hip arthropasty without acute hardware complication. XR HIP RIGHT (2-3 VIEWS)    Result Date: 2/18/2022  Portable chest: 1. Worsening airspace disease throughout the left lung with small left-sided pleural effusion, likely worsening multifocal pneumonia. Follow-up is recommended to document resolution. 2. Underlying fibrosis throughout the lungs. 3. Stable mild cardiomegaly. Right hip: Acute slightly displaced right femoral neck fracture. Underlying osteopenia. CT HEAD WO CONTRAST    Result Date: 2/18/2022  No acute intracranial process identified. XR CHEST PORTABLE    Result Date: 2/21/2022  Diffuse bilateral pneumonia worse on the left not significantly changed since 02/19/2022 given differences in radiographic technique. Mild cardiomegaly and possible mild pulmonary vascular congestion. XR CHEST PORTABLE    Result Date: 2/18/2022  Portable chest: 1. Worsening airspace disease throughout the left lung with small left-sided pleural effusion, likely worsening multifocal pneumonia. Follow-up is recommended to document resolution. 2. Underlying fibrosis throughout the lungs. 3. Stable mild cardiomegaly.  Right hip: Acute slightly displaced right femoral neck fracture. Underlying osteopenia. CT CHEST PULMONARY EMBOLISM W CONTRAST    Result Date: 2/18/2022  1. No evidence for acute pulmonary embolism. 2. There is very large new ground-glass airspace disease occupying most of the left upper lobe and new small left pleural effusion. 3. Solid pleural-based posterior segment right upper lobe airspace density along the major fissure is larger now measuring 4.3 x 1.7 cm, previously about 1.2 x 2.4 cm. Probably also worsening pneumonia but would recommend short interval 3 month follow-up 4. 9 mm right upper lobe suprahilar central nodule is unchanged. RECOMMENDATIONS: Unavailable     CT HIP RIGHT WO CONTRAST    Result Date: 2/22/2022  1. Acute right femoral neck fracture. Physical Examination:        Physical Exam  Constitutional:       General: He is not in acute distress. Appearance: Normal appearance. Comments: Pt found sitting up in bed, in no acute physical distress but appears frustrated   HENT:      Head: Normocephalic and atraumatic. Nose: Nose normal.      Comments: NC in place  Eyes:      General:         Right eye: No discharge. Left eye: No discharge. Conjunctiva/sclera: Conjunctivae normal.   Cardiovascular:      Rate and Rhythm: Tachycardia present. Pulmonary:      Effort: No respiratory distress. Breath sounds: No wheezing. Comments: Saturating in mid 80s at 4.5 L O2 during my physical exam;  Given SpO2 and pt's baseline of using 5L O2 at home; I bumped it up to 5L; Saturations improved to low 90s. Able to speak full sentences without significant dyspnea. Diffusely decreased aeration on auscultation. Abdominal:      General: Abdomen is flat. There is no distension. Palpations: Abdomen is soft. Tenderness: There is no abdominal tenderness. Musculoskeletal:         General: Tenderness present. Right lower leg: No edema.       Left lower leg: No edema. Comments: Bandage at incision site at right hip in place; some dried blood seen under bandage but no active bleeding. No erythema. Skin:     General: Skin is warm and dry. Coloration: Skin is not jaundiced. Findings: No erythema or rash. Neurological:      Mental Status: He is alert. Mental status is at baseline.    Psychiatric:         Behavior: Behavior normal.      Comments: Appears in low spirits           Assessment:        Primary Problem  Multifocal pneumonia    Active Hospital Problems    Diagnosis Date Noted    Multifocal pneumonia [J18.9] 02/18/2022    Fracture of right hip [S72.001A] 02/18/2022    Community acquired bacterial pneumonia [J15.9] 02/18/2022    Pulmonary fibrosis ([facility] [address] [J84.10] 12/08/2020    PAF (paroxysmal atrial fibrillation) ([facility] [I48.0] 07/21/2014       Plan:        Multifocal pneumonia in setting of pulmonary fibrosis  O2 needs back at baseline of 5 L on 2/22 prior to hip surgery & remain that way post-operatively  C/w O2 supplementation as needed   Blood cultures no growth x5 days  Respiratory cultures pending  A-1 antitrypsin 229  Azithromycin 500 mg IV every 24 hours 4 days total received  Ceftriaxone 1 g IV every 24 hours - day 6  Methylprednisolone IV 40 mg every 12 hours  Furosemide 40 mg IV daily - started 2/20  Oxygen RT  PT/OT  Echo 2/22: Estimated EF 40 to 45%, evidence of diastolic dysfunction  Pulmonology on board; OK to transfer to intermediate ICU  ID consulted  Incentive spirometry  Consider PMR consult     Acute right hip fracture 2/2 fall - s/p hemiarthroplasty POD#1  CT right hip without contrast: Acute right femoral neck fracture  Ortho following  Right hip femoral hemiarthroplasty done 2/22  Acetaminophen and morphine as needed for pain management     Depression   Sertraline p.o. 25 mg daily started 2/21    Oral thrush  Fluconazole 300 mg IV every 24 hours (started 2/22)    Atrial fibrillation - rate controlled  On home doses of aspirin and Eliquis  Digoxin 125 mg p.o. daily  Dig levels 0.6 wnl (checked since pt on azithromycin)  Magnesium and potassium replacement protocols      Hypertension  Amlodipine p.o. 5 mg daily  Carvedilol 3.125 mg p.o. twice daily   Lisinopril 10 mg p.o. twice daily     Hyperlipidemia  Atorvastatin 40 mg p.o. daily     Other home medications being continued:  Tamsulosin 0.4 mg p.o. daily     Code: DNR-CCA  DVT prophylaxis: Lovenox (full dose d/t Afib hx) on hold postoperatively => restart once OK with surgery  GI prophylaxis: Famotidine 20 mg IV twice daily  Diet: low sodium  Activity: Up with assistance    Please note: Use of a speech recognition software was used in the creation of portions of this note and dictation errors, including those of syntax and sound alike word substitutions, may have escaped proofreading. Eliecer Murguia DO  2/23/2022  8:04 AM       Attestation and add on       I have discussed the care of Bob German , including pertinent history and exam findings,      2/23/22    with the resident. I have seen and examined the patient and the key elements of all parts of the encounter have been performed by me . I agree with the assessment, plan and orders as documented by the resident. Principal Problem:    Multifocal pneumonia  Active Problems:    PAF (paroxysmal atrial fibrillation) (HCC)    Pulmonary fibrosis (HCC)    Fracture of right hip    Community acquired bacterial pneumonia  Resolved Problems:    * No resolved hospital problems. *         ---- ;     MD YOHANA Blake[address]    Phone [phone]   Fax: [phone]  Answering Service: [phone]

## 2022-02-23 NOTE — ANESTHESIA POSTPROCEDURE EVALUATION
Department of Anesthesiology  Postprocedure Note    Patient: Gautam Regan  MRN: [id number]  YOB: 1955  Date of evaluation: 2/23/2022  Time:  12:16 PM     Procedure Summary     Date: 02/22/22 Room / Location: [address] [place] [address]  / [address] [facility]     Anesthesia Start: 1440 Anesthesia Stop: 1606    Procedure: HIP FEMORAL HEMIARTHROPLASTY (Right Hip) Diagnosis:       (RIGHT HIP FRACTURE)      (PT VACCINATED)    Surgeons: Holly Wright MD Responsible Provider: Britt Murphy MD    Anesthesia Type: general ASA Status: 4          Anesthesia Type: general    Ganga Phase I: Ganga Score: 8    Ganga Phase II:      Last vitals: Reviewed and per EMR flowsheets. Anesthesia Post Evaluation    Comments: POD #1. Patient seen lying in bed. Had no anesthesia related issues. He was excited that he was extubated post op.

## 2022-02-24 LAB
ABSOLUTE EOS #: 0 K/UL (ref 0–0.4)
ABSOLUTE LYMPH #: 0.52 K/UL (ref 1–4.8)
ABSOLUTE MONO #: 0.91 K/UL (ref 0.1–1.3)
ANION GAP SERPL CALCULATED.3IONS-SCNC: 4 MMOL/L (ref 9–17)
BASOPHILS # BLD: 0 % (ref 0–2)
BASOPHILS ABSOLUTE: 0 K/UL (ref 0–0.2)
BUN BLDV-MCNC: 33 MG/DL (ref 8–23)
CALCIUM SERPL-MCNC: 8 MG/DL (ref 8.6–10.4)
CHLORIDE BLD-SCNC: 92 MMOL/L (ref 98–107)
CO2: 39 MMOL/L (ref 20–31)
CREAT SERPL-MCNC: 0.63 MG/DL (ref 0.7–1.2)
EOSINOPHILS RELATIVE PERCENT: 0 % (ref 0–4)
GFR AFRICAN AMERICAN: >60 ML/MIN
GFR NON-AFRICAN AMERICAN: >60 ML/MIN
GFR SERPL CREATININE-BSD FRML MDRD: ABNORMAL ML/MIN/{1.73_M2}
GLUCOSE BLD-MCNC: 134 MG/DL (ref 70–99)
HCT VFR BLD CALC: 35 % (ref 41–53)
HEMOGLOBIN: 11.4 G/DL (ref 13.5–17.5)
LYMPHOCYTES # BLD: 4 % (ref 24–44)
MCH RBC QN AUTO: 25 PG (ref 26–34)
MCHC RBC AUTO-ENTMCNC: 32.5 G/DL (ref 31–37)
MCV RBC AUTO: 76.9 FL (ref 80–100)
MONOCYTES # BLD: 7 % (ref 1–7)
MORPHOLOGY: ABNORMAL
PDW BLD-RTO: 16.7 % (ref 11.5–14.9)
PLATELET # BLD: 293 K/UL (ref 150–450)
PMV BLD AUTO: 7.5 FL (ref 6–12)
POTASSIUM SERPL-SCNC: 4.9 MMOL/L (ref 3.7–5.3)
RBC # BLD: 4.55 M/UL (ref 4.5–5.9)
SEG NEUTROPHILS: 89 % (ref 36–66)
SEGMENTED NEUTROPHILS ABSOLUTE COUNT: 11.57 K/UL (ref 1.3–9.1)
SODIUM BLD-SCNC: 135 MMOL/L (ref 135–144)
WBC # BLD: 13 K/UL (ref 3.5–11)

## 2022-02-24 PROCEDURE — 94761 N-INVAS EAR/PLS OXIMETRY MLT: CPT

## 2022-02-24 PROCEDURE — 99233 SBSQ HOSP IP/OBS HIGH 50: CPT | Performed by: INTERNAL MEDICINE

## 2022-02-24 PROCEDURE — 80048 BASIC METABOLIC PNL TOTAL CA: CPT

## 2022-02-24 PROCEDURE — 6370000000 HC RX 637 (ALT 250 FOR IP): Performed by: ORTHOPAEDIC SURGERY

## 2022-02-24 PROCEDURE — 2580000003 HC RX 258: Performed by: INTERNAL MEDICINE

## 2022-02-24 PROCEDURE — 2700000000 HC OXYGEN THERAPY PER DAY

## 2022-02-24 PROCEDURE — 85025 COMPLETE CBC W/AUTO DIFF WBC: CPT

## 2022-02-24 PROCEDURE — 2060000000 HC ICU INTERMEDIATE R&B

## 2022-02-24 PROCEDURE — 97110 THERAPEUTIC EXERCISES: CPT

## 2022-02-24 PROCEDURE — 6370000000 HC RX 637 (ALT 250 FOR IP)

## 2022-02-24 PROCEDURE — 99232 SBSQ HOSP IP/OBS MODERATE 35: CPT | Performed by: PHYSICAL MEDICINE & REHABILITATION

## 2022-02-24 PROCEDURE — 99232 SBSQ HOSP IP/OBS MODERATE 35: CPT | Performed by: INTERNAL MEDICINE

## 2022-02-24 PROCEDURE — 6370000000 HC RX 637 (ALT 250 FOR IP): Performed by: STUDENT IN AN ORGANIZED HEALTH CARE EDUCATION/TRAINING PROGRAM

## 2022-02-24 PROCEDURE — 6360000002 HC RX W HCPCS: Performed by: ORTHOPAEDIC SURGERY

## 2022-02-24 PROCEDURE — 2580000003 HC RX 258: Performed by: ORTHOPAEDIC SURGERY

## 2022-02-24 PROCEDURE — 6360000002 HC RX W HCPCS: Performed by: STUDENT IN AN ORGANIZED HEALTH CARE EDUCATION/TRAINING PROGRAM

## 2022-02-24 PROCEDURE — 6370000000 HC RX 637 (ALT 250 FOR IP): Performed by: INTERNAL MEDICINE

## 2022-02-24 PROCEDURE — 2500000003 HC RX 250 WO HCPCS: Performed by: ORTHOPAEDIC SURGERY

## 2022-02-24 PROCEDURE — 6360000002 HC RX W HCPCS: Performed by: INTERNAL MEDICINE

## 2022-02-24 PROCEDURE — 97530 THERAPEUTIC ACTIVITIES: CPT

## 2022-02-24 PROCEDURE — 36415 COLL VENOUS BLD VENIPUNCTURE: CPT

## 2022-02-24 RX ORDER — FUROSEMIDE 10 MG/ML
20 INJECTION INTRAMUSCULAR; INTRAVENOUS DAILY
Status: DISCONTINUED | OUTPATIENT
Start: 2022-02-25 | End: 2022-02-24

## 2022-02-24 RX ORDER — 0.9 % SODIUM CHLORIDE 0.9 %
500 INTRAVENOUS SOLUTION INTRAVENOUS ONCE
Status: COMPLETED | OUTPATIENT
Start: 2022-02-24 | End: 2022-02-24

## 2022-02-24 RX ORDER — PREDNISONE 20 MG/1
40 TABLET ORAL DAILY
Status: DISCONTINUED | OUTPATIENT
Start: 2022-02-24 | End: 2022-02-25

## 2022-02-24 RX ADMIN — NYSTATIN 500000 UNITS: 500000 SUSPENSION ORAL at 13:00

## 2022-02-24 RX ADMIN — ATORVASTATIN CALCIUM 40 MG: 40 TABLET, FILM COATED ORAL at 08:11

## 2022-02-24 RX ADMIN — CARVEDILOL 3.12 MG: 3.12 TABLET, FILM COATED ORAL at 08:11

## 2022-02-24 RX ADMIN — CEFTRIAXONE SODIUM 1000 MG: 1 INJECTION, POWDER, FOR SOLUTION INTRAMUSCULAR; INTRAVENOUS at 08:12

## 2022-02-24 RX ADMIN — SERTRALINE HYDROCHLORIDE 25 MG: 50 TABLET ORAL at 08:11

## 2022-02-24 RX ADMIN — ACETAMINOPHEN 650 MG: 325 TABLET, FILM COATED ORAL at 18:30

## 2022-02-24 RX ADMIN — ONDANSETRON 4 MG: 4 TABLET, ORALLY DISINTEGRATING ORAL at 23:37

## 2022-02-24 RX ADMIN — ENOXAPARIN SODIUM 70 MG: 100 INJECTION SUBCUTANEOUS at 20:50

## 2022-02-24 RX ADMIN — HYDROMORPHONE HYDROCHLORIDE 0.5 MG: 1 INJECTION, SOLUTION INTRAMUSCULAR; INTRAVENOUS; SUBCUTANEOUS at 23:38

## 2022-02-24 RX ADMIN — FUROSEMIDE 40 MG: 40 INJECTION, SOLUTION INTRAMUSCULAR; INTRAVENOUS at 08:11

## 2022-02-24 RX ADMIN — NYSTATIN 500000 UNITS: 500000 SUSPENSION ORAL at 20:50

## 2022-02-24 RX ADMIN — SODIUM CHLORIDE, PRESERVATIVE FREE 10 ML: 5 INJECTION INTRAVENOUS at 20:51

## 2022-02-24 RX ADMIN — METHYLPREDNISOLONE SODIUM SUCCINATE 40 MG: 40 INJECTION, POWDER, FOR SOLUTION INTRAMUSCULAR; INTRAVENOUS at 08:11

## 2022-02-24 RX ADMIN — NYSTATIN 500000 UNITS: 500000 SUSPENSION ORAL at 08:11

## 2022-02-24 RX ADMIN — PREDNISONE 40 MG: 20 TABLET ORAL at 17:44

## 2022-02-24 RX ADMIN — ACETAMINOPHEN 650 MG: 325 TABLET, FILM COATED ORAL at 09:35

## 2022-02-24 RX ADMIN — SODIUM CHLORIDE, PRESERVATIVE FREE 10 ML: 5 INJECTION INTRAVENOUS at 08:13

## 2022-02-24 RX ADMIN — FAMOTIDINE 20 MG: 10 INJECTION, SOLUTION INTRAVENOUS at 20:50

## 2022-02-24 RX ADMIN — NYSTATIN 500000 UNITS: 500000 SUSPENSION ORAL at 17:44

## 2022-02-24 RX ADMIN — SODIUM CHLORIDE 500 ML: 0.9 INJECTION, SOLUTION INTRAVENOUS at 14:22

## 2022-02-24 RX ADMIN — ACETAMINOPHEN 650 MG: 325 TABLET, FILM COATED ORAL at 03:13

## 2022-02-24 RX ADMIN — SODIUM CHLORIDE, PRESERVATIVE FREE 10 ML: 5 INJECTION INTRAVENOUS at 08:12

## 2022-02-24 RX ADMIN — AMLODIPINE BESYLATE 5 MG: 5 TABLET ORAL at 08:11

## 2022-02-24 RX ADMIN — FAMOTIDINE 20 MG: 10 INJECTION, SOLUTION INTRAVENOUS at 08:11

## 2022-02-24 RX ADMIN — LISINOPRIL 10 MG: 20 TABLET ORAL at 08:11

## 2022-02-24 RX ADMIN — DIGOXIN 125 MCG: 125 TABLET ORAL at 08:11

## 2022-02-24 RX ADMIN — TAMSULOSIN HYDROCHLORIDE 0.4 MG: 0.4 CAPSULE ORAL at 08:11

## 2022-02-24 RX ADMIN — LISINOPRIL 10 MG: 20 TABLET ORAL at 20:50

## 2022-02-24 RX ADMIN — ASPIRIN 81 MG 81 MG: 81 TABLET ORAL at 08:11

## 2022-02-24 RX ADMIN — CARVEDILOL 3.12 MG: 3.12 TABLET, FILM COATED ORAL at 17:44

## 2022-02-24 ASSESSMENT — PAIN DESCRIPTION - PAIN TYPE
TYPE: SURGICAL PAIN

## 2022-02-24 ASSESSMENT — PAIN DESCRIPTION - ORIENTATION
ORIENTATION: RIGHT

## 2022-02-24 ASSESSMENT — PAIN DESCRIPTION - LOCATION
LOCATION: LEG
LOCATION: HIP
LOCATION: LEG
LOCATION: HIP;LEG

## 2022-02-24 ASSESSMENT — PAIN DESCRIPTION - DESCRIPTORS
DESCRIPTORS: ACHING;DISCOMFORT
DESCRIPTORS: ACHING;DISCOMFORT

## 2022-02-24 ASSESSMENT — PAIN SCALES - GENERAL
PAINLEVEL_OUTOF10: 5
PAINLEVEL_OUTOF10: 6
PAINLEVEL_OUTOF10: 7
PAINLEVEL_OUTOF10: 2
PAINLEVEL_OUTOF10: 10
PAINLEVEL_OUTOF10: 5
PAINLEVEL_OUTOF10: 8
PAINLEVEL_OUTOF10: 6
PAINLEVEL_OUTOF10: 6
PAINLEVEL_OUTOF10: 4
PAINLEVEL_OUTOF10: 4
PAINLEVEL_OUTOF10: 8

## 2022-02-24 ASSESSMENT — ENCOUNTER SYMPTOMS
SORE THROAT: 0
BACK PAIN: 1
VOMITING: 0
COUGH: 0
ABDOMINAL PAIN: 0
SHORTNESS OF BREATH: 1
EYE REDNESS: 0

## 2022-02-24 ASSESSMENT — PAIN - FUNCTIONAL ASSESSMENT: PAIN_FUNCTIONAL_ASSESSMENT: PREVENTS OR INTERFERES SOME ACTIVE ACTIVITIES AND ADLS

## 2022-02-24 ASSESSMENT — PAIN DESCRIPTION - DIRECTION: RADIATING_TOWARDS: THIGH

## 2022-02-24 ASSESSMENT — PAIN DESCRIPTION - PROGRESSION: CLINICAL_PROGRESSION: NOT CHANGED

## 2022-02-24 ASSESSMENT — PAIN DESCRIPTION - ONSET
ONSET: ON-GOING
ONSET: ON-GOING

## 2022-02-24 ASSESSMENT — PAIN DESCRIPTION - FREQUENCY: FREQUENCY: CONTINUOUS

## 2022-02-24 NOTE — FLOWSHEET NOTE
Patient said he was feeling better today but still wasn't up to completing advance directives. He asked that chaplains continue to check with him and that \"I'll do it at some point. \"      02/24/22 1513   Encounter Summary   Services provided to: Patient   Referral/Consult From: Palliative Care   Continue Visiting   (2-24-22)   Complexity of Encounter Moderate   Length of Encounter 15 minutes   Grief and Life Adjustment   Type Adjustment to illness   Assessment Approachable   Intervention Active listening;Explored feelings, thoughts, concerns;Sustaining presence/ Ministry of presence   Outcome Expressed gratitude;Engaged in conversation;Expressed feelings/needs/concerns;Coping

## 2022-02-24 NOTE — PROGRESS NOTES
Physical Medicine & Rehabilitation  Progress Note    2/24/2022 4:36 PM     CC: Ambulatory and ADL dysfunction due to right femoral neck fracture status post hip arthroplasty and community-acquired pneumonia complicated by pulmonary fibrosis    Subjective:   Patient notes attempted therapy this morning-went into A. fib blood pressure dropped, heart rate increased and O2 sats dropped. Nose therapy to return in the afternoon. Otherwise patient feels well. Oxygen now at 6 L high flow    ROS:  Denies fevers, chills, sweats. No chest pain, palpitations, lightheadedness. Denies coughing, wheezing or shortness of breath. Denies abdominal pain, nausea, diarrhea or constipation. No new areas of joint pain. Denies new areas of numbness or weakness. Denies new anxiety or depression issues. No new skin problems. Rehabilitation:   PT:  Restrictions/Precautions: Fall Risk,Weight Bearing (Full WBAT RLE)  Implants present? : Metal implants (R hemiarthroplasty, R TKA)  Other position/activity restrictions: Full WBAT RLE  Right Lower Extremity Weight Bearing: Weight Bearing As Tolerated (Full WBAT)     2/24  Bed Mobility:   Bed Mobility  Supine to Sit: Moderate assistance (trunk and LE assist)  Sit to Supine: Minimal assistance (assit with RLE only)  Scooting: Stand by assistance  Comment: pt slightly dizzy upon initially sitting up. Subsided within a minute  Bed mobility  Scooting: Stand by assistance     Transfers:  Sit to Stand: Unable to assess  Stand to sit: Unable to assess  Stairs/Curb  Stairs?: No     BALANCE Posture: Fair  Sitting - Static: Good;-  Sitting - Dynamic: Good;-  Comments: sitting at EOB unsupported x 10mins    2/25  Bed Mobility  Supine to Sit: Moderate assistance (trunk and LE assist)  Sit to Supine: Dependent/Total (x3- due to decrease responsiviness /low BP)  Scooting: Stand by assistance  Comment: Again pt reported feeling slilghtly dizzy upon sitting up at EOB.    Bed mobility  Scooting: Stand by assistance     Transfers:  Sit to Stand: Contact guard assistance;2 Person Assistance (for safety)  Stand to sit: Contact guard assistance;2 Person Assistance (for safety)  WB Status: Full WBAT RLE  Ambulation 1  Surface: level tile  Device: Rolling Walker  Assistance: Contact guard assistance;2 Person assistance  Quality of Gait: small steps, decrease WBing into RLE  Gait Deviations: Slow Lanny;Decreased step length;Decreased step height  Distance: 2ft x 2      OT:  ADL  Feeding: Setup  Grooming: Setup  UE Bathing: Stand by assistance  LE Bathing: Maximum assistance  UE Dressing: Stand by assistance  LE Dressing: Maximum assistance  Toileting: Dependent/Total  Additional Comments: ADL scores obtained through S/OT clinical reasoning/skilled observation. Pt HR and SpO2 WFL at start of session. Pt sup<>sit Mod A and sat EOB ~10 minutes. Pt HR and SpO2 remain WFL. Pt BP was taken and shown to be 80/49. Pt HR and SpO2 remained WFL throughout session, however pt presenting with BP ranging from 97/64 at highest- 62/39 BPM while transfering bed<>recliner CGA x2 with RW. RN consulted and requests pt return to bed to improve BP. while returning to supine pt experianced decreased level of responsivness requring Max A x3 to return to supine safely. Pt placed in [place] position until BP improved to 97/64. RN notified and with pt.          Balance  Sitting Balance: Stand by assistance  Standing Balance: Contact guard assistance (x2 with RW)   Standing Balance  Time: ~1-2 minutes   Activity: stand pivot transfer to recliner   Comment: CGA x2 with RW  Functional Mobility  Functional - Mobility Device: Rolling Walker  Activity: Other (to/from recliner)  Assist Level: Contact guard assistance (x2)  Functional Mobility Comments: stand pivot     Bed mobility  Bridging: Minimal assistance  Rolling to Left: Minimal assistance  Rolling to Right: Minimal assistance  Supine to Sit: Moderate assistance  Sit to Supine: Maximum assistance (x3 due to decreased level of responsivness )  Scooting: Stand by assistance  Comment: Pt Sit<>sup due to decreased level of responsivness. Pt recovered well once supine in trendelenburg position. BP was taken at 62/39 during this occurance improving to 97/64  Transfers  Stand Pivot Transfers: Contact guard assistance (x2)  Sit to stand: Contact guard assistance (x2)  Stand to sit: Contact guard assistance (x2)  Transfer Comments: unable to assess                    ST:            Objective:  BP (!) 80/49 Comment:  upon sitting EOB  Pulse 106   Temp 98.4 °F (36.9 °C) (Oral)   Resp 17   Ht 6' (1.829 m)   Wt 171 lb 1.2 oz (77.6 kg)   SpO2 90%   BMI 23.20 kg/m²  I Body mass index is 23.2 kg/m². I   Wt Readings from Last 1 Encounters:   22 171 lb 1.2 oz (77.6 kg)      Temp (24hrs), Av.5 °F (36.9 °C), Min:97.9 °F (36.6 °C), Max:98.9 °F (37.2 °C)         GEN: well developed, well nourished, no acute distress  HEENT: Normocephalic atraumatic, EOMI, mucous membranes pink and moist  CV: RRR, no murmurs, rubs or gallops  PULM:Respirations WNL and unlabored  ABD: soft, NT, ND, +BS and equal  NEURO: A&O x3. Sensation intact to light touch. MSK: At least antigravity upper extremity left lower extremity distal right lower extremity  EXTREMITIES: No calf tenderness to palpation bilaterally. No edema BLEs  SKIN: warm dry and intact with good turgor  PSYCH: appropriately interactive. Affect WNL.          Medications   Scheduled Meds:   predniSONE  40 mg Oral Daily    nystatin  5 mL Oral 4x Daily    sodium chloride flush  5-40 mL IntraVENous 2 times per day    digoxin  125 mcg Oral Daily    sertraline  25 mg Oral Daily    enoxaparin  1 mg/kg SubCUTAneous BID    sodium chloride flush  5-40 mL IntraVENous 2 times per day    famotidine (PEPCID) injection  20 mg IntraVENous BID    amLODIPine  5 mg Oral Daily    atorvastatin  40 mg Oral Daily    carvedilol  3.125 mg Oral BID WC    lisinopril  10 mg Oral BID    tamsulosin  0.4 mg Oral Daily    aspirin  81 mg Oral Daily     Continuous Infusions:   sodium chloride      sodium chloride       PRN Meds:.sodium chloride flush, sodium chloride, HYDROmorphone **OR** HYDROmorphone, perflutren lipid microspheres, morphine, sodium chloride flush, sodium chloride flush, sodium chloride, ondansetron **OR** ondansetron, polyethylene glycol, acetaminophen **OR** acetaminophen, potassium chloride **OR** potassium chloride, magnesium sulfate     Diagnostics:     CBC:   Recent Labs     02/22/22 0341 02/23/22 0456 02/24/22 0443   WBC 10.0 15.2* 13.0*   RBC 4.59 5.19 4.55   HGB 11.3* 12.8* 11.4*   HCT 35.4* 40.3* 35.0*   MCV 77.2* 77.7* 76.9*   RDW 17.0* 17.0* 16.7*    360 293     BMP:   Recent Labs     02/22/22 0341 02/23/22 0456 02/24/22 0443    138 135   K 4.8 5.3 4.9   CL 96* 94* 92*   CO2 39* 41* 39*   BUN 29* 36* 33*   CREATININE 0.59* 0.75 0.63*     BNP: No results for input(s): BNP in the last 72 hours. PT/INR:   Recent Labs     02/22/22 0341   PROTIME 18.2*   INR 1.5     APTT:   Recent Labs     02/22/22 0341   APTT 32.6     CARDIAC ENZYMES: No results for input(s): CKMB, CKMBINDEX, TROPONINT in the last 72 hours. Invalid input(s): CKTOTAL;3  FASTING LIPID PANEL:  Lab Results   Component Value Date    CHOL 200 (H) 01/03/2017    HDL 29 (L) 12/20/2021    TRIG 165 (H) 01/03/2017     LIVER PROFILE: No results for input(s): AST, ALT, ALB, BILIDIR, BILITOT, ALKPHOS in the last 72 hours. I/O (24Hr): Intake/Output Summary (Last 24 hours) at 2/24/2022 1636  Last data filed at 2/24/2022 1426  Gross per 24 hour   Intake 950 ml   Output 1750 ml   Net -800 ml       Glu last 24 hour  No results for input(s): POCGLU in the last 72 hours.     No results for input(s): CLARITYU, COLORU, PHUR, [address], [address] [facility], [address] [place] [address], BLOODU, BACTERIA, NITRU, [address], LEUKOCYTESUR, YEAST, GLUCOSEU, BILIRUBINUR in the last 72 hours.         Impression: Mr. Renetta Barajas is a 74 y.o. male with a history of Multifocal pneumonia     1. Right femoral neck fracture status post hip arthroplasty-weightbearing as tolerated per note  2. Community-acquired pneumonia complicated by chronic pulmonary fibrosis on 5 L of oxygen at home- prednisone 40 mg  3. Pain-Dilaudid, morphine-receiving frequent morphine-has not received morphine today  4. A. Fib-aspirin, digoxin  5. Hypertension/hyperlipidemia-Norvasc, Lipitor, , lisinopril  6. BPH-Flomax  7. History of Hill's esophagus-Pepcid IV twice a day  8. Thrush-Mycostatin  9. Depression-Zoloft  10. Noted DNR CC arrest  6. Leukocytosis-questionable steroids     Recommendations:  1. Diagnosis: Right hip arthroplasty, community-acquired pneumonia  2. Therapy:  Due to improvements with physical therapy this afternoon, however in the a.m. went into A. fib with therapies with decreased O2 sat, decreased blood pressure and increased heart rate. -Appears occur with increased activity  3. Medical  Necessity: As above  4. Support: Clarify  5. Rehab recommendation: Currently low level for acute inpatient rehabilitation see above therapy notes,  appears making some improvements however as noted episodes of hypotension, tachycardia and hypoxia with limited therapy this morning.,  Continue to monitor if continue to improve and heart rate controlled may still benefit from acute inpatient rehabilitation, however noted  per  patient wanting rehab [facility].    -Noted cardiology consult due to in and out of A. fib        6. DVT proph:  Lovenox 1 mg/kg noted     It was my pleasure to evaluate Bill Carey today. Please call with questions.         Magnus Goodman MD       This note is created with the assistance of a speech recognition program.  While intending to generate a document that actually reflects the content of the visit, the document can still have some errors including those of syntax and sound a like substitutions which may escape proof reading.   In such instances, actual meaning can be extrapolated by contextual diversion

## 2022-02-24 NOTE — PROGRESS NOTES
Physician Progress Note      PATIENT:               [facility] #:                  [id number]  :                       1955  ADMIT DATE:       2022 6:14 AM  DISCH DATE:  RESPONDING  PROVIDER #:        Katina Mckeon MD          QUERY TEXT:    Pt admitted with shortness of breath. Pt noted to have elevated pro-BNP with   documented combined CHF on problem list. If possible, please document in   progress notes and discharge summary if you are evaluating and/or treating any   of the following: The medical record reflects the following:  Risk Factors: 74 y.o. male with PMH significant for atrial fibrillation, HTN,   Hyperlipidemia, pulmonary fibrosis, and combined CHF. Clinical Indicators: In the setting of above risk factors, pt presented for   evaluation of worsening shortness of breath and hypoxia. Per H&P: diffuse   crackles bilat lungs. Pro-BNP 6,234 (). CT Chest  showed small left   sided pleural effusion.  CXR: Mild cardiomegaly and possible mild   pulmonary vascular congestion. Per  Pulm PN Active Problem List: CHF   (congestive heart failure), NYHA class II, acute on chronic, combined  Treatment: Lasix 40 mg daily started . Repeat CXR. Options provided:  -- Acute on Chronic Systolic and Diastolic CHF  -- Acute Systolic and Diastolic CHF  -- Chronic Systolic and Diastolic CHF  -- Other - I will add my own diagnosis  -- Disagree - Not applicable / Not valid  -- Disagree - Clinically unable to determine / Unknown  -- Refer to Clinical Documentation Reviewer    PROVIDER RESPONSE TEXT:    This patient is in systolic and diastolic CHF exacerbation.     Query created by: Rex Adame on 2022 8:37 AM      Electronically signed by:  Katina Mckeon MD 2022 9:36 AM

## 2022-02-24 NOTE — PROGRESS NOTES
[id number]    PROGRESS NOTE             2/24/2022    8:17 AM    Name:   Gilberto Velasquez  MRN:     [id number]     Acct:      [de-identified]   Room:   2005/2005-01  IP Day:  6  Admit Date:  2/18/2022  6:14 AM    PCP:  Koki Ortiz MD  Code Status:  DNR-CCA    Subjective:     C/C: No chief complaint on file. Interval History Status: not changed. Pt was seen and examined. Reports breathing is good;  Placed on [place] d/t dyspnea during PT. Complains of right hip pain but manageable. Complains of diffuse myalgias; attributed to \"uncomfortable bed. \"  Going into A. fib with RVR; 7 yesterday after therapy with self resolved since today is happening again after therapy and limiting therapy as well as patient's motions; Will consult cardiology. Review of Systems:     Review of Systems   Constitutional: Negative for chills and fever. HENT: Negative for sore throat. Eyes: Negative for redness and visual disturbance. Respiratory: Positive for shortness of breath (chronic). Negative for cough. Cardiovascular: Negative for chest pain and palpitations. Gastrointestinal: Negative for abdominal pain and vomiting. Musculoskeletal: Positive for arthralgias, back pain (Attributes it to bed) and myalgias. Neurological: Negative for dizziness and light-headedness. Psychiatric/Behavioral: Negative for behavioral problems and confusion. Medications: Allergies: Allergies   Allergen Reactions    Adhesive Tape Other (See Comments)     Blister badly     Codeine Nausea Only     Other reaction(s): Other: See Comments  NAUSEA  Other reaction(s):  Other: See Comments  NAUSEA    Penicillins Swelling     As a baby  Other reaction(s): Unknown  As a baby    Nintedanib Diarrhea     10-15 BM daily       Current Meds:   Scheduled Meds:    nystatin  5 mL Oral 4x Daily    methylPREDNISolone  40 mg IntraVENous Q12H    sodium chloride flush  5-40 mL IntraVENous 2 times per day    digoxin  125 mcg Oral Daily    sertraline  25 mg Oral Daily    furosemide  40 mg IntraVENous Daily    [Held by provider] enoxaparin  1 mg/kg SubCUTAneous BID    sodium chloride flush  5-40 mL IntraVENous 2 times per day    famotidine (PEPCID) injection  20 mg IntraVENous BID    cefTRIAXone (ROCEPHIN) IV  1,000 mg IntraVENous Q24H    amLODIPine  5 mg Oral Daily    atorvastatin  40 mg Oral Daily    carvedilol  3.125 mg Oral BID WC    lisinopril  10 mg Oral BID    tamsulosin  0.4 mg Oral Daily    aspirin  81 mg Oral Daily     Continuous Infusions:    sodium chloride      sodium chloride       PRN Meds: sodium chloride flush, sodium chloride, HYDROmorphone **OR** HYDROmorphone, perflutren lipid microspheres, morphine, sodium chloride flush, sodium chloride flush, sodium chloride, ondansetron **OR** ondansetron, polyethylene glycol, acetaminophen **OR** acetaminophen, potassium chloride **OR** potassium chloride, magnesium sulfate    Data:     Past Medical History:   has a past medical history of Atrial fibrillation ([facility] [address] Back pain, chronic, Hill's esophagus, Benign essential HTN, BPH (benign prostatic hyperplasia), Cancer (HCC), Chronic idiopathic pulmonary fibrosis ([facility] [address] Cocaine abuse in remission [facility]), ED (erectile dysfunction), GERD (gastroesophageal reflux disease), GI bleed, Hernia, History of colon cancer, Melena, Migraines, and Murmur, cardiac. Social History:   reports that he quit smoking about 51 years ago. He has a 0.50 pack-year smoking history. He has never used smokeless tobacco. He reports current alcohol use of about 12.0 standard drinks of alcohol per week. He reports that he does not use drugs.      Family History:   Family History   Problem Relation Age of Onset    Diabetes Mother     Heart Attack Father     Heart Disease Father     Heart Disease Brother        Vitals:  BP (!) 141/87   Pulse 91   Temp 98.9 °F (37.2 °C) (Oral)   Resp 17   Ht 6' (1.829 m)   Wt 171 lb 1.2 oz (77.6 kg)   SpO2 98%   BMI 23.20 kg/m²   Temp (24hrs), Av.2 °F (36.8 °C), Min:97.5 °F (36.4 °C), Max:98.9 °F (37.2 °C)    No results for input(s): POCGLU in the last 72 hours. Vitals:    22 0100 22 0200 22 0300 22 0400   BP: (!) 157/89 139/66 124/85 (!) 141/87   Pulse: 96 101 98 91   Resp: 14    Temp:    98.9 °F (37.2 °C)   TempSrc:    Oral   SpO2: 96% 96% 96% 98%   Weight:       Height:           I/O(24Hr):     Intake/Output Summary (Last 24 hours) at 2022 0817  Last data filed at 2022 0644  Gross per 24 hour   Intake 0 ml   Output 400 ml   Net -400 ml       Labs:  Recent Results (from the past 24 hour(s))   Basic Metabolic Panel w/ Reflex to MG    Collection Time: 22  4:43 AM   Result Value Ref Range    Glucose 134 (H) 70 - 99 mg/dL    BUN 33 (H) 8 - 23 mg/dL    CREATININE 0.63 (L) 0.70 - 1.20 mg/dL    Calcium 8.0 (L) 8.6 - 10.4 mg/dL    Sodium 135 135 - 144 mmol/L    Potassium 4.9 3.7 - 5.3 mmol/L    Chloride 92 (L) 98 - 107 mmol/L    CO2 39 (H) 20 - 31 mmol/L    Anion Gap 4 (L) 9 - 17 mmol/L    GFR Non-African American >60 >60 mL/min    GFR African American >60 >60 mL/min    GFR Comment         CBC with Auto Differential    Collection Time: 22  4:43 AM   Result Value Ref Range    WBC 13.0 (H) 3.5 - 11.0 k/uL    RBC 4.55 4.5 - 5.9 m/uL    Hemoglobin 11.4 (L) 13.5 - 17.5 g/dL    Hematocrit 35.0 (L) 41 - 53 %    MCV 76.9 (L) 80 - 100 fL    MCH 25.0 (L) 26 - 34 pg    MCHC 32.5 31 - 37 g/dL    RDW 16.7 (H) 11.5 - 14.9 %    Platelets 159 429 - 906 k/uL    MPV 7.5 6.0 - 12.0 fL    Seg Neutrophils 89 (H) 36 - 66 %    Lymphocytes 4 (L) 24 - 44 %    Monocytes 7 1 - 7 %    Eosinophils % 0 0 - 4 %    Basophils 0 0 - 2 %    Segs Absolute 11.57 (H) 1.3 - 9.1 k/uL    Absolute Lymph # 0.52 (L) 1.0 - 4.8 k/uL    Absolute Mono # 0.91 0.1 - 1.3 k/uL    Absolute Eos # 0.00 0.0 - 0.4 k/uL    Basophils Absolute 0.00 0.0 - 0.2 k/uL    Morphology ANISOCYTOSIS PRESENT     Morphology MICROCYTOSIS PRESENT     Morphology FEW STOMATOCYTES     Morphology FEW ELLIPTOCYTES          Lab Results   Component Value Date/Time    SPECIAL NOT REPORTED 07/15/2021 04:33 PM     Lab Results   Component Value Date/Time    CULTURE NO SIGNIFICANT GROWTH 02/20/2022 06:41 PM         Radiology:    XR CHEST (SINGLE VIEW FRONTAL)    Result Date: 2/19/2022  Bilateral airspace disease superimposed on pulmonary fibrosis. Increasing airspace disease predominantly involving the left lung. XR HIP RIGHT (1 VIEW)    Result Date: 2/22/2022  Total hip arthropasty without acute hardware complication. XR HIP RIGHT (2-3 VIEWS)    Result Date: 2/18/2022  Portable chest: 1. Worsening airspace disease throughout the left lung with small left-sided pleural effusion, likely worsening multifocal pneumonia. Follow-up is recommended to document resolution. 2. Underlying fibrosis throughout the lungs. 3. Stable mild cardiomegaly. Right hip: Acute slightly displaced right femoral neck fracture. Underlying osteopenia. CT HEAD WO CONTRAST    Result Date: 2/18/2022  No acute intracranial process identified. XR CHEST PORTABLE    Result Date: 2/23/2022  No significant interval change. XR CHEST PORTABLE    Result Date: 2/21/2022  Diffuse bilateral pneumonia worse on the left not significantly changed since 02/19/2022 given differences in radiographic technique. Mild cardiomegaly and possible mild pulmonary vascular congestion. XR CHEST PORTABLE    Result Date: 2/18/2022  Portable chest: 1. Worsening airspace disease throughout the left lung with small left-sided pleural effusion, likely worsening multifocal pneumonia. Follow-up is recommended to document resolution. 2. Underlying fibrosis throughout the lungs. 3. Stable mild cardiomegaly. Right hip: Acute slightly displaced right femoral neck fracture. Underlying osteopenia.      CT CHEST PULMONARY EMBOLISM W CONTRAST    Result Date: 2/18/2022  1. No evidence for acute pulmonary embolism. 2. There is very large new ground-glass airspace disease occupying most of the left upper lobe and new small left pleural effusion. 3. Solid pleural-based posterior segment right upper lobe airspace density along the major fissure is larger now measuring 4.3 x 1.7 cm, previously about 1.2 x 2.4 cm. Probably also worsening pneumonia but would recommend short interval 3 month follow-up 4. 9 mm right upper lobe suprahilar central nodule is unchanged. RECOMMENDATIONS: Unavailable     CT HIP RIGHT WO CONTRAST    Result Date: 2/22/2022  1. Acute right femoral neck fracture. Physical Examination:        Physical Exam  Constitutional:       General: He is not in acute distress. Appearance: Normal appearance. HENT:      Head: Normocephalic and atraumatic. Nose: Nose normal.      Comments: Nasal cannula in place  Eyes:      General: No scleral icterus. Right eye: No discharge. Left eye: No discharge. Conjunctiva/sclera: Conjunctivae normal.   Cardiovascular:      Rate and Rhythm: Tachycardia present. Rhythm irregular. Pulmonary:      Effort: No respiratory distress. Comments: Saturating in 90s on 5 L  Abdominal:      General: Abdomen is flat. Bowel sounds are normal.      Palpations: Abdomen is soft. Tenderness: There is no abdominal tenderness. There is no guarding. Neurological:      Mental Status: He is alert.          Assessment:        Primary Problem  Multifocal pneumonia    Active Hospital Problems    Diagnosis Date Noted    Multifocal pneumonia [J18.9] 02/18/2022    Fracture of right hip [S72.001A] 02/18/2022    Community acquired bacterial pneumonia [J15.9] 02/18/2022    Pulmonary fibrosis ([facility] [address] [J84.10] 12/08/2020    PAF (paroxysmal atrial fibrillation) ([facility] [address] [I48.0] 07/21/2014       Plan:        Multifocal pneumonia in setting of pulmonary fibrosis- improving  C/w O2 supplementation as needed   Blood cultures negative  Respiratory cultures pending  Azithromycin 500 mg IV every 24 hours 4 days total received  Ceftriaxone 1 g IV every 24 hours - day 7  Methylprednisolone IV => switched to oral prednisone 40 mg QD today  RT  PT/OT   Pulmonology following  ID following  Incentive spirometry  PMR consulted     Acute on chronic systolic and diastolic CHF exacerbation  Also contributed to initial SOB presentation  Echo 2/22: EF 40 to 45%, evidence of diastolic dysfunction  Furosemide 40 mg IV QD - started 2/20 => will decrease to 20 mg IV QD    Acute right hip fracture 2/2 fall - s/p hemiarthroplasty POD#2  CT right hip without contrast: Acute right femoral neck fracture  Ortho following  Right hip femoral hemiarthroplasty done 2/22  Acetaminophen and morphine as needed for pain management     Depression   Sertraline p.o. 25 mg daily started 2/21     Atrial fibrillation - rate uncontrolled s/p surgery  On home doses of aspirin  Full dose enoxaparin in place of home Eliquis   Digoxin 125 mg p.o. daily  Dig levels 0.6 wnl (checked since pt on azithromycin)  Magnesium and potassium replacement protocols   Going into Afib RVR => Consult Cardiology     Hypertension  Amlodipine p.o. 5 mg daily  Carvedilol 3.125 mg p.o. twice daily   Lisinopril 10 mg p.o. twice daily     Hyperlipidemia  Atorvastatin 40 mg p.o. daily     Other home medications being continued:  Tamsulosin 0.4 mg p.o. daily     Code: DNR-CCA  DVT prophylaxis: Enoxaparin 30 mg SQ twice daily (full dose d/t Afib hx)  GI prophylaxis: Famotidine 20 mg IV twice daily  Diet: low sodium  Activity: Up with assistance      Please note: Use of a speech recognition software was used in the creation of portions of this note and dictation errors, including those of syntax and sound alike word substitutions, may have escaped proofreading.        Tiny Alston DO  2/24/2022  8:17 AM       Attestation and add on       I have discussed the care of Pallavi Parra , including pertinent history and exam findings,      2/24/22    with the resident. I have seen and examined the patient and the key elements of all parts of the encounter have been performed by me . I agree with the assessment, plan and orders as documented by the resident. Principal Problem:    Multifocal pneumonia  Active Problems:    PAF (paroxysmal atrial fibrillation) (HCC)    Pulmonary fibrosis (HCC)    Fracture of right hip    Community acquired bacterial pneumonia  Resolved Problems:    * No resolved hospital problems. *         ---- ;     MD YOHANA Haas[address], [address] [place] [address].    Phone [phone]   Fax: [phone]  Answering Service: [phone]

## 2022-02-24 NOTE — CARE COORDINATION
ONGOING DISCHARGE PLAN:    Per Nursing, when pt. Was up to the chair, BP dropped, O2 Sats increased. BP 80/49, Sating 90%, 6LHFNC. Pt. Is POD #2, Right Hip Femoral Hemiarthroplasty. Po Prednisone. Per LSW, pt was too low level for ARU, Most likely will need SNF, Pt. Was wanting Rehab in [facility] close to his home. LSW will see santi. PT/OT on board. Will continue to follow for additional discharge needs.     Electronically signed by Chaparro Delgado RN on 2/24/2022 at 4:29 PM

## 2022-02-24 NOTE — PLAN OF CARE
Problem: DISCHARGE BARRIERS  Goal: Patient's continuum of care needs are met  2/24/2022 0708 by Zaheer Stone RN  Outcome: Ongoing  2/24/2022 0648 by Zaheer Stone RN  Outcome: Ongoing     Problem: Pain:  Goal: Pain level will decrease  Description: Pain level will decrease  2/24/2022 0708 by Zaheer Stone RN  Outcome: Ongoing  2/24/2022 0648 by Zaheer Stone RN  Outcome: Ongoing  Goal: Control of acute pain  Description: Control of acute pain  2/24/2022 0708 by Zaheer Stone RN  Outcome: Ongoing  2/24/2022 0648 by Zaheer Stone RN  Outcome: Ongoing  Goal: Control of chronic pain  Description: Control of chronic pain  2/24/2022 0708 by Zaheer Stone RN  Outcome: Ongoing  2/24/2022 0648 by Zaheer Stone RN  Outcome: Ongoing     Problem: Musculor/Skeletal Functional Status  Goal: Highest potential functional level  2/24/2022 0708 by Zaheer Stone RN  Outcome: Ongoing  2/24/2022 0648 by Zaheer Stone RN  Outcome: Ongoing  Goal: Absence of falls  2/24/2022 0708 by Zaheer Stone RN  Outcome: Ongoing  2/24/2022 0648 by Zaheer Stone RN  Outcome: Ongoing     Problem: Musculor/Skeletal Functional Status  Goal: Highest potential functional level  2/24/2022 0708 by Zaheer Stone RN  Outcome: Ongoing  2/24/2022 0648 by Zaheer Stone RN  Outcome: Ongoing  Goal: Absence of falls  2/24/2022 0708 by Zaheer Stone RN  Outcome: Ongoing  2/24/2022 0648 by Zaheer Stone RN  Outcome: Ongoing

## 2022-02-24 NOTE — PROGRESS NOTES
Physical Therapy  [address]   Physical Therapy Progress Note    Date: 22  Patient Name: Wen Padilla       Room:   MRN: [id number]   Account: [de-identified]   : 1955  (74 y.o.) Gender: male     Referring Practitioner: Jay Bunn MD  Diagnosis: Hypoxia, community acquired, bacterial pnemumonia, closed fx of neck of right femur s/p HIP FEMORAL HEMIARTHROPLASTY, pneumonia of both lungs due to infectious organism, multifocal pneumonia   Past Medical History:  has a past medical history of Atrial fibrillation ([facility] [address] Back pain, chronic, Hill's esophagus, Benign essential HTN, BPH (benign prostatic hyperplasia), Cancer ([facility] [address] Chronic idiopathic pulmonary fibrosis ([facility] [address] Cocaine abuse in remission [facility]), ED (erectile dysfunction), GERD (gastroesophageal reflux disease), GI bleed, Hernia, History of colon cancer, Melena, Migraines, and Murmur, cardiac. Past Surgical History:   has a past surgical history that includes Tonsillectomy; Colonoscopy; colectomy; Colonoscopy (2016); knee surgery (Right, ); transesophageal echocardiogram (2018); Upper gastrointestinal endoscopy (N/A, 2018); Colonoscopy (N/A, 2018); hernia repair (Right, ); Cardiac catheterization (2018); colectomy; Total knee arthroplasty (Right, 2019); Upper gastrointestinal endoscopy (N/A, 2019); Cardioversion (); and hip surgery (Right, 2022). Restrictions/Precautions  Restrictions/Precautions: Fall Risk;Weight Bearing (Full WBAT RLE)  Required Braces or Orthoses?: No  Implants present? : Metal implants (R hemiarthroplasty, R TKA)  Lower Extremity Weight Bearing Restrictions  Right Lower Extremity Weight Bearing: Weight Bearing As Tolerated (Full WBAT)  Position Activity Restriction  Other position/activity restrictions: Full WBAT RLE    Subjective: Pt in bed, agreeable to PT OT. Would love to get up to chair. RN okay with therapy. Vital Signs  Pulse:  (tachy throughout [id number]'s Highest reaching 177)  Patient Currently in Pain: Yes  Pain Assessment: 0-10  Pain Level: 4  Pain Type: Surgical pain  Pain Location: Hip;Leg  Pain Orientation: Right     Oxygen Therapy  SpO2:  (destated with bed ex and sup>sit lowest 82%)  O2 Flow Rate (L/min): 6 L/min          Bed Mobility:   Bed Mobility  Supine to Sit: Moderate assistance (trunk and LE assist)  Sit to Supine: Minimal assistance (assit with RLE only)  Scooting: Stand by assistance  Comment: pt slightly dizzy upon initially sitting up. Subsided within a minute  Bed mobility  Scooting: Stand by assistance    Transfers:  Sit to Stand: Unable to assess  Stand to sit: Unable to assess                          Stairs/Curb  Stairs?: No          BALANCE Posture: Fair  Sitting - Static: Good;-  Sitting - Dynamic: Good;-  Comments: sitting at EOB unsupported x 10mins    EXERCISES    Other exercises?: Yes  Other exercises 1: Supine RLE ex x 10  Other exercises 2: Seated (B) LE ex x 10-15  Other exercises 3: Sitting sloan at EOB x 10 mins- Hoping pt's HR would regulate, however continues to be tachy 120's-150's reaching a high of 177bpm. RN advised that pt lye back down at this time and will get cardiology on board to assist with getting pt's HR under control.   Other exercises 4: Pt issued supine HEP for LE strengthening           Activity Tolerance: Treatment limited secondary to medical complications (free text) (tachy HR)  PT Equipment Recommendations  Other: TBD       Current Treatment Recommendations: Strengthening,ROM,Balance Training,Functional Mobility Training,Transfer Training,Endurance Training,Gait Training,Equipment Evaluation, Education, & procurement,Patient/Caregiver Education & Training,Safety Education & Training,Positioning,Pain Management    Conditions Requiring Skilled Therapeutic Intervention  Body structures, Functions, Activity limitations: Decreased functional mobility ;Decreased ROM; Decreased strength;Decreased ADL status; Decreased endurance;Decreased balance; Increased pain  Assessment: Pt most limited by pain, tachycardia, O2 sats, and feeling sweaty/clammy. Pt would benefit from continued PT to improve his independence towards PLOF. Treatment Diagnosis: Impaired functional mobility 2* hip pain  Prognosis: Good  Decision Making: Medium Complexity  History: s/p HIP FEMORAL HEMIARTHROPLASTY on 2.22.22  Exam: ROM, MMT, bed mobility, posture, endurance  Clinical Presentation: Pt alert, cooperative, pleasant  Barriers to Learning: pain  REQUIRES PT FOLLOW UP: Yes  Discharge Recommendations: Continue to assess pending progress; Patient would benefit from continued therapy after discharge    Goals  Short term goals  Time Frame for Short term goals: 7 days  Short term goal 1: Pt to demo supine<-->sit min to mod x1. Short term goal 2: Pt to perform transfers min to mod x1from varied surfaces. Short term goal 3: Pt to amb 10'-20' min to mod x1 with device. Short term goal 4: Pt to tolerate sitting upright in chair for at least 30 minutes. Short term goal 5: Pt to reduce pain to 2-3/10 with movement to progress independence with mobility. Short term goal 6: Pt to improve BLE strength by 1/2 MMG. Short term goal 7: Pt to tolerate standing 1-2 minutes, min to mod x1 with device.        02/24/22 1353   PT Individual Minutes   Time In 0826   Time Out 0851   Minutes 25       Electronically signed by Shadi Warren PTA on 2/24/22 at 1:55 PM EST

## 2022-02-24 NOTE — PLAN OF CARE
Problem: Skin Integrity:  Goal: Will show no infection signs and symptoms  Description: Will show no infection signs and symptoms  Outcome: Ongoing  Goal: Absence of new skin breakdown  Description: Absence of new skin breakdown  Outcome: Ongoing     Problem: Falls - Risk of:  Goal: Will remain free from falls  Description: Will remain free from falls  Outcome: Ongoing  Goal: Absence of physical injury  Description: Absence of physical injury  Outcome: Ongoing     Problem: SAFETY  Goal: Free from accidental physical injury  Outcome: Ongoing  Goal: Free from intentional harm  Outcome: Ongoing     Problem: DAILY CARE  Goal: Daily care needs are met  Outcome: Ongoing     Problem: PAIN  Goal: Patient's pain/discomfort is manageable  Outcome: Ongoing     Problem: SKIN INTEGRITY  Goal: Skin integrity is maintained or improved  Outcome: Ongoing     Problem: KNOWLEDGE DEFICIT  Goal: Patient/S.O. demonstrates understanding of disease process, treatment plan, medications, and discharge instructions.   Outcome: Ongoing     Problem: DISCHARGE BARRIERS  Goal: Patient's continuum of care needs are met  Outcome: Ongoing     Problem: Pain:  Goal: Pain level will decrease  Description: Pain level will decrease  Outcome: Ongoing  Goal: Control of acute pain  Description: Control of acute pain  Outcome: Ongoing  Goal: Control of chronic pain  Description: Control of chronic pain  Outcome: Ongoing     Problem: Musculor/Skeletal Functional Status  Goal: Highest potential functional level  Outcome: Ongoing  Goal: Absence of falls  Outcome: Ongoing     Problem: Musculor/Skeletal Functional Status  Goal: Highest potential functional level  Outcome: Ongoing  Goal: Absence of falls  Outcome: Ongoing

## 2022-02-24 NOTE — PROGRESS NOTES
[address]   INPATIENT OCCUPATIONAL THERAPY  PROGRESS NOTE  Date: 2022  Patient Name: Isael Cosby      Room:   MRN: [id number]    : 1955  (74 y.o.) Gender: male     Discharge Recommendations: The patient would benefit from an intensive level of therapy after discharge from the facility. They should be able to tolerate 3-hours of Combined OT/PT/ST over 5 days/week or at least 900 minutes of  Combined Therapy over 7 days.            Referring Practitioner: Bob Galvan MD  Diagnosis: Hypoxia, community acquired, bacterial pnemumonia, closed fx of neck of right femur s/p HIP FEMORAL HEMIARTHROPLASTY, pneumonia of both lungs due to infectious organism, multifocal pneumonia   General  Chart Reviewed: Yes,Orders,Progress Notes,History and Physical,Operative Notes  Patient assessed for rehabilitation services?: Yes  Family / Caregiver Present: No  Referring Practitioner: Bob Galvan MD  Diagnosis: Hypoxia, community acquired, bacterial pnemumonia, closed fx of neck of right femur s/p HIP FEMORAL HEMIARTHROPLASTY, pneumonia of both lungs due to infectious organism, multifocal pneumonia     Restrictions  Restrictions/Precautions: Fall Risk,Weight Bearing (Full WBAT RLE)  Implants present? : Metal implants (R hemiarthroplasty, R TKA)  Other position/activity restrictions: Full WBAT RLE  Right Lower Extremity Weight Bearing: Weight Bearing As Tolerated (Full WBAT)  Required Braces or Orthoses?: No      Subjective  Subjective: \"I want to get out of this bed\" Pt egar to sit in chair this morning. Increaed heart rate and decreased SpO2 levels prevented this from being done safety this AM. RN and respitory monitoring.   Comments: RN okayed pt to be seen for OT/PT treatment  Patient Currently in Pain: Yes  Pain Level: 6  Pain Location: Leg  Pain Orientation: Right  Overall Orientation Status: Within Functional Limits  Patient Observation  Observations: HR varied consitantly from 120s-150s due to afib. Ranges obvered to be as low as 80 BMP while supine and as high as 177 BPM sit<>supine. O2 sats to low 80s at edge of bed. Pt requires at least 3 minutes to recover. Limited by high HR  Pain Assessment  Pain Assessment: 0-10  Pain Level: 6  Pain Type: Surgical pain  Pain Location: Leg  Pain Orientation: Right  Pain Radiating Towards: thigh    Objective  Cognition  Overall Cognitive Status: WFL  Bed mobility  Bridging: Minimal assistance  Rolling to Left: Minimal assistance  Rolling to Right: Minimal assistance  Supine to Sit: Moderate assistance (x2)  Sit to Supine: Minimal assistance  Scooting: Moderate assistance  Comment: Pt reports some mild dizziness sitting EOB. Balance  Sitting Balance: Stand by assistance  Standing Balance: Unable to assess(comment) (Pt increased HR and decreased oxygen saturation)  Standing Balance  Comment: unable to assess   Functional Mobility  Functional Mobility Comments: unable to assess    ADL  Feeding: Setup  Grooming: Setup  UE Bathing: Stand by assistance (sitting EOB )  LE Bathing: Maximum assistance  UE Dressing: Stand by assistance  LE Dressing: Dependent/Total  Toileting: Dependent/Total  Additional Comments: ADL scores obtained through S/OT clinical reasoning/skilled observation. Pt completed leg exercises with PT in bed prior to washing face while seated EOB. Pt required Mod A sup<>sit and sat EOB ~10 minures SBA to monitor viatals. Pt returned to supine with Min A due to elevated HR and decreased SpO2 levels per RN. RN and respitory therapy monitoring vitals      Transfers  Transfer Comments: unable to assess                              Assessment  Performance deficits / Impairments: Decreased functional mobility ; Decreased ADL status; Decreased ROM; Decreased strength;Decreased safe awareness;Decreased endurance;Decreased balance;Decreased high-level IADLs  Prognosis: Good  Discharge Recommendations: Patient would benefit from continued therapy after discharge  Activity Tolerance: Patient Tolerated treatment well;Treatment limited secondary to medical complications (free text); Patient limited by pain (unstable vitals )  Safety Devices in place: Yes  Type of devices: Call light within reach; Patient at risk for falls; Left in bed;Nurse notified  Restraints  Initially in place: No             Patient Education:   Pursed lip breathing   Learner:patient  Method: explanation       Outcome: demonstrated understanding     Plan  Safety Devices  Safety Devices in place: Yes  Type of devices: Call light within reach,Patient at risk for falls,Left in bed,Nurse notified  Restraints  Initially in place: No  Plan  Times per week: 5-7 (BID)  Times per day: Twice a day  Current Treatment Recommendations: [address] Mobility Training,Pain Management,Safety Education & Training,Patient/Caregiver Education & Training,Equipment Evaluation, Education, & procurement,Self-Care / ADL,Positioning      Goals  Short term goals  Time Frame for Short term goals: by discharge   Short term goal 1: Pt will complete LB bathing/dressing/toileting Mod A while maintaining vitals WFL and use of AE/DME/modified techniques to increase IND with self-care  Short term goal 2: Pt will verbalize/demonstrate Good understanding of fall prevention strategies to increase safety in ADL's  Short term goal 3: Pt will complete functional mobiliy/transfers Mod A with RW during functional activity to increase IND in ADL's  Short term goal 4: Pt will tolerate 15-20 minutes of functional activity while maintaining Good safety and vitals to increase strength and IND in self-care    OT Individual Minutes  Time In: 0826  Time Out: 0852  Minutes: 26      Electronically signed by Marce Bloch, S/OT on 2/24/22 at 10:04 AM EST

## 2022-02-24 NOTE — FLOWSHEET NOTE
Writer spoke with Terese Kyle, who has some anxiety over being named the patient's HPOA even though they have been together for over 40 years. She knows the patient's wishes, but said she wants to be sure patient's brother is 2nd agent as she doesn't want to have the decisions \"all on my shoulders. \" She was appreciative of writer's listening presence and emotional support. 02/24/22 1630   Encounter Summary   Services provided to: Significant other   Referral/Consult From: Palliative Care   Continue Visiting   (2-24-22)   Complexity of Encounter Moderate   Length of Encounter 15 minutes   Grief and Life Adjustment   Type Adjustment to illness;Palliative care   Assessment Anxious; Approachable   Intervention Active listening;Explored feelings, thoughts, concerns;Explored coping resources;Sustaining presence/ Ministry of presence; Discussed illness/injury and it's impact   Outcome Expressed gratitude;Engaged in conversation;Expressed feelings/needs/concerns;Receptive

## 2022-02-24 NOTE — PROGRESS NOTES
[address]    INPATIENT OCCUPATIONAL THERAPY  PROGRESS NOTE  Date: 2022  Patient Name: La Padilla      Room:   MRN: [id number]    : 1955  (74 y.o.) Gender: male     Discharge Recommendations: The patient would benefit from an intensive level of therapy after discharge from the facility. They should be able to tolerate 3-hours of Combined OT/PT/ST over 5 days/week or at least 900 minutes of  Combined Therapy over 7 days.          Referring Practitioner: Sasha Collins MD  Diagnosis: Hypoxia, community acquired, bacterial pnemumonia, closed fx of neck of right femur s/p HIP FEMORAL HEMIARTHROPLASTY, pneumonia of both lungs due to infectious organism, multifocal pneumonia   General  Chart Reviewed: Yes,Orders,Progress Notes,History and Physical,Operative Notes  Patient assessed for rehabilitation services?: Yes  Family / Caregiver Present: No  Referring Practitioner: Sasha Collins MD  Diagnosis: Hypoxia, community acquired, bacterial pnemumonia, closed fx of neck of right femur s/p HIP FEMORAL HEMIARTHROPLASTY, pneumonia of both lungs due to infectious organism, multifocal pneumonia     Restrictions  Restrictions/Precautions: Fall Risk,Weight Bearing (Full WBAT RLE)  Implants present? : Metal implants (R hemiarthroplasty, R TKA)  Other position/activity restrictions: Full WBAT RLE  Right Lower Extremity Weight Bearing: Weight Bearing As Tolerated (Full WBAT)  Required Braces or Orthoses?: No      Subjective  Subjective: \"I'm feeling okay right now\" Pt reports he feels stable and ready to attempt to transfer bed<>chair   Comments: RN okayed pt to be seen for OT/PT treatment  Patient Currently in Pain: Yes  Pain Level: 8  Pain Location: Hip;Leg  Pain Orientation: Right  Overall Orientation Status: Within Functional Limits  Patient Observation  Observations: Pt HR and SpO2 remained WFL throughout session, however pt presenting with BP ranging from 97/64 at highest- 62/39 BPM while transfering bed<>recliner. RN consulted and requests pt return to bed to improve BP. while returning to supine pt experianced decreased level of responsivness requring Max A x3 to return to supine safely. Pt placed in trendenburg position until BP improved to 97/64. RN notified and with pt. Pain Assessment  Pain Assessment: 0-10  Pain Level: 8  Pain Type: Surgical pain  Pain Location: Hip,Leg  Pain Orientation: Right  Pain Radiating Towards: thigh    Objective  Cognition  Overall Cognitive Status: WFL  Bed mobility  Bridging: Minimal assistance  Rolling to Left: Minimal assistance  Rolling to Right: Minimal assistance  Supine to Sit: Moderate assistance  Sit to Supine: Maximum assistance (x3 due to decreased level of responsivness )  Scooting: Stand by assistance  Comment: Pt Sit<>sup due to decreased level of responsivness. Pt recovered well once supine in trendelenburg position. BP was taken at 62/39 during this occurance improving to 97/64  Balance  Sitting Balance: Stand by assistance  Standing Balance: Contact guard assistance (x2 with RW)  Standing Balance  Time: ~1-2 minutes   Activity: stand pivot transfer to recliner   Comment: CGA x2 with RW  Functional Mobility  Functional - Mobility Device: Rolling Walker  Activity: Other (to/from recliner)  Assist Level: Contact guard assistance (x2)  Functional Mobility Comments: stand pivot   ADL  Feeding: Setup  Grooming: Setup  UE Bathing: Stand by assistance  LE Bathing: Maximum assistance  UE Dressing: Stand by assistance  LE Dressing: Maximum assistance  Toileting: Dependent/Total  Additional Comments: ADL scores obtained through S/OT clinical reasoning/skilled observation. Pt HR and SpO2 WFL at start of session. Pt sup<>sit Mod A and sat EOB ~10 minutes. Pt HR and SpO2 remain WFL. Pt BP was taken and shown to be 80/49. DEONDRE Resendez consulted and okays pt for transfer to chair with BLE elevated.  Pt HR and SpO2 remained WFL throughout session, however pt presenting with BP ranging from 97/64 at highest- 62/39 BPM while transfering bed<>recliner CGA x2 with RW. RN consulted and requests pt return to bed to improve BP. while returning to supine pt experianced decreased level of responsivness requring Max A x3 to return to supine safely. Pt placed in tren[place]burg position until BP improved to 97/64. RN Unknown Cloud monitoring and with pt. Transfers  Stand Pivot Transfers: Contact guard assistance (x2)  Sit to stand: Contact guard assistance (x2)  Stand to sit: Contact guard assistance (x2)  Transfer Comments: unable to assess                              Assessment  Performance deficits / Impairments: Decreased functional mobility ; Decreased ADL status; Decreased ROM; Decreased strength;Decreased safe awareness;Decreased endurance;Decreased balance;Decreased high-level IADLs  Prognosis: Good  Discharge Recommendations: Patient would benefit from continued therapy after discharge  Activity Tolerance: Patient Tolerated treatment well;Treatment limited secondary to medical complications (free text); Patient limited by pain (decreased BP)  Safety Devices in place: Yes  Type of devices: Call light within reach; Patient at risk for falls; Left in bed;Nurse notified  Restraints  Initially in place: No             Patient Education:   pacing, pursed lip breathing   Learner:patient  Method: explanation       Outcome: demonstrated understanding     Plan  Safety Devices  Safety Devices in place: Yes  Type of devices: Call light within reach,Patient at risk for falls,Left in bed,Nurse notified  Restraints  Initially in place: No  Plan  Times per week: 5-7 (BID)  Times per day: Twice a day  Current Treatment Recommendations: Strengthening,ROM,Balance Training,Functional Mobility Training,Pain Management,Safety Education & Training,Patient/Caregiver Education & Training,Equipment Evaluation, Education, & procurement,Self-Care / ADL,Positioning      Goals  Short term goals  Time Frame for Short term goals: by discharge   Short term goal 1: Pt will complete LB bathing/dressing/toileting Mod A while maintaining vitals WFL and use of AE/DME/modified techniques to increase IND with self-care  Short term goal 2: Pt will verbalize/demonstrate Good understanding of fall prevention strategies to increase safety in ADL's  Short term goal 3: Pt will complete functional mobiliy/transfers Mod A with RW during functional activity to increase IND in ADL's  Short term goal 4: Pt will tolerate 15-20 minutes of functional activity while maintaining Good safety and vitals to increase strength and IND in self-care    OT Individual Minutes  Time In: 1312  Time Out: 1345  Minutes: 33      Electronically signed by CAROLINA Meek/OT on 2/24/22 at 3:33 PM EST

## 2022-02-24 NOTE — PROGRESS NOTES
ICU Progress Note (Non-Vent)  [facility]    Patient - Alexis Sullivan,  Age - 74 y.o.    - 1955      Room Number -    MRN -  [id number]   Acct # - [de-identified]  Date of Admission -  2022  6:14 AM    Events of Past 24 Hours   Per RN, no acute overnight events. POD #2 of right hip femoral hemiarthroplasty   He is alert and oriented, was on 5L Saltzer device saturating 97%. He denies any worsening dyspnea. Afib on monitor with HR between 110-120s. Oral thrush greatly improved. Labs reviewed. WBC improving. Electrolytes wnl, improvement in BUN. Vitals    height is 6' (1.829 m) and weight is 171 lb 1.2 oz (77.6 kg). His oral temperature is 98.6 °F (37 °C). His blood pressure is 112/88 and his pulse is 106. His respiration is 17 and oxygen saturation is 100%.        Temperature Range: Temp: 98.6 °F (37 °C) Temp  Av.2 °F (36.8 °C)  Min: 97.5 °F (36.4 °C)  Max: 98.9 °F (37.2 °C)  BP Range:  Systolic (51HLF), RNC:557 , Min:79 , MXL:405     Diastolic (82HIC), UGZ:26, Min:50, Max:89    Pulse Range: Pulse  Av.2  Min: 82  Max: 126  Respiration Range: Resp  Av.2  Min: 14  Max: 24  Current Pulse Ox[de-identified]  SpO2:  (destated with bed ex and sup>sit lowest 82%)  24HR Pulse Ox Range:  SpO2  Av.1 %  Min: 90 %  Max: 100 %  Oxygen Amount and Delivery: O2 Flow Rate (L/min): 6 L/min    Wt Readings from Last 3 Encounters:   22 171 lb 1.2 oz (77.6 kg)   21 171 lb (77.6 kg)   07/15/21 166 lb 9.6 oz (75.6 kg)     I/O       Intake/Output Summary (Last 24 hours) at 2022 1129  Last data filed at 2022 0857  Gross per 24 hour   Intake 0 ml   Output 900 ml   Net -900 ml     DRAIN/TUBE OUTPUT       Invasive Lines   ICP PRESSURE RANGE  No data recorded  CVP PRESSURE RANGE  No data recorded      Medications      nystatin  5 mL Oral 4x Daily    methylPREDNISolone  40 mg IntraVENous Q12H    sodium chloride flush  5-40 mL IntraVENous 2 times per day    digoxin  125 mcg Oral Daily    sertraline  25 mg Oral Daily    furosemide  40 mg IntraVENous Daily    enoxaparin  1 mg/kg SubCUTAneous BID    sodium chloride flush  5-40 mL IntraVENous 2 times per day    famotidine (PEPCID) injection  20 mg IntraVENous BID    cefTRIAXone (ROCEPHIN) IV  1,000 mg IntraVENous Q24H    amLODIPine  5 mg Oral Daily    atorvastatin  40 mg Oral Daily    carvedilol  3.125 mg Oral BID WC    lisinopril  10 mg Oral BID    tamsulosin  0.4 mg Oral Daily    aspirin  81 mg Oral Daily     sodium chloride flush, sodium chloride, HYDROmorphone **OR** HYDROmorphone, perflutren lipid microspheres, morphine, sodium chloride flush, sodium chloride flush, sodium chloride, ondansetron **OR** ondansetron, polyethylene glycol, acetaminophen **OR** acetaminophen, potassium chloride **OR** potassium chloride, magnesium sulfate  IV Drips/Infusions   sodium chloride      sodium chloride         Diet/Nutrition   ADULT DIET; Regular  ADULT ORAL NUTRITION SUPPLEMENT; Breakfast, Dinner; Standard High Calorie/High Protein Oral Supplement    Exam      Constitutional - Alert, arousable  General Appearance  well developed, well nourished  HEENT -normocephalic, atraumatic. PERRLA has thrush in the oral cavity, but very improved from 02/22/22  Lungs - Chest expands equally, some coarseness in the left upper lobe. Clear breath sounds to the posterior lung bases. Cardiovascular - Heart sounds are normal.  normal rate and rhythm regular, no murmur, gallop or rub. Abdomen - soft, nontender, nondistended, no masses or organomegaly  Neurologic - CN II-XII are grossly intact.  There are no focal motor deficits  Skin - no bruising or bleeding  Extremities - no cyanosis, clubbing or edema    Lab Results   CBC     Lab Results   Component Value Date    WBC 13.0 02/24/2022    RBC 4.55 02/24/2022    HGB 11.4 02/24/2022    HCT 35.0 02/24/2022     02/24/2022    MCV 76.9 02/24/2022    MCH 25.0 02/24/2022    MCHC 32.5 02/24/2022    RDW 16.7 02/24/2022    LYMPHOPCT 4 02/24/2022    MONOPCT 7 02/24/2022    BASOPCT 0 02/24/2022    MONOSABS 0.91 02/24/2022    LYMPHSABS 0.52 02/24/2022    EOSABS 0.00 02/24/2022    BASOSABS 0.00 02/24/2022    DIFFTYPE NOT REPORTED 02/21/2022       BMP   Lab Results   Component Value Date     02/24/2022    K 4.9 02/24/2022    CL 92 02/24/2022    CO2 39 02/24/2022    BUN 33 02/24/2022    CREATININE 0.63 02/24/2022    GLUCOSE 134 02/24/2022       LFTS  Lab Results   Component Value Date    ALKPHOS 92 12/20/2021    ALT 8 12/20/2021    AST 18 12/20/2021    PROT 7.4 12/20/2021    BILITOT 0.66 12/20/2021    BILIDIR <0.08 12/05/2018    IBILI CANNOT BE CALCULATED 12/05/2018    LABALBU 4.1 12/20/2021       ABG ABGs:   Lab Results   Component Value Date    PHART 7.394 02/18/2022    PO2ART 33.4 02/18/2022    CSN4PHE 42.4 02/18/2022       Lab Results   Component Value Date    MODE NOT REPORTED 02/18/2022         INR  Recent Labs     02/22/22 0341   PROTIME 18.2*   INR 1.5       APTT  Recent Labs     02/22/22  0341   APTT 32.6       Lactic Acid  Lab Results   Component Value Date    LACTA NOT REPORTED 12/07/2020    LACTA NOT REPORTED 10/17/2020    LACTA 1.0 08/11/2014        BNP   No results for input(s): BNP in the last 72 hours. Cultures       Radiology         SYSTEMS ASSESSMENT    Acute on chronic hypoxic respiratory failure  Left upper lobe pneumonia  Right femoral neck fracture  Idiopathic pulmonary fibrosis/UIP  Hemoptysis-resolved    Oxygen requirements have dramatically decreased on 5L Saltzer   Maintain O2 saturation >88%   Continue Rocephin and Zithromax  Continue incentive spirometry  Decreased steroids to allow for proper wound healing s/p surgery. Oral thrush secondary to steroids - continue nystatin    Consult cardio for Afib and tachycardia.        Anabela Anderson [facility]       Critical Care Time   0 min      Electronically signed by Nasra Cline on 2/24/2022 at 11:29 AM        Patient seen and examined independently by me. Above discussed and I agree with medical student note except where indicated in the EMR revision history. Also see my additional comments and changes indicated by discrete font, text color, italics, and/or initials. Labs, cultures, and radiographs where available were reviewed. Overall, he continues to improve from a pulmonary standpoint, almost back to his baseline oxygen.   However, his heart rate is elevated he had seen Dr. Luca Kelley for preoperative cardiac clearance and we will asked them to adjust his cardiac medications  We will switch him to oral prednisone  Finished course of Zithromax, continue ceftriaxone for 2 more days for his pneumonia  I am not sure if he needs IV Lasix anymore, his BUN/creatinine ratio has been elevated and he is now tachycardic  Awaiting bed in ICU intermediate section      Electronically signed by Afshin Jhaveri MD on 2/24/2022 at 11:40 AM

## 2022-02-24 NOTE — PROGRESS NOTES
Physical Therapy  [facility]: ARSH CATES   Physical Therapy Progress Note    Date: 22  Patient Name: La Padilla       Room:   MRN: [id number]   Account: [de-identified]   : 1955  (74 y.o.) Gender: male     Referring Practitioner: Keaton Machado MD  Diagnosis: Hypoxia, community acquired, bacterial pnemumonia, closed fx of neck of right femur s/p HIP FEMORAL HEMIARTHROPLASTY, pneumonia of both lungs due to infectious organism, multifocal pneumonia   Past Medical History:  has a past medical history of Atrial fibrillation ([facility] [address] Back pain, chronic, Hill's esophagus, Benign essential HTN, BPH (benign prostatic hyperplasia), Cancer ([facility] [address] Chronic idiopathic pulmonary fibrosis ([facility] [address] Cocaine abuse in remission [facility]), ED (erectile dysfunction), GERD (gastroesophageal reflux disease), GI bleed, Hernia, History of colon cancer, Melena, Migraines, and Murmur, cardiac. Past Surgical History:   has a past surgical history that includes Tonsillectomy; Colonoscopy; colectomy; Colonoscopy (2016); knee surgery (Right, s); transesophageal echocardiogram (2018); Upper gastrointestinal endoscopy (N/A, 2018); Colonoscopy (N/A, 2018); hernia repair (Right, ); Cardiac catheterization (2018); colectomy; Total knee arthroplasty (Right, 2019); Upper gastrointestinal endoscopy (N/A, 2019); Cardioversion (); and hip surgery (Right, 2022). Restrictions/Precautions  Restrictions/Precautions: Fall Risk;Weight Bearing (Full WBAT RLE)  Required Braces or Orthoses?: No  Implants present? : Metal implants (R hemiarthroplasty, R TKA)  Lower Extremity Weight Bearing Restrictions  Right Lower Extremity Weight Bearing: Weight Bearing As Tolerated (Full WBAT)  Position Activity Restriction  Other position/activity restrictions: Full WBAT RLE    Subjective: Pt in bed, agreeable to PT OT. Would love to get up to chair.  RN okay with therapy. Comments: RN reported pt's HR is under control, however is having low BP's. Okay with attempting to get pt up to chair this afternoon. Vital Signs  Pulse:  (tachy throughout session 102-122)  BP: (!) 80/49 ( upon sitting EOB)  BP Location: Left upper arm  Patient Position: Sitting (EOB)  Patient Currently in Pain: Yes  Pain Assessment: 0-10  Pain Level: 8  Pain Type: Surgical pain  Pain Location: Hip;Leg  Pain Orientation: Right     Oxygen Therapy  SpO2:   (stayed at or above 90% throughout session)  Pulse Oximeter Device Mode: Continuous  Pulse Oximeter Device Location: Finger  O2 Device: High flow nasal cannula    O2 Flow Rate (L/min): 6 L/min          Bed Mobility:   Bed Mobility  Supine to Sit: Moderate assistance (trunk and LE assist)  Sit to Supine: Dependent/Total (x3- due to decrease responsiviness /low BP)  Scooting: Stand by assistance  Comment: Again pt reported feeling slilghtly dizzy upon sitting up at EOB. Bed mobility  Scooting: Stand by assistance    Transfers:  Sit to Stand: Contact guard assistance;2 Person Assistance (for safety)  Stand to sit: Contact guard assistance;2 Person Assistance (for safety)              WB Status: Full WBAT RLE  Ambulation 1  Surface: level tile  Device: Rolling Walker  Assistance: Contact guard assistance;2 Person assistance  Quality of Gait: small steps, decrease WBing into RLE  Gait Deviations: Slow Lanny;Decreased step length;Decreased step height  Distance: 2ft x 2  Comments: Pt able to take a few steps from EOB over to recliner placed at bedside. Pt demonstrated good use of RW. Multiple lines to manage with bed<>chair transfer. Once pt was sitting in chair with LE's elevated BP was 71/47, pt stated he was not dizzy. Per RN she would like pt to be placed back in bed. Pt transfered back to bed. Once pt sat at EOB he started to appear less responsive with starting to lean over and eyes roll back in his head.  Pt immediatly assisted by 3 people into bed supine safely. Bed placed in trendelenburg position. BP 97/64. Bed brought back to neutral and pt made comfortable with EPC cuffs on and working. RN notified. Stairs/Curb  Stairs?: No        BALANCE Posture: Fair  Sitting - Static: Good;-  Sitting - Dynamic: Good;-  Standing - Static: Good;- (w/RW)  Standing - Dynamic: Good;- (w/RW)  Comments: sitting EOB and standing with RW    EXERCISES    Other exercises?: Yes  Other exercises 2: Seated (B) LE ex x 10-15     Activity Tolerance: Treatment limited secondary to medical complications (low BP)  PT Equipment Recommendations  Other: TBD     Current Treatment Recommendations: Strengthening,ROM,Balance Training,Functional Mobility Training,Transfer Training,Endurance Training,Gait Training,Equipment Evaluation, Education, & procurement,Patient/Caregiver Education & Training,Safety Education & Training,Positioning,Pain Management    Conditions Requiring Skilled Therapeutic Intervention  Body structures, Functions, Activity limitations: Decreased functional mobility ; Decreased ROM; Decreased strength;Decreased ADL status; Decreased endurance;Decreased balance; Increased pain  Assessment: Pt most limited by pain, tachycardia, O2 sats, and feeling sweaty/clammy. Pt would benefit from continued PT to improve his independence towards PLOF. Treatment Diagnosis: Impaired functional mobility 2* hip pain  Prognosis: Good  Decision Making: Medium Complexity  History: s/p HIP FEMORAL HEMIARTHROPLASTY on 2.22.22  Exam: ROM, MMT, bed mobility, posture, endurance  Clinical Presentation: Pt alert, cooperative, pleasant  Barriers to Learning: pain  REQUIRES PT FOLLOW UP: Yes  Discharge Recommendations: Continue to assess pending progress; Patient would benefit from continued therapy after discharge    Goals  Short term goals  Time Frame for Short term goals: 7 days  Short term goal 1: Pt to demo supine<-->sit min to mod x1.   Short term goal 2: Pt to perform transfers min to mod x1from varied surfaces. Short term goal 3: Pt to amb 10'-20' min to mod x1 with device. Short term goal 4: Pt to tolerate sitting upright in chair for at least 30 minutes. Short term goal 5: Pt to reduce pain to 2-3/10 with movement to progress independence with mobility. Short term goal 6: Pt to improve BLE strength by 1/2 MMG. Short term goal 7: Pt to tolerate standing 1-2 minutes, min to mod x1 with device.        02/24/22 1353   PT Individual Minutes   Time In 1312   Time Out 1346   Minutes 34       Electronically signed by Madeleine Benoit PTA on 2/24/22 at 2:25 PM EST

## 2022-02-24 NOTE — PROGRESS NOTES
[facility] -   Infectious diseases evaluation  admission date 2/18/2022    reason for consultation:   Pneumonia    Impression :   Current:  Community-acquired pneumonia  Acute right femoral neck fracture S/P right femoral hemiarthroplasty . Pulmonary fibrosis  Acute hypoxic respiratory failure    Recommendations   · Discontinue IV ceftriaxone  · Diflucan IV 1 dose received on 2/22/2022  · On nystatin suspension  · 5 days course of Zithromax completed 2/21/2022  · Follow CBC and renal function  · Supportive care    Infection Control Recommendations   · [place] Precautions      Antimicrobial Stewardship Recommendations   · Simplification of therapy  · Targeted therapy          History of Present Illness:   Initial history:  Pallavi Parra is a 74y.o.-year-old male presented to hospital on 2/18/2022 with worsening shortness of breath for several days, worse with exertion, no alleviating factors. The patient also fell ,was found to have acute right femoral neck fracture. The patient had chronic cough due to pulmonary fibrosis not increased, usually on 5 L of oxygen at home. He also had chronic legs pain. He denied fever or chills. The patient has been afebrile, no leukocytosis. He was started on IV Zithromax and Rocephin on admission that he is tolerating  Strep pneumo and Legionella antigen negative  Chest x-ray showed diffuse bilateral pneumonia worse on the left side. COVID-19 rapid test was negative. No growth on blood cultures  Interval changes  2/24/2022   He remains on oxygen per nasal cannula, denied worsening shortness of breath, remains tachycardic in A. fib, no new complaints.   Patient Vitals for the past 8 hrs:   BP Temp Temp src Pulse Resp SpO2   02/24/22 1514 -- -- -- -- -- 90 %   02/24/22 1358 (!) 80/49 -- -- -- -- --   02/24/22 1230 91/60 98.4 °F (36.9 °C) Oral -- -- --   02/24/22 1015 112/88 -- -- 106 17 100 %   02/24/22 0945 97/62 -- -- 100 15 100 %   02/24/22 0930 92/74 -- -- 114 22 95 %   02/24/22 0900 110/71 -- -- 107 22 93 %           I have personally reviewed the past medical history, past surgical history, medications, social history, and family history, and I haveupdated the database accordingly. Allergies:   Adhesive tape, Codeine, Penicillins, and Nintedanib     Review of Systems:     Review of Systems  As per history of present illness, other than above 12 system review was negative  Physical Examination :       Physical Exam  Constitutional:       Appearance: He is not toxic-appearing. HENT:      Head: Normocephalic and atraumatic. Right Ear: External ear normal.      Left Ear: External ear normal.      Mouth/Throat:      Pharynx: Oropharynx is clear. No oropharyngeal exudate. Comments: Thrush  Eyes:      General: No scleral icterus. Conjunctiva/sclera: Conjunctivae normal.   Cardiovascular:      Rate and Rhythm: Tachycardia present. Rhythm irregular. Heart sounds: No murmur heard. Pulmonary:      Breath sounds: No wheezing or rales. Comments: Coarse breath sounds bilaterally  Abdominal:      General: There is no distension. Palpations: Abdomen is soft. Musculoskeletal:      Cervical back: Neck supple. No rigidity. Right lower leg: No edema. Left lower leg: No edema. Skin:     General: Skin is warm. Coloration: Skin is not jaundiced. Neurological:      General: No focal deficit present. Mental Status: He is alert and oriented to person, place, and time.          Past Medical History:     Past Medical History:   Diagnosis Date    Atrial fibrillation ([facility] [address]     Back pain, chronic     Hill's esophagus 06/18/2019    Benign essential HTN     BPH (benign prostatic hyperplasia)     Cancer (HCC)     colon-rectal    Chronic idiopathic pulmonary fibrosis ([facility] [address] 03/29/2021    Cocaine abuse in remission [facility])     1970's    ED (erectile dysfunction) 4/2/2015    GERD (gastroesophageal reflux disease)     GI bleed 12/5/2018    Hernia     History of colon cancer     Melena     Migraines     Murmur, cardiac        Past Surgical  History:     Past Surgical History:   Procedure Laterality Date    CARDIAC CATHETERIZATION  11/29/2018    Non-obstructive CAD    CARDIOVERSION  2020    COLECTOMY      2nd colectomy, Colostomy and reversed [facility] COLECTOMY      1st time [place]    COLONOSCOPY      COLONOSCOPY  07/18/2016    COLONOSCOPY N/A 12/6/2018    COLONOSCOPY DIAGNOSTIC performed by Edgardo Talbot MD at [address] [facility] Right 2009    inguinal    HIP SURGERY Right 2/22/2022    HIP FEMORAL HEMIARTHROPLASTY performed by Diego Spears MD at [address] [facility] Right 1970's    arthrotomy    TONSILLECTOMY      TOTAL KNEE ARTHROPLASTY Right 1/8/2019    KNEE TOTAL ARTHROPLASTY performed by Diego Spears MD at [address] [facility] TRANSESOPHAGEAL ECHOCARDIOGRAM  11/29/2018    UPPER GASTROINTESTINAL ENDOSCOPY N/A 12/5/2018    EGD DIAGNOSTIC ONLY performed by Edgardo Talbot MD at [address] [facility] N/A 6/18/2019    MCGUIRE'S       Medications:      predniSONE  40 mg Oral Daily    nystatin  5 mL Oral 4x Daily    sodium chloride flush  5-40 mL IntraVENous 2 times per day    digoxin  125 mcg Oral Daily    sertraline  25 mg Oral Daily    enoxaparin  1 mg/kg SubCUTAneous BID    sodium chloride flush  5-40 mL IntraVENous 2 times per day    famotidine (PEPCID) injection  20 mg IntraVENous BID    amLODIPine  5 mg Oral Daily    atorvastatin  40 mg Oral Daily    carvedilol  3.125 mg Oral BID WC    lisinopril  10 mg Oral BID    tamsulosin  0.4 mg Oral Daily    aspirin  81 mg Oral Daily       Social History:     Social History     Socioeconomic History    Marital status: Single     Spouse name: Not on file    Number of children: Not on file    Years of education: Not on file    Highest education level: Not on file Occupational History    Not on file   Tobacco Use    Smoking status: Former Smoker     Packs/day: 0.50     Years: 1.00     Pack years: 0.50     Quit date:      Years since quittin.1    Smokeless tobacco: Never Used    Tobacco comment: stated never actually really smoked only inhaled    Vaping Use    Vaping Use: Never used   Substance and Sexual Activity    Alcohol use: Yes     Alcohol/week: 12.0 standard drinks     Types: 2 Shots of liquor, 10 Standard drinks or equivalent per week     Comment: 2-3 times a week    Drug use: No     Types: Other-see comments     Comment: Cocaine use in past in     Sexual activity: Yes     Partners: Female   Other Topics Concern    Not on file   Social History Narrative    Not on file     Social Determinants of Health     Financial Resource Strain: Medium Risk    Difficulty of Paying Living Expenses: Somewhat hard   Food Insecurity: No Food Insecurity    Worried About Running Out of Food in the Last Year: Never true    Tino of Food in the Last Year: Never true   Transportation Needs:     Lack of Transportation (Medical): Not on file    Lack of Transportation (Non-Medical):  Not on file   Physical Activity:     Days of Exercise per Week: Not on file    Minutes of Exercise per Session: Not on file   Stress:     Feeling of Stress : Not on file   Social Connections:     Frequency of Communication with Friends and Family: Not on file    Frequency of Social Gatherings with Friends and Family: Not on file    Attends Yarsanism Services: Not on file    Active Member of Clubs or Organizations: Not on file    Attends Club or Organization Meetings: Not on file    Marital Status: Not on file   Intimate Partner Violence:     Fear of Current or Ex-Partner: Not on file    Emotionally Abused: Not on file    Physically Abused: Not on file    Sexually Abused: Not on file   Housing Stability:     Unable to Pay for Housing in the Last Year: Not on file    Number of Places Lived in the Last Year: Not on file    Unstable Housing in the Last Year: Not on file       Family History:     Family History   Problem Relation Age of Onset    Diabetes Mother     Heart Attack Father     Heart Disease Father     Heart Disease Brother       Medical Decision Making:   I have independently reviewed/ordered the following labs:    CBC with Differential:   Recent Labs     02/23/22  0456 02/24/22  0443   WBC 15.2* 13.0*   HGB 12.8* 11.4*   HCT 40.3* 35.0*    293   LYMPHOPCT 4* 4*   MONOPCT 7 7     BMP:  Recent Labs     02/23/22  0456 02/24/22  0443    135   K 5.3 4.9   CL 94* 92*   CO2 41* 39*   BUN 36* 33*   CREATININE 0.75 0.63*     Hepatic Function Panel: No results for input(s): PROT, LABALBU, BILIDIR, IBILI, BILITOT, ALKPHOS, ALT, AST in the last 72 hours. No results for input(s): RPR in the last 72 hours. No results for input(s): HIV in the last 72 hours. No results for input(s): BC in the last 72 hours. Lab Results   Component Value Date    CREATININE 0.63 02/24/2022    GLUCOSE 134 02/24/2022       Detailed results: Thank you for allowing us to participate in the care of this patient. Please call with questions. This note is created with the assistance of a speech recognition program.  While intending to generate adocument that actually reflects the content of the visit, the document can still have some errors including those of syntax and sound a like substitutions which may escape proof reading. It such instances, actual meaningcan be extrapolated by contextual diversion.     Gail Ledezma MD  Office: [phone]  Perfect serve / office [phone]

## 2022-02-25 LAB
ABSOLUTE EOS #: 0 K/UL (ref 0–0.4)
ABSOLUTE LYMPH #: 0.9 K/UL (ref 1–4.8)
ABSOLUTE MONO #: 1.1 K/UL (ref 0.1–1.3)
ANION GAP SERPL CALCULATED.3IONS-SCNC: 4 MMOL/L (ref 9–17)
BASOPHILS # BLD: 0 % (ref 0–2)
BASOPHILS ABSOLUTE: 0 K/UL (ref 0–0.2)
BUN BLDV-MCNC: 28 MG/DL (ref 8–23)
CALCIUM SERPL-MCNC: 7.4 MG/DL (ref 8.6–10.4)
CHLORIDE BLD-SCNC: 97 MMOL/L (ref 98–107)
CO2: 34 MMOL/L (ref 20–31)
CREAT SERPL-MCNC: 0.51 MG/DL (ref 0.7–1.2)
EOSINOPHILS RELATIVE PERCENT: 0 % (ref 0–4)
GFR AFRICAN AMERICAN: >60 ML/MIN
GFR NON-AFRICAN AMERICAN: >60 ML/MIN
GFR SERPL CREATININE-BSD FRML MDRD: ABNORMAL ML/MIN/{1.73_M2}
GLUCOSE BLD-MCNC: 111 MG/DL (ref 70–99)
HCT VFR BLD CALC: 31.1 % (ref 41–53)
HEMOGLOBIN: 10.1 G/DL (ref 13.5–17.5)
LYMPHOCYTES # BLD: 8 % (ref 24–44)
MCH RBC QN AUTO: 24.8 PG (ref 26–34)
MCHC RBC AUTO-ENTMCNC: 32.5 G/DL (ref 31–37)
MCV RBC AUTO: 76.4 FL (ref 80–100)
MONOCYTES # BLD: 9 % (ref 1–7)
PDW BLD-RTO: 16.5 % (ref 11.5–14.9)
PLATELET # BLD: 281 K/UL (ref 150–450)
PMV BLD AUTO: 7.5 FL (ref 6–12)
POTASSIUM SERPL-SCNC: 4.2 MMOL/L (ref 3.7–5.3)
RBC # BLD: 4.07 M/UL (ref 4.5–5.9)
SEG NEUTROPHILS: 83 % (ref 36–66)
SEGMENTED NEUTROPHILS ABSOLUTE COUNT: 10.5 K/UL (ref 1.3–9.1)
SODIUM BLD-SCNC: 135 MMOL/L (ref 135–144)
WBC # BLD: 12.6 K/UL (ref 3.5–11)

## 2022-02-25 PROCEDURE — 99233 SBSQ HOSP IP/OBS HIGH 50: CPT | Performed by: INTERNAL MEDICINE

## 2022-02-25 PROCEDURE — 6370000000 HC RX 637 (ALT 250 FOR IP): Performed by: INTERNAL MEDICINE

## 2022-02-25 PROCEDURE — 6360000002 HC RX W HCPCS: Performed by: ORTHOPAEDIC SURGERY

## 2022-02-25 PROCEDURE — 85025 COMPLETE CBC W/AUTO DIFF WBC: CPT

## 2022-02-25 PROCEDURE — 6370000000 HC RX 637 (ALT 250 FOR IP): Performed by: ORTHOPAEDIC SURGERY

## 2022-02-25 PROCEDURE — 94761 N-INVAS EAR/PLS OXIMETRY MLT: CPT

## 2022-02-25 PROCEDURE — 6370000000 HC RX 637 (ALT 250 FOR IP)

## 2022-02-25 PROCEDURE — 2500000003 HC RX 250 WO HCPCS: Performed by: ORTHOPAEDIC SURGERY

## 2022-02-25 PROCEDURE — 6360000002 HC RX W HCPCS: Performed by: STUDENT IN AN ORGANIZED HEALTH CARE EDUCATION/TRAINING PROGRAM

## 2022-02-25 PROCEDURE — 99231 SBSQ HOSP IP/OBS SF/LOW 25: CPT | Performed by: INTERNAL MEDICINE

## 2022-02-25 PROCEDURE — 2580000003 HC RX 258: Performed by: ORTHOPAEDIC SURGERY

## 2022-02-25 PROCEDURE — 6370000000 HC RX 637 (ALT 250 FOR IP): Performed by: STUDENT IN AN ORGANIZED HEALTH CARE EDUCATION/TRAINING PROGRAM

## 2022-02-25 PROCEDURE — 97116 GAIT TRAINING THERAPY: CPT

## 2022-02-25 PROCEDURE — 2060000000 HC ICU INTERMEDIATE R&B

## 2022-02-25 PROCEDURE — 2700000000 HC OXYGEN THERAPY PER DAY

## 2022-02-25 PROCEDURE — 97530 THERAPEUTIC ACTIVITIES: CPT

## 2022-02-25 PROCEDURE — 36415 COLL VENOUS BLD VENIPUNCTURE: CPT

## 2022-02-25 PROCEDURE — 97110 THERAPEUTIC EXERCISES: CPT

## 2022-02-25 PROCEDURE — 80048 BASIC METABOLIC PNL TOTAL CA: CPT

## 2022-02-25 RX ORDER — MIDODRINE HYDROCHLORIDE 10 MG/1
10 TABLET ORAL EVERY 6 HOURS PRN
Status: DISCONTINUED | OUTPATIENT
Start: 2022-02-25 | End: 2022-02-26

## 2022-02-25 RX ORDER — FAMOTIDINE 20 MG/1
20 TABLET, FILM COATED ORAL 2 TIMES DAILY
Status: DISCONTINUED | OUTPATIENT
Start: 2022-02-25 | End: 2022-03-03 | Stop reason: HOSPADM

## 2022-02-25 RX ORDER — OXYCODONE HYDROCHLORIDE AND ACETAMINOPHEN 5; 325 MG/1; MG/1
1 TABLET ORAL EVERY 4 HOURS PRN
Status: DISCONTINUED | OUTPATIENT
Start: 2022-02-25 | End: 2022-03-03 | Stop reason: HOSPADM

## 2022-02-25 RX ADMIN — CARVEDILOL 3.12 MG: 3.12 TABLET, FILM COATED ORAL at 08:48

## 2022-02-25 RX ADMIN — SODIUM CHLORIDE, PRESERVATIVE FREE 10 ML: 5 INJECTION INTRAVENOUS at 20:48

## 2022-02-25 RX ADMIN — NYSTATIN 500000 UNITS: 500000 SUSPENSION ORAL at 20:47

## 2022-02-25 RX ADMIN — NYSTATIN 500000 UNITS: 500000 SUSPENSION ORAL at 08:48

## 2022-02-25 RX ADMIN — NYSTATIN 500000 UNITS: 500000 SUSPENSION ORAL at 16:47

## 2022-02-25 RX ADMIN — NYSTATIN 500000 UNITS: 500000 SUSPENSION ORAL at 12:07

## 2022-02-25 RX ADMIN — ATORVASTATIN CALCIUM 40 MG: 40 TABLET, FILM COATED ORAL at 08:48

## 2022-02-25 RX ADMIN — LISINOPRIL 10 MG: 20 TABLET ORAL at 08:48

## 2022-02-25 RX ADMIN — HYDROMORPHONE HYDROCHLORIDE 0.5 MG: 1 INJECTION, SOLUTION INTRAMUSCULAR; INTRAVENOUS; SUBCUTANEOUS at 02:51

## 2022-02-25 RX ADMIN — FAMOTIDINE 20 MG: 10 INJECTION, SOLUTION INTRAVENOUS at 08:48

## 2022-02-25 RX ADMIN — ENOXAPARIN SODIUM 70 MG: 100 INJECTION SUBCUTANEOUS at 22:15

## 2022-02-25 RX ADMIN — SERTRALINE HYDROCHLORIDE 25 MG: 50 TABLET ORAL at 08:48

## 2022-02-25 RX ADMIN — SODIUM CHLORIDE, PRESERVATIVE FREE 10 ML: 5 INJECTION INTRAVENOUS at 09:00

## 2022-02-25 RX ADMIN — OXYCODONE HYDROCHLORIDE AND ACETAMINOPHEN 1 TABLET: 5; 325 TABLET ORAL at 12:07

## 2022-02-25 RX ADMIN — METOPROLOL TARTRATE 25 MG: 25 TABLET, FILM COATED ORAL at 20:46

## 2022-02-25 RX ADMIN — AMLODIPINE BESYLATE 5 MG: 5 TABLET ORAL at 08:49

## 2022-02-25 RX ADMIN — OXYCODONE HYDROCHLORIDE AND ACETAMINOPHEN 1 TABLET: 5; 325 TABLET ORAL at 20:46

## 2022-02-25 RX ADMIN — DIGOXIN 125 MCG: 125 TABLET ORAL at 08:48

## 2022-02-25 RX ADMIN — SODIUM CHLORIDE, PRESERVATIVE FREE 10 ML: 5 INJECTION INTRAVENOUS at 20:49

## 2022-02-25 RX ADMIN — TAMSULOSIN HYDROCHLORIDE 0.4 MG: 0.4 CAPSULE ORAL at 08:48

## 2022-02-25 RX ADMIN — FAMOTIDINE 20 MG: 20 TABLET, FILM COATED ORAL at 20:46

## 2022-02-25 RX ADMIN — LISINOPRIL 10 MG: 20 TABLET ORAL at 20:46

## 2022-02-25 RX ADMIN — ACETAMINOPHEN 650 MG: 325 TABLET, FILM COATED ORAL at 02:51

## 2022-02-25 RX ADMIN — SODIUM CHLORIDE, PRESERVATIVE FREE 10 ML: 5 INJECTION INTRAVENOUS at 20:47

## 2022-02-25 RX ADMIN — PREDNISONE 40 MG: 20 TABLET ORAL at 08:48

## 2022-02-25 RX ADMIN — ENOXAPARIN SODIUM 70 MG: 100 INJECTION SUBCUTANEOUS at 08:48

## 2022-02-25 RX ADMIN — ASPIRIN 81 MG 81 MG: 81 TABLET ORAL at 08:48

## 2022-02-25 ASSESSMENT — PAIN DESCRIPTION - ORIENTATION
ORIENTATION: RIGHT

## 2022-02-25 ASSESSMENT — ENCOUNTER SYMPTOMS
SHORTNESS OF BREATH: 1
NAUSEA: 1
DIARRHEA: 0
VOMITING: 0
COUGH: 0
EYE REDNESS: 0
EYE DISCHARGE: 0

## 2022-02-25 ASSESSMENT — PAIN DESCRIPTION - PAIN TYPE
TYPE: SURGICAL PAIN

## 2022-02-25 ASSESSMENT — PAIN SCALES - GENERAL
PAINLEVEL_OUTOF10: 3
PAINLEVEL_OUTOF10: 8
PAINLEVEL_OUTOF10: 6
PAINLEVEL_OUTOF10: 3
PAINLEVEL_OUTOF10: 9
PAINLEVEL_OUTOF10: 6
PAINLEVEL_OUTOF10: 7
PAINLEVEL_OUTOF10: 9

## 2022-02-25 ASSESSMENT — PAIN DESCRIPTION - PROGRESSION: CLINICAL_PROGRESSION: NOT CHANGED

## 2022-02-25 ASSESSMENT — PAIN DESCRIPTION - DESCRIPTORS: DESCRIPTORS: SHARP

## 2022-02-25 ASSESSMENT — PAIN DESCRIPTION - ONSET: ONSET: ON-GOING

## 2022-02-25 ASSESSMENT — PAIN DESCRIPTION - LOCATION
LOCATION: HIP

## 2022-02-25 ASSESSMENT — PAIN DESCRIPTION - FREQUENCY
FREQUENCY: CONTINUOUS
FREQUENCY: CONTINUOUS

## 2022-02-25 ASSESSMENT — PAIN - FUNCTIONAL ASSESSMENT: PAIN_FUNCTIONAL_ASSESSMENT: PREVENTS OR INTERFERES SOME ACTIVE ACTIVITIES AND ADLS

## 2022-02-25 NOTE — PROGRESS NOTES
Physical Therapy  Facility/Department: [facility] PROGRESSIVE CARE  Daily Treatment Note  NAME: Carmen Archuleta  : 1955  MRN: [id number]    Date of Service: 2022    Discharge Recommendations:  Continue to assess pending progress,Patient would benefit from continued therapy after discharge   PT Equipment Recommendations  Other: TBD    Assessment   Body structures, Functions, Activity limitations: Decreased functional mobility ; Decreased ROM; Decreased strength;Decreased ADL status; Decreased endurance;Decreased balance; Increased pain  Treatment Diagnosis: Impaired functional mobility 2* hip pain  Specific instructions for Next Treatment: progress up to chair, co tx, ROM/therex  Barriers to Learning: pain  REQUIRES PT FOLLOW UP: Yes  Activity Tolerance  Activity Tolerance: Patient limited by pain;Treatment limited secondary to medical complications (free text); Patient Tolerated treatment well     Patient Diagnosis(es): The primary encounter diagnosis was Pneumonia of both lungs due to infectious organism, unspecified part of lung. Diagnoses of Hypoxia and Closed fracture of neck of right femur, initial encounter [facility]) were also pertinent to this visit. has a past medical history of Atrial fibrillation ([facility] [address] Back pain, chronic, Hill's esophagus, Benign essential HTN, BPH (benign prostatic hyperplasia), Cancer ([facility] [address] Chronic idiopathic pulmonary fibrosis ([facility] [address] Cocaine abuse in remission [facility]), ED (erectile dysfunction), GERD (gastroesophageal reflux disease), GI bleed, Hernia, History of colon cancer, Melena, Migraines, and Murmur, cardiac.   has a past surgical history that includes Tonsillectomy; Colonoscopy; colectomy; Colonoscopy (2016); knee surgery (Right, ); transesophageal echocardiogram (2018); Upper gastrointestinal endoscopy (N/A, 2018); Colonoscopy (N/A, 2018); hernia repair (Right, ); Cardiac catheterization (2018); colectomy;  Total knee arthroplasty (Right, 1/8/2019); Upper gastrointestinal endoscopy (N/A, 6/18/2019); Cardioversion (2020); and hip surgery (Right, 2/22/2022). Restrictions  Restrictions/Precautions  Restrictions/Precautions: Fall Risk,Weight Bearing (Full WBAT RLE)  Required Braces or Orthoses?: No  Implants present? : Metal implants (R hemiarthroplasty, R TKA)  Lower Extremity Weight Bearing Restrictions  Right Lower Extremity Weight Bearing: Weight Bearing As Tolerated (Full WBAT)  Position Activity Restriction  Other position/activity restrictions: Full WBAT RLE  Subjective   General  Chart Reviewed: Yes  Family / Caregiver Present: Yes (PM)  Referring Practitioner: Sugar Greenwood MD  Subjective  Subjective: Patient in bed upon arrival. Pt agreeable to transfer to chair with vitals closely monitored (flavio per RN, keep HR under 140's-150's). Pt reports slight dizziness with change in position, however tolerable. General Comment  Comments: RN okay'd patient for PT/OT. Pt pleasant and cooperative. Pt HR ranging from 109 -140. Pain Screening  Patient Currently in Pain: Yes  Pain Assessment  Pain Assessment: 0-10  Pain Level: 9  Pain Type: Surgical pain  Pain Location: Hip  Pain Orientation: Right  Pain Frequency: Continuous  Vital Signs  Patient Currently in Pain: Yes  Oxygen Therapy  O2 Device: High flow nasal cannula  O2 Flow Rate (L/min): 6 L/min       Orientation  Orientation  Overall Orientation Status: Within Functional Limits  Cognition      Objective      Transfers  Sit to Stand: Contact guard assistance;2 Person Assistance (for safety)  Stand to sit: Contact guard assistance;2 Person Assistance (for safety)  Comment: transfer completed from bed<>recliner with RW. Cues for hand placement and technique.   Ambulation  Ambulation?: Yes  WB Status: Full WBAT RLE  Ambulation 1  Surface: level tile  Device: Rolling Walker  Assistance: Contact guard assistance;2 Person assistance (2nd person managing multiple lines)  Quality of Gait: small steps, decrease WBing into RLE  Gait Deviations: Slow Lanny;Decreased step length;Decreased step height  Distance: 4 steps bed -> recliner  Comments: -140. Pt reports denies dizziness during amb. Stairs/Curb  Stairs?: No        Other exercises  Other exercises?: Yes  Other exercises 1: Supine B ankle pumps x10 and R A/AAROM heel slides x10  Other exercises 2: Seated (B) LE ex x 10-15  Other exercises 3: Pt and SO education of importance of mobility and strengthening. Goals  Short term goals  Time Frame for Short term goals: 7 days  Short term goal 1: Pt to demo supine<-->sit min to mod x1. Short term goal 2: Pt to perform transfers min to mod x1from varied surfaces. Short term goal 3: Pt to amb 10'-20' min to mod x1 with device. Short term goal 4: Pt to tolerate sitting upright in chair for at least 30 minutes. Short term goal 5: Pt to reduce pain to 2-3/10 with movement to progress independence with mobility. Short term goal 6: Pt to improve BLE strength by 1/2 MMG. Short term goal 7: Pt to tolerate standing 1-2 minutes, min to mod x1 with device. Patient Goals   Patient goals : To move better    Plan    Plan  Times per week: 1-2x/day  Specific instructions for Next Treatment: progress up to chair, co tx, ROM/therex  Current Treatment Recommendations: Strengthening,ROM,Balance Training,Functional Mobility Training,Transfer Training,Endurance Training,Gait Training,Equipment Evaluation, Education, & procurement,Patient/Caregiver Education & Training,Safety Education & Training,Positioning,Pain Management  Safety Devices  Type of devices:  All fall risk precautions in place,Call light within reach,Gait belt,Nurse notified     Therapy Time     02/25/22 1025 02/25/22 1445   PT Individual Minutes   Time In 1025 1445   Time Out 1043 1458   Minutes Henry Daniel, PTA

## 2022-02-25 NOTE — PROGRESS NOTES
[facility] -   Infectious diseases evaluation  admission date 2/18/2022    reason for consultation:   Pneumonia    Impression :   Current:  Community-acquired pneumonia  Acute right femoral neck fracture S/P right femoral hemiarthroplasty 2/22/2022. Pulmonary fibrosis  Acute hypoxic respiratory failure    Recommendations   · 7 days course of IV ceftriaxone completed 2/24/2022  · 5 days course of Zithromax completed 2/21/2022  · Monitor off antibiotics  · Follow CBC and renal function  · Supportive care    Infection Control Recommendations   · [place] Precautions      Antimicrobial Stewardship Recommendations   · Simplification of therapy  · Targeted therapy          History of Present Illness:   Initial history:  Crystal Sands is a 74y.o.-year-old male presented to hospital on 2/18/2022 with worsening shortness of breath for several days, worse with exertion, no alleviating factors. The patient also fell ,was found to have acute right femoral neck fracture. The patient had chronic cough due to pulmonary fibrosis not increased, usually on 5 L of oxygen at home. He also had chronic legs pain. He denied fever or chills. The patient has been afebrile, no leukocytosis. He was started on IV Zithromax and Rocephin on admission that he is tolerating  Strep pneumo and Legionella antigen negative  Chest x-ray showed diffuse bilateral pneumonia worse on the left side. COVID-19 rapid test was negative. No growth on blood cultures  Interval changes  2/25/2022   He is complaining of postoperative pain, denied increased shortness of breath or cough, denied fever or chills, no other complaints.   Patient Vitals for the past 8 hrs:   BP Temp Temp src Pulse Resp SpO2 Weight   02/25/22 1045 -- -- -- 106 23 (!) 89 % --   02/25/22 1030 -- -- -- 118 18 94 % --   02/25/22 1027 -- -- -- 108 -- -- --   02/25/22 1015 -- -- -- 97 26 97 % --   02/25/22 1000 99/65 -- -- 86 23 97 % --   02/25/22 0945 -- -- -- 81 18 99 % --   02/25/22 0930 -- -- -- 89 17 100 % --   02/25/22 0915 -- -- -- 107 19 90 % --   02/25/22 0900 101/65 -- -- 95 21 92 % --   02/25/22 0845 -- -- -- 108 19 91 % --   02/25/22 0830 -- -- -- 107 22 97 % --   02/25/22 0815 -- -- -- 85 21 93 % --   02/25/22 0800 (!) 93/45 -- -- 108 17 (!) 86 % --   02/25/22 0745 -- -- -- 104 20 (!) 88 % --   02/25/22 0730 -- -- -- 69 14 98 % --   02/25/22 0715 -- -- -- 78 13 92 % --   02/25/22 0700 (!) 95/57 -- -- 96 14 91 % --   02/25/22 0645 -- -- -- 86 14 (!) 85 % --   02/25/22 0600 92/66 -- -- 96 15 94 % 162 lb 11.2 oz (73.8 kg)   02/25/22 0500 132/88 -- -- 84 14 96 % --   02/25/22 0400 113/80 98.7 °F (37.1 °C) Oral 86 16 99 % --           I have personally reviewed the past medical history, past surgical history, medications, social history, and family history, and I haveupdated the database accordingly. Allergies:   Adhesive tape, Codeine, Penicillins, and Nintedanib     Review of Systems:     Review of Systems  As per history of present illness, other than above 12 system review was negative  Physical Examination :       Physical Exam  Constitutional:       Appearance: He is not toxic-appearing. HENT:      Head: Normocephalic and atraumatic. Right Ear: External ear normal.      Left Ear: External ear normal.      Mouth/Throat:      Pharynx: Oropharynx is clear. No oropharyngeal exudate. Comments: Thrush  Eyes:      General: No scleral icterus. Conjunctiva/sclera: Conjunctivae normal.   Cardiovascular:      Heart sounds: Normal heart sounds. No murmur heard. Pulmonary:      Breath sounds: No wheezing or rales. Comments: Coarse breath sounds bilaterally  Abdominal:      General: There is no distension. Palpations: Abdomen is soft. Musculoskeletal:      Cervical back: Neck supple. No rigidity. Right lower leg: No edema. Left lower leg: No edema. Skin:     General: Skin is warm. Coloration: Skin is not jaundiced. Neurological:      General: No focal deficit present. Mental Status: He is alert and oriented to person, place, and time.          Past Medical History:     Past Medical History:   Diagnosis Date    Atrial fibrillation ([facility] [address]     Back pain, chronic     Mcguire's esophagus 06/18/2019    Benign essential HTN     BPH (benign prostatic hyperplasia)     Cancer (HCC)     colon-rectal    Chronic idiopathic pulmonary fibrosis ([facility] [address] 03/29/2021    Cocaine abuse in remission [facility])     1970's    ED (erectile dysfunction) 4/2/2015    GERD (gastroesophageal reflux disease)     GI bleed 12/5/2018    Hernia     History of colon cancer     Melena     Migraines     Murmur, cardiac        Past Surgical  History:     Past Surgical History:   Procedure Laterality Date    CARDIAC CATHETERIZATION  11/29/2018    Non-obstructive CAD    CARDIOVERSION  2020    COLECTOMY      2nd colectomy, Colostomy and reversed [facility] COLECTOMY      1st time [place]    COLONOSCOPY      COLONOSCOPY  07/18/2016    COLONOSCOPY N/A 12/6/2018    COLONOSCOPY DIAGNOSTIC performed by Jessica Fan MD at [address] [facility] Right 2009    inguinal    HIP SURGERY Right 2/22/2022    HIP FEMORAL HEMIARTHROPLASTY performed by Hermelinda Cuevas MD at [address] [facility] Right 1970's    arthrotomy    TONSILLECTOMY      TOTAL KNEE ARTHROPLASTY Right 1/8/2019    KNEE TOTAL ARTHROPLASTY performed by Hermelinda Cuevas MD at [address] [facility] TRANSESOPHAGEAL ECHOCARDIOGRAM  11/29/2018    UPPER GASTROINTESTINAL ENDOSCOPY N/A 12/5/2018    EGD DIAGNOSTIC ONLY performed by Jessica Fan MD at [address] [facility] N/A 6/18/2019    MCGUIRE'S       Medications:      metoprolol tartrate  25 mg Oral BID    famotidine  20 mg Oral BID    [START ON 2/26/2022] predniSONE  30 mg Oral Daily    nystatin  5 mL Oral 4x Daily    sodium chloride flush  5-40 mL IntraVENous 2 times per day    digoxin  125 mcg Oral Daily    sertraline  25 mg Oral Daily    enoxaparin  1 mg/kg SubCUTAneous BID    sodium chloride flush  5-40 mL IntraVENous 2 times per day    atorvastatin  40 mg Oral Daily    lisinopril  10 mg Oral BID    tamsulosin  0.4 mg Oral Daily    aspirin  81 mg Oral Daily       Social History:     Social History     Socioeconomic History    Marital status: Single     Spouse name: Not on file    Number of children: Not on file    Years of education: Not on file    Highest education level: Not on file   Occupational History    Not on file   Tobacco Use    Smoking status: Former Smoker     Packs/day: 0.50     Years: 1.00     Pack years: 0.50     Quit date:      Years since quittin.1    Smokeless tobacco: Never Used    Tobacco comment: stated never actually really smoked only inhaled    Vaping Use    Vaping Use: Never used   Substance and Sexual Activity    Alcohol use: Yes     Alcohol/week: 12.0 standard drinks     Types: 2 Shots of liquor, 10 Standard drinks or equivalent per week     Comment: 2-3 times a week    Drug use: No     Types: Other-see comments     Comment: Cocaine use in past in     Sexual activity: Yes     Partners: Female   Other Topics Concern    Not on file   Social History Narrative    Not on file     Social Determinants of Health     Financial Resource Strain: Medium Risk    Difficulty of Paying Living Expenses: Somewhat hard   Food Insecurity: No Food Insecurity    Worried About Running Out of Food in the Last Year: Never true    Tino of Food in the Last Year: Never true   Transportation Needs:     Lack of Transportation (Medical): Not on file    Lack of Transportation (Non-Medical):  Not on file   Physical Activity:     Days of Exercise per Week: Not on file    Minutes of Exercise per Session: Not on file   Stress:     Feeling of Stress : Not on file   Social Connections:     Frequency of Communication with Friends and Family: Not on file    Frequency of Social Gatherings with Friends and Family: Not on file    Attends [employer] Services: Not on file    Active Member of Clubs or Organizations: Not on file    Attends Club or Organization Meetings: Not on file    Marital Status: Not on file   Intimate Partner Violence:     Fear of Current or Ex-Partner: Not on file    Emotionally Abused: Not on file    Physically Abused: Not on file    Sexually Abused: Not on file   Housing Stability:     Unable to Pay for Housing in the Last Year: Not on file    Number of [place] in the Last Year: Not on file    Unstable Housing in the Last Year: Not on file       Family History:     Family History   Problem Relation Age of Onset    Diabetes Mother     Heart Attack Father     Heart Disease Father     Heart Disease Brother       Medical Decision Making:   I have independently reviewed/ordered the following labs:    CBC with Differential:   Recent Labs     02/24/22 0443 02/25/22 0428   WBC 13.0* 12.6*   HGB 11.4* 10.1*   HCT 35.0* 31.1*    281   LYMPHOPCT 4* 8*   MONOPCT 7 9*     BMP:  Recent Labs     02/24/22 0443 02/25/22 0428    135   K 4.9 4.2   CL 92* 97*   CO2 39* 34*   BUN 33* 28*   CREATININE 0.63* 0.51*     Hepatic Function Panel: No results for input(s): PROT, LABALBU, BILIDIR, IBILI, BILITOT, ALKPHOS, ALT, AST in the last 72 hours. No results for input(s): RPR in the last 72 hours. No results for input(s): HIV in the last 72 hours. No results for input(s): BC in the last 72 hours. Lab Results   Component Value Date    CREATININE 0.51 02/25/2022    GLUCOSE 111 02/25/2022       Detailed results: Thank you for allowing us to participate in the care of this patient. Please call with questions.     This note is created with the assistance of a speech recognition program.  While intending to generate adocument that actually reflects the content of the visit, the document can still have some errors including those of syntax and sound a like substitutions which may escape proof reading. It such instances, actual meaningcan be extrapolated by contextual diversion.     Clau Galvan MD  Office: [phone]  Perfect serve / office [phone]

## 2022-02-25 NOTE — PROGRESS NOTES
SW spoke with pt regarding SNF placement. SW informed pt that at this time, he is too low level for [facility]. Pt was agreeable to referrals being sent to 1. [facility] and [facility]. [facility]. SW faxed referrals.

## 2022-02-25 NOTE — PROGRESS NOTES
.. PALLIATIVE CARE TEAM    Patient: Bob German  Room: 2005/2005-01    Reason For Consult   Goals of care evaluation  Distress management  Symptom Management  Guidance and support  Facilitate communications  Assistance in coordinating care  Recommendations for the above    Impression: Bob German is a 74y.o. year old male with  has a past medical history of Atrial fibrillation ([facility] [address] Back pain, chronic, Hill's esophagus, Benign essential HTN, BPH (benign prostatic hyperplasia), Cancer ([facility] [address] Chronic idiopathic pulmonary fibrosis ([facility] [address] Cocaine abuse in remission [facility]), ED (erectile dysfunction), GERD (gastroesophageal reflux disease), GI bleed, Hernia, History of colon cancer, Melena, Migraines, and Murmur, cardiac. .  Currently hospitalized for the management of Multifocal pneumonia. The Palliative Care Team is following to assist with goals of care and family support.      Code Status  DNR-CCA    Vital Signs:  BP 99/65   Pulse 106   Temp 98.7 °F (37.1 °C) (Oral)   Resp 23   Ht 6' (1.829 m)   Wt 162 lb 11.2 oz (73.8 kg)   SpO2 95%   BMI 22.07 kg/m²     Patient Active Problem List   Diagnosis    Dyslipidemia    ED (erectile dysfunction)    Incomplete bladder emptying    Benign prostatic hyperplasia with urinary obstruction    History of colon cancer    PAF (paroxysmal atrial fibrillation) (HCC)    Anemia of chronic disease    Pallor of optic disc    Presbyopia    Incisional hernia, without obstruction or gangrene    Hypertrophic nonobstructive cardiomyopathy (HCC)    Iron (Fe) deficiency anemia    Primary osteoarthritis of right knee    History of malignant neoplasm of rectum    Hill's esophagus    CHF (congestive heart failure), NYHA class II, acute on chronic, combined (HCC)    Hypoxia    Dyspnea and respiratory abnormalities    Occupational pulmonary disease    Sinus bradycardia    Acute hypoxemic respiratory failure due to COVID-19 ([facility] [address]    COVID-19    Pneumonia  Acute respiratory failure with hypoxia (HCC)    Pulmonary fibrosis (HCC)    Syncope and collapse    Chronic anticoagulation    Severe malnutrition (HCC)    Cervical stenosis of spinal canal    Impingement syndrome of left shoulder    Multifocal pneumonia    Fracture of right hip    Community acquired bacterial pneumonia       Palliative Interaction:Patient is in bed and is very alert and oriented. I introduce my support role to him. He is transferring out of ICU today. We talk about completing his HCPOA, and he states\" yes she has been here 2 x to complete that . I again explain that responsibility of the [address]. I explain that the appointed [address] does not have to necessarily make any decisions, and if the decisions are already talked about, the HCPOA just has to honor the patient's wishes for medical treatment. He is in good understanding and he states that his SO Susie and he have discussed it. The patient is hoping to complete the documents. I do as well educate the patient, that if the [address] is not completed, and he is unable to make his own medical decisions, that it would be the majority of his siblings as he is not  and has no biological children. He is in good understanding. I offer much emotional support to the patient and he is appreciative. Will follow for goals of care and patient/family support. Goals/Plan of care  Education/support to patient  Providing support for coping/adaptation/distress of patient  Continue with current plan of care  Code status clarified: [facility]  Palliative care orders introduced  Validating patient/family distress  Continued communication updates  Patient is alert and oriented and transferring out of ICU. The plan is for him to complete his HCPoA here at the hospital. I offer emotional support and he is appreciative.      Electronically signed by   Asberry Mortimer, RN  Palliative Care Team  on 2/25/2022 at 1:35 PM

## 2022-02-25 NOTE — PROGRESS NOTES
[address] [facility]    INPATIENT OCCUPATIONAL THERAPY  PROGRESS NOTE  Date: 2022  Patient Name: Nithya Montilla      Room: 7448/3261-74  MRN: [id number]    : 1955  (74 y.o.) Gender: male     Discharge Recommendations:  Further Occupational Therapy is recommended upon facility discharge.          Referring Practitioner: Paula Espinoza MD  Diagnosis: Hypoxia, community acquired, bacterial pnemumonia, closed fx of neck of right femur s/p HIP FEMORAL HEMIARTHROPLASTY, pneumonia of both lungs due to infectious organism, multifocal pneumonia   General  Chart Reviewed: Yes,Orders,Progress Notes,History and Physical,Operative Notes  Patient assessed for rehabilitation services?: Yes  Response to previous treatment: Patient with no complaints from previous session  Family / Caregiver Present: No  Referring Practitioner: Paula Espinoza MD  Diagnosis: Hypoxia, community acquired, bacterial pnemumonia, closed fx of neck of right femur s/p HIP FEMORAL HEMIARTHROPLASTY, pneumonia of both lungs due to infectious organism, multifocal pneumonia     Restrictions  Restrictions/Precautions: Fall Risk,Weight Bearing  Implants present? : Metal implants  Other position/activity restrictions: Full WBAT RLE  Right Lower Extremity Weight Bearing: Weight Bearing As Tolerated  Required Braces or Orthoses?: No      Subjective  Subjective: \" This feels so good to sit up in a chair\"   Comments: RN okayed pt to be seen for OT/PT treatment  Patient Currently in Pain: Yes  Pain Level: 9  Pain Location: Hip  Pain Orientation: Right  Overall Orientation Status: Within Functional Limits  Patient Observation  Observations: Pts HR ranging from 108-140 during AM tx/   Pain Assessment  Pain Assessment: 0-10  Pain Level: 9  Pain Type: Surgical pain  Pain Location: Hip  Pain Orientation: Right    Objective  Cognition  Overall Cognitive Status: WFL  Bed mobility  Supine to Sit: Minimal assistance;2 Person assistance  Balance  Sitting Balance: Stand by assistance  Standing Balance: Contact guard assistance  Standing Balance  Time: 1-2 min   Activity: stand step transfer to bed side   Comment: CGA X2 for safety   Functional Mobility  Functional - Mobility Device: Rolling Walker  Activity: Other  Assist Level: Contact guard assistance   ADL  Feeding: Setup  Grooming: Setup  UE Bathing: Stand by assistance  LE Bathing: Maximum assistance  UE Dressing: Stand by assistance  LE Dressing: Maximum assistance  Toileting: Dependent/Total  Additional Comments: ADL scores based on skilled clinical observation this date      Transfers  Sit to stand: Contact guard assistance  Stand to sit: 2 Person assistance;Contact guard assistance         Assessment  Performance deficits / Impairments: Decreased functional mobility ; Decreased ADL status; Decreased ROM; Decreased strength;Decreased safe awareness;Decreased endurance;Decreased balance;Decreased high-level IADLs  Assessment: Education provided to pt and pts wife on home safety, ADL activites, home safety, AE, DME  and fall prevention   Prognosis: Good  Discharge Recommendations: Patient would benefit from continued therapy after discharge  Activity Tolerance: Patient Tolerated treatment well;Treatment limited secondary to medical complications (free text); Patient limited by pain  Safety Devices in place: Yes  Type of devices: Left in chair;Nurse notified;Call light within reach             Patient Education:     Learner:patient  Method: demonstration and explanation       Outcome: acknowledged understanding      Plan  Safety Devices  Safety Devices in place: Yes  Type of devices: Left in chair,Nurse notified,Call light within reach  Plan  Times per week: 5-7 (BID)  Times per day: Twice a day  Current Treatment Recommendations: [facility] Education & Training,Patient/Caregiver Education & Training,Equipment Evaluation, Education, & procurement,Self-Care / ADL,Positioning      Goals  Short term goals  Time Frame for Short term goals: by discharge   Short term goal 1: Pt will complete LB bathing/dressing/toileting Mod A while maintaining vitals WFL and use of AE/DME/modified techniques to increase IND with self-care  Short term goal 2: Pt will verbalize/demonstrate Good understanding of fall prevention strategies to increase safety in ADL's  Short term goal 3: Pt will complete functional mobiliy/transfers Mod A with RW during functional activity to increase IND in ADL's  Short term goal 4: Pt will tolerate 15-20 minutes of functional activity while maintaining Good safety and vitals to increase strength and IND in self-care       02/25/22 1025 02/25/22 1534   OT Individual Minutes   Time In 1025 1445   Time Out 1043 1458   Minutes 18 13       Electronically signed by KAYLA Naqvi on 2/25/22 at 3:35 PM EST

## 2022-02-25 NOTE — CONSULTS
[facility]   Consult Note                 Date:   2/25/2022  Date of admission:  2/18/2022  6:14 AM  MRN:   [id number]  YOB: 1955    Reason for Consult: Consult for ALE. fib    HISTORY OF PRESENT ILLNESS:    The patient is a 74 y.o.  male who is admitted to the hospital .  Patient originally came to the ER while walking fall down and broken the hip status post surgery done. Patient had a history of chronic A. fib. Hypertension. Patient is on Norvasc Coreg. Patient is on full dose of Lovenox at this time  Yesterday patient stood up and had a dizziness blood pressure on the lower side patient was treated with IV fluid  At this time denies any chest pain pressure lightheaded dizzy any palpitations. Patient was also started on as needed basis midodrine    Past Medical History:   has a past medical history of Atrial fibrillation ([facility] Back pain, chronic, Hill's esophagus, Benign essential HTN, BPH (benign prostatic hyperplasia), Cancer ([facility] Chronic idiopathic pulmonary fibrosis ([facility] Cocaine abuse in remission [facility]), ED (erectile dysfunction), GERD (gastroesophageal reflux disease), GI bleed, Hernia, History of colon cancer, Melena, Migraines, and Murmur, cardiac. Past Surgical History:   has a past surgical history that includes Tonsillectomy; Colonoscopy; colectomy; Colonoscopy (07/18/2016); knee surgery (Right, 1970's); transesophageal echocardiogram (11/29/2018); Upper gastrointestinal endoscopy (N/A, 12/5/2018); Colonoscopy (N/A, 12/6/2018); hernia repair (Right, 2009); Cardiac catheterization (11/29/2018); colectomy; Total knee arthroplasty (Right, 1/8/2019); Upper gastrointestinal endoscopy (N/A, 6/18/2019); Cardioversion (2020); and hip surgery (Right, 2/22/2022). Home Medications:    Prior to Admission medications    Medication Sig Start Date End Date Taking?  Authorizing Provider   tamsulosin (FLOMAX) 0.4 MG capsule take 1 capsule by mouth once daily 2/8/22  Yes Cassandra Sylvester MD   Baclofen (LIORESAL) 5 MG tablet take 1 tablet by mouth twice a day 1/27/22  Yes Cassandra Sylvester MD   ibuprofen (ADVIL;MOTRIN) 800 MG tablet take 1 tablet by mouth three times a day if needed for pain take with food 1/14/22  Yes Cassandra Sylvester MD   omeprazole (PRILOSEC) 40 MG delayed release capsule Take 1 capsule by mouth daily 12/14/21  Yes Cassandra Sylvester MD   lisinopril (PRINIVIL;ZESTRIL) 10 MG tablet Take 1 tablet by mouth 2 times daily 12/14/21  Yes Cassandra Sylvester MD   digoxin [facility]) 125 MCG tablet take 1 tablet by mouth once daily 11/29/21  Yes Cassandra Sylvester MD   atorvastatin (LIPITOR) 40 MG tablet take 1 tablet by mouth once daily 11/11/21  Yes MIRI Alvarado NP   ELIQUIS 5 MG TABS tablet take 1 tablet by mouth twice a day 8/30/21  Yes Panchito Baker MD   Handicap Placard MISC by Does not apply route Expires 1/2026 1/19/21   Cassandra Sylvester MD       Current Medications: Scheduled Meds:   predniSONE  40 mg Oral Daily    nystatin  5 mL Oral 4x Daily    sodium chloride flush  5-40 mL IntraVENous 2 times per day    digoxin  125 mcg Oral Daily    sertraline  25 mg Oral Daily    enoxaparin  1 mg/kg SubCUTAneous BID    sodium chloride flush  5-40 mL IntraVENous 2 times per day    famotidine (PEPCID) injection  20 mg IntraVENous BID    amLODIPine  5 mg Oral Daily    atorvastatin  40 mg Oral Daily    carvedilol  3.125 mg Oral BID WC    lisinopril  10 mg Oral BID    tamsulosin  0.4 mg Oral Daily    aspirin  81 mg Oral Daily     Continuous Infusions:   sodium chloride      sodium chloride       PRN Meds:.sodium chloride flush, sodium chloride, HYDROmorphone **OR** HYDROmorphone, perflutren lipid microspheres, morphine, sodium chloride flush, sodium chloride flush, sodium chloride, ondansetron **OR** ondansetron, polyethylene glycol, acetaminophen **OR** acetaminophen, potassium chloride **OR** potassium chloride, magnesium sulfate Allergies:  Adhesive tape, Codeine, Penicillins, and Nintedanib    Social History:   reports that he quit smoking about 51 years ago. He has a 0.50 pack-year smoking history. He has never used smokeless tobacco. He reports current alcohol use of about 12.0 standard drinks of alcohol per week. He reports that he does not use drugs. Family History: family history includes Diabetes in his mother; Heart Attack in his father; Heart Disease in his brother and father. Review of Systems   CONSTITUTIONAL:  negative for fevers, chills, fatigue and malaise    EYES:  negative for discharge    HEENT:  negative for epistaxis and sore throat    RESPIRATORY:  negative for cough, shortness of breath, wheezing    CARDIOVASCULAR:  negative for chest pain, palpitations,+ pre- syncope, edema    GASTROINTESTINAL:  negative for nausea, vomiting, diarrhea, constipation, abdominal pain    GENITOURINARY:  negative for incontinence    MUSCULOSKELETAL:  negative for neck or back pain    NEUROLOGICAL:  negative for headaches, seizures and double vision   PSYCHIATRIC:  negative               PHYSICAL EXAM:    Blood pressure 92/66, pulse 96, temperature 98.7 °F (37.1 °C), temperature source Oral, resp. rate 15, height 6' (1.829 m), weight 162 lb 11.2 oz (73.8 kg), SpO2 94 %. CONSTITUTIONAL: AOx4, no apparent distress, appears stated age   HEAD: normocephalic, atraumatic   EYES: PERRLA, EOMI   ENT: moist mucous membranes, uvula midline   NECK:  symmetric, no midline tenderness to palpation   LUNGS: clear to auscultation bilaterally   CARDIOVASCULAR: irregular rate and rhythm, sm murmur  ABDOMEN: Soft, non-tender, non-distended with normal active bowel sounds   SKIN: no rash       DATA:    EC22  AF     Echo:22  Summary  Difficult to assess LV function due to atrial fibrillation. Left ventricle is normal in size. Estimated LV EF 40-45%. Evidence of diastolic dysfunction.   Right ventricular dilatation with normal systolic function. Left atrial dilatation. Right atrial dilatation. Mild aortic stenosis. Peak instantaneous gradient 28 mmHg and mean gradient 15 mmHg. Trivial aortic insufficiency. Mild to moderate mitral regurgitation. Multiple MR jets noted. Stress:  Cath:    Labs:     CBC:   Recent Labs     02/24/22 0443 02/25/22 0428   WBC 13.0* 12.6*   HGB 11.4* 10.1*   HCT 35.0* 31.1*    281     BMP:   Recent Labs     02/24/22 0443 02/25/22 0428    135   K 4.9 4.2   CO2 39* 34*   BUN 33* 28*   CREATININE 0.63* 0.51*   LABGLOM >60 >60   GLUCOSE 134* 111*     BNP: No results for input(s): BNP in the last 72 hours. PT/INR: No results for input(s): PROTIME, INR in the last 72 hours. APTT:No results for input(s): APTT in the last 72 hours. CARDIAC ENZYMES:No results for input(s): CKTOTAL, CKMB, CKMBINDEX, TROPONINI in the last 72 hours. FASTING LIPID PANEL:  Lab Results   Component Value Date    HDL 29 12/20/2021    TRIG 165 01/03/2017     LIVER PROFILE:No results for input(s): AST, ALT, LABALBU in the last 72 hours.     Intake/Output Summary (Last 24 hours) at 2/25/2022 3829  Last data filed at 2/25/2022 0600  Gross per 24 hour   Intake 2602 ml   Output 2300 ml   Net 302 ml       IMPRESSION:    Patient Active Problem List   Diagnosis    Dyslipidemia    ED (erectile dysfunction)    Incomplete bladder emptying    Benign prostatic hyperplasia with urinary obstruction    History of colon cancer    PAF (paroxysmal atrial fibrillation) (HCC)    Anemia of chronic disease    Pallor of optic disc    Presbyopia    Incisional hernia, without obstruction or gangrene    Hypertrophic nonobstructive cardiomyopathy (HCC)    Iron (Fe) deficiency anemia    Primary osteoarthritis of right knee    History of malignant neoplasm of rectum    Hill's esophagus    CHF (congestive heart failure), NYHA class II, acute on chronic, combined (HCC)    Hypoxia    Dyspnea and respiratory abnormalities    Occupational pulmonary disease    Sinus bradycardia    Acute hypoxemic respiratory failure due to COVID-19 ([address]    COVID-19    Pneumonia    Acute respiratory failure with hypoxia (HCC)    Pulmonary fibrosis (HCC)    Syncope and collapse    Chronic anticoagulation    Severe malnutrition (HCC)    Cervical stenosis of spinal canal    Impingement syndrome of left shoulder    Multifocal pneumonia    Fracture of right hip    Community acquired bacterial pneumonia           RECOMMENDATIONS:  Chronic persistent A. fib agree with Lovenox full dose changed to p.o. when appropriate  History of dizziness most likely secondary to orthostatic hypotension, history of mild to moderate aortic stenosis history of mild to moderate mitral regurg and due to medications  Will DC Norvasc discussed vasodilatation and further aggravate the symptoms  Patient is on Coreg will DC it we will change to Lopressor 25 mg twice daily hold for heart rate less than 50 blood pressure less than 90  At this time midodrine continue as needed basis  We will continue to follow      Discussed with patient and nursing.     Rafa Rollins MD, MD  [facility]        [phone]

## 2022-02-25 NOTE — PROGRESS NOTES
ICU Progress Note (Non-Vent)  [facility]    Patient - Donna Moore,  Age - 74 y.o.    - 1955      Room Number -    MRN -  [id number]   Acct # - [de-identified]  Date of Admission -  2022  6:14 AM    Events of Past 24 Hours   Per RN, no acute overnight events. POD #3 of right hip femoral hemiarthroplasty   He is alert and oriented. On 3L Saltzer device saturating mid 90s  He denies any worsening dyspnea. Afib on monitor. Oral thrush greatly improved. Labs reviewed. WBC improving. Electrolytes wnl, improvement in BUN. Calcium progressively decreasing over this week. 7.4 <--8.0<--8.8<--8.1     Vitals    height is 6' (1.829 m) and weight is 162 lb 11.2 oz (73.8 kg). His oral temperature is 98.7 °F (37.1 °C). His blood pressure is 92/66 and his pulse is 96. His respiration is 15 and oxygen saturation is 94%.        Temperature Range: Temp: 98.7 °F (37.1 °C) Temp  Av.5 °F (36.9 °C)  Min: 98.3 °F (36.8 °C)  Max: 98.9 °F (37.2 °C)  BP Range:  Systolic (02LJR), DJU:918 , Min:80 , GKL:822     Diastolic (45KZW), CXZ:40, Min:49, Max:88    Pulse Range: Pulse  Av.5  Min: 84  Max: 113  Respiration Range: Resp  Av.3  Min: 10  Max: 26  Current Pulse Ox[de-identified]  SpO2: 94 %  24HR Pulse Ox Range:  SpO2  Av.2 %  Min: 90 %  Max: 100 %  Oxygen Amount and Delivery: O2 Flow Rate (L/min): 6 L/min    Wt Readings from Last 3 Encounters:   22 162 lb 11.2 oz (73.8 kg)   21 171 lb (77.6 kg)   07/15/21 166 lb 9.6 oz (75.6 kg)     I/O       Intake/Output Summary (Last 24 hours) at 2022 0935  Last data filed at 2022 0600  Gross per 24 hour   Intake 2602 ml   Output 2300 ml   Net 302 ml     DRAIN/TUBE OUTPUT       Invasive Lines   ICP PRESSURE RANGE  No data recorded  CVP PRESSURE RANGE  No data recorded      Medications      predniSONE  40 mg Oral Daily    nystatin  5 mL Oral 4x Daily    sodium chloride flush  5-40 mL IntraVENous 2 times per day    digoxin  125 mcg Oral Daily    sertraline  25 mg Oral Daily    enoxaparin  1 mg/kg SubCUTAneous BID    sodium chloride flush  5-40 mL IntraVENous 2 times per day    famotidine (PEPCID) injection  20 mg IntraVENous BID    amLODIPine  5 mg Oral Daily    atorvastatin  40 mg Oral Daily    carvedilol  3.125 mg Oral BID WC    lisinopril  10 mg Oral BID    tamsulosin  0.4 mg Oral Daily    aspirin  81 mg Oral Daily     sodium chloride flush, sodium chloride, HYDROmorphone **OR** HYDROmorphone, perflutren lipid microspheres, morphine, sodium chloride flush, sodium chloride flush, sodium chloride, ondansetron **OR** ondansetron, polyethylene glycol, acetaminophen **OR** acetaminophen, potassium chloride **OR** potassium chloride, magnesium sulfate  IV Drips/Infusions   sodium chloride      sodium chloride         Diet/Nutrition   ADULT DIET; Regular  ADULT ORAL NUTRITION SUPPLEMENT; Breakfast, Dinner; Standard High Calorie/High Protein Oral Supplement    Exam      Constitutional - Alert, arousable  General Appearance  well developed, well nourished  HEENT -normocephalic, atraumatic. JOSERLA has thrush in the oral cavity, but very improved from 02/22/22  Lungs - Chest expands equally, some coarseness in the left upper lobe. Clear breath sounds to the posterior lung bases. Cardiovascular - Heart sounds are normal.  normal rate and rhythm regular, no murmur, gallop or rub. Abdomen - soft, nontender, nondistended, no masses or organomegaly  Neurologic - CN II-XII are grossly intact.  There are no focal motor deficits  Skin - no bruising or bleeding  Extremities - no cyanosis, clubbing or edema    Lab Results   CBC     Lab Results   Component Value Date    WBC 12.6 02/25/2022    RBC 4.07 02/25/2022    HGB 10.1 02/25/2022    HCT 31.1 02/25/2022     02/25/2022    MCV 76.4 02/25/2022    MCH 24.8 02/25/2022    MCHC 32.5 02/25/2022    RDW 16.5 02/25/2022    LYMPHOPCT 8 02/25/2022    MONOPCT 9 02/25/2022    BASOPCT 0 02/25/2022    MONOSABS 1.10 02/25/2022    LYMPHSABS 0.90 02/25/2022    EOSABS 0.00 02/25/2022    BASOSABS 0.00 02/25/2022    DIFFTYPE NOT REPORTED 02/21/2022       BMP   Lab Results   Component Value Date     02/25/2022    K 4.2 02/25/2022    CL 97 02/25/2022    CO2 34 02/25/2022    BUN 28 02/25/2022    CREATININE 0.51 02/25/2022    GLUCOSE 111 02/25/2022       LFTS  Lab Results   Component Value Date    ALKPHOS 92 12/20/2021    ALT 8 12/20/2021    AST 18 12/20/2021    PROT 7.4 12/20/2021    BILITOT 0.66 12/20/2021    BILIDIR <0.08 12/05/2018    IBILI CANNOT BE CALCULATED 12/05/2018    LABALBU 4.1 12/20/2021       ABG ABGs:   Lab Results   Component Value Date    PHART 7.394 02/18/2022    PO2ART 33.4 02/18/2022    PUV0MBH 42.4 02/18/2022       Lab Results   Component Value Date    MODE NOT REPORTED 02/18/2022         INR  No results for input(s): PROTIME, INR in the last 72 hours. APTT  No results for input(s): APTT in the last 72 hours. Lactic Acid  Lab Results   Component Value Date    LACTA NOT REPORTED 12/07/2020    LACTA NOT REPORTED 10/17/2020    LACTA 1.0 08/11/2014        BNP   No results for input(s): BNP in the last 72 hours. Cultures       Radiology         SYSTEMS ASSESSMENT    Acute on chronic hypoxic respiratory failure  Left upper lobe pneumonia  Right femoral neck fracture  Idiopathic pulmonary fibrosis/UIP  Hemoptysis-resolved    Oxygen requirements have dramatically decreased on 5 L Saltzer   Maintain O2 saturation >88%    Zithromax completed. 1 more day of Rocephin remains. Continue incentive spirometry  Continue oral prednisone   Oral thrush secondary to steroids have improved greatly - discontinue nystatin.      BP and Afib followed by cardio  Hypocalcemia noted - replace with calcium gluconate 1g   [facility]       Critical Care Time   0 min      Electronically signed by Andreia Banks on 2/25/2022 at 9:35 AM      Patient seen and examined independently by me. Above discussed and I agree with medical student note except where indicated in the EMR revision history. Also see my additional comments and changes indicated by discrete font, text color, italics, and/or initials. Labs, cultures, and radiographs where available were reviewed.        Is improved from a pulmonary standpoint, is at his baseline  Decrease prednisone to 30 mg daily  Appreciate cardiology input adjusting his medication  Awaiting bed to be transferred to [facility]  We will switch over to oral Pepcid  At some point will need to be placed on full anticoagulation  Antibiotics discontinued per ID  Does not want any IV Dilaudid or morphine, switch over to oral Percocet  Electronically signed by Rosmery Julian MD on 2/25/2022 at 10:36 AM

## 2022-02-26 LAB
ABSOLUTE EOS #: 0.11 K/UL (ref 0–0.4)
ABSOLUTE LYMPH #: 1.37 K/UL (ref 1–4.8)
ABSOLUTE MONO #: 1.03 K/UL (ref 0.1–1.3)
ANION GAP SERPL CALCULATED.3IONS-SCNC: 2 MMOL/L (ref 9–17)
BASOPHILS # BLD: 0 % (ref 0–2)
BASOPHILS ABSOLUTE: 0 K/UL (ref 0–0.2)
BUN BLDV-MCNC: 21 MG/DL (ref 8–23)
CALCIUM SERPL-MCNC: 7.7 MG/DL (ref 8.6–10.4)
CHLORIDE BLD-SCNC: 96 MMOL/L (ref 98–107)
CO2: 37 MMOL/L (ref 20–31)
CREAT SERPL-MCNC: 0.54 MG/DL (ref 0.7–1.2)
EOSINOPHILS RELATIVE PERCENT: 1 % (ref 0–4)
GFR AFRICAN AMERICAN: >60 ML/MIN
GFR NON-AFRICAN AMERICAN: >60 ML/MIN
GFR SERPL CREATININE-BSD FRML MDRD: ABNORMAL ML/MIN/{1.73_M2}
GLUCOSE BLD-MCNC: 85 MG/DL (ref 70–99)
HCT VFR BLD CALC: 29.1 % (ref 41–53)
HEMOGLOBIN: 9.4 G/DL (ref 13.5–17.5)
LYMPHOCYTES # BLD: 12 % (ref 24–44)
MCH RBC QN AUTO: 24.8 PG (ref 26–34)
MCHC RBC AUTO-ENTMCNC: 32.5 G/DL (ref 31–37)
MCV RBC AUTO: 76.3 FL (ref 80–100)
MONOCYTES # BLD: 9 % (ref 1–7)
MORPHOLOGY: ABNORMAL
PDW BLD-RTO: 16.9 % (ref 11.5–14.9)
PLATELET # BLD: 289 K/UL (ref 150–450)
PMV BLD AUTO: 7.2 FL (ref 6–12)
POTASSIUM SERPL-SCNC: 3.9 MMOL/L (ref 3.7–5.3)
RBC # BLD: 3.81 M/UL (ref 4.5–5.9)
REASON FOR REJECTION: NORMAL
SEG NEUTROPHILS: 78 % (ref 36–66)
SEGMENTED NEUTROPHILS ABSOLUTE COUNT: 8.89 K/UL (ref 1.3–9.1)
SODIUM BLD-SCNC: 135 MMOL/L (ref 135–144)
WBC # BLD: 11.4 K/UL (ref 3.5–11)
ZZ NTE CLEAN UP: ORDERED TEST: NORMAL
ZZ NTE WITH NAME CLEAN UP: SPECIMEN SOURCE: NORMAL

## 2022-02-26 PROCEDURE — 6360000002 HC RX W HCPCS: Performed by: STUDENT IN AN ORGANIZED HEALTH CARE EDUCATION/TRAINING PROGRAM

## 2022-02-26 PROCEDURE — 99233 SBSQ HOSP IP/OBS HIGH 50: CPT | Performed by: INTERNAL MEDICINE

## 2022-02-26 PROCEDURE — 6370000000 HC RX 637 (ALT 250 FOR IP)

## 2022-02-26 PROCEDURE — 36415 COLL VENOUS BLD VENIPUNCTURE: CPT

## 2022-02-26 PROCEDURE — 2580000003 HC RX 258: Performed by: ORTHOPAEDIC SURGERY

## 2022-02-26 PROCEDURE — 99232 SBSQ HOSP IP/OBS MODERATE 35: CPT | Performed by: NURSE PRACTITIONER

## 2022-02-26 PROCEDURE — 85025 COMPLETE CBC W/AUTO DIFF WBC: CPT

## 2022-02-26 PROCEDURE — 97530 THERAPEUTIC ACTIVITIES: CPT

## 2022-02-26 PROCEDURE — 6370000000 HC RX 637 (ALT 250 FOR IP): Performed by: ORTHOPAEDIC SURGERY

## 2022-02-26 PROCEDURE — 97110 THERAPEUTIC EXERCISES: CPT

## 2022-02-26 PROCEDURE — 6370000000 HC RX 637 (ALT 250 FOR IP): Performed by: INTERNAL MEDICINE

## 2022-02-26 PROCEDURE — 80048 BASIC METABOLIC PNL TOTAL CA: CPT

## 2022-02-26 PROCEDURE — 6370000000 HC RX 637 (ALT 250 FOR IP): Performed by: STUDENT IN AN ORGANIZED HEALTH CARE EDUCATION/TRAINING PROGRAM

## 2022-02-26 PROCEDURE — 2580000003 HC RX 258: Performed by: INTERNAL MEDICINE

## 2022-02-26 PROCEDURE — 2060000000 HC ICU INTERMEDIATE R&B

## 2022-02-26 RX ORDER — MIDODRINE HYDROCHLORIDE 5 MG/1
5 TABLET ORAL
Status: DISCONTINUED | OUTPATIENT
Start: 2022-02-27 | End: 2022-03-03 | Stop reason: HOSPADM

## 2022-02-26 RX ORDER — SODIUM CHLORIDE, SODIUM LACTATE, POTASSIUM CHLORIDE, AND CALCIUM CHLORIDE .6; .31; .03; .02 G/100ML; G/100ML; G/100ML; G/100ML
500 INJECTION, SOLUTION INTRAVENOUS ONCE
Status: COMPLETED | OUTPATIENT
Start: 2022-02-26 | End: 2022-02-26

## 2022-02-26 RX ADMIN — ENOXAPARIN SODIUM 70 MG: 100 INJECTION SUBCUTANEOUS at 20:09

## 2022-02-26 RX ADMIN — ATORVASTATIN CALCIUM 40 MG: 40 TABLET, FILM COATED ORAL at 08:20

## 2022-02-26 RX ADMIN — PREDNISONE 30 MG: 20 TABLET ORAL at 08:20

## 2022-02-26 RX ADMIN — SODIUM CHLORIDE, PRESERVATIVE FREE 10 ML: 5 INJECTION INTRAVENOUS at 08:41

## 2022-02-26 RX ADMIN — OXYCODONE HYDROCHLORIDE AND ACETAMINOPHEN 1 TABLET: 5; 325 TABLET ORAL at 20:08

## 2022-02-26 RX ADMIN — TAMSULOSIN HYDROCHLORIDE 0.4 MG: 0.4 CAPSULE ORAL at 08:20

## 2022-02-26 RX ADMIN — SODIUM CHLORIDE, POTASSIUM CHLORIDE, SODIUM LACTATE AND CALCIUM CHLORIDE 500 ML: 600; 310; 30; 20 INJECTION, SOLUTION INTRAVENOUS at 09:38

## 2022-02-26 RX ADMIN — SERTRALINE HYDROCHLORIDE 25 MG: 50 TABLET ORAL at 08:20

## 2022-02-26 RX ADMIN — SODIUM CHLORIDE, PRESERVATIVE FREE 10 ML: 5 INJECTION INTRAVENOUS at 20:10

## 2022-02-26 RX ADMIN — OXYCODONE HYDROCHLORIDE AND ACETAMINOPHEN 1 TABLET: 5; 325 TABLET ORAL at 03:49

## 2022-02-26 RX ADMIN — METOPROLOL TARTRATE 25 MG: 25 TABLET, FILM COATED ORAL at 20:08

## 2022-02-26 RX ADMIN — NYSTATIN 500000 UNITS: 500000 SUSPENSION ORAL at 08:40

## 2022-02-26 RX ADMIN — MIDODRINE HYDROCHLORIDE 10 MG: 10 TABLET ORAL at 08:40

## 2022-02-26 RX ADMIN — NYSTATIN 500000 UNITS: 500000 SUSPENSION ORAL at 17:41

## 2022-02-26 RX ADMIN — ENOXAPARIN SODIUM 70 MG: 100 INJECTION SUBCUTANEOUS at 08:47

## 2022-02-26 RX ADMIN — FAMOTIDINE 20 MG: 20 TABLET, FILM COATED ORAL at 08:20

## 2022-02-26 RX ADMIN — FAMOTIDINE 20 MG: 20 TABLET, FILM COATED ORAL at 20:08

## 2022-02-26 RX ADMIN — ASPIRIN 81 MG 81 MG: 81 TABLET ORAL at 08:20

## 2022-02-26 RX ADMIN — NYSTATIN 500000 UNITS: 500000 SUSPENSION ORAL at 14:11

## 2022-02-26 RX ADMIN — DIGOXIN 125 MCG: 125 TABLET ORAL at 08:19

## 2022-02-26 RX ADMIN — NYSTATIN 500000 UNITS: 500000 SUSPENSION ORAL at 20:08

## 2022-02-26 RX ADMIN — OXYCODONE HYDROCHLORIDE AND ACETAMINOPHEN 1 TABLET: 5; 325 TABLET ORAL at 08:19

## 2022-02-26 RX ADMIN — SODIUM CHLORIDE, PRESERVATIVE FREE 10 ML: 5 INJECTION INTRAVENOUS at 08:52

## 2022-02-26 ASSESSMENT — PAIN DESCRIPTION - PROGRESSION
CLINICAL_PROGRESSION: NOT CHANGED
CLINICAL_PROGRESSION: GRADUALLY WORSENING

## 2022-02-26 ASSESSMENT — PAIN SCALES - GENERAL
PAINLEVEL_OUTOF10: 4
PAINLEVEL_OUTOF10: 7
PAINLEVEL_OUTOF10: 7
PAINLEVEL_OUTOF10: 6
PAINLEVEL_OUTOF10: 3
PAINLEVEL_OUTOF10: 6
PAINLEVEL_OUTOF10: 5
PAINLEVEL_OUTOF10: 6
PAINLEVEL_OUTOF10: 6

## 2022-02-26 ASSESSMENT — PAIN DESCRIPTION - DESCRIPTORS
DESCRIPTORS: ACHING;SORE
DESCRIPTORS: SHARP
DESCRIPTORS: ACHING;SORE
DESCRIPTORS: SHARP

## 2022-02-26 ASSESSMENT — PAIN DESCRIPTION - FREQUENCY
FREQUENCY: CONTINUOUS

## 2022-02-26 ASSESSMENT — PAIN DESCRIPTION - PAIN TYPE
TYPE: SURGICAL PAIN

## 2022-02-26 ASSESSMENT — PAIN DESCRIPTION - ONSET
ONSET: ON-GOING

## 2022-02-26 ASSESSMENT — PAIN DESCRIPTION - ORIENTATION
ORIENTATION: RIGHT

## 2022-02-26 ASSESSMENT — PAIN DESCRIPTION - LOCATION
LOCATION: HIP
LOCATION: HIP
LOCATION: HIP;INCISION
LOCATION: HIP
LOCATION: HIP;INCISION

## 2022-02-26 ASSESSMENT — ENCOUNTER SYMPTOMS
VOMITING: 0
NAUSEA: 1
EYE REDNESS: 0
SHORTNESS OF BREATH: 1
DIARRHEA: 0
COUGH: 0
EYE DISCHARGE: 0

## 2022-02-26 NOTE — PROGRESS NOTES
Pulmonary Progress Note  [facility]      Patient - Isael Cosby,  Age - 74 y.o.    - 1955      Room Number - 2105/2105-01   [facility] -  [id number]   [facility]  Date of Admission -  2022  6:14 AM        Consulting Parisa Shea MD  Primary Care Physician - Alison Handy MD     SUBJECTIVE   Was dizzy while sitting in a chair, based on blood pressure readings, does not appear to be orthostatic but hypotensive  Did not get his lisinopril or Lopressor this a.m. OBJECTIVE   VITALS    height is 6' (1.829 m) and weight is 158 lb 11.7 oz (72 kg). His oral temperature is 97.9 °F (36.6 °C). His blood pressure is 101/74 and his pulse is 73. His respiration is 20 and oxygen saturation is 93%. Body mass index is 21.53 kg/m². Temperature Range: Temp: 97.9 °F (36.6 °C) Temp  Av.5 °F (36.4 °C)  Min: 96.4 °F (35.8 °C)  Max: 98.2 °F (36.8 °C)  BP Range:  Systolic (09IXR), QMU:00 , Min:65 , CFC:237     Diastolic (24UQI), MQU:17, Min:26, Max:79    Pulse Range: Pulse  Av.1  Min: 73  Max: 118  Respiration Range: Resp  Av.2  Min: 15  Max: 26  Current Pulse Ox[de-identified]  SpO2: 93 %  24HR Pulse Ox Range:  SpO2  Av.5 %  Min: 87 %  Max: 100 %  Oxygen Amount and Delivery: O2 Flow Rate (L/min): 6 L/min    Wt Readings from Last 3 Encounters:   22 158 lb 11.7 oz (72 kg)   21 171 lb (77.6 kg)   07/15/21 166 lb 9.6 oz (75.6 kg)       I/O (24 Hours)    Intake/Output Summary (Last 24 hours) at 2022 9621  Last data filed at 2022 0745  Gross per 24 hour   Intake --   Output 1875 ml   Net -1875 ml       EXAM     General Appearance  Awake, alert, oriented, in no acute distress  HEENT - normocephalic, atraumatic.  []  Mallampati  [] Crowded airway   [] Macroglossia  []  Retrognathia  [] Micrognathia  []  Normal tongue size []  Normal Bite  [] Bell sign positive    Neck - Supple,  trachea midline   Lungs -bilateral crackles no change  Cardiovascular - Heart sounds are normal.  Regular rate and rhythm   Abdomen - Soft, nontender, nondistended, no masses or organomegaly  Neurologic - There are no focal motor or sensory deficits  Skin - No bruising or bleeding  Extremities - No clubbing, cyanosis, edema    MEDS      lactated ringers bolus  500 mL IntraVENous Once    metoprolol tartrate  25 mg Oral BID    famotidine  20 mg Oral BID    predniSONE  30 mg Oral Daily    nystatin  5 mL Oral 4x Daily    sodium chloride flush  5-40 mL IntraVENous 2 times per day    digoxin  125 mcg Oral Daily    sertraline  25 mg Oral Daily    enoxaparin  1 mg/kg SubCUTAneous BID    sodium chloride flush  5-40 mL IntraVENous 2 times per day    atorvastatin  40 mg Oral Daily    [Held by provider] lisinopril  10 mg Oral BID    tamsulosin  0.4 mg Oral Daily    aspirin  81 mg Oral Daily      sodium chloride      sodium chloride       midodrine, oxyCODONE-acetaminophen, sodium chloride flush, sodium chloride, perflutren lipid microspheres, sodium chloride flush, sodium chloride flush, sodium chloride, ondansetron **OR** ondansetron, polyethylene glycol, acetaminophen **OR** acetaminophen, potassium chloride **OR** potassium chloride, magnesium sulfate    LABS   CBC   Recent Labs     02/26/22  0655   WBC 11.4*   HGB 9.4*   HCT 29.1*   MCV 76.3*        BMP:   Lab Results   Component Value Date     02/26/2022    K 3.9 02/26/2022    CL 96 02/26/2022    CO2 37 02/26/2022    BUN 21 02/26/2022    LABALBU 4.1 12/20/2021    CREATININE 0.54 02/26/2022    CALCIUM 7.7 02/26/2022    GFRAA >60 02/26/2022    LABGLOM >60 02/26/2022     ABGs:  Lab Results   Component Value Date    PHART 7.394 02/18/2022    PO2ART 33.4 02/18/2022    UXY9MNK 42.4 02/18/2022      Lab Results   Component Value Date    MODE NOT REPORTED 02/18/2022     Ionized Calcium:  No results found for: IONCA  Magnesium:    Lab Results   Component Value Date    MG 2.1 04/25/2021     Phosphorus:    Lab Results   Component Value Date    PHOS 3.6 01/13/2021        LIVER PROFILE No results for input(s): AST, ALT, LIPASE, BILIDIR, BILITOT, ALKPHOS in the last 72 hours. Invalid input(s): AMYLASE,  ALB  INR No results for input(s): INR in the last 72 hours. PTT   Lab Results   Component Value Date    APTT 32.6 02/22/2022         RADIOLOGY     (See actual reports for details)    ASSESSMENT/PLAN   Principal Problem:    Multifocal pneumonia  Active Problems:    PAF (paroxysmal atrial fibrillation) (HCC)    Pulmonary fibrosis (HCC)    Fracture of right hip    Community acquired bacterial pneumonia  Resolved Problems:    * No resolved hospital problems.  *    Hold antihypertensives  500 mL bolus of lactated Ringer  Oxygen keep saturations greater than 88% (close to baseline)  No change in prednisone    Electronically signed by Jyothi Bender MD on 2/26/2022 at 9:22 AM

## 2022-02-26 NOTE — PLAN OF CARE
Problem: Skin Integrity:  Goal: Will show no infection signs and symptoms  Description: Will show no infection signs and symptoms  Outcome: Ongoing     Problem: Skin Integrity:  Goal: Absence of new skin breakdown  Description: Absence of new skin breakdown  Outcome: Ongoing     Problem: Falls - Risk of:  Goal: Will remain free from falls  Description: Will remain free from falls  Outcome: Ongoing     Problem: SAFETY  Goal: Free from accidental physical injury  Outcome: Ongoing     Problem: DAILY CARE  Goal: Daily care needs are met  Outcome: Ongoing     Problem: PAIN  Goal: Patient's pain/discomfort is manageable  Outcome: Ongoing

## 2022-02-26 NOTE — PROGRESS NOTES
[address]    PROGRESS NOTE             2/26/2022    8:39 AM    Name:   Renetta Barajas  MRN:     [id number]     Acct:      [de-identified]   Room:   2105/2105-01  IP Day:  8  Admit Date:  2/18/2022  6:14 AM    PCP:  Ketan Aggarwal MD  Code Status:  DNR-CCA    Subjective:     C/C: No chief complaint on file. Interval History Status: improved. Patient was seen and examined at bedside, no acute events overnight, patient has mild SOB no chest pain. S/p right hemiarthroplasty post op day 4, pain managed with Percocet. Hydrocortisone decreased to 30mg daily  Social workers for placement planning. Brief History:     56-year-old male with past medical history of pulmonary fibrosis presented with acute hypoxic respiratory failure, had fall while walking into ED and fractured right hip. Respiratory failure found to be secondary to pneumonia in setting of pulmonary fibrosis; unsure of antibiotics and patient O2 needs back at baseline. Right hip hemiarthroplasty done without any complications; postop day #4. Patient with chronic A. fib but previously controlled rate started going into A. fib with RVR on postoperative days; cardiology consulted. Review of Systems:     Review of Systems   Constitutional: Negative for chills and fever. Eyes: Negative for discharge and redness. Respiratory: Positive for shortness of breath (at baseline). Negative for cough. Cardiovascular: Negative for chest pain and palpitations. Gastrointestinal: Positive for nausea. Negative for diarrhea and vomiting. Genitourinary: Negative for difficulty urinating. Musculoskeletal: Positive for arthralgias (r hip) and gait problem (limited by hip pain). Negative for myalgias. Neurological: Positive for light-headedness. Negative for syncope (but had pre-syncopal event during therapy yesterday afternoon).    Psychiatric/Behavioral: Positive for sleep disturbance. Medications: Allergies: Allergies   Allergen Reactions    Adhesive Tape Other (See Comments)     Blister badly     Codeine Nausea Only     Other reaction(s): Other: See Comments  NAUSEA  Other reaction(s): Other: See Comments  NAUSEA    Penicillins Swelling     As a baby  Other reaction(s): Unknown  As a baby    Nintedanib Diarrhea     10-15 BM daily       Current Meds:   Scheduled Meds:    metoprolol tartrate  25 mg Oral BID    famotidine  20 mg Oral BID    predniSONE  30 mg Oral Daily    nystatin  5 mL Oral 4x Daily    sodium chloride flush  5-40 mL IntraVENous 2 times per day    digoxin  125 mcg Oral Daily    sertraline  25 mg Oral Daily    enoxaparin  1 mg/kg SubCUTAneous BID    sodium chloride flush  5-40 mL IntraVENous 2 times per day    atorvastatin  40 mg Oral Daily    lisinopril  10 mg Oral BID    tamsulosin  0.4 mg Oral Daily    aspirin  81 mg Oral Daily     Continuous Infusions:    sodium chloride      sodium chloride       PRN Meds: midodrine, oxyCODONE-acetaminophen, sodium chloride flush, sodium chloride, perflutren lipid microspheres, sodium chloride flush, sodium chloride flush, sodium chloride, ondansetron **OR** ondansetron, polyethylene glycol, acetaminophen **OR** acetaminophen, potassium chloride **OR** potassium chloride, magnesium sulfate    Data:     Past Medical History:   has a past medical history of Atrial fibrillation ([facility] Back pain, chronic, Hill's esophagus, Benign essential HTN, BPH (benign prostatic hyperplasia), Cancer (HCC), Chronic idiopathic pulmonary fibrosis ([facility] Cocaine abuse in remission [facility]), ED (erectile dysfunction), GERD (gastroesophageal reflux disease), GI bleed, Hernia, History of colon cancer, Melena, Migraines, and Murmur, cardiac. Social History:   reports that he quit smoking about 51 years ago. He has a 0.50 pack-year smoking history.  He has never used smokeless tobacco. He reports current alcohol use of Gloria Hardy   Date of      1955  Gender                      Male  Birth   Age          74 year(s)  Race                           Room Number  2016        Height:                     71.65 inch, 182 cm   Corporate ID [id number]    Weight:                     163 pounds, 74 kg  #   Patient Acct [de-identified]   BSA:          1.95 m^2      BMI:      22.34  #                                                              kg/m^2   MR #         [id number]      Sonographer                 Janae Colindres   Accession #  [id number]  Interpreting Physician      [address] [place] [address]   Fellow                   Referring Nurse                           Practitioner   Interpreting             Referring Physician         Dana Rascon MD  Fellow  Type of Study   TTE procedure:2D Echocardiogram, M-Mode, Doppler, Color Doppler. Procedure Date Date: 02/22/2022 Start: 11:48 AM Study Location: [facility] Technical Quality: Fair visualization Indications:Dyspnea/SOB and Preop cardiac evaluation. Patient Status: Inpatient Height: 71.65 inches Weight: 163.15 pounds BSA: 1.95 m^2 BMI: 22.34 kg/m^2 Rhythm: Atrial fibrillation HR: 89 bpm BP: 112/67 mmHg Allergies   - Penicillin. - Codeine. CONCLUSIONS Summary Difficult to assess LV function due to atrial fibrillation. Left ventricle is normal in size. Estimated LV EF 40-45%. Evidence of diastolic dysfunction. Right ventricular dilatation with normal systolic function. Left atrial dilatation. Right atrial dilatation. Mild aortic stenosis. Peak instantaneous gradient 28 mmHg and mean gradient 15 mmHg. Trivial aortic insufficiency. Mild to moderate mitral regurgitation. Multiple MR jets noted. Mild tricuspid regurgitation. Estimated right ventricular systolic pressure is 41 mmHg, suggests mild Pulm HTN.  Signature ----------------------------------------------------------------------------  Electronically signed by Janae Colindres(Sonographer) on 02/22/2022 12:29  PM ---------------------------------------------------------------------------- ----------------------------------------------------------------------------  Electronically signed by Steven Hooker(Interpreting physician) on 2022  12:31 PM ---------------------------------------------------------------------------- FINDINGS Left Atrium Left atrial dilatation. Left Ventricle Left ventricle is normal in size. Estimated LV EF 40-45 %. Evidence of diastolic dysfunction. Right Atrium Right atrial dilatation. Right Ventricle Right ventricular dilatation with normal systolic function. Mitral Valve Mild to moderate mitral regurgitation. Multiple MR jets noted. Aortic Valve Aortic valve is trileaflet. Mild aortic stenosis. Peak instantaneous gradient 28 mmHg and mean gradient 15 mmHg. Trivial aortic insufficiency. Tricuspid Valve Normal tricuspid valve structure and function. Mild tricuspid regurgitation. Estimated right ventricular systolic pressure is 41 mmHg. Pulmonic Valve Pulmonic valve not well visualized but Doppler velocities are normal. No pulmonic insufficiency. Pericardial Effusion No significant pericardial effusion is seen. Miscellaneous Normal aortic root dimension. E/e' average 13.9 IVC not well visualized.  M-mode / 2D Measurements & Calculations:   LVIDd:4.4 cm(3.7 - 5.6 cm)       Diastolic RSMWOZ:46.4 ml  XROEV:8.64 cm(2.2 - 4.0 cm)      Systolic SGKBDS:65 ml  XDZM:0.9 cm(0.6 - 1.1 cm)        Aortic Root:3.6 cm(2.0 - 3.7 cm)  LVPWd:1.2 cm(0.6 - 1.1 cm)       LA Dimension: 5.4 cm(1.9 - 4.0 cm)  Fractional Shortenin.95 %    LA volume/Index: 96 ml /49m^2  Calculated LVEF (%): 44.84 %     LVOT:2.1 cm   Mitral:                                Aortic   Peak E-Wave: 0.63 m/s                  Peak Velocity: 2.65 m/s                                         Mean Velocity: 1.82 m/s  Peak Gradient: 1.6 mmHg                Peak Gradient: 28.09 mmHg  Deceleration Time: 151 msec            Mean Gradient: 15 mmHg Area (continuity): 1.09 cm^2                                         AV VTI: 42.4 cm   Tricuspid:                             Pulmonic:   Estimated RVSP: 41 mmHg  Peak TR Velocity: 2.88 m/s  Peak TR Gradient: 33.1776 mmHg  Estimated RA Pressure: 8 mmHg                                         Estimated PASP: 41.18 mmHg  Septal Wall E' velocity:0.09 m/s Lateral Wall E' velocity:0.09 m/s    XR CHEST (SINGLE VIEW FRONTAL)    Result Date: 2/19/2022  EXAMINATION: ONE XRAY VIEW OF THE CHEST 2/19/2022 4:53 am COMPARISON: 02/18/2022 HISTORY: ORDERING SYSTEM PROVIDED HISTORY: Pulmonary Fibrosis TECHNOLOGIST PROVIDED HISTORY: Pulmonary Fibrosis Reason for Exam: Pulmonary Fibrosis Additional signs and symptoms: Pulmonary Fibrosis Relevant Medical/Surgical History: Pulmonary Fibrosis FINDINGS: Increasing ground-glass attenuation in the left lung. Interstitial opacities throughout the right lung are without appreciable change. No pneumothorax identified. The cardiac and mediastinal contours appear unchanged. Bilateral airspace disease superimposed on pulmonary fibrosis. Increasing airspace disease predominantly involving the left lung. XR HIP RIGHT (1 VIEW)    Result Date: 2/22/2022  EXAMINATION: ONE XRAY VIEW OF THE RIGHT HIP 2/22/2022 1:10 pm COMPARISON: 02/18/2022 HISTORY: ORDERING SYSTEM PROVIDED HISTORY: post op TECHNOLOGIST PROVIDED HISTORY: Of operative side while in recovery room. post op Reason for Exam: s/p right hip replacement FINDINGS: There is a right total hip arthroplasty with noncemented components. No acute fracture or dislocation. No acute hardware failure or loosening. Grossly normal alignment. Surrounding postsurgical changes. Vascular calcifications are seen compatible with atherosclerotic disease. Total hip arthropasty without acute hardware complication.      XR HIP RIGHT (2-3 VIEWS)    Result Date: 2/18/2022  EXAMINATION: ONE XRAY VIEW OF THE CHEST; TWO XRAY VIEWS OF THE RIGHT HIP 2/18/2022 6:52 am COMPARISON: 04/24/2021, 1318 hours, CT chest from 12/27/2021 HISTORY: ORDERING SYSTEM PROVIDED HISTORY: shortness of breath TECHNOLOGIST PROVIDED HISTORY: shortness of breath Reason for Exam: PT CO SOB with hemoptysis X several days. 58-year-old male with shortness of breath and hemoptysis for several days FINDINGS: Portable chest: AP portable upright view of the chest. Cardiac monitor leads overlie the chest. Underlying fibrotic changes throughout the lungs. Trachea midline. No pneumothorax. No free air. Worsening airspace disease throughout the left lung. Small left-sided pleural effusion. Cardiac and mediastinal contours remain unchanged. Stable mild cardiomegaly. Visualized osseous structures remain unchanged. Right hip: Acute slightly displaced fracture involving the right femoral neck. Underlying osteopenia. Surgical clips throughout the pelvis. Mild stool burden. Atherosclerotic calcification of the vasculature. Portable chest: 1. Worsening airspace disease throughout the left lung with small left-sided pleural effusion, likely worsening multifocal pneumonia. Follow-up is recommended to document resolution. 2. Underlying fibrosis throughout the lungs. 3. Stable mild cardiomegaly. Right hip: Acute slightly displaced right femoral neck fracture. Underlying osteopenia. CT HEAD WO CONTRAST    Result Date: 2/18/2022  EXAMINATION: CT OF THE HEAD WITHOUT CONTRAST  2/18/2022 9:39 am TECHNIQUE: CT of the head was performed without the administration of intravenous contrast. Dose modulation, iterative reconstruction, and/or weight based adjustment of the mA/kV was utilized to reduce the radiation dose to as low as reasonably achievable.  COMPARISON: 01/12/2021 HISTORY: ORDERING SYSTEM PROVIDED HISTORY: fell, hit head TECHNOLOGIST PROVIDED HISTORY: fell, hit head Reason for Exam: patient states he fell and hit his head today FINDINGS: BRAIN/VENTRICLES: There is no acute infarct or acute intracranial hemorrhage present. There is no mass effect or midline shift present. There is no ventriculomegaly or abnormal extra-axial fluid collection present. ORBITS: Limited evaluation of the orbits is unremarkable. SINUSES: The paranasal sinuses and mastoid air cells are clear. SOFT TISSUES/SKULL:  No lytic or blastic osseous lesions are identified. No acute intracranial process identified. XR CHEST PORTABLE    Result Date: 2/23/2022  EXAMINATION: ONE XRAY VIEW OF THE CHEST 2/23/2022 6:21 am COMPARISON: 02/21/2022, 02/19/2022, 04/24/2021 in in HISTORY: [address]: Idiopathic pulmonary fibrosis, history of pneumonia TECHNOLOGIST PROVIDED HISTORY: Idiopathic pulmonary fibrosis, history of pneumonia Reason for Exam: cough, congestion FINDINGS: The cardiac and mediastinal contours appear unchanged. Coarse interstitial opacities and ground-glass opacities again demonstrated, left greater than right. No pneumothorax or effusion. No acute osseous abnormality identified. No significant interval change. XR CHEST PORTABLE    Result Date: 2/21/2022  EXAMINATION: ONE XRAY VIEW OF THE CHEST 2/21/2022 5:49 am COMPARISON: 02/19/2022 HISTORY: ORDERING SYSTEM PROVIDED HISTORY: Hypoxia, pneumonia TECHNOLOGIST PROVIDED HISTORY: Hypoxia, pneumonia Reason for Exam: Hypoxia, pneumonia Additional signs and symptoms: Hypoxia, pneumonia Relevant Medical/Surgical History: Hypoxia, pneumonia FINDINGS: Stable mild cardiomegaly. Pulmonary vasculature appears mildly congested. Diffuse patchy airspace opacity is present bilaterally worse on the left and likely not significantly changed given differences in radiographic technique. No pneumothorax or pleural effusion. Lung volumes are quite low. Surrounding structures are unremarkable.      Diffuse bilateral pneumonia worse on the left not significantly changed since 02/19/2022 given differences in radiographic technique. Mild cardiomegaly and possible mild pulmonary vascular congestion. XR CHEST PORTABLE    Result Date: 2/18/2022  EXAMINATION: ONE XRAY VIEW OF THE CHEST; TWO XRAY VIEWS OF THE RIGHT HIP 2/18/2022 6:52 am COMPARISON: 04/24/2021, 1318 hours, CT chest from 12/27/2021 HISTORY: ORDERING SYSTEM PROVIDED HISTORY: shortness of breath TECHNOLOGIST PROVIDED HISTORY: shortness of breath Reason for Exam: PT CO SOB with hemoptysis X several days. 58-year-old male with shortness of breath and hemoptysis for several days FINDINGS: Portable chest: AP portable upright view of the chest. Cardiac monitor leads overlie the chest. Underlying fibrotic changes throughout the lungs. Trachea midline. No pneumothorax. No free air. Worsening airspace disease throughout the left lung. Small left-sided pleural effusion. Cardiac and mediastinal contours remain unchanged. Stable mild cardiomegaly. Visualized osseous structures remain unchanged. Right hip: Acute slightly displaced fracture involving the right femoral neck. Underlying osteopenia. Surgical clips throughout the pelvis. Mild stool burden. Atherosclerotic calcification of the vasculature. Portable chest: 1. Worsening airspace disease throughout the left lung with small left-sided pleural effusion, likely worsening multifocal pneumonia. Follow-up is recommended to document resolution. 2. Underlying fibrosis throughout the lungs. 3. Stable mild cardiomegaly. Right hip: Acute slightly displaced right femoral neck fracture. Underlying osteopenia. CT CHEST PULMONARY EMBOLISM W CONTRAST    Result Date: 2/18/2022  EXAMINATION: CTA OF THE CHEST 2/18/2022 7:43 am TECHNIQUE: CTA of the chest was performed after the administration of intravenous contrast.  Multiplanar reformatted images are provided for review. MIP images are provided for review.  Dose modulation, iterative reconstruction, and/or weight based adjustment of the mA/kV was utilized to reduce the radiation dose to as low as reasonably achievable. COMPARISON: 12/27/2021 HISTORY: ORDERING SYSTEM PROVIDED HISTORY: Elevated d-dimer TECHNOLOGIST PROVIDED HISTORY: Elevated d-dimer Decision Support Exception - unselect if not a suspected or confirmed emergency medical condition->Emergency Medical Condition (MA) Reason for Exam: sob, pneumonia bilat FINDINGS: Pulmonary Arteries: Pulmonary arteries show no evidence of intraluminal filling defect to suggest pulmonary embolism. Main pulmonary artery is normal in caliber. Mediastinum: Mediastinal lymphadenopathy. Cluster of AP window lymph nodes ranging from 13-15 mm slightly more prominent compared to prior where largest was 13 mm short axis. Subcarinal fullness representing adenopathy also increased. Right hilar mild lymphadenopathy increased from prior. 15 mm short axis lymph node noted on series 2, image 112 where previously there was mild soft tissue fullness. No axillary lymphadenopathy. Thyroid gland and esophagus grossly normal. Lungs/pleura: Peripheral predominant fibrosis with underlying bronchiectasis again noted which is fairly stable. Superimposed on above,interval development of new large area of ground-glass airspace disease in the left upper lobe extending almost across the anterior posterior length of the upper lobe and from the apex to the lingula. Most likely in pneumonia. On the right there is solid pleural base consolidative density 4.3 x 1.7 cm along the major fissure in the posterior segment right upper lobe. Previously was about 1.2 x 2.4 cm. Probably also worsening pneumonia but would recommend short interval 3 month follow-up. Small left pleural effusion is also new. 9 mm right upper lobe suprahilar central nodule series 4, image 46 is unchanged. No pneumothorax. Upper Abdomen: No acute process. No suspicious masses. Soft Tissues/Bones: No acute bone or soft tissue abnormality. 1. No evidence for acute pulmonary embolism.  2. There is very large new ground-glass airspace disease occupying most of the left upper lobe and new small left pleural effusion. 3. Solid pleural-based posterior segment right upper lobe airspace density along the major fissure is larger now measuring 4.3 x 1.7 cm, previously about 1.2 x 2.4 cm. Probably also worsening pneumonia but would recommend short interval 3 month follow-up 4. 9 mm right upper lobe suprahilar central nodule is unchanged. RECOMMENDATIONS: Unavailable     CT HIP RIGHT WO CONTRAST    Result Date: 2/22/2022  EXAMINATION: CT OF THE RIGHT HIP WITHOUT CONTRAST, 2/19/2022 12:30 pm TECHNIQUE: CT of the right hip was performed without the administration of intravenous contrast.  Multiplanar reformatted images are provided for review. Dose modulation, iterative reconstruction, and/or weight based adjustment of the mA/kV was utilized to reduce the radiation dose to as low as reasonably achievable. COMPARISON: Radiographs yesterday HISTORY ORDERING SYSTEM PROVIDED HISTORY: Better delineate right hip fracture. TECHNOLOGIST PROVIDED HISTORY: Better delineate right hip fracture. Reason for Exam:  Fx rt hip further evaluation / fall 1 day ago. FINDINGS: Bones: Acute right femoral neck fracture is present. No intertrochanteric fracture extension. The fracture is obliquely oriented across the femoral neck. There is apex anterior fracture angulation with rotation of the femur relative to the femoral head. No additional fracture is present in the right acetabulum. No fracture in the subtrochanteric proximal femur. No lytic or blastic lesion. Soft Tissue: Small reactive right hip joint effusion. No soft tissue hematoma. Muscle bulk in the right hip is normal. Joint: No significant right hip degenerative changes. 1. Acute right femoral neck fracture. Physical Examination:        Physical Exam  Constitutional:       General: He is not in acute distress. Appearance: Normal appearance.    HENT: Head: Normocephalic. Right Ear: External ear normal.      Left Ear: External ear normal.      Nose: Nose normal.   Eyes:      General: No scleral icterus. Right eye: No discharge. Left eye: No discharge. Conjunctiva/sclera: Conjunctivae normal.   Cardiovascular:      Rate and Rhythm: Tachycardia present. Pulmonary:      Effort: Pulmonary effort is normal. No respiratory distress. Breath sounds: Rales (ausculated at left lateral lung base) present. Abdominal:      General: Abdomen is flat. There is no distension. Palpations: Abdomen is soft. Tenderness: There is no abdominal tenderness. Hernia: A hernia is present. Comments: Hyperactive BS   Musculoskeletal:      Right lower leg: No edema. Left lower leg: No edema. Comments: Right LE ROM limited d/t pain,  R hip bandage in place; No overt erythema or edema. Skin:     Coloration: Skin is not jaundiced or pale. Neurological:      Mental Status: He is alert. Mental status is at baseline.    Psychiatric:         Behavior: Behavior normal.           Assessment:        Primary Problem  Multifocal pneumonia    Active Hospital Problems    Diagnosis Date Noted    Multifocal pneumonia [J18.9] 02/18/2022    Fracture of right hip [S72.001A] 02/18/2022    Community acquired bacterial pneumonia [J15.9] 02/18/2022    [facility] [J84.10] 12/08/2020    PAF (paroxysmal atrial fibrillation) ([facility] [I48.0] 07/21/2014       Plan:        Multifocal pneumonia in setting of pulmonary fibrosis- improving  C/w O2 supplementation as needed; pt back at baseline  Blood cultures negative, Respiratory cultures pending   Azithromycin course completed  Ceftriaxone 1 g IV every 24 hours 7 day course completed 2/24  Prednisone p.o. 40 mg QD was decreased to 30mg daily  Incentive spirometry  RT, PT/OT   Pulmonology following  ID following  PMR following     Acute on chronic systolic and diastolic CHF exacerbation  Also contributed to initial SOB presentation  Echo 2/22: EF 40 to 45%, evidence of diastolic dysfunction  Cardiology on board        Acute right hip fracture 2/2 fall - s/p hemiarthroplasty POD#4  CT right hip without contrast: Acute right femoral neck fracture  Ortho following  Right hip femoral hemiarthroplasty done 2/22  Pain management per Ortho/crit care recommendations  Percocet for pain         Depression   Sertraline p.o. 25 mg daily started 2/21     Atrial fibrillation   Acute on chronic; going into Afib with RVR & hypotensive s/p surgery, especially with exertion => Cardiology consulted  Discussed with Dr. Oliverio Penn at bedside, recommends:              C/w digoxin              Agrees with midodrine prn for SBP < 90              AC; would recommend switch to Eliquis               Dc Norvasc, Coreg              Start Lopressor 25 mg BID, hold for HR < 50, SBP <90          On home dose of aspirin  On Full dose enoxaparin   Digoxin 125 mg p.o. daily  Magnesium and potassium replacement protocols      Hypertension  Amlodipine p.o. 5 mg daily => dc for low BP  Carvedilol 3.125 mg p.o. twice daily => dc for low BP   Lisinopril 10 mg p.o. twice daily     Hyperlipidemia  Atorvastatin 40 mg p.o. daily     Other home medications being continued:  Tamsulosin 0.4 mg p.o. daily     Code: DNR-CCA  DVT prophylaxis: Enoxaparin 70 mg SQ twice daily (full dose d/t Afib hx)  GI prophylaxis: Famotidine 20 mg PO   Diet: low sodium => switched to regular d/t low BP  Activity: Up with assistance  Dispo: MELODY Vasquez MD  2/26/2022  8:39 AM       Attestation and add on       I have discussed the care of Cassy Vilchis , including pertinent history and exam findings,      2/26/22    with the resident. I have seen and examined the patient and the key elements of all parts of the encounter have been performed by me . I agree with the assessment, plan and orders as documented by the resident.      Principal Problem:    Multifocal pneumonia  Active Problems:    PAF (paroxysmal atrial fibrillation) (HCC)    Pulmonary fibrosis (HCC)    Fracture of right hip    Community acquired bacterial pneumonia    Allergy to penicillin  Resolved Problems:    * No resolved hospital problems. *         ---- ;     MD YOHANA Das[address], [address] [place] [address].    Phone [phone]   Fax: [phone]  Answering Service: [phone]

## 2022-02-26 NOTE — FLOWSHEET NOTE
Pt and his wife welcomed prayer. 02/26/22 1533   Encounter Summary   Services provided to: Patient and family together   Referral/Consult From: Palliative Care   Continue Visiting   (2-26-22)   Complexity of Encounter Moderate   Length of Encounter 15 minutes   Spiritual Assessment Completed Yes   Spiritual/Jehovah's witness   Type Spiritual support   Assessment Calm; Approachable;Coping   Intervention Active listening;Explored feelings, thoughts, concerns;Explored coping resources;Prayer;Sustaining presence/ Ministry of presence   Outcome Expressed gratitude;Coping;Engaged in conversation;Receptive

## 2022-02-26 NOTE — PROGRESS NOTES
SW called and left voicemail for [facility] and [facility] regarding pt referral.

## 2022-02-26 NOTE — PROGRESS NOTES
[facility] -   Infectious diseases evaluation  admission date 2/18/2022    reason for consultation:   Pneumonia    Impression :   Current:  Community-acquired pneumonia  Acute right femoral neck fracture S/P right femoral hemiarthroplasty 2/22/2022. Pulmonary fibrosis  Acute hypoxic respiratory failure  Allergy to PCN    Other:  ·   Discussion / summary of stay / plan of care   · Completed 7-day course of Ceftriaxone 2/24/22. Completed 5 day course of Azithromycin 2/21/22. Recommendations   · Monitor off antibiotics. · Follow CBC and renal function. · Supportive care. · Discussed with patient. Infection Control Recommendations   · [place] Precautions    Antimicrobial Stewardship Recommendations   · Simplification of therapy    Coordination ofOutpatient Care:   · Estimated Length of IV antimicrobials:  · Patient will need Midline / picc Catheter Insertion:   · Patient will need SNF:  · Patient will need outpatient wound care:     History of Present Illness:   Initial history:  Giorgio Aldana is a 74y.o.-year-old male who  presented to hospital on 2/18/2022 with worsening shortness of breath for several days, worse with exertion, no alleviating factors. The patient also fell ,was found to have acute right femoral neck fracture. The patient had chronic cough due to pulmonary fibrosis not increased, usually on 5 L of oxygen at home. He also had chronic legs pain. He denied fever or chills. The patient has been afebrile, no leukocytosis. He was started on IV Zithromax and Rocephin on admission that he is tolerating  Strep pneumo and Legionella antigen negative  Chest x-ray showed diffuse bilateral pneumonia worse on the left side. COVID-19 rapid test was negative. No growth on blood cultures     Interval changes  2/26/2022   Afebrile. Feeling good. Denies any fever, chills, n/v/d. C/o Pain to right leg from fracture.       Patient Vitals for the past 8 hrs:   BP Temp Temp src Pulse Resp SpO2   02/26/22 1411 106/73 97.7 °F (36.5 °C) Oral 97 18 --   02/26/22 1230 (!) 135/117 97.8 °F (36.6 °C) Oral 116 18 93 %   02/26/22 0843 101/74 -- -- 73 -- --   02/26/22 0824 (!) 77/57 -- -- 112 -- --       Summary of relevant labs:  Labs:    Micro:    Imaging:      I have personally reviewed the past medical history, past surgical history, medications, social history, and family history, and I haveupdated the database accordingly. Allergies:   Adhesive tape, Codeine, Penicillins, and Nintedanib     Review of Systems:     Review of Systems    Physical Examination :       Physical Exam  Vitals and nursing note reviewed. Constitutional:       General: He is not in acute distress. Appearance: Normal appearance. He is not ill-appearing. HENT:      Head: Normocephalic and atraumatic. Right Ear: External ear normal.      Left Ear: External ear normal.      Nose: Nose normal.      Mouth/Throat:      Mouth: Mucous membranes are moist.      Pharynx: Oropharynx is clear. Eyes:      Extraocular Movements: Extraocular movements intact. Conjunctiva/sclera: Conjunctivae normal.      Pupils: Pupils are equal, round, and reactive to light. Cardiovascular:      Rate and Rhythm: Normal rate and regular rhythm. Heart sounds: Normal heart sounds. No murmur heard. Pulmonary:      Effort: Pulmonary effort is normal. No respiratory distress. Breath sounds: Normal breath sounds. No wheezing. Abdominal:      General: Bowel sounds are normal. There is no distension. Palpations: Abdomen is soft. Tenderness: There is no abdominal tenderness. Genitourinary:     Comments: No fonseca. Musculoskeletal:      Cervical back: No rigidity. Right lower leg: No edema. Left lower leg: No edema. Skin:     General: Skin is warm and dry. Capillary Refill: Capillary refill takes less than 2 seconds. Findings: No rash.    Neurological: 125 mcg Oral Daily    sertraline  25 mg Oral Daily    enoxaparin  1 mg/kg SubCUTAneous BID    sodium chloride flush  5-40 mL IntraVENous 2 times per day    atorvastatin  40 mg Oral Daily    [Held by provider] lisinopril  10 mg Oral BID    tamsulosin  0.4 mg Oral Daily    aspirin  81 mg Oral Daily       Social History:     Social History     Socioeconomic History    Marital status: Single     Spouse name: Not on file    Number of children: Not on file    Years of education: Not on file    Highest education level: Not on file   Occupational History    Not on file   Tobacco Use    Smoking status: Former Smoker     Packs/day: 0.50     Years: 1.00     Pack years: 0.50     Quit date:      Years since quittin.1    Smokeless tobacco: Never Used    Tobacco comment: stated never actually really smoked only inhaled    Vaping Use    Vaping Use: Never used   Substance and Sexual Activity    Alcohol use: Yes     Alcohol/week: 12.0 standard drinks     Types: 2 Shots of liquor, 10 Standard drinks or equivalent per week     Comment: 2-3 times a week    Drug use: No     Types: Other-see comments     Comment: Cocaine use in past in     Sexual activity: Yes     Partners: Female   Other Topics Concern    Not on file   Social History Narrative    Not on file     Social Determinants of Health     Financial Resource Strain: Medium Risk    Difficulty of Paying Living Expenses: Somewhat hard   Food Insecurity: No Food Insecurity    Worried About Running Out of Food in the Last Year: Never true    Tino of Food in the Last Year: Never true   Transportation Needs:     Lack of Transportation (Medical): Not on file    Lack of Transportation (Non-Medical):  Not on file   Physical Activity:     Days of Exercise per Week: Not on file    Minutes of Exercise per Session: Not on file   Stress:     Feeling of Stress : Not on file   Social Connections:     Frequency of Communication with Friends and Family: Not on file    Frequency of Social Gatherings with Friends and Family: Not on file    Attends Faith Services: Not on file    Active Member of Clubs or Organizations: Not on file    Attends Club or Organization Meetings: Not on file    Marital Status: Not on file   Intimate Partner Violence:     Fear of Current or Ex-Partner: Not on file    Emotionally Abused: Not on file    Physically Abused: Not on file    Sexually Abused: Not on file   Housing Stability:     Unable to Pay for Housing in the Last Year: Not on file    Number of Places Lived in the Last Year: Not on file    Unstable Housing in the Last Year: Not on file       Family History:     Family History   Problem Relation Age of Onset    Diabetes Mother     Heart Attack Father     Heart Disease Father     Heart Disease Brother       Medical Decision Making:   I have independently reviewed/ordered the following labs:    CBC with Differential:   Recent Labs     02/25/22 0428 02/26/22  0655   WBC 12.6* 11.4*   HGB 10.1* 9.4*   HCT 31.1* 29.1*    289   LYMPHOPCT 8* 12*   MONOPCT 9* 9*     BMP:  Recent Labs     02/25/22 0428 02/26/22  0655    135   K 4.2 3.9   CL 97* 96*   CO2 34* 37*   BUN 28* 21   CREATININE 0.51* 0.54*     Hepatic Function Panel: No results for input(s): PROT, LABALBU, BILIDIR, IBILI, BILITOT, ALKPHOS, ALT, AST in the last 72 hours. No results for input(s): RPR in the last 72 hours. No results for input(s): HIV in the last 72 hours. No results for input(s): BC in the last 72 hours. Lab Results   Component Value Date    CREATININE 0.54 02/26/2022    GLUCOSE 85 02/26/2022       Detailed results: Thank you for allowing us to participate in the care of this patient. Please call with questions.     This note is created with the assistance of a speech recognition program.  While intending to generate adocument that actually reflects the content of the visit, the document can still have some errors including those of syntax and sound a like substitutions which may escape proof reading. It such instances, actual meaningcan be extrapolated by contextual diversion.     MIRI Weller - CNP  Office: [phone]  Perfect serve / office [phone]

## 2022-02-26 NOTE — FLOWSHEET NOTE
02/25/22 1950   Encounter Summary   Services provided to: Patient   Referral/Consult From: Palliative Care   Continue Visiting   (2/25/22)   Complexity of Encounter Low   Length of Encounter 15 minutes   Spiritual/Bahai   Type Spiritual support   Assessment Sleeping   Intervention Prayer

## 2022-02-26 NOTE — PROGRESS NOTES
Physical Therapy  Facility/Department: [facility] PROGRESSIVE CARE  Daily Treatment Note  NAME: Giorgio Aldana  : 1955  MRN: [id number]    Date of Service: 2022    Discharge Recommendations:  Continue to assess pending progress,Patient would benefit from continued therapy after discharge   PT Equipment Recommendations  Other: TBD    Assessment   Treatment Diagnosis: Impaired functional mobility 2* hip pain  Barriers to Learning: pain  REQUIRES PT FOLLOW UP: Yes  Activity Tolerance  Activity Tolerance: Patient limited by pain;Treatment limited secondary to medical complications (free text); Patient Tolerated treatment well     Patient Diagnosis(es): The primary encounter diagnosis was Pneumonia of both lungs due to infectious organism, unspecified part of lung. Diagnoses of Hypoxia and Closed fracture of neck of right femur, initial encounter [facility]) were also pertinent to this visit. has a past medical history of Atrial fibrillation ([facility] [address] Back pain, chronic, Hill's esophagus, Benign essential HTN, BPH (benign prostatic hyperplasia), Cancer ([facility] [address] Chronic idiopathic pulmonary fibrosis ([facility] [address] Cocaine abuse in remission [facility]), ED (erectile dysfunction), GERD (gastroesophageal reflux disease), GI bleed, Hernia, History of colon cancer, Melena, Migraines, and Murmur, cardiac.   has a past surgical history that includes Tonsillectomy; Colonoscopy; colectomy; Colonoscopy (2016); knee surgery (Right, 's); transesophageal echocardiogram (2018); Upper gastrointestinal endoscopy (N/A, 2018); Colonoscopy (N/A, 2018); hernia repair (Right, ); Cardiac catheterization (2018); colectomy; Total knee arthroplasty (Right, 2019); Upper gastrointestinal endoscopy (N/A, 2019); Cardioversion (); and hip surgery (Right, 2022).     Restrictions  Restrictions/Precautions  Restrictions/Precautions: Fall Risk,Weight Bearing (Full WBAT RLE)  Required Braces or Orthoses?: No  Implants present? : Metal implants (R hemiarthroplasty, R TKA)  Lower Extremity Weight Bearing Restrictions  Right Lower Extremity Weight Bearing: Weight Bearing As Tolerated (Full WBAT)  Position Activity Restriction  Other position/activity restrictions: Full WBAT RLE  Subjective   General  Chart Reviewed: Yes  Family / Caregiver Present: Yes (\"Fiance forever\")  Referring Practitioner: Anthony North MD  Subjective  Subjective: Pt positioned semifowlers in bed, agreeable to tx. General Comment  Comments: DEONDRE Perez ok's tx. Pain Screening  Patient Currently in Pain: Yes  Pain Assessment  Pain Assessment: 0-10  Pain Level: 6  Pain Type: Surgical pain  Pain Location: Hip  Pain Orientation: Right  Vital Signs  Blood Pressure Lyin/67  Blood Pressure Sittin/56 (83/65 after 5 mins )  Patient Currently in Pain: Yes  Oxygen Therapy  O2 Device: High flow nasal cannula  O2 Flow Rate (L/min): 6 L/min  Patient Observation  Observations: Orthostatic BP noted, SpO2 and HR remaining WNL. Orientation  Orientation  Overall Orientation Status: Within Functional Limits  Objective   Bed mobility  Bridging: Stand by assistance  Rolling to Left: Stand by assistance  Supine to Sit: Stand by assistance  Sit to Supine: Stand by assistance  Scooting: Stand by assistance (to EOB)  Comment: HOB elevated and use of bed rails, instruction provided to utilize L LE to assist R LE into bed to improve independance w bed mobiltiy.   Transfers  Comment: transfers differed d/t  significantly decreased BP from Supine > Sit   Ambulation  Ambulation?: No  WB Status: Full WBAT RLE  Stairs/Curb  Stairs?: No     Balance  Posture: Fair  Sitting - Static: Good;-  Sitting - Dynamic: Good;-  Standing - Static: Good;- (w/RW)  Standing - Dynamic: Good;- (w/RW)  Comments: sitting EOB and standing with RW  Other exercises  Other exercises?: Yes  Other exercises 1: Seated B LE ex's x10   Other exercises 2: Dynamic sitting EOB ~15 mins  Other exercises 3: Education provided on energy conservation techniques and benefits of continuing exercises provided. Goals  Short term goals  Time Frame for Short term goals: 7 days  Short term goal 1: Pt to demo supine<-->sit min to mod x1. Short term goal 2: Pt to perform transfers min to mod x1from varied surfaces. Short term goal 3: Pt to amb 10'-20' min to mod x1 with device. Short term goal 4: Pt to tolerate sitting upright in chair for at least 30 minutes. Short term goal 5: Pt to reduce pain to 2-3/10 with movement to progress independence with mobility. Short term goal 6: Pt to improve BLE strength by 1/2 MMG. Short term goal 7: Pt to tolerate standing 1-2 minutes, min to mod x1 with device. Patient Goals   Patient goals : To move better    Plan    Plan  Times per week: 1-2x/day  Safety Devices  Type of devices:  All fall risk precautions in place,Call light within reach,Gait belt,Nurse notified     Therapy Time         02/26/22 1613   PT Individual Minutes   Time In 1510   Time Out 1543   Minutes [address]

## 2022-02-27 LAB
ABSOLUTE EOS #: 0.09 K/UL (ref 0–0.4)
ABSOLUTE LYMPH #: 1.31 K/UL (ref 1–4.8)
ABSOLUTE MONO #: 0.96 K/UL (ref 0.1–1.3)
ANION GAP SERPL CALCULATED.3IONS-SCNC: 2 MMOL/L (ref 9–17)
BASOPHILS # BLD: 1 % (ref 0–2)
BASOPHILS ABSOLUTE: 0.09 K/UL (ref 0–0.2)
BUN BLDV-MCNC: 20 MG/DL (ref 8–23)
CALCIUM IONIZED: 1.06 MMOL/L (ref 1.13–1.33)
CALCIUM SERPL-MCNC: 7.7 MG/DL (ref 8.6–10.4)
CHLORIDE BLD-SCNC: 96 MMOL/L (ref 98–107)
CO2: 37 MMOL/L (ref 20–31)
CREAT SERPL-MCNC: 0.61 MG/DL (ref 0.7–1.2)
EOSINOPHILS RELATIVE PERCENT: 1 % (ref 0–4)
GFR AFRICAN AMERICAN: >60 ML/MIN
GFR NON-AFRICAN AMERICAN: >60 ML/MIN
GFR SERPL CREATININE-BSD FRML MDRD: ABNORMAL ML/MIN/{1.73_M2}
GLUCOSE BLD-MCNC: 84 MG/DL (ref 70–99)
HCT VFR BLD CALC: 28 % (ref 41–53)
HCT VFR BLD CALC: 28.3 % (ref 41–53)
HEMOGLOBIN: 9.1 G/DL (ref 13.5–17.5)
HEMOGLOBIN: 9.2 G/DL (ref 13.5–17.5)
LYMPHOCYTES # BLD: 15 % (ref 24–44)
MCH RBC QN AUTO: 25.1 PG (ref 26–34)
MCHC RBC AUTO-ENTMCNC: 32.4 G/DL (ref 31–37)
MCV RBC AUTO: 77.6 FL (ref 80–100)
MONOCYTES # BLD: 11 % (ref 1–7)
MORPHOLOGY: ABNORMAL
PDW BLD-RTO: 16.8 % (ref 11.5–14.9)
PLATELET # BLD: 315 K/UL (ref 150–450)
PMV BLD AUTO: 7.2 FL (ref 6–12)
POTASSIUM SERPL-SCNC: 4.4 MMOL/L (ref 3.7–5.3)
RBC # BLD: 3.61 M/UL (ref 4.5–5.9)
SEG NEUTROPHILS: 72 % (ref 36–66)
SEGMENTED NEUTROPHILS ABSOLUTE COUNT: 6.25 K/UL (ref 1.3–9.1)
SODIUM BLD-SCNC: 135 MMOL/L (ref 135–144)
WBC # BLD: 8.7 K/UL (ref 3.5–11)

## 2022-02-27 PROCEDURE — 6370000000 HC RX 637 (ALT 250 FOR IP)

## 2022-02-27 PROCEDURE — 6370000000 HC RX 637 (ALT 250 FOR IP): Performed by: INTERNAL MEDICINE

## 2022-02-27 PROCEDURE — 6370000000 HC RX 637 (ALT 250 FOR IP): Performed by: ORTHOPAEDIC SURGERY

## 2022-02-27 PROCEDURE — 82330 ASSAY OF CALCIUM: CPT

## 2022-02-27 PROCEDURE — 6360000002 HC RX W HCPCS: Performed by: STUDENT IN AN ORGANIZED HEALTH CARE EDUCATION/TRAINING PROGRAM

## 2022-02-27 PROCEDURE — 85014 HEMATOCRIT: CPT

## 2022-02-27 PROCEDURE — 85025 COMPLETE CBC W/AUTO DIFF WBC: CPT

## 2022-02-27 PROCEDURE — 6370000000 HC RX 637 (ALT 250 FOR IP): Performed by: STUDENT IN AN ORGANIZED HEALTH CARE EDUCATION/TRAINING PROGRAM

## 2022-02-27 PROCEDURE — 99232 SBSQ HOSP IP/OBS MODERATE 35: CPT | Performed by: INTERNAL MEDICINE

## 2022-02-27 PROCEDURE — 36415 COLL VENOUS BLD VENIPUNCTURE: CPT

## 2022-02-27 PROCEDURE — 2580000003 HC RX 258: Performed by: ORTHOPAEDIC SURGERY

## 2022-02-27 PROCEDURE — 85018 HEMOGLOBIN: CPT

## 2022-02-27 PROCEDURE — 99232 SBSQ HOSP IP/OBS MODERATE 35: CPT | Performed by: NURSE PRACTITIONER

## 2022-02-27 PROCEDURE — 2060000000 HC ICU INTERMEDIATE R&B

## 2022-02-27 PROCEDURE — 80048 BASIC METABOLIC PNL TOTAL CA: CPT

## 2022-02-27 RX ORDER — PREDNISONE 20 MG/1
20 TABLET ORAL DAILY
Status: DISCONTINUED | OUTPATIENT
Start: 2022-02-28 | End: 2022-03-01

## 2022-02-27 RX ADMIN — NYSTATIN 500000 UNITS: 500000 SUSPENSION ORAL at 08:22

## 2022-02-27 RX ADMIN — METOPROLOL TARTRATE 25 MG: 25 TABLET, FILM COATED ORAL at 09:36

## 2022-02-27 RX ADMIN — ENOXAPARIN SODIUM 70 MG: 100 INJECTION SUBCUTANEOUS at 08:23

## 2022-02-27 RX ADMIN — ENOXAPARIN SODIUM 70 MG: 100 INJECTION SUBCUTANEOUS at 21:26

## 2022-02-27 RX ADMIN — TAMSULOSIN HYDROCHLORIDE 0.4 MG: 0.4 CAPSULE ORAL at 08:22

## 2022-02-27 RX ADMIN — DIGOXIN 125 MCG: 125 TABLET ORAL at 08:20

## 2022-02-27 RX ADMIN — FAMOTIDINE 20 MG: 20 TABLET, FILM COATED ORAL at 08:21

## 2022-02-27 RX ADMIN — OXYCODONE HYDROCHLORIDE AND ACETAMINOPHEN 1 TABLET: 5; 325 TABLET ORAL at 06:01

## 2022-02-27 RX ADMIN — PREDNISONE 30 MG: 20 TABLET ORAL at 08:21

## 2022-02-27 RX ADMIN — METOPROLOL TARTRATE 25 MG: 25 TABLET, FILM COATED ORAL at 21:26

## 2022-02-27 RX ADMIN — SERTRALINE HYDROCHLORIDE 25 MG: 50 TABLET ORAL at 08:21

## 2022-02-27 RX ADMIN — MIDODRINE HYDROCHLORIDE 5 MG: 5 TABLET ORAL at 06:01

## 2022-02-27 RX ADMIN — ASPIRIN 81 MG 81 MG: 81 TABLET ORAL at 08:21

## 2022-02-27 RX ADMIN — SODIUM CHLORIDE, PRESERVATIVE FREE 10 ML: 5 INJECTION INTRAVENOUS at 08:22

## 2022-02-27 RX ADMIN — OXYCODONE HYDROCHLORIDE AND ACETAMINOPHEN 1 TABLET: 5; 325 TABLET ORAL at 22:19

## 2022-02-27 RX ADMIN — FAMOTIDINE 20 MG: 20 TABLET, FILM COATED ORAL at 21:26

## 2022-02-27 RX ADMIN — SODIUM CHLORIDE, PRESERVATIVE FREE 10 ML: 5 INJECTION INTRAVENOUS at 21:26

## 2022-02-27 RX ADMIN — OXYCODONE HYDROCHLORIDE AND ACETAMINOPHEN 1 TABLET: 5; 325 TABLET ORAL at 17:58

## 2022-02-27 RX ADMIN — OXYCODONE HYDROCHLORIDE AND ACETAMINOPHEN 1 TABLET: 5; 325 TABLET ORAL at 01:17

## 2022-02-27 RX ADMIN — ATORVASTATIN CALCIUM 40 MG: 40 TABLET, FILM COATED ORAL at 08:21

## 2022-02-27 ASSESSMENT — PAIN SCALES - GENERAL
PAINLEVEL_OUTOF10: 5
PAINLEVEL_OUTOF10: 10
PAINLEVEL_OUTOF10: 0
PAINLEVEL_OUTOF10: 9
PAINLEVEL_OUTOF10: 10
PAINLEVEL_OUTOF10: 4
PAINLEVEL_OUTOF10: 8

## 2022-02-27 ASSESSMENT — PAIN DESCRIPTION - ORIENTATION
ORIENTATION: RIGHT

## 2022-02-27 ASSESSMENT — PAIN DESCRIPTION - ONSET
ONSET: ON-GOING

## 2022-02-27 ASSESSMENT — PAIN DESCRIPTION - PAIN TYPE
TYPE: SURGICAL PAIN

## 2022-02-27 ASSESSMENT — ENCOUNTER SYMPTOMS
GASTROINTESTINAL NEGATIVE: 1
SHORTNESS OF BREATH: 1
ABDOMINAL PAIN: 0
COUGH: 0
WHEEZING: 1
VOMITING: 0
SHORTNESS OF BREATH: 0
EYE REDNESS: 0

## 2022-02-27 ASSESSMENT — PAIN DESCRIPTION - FREQUENCY
FREQUENCY: CONTINUOUS

## 2022-02-27 ASSESSMENT — PAIN DESCRIPTION - DESCRIPTORS
DESCRIPTORS: ACHING;SORE

## 2022-02-27 ASSESSMENT — PAIN SCALES - WONG BAKER: WONGBAKER_NUMERICALRESPONSE: 0

## 2022-02-27 ASSESSMENT — PAIN DESCRIPTION - LOCATION
LOCATION: HIP;INCISION

## 2022-02-27 NOTE — PROGRESS NOTES
[facility] -   Infectious diseases evaluation  admission date 2/18/2022    reason for consultation:   Pneumonia    Impression :   Current:  Community-acquired pneumonia  Acute right femoral neck fracture S/P right femoral hemiarthroplasty 2/22/2022. Pulmonary fibrosis  Acute hypoxic respiratory failure  Allergy to PCN    Other:  ·   Discussion / summary of stay / plan of care   · Completed 7-day course of Ceftriaxone 2/24/22. Completed 5 day course of Azithromycin 2/21/22. Recommendations   · Monitor off antibiotics. · Follow CBC and renal function. · Supportive care. · Discussed with patient. Infection Control Recommendations   · [place]inner Precautions    Antimicrobial Stewardship Recommendations   · Simplification of therapy    Coordination ofOutpatient Care:   · Estimated Length of IV antimicrobials:  · Patient will need Midline / picc Catheter Insertion:   · Patient will need SNF:  · Patient will need outpatient wound care:     History of Present Illness:   Initial history:  Mitchel Mckee is a 74y.o.-year-old male who  presented to hospital on 2/18/2022 with worsening shortness of breath for several days, worse with exertion, no alleviating factors. The patient also fell ,was found to have acute right femoral neck fracture. The patient had chronic cough due to pulmonary fibrosis not increased, usually on 5 L of oxygen at home. He also had chronic legs pain. He denied fever or chills. The patient has been afebrile, no leukocytosis. He was started on IV Zithromax and Rocephin on admission that he is tolerating  Strep pneumo and Legionella antigen negative  Chest x-ray showed diffuse bilateral pneumonia worse on the left side. COVID-19 rapid test was negative. No growth on blood cultures     Interval changes  2/27/2022   Afebrile. SpO2 100% 6L/nc  Feeling good. Denies any fever, chills, n/v/d. C/o Pain to right leg from fracture.       Patient Vitals for the past 8 hrs:   BP Temp Temp src Pulse Resp SpO2   22 0935 110/66 -- -- 86 -- --   22 0715 106/73 96.5 °F (35.8 °C) Oral 89 18 100 %   22 0455 116/84 98.1 °F (36.7 °C) Oral 80 18 99 %       Summary of relevant labs:  Labs:  Creat 0.63-0.51-0.54-0.61  Procalcitonin 0.10-0.22  WBC 15.2-13.0-12.6-11.4-8.7    Micro:   Blood Cx x2 - no growth   Urine Cx - no growth   Blood Cx - no growth   Legionella antigen - negative   Strep pneumoniae antigen - negative   COVID19 - no detected    Imagin/23 CXR - No significant interval change.  CXR - Diffuse bilateral pneumonia worse on the left not significantly changed since 2022 given differences in radiographic technique. Mild cardiomegaly and possible mild pulmonary vascular congestion. I have personally reviewed the past medical history, past surgical history, medications, social history, and family history, and I haveupdated the database accordingly. Allergies:   Adhesive tape, Codeine, Penicillins, and Nintedanib     Review of Systems:     Review of Systems   Constitutional: Negative. HENT: Negative. Respiratory: Positive for shortness of breath. Cardiovascular: Negative. Gastrointestinal: Negative. Genitourinary: Negative. Musculoskeletal: Negative. Skin: Negative. Neurological: Negative. Psychiatric/Behavioral: Negative. Physical Examination :       Physical Exam  Vitals and nursing note reviewed. Constitutional:       General: He is not in acute distress. Appearance: Normal appearance. He is not ill-appearing. HENT:      Head: Normocephalic and atraumatic. Right Ear: External ear normal.      Left Ear: External ear normal.      Nose: Nose normal.      Mouth/Throat:      Mouth: Mucous membranes are moist.      Pharynx: Oropharynx is clear.    Eyes:      Conjunctiva/sclera: Conjunctivae normal.   Cardiovascular:      Rate and Rhythm: Normal rate and regular rhythm. Heart sounds: Normal heart sounds. Pulmonary:      Effort: Pulmonary effort is normal. No respiratory distress. Breath sounds: Normal breath sounds. No wheezing. Abdominal:      General: Bowel sounds are normal. There is no distension. Palpations: Abdomen is soft. Tenderness: There is no abdominal tenderness. Genitourinary:     Comments: No fonseca. Musculoskeletal:      Cervical back: No rigidity. Right lower leg: No edema. Left lower leg: No edema. Skin:     General: Skin is warm and dry. Capillary Refill: Capillary refill takes less than 2 seconds. Findings: No rash. Neurological:      Mental Status: He is alert and oriented to person, place, and time.    Psychiatric:         Behavior: Behavior normal.         Past Medical History:     Past Medical History:   Diagnosis Date    Atrial fibrillation ([facility]     Back pain, chronic     Hill's esophagus 06/18/2019    Benign essential HTN     BPH (benign prostatic hyperplasia)     Cancer (HCC)     colon-rectal    Chronic idiopathic pulmonary fibrosis ([facility] [address] [facility] 03/29/2021    Cocaine abuse in remission [facility])     1970's    ED (erectile dysfunction) 4/2/2015    GERD (gastroesophageal reflux disease)     GI bleed 12/5/2018    Hernia     History of colon cancer     Melena     Migraines     Murmur, cardiac        Past Surgical  History:     Past Surgical History:   Procedure Laterality Date    CARDIAC CATHETERIZATION  11/29/2018    Non-obstructive CAD    CARDIOVERSION  2020    COLECTOMY      2nd colectomy, Colostomy and reversed [facility] COLECTOMY      1st time [place]    COLONOSCOPY      COLONOSCOPY  07/18/2016    COLONOSCOPY N/A 12/6/2018    COLONOSCOPY DIAGNOSTIC performed by Nikole Wong MD at [address] [facility] Right 2009    inguinal    HIP SURGERY Right 2/22/2022    HIP FEMORAL HEMIARTHROPLASTY performed by Angelica Colindres MD at [facility] Right     arthrotomy    TONSILLECTOMY      TOTAL KNEE ARTHROPLASTY Right 2019    KNEE TOTAL ARTHROPLASTY performed by Olinda Walton MD at [address] [facility] TRANSESOPHAGEAL ECHOCARDIOGRAM  2018    UPPER GASTROINTESTINAL ENDOSCOPY N/A 2018    EGD DIAGNOSTIC ONLY performed by Teodoro Hall MD at [address] [place] [address] N/A 2019    MCGUIRE'S       Medications:      midodrine  5 mg Oral TID AC    metoprolol tartrate  25 mg Oral BID    famotidine  20 mg Oral BID    predniSONE  30 mg Oral Daily    nystatin  5 mL Oral 4x Daily    sodium chloride flush  5-40 mL IntraVENous 2 times per day    digoxin  125 mcg Oral Daily    sertraline  25 mg Oral Daily    enoxaparin  1 mg/kg SubCUTAneous BID    sodium chloride flush  5-40 mL IntraVENous 2 times per day    atorvastatin  40 mg Oral Daily    [Held by provider] lisinopril  10 mg Oral BID    tamsulosin  0.4 mg Oral Daily    aspirin  81 mg Oral Daily       Social History:     Social History     Socioeconomic History    Marital status: Single     Spouse name: Not on file    Number of children: Not on file    Years of education: Not on file    Highest education level: Not on file   Occupational History    Not on file   Tobacco Use    Smoking status: Former Smoker     Packs/day: 0.50     Years: 1.00     Pack years: 0.50     Quit date:      Years since quittin.1    Smokeless tobacco: Never Used    Tobacco comment: stated never actually really smoked only inhaled    Vaping Use    Vaping Use: Never used   Substance and Sexual Activity    Alcohol use: Yes     Alcohol/week: 12.0 standard drinks     Types: 2 Shots of liquor, 10 Standard drinks or equivalent per week     Comment: 2-3 times a week    Drug use: No     Types:  Other-see comments     Comment: Cocaine use in past in     Sexual activity: Yes     Partners: Female   Other Topics Concern    Not on file   Social History Narrative    Not on file Social Determinants of Health     Financial Resource Strain: Medium Risk    Difficulty of Paying Living Expenses: Somewhat hard   Food Insecurity: No Food Insecurity    Worried About Running Out of Food in the Last Year: Never true    Tino of Food in the Last Year: Never true   Transportation Needs:     Lack of Transportation (Medical): Not on file    Lack of Transportation (Non-Medical):  Not on file   Physical Activity:     Days of Exercise per Week: Not on file    Minutes of Exercise per Session: Not on file   Stress:     Feeling of Stress : Not on file   Social Connections:     Frequency of Communication with Friends and Family: Not on file    Frequency of Social Gatherings with Friends and Family: Not on file    Attends Taoist Services: Not on file    Active Member of [address] [place] [address] [employer] or Organizations: Not on file    Attends Club or Organization Meetings: Not on file    Marital Status: Not on file   Intimate Partner Violence:     Fear of Current or Ex-Partner: Not on file    Emotionally Abused: Not on file    Physically Abused: Not on file    Sexually Abused: Not on file   Housing Stability:     Unable to Pay for Housing in the Last Year: Not on file    Number of [place] in the Last Year: Not on file    Unstable Housing in the Last Year: Not on file       Family History:     Family History   Problem Relation Age of Onset    Diabetes Mother     Heart Attack Father     Heart Disease Father     Heart Disease Brother       Medical Decision Making:   I have independently reviewed/ordered the following labs:    CBC with Differential:   Recent Labs     02/26/22  0655 02/27/22  0552   WBC 11.4* 8.7   HGB 9.4* 9.1*   HCT 29.1* 28.0*    315   LYMPHOPCT 12* 15*   MONOPCT 9* 11*     BMP:  Recent Labs     02/26/22  0655 02/27/22  0552    135   K 3.9 4.4   CL 96* 96*   CO2 37* 37*   BUN 21 20   CREATININE 0.54* 0.61*     Hepatic Function Panel: No results for input(s): PROT, LABALBU, BILIDIR, IBILI, BILITOT, ALKPHOS, ALT, AST in the last 72 hours. No results for input(s): RPR in the last 72 hours. No results for input(s): HIV in the last 72 hours. No results for input(s): BC in the last 72 hours. Lab Results   Component Value Date    CREATININE 0.61 02/27/2022    GLUCOSE 84 02/27/2022       Detailed results: Thank you for allowing us to participate in the care of this patient. Please call with questions. This note is created with the assistance of a speech recognition program.  While intending to generate adocument that actually reflects the content of the visit, the document can still have some errors including those of syntax and sound a like substitutions which may escape proof reading. It such instances, actual meaningcan be extrapolated by contextual diversion.     Pete Pollock, MIRI - LIYA  Office: [phone]  Perfect serve / office [phone]

## 2022-02-27 NOTE — CARE COORDINATION
ONGOING DISCHARGE PLANNING NOTE:    Writer reviewed LSW notes, and discharge plan is to discharge to SNF   Wean prednisone  Will need pre cert started for discharge      Electronically signed by Benjamin Copeland RN on 2/27/2022 at 11:23 AM

## 2022-02-27 NOTE — PROGRESS NOTES
Notified by nurse via [employer] (although not on call this weekend) of patient's dressing being saturated. Instructed her to reinforce dressing and I then will have our PA apply new Aquacell dressing on Monday.

## 2022-02-27 NOTE — PROGRESS NOTES
cooperative with exam  HEENT: Head: Normocephalic, no lesions, without obvious abnormality. Neck: no adenopathy, no carotid bruit, no JVD, supple, symmetrical, trachea midline and thyroid not enlarged, symmetric, no tenderness/mass/nodules  Lungs: decreased with faint crackles at right base  Heart: Irregular rate and rhythm, S1, S2 normal, no murmur, click, rub or gallop  Abdomen: soft, non-tender; bowel sounds normal; no masses,  no organomegaly  Extremities: extremities normal, atraumatic, no cyanosis or edema  Neurologic: Mental status: Alert, oriented, thought content appropriate      2D ECHO ( 2/22/22)  Difficult to assess LV function due to atrial fibrillation. Left ventricle is normal in size. Estimated LV EF 40-45%. Evidence of diastolic dysfunction. Right ventricular dilatation with normal systolic function. Left atrial dilatation. Right atrial dilatation. Mild aortic stenosis. Peak instantaneous gradient 28 mmHg and mean gradient 15 mmHg. Trivial aortic insufficiency. Mild to moderate mitral regurgitation. Multiple MR jets noted. Assessment / Acute Cardiac Problems:   1. Chronic AF  2. Lightheadedness and fall with right hip fracture  3. Orthostatic hypotension  4. Essential HTN  5. Pulmonary fibrosis with multifocal pneumonia  6.   Acute-on-chronic hypoxic respiratory failure    Patient Active Problem List:     Dyslipidemia     ED (erectile dysfunction)     Incomplete bladder emptying     Benign prostatic hyperplasia with urinary obstruction     History of colon cancer     PAF (paroxysmal atrial fibrillation) (HCC)     Anemia of chronic disease     Pallor of optic disc     Presbyopia     Incisional hernia, without obstruction or gangrene     Hypertrophic nonobstructive cardiomyopathy (HCC)     Iron (Fe) deficiency anemia     Primary osteoarthritis of right knee     History of malignant neoplasm of rectum     Hill's esophagus     CHF (congestive heart failure), NYHA class II, acute on chronic, combined ([facility] [address]     Hypoxia     Dyspnea and respiratory abnormalities     Occupational pulmonary disease     Sinus bradycardia     Acute hypoxemic respiratory failure due to COVID-19 [facility])     COVID-19     Pneumonia     Acute respiratory failure with hypoxia (HCC)     Pulmonary fibrosis (HCC)     Syncope and collapse     Chronic anticoagulation     Severe malnutrition (HCC)     Cervical stenosis of spinal canal     Impingement syndrome of left shoulder     Multifocal pneumonia     Fracture of right hip     Community acquired bacterial pneumonia     Allergy to penicillin      Plan of Treatment:   1. Continue metoprolol 25 mg bid  2. Continue holding lisinopril  3. Change midodrine to 5 mg scheduled TID at [phone]  4. Continue digoxin  5. Continue ASA and atorvastatin  6.    Off antibiotics with Pulmonary and ID following    Electronically signed by Idania Velázquez MD on 2/27/2022 at 8:43 AM  [facility]  [phone]

## 2022-02-27 NOTE — PLAN OF CARE
0430 by Epi Manning, RN  Outcome: Ongoing  2/26/2022 1634 by Bambi Willett, RN  Outcome: Ongoing  Goal: Absence of falls  Outcome: Ongoing

## 2022-02-27 NOTE — PROGRESS NOTES
Pulmonary Progress Note  [facility]      Patient - Renetta Barajas,  Age - 74 y.o.    - 1955      Room Number - 2105/2105-01   N -  [id number]   [facility]  Date of Admission -  2022  6:14 AM        Consulting Lennox Come, MD  Primary Care Physician - Ketan Aggarwal MD     SUBJECTIVE   Denies any dyspnea or chest pain. Denies any coughing. Remains on his baseline oxygen  2 more episodes of hypotension    OBJECTIVE   VITALS    height is 6' (1.829 m) and weight is 160 lb 15 oz (73 kg). His oral temperature is 96.5 °F (35.8 °C). His blood pressure is 110/66 and his pulse is 86. His respiration is 18 and oxygen saturation is 100%. Body mass index is 21.83 kg/m². Temperature Range: Temp: 96.5 °F (35.8 °C) Temp  Av.7 °F (36.5 °C)  Min: 96.5 °F (35.8 °C)  Max: 98.2 °F (36.8 °C)  BP Range:  Systolic (75BYN), FXO:732 , Min:104 , XON:963     Diastolic (71MAE), QXD:86, Min:64, Max:117    Pulse Range: Pulse  Av.3  Min: 63  Max: 116  Respiration Range: Resp  Av.3  Min: 18  Max: 20  Current Pulse Ox[de-identified]  SpO2: 100 %  24HR Pulse Ox Range:  SpO2  Av %  Min: 93 %  Max: 100 %  Oxygen Amount and Delivery: O2 Flow Rate (L/min): 6 L/min    Wt Readings from Last 3 Encounters:   22 160 lb 15 oz (73 kg)   21 171 lb (77.6 kg)   07/15/21 166 lb 9.6 oz (75.6 kg)       I/O (24 Hours)    Intake/Output Summary (Last 24 hours) at 2022 1026  Last data filed at 2022 0935  Gross per 24 hour   Intake --   Output 1905 ml   Net -1905 ml       EXAM     General Appearance  Awake, alert, oriented, in no acute distress  HEENT - normocephalic, atraumatic.  []  Mallampati  [] Crowded airway   [] Macroglossia  []  Retrognathia  [] Micrognathia  []  Normal tongue size []  Normal Bite  [] [place] sign positive    Neck - Supple,  trachea midline   Lungs -bibasilar crackles, this is what he is at baseline  Cardiovascular - Heart sounds are normal.  Regular rate and rhythm   Abdomen - Soft, nontender, nondistended, no masses or organomegaly  Neurologic - There are no focal motor or sensory deficits  Skin - No bruising or bleeding  Extremities - No clubbing, cyanosis, edema    MEDS      midodrine  5 mg Oral TID AC    metoprolol tartrate  25 mg Oral BID    famotidine  20 mg Oral BID    predniSONE  30 mg Oral Daily    nystatin  5 mL Oral 4x Daily    sodium chloride flush  5-40 mL IntraVENous 2 times per day    digoxin  125 mcg Oral Daily    sertraline  25 mg Oral Daily    enoxaparin  1 mg/kg SubCUTAneous BID    sodium chloride flush  5-40 mL IntraVENous 2 times per day    atorvastatin  40 mg Oral Daily    [Held by provider] lisinopril  10 mg Oral BID    tamsulosin  0.4 mg Oral Daily    aspirin  81 mg Oral Daily      sodium chloride      sodium chloride       oxyCODONE-acetaminophen, sodium chloride flush, sodium chloride, perflutren lipid microspheres, sodium chloride flush, sodium chloride flush, sodium chloride, ondansetron **OR** ondansetron, polyethylene glycol, acetaminophen **OR** acetaminophen, potassium chloride **OR** potassium chloride, magnesium sulfate    LABS   CBC   Recent Labs     02/27/22  0552   WBC 8.7   HGB 9.1*   HCT 28.0*   MCV 77.6*        BMP:   Lab Results   Component Value Date     02/27/2022    K 4.4 02/27/2022    CL 96 02/27/2022    CO2 37 02/27/2022    BUN 20 02/27/2022    LABALBU 4.1 12/20/2021    CREATININE 0.61 02/27/2022    CALCIUM 7.7 02/27/2022    GFRAA >60 02/27/2022    LABGLOM >60 02/27/2022     ABGs:  Lab Results   Component Value Date    PHART 7.394 02/18/2022    PO2ART 33.4 02/18/2022    FUV8OUX 42.4 02/18/2022      Lab Results   Component Value Date    MODE NOT REPORTED 02/18/2022     Ionized Calcium:  No results found for: IONCA  Magnesium:    Lab Results   Component Value Date    MG 2.1 04/25/2021     Phosphorus:    Lab Results   Component Value Date    PHOS 3.6 01/13/2021        LIVER PROFILE No results for input(s): AST, ALT, LIPASE, BILIDIR, BILITOT, ALKPHOS in the last 72 hours. Invalid input(s): AMYLASE,  ALB  INR No results for input(s): INR in the last 72 hours. PTT   Lab Results   Component Value Date    APTT 32.6 02/22/2022         RADIOLOGY     (See actual reports for details)    ASSESSMENT/PLAN   Principal Problem:    Multifocal pneumonia  Active Problems:    PAF (paroxysmal atrial fibrillation) (HCC)    Pulmonary fibrosis (HCC)    Fracture of right hip    Community acquired bacterial pneumonia    Allergy to penicillin  Resolved Problems:    * No resolved hospital problems.  *    Pulmonary status appears to be stable  Slowly wean prednisone, will decrease to 20 mg today  Recheck hemoglobin since hip dressing was saturated  No evidence of hypotension today  Discontinue nystatin, he received a week's therapy for his thrush  Electronically signed by Annemarie Rizo MD on 2/27/2022 at 10:26 AM

## 2022-02-27 NOTE — PROGRESS NOTES
[address]    PROGRESS NOTE             2/27/2022    8:04 AM    Name:   Carmen Archuleta  MRN:     [id number]     Acct:      [de-identified]   Room:   2105/2105-01  IP Day:  9  Admit Date:  2/18/2022  6:14 AM    PCP:  Jennifer Beltran MD  Code Status:  DNR-CCA    Subjective:     C/C: No chief complaint on file. Interval History Status: improved. Patient was seen and examined. Reports feeling better. Continued to be hypotensive yesterday & Given 500 mL of LR at time. BP has been OK since. Patient has no new complaints this a.m.;  Admits to right hip and knee pain but manageable;  C/o right knee swelling (reports hx of knee replacement on right). Right hip dressing at site of incision drained with blood and notable ecchymosis on physical exam; will order H&H in afternoon. Reports mood is improving. Brief History:     22-year-old male with past medical history of pulmonary fibrosis presented with acute hypoxic respiratory failure, had fall while walking into ED and fractured right hip.  Respiratory failure found to be secondary to pneumonia in setting of pulmonary fibrosis; unsure of antibiotics and patient O2 needs back at baseline.  Right hip hemiarthroplasty done without any complications; postop day #5.  Patient with chronic A. fib but previously controlled rate started going into A. fib with RVR on postoperative days; cardiology consulted & medication adjustments being made. Hypotensive episodes managed with fluid boluses; last 1 given 2/26; BP low normal since. Review of Systems:     Review of Systems   Constitutional: Positive for appetite change (improved). Negative for chills and fever. Eyes: Negative for redness and visual disturbance. Respiratory: Positive for wheezing. Negative for cough and shortness of breath. Cardiovascular: Negative for chest pain and palpitations.    Gastrointestinal: Negative for abdominal pain and vomiting. Genitourinary: Negative for difficulty urinating and dysuria. Musculoskeletal: Positive for arthralgias (right hip) and myalgias (right hip). Neurological: Positive for weakness. Negative for dizziness and light-headedness. Medications: Allergies: Allergies   Allergen Reactions    Adhesive Tape Other (See Comments)     Blister badly     Codeine Nausea Only     Other reaction(s): Other: See Comments  NAUSEA  Other reaction(s):  Other: See Comments  NAUSEA    Penicillins Swelling     As a baby  Other reaction(s): Unknown  As a baby    Nintedanib Diarrhea     10-15 BM daily       Current Meds:   Scheduled Meds:    midodrine  5 mg Oral TID AC    metoprolol tartrate  25 mg Oral BID    famotidine  20 mg Oral BID    predniSONE  30 mg Oral Daily    nystatin  5 mL Oral 4x Daily    sodium chloride flush  5-40 mL IntraVENous 2 times per day    digoxin  125 mcg Oral Daily    sertraline  25 mg Oral Daily    enoxaparin  1 mg/kg SubCUTAneous BID    sodium chloride flush  5-40 mL IntraVENous 2 times per day    atorvastatin  40 mg Oral Daily    [Held by provider] lisinopril  10 mg Oral BID    tamsulosin  0.4 mg Oral Daily    aspirin  81 mg Oral Daily     Continuous Infusions:    sodium chloride      sodium chloride       PRN Meds: oxyCODONE-acetaminophen, sodium chloride flush, sodium chloride, perflutren lipid microspheres, sodium chloride flush, sodium chloride flush, sodium chloride, ondansetron **OR** ondansetron, polyethylene glycol, acetaminophen **OR** acetaminophen, potassium chloride **OR** potassium chloride, magnesium sulfate    Data:     Past Medical History:   has a past medical history of Atrial fibrillation ([facility] [address] Back pain, chronic, Hill's esophagus, Benign essential HTN, BPH (benign prostatic hyperplasia), Cancer ([facility] [address] Chronic idiopathic pulmonary fibrosis ([facility] [address] Cocaine abuse in remission [facility]), ED (erectile dysfunction), GERD (gastroesophageal reflux disease), GI bleed, Hernia, History of colon cancer, Melena, Migraines, and Murmur, cardiac. Social History:   reports that he quit smoking about 51 years ago. He has a 0.50 pack-year smoking history. He has never used smokeless tobacco. He reports current alcohol use of about 12.0 standard drinks of alcohol per week. He reports that he does not use drugs. Family History:   Family History   Problem Relation Age of Onset    Diabetes Mother     Heart Attack Father     Heart Disease Father     Heart Disease Brother        Vitals:  /73   Pulse 89   Temp 96.5 °F (35.8 °C) (Oral)   Resp 18   Ht 6' (1.829 m)   Wt 160 lb 15 oz (73 kg)   SpO2 100%   BMI 21.83 kg/m²   Temp (24hrs), Av.7 °F (36.5 °C), Min:96.5 °F (35.8 °C), Max:98.2 °F (36.8 °C)    No results for input(s): POCGLU in the last 72 hours. Vitals:    22 2002 22 0000 22 0455 22 0715   BP: 115/75 104/64 116/84 106/73   Pulse: 80 63 80 89   Resp: 18 20 18 18   Temp: 98.2 °F (36.8 °C) 97.9 °F (36.6 °C) 98.1 °F (36.7 °C) 96.5 °F (35.8 °C)   TempSrc: Oral Oral Oral Oral   SpO2: 97% 96% 99% 100%   Weight:  160 lb 15 oz (73 kg)     Height:           I/O(24Hr):     Intake/Output Summary (Last 24 hours) at 2022 0804  Last data filed at 2022 0612  Gross per 24 hour   Intake --   Output 1725 ml   Net -1725 ml       Labs:  Recent Results (from the past 24 hour(s))   Basic Metabolic Panel w/ Reflex to MG    Collection Time: 22  5:52 AM   Result Value Ref Range    Glucose 84 70 - 99 mg/dL    BUN 20 8 - 23 mg/dL    CREATININE 0.61 (L) 0.70 - 1.20 mg/dL    Calcium 7.7 (L) 8.6 - 10.4 mg/dL    Sodium 135 135 - 144 mmol/L    Potassium 4.4 3.7 - 5.3 mmol/L    Chloride 96 (L) 98 - 107 mmol/L    CO2 37 (H) 20 - 31 mmol/L    Anion Gap 2 (L) 9 - 17 mmol/L    GFR Non-African American >60 >60 mL/min    GFR African American >60 >60 mL/min    GFR Comment         CBC with Auto Differential    Collection Time: 02/27/22  5:52 AM   Result Value Ref Range    WBC 8.7 3.5 - 11.0 k/uL    RBC 3.61 (L) 4.5 - 5.9 m/uL    Hemoglobin 9.1 (L) 13.5 - 17.5 g/dL    Hematocrit 28.0 (L) 41 - 53 %    MCV 77.6 (L) 80 - 100 fL    MCH 25.1 (L) 26 - 34 pg    MCHC 32.4 31 - 37 g/dL    RDW 16.8 (H) 11.5 - 14.9 %    Platelets 155 832 - 775 k/uL    MPV 7.2 6.0 - 12.0 fL    Seg Neutrophils 72 (H) 36 - 66 %    Lymphocytes 15 (L) 24 - 44 %    Monocytes 11 (H) 1 - 7 %    Eosinophils % 1 0 - 4 %    Basophils 1 0 - 2 %    Segs Absolute 6.25 1.3 - 9.1 k/uL    Absolute Lymph # 1.31 1.0 - 4.8 k/uL    Absolute Mono # 0.96 0.1 - 1.3 k/uL    Absolute Eos # 0.09 0.0 - 0.4 k/uL    Basophils Absolute 0.09 0.0 - 0.2 k/uL    Morphology ANISOCYTOSIS PRESENT     Morphology HYPOCHROMIA PRESENT     Morphology 1+ POLYCHROMASIA     Morphology 1+ ELLIPTOCYTES          Lab Results   Component Value Date/Time    SPECIAL NOT REPORTED 07/15/2021 04:33 PM     Lab Results   Component Value Date/Time    CULTURE NO SIGNIFICANT GROWTH 02/20/2022 06:41 PM         Radiology:    XR CHEST (SINGLE VIEW FRONTAL)    Result Date: 2/19/2022  Bilateral airspace disease superimposed on pulmonary fibrosis. Increasing airspace disease predominantly involving the left lung. XR HIP RIGHT (1 VIEW)    Result Date: 2/22/2022  Total hip arthropasty without acute hardware complication. XR HIP RIGHT (2-3 VIEWS)    Result Date: 2/18/2022  Portable chest: 1. Worsening airspace disease throughout the left lung with small left-sided pleural effusion, likely worsening multifocal pneumonia. Follow-up is recommended to document resolution. 2. Underlying fibrosis throughout the lungs. 3. Stable mild cardiomegaly. Right hip: Acute slightly displaced right femoral neck fracture. Underlying osteopenia. CT HEAD WO CONTRAST    Result Date: 2/18/2022  No acute intracranial process identified. XR CHEST PORTABLE    Result Date: 2/23/2022  No significant interval change.      XR CHEST PORTABLE    Result Date: 2/21/2022  Diffuse bilateral pneumonia worse on the left not significantly changed since 02/19/2022 given differences in radiographic technique. Mild cardiomegaly and possible mild pulmonary vascular congestion. XR CHEST PORTABLE    Result Date: 2/18/2022  Portable chest: 1. Worsening airspace disease throughout the left lung with small left-sided pleural effusion, likely worsening multifocal pneumonia. Follow-up is recommended to document resolution. 2. Underlying fibrosis throughout the lungs. 3. Stable mild cardiomegaly. Right hip: Acute slightly displaced right femoral neck fracture. Underlying osteopenia. CT CHEST PULMONARY EMBOLISM W CONTRAST    Result Date: 2/18/2022  1. No evidence for acute pulmonary embolism. 2. There is very large new ground-glass airspace disease occupying most of the left upper lobe and new small left pleural effusion. 3. Solid pleural-based posterior segment right upper lobe airspace density along the major fissure is larger now measuring 4.3 x 1.7 cm, previously about 1.2 x 2.4 cm. Probably also worsening pneumonia but would recommend short interval 3 month follow-up 4. 9 mm right upper lobe suprahilar central nodule is unchanged. RECOMMENDATIONS: Unavailable     CT HIP RIGHT WO CONTRAST    Result Date: 2/22/2022  1. Acute right femoral neck fracture. Physical Examination:        Physical Exam  Constitutional:       General: He is not in acute distress. Appearance: Normal appearance. He is normal weight. HENT:      Head: Normocephalic. Nose: Nose normal.      Comments: NC  Eyes:      General: No scleral icterus. Conjunctiva/sclera: Conjunctivae normal.   Cardiovascular:      Rate and Rhythm: Normal rate. Pulmonary:      Effort: No respiratory distress. Breath sounds: Rales (laterally at lung bases) present. No rhonchi. Comments:  On 6L O2 via NC; decreased it back to 5L while at bedside  Abdominal:      General: Abdomen is flat. There is no distension. Palpations: Abdomen is soft. Tenderness: There is no abdominal tenderness. There is no guarding or rebound. Comments: Hyperactive bowel sounds   Musculoskeletal:         General: Swelling (Right knee) and tenderness (Right hip) present. Comments: Right knee swollen relative to left knee;  Does have mild pitting edema on exam   But no TTP, no erythema, not warm to touch    Some swelling around Right hip incision area with tenderness to palpation, surrounding ecchymosis & dressing with sangenous strikethrough as seen in image below    Decreased ROM with hip flexion   Skin:     Findings: Bruising present. Neurological:      Mental Status: He is alert. Mental status is at baseline.    Psychiatric:         Behavior: Behavior normal.           Media Information               Media Information                 Assessment:        Primary Problem  Multifocal pneumonia    Active Hospital Problems    Diagnosis Date Noted    Allergy to penicillin [Z88.0]     Multifocal pneumonia [J18.9] 02/18/2022    Fracture of right hip [S72.001A] 02/18/2022    Community acquired bacterial pneumonia [J15.9] 02/18/2022    [facility] [J84.10] 12/08/2020    PAF (paroxysmal atrial fibrillation) ([facility] [I48.0] 07/21/2014       Plan:        Multifocal pneumonia in setting of pulmonary fibrosis- improving  C/w O2 supplementation as needed; pt remains at baseline of 5L O2  Blood cultures negative, Respiratory cultures pending   Azithromycin course completed 2/21  Ceftriaxone course completed 2/24  Prednisone decreased to 20 mg p.o. 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     Depression   Sertraline p.o. 25 mg daily started 2/21     Atrial fibrillation   Acute on chronic; going into Afib with RVR & hypotensive s/p surgery, especially with exertion  C/w previous recommendations per Cardiology              Digoxin 125 mg p.o. daily              Midodrine prn for SBP < 90              AC              Lopressor 25 mg BID, hold for HR < 50, SBP <90 (started 2/25)  On home dose of aspirin  On Full dose enoxaparin   Magnesium and potassium replacement protocols      Hypertension  Amlodipine p.o. 5 mg daily => dc'ed for low BP on 2/25  Carvedilol 3.125 mg p.o. twice daily => dc'ed for low BP on 2/25  Lisinopril 10 mg p.o. twice daily => held for low BP on 2/26     Hyperlipidemia  Atorvastatin 40 mg p.o. daily     Other home medications being continued:  Tamsulosin 0.4 mg p.o. daily     Code: DNR-CCA  DVT prophylaxis: Enoxaparin 70 mg SQ twice daily (full dose d/t Afib hx)  GI prophylaxis: Famotidine 20 mg PO   Diet: Regular  Activity: Up with assistance  Dispo: SNF vs ARU (PMR following)    Please note: Use of a speech recognition software was used in the creation of portions of this note and dictation errors, including those of syntax and sound alike word substitutions, may have escaped proofreading. Lindsay Porter,   2/27/2022  8:04 AM       Attestation and add on       I have discussed the care of Tutu Guevara , including pertinent history and exam findings,      2/27/22    with the resident. I have seen and examined the patient and the key elements of all parts of the encounter have been performed by me . I agree with the assessment, plan and orders as documented by the resident.      Principal Problem:    Multifocal pneumonia  Active Problems:    PAF (paroxysmal atrial fibrillation) ([facility] [address] Pulmonary fibrosis ([facility] [address]    Fracture of right hip    Community acquired bacterial pneumonia    Allergy to penicillin  Resolved Problems:    * No resolved hospital problems. *         ---- ;     MD YOHANA Haas[address], [address] [place] [address].    Phone [phone]   Fax: [phone]  Answering Service: [phone]

## 2022-02-27 NOTE — PROGRESS NOTES
Rn notified Dr. Elizabeth Dorsey of pt dressing being saturated, drainage noted as red/bloody. Orders-to reinforce dressing w/ABD and tape.

## 2022-02-27 NOTE — ACP (ADVANCE CARE PLANNING)
Advance Care Planning     General Advance Care Planning (ACP) Conversation    Date of Conversation: 2/18/2022  Conducted with: Patient with Decision Making Capacity    Healthcare Decision Maker:    Primary Decision Maker: Genia Davenport (sig other) - Other - [phone]    Secondary Decision Maker: Pio Cullen - Brother/Sister - [phone]    Copy of HCPOA in patient's paper chart      Content/Action Overview: Has ACP document(s) on file - reflects the patient's care preferences  On 2/23/22, Cecille Caceres Reviewed DNR and patient confirms current DNR status - completed forms on file    Patient stated he does not have a Living Will at this time, and his working on getting one. Length of Voluntary ACP Conversation in minutes: [address].  Deejay Brown

## 2022-02-27 NOTE — FLOWSHEET NOTE
Patient provided medical update and declined prayer; patient told writer that he has a completed HCPOA, and a copy of this document is in his chart; patient stated he is \"working on the [employer]"; See ACP note this date;     02/27/22 1310   Encounter Summary   Services provided to: Patient   Referral/Consult From: Palliative Care   Continue Visiting   (2/27/22)   Complexity of Encounter Low   Length of Encounter 15 minutes   Spiritual Assessment Completed Yes   Grief and Life Adjustment   Type Adjustment to illness   Assessment Approachable; Hopeful;Coping   Intervention Active listening;Sustaining presence/ Ministry of presence; Discussed illness/injury and it's impact   Outcome Expressed gratitude;Expressed feelings/needs/concerns;Coping; Hopeful;Receptive   Primary Decision Connor (Healthcare Proxy)   [address] is:   (See ACP note this date)

## 2022-02-28 LAB
ABSOLUTE EOS #: 0.2 K/UL (ref 0–0.4)
ABSOLUTE LYMPH #: 1.49 K/UL (ref 1–4.8)
ABSOLUTE MONO #: 1.09 K/UL (ref 0.1–1.3)
ANION GAP SERPL CALCULATED.3IONS-SCNC: 1 MMOL/L (ref 9–17)
BASOPHILS # BLD: 0 % (ref 0–2)
BASOPHILS ABSOLUTE: 0 K/UL (ref 0–0.2)
BUN BLDV-MCNC: 17 MG/DL (ref 8–23)
CALCIUM SERPL-MCNC: 7.9 MG/DL (ref 8.6–10.4)
CHLORIDE BLD-SCNC: 98 MMOL/L (ref 98–107)
CO2: 37 MMOL/L (ref 20–31)
CREAT SERPL-MCNC: 0.51 MG/DL (ref 0.7–1.2)
EOSINOPHILS RELATIVE PERCENT: 2 % (ref 0–4)
GFR AFRICAN AMERICAN: >60 ML/MIN
GFR NON-AFRICAN AMERICAN: >60 ML/MIN
GFR SERPL CREATININE-BSD FRML MDRD: ABNORMAL ML/MIN/{1.73_M2}
GLUCOSE BLD-MCNC: 81 MG/DL (ref 70–99)
HCT VFR BLD CALC: 28.5 % (ref 41–53)
HEMOGLOBIN: 9.3 G/DL (ref 13.5–17.5)
LYMPHOCYTES # BLD: 15 % (ref 24–44)
MCH RBC QN AUTO: 25.3 PG (ref 26–34)
MCHC RBC AUTO-ENTMCNC: 32.5 G/DL (ref 31–37)
MCV RBC AUTO: 77.7 FL (ref 80–100)
MONOCYTES # BLD: 11 % (ref 1–7)
MORPHOLOGY: ABNORMAL
PDW BLD-RTO: 17.3 % (ref 11.5–14.9)
PLATELET # BLD: 340 K/UL (ref 150–450)
PMV BLD AUTO: 7 FL (ref 6–12)
POTASSIUM SERPL-SCNC: 4.4 MMOL/L (ref 3.7–5.3)
RBC # BLD: 3.67 M/UL (ref 4.5–5.9)
SEG NEUTROPHILS: 72 % (ref 36–66)
SEGMENTED NEUTROPHILS ABSOLUTE COUNT: 7.12 K/UL (ref 1.3–9.1)
SODIUM BLD-SCNC: 136 MMOL/L (ref 135–144)
WBC # BLD: 9.9 K/UL (ref 3.5–11)

## 2022-02-28 PROCEDURE — 97116 GAIT TRAINING THERAPY: CPT

## 2022-02-28 PROCEDURE — 6370000000 HC RX 637 (ALT 250 FOR IP): Performed by: INTERNAL MEDICINE

## 2022-02-28 PROCEDURE — 6360000002 HC RX W HCPCS: Performed by: STUDENT IN AN ORGANIZED HEALTH CARE EDUCATION/TRAINING PROGRAM

## 2022-02-28 PROCEDURE — 99232 SBSQ HOSP IP/OBS MODERATE 35: CPT | Performed by: INTERNAL MEDICINE

## 2022-02-28 PROCEDURE — 99231 SBSQ HOSP IP/OBS SF/LOW 25: CPT | Performed by: INTERNAL MEDICINE

## 2022-02-28 PROCEDURE — 2580000003 HC RX 258: Performed by: ORTHOPAEDIC SURGERY

## 2022-02-28 PROCEDURE — 6370000000 HC RX 637 (ALT 250 FOR IP)

## 2022-02-28 PROCEDURE — 6370000000 HC RX 637 (ALT 250 FOR IP): Performed by: STUDENT IN AN ORGANIZED HEALTH CARE EDUCATION/TRAINING PROGRAM

## 2022-02-28 PROCEDURE — 99024 POSTOP FOLLOW-UP VISIT: CPT | Performed by: PHYSICIAN ASSISTANT

## 2022-02-28 PROCEDURE — 36415 COLL VENOUS BLD VENIPUNCTURE: CPT

## 2022-02-28 PROCEDURE — 97110 THERAPEUTIC EXERCISES: CPT

## 2022-02-28 PROCEDURE — 2060000000 HC ICU INTERMEDIATE R&B

## 2022-02-28 PROCEDURE — 6370000000 HC RX 637 (ALT 250 FOR IP): Performed by: ORTHOPAEDIC SURGERY

## 2022-02-28 PROCEDURE — 80048 BASIC METABOLIC PNL TOTAL CA: CPT

## 2022-02-28 PROCEDURE — 85025 COMPLETE CBC W/AUTO DIFF WBC: CPT

## 2022-02-28 RX ADMIN — ENOXAPARIN SODIUM 70 MG: 100 INJECTION SUBCUTANEOUS at 08:29

## 2022-02-28 RX ADMIN — METOPROLOL TARTRATE 25 MG: 25 TABLET, FILM COATED ORAL at 21:49

## 2022-02-28 RX ADMIN — OXYCODONE HYDROCHLORIDE AND ACETAMINOPHEN 1 TABLET: 5; 325 TABLET ORAL at 14:56

## 2022-02-28 RX ADMIN — FAMOTIDINE 20 MG: 20 TABLET, FILM COATED ORAL at 08:30

## 2022-02-28 RX ADMIN — FAMOTIDINE 20 MG: 20 TABLET, FILM COATED ORAL at 21:49

## 2022-02-28 RX ADMIN — MIDODRINE HYDROCHLORIDE 5 MG: 5 TABLET ORAL at 11:25

## 2022-02-28 RX ADMIN — DIGOXIN 125 MCG: 125 TABLET ORAL at 08:30

## 2022-02-28 RX ADMIN — OXYCODONE HYDROCHLORIDE AND ACETAMINOPHEN 1 TABLET: 5; 325 TABLET ORAL at 08:30

## 2022-02-28 RX ADMIN — TAMSULOSIN HYDROCHLORIDE 0.4 MG: 0.4 CAPSULE ORAL at 08:30

## 2022-02-28 RX ADMIN — SODIUM CHLORIDE, PRESERVATIVE FREE 10 ML: 5 INJECTION INTRAVENOUS at 21:51

## 2022-02-28 RX ADMIN — METOPROLOL TARTRATE 25 MG: 25 TABLET, FILM COATED ORAL at 08:30

## 2022-02-28 RX ADMIN — SERTRALINE HYDROCHLORIDE 25 MG: 50 TABLET ORAL at 08:30

## 2022-02-28 RX ADMIN — ATORVASTATIN CALCIUM 40 MG: 40 TABLET, FILM COATED ORAL at 08:30

## 2022-02-28 RX ADMIN — PREDNISONE 20 MG: 20 TABLET ORAL at 08:30

## 2022-02-28 RX ADMIN — ENOXAPARIN SODIUM 70 MG: 100 INJECTION SUBCUTANEOUS at 21:50

## 2022-02-28 RX ADMIN — MIDODRINE HYDROCHLORIDE 5 MG: 5 TABLET ORAL at 16:25

## 2022-02-28 RX ADMIN — OXYCODONE HYDROCHLORIDE AND ACETAMINOPHEN 1 TABLET: 5; 325 TABLET ORAL at 02:38

## 2022-02-28 RX ADMIN — SODIUM CHLORIDE, PRESERVATIVE FREE 10 ML: 5 INJECTION INTRAVENOUS at 08:32

## 2022-02-28 RX ADMIN — ASPIRIN 81 MG 81 MG: 81 TABLET ORAL at 08:30

## 2022-02-28 ASSESSMENT — PAIN DESCRIPTION - LOCATION
LOCATION: HIP
LOCATION: HIP;INCISION

## 2022-02-28 ASSESSMENT — PAIN SCALES - WONG BAKER: WONGBAKER_NUMERICALRESPONSE: 0

## 2022-02-28 ASSESSMENT — PAIN DESCRIPTION - PAIN TYPE
TYPE: SURGICAL PAIN
TYPE: SURGICAL PAIN

## 2022-02-28 ASSESSMENT — PAIN SCALES - GENERAL
PAINLEVEL_OUTOF10: 8
PAINLEVEL_OUTOF10: 7
PAINLEVEL_OUTOF10: 3
PAINLEVEL_OUTOF10: 8
PAINLEVEL_OUTOF10: 6
PAINLEVEL_OUTOF10: 8

## 2022-02-28 ASSESSMENT — PAIN DESCRIPTION - ORIENTATION
ORIENTATION: RIGHT
ORIENTATION: RIGHT

## 2022-02-28 ASSESSMENT — ENCOUNTER SYMPTOMS
RHINORRHEA: 0
COUGH: 0
SHORTNESS OF BREATH: 1
EYE DISCHARGE: 0
EYE REDNESS: 0
CONSTIPATION: 0
VOMITING: 0

## 2022-02-28 ASSESSMENT — PAIN DESCRIPTION - DESCRIPTORS
DESCRIPTORS: THROBBING
DESCRIPTORS: ACHING;SORE

## 2022-02-28 ASSESSMENT — PAIN DESCRIPTION - FREQUENCY: FREQUENCY: CONTINUOUS

## 2022-02-28 ASSESSMENT — PAIN DESCRIPTION - ONSET: ONSET: ON-GOING

## 2022-02-28 NOTE — PROGRESS NOTES
[address]   Physical Therapy Progress Note    Date: 22  Patient Name: Nadja Strauss       Room: 2667/3221-33  MRN: [id number]   Account: [de-identified]   : 1955  (75 y.o.)   Gender: male     Discharge Recommendations   The patient would benefit from an intensive level of therapy after discharge from the facility. They should be able to tolerate 3-hours of Combined OT/PT/ST over 5 days/week or at least 900 minutes of  Combined Therapy over 7 days. This patient would benefit from a Physical Medicine and Rehab Consult. Other: TBD    Referring Practitioner: Angelica Colindres MD  Diagnosis: Hypoxia, community acquired, bacterial pnemumonia, closed fx of neck of right femur s/p HIP FEMORAL HEMIARTHROPLASTY, pneumonia of both lungs due to infectious organism, multifocal pneumonia   Restrictions/Precautions: Fall Risk,Weight Bearing (Full WBAT RLE)  Implants present? : Metal implants (R hemiarthroplasty, R TKA)  Other position/activity restrictions: Full WBAT RLE  Right Lower Extremity Weight Bearing: Weight Bearing As Tolerated (Full WBAT)   Past Medical History:  has a past medical history of Atrial fibrillation ([address] Back pain, chronic, Hill's esophagus, Benign essential HTN, BPH (benign prostatic hyperplasia), Cancer ([address] Chronic idiopathic pulmonary fibrosis ([address] Cocaine abuse in remission [facility]), ED (erectile dysfunction), GERD (gastroesophageal reflux disease), GI bleed, Hernia, History of colon cancer, Melena, Migraines, and Murmur, cardiac. Past Surgical History:   has a past surgical history that includes Tonsillectomy; Colonoscopy; colectomy; Colonoscopy (2016); knee surgery (Right, 's); transesophageal echocardiogram (2018); Upper gastrointestinal endoscopy (N/A, 2018); Colonoscopy (N/A, 2018); hernia repair (Right, ); Cardiac catheterization (2018); colectomy; Total knee arthroplasty (Right, 2019);  Upper gastrointestinal endoscopy (N/A, 6/18/2019); Cardioversion (2020); and hip surgery (Right, 2/22/2022). Overall Orientation Status: Within Functional Limits  Restrictions/Precautions  Restrictions/Precautions: Fall Risk;Weight Bearing (Full WBAT RLE)  Required Braces or Orthoses?: No  Implants present? : Metal implants (R hemiarthroplasty, R TKA)  Lower Extremity Weight Bearing Restrictions  Right Lower Extremity Weight Bearing: Weight Bearing As Tolerated (Full WBAT)  Position Activity Restriction  Other position/activity restrictions: Full WBAT RLE    Subjective: Pt reports getting up with assist to bedside commode. Comments: DEONDRE Thakkar is okay with PT tx. Pt is on O2 at home with baseline of 5L per doctor's notes. Pt needing 6L for ambulating into hallway. Vital Signs  Patient Currently in Pain: Yes  Pain Assessment: 0-10  Pain Level: 6  Pain Type: Surgical pain  Pain Location: Hip  Pain Orientation: Right  Pain Descriptors: Throbbing  Non-Pharmaceutical Pain Intervention(s): Ambulation/Increased Activity; Distraction;Repositioned  Response to Pain Intervention: Patient Satisfied     Oxygen Therapy  SpO2: 91 % (amb in hallway with PT)  Pulse Oximeter Device Mode: Intermittent  Pulse Oximeter Device Location: Finger;Right  O2 Device: High flow nasal cannula  O2 Flow Rate (L/min): 6 L/min (portable O2 tank has 4L and 6L, increase to 6L for walk)          Bed Mobility  Bridging: Stand by assistance  Rolling: Supervision  Supine to Sit: Supervision (cues for pillow between knees and sequencing)  Sit to Supine: Unable to assess (pt left in bedside recliner)  Scooting: Stand by assistance (to EOB)      Transfers:  Sit to Stand: Contact guard assistance  Stand to sit: Contact guard assistance  Bed to Chair: Contact guard assistance           WB Status: Full WBAT RLE  Ambulation 1  Surface: level tile  Device: Rolling Walker  Assistance: Contact guard assistance (2nd person managing O2 tank and lines)  Quality of Gait: antalgic gait with longer stride on right, shorter step length on left, slow guarded pace  Gait Deviations: Slow Lanny;Decreased step length;Decreased step height  Distance: 30ftx2  Comments: seated rest breaks between walks. Pt's in afib, SaO2 91% on 6L, cues for sequencing and safe turns        Stairs/Curb  Stairs?: No                                                     Posture: Fair  Sitting - Static: Good  Sitting - Dynamic: Fair;+  Standing - Static: Fair;+ (w/RW)  Standing - Dynamic: Fair (w/RW)  Comments: Seated EOB, Standing with RW     Other exercises?: Yes  Other exercises 1: STS x4  Other exercises 2: Static Standing x3, 40sec, 1min 20sec, 3min. Other exercises 3: Bed Mobility with education on pillow between knees to prevent aDducting right hip  Other exercises 4: Education on Plan of Care and increased ambulation           Activity Tolerance: Patient limited by pain;Treatment limited secondary to medical complications (free text); Patient Tolerated treatment well  PT Equipment Recommendations  Other: TBD       Assessment  Activity Tolerance: Patient limited by pain;Treatment limited secondary to medical complications (free text); Patient Tolerated treatment well   Body structures, Functions, Activity limitations: Decreased functional mobility ; Decreased ROM; Decreased strength;Decreased ADL status; Decreased endurance;Decreased balance; Increased pain  Specific instructions for Next Treatment: progress up to chair, co tx, ROM/therex  Prognosis: Good  Discharge Recommendations: Continue to assess pending progress; Patient would benefit from continued therapy after discharge     Type of devices:  All fall risk precautions in place;Call light within reach;Gait belt;Nurse notified     Plan  Times per week: 1-2x/day  Current Treatment Recommendations: Strengthening,ROM,Balance Training,Functional Mobility Training,Transfer Training,Endurance Training,Gait Training,Equipment Evaluation, Education, & procurement,Patient/Caregiver Education & Training,Safety Education & Training,Positioning,Pain Management    Patient Education  New Education Provided:  Plan of Care  Learner:patient  Method: demonstration and explanation       Outcome: needs reinforcement     Goals  Short term goals  Time Frame for Short term goals: 7 days  Short term goal 1: Pt to demo supine<-->sit min to mod x1. Short term goal 2: Pt to perform transfers min to mod x1from varied surfaces. Short term goal 3: Pt to amb 10'-20' min to mod x1 with device. Short term goal 4: Pt to tolerate sitting upright in chair for at least 30 minutes. Short term goal 5: Pt to reduce pain to 2-3/10 with movement to progress independence with mobility. Short term goal 6: Pt to improve BLE strength by 1/2 MMG. Short term goal 7: Pt to tolerate standing 1-2 minutes, min to mod x1 with device.     PT Individual Minutes  Time In: 4859  Time Out: [address]  Minutes: 48    Electronically signed by Ovi Perera PTA on 2/28/22 at 4:41 PM EST

## 2022-02-28 NOTE — PROGRESS NOTES
Pulmonary Progress Note  [facility]      Patient - Aron Dhillon,  Age - 74 y.o.    - 1955      Room Number - 2105/2105-01   N -  [id number]   [facility]  Date of Admission -  2022  6:14 AM        Consulting Mallory Blackwell MD  Primary Care Physician - Citlali Alberts MD     SUBJECTIVE   He looks very comfortable, currently at baseline of 5 L nasal cannula; still complaining of pain in his hip    OBJECTIVE   VITALS    height is 6' (1.829 m) and weight is 162 lb 11.2 oz (73.8 kg). His oral temperature is 97.3 °F (36.3 °C). His blood pressure is 100/71 and his pulse is 89. His respiration is 17 and oxygen saturation is 98%. Body mass index is 22.07 kg/m². Temperature Range: Temp: 97.3 °F (36.3 °C) Temp  Av.7 °F (36.5 °C)  Min: 97.3 °F (36.3 °C)  Max: 98.1 °F (36.7 °C)  BP Range:  Systolic (35BNL), EXI:873 , Min:100 , OSQ:124     Diastolic (72NRE), ACU:96, Min:62, Max:80    Pulse Range: Pulse  Av.6  Min: 79  Max: 95  Respiration Range: Resp  Av.7  Min: 17  Max: 18  Current Pulse Ox[de-identified]  SpO2: 98 %  24HR Pulse Ox Range:  SpO2  Av.5 %  Min: 97 %  Max: 100 %  Oxygen Amount and Delivery: O2 Flow Rate (L/min): 4.5 L/min    Wt Readings from Last 3 Encounters:   22 162 lb 11.2 oz (73.8 kg)   21 171 lb (77.6 kg)   07/15/21 166 lb 9.6 oz (75.6 kg)       I/O (24 Hours)    Intake/Output Summary (Last 24 hours) at 2022 1139  Last data filed at 2022 0830  Gross per 24 hour   Intake 427 ml   Output 1447 ml   Net -1020 ml       EXAM     General Appearance  Awake, alert, oriented, in no acute distress  HEENT - normocephalic, atraumatic.  []  Mallampati  [] Crowded airway   [] Macroglossia  []  Retrognathia  [] Micrognathia  []  Normal tongue size []  Normal Bite  [] Pitt sign positive    Neck - Supple,  trachea midline   Lungs -crackles bilaterally, at baseline  Cardiovascular - Heart sounds are normal.  Regular rate and rhythm   Abdomen - Soft, nontender, nondistended, no masses or organomegaly  Neurologic - There are no focal motor or sensory deficits  Skin - No bruising or bleeding; I did not examine his dressing  Extremities - No clubbing, cyanosis, edema    MEDS      predniSONE  20 mg Oral Daily    midodrine  5 mg Oral TID AC    metoprolol tartrate  25 mg Oral BID    famotidine  20 mg Oral BID    sodium chloride flush  5-40 mL IntraVENous 2 times per day    digoxin  125 mcg Oral Daily    sertraline  25 mg Oral Daily    enoxaparin  1 mg/kg SubCUTAneous BID    sodium chloride flush  5-40 mL IntraVENous 2 times per day    atorvastatin  40 mg Oral Daily    [Held by provider] lisinopril  10 mg Oral BID    tamsulosin  0.4 mg Oral Daily    aspirin  81 mg Oral Daily      sodium chloride      sodium chloride       oxyCODONE-acetaminophen, sodium chloride flush, sodium chloride, perflutren lipid microspheres, sodium chloride flush, sodium chloride flush, sodium chloride, ondansetron **OR** ondansetron, polyethylene glycol, acetaminophen **OR** acetaminophen, potassium chloride **OR** potassium chloride, magnesium sulfate    LABS   CBC   Recent Labs     02/28/22  0540   WBC 9.9   HGB 9.3*   HCT 28.5*   MCV 77.7*        BMP:   Lab Results   Component Value Date     02/28/2022    K 4.4 02/28/2022    CL 98 02/28/2022    CO2 37 02/28/2022    BUN 17 02/28/2022    LABALBU 4.1 12/20/2021    CREATININE 0.51 02/28/2022    CALCIUM 7.9 02/28/2022    GFRAA >60 02/28/2022    LABGLOM >60 02/28/2022     ABGs:  Lab Results   Component Value Date    PHART 7.394 02/18/2022    PO2ART 33.4 02/18/2022    DTF5LAT 42.4 02/18/2022      Lab Results   Component Value Date    MODE NOT REPORTED 02/18/2022     Ionized Calcium:  No results found for: IONCA  Magnesium:    Lab Results   Component Value Date    MG 2.1 04/25/2021     Phosphorus:    Lab Results   Component Value Date    PHOS 3.6 01/13/2021        LIVER PROFILE No results for input(s): AST, ALT, LIPASE, BILIDIR, BILITOT, ALKPHOS in the last 72 hours. Invalid input(s): AMYLASE,  ALB  INR No results for input(s): INR in the last 72 hours. PTT   Lab Results   Component Value Date    APTT 32.6 02/22/2022         RADIOLOGY     (See actual reports for details)    ASSESSMENT/PLAN   Principal Problem:    Multifocal pneumonia  Active Problems:    PAF (paroxysmal atrial fibrillation) (HCC)    Pulmonary fibrosis (HCC)    Fracture of right hip    Community acquired bacterial pneumonia    Allergy to penicillin  Resolved Problems:    * No resolved hospital problems.  *    Pulmonary status appears to be stable  He has recovered from his pneumonia  Continue prednisone at 20 mg daily; will taper over the next week or so  Hemoglobin appears to be stable but if dressing continues to be saturated, would recommend  hold a dose of Lovenox  Electronically signed by Donald Doherty MD on 2/28/2022 at 11:39 AM

## 2022-02-28 NOTE — FLOWSHEET NOTE
Pt states that he is much better today, but declined prayer. 02/28/22 1643   Encounter Summary   Services provided to: Patient   Referral/Consult From: Palliative Care   Continue Visiting   (2-28-22)   Complexity of Encounter Moderate   Length of Encounter 15 minutes   Spiritual Assessment Completed Yes   Spiritual/Gnosticism   Type Spiritual support   Assessment Calm; Approachable;Coping   Intervention Active listening;Explored feelings, thoughts, concerns;Sustaining presence/ Ministry of presence   Outcome Expressed gratitude;Engaged in conversation;Coping;Refused/declined

## 2022-02-28 NOTE — PROGRESS NOTES
Comprehensive Nutrition Assessment    Type and Reason for Visit:  Reassess    Nutrition Recommendations/Plan: Continue current diet and supplements. Monitor weight. Nutrition Assessment:  Patient reports good appetite, consuming % of meals and supplements. Patient said oral supplements are too thick but acceptable. Noted 4.7% (8lbs 4 oz weight loss) in 5 dyas. Not sure if it's real weight loss or weighing or scale error. Patient is eating well. Will continue current diet and supplements and monitor weights. Malnutrition Assessment:  Malnutrition Status: At risk for malnutrition (Comment)    Context:  Acute Illness     Findings of the 6 clinical characteristics of malnutrition:  Energy Intake:  No significant decrease in energy intake  Weight Loss:  7 - Greater than 2% over 1 week     Body Fat Loss:  No significant body fat loss     Muscle Mass Loss:  No significant muscle mass loss    Fluid Accumulation:  1 - Mild     Strength:  Not Performed    Estimated Daily Nutrient Needs:  Energy (kcal):  6934-2714 kcals based on [place] with 1.2-1.3 factor using current wt 77.6 kg; Weight Used for Energy Requirements:  Current     Protein (g):  101-116 gm protein based on 1.3-1.5 gm/kg using current wt.; Weight Used for Protein Requirements:  Current          Nutrition Related Findings:  No edema. Bowel sounds active. Last BM 2/28. Hx Covid, [place]-rectal cancer      Wounds:  Surgical Incision       Current Nutrition Therapies:    ADULT DIET;  Regular  ADULT ORAL NUTRITION SUPPLEMENT; Breakfast, Dinner; Standard High Calorie/High Protein Oral Supplement    Anthropometric Measures:  · Height: 6' (182.9 cm)  · Current Body Weight: 171 lb (77.6 kg)   · Admission Body Weight: 162 lb 11.2 oz (73.8 kg)    · Usual Body Weight: 175 lb (79.4 kg)     · Ideal Body Weight: 178 lbs; % Ideal Body Weight 96.1 %   · BMI: 23.2  · BMI Categories: Normal Weight (BMI 22.0 to 24.9) age over 72       Nutrition Diagnosis: · Increased nutrient needs related to other (comment) (healing/ hip surgery) as evidenced by wounds    Nutrition Interventions:   Food and/or Nutrient Delivery:  Continue Current Diet,Continue Oral Nutrition Supplement  Nutrition Education/Counseling:  Education not indicated   Coordination of Nutrition Care:  Continue to monitor while inpatient    Goals:  Meet estimated nutrient needs       Nutrition Monitoring and Evaluation:   Behavioral-Environmental Outcomes:  None Identified   Food/Nutrient Intake Outcomes:  Food and Nutrient Intake,Supplement Intake  Physical Signs/Symptoms Outcomes:  Biochemical Data,GI Status,Fluid Status or Edema,Nutrition Focused Physical Findings,Skin,Weight     Discharge Planning:    Continue Oral Nutrition Supplement,Continue current diet       Some areas of assessment may be incomplete due to COVID-19 precautions    Duglas Do RD, LD  Office phone [phone]

## 2022-02-28 NOTE — PROGRESS NOTES
ELYSE spoke ot Tarun in ARU. Per Dr Paramjit Hinojosa last note, pt is too low functioning. ELYSE spoke to pt. He is still agreeable to go to [facility]. ELYSE spoke to Joselito at [facility]. Pt will be accepted. Pre-cert started today.

## 2022-02-28 NOTE — PROGRESS NOTES
Post Operative Progress Note    2/28/2022 12:09 PM  Info:   Admit Date: 2/18/2022  PCP: Ketan Aggarwal MD      Medications:   Scheduled Meds:   predniSONE  20 mg Oral Daily    midodrine  5 mg Oral TID AC    metoprolol tartrate  25 mg Oral BID    famotidine  20 mg Oral BID    sodium chloride flush  5-40 mL IntraVENous 2 times per day    digoxin  125 mcg Oral Daily    sertraline  25 mg Oral Daily    enoxaparin  1 mg/kg SubCUTAneous BID    sodium chloride flush  5-40 mL IntraVENous 2 times per day    atorvastatin  40 mg Oral Daily    [Held by provider] lisinopril  10 mg Oral BID    tamsulosin  0.4 mg Oral Daily    aspirin  81 mg Oral Daily     Continuous Infusions:   sodium chloride      sodium chloride       PRN Meds:oxyCODONE-acetaminophen, sodium chloride flush, sodium chloride, perflutren lipid microspheres, sodium chloride flush, sodium chloride flush, sodium chloride, ondansetron **OR** ondansetron, polyethylene glycol, acetaminophen **OR** acetaminophen, potassium chloride **OR** potassium chloride, magnesium sulfate    Diet:   Diet: ADULT DIET; Regular  ADULT ORAL NUTRITION SUPPLEMENT; Breakfast, Dinner; Standard High Calorie/High Protein Oral Supplement    Subjective:   Systemic or Specific Complaints: Right hip wound drainage    Mr. Asad Guardado is a 41-year-old gentleman postoperative day #6 status post right hip hemiarthroplasty for a femoral neck fracture due to a fall on admitted for pneumonia. We were contacted overnight for increased drainage coming out of the initial postoperative dressing. Dressing was reinforced. Patient reports he does have some discomfort over the right hip but no more so than he did over the last few days. He states since the dressing was reinforced he has not noticed any bleeding or drainage coming from the wound. Does note some bruising surrounding the operative site however.     Objective:     Patient Vitals for the past 24 hrs:   BP Temp Temp src Pulse Resp SpO2 Weight   02/28/22 1119 100/71 -- -- -- -- -- --   02/28/22 0749 132/75 97.3 °F (36.3 °C) Oral 89 17 98 % --   02/28/22 0610 127/74 97.8 °F (36.6 °C) Oral 95 18 98 % 162 lb 11.2 oz (73.8 kg)   02/28/22 0017 104/62 97.4 °F (36.3 °C) Oral 79 18 100 % --   02/27/22 2126 125/79 -- -- 84 -- -- --   02/27/22 2022 102/70 97.5 °F (36.4 °C) Oral 86 18 100 % --   02/27/22 1700 110/80 98 °F (36.7 °C) Oral 84 18 98 % --   02/27/22 1315 100/71 98.1 °F (36.7 °C) Oral 82 17 97 % --       Right hip:  Wound:  Initial postoperative Aquacel was removed as it does appear as saturated. Zipline closure device remains intact incision has excellent approximation. No active drainage coming from the wound however there does appear to be some coagulated late of blood in this area. New Aquacel dressing is applied over the Zipline and wound itself. Some surrounding ecchymosis noted   Motion: expected discomfort with range of motion in affected extremity   DVT Exam:  no evidence of DVT on physical examination         Data Review  CBC:   Recent Labs     02/26/22  0655 02/26/22  0655 02/27/22  0552 02/27/22  1506 02/28/22  0540   WBC 11.4*  --  8.7  --  9.9   HGB 9.4*   < > 9.1* 9.2* 9.3*     --  315  --  340    < > = values in this interval not displayed. Assessment:   Patient is S/P right Hip hemiarthroplasty on 2/22/2022  Pain is well controlled. He has no nausea and no vomiting. Current activity is up with assistance  Staff noticed increased drainage coming from the right hip wound yesterday 2/27/2022. Dressing was reinforced. On evaluation today incision demonstrates excellent approximation. Overlying Zipline closure device remains intact however Aquacel dressing is replaced as initial postoperative dressing was saturated.   No active drainage or dehiscence coming from the wound      Plan:      1:  Continue Physical Therapy  2:  Continue Deep venous thrombosis prophylaxis  3:  Continue Pain Control  4:  Discharge plans home  5. Zipline closure device remains intact new Aquacel was applied no active drainage coming from the wound.       Electronically signed by LAQUITA Morales on 2/28/2022 at 12:09 PM

## 2022-02-28 NOTE — PLAN OF CARE
Problem: Skin Integrity:  Goal: Will show no infection signs and symptoms  Description: Will show no infection signs and symptoms  2/28/2022 1736 by Brigida Lees RN  Outcome: Ongoing     Problem: Skin Integrity:  Goal: Absence of new skin breakdown  Description: Absence of new skin breakdown  2/28/2022 1736 by Brigida Lees RN  Outcome: Ongoing     Problem: Falls - Risk of:  Goal: Will remain free from falls  Description: Will remain free from falls  2/28/2022 1736 by Brigida Lees RN  Outcome: Ongoing     Problem: Falls - Risk of:  Goal: Absence of physical injury  Description: Absence of physical injury  2/28/2022 1736 by Brigida Lees RN  Outcome: Ongoing     Problem: SAFETY  Goal: Free from accidental physical injury  2/28/2022 1736 by Brigida Lees RN  Outcome: Ongoing     Problem: SAFETY  Goal: Free from intentional harm  2/28/2022 1736 by Brigida Lees RN  Outcome: Ongoing   Patient ambulating in room and on unit with help of nursing staff and physical therapy. Patient assessed and no new skin breakdown noted.

## 2022-02-28 NOTE — PLAN OF CARE
Nutrition Problem #1: Increased nutrient needs  Intervention: Food and/or Nutrient Delivery: Continue Current Diet,Continue Oral Nutrition Supplement  Nutritional Goals: Meet estimated nutrient needs

## 2022-02-28 NOTE — PROGRESS NOTES
[facility] -   Infectious diseases evaluation  admission date 2/18/2022    reason for consultation:   Pneumonia    Impression :   Current:  Community-acquired pneumonia  Acute right femoral neck fracture S/P right femoral hemiarthroplasty 2/22/2022. Pulmonary fibrosis  Acute hypoxic respiratory failure    Recommendations   · 7 days course of IV ceftriaxone completed 2/24/2022  · 5 days course of Zithromax completed 2/21/2022  · Monitor off antibiotics  · Follow CBC and renal function  · Supportive care    Infection Control Recommendations   · [place] Precautions      Antimicrobial Stewardship Recommendations   · Simplification of therapy  · Targeted therapy          History of Present Illness:   Initial history:  Clau Sexton is a 74y.o.-year-old male presented to hospital on 2/18/2022 with worsening shortness of breath for several days, worse with exertion, no alleviating factors. The patient also fell ,was found to have acute right femoral neck fracture. The patient had chronic cough due to pulmonary fibrosis not increased, usually on 5 L of oxygen at home. He also had chronic legs pain. He denied fever or chills. The patient has been afebrile, no leukocytosis. He was started on IV Zithromax and Rocephin on admission that he is tolerating  Strep pneumo and Legionella antigen negative  Chest x-ray showed diffuse bilateral pneumonia worse on the left side. COVID-19 rapid test was negative. No growth on blood cultures  Interval changes  2/28/2022   He is feeling well, on 5 L oxygen per nasal cannula, complaining of postoperative pain, denied nausea or vomiting, no other complaints.   Patient Vitals for the past 8 hrs:   BP Temp Temp src Pulse Resp SpO2 Weight   02/28/22 0749 132/75 97.3 °F (36.3 °C) Oral 89 17 98 % --   02/28/22 0610 127/74 97.8 °F (36.6 °C) Oral 95 18 98 % 162 lb 11.2 oz (73.8 kg)           I have personally reviewed the past medical history, past surgical history, medications, social history, and family history, and I haveupdated the database accordingly. Allergies:   Adhesive tape, Codeine, Penicillins, and Nintedanib     Review of Systems:     Review of Systems  As per history of present illness, other than above 12 system review was negative  Physical Examination :       Physical Exam  Constitutional:       Appearance: He is not toxic-appearing. HENT:      Head: Normocephalic and atraumatic. Right Ear: External ear normal.      Left Ear: External ear normal.      Mouth/Throat:      Pharynx: Oropharynx is clear. No oropharyngeal exudate. Eyes:      General: No scleral icterus. Conjunctiva/sclera: Conjunctivae normal.   Cardiovascular:      Heart sounds: Normal heart sounds. No murmur heard. Pulmonary:      Breath sounds: No wheezing or rales. Abdominal:      General: There is no distension. Palpations: Abdomen is soft. Musculoskeletal:      Cervical back: Neck supple. No rigidity. Right lower leg: No edema. Left lower leg: No edema. Skin:     General: Skin is warm. Coloration: Skin is not jaundiced. Neurological:      General: No focal deficit present. Mental Status: He is alert and oriented to person, place, and time.          Past Medical History:     Past Medical History:   Diagnosis Date    Atrial fibrillation ([facility]     Back pain, chronic     Hill's esophagus 06/18/2019    Benign essential HTN     BPH (benign prostatic hyperplasia)     Cancer (HCC)     colon-rectal    Chronic idiopathic pulmonary fibrosis ([facility] [address] 03/29/2021    Cocaine abuse in remission [facility])     1970's    ED (erectile dysfunction) 4/2/2015    GERD (gastroesophageal reflux disease)     GI bleed 12/5/2018    Hernia     History of colon cancer     Melena     Migraines     Murmur, cardiac        Past Surgical  History:     Past Surgical History:   Procedure Laterality Date    CARDIAC CATHETERIZATION 2018    Non-obstructive CAD    CARDIOVERSION  2020    COLECTOMY      2nd colectomy, Colostomy and reversed [facility] COLECTOMY      1st time Ksenia    COLONOSCOPY      COLONOSCOPY  2016    COLONOSCOPY N/A 2018    COLONOSCOPY DIAGNOSTIC performed by Jarred Felder MD at [address] Right 2009    inguinal    HIP SURGERY Right 2022    HIP FEMORAL HEMIARTHROPLASTY performed by Sugar Greenwood MD at [address] [facility] Right 1970's    arthrotomy    TONSILLECTOMY      TOTAL KNEE ARTHROPLASTY Right 2019    KNEE TOTAL ARTHROPLASTY performed by Sugar Greenwood MD at [address] [facility] TRANSESOPHAGEAL ECHOCARDIOGRAM  2018    UPPER GASTROINTESTINAL ENDOSCOPY N/A 2018    EGD DIAGNOSTIC ONLY performed by Jarred Felder MD at [address] [facility] N/A 2019    [facility]S       Medications:      predniSONE  20 mg Oral Daily    midodrine  5 mg Oral TID AC    metoprolol tartrate  25 mg Oral BID    famotidine  20 mg Oral BID    sodium chloride flush  5-40 mL IntraVENous 2 times per day    digoxin  125 mcg Oral Daily    sertraline  25 mg Oral Daily    enoxaparin  1 mg/kg SubCUTAneous BID    sodium chloride flush  5-40 mL IntraVENous 2 times per day    atorvastatin  40 mg Oral Daily    [Held by provider] lisinopril  10 mg Oral BID    tamsulosin  0.4 mg Oral Daily    aspirin  81 mg Oral Daily       Social History:     Social History     Socioeconomic History    Marital status: Single     Spouse name: Not on file    Number of children: Not on file    Years of education: Not on file    Highest education level: Not on file   Occupational History    Not on file   Tobacco Use    Smoking status: Former Smoker     Packs/day: 0.50     Years: 1.00     Pack years: 0.50     Quit date:      Years since quittin.1    Smokeless tobacco: Never Used    Tobacco comment: stated never actually really smoked only inhaled    Vaping Use    Vaping Use: Never used   Substance and Sexual Activity    Alcohol use: Yes     Alcohol/week: 12.0 standard drinks     Types: 2 Shots of liquor, 10 Standard drinks or equivalent per week     Comment: 2-3 times a week    Drug use: No     Types: Other-see comments     Comment: Cocaine use in past in 1970's    Sexual activity: Yes     Partners: Female   Other Topics Concern    Not on file   Social History Narrative    Not on file     Social Determinants of Health     Financial Resource Strain: Medium Risk    Difficulty of Paying Living Expenses: Somewhat hard   Food Insecurity: No Food Insecurity    Worried About Running Out of Food in the Last Year: Never true    Tino of Food in the Last Year: Never true   Transportation Needs:     Lack of Transportation (Medical): Not on file    Lack of Transportation (Non-Medical):  Not on file   Physical Activity:     Days of Exercise per Week: Not on file    Minutes of Exercise per Session: Not on file   Stress:     Feeling of Stress : Not on file   Social Connections:     Frequency of Communication with Friends and Family: Not on file    Frequency of Social Gatherings with Friends and Family: Not on file    Attends [employer] Services: Not on file    Active Member of [address] [place] [address] [employer] or Organizations: Not on file    Attends Club or Organization Meetings: Not on file    Marital Status: Not on file   Intimate Partner Violence:     Fear of Current or Ex-Partner: Not on file    Emotionally Abused: Not on file    Physically Abused: Not on file    Sexually Abused: Not on file   Housing Stability:     Unable to Pay for Housing in the Last Year: Not on file    Number of [place] in the Last Year: Not on file    Unstable Housing in the Last Year: Not on file       Family History:     Family History   Problem Relation Age of Onset    Diabetes Mother     Heart Attack Father     Heart Disease Father    [address] Decision Making:   I have independently reviewed/ordered the following labs:    CBC with Differential:   Recent Labs     02/27/22  0552 02/27/22  0552 02/27/22  1506 02/28/22  0540   WBC 8.7  --   --  9.9   HGB 9.1*   < > 9.2* 9.3*   HCT 28.0*   < > 28.3* 28.5*     --   --  340   LYMPHOPCT 15*  --   --  15*   MONOPCT 11*  --   --  11*    < > = values in this interval not displayed. BMP:  Recent Labs     02/27/22  0552 02/28/22  0540    136   K 4.4 4.4   CL 96* 98   CO2 37* 37*   BUN 20 17   CREATININE 0.61* 0.51*     Hepatic Function Panel: No results for input(s): PROT, LABALBU, BILIDIR, IBILI, BILITOT, ALKPHOS, ALT, AST in the last 72 hours. No results for input(s): RPR in the last 72 hours. No results for input(s): HIV in the last 72 hours. No results for input(s): BC in the last 72 hours. Lab Results   Component Value Date    CREATININE 0.51 02/28/2022    GLUCOSE 81 02/28/2022       Detailed results: Thank you for allowing us to participate in the care of this patient. Please call with questions. This note is created with the assistance of a speech recognition program.  While intending to generate adocument that actually reflects the content of the visit, the document can still have some errors including those of syntax and sound a like substitutions which may escape proof reading. It such instances, actual meaningcan be extrapolated by contextual diversion.     Karina العراقي MD  Office: [phone]  Perfect serve / office [phone]

## 2022-03-01 LAB
ABSOLUTE EOS #: 0.3 K/UL (ref 0–0.4)
ABSOLUTE LYMPH #: 1.6 K/UL (ref 1–4.8)
ABSOLUTE MONO #: 1.1 K/UL (ref 0.1–1.3)
ANION GAP SERPL CALCULATED.3IONS-SCNC: 1 MMOL/L (ref 9–17)
BASOPHILS # BLD: 1 % (ref 0–2)
BASOPHILS ABSOLUTE: 0.1 K/UL (ref 0–0.2)
BUN BLDV-MCNC: 19 MG/DL (ref 8–23)
CALCIUM SERPL-MCNC: 7.7 MG/DL (ref 8.6–10.4)
CHLORIDE BLD-SCNC: 99 MMOL/L (ref 98–107)
CO2: 37 MMOL/L (ref 20–31)
CREAT SERPL-MCNC: 0.52 MG/DL (ref 0.7–1.2)
EOSINOPHILS RELATIVE PERCENT: 3 % (ref 0–4)
GFR AFRICAN AMERICAN: >60 ML/MIN
GFR NON-AFRICAN AMERICAN: >60 ML/MIN
GFR SERPL CREATININE-BSD FRML MDRD: ABNORMAL ML/MIN/{1.73_M2}
GLUCOSE BLD-MCNC: 81 MG/DL (ref 70–99)
HCT VFR BLD CALC: 25.8 % (ref 41–53)
HCT VFR BLD CALC: 29 % (ref 41–53)
HEMOGLOBIN: 8.3 G/DL (ref 13.5–17.5)
HEMOGLOBIN: 9.2 G/DL (ref 13.5–17.5)
LYMPHOCYTES # BLD: 17 % (ref 24–44)
MCH RBC QN AUTO: 25.1 PG (ref 26–34)
MCHC RBC AUTO-ENTMCNC: 32.1 G/DL (ref 31–37)
MCV RBC AUTO: 78 FL (ref 80–100)
MONOCYTES # BLD: 11 % (ref 1–7)
PDW BLD-RTO: 17.5 % (ref 11.5–14.9)
PLATELET # BLD: 352 K/UL (ref 150–450)
PMV BLD AUTO: 6.9 FL (ref 6–12)
POTASSIUM SERPL-SCNC: 4.1 MMOL/L (ref 3.7–5.3)
RBC # BLD: 3.31 M/UL (ref 4.5–5.9)
SEG NEUTROPHILS: 68 % (ref 36–66)
SEGMENTED NEUTROPHILS ABSOLUTE COUNT: 6.6 K/UL (ref 1.3–9.1)
SODIUM BLD-SCNC: 137 MMOL/L (ref 135–144)
WBC # BLD: 9.7 K/UL (ref 3.5–11)

## 2022-03-01 PROCEDURE — 85014 HEMATOCRIT: CPT

## 2022-03-01 PROCEDURE — 2580000003 HC RX 258: Performed by: ORTHOPAEDIC SURGERY

## 2022-03-01 PROCEDURE — 6360000002 HC RX W HCPCS: Performed by: STUDENT IN AN ORGANIZED HEALTH CARE EDUCATION/TRAINING PROGRAM

## 2022-03-01 PROCEDURE — 80048 BASIC METABOLIC PNL TOTAL CA: CPT

## 2022-03-01 PROCEDURE — 6370000000 HC RX 637 (ALT 250 FOR IP): Performed by: INTERNAL MEDICINE

## 2022-03-01 PROCEDURE — 99232 SBSQ HOSP IP/OBS MODERATE 35: CPT | Performed by: INTERNAL MEDICINE

## 2022-03-01 PROCEDURE — 6370000000 HC RX 637 (ALT 250 FOR IP)

## 2022-03-01 PROCEDURE — 97530 THERAPEUTIC ACTIVITIES: CPT

## 2022-03-01 PROCEDURE — 6370000000 HC RX 637 (ALT 250 FOR IP): Performed by: STUDENT IN AN ORGANIZED HEALTH CARE EDUCATION/TRAINING PROGRAM

## 2022-03-01 PROCEDURE — 6370000000 HC RX 637 (ALT 250 FOR IP): Performed by: ORTHOPAEDIC SURGERY

## 2022-03-01 PROCEDURE — 99231 SBSQ HOSP IP/OBS SF/LOW 25: CPT | Performed by: INTERNAL MEDICINE

## 2022-03-01 PROCEDURE — 2060000000 HC ICU INTERMEDIATE R&B

## 2022-03-01 PROCEDURE — 85018 HEMOGLOBIN: CPT

## 2022-03-01 PROCEDURE — 99024 POSTOP FOLLOW-UP VISIT: CPT | Performed by: ORTHOPAEDIC SURGERY

## 2022-03-01 PROCEDURE — 36415 COLL VENOUS BLD VENIPUNCTURE: CPT

## 2022-03-01 PROCEDURE — 97535 SELF CARE MNGMENT TRAINING: CPT

## 2022-03-01 PROCEDURE — 85025 COMPLETE CBC W/AUTO DIFF WBC: CPT

## 2022-03-01 RX ORDER — PREDNISONE 10 MG/1
10 TABLET ORAL DAILY
Status: DISCONTINUED | OUTPATIENT
Start: 2022-03-02 | End: 2022-03-03 | Stop reason: HOSPADM

## 2022-03-01 RX ADMIN — DIGOXIN 125 MCG: 125 TABLET ORAL at 07:48

## 2022-03-01 RX ADMIN — SODIUM CHLORIDE, PRESERVATIVE FREE 10 ML: 5 INJECTION INTRAVENOUS at 21:51

## 2022-03-01 RX ADMIN — FAMOTIDINE 20 MG: 20 TABLET, FILM COATED ORAL at 07:49

## 2022-03-01 RX ADMIN — OXYCODONE HYDROCHLORIDE AND ACETAMINOPHEN 1 TABLET: 5; 325 TABLET ORAL at 21:53

## 2022-03-01 RX ADMIN — OXYCODONE HYDROCHLORIDE AND ACETAMINOPHEN 1 TABLET: 5; 325 TABLET ORAL at 00:34

## 2022-03-01 RX ADMIN — ATORVASTATIN CALCIUM 40 MG: 40 TABLET, FILM COATED ORAL at 07:49

## 2022-03-01 RX ADMIN — PREDNISONE 20 MG: 20 TABLET ORAL at 07:49

## 2022-03-01 RX ADMIN — OXYCODONE HYDROCHLORIDE AND ACETAMINOPHEN 1 TABLET: 5; 325 TABLET ORAL at 06:37

## 2022-03-01 RX ADMIN — MIDODRINE HYDROCHLORIDE 5 MG: 5 TABLET ORAL at 06:37

## 2022-03-01 RX ADMIN — ASPIRIN 81 MG 81 MG: 81 TABLET ORAL at 07:47

## 2022-03-01 RX ADMIN — FAMOTIDINE 20 MG: 20 TABLET, FILM COATED ORAL at 21:51

## 2022-03-01 RX ADMIN — OXYCODONE HYDROCHLORIDE AND ACETAMINOPHEN 1 TABLET: 5; 325 TABLET ORAL at 12:52

## 2022-03-01 RX ADMIN — SERTRALINE HYDROCHLORIDE 25 MG: 50 TABLET ORAL at 07:47

## 2022-03-01 RX ADMIN — MIDODRINE HYDROCHLORIDE 5 MG: 5 TABLET ORAL at 15:59

## 2022-03-01 RX ADMIN — MIDODRINE HYDROCHLORIDE 5 MG: 5 TABLET ORAL at 11:36

## 2022-03-01 RX ADMIN — SODIUM CHLORIDE, PRESERVATIVE FREE 10 ML: 5 INJECTION INTRAVENOUS at 07:49

## 2022-03-01 RX ADMIN — ENOXAPARIN SODIUM 70 MG: 100 INJECTION SUBCUTANEOUS at 07:54

## 2022-03-01 RX ADMIN — METOPROLOL TARTRATE 25 MG: 25 TABLET, FILM COATED ORAL at 21:51

## 2022-03-01 RX ADMIN — ENOXAPARIN SODIUM 70 MG: 100 INJECTION SUBCUTANEOUS at 21:51

## 2022-03-01 ASSESSMENT — ENCOUNTER SYMPTOMS
WHEEZING: 1
VOMITING: 0
NAUSEA: 0
SHORTNESS OF BREATH: 1
EYE DISCHARGE: 0
ABDOMINAL PAIN: 0
CONSTIPATION: 0

## 2022-03-01 ASSESSMENT — PAIN DESCRIPTION - LOCATION
LOCATION: HIP
LOCATION: HIP

## 2022-03-01 ASSESSMENT — PAIN SCALES - GENERAL
PAINLEVEL_OUTOF10: 8
PAINLEVEL_OUTOF10: 7
PAINLEVEL_OUTOF10: 10
PAINLEVEL_OUTOF10: 5
PAINLEVEL_OUTOF10: 9
PAINLEVEL_OUTOF10: 0
PAINLEVEL_OUTOF10: 9
PAINLEVEL_OUTOF10: 0
PAINLEVEL_OUTOF10: 2

## 2022-03-01 ASSESSMENT — PAIN DESCRIPTION - PAIN TYPE
TYPE: SURGICAL PAIN
TYPE: SURGICAL PAIN

## 2022-03-01 ASSESSMENT — PAIN SCALES - WONG BAKER: WONGBAKER_NUMERICALRESPONSE: 0

## 2022-03-01 ASSESSMENT — PAIN DESCRIPTION - ORIENTATION
ORIENTATION: RIGHT
ORIENTATION: RIGHT

## 2022-03-01 NOTE — PLAN OF CARE
Problem: Skin Integrity:  Goal: Absence of new skin breakdown  Description: Absence of new skin breakdown  3/1/2022 0308 by Brian Blanco RN  Outcome: Ongoing  Note: Pt absent from any new skin breakdown       Problem: Falls - Risk of:  Goal: Will remain free from falls  Description: Will remain free from falls  3/1/2022 0308 by Brian Blanco RN  Outcome: Ongoing  Note: Pt free of falls. Call light and bedside table within reach. Bed locked and in lowest position, nonskid socks on feet. Problem: PAIN  Goal: Patient's pain/discomfort is manageable  3/1/2022 0308 by Brian Blanco RN  Outcome: Ongoing  Note: Pt given medication to help decrease pain. See MAR.

## 2022-03-01 NOTE — PROGRESS NOTES
ELYSE called [facility]. Admissions team left at 1600. ELYSE was unable to get a cell phone number or any staff to check on pre-cert. ELYSE anticipates decision tomorrow morning. Message was left for Shanna. [address] PAS completed. Transport tentatively set for [facility] tomorrow pending pre-cert.

## 2022-03-01 NOTE — PROGRESS NOTES
[address]    PROGRESS NOTE             3/1/2022    8:01 AM    Name:   Rosanne Bernard  MRN:     [id number]     Acct:      [de-identified]   Room:   2105/2105-01  IP Day:  6  Admit Date:  2/18/2022  6:14 AM    PCP:  Portia Real MD  Code Status:  DNR-CCA    Subjective:     C/C: No chief complaint on file. Interval History Status: not changed. Patient seen and examined. Reports malaise and short self resolving visual disturbance overnight;  Otherwise no new complaints. Reports mood and appetite improved. Hgb dropped by 1 point since yesterday; will check H&H in afternoon. Dressing for right hip incision site was changed yesterday. Patient denies noticing any overt bleeding. No acute overnight events. Pre-CERT for SNF started; SW on case. Brief History:     70-year-old male with past medical history of pulmonary fibrosis presented with acute hypoxic respiratory failure, had fall while walking into ED and fractured right hip.  Respiratory failure found to be secondary to pneumonia in setting of pulmonary fibrosis; unsure of antibiotics and patient O2 needs back at baseline.  Right hip hemiarthroplasty done 2/22 without any complications.  Patient with chronic A. fib but previously controlled rate started going into A. fib with RVR on postoperative days; cardiology consulted & medication adjustments being made.  Hypotensive episodes managed with fluid boluses; last 1 given 2/26; BP low normal since. Review of Systems:     Review of Systems   Constitutional: Negative for chills and fever. Eyes: Positive for visual disturbance (overnight but then resolved). Negative for discharge. Respiratory: Positive for shortness of breath (at baseline) and wheezing (at baseline). Gastrointestinal: Negative for abdominal pain, constipation, nausea and vomiting. Genitourinary: Negative for difficulty urinating and hematuria. Musculoskeletal: Positive for arthralgias (Right hip) and myalgias. Neurological: Negative for dizziness and light-headedness. Psychiatric/Behavioral: Negative for agitation and behavioral problems. Medications: Allergies: Allergies   Allergen Reactions    Adhesive Tape Other (See Comments)     Blister badly     Codeine Nausea Only     Other reaction(s): Other: See Comments  NAUSEA  Other reaction(s):  Other: See Comments  NAUSEA    Penicillins Swelling     As a baby  Other reaction(s): Unknown  As a baby    Nintedanib Diarrhea     10-15 BM daily       Current Meds:   Scheduled Meds:    predniSONE  20 mg Oral Daily    midodrine  5 mg Oral TID AC    metoprolol tartrate  25 mg Oral BID    famotidine  20 mg Oral BID    sodium chloride flush  5-40 mL IntraVENous 2 times per day    digoxin  125 mcg Oral Daily    sertraline  25 mg Oral Daily    enoxaparin  1 mg/kg SubCUTAneous BID    sodium chloride flush  5-40 mL IntraVENous 2 times per day    atorvastatin  40 mg Oral Daily    [Held by provider] lisinopril  10 mg Oral BID    tamsulosin  0.4 mg Oral Daily    aspirin  81 mg Oral Daily     Continuous Infusions:    sodium chloride      sodium chloride       PRN Meds: oxyCODONE-acetaminophen, sodium chloride flush, sodium chloride, perflutren lipid microspheres, sodium chloride flush, sodium chloride flush, sodium chloride, ondansetron **OR** ondansetron, polyethylene glycol, acetaminophen **OR** acetaminophen, potassium chloride **OR** potassium chloride, magnesium sulfate    Data:     Past Medical History:   has a past medical history of Atrial fibrillation ([facility] [address] Back pain, chronic, Hill's esophagus, Benign essential HTN, BPH (benign prostatic hyperplasia), Cancer (HCC), Chronic idiopathic pulmonary fibrosis ([facility] [address] Cocaine abuse in remission [facility]), ED (erectile dysfunction), GERD (gastroesophageal reflux disease), GI bleed, Hernia, History of colon cancer, Melena, Migraines, and Murmur, cardiac. Social History:   reports that he quit smoking about 51 years ago. He has a 0.50 pack-year smoking history. He has never used smokeless tobacco. He reports current alcohol use of about 12.0 standard drinks of alcohol per week. He reports that he does not use drugs. Family History:   Family History   Problem Relation Age of Onset    Diabetes Mother     Heart Attack Father     Heart Disease Father     Heart Disease Brother        Vitals:  BP 99/70   Pulse 96   Temp 98 °F (36.7 °C) (Oral)   Resp 20   Ht 6' (1.829 m)   Wt 154 lb 12.2 oz (70.2 kg)   SpO2 100%   BMI 20.99 kg/m²   Temp (24hrs), Av.8 °F (36.6 °C), Min:97.5 °F (36.4 °C), Max:98 °F (36.7 °C)    No results for input(s): POCGLU in the last 72 hours. Vitals:    22 0030 22 0330 22 0630 22 0730   BP: 101/78  109/63 99/70   Pulse: 98  90 96   Resp: 20  20 20   Temp: 97.9 °F (36.6 °C)  97.7 °F (36.5 °C) 98 °F (36.7 °C)   TempSrc: Oral  Oral Oral   SpO2: 100%  97% 100%   Weight:  154 lb 12.2 oz (70.2 kg)     Height:           I/O(24Hr):     Intake/Output Summary (Last 24 hours) at 3/1/2022 0801  Last data filed at 3/1/2022 0530  Gross per 24 hour   Intake 10 ml   Output 1050 ml   Net -1040 ml       Labs:  Recent Results (from the past 24 hour(s))   Basic Metabolic Panel w/ Reflex to MG    Collection Time: 22  5:05 AM   Result Value Ref Range    Glucose 81 70 - 99 mg/dL    BUN 19 8 - 23 mg/dL    CREATININE 0.52 (L) 0.70 - 1.20 mg/dL    Calcium 7.7 (L) 8.6 - 10.4 mg/dL    Sodium 137 135 - 144 mmol/L    Potassium 4.1 3.7 - 5.3 mmol/L    Chloride 99 98 - 107 mmol/L    CO2 37 (H) 20 - 31 mmol/L    Anion Gap 1 (L) 9 - 17 mmol/L    GFR Non-African American >60 >60 mL/min    GFR African American >60 >60 mL/min    GFR Comment         CBC with Auto Differential    Collection Time: 22  5:05 AM   Result Value Ref Range    WBC 9.7 3.5 - 11.0 k/uL    RBC 3.31 (L) 4.5 - 5.9 m/uL    Hemoglobin 8.3 (L) 13.5 - 17.5 g/dL    Hematocrit 25.8 (L) 41 - 53 %    MCV 78.0 (L) 80 - 100 fL    MCH 25.1 (L) 26 - 34 pg    MCHC 32.1 31 - 37 g/dL    RDW 17.5 (H) 11.5 - 14.9 %    Platelets 194 869 - 490 k/uL    MPV 6.9 6.0 - 12.0 fL    Seg Neutrophils 68 (H) 36 - 66 %    Lymphocytes 17 (L) 24 - 44 %    Monocytes 11 (H) 1 - 7 %    Eosinophils % 3 0 - 4 %    Basophils 1 0 - 2 %    Segs Absolute 6.60 1.3 - 9.1 k/uL    Absolute Lymph # 1.60 1.0 - 4.8 k/uL    Absolute Mono # 1.10 0.1 - 1.3 k/uL    Absolute Eos # 0.30 0.0 - 0.4 k/uL    Basophils Absolute 0.10 0.0 - 0.2 k/uL         Lab Results   Component Value Date/Time    SPECIAL NOT REPORTED 07/15/2021 04:33 PM     Lab Results   Component Value Date/Time    CULTURE NO SIGNIFICANT GROWTH 02/20/2022 06:41 PM         Radiology:    XR CHEST (SINGLE VIEW FRONTAL)    Result Date: 2/19/2022  Bilateral airspace disease superimposed on pulmonary fibrosis. Increasing airspace disease predominantly involving the left lung. XR HIP RIGHT (1 VIEW)    Result Date: 2/22/2022  Total hip arthropasty without acute hardware complication. XR HIP RIGHT (2-3 VIEWS)    Result Date: 2/18/2022  Portable chest: 1. Worsening airspace disease throughout the left lung with small left-sided pleural effusion, likely worsening multifocal pneumonia. Follow-up is recommended to document resolution. 2. Underlying fibrosis throughout the lungs. 3. Stable mild cardiomegaly. Right hip: Acute slightly displaced right femoral neck fracture. Underlying osteopenia. CT HEAD WO CONTRAST    Result Date: 2/18/2022  No acute intracranial process identified. XR CHEST PORTABLE    Result Date: 2/23/2022  No significant interval change. XR CHEST PORTABLE    Result Date: 2/21/2022  Diffuse bilateral pneumonia worse on the left not significantly changed since 02/19/2022 given differences in radiographic technique. Mild cardiomegaly and possible mild pulmonary vascular congestion.      XR CHEST PORTABLE    Result Date: 2/18/2022  Portable chest: 1. Worsening airspace disease throughout the left lung with small left-sided pleural effusion, likely worsening multifocal pneumonia. Follow-up is recommended to document resolution. 2. Underlying fibrosis throughout the lungs. 3. Stable mild cardiomegaly. Right hip: Acute slightly displaced right femoral neck fracture. Underlying osteopenia. CT CHEST PULMONARY EMBOLISM W CONTRAST    Result Date: 2/18/2022  1. No evidence for acute pulmonary embolism. 2. There is very large new ground-glass airspace disease occupying most of the left upper lobe and new small left pleural effusion. 3. Solid pleural-based posterior segment right upper lobe airspace density along the major fissure is larger now measuring 4.3 x 1.7 cm, previously about 1.2 x 2.4 cm. Probably also worsening pneumonia but would recommend short interval 3 month follow-up 4. 9 mm right upper lobe suprahilar central nodule is unchanged. RECOMMENDATIONS: Unavailable     CT HIP RIGHT WO CONTRAST    Result Date: 2/22/2022  1. Acute right femoral neck fracture. Physical Examination:        Physical Exam  Constitutional:       General: He is not in acute distress. Appearance: Normal appearance. HENT:      Head: Normocephalic and atraumatic. Nose: Nose normal.      Mouth/Throat:      Mouth: Mucous membranes are moist.   Eyes:      General: No scleral icterus. Conjunctiva/sclera: Conjunctivae normal.   Cardiovascular:      Rate and Rhythm: Normal rate. Pulmonary:      Breath sounds: Rales present. No wheezing. Comments: On NC,  Rales unchanged from previous  Abdominal:      General: Abdomen is flat. Bowel sounds are normal.      Palpations: Abdomen is soft. Tenderness: There is no abdominal tenderness. Hernia: A hernia is present. Musculoskeletal:         General: Swelling (right hip and right knee) and tenderness present. Skin:     Coloration: Skin is not jaundiced.       Findings: Bruising (ecchymosis at right thigh; only small amount of sangenous strikethrough on right hip dressing over incision site) present. No erythema. Neurological:      Mental Status: He is alert. Mental status is at baseline. Psychiatric:         Mood and Affect: Mood normal.         Behavior: Behavior normal.             Media Information               Assessment:        Primary Problem  Multifocal pneumonia    Active Hospital Problems    Diagnosis Date Noted    Allergy to penicillin [Z88.0]     Multifocal pneumonia [J18.9] 02/18/2022    Fracture of right hip [S72.001A] 02/18/2022    Community acquired bacterial pneumonia [J15.9] 02/18/2022    Pulmonary fibrosis ([facility] [address] [J84.10] 12/08/2020    PAF (paroxysmal atrial fibrillation) ([facility] [address] [I48.0] 07/21/2014       Plan:        Multifocal pneumonia in setting of pulmonary fibrosis- improving  C/w O2 supplementation as needed (baseline of 5L on Home O2)  Blood cultures negative, Respiratory cultures pending   Azithromycin course completed 2/21  Ceftriaxone course completed 2/24  Prednisone 20 mg p.o. 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 25 mg daily started 2/21     Atrial fibrillation - rate controlled  Acute on chronic;  Afib with RVR & hypotensive s/p surgery, especially with exertion; Cardiology consulted & medications adjusted; pt has been rate controlled since  Digoxin 125 mg p.o. daily  Lopressor 25 mg BID, hold for HR < 50, SBP <90 (started 2/25)  Midodrine prn for SBP < 90              On home dose of aspirin  On Full dose enoxaparin   Magnesium and potassium replacement protocols      Hypertension  Amlodipine p.o. 5 mg daily => dc'ed for low BP on 2/25  Carvedilol 3.125 mg p.o. twice daily => dc'ed for low BP on 2/25  Lisinopril 10 mg p.o. twice daily => held for low BP on 2/26     Hyperlipidemia  Atorvastatin 40 mg p.o. daily     Other home medications being continued:  Tamsulosin 0.4 mg p.o. daily     Code: DNR-CCA  DVT prophylaxis: Enoxaparin 70 mg SQ twice daily (full dose d/t Afib hx); f/u for when to switch back to home Eliquis  GI prophylaxis: Famotidine 20 mg PO   Diet: Regular  Activity: Up with assistance  Dispo: SNF pending placement      Tori Lomeli DO  3/1/2022  8:01 AM     Attending Physician Statement  I have discussed the care of Bill Carey with the resident team. I have examined the patient myself and taken ros and hpi , including pertinent history and exam findings,  with the resident. I have reviewed the key elements of all parts of the encounter with the resident. I agree with the assessment, plan and orders as documented by the resident. Principal Problem:    Multifocal pneumonia  Active Problems:    PAF (paroxysmal atrial fibrillation) (HCC)    Pulmonary fibrosis (HCC)    Fracture of right hip    Community acquired bacterial pneumonia    Allergy to penicillin  Resolved Problems:    * No resolved hospital problems.  *    Completed antibiotics for pneumonia  Hemoglobin drop 1point since yesterday; patient is on full dose Lovenox, repeat hemoglobin this afternoon if no significant drop will switch to Eliquis  Discharge plan yo [facility]    Electronically signed by Nelson Valladares MD

## 2022-03-01 NOTE — FLOWSHEET NOTE
Patient talked about his discharge plan and the timing of his discharge; patient talked about his childhood; listening presence and support;     03/01/22 1822   Encounter Summary   Services provided to: Patient   Referral/Consult From: Palliative Care   Continue Visiting   (3/1/22)   Complexity of Encounter Moderate   Length of Encounter 15 minutes   Spiritual Assessment Completed Yes   Grief and Life Adjustment   Type Palliative care   Assessment Approachable; Hopeful;Coping;Helplessness   Intervention Active listening;Explored feelings, thoughts, concerns;Sustaining presence/ Ministry of presence; Discussed illness/injury and it's impact   Outcome Expressed gratitude;Engaged in conversation; Shared life review;Expressed feelings/needs/concerns;Coping; Hopeful;Receptive

## 2022-03-01 NOTE — PROGRESS NOTES
[facility]: [facility]   Physical Therapy Progress Note    Date: 3/1/22  Patient Name: La Padilla       Room:   MRN: [id number]   Account: [de-identified]   : 1955  (75 y.o.)   Gender: male     Discharge Recommendations   Continue to assess pending progress,Patient would benefit from continued therapy after discharge  Other: TBD    Referring Practitioner: Keaton Machado MD  Diagnosis: Hypoxia, community acquired, bacterial pnemumonia, closed fx of neck of right femur s/p HIP FEMORAL HEMIARTHROPLASTY, pneumonia of both lungs due to infectious organism, multifocal pneumonia   Restrictions/Precautions: Fall Risk,Weight Bearing (Full WBAT RLE)  Implants present? : Metal implants (R hemiarthroplasty, R TKA)  Other position/activity restrictions: Full WBAT RLE  Right Lower Extremity Weight Bearing: Weight Bearing As Tolerated (Full WBAT)   Past Medical History:  has a past medical history of Atrial fibrillation ([facility] [address] Back pain, chronic, Hill's esophagus, Benign essential HTN, BPH (benign prostatic hyperplasia), Cancer ([facility] [address] Chronic idiopathic pulmonary fibrosis ([facility] [address] Cocaine abuse in remission [facility]), ED (erectile dysfunction), GERD (gastroesophageal reflux disease), GI bleed, Hernia, History of colon cancer, Melena, Migraines, and Murmur, cardiac. Past Surgical History:   has a past surgical history that includes Tonsillectomy; Colonoscopy; colectomy; Colonoscopy (2016); knee surgery (Right, s); transesophageal echocardiogram (2018); Upper gastrointestinal endoscopy (N/A, 2018); Colonoscopy (N/A, 2018); hernia repair (Right, ); Cardiac catheterization (2018); colectomy; Total knee arthroplasty (Right, 2019); Upper gastrointestinal endoscopy (N/A, 2019); Cardioversion (); and hip surgery (Right, 2022).        Overall Orientation Status: Within Functional Limits  Restrictions/Precautions  Restrictions/Precautions: Fall Risk;Weight Bearing (Full WBAT RLE)  Required Braces or Orthoses?: No  Implants present? : Metal implants (R hemiarthroplasty, R TKA)  Lower Extremity Weight Bearing Restrictions  Right Lower Extremity Weight Bearing: Weight Bearing As Tolerated (Full WBAT)  Position Activity Restriction  Other position/activity restrictions: Full WBAT RLE    Subjective: Pt reports getting up to chair and to toilet this morning; pt states \"I just got back into bed\"  Comments: RN Zuleika Collins in room and okay with PT/OT tx. Co-treat with OTJEROMY Rodrigues. Pt is a little agitated and information on discharge, attempt to put patient at ease. Pt is easily frustrated but willing to work with therapy. Vital Signs  Patient Currently in Pain: Yes  Pain Assessment: 0-10  Pain Level: 9  Pain Type: Surgical pain  Pain Location: Hip  Pain Orientation: Right  Non-Pharmaceutical Pain Intervention(s): Ambulation/Increased Activity; Distraction;Repositioned  Response to Pain Intervention: Patient Satisfied                Bed Mobility:   Bridging: Stand by assistance  Rolling: Supervision  Supine to Sit: Supervision (cues for pillow between knees and sequencing)  Sit to Supine: Supervision  Scooting: Stand by assistance (to [facility] and to [facility])  Comment: Flat bed, 1 bedrail used      Transfers:  Sit to Stand: Contact guard assistance  Stand to sit: Contact guard assistance              WB Status: Full WBAT RLE           Stairs/Curb  Stairs?: No (not ready at this time)                                                     Posture: Fair  Sitting - Static: Good  Sitting - Dynamic: Fair;+  Standing - Static: Fair;+ (w/RW)  Standing - Dynamic: Fair (w/RW)  Comments: Seated EOB, Standing with RW     Other exercises?: Yes  Other exercises 1: STS x1  Other exercises 2: Static Standing x1 tolerance 1min while taking blood pressure 89/51 with standing           Activity Tolerance: Treatment limited secondary to medical complications (free text)  Activity Tolerance: BP 89/51, deferred ambulation due to low BP  PT Equipment Recommendations  Other: TBD       Assessment  Activity Tolerance: Treatment limited secondary to medical complications (free text)   Body structures, Functions, Activity limitations: Decreased functional mobility ; Decreased ROM; Decreased strength;Decreased ADL status; Decreased endurance;Decreased balance; Increased pain  Specific instructions for Next Treatment: progress up to chair, co tx, ROM/therex  Prognosis: Good  Discharge Recommendations: Continue to assess pending progress; Patient would benefit from continued therapy after discharge     Type of devices: All fall risk precautions in place;Call light within reach;Gait belt;Nurse notified     Plan  Times per week: 1-2x/day  Current Treatment Recommendations: Strengthening,ROM,Balance Training,Functional Mobility Training,Transfer Training,Endurance Training,Gait Training,Equipment Evaluation, Education, & procurement,Patient/Caregiver Education & Training,Safety Education & Training,Positioning,Pain Management    Patient Education  New Education Provided:  Plan of Care  Learner:patient  Method: demonstration and explanation       Outcome: needs reinforcement     Goals  Short term goals  Time Frame for Short term goals: 7 days  Short term goal 1: Pt to demo supine<-->sit min to mod x1. Short term goal 2: Pt to perform transfers min to mod x1from varied surfaces. Short term goal 3: Pt to amb 10'-20' min to mod x1 with device. Short term goal 4: Pt to tolerate sitting upright in chair for at least 30 minutes. Short term goal 5: Pt to reduce pain to 2-3/10 with movement to progress independence with mobility. Short term goal 6: Pt to improve BLE strength by 1/2 MMG. Short term goal 7: Pt to tolerate standing 1-2 minutes, min to mod x1 with device.     PT Individual Minutes  Time In: 6897  Time Out: 1383  Minutes: 11    Electronically signed by Danica Miguel PTA on 3/1/22 at 12:15 PM EST

## 2022-03-01 NOTE — PROGRESS NOTES
[facility] -   Infectious diseases evaluation  admission date 2/18/2022    reason for consultation:   Pneumonia    Impression :   Current:  Community-acquired pneumonia  Acute right femoral neck fracture S/P right femoral hemiarthroplasty 2/22/2022. Pulmonary fibrosis  Acute hypoxic respiratory failure    Recommendations   · 7 days course of IV ceftriaxone completed 2/24/2022  · 5 days course of Zithromax completed 2/21/2022  · Monitor off antibiotics  · Follow CBC and renal function  · Supportive care  · Infectious disease will sign off    Infection Control Recommendations   · [place] Precautions      Antimicrobial Stewardship Recommendations   · Simplification of therapy  · Targeted therapy          History of Present Illness:   Initial history:  Laith Eason is a 74y.o.-year-old male presented to hospital on 2/18/2022 with worsening shortness of breath for several days, worse with exertion, no alleviating factors. The patient also fell ,was found to have acute right femoral neck fracture. The patient had chronic cough due to pulmonary fibrosis not increased, usually on 5 L of oxygen at home. He also had chronic legs pain. He denied fever or chills. The patient has been afebrile, no leukocytosis. He was started on IV Zithromax and Rocephin on admission that he is tolerating  Strep pneumo and Legionella antigen negative  Chest x-ray showed diffuse bilateral pneumonia worse on the left side. COVID-19 rapid test was negative. No growth on blood cultures    Interval changes  3/1/2022     Patient was seen and examined bedside. No acute events overnight. Patient denies any fever, chills, productive cough, chest pain, SOB, palpitations.     Patient Vitals for the past 8 hrs:   BP Temp Temp src Pulse Resp SpO2   03/01/22 1245 119/75 -- -- -- -- --   03/01/22 1130 (!) 95/59 97.4 °F (36.3 °C) Oral 83 18 100 %   03/01/22 0730 99/70 98 °F (36.7 °C) Oral 96 20 100 %           I have personally reviewed the past medical history, past surgical history, medications, social history, and family history, and I haveupdated the database accordingly. Allergies:   Adhesive tape, Codeine, Penicillins, and Nintedanib     Review of Systems:     Review of Systems  As per history of present illness, other than above 12 system review was negative  Physical Examination :       Physical Exam  Constitutional:       Appearance: He is not toxic-appearing. HENT:      Head: Normocephalic and atraumatic. Right Ear: External ear normal.      Left Ear: External ear normal.      Mouth/Throat:      Pharynx: Oropharynx is clear. No oropharyngeal exudate. Eyes:      General: No scleral icterus. Conjunctiva/sclera: Conjunctivae normal.   Cardiovascular:      Heart sounds: Normal heart sounds. No murmur heard. Pulmonary:      Breath sounds: No wheezing or rales. Abdominal:      General: There is no distension. Palpations: Abdomen is soft. Musculoskeletal:      Cervical back: Neck supple. No rigidity. Right lower leg: No edema. Left lower leg: No edema. Skin:     General: Skin is warm. Coloration: Skin is not jaundiced. Neurological:      General: No focal deficit present. Mental Status: He is alert and oriented to person, place, and time.          Past Medical History:     Past Medical History:   Diagnosis Date    Atrial fibrillation ([facility]     Back pain, chronic     Hill's esophagus 06/18/2019    Benign essential HTN     BPH (benign prostatic hyperplasia)     Cancer (HCC)     colon-rectal    Chronic idiopathic pulmonary fibrosis ([facility] 03/29/2021    Cocaine abuse in remission [facility])     1970's    ED (erectile dysfunction) 4/2/2015    GERD (gastroesophageal reflux disease)     GI bleed 12/5/2018    Hernia     History of colon cancer     Melena     Migraines     Murmur, cardiac        Past Surgical  History:     Past Surgical History: Procedure Laterality Date    CARDIAC CATHETERIZATION  2018    Non-obstructive CAD    CARDIOVERSION      COLECTOMY      2nd colectomy, Colostomy and reversed [facility] COLECTOMY      1st time [employer]    COLONOSCOPY      COLONOSCOPY  2016    COLONOSCOPY N/A 2018    COLONOSCOPY DIAGNOSTIC performed by Jessica Fan MD at [address] Right 2009    inguinal    HIP SURGERY Right 2022    HIP FEMORAL HEMIARTHROPLASTY performed by Hermelinda Cuevas MD at [address] [facility] Right 1970's    arthrotomy    TONSILLECTOMY      TOTAL KNEE ARTHROPLASTY Right 2019    KNEE TOTAL ARTHROPLASTY performed by Hermelinda Cuevas MD at [address] [facility] TRANSESOPHAGEAL ECHOCARDIOGRAM  2018    UPPER GASTROINTESTINAL ENDOSCOPY N/A 2018    EGD DIAGNOSTIC ONLY performed by Jessica Fan MD at [facility] N/A 2019    [facility]S       Medications:      predniSONE  20 mg Oral Daily    midodrine  5 mg Oral TID AC    metoprolol tartrate  25 mg Oral BID    famotidine  20 mg Oral BID    sodium chloride flush  5-40 mL IntraVENous 2 times per day    digoxin  125 mcg Oral Daily    sertraline  25 mg Oral Daily    enoxaparin  1 mg/kg SubCUTAneous BID    sodium chloride flush  5-40 mL IntraVENous 2 times per day    atorvastatin  40 mg Oral Daily    [Held by provider] lisinopril  10 mg Oral BID    tamsulosin  0.4 mg Oral Daily    aspirin  81 mg Oral Daily       Social History:     Social History     Socioeconomic History    Marital status: Single     Spouse name: Not on file    Number of children: Not on file    Years of education: Not on file    Highest education level: Not on file   Occupational History    Not on file   Tobacco Use    Smoking status: Former Smoker     Packs/day: 0.50     Years: 1.00     Pack years: 0.50     Quit date:      Years since quittin.1    Smokeless tobacco: Never Used    Tobacco comment: stated never actually really smoked only inhaled    Vaping Use    Vaping Use: Never used   Substance and Sexual Activity    Alcohol use: Yes     Alcohol/week: 12.0 standard drinks     Types: 2 Shots of liquor, 10 Standard drinks or equivalent per week     Comment: 2-3 times a week    Drug use: No     Types: Other-see comments     Comment: Cocaine use in past in 1970's    Sexual activity: Yes     Partners: Female   Other Topics Concern    Not on file   Social History Narrative    Not on file     Social Determinants of Health     Financial Resource Strain: Medium Risk    Difficulty of Paying Living Expenses: Somewhat hard   Food Insecurity: No Food Insecurity    Worried About Running Out of Food in the Last Year: Never true    Tino of Food in the Last Year: Never true   Transportation Needs:     Lack of Transportation (Medical): Not on file    Lack of Transportation (Non-Medical):  Not on file   Physical Activity:     Days of Exercise per Week: Not on file    Minutes of Exercise per Session: Not on file   Stress:     Feeling of Stress : Not on file   Social Connections:     Frequency of Communication with Friends and Family: Not on file    Frequency of Social Gatherings with Friends and Family: Not on file    Attends Mandaeism Services: Not on file    Active Member of [address] [place] [address] [employer] or Organizations: Not on file    Attends Club or Organization Meetings: Not on file    Marital Status: Not on file   Intimate Partner Violence:     Fear of Current or Ex-Partner: Not on file    Emotionally Abused: Not on file    Physically Abused: Not on file    Sexually Abused: Not on file   Housing Stability:     Unable to Pay for Housing in the Last Year: Not on file    Number of [place] in the Last Year: Not on file    Unstable Housing in the Last Year: Not on file       Family History:     Family History   Problem Relation Age of Onset    Diabetes Mother     Heart Attack Father     Heart Disease Father     Heart Disease Brother       Medical Decision Making:   I have independently reviewed/ordered the following labs:    CBC with Differential:   Recent Labs     02/28/22  0540 02/28/22  0540 03/01/22  0505 03/01/22  1230   WBC 9.9  --  9.7  --    HGB 9.3*   < > 8.3* 9.2*   HCT 28.5*   < > 25.8* 29.0*     --  352  --    LYMPHOPCT 15*  --  17*  --    MONOPCT 11*  --  11*  --     < > = values in this interval not displayed. BMP:  Recent Labs     02/28/22  0540 03/01/22  0505    137   K 4.4 4.1   CL 98 99   CO2 37* 37*   BUN 17 19   CREATININE 0.51* 0.52*     Hepatic Function Panel: No results for input(s): PROT, LABALBU, BILIDIR, IBILI, BILITOT, ALKPHOS, ALT, AST in the last 72 hours. No results for input(s): RPR in the last 72 hours. No results for input(s): HIV in the last 72 hours. No results for input(s): BC in the last 72 hours. Lab Results   Component Value Date    CREATININE 0.52 03/01/2022    GLUCOSE 81 03/01/2022       Detailed results: Thank you for allowing us to participate in the care of this patient. Please call with questions. This note is created with the assistance of a speech recognition program.  While intending to generate adocument that actually reflects the content of the visit, the document can still have some errors including those of syntax and sound a like substitutions which may escape proof reading. It such instances, actual meaningcan be extrapolated by contextual diversion. Bhargav Young MD   Attending Physician Statement  I have discussed the care of the patient, including pertinent history and exam findings,  with the resident. I have reviewed the key elements of all parts of the encounter with the resident. I agree with the assessment, plan and orders as documented by the resident.     Addis Wade MD    Office: [phone]  Perfect serve / office [phone]

## 2022-03-01 NOTE — PLAN OF CARE
Problem: Skin Integrity:  Goal: Absence of new skin breakdown  Description: Absence of new skin breakdown  Outcome: Ongoing   No new breakdown noted with assessment  Problem: Falls - Risk of:  Goal: Will remain free from falls  Description: Will remain free from falls  Outcome: Ongoing   Patient ambulating throughout room without injury, harm or falls  Problem: SAFETY  Goal: Free from intentional harm  Outcome: Ongoing     Problem: PAIN  Goal: Patient's pain/discomfort is manageable  Outcome: Ongoing   Pain managed through use of pharm and non-pharm pain management techniques  Problem: Musculor/Skeletal Functional Status  Goal: Highest potential functional level  Outcome: Ongoing

## 2022-03-01 NOTE — PROGRESS NOTES
Pulmonary Progress Note  [facility]      Patient - Aron Dhillon,  Age - 74 y.o.    - 1955      Room Number - 2105/2105-01   N -  [id number]   [facility]  Date of Admission -  2022  6:14 AM        Consulting Mallory Blackwell MD  Primary Care Physician - Arya Cardenas-Bartolome Pascual MD     SUBJECTIVE   Per RN, no acute overnight events. Pt appears in NAD. On 5L nasal cannula (baseline). Still reports hip pain, some new ecchymosis is noted at the surrounding surgical site. Labs reviewed and unremarkable. OBJECTIVE   VITALS    height is 6' (1.829 m) and weight is 154 lb 12.2 oz (70.2 kg). His oral temperature is 97.4 °F (36.3 °C). His blood pressure is 119/75 and his pulse is 83. His respiration is 18 and oxygen saturation is 100%. Body mass index is 20.99 kg/m². Temperature Range: Temp: 97.4 °F (36.3 °C) Temp  Av.7 °F (36.5 °C)  Min: 97.4 °F (36.3 °C)  Max: 98 °F (36.7 °C)  BP Range:  Systolic (60YSV), TMM:687 , Min:95 , IGJ:590     Diastolic (01WPB), BT, Min:59, Max:78    Pulse Range: Pulse  Av.7  Min: 83  Max: 98  Respiration Range: Resp  Av  Min: 18  Max: 20  Current Pulse Ox[de-identified]  SpO2: 100 %  24HR Pulse Ox Range:  SpO2  Av.7 %  Min: 91 %  Max: 100 %  Oxygen Amount and Delivery: O2 Flow Rate (L/min): 5 L/min    Wt Readings from Last 3 Encounters:   22 154 lb 12.2 oz (70.2 kg)   21 171 lb (77.6 kg)   07/15/21 166 lb 9.6 oz (75.6 kg)       I/O (24 Hours)    Intake/Output Summary (Last 24 hours) at 3/1/2022 1258  Last data filed at 3/1/2022 0914  Gross per 24 hour   Intake 240 ml   Output 850 ml   Net -610 ml       EXAM     General Appearance  Awake, alert, oriented, in no acute distress  HEENT - normocephalic, atraumatic.  []  Mallampati  [] Crowded airway   [] Macroglossia  []  Retrognathia  [] Micrognathia  []  Normal tongue size []  Normal Bite  [] Justo sign positive    Neck - Supple,  trachea midline   Lungs -CTAB, no wheezes, bilateral crackles at base  Cardiovascular - Heart sounds are normal.  Regular rate and rhythm   Abdomen - Soft, nontender, nondistended, no masses or organomegaly  Neurologic - There are no focal motor or sensory deficits  Skin - No bruising or bleeding; I did not examine his dressing  Extremities - No clubbing, cyanosis, edema; ecchymosis to the surrounding skin at the surgical site of the right hip. MEDS      predniSONE  20 mg Oral Daily    midodrine  5 mg Oral TID AC    metoprolol tartrate  25 mg Oral BID    famotidine  20 mg Oral BID    sodium chloride flush  5-40 mL IntraVENous 2 times per day    digoxin  125 mcg Oral Daily    sertraline  25 mg Oral Daily    enoxaparin  1 mg/kg SubCUTAneous BID    sodium chloride flush  5-40 mL IntraVENous 2 times per day    atorvastatin  40 mg Oral Daily    [Held by provider] lisinopril  10 mg Oral BID    tamsulosin  0.4 mg Oral Daily    aspirin  81 mg Oral Daily      sodium chloride      sodium chloride       oxyCODONE-acetaminophen, sodium chloride flush, sodium chloride, perflutren lipid microspheres, sodium chloride flush, sodium chloride flush, sodium chloride, ondansetron **OR** ondansetron, polyethylene glycol, acetaminophen **OR** acetaminophen, potassium chloride **OR** potassium chloride, magnesium sulfate    LABS   CBC   Recent Labs     03/01/22  0505 03/01/22  0505 03/01/22  1230   WBC 9.7  --   --    HGB 8.3*   < > 9.2*   HCT 25.8*   < > 29.0*   MCV 78.0*  --   --      --   --     < > = values in this interval not displayed.      BMP:   Lab Results   Component Value Date     03/01/2022    K 4.1 03/01/2022    CL 99 03/01/2022    CO2 37 03/01/2022    BUN 19 03/01/2022    LABALBU 4.1 12/20/2021    CREATININE 0.52 03/01/2022    CALCIUM 7.7 03/01/2022    GFRAA >60 03/01/2022    LABGLOM >60 03/01/2022     ABGs:  Lab Results   Component Value Date    PHART 7.394 02/18/2022    PO2ART 33.4 02/18/2022    RKJ3PPJ 42.4 02/18/2022      Lab Results   Component Value Date    MODE NOT REPORTED 02/18/2022     Ionized Calcium:  No results found for: IONCA  Magnesium:    Lab Results   Component Value Date    MG 2.1 04/25/2021     Phosphorus:    Lab Results   Component Value Date    PHOS 3.6 01/13/2021        LIVER PROFILE No results for input(s): AST, ALT, LIPASE, BILIDIR, BILITOT, ALKPHOS in the last 72 hours. Invalid input(s): AMYLASE,  ALB  INR No results for input(s): INR in the last 72 hours. PTT   Lab Results   Component Value Date    APTT 32.6 02/22/2022         RADIOLOGY     (See actual reports for details)    ASSESSMENT/PLAN   Principal Problem:    Multifocal pneumonia  Active Problems:    PAF (paroxysmal atrial fibrillation) (HCC)    Pulmonary fibrosis (HCC)    Fracture of right hip    Community acquired bacterial pneumonia    Allergy to penicillin  Resolved Problems:    * No resolved hospital problems. *    Pulmonary status appears to be stable  He has recovered from his pneumonia  Continue prednisone at 20 mg daily; will taper over the next week or so  Hemoglobin appears to be stable but if dressing continues to be saturated, would recommend  hold a dose of Lovenox    Amelia Cabot, MS4    Electronically signed by Barbara Thakkar on 3/1/2022 at 12:58 PM    Patient seen and examined independently by me. Above discussed and I agree with medical student note except where indicated in the EMR revision history. Also see my additional comments and changes indicated by discrete font, text color, italics, and/or initials. Labs, cultures, and radiographs where available were reviewed. He continues to improve from a pulmonary standpoint, no worsening of his lung function as we have been decreasing his steroids.   We will decrease his prednisone to 10 mg a day  Awaiting rehab  If discharged, needs to follow-up in 4 to 6 weeks  Electronically signed by García Rodriguez MD on 3/1/2022 at 3:28 PM

## 2022-03-01 NOTE — PROGRESS NOTES
SW asked OT to see pt for pre-cert purposes. [facility] started pre-cert but needs OT note.

## 2022-03-01 NOTE — PROGRESS NOTES
[address] [facility]    INPATIENT OCCUPATIONAL THERAPY  PROGRESS NOTE  Date: 3/1/2022  Patient Name: La Padilla      Room: 9903/2790-37  MRN: [id number]    : 1955  (74 y.o.) Gender: male     Discharge Recommendations: The patient would benefit from an intensive level of therapy after discharge from the facility. They should be able to tolerate 3-hours of Combined OT/PT/ST over 5 days/week or at least 900 minutes of  Combined Therapy over 7 days. Referring Practitioner: Keaton Machado MD  Diagnosis: Hypoxia, community acquired, bacterial pnemumonia, closed fx of neck of right femur s/p HIP FEMORAL HEMIARTHROPLASTY, pneumonia of both lungs due to infectious organism, multifocal pneumonia   General  Chart Reviewed: Yes,Progress Notes  Patient assessed for rehabilitation services?: Yes  Response to previous treatment: Patient with no complaints from previous session  Family / Caregiver Present: No  Referring Practitioner: Keaton Machado MD  Diagnosis: Hypoxia, community acquired, bacterial pnemumonia, closed fx of neck of right femur s/p HIP FEMORAL HEMIARTHROPLASTY, pneumonia of both lungs due to infectious organism, multifocal pneumonia     Restrictions  Restrictions/Precautions: Fall Risk,Weight Bearing (Full WBAT RLE)  Implants present? : Metal implants (R hemiarthroplasty, R TKA)  Other position/activity restrictions: Full WBAT RLE  Right Lower Extremity Weight Bearing: Weight Bearing As Tolerated  Required Braces or Orthoses?: No      Subjective  Subjective: \"I was just sitting up for 2 hours\" Patient reports that he just returned to bed with nursing assist. Patient is agreeable to therapy session.   Patient Currently in Pain: Yes  Pain Level: 9  Pain Location: Hip  Pain Orientation: Right        Pain Assessment  Pain Assessment: 0-10  Pain Level: 9  Pain Type: Surgical pain  Pain Location: Hip  Pain Orientation: Right    Objective     Bed mobility  Supine to Sit: at risk for falls,Left in bed,Nurse notified  Plan  Times per week: 5-7 (BID)  Times per day: Twice a day  Current Treatment Recommendations: Strengthening,ROM,Balance Training,Functional Mobility Training,Pain Management,Safety Education & Training,Patient/Caregiver Education & Training,Equipment Evaluation, Education, & procurement,Self-Care / ADL,Positioning      Goals  Short term goals  Time Frame for Short term goals: by discharge   Short term goal 1: Pt will complete LB bathing/dressing/toileting Mod A while maintaining vitals WFL and use of AE/DME/modified techniques to increase IND with self-care  Short term goal 2: Pt will verbalize/demonstrate Good understanding of fall prevention strategies to increase safety in ADL's  Short term goal 3: Pt will complete functional mobiliy/transfers Mod A with RW during functional activity to increase IND in ADL's  Short term goal 4: Pt will tolerate 15-20 minutes of functional activity while maintaining Good safety and vitals to increase strength and IND in self-care    OT Individual Minutes  Time In: 4217  Time Out: 121 [facility]  Minutes: 11      Electronically signed by Bishop Dirk OT on 3/1/22 at 12:04 PM EST

## 2022-03-02 ENCOUNTER — TELEPHONE (OUTPATIENT)
Dept: SURGERY | Age: 67
End: 2022-03-02

## 2022-03-02 LAB
ABSOLUTE EOS #: 0.23 K/UL (ref 0–0.4)
ABSOLUTE LYMPH #: 1.6 K/UL (ref 1–4.8)
ABSOLUTE MONO #: 1.14 K/UL (ref 0.1–1.3)
ANION GAP SERPL CALCULATED.3IONS-SCNC: 3 MMOL/L (ref 9–17)
BASOPHILS # BLD: 0 % (ref 0–2)
BASOPHILS ABSOLUTE: 0 K/UL (ref 0–0.2)
BUN BLDV-MCNC: 17 MG/DL (ref 8–23)
CALCIUM SERPL-MCNC: 8 MG/DL (ref 8.6–10.4)
CHLORIDE BLD-SCNC: 101 MMOL/L (ref 98–107)
CO2: 36 MMOL/L (ref 20–31)
CREAT SERPL-MCNC: 0.52 MG/DL (ref 0.7–1.2)
EOSINOPHILS RELATIVE PERCENT: 2 % (ref 0–4)
GFR AFRICAN AMERICAN: >60 ML/MIN
GFR NON-AFRICAN AMERICAN: >60 ML/MIN
GFR SERPL CREATININE-BSD FRML MDRD: ABNORMAL ML/MIN/{1.73_M2}
GLUCOSE BLD-MCNC: 83 MG/DL (ref 70–99)
HCT VFR BLD CALC: 25.2 % (ref 41–53)
HEMOGLOBIN: 8.1 G/DL (ref 13.5–17.5)
LYMPHOCYTES # BLD: 14 % (ref 24–44)
MCH RBC QN AUTO: 25.2 PG (ref 26–34)
MCHC RBC AUTO-ENTMCNC: 32.3 G/DL (ref 31–37)
MCV RBC AUTO: 78 FL (ref 80–100)
MONOCYTES # BLD: 10 % (ref 1–7)
MORPHOLOGY: ABNORMAL
PDW BLD-RTO: 17.5 % (ref 11.5–14.9)
PLATELET # BLD: 415 K/UL (ref 150–450)
PMV BLD AUTO: 6.8 FL (ref 6–12)
POTASSIUM SERPL-SCNC: 4.8 MMOL/L (ref 3.7–5.3)
RBC # BLD: 3.23 M/UL (ref 4.5–5.9)
SEG NEUTROPHILS: 74 % (ref 36–66)
SEGMENTED NEUTROPHILS ABSOLUTE COUNT: 8.43 K/UL (ref 1.3–9.1)
SODIUM BLD-SCNC: 140 MMOL/L (ref 135–144)
WBC # BLD: 11.4 K/UL (ref 3.5–11)

## 2022-03-02 PROCEDURE — 6360000002 HC RX W HCPCS: Performed by: STUDENT IN AN ORGANIZED HEALTH CARE EDUCATION/TRAINING PROGRAM

## 2022-03-02 PROCEDURE — 99232 SBSQ HOSP IP/OBS MODERATE 35: CPT | Performed by: INTERNAL MEDICINE

## 2022-03-02 PROCEDURE — 6370000000 HC RX 637 (ALT 250 FOR IP): Performed by: STUDENT IN AN ORGANIZED HEALTH CARE EDUCATION/TRAINING PROGRAM

## 2022-03-02 PROCEDURE — 6370000000 HC RX 637 (ALT 250 FOR IP)

## 2022-03-02 PROCEDURE — 2060000000 HC ICU INTERMEDIATE R&B

## 2022-03-02 PROCEDURE — 6370000000 HC RX 637 (ALT 250 FOR IP): Performed by: INTERNAL MEDICINE

## 2022-03-02 PROCEDURE — 2580000003 HC RX 258: Performed by: ORTHOPAEDIC SURGERY

## 2022-03-02 PROCEDURE — 80048 BASIC METABOLIC PNL TOTAL CA: CPT

## 2022-03-02 PROCEDURE — 6370000000 HC RX 637 (ALT 250 FOR IP): Performed by: ORTHOPAEDIC SURGERY

## 2022-03-02 PROCEDURE — 85025 COMPLETE CBC W/AUTO DIFF WBC: CPT

## 2022-03-02 PROCEDURE — 36415 COLL VENOUS BLD VENIPUNCTURE: CPT

## 2022-03-02 RX ORDER — MIDODRINE HYDROCHLORIDE 5 MG/1
5 TABLET ORAL
Qty: 90 TABLET | Refills: 3 | Status: SHIPPED | OUTPATIENT
Start: 2022-03-02

## 2022-03-02 RX ORDER — SERTRALINE HYDROCHLORIDE 25 MG/1
25 TABLET, FILM COATED ORAL DAILY
Qty: 30 TABLET | Refills: 3 | Status: SHIPPED | OUTPATIENT
Start: 2022-03-02 | End: 2022-07-13 | Stop reason: SDUPTHER

## 2022-03-02 RX ORDER — PREDNISONE 10 MG/1
10 TABLET ORAL DAILY
Qty: 5 TABLET | Refills: 0 | Status: SHIPPED | OUTPATIENT
Start: 2022-03-02 | End: 2022-03-07

## 2022-03-02 RX ADMIN — MIDODRINE HYDROCHLORIDE 5 MG: 5 TABLET ORAL at 06:22

## 2022-03-02 RX ADMIN — OXYCODONE HYDROCHLORIDE AND ACETAMINOPHEN 1 TABLET: 5; 325 TABLET ORAL at 20:13

## 2022-03-02 RX ADMIN — OXYCODONE HYDROCHLORIDE AND ACETAMINOPHEN 1 TABLET: 5; 325 TABLET ORAL at 06:23

## 2022-03-02 RX ADMIN — METOPROLOL TARTRATE 25 MG: 25 TABLET, FILM COATED ORAL at 20:07

## 2022-03-02 RX ADMIN — SODIUM CHLORIDE, PRESERVATIVE FREE 10 ML: 5 INJECTION INTRAVENOUS at 20:13

## 2022-03-02 RX ADMIN — MIDODRINE HYDROCHLORIDE 5 MG: 5 TABLET ORAL at 17:42

## 2022-03-02 RX ADMIN — ENOXAPARIN SODIUM 70 MG: 100 INJECTION SUBCUTANEOUS at 08:54

## 2022-03-02 RX ADMIN — SODIUM CHLORIDE, PRESERVATIVE FREE 10 ML: 5 INJECTION INTRAVENOUS at 08:54

## 2022-03-02 RX ADMIN — SERTRALINE HYDROCHLORIDE 25 MG: 50 TABLET ORAL at 08:45

## 2022-03-02 RX ADMIN — ASPIRIN 81 MG 81 MG: 81 TABLET ORAL at 08:45

## 2022-03-02 RX ADMIN — ENOXAPARIN SODIUM 70 MG: 100 INJECTION SUBCUTANEOUS at 20:08

## 2022-03-02 RX ADMIN — METOPROLOL TARTRATE 25 MG: 25 TABLET, FILM COATED ORAL at 08:45

## 2022-03-02 RX ADMIN — OXYCODONE HYDROCHLORIDE AND ACETAMINOPHEN 1 TABLET: 5; 325 TABLET ORAL at 11:58

## 2022-03-02 RX ADMIN — PREDNISONE 10 MG: 10 TABLET ORAL at 08:45

## 2022-03-02 RX ADMIN — TAMSULOSIN HYDROCHLORIDE 0.4 MG: 0.4 CAPSULE ORAL at 08:45

## 2022-03-02 RX ADMIN — DIGOXIN 125 MCG: 125 TABLET ORAL at 08:45

## 2022-03-02 RX ADMIN — MIDODRINE HYDROCHLORIDE 5 MG: 5 TABLET ORAL at 11:54

## 2022-03-02 RX ADMIN — ATORVASTATIN CALCIUM 40 MG: 40 TABLET, FILM COATED ORAL at 08:45

## 2022-03-02 RX ADMIN — FAMOTIDINE 20 MG: 20 TABLET, FILM COATED ORAL at 08:45

## 2022-03-02 RX ADMIN — FAMOTIDINE 20 MG: 20 TABLET, FILM COATED ORAL at 20:07

## 2022-03-02 ASSESSMENT — PAIN DESCRIPTION - ORIENTATION: ORIENTATION: RIGHT

## 2022-03-02 ASSESSMENT — ENCOUNTER SYMPTOMS
NAUSEA: 0
SORE THROAT: 0
CONSTIPATION: 0
EYE DISCHARGE: 0
ABDOMINAL PAIN: 0
WHEEZING: 1
SHORTNESS OF BREATH: 1
EYE REDNESS: 0
VOMITING: 0
BLOOD IN STOOL: 0

## 2022-03-02 ASSESSMENT — PAIN SCALES - GENERAL
PAINLEVEL_OUTOF10: 9
PAINLEVEL_OUTOF10: 7
PAINLEVEL_OUTOF10: 9

## 2022-03-02 ASSESSMENT — PAIN DESCRIPTION - LOCATION: LOCATION: HIP

## 2022-03-02 ASSESSMENT — PAIN DESCRIPTION - DESCRIPTORS: DESCRIPTORS: ACHING;THROBBING

## 2022-03-02 ASSESSMENT — PAIN DESCRIPTION - PAIN TYPE: TYPE: SURGICAL PAIN

## 2022-03-02 ASSESSMENT — PAIN DESCRIPTION - FREQUENCY: FREQUENCY: CONTINUOUS

## 2022-03-02 NOTE — PLAN OF CARE
Problem: Skin Integrity:  Goal: Will show no infection signs and symptoms  Description: Will show no infection signs and symptoms  3/2/2022 0241 by Flora Jasmien RN  Outcome: Ongoing  3/1/2022 1721 by Tadeo Lerner RN  Outcome: Ongoing  Goal: Absence of new skin breakdown  Description: Absence of new skin breakdown  3/2/2022 0241 by Flora Jasmine RN  Outcome: Ongoing  3/1/2022 1721 by Tadeo Lerner RN  Outcome: Ongoing     Problem: Falls - Risk of:  Goal: Will remain free from falls  Description: Will remain free from falls  3/2/2022 0241 by Flora Jasmine RN  Outcome: Ongoing  3/1/2022 1721 by Tadeo Lerner RN  Outcome: Ongoing  Goal: Absence of physical injury  Description: Absence of physical injury  3/2/2022 0241 by Flora Jasmine RN  Outcome: Ongoing  3/1/2022 1721 by Tadeo Lerner RN  Outcome: Ongoing     Problem: SAFETY  Goal: Free from accidental physical injury  3/2/2022 0241 by Flora Jasmine RN  Outcome: Ongoing  3/1/2022 1721 by Tadeo Lerner RN  Outcome: Ongoing  Goal: Free from intentional harm  3/2/2022 0241 by Flora Jasmine RN  Outcome: Ongoing  3/1/2022 1721 by Tadeo Lerner RN  Outcome: Ongoing     Problem: DAILY CARE  Goal: Daily care needs are met  3/2/2022 0241 by Flora Jasmine RN  Outcome: Ongoing  3/1/2022 1721 by Tadeo Lerner RN  Outcome: Ongoing     Problem: PAIN  Goal: Patient's pain/discomfort is manageable  3/2/2022 0241 by Flora Jasmine RN  Outcome: Ongoing  3/1/2022 1721 by Tadeo Lerner RN  Outcome: Ongoing     Problem: SKIN INTEGRITY  Goal: Skin integrity is maintained or improved  3/2/2022 0241 by Flora Jasmine RN  Outcome: Ongoing  3/1/2022 1721 by Tadeo Lerner RN  Outcome: Ongoing     Problem: KNOWLEDGE DEFICIT  Goal: Patient/S.O. demonstrates understanding of disease process, treatment plan, medications, and discharge instructions.   3/2/2022 0241 by Flora Jasmine RN  Outcome: Ongoing  3/1/2022 1721 by Tadeo Lerner RN  Outcome: Ongoing     Problem: DISCHARGE BARRIERS  Goal: Patient's continuum of care needs are met  3/2/2022 0241 by Michael Salcido RN  Outcome: Ongoing  3/1/2022 1721 by Marcie Ortega RN  Outcome: Ongoing     Problem: Pain:  Goal: Pain level will decrease  Description: Pain level will decrease  3/2/2022 0241 by Michael Salcido RN  Outcome: Ongoing  3/1/2022 1721 by Marcie Ortega RN  Outcome: Ongoing  Goal: Control of acute pain  Description: Control of acute pain  3/2/2022 0241 by Michael Salcido RN  Outcome: Ongoing  3/1/2022 1721 by Marcie Ortega RN  Outcome: Ongoing  Goal: Control of chronic pain  Description: Control of chronic pain  3/2/2022 0241 by Michael Salcido RN  Outcome: Ongoing  3/1/2022 1721 by Marcie Ortega RN  Outcome: Ongoing     Problem: Musculor/Skeletal Functional Status  Goal: Highest potential functional level  3/2/2022 0241 by Michael Salcido RN  Outcome: Ongoing  3/1/2022 1721 by Marcie Ortega RN  Outcome: Ongoing  Goal: Absence of falls  3/2/2022 0241 by Michael Salcido RN  Outcome: Ongoing  3/1/2022 1721 by Marcie Ortega RN  Outcome: Ongoing     Problem: Musculor/Skeletal Functional Status  Goal: Highest potential functional level  3/2/2022 0241 by Michael Salcido RN  Outcome: Ongoing  3/1/2022 1721 by Marcie Ortega RN  Outcome: Ongoing  Goal: Absence of falls  3/2/2022 0241 by Michael Salcido RN  Outcome: Ongoing  3/1/2022 1721 by Marcie Ortega RN  Outcome: Ongoing     Problem: Nutrition  Goal: Optimal nutrition therapy  3/2/2022 0241 by Michael Salcido RN  Outcome: Ongoing  3/1/2022 1721 by Marcie Ortega RN  Outcome: Ongoing

## 2022-03-02 NOTE — DISCHARGE SUMMARY
[address]    Discharge Summary     Patient ID: Chris Reyes  :  1955   MRN: [id number]     ACCOUNT:  [de-identified]   Patient's PCP: Shazia Dubose MD  Admit Date: 2022   Discharge Date: 3/2/2022     Length of Stay: 12  Code Status:  DNR-CCA  Admitting Physician: Ana Maria Guerrero MD  Discharge Physician: Bryan Brooks DO     Active Discharge Diagnoses:       Primary Problem  Multifocal pneumonia      Hospital Problems  Active Hospital Problems    Diagnosis Date Noted    Allergy to penicillin [Z88.0]     Multifocal pneumonia [J18.9] 2022    Fracture of right hip [S72.001A] 2022    Community acquired bacterial pneumonia [J15.9] 2022    Pulmonary fibrosis ([facility] [address] [J84.10] 2020    PAF (paroxysmal atrial fibrillation) ([facility] [address] [I48.0] 2014       Admission Condition:  serious     Discharged Condition: fair    Hospital Stay:       Hospital Course: Chris Reyes is a 74 y.o. male with past medical history of pulmonary fibrosis presented with acute hypoxic respiratory failure, had fall while walking into ED and fractured right hip. Respiratory failure found to be secondary to multifocal pneumonia in setting of pulmonary fibrosis; managed with antibiotics, oxygen supplementation, steroids, and patient O2 needs returned to baseline. Right hip hemiarthroplasty done  without any complications. Post surgery,   Pt does have ecchymosis at right thigh but no active bleeding,  Was restarted on AC,  Hgb remained low but stable. ROM limitations  hip surgery improving but will need to continue PT/OT. Patient with chronic A. fib but previously controlled rate started going into A. fib with RVR on postoperative days; cardiology consulted & medication adjustments made;  Rate now controlled.   Hypotensive episodes managed with fluid boluses; BP improved but remained low normal;  pt's home antihypertensives discontinued; midodrine & metoprolol added. Patient also depressed times few months on admission with worsening after hip fracture; Was started on sertraline on 2/21;  Mood improved & will continue sertraline on discharge. Patient medically stable for discharge to SNF.       Significant therapeutic interventions: Azithromycin, ceftriaxone, steroids, incentive spirometry, PT/OT, pain management, right hip femoral hemiarthroplasty, cardiac meds including digoxin/Lopressor/midodrine, AC, statin, ASA    Significant Diagnostic Studies:   Labs / Micro:  CBC:   Lab Results   Component Value Date    WBC 10.7 03/03/2022    RBC 3.06 03/03/2022    HGB 7.9 03/03/2022    HCT 24.5 03/03/2022    MCV 79.9 03/03/2022    MCH 25.9 03/03/2022    MCHC 32.3 03/03/2022    RDW 17.6 03/03/2022     03/03/2022     BMP:    Lab Results   Component Value Date    GLUCOSE 83 03/03/2022     03/03/2022    K 4.2 03/03/2022    CL 99 03/03/2022    CO2 36 03/03/2022    ANIONGAP 3 03/03/2022    BUN 19 03/03/2022    CREATININE 0.55 03/03/2022    BUNCRER NOT REPORTED 02/21/2022    CALCIUM 7.7 03/03/2022    LABGLOM >60 03/03/2022    GFRAA >60 03/03/2022    GFR      03/03/2022     HFP:    Lab Results   Component Value Date    PROT 7.4 12/20/2021     CMP:    Lab Results   Component Value Date    GLUCOSE 83 03/03/2022     03/03/2022    K 4.2 03/03/2022    CL 99 03/03/2022    CO2 36 03/03/2022    BUN 19 03/03/2022    CREATININE 0.55 03/03/2022    ANIONGAP 3 03/03/2022    ALKPHOS 92 12/20/2021    ALT 8 12/20/2021    AST 18 12/20/2021    BILITOT 0.66 12/20/2021    LABALBU 4.1 12/20/2021    ALBUMIN 1.2 12/20/2021    LABGLOM >60 03/03/2022    GFRAA >60 03/03/2022    GFR      03/03/2022    PROT 7.4 12/20/2021    CALCIUM 7.7 03/03/2022     PT/INR:    Lab Results   Component Value Date    PROTIME 18.2 02/22/2022    INR 1.5 02/22/2022     PTT:   Lab Results   Component Value Date    APTT 32.6 02/22/2022     FLP:    Lab Results Component Value Date    CHOL 200 01/03/2017    TRIG 165 01/03/2017    HDL 29 12/20/2021     U/A:    Lab Results   Component Value Date    COLORU Yellow 02/20/2022    TURBIDITY Clear 02/20/2022    SPECGRAV 1.012 02/20/2022    HGBUR NEGATIVE 02/20/2022    PHUR 6.0 02/20/2022    PROTEINU NEGATIVE 02/20/2022    GLUCOSEU NEGATIVE 02/20/2022    KETUA NEGATIVE 02/20/2022    BILIRUBINUR NEGATIVE 02/20/2022    BILIRUBINUR negative 02/27/2020    UROBILINOGEN Normal 02/20/2022    NITRU NEGATIVE 02/20/2022    LEUKOCYTESUR NEGATIVE 02/20/2022     TSH:    Lab Results   Component Value Date    TSH 3.93 12/20/2021       Radiology:    ECHO Complete 2D W Doppler W Color    Result Date: 2/22/2022  [address] [facility] Transthoracic Echocardiography Report (TTE)  Patient Name Hugo Avila     Date of Study               02/22/2022               Bhakti Del Cid   Date of      1955  Gender                      Male  Birth   Age          74 year(s)  Race                           Room Number  2016        Height:                     71.65 inch, 182 cm   Corporate ID [id number]    Weight:                     163 pounds, 74 kg  #   Patient Acct [de-identified]   BSA:          1.95 m^2      BMI:      22.34  #                                                              kg/m^2   MR #         [id number]      Sonographer                 Janae Colindres   Accession #  [id number]  Interpreting Physician      [address] [place] [address]   Fellow                   Referring Nurse                           Practitioner   Interpreting             Referring Physician         Sallyann Burkitt, MD  Fellow  Type of Study   TTE procedure:2D Echocardiogram, M-Mode, Doppler, Color Doppler. Procedure Date Date: 02/22/2022 Start: 11:48 AM Study Location: [facility] Technical Quality: Fair visualization Indications:Dyspnea/SOB and Preop cardiac evaluation.  Patient Status: Inpatient Height: 71.65 inches Weight: 163.15 pounds BSA: 1.95 m^2 BMI: 22.34 kg/m^2 Rhythm: Atrial fibrillation HR: 89 bpm BP: 112/67 mmHg Allergies   - Penicillin. - Codeine. CONCLUSIONS Summary Difficult to assess LV function due to atrial fibrillation. Left ventricle is normal in size. Estimated LV EF 40-45%. Evidence of diastolic dysfunction. Right ventricular dilatation with normal systolic function. Left atrial dilatation. Right atrial dilatation. Mild aortic stenosis. Peak instantaneous gradient 28 mmHg and mean gradient 15 mmHg. Trivial aortic insufficiency. Mild to moderate mitral regurgitation. Multiple MR jets noted. Mild tricuspid regurgitation. Estimated right ventricular systolic pressure is 41 mmHg, suggests mild Pulm HTN. Signature ----------------------------------------------------------------------------  Electronically signed by Janae Colindres(Sonographer) on 02/22/2022 12:29  PM ---------------------------------------------------------------------------- ----------------------------------------------------------------------------  Electronically signed by Steven Hooker(Interpreting physician) on 02/22/2022  12:31 PM ---------------------------------------------------------------------------- FINDINGS Left Atrium Left atrial dilatation. Left Ventricle Left ventricle is normal in size. Estimated LV EF 40-45 %. Evidence of diastolic dysfunction. Right Atrium Right atrial dilatation. Right Ventricle Right ventricular dilatation with normal systolic function. Mitral Valve Mild to moderate mitral regurgitation. Multiple MR jets noted. Aortic Valve Aortic valve is trileaflet. Mild aortic stenosis. Peak instantaneous gradient 28 mmHg and mean gradient 15 mmHg. Trivial aortic insufficiency. Tricuspid Valve Normal tricuspid valve structure and function. Mild tricuspid regurgitation. Estimated right ventricular systolic pressure is 41 mmHg. Pulmonic Valve Pulmonic valve not well visualized but Doppler velocities are normal. No pulmonic insufficiency.  Pericardial Effusion No significant pericardial effusion is seen. Miscellaneous Normal aortic root dimension. E/e' average 13.9 IVC not well visualized. M-mode / 2D Measurements & Calculations:   LVIDd:4.4 cm(3.7 - 5.6 cm)       Diastolic YCDAUL:35.6 ml  PGOOY:0.38 cm(2.2 - 4.0 cm)      Systolic RFHHTC:06 ml  LRSH:4.3 cm(0.6 - 1.1 cm)        Aortic Root:3.6 cm(2.0 - 3.7 cm)  LVPWd:1.2 cm(0.6 - 1.1 cm)       LA Dimension: 5.4 cm(1.9 - 4.0 cm)  Fractional Shortenin.95 %    LA volume/Index: 96 ml /49m^2  Calculated LVEF (%): 44.84 %     LVOT:2.1 cm   Mitral:                                Aortic   Peak E-Wave: 0.63 m/s                  Peak Velocity: 2.65 m/s                                         Mean Velocity: 1.82 m/s  Peak Gradient: 1.6 mmHg                Peak Gradient: 28.09 mmHg  Deceleration Time: 151 msec            Mean Gradient: 15 mmHg                                          Area (continuity): 1.09 cm^2                                         AV VTI: 42.4 cm   Tricuspid:                             Pulmonic:   Estimated RVSP: 41 mmHg  Peak TR Velocity: 2.88 m/s  Peak TR Gradient: 33.1776 mmHg  Estimated RA Pressure: 8 mmHg                                         Estimated PASP: 41.18 mmHg  Septal Wall E' velocity:0.09 m/s Lateral Wall E' velocity:0.09 m/s    XR CHEST (SINGLE VIEW FRONTAL)    Result Date: 2022  EXAMINATION: ONE XRAY VIEW OF THE CHEST 2022 4:53 am COMPARISON: 2022 HISTORY: ORDERING SYSTEM PROVIDED HISTORY: Pulmonary Fibrosis TECHNOLOGIST PROVIDED HISTORY: Pulmonary Fibrosis Reason for Exam: Pulmonary Fibrosis Additional signs and symptoms: Pulmonary Fibrosis Relevant Medical/Surgical History: Pulmonary Fibrosis FINDINGS: Increasing ground-glass attenuation in the left lung. Interstitial opacities throughout the right lung are without appreciable change. No pneumothorax identified. The cardiac and mediastinal contours appear unchanged. Bilateral airspace disease superimposed on pulmonary fibrosis. Increasing airspace disease predominantly involving the left lung. XR HIP RIGHT (1 VIEW)    Result Date: 2/22/2022  EXAMINATION: ONE XRAY VIEW OF THE RIGHT HIP 2/22/2022 1:10 pm COMPARISON: 02/18/2022 HISTORY: ORDERING SYSTEM PROVIDED HISTORY: post op TECHNOLOGIST PROVIDED HISTORY: Of operative side while in recovery room. post op Reason for Exam: s/p right hip replacement FINDINGS: There is a right total hip arthroplasty with noncemented components. No acute fracture or dislocation. No acute hardware failure or loosening. Grossly normal alignment. Surrounding postsurgical changes. Vascular calcifications are seen compatible with atherosclerotic disease. Total hip arthropasty without acute hardware complication. XR HIP RIGHT (2-3 VIEWS)    Result Date: 2/18/2022  EXAMINATION: ONE XRAY VIEW OF THE CHEST; TWO XRAY VIEWS OF THE RIGHT HIP 2/18/2022 6:52 am COMPARISON: 04/24/2021, 1318 hours, CT chest from 12/27/2021 HISTORY: ORDERING SYSTEM PROVIDED HISTORY: shortness of breath TECHNOLOGIST PROVIDED HISTORY: shortness of breath Reason for Exam: PT CO SOB with hemoptysis X several days. 68-year-old male with shortness of breath and hemoptysis for several days FINDINGS: Portable chest: AP portable upright view of the chest. Cardiac monitor leads overlie the chest. Underlying fibrotic changes throughout the lungs. Trachea midline. No pneumothorax. No free air. Worsening airspace disease throughout the left lung. Small left-sided pleural effusion. Cardiac and mediastinal contours remain unchanged. Stable mild cardiomegaly. Visualized osseous structures remain unchanged. Right hip: Acute slightly displaced fracture involving the right femoral neck. Underlying osteopenia. Surgical clips throughout the pelvis. Mild stool burden. Atherosclerotic calcification of the vasculature. Portable chest: 1.  Worsening airspace disease throughout the left lung with small left-sided pleural effusion, likely worsening multifocal pneumonia. Follow-up is recommended to document resolution. 2. Underlying fibrosis throughout the lungs. 3. Stable mild cardiomegaly. Right hip: Acute slightly displaced right femoral neck fracture. Underlying osteopenia. CT HEAD WO CONTRAST    Result Date: 2/18/2022  EXAMINATION: CT OF THE HEAD WITHOUT CONTRAST  2/18/2022 9:39 am TECHNIQUE: CT of the head was performed without the administration of intravenous contrast. Dose modulation, iterative reconstruction, and/or weight based adjustment of the mA/kV was utilized to reduce the radiation dose to as low as reasonably achievable. COMPARISON: 01/12/2021 HISTORY: ORDERING SYSTEM PROVIDED HISTORY: fell, hit head TECHNOLOGIST PROVIDED HISTORY: fell, hit head Reason for Exam: patient states he fell and hit his head today FINDINGS: BRAIN/VENTRICLES: There is no acute infarct or acute intracranial hemorrhage present. There is no mass effect or midline shift present. There is no ventriculomegaly or abnormal extra-axial fluid collection present. ORBITS: Limited evaluation of the orbits is unremarkable. SINUSES: The paranasal sinuses and mastoid air cells are clear. SOFT TISSUES/SKULL:  No lytic or blastic osseous lesions are identified. No acute intracranial process identified. XR CHEST PORTABLE    Result Date: 2/23/2022  EXAMINATION: ONE XRAY VIEW OF THE CHEST 2/23/2022 6:21 am COMPARISON: 02/21/2022, 02/19/2022, 04/24/2021 in in HISTORY: [address] [facility] [address]: Idiopathic pulmonary fibrosis, history of pneumonia TECHNOLOGIST PROVIDED HISTORY: Idiopathic pulmonary fibrosis, history of pneumonia Reason for Exam: cough, congestion FINDINGS: The cardiac and mediastinal contours appear unchanged. Coarse interstitial opacities and ground-glass opacities again demonstrated, left greater than right. No pneumothorax or effusion. No acute osseous abnormality identified. No significant interval change.      XR CHEST burden. Atherosclerotic calcification of the vasculature. Portable chest: 1. Worsening airspace disease throughout the left lung with small left-sided pleural effusion, likely worsening multifocal pneumonia. Follow-up is recommended to document resolution. 2. Underlying fibrosis throughout the lungs. 3. Stable mild cardiomegaly. Right hip: Acute slightly displaced right femoral neck fracture. Underlying osteopenia. CT CHEST PULMONARY EMBOLISM W CONTRAST    Result Date: 2/18/2022  EXAMINATION: CTA OF THE CHEST 2/18/2022 7:43 am TECHNIQUE: CTA of the chest was performed after the administration of intravenous contrast.  Multiplanar reformatted images are provided for review. MIP images are provided for review. Dose modulation, iterative reconstruction, and/or weight based adjustment of the mA/kV was utilized to reduce the radiation dose to as low as reasonably achievable. COMPARISON: 12/27/2021 HISTORY: ORDERING SYSTEM PROVIDED HISTORY: Elevated d-dimer TECHNOLOGIST PROVIDED HISTORY: Elevated d-dimer Decision Support Exception - unselect if not a suspected or confirmed emergency medical condition->Emergency Medical Condition ([place]) Reason for Exam: sob, pneumonia bilat FINDINGS: Pulmonary Arteries: Pulmonary arteries show no evidence of intraluminal filling defect to suggest pulmonary embolism. Main pulmonary artery is normal in caliber. Mediastinum: Mediastinal lymphadenopathy. Cluster of AP window lymph nodes ranging from 13-15 mm slightly more prominent compared to prior where largest was 13 mm short axis. Subcarinal fullness representing adenopathy also increased. Right hilar mild lymphadenopathy increased from prior. 15 mm short axis lymph node noted on series 2, image 112 where previously there was mild soft tissue fullness. No axillary lymphadenopathy.  Thyroid gland and esophagus grossly normal. Lungs/pleura: Peripheral predominant fibrosis with underlying bronchiectasis again noted which is fairly stable. Superimposed on above,interval development of new large area of ground-glass airspace disease in the left upper lobe extending almost across the anterior posterior length of the upper lobe and from the apex to the lingula. Most likely in pneumonia. On the right there is solid pleural base consolidative density 4.3 x 1.7 cm along the major fissure in the posterior segment right upper lobe. Previously was about 1.2 x 2.4 cm. Probably also worsening pneumonia but would recommend short interval 3 month follow-up. Small left pleural effusion is also new. 9 mm right upper lobe suprahilar central nodule series 4, image 46 is unchanged. No pneumothorax. Upper Abdomen: No acute process. No suspicious masses. Soft Tissues/Bones: No acute bone or soft tissue abnormality. 1. No evidence for acute pulmonary embolism. 2. There is very large new ground-glass airspace disease occupying most of the left upper lobe and new small left pleural effusion. 3. Solid pleural-based posterior segment right upper lobe airspace density along the major fissure is larger now measuring 4.3 x 1.7 cm, previously about 1.2 x 2.4 cm. Probably also worsening pneumonia but would recommend short interval 3 month follow-up 4. 9 mm right upper lobe suprahilar central nodule is unchanged. RECOMMENDATIONS: Unavailable     CT HIP RIGHT WO CONTRAST    Result Date: 2/22/2022  EXAMINATION: CT OF THE RIGHT HIP WITHOUT CONTRAST, 2/19/2022 12:30 pm TECHNIQUE: CT of the right hip was performed without the administration of intravenous contrast.  Multiplanar reformatted images are provided for review. Dose modulation, iterative reconstruction, and/or weight based adjustment of the mA/kV was utilized to reduce the radiation dose to as low as reasonably achievable. COMPARISON: Radiographs yesterday HISTORY ORDERING SYSTEM PROVIDED HISTORY: Better delineate right hip fracture. TECHNOLOGIST PROVIDED HISTORY: Better delineate right hip fracture. Reason for Exam:  Fx rt hip further evaluation / fall 1 day ago. FINDINGS: Bones: Acute right femoral neck fracture is present. No intertrochanteric fracture extension. The fracture is obliquely oriented across the femoral neck. There is apex anterior fracture angulation with rotation of the femur relative to the femoral head. No additional fracture is present in the right acetabulum. No fracture in the subtrochanteric proximal femur. No lytic or blastic lesion. Soft Tissue: Small reactive right hip joint effusion. No soft tissue hematoma. Muscle bulk in the right hip is normal. Joint: No significant right hip degenerative changes. 1. Acute right femoral neck fracture. Consultations:    Consults:     Final Specialist Recommendations/Findings:   IP CONSULT TO PRIMARY CARE PROVIDER  IP CONSULT TO ORTHOPEDIC SURGERY  IP CONSULT TO PULMONOLOGY  IP CONSULT TO INFECTIOUS DISEASES  IP CONSULT TO PALLIATIVE CARE  IP CONSULT TO SOCIAL WORK  IP CONSULT TO SPIRITUAL SERVICES  IP CONSULT TO PHYSICAL MEDICINE REHAB  IP CONSULT TO CARDIOLOGY      The patient was seen and examined on day of discharge and this discharge summary is in conjunction with any daily progress note from day of discharge. Discharge plan:       Disposition: To a non-[facility] facility    Physician Follow Up: Follow-up with PCP, pulmonologist, cardiologist.    Requiring Further Evaluation/Follow Up POST HOSPITALIZATION/Incidental Findings: Hypotension history of previous essential hypertension, pulmonary fibrosis, right hip hemiarthroplasty, A. fib    Diet: regular diet    Activity: As tolerated    Instructions to Patient:   Take all medications as prescribed. If symptoms worsen or recur, please return to the ED. Follow-up with PCP.   Follow-up with cardiologist.  Follow-up with pulmonologist.      Discharge Medications:      Medication List        START taking these medications      metoprolol tartrate 25 MG tablet  Commonly known as: LOPRESSOR  Take 1 tablet by mouth 2 times daily     midodrine 5 MG tablet  Commonly known as: PROAMATINE  Take 1 tablet by mouth 3 times daily (before meals)     predniSONE 10 MG tablet  Commonly known as: DELTASONE  Take 1 tablet by mouth daily for 5 doses     sertraline 25 MG tablet  Commonly known as: ZOLOFT  Take 1 tablet by mouth daily            CONTINUE taking these medications      atorvastatin 40 MG tablet  Commonly known as: LIPITOR  take 1 tablet by mouth once daily     Baclofen 5 MG tablet  Commonly known as: LIORESAL  take 1 tablet by mouth twice a day     digoxin 125 MCG tablet  Commonly known as: LANOXIN  take 1 tablet by mouth once daily     Eliquis 5 MG Tabs tablet  Generic drug: apixaban  take 1 tablet by mouth twice a day     Handicap Placard Misc  by Does not apply route Expires 1/2026     ibuprofen 800 MG tablet  Commonly known as: ADVIL;MOTRIN  take 1 tablet by mouth three times a day if needed for pain take with food     omeprazole 40 MG delayed release capsule  Commonly known as: PRILOSEC  Take 1 capsule by mouth daily     tamsulosin 0.4 MG capsule  Commonly known as: FLOMAX  take 1 capsule by mouth once daily            STOP taking these medications      lisinopril 10 MG tablet  Commonly known as: PRINIVIL;ZESTRIL               Where to Get Your Medications        These medications were sent to [address] [place] [address] [facility]       Phone: [phone]   metoprolol tartrate 25 MG tablet  midodrine 5 MG tablet  predniSONE 10 MG tablet  sertraline 25 MG tablet           Electronically signed by   Sheryl Mcdermott DO  3/2/2022  4:10 PM      Thank you Dr. Chiara Marin MD for the opportunity to be involved in this patient's care.     Attending Physician Statement  I have discussed the care of Yoli Rivas and I have examined the patient myselft and taken ros and hpi , including pertinent history and exam findings,  with the resident. I have reviewed the key elements of all parts of the encounter with the resident. I agree with the assessment, plan and orders as documented by the resident. I spent approx 35 mins in direct patient care as above and discussing discharge with patient, reviewing medications and counseling for discharge .     Electronically signed by Levi Moeller MD

## 2022-03-02 NOTE — PROGRESS NOTES
Pulmonary Progress Note  [facility]      Patient - Yoli Rivas,  Age - 74 y.o.    - 1955      Room Number - 2105/2105-01   N -  [id number]   [facility]  Date of Admission -  2022  6:14 AM        Consulting Angela Robison MD  Primary Care Physician - Arya Pascual MD     SUBJECTIVE   Looks comfortable sitting in a chair, seems to be at his baseline pulmonary status    OBJECTIVE   VITALS    height is 6' (1.829 m) and weight is 155 lb 1.6 oz (70.4 kg). His axillary temperature is 97.5 °F (36.4 °C). His blood pressure is 126/106 (abnormal) and his pulse is 81. His respiration is 16 and oxygen saturation is 94%. Body mass index is 21.04 kg/m². Temperature Range: Temp: 97.5 °F (36.4 °C) Temp  Av.7 °F (36.5 °C)  Min: 97.2 °F (36.2 °C)  Max: 97.9 °F (36.6 °C)  BP Range:  Systolic (60CZW), EOH:784 , Min:92 , LMJ:651     Diastolic (40HVO), FRZ:56, Min:60, Max:106    Pulse Range: Pulse  Av.8  Min: 81  Max: 95  Respiration Range: Resp  Av.5  Min: 16  Max: 18  Current Pulse Ox[de-identified]  SpO2: 94 %  24HR Pulse Ox Range:  SpO2  Av %  Min: 94 %  Max: 99 %  Oxygen Amount and Delivery: O2 Flow Rate (L/min): 5 L/min    Wt Readings from Last 3 Encounters:   22 155 lb 1.6 oz (70.4 kg)   21 171 lb (77.6 kg)   07/15/21 166 lb 9.6 oz (75.6 kg)       I/O (24 Hours)    Intake/Output Summary (Last 24 hours) at 3/2/2022 1224  Last data filed at 3/2/2022 0930  Gross per 24 hour   Intake 970 ml   Output 1875 ml   Net -905 ml       EXAM     General Appearance  Awake, alert, oriented, in no acute distress  HEENT - normocephalic, atraumatic.  []  Mallampati  [] Crowded airway   [] Macroglossia  []  Retrognathia  [] Micrognathia  []  Normal tongue size []  Normal Bite  [] Justo sign positive    Neck - Supple,  trachea midline   Lungs -bilateral basilar crackle  Cardiovascular - Heart sounds are normal.  Regular rate and rhythm   Abdomen - Soft, nontender, nondistended, no masses or organomegaly  Neurologic - There are no focal motor or sensory deficits  Skin - No bruising or bleeding  Extremities - No clubbing, cyanosis, edema    MEDS      predniSONE  10 mg Oral Daily    midodrine  5 mg Oral TID AC    metoprolol tartrate  25 mg Oral BID    famotidine  20 mg Oral BID    sodium chloride flush  5-40 mL IntraVENous 2 times per day    digoxin  125 mcg Oral Daily    sertraline  25 mg Oral Daily    enoxaparin  1 mg/kg SubCUTAneous BID    sodium chloride flush  5-40 mL IntraVENous 2 times per day    atorvastatin  40 mg Oral Daily    [Held by provider] lisinopril  10 mg Oral BID    tamsulosin  0.4 mg Oral Daily    aspirin  81 mg Oral Daily      sodium chloride      sodium chloride       oxyCODONE-acetaminophen, sodium chloride flush, sodium chloride, perflutren lipid microspheres, sodium chloride flush, sodium chloride flush, sodium chloride, ondansetron **OR** ondansetron, polyethylene glycol, acetaminophen **OR** acetaminophen, potassium chloride **OR** potassium chloride, magnesium sulfate    LABS   CBC   Recent Labs     03/02/22  0531   WBC 11.4*   HGB 8.1*   HCT 25.2*   MCV 78.0*        BMP:   Lab Results   Component Value Date     03/02/2022    K 4.8 03/02/2022     03/02/2022    CO2 36 03/02/2022    BUN 17 03/02/2022    LABALBU 4.1 12/20/2021    CREATININE 0.52 03/02/2022    CALCIUM 8.0 03/02/2022    GFRAA >60 03/02/2022    LABGLOM >60 03/02/2022     ABGs:  Lab Results   Component Value Date    PHART 7.394 02/18/2022    PO2ART 33.4 02/18/2022    NTG3VVM 42.4 02/18/2022      Lab Results   Component Value Date    MODE NOT REPORTED 02/18/2022     Ionized Calcium:  No results found for: IONCA  Magnesium:    Lab Results   Component Value Date    MG 2.1 04/25/2021     Phosphorus:    Lab Results   Component Value Date    PHOS 3.6 01/13/2021        LIVER PROFILE No results for input(s): AST, ALT, LIPASE, BILIDIR, BILITOT, ALKPHOS in the last 72 hours. Invalid input(s): AMYLASE,  ALB  INR No results for input(s): INR in the last 72 hours. PTT   Lab Results   Component Value Date    APTT 32.6 02/22/2022         RADIOLOGY     (See actual reports for details)    ASSESSMENT/PLAN   Principal Problem:    Multifocal pneumonia  Active Problems:    PAF (paroxysmal atrial fibrillation) (HCC)    Pulmonary fibrosis (HCC)    Fracture of right hip    Community acquired bacterial pneumonia    Allergy to penicillin  Resolved Problems:    * No resolved hospital problems.  *    Waiting placement at [facility]  Prednisone is decreased and he seems to be tolerating the taper  Continue oxygen keep saturations greater than 88%    Electronically signed by Rosmery Julian MD on 3/2/2022 at 12:24 PM

## 2022-03-02 NOTE — FLOWSHEET NOTE
Patient stated he hopes to be discharged tomorrow; declined riley     03/02/22 1708   Encounter Summary   Services provided to: Patient   Referral/Consult From: Palliative Care   Continue Visiting   (3/2/22)   Complexity of Encounter Low   Length of Encounter 15 minutes   Spiritual Assessment Completed Yes   Spiritual/Islam   Type Spiritual support   Assessment Approachable; Hopeful;Coping   Intervention Active listening;Sustaining presence/ Ministry of presence; Discussed illness/injury and it's impact   Outcome Expressed gratitude;Engaged in conversation;Coping;Expressed feelings/needs/concerns; Hopeful;Receptive

## 2022-03-02 NOTE — PROGRESS NOTES
ELYSE called [facility] and was told pt's pre-cert is still pending. Hang Canchola from [facility] also called and spoke to Hector Wallis. Anticipate final decision later today or tomorrow. Transport is postponed. Critical access hospital is done. Call [facility] upon discharge [phone]. Call [facility] at [phone]. Final orders can be faxed to [phone]. Pt and significant other were updated.

## 2022-03-02 NOTE — PROGRESS NOTES
Physical Therapy        Physical Therapy Cancel Note      DATE: 3/2/2022    NAME: Radha Real  MRN: [id number]   : 1955      Patient not seen this date for Physical Therapy due to: Other: Cx, pt getting bath before changing for discharge.       Electronically signed by Ching Heredia PTA on 3/2/2022 at 2:44 PM

## 2022-03-02 NOTE — TELEPHONE ENCOUNTER
3/2/2022-  [facility] (2nd attempt) to schedule new patient visit.

## 2022-03-02 NOTE — PLAN OF CARE
Problem: Skin Integrity:  Goal: Will show no infection signs and symptoms  Description: Will show no infection signs and symptoms  Outcome: Ongoing  Goal: Absence of new skin breakdown  Description: Absence of new skin breakdown  Outcome: Ongoing     Problem: Falls - Risk of:  Goal: Will remain free from falls  Description: Will remain free from falls  Outcome: Ongoing no falls  Goal: Absence of physical injury  Description: Absence of physical injury  Outcome: Ongoing     Problem: SAFETY  Goal: Free from accidental physical injury  Outcome: Ongoing  Goal: Free from intentional harm  Outcome: Ongoing     Problem: DAILY CARE  Goal: Daily care needs are met  Outcome: Ongoing minimal assist with adl's     Problem: PAIN  Goal: Patient's pain/discomfort is manageable  Outcome: Ongoing     Problem: SKIN INTEGRITY  Goal: Skin integrity is maintained or improved  Outcome: Ongoing     Problem: KNOWLEDGE DEFICIT  Goal: Patient/S.O. demonstrates understanding of disease process, treatment plan, medications, and discharge instructions.   Outcome: Ongoing educated prn     Problem: DISCHARGE BARRIERS  Goal: Patient's continuum of care needs are met  Outcome: Ongoing     Problem: Pain:  Goal: Pain level will decrease  Description: Pain level will decrease  Outcome: Ongoing medicated with prn pain meds  Goal: Control of acute pain  Description: Control of acute pain  Outcome: Ongoing  Goal: Control of chronic pain  Description: Control of chronic pain  Outcome: Ongoing     Problem: Musculor/Skeletal Functional Status  Goal: Highest potential functional level  Outcome: Ongoing  Goal: Absence of falls  Outcome: Ongoing     Problem: Musculor/Skeletal Functional Status  Goal: Highest potential functional level  Outcome: Ongoing  Goal: Absence of falls  Outcome: Ongoing     Problem: Nutrition  Goal: Optimal nutrition therapy  Outcome: Ongoing adequate oral intake

## 2022-03-03 VITALS
HEIGHT: 72 IN | SYSTOLIC BLOOD PRESSURE: 119 MMHG | RESPIRATION RATE: 18 BRPM | HEART RATE: 89 BPM | OXYGEN SATURATION: 99 % | DIASTOLIC BLOOD PRESSURE: 107 MMHG | TEMPERATURE: 98.1 F | WEIGHT: 155.2 LBS | BODY MASS INDEX: 21.02 KG/M2

## 2022-03-03 LAB
ABSOLUTE EOS #: 0.3 K/UL (ref 0–0.4)
ABSOLUTE LYMPH #: 1.7 K/UL (ref 1–4.8)
ABSOLUTE MONO #: 1.1 K/UL (ref 0.1–1.3)
ANION GAP SERPL CALCULATED.3IONS-SCNC: 3 MMOL/L (ref 9–17)
BASOPHILS # BLD: 0 % (ref 0–2)
BASOPHILS ABSOLUTE: 0 K/UL (ref 0–0.2)
BUN BLDV-MCNC: 19 MG/DL (ref 8–23)
CALCIUM SERPL-MCNC: 7.7 MG/DL (ref 8.6–10.4)
CHLORIDE BLD-SCNC: 99 MMOL/L (ref 98–107)
CO2: 36 MMOL/L (ref 20–31)
CREAT SERPL-MCNC: 0.55 MG/DL (ref 0.7–1.2)
EOSINOPHILS RELATIVE PERCENT: 3 % (ref 0–4)
GFR AFRICAN AMERICAN: >60 ML/MIN
GFR NON-AFRICAN AMERICAN: >60 ML/MIN
GFR SERPL CREATININE-BSD FRML MDRD: ABNORMAL ML/MIN/{1.73_M2}
GLUCOSE BLD-MCNC: 83 MG/DL (ref 70–99)
HCT VFR BLD CALC: 24.5 % (ref 41–53)
HEMOGLOBIN: 7.9 G/DL (ref 13.5–17.5)
LYMPHOCYTES # BLD: 16 % (ref 24–44)
MCH RBC QN AUTO: 25.9 PG (ref 26–34)
MCHC RBC AUTO-ENTMCNC: 32.3 G/DL (ref 31–37)
MCV RBC AUTO: 79.9 FL (ref 80–100)
MONOCYTES # BLD: 11 % (ref 1–7)
PDW BLD-RTO: 17.6 % (ref 11.5–14.9)
PLATELET # BLD: 423 K/UL (ref 150–450)
PMV BLD AUTO: 7 FL (ref 6–12)
POTASSIUM SERPL-SCNC: 4.2 MMOL/L (ref 3.7–5.3)
RBC # BLD: 3.06 M/UL (ref 4.5–5.9)
SEG NEUTROPHILS: 70 % (ref 36–66)
SEGMENTED NEUTROPHILS ABSOLUTE COUNT: 7.5 K/UL (ref 1.3–9.1)
SODIUM BLD-SCNC: 138 MMOL/L (ref 135–144)
WBC # BLD: 10.7 K/UL (ref 3.5–11)

## 2022-03-03 PROCEDURE — 36415 COLL VENOUS BLD VENIPUNCTURE: CPT

## 2022-03-03 PROCEDURE — 2580000003 HC RX 258: Performed by: ORTHOPAEDIC SURGERY

## 2022-03-03 PROCEDURE — 6370000000 HC RX 637 (ALT 250 FOR IP): Performed by: ORTHOPAEDIC SURGERY

## 2022-03-03 PROCEDURE — 6370000000 HC RX 637 (ALT 250 FOR IP): Performed by: INTERNAL MEDICINE

## 2022-03-03 PROCEDURE — 97116 GAIT TRAINING THERAPY: CPT

## 2022-03-03 PROCEDURE — 80048 BASIC METABOLIC PNL TOTAL CA: CPT

## 2022-03-03 PROCEDURE — 6370000000 HC RX 637 (ALT 250 FOR IP): Performed by: STUDENT IN AN ORGANIZED HEALTH CARE EDUCATION/TRAINING PROGRAM

## 2022-03-03 PROCEDURE — 99232 SBSQ HOSP IP/OBS MODERATE 35: CPT | Performed by: INTERNAL MEDICINE

## 2022-03-03 PROCEDURE — 97535 SELF CARE MNGMENT TRAINING: CPT

## 2022-03-03 PROCEDURE — 6370000000 HC RX 637 (ALT 250 FOR IP)

## 2022-03-03 PROCEDURE — 85025 COMPLETE CBC W/AUTO DIFF WBC: CPT

## 2022-03-03 RX ADMIN — OXYCODONE HYDROCHLORIDE AND ACETAMINOPHEN 1 TABLET: 5; 325 TABLET ORAL at 02:40

## 2022-03-03 RX ADMIN — FAMOTIDINE 20 MG: 20 TABLET, FILM COATED ORAL at 08:57

## 2022-03-03 RX ADMIN — MIDODRINE HYDROCHLORIDE 5 MG: 5 TABLET ORAL at 06:19

## 2022-03-03 RX ADMIN — PREDNISONE 10 MG: 10 TABLET ORAL at 08:58

## 2022-03-03 RX ADMIN — SODIUM CHLORIDE, PRESERVATIVE FREE 10 ML: 5 INJECTION INTRAVENOUS at 08:59

## 2022-03-03 RX ADMIN — OXYCODONE HYDROCHLORIDE AND ACETAMINOPHEN 1 TABLET: 5; 325 TABLET ORAL at 09:31

## 2022-03-03 RX ADMIN — ATORVASTATIN CALCIUM 40 MG: 40 TABLET, FILM COATED ORAL at 08:56

## 2022-03-03 RX ADMIN — APIXABAN 5 MG: 5 TABLET, FILM COATED ORAL at 11:51

## 2022-03-03 RX ADMIN — SERTRALINE HYDROCHLORIDE 25 MG: 50 TABLET ORAL at 08:58

## 2022-03-03 RX ADMIN — DIGOXIN 125 MCG: 125 TABLET ORAL at 08:56

## 2022-03-03 RX ADMIN — MIDODRINE HYDROCHLORIDE 5 MG: 5 TABLET ORAL at 11:06

## 2022-03-03 RX ADMIN — TAMSULOSIN HYDROCHLORIDE 0.4 MG: 0.4 CAPSULE ORAL at 08:56

## 2022-03-03 RX ADMIN — ASPIRIN 81 MG 81 MG: 81 TABLET ORAL at 08:58

## 2022-03-03 ASSESSMENT — ENCOUNTER SYMPTOMS
COUGH: 0
SORE THROAT: 0
EYE DISCHARGE: 0
NAUSEA: 0
SINUS PAIN: 0
EYE REDNESS: 0
SINUS PRESSURE: 0
VOMITING: 0
SHORTNESS OF BREATH: 1

## 2022-03-03 ASSESSMENT — PAIN SCALES - GENERAL
PAINLEVEL_OUTOF10: 6
PAINLEVEL_OUTOF10: 10
PAINLEVEL_OUTOF10: 8

## 2022-03-03 NOTE — PROGRESS NOTES
ELYSE informed [facility] can accept pt at [employer]. ELYSE scheduled transport with [facility] for 7PM to [facility]. [facility] and [place] [facility] are completed.     Number for report: [phone]

## 2022-03-03 NOTE — PROGRESS NOTES
Tonsillectomy; Colonoscopy; colectomy; Colonoscopy (07/18/2016); knee surgery (Right, 1970's); transesophageal echocardiogram (11/29/2018); Upper gastrointestinal endoscopy (N/A, 12/5/2018); Colonoscopy (N/A, 12/6/2018); hernia repair (Right, 2009); Cardiac catheterization (11/29/2018); colectomy; Total knee arthroplasty (Right, 1/8/2019); Upper gastrointestinal endoscopy (N/A, 6/18/2019); Cardioversion (2020); and hip surgery (Right, 2/22/2022). Restrictions  Restrictions/Precautions  Restrictions/Precautions: Fall Risk,Weight Bearing (WBAT)  Required Braces or Orthoses?: No  Implants present? : Metal implants  Lower Extremity Weight Bearing Restrictions  Right Lower Extremity Weight Bearing: Weight Bearing As Tolerated (Full WBAT)  Position Activity Restriction  Other position/activity restrictions: Full WBAT RLE  Subjective   General  Chart Reviewed: Yes  Missed reason:  (pt getting bath before changing for discharge)  Response To Previous Treatment: Patient with no complaints from previous session. Family / Caregiver Present: Yes (Spouse)  Referring Practitioner: Beverly Vaughn MD  Subjective  Subjective: Pt comments been sitting up for most of the day per recliner or EOB. Pt reports being active throughout the day performing seated/EOB exercises. Pt stated that he is feeling great and that he will be discharging at 7pm. Wife did confirm discharge plan and voutched that he has been performing exercises today. General Comment  Comments: Desiree Daugherty ok's PT tx.    Pain Screening  Patient Currently in Pain: Denies  Vital Signs  Pulse: 140 (~132-140 post amb)  Level of Consciousness: Alert (0)  Patient Currently in Pain: Denies  Oxygen Therapy  O2 Device: Nasal cannula  O2 Flow Rate (L/min): 5 L/min (5L in room, 6L for portable while amb)           Objective   Bed mobility  Rolling to Right: Supervision  Supine to Sit: Supervision  Sit to Supine: Supervision  Scooting: Supervision  Comment: Pt needed few verbal cues for hand placement. Pt is quick in movement, but is able to perform bed mobility with supervision to insure hip precautions are in effect. Transfers  Sit to Stand: Contact guard assistance  Stand to sit: Contact guard assistance  Comment: Pt is CGA x1 for transfers due to increase time needed to shift/unload weight over/off hips and for safety. Standing with RW  Ambulation  Ambulation?: Yes  WB Status: Full WBAT RLE  More Ambulation?: No  Ambulation 1  Surface: level tile  Device: Rolling Walker  Assistance: Contact guard assistance  Quality of Gait: Antalgic gait with slight anterior lean due to UE support on RW. intermitient downward gaze. Gait Deviations: Slow Lanny;Decreased step length;Decreased step height  Distance: 80ft x1  Comments: Pt needed minimal verbal cues to correct downward gaze and to correct posture. Pt needed verbal cues on breathing due to holding breath with pain. Pt did state pain had increased \"a little bit\" when amb. Pt took a rest break standing in arreola before returning to room. Once returned to room pt stated he was tired and wanted to rst before dicharge. Stairs/Curb  Stairs?: No (not ready at this time)     Balance  Posture: Fair  Sitting - Static: Good  Sitting - Dynamic: Fair;+  Standing - Static: Fair;+ (w/RW)  Standing - Dynamic: Fair (w/RW)  Other exercises  Other exercises?: Yes  Other exercises 1: Edu on home safety. Pt and spouse had great teach back to to prior experience from spouse. Other exercises 2: Edu on WB status and how to safetly know limits when performing tasks. Other exercises 3: Edu on breathing techniques to reduce pain with movement  Other exercises 4: Amb 80ft x1               Goals  Short term goals  Time Frame for Short term goals: 7 days  Short term goal 1: Pt to demo supine<-->sit min to mod x1. Short term goal 2: Pt to perform transfers min to mod x1from varied surfaces. Short term goal 3: Pt to amb 10'-20' min to mod x1 with device.   Short

## 2022-03-03 NOTE — PROGRESS NOTES
Patient is in bed and alert and oriented and appears comfortable. He is going to rehab tonight at 7 pm.   I talk about how we are glad that his HCPOA/Living Will is complete, and he as well is relieved. I offer much emotional support to him, and he is appreciative. Will follow for support as needed. Marquis Sandoval .[facility], RN  [facility]: [phone]  [address]: [phone]  [address]: [phone]

## 2022-03-03 NOTE — PROGRESS NOTES
SW informed that pt was denied SNF by insurance. Shabana Nagy from [facility] informed SW that a P2P can be scheduled. ELYSE contacted Dr. Scottie Tamayo to carryout the P2P. Call [phone], option 5. Please provide pt name, ,and authorization number ([id number])  Please complete P2P by 1:00PM.    Addendum: Dr. Scottie Tamayo informed SW that p2P was completed and pt approved for SNF. SW awaiting [facility] to be informed on results to schedule transport.

## 2022-03-03 NOTE — PROGRESS NOTES
Pulmonary Progress Note  [facility]      Patient - Laith Eason,  Age - 74 y.o.    - 1955      Room Number - 2105/2105-01   N -  [id number]   [facility]  Date of Admission -  2022  6:14 AM        Consulting Ronan Medina MD  Primary Care Physician - TRUNG Roche MD     SUBJECTIVE   Looks comfortable getting physical therapy and Occupational Therapy    OBJECTIVE   VITALS    height is 6' (1.829 m) and weight is 155 lb 3.3 oz (70.4 kg). His oral temperature is 98.1 °F (36.7 °C). His blood pressure is 97/63 and his pulse is 97. His respiration is 18 and oxygen saturation is 99%. Body mass index is 21.05 kg/m². Temperature Range: Temp: 98.1 °F (36.7 °C) Temp  Av.9 °F (36.6 °C)  Min: 97.5 °F (36.4 °C)  Max: 98.3 °F (36.8 °C)  BP Range:  Systolic (72ZCB), EBF:220 , Min:86 , JYX:364     Diastolic (94QUL), NDW:75, Min:51, Max:76    Pulse Range: Pulse  Av.6  Min: 76  Max: 133  Respiration Range: Resp  Av  Min: 18  Max: 18  Current Pulse Ox[de-identified]  SpO2: 99 %  24HR Pulse Ox Range:  SpO2  Av.7 %  Min: 92 %  Max: 99 %  Oxygen Amount and Delivery: O2 Flow Rate (L/min): 5 L/min    Wt Readings from Last 3 Encounters:   22 155 lb 3.3 oz (70.4 kg)   21 171 lb (77.6 kg)   07/15/21 166 lb 9.6 oz (75.6 kg)       I/O (24 Hours)    Intake/Output Summary (Last 24 hours) at 3/3/2022 1427  Last data filed at 3/3/2022 1325  Gross per 24 hour   Intake 476 ml   Output 850 ml   Net -374 ml       EXAM     General Appearance  Awake, alert, oriented, in no acute distress  HEENT - normocephalic, atraumatic.  []  Mallampati  [] Crowded airway   [] Macroglossia  []  Retrognathia  [] Micrognathia  []  Normal tongue size []  Normal Bite  [] Justo sign positive    Neck - Supple,  trachea midline   Lungs -minimal basilar crackle  Cardiovascular - Heart sounds are normal.  Regular rate and rhythm Abdomen - Soft, nontender, nondistended, no masses or organomegaly  Neurologic - There are no focal motor or sensory deficits  Skin - No bruising or bleeding  Extremities - No clubbing, cyanosis, edema    MEDS      apixaban  5 mg Oral BID    predniSONE  10 mg Oral Daily    midodrine  5 mg Oral TID AC    metoprolol tartrate  25 mg Oral BID    famotidine  20 mg Oral BID    sodium chloride flush  5-40 mL IntraVENous 2 times per day    digoxin  125 mcg Oral Daily    sertraline  25 mg Oral Daily    sodium chloride flush  5-40 mL IntraVENous 2 times per day    atorvastatin  40 mg Oral Daily    tamsulosin  0.4 mg Oral Daily    aspirin  81 mg Oral Daily      sodium chloride      sodium chloride       oxyCODONE-acetaminophen, sodium chloride flush, sodium chloride, perflutren lipid microspheres, sodium chloride flush, sodium chloride flush, sodium chloride, ondansetron **OR** ondansetron, polyethylene glycol, acetaminophen **OR** acetaminophen, potassium chloride **OR** potassium chloride, magnesium sulfate    LABS   CBC   Recent Labs     03/03/22  0456   WBC 10.7   HGB 7.9*   HCT 24.5*   MCV 79.9*        BMP:   Lab Results   Component Value Date     03/03/2022    K 4.2 03/03/2022    CL 99 03/03/2022    CO2 36 03/03/2022    BUN 19 03/03/2022    LABALBU 4.1 12/20/2021    CREATININE 0.55 03/03/2022    CALCIUM 7.7 03/03/2022    GFRAA >60 03/03/2022    LABGLOM >60 03/03/2022     ABGs:  Lab Results   Component Value Date    PHART 7.394 02/18/2022    PO2ART 33.4 02/18/2022    IJZ8GJW 42.4 02/18/2022      Lab Results   Component Value Date    MODE NOT REPORTED 02/18/2022     Ionized Calcium:  No results found for: IONCA  Magnesium:    Lab Results   Component Value Date    MG 2.1 04/25/2021     Phosphorus:    Lab Results   Component Value Date    PHOS 3.6 01/13/2021        LIVER PROFILE No results for input(s): AST, ALT, LIPASE, BILIDIR, BILITOT, ALKPHOS in the last 72 hours.     Invalid input(s): AMYLASE,  ALB  INR No results for input(s): INR in the last 72 hours. PTT   Lab Results   Component Value Date    APTT 32.6 02/22/2022         RADIOLOGY     (See actual reports for details)    ASSESSMENT/PLAN   Principal Problem:    Multifocal pneumonia  Active Problems:    PAF (paroxysmal atrial fibrillation) (HCC)    Pulmonary fibrosis (HCC)    Fracture of right hip    Community acquired bacterial pneumonia    Allergy to penicillin  Resolved Problems:    * No resolved hospital problems.  *    Stable for transfer to [facility]  Finishing up course of prednisone  Follow-up in the office in 4 to 6-week  Electronically signed by Karen Patel MD on 3/3/2022 at 2:27 PM

## 2022-03-03 NOTE — FLOWSHEET NOTE
Patient will be d/c tonight to [facility]; he is grateful for the support he's received. 03/03/22 1816   Encounter Summary   Services provided to: Patient   Referral/Consult From: Palliative Care   Continue Visiting   (3-3-22)   Complexity of Encounter Low   Length of Encounter 15 minutes   Spiritual/Scientology   Type Spiritual support   Assessment Calm; Approachable   Intervention Active listening;Explored feelings, thoughts, concerns;Prayer;Sustaining presence/ Ministry of presence; Discussed illness/injury and it's impact   Outcome Expressed gratitude;Engaged in conversation;Expressed feelings/needs/concerns;Coping;Receptive

## 2022-03-03 NOTE — PLAN OF CARE
Patient is medically stable to discharge today. Changes made to home BP medications.     Electronically signed by Tomasa Torres MD on 3/3/2022 at 1:38 PM

## 2022-03-03 NOTE — PROGRESS NOTES
[id number] [email or web address]    PROGRESS NOTE             3/3/2022    8:01 AM    Name:   Radha Real  MRN:     [id number]     Acct:      [de-identified]   Room:   [facility]/2105-01  IP Day:  15  Admit Date:  2/18/2022  6:14 AM    PCP:  Santos Ocasoi MD  Code Status:  DNR-CCA    Subjective:     C/C: No chief complaint on file. Interval History Status: not changed. Pt seen and examined. C/o of jerky movements that woke him from sleep; Will review meds. Otherwise no new complaints. Hypotensive episodes recorded overnight; pt asymptomatic. Hgb remains low; pt denies dizziness/lightheaded/SOB more than baseline. Only feels somewhat dizzy with positional changes which has been ongoing since hip surgery. Denies any active bleeding. C/w dc planning. Review of Systems:     Review of Systems   Constitutional: Negative for chills and fever. HENT: Negative for sinus pressure, sinus pain and sore throat. Eyes: Negative for discharge and redness. Respiratory: Positive for shortness of breath. Negative for cough. Cardiovascular: Negative for chest pain and palpitations. Gastrointestinal: Negative for nausea and vomiting. Musculoskeletal: Positive for arthralgias and myalgias. Neurological: Positive for weakness. Negative for dizziness, light-headedness and headaches. Medications: Allergies: Allergies   Allergen Reactions    Adhesive Tape Other (See Comments)     Blister badly     Codeine Nausea Only     Other reaction(s): Other: See Comments  NAUSEA  Other reaction(s):  Other: See Comments  NAUSEA    Penicillins Swelling     As a baby  Other reaction(s): Unknown  As a baby    Nintedanib Diarrhea     10-15 BM daily       Current Meds:   Scheduled Meds:    predniSONE  10 mg Oral Daily    midodrine  5 mg Oral TID AC    metoprolol tartrate  25 mg Oral BID    famotidine  20 mg Oral BID    sodium chloride flush  5-40 mL IntraVENous 2 times per day    digoxin  125 mcg Oral Daily    sertraline  25 mg Oral Daily    enoxaparin  1 mg/kg SubCUTAneous BID    sodium chloride flush  5-40 mL IntraVENous 2 times per day    atorvastatin  40 mg Oral Daily    [Held by provider] lisinopril  10 mg Oral BID    tamsulosin  0.4 mg Oral Daily    aspirin  81 mg Oral Daily     Continuous Infusions:    sodium chloride      sodium chloride       PRN Meds: oxyCODONE-acetaminophen, sodium chloride flush, sodium chloride, perflutren lipid microspheres, sodium chloride flush, sodium chloride flush, sodium chloride, ondansetron **OR** ondansetron, polyethylene glycol, acetaminophen **OR** acetaminophen, potassium chloride **OR** potassium chloride, magnesium sulfate    Data:     Past Medical History:   has a past medical history of Atrial fibrillation ([facility] [address] Back pain, chronic, Hill's esophagus, Benign essential HTN, BPH (benign prostatic hyperplasia), Cancer (HCC), [facility] Cocaine abuse in remission [facility]), ED (erectile dysfunction), GERD (gastroesophageal reflux disease), GI bleed, Hernia, History of colon cancer, Melena, Migraines, and Murmur, cardiac. Social History:   reports that he quit smoking about 51 years ago. He has a 0.50 pack-year smoking history. He has never used smokeless tobacco. He reports current alcohol use of about 12.0 standard drinks of alcohol per week. He reports that he does not use drugs. Family History:   Family History   Problem Relation Age of Onset    Diabetes Mother     Heart Attack Father     Heart Disease Father     Heart Disease Brother        Vitals:  BP 91/66   Pulse 81   Temp 97.7 °F (36.5 °C) (Oral)   Resp 18   Ht 6' (1.829 m)   Wt 155 lb 3.3 oz (70.4 kg)   SpO2 99%   BMI 21.05 kg/m²   Temp (24hrs), Av.6 °F (36.4 °C), Min:97.2 °F (36.2 °C), Max:98.3 °F (36.8 °C)    No results for input(s): POCGLU in the last 72 hours.   Vitals:    22 03/02/22 2330 03/03/22 0615 03/03/22 0733   BP: 98/60 (!) 129/51 108/63 91/66   Pulse: 133 83 88 81   Resp:  18  18   Temp:  98.3 °F (36.8 °C)  97.7 °F (36.5 °C)   TempSrc:  Oral  Oral   SpO2:  92%  99%   Weight:  155 lb 3.3 oz (70.4 kg)     Height:           I/O(24Hr):     Intake/Output Summary (Last 24 hours) at 3/3/2022 0801  Last data filed at 3/3/2022 0735  Gross per 24 hour   Intake 730 ml   Output 825 ml   Net -95 ml       Labs:  Recent Results (from the past 24 hour(s))   Basic Metabolic Panel w/ Reflex to MG    Collection Time: 03/03/22  4:56 AM   Result Value Ref Range    Glucose 83 70 - 99 mg/dL    BUN 19 8 - 23 mg/dL    CREATININE 0.55 (L) 0.70 - 1.20 mg/dL    Calcium 7.7 (L) 8.6 - 10.4 mg/dL    Sodium 138 135 - 144 mmol/L    Potassium 4.2 3.7 - 5.3 mmol/L    Chloride 99 98 - 107 mmol/L    CO2 36 (H) 20 - 31 mmol/L    Anion Gap 3 (L) 9 - 17 mmol/L    GFR Non-African American >60 >60 mL/min    GFR African American >60 >60 mL/min    GFR Comment         CBC with Auto Differential    Collection Time: 03/03/22  4:56 AM   Result Value Ref Range    WBC 10.7 3.5 - 11.0 k/uL    RBC 3.06 (L) 4.5 - 5.9 m/uL    Hemoglobin 7.9 (L) 13.5 - 17.5 g/dL    Hematocrit 24.5 (L) 41 - 53 %    MCV 79.9 (L) 80 - 100 fL    MCH 25.9 (L) 26 - 34 pg    MCHC 32.3 31 - 37 g/dL    RDW 17.6 (H) 11.5 - 14.9 %    Platelets 267 406 - 431 k/uL    MPV 7.0 6.0 - 12.0 fL    Seg Neutrophils 70 (H) 36 - 66 %    Lymphocytes 16 (L) 24 - 44 %    Monocytes 11 (H) 1 - 7 %    Eosinophils % 3 0 - 4 %    Basophils 0 0 - 2 %    Segs Absolute 7.50 1.3 - 9.1 k/uL    Absolute Lymph # 1.70 1.0 - 4.8 k/uL    Absolute Mono # 1.10 0.1 - 1.3 k/uL    Absolute Eos # 0.30 0.0 - 0.4 k/uL    Basophils Absolute 0.00 0.0 - 0.2 k/uL         Lab Results   Component Value Date/Time    SPECIAL NOT REPORTED 07/15/2021 04:33 PM     Lab Results   Component Value Date/Time    CULTURE NO SIGNIFICANT GROWTH 02/20/2022 06:41 PM         Radiology:    XR CHEST (SINGLE VIEW FRONTAL)    Result Date: 2/19/2022  Bilateral airspace disease superimposed on pulmonary fibrosis. Increasing airspace disease predominantly involving the left lung. XR HIP RIGHT (1 VIEW)    Result Date: 2/22/2022  Total hip arthropasty without acute hardware complication. XR HIP RIGHT (2-3 VIEWS)    Result Date: 2/18/2022  Portable chest: 1. Worsening airspace disease throughout the left lung with small left-sided pleural effusion, likely worsening multifocal pneumonia. Follow-up is recommended to document resolution. 2. Underlying fibrosis throughout the lungs. 3. Stable mild cardiomegaly. Right hip: Acute slightly displaced right femoral neck fracture. Underlying osteopenia. CT HEAD WO CONTRAST    Result Date: 2/18/2022  No acute intracranial process identified. XR CHEST PORTABLE    Result Date: 2/23/2022  No significant interval change. XR CHEST PORTABLE    Result Date: 2/21/2022  Diffuse bilateral pneumonia worse on the left not significantly changed since 02/19/2022 given differences in radiographic technique. Mild cardiomegaly and possible mild pulmonary vascular congestion. XR CHEST PORTABLE    Result Date: 2/18/2022  Portable chest: 1. Worsening airspace disease throughout the left lung with small left-sided pleural effusion, likely worsening multifocal pneumonia. Follow-up is recommended to document resolution. 2. Underlying fibrosis throughout the lungs. 3. Stable mild cardiomegaly. Right hip: Acute slightly displaced right femoral neck fracture. Underlying osteopenia. CT CHEST PULMONARY EMBOLISM W CONTRAST    Result Date: 2/18/2022  1. No evidence for acute pulmonary embolism. 2. There is very large new ground-glass airspace disease occupying most of the left upper lobe and new small left pleural effusion. 3. Solid pleural-based posterior segment right upper lobe airspace density along the major fissure is larger now measuring 4.3 x 1.7 cm, previously about 1.2 x 2.4 cm. Probably also worsening pneumonia but would recommend short interval 3 month follow-up 4. 9 mm right upper lobe suprahilar central nodule is unchanged. RECOMMENDATIONS: Unavailable     CT HIP RIGHT WO CONTRAST    Result Date: 2/22/2022  1. Acute right femoral neck fracture. Physical Examination:        Physical Exam  Constitutional:       General: He is not in acute distress. Appearance: Normal appearance. HENT:      Head: Normocephalic and atraumatic. Right Ear: External ear normal.      Left Ear: External ear normal.      Nose:      Comments: NC in place     Mouth/Throat:      Pharynx: Oropharynx is clear. Eyes:      General: No scleral icterus. Right eye: No discharge. Left eye: No discharge. Conjunctiva/sclera: Conjunctivae normal.   Cardiovascular:      Rate and Rhythm: Normal rate. Pulmonary:      Effort: No respiratory distress. Breath sounds: Rales present. Abdominal:      Palpations: Abdomen is soft. Tenderness: There is no abdominal tenderness. Musculoskeletal:         General: Tenderness (Right hip alongside ecchymosis) present. Right lower leg: No edema. Left lower leg: No edema. Skin:     Comments: Ecchymosis of right lateral thigh surrounding incision site;  Dressing has some sangenous strikethrough unchanged from prior   Neurological:      Mental Status: He is alert. Mental status is at baseline.    Psychiatric:         Mood and Affect: Mood normal.         Behavior: Behavior normal.           Assessment:        Primary Problem  Multifocal pneumonia    Active Hospital Problems    Diagnosis Date Noted    Allergy to penicillin [Z88.0]     Multifocal pneumonia [J18.9] 02/18/2022    Fracture of right hip [S72.001A] 02/18/2022    Community acquired bacterial pneumonia [J15.9] 02/18/2022    Pulmonary fibrosis ([facility] [address] [J84.10] 12/08/2020    [facility] [I48.0] 07/21/2014       Plan:        Multifocal mg PO   Diet: Regular  Activity: Up with assistance  Dispo: anticipate discharge to SNF today pending placement      Please note: Use of a speech recognition software was used in the creation of portions of this note and dictation errors, including those of syntax and sound alike word substitutions, may have escaped proofreading. Singh BecerraDO  3/3/2022  8:01 AM     Attending Physician Statement  I have discussed the care of Mitchel Mckee with the resident team. I have examined the patient myself and taken ros and hpi , including pertinent history and exam findings,  with the resident. I have reviewed the key elements of all parts of the encounter with the resident. I agree with the assessment, plan and orders as documented by the resident. Principal Problem:    Multifocal pneumonia  Active Problems:    PAF (paroxysmal atrial fibrillation) (HCC)    Pulmonary fibrosis (HCC)    Fracture of right hip    Community acquired bacterial pneumonia    Allergy to penicillin  Resolved Problems:    * No resolved hospital problems.  *    Awaiting precert  BP running low-lisinopril discontinued      Electronically signed by Rika Lopez MD

## 2022-03-03 NOTE — PLAN OF CARE
Problem: Skin Integrity:  Goal: Will show no infection signs and symptoms  Description: Will show no infection signs and symptoms  3/3/2022 0524 by Can Paulino RN  Outcome: Ongoing     Problem: Skin Integrity:  Goal: Absence of new skin breakdown  Description: Absence of new skin breakdown  3/3/2022 0524 by Can Paulino RN  Outcome: Ongoing     Problem: Falls - Risk of:  Goal: Will remain free from falls  Description: Will remain free from falls  3/3/2022 0524 by Can Paulino RN  Outcome: Ongoing     Problem: Falls - Risk of:  Goal: Absence of physical injury  Description: Absence of physical injury  3/3/2022 0524 by Can Paulino RN  Outcome: Ongoing     Problem: SAFETY  Goal: Free from accidental physical injury  3/3/2022 0524 by Can Paulino RN  Outcome: Ongoing     Problem: SAFETY  Goal: Free from intentional harm  3/3/2022 0524 by Can Paulino RN  Outcome: Ongoing     Problem: DAILY CARE  Goal: Daily care needs are met  3/3/2022 0524 by Can Paulino RN  Outcome: Ongoing     Problem: SKIN INTEGRITY  Goal: Skin integrity is maintained or improved  3/3/2022 0524 by Can Paulino RN  Outcome: Ongoing     Problem: KNOWLEDGE DEFICIT  Goal: Patient/S.O. demonstrates understanding of disease process, treatment plan, medications, and discharge instructions.   3/3/2022 0524 by Can Paulino RN  Outcome: Ongoing     Problem: DISCHARGE BARRIERS  Goal: Patient's continuum of care needs are met  3/3/2022 0524 by Can Paulino RN  Outcome: Ongoing     Problem: Pain:  Goal: Control of chronic pain  Description: Control of chronic pain  3/3/2022 0524 by Can Paulino RN  Outcome: Ongoing

## 2022-03-03 NOTE — PROGRESS NOTES
This RN agrees with Reyna Zuleta RN's charting throughout the duration of this shift. Electronically signed by Skylar Verma RN.

## 2022-03-03 NOTE — PROGRESS NOTES
[facility]   INPATIENT OCCUPATIONAL THERAPY  PROGRESS NOTE  Date: 3/3/2022  Patient Name: Isael Cosby      Room: 9992/2138-22  MRN: [id number]    : 1955  (74 y.o.) Gender: male     Discharge Recommendations:  Further Occupational Therapy is recommended upon facility discharge. Referring Practitioner: Bob Galvan MD  Diagnosis: Hypoxia, community acquired, bacterial pnemumonia, closed fx of neck of right femur s/p HIP FEMORAL HEMIARTHROPLASTY, pneumonia of both lungs due to infectious organism, multifocal pneumonia   General  Chart Reviewed: Yes  Patient assessed for rehabilitation services?: Yes  Response to previous treatment: Patient with no complaints from previous session  Family / Caregiver Present: No  Referring Practitioner: Bob Galvan MD  Diagnosis: Hypoxia, community acquired, bacterial pnemumonia, closed fx of neck of right femur s/p HIP FEMORAL HEMIARTHROPLASTY, pneumonia of both lungs due to infectious organism, multifocal pneumonia     Restrictions  Restrictions/Precautions: Fall Risk,Weight Bearing (WBAT)  Implants present? : Metal implants  Other position/activity restrictions: Full WBAT RLE  Right Lower Extremity Weight Bearing: Weight Bearing As Tolerated (Full WBAT)  Required Braces or Orthoses?: No      Subjective  Subjective: \"I've been feeling dizzy. My blood pressure hasn't been controlled. \"   Comments: Okay for OT per DEONDRE Brooks. Pt is agreeable for therapy, but stated \"I don't know what I'm up for. I've been doing my exercises all day\"  Patient Currently in Pain: Denies             Objective     Bed mobility  Bridging: Supervision  Supine to Sit: Supervision  Sit to Supine: Supervision  Scooting: Supervision  Comment: HOB slightly elevated. Sat EOB ~14 minutes unsupported. Pt reported \"not feeling right,\" politely declining all further mobility. Able to adjust self in bed.    Balance  Sitting Balance: Supervision (Pt sat EOB ~14 minutes)     Functional Mobility  Functional Mobility Comments: Pt declined all transfers/mobility due to feeling dizzy   ADL  Feeding: Independent  Grooming: Setup  UE Bathing: Stand by assistance  LE Bathing: Minimal assistance  UE Dressing: Stand by assistance  LE Dressing: Minimal assistance  Toileting: Moderate assistance  Additional Comments: OT facilitated pt's engagement in today's session. OT inquired pt's recall on AE, including sock aid/reacher. Pt demonstrated lifting LLE to gold/doff sock, and using L foot to doff R sock. OT also education/demonstrated use of foot funnel, with fair carry over. Additional Activities: OT educated on energy conservation/fall prevention to increase safety and independence in his daily routine. Pt verbalized understanding. Assessment  Performance deficits / Impairments: Decreased functional mobility ; Decreased ADL status; Decreased ROM; Decreased strength;Decreased safe awareness;Decreased endurance;Decreased balance;Decreased high-level IADLs  Prognosis: Good  Discharge Recommendations: Patient would benefit from continued therapy after discharge  Activity Tolerance: Patient Tolerated treatment well  Safety Devices in place: Yes  Type of devices: Call light within reach; Left in bed;Nurse notified             Patient Education: ed pt on OT POC, AE/AS (sock aid, reacher, foot funnel), EC/WS, fall prevention     Learner:patient  Method: demonstration and explanation       Outcome: needs reinforcement     Plan  Safety Devices  Safety Devices in place: Yes  Type of devices: Call light within reach,Left in bed,Nurse notified  Plan  Times per week: 5-7 (BID)  Times per day: Twice a day  Current Treatment Recommendations: [address] [place] [address] Education & Training,Patient/Caregiver Education & Training,Equipment Evaluation, Education, & procurement,Self-Care / ADL,Positioning      Goals  Short term goals  Time Frame for Short term goals: by discharge   Short term goal 1: Pt will complete LB bathing/dressing/toileting Mod A while maintaining vitals WFL and use of AE/DME/modified techniques to increase IND with self-care  Short term goal 2: Pt will verbalize/demonstrate Good understanding of fall prevention strategies to increase safety in ADL's  Short term goal 3: Pt will complete functional mobiliy/transfers Mod A with RW during functional activity to increase IND in ADL's  Short term goal 4: Pt will tolerate 15-20 minutes of functional activity while maintaining Good safety and vitals to increase strength and IND in self-care    OT Individual Minutes  Time In: 1345  Time Out: 1400  Minutes: 15      Electronically signed by Elias Berg OT on 3/3/22 at 2:35 PM EST        03/03/22 1434   OT Individual Minutes   Time In 1345   Time Out 1400   Minutes 15   Time Code Minutes    Timed Code Treatment Minutes 15 Minutes

## 2022-03-04 ENCOUNTER — TELEPHONE (OUTPATIENT)
Dept: SURGERY | Age: 67
End: 2022-03-04

## 2022-03-04 NOTE — TELEPHONE ENCOUNTER
3/4/2022- Spoke to patient, he is still healing from his broken hip and is in rehab. Patient states he will call office back when he is ready to schedule.

## 2022-03-21 ENCOUNTER — TELEPHONE (OUTPATIENT)
Dept: SURGERY | Age: 67
End: 2022-03-21

## 2022-03-21 NOTE — TELEPHONE ENCOUNTER
3/21/2022- Spoke to patient, he is still in the hospital and recovering. He will call us when he is ready to schedule new patient visit.

## 2022-03-22 DIAGNOSIS — I42.2 HYPERTROPHIC NONOBSTRUCTIVE CARDIOMYOPATHY (HCC): ICD-10-CM

## 2022-03-22 DIAGNOSIS — I48.0 PAF (PAROXYSMAL ATRIAL FIBRILLATION) (HCC): ICD-10-CM

## 2022-03-23 RX ORDER — APIXABAN 5 MG/1
TABLET, FILM COATED ORAL
Qty: 60 TABLET | Refills: 5 | Status: SHIPPED | OUTPATIENT
Start: 2022-03-23 | End: 2022-09-19

## 2022-03-23 NOTE — TELEPHONE ENCOUNTER
Last visit: 12/14/2021  Last Med refill: 01/28/2022  Does patient have enough medication for 72 hours: No:     Next Visit Date:  Future Appointments   Date Time Provider Yves Henriquez   3/30/2022  1:45 PM Jessy [id number]   4/6/2022  2:30 PM Cassandra Singh  [address] Maintenance   Topic Date Due    DTaP/Tdap/Td vaccine (1 - Tdap) Never done    Shingles Vaccine (1 of 2) Never done    Pneumococcal 65+ years Vaccine (1 of 1 - PPSV23) Never done    Colorectal Cancer Screen  12/06/2020    Depression Screen  12/14/2022    Annual Wellness Visit (AWV)  12/15/2022    Lipid screen  12/20/2022    Flu vaccine  Completed    COVID-19 Vaccine  Completed    AAA screen  Completed    Hepatitis C screen  Completed    Hepatitis A vaccine  Aged Out    Hepatitis B vaccine  Aged Out    Hib vaccine  Aged Out    Meningococcal (ACWY) vaccine  Aged Out       Hemoglobin A1C (%)   Date Value   01/10/2018 5.0             ( goal A1C is < 7)   No results found for: LABMICR  LDL Cholesterol (mg/dL)   Date Value   12/20/2021 68   03/10/2020 82       (goal LDL is <100)   AST (U/L)   Date Value   12/20/2021 18     ALT (U/L)   Date Value   12/20/2021 8     BUN (mg/dL)   Date Value   03/03/2022 19     BP Readings from Last 3 Encounters:   03/03/22 (!) 119/107   02/22/22 (!) 163/87   12/14/21 124/80          (goal 120/80)    All Future Testing planned in [facility]  Lab Frequency Next Occurrence   Initiate PAT Protocol Once 03/07/2022               Patient Active Problem List:     Dyslipidemia     ED (erectile dysfunction)     Incomplete bladder emptying     Benign prostatic hyperplasia with urinary obstruction     History of colon cancer     PAF (paroxysmal atrial fibrillation) (HCC)     Anemia of chronic disease     Pallor of optic disc     Presbyopia     Incisional hernia, without obstruction or gangrene     Hypertrophic nonobstructive cardiomyopathy (HCC)     Iron (Fe) deficiency anemia     Primary osteoarthritis of right knee     History of malignant neoplasm of rectum     Hill's esophagus     CHF (congestive heart failure), NYHA class II, acute on chronic, combined (HCC)     Hypoxia     Dyspnea and respiratory abnormalities     Occupational pulmonary disease     Sinus bradycardia     Acute hypoxemic respiratory failure due to COVID-19 [facility])     COVID-19     Pneumonia     Acute respiratory failure with hypoxia (HCC)     Pulmonary fibrosis (HCC)     Syncope and collapse     Chronic anticoagulation     Severe malnutrition ([facility])     Cervical stenosis of spinal canal     Impingement syndrome of left shoulder     Multifocal pneumonia     Fracture of right hip     Community acquired bacterial pneumonia     Allergy to penicillin

## 2022-03-30 ENCOUNTER — OFFICE VISIT (OUTPATIENT)
Dept: ORTHOPEDIC SURGERY | Age: 67
End: 2022-03-30

## 2022-03-30 VITALS — HEIGHT: 72 IN | WEIGHT: 155 LBS | BODY MASS INDEX: 20.99 KG/M2

## 2022-03-30 DIAGNOSIS — Z96.649 S/P HIP HEMIARTHROPLASTY: ICD-10-CM

## 2022-03-30 DIAGNOSIS — S72.001D CLOSED FRACTURE OF RIGHT HIP WITH ROUTINE HEALING, SUBSEQUENT ENCOUNTER: Primary | ICD-10-CM

## 2022-03-30 PROCEDURE — 99024 POSTOP FOLLOW-UP VISIT: CPT | Performed by: PHYSICIAN ASSISTANT

## 2022-03-30 NOTE — PROGRESS NOTES
1756 [facility], 20 [facility] 344 Darcie Bronson, [address] [place] [address], [address] [facility]           Dept Phone: [phone]           Dept Fax:  [phone] [facility]           C[place]          Dept Phone: [phone]           Dept Fax:  [phone]      Chief Compliant:  Chief Complaint   Patient presents with    Post-Op Check     Rt Femoral Hemiarthtoplasty        History of Present Illness: This is a 74 y.o. male who presents to the clinic today for evaluation of had concerns including Post-Op Check (Rt Femoral Hemiarthtoplasty). Mr. Brenda Ann is a 73-year-old gentleman who presents for postoperative evaluation status post right hip hemiarthroplasty on 2/22/2022. Patient here for his initial postoperative follow-up. After discharge from the hospital he did spend 2 weeks at a skilled nursing facility for rehabilitation. Patient is back at home now and is doing daily home exercises. He states he does have a home physical therapist come to the house 2 times a week. He reports overall the hip is feeling excellent his only complaint is that he has noticed that the right leg is slightly shorter than the left leg believes this is causing some low back issues. Patient denies any acute complaints at this time states the wound is well-healed no numbness or tingling no fever chills.       Past History:    Current Outpatient Medications:     ELIQUIS 5 MG TABS tablet, take 1 tablet by mouth twice a day, Disp: 60 tablet, Rfl: 5    sertraline (ZOLOFT) 25 MG tablet, Take 1 tablet by mouth daily, Disp: 30 tablet, Rfl: 3    metoprolol tartrate (LOPRESSOR) 25 MG tablet, Take 1 tablet by mouth 2 times daily, Disp: 60 tablet, Rfl: 0    midodrine (PROAMATINE) 5 MG tablet, Take 1 tablet by mouth 3 times daily (before meals), Disp: 90 tablet, Rfl: 3    tamsulosin (FLOMAX) 0.4 MG capsule, take 1 capsule by mouth once daily, Disp: 90 capsule, Rfl: 1    Baclofen (LIORESAL) 5 MG tablet, take 1 tablet by mouth twice a day, Disp: 180 tablet, Rfl: 1    ibuprofen (ADVIL;MOTRIN) 800 MG tablet, take 1 tablet by mouth three times a day if needed for pain take with food, Disp: 90 tablet, Rfl: 1    omeprazole (PRILOSEC) 40 MG delayed release capsule, Take 1 capsule by mouth daily, Disp: 90 capsule, Rfl: 1    digoxin (LANOXIN) 125 MCG tablet, take 1 tablet by mouth once daily, Disp: 90 tablet, Rfl: 1    atorvastatin (LIPITOR) 40 MG tablet, take 1 tablet by mouth once daily, Disp: 90 tablet, Rfl: 1    Handicap Placard MISC, by Does not apply route Expires 2026, Disp: 1 each, Rfl: 0  Allergies   Allergen Reactions    Adhesive Tape Other (See Comments)     Blister badly     Codeine Nausea Only     Other reaction(s): Other: See Comments  NAUSEA  Other reaction(s): Other: See Comments  NAUSEA    Penicillins Swelling     As a baby  Other reaction(s): Unknown  As a baby    Nintedanib Diarrhea     10-15 BM daily     Social History     Socioeconomic History    Marital status: Single     Spouse name: Not on file    Number of children: Not on file    Years of education: Not on file    Highest education level: Not on file   Occupational History    Not on file   Tobacco Use    Smoking status: Former Smoker     Packs/day: 0.50     Years: 1.00     Pack years: 0.50     Quit date:      Years since quittin.2    Smokeless tobacco: Never Used    Tobacco comment: stated never actually really smoked only inhaled    Vaping Use    Vaping Use: Never used   Substance and Sexual Activity    Alcohol use: Yes     Alcohol/week: 12.0 standard drinks     Types: 2 Shots of liquor, 10 Standard drinks or equivalent per week     Comment: 2-3 times a week    Drug use: No     Types:  Other-see comments     Comment: Cocaine use in past in     Sexual activity: Yes     Partners: Female   Other Topics Concern    Not on file   Social History Narrative    Not on file     Social Determinants of Health     Financial Resource Strain: Medium Risk    Difficulty of Paying Living Expenses: Somewhat hard   Food Insecurity: No Food Insecurity    Worried About Running Out of Food in the Last Year: Never true    Tino of Food in the Last Year: Never true   Transportation Needs:     Lack of Transportation (Medical): Not on file    Lack of Transportation (Non-Medical):  Not on file   Physical Activity:     Days of Exercise per Week: Not on file    Minutes of Exercise per Session: Not on file   Stress:     Feeling of Stress : Not on file   Social Connections:     Frequency of Communication with Friends and Family: Not on file    Frequency of Social Gatherings with Friends and Family: Not on file    Attends Yarsani Services: Not on file    Active Member of [address] [place] [address] or Organizations: Not on file    Attends Club or Organization Meetings: Not on file    Marital Status: Not on file   Intimate Partner Violence:     Fear of Current or Ex-Partner: Not on file    Emotionally Abused: Not on file    Physically Abused: Not on file    Sexually Abused: Not on file   Housing Stability:     Unable to Pay for Housing in the Last Year: Not on file    Number of [place] in the Last Year: Not on file    Unstable Housing in the Last Year: Not on file     Past Medical History:   Diagnosis Date    Atrial fibrillation ([facility]     Back pain, chronic     Hill's esophagus 06/18/2019    Benign essential HTN     BPH (benign prostatic hyperplasia)     Cancer ([facility]     colon-rectal    Chronic idiopathic pulmonary fibrosis ([facility] 03/29/2021    Cocaine abuse in remission [facility])     1970's    ED (erectile dysfunction) 4/2/2015    GERD (gastroesophageal reflux disease)     GI bleed 12/5/2018    Hernia     History of colon cancer     Melena     Migraines     Murmur, cardiac      Past Surgical History:   Procedure Laterality Date    CARDIAC CATHETERIZATION  11/29/2018    Non-obstructive CAD    CARDIOVERSION  2020    COLECTOMY      2nd colectomy, Colostomy and reversed [facility] COLECTOMY      1st time Ksenia    COLONOSCOPY      COLONOSCOPY  07/18/2016    COLONOSCOPY N/A 12/6/2018    COLONOSCOPY DIAGNOSTIC performed by Debra Cross MD at [address] Right 2009    inguinal    HIP SURGERY Right 2/22/2022    HIP FEMORAL HEMIARTHROPLASTY performed by Alem Flores MD at [address] [facility] Right 1970's    arthrotomy    TONSILLECTOMY      TOTAL KNEE ARTHROPLASTY Right 1/8/2019    KNEE TOTAL ARTHROPLASTY performed by Alem Flores MD at [address] [facility] TRANSESOPHAGEAL ECHOCARDIOGRAM  11/29/2018    UPPER GASTROINTESTINAL ENDOSCOPY N/A 12/5/2018    EGD DIAGNOSTIC ONLY performed by Debra Cross MD at [facility] N/A 6/18/2019    MCGUIRE'S     Family History   Problem Relation Age of Onset    Diabetes Mother     Heart Attack Father     Heart Disease Father     Heart Disease Brother         Review of Systems   Constitutional: Negative for fever, chills, sweats. Eyes: Negative for changes in vision, or pain. HENT: Negative for ear ache, epistaxis, or sore throat. Respiratory/Cardio: Negative for Chest pain, palpitations, SOB, or cough. Gastrointestinal: Negative for abdominal pain, N/V/D. Genitourinary: Negative for dysuria, frequency, urgency, or hematuria. Neurological: Negative for headache, numbness, or weakness. Integumentary: Negative for rash, itching, laceration, or abrasion. Musculoskeletal: Positive for No chief complaint on file. Physical Exam:  Constitutional: Patient is oriented to person, place, and time. Patient appears well-developed and well nourished.    HENT: Negative otherwise noted  Head: Normocephalic and Atraumatic  Nose: Normal  Eyes: Conjunctivae and EOM are normal  Neck: Normal range of motion Neck supple. Respiratory/Cardio: Effort normal. No respiratory distress. Musculoskeletal:    rightHip    Tenderness:  Minimal tenderness or round in the lateral hip incision. No tenderness to the greater trochanter or posterior hip. Range of Motion:      Extension: 20     Flexion: 110     Internal Rotation: 20     External Rotation: 30     Abduction: 20     Adduction:  20       Muscle Strength      Abduction: 5/5     Adduction: 5/5     Flexion: 5/5         Gait: Antalgic ambulating with the assistance of a cane   Marvetta [place] Test: Negative   Rosa Test: Negative     Approximately 1.5 cm likely discrepancy shorter on the right side compared to the left. Neurological: Patient is alert and oriented to person, place, and time. Normal strenght. No sensory deficit. Skin: Skin is warm and dry  Psychiatric: Behavior is normal. Thought content normal.  Nursing note and vitals reviewed.      Labs and Imaging:     ECHO Complete 2D W Doppler W Color  16[address] [facility]    Transthoracic Echocardiography Report (TTE)     Patient Name Steve [facility]     Date of Study               02/22/2022                Hailey Masters      Date of      1955  Gender                      Male   Birth      Age          74 year(s)  Race                              Room Number  2016        Height:                     71.65 inch, 182 cm      Corporate ID [id number]    Weight:                     163 pounds, 74 kg   #      Patient Acct [de-identified]   BSA:          1.95 m^2      BMI:      22.34   #                                                              kg/m^2      MR #         [id number]      Sonographer                 LukeJanae ogden      Accession #  [id number]  Interpreting Physician      [facility]      Fellow                   Referring Nurse                            Practitioner      Interpreting             Referring Physician         Yoko León MD   Fellow     Type of Study      TTE procedure:2D Echocardiogram, M-Mode, Doppler, Color Doppler. Procedure Date  Date: 02/22/2022 Start: 11:48 AM    Study Location: [address] [place] [address]  Technical Quality: Fair visualization    Indications:Dyspnea/SOB and Preop cardiac evaluation. Patient Status: Inpatient    Height: 71.65 inches Weight: 163.15 pounds BSA: 1.95 m^2 BMI: 22.34 kg/m^2    Rhythm: Atrial fibrillation HR: 89 bpm BP: 112/67 mmHg    Allergies    - Penicillin. - Codeine. CONCLUSIONS    Summary  Difficult to assess LV function due to atrial fibrillation. Left ventricle is normal in size. Estimated LV EF 40-45%. Evidence of diastolic dysfunction. Right ventricular dilatation with normal systolic function. Left atrial dilatation. Right atrial dilatation. Mild aortic stenosis. Peak instantaneous gradient 28 mmHg and mean gradient  15 mmHg. Trivial aortic insufficiency. Mild to moderate mitral regurgitation. Multiple MR jets noted. Mild tricuspid regurgitation. Estimated right ventricular systolic pressure is 41 mmHg, suggests mild Pulm  HTN. Signature  ----------------------------------------------------------------------------   Electronically signed by Janae Colindres(Sonographer) on 02/22/2022 12:29   PM  ----------------------------------------------------------------------------    ----------------------------------------------------------------------------   Electronically signed by Steven Hooker(Interpreting physician) on 02/22/2022   12:31 PM  ----------------------------------------------------------------------------  FINDINGS  Left Atrium  Left atrial dilatation. Left Ventricle  Left ventricle is normal in size. Estimated LV EF 40-45 %. Evidence of diastolic dysfunction. Right Atrium  Right atrial dilatation. Right Ventricle  Right ventricular dilatation with normal systolic function. Mitral Valve  Mild to moderate mitral regurgitation. Multiple MR jets noted. Aortic Valve  Aortic valve is trileaflet. Mild aortic stenosis.   Peak instantaneous gradient 28 mmHg and mean gradient 15 mmHg. Trivial aortic insufficiency. Tricuspid Valve  Normal tricuspid valve structure and function. Mild tricuspid regurgitation. Estimated right ventricular systolic pressure is 41 mmHg. Pulmonic Valve  Pulmonic valve not well visualized but Doppler velocities are normal. No  pulmonic insufficiency. Pericardial Effusion  No significant pericardial effusion is seen. Miscellaneous  Normal aortic root dimension. E/e' average 13.9  IVC not well visualized. M-mode / 2D Measurements & Calculations:      LVIDd:4.4 cm(3.7 - 5.6 cm)       Diastolic ECPTBH:62.3 ml   LVIDs:3.61 cm(2.2 - 4.0 cm)      Systolic DQRFKC:14 ml   IUIF:9.9 cm(0.6 - 1.1 cm)        Aortic Root:3.6 cm(2.0 - 3.7 cm)   LVPWd:1.2 cm(0.6 - 1.1 cm)       LA Dimension: 5.4 cm(1.9 - 4.0 cm)   Fractional Shortenin.95 %    LA volume/Index: 96 ml /49m^2   Calculated LVEF (%): 44.84 %     LVOT:2.1 cm      Mitral:                                Aortic      Peak E-Wave: 0.63 m/s                  Peak Velocity: 2.65 m/s                                          Mean Velocity: 1.82 m/s   Peak Gradient: 1.6 mmHg                Peak Gradient: 28.09 mmHg   Deceleration Time: 151 msec            Mean Gradient: 15 mmHg                                             Area (continuity): 1.09 cm^2                                          AV VTI: 42.4 cm      Tricuspid:                             Pulmonic:      Estimated RVSP: 41 mmHg   Peak TR Velocity: 2.88 m/s   Peak TR Gradient: 33.1776 mmHg   Estimated RA Pressure: 8 mmHg                                          Estimated PASP: 41.18 mmHg     Septal Wall E' velocity:0.09 m/s  Lateral Wall E' velocity:0.09 m/s        X-rays taken in clinic today and preliminarily reviewed by me 3/30/22:  AP pelvis lateral view of the right hip demonstrates patient is status post right hip hemiarthroplasty.   There does appear to be some slight subsidence of the femoral component compared with x-rays initial postop on 2/22/2022. No evidence of periprostatic fracture or obvious loosening. Orders Placed This Encounter   Procedures    XR HIP 1 VW W PELVIS RIGHT     Standing Status:   Future     Standing Expiration Date:   3/29/2023       Assessment and Plan:  1. Closed fracture of right hip, initial encounter [facility])          PLAN:  Namita Sandoval is a 74 y.o. old male who presents for postoperative follow-up status post right femoral hemiarthroplasty on 2/22/2022. Patient overall reports his hip is doing quite well he does note that since the surgery he has noticed a slight leg length discrepancy which is causing some low back pain. Radiographic data does appear to be some slight subsidence of the femoral component but well within acceptable limits. 1.  Dr. Simone Bowman is available for review of x-rays and agrees that there is some signs of subsidence but patient should continue to do well postoperatively recommends prescription for patient be fitted for a shoe lift to help balance his leg length discrepancy. 2.  Incision looks excellent today well-healed without any evidence of infection  3. Continue with physical therapy  4. Follow-up in 4 weeks with myself or Dr. Simone Bowman for further evaluation however patient may call return sooner for any questions or concerns    Electronically signed by LAQUITA Ireland on 3/30/22 at 1:30 PM EDT        Please note that this chart was generated using voice recognition Dragon dictation software. Although every effort was made to ensure the accuracy of this automated transcription, some errors in transcription may have occurred.

## 2022-04-01 NOTE — TELEPHONE ENCOUNTER
Please address the medication refill and close the encounter. If I can be of assistance, please route to the applicable pool. Thank you.       Last visit:   Last Med refill:   Does patient have enough medication for 72 hours:    Next Visit Date:  Future Appointments   Date Time Provider Yves Henriquez   4/6/2022  2:30 PM Cassandra Mcnulty MD [facility]   4/27/2022  1:30 PM LAQUITA Miller [facility] [address] Maintenance   Topic Date Due    DTaP/Tdap/Td vaccine (1 - Tdap) Never done    Shingles Vaccine (1 of 2) Never done    Pneumococcal 65+ years Vaccine (1 of 1 - PPSV23) Never done    Colorectal Cancer Screen  12/06/2020    Depression Screen  12/14/2022    Annual Wellness Visit (AWV)  12/15/2022    Lipid screen  12/20/2022    Flu vaccine  Completed    COVID-19 Vaccine  Completed    AAA screen  Completed    Hepatitis C screen  Completed    Hepatitis A vaccine  Aged Out    Hepatitis B vaccine  Aged Out    Hib vaccine  Aged Out    Meningococcal (ACWY) vaccine  Aged Out       Hemoglobin A1C (%)   Date Value   01/10/2018 5.0             ( goal A1C is < 7)   No results found for: LABMICR  LDL Cholesterol (mg/dL)   Date Value   12/20/2021 68   03/10/2020 82       (goal LDL is <100)   AST (U/L)   Date Value   12/20/2021 18     ALT (U/L)   Date Value   12/20/2021 8     BUN (mg/dL)   Date Value   03/03/2022 19     BP Readings from Last 3 Encounters:   03/03/22 (!) 119/107   02/22/22 (!) 163/87   12/14/21 124/80          (goal 120/80)    All Future Testing planned in [facility]  Lab Frequency Next Occurrence   Initiate PAT Protocol Once 03/07/2022               Patient Active Problem List:     Dyslipidemia     ED (erectile dysfunction)     Incomplete bladder emptying     Benign prostatic hyperplasia with urinary obstruction     History of colon cancer     PAF (paroxysmal atrial fibrillation) (HCC)     Anemia of chronic disease     Pallor of optic disc     Presbyopia     Incisional hernia, without obstruction or gangrene     Hypertrophic nonobstructive cardiomyopathy (HCC)     Iron (Fe) deficiency anemia     Primary osteoarthritis of right knee     History of malignant neoplasm of rectum     Hill's esophagus     CHF (congestive heart failure), NYHA class II, acute on chronic, combined (HCC)     Hypoxia     Dyspnea and respiratory abnormalities     Occupational pulmonary disease     Sinus bradycardia     Acute hypoxemic respiratory failure due to COVID-19 ([address]     COVID-19     Pneumonia     Acute respiratory failure with hypoxia (HCC)     Pulmonary fibrosis (HCC)     Syncope and collapse     Chronic anticoagulation     Severe malnutrition (HCC)     Cervical stenosis of spinal canal     Impingement syndrome of left shoulder     Multifocal pneumonia     Fracture of right hip     Community acquired bacterial pneumonia     Allergy to penicillin

## 2022-04-27 ENCOUNTER — OFFICE VISIT (OUTPATIENT)
Dept: ORTHOPEDIC SURGERY | Age: 67
End: 2022-04-27
Payer: MEDICARE

## 2022-04-27 VITALS — BODY MASS INDEX: 20.99 KG/M2 | HEIGHT: 72 IN | WEIGHT: 155 LBS

## 2022-04-27 DIAGNOSIS — Z96.649 S/P HIP HEMIARTHROPLASTY: ICD-10-CM

## 2022-04-27 DIAGNOSIS — S72.001A CLOSED FRACTURE OF RIGHT HIP, INITIAL ENCOUNTER (HCC): Primary | ICD-10-CM

## 2022-04-27 PROCEDURE — 1036F TOBACCO NON-USER: CPT | Performed by: PHYSICIAN ASSISTANT

## 2022-04-27 PROCEDURE — 3017F COLORECTAL CA SCREEN DOC REV: CPT | Performed by: PHYSICIAN ASSISTANT

## 2022-04-27 PROCEDURE — G8427 DOCREV CUR MEDS BY ELIG CLIN: HCPCS | Performed by: PHYSICIAN ASSISTANT

## 2022-04-27 PROCEDURE — G8420 CALC BMI NORM PARAMETERS: HCPCS | Performed by: PHYSICIAN ASSISTANT

## 2022-04-27 PROCEDURE — 99214 OFFICE O/P EST MOD 30 MIN: CPT | Performed by: PHYSICIAN ASSISTANT

## 2022-04-27 PROCEDURE — 1123F ACP DISCUSS/DSCN MKR DOCD: CPT | Performed by: PHYSICIAN ASSISTANT

## 2022-04-27 PROCEDURE — 4040F PNEUMOC VAC/ADMIN/RCVD: CPT | Performed by: PHYSICIAN ASSISTANT

## 2022-04-27 NOTE — PROGRESS NOTES
6036 [facility], [address] [place], [address] [place] [address], [address] [facility]           Dept Phone: [phone]           Dept Fax:  [phone] [address] [facility], [place]          Dept Phone: [phone]           Dept Fax:  [phone]      Chief Compliant:  Chief Complaint   Patient presents with    Post-Op Check     Rt Femoral Hemiarthroplasty 2/22/22        History of Present Illness:  Jomar Weller returns today. This is a 74 y.o. male who presents to the clinic today for follow up of status post right femoral hemiarthroplasty on 2/22/2022. At initial postoperative appointment on 3/30/2022 patient was found to have some subsidence of the femoral component but overall was doing quite well. He was given a prescription to be fitted for a shoe lift at that time. Patient reports since getting the shoe lift the hip has felt significantly better. He has no pain in the hip. Patient reports that his actual main complaint is that the right knee will occasionally give out while walking. Patient with history of right total knee arthroplasty in 2019. He has not had any fall however with the continual giving out of the knee he is concerned for injury or fall. He is currently ambulating with assistance of a cane. Review of Systems   Constitutional: Negative for fever, chills, sweats, recent illness, or recent injury. Neurological: Negative for headaches, numbness, or weakness. Integumentary: Negative for rash, itching, ecchymosis, abrasions, or laceration. Musculoskeletal: Positive for Post-Op Check (Rt Femoral Hemiarthroplasty 2/22/22)       Physical Exam:  Constitutional: Patient is oriented to person, place, and time. Patient appears well-developed and well nourished.    Musculoskeletal:    rightHip     Tenderness:  Minimal tenderness or round in the lateral hip incision. No tenderness to the greater trochanter or posterior hip.         Range of Motion:       Extension: 20     Flexion: 110     Internal Rotation: 20     External Rotation: 30     Abduction: 20     Adduction:  20         Muscle Strength       Abduction: 5/5     Adduction: 5/5     Flexion: 5/5          Gait: Antalgic ambulating with the assistance of a cane   Armendariz Crow Test: Negative   Rosa Test: Negative       Right Knee    Gait:  Antalgic. Incision:  Well healed without any incisional erythema. Tenderness:  none   Flexion ROM:  115   Extension ROM:  0   Effusion:  no   DVT Evaluation:  No evidence of DVT seen on physical exam.     No instability of the knee with varus or valgus stress at 0, 30 or 90 degrees. Patient does appear to have some weakness with resisted extension compared to contralateral knee. Neurological: Patient is alert and oriented to person, place, and time. Normal strenght. No sensory deficit. Skin: Skin is warm and dry  Psychiatric: Behavior is normal. Thought content normal.  Nursing note and vitals reviewed. Labs and Imaging:     XR taken today:  No results found. X-rays taken in clinic and preliminarily reviewed by me 4/27/22:   AP pelvis lateral view of the right hip demonstrate some mild subsidence of the femoral component but no evidence of interval changes or worsening since x-rays taken on 3/30/2022. No evidence of other hardware complication    AP and lateral views of the right knee demonstrates patient is status post right total knee arthroplasty. Arthroplasty components appear in excellent position without any evidence of hardware loosening or other hardware complication. No periprosthetic fracture    Assessment and Plan:  1. Closed fracture of right hip, initial encounter ([facility]    2. S/P hip hemiarthroplasty              PLAN:  This is a 74 y.o. male who presents to the clinic today for follow up right hip hemiarthroplasty on 2/22/2022.   1.

## 2022-04-27 NOTE — PATIENT INSTRUCTIONS
Patient Education        Patellofemoral Pain Syndrome (Runner's Knee): Exercises  Introduction  Here are some examples of exercises for you to try. The exercises may be suggested for a condition or for rehabilitation. Start each exercise slowly. Ease off the exercises if you start to have pain. You will be told when to start these exercises and which ones will work bestfor you. How to do the exercises  Calf wall stretch    1. Stand facing a wall with your hands on the wall at about eye level. Put your affected leg about a step behind your other leg. 2. Keeping your back leg straight and your back heel on the floor, bend your front knee and gently bring your hip and chest toward the wall until you feel a stretch in the calf of your back leg. 3. Hold the stretch for at least 15 to 30 seconds. 4. Repeat 2 to 4 times. 5. Repeat steps 1 through 4, but this time keep your back knee bent. Quadriceps stretch    1. If you are not steady on your feet, hold on to a chair, counter, or wall. 2. Bend your affected leg, and reach behind you to grab the front of your foot or ankle with the hand on the same side. For example, if you are stretching your right leg, use your right hand. 3. Keeping your knees next to each other, pull your foot toward your buttock until you feel a gentle stretch across the front of your hip and down the front of your thigh. Your knee should be pointed directly to the ground, and not out to the side. 4. Hold the stretch for at least 15 to 30 seconds. 5. Repeat 2 to 4 times. Hamstring wall stretch    1. Lie on your back in a doorway, with your good leg through the open door. 2. Slide your affected leg up the wall to straighten your knee. You should feel a gentle stretch down the back of your leg. 3. Hold the stretch for at least 1 minute. Then over time, try to lengthen the time you hold the stretch to as long as 6 minutes. 4. Repeat 2 to 4 times.   5. If you do not have a place to do this exercise in a doorway, there is another way to do it:  6. Lie on your back, and bend your affected leg. 7. Loop a towel under the ball and toes of that foot, and hold the ends of the towel in your hands. 8. Straighten your knee, and slowly pull back on the towel. You should feel a gentle stretch down the back of your leg. 9. Hold the stretch for at least 15 to 30 seconds. Or even better, hold the stretch for 1 minute if you can. 10. Repeat 2 to 4 times. 1. Do not arch your back. 2. Do not bend either knee. 3. Keep one heel touching the floor and the other heel touching the wall. Do not point your toes. Quad sets    1. Sit with your affected leg straight and supported on the floor or a firm bed. Place a small, rolled-up towel under your affected knee. Your other leg should be bent, with that foot flat on the floor. 2. Tighten the thigh muscles of your affected leg by pressing the back of your knee down into the towel. 3. Hold for about 6 seconds, then rest for up to 10 seconds. 4. Repeat 8 to 12 times. Straight-leg raises to the front    1. Lie on your back with your good knee bent so that your foot rests flat on the floor. Your affected leg should be straight. Make sure that your low back has a normal curve. You should be able to slip your hand in between the floor and the small of your back, with your palm touching the floor and your back touching the back of your hand. 2. Tighten the thigh muscles in your affected leg by pressing the back of your knee flat down to the floor. Hold your knee straight. 3. Keeping the thigh muscles tight and your leg straight, lift your affected leg up so that your heel is about 12 inches off the floor. 4. Hold for about 6 seconds, then lower your leg slowly. Rest for up to 10 seconds between repetitions. 5. Repeat 8 to 12 times. Straight-leg raises to the back    1. Lie on your stomach, and lift your leg straight up behind you (toward the ceiling).   2. Lift your toes about 6 inches off the floor, hold for about 6 seconds, then lower slowly. 3. Do 8 to 12 repetitions. Wall slide with ball squeeze    1. Stand with your back against a wall and with your feet about shoulder-width apart. Your feet should be about 12 inches away from the wall. 2. Put a ball about the size of a soccer ball between your knees. Then slowly slide down the wall until your knees are bent about 20 to 30 degrees. 3. Tighten your thigh muscles by squeezing the ball between your knees. Hold that position for about 10 seconds, then stop squeezing. Rest for up to 10 seconds between repetitions. 4. Repeat 8 to 12 times. Follow-up care is a key part of your treatment and safety. Be sure to make and go to all appointments, and call your doctor if you are having problems. It's also a good idea to know your test results and keep alist of the medicines you take. Where can you learn more? Go to https://modulR.Med[employer]. org and sign in to your [employer] account. Enter A404 in the Healthcare Interactive box to learn more about \"Patellofemoral Pain Syndrome (Runner's Knee): Exercises. \"     If you do not have an account, please click on the \"Sign Up Now\" link. Current as of: July 1, 2021               Content Version: 13.2  © 1989-9853 [employer]. Care instructions adapted under license by [facility] ([facility]). If you have questions about a medical condition or this instruction, always ask your healthcare professional. [place] [address] any warranty or liability for your use of this information.

## 2022-04-28 ENCOUNTER — OFFICE VISIT (OUTPATIENT)
Dept: FAMILY MEDICINE CLINIC | Age: 67
End: 2022-04-28
Payer: MEDICARE

## 2022-04-28 VITALS
DIASTOLIC BLOOD PRESSURE: 68 MMHG | OXYGEN SATURATION: 98 % | BODY MASS INDEX: 20.75 KG/M2 | WEIGHT: 153 LBS | HEART RATE: 84 BPM | TEMPERATURE: 98.1 F | SYSTOLIC BLOOD PRESSURE: 96 MMHG

## 2022-04-28 DIAGNOSIS — I42.2 HYPERTROPHIC NONOBSTRUCTIVE CARDIOMYOPATHY (HCC): ICD-10-CM

## 2022-04-28 DIAGNOSIS — K22.70 BARRETT'S ESOPHAGUS DETERMINED BY ENDOSCOPY: ICD-10-CM

## 2022-04-28 DIAGNOSIS — D64.9 ANEMIA, UNSPECIFIED TYPE: Primary | ICD-10-CM

## 2022-04-28 DIAGNOSIS — I26.93 SINGLE SUBSEGMENTAL PULMONARY EMBOLISM WITHOUT ACUTE COR PULMONALE (HCC): ICD-10-CM

## 2022-04-28 DIAGNOSIS — R22.2 PLEURAL NODULE: ICD-10-CM

## 2022-04-28 DIAGNOSIS — E43 SEVERE MALNUTRITION (HCC): ICD-10-CM

## 2022-04-28 DIAGNOSIS — R06.89 DYSPNEA AND RESPIRATORY ABNORMALITIES: ICD-10-CM

## 2022-04-28 DIAGNOSIS — J84.10 PULMONARY FIBROSIS (HCC): ICD-10-CM

## 2022-04-28 DIAGNOSIS — R06.00 DYSPNEA AND RESPIRATORY ABNORMALITIES: ICD-10-CM

## 2022-04-28 DIAGNOSIS — Z09 HOSPITAL DISCHARGE FOLLOW-UP: ICD-10-CM

## 2022-04-28 DIAGNOSIS — R53.1 GENERALIZED WEAKNESS: ICD-10-CM

## 2022-04-28 DIAGNOSIS — M17.11 PRIMARY OSTEOARTHRITIS OF RIGHT KNEE: ICD-10-CM

## 2022-04-28 DIAGNOSIS — Z96.641 HISTORY OF HEMIARTHROPLASTY OF RIGHT HIP: ICD-10-CM

## 2022-04-28 DIAGNOSIS — I48.0 PAROXYSMAL ATRIAL FIBRILLATION (HCC): ICD-10-CM

## 2022-04-28 PROCEDURE — G8427 DOCREV CUR MEDS BY ELIG CLIN: HCPCS | Performed by: INTERNAL MEDICINE

## 2022-04-28 PROCEDURE — 3017F COLORECTAL CA SCREEN DOC REV: CPT | Performed by: INTERNAL MEDICINE

## 2022-04-28 PROCEDURE — 1111F DSCHRG MED/CURRENT MED MERGE: CPT | Performed by: INTERNAL MEDICINE

## 2022-04-28 PROCEDURE — 4040F PNEUMOC VAC/ADMIN/RCVD: CPT | Performed by: INTERNAL MEDICINE

## 2022-04-28 PROCEDURE — 99215 OFFICE O/P EST HI 40 MIN: CPT | Performed by: INTERNAL MEDICINE

## 2022-04-28 PROCEDURE — G8420 CALC BMI NORM PARAMETERS: HCPCS | Performed by: INTERNAL MEDICINE

## 2022-04-28 PROCEDURE — 1123F ACP DISCUSS/DSCN MKR DOCD: CPT | Performed by: INTERNAL MEDICINE

## 2022-04-28 PROCEDURE — 1036F TOBACCO NON-USER: CPT | Performed by: INTERNAL MEDICINE

## 2022-04-28 RX ORDER — DIGOXIN 125 MCG
TABLET ORAL
Qty: 90 TABLET | Refills: 1 | Status: SHIPPED | OUTPATIENT
Start: 2022-04-28

## 2022-04-28 RX ORDER — OMEPRAZOLE 40 MG/1
40 CAPSULE, DELAYED RELEASE ORAL DAILY
Qty: 90 CAPSULE | Refills: 1 | Status: SHIPPED | OUTPATIENT
Start: 2022-04-28

## 2022-04-28 RX ORDER — IBUPROFEN 800 MG/1
TABLET ORAL
Qty: 90 TABLET | Refills: 1 | Status: SHIPPED | OUTPATIENT
Start: 2022-04-28 | End: 2022-08-05

## 2022-04-28 RX ORDER — ATORVASTATIN CALCIUM 40 MG/1
TABLET, FILM COATED ORAL
Qty: 90 TABLET | Refills: 1 | Status: SHIPPED | OUTPATIENT
Start: 2022-04-28

## 2022-04-28 NOTE — PROGRESS NOTES
Patient is here for hospital F/U  He went in for pneumonia and while there fell and broke his RT hip. He is having all over pain. He states is dizzy a lot and when taking BP its low.  He is sleeping a lot, like 12-16 hours

## 2022-04-28 NOTE — PROGRESS NOTES
Post-Discharge Transitional Care Follow Up      Kevin Taveras   YOB: 1955    Date of Office Visit:  4/28/2022  Date of Hospital Admission: 2/18/22  Date of Hospital Discharge: 3/3/22  Readmission Risk Score (high >=14%. Medium >=10%):Readmission Risk Score: 15.7 ( )      Care management risk score Rising risk (score 2-5) and Complex Care (Scores >=6): 2     Non face to face  following discharge, date last encounter closed (first attempt may have been earlier): *No documented post hospital discharge outreach found in the last 14 days     Call initiated 2 business days of discharge: *No response recorded in the last 14 days     Anemia, unspecified type  -     CBC with Auto Differential; Future  -     Comprehensive Metabolic Panel; Future  -     Iron and TIBC; Future  -     Ferritin; Future  -     Vitamin B12 & Folate; Future  Paroxysmal atrial fibrillation (HCC)  -     atorvastatin (LIPITOR) 40 MG tablet; take 1 tablet by mouth once daily, Disp-90 tablet, R-1Normal  -     digoxin (LANOXIN) 125 MCG tablet; take 1 tablet by mouth once daily, Disp-90 tablet, R-1Normal  Hypertrophic nonobstructive cardiomyopathy (HCC)  -     atorvastatin (LIPITOR) 40 MG tablet; take 1 tablet by mouth once daily, Disp-90 tablet, R-1Normal  Hill's esophagus determined by endoscopy  -     omeprazole (PRILOSEC) 40 MG delayed release capsule; Take 1 capsule by mouth daily, Disp-90 capsule, R-1Normal  Primary osteoarthritis of right knee  -     ibuprofen (ADVIL;MOTRIN) 800 MG tablet; take 1 tablet by mouth three times a day if needed for pain take with food, Disp-90 tablet, R-1Normal  Single subsegmental pulmonary embolism without acute cor pulmonale (HCC)  Severe malnutrition (HCC)  Pleural nodule  -     CT CHEST W CONTRAST;  Future  Pulmonary fibrosis (HCC)  Dyspnea and respiratory abnormalities  History of hemiarthroplasty of right hip  -     [address]  Generalized weakness  - Ric 113 discharge follow-up  -     [place] DISCHARGE MEDS RECONCILED W/ CURRENT OUTPATIENT MED LIST      Medical Decision Making: high complexity  No follow-ups on file. On this date 4/28/2022 I have spent 20 minutes reviewing previous notes, test results and face to face with the patient discussing the diagnosis and importance of compliance with the treatment plan as well as documenting on the day of the visit. Subjective:   Per d/c summary, \"Juan Carlos Richardson is a 74 y.o. male with past medical history of pulmonary fibrosis presented with acute hypoxic respiratory failure, had fall while walking into ED and fractured right hip.    Respiratory failure found to be secondary to multifocal pneumonia in setting of pulmonary fibrosis; managed with antibiotics, oxygen supplementation, steroids, and patient O2 needs returned to baseline. Right hip hemiarthroplasty PGNC 4/27 FKUSSYV any complications.    Post surgery,   Pt does have ecchymosis at right thigh but no active bleeding,  Was restarted on AC,  Hgb remained low but stable. ROM limitations 2/2 hip surgery improving but will need to continue PT/OT. Patient with chronic A. fib but previously controlled rate started going into A. fib with RVR on postoperative days; cardiology consulted & medication adjustments made;  Rate now controlled. Hypotensive episodes managed with fluid boluses; BP improved but remained low normal;  pt's home antihypertensives discontinued; midodrine & metoprolol added. Patient also depressed times few months on admission with worsening after hip fracture;   Was started on sertraline on 2/21;  Mood improved & will continue sertraline on discharge.     Patient medically stable for discharge to SNF.        Significant therapeutic interventions: Azithromycin, ceftriaxone, steroids, incentive spirometry, PT/OT, pain management, right hip femoral hemiarthroplasty, cardiac meds including digoxin/Lopressor/midodrine, AC, statin, ASA\"      Inpatient course: Discharge summary reviewed- see chart. Interval history/Current status: came home from [facility] three weeks ago,was there for 12-13 days   Was doing PT at home, then he requested to stop nursing visits only but they stopped PT as well. Dizzy all the time, sleeping about 18 hours a day, tired all the time. Patient Active Problem List   Diagnosis    Dyslipidemia    ED (erectile dysfunction)    Incomplete bladder emptying    Benign prostatic hyperplasia with urinary obstruction    History of colon cancer    PAF (paroxysmal atrial fibrillation) (HCC)    Anemia of chronic disease    Pallor of optic disc    Presbyopia    Incisional hernia, without obstruction or gangrene    Hypertrophic nonobstructive cardiomyopathy (HCC)    Iron (Fe) deficiency anemia    Primary osteoarthritis of right knee    History of malignant neoplasm of rectum    Hill's esophagus    CHF (congestive heart failure), NYHA class II, acute on chronic, combined ([address]    Hypoxia    Dyspnea and respiratory abnormalities    Occupational pulmonary disease    Sinus bradycardia    Acute hypoxemic respiratory failure due to COVID-19 ([address]    COVID-19    Pneumonia    Acute respiratory failure with hypoxia (HCC)    Pulmonary fibrosis (HCC)    Syncope and collapse    Chronic anticoagulation    Severe malnutrition (HCC)    Cervical stenosis of spinal canal    Impingement syndrome of left shoulder    Multifocal pneumonia    Fracture of right hip    Community acquired bacterial pneumonia    Allergy to penicillin       Medications listed as ordered at the time of discharge from hospital     Medication List          Accurate as of April 28, 2022  4:19 PM. If you have any questions, ask your nurse or doctor.             CONTINUE taking these medications    atorvastatin 40 MG tablet  Commonly known as: LIPITOR  take 1 tablet by mouth once daily     Baclofen 5 MG tablet  Commonly known as: LIORESAL  take 1 tablet by mouth twice a day     digoxin 125 MCG tablet  Commonly known as: LANOXIN  take 1 tablet by mouth once daily     Eliquis 5 MG Tabs tablet  Generic drug: apixaban  take 1 tablet by mouth twice a day     Handicap Placard Misc  by Does not apply route Expires 1/2026     ibuprofen 800 MG tablet  Commonly known as: ADVIL;MOTRIN  take 1 tablet by mouth three times a day if needed for pain take with food     midodrine 5 MG tablet  Commonly known as: PROAMATINE  Take 1 tablet by mouth 3 times daily (before meals)     omeprazole 40 MG delayed release capsule  Commonly known as: PRILOSEC  Take 1 capsule by mouth daily     sertraline 25 MG tablet  Commonly known as: ZOLOFT  Take 1 tablet by mouth daily     tamsulosin 0.4 MG capsule  Commonly known as: FLOMAX  take 1 capsule by mouth once daily             Medications marked \"taking\" at this time  Outpatient Medications Marked as Taking for the 4/28/22 encounter (Office Visit) with Haroldo Cordova MD   Medication Sig Dispense Refill    ELIQUIS 5 MG TABS tablet take 1 tablet by mouth twice a day 60 tablet 5    sertraline (ZOLOFT) 25 MG tablet Take 1 tablet by mouth daily 30 tablet 3    tamsulosin (FLOMAX) 0.4 MG capsule take 1 capsule by mouth once daily 90 capsule 1    Baclofen (LIORESAL) 5 MG tablet take 1 tablet by mouth twice a day 180 tablet 1    ibuprofen (ADVIL;MOTRIN) 800 MG tablet take 1 tablet by mouth three times a day if needed for pain take with food 90 tablet 1    omeprazole (PRILOSEC) 40 MG delayed release capsule Take 1 capsule by mouth daily 90 capsule 1    digoxin (LANOXIN) 125 MCG tablet take 1 tablet by mouth once daily 90 tablet 1    atorvastatin (LIPITOR) 40 MG tablet take 1 tablet by mouth once daily 90 tablet 1    Handicap Placard MISC by Does not apply route Expires 1/2026 1 each 0        Medications patient taking as of now reconciled against medications ordered at time of hospital discharge: Yes    Review of Systems   Constitutional: Positive for fatigue. Negative for fever and unexpected weight change. Respiratory: Positive for shortness of breath. Negative for cough, choking, chest tightness and wheezing. Cardiovascular: Negative for chest pain, palpitations and leg swelling. Gastrointestinal: Negative for abdominal pain, anal bleeding, blood in stool, constipation, diarrhea, nausea and vomiting. Endocrine: Negative. Musculoskeletal: Positive for arthralgias and gait problem. Negative for joint swelling and myalgias. Skin: Negative. Neurological: Positive for weakness. Negative for dizziness. Psychiatric/Behavioral: Negative for sleep disturbance. All other systems reviewed and are negative. Objective:    BP 96/68   Pulse 84   Temp 98.1 °F (36.7 °C) (Temporal)   Wt 153 lb (69.4 kg)   SpO2 98% Comment: with 5 liters of oxygen  BMI 20.75 kg/m²    Wt Readings from Last 3 Encounters:   04/28/22 153 lb (69.4 kg)   04/27/22 155 lb (70.3 kg)   03/30/22 155 lb (70.3 kg)       Physical Exam  Vitals and nursing note reviewed. Constitutional:       General: He is not in acute distress. Appearance: He is well-developed and underweight. He is ill-appearing. Interventions: Nasal cannula in place. Comments: Ambulating with walker    Eyes:      General: Lids are normal. Vision grossly intact. Cardiovascular:      Rate and Rhythm: Normal rate and regular rhythm. Heart sounds: Normal heart sounds, S1 normal and S2 normal. No murmur heard. No friction rub. No gallop. Pulmonary:      Effort: Pulmonary effort is normal. No respiratory distress. Breath sounds: Normal breath sounds. No wheezing. Abdominal:      General: Bowel sounds are normal.      Palpations: Abdomen is soft. There is no mass. Tenderness: There is no abdominal tenderness. There is no guarding. Musculoskeletal:         General: Normal range of motion. Skin:     General: Skin is warm and dry. Capillary Refill: Capillary refill takes less than 2 seconds. Neurological:      General: No focal deficit present. Mental Status: He is alert and oriented to person, place, and time. An electronic signature was used to authenticate this note.   --Zarina Seo MD

## 2022-05-02 ENCOUNTER — HOSPITAL ENCOUNTER (OUTPATIENT)
Age: 67
Setting detail: SPECIMEN
Discharge: HOME | End: 2022-05-02

## 2022-05-02 ENCOUNTER — TELEPHONE (OUTPATIENT)
Dept: FAMILY MEDICINE CLINIC | Age: 67
End: 2022-05-02

## 2022-05-02 DIAGNOSIS — D64.9 ANEMIA, UNSPECIFIED TYPE: ICD-10-CM

## 2022-05-02 LAB
ABSOLUTE EOS #: 0.31 K/UL (ref 0–0.44)
ABSOLUTE IMMATURE GRANULOCYTE: 0.03 K/UL (ref 0–0.3)
ABSOLUTE LYMPH #: 1.59 K/UL (ref 1.1–3.7)
ABSOLUTE MONO #: 0.6 K/UL (ref 0.1–1.2)
ALBUMIN SERPL-MCNC: 4 G/DL (ref 3.5–5.2)
ALBUMIN/GLOBULIN RATIO: 1.2 (ref 1–2.5)
ALP BLD-CCNC: 111 U/L (ref 40–129)
ALT SERPL-CCNC: 9 U/L (ref 5–41)
ANION GAP SERPL CALCULATED.3IONS-SCNC: 12 MMOL/L (ref 9–17)
AST SERPL-CCNC: 15 U/L
BASOPHILS # BLD: 1 % (ref 0–2)
BASOPHILS ABSOLUTE: 0.04 K/UL (ref 0–0.2)
BILIRUB SERPL-MCNC: 0.38 MG/DL (ref 0.3–1.2)
BUN BLDV-MCNC: 16 MG/DL (ref 8–23)
CALCIUM SERPL-MCNC: 9.3 MG/DL (ref 8.6–10.4)
CHLORIDE BLD-SCNC: 101 MMOL/L (ref 98–107)
CO2: 29 MMOL/L (ref 20–31)
CREAT SERPL-MCNC: 0.6 MG/DL (ref 0.7–1.2)
EOSINOPHILS RELATIVE PERCENT: 4 % (ref 1–4)
FERRITIN: 47 NG/ML (ref 30–400)
FOLATE: >20 NG/ML
GFR AFRICAN AMERICAN: >60 ML/MIN
GFR NON-AFRICAN AMERICAN: >60 ML/MIN
GFR SERPL CREATININE-BSD FRML MDRD: ABNORMAL ML/MIN/{1.73_M2}
GLUCOSE BLD-MCNC: 72 MG/DL (ref 70–99)
HCT VFR BLD CALC: 41.2 % (ref 40.7–50.3)
HEMOGLOBIN: 12.6 G/DL (ref 13–17)
IMMATURE GRANULOCYTES: 0 %
IRON SATURATION: 9 % (ref 20–55)
IRON: 33 UG/DL (ref 59–158)
LYMPHOCYTES # BLD: 21 % (ref 24–43)
MCH RBC QN AUTO: 25.9 PG (ref 25.2–33.5)
MCHC RBC AUTO-ENTMCNC: 30.6 G/DL (ref 28.4–34.8)
MCV RBC AUTO: 84.6 FL (ref 82.6–102.9)
MONOCYTES # BLD: 8 % (ref 3–12)
NRBC AUTOMATED: 0 PER 100 WBC
PDW BLD-RTO: 18.5 % (ref 11.8–14.4)
PLATELET # BLD: 350 K/UL (ref 138–453)
PMV BLD AUTO: 10.4 FL (ref 8.1–13.5)
POTASSIUM SERPL-SCNC: 4 MMOL/L (ref 3.7–5.3)
RBC # BLD: 4.87 M/UL (ref 4.21–5.77)
RBC # BLD: ABNORMAL 10*6/UL
SEG NEUTROPHILS: 66 % (ref 36–65)
SEGMENTED NEUTROPHILS ABSOLUTE COUNT: 4.97 K/UL (ref 1.5–8.1)
SODIUM BLD-SCNC: 142 MMOL/L (ref 135–144)
TOTAL IRON BINDING CAPACITY: 349 UG/DL (ref 250–450)
TOTAL PROTEIN: 7.3 G/DL (ref 6.4–8.3)
UNSATURATED IRON BINDING CAPACITY: 316 UG/DL (ref 112–347)
VITAMIN B-12: 705 PG/ML (ref 232–1245)
WBC # BLD: 7.5 K/UL (ref 3.5–11.3)

## 2022-05-02 NOTE — TELEPHONE ENCOUNTER
89/49 LT arm  89/69 RT arm     BP below, so she needs to report it     Bryce from [address] PT     Other vitals are stable    Tired, shortness of breath     Stated pt is coming in for blood work today

## 2022-05-05 RX ORDER — FERROUS SULFATE 325(65) MG
325 TABLET ORAL 2 TIMES DAILY
Qty: 180 TABLET | Refills: 0 | Status: SHIPPED | OUTPATIENT
Start: 2022-05-05 | End: 2022-07-29

## 2022-05-09 ENCOUNTER — TELEPHONE (OUTPATIENT)
Dept: ORTHOPEDIC SURGERY | Age: 67
End: 2022-05-09

## 2022-05-09 ENCOUNTER — HOSPITAL ENCOUNTER (OUTPATIENT)
Dept: CT IMAGING | Age: 67
Discharge: HOME | End: 2022-05-11
Payer: MEDICARE

## 2022-05-09 DIAGNOSIS — R22.2 PLEURAL NODULE: ICD-10-CM

## 2022-05-09 PROCEDURE — 6360000004 HC RX CONTRAST MEDICATION: Performed by: INTERNAL MEDICINE

## 2022-05-09 PROCEDURE — 71260 CT THORAX DX C+: CPT

## 2022-05-09 PROCEDURE — 2580000003 HC RX 258: Performed by: INTERNAL MEDICINE

## 2022-05-09 RX ORDER — SODIUM CHLORIDE 0.9 % (FLUSH) 0.9 %
10 SYRINGE (ML) INJECTION PRN
Status: DISCONTINUED | OUTPATIENT
Start: 2022-05-09 | End: 2022-05-12 | Stop reason: HOSPADM

## 2022-05-09 RX ORDER — 0.9 % SODIUM CHLORIDE 0.9 %
80 INTRAVENOUS SOLUTION INTRAVENOUS ONCE
Status: COMPLETED | OUTPATIENT
Start: 2022-05-09 | End: 2022-05-09

## 2022-05-09 RX ADMIN — IOPAMIDOL 75 ML: 755 INJECTION, SOLUTION INTRAVENOUS at 14:49

## 2022-05-09 RX ADMIN — SODIUM CHLORIDE 80 ML: 9 INJECTION, SOLUTION INTRAVENOUS at 14:45

## 2022-05-09 RX ADMIN — SODIUM CHLORIDE, PRESERVATIVE FREE 10 ML: 5 INJECTION INTRAVENOUS at 14:49

## 2022-05-10 ASSESSMENT — ENCOUNTER SYMPTOMS
DIARRHEA: 0
VOMITING: 0
BLOOD IN STOOL: 0
NAUSEA: 0
CHEST TIGHTNESS: 0
COUGH: 0
SHORTNESS OF BREATH: 1
CHOKING: 0
ANAL BLEEDING: 0
ABDOMINAL PAIN: 0
WHEEZING: 0
CONSTIPATION: 0

## 2022-05-10 ASSESSMENT — VISUAL ACUITY: OU: 1

## 2022-05-12 ENCOUNTER — TELEPHONE (OUTPATIENT)
Dept: FAMILY MEDICINE CLINIC | Age: 67
End: 2022-05-12

## 2022-05-12 NOTE — TELEPHONE ENCOUNTER
Patient called asking for referral to see someone for hernia.  States its getting very painful and making it difficulty to move around

## 2022-05-13 NOTE — TELEPHONE ENCOUNTER
Spoke with patient and he stated it started in his groin but is in his abd now. States he is having pain also. Scheduled patient an appt for 5/16.

## 2022-05-24 ENCOUNTER — TELEPHONE (OUTPATIENT)
Dept: FAMILY MEDICINE CLINIC | Age: 67
End: 2022-05-24

## 2022-05-24 NOTE — TELEPHONE ENCOUNTER
Pneumonia has resolved, airspace disease has not progressed. A pleural-based opacity in the posterior lateral right upper lobe has not changed since previous CT but seems more prominent since 2021-suggest continued follow-up with short-term CT 3 to 6 months or we can do a PET/CT to provide more information. He can also see pulmonology for second opinion on this lesion.

## 2022-05-24 NOTE — TELEPHONE ENCOUNTER
Pt was notified of CT results.  He states has up coming appointment with Pulm and will discuss with him

## 2022-05-31 ENCOUNTER — OFFICE VISIT (OUTPATIENT)
Dept: ORTHOPEDIC SURGERY | Age: 67
End: 2022-05-31
Payer: MEDICARE

## 2022-05-31 VITALS — RESPIRATION RATE: 12 BRPM | WEIGHT: 153 LBS | HEIGHT: 72 IN | BODY MASS INDEX: 20.72 KG/M2

## 2022-05-31 DIAGNOSIS — Z96.649 S/P HIP HEMIARTHROPLASTY: Primary | ICD-10-CM

## 2022-05-31 DIAGNOSIS — M70.61 TROCHANTERIC BURSITIS OF RIGHT HIP: ICD-10-CM

## 2022-05-31 PROCEDURE — 20610 DRAIN/INJ JOINT/BURSA W/O US: CPT | Performed by: PHYSICIAN ASSISTANT

## 2022-05-31 RX ORDER — BETAMETHASONE SODIUM PHOSPHATE AND BETAMETHASONE ACETATE 3; 3 MG/ML; MG/ML
12 INJECTION, SUSPENSION INTRA-ARTICULAR; INTRALESIONAL; INTRAMUSCULAR; SOFT TISSUE ONCE
Status: COMPLETED | OUTPATIENT
Start: 2022-05-31 | End: 2022-05-31

## 2022-05-31 RX ORDER — LIDOCAINE HYDROCHLORIDE 10 MG/ML
4 INJECTION, SOLUTION INFILTRATION; PERINEURAL ONCE
Status: COMPLETED | OUTPATIENT
Start: 2022-05-31 | End: 2022-05-31

## 2022-05-31 RX ADMIN — BETAMETHASONE SODIUM PHOSPHATE AND BETAMETHASONE ACETATE 12 MG: 3; 3 INJECTION, SUSPENSION INTRA-ARTICULAR; INTRALESIONAL; INTRAMUSCULAR; SOFT TISSUE at 12:01

## 2022-05-31 RX ADMIN — LIDOCAINE HYDROCHLORIDE 4 ML: 10 INJECTION, SOLUTION INFILTRATION; PERINEURAL at 12:02

## 2022-06-01 NOTE — PROGRESS NOTES
[facility], [address] [address] [facility], [address] [place] [address], [id number] [place] [address]           Dept Phone: [phone]           Dept Fax:  [phone] [facility]           [place]          Dept Phone: [phone]           Dept Fax:  [phone]      Chief Compliant:  Chief Complaint   Patient presents with    Post-Op Check     R hip        History of Present Illness: This is a 74 y.o. male who presents to the clinic today for evaluation of had concerns including Post-Op Check (R hip). Mr. Leida Castillo is a 26-year-old gentleman who presents for follow-up of right hip hemiarthroplasty which occurred on 2/2/2022 due to femoral neck fracture. At initial postoperative appointment on 3/30/2022 patient was found to have some subsidence of the femoral component but overall was doing quite well. He was given a prescription to be fitted for a shoe lift at that time. At last visit on 4/27/2022 patient was actually doing quite well his main complaint is actually pain in the knee however over the last 3 to 4 weeks he has had significant increased right hip pain making it very difficult to walk he has started using his walker again due to concern for fall and his pain level at last visit he was only using just a cane. Patient reports pain is most severe to the right groin but does note radiation of pain to the posterior hip. Pain seems to be aggravated by weightbearing and walking does seem to be relieved by rest.    After last visit patient patient was recommended to pursue physical therapy however he declined in light of home exercise program however he admits that the home exercises have been aggravating his pain so he has not been doing over the last few weeks. Patient presents today continue no knee pain but his main concern is the right hip.   States he is tolerating the knee pain with a hinged knee brace he has been wearing. Past History:    Current Outpatient Medications:     ferrous sulfate (IRON 325) 325 (65 Fe) MG tablet, Take 1 tablet by mouth 2 times daily, Disp: 180 tablet, Rfl: 0    atorvastatin (LIPITOR) 40 MG tablet, take 1 tablet by mouth once daily, Disp: 90 tablet, Rfl: 1    digoxin (LANOXIN) 125 MCG tablet, take 1 tablet by mouth once daily, Disp: 90 tablet, Rfl: 1    omeprazole (PRILOSEC) 40 MG delayed release capsule, Take 1 capsule by mouth daily, Disp: 90 capsule, Rfl: 1    ibuprofen (ADVIL;MOTRIN) 800 MG tablet, take 1 tablet by mouth three times a day if needed for pain take with food, Disp: 90 tablet, Rfl: 1    ELIQUIS 5 MG TABS tablet, take 1 tablet by mouth twice a day, Disp: 60 tablet, Rfl: 5    sertraline (ZOLOFT) 25 MG tablet, Take 1 tablet by mouth daily, Disp: 30 tablet, Rfl: 3    midodrine (PROAMATINE) 5 MG tablet, Take 1 tablet by mouth 3 times daily (before meals), Disp: 90 tablet, Rfl: 3    tamsulosin (FLOMAX) 0.4 MG capsule, take 1 capsule by mouth once daily, Disp: 90 capsule, Rfl: 1    Baclofen (LIORESAL) 5 MG tablet, take 1 tablet by mouth twice a day, Disp: 180 tablet, Rfl: 1    Handicap Placard MISC, by Does not apply route Expires 1/2026, Disp: 1 each, Rfl: 0  Allergies   Allergen Reactions    Adhesive Tape Other (See Comments)     Blister badly     Codeine Nausea Only     Other reaction(s): Other: See Comments  NAUSEA  Other reaction(s):  Other: See Comments  NAUSEA    Penicillins Swelling     As a baby  Other reaction(s): Unknown  As a baby    Nintedanib Diarrhea     10-15 BM daily     Social History     Socioeconomic History    Marital status: Single     Spouse name: Not on file    Number of children: Not on file    Years of education: Not on file    Highest education level: Not on file   Occupational History    Not on file   Tobacco Use    Smoking status: Former Smoker     Packs/day: 0.50     Years: 1.00     Pack years: 0.50     Quit date: 26     Years since quittin.4    Smokeless tobacco: Never Used    Tobacco comment: stated never actually really smoked only inhaled    Vaping Use    Vaping Use: Never used   Substance and Sexual Activity    Alcohol use: Yes     Alcohol/week: 12.0 standard drinks     Types: 2 Shots of liquor, 10 Standard drinks or equivalent per week     Comment: 2-3 times a week    Drug use: No     Types: Other-see comments     Comment: Cocaine use in past in     Sexual activity: Yes     Partners: Female   Other Topics Concern    Not on file   Social History Narrative    Not on file     Social Determinants of Health     Financial Resource Strain:     Difficulty of Paying Living Expenses: Not on file   Food Insecurity:     Worried About Running Out of Food in the Last Year: Not on file    Tino of Food in the Last Year: Not on file   Transportation Needs:     Lack of Transportation (Medical): Not on file    Lack of Transportation (Non-Medical):  Not on file   Physical Activity:     Days of Exercise per Week: Not on file    Minutes of Exercise per Session: Not on file   Stress:     Feeling of Stress : Not on file   Social Connections:     Frequency of Communication with Friends and Family: Not on file    Frequency of Social Gatherings with Friends and Family: Not on file    Attends Anabaptism Services: Not on file    Active Member of [address] [place] [address] or Organizations: Not on file    Attends Club or Organization Meetings: Not on file    Marital Status: Not on file   Intimate Partner Violence:     Fear of Current or Ex-Partner: Not on file    Emotionally Abused: Not on file    Physically Abused: Not on file    Sexually Abused: Not on file   Housing Stability:     Unable to Pay for Housing in the Last Year: Not on file    Number of [place] in the Last Year: Not on file    Unstable Housing in the Last Year: Not on file     Past Medical History:   Diagnosis Date dysuria, frequency, urgency, or hematuria. Neurological: Negative for headache, numbness, or weakness. Integumentary: Negative for rash, itching, laceration, or abrasion. Musculoskeletal: Positive for Post-Op Check (R hip)       Physical Exam:  Constitutional: Patient is oriented to person, place, and time. Patient appears well-developed and well nourished. HENT: Negative otherwise noted  Head: Normocephalic and Atraumatic  Nose: Normal  Eyes: Conjunctivae and EOM are normal  Neck: Normal range of motion Neck supple. Respiratory/Cardio: Effort normal. No respiratory distress. Musculoskeletal:    right Hip    Tenderness: Severe tenderness over the right greater trochanter. Mild tenderness over the proximal IT band. No tenderness to the anterior posterior hip       Range of Motion:      Extension: 10     Flexion: 120     Internal Rotation: 15     External Rotation: 25     Abduction: 40     Adduction:  30       Muscle Strength      Abduction: 5/5     Adduction: 5/5     Flexion: 5/5         Gait: Antalgic   Manjit Test: Positive   Rosa Test: Positive     Right Knee     Gait:  Antalgic. Incision:  Well healed without any incisional erythema. Tenderness:  none   Flexion ROM:  115   Extension ROM:  0   Effusion:  no   DVT Evaluation:  No evidence of DVT seen on physical exam.      No instability of the knee with varus or valgus stress at 0, 30 or 90 degrees.     Patient does appear to have some weakness with resisted extension compared to contralateral knee. Neurological: Patient is alert and oriented to person, place, and time. Normal strenght. No sensory deficit. Skin: Skin is warm and dry  Psychiatric: Behavior is normal. Thought content normal.  Nursing note and vitals reviewed.      Labs and Imaging:     X-rays taken in clinic today and preliminarily reviewed by me 6/1/22:  AP pelvis and lateral view of the right hip again demonstrate subsidence of the femoral component which was previously the patient. Also, discussed was the potential for further injections, irrigation and debridement and surgery. Alternate means of treatment have also been discussed with the patient. Following an appropriate discussion with the patient regarding the risks and benefits of the procedure he consented to proceed. his right trochanteric bursa was prepped using betadine solution. Using aseptic technique and through a lateral approach, his right trochanteric bursa was injected superficially with 4 cc of 1% lidocaine without epinephrine and subsequently with 2 cc of 6 mg/mL Celestone into the bursal space. A band aid was applied to the injection site. he tolerated the injection with no immediate adverse reactions. Electronically signed by Kennis Mcardle, PA on 6/1/22 at 12:45 PM EDT        Please note that this chart was generated using voice recognition Dragon dictation software. Although every effort was made to ensure the accuracy of this automated transcription, some errors in transcription may have occurred.

## 2022-06-20 ENCOUNTER — OFFICE VISIT (OUTPATIENT)
Dept: ORTHOPEDIC SURGERY | Age: 67
End: 2022-06-20

## 2022-06-20 DIAGNOSIS — Z96.649 S/P HIP HEMIARTHROPLASTY: Primary | ICD-10-CM

## 2022-06-20 DIAGNOSIS — S72.001A CLOSED FRACTURE OF RIGHT HIP, INITIAL ENCOUNTER (HCC): ICD-10-CM

## 2022-06-20 PROCEDURE — 99024 POSTOP FOLLOW-UP VISIT: CPT | Performed by: ORTHOPAEDIC SURGERY

## 2022-06-20 NOTE — PROGRESS NOTES
Citlali Hou M.D.            [address] [place] [address], [address] [facility], [id number] [place] [address]           Dept Phone: [phone]           Dept Fax:  [address], [place]          Dept Phone: [phone]           Dept Fax:  [phone]      Chief Compliant:  Chief Complaint   Patient presents with    Pain     2/22/22 Rt Femoral David        History of Present Illness: This is a 74 y.o. male who presents to the clinic today for evaluation / follow up of right hip pain. Patient is a 15-year-old gentleman with severe pulmonary fibrosis who had a displaced right femoral neck fracture this past February. He had a femoral hemiarthroplasty performed. Patient had both postoperative complications of the and basically was in a nursing facility for 3 weeks without difficult and much ambulation. Patient has been followed over the last few months has been noted on the x-rays that the femoral component has subsided giving him some shortening and some pain in this area patient was last seen by Alvaro Dunaway who also injected his trochanter which did help a mild he still complains of groin pain and still having to use a walker for weakness but also for his pulmonary status. .       Review of Systems   Constitutional: Negative for fever, chills, sweats. Eyes: Negative for changes in vision, or pain. HENT: Negative for ear ache, epistaxis, or sore throat. Respiratory/Cardio: Negative for Chest pain, palpitations, SOB, or cough. Gastrointestinal: Negative for abdominal pain, N/V/D. Genitourinary: Negative for dysuria, frequency, urgency, or hematuria. Neurological: Negative for headache, numbness, or weakness. Integumentary: Negative for rash, itching, laceration, or abrasion.    Musculoskeletal: Positive for Pain (2/22/22 Rt Femoral David)       Physical Exam:  Constitutional: Patient is oriented to person, place, and time. Patient appears well-developed and well nourished. HENT: Negative otherwise noted  Head: Normocephalic and Atraumatic  Nose: Normal  Eyes: Conjunctivae and EOM are normal  Neck: Normal range of motion Neck supple. Respiratory/Cardio: Effort normal. No respiratory distress. Musculoskeletal: Physical examination notes that with patient supine he really has minimal pain with flexion internal ex rotation of his right hip. He has little bit of trochanter pain but nothing too severe. I recommended approximately 1 inch leg length discrepancy. Neurological: Patient is alert and oriented to person, place, and time. Normal strenght. No sensory deficit. Skin: Skin is warm and dry  Psychiatric: Behavior is normal. Thought content normal.  Nursing note and vitals reviewed. Labs and Imaging:     XR taken today:  XR HIP 1 VW W PELVIS RIGHT    Result Date: 6/20/2022  X-rays taken today reviewed by me show standing AP of the pelvis. Patient status post right femoral hemiarthroplasty. Patient has evidence for femoral stem subsidence based on the position of the stem. This was confirmed for my review of the previous x-rays initially taken back in February. The past 3 x-rays prior to this time show the subsidence this is this is a bit unchanged from the original subsidence has appeared to stabilize. Patient has developed a leg length discrepancy as a result of this. Orders Placed This Encounter   Procedures    XR HIP 1 VW W PELVIS RIGHT     Standing Status:   Future     Number of Occurrences:   1     Standing Expiration Date:   6/20/2023       Assessment and Plan:  1. S/P hip hemiarthroplasty    2. Closed fracture of right hip, initial encounter [facility])            This is a 74 y.o. male who presents to the clinic today for evaluation / follow up of status post right David arthroplasty. .     Past History:    Current Outpatient Medications:   ferrous sulfate (IRON 325) 325 (65 Fe) MG tablet, Take 1 tablet by mouth 2 times daily, Disp: 180 tablet, Rfl: 0    atorvastatin (LIPITOR) 40 MG tablet, take 1 tablet by mouth once daily, Disp: 90 tablet, Rfl: 1    digoxin (LANOXIN) 125 MCG tablet, take 1 tablet by mouth once daily, Disp: 90 tablet, Rfl: 1    omeprazole (PRILOSEC) 40 MG delayed release capsule, Take 1 capsule by mouth daily, Disp: 90 capsule, Rfl: 1    ibuprofen (ADVIL;MOTRIN) 800 MG tablet, take 1 tablet by mouth three times a day if needed for pain take with food, Disp: 90 tablet, Rfl: 1    ELIQUIS 5 MG TABS tablet, take 1 tablet by mouth twice a day, Disp: 60 tablet, Rfl: 5    sertraline (ZOLOFT) 25 MG tablet, Take 1 tablet by mouth daily, Disp: 30 tablet, Rfl: 3    midodrine (PROAMATINE) 5 MG tablet, Take 1 tablet by mouth 3 times daily (before meals), Disp: 90 tablet, Rfl: 3    tamsulosin (FLOMAX) 0.4 MG capsule, take 1 capsule by mouth once daily, Disp: 90 capsule, Rfl: 1    Baclofen (LIORESAL) 5 MG tablet, take 1 tablet by mouth twice a day, Disp: 180 tablet, Rfl: 1    Handicap Placard MISC, by Does not apply route Expires 2026, Disp: 1 each, Rfl: 0  Allergies   Allergen Reactions    Adhesive Tape Other (See Comments)     Blister badly     Codeine Nausea Only     Other reaction(s): Other: See Comments  NAUSEA  Other reaction(s):  Other: See Comments  NAUSEA    Penicillins Swelling     As a baby  Other reaction(s): Unknown  As a baby    Nintedanib Diarrhea     10-15 BM daily     Social History     Socioeconomic History    Marital status: Single     Spouse name: Not on file    Number of children: Not on file    Years of education: Not on file    Highest education level: Not on file   Occupational History    Not on file   Tobacco Use    Smoking status: Former Smoker     Packs/day: 0.50     Years: 1.00     Pack years: 0.50     Quit date:      Years since quittin.5    Smokeless tobacco: Never Used    Tobacco comment: stated never actually really smoked only inhaled    Vaping Use    Vaping Use: Never used   Substance and Sexual Activity    Alcohol use: Yes     Alcohol/week: 12.0 standard drinks     Types: 2 Shots of liquor, 10 Standard drinks or equivalent per week     Comment: 2-3 times a week    Drug use: No     Types: Other-see comments     Comment: Cocaine use in past in 1970's    Sexual activity: Yes     Partners: Female   Other Topics Concern    Not on file   Social History Narrative    Not on file     Social Determinants of Health     Financial Resource Strain:     Difficulty of Paying Living Expenses: Not on file   Food Insecurity:     Worried About Running Out of Food in the Last Year: Not on file    Tino of Food in the Last Year: Not on file   Transportation Needs:     Lack of Transportation (Medical): Not on file    Lack of Transportation (Non-Medical):  Not on file   Physical Activity:     Days of Exercise per Week: Not on file    Minutes of Exercise per Session: Not on file   Stress:     Feeling of Stress : Not on file   Social Connections:     Frequency of Communication with Friends and Family: Not on file    Frequency of Social Gatherings with Friends and Family: Not on file    Attends Sikhism Services: Not on file    Active Member of [address] [place] [address] [employer] or Organizations: Not on file    Attends Club or Organization Meetings: Not on file    Marital Status: Not on file   Intimate Partner Violence:     Fear of Current or Ex-Partner: Not on file    Emotionally Abused: Not on file    Physically Abused: Not on file    Sexually Abused: Not on file   Housing Stability:     Unable to Pay for Housing in the Last Year: Not on file    Number of [place] in the Last Year: Not on file    Unstable Housing in the Last Year: Not on file     Past Medical History:   Diagnosis Date    Atrial fibrillation ([facility] [address]     Back pain, chronic     Hill's esophagus 06/18/2019    Benign essential HTN     BPH (benign prostatic hyperplasia)     Cancer (HCC)     colon-rectal    Chronic idiopathic pulmonary fibrosis ([facility] [address] 03/29/2021    Cocaine abuse in remission [facility])     1970's    ED (erectile dysfunction) 4/2/2015    GERD (gastroesophageal reflux disease)     GI bleed 12/5/2018    Hernia     History of colon cancer     Melena     Migraines     Murmur, cardiac      Past Surgical History:   Procedure Laterality Date    CAPSULOTOMY, HAND  2020    CARDIAC CATHETERIZATION  11/29/2018    Non-obstructive CAD    COLECTOMY      2nd colectomy, Colostomy and reversed [facility] COLECTOMY      1st time Ksenia    COLONOSCOPY      COLONOSCOPY  07/18/2016    COLONOSCOPY N/A 12/6/2018    COLONOSCOPY DIAGNOSTIC performed by Magdy Melotn MD at [address] [facility] Right 2009    inguinal    HIP SURGERY Right 2/22/2022    HIP FEMORAL HEMIARTHROPLASTY performed by Americo Garcia MD at [address] [facility] Right 1970's    arthrotomy    TONSILLECTOMY      TOTAL KNEE ARTHROPLASTY Right 1/8/2019    KNEE TOTAL ARTHROPLASTY performed by Americo Garcia MD at [address] TRANSESOPHAGEAL ECHOCARDIOGRAM  11/29/2018    UPPER GASTROINTESTINAL ENDOSCOPY N/A 12/5/2018    EGD DIAGNOSTIC ONLY performed by Magdy Melton MD at [facility] ENDOSCOPY N/A 6/18/2019    MCGUIRE'S     Family History   Problem Relation Age of Onset    Diabetes Mother     Heart Attack Father     Heart Disease Father     Heart Disease Brother    Plan no  I told the patient his wife that it appears that the stem although subsided has a stabilized and hopefully this will consolidate over time. He has been given a prescription for but build up for his shoe. We will see him back here in 2 months for repeat x-rays or call for problems prior to that time      Provider Attestation:  Cesilia Zhao, personally performed the services described in this documentation.  All medical record entries made by the scribe were at my direction and in my presence. I have reviewed the chart and discharge instructions and agree that the records reflect my personal performance and is accurate and complete. Maria G Goodman MD. 06/20/22      Please note that this chart was generated using voice recognition Dragon dictation software. Although every effort was made to ensure the accuracy of this automated transcription, some errors in transcription may have occurred.

## 2022-06-23 ENCOUNTER — TELEPHONE (OUTPATIENT)
Dept: FAMILY MEDICINE CLINIC | Age: 67
End: 2022-06-23

## 2022-06-23 NOTE — TELEPHONE ENCOUNTER
Pt is requesting something for pain due to having rheumatoid arthritis. He states hands and hip are hurting. Patient does follow with ORTHO.     PLEASE ADVISE

## 2022-06-24 NOTE — TELEPHONE ENCOUNTER
I can offer a short course of steroids  But do recommend him meeting with rheumatology for this - he would likely get more long term control of painful symptoms

## 2022-06-24 NOTE — TELEPHONE ENCOUNTER
Patient states he has not taken anything in the past for this. States the ibuprofen helps him move around easier but doesn't help with the pain.

## 2022-06-27 RX ORDER — PREDNISONE 10 MG/1
TABLET ORAL
Qty: 30 TABLET | Refills: 0 | Status: SHIPPED | OUTPATIENT
Start: 2022-06-27

## 2022-07-12 RX ORDER — SERTRALINE HYDROCHLORIDE 25 MG/1
TABLET, FILM COATED ORAL
Qty: 30 TABLET | Refills: 3 | OUTPATIENT
Start: 2022-07-12

## 2022-07-13 RX ORDER — SERTRALINE HYDROCHLORIDE 25 MG/1
25 TABLET, FILM COATED ORAL DAILY
Qty: 30 TABLET | Refills: 3 | Status: SHIPPED | OUTPATIENT
Start: 2022-07-13 | End: 2022-10-03

## 2022-07-15 ENCOUNTER — TELEPHONE (OUTPATIENT)
Dept: FAMILY MEDICINE CLINIC | Age: 67
End: 2022-07-15

## 2022-07-15 NOTE — TELEPHONE ENCOUNTER
----- Message from [employer] [facility] [employer] sent at 7/15/2022 11:24 AM EDT -----  Subject: Medication Problem    Medication: predniSONE (DELTASONE) 10 MG tablet  Dosage: decreasing  Ordering Provider: Dr Pam Mckeon    Question/Problem: Patient is nervous, irritable, and shaky and would like   to know if this is a side effect from this medication.     Pharmacy: [facility] [phone]    ---------------------------------------------------------------------------  --------------  Diana CADE  [phone]; OK to leave message on voicemail  ---------------------------------------------------------------------------  --------------    SCRIPT ANSWERS  Relationship to Patient: Self

## 2022-07-29 RX ORDER — FERROUS SULFATE 325(65) MG
TABLET ORAL
Qty: 180 TABLET | Refills: 1 | Status: SHIPPED | OUTPATIENT
Start: 2022-07-29

## 2022-07-29 NOTE — TELEPHONE ENCOUNTER
Last visit: 4/28/22  Last Med refill: 5/5/22  Does patient have enough medication for 72 hours: Yes    Next Visit Date:  Future Appointments   Date Time Provider Yves Henriquez   8/22/2022  2:25 PM Stu Dempsey MD [address] [place] [address]   Topic Date Due    Pneumococcal 65+ years Vaccine (1 - PCV) Never done    DTaP/Tdap/Td vaccine (1 - Tdap) Never done    Shingles vaccine (1 of 2) Never done    Colorectal Cancer Screen  12/06/2020    Prostate Specific Antigen (PSA) Screening or Monitoring  02/25/2021    COVID-19 Vaccine (4 - Booster for Pfizer series) 04/21/2022    Flu vaccine (1) 09/01/2022    Depression Screen  12/14/2022    Annual Wellness Visit (AWV)  12/15/2022    Lipids  12/20/2022    AAA screen  Completed    Hepatitis C screen  Completed    Hepatitis A vaccine  Aged Out    Hepatitis B vaccine  Aged Out    Hib vaccine  Aged Out    Meningococcal (ACWY) vaccine  Aged Out       Hemoglobin A1C (%)   Date Value   01/10/2018 5.0             ( goal A1C is < 7)   No results found for: LABMICR  LDL Cholesterol (mg/dL)   Date Value   12/20/2021 68   03/10/2020 82       (goal LDL is <100)   AST (U/L)   Date Value   05/02/2022 15     ALT (U/L)   Date Value   05/02/2022 9     BUN (mg/dL)   Date Value   05/02/2022 16     BP Readings from Last 3 Encounters:   04/28/22 96/68   03/03/22 (!) 119/107   02/22/22 (!) 163/87          (goal 120/80)    All Future Testing planned in [facility]  Lab Frequency Next Occurrence   CT CHEST HIGH RESOLUTION Once 04/07/2022   Full PFT Study With Bronchodilator Once 04/07/2022               Patient Active Problem List:     Dyslipidemia     ED (erectile dysfunction)     Incomplete bladder emptying     Benign prostatic hyperplasia with urinary obstruction     History of colon cancer     PAF (paroxysmal atrial fibrillation) (HCC)     Anemia of chronic disease     Pallor of optic disc     Presbyopia     Incisional hernia, without obstruction or gangrene     Hypertrophic nonobstructive cardiomyopathy (HCC)     Iron (Fe) deficiency anemia     Primary osteoarthritis of right knee     History of malignant neoplasm of rectum     Hill's esophagus     CHF (congestive heart failure), NYHA class II, acute on chronic, combined (HCC)     Hypoxia     Dyspnea and respiratory abnormalities     Occupational pulmonary disease     Sinus bradycardia     Acute hypoxemic respiratory failure due to COVID-19 ([facility] [address]     COVID-19     Pneumonia     Acute respiratory failure with hypoxia (HCC)     Pulmonary fibrosis (HCC)     Syncope and collapse     Chronic anticoagulation     Severe malnutrition (HCC)     Cervical stenosis of spinal canal     Impingement syndrome of left shoulder     Multifocal pneumonia     Fracture of right hip     Community acquired bacterial pneumonia     Allergy to penicillin     Single subsegmental pulmonary embolism without acute cor pulmonale ([address]

## 2022-08-05 DIAGNOSIS — M17.11 PRIMARY OSTEOARTHRITIS OF RIGHT KNEE: ICD-10-CM

## 2022-08-05 RX ORDER — IBUPROFEN 800 MG/1
TABLET ORAL
Qty: 90 TABLET | Refills: 1 | Status: SHIPPED | OUTPATIENT
Start: 2022-08-05 | End: 2022-10-24

## 2022-08-05 NOTE — TELEPHONE ENCOUNTER
Last visit: 4/28/22  Last Med refill: 4/28/22  Does patient have enough medication for 72 hours: No:     Next Visit Date:  Future Appointments   Date Time Provider Yves Henriquez   8/22/2022  2:25 PM Trent Luna MD [address] [place] [address]   Topic Date Due    Pneumococcal 65+ years Vaccine (1 - PCV) Never done    DTaP/Tdap/Td vaccine (1 - Tdap) Never done    Shingles vaccine (1 of 2) Never done    Colorectal Cancer Screen  12/06/2020    Prostate Specific Antigen (PSA) Screening or Monitoring  02/25/2021    COVID-19 Vaccine (4 - Booster for Pfizer series) 04/21/2022    Flu vaccine (1) 09/01/2022    Depression Screen  12/14/2022    Annual Wellness Visit (AWV)  12/15/2022    Lipids  12/20/2022    AAA screen  Completed    Hepatitis C screen  Completed    Hepatitis A vaccine  Aged Out    Hepatitis B vaccine  Aged Out    Hib vaccine  Aged Out    Meningococcal (ACWY) vaccine  Aged Out       Hemoglobin A1C (%)   Date Value   01/10/2018 5.0             ( goal A1C is < 7)   No results found for: LABMICR  LDL Cholesterol (mg/dL)   Date Value   12/20/2021 68   03/10/2020 82       (goal LDL is <100)   AST (U/L)   Date Value   05/02/2022 15     ALT (U/L)   Date Value   05/02/2022 9     BUN (mg/dL)   Date Value   05/02/2022 16     BP Readings from Last 3 Encounters:   04/28/22 96/68   03/03/22 (!) 119/107   02/22/22 (!) 163/87          (goal 120/80)    All Future Testing planned in [facility]  Lab Frequency Next Occurrence   CT CHEST HIGH RESOLUTION Once 04/07/2022   Full PFT Study With Bronchodilator Once 04/07/2022               Patient Active Problem List:     Dyslipidemia     ED (erectile dysfunction)     Incomplete bladder emptying     Benign prostatic hyperplasia with urinary obstruction     History of colon cancer     PAF (paroxysmal atrial fibrillation) (HCC)     Anemia of chronic disease     Pallor of optic disc     Presbyopia     Incisional hernia, without obstruction or gangrene     Hypertrophic nonobstructive cardiomyopathy (HCC)     Iron (Fe) deficiency anemia     Primary osteoarthritis of right knee     History of malignant neoplasm of rectum     Hill's esophagus     CHF (congestive heart failure), NYHA class II, acute on chronic, combined (HCC)     Hypoxia     Dyspnea and respiratory abnormalities     Occupational pulmonary disease     Sinus bradycardia     Acute hypoxemic respiratory failure due to COVID-19 ([facility] [address]     COVID-19     Pneumonia     Acute respiratory failure with hypoxia (HCC)     Pulmonary fibrosis (HCC)     Syncope and collapse     Chronic anticoagulation     Severe malnutrition (HCC)     Cervical stenosis of spinal canal     Impingement syndrome of left shoulder     Multifocal pneumonia     Fracture of right hip     Community acquired bacterial pneumonia     Allergy to penicillin     Single subsegmental pulmonary embolism without acute cor pulmonale ([address]

## 2022-08-08 RX ORDER — TAMSULOSIN HYDROCHLORIDE 0.4 MG/1
CAPSULE ORAL
Qty: 90 CAPSULE | Refills: 1 | Status: SHIPPED | OUTPATIENT
Start: 2022-08-08

## 2022-08-08 NOTE — TELEPHONE ENCOUNTER
Last visit: 4/28/22  Last Med refill: 2/8/22  Does patient have enough medication for 72 hours: No:     Next Visit Date:  Future Appointments   Date Time Provider Yves Henriquez   8/22/2022  2:25 PM Nona Mullen MD [address] [place] [address]   Topic Date Due    Pneumococcal 65+ years Vaccine (1 - PCV) Never done    DTaP/Tdap/Td vaccine (1 - Tdap) Never done    Shingles vaccine (1 of 2) Never done    Colorectal Cancer Screen  12/06/2020    Prostate Specific Antigen (PSA) Screening or Monitoring  02/25/2021    COVID-19 Vaccine (4 - Booster for Pfizer series) 04/21/2022    Flu vaccine (1) 09/01/2022    Depression Screen  12/14/2022    Annual Wellness Visit (AWV)  12/15/2022    Lipids  12/20/2022    AAA screen  Completed    Hepatitis C screen  Completed    Hepatitis A vaccine  Aged Out    Hepatitis B vaccine  Aged Out    Hib vaccine  Aged Out    Meningococcal (ACWY) vaccine  Aged Out       Hemoglobin A1C (%)   Date Value   01/10/2018 5.0             ( goal A1C is < 7)   No results found for: LABMICR  LDL Cholesterol (mg/dL)   Date Value   12/20/2021 68   03/10/2020 82       (goal LDL is <100)   AST (U/L)   Date Value   05/02/2022 15     ALT (U/L)   Date Value   05/02/2022 9     BUN (mg/dL)   Date Value   05/02/2022 16     BP Readings from Last 3 Encounters:   04/28/22 96/68   03/03/22 (!) 119/107   02/22/22 (!) 163/87          (goal 120/80)    All Future Testing planned in [facility]  Lab Frequency Next Occurrence   CT CHEST HIGH RESOLUTION Once 04/07/2022   Full PFT Study With Bronchodilator Once 04/07/2022               Patient Active Problem List:     Dyslipidemia     ED (erectile dysfunction)     Incomplete bladder emptying     Benign prostatic hyperplasia with urinary obstruction     History of colon cancer     PAF (paroxysmal atrial fibrillation) (HCC)     Anemia of chronic disease     Pallor of optic disc     Presbyopia     Incisional hernia, without obstruction or gangrene     Hypertrophic nonobstructive cardiomyopathy (HCC)     Iron (Fe) deficiency anemia     Primary osteoarthritis of right knee     History of malignant neoplasm of rectum     Hill's esophagus     CHF (congestive heart failure), NYHA class II, acute on chronic, combined (HCC)     Hypoxia     Dyspnea and respiratory abnormalities     Occupational pulmonary disease     Sinus bradycardia     Acute hypoxemic respiratory failure due to COVID-19 ([facility] [address]     COVID-19     Pneumonia     Acute respiratory failure with hypoxia (HCC)     Pulmonary fibrosis (HCC)     Syncope and collapse     Chronic anticoagulation     Severe malnutrition (HCC)     Cervical stenosis of spinal canal     Impingement syndrome of left shoulder     Multifocal pneumonia     Fracture of right hip     Community acquired bacterial pneumonia     Allergy to penicillin     Single subsegmental pulmonary embolism without acute cor pulmonale ([address]

## 2022-08-16 NOTE — PLAN OF CARE
Nutrition Problem #1: Severe malnutrition  Intervention: Food and/or Nutrient Delivery: Continue Current Diet, Start Oral Nutrition Supplement  Nutritional Goals: Estimated protein-energy needs will be met from all sources Yes

## 2022-08-22 ENCOUNTER — OFFICE VISIT (OUTPATIENT)
Dept: ORTHOPEDIC SURGERY | Age: 67
End: 2022-08-22
Payer: MEDICARE

## 2022-08-22 DIAGNOSIS — Z96.649 S/P HIP HEMIARTHROPLASTY: Primary | ICD-10-CM

## 2022-08-22 PROCEDURE — G8420 CALC BMI NORM PARAMETERS: HCPCS | Performed by: ORTHOPAEDIC SURGERY

## 2022-08-22 PROCEDURE — 3017F COLORECTAL CA SCREEN DOC REV: CPT | Performed by: ORTHOPAEDIC SURGERY

## 2022-08-22 PROCEDURE — 99213 OFFICE O/P EST LOW 20 MIN: CPT | Performed by: ORTHOPAEDIC SURGERY

## 2022-08-22 PROCEDURE — 1123F ACP DISCUSS/DSCN MKR DOCD: CPT | Performed by: ORTHOPAEDIC SURGERY

## 2022-08-22 PROCEDURE — G8428 CUR MEDS NOT DOCUMENT: HCPCS | Performed by: ORTHOPAEDIC SURGERY

## 2022-08-22 PROCEDURE — 1036F TOBACCO NON-USER: CPT | Performed by: ORTHOPAEDIC SURGERY

## 2022-08-22 NOTE — PROGRESS NOTES
Mitzi Lantigua M.D.            [address] [place] [address]., [address] [facility], [id number] [place] [address]           Dept Phone: [phone]           Dept Fax:  [phone]           [place]          Dept Phone: [phone]           Dept Fax:  [phone]      Chief Compliant:  Chief Complaint   Patient presents with    Pain     Rt RAMONE HIP        History of Present Illness: This is a 69 y.o. male who presents to the clinic today for evaluation / follow up of status post right femoral hemiarthroplasty for displaced femoral neck fracture on 2/22/2022. Patient was noted at time of surgery that he was delayed for 5 days that he has severe restrictive lung disease. Patient's postop course been complicated by medical issues and he is also noted recent x-rays that he has had some subsidence of the stem. Patient is presently wearing a shoe lift on the right side. Patient is presently utilizing a cane but he also has a oxygen tank which she cannot use balance. Patient states that he has pain when he first gets up going once he is up and going is not too bad. He is actually complaining of more some anterior knee pain he is in the groin. Of note the patient is decided to go back on the list for lung transplant. .       Review of Systems   Constitutional: Negative for fever, chills, sweats. Eyes: Negative for changes in vision, or pain. HENT: Negative for ear ache, epistaxis, or sore throat. Respiratory/Cardio: Negative for Chest pain, palpitations, SOB, or cough. Gastrointestinal: Negative for abdominal pain, N/V/D. Genitourinary: Negative for dysuria, frequency, urgency, or hematuria. Neurological: Negative for headache, numbness, or weakness. Integumentary: Negative for rash, itching, laceration, or abrasion.    Musculoskeletal: Positive for Pain (Rt RAMONE HIP) Physical Exam:  Constitutional: Patient is oriented to person, place, and time. Patient appears well-developed and well nourished. HENT: Negative otherwise noted  Head: Normocephalic and Atraumatic  Nose: Normal  Eyes: Conjunctivae and EOM are normal  Neck: Normal range of motion Neck supple. Respiratory/Cardio: Effort normal. No respiratory distress. Musculoskeletal: Examination notes the patient has basically no pain going on flexion internal extra rotation when sitting down. When I load him he has a little bit of discomfort but otherwise he gets up and moves around fairly well. Again noted that he has the shoe lift on the right side. Neurological: Patient is alert and oriented to person, place, and time. Normal strength. No sensory deficit. Skin: Skin is warm and dry  Psychiatric: Behavior is normal. Thought content normal.  Nursing note and vitals reviewed. Labs and Imaging:     XR taken today:  XR HIP 1 VW W PELVIS RIGHT    Result Date: 8/22/2022  X-rays taken today reviewed by me show the AP of the pelvis. Patient status post right femoral hemiarthroplasty. The patient femoral stem has evidence for subsidence but has appeared to have settled when compared to all views taken for this past May as well as June. Obviously the leg length discrepancy with regards to the lesser trochanter is noted. No orders of the defined types were placed in this encounter. Assessment and Plan:  1. S/P hip hemiarthroplasty            This is a 69 y.o. male who presents to the clinic today for evaluation / follow up of status post right femoral head hemiarthroplasty with evidence for subsidence which appears to have stabilized based on the last 3 x-rays. .     Past History:    Current Outpatient Medications:     tamsulosin (FLOMAX) 0.4 MG capsule, take 1 capsule by mouth once daily, Disp: 90 capsule, Rfl: 1    ibuprofen (ADVIL;MOTRIN) 800 MG tablet, take 1 tablet by mouth three times a day if needed for MODERATE PAIN ( PAIN SCALE 4-6 ), Disp: 90 tablet, Rfl: 1    ferrous sulfate (IRON 325) 325 (65 Fe) MG tablet, take 1 tablet by mouth twice a day, Disp: 180 tablet, Rfl: 1    sertraline (ZOLOFT) 25 MG tablet, Take 1 tablet by mouth daily, Disp: 30 tablet, Rfl: 3    predniSONE (DELTASONE) 10 MG tablet, 4 tabs by mouth daily for 3 days, then 3 tabs daily for 3 days, then 2 tabs daily for 3 days, then 1 tab daily till gone., Disp: 30 tablet, Rfl: 0    atorvastatin (LIPITOR) 40 MG tablet, take 1 tablet by mouth once daily, Disp: 90 tablet, Rfl: 1    digoxin (LANOXIN) 125 MCG tablet, take 1 tablet by mouth once daily, Disp: 90 tablet, Rfl: 1    omeprazole (PRILOSEC) 40 MG delayed release capsule, Take 1 capsule by mouth daily, Disp: 90 capsule, Rfl: 1    ELIQUIS 5 MG TABS tablet, take 1 tablet by mouth twice a day, Disp: 60 tablet, Rfl: 5    midodrine (PROAMATINE) 5 MG tablet, Take 1 tablet by mouth 3 times daily (before meals), Disp: 90 tablet, Rfl: 3    Baclofen (LIORESAL) 5 MG tablet, take 1 tablet by mouth twice a day, Disp: 180 tablet, Rfl: 1    Handicap Placard MISC, by Does not apply route Expires 2026, Disp: 1 each, Rfl: 0  Allergies   Allergen Reactions    Adhesive Tape Other (See Comments)     Blister badly     Codeine Nausea Only     Other reaction(s): Other: See Comments  NAUSEA  Other reaction(s):  Other: See Comments  NAUSEA    Penicillins Swelling     As a baby  Other reaction(s): Unknown  As a baby    Nintedanib Diarrhea     10-15 BM daily     Social History     Socioeconomic History    Marital status: Single     Spouse name: Not on file    Number of children: Not on file    Years of education: Not on file    Highest education level: Not on file   Occupational History    Not on file   Tobacco Use    Smoking status: Former     Packs/day: 0.50     Years: 1.00     Pack years: 0.50     Types: Cigarettes     Quit date: 26     Years since quittin.6    Smokeless tobacco: Never    Tobacco comments: stated never actually really smoked only inhaled    Vaping Use    Vaping Use: Never used   Substance and Sexual Activity    Alcohol use: Yes     Alcohol/week: 12.0 standard drinks     Types: 2 Shots of liquor, 10 Standard drinks or equivalent per week     Comment: 2-3 times a week    Drug use: No     Types:  Other-see comments     Comment: Cocaine use in past in 1970's    Sexual activity: Yes     Partners: Female   Other Topics Concern    Not on file   Social History Narrative    Not on file     Social Determinants of Health     Financial Resource Strain: Not on file   Food Insecurity: Not on file   Transportation Needs: Not on file   Physical Activity: Not on file   Stress: Not on file   Social Connections: Not on file   Intimate Partner Violence: Not on file   Housing Stability: Not on file     Past Medical History:   Diagnosis Date    Atrial fibrillation ([facility] [address]     Back pain, chronic     Hill's esophagus 06/18/2019    Benign essential HTN     BPH (benign prostatic hyperplasia)     Cancer (HCC)     colon-rectal    Chronic idiopathic pulmonary fibrosis ([facility] [address] 03/29/2021    Cocaine abuse in remission [facility])     1970's    ED (erectile dysfunction) 4/2/2015    GERD (gastroesophageal reflux disease)     GI bleed 12/5/2018    Hernia     History of colon cancer     Melena     Migraines     Murmur, cardiac      Past Surgical History:   Procedure Laterality Date    CAPSULOTOMY, HAND  2020    CARDIAC CATHETERIZATION  11/29/2018    Non-obstructive CAD    COLECTOMY      2nd colectomy, Colostomy and reversed [address] time [place]    COLONOSCOPY      COLONOSCOPY  07/18/2016    COLONOSCOPY N/A 12/6/2018    COLONOSCOPY DIAGNOSTIC performed by Viola Astorga MD at [facility] Right 2009    inguinal    HIP SURGERY Right 2/22/2022    HIP FEMORAL HEMIARTHROPLASTY performed by Lucy Palumbo MD at [address] [facility] Right 1970's    arthrotomy    TONSILLECTOMY      TOTAL KNEE ARTHROPLASTY Right 1/8/2019    KNEE TOTAL ARTHROPLASTY performed by Mara Govea MD at [address] [place]     TRANSESOPHAGEAL ECHOCARDIOGRAM  11/29/2018    UPPER GASTROINTESTINAL ENDOSCOPY N/A 12/5/2018    EGD DIAGNOSTIC ONLY performed by Allyson Isaac MD at [facility]    UPPER GASTROINTESTINAL ENDOSCOPY N/A 6/18/2019    MCGUIRE'S     Family History   Problem Relation Age of Onset    Diabetes Mother     Heart Attack Father     Heart Disease Father     Heart Disease Brother    Thanh Romano  Patient overall is functioning fairly well regards to his right hip. He is continue exercises. He is obviously restricted by the bout of activity he can do due to his lung disease. He continue to exercise program.  At this point it will feel the patient needs to follow-up and I wish him good luck in his hopefully lung transplant in the future      Provider Attestation:  Noe Munoz, personally performed the services described in this documentation. All medical record entries made by the scribe were at my direction and in my presence. I have reviewed the chart and discharge instructions and agree that the records reflect my personal performance and is accurate and complete. Mara Govea MD. 08/22/22      Please note that this chart was generated using voice recognition Dragon dictation software. Although every effort was made to ensure the accuracy of this automated transcription, some errors in transcription may have occurred.

## 2022-09-17 DIAGNOSIS — I48.0 PAF (PAROXYSMAL ATRIAL FIBRILLATION) (HCC): ICD-10-CM

## 2022-09-17 DIAGNOSIS — I42.2 HYPERTROPHIC NONOBSTRUCTIVE CARDIOMYOPATHY (HCC): ICD-10-CM

## 2022-09-19 RX ORDER — APIXABAN 5 MG/1
TABLET, FILM COATED ORAL
Qty: 60 TABLET | Refills: 5 | Status: SHIPPED | OUTPATIENT
Start: 2022-09-19

## 2022-09-19 NOTE — TELEPHONE ENCOUNTER
Last visit: 4/28/22  Last Med refill: 3/23/22  Does patient have enough medication for 72 hours: Yes    Next Visit Date:  Future Appointments   Date Time Provider Yves Florence   9/22/2022  2:20 PM Milad Regan MD [facility] [address] Maintenance   Topic Date Due    Pneumococcal 65+ years Vaccine (1 - PCV) Never done    DTaP/Tdap/Td vaccine (1 - Tdap) Never done    Shingles vaccine (1 of 2) Never done    Colorectal Cancer Screen  12/06/2020    COVID-19 Vaccine (4 - Booster for Pfizer series) 04/21/2022    Flu vaccine (1) 09/01/2022    Depression Screen  12/14/2022    Annual Wellness Visit (AWV)  12/15/2022    Lipids  12/20/2022    AAA screen  Completed    Hepatitis C screen  Completed    Hepatitis A vaccine  Aged Out    Hepatitis B vaccine  Aged Out    Hib vaccine  Aged Out    Meningococcal (ACWY) vaccine  Aged Out       Hemoglobin A1C (%)   Date Value   01/10/2018 5.0             ( goal A1C is < 7)   No results found for: LABMICR  LDL Cholesterol (mg/dL)   Date Value   12/20/2021 68   03/10/2020 82       (goal LDL is <100)   AST (U/L)   Date Value   05/02/2022 15     ALT (U/L)   Date Value   05/02/2022 9     BUN (mg/dL)   Date Value   05/02/2022 16     BP Readings from Last 3 Encounters:   04/28/22 96/68   03/03/22 (!) 119/107   02/22/22 (!) 163/87          (goal 120/80)    All Future Testing planned in [facility]  Lab Frequency Next Occurrence   CT CHEST HIGH RESOLUTION Once 04/07/2022   Full PFT Study With Bronchodilator Once 04/07/2022               Patient Active Problem List:     Dyslipidemia     ED (erectile dysfunction)     Incomplete bladder emptying     Benign prostatic hyperplasia with urinary obstruction     History of colon cancer     PAF (paroxysmal atrial fibrillation) (HCC)     Anemia of chronic disease     Pallor of optic disc     Presbyopia     Incisional hernia, without obstruction or gangrene     Hypertrophic nonobstructive cardiomyopathy (HCC)     Iron (Fe) deficiency anemia     Primary osteoarthritis of right knee     History of malignant neoplasm of rectum     Hill's esophagus     CHF (congestive heart failure), NYHA class II, acute on chronic, combined (HCC)     Hypoxia     Dyspnea and respiratory abnormalities     Occupational pulmonary disease     Sinus bradycardia     Acute hypoxemic respiratory failure due to COVID-19 [facility])     COVID-19     Pneumonia     Acute respiratory failure with hypoxia (HCC)     Pulmonary fibrosis (HCC)     Syncope and collapse     Chronic anticoagulation     Severe malnutrition ([facility])     Cervical stenosis of spinal canal     Impingement syndrome of left shoulder     Multifocal pneumonia     Fracture of right hip     Community acquired bacterial pneumonia     Allergy to penicillin     Single subsegmental pulmonary embolism without acute cor pulmonale ([facility] [address]

## 2022-10-03 ENCOUNTER — TELEPHONE (OUTPATIENT)
Dept: FAMILY MEDICINE CLINIC | Age: 67
End: 2022-10-03

## 2022-10-03 NOTE — TELEPHONE ENCOUNTER
Zoloft increased to 50 mg daily, take 2 pills of current 25 mg dose daily till they are gone, then start 50 mg pills. Please notify.

## 2022-10-03 NOTE — TELEPHONE ENCOUNTER
Pt has an appointment scheduled for 11/8/2022. He is requesting an increase on medication for nerves.  He states is going through a lot and his mind just keeps going

## 2022-10-21 DIAGNOSIS — M17.11 PRIMARY OSTEOARTHRITIS OF RIGHT KNEE: ICD-10-CM

## 2022-10-24 RX ORDER — IBUPROFEN 800 MG/1
TABLET ORAL
Qty: 90 TABLET | Refills: 1 | Status: SHIPPED | OUTPATIENT
Start: 2022-10-24

## 2022-10-24 NOTE — TELEPHONE ENCOUNTER
Last visit: 04/28/2022  Last Med refill: 08/05/2022  Does patient have enough medication for 72 hours: No:     Next Visit Date:  Future Appointments   Date Time Provider Yves [place]   11/8/2022  4:30 PM Cassandra Houser  [address] Maintenance   Topic Date Due    Pneumococcal 65+ years Vaccine (1 - PCV) Never done    DTaP/Tdap/Td vaccine (1 - Tdap) Never done    Shingles vaccine (1 of 2) Never done    Colorectal Cancer Screen  12/06/2020    COVID-19 Vaccine (4 - Booster for Pfizer series) 02/15/2022    Flu vaccine (1) 08/01/2022    Depression Screen  12/14/2022    Annual Wellness Visit (AWV)  12/15/2022    Lipids  12/20/2022    AAA screen  Completed    Hepatitis C screen  Completed    Hepatitis A vaccine  Aged Out    Hib vaccine  Aged Out    Meningococcal (ACWY) vaccine  Aged Out       Hemoglobin A1C (%)   Date Value   01/10/2018 5.0             ( goal A1C is < 7)   No results found for: LABMICR  LDL Cholesterol (mg/dL)   Date Value   12/20/2021 68   03/10/2020 82       (goal LDL is <100)   AST (U/L)   Date Value   05/02/2022 15     ALT (U/L)   Date Value   05/02/2022 9     BUN (mg/dL)   Date Value   05/02/2022 16     BP Readings from Last 3 Encounters:   04/28/22 96/68   03/03/22 (!) 119/107   02/22/22 (!) 163/87          (goal 120/80)    All Future Testing planned in [facility]  Lab Frequency Next Occurrence   CT CHEST HIGH RESOLUTION Once 04/07/2022   Full PFT Study With Bronchodilator Once 04/07/2022               Patient Active Problem List:     Dyslipidemia     ED (erectile dysfunction)     Incomplete bladder emptying     Benign prostatic hyperplasia with urinary obstruction     History of colon cancer     PAF (paroxysmal atrial fibrillation) (HCC)     Anemia of chronic disease     Pallor of optic disc     Presbyopia     Incisional hernia, without obstruction or gangrene     Hypertrophic nonobstructive cardiomyopathy (HCC)     Iron (Fe) deficiency anemia     Primary osteoarthritis of right knee     History of malignant neoplasm of rectum     Hill's esophagus     CHF (congestive heart failure), NYHA class II, acute on chronic, combined (HCC)     Hypoxia     Dyspnea and respiratory abnormalities     Occupational pulmonary disease     Sinus bradycardia     Acute hypoxemic respiratory failure due to COVID-19 [facility])     COVID-19     Pneumonia     Acute respiratory failure with hypoxia (HCC)     Pulmonary fibrosis (HCC)     Syncope and collapse     Chronic anticoagulation     Severe malnutrition (HCC)     Cervical stenosis of spinal canal     Impingement syndrome of left shoulder     Multifocal pneumonia     Fracture of right hip     Community acquired bacterial pneumonia     Allergy to penicillin     Single subsegmental pulmonary embolism without acute cor pulmonale ([facility] [address]

## 2022-11-02 RX ORDER — SERTRALINE HYDROCHLORIDE 25 MG/1
TABLET, FILM COATED ORAL
Qty: 30 TABLET | Refills: 3 | Status: ON HOLD | OUTPATIENT
Start: 2022-11-02 | End: 2022-12-26 | Stop reason: HOSPADM

## 2022-11-02 NOTE — TELEPHONE ENCOUNTER
Last visit: [id number]  Last Med refill: [id number]  Does patient have enough medication for 72 hours:no    Next Visit Date:  Future Appointments   Date Time Provider Yves Henriquez   11/8/2022  4:30 PM Cassandra Singletary  [address] Maintenance   Topic Date Due    Pneumococcal 65+ years Vaccine (1 - PCV) Never done    DTaP/Tdap/Td vaccine (1 - Tdap) Never done    Shingles vaccine (1 of 2) Never done    Colorectal Cancer Screen  12/06/2020    COVID-19 Vaccine (4 - Booster for Pfizer series) 02/15/2022    Flu vaccine (1) 08/01/2022    Depression Screen  12/14/2022    Annual Wellness Visit (AWV)  12/15/2022    Lipids  12/20/2022    AAA screen  Completed    Hepatitis C screen  Completed    Hepatitis A vaccine  Aged Out    Hib vaccine  Aged Out    Meningococcal (ACWY) vaccine  Aged Out       Hemoglobin A1C (%)   Date Value   01/10/2018 5.0             ( goal A1C is < 7)   No results found for: LABMICR  LDL Cholesterol (mg/dL)   Date Value   12/20/2021 68   03/10/2020 82       (goal LDL is <100)   AST (U/L)   Date Value   05/02/2022 15     ALT (U/L)   Date Value   05/02/2022 9     BUN (mg/dL)   Date Value   05/02/2022 16     BP Readings from Last 3 Encounters:   04/28/22 96/68   03/03/22 (!) 119/107   02/22/22 (!) 163/87          (goal 120/80)    All Future Testing planned in [facility]  Lab Frequency Next Occurrence   CT CHEST HIGH RESOLUTION Once 04/07/2022   Full PFT Study With Bronchodilator Once 04/07/2022               Patient Active Problem List:     Dyslipidemia     ED (erectile dysfunction)     Incomplete bladder emptying     Benign prostatic hyperplasia with urinary obstruction     History of colon cancer     PAF (paroxysmal atrial fibrillation) (HCC)     Anemia of chronic disease     Pallor of optic disc     Presbyopia     Incisional hernia, without obstruction or gangrene     Hypertrophic nonobstructive cardiomyopathy (HCC)     Iron (Fe) deficiency anemia     Primary osteoarthritis of right knee     History of malignant neoplasm of rectum     Hill's esophagus     CHF (congestive heart failure), NYHA class II, acute on chronic, combined (HCC)     Hypoxia     Dyspnea and respiratory abnormalities     Occupational pulmonary disease     Sinus bradycardia     Acute hypoxemic respiratory failure due to COVID-19 [facility])     COVID-19     Pneumonia     Acute respiratory failure with hypoxia (HCC)     Pulmonary fibrosis (HCC)     Syncope and collapse     Chronic anticoagulation     Severe malnutrition ([facility])     Cervical stenosis of spinal canal     Impingement syndrome of left shoulder     Multifocal pneumonia     Fracture of right hip     Community acquired bacterial pneumonia     Allergy to penicillin     Single subsegmental pulmonary embolism without acute cor pulmonale ([facility] [address]

## 2022-11-02 NOTE — TELEPHONE ENCOUNTER
This is not our patient Last visit: 0  Last Med refill: 0  Does patient have enough medication for 72 hours: No:        Not our patient   Next Visit Date:  Future Appointments   Date Time Provider Yves Henriquez   11/8/2022  4:30 PM Cassandra Singletary  [address] Maintenance   Topic Date Due    Pneumococcal 65+ years Vaccine (1 - PCV) Never done    DTaP/Tdap/Td vaccine (1 - Tdap) Never done    Shingles vaccine (1 of 2) Never done    Colorectal Cancer Screen  12/06/2020    COVID-19 Vaccine (4 - Booster for Pfizer series) 02/15/2022    Flu vaccine (1) 08/01/2022    Depression Screen  12/14/2022    Annual Wellness Visit (AWV)  12/15/2022    Lipids  12/20/2022    AAA screen  Completed    Hepatitis C screen  Completed    Hepatitis A vaccine  Aged Out    Hib vaccine  Aged Out    Meningococcal (ACWY) vaccine  Aged Out       Hemoglobin A1C (%)   Date Value   01/10/2018 5.0             ( goal A1C is < 7)   No results found for: LABMICR  LDL Cholesterol (mg/dL)   Date Value   12/20/2021 68   03/10/2020 82       (goal LDL is <100)   AST (U/L)   Date Value   05/02/2022 15     ALT (U/L)   Date Value   05/02/2022 9     BUN (mg/dL)   Date Value   05/02/2022 16     BP Readings from Last 3 Encounters:   04/28/22 96/68   03/03/22 (!) 119/107   02/22/22 (!) 163/87          (goal 120/80)    All Future Testing planned in [facility]  Lab Frequency Next Occurrence   CT CHEST HIGH RESOLUTION Once 04/07/2022   Full PFT Study With Bronchodilator Once 04/07/2022               Patient Active Problem List:     Dyslipidemia     ED (erectile dysfunction)     Incomplete bladder emptying     Benign prostatic hyperplasia with urinary obstruction     History of colon cancer     PAF (paroxysmal atrial fibrillation) (HCC)     Anemia of chronic disease     Pallor of optic disc     Presbyopia     Incisional hernia, without obstruction or gangrene     Hypertrophic nonobstructive cardiomyopathy (HCC)     Iron (Fe) deficiency anemia     Primary osteoarthritis of right knee     History of malignant neoplasm of rectum     Hill's esophagus     CHF (congestive heart failure), NYHA class II, acute on chronic, combined (HCC)     Hypoxia     Dyspnea and respiratory abnormalities     Occupational pulmonary disease     Sinus bradycardia     Acute hypoxemic respiratory failure due to COVID-19 [facility])     COVID-19     Pneumonia     Acute respiratory failure with hypoxia (HCC)     Pulmonary fibrosis (HCC)     Syncope and collapse     Chronic anticoagulation     Severe malnutrition ([facility])     Cervical stenosis of spinal canal     Impingement syndrome of left shoulder     Multifocal pneumonia     Fracture of right hip     Community acquired bacterial pneumonia     Allergy to penicillin     Single subsegmental pulmonary embolism without acute cor pulmonale ([facility] [address]

## 2022-11-08 ENCOUNTER — OFFICE VISIT (OUTPATIENT)
Dept: FAMILY MEDICINE CLINIC | Age: 67
End: 2022-11-08
Payer: MEDICARE

## 2022-11-08 VITALS
DIASTOLIC BLOOD PRESSURE: 66 MMHG | OXYGEN SATURATION: 99 % | TEMPERATURE: 98.6 F | SYSTOLIC BLOOD PRESSURE: 98 MMHG | WEIGHT: 159 LBS | BODY MASS INDEX: 21.56 KG/M2 | HEART RATE: 69 BPM

## 2022-11-08 DIAGNOSIS — R06.89 DYSPNEA AND RESPIRATORY ABNORMALITIES: ICD-10-CM

## 2022-11-08 DIAGNOSIS — R06.00 DYSPNEA AND RESPIRATORY ABNORMALITIES: ICD-10-CM

## 2022-11-08 DIAGNOSIS — Z23 NEED FOR INFLUENZA VACCINATION: Primary | ICD-10-CM

## 2022-11-08 DIAGNOSIS — J84.10 PULMONARY FIBROSIS (HCC): ICD-10-CM

## 2022-11-08 DIAGNOSIS — Z96.641 HISTORY OF HEMIARTHROPLASTY OF RIGHT HIP: ICD-10-CM

## 2022-11-08 DIAGNOSIS — M17.11 PRIMARY OSTEOARTHRITIS OF RIGHT KNEE: ICD-10-CM

## 2022-11-08 DIAGNOSIS — Z79.01 CURRENT USE OF LONG TERM ANTICOAGULATION: ICD-10-CM

## 2022-11-08 PROCEDURE — G8484 FLU IMMUNIZE NO ADMIN: HCPCS | Performed by: INTERNAL MEDICINE

## 2022-11-08 PROCEDURE — 3017F COLORECTAL CA SCREEN DOC REV: CPT | Performed by: INTERNAL MEDICINE

## 2022-11-08 PROCEDURE — G0008 ADMIN INFLUENZA VIRUS VAC: HCPCS | Performed by: INTERNAL MEDICINE

## 2022-11-08 PROCEDURE — 90694 VACC AIIV4 NO PRSRV 0.5ML IM: CPT | Performed by: INTERNAL MEDICINE

## 2022-11-08 PROCEDURE — 1036F TOBACCO NON-USER: CPT | Performed by: INTERNAL MEDICINE

## 2022-11-08 PROCEDURE — 1123F ACP DISCUSS/DSCN MKR DOCD: CPT | Performed by: INTERNAL MEDICINE

## 2022-11-08 PROCEDURE — G8427 DOCREV CUR MEDS BY ELIG CLIN: HCPCS | Performed by: INTERNAL MEDICINE

## 2022-11-08 PROCEDURE — 99214 OFFICE O/P EST MOD 30 MIN: CPT | Performed by: INTERNAL MEDICINE

## 2022-11-08 PROCEDURE — G8420 CALC BMI NORM PARAMETERS: HCPCS | Performed by: INTERNAL MEDICINE

## 2022-11-08 RX ORDER — TRAMADOL HYDROCHLORIDE 50 MG/1
50 TABLET ORAL EVERY 8 HOURS PRN
Qty: 30 TABLET | Refills: 0 | Status: SHIPPED | OUTPATIENT
Start: 2022-11-08 | End: 2022-11-22

## 2022-11-08 RX ORDER — DOXYCYCLINE HYCLATE 100 MG
100 TABLET ORAL 2 TIMES DAILY
Qty: 14 TABLET | Refills: 0 | Status: SHIPPED | OUTPATIENT
Start: 2022-11-08 | End: 2022-11-15

## 2022-11-08 RX ORDER — SILDENAFIL CITRATE 20 MG/1
20 TABLET ORAL 3 TIMES DAILY
Qty: 90 TABLET | Refills: 1 | Status: SHIPPED | OUTPATIENT
Start: 2022-11-08

## 2022-11-08 SDOH — ECONOMIC STABILITY: FOOD INSECURITY: WITHIN THE PAST 12 MONTHS, THE FOOD YOU BOUGHT JUST DIDN'T LAST AND YOU DIDN'T HAVE MONEY TO GET MORE.: NEVER TRUE

## 2022-11-08 SDOH — ECONOMIC STABILITY: FOOD INSECURITY: WITHIN THE PAST 12 MONTHS, YOU WORRIED THAT YOUR FOOD WOULD RUN OUT BEFORE YOU GOT MONEY TO BUY MORE.: NEVER TRUE

## 2022-11-08 ASSESSMENT — ENCOUNTER SYMPTOMS
ABDOMINAL PAIN: 0
SPUTUM PRODUCTION: 0
SWOLLEN GLANDS: 0
RHINORRHEA: 0
HEMOPTYSIS: 0
WHEEZING: 1
VOMITING: 0
SHORTNESS OF BREATH: 1
SORE THROAT: 0
ORTHOPNEA: 0

## 2022-11-08 ASSESSMENT — PATIENT HEALTH QUESTIONNAIRE - PHQ9
SUM OF ALL RESPONSES TO PHQ QUESTIONS 1-9: 0
SUM OF ALL RESPONSES TO PHQ QUESTIONS 1-9: 0
2. FEELING DOWN, DEPRESSED OR HOPELESS: 0
SUM OF ALL RESPONSES TO PHQ9 QUESTIONS 1 & 2: 0
1. LITTLE INTEREST OR PLEASURE IN DOING THINGS: 0
SUM OF ALL RESPONSES TO PHQ QUESTIONS 1-9: 0
SUM OF ALL RESPONSES TO PHQ QUESTIONS 1-9: 0

## 2022-11-08 ASSESSMENT — SOCIAL DETERMINANTS OF HEALTH (SDOH): HOW HARD IS IT FOR YOU TO PAY FOR THE VERY BASICS LIKE FOOD, HOUSING, MEDICAL CARE, AND HEATING?: NOT VERY HARD

## 2022-11-08 NOTE — PROGRESS NOTES
[facility]  [address]  [place] [id number]  Dept: [phone]  Dept Fax: [phone]      Nolberto Marie is a 69 y.o. male who presents today for hismedical conditions/complaints as noted below. Nolberto Marie is c/o of Discuss Medications, Arthritis, and Shortness of Breath        Assessment/Plan:     1. Need for influenza vaccination  -     Influenza, FLUAD, (age 72 y+), IM, Preservative Free, 0.5 mL  2. Pulmonary fibrosis (HCC)  -     sildenafil (REVATIO) 20 MG tablet; Take 1 tablet by mouth 3 times daily, Disp-90 tablet, R-1Normal  3. Dyspnea and respiratory abnormalities  -     doxycycline hyclate (VIBRA-TABS) 100 MG tablet; Take 1 tablet by mouth 2 times daily for 7 days, Disp-14 tablet, R-0Normal  -     sildenafil (REVATIO) 20 MG tablet; Take 1 tablet by mouth 3 times daily, Disp-90 tablet, R-1Normal  4. Primary osteoarthritis of right knee  -     traMADol (ULTRAM) 50 MG tablet; Take 1 tablet by mouth every 8 hours as needed for Pain for up to 14 days. , Disp-30 tablet, R-0Normal  5. History of hemiarthroplasty of right hip  -     traMADol (ULTRAM) 50 MG tablet; Take 1 tablet by mouth every 8 hours as needed for Pain for up to 14 days. , Disp-30 tablet, R-0Normal  6. Current use of long term anticoagulation  -     traMADol (ULTRAM) 50 MG tablet; Take 1 tablet by mouth every 8 hours as needed for Pain for up to 14 days. , Disp-30 tablet, R-0Normal          No follow-ups on file. HPI     Arthritis  Pertinent negatives include no fever or rash. Shortness of Breath  This is a new problem. The current episode started in the past 7 days. The problem occurs constantly. The problem has been waxing and waning. The average episode lasts 1 week. Associated symptoms include wheezing.  Pertinent negatives include no abdominal pain, chest pain, claudication, coryza, ear pain, fever, headaches, hemoptysis, leg pain, leg swelling, neck pain, orthopnea, PND, rash, rhinorrhea, sore throat, sputum production, swollen glands, syncope or vomiting. The symptoms are aggravated by any activity. Treatments tried: oxygen. The treatment provided no relief. Notes his oxygen level drops so low that his pulseox at home does not register it beyond \"low\". Usually when this occurs he is in the hospital. His oxygen is 5LPM continuously now. Was seeing pulm [facility] [place] - tried Ofev and Esbriet - did not tolerate due to severe diarrhea. Decided not to pursue lung transplant so stopped going.        BP Readings from Last 3 Encounters:   11/08/22 98/66   04/28/22 96/68   03/03/22 (!) 119/107              Past Medical History:   Diagnosis Date    Atrial fibrillation (HCC)     Back pain, chronic     Hill's esophagus 06/18/2019    Benign essential HTN     BPH (benign prostatic hyperplasia)     Cancer (HCC)     colon-rectal    Chronic idiopathic pulmonary fibrosis ([facility] [address] 03/29/2021    Cocaine abuse in remission [facility])     1970's    ED (erectile dysfunction) 4/2/2015    GERD (gastroesophageal reflux disease)     GI bleed 12/5/2018    Hernia     History of colon cancer     Melena     Migraines     Murmur, cardiac       Past Surgical History:   Procedure Laterality Date    CARDIAC CATHETERIZATION  11/29/2018    Non-obstructive CAD    CARDIOVERSION  2020    COLECTOMY      2nd colectomy, Colostomy and reversed [address] time [place]    COLONOSCOPY      COLONOSCOPY  07/18/2016    COLONOSCOPY N/A 12/6/2018    COLONOSCOPY DIAGNOSTIC performed by [place] Hector MD at [facility] Right 2009    inguinal    HIP SURGERY Right 2/22/2022    HIP FEMORAL HEMIARTHROPLASTY performed by Sophie Keene MD at [address] [facility] Right 1970's    arthrotomy    TONSILLECTOMY      TOTAL KNEE ARTHROPLASTY Right 1/8/2019    KNEE TOTAL ARTHROPLASTY performed by Sophie Keene MD at [address]    TRANSESOPHAGEAL ECHOCARDIOGRAM  11/29/2018    UPPER GASTROINTESTINAL ENDOSCOPY N/A 2018    EGD DIAGNOSTIC ONLY performed by Anjelica Avila MD at [facility]    UPPER GASTROINTESTINAL ENDOSCOPY N/A 2019    MCGUIRE'S       Family History   Problem Relation Age of Onset    Diabetes Mother     Heart Attack Father     Heart Disease Father     Heart Disease Brother        Social History     Tobacco Use    Smoking status: Former     Packs/day: 0.50     Years: 1.00     Pack years: 0.50     Types: Cigarettes     Quit date:      Years since quittin.8    Smokeless tobacco: Never    Tobacco comments:     stated never actually really smoked only inhaled    Substance Use Topics    Alcohol use: Yes     Alcohol/week: 12.0 standard drinks     Types: 2 Shots of liquor, 10 Standard drinks or equivalent per week     Comment: 2-3 times a week        Allergies   Allergen Reactions    Adhesive Tape Other (See Comments)     Blister badly     Codeine Nausea Only     Other reaction(s): Other: See Comments  NAUSEA  Other reaction(s): Other: See Comments  NAUSEA    Penicillins Swelling     As a baby  Other reaction(s): Unknown  As a baby    Nintedanib Diarrhea     10-15 BM daily     Prior to Visit Medications    Medication Sig Taking?  Authorizing Provider   ibuprofen (ADVIL;MOTRIN) 800 MG tablet take 1 tablet by mouth three times a day if needed for MODERATE PAIN ( PAIN SCALE 4-6 ) Yes Cassandra Houser MD   sertraline (ZOLOFT) 50 MG tablet Take 1 tablet by mouth daily Yes Ketan Howell MD   ELIQUIS 5 MG TABS tablet take 1 tablet by mouth twice a day Yes MIRI Barrett NP   tamsulosin (FLOMAX) 0.4 MG capsule take 1 capsule by mouth once daily Yes Cassandra Houser MD   ferrous sulfate (IRON 325) 325 (65 Fe) MG tablet take 1 tablet by mouth twice a day Yes Cassandra Houser MD   atorvastatin (LIPITOR) 40 MG tablet take 1 tablet by mouth once daily Yes Cassandra Houser MD   digoxin (LANOXIN) 125 MCG tablet take 1 tablet by mouth once daily Yes Ketan Howell MD   omeprazole (PRILOSEC) 40 MG delayed release capsule Take 1 capsule by mouth daily Yes Cassandra Corado MD   midodrine (PROAMATINE) 5 MG tablet Take 1 tablet by mouth 3 times daily (before meals) Yes Tenisha Rivas DO   Baclofen (LIORESAL) 5 MG tablet take 1 tablet by mouth twice a day Yes Elizabeth Arita MD   Handicap Placard MISC by Does not apply route Expires 1/2026 Yes Cassandra Corado MD   sertraline (ZOLOFT) 25 MG tablet take 1 tablet by mouth once daily  Patient not taking: Reported on 11/8/2022  Diana Chung DO       Review of Systems     Review of Systems   Constitutional:  Negative for fever. HENT:  Negative for ear pain, rhinorrhea and sore throat. Respiratory:  Positive for shortness of breath and wheezing. Negative for hemoptysis and sputum production. Cardiovascular:  Negative for chest pain, orthopnea, claudication, leg swelling, syncope and PND. Gastrointestinal:  Negative for abdominal pain and vomiting. Musculoskeletal:  Positive for arthritis. Negative for neck pain. Skin:  Negative for rash. Neurological:  Negative for headaches. All other systems reviewed and are negative. Objective     BP 98/66   Pulse 69   Temp 98.6 °F (37 °C) (Temporal)   Wt 159 lb (72.1 kg)   SpO2 99%   BMI 21.56 kg/m²   Physical Exam      Data Review       Health Maintenance Due   Topic Date Due    Pneumococcal 65+ years Vaccine (1 - PCV) Never done    DTaP/Tdap/Td vaccine (1 - Tdap) Never done    Shingles vaccine (1 of 2) Never done    Colorectal Cancer Screen  12/06/2020    COVID-19 Vaccine (4 - Booster for Reis Peter series) 02/15/2022    Flu vaccine (1) 08/01/2022           Patient given educational materials- see patient instructions. Discussed use, benefit, and side effects of prescribedmedications. All patient questions answered. Pt voiced understanding. Reviewedhealth maintenance. Instructed to continue current medications, diet and exercise. Patient agreed with treatment plan. Follow up as directed. Electronically signedby Julio Victor MD on 11/8/2022

## 2022-11-08 NOTE — PROGRESS NOTES
Pt is here due to having breathing issues. He has good days and bad days. Patient is currently using 5 liters of oxygen.   He is worried about getting PNE again

## 2022-12-05 ENCOUNTER — TELEPHONE (OUTPATIENT)
Dept: FAMILY MEDICINE CLINIC | Age: 67
End: 2022-12-05

## 2022-12-05 DIAGNOSIS — J96.11 CHRONIC HYPOXEMIC RESPIRATORY FAILURE (HCC): Primary | ICD-10-CM

## 2022-12-05 DIAGNOSIS — R06.00 DYSPNEA AND RESPIRATORY ABNORMALITIES: ICD-10-CM

## 2022-12-05 DIAGNOSIS — R06.89 DYSPNEA AND RESPIRATORY ABNORMALITIES: ICD-10-CM

## 2022-12-05 RX ORDER — ALBUTEROL SULFATE 90 UG/1
2 AEROSOL, METERED RESPIRATORY (INHALATION) EVERY 4 HOURS PRN
Qty: 18 G | Refills: 0 | Status: SHIPPED | OUTPATIENT
Start: 2022-12-05 | End: 2023-12-05

## 2022-12-05 NOTE — TELEPHONE ENCOUNTER
I notified Oscar Huang of Albuterol being called in.  He said his wife will P/U the Trelegy so he can try and see if that helps

## 2022-12-05 NOTE — TELEPHONE ENCOUNTER
Patient is requesting a inhaler be called in due to having issues with his breathing. He said his oxygen has been increased to 8 liters.   PLEASE ADVISE

## 2022-12-06 RX ORDER — FLUTICASONE FUROATE, UMECLIDINIUM BROMIDE AND VILANTEROL TRIFENATATE 200; 62.5; 25 UG/1; UG/1; UG/1
1 POWDER RESPIRATORY (INHALATION) DAILY
Qty: 1 EACH | Refills: 0 | COMMUNITY
Start: 2022-12-06

## 2022-12-07 DIAGNOSIS — K22.70 BARRETT'S ESOPHAGUS DETERMINED BY ENDOSCOPY: ICD-10-CM

## 2022-12-07 NOTE — TELEPHONE ENCOUNTER
Last visit: 11/08/2022  Last Med refill: 09/10/2022  Does patient have enough medication for 72 hours: No:     Next Visit Date:  No future appointments.     Health Maintenance   Topic Date Due    Pneumococcal 65+ years Vaccine (1 - PCV) Never done    DTaP/Tdap/Td vaccine (1 - Tdap) Never done    Shingles vaccine (1 of 2) Never done    Colorectal Cancer Screen  12/06/2020    COVID-19 Vaccine (4 - Booster for Reis Peter series) 02/15/2022    Annual Wellness Visit (AWV)  12/15/2022    Lipids  12/20/2022    Depression Screen  11/08/2023    Flu vaccine  Completed    AAA screen  Completed    Hepatitis C screen  Completed    Hepatitis A vaccine  Aged Out    Hib vaccine  Aged Out    Meningococcal (ACWY) vaccine  Aged Out       Hemoglobin A1C (%)   Date Value   01/10/2018 5.0             ( goal A1C is < 7)   No results found for: LABMICR  LDL Cholesterol (mg/dL)   Date Value   12/20/2021 68   03/10/2020 82       (goal LDL is <100)   AST (U/L)   Date Value   05/02/2022 15     ALT (U/L)   Date Value   05/02/2022 9     BUN (mg/dL)   Date Value   05/02/2022 16     BP Readings from Last 3 Encounters:   11/08/22 98/66   04/28/22 96/68   03/03/22 (!) 119/107          (goal 120/80)    All Future Testing planned in [facility]  Lab Frequency Next Occurrence   CT CHEST HIGH RESOLUTION Once 04/07/2022   Full PFT Study With Bronchodilator Once 04/07/2022               Patient Active Problem List:     Dyslipidemia     ED (erectile dysfunction)     Incomplete bladder emptying     Benign prostatic hyperplasia with urinary obstruction     History of colon cancer     PAF (paroxysmal atrial fibrillation) (HCC)     Anemia of chronic disease     Pallor of optic disc     Presbyopia     Incisional hernia, without obstruction or gangrene     Hypertrophic nonobstructive cardiomyopathy (HCC)     Iron (Fe) deficiency anemia     Primary osteoarthritis of right knee     History of malignant neoplasm of rectum     Hill's esophagus     CHF (congestive heart failure), NYHA class II, acute on chronic, combined (HCC)     Hypoxia     Dyspnea and respiratory abnormalities     Occupational pulmonary disease     Sinus bradycardia     Acute hypoxemic respiratory failure due to COVID-19 [facility])     COVID-19     Pneumonia     Acute respiratory failure with hypoxia (HCC)     Pulmonary fibrosis (HCC)     Syncope and collapse     Chronic anticoagulation     Severe malnutrition (HCC)     Cervical stenosis of spinal canal     Impingement syndrome of left shoulder     Multifocal pneumonia     Fracture of right hip     Community acquired bacterial pneumonia     Allergy to penicillin     Single subsegmental pulmonary embolism without acute cor pulmonale ([facility] [place] [address]

## 2022-12-08 RX ORDER — OMEPRAZOLE 40 MG/1
CAPSULE, DELAYED RELEASE ORAL
Qty: 90 CAPSULE | Refills: 1 | Status: SHIPPED | OUTPATIENT
Start: 2022-12-08

## 2022-12-19 ENCOUNTER — HOSPITAL ENCOUNTER (INPATIENT)
Age: 67
LOS: 7 days | Discharge: HOME | DRG: 175 | End: 2022-12-26
Attending: EMERGENCY MEDICINE | Admitting: INTERNAL MEDICINE
Payer: MEDICARE

## 2022-12-19 ENCOUNTER — APPOINTMENT (OUTPATIENT)
Dept: CT IMAGING | Age: 67
DRG: 175 | End: 2022-12-19
Payer: MEDICARE

## 2022-12-19 ENCOUNTER — APPOINTMENT (OUTPATIENT)
Dept: GENERAL RADIOLOGY | Age: 67
DRG: 175 | End: 2022-12-19
Payer: MEDICARE

## 2022-12-19 DIAGNOSIS — R06.02 SHORTNESS OF BREATH: ICD-10-CM

## 2022-12-19 DIAGNOSIS — J81.0 ACUTE PULMONARY EDEMA (HCC): ICD-10-CM

## 2022-12-19 DIAGNOSIS — I26.99 PULMONARY EMBOLISM WITHOUT ACUTE COR PULMONALE, UNSPECIFIED CHRONICITY, UNSPECIFIED PULMONARY EMBOLISM TYPE (HCC): Primary | ICD-10-CM

## 2022-12-19 DIAGNOSIS — R55 SYNCOPE AND COLLAPSE: ICD-10-CM

## 2022-12-19 DIAGNOSIS — I26.99 ACUTE PULMONARY EMBOLISM WITHOUT ACUTE COR PULMONALE, UNSPECIFIED PULMONARY EMBOLISM TYPE (HCC): ICD-10-CM

## 2022-12-19 PROBLEM — I24.89 DEMAND ISCHEMIA OF MYOCARDIUM: Status: ACTIVE | Noted: 2022-01-01

## 2022-12-19 PROBLEM — I24.8 DEMAND ISCHEMIA OF MYOCARDIUM (HCC): Status: ACTIVE | Noted: 2022-01-01

## 2022-12-19 PROBLEM — J84.112 CHRONIC IDIOPATHIC PULMONARY FIBROSIS (HCC): Status: ACTIVE | Noted: 2020-12-08

## 2022-12-19 PROBLEM — I26.93 SINGLE SUBSEGMENTAL PULMONARY EMBOLISM WITHOUT ACUTE COR PULMONALE (HCC): Status: RESOLVED | Noted: 2022-04-28 | Resolved: 2022-12-19

## 2022-12-19 PROBLEM — J18.9 MULTIFOCAL PNEUMONIA: Status: RESOLVED | Noted: 2022-02-18 | Resolved: 2022-12-19

## 2022-12-19 PROBLEM — J96.02 ACUTE RESPIRATORY ACIDOSIS (HCC): Status: ACTIVE | Noted: 2022-01-01

## 2022-12-19 PROBLEM — I21.4 NSTEMI (NON-ST ELEVATED MYOCARDIAL INFARCTION) (HCC): Status: ACTIVE | Noted: 2022-01-01

## 2022-12-19 PROBLEM — I95.1 ORTHOSTATIC HYPOTENSION: Status: ACTIVE | Noted: 2022-12-19

## 2022-12-19 PROBLEM — J84.112 CHRONIC IDIOPATHIC PULMONARY FIBROSIS (HCC): Chronic | Status: ACTIVE | Noted: 2020-12-08

## 2022-12-19 PROBLEM — J15.9 COMMUNITY ACQUIRED BACTERIAL PNEUMONIA: Status: RESOLVED | Noted: 2022-02-18 | Resolved: 2022-12-19

## 2022-12-19 LAB
ABSOLUTE EOS #: 0.11 K/UL (ref 0–0.44)
ABSOLUTE IMMATURE GRANULOCYTE: 0.03 K/UL (ref 0–0.3)
ABSOLUTE LYMPH #: 0.93 K/UL (ref 1.1–3.7)
ABSOLUTE MONO #: 0.58 K/UL (ref 0.1–1.2)
ADENOVIRUS PCR: NOT DETECTED
ALBUMIN SERPL-MCNC: 3.2 G/DL (ref 3.5–5.2)
ALBUMIN/GLOBULIN RATIO: 1 (ref 1–2.5)
ALP BLD-CCNC: 83 U/L (ref 40–129)
ALT SERPL-CCNC: 20 U/L (ref 5–41)
ANION GAP SERPL CALCULATED.3IONS-SCNC: 9 MMOL/L (ref 9–17)
AST SERPL-CCNC: 26 U/L
BASOPHILS # BLD: 1 % (ref 0–2)
BASOPHILS ABSOLUTE: 0.05 K/UL (ref 0–0.2)
BILIRUB SERPL-MCNC: 0.7 MG/DL (ref 0.3–1.2)
BORDETELLA PARAPERTUSSIS: NOT DETECTED
BORDETELLA PERTUSSIS PCR: NOT DETECTED
BUN BLDV-MCNC: 24 MG/DL (ref 8–23)
CALCIUM SERPL-MCNC: 8.1 MG/DL (ref 8.6–10.4)
CARBOXYHEMOGLOBIN: 2 % (ref 0–5)
CHLAMYDIA PNEUMONIAE BY PCR: NOT DETECTED
CHLORIDE BLD-SCNC: 103 MMOL/L (ref 98–107)
CO2: 25 MMOL/L (ref 20–31)
CORONAVIRUS 229E PCR: NOT DETECTED
CORONAVIRUS HKU1 PCR: NOT DETECTED
CORONAVIRUS NL63 PCR: NOT DETECTED
CORONAVIRUS OC43 PCR: NOT DETECTED
CREAT SERPL-MCNC: 0.79 MG/DL (ref 0.7–1.2)
DIGOXIN LEVEL: 0.8 NG/ML (ref 0.5–2)
EOSINOPHILS RELATIVE PERCENT: 1 % (ref 1–4)
FIO2: ABNORMAL
GFR SERPL CREATININE-BSD FRML MDRD: >60 ML/MIN/1.73M2
GLUCOSE BLD-MCNC: 141 MG/DL (ref 70–99)
HCO3 VENOUS: 29.9 MMOL/L (ref 24–30)
HCT VFR BLD CALC: 39.6 % (ref 40.7–50.3)
HEMOGLOBIN: 11.8 G/DL (ref 13–17)
HUMAN METAPNEUMOVIRUS PCR: NOT DETECTED
IMMATURE GRANULOCYTES: 0 %
INFLUENZA A BY PCR: NOT DETECTED
INFLUENZA B BY PCR: NOT DETECTED
LACTIC ACID, WHOLE BLOOD: 1.1 MMOL/L (ref 0.7–2.1)
LYMPHOCYTES # BLD: 11 % (ref 24–43)
MAGNESIUM: 1.8 MG/DL (ref 1.6–2.6)
MCH RBC QN AUTO: 26.4 PG (ref 25.2–33.5)
MCHC RBC AUTO-ENTMCNC: 29.8 G/DL (ref 28.4–34.8)
MCV RBC AUTO: 88.6 FL (ref 82.6–102.9)
MONOCYTES # BLD: 7 % (ref 3–12)
MYCOPLASMA PNEUMONIAE PCR: NOT DETECTED
NRBC AUTOMATED: 0 PER 100 WBC
O2 SAT, VEN: 65.9 % (ref 60–85)
PARAINFLUENZA 1 PCR: NOT DETECTED
PARAINFLUENZA 2 PCR: NOT DETECTED
PARAINFLUENZA 3 PCR: NOT DETECTED
PARAINFLUENZA 4 PCR: NOT DETECTED
PARTIAL THROMBOPLASTIN TIME: 29.7 SEC (ref 20.5–30.5)
PATIENT TEMP: 37
PCO2, VEN: 68.6 MM HG (ref 39–55)
PDW BLD-RTO: 14.3 % (ref 11.8–14.4)
PH VENOUS: 7.26 (ref 7.32–7.42)
PLATELET # BLD: 277 K/UL (ref 138–453)
PMV BLD AUTO: 10.2 FL (ref 8.1–13.5)
PO2, VEN: 42.3 MM HG (ref 30–50)
POSITIVE BASE EXCESS, VEN: 1.6 MMOL/L (ref 0–2)
POTASSIUM SERPL-SCNC: 4 MMOL/L (ref 3.7–5.3)
PRO-BNP: ABNORMAL PG/ML
RBC # BLD: 4.47 M/UL (ref 4.21–5.77)
RESP SYNCYTIAL VIRUS PCR: NOT DETECTED
RHINO/ENTEROVIRUS PCR: NOT DETECTED
SARS-COV-2, PCR: NOT DETECTED
SARS-COV-2, RAPID: NOT DETECTED
SEG NEUTROPHILS: 80 % (ref 36–65)
SEGMENTED NEUTROPHILS ABSOLUTE COUNT: 6.75 K/UL (ref 1.5–8.1)
SODIUM BLD-SCNC: 137 MMOL/L (ref 135–144)
SPECIMEN DESCRIPTION: NORMAL
SPECIMEN DESCRIPTION: NORMAL
THYROXINE, FREE: 1.12 NG/DL (ref 0.93–1.7)
TOTAL PROTEIN: 6.5 G/DL (ref 6.4–8.3)
TROPONIN, HIGH SENSITIVITY: 33 NG/L (ref 0–22)
TROPONIN, HIGH SENSITIVITY: 35 NG/L (ref 0–22)
TSH SERPL DL<=0.05 MIU/L-ACNC: 7.55 UIU/ML (ref 0.3–5)
WBC # BLD: 8.5 K/UL (ref 3.5–11.3)

## 2022-12-19 PROCEDURE — 6360000004 HC RX CONTRAST MEDICATION: Performed by: STUDENT IN AN ORGANIZED HEALTH CARE EDUCATION/TRAINING PROGRAM

## 2022-12-19 PROCEDURE — 80162 ASSAY OF DIGOXIN TOTAL: CPT

## 2022-12-19 PROCEDURE — 85025 COMPLETE CBC W/AUTO DIFF WBC: CPT

## 2022-12-19 PROCEDURE — 71045 X-RAY EXAM CHEST 1 VIEW: CPT

## 2022-12-19 PROCEDURE — 6370000000 HC RX 637 (ALT 250 FOR IP): Performed by: STUDENT IN AN ORGANIZED HEALTH CARE EDUCATION/TRAINING PROGRAM

## 2022-12-19 PROCEDURE — 71260 CT THORAX DX C+: CPT | Performed by: STUDENT IN AN ORGANIZED HEALTH CARE EDUCATION/TRAINING PROGRAM

## 2022-12-19 PROCEDURE — 0202U NFCT DS 22 TRGT SARS-COV-2: CPT

## 2022-12-19 PROCEDURE — 82805 BLOOD GASES W/O2 SATURATION: CPT

## 2022-12-19 PROCEDURE — 83735 ASSAY OF MAGNESIUM: CPT

## 2022-12-19 PROCEDURE — 83880 ASSAY OF NATRIURETIC PEPTIDE: CPT

## 2022-12-19 PROCEDURE — 85730 THROMBOPLASTIN TIME PARTIAL: CPT

## 2022-12-19 PROCEDURE — 99223 1ST HOSP IP/OBS HIGH 75: CPT | Performed by: INTERNAL MEDICINE

## 2022-12-19 PROCEDURE — 94640 AIRWAY INHALATION TREATMENT: CPT

## 2022-12-19 PROCEDURE — 83605 ASSAY OF LACTIC ACID: CPT

## 2022-12-19 PROCEDURE — 36415 COLL VENOUS BLD VENIPUNCTURE: CPT

## 2022-12-19 PROCEDURE — 80053 COMPREHEN METABOLIC PANEL: CPT

## 2022-12-19 PROCEDURE — 93005 ELECTROCARDIOGRAM TRACING: CPT | Performed by: STUDENT IN AN ORGANIZED HEALTH CARE EDUCATION/TRAINING PROGRAM

## 2022-12-19 PROCEDURE — 90715 TDAP VACCINE 7 YRS/> IM: CPT | Performed by: STUDENT IN AN ORGANIZED HEALTH CARE EDUCATION/TRAINING PROGRAM

## 2022-12-19 PROCEDURE — 6360000002 HC RX W HCPCS: Performed by: STUDENT IN AN ORGANIZED HEALTH CARE EDUCATION/TRAINING PROGRAM

## 2022-12-19 PROCEDURE — 96374 THER/PROPH/DIAG INJ IV PUSH: CPT

## 2022-12-19 PROCEDURE — 99285 EMERGENCY DEPT VISIT HI MDM: CPT

## 2022-12-19 PROCEDURE — 84484 ASSAY OF TROPONIN QUANT: CPT

## 2022-12-19 PROCEDURE — 6370000000 HC RX 637 (ALT 250 FOR IP)

## 2022-12-19 PROCEDURE — 2580000003 HC RX 258: Performed by: STUDENT IN AN ORGANIZED HEALTH CARE EDUCATION/TRAINING PROGRAM

## 2022-12-19 PROCEDURE — 90471 IMMUNIZATION ADMIN: CPT | Performed by: STUDENT IN AN ORGANIZED HEALTH CARE EDUCATION/TRAINING PROGRAM

## 2022-12-19 PROCEDURE — 2060000000 HC ICU INTERMEDIATE R&B

## 2022-12-19 PROCEDURE — 70450 CT HEAD/BRAIN W/O DYE: CPT

## 2022-12-19 PROCEDURE — 84443 ASSAY THYROID STIM HORMONE: CPT

## 2022-12-19 PROCEDURE — 84439 ASSAY OF FREE THYROXINE: CPT

## 2022-12-19 PROCEDURE — 87635 SARS-COV-2 COVID-19 AMP PRB: CPT

## 2022-12-19 RX ORDER — SODIUM CHLORIDE 0.9 % (FLUSH) 0.9 %
5-40 SYRINGE (ML) INJECTION PRN
Status: DISCONTINUED | OUTPATIENT
Start: 2022-12-19 | End: 2022-12-26 | Stop reason: HOSPADM

## 2022-12-19 RX ORDER — HEPARIN SODIUM 1000 [USP'U]/ML
80 INJECTION, SOLUTION INTRAVENOUS; SUBCUTANEOUS PRN
Status: DISCONTINUED | OUTPATIENT
Start: 2022-12-19 | End: 2022-12-24

## 2022-12-19 RX ORDER — ONDANSETRON 2 MG/ML
4 INJECTION INTRAMUSCULAR; INTRAVENOUS ONCE
Status: COMPLETED | OUTPATIENT
Start: 2022-12-19 | End: 2022-12-19

## 2022-12-19 RX ORDER — ACETAMINOPHEN 650 MG/1
650 SUPPOSITORY RECTAL EVERY 6 HOURS PRN
Status: DISCONTINUED | OUTPATIENT
Start: 2022-12-19 | End: 2022-12-26 | Stop reason: HOSPADM

## 2022-12-19 RX ORDER — HEPARIN SODIUM 1000 [USP'U]/ML
40 INJECTION, SOLUTION INTRAVENOUS; SUBCUTANEOUS PRN
Status: DISCONTINUED | OUTPATIENT
Start: 2022-12-19 | End: 2022-12-24

## 2022-12-19 RX ORDER — POLYETHYLENE GLYCOL 3350 17 G/17G
17 POWDER, FOR SOLUTION ORAL DAILY PRN
Status: DISCONTINUED | OUTPATIENT
Start: 2022-12-19 | End: 2022-12-26 | Stop reason: HOSPADM

## 2022-12-19 RX ORDER — ONDANSETRON 4 MG/1
4 TABLET, ORALLY DISINTEGRATING ORAL EVERY 8 HOURS PRN
Status: DISCONTINUED | OUTPATIENT
Start: 2022-12-19 | End: 2022-12-26 | Stop reason: HOSPADM

## 2022-12-19 RX ORDER — HEPARIN SODIUM AND DEXTROSE 10000; 5 [USP'U]/100ML; G/100ML
5-30 INJECTION INTRAVENOUS CONTINUOUS
Status: DISCONTINUED | OUTPATIENT
Start: 2022-12-19 | End: 2022-12-24

## 2022-12-19 RX ORDER — ATORVASTATIN CALCIUM 40 MG/1
40 TABLET, FILM COATED ORAL DAILY
Status: DISCONTINUED | OUTPATIENT
Start: 2022-12-19 | End: 2022-12-26 | Stop reason: HOSPADM

## 2022-12-19 RX ORDER — TAMSULOSIN HYDROCHLORIDE 0.4 MG/1
0.4 CAPSULE ORAL DAILY
Status: DISCONTINUED | OUTPATIENT
Start: 2022-12-19 | End: 2022-12-26 | Stop reason: HOSPADM

## 2022-12-19 RX ORDER — ALBUTEROL SULFATE 90 UG/1
2 AEROSOL, METERED RESPIRATORY (INHALATION) EVERY 4 HOURS PRN
Status: DISCONTINUED | OUTPATIENT
Start: 2022-12-19 | End: 2022-12-25

## 2022-12-19 RX ORDER — SILDENAFIL CITRATE 20 MG/1
20 TABLET ORAL 3 TIMES DAILY
Status: DISCONTINUED | OUTPATIENT
Start: 2022-12-19 | End: 2022-12-26 | Stop reason: HOSPADM

## 2022-12-19 RX ORDER — ACETAMINOPHEN 325 MG/1
650 TABLET ORAL EVERY 6 HOURS PRN
Status: DISCONTINUED | OUTPATIENT
Start: 2022-12-19 | End: 2022-12-26 | Stop reason: HOSPADM

## 2022-12-19 RX ORDER — ONDANSETRON 2 MG/ML
4 INJECTION INTRAMUSCULAR; INTRAVENOUS EVERY 6 HOURS PRN
Status: DISCONTINUED | OUTPATIENT
Start: 2022-12-19 | End: 2022-12-26 | Stop reason: HOSPADM

## 2022-12-19 RX ORDER — SODIUM CHLORIDE 9 MG/ML
INJECTION, SOLUTION INTRAVENOUS PRN
Status: DISCONTINUED | OUTPATIENT
Start: 2022-12-19 | End: 2022-12-26 | Stop reason: HOSPADM

## 2022-12-19 RX ORDER — DIGOXIN 125 MCG
125 TABLET ORAL DAILY
Status: DISCONTINUED | OUTPATIENT
Start: 2022-12-19 | End: 2022-12-26 | Stop reason: HOSPADM

## 2022-12-19 RX ORDER — BUDESONIDE AND FORMOTEROL FUMARATE DIHYDRATE 80; 4.5 UG/1; UG/1
1 AEROSOL RESPIRATORY (INHALATION) 2 TIMES DAILY
Status: DISCONTINUED | OUTPATIENT
Start: 2022-12-19 | End: 2022-12-26 | Stop reason: HOSPADM

## 2022-12-19 RX ORDER — PANTOPRAZOLE SODIUM 40 MG/1
40 TABLET, DELAYED RELEASE ORAL
Status: DISCONTINUED | OUTPATIENT
Start: 2022-12-20 | End: 2022-12-26 | Stop reason: HOSPADM

## 2022-12-19 RX ORDER — SODIUM CHLORIDE 0.9 % (FLUSH) 0.9 %
5-40 SYRINGE (ML) INJECTION EVERY 12 HOURS SCHEDULED
Status: DISCONTINUED | OUTPATIENT
Start: 2022-12-19 | End: 2022-12-26 | Stop reason: HOSPADM

## 2022-12-19 RX ORDER — MIDODRINE HYDROCHLORIDE 5 MG/1
5 TABLET ORAL ONCE
Status: COMPLETED | OUTPATIENT
Start: 2022-12-19 | End: 2022-12-19

## 2022-12-19 RX ORDER — 0.9 % SODIUM CHLORIDE 0.9 %
500 INTRAVENOUS SOLUTION INTRAVENOUS ONCE
Status: COMPLETED | OUTPATIENT
Start: 2022-12-19 | End: 2022-12-19

## 2022-12-19 RX ADMIN — ONDANSETRON 4 MG: 2 INJECTION INTRAMUSCULAR; INTRAVENOUS at 16:18

## 2022-12-19 RX ADMIN — MIDODRINE HYDROCHLORIDE 5 MG: 5 TABLET ORAL at 15:55

## 2022-12-19 RX ADMIN — HEPARIN SODIUM 18 UNITS/KG/HR: 10000 INJECTION, SOLUTION INTRAVENOUS at 17:04

## 2022-12-19 RX ADMIN — SODIUM CHLORIDE 500 ML: 9 INJECTION, SOLUTION INTRAVENOUS at 14:27

## 2022-12-19 RX ADMIN — TETANUS TOXOID, REDUCED DIPHTHERIA TOXOID AND ACELLULAR PERTUSSIS VACCINE, ADSORBED 0.5 ML: 5; 2.5; 8; 8; 2.5 SUSPENSION INTRAMUSCULAR at 14:45

## 2022-12-19 RX ADMIN — IOPAMIDOL 75 ML: 755 INJECTION, SOLUTION INTRAVENOUS at 15:41

## 2022-12-19 RX ADMIN — BUDESONIDE AND FORMOTEROL FUMARATE DIHYDRATE 1 PUFF: 80; 4.5 AEROSOL RESPIRATORY (INHALATION) at 19:52

## 2022-12-19 ASSESSMENT — ENCOUNTER SYMPTOMS
NAUSEA: 0
BACK PAIN: 0
SHORTNESS OF BREATH: 1
DIARRHEA: 0
COLOR CHANGE: 0
VOMITING: 0
RHINORRHEA: 1
COUGH: 1

## 2022-12-19 ASSESSMENT — PAIN - FUNCTIONAL ASSESSMENT: PAIN_FUNCTIONAL_ASSESSMENT: NONE - DENIES PAIN

## 2022-12-19 NOTE — ACP (ADVANCE CARE PLANNING)
Advance Care Planning     Advance Care Planning Activator (Inpatient)  Conversation Note      Date of ACP Conversation: 12/19/2022     Conversation Conducted with: Patient with Decision Making Capacity    ACP Activator: Will Jeremiahkarol, [facility] Decision Maker:     Current Designated Health Care Decision Maker:     Primary Decision Maker: Alona Valdes (sig other) - Other - [phone]    Secondary Decision Maker: Lit Cartagena - Brother/Sister - [phone]  Click here to complete Healthcare Decision Makers including section of the Healthcare Decision Maker Relationship (ie \"Primary\")  Today we documented Decision Maker(s) consistent with ACP documents on file. Care Preferences    Ventilation: \"If you were in your present state of health and suddenly became very ill and were unable to breathe on your own, what would your preference be about the use of a ventilator (breathing machine) if it were available to you? \"      Would the patient desire the use of ventilator (breathing machine)?: no    \"If your health worsens and it becomes clear that your chance of recovery is unlikely, what would your preference be about the use of a ventilator (breathing machine) if it were available to you? \"     Would the patient desire the use of ventilator (breathing machine)?: No      Resuscitation  \"CPR works best to restart the heart when there is a sudden event, like a heart attack, in someone who is otherwise healthy. Unfortunately, CPR does not typically restart the heart for people who have serious health conditions or who are very sick. \"    \"In the event your heart stopped as a result of an underlying serious health condition, would you want attempts to be made to restart your heart (answer \"yes\" for attempt to resuscitate) or would you prefer a natural death (answer \"no\" for do not attempt to resuscitate)? \" no       [x] Yes   [] No   Educated Patient / Farrah Stanford regarding differences between Advance Directives and portable DNR orders. Length of ACP Conversation in minutes:  05    Conversation Outcomes:  [x] ACP discussion completed  [x] Existing advance directive reviewed with patient; no changes to patient's previously recorded wishes  [] New Advance Directive completed  [] Portable Do Not Rescitate prepared for Provider review and signature  [] POLST/POST/MOLST/MOST prepared for Provider review and signature      Follow-up plan:    [x] Schedule follow-up conversation to continue planning  [] Referred individual to Provider for additional questions/concerns   [] Advised patient/agent/surrogate to review completed ACP document and update if needed with changes in condition, patient preferences or care setting    [] This note routed to one or more involved healthcare providers        Patient has a DNR-CC on file. Wilfrid Og.  Cierra Henderson

## 2022-12-19 NOTE — H&P
[address]     Department of Internal Medicine - Staff Internal Medicine Teaching Service          ADMISSION NOTE/HISTORY AND PHYSICAL EXAMINATION   Date: 12/19/2022  Patient Name: Clive Griffin  Date of admission: 12/19/2022  2:07 PM  YOB: 1955  PCP: Froilan Singh MD  History Obtained From:  patient, electronic medical record    CHIEF COMPLAINT     Chief complaint: An episode of syncope    HISTORY OF PRESENTING ILLNESS     The patient is a pleasant 69 y.o. male with past medical history of A. fib, idiopathic pulmonary fibrosis with pulmonary hypertension, colorectal carcinoma, history of pulmonary embolism, BPH presents after an episode of near syncope. Patient states he was standing at his home and suddenly started feeling sweaty and feeling that he was going to pass out which made him sit on the floor. Denies any loss of consciousness, did not hit his head. Patient has a history of pulmonary embolism and A. fib and takes Eliquis at home, patient has a history of idiopathic pulmonary fibrosis-along with significant family history-also in the sister-has been taking sildenafil for moderate pulmonary hypertension. Uses 9 L of oxygen at home. States he has passed out multiple times in the past during which he was found to have COVID-pneumonia. As per the dispense report patient takes tamsulosin and sildenafil-patient is unsure of tamsulosin but takes sildenafil. Patient states he always feels dizzy when standing suddenly from lying down position. Last echo in February 22 showed ejection fraction 40 to 45%, RVSP 41 mmHg, mild to moderate mitral regurgitation. Patient denies any chest pain, abdominal pain, loose stool, burning urination, leg pain or swelling, no recent sick contact. in the ED patient was requiring 15 L of oxygen via nonrebreather and was desaturating when taken off nasal cannula as per the chart.   Hypotensive blood pressure 88/62, heart rate 69-rhythm A. Fib. proBNP elevated to 16,000.  2 sets of troponin 35-33. CT PE showed bilateral pulmonary embolism without right heart strain along with underlying honeycomb pattern at peripheral and bases and centrilobular paraseptal emphysema. Patient was started on heparin drip and admitted to internal medicine floor for further management. Has a history of colorectal cancer, had a colostomy in past which was later reversed.      Review of Systems:  General ROS: Completed and except as mentioned above were negative   HEENT ROS: Completed and except as mentioned above were negative   Allergy and Immunology ROS:  Completed and except as mentioned above were negative  Hematological and Lymphatic ROS:  Completed and except as mentioned above were negative  Respiratory ROS:  Completed and except as mentioned above were negative  Cardiovascular ROS:  Completed and except as mentioned above were negative  Gastrointestinal ROS: Completed and except as mentioned above were negative  Genito-Urinary ROS:  Completed and except as mentioned above were negative  Musculoskeletal ROS:  Completed and except as mentioned above were negative  Neurological ROS:  Completed and except as mentioned above were negative  Skin & Dermatological ROS:  Completed and except as mentioned above were negative  Psychological ROS:  Completed and except as mentioned above were negative    PAST MEDICAL HISTORY     Past Medical History:   Diagnosis Date    Atrial fibrillation ([facility] [address]     Back pain, chronic     Hill's esophagus 06/18/2019    Benign essential HTN     BPH (benign prostatic hyperplasia)     Cancer (HCC)     colon-rectal    Chronic idiopathic pulmonary fibrosis ([facility] [address] 03/29/2021    Cocaine abuse in remission [facility])     1970's    ED (erectile dysfunction) 4/2/2015    GERD (gastroesophageal reflux disease)     GI bleed 12/5/2018    Hernia     History of colon cancer     Melena     Migraines     Murmur, cardiac        PAST SURGICAL HISTORY Past Surgical History:   Procedure Laterality Date    CARDIAC CATHETERIZATION  11/29/2018    Non-obstructive CAD    CARDIOVERSION  2020    COLECTOMY      2nd colectomy, Colostomy and reversed [facility]      1st time Ksenia    COLONOSCOPY      COLONOSCOPY  07/18/2016    COLONOSCOPY N/A 12/6/2018    COLONOSCOPY DIAGNOSTIC performed by Favian Khan MD at [address] [facility] Right 2009    inguinal    HIP SURGERY Right 2/22/2022    HIP FEMORAL HEMIARTHROPLASTY performed by Savannah Ott MD at [address] [facility] Right 1970's    arthrotomy    TONSILLECTOMY      TOTAL KNEE ARTHROPLASTY Right 1/8/2019    KNEE TOTAL ARTHROPLASTY performed by Savannah Ott MD at [id number]    TRANSESOPHAGEAL ECHOCARDIOGRAM  11/29/2018    UPPER GASTROINTESTINAL ENDOSCOPY N/A 12/5/2018    EGD DIAGNOSTIC ONLY performed by Favian Khan MD at [facility] N/A 6/18/2019    MCGUIRE'S       ALLERGIES     Adhesive tape, Codeine, Penicillins, and Nintedanib    MEDICATIONS PRIOR TO ADMISSION     Prior to Admission medications    Medication Sig Start Date End Date Taking?  Authorizing Provider   OXYGEN Inhale 4 L into the lungs continuous Uses 4-5 liters 24/7   Yes Historical Provider, MD   omeprazole (PRILOSEC) 40 MG delayed release capsule take 1 capsule by mouth once daily 12/8/22   Cassandra Price MD   fluticasone-umeclidin-vilant (TRELEGY ELLIPTA) 200-62.5-25 MCG/ACT AEPB inhaler Inhale 1 puff into the lungs daily 12/6/22   Cassandra Price MD   albuterol sulfate HFA (VENTOLIN HFA) 108 (90 Base) MCG/ACT inhaler Inhale 2 puffs into the lungs every 4 hours as needed for Wheezing 12/5/22 12/5/23  Cassandra Price MD   sildenafil (REVATIO) 20 MG tablet Take 1 tablet by mouth 3 times daily 11/8/22   Cassandra Price MD   sertraline (ZOLOFT) 25 MG tablet take 1 tablet by mouth once daily  Patient not taking: Reported on 11/8/2022 11/2/22   Juve Grimaldo DO ibuprofen (ADVIL;MOTRIN) 800 MG tablet take 1 tablet by mouth three times a day if needed for MODERATE PAIN ( PAIN SCALE 4-6 ) 10/24/22   Cassandra Mitchell MD   sertraline (ZOLOFT) 50 MG tablet Take 1 tablet by mouth daily 10/3/22   Cassandra Mitchell MD   ELIQUIS 5 MG TABS tablet take 1 tablet by mouth twice a day 22   MIRI Archer - NP   tamsulosin [facility]) 0.4 MG capsule take 1 capsule by mouth once daily 22   Cassandra Mitchell MD   ferrous sulfate (IRON 325) 325 (65 Fe) MG tablet take 1 tablet by mouth twice a day 22   Cassandra Mitchell MD   atorvastatin (LIPITOR) 40 MG tablet take 1 tablet by mouth once daily 22   Cassandra Mitchell MD   digoxin Domingo Gamble) 125 MCG tablet take 1 tablet by mouth once daily 22   Cassandra Mitchell MD   midodrine (PROAMATINE) 5 MG tablet Take 1 tablet by mouth 3 times daily (before meals) 3/2/22   Tenisha Fink,    Baclofen (LIORESAL) 5 MG tablet take 1 tablet by mouth twice a day 22   Cassandra Mitchell MD   Handicap Placard MISC by Does not apply route Expires 21   Cassandra Mitchell MD       SOCIAL HISTORY     Tobacco: 0.5-year smoking history-quit 50 years ago  Alcohol: Occasionally  Illicits: No    FAMILY HISTORY     Family History   Problem Relation Age of Onset    Diabetes Mother     Heart Attack Father     Heart Disease Father     Heart Disease Brother        PHYSICAL EXAM     Vitals: BP 88/62   Pulse 69   Temp (!) 96.2 °F (35.7 °C) (Axillary)   Resp 15   Ht 6' (1.829 m)   Wt 159 lb (72.1 kg)   SpO2 100%   BMI 21.56 kg/m²   Tmax: Temp (24hrs), Av.2 °F (35.7 °C), Min:96.2 °F (35.7 °C), Max:96.2 °F (35.7 °C)    Last Body weight:   Wt Readings from Last 3 Encounters:   22 159 lb (72.1 kg)   22 159 lb (72.1 kg)   22 153 lb (69.4 kg)     Body Mass Index : Body mass index is 21.56 kg/m². PHYSICAL EXAMINATION:  Constitutional: This is a well developed, well nourished, 18.5-24.9 - Normal 69 y.o. year old male who is alert, oriented, cooperative and in no apparent distress. Head:normocephalic and atraumatic. EENT:  PERRLA. No conjunctival injections. Septum was midline, mucosa was without erythema, exudates or cobblestoning. No thrush was noted. Neck: Supple without thyromegaly. No elevated JVP. Trachea was midline. Respiratory: Chest was symmetrical without dullness to percussion. Breath sounds bilaterally were clear to auscultation. There were no wheezes, rhonchi or rales. There is no intercostal retraction or use of accessory muscles. No egophony noted. Cardiovascular: Regular without murmur, clicks, gallops or rubs. Abdomen: Slightly rounded and soft without organomegaly. No rebound, rigidity or guarding was appreciated. Lymphatic: No lymphadenopathy. Musculoskeletal: Normal curvature of the spine. No gross muscle weakness. Extremities:  No lower extremity edema, ulcerations, tenderness, varicosities or erythema. Muscle size, tone and strength are normal.  No involuntary movements are noted. Skin:  Warm and dry. Good color, turgor and pigmentation. No lesions or scars.   No cyanosis or clubbing  Neurological/Psychiatric: The patient's general behavior, level of consciousness, thought content and emotional status is normal.          INVESTIGATIONS     Laboratory Testing:     Recent Results (from the past 24 hour(s))   CBC with Auto Differential    Collection Time: 12/19/22  2:23 PM   Result Value Ref Range    WBC 8.5 3.5 - 11.3 k/uL    RBC 4.47 4.21 - 5.77 m/uL    Hemoglobin 11.8 (L) 13.0 - 17.0 g/dL    Hematocrit 39.6 (L) 40.7 - 50.3 %    MCV 88.6 82.6 - 102.9 fL    MCH 26.4 25.2 - 33.5 pg    MCHC 29.8 28.4 - 34.8 g/dL    RDW 14.3 11.8 - 14.4 %    Platelets 687 085 - 097 k/uL    MPV 10.2 8.1 - 13.5 fL    NRBC Automated 0.0 0.0 per 100 WBC    Seg Neutrophils 80 (H) 36 - 65 %    Lymphocytes 11 (L) 24 - 43 %    Monocytes 7 3 - 12 %    Eosinophils % 1 1 - 4 %    Basophils 1 0 - 2 %    Immature Granulocytes 0 0 %    Segs Absolute 6.75 1.50 - 8.10 k/uL    Absolute Lymph # 0.93 (L) 1.10 - 3.70 k/uL    Absolute Mono # 0.58 0.10 - 1.20 k/uL    Absolute Eos # 0.11 0.00 - 0.44 k/uL    Basophils Absolute 0.05 0.00 - 0.20 k/uL    Absolute Immature Granulocyte 0.03 0.00 - 0.30 k/uL   Comprehensive Metabolic Panel    Collection Time: 12/19/22  2:23 PM   Result Value Ref Range    Glucose 141 (H) 70 - 99 mg/dL    BUN 24 (H) 8 - 23 mg/dL    Creatinine 0.79 0.70 - 1.20 mg/dL    Est, Glom Filt Rate >60 >60 mL/min/1.73m2    Calcium 8.1 (L) 8.6 - 10.4 mg/dL    Sodium 137 135 - 144 mmol/L    Potassium 4.0 3.7 - 5.3 mmol/L    Chloride 103 98 - 107 mmol/L    CO2 25 20 - 31 mmol/L    Anion Gap 9 9 - 17 mmol/L    Alkaline Phosphatase 83 40 - 129 U/L    ALT 20 5 - 41 U/L    AST 26 <40 U/L    Total Bilirubin 0.7 0.3 - 1.2 mg/dL    Total Protein 6.5 6.4 - 8.3 g/dL    Albumin 3.2 (L) 3.5 - 5.2 g/dL    Albumin/Globulin Ratio 1.0 1.0 - 2.5   Magnesium    Collection Time: 12/19/22  2:23 PM   Result Value Ref Range    Magnesium 1.8 1.6 - 2.6 mg/dL   Troponin    Collection Time: 12/19/22  2:23 PM   Result Value Ref Range    Troponin, High Sensitivity 35 (H) 0 - 22 ng/L   Brain Natriuretic Peptide    Collection Time: 12/19/22  2:23 PM   Result Value Ref Range    Pro-BNP 16,378 (H) <300 pg/mL   TSH    Collection Time: 12/19/22  2:23 PM   Result Value Ref Range    TSH 7.55 (H) 0.30 - 5.00 uIU/mL   T4, Free    Collection Time: 12/19/22  2:23 PM   Result Value Ref Range    Thyroxine, Free 1.12 0.93 - 1.70 ng/dL   Digoxin Level    Collection Time: 12/19/22  2:23 PM   Result Value Ref Range    Digoxin Lvl 0.8 0.5 - 2.0 ng/mL   COVID-19, Rapid    Collection Time: 12/19/22  2:32 PM    Specimen: Nasopharyngeal Swab   Result Value Ref Range    Specimen Description . NASOPHARYNGEAL SWAB     SARS-CoV-2, Rapid Not Detected Not Detected   Troponin    Collection Time: 12/19/22  4:00 PM   Result Value Ref Range    Troponin, High Sensitivity 33 (H) 0 - 22 ng/L   Lactic Acid    Collection Time: 12/19/22  4:19 PM   Result Value Ref Range    Lactic Acid, Whole Blood 1.1 0.7 - 2.1 mmol/L   BLOOD GAS, VENOUS    Collection Time: 12/19/22  5:01 PM   Result Value Ref Range    pH, Fer 7.262 (L) 7.320 - 7.420    pCO2, Fer 68.6 (H) 39 - 55 mm Hg    pO2, Fer 42.3 30 - 50 mm Hg    HCO3, Venous 29.9 24 - 30 mmol/L    Positive Base Excess, Fer 1.6 0.0 - 2.0 mmol/L    O2 Sat, Fer 65.9 60.0 - 85.0 %    Carboxyhemoglobin 2.0 0 - 5 %    Pt Temp 37.0     FIO2 INFORMATION NOT PROVIDED        Imaging:   CT HEAD WO CONTRAST    Result Date: 12/19/2022  No acute intracranial abnormality. XR CHEST PORTABLE    Result Date: 12/19/2022  Pulmonary fibrotic changes are again identified. A superimposed pneumonia is not excluded. CT CHEST PULMONARY EMBOLISM W CONTRAST    Addendum Date: 12/19/2022    ADDENDUM: Discussed with Dr. Barb Posadas at 4:40 p.m. Result Date: 12/19/2022  Pulmonary emboli bilaterally as above. No evidence of right ventricular strain. Coronary artery disease and aortic valve disease. COPD and pulmonary fibrosis. RECOMMENDATIONS: The findings were sent to the Radiology Results [id number] at 4:35 pm on 12/19/2022 to be communicated to a licensed caregiver. ASSESSMENT & PLAN     ASSESSMENT / PLAN:     IMPRESSION  This is a 69 y.o. male who presented with near syncope and found to have pulmonary embolism. Patient admitted to inpatient status for the management of syncope and pulmonary embolism. Principal Problem:    Acute pulmonary embolism (HCC)-patient was already on Eliquis. No calf tenderness or swelling.  -Started on heparin drip  -We will get bilateral lower extremity venous Doppler    Heart failure with reduced ejection fraction 40 to 45% in February 2022 with mild to moderate MR  proBNP elevated-no signs of pulmonary congestion. Resumed home digoxin  We will get an echo.      Near Syncope and collapse  We will check orthostatics and echo. PAF (paroxysmal atrial fibrillation) (HCC)  On heparin drip currently. Blood pressure and heart rate stable now. Will use as needed Lopressor for rate control. Chronic idiopathic pulmonary fibrosis (HCC)  COPD stage II on PFT on 2/25/2021 with FEV1 of 55%  Symbicort and Proventil as needed. Maintain oxygen saturation above 92%. NSTEMI  -Troponins trending down. already on heparin for pulmonary embolism. Dyslipidemia  Resumed Home atorvastatin 40 mg     DVT ppx: Already on heparin drip  GI ppx: Not indicated at this time    PT/OT/SW-has been consulted  Discharge Planning:  consulted we will follow-up    Julieta Jordan MD  Internal Medicine Resident, PGY-1  [facility]; [place], [place]  12/19/2022, 5:16 PM   Attending Physician Statement  I have discussed the care of Jostin Chaney and I have examined the patient myselft and taken ros and hpi , including pertinent history and exam findings,  with the resident. I have reviewed the key elements of all parts of the encounter with the resident. I agree with the assessment, plan and orders as documented by the resident. Spent 55 minutes in reviewing data/medicines/talking to patient/family,  explaining and answering all the questions.         Electronically signed by Julienne Soulier, MD

## 2022-12-19 NOTE — ED PROVIDER NOTES
[facility] ED  Emergency Department Encounter  Emergency Medicine Resident     Pt Name:Juan Carlos Brown  MRN: [id number]  [place] 1955  Date of evaluation: 12/19/22  PCP:  Jeffrey Evans MD      [address]       Chief Complaint   Patient presents with    Fall    Loss of Consciousness       HISTORY OF PRESENT ILLNESS  (Location/Symptom, Timing/Onset, Context/Setting, Quality, Duration, Modifying Factors, Severity.)      Ronda Mcwilliams is a 69 y.o. male who presents with syncope versus near syncope at home. Past medical history sniffing for atrial fibrillation, hypertension, BPH, colorectal cancer. Patient states that he was in his kitchen when he felt he was going to pass out and lowered himself to the floor. He denies any loss of consciousness. States that he takes Eliquis is not missed any dosages. Does state he has been significantly short of breath. States that he is passed out multiple times in the past and each time is had COVID-pneumonia. Does state he has had a little bit increased oxygen requirement and was wearing 9 L at home. EMS state that once taken off of nasal cannula patient desaturates to the low 80s. EMS states the patient had a 20 foot cord for his oxygen tubing around the house. Patient denies any chest pain. He states that he feels short of breath. Denies any nausea vomiting or diarrhea. No known sick contacts. Is vaccinated gets COVID.     PAST MEDICAL / SURGICAL / SOCIAL / FAMILY HISTORY      has a past medical history of Atrial fibrillation ([address] Back pain, chronic, Hill's esophagus, Benign essential HTN, BPH (benign prostatic hyperplasia), Cancer ([address] Chronic idiopathic pulmonary fibrosis ([address] Cocaine abuse in remission [facility]), ED (erectile dysfunction), GERD (gastroesophageal reflux disease), GI bleed, Hernia, History of colon cancer, Melena, Migraines, and Murmur, cardiac.       has a past surgical history that includes Tonsillectomy; Colonoscopy; colectomy; Colonoscopy (2016); knee surgery (Right, ); transesophageal echocardiogram (2018); Upper gastrointestinal endoscopy (N/A, 2018); Colonoscopy (N/A, 2018); hernia repair (Right, ); Cardiac catheterization (2018); colectomy; Total knee arthroplasty (Right, 2019); Upper gastrointestinal endoscopy (N/A, 2019); Cardioversion (); and hip surgery (Right, 2022). Social History     Socioeconomic History    Marital status: Single     Spouse name: Not on file    Number of children: Not on file    Years of education: Not on file    Highest education level: Not on file   Occupational History    Not on file   Tobacco Use    Smoking status: Former     Packs/day: 0.50     Years: 1.00     Pack years: 0.50     Types: Cigarettes     Quit date: 26     Years since quittin.0    Smokeless tobacco: Never    Tobacco comments:     stated never actually really smoked only inhaled    Vaping Use    Vaping Use: Never used   Substance and Sexual Activity    Alcohol use: Yes     Alcohol/week: 12.0 standard drinks     Types: 2 Shots of liquor, 10 Standard drinks or equivalent per week     Comment: 2-3 times a week    Drug use: No     Types:  Other-see comments     Comment: Cocaine use in past in     Sexual activity: Yes     Partners: Female   Other Topics Concern    Not on file   Social History Narrative    Not on file     Social Determinants of Health     Financial Resource Strain: Low Risk     Difficulty of Paying Living Expenses: Not very hard   Food Insecurity: No Food Insecurity    Worried About Running Out of Food in the Last Year: Never true    Ran Out of Food in the Last Year: Never true   Transportation Needs: Not on file   Physical Activity: Not on file   Stress: Not on file   Social Connections: Not on file   Intimate Partner Violence: Not on file   Housing Stability: Not on file       Family History   Problem Relation Age of Onset    Diabetes Mother     Heart Attack Father     Heart Disease Father     Heart Disease Brother        Allergies:  Adhesive tape, Codeine, Penicillins, and Nintedanib    Home Medications:  Prior to Admission medications    Medication Sig Start Date End Date Taking?  Authorizing Provider   OXYGEN Inhale 4 L into the lungs continuous Uses 4-5 liters 24/7   Yes Historical Provider, MD   omeprazole (PRILOSEC) 40 MG delayed release capsule take 1 capsule by mouth once daily 12/8/22   Cassandra Garcia MD   fluticasone-umeclidin-vilant (TRELEGY ELLIPTA) 200-62.5-25 MCG/ACT AEPB inhaler Inhale 1 puff into the lungs daily 12/6/22   Cassandra Garcia MD   albuterol sulfate HFA (VENTOLIN HFA) 108 (90 Base) MCG/ACT inhaler Inhale 2 puffs into the lungs every 4 hours as needed for Wheezing 12/5/22 12/5/23  Cassandra Garcia MD   sildenafil (REVATIO) 20 MG tablet Take 1 tablet by mouth 3 times daily 11/8/22   Cassandra Garcia MD   sertraline (ZOLOFT) 25 MG tablet take 1 tablet by mouth once daily  Patient not taking: Reported on 11/8/2022 11/2/22   Nacho Meneses DO   ibuprofen (ADVIL;MOTRIN) 800 MG tablet take 1 tablet by mouth three times a day if needed for MODERATE PAIN ( PAIN SCALE 4-6 ) 10/24/22   Cassandra Garcia MD   sertraline (ZOLOFT) 50 MG tablet Take 1 tablet by mouth daily 10/3/22   Cassandra Garcia MD   ELIQUIS 5 MG TABS tablet take 1 tablet by mouth twice a day 9/19/22   MIRI Vazquez NP   tamsulosin (FLOMAX) 0.4 MG capsule take 1 capsule by mouth once daily 8/8/22   Cassandra Garcia MD   ferrous sulfate (IRON 325) 325 (65 Fe) MG tablet take 1 tablet by mouth twice a day 7/29/22   Cassandra Garcia MD   atorvastatin (LIPITOR) 40 MG tablet take 1 tablet by mouth once daily 4/28/22   Cassandra Garcia MD   digoxin (LANOXIN) 125 MCG tablet take 1 tablet by mouth once daily 4/28/22   Cassandra Garcia MD   midodrine (PROAMATINE) 5 MG tablet Take 1 tablet by mouth 3 times daily (before meals) 3/2/22   Tenisha Roxana Zamora DO   Baclofen (LIORESAL) 5 MG tablet take 1 tablet by mouth twice a day 1/27/22   Cassandra Valles MD   Handsandie Velasquez MISC by Does not apply route Expires 1/2026 1/19/21   Cassandra Valles MD       REVIEW OF SYSTEMS    (2-9 systems for level 4, 10 or more for level 5)      Review of Systems   Constitutional:  Negative for chills and fever. HENT:  Positive for congestion and rhinorrhea. Eyes:  Negative for visual disturbance. Respiratory:  Positive for cough and shortness of breath. Cardiovascular:  Negative for chest pain and leg swelling. Gastrointestinal:  Negative for diarrhea, nausea and vomiting. Genitourinary:  Negative for dysuria and hematuria. Musculoskeletal:  Negative for back pain and neck pain. Skin:  Negative for color change, pallor, rash and wound. Neurological:  Positive for syncope and light-headedness. Negative for seizures, weakness, numbness and headaches. PHYSICAL EXAM   (up to 7 for level 4, 8 or more for level 5)      INITIAL VITALS:   BP 88/62   Pulse 69   Temp (!) 96.2 °F (35.7 °C) (Axillary)   Resp 15   Ht 6' (1.829 m)   Wt 159 lb (72.1 kg)   SpO2 100%   BMI 21.56 kg/m²     Physical Exam  Constitutional:       General: He is not in acute distress. Appearance: He is not ill-appearing, toxic-appearing or diaphoretic. HENT:      Head: Normocephalic and atraumatic. Eyes:      Extraocular Movements: Extraocular movements intact. Pupils: Pupils are equal, round, and reactive to light. Cardiovascular:      Rate and Rhythm: Normal rate. Rhythm irregular. Heart sounds: No murmur heard. No friction rub. No gallop. Pulmonary:      Effort: No respiratory distress. Breath sounds: No stridor. No wheezing, rhonchi or rales. Chest:      Chest wall: No tenderness. Abdominal:      General: There is no distension. Palpations: There is no mass. Tenderness: There is no abdominal tenderness.  There is no right CVA tenderness, left CVA tenderness, guarding or rebound. Hernia: No hernia is present. Musculoskeletal:         General: No swelling, tenderness, deformity or signs of injury. Right lower leg: No edema. Left lower leg: No edema. Skin:     Capillary Refill: Capillary refill takes less than 2 seconds. Coloration: Skin is not jaundiced or pale. Findings: No bruising, erythema, lesion or rash. Neurological:      Mental Status: He is oriented to person, place, and time. Cranial Nerves: No cranial nerve deficit. Sensory: No sensory deficit. Motor: No weakness.       Coordination: Coordination normal.       DIFFERENTIAL  DIAGNOSIS     PLAN (LABS / IMAGING / EKG):  Orders Placed This Encounter   Procedures    COVID-19, Rapid    Respiratory Panel, Molecular, with COVID-19 (Restricted: peds pts or suitable admitted adults)    CT CHEST PULMONARY EMBOLISM W CONTRAST    XR CHEST PORTABLE    CT HEAD WO CONTRAST    CBC with Auto Differential    Comprehensive Metabolic Panel    Magnesium    Troponin    Brain Natriuretic Peptide    TSH    T4, Free    Digoxin Level    Lactic Acid    BLOOD GAS, VENOUS    APTT    Telemetry monitoring - 72 hour duration    Inpatient consult to Internal Medicine    Inpatient consult to Vascular Surgery    ECHO Complete 2D W Doppler W Color    ADMIT TO INPATIENT       MEDICATIONS ORDERED:  Orders Placed This Encounter   Medications    0.9 % sodium chloride bolus    Tetanus-Diphth-Acell Pertussis (BOOSTRIX) injection 0.5 mL    midodrine (PROAMATINE) tablet 5 mg    iopamidol (ISOVUE-370) 76 % injection 75 mL    ondansetron (ZOFRAN) injection 4 mg    heparin 25,000 units in dextrose 5 % 250 mL infusion (rate based)    heparin (porcine) injection 5,770 Units    heparin (porcine) injection 2,880 Units       DDX: PE, epilepsy, syncope, thoracic aortic dissection, arrhythmia, electrolyte abnormality, acute CVA, intracranial mass, hypothyroidism, CHF exacerbation    DIAGNOSTIC RESULTS / EMERGENCY DEPARTMENT COURSE / MDM   LAB RESULTS:  Results for orders placed or performed during the hospital encounter of 12/19/22   COVID-19, Rapid    Specimen: Nasopharyngeal Swab   Result Value Ref Range    Specimen Description . NASOPHARYNGEAL SWAB     SARS-CoV-2, Rapid Not Detected Not Detected   CBC with Auto Differential   Result Value Ref Range    WBC 8.5 3.5 - 11.3 k/uL    RBC 4.47 4.21 - 5.77 m/uL    Hemoglobin 11.8 (L) 13.0 - 17.0 g/dL    Hematocrit 39.6 (L) 40.7 - 50.3 %    MCV 88.6 82.6 - 102.9 fL    MCH 26.4 25.2 - 33.5 pg    MCHC 29.8 28.4 - 34.8 g/dL    RDW 14.3 11.8 - 14.4 %    Platelets 199 130 - 243 k/uL    MPV 10.2 8.1 - 13.5 fL    NRBC Automated 0.0 0.0 per 100 WBC    Seg Neutrophils 80 (H) 36 - 65 %    Lymphocytes 11 (L) 24 - 43 %    Monocytes 7 3 - 12 %    Eosinophils % 1 1 - 4 %    Basophils 1 0 - 2 %    Immature Granulocytes 0 0 %    Segs Absolute 6.75 1.50 - 8.10 k/uL    Absolute Lymph # 0.93 (L) 1.10 - 3.70 k/uL    Absolute Mono # 0.58 0.10 - 1.20 k/uL    Absolute Eos # 0.11 0.00 - 0.44 k/uL    Basophils Absolute 0.05 0.00 - 0.20 k/uL    Absolute Immature Granulocyte 0.03 0.00 - 0.30 k/uL   Comprehensive Metabolic Panel   Result Value Ref Range    Glucose 141 (H) 70 - 99 mg/dL    BUN 24 (H) 8 - 23 mg/dL    Creatinine 0.79 0.70 - 1.20 mg/dL    Est, Glom Filt Rate >60 >60 mL/min/1.73m2    Calcium 8.1 (L) 8.6 - 10.4 mg/dL    Sodium 137 135 - 144 mmol/L    Potassium 4.0 3.7 - 5.3 mmol/L    Chloride 103 98 - 107 mmol/L    CO2 25 20 - 31 mmol/L    Anion Gap 9 9 - 17 mmol/L    Alkaline Phosphatase 83 40 - 129 U/L    ALT 20 5 - 41 U/L    AST 26 <40 U/L    Total Bilirubin 0.7 0.3 - 1.2 mg/dL    Total Protein 6.5 6.4 - 8.3 g/dL    Albumin 3.2 (L) 3.5 - 5.2 g/dL    Albumin/Globulin Ratio 1.0 1.0 - 2.5   Magnesium   Result Value Ref Range    Magnesium 1.8 1.6 - 2.6 mg/dL   Troponin   Result Value Ref Range    Troponin, High Sensitivity 35 (H) 0 - 22 ng/L   Troponin Result Value Ref Range    Troponin, High Sensitivity 33 (H) 0 - 22 ng/L   Brain Natriuretic Peptide   Result Value Ref Range    Pro-BNP 16,378 (H) <300 pg/mL   TSH   Result Value Ref Range    TSH 7.55 (H) 0.30 - 5.00 uIU/mL   T4, Free   Result Value Ref Range    Thyroxine, Free 1.12 0.93 - 1.70 ng/dL   Digoxin Level   Result Value Ref Range    Digoxin Lvl 0.8 0.5 - 2.0 ng/mL   Lactic Acid   Result Value Ref Range    Lactic Acid, Whole Blood 1.1 0.7 - 2.1 mmol/L       IMPRESSION: Elevation of patient's BNP. Otherwise labs. Be mostly baseline. No leukocytosis or anemia. RADIOLOGY:  CT CHEST PULMONARY EMBOLISM W CONTRAST   Final Result   Addendum (preliminary) 1 of 1   ADDENDUM:   Discussed with Dr. Joe Posadas at 4:40 p.m. Final   Pulmonary emboli bilaterally as above. No evidence of right ventricular   strain. Coronary artery disease and aortic valve disease. COPD and   pulmonary fibrosis. RECOMMENDATIONS:   The findings were sent to the Radiology Results [id number] at 4:35   pm on 12/19/2022 to be communicated to a licensed caregiver. CT HEAD WO CONTRAST   Final Result   No acute intracranial abnormality. XR CHEST PORTABLE   Final Result   Pulmonary fibrotic changes are again identified. A superimposed pneumonia is   not excluded. EKG  Atrial fibrillation with PVC, slight right axis deviation, intervals otherwise appear within normal limits, no acute ST elevation noted, T wave abnormality noted in V3 V4 V5 V6, there is gross flattening, abnormal EKG,    All EKG's are interpreted by the Emergency Department Physician who either signs or Co-signs this chart in the absence of a cardiologist.    EMERGENCY DEPARTMENT COURSE:  68-year-old male fall loss of consciousness on Eliquis. History of PE in the past as well as surgery. Broad work-up initiated. CT head shows no intracranial hemorrhage or mass.   Elevation in BNP however patient is slightly hypotensive no indication for diuresis at this time. Patiently given 500 cc of fluid emergency department. Patient not been on his midodrine therefore that was given with some improvement in symptoms. Low sufficient for infectious etiology at this time. CT PE study showing bilateral pulmonary emboli with no right heart strain. Patient already on Eliquis therefore heparin bolus held off but will give infusion. Vascular surgery consulted and patient admitted to internal medicine stepdown unit. No notes of EC Admission Criteria type on file. PROCEDURES:  N/a    CONSULTS:  IP CONSULT TO INTERNAL MEDICINE  IP CONSULT TO VASCULAR SURGERY    CRITICAL CARE:  N/a    FINAL IMPRESSION      1. Pulmonary embolism without acute cor pulmonale, unspecified chronicity, unspecified pulmonary embolism type ([address]    2. Syncope and collapse    3. Shortness of breath          DISPOSITION / PLAN     DISPOSITION Admitted 12/19/2022 04:39:02 PM      PATIENT REFERRED TO:  No follow-up provider specified.     DISCHARGE MEDICATIONS:  New Prescriptions    No medications on file       Nuvia Murguia DO  Emergency Medicine Resident    (Please note that portions of thisnote were completed with a voice recognition program.  Efforts were made to edit the dictations but occasionally words are mis-transcribed.)        Pao Vizcaino DO  Resident  12/19/22 [id number]

## 2022-12-19 NOTE — ED TRIAGE NOTES
Pt brought to ED by EMS after pt wife called after finding pt on the floor. Pt states that they passed out. Pt claims that this is not the first time he has experienced syncope. Pt has no obvious trauma to the head, pt states that they slid down the all. Pt takes blood thinners and all other prescribed medications at home as directed. Pt states that they wear 9L of O2 at home. Pt alert and oriented. Pt connected to monitor. EKG done. Pt connected to 15L via nonrebreather mask.

## 2022-12-19 NOTE — ED PROVIDER NOTES
[address] [facility]     Emergency Department     Faculty Attestation    I performed a history and physical examination of the patient and discussed management with the resident. I reviewed the resident´s note and agree with the documented findings and plan of care. Any areas of disagreement are noted on the chart. I was personally present for the key portions of any procedures. I have documented in the chart those procedures where I was not present during the key portions. I have reviewed the emergency nurses triage note. I agree with the chief complaint, past medical history, past surgical history, allergies, medications, social and family history as documented unless otherwise noted below. For Physician Assistant/ Nurse Practitioner cases/documentation I have personally evaluated this patient and have completed at least one if not all key elements of the E/M (history, physical exam, and MDM). Additional findings are as noted. Patient comes in after syncope, patient is tachypneic, hypoxic, equal breath sounds, heart irregularly irregular with grade 2/6 systolic murmur heard over the right upper sternal border. Abdomen soft and nontender, no pulsatile mass, no lower extremity pain or swelling on examination.        EKG Interpretation    Interpreted by emergency department physician    Rhythm: Atrial fibrillation  Rate: 85  Axis: 108 degrees  Ectopy: none  Conduction: Atrial fibrillation  Inferior ST and T wave changes  Q Waves: none    Clinical Impression: Abnormal EKG    Cedrick Hughes MD  12/19/22 1420       Nicolle Chilel MD  12/19/22 1440

## 2022-12-20 PROBLEM — I26.99 PULMONARY EMBOLISM WITHOUT ACUTE COR PULMONALE (HCC): Status: ACTIVE | Noted: 2022-12-20

## 2022-12-20 LAB
ABSOLUTE EOS #: 0.24 K/UL (ref 0–0.44)
ABSOLUTE IMMATURE GRANULOCYTE: 0.03 K/UL (ref 0–0.3)
ABSOLUTE LYMPH #: 0.81 K/UL (ref 1.1–3.7)
ABSOLUTE MONO #: 0.53 K/UL (ref 0.1–1.2)
ANION GAP SERPL CALCULATED.3IONS-SCNC: 5 MMOL/L (ref 9–17)
BASOPHILS # BLD: 1 % (ref 0–2)
BASOPHILS ABSOLUTE: 0.04 K/UL (ref 0–0.2)
BUN BLDV-MCNC: 21 MG/DL (ref 8–23)
CA 19-9: 114 U/ML (ref 0–35)
CALCIUM SERPL-MCNC: 8.1 MG/DL (ref 8.6–10.4)
CARCINOEMBRYONIC ANTIGEN: 4.8 NG/ML
CHLORIDE BLD-SCNC: 104 MMOL/L (ref 98–107)
CO2: 30 MMOL/L (ref 20–31)
CREAT SERPL-MCNC: 0.63 MG/DL (ref 0.7–1.2)
EOSINOPHILS RELATIVE PERCENT: 4 % (ref 1–4)
GFR SERPL CREATININE-BSD FRML MDRD: >60 ML/MIN/1.73M2
GLUCOSE BLD-MCNC: 111 MG/DL (ref 70–99)
HCT VFR BLD CALC: 35.5 % (ref 40.7–50.3)
HEMOGLOBIN: 10.7 G/DL (ref 13–17)
IMMATURE GRANULOCYTES: 0 %
INR BLD: 1.4
LYMPHOCYTES # BLD: 12 % (ref 24–43)
MCH RBC QN AUTO: 27 PG (ref 25.2–33.5)
MCHC RBC AUTO-ENTMCNC: 30.1 G/DL (ref 28.4–34.8)
MCV RBC AUTO: 89.6 FL (ref 82.6–102.9)
MONOCYTES # BLD: 8 % (ref 3–12)
NRBC AUTOMATED: 0 PER 100 WBC
PARTIAL THROMBOPLASTIN TIME: 33.9 SEC (ref 20.5–30.5)
PARTIAL THROMBOPLASTIN TIME: 40.6 SEC (ref 20.5–30.5)
PARTIAL THROMBOPLASTIN TIME: 51.9 SEC (ref 20.5–30.5)
PDW BLD-RTO: 14.3 % (ref 11.8–14.4)
PLATELET # BLD: 246 K/UL (ref 138–453)
PMV BLD AUTO: 10 FL (ref 8.1–13.5)
POTASSIUM SERPL-SCNC: 4 MMOL/L (ref 3.7–5.3)
PROSTATE SPECIFIC ANTIGEN: 0.46 NG/ML
PROTHROMBIN TIME: 14.5 SEC (ref 9.1–12.3)
RBC # BLD: 3.96 M/UL (ref 4.21–5.77)
SEG NEUTROPHILS: 75 % (ref 36–65)
SEGMENTED NEUTROPHILS ABSOLUTE COUNT: 5.18 K/UL (ref 1.5–8.1)
SODIUM BLD-SCNC: 139 MMOL/L (ref 135–144)
WBC # BLD: 6.8 K/UL (ref 3.5–11.3)

## 2022-12-20 PROCEDURE — 94761 N-INVAS EAR/PLS OXIMETRY MLT: CPT

## 2022-12-20 PROCEDURE — 6370000000 HC RX 637 (ALT 250 FOR IP): Performed by: STUDENT IN AN ORGANIZED HEALTH CARE EDUCATION/TRAINING PROGRAM

## 2022-12-20 PROCEDURE — 2060000000 HC ICU INTERMEDIATE R&B

## 2022-12-20 PROCEDURE — 85025 COMPLETE CBC W/AUTO DIFF WBC: CPT

## 2022-12-20 PROCEDURE — 6360000002 HC RX W HCPCS: Performed by: STUDENT IN AN ORGANIZED HEALTH CARE EDUCATION/TRAINING PROGRAM

## 2022-12-20 PROCEDURE — 86301 IMMUNOASSAY TUMOR CA 19-9: CPT

## 2022-12-20 PROCEDURE — 97166 OT EVAL MOD COMPLEX 45 MIN: CPT

## 2022-12-20 PROCEDURE — 85730 THROMBOPLASTIN TIME PARTIAL: CPT

## 2022-12-20 PROCEDURE — 82378 CARCINOEMBRYONIC ANTIGEN: CPT

## 2022-12-20 PROCEDURE — 97162 PT EVAL MOD COMPLEX 30 MIN: CPT

## 2022-12-20 PROCEDURE — 2580000003 HC RX 258

## 2022-12-20 PROCEDURE — 2700000000 HC OXYGEN THERAPY PER DAY

## 2022-12-20 PROCEDURE — 80048 BASIC METABOLIC PNL TOTAL CA: CPT

## 2022-12-20 PROCEDURE — 85610 PROTHROMBIN TIME: CPT

## 2022-12-20 PROCEDURE — G0103 PSA SCREENING: HCPCS

## 2022-12-20 PROCEDURE — 93970 EXTREMITY STUDY: CPT

## 2022-12-20 PROCEDURE — 6370000000 HC RX 637 (ALT 250 FOR IP)

## 2022-12-20 PROCEDURE — 97530 THERAPEUTIC ACTIVITIES: CPT

## 2022-12-20 PROCEDURE — 99223 1ST HOSP IP/OBS HIGH 75: CPT | Performed by: INTERNAL MEDICINE

## 2022-12-20 PROCEDURE — 99222 1ST HOSP IP/OBS MODERATE 55: CPT | Performed by: STUDENT IN AN ORGANIZED HEALTH CARE EDUCATION/TRAINING PROGRAM

## 2022-12-20 PROCEDURE — 97535 SELF CARE MNGMENT TRAINING: CPT

## 2022-12-20 PROCEDURE — 94640 AIRWAY INHALATION TREATMENT: CPT

## 2022-12-20 PROCEDURE — 36415 COLL VENOUS BLD VENIPUNCTURE: CPT

## 2022-12-20 RX ORDER — ECHINACEA PURPUREA EXTRACT 125 MG
1 TABLET ORAL PRN
Status: DISCONTINUED | OUTPATIENT
Start: 2022-12-20 | End: 2022-12-26 | Stop reason: HOSPADM

## 2022-12-20 RX ADMIN — IPRATROPIUM BROMIDE 0.5 MG: 0.5 SOLUTION RESPIRATORY (INHALATION) at 16:30

## 2022-12-20 RX ADMIN — IPRATROPIUM BROMIDE 0.5 MG: 0.5 SOLUTION RESPIRATORY (INHALATION) at 20:37

## 2022-12-20 RX ADMIN — DIGOXIN 125 MCG: 125 TABLET ORAL at 01:22

## 2022-12-20 RX ADMIN — BUDESONIDE AND FORMOTEROL FUMARATE DIHYDRATE 1 PUFF: 80; 4.5 AEROSOL RESPIRATORY (INHALATION) at 20:37

## 2022-12-20 RX ADMIN — DESMOPRESSIN ACETATE 40 MG: 0.2 TABLET ORAL at 01:22

## 2022-12-20 RX ADMIN — TAMSULOSIN HYDROCHLORIDE 0.4 MG: 0.4 CAPSULE ORAL at 08:44

## 2022-12-20 RX ADMIN — ACETAMINOPHEN 650 MG: 325 TABLET ORAL at 02:19

## 2022-12-20 RX ADMIN — HEPARIN SODIUM 2880 UNITS: 1000 INJECTION INTRAVENOUS; SUBCUTANEOUS at 20:45

## 2022-12-20 RX ADMIN — SODIUM CHLORIDE, PRESERVATIVE FREE 10 ML: 5 INJECTION INTRAVENOUS at 01:23

## 2022-12-20 RX ADMIN — SILDENAFIL 20 MG: 20 TABLET ORAL at 01:22

## 2022-12-20 RX ADMIN — SILDENAFIL 20 MG: 20 TABLET ORAL at 20:25

## 2022-12-20 RX ADMIN — BUDESONIDE AND FORMOTEROL FUMARATE DIHYDRATE 1 PUFF: 80; 4.5 AEROSOL RESPIRATORY (INHALATION) at 09:13

## 2022-12-20 RX ADMIN — DESMOPRESSIN ACETATE 40 MG: 0.2 TABLET ORAL at 08:44

## 2022-12-20 RX ADMIN — IPRATROPIUM BROMIDE 0.5 MG: 0.5 SOLUTION RESPIRATORY (INHALATION) at 11:27

## 2022-12-20 RX ADMIN — HEPARIN SODIUM 2880 UNITS: 1000 INJECTION INTRAVENOUS; SUBCUTANEOUS at 02:49

## 2022-12-20 RX ADMIN — HEPARIN SODIUM 2880 UNITS: 1000 INJECTION INTRAVENOUS; SUBCUTANEOUS at 14:30

## 2022-12-20 RX ADMIN — DIGOXIN 125 MCG: 125 TABLET ORAL at 10:43

## 2022-12-20 RX ADMIN — SILDENAFIL 20 MG: 20 TABLET ORAL at 14:17

## 2022-12-20 RX ADMIN — IPRATROPIUM BROMIDE 0.5 MG: 0.5 SOLUTION RESPIRATORY (INHALATION) at 09:13

## 2022-12-20 RX ADMIN — ACETAMINOPHEN 650 MG: 325 TABLET ORAL at 20:25

## 2022-12-20 RX ADMIN — SILDENAFIL 20 MG: 20 TABLET ORAL at 08:44

## 2022-12-20 RX ADMIN — PANTOPRAZOLE SODIUM 40 MG: 40 TABLET, DELAYED RELEASE ORAL at 08:44

## 2022-12-20 ASSESSMENT — ENCOUNTER SYMPTOMS
VOMITING: 0
ABDOMINAL PAIN: 0
PHOTOPHOBIA: 0
COUGH: 1
NAUSEA: 0
SHORTNESS OF BREATH: 1
WHEEZING: 0
SORE THROAT: 0

## 2022-12-20 NOTE — PROGRESS NOTES
Occupational Therapy  Facility/Department: [facility] CAR 2- STEPDOWN  Occupational Therapy Initial Assessment    Name: Suresh Hudson  : 1955  MRN: [id number]  Date of Service: 2022    Chief Complaint   Patient presents with    Fall    Loss of Consciousness     Discharge Recommendations:  Patient would benefit from continued therapy after discharge        Patient Diagnosis(es): The primary encounter diagnosis was Pulmonary embolism without acute cor pulmonale, unspecified chronicity, unspecified pulmonary embolism type ([address] Diagnoses of Syncope and collapse and Shortness of breath were also pertinent to this visit. Past Medical History:  has a past medical history of Atrial fibrillation ([address] Back pain, chronic, Hill's esophagus, Benign essential HTN, BPH (benign prostatic hyperplasia), Cancer ([address] Chronic idiopathic pulmonary fibrosis ([address] Cocaine abuse in remission ([address] Community acquired bacterial pneumonia, ED (erectile dysfunction), GERD (gastroesophageal reflux disease), GI bleed, Hernia, History of colon cancer, Melena, Migraines, Multifocal pneumonia, Murmur, cardiac, and Single subsegmental pulmonary embolism without acute cor pulmonale ([address] Past Surgical History:  has a past surgical history that includes Tonsillectomy; Colonoscopy; colectomy; Colonoscopy (2016); knee surgery (Right, 's); transesophageal echocardiogram (2018); Upper gastrointestinal endoscopy (N/A, 2018); Colonoscopy (N/A, 2018); hernia repair (Right, ); Cardiac catheterization (2018); colectomy; Total knee arthroplasty (Right, 2019); Upper gastrointestinal endoscopy (N/A, 2019); Cardioversion (); and hip surgery (Right, 2022). Assessment   Performance deficits / Impairments: Decreased functional mobility ; Decreased balance;Decreased high-level IADLs;Decreased ADL status; Decreased endurance;Decreased strength  Assessment: Pt with significantly limited activity tolerance impairing pt's ability to engage in ADL tasks and functional transfers/functional mobility as pt desaturations significantly with all exertion and requires prolonged seated rest breaks to recover SOB/work of breathing. Continued therapy services recommended while hospitalized and at discharge to maximize pt's safety and independence in performing functional tasks. Prognosis: Good;Fair  Decision Making: Medium Complexity  REQUIRES OT FOLLOW-UP: Yes  Activity Tolerance  Activity Tolerance: Patient limited by fatigue (SOB/increased work of breathing)      Safety Devices  Type of Devices: Call light within reach; Left in bed;Nurse notified  Restraints  Restraints Initially in Place: No    Plan   Occupational Therapy Plan  Times Per Week: 3-5x/wk  Current Treatment Recommendations: Functional mobility training, Endurance training, Patient/Caregiver education & training, Equipment evaluation, education, & procurement, Self-Care / ADL, Home management training, Safety education & training     Restrictions  Restrictions/Precautions  Restrictions/Precautions: Fall Risk, General Precautions  Required Braces or Orthoses?: No  Position Activity Restriction  Other position/activity restrictions: Up as tolerated. Pt with 2 inch leg length difference from prior trauma- has shoe with lift. RLE shorter. Subjective   General  Patient assessed for rehabilitation services?: Yes  Family / Caregiver Present: No  General Comment  Comments: RN ok'd for therapy this AM. Pt agreeable to participate in session and pleasant/cooperative throughout. Pt reports chronic pain to R foot however does not quantify. Pt with 7L O2 via nasal canula in place throughout session.      Social/Functional History  Social/Functional History  Lives With: Spouse  Type of Home: House  Home Layout: Laundry in basement, Two level, Bed/Bath upstairs  Home Access: Stairs to enter with rails  Entrance Stairs - Number of Steps: 3 outdoor steps  Entrance Stairs - Rails: Both  Bathroom Shower/Tub: Tub/Shower unit  Bathroom Toilet: Standard  Bathroom Equipment: Shower chair (pt reports grab bars being installed in the shower currently and just obtaining a shower chair yesterday)  Bathroom Accessibility: Accessible  Home Equipment: Floria Madison, rolling, Oxygen (home O2 at all times, was on 5L O2 however increased to 8-10L O2 ~2wks ago)  Has the patient had two or more falls in the past year or any fall with injury in the past year?: No  Receives Help From: Family (Pt reports supportive wife however states wife does work daily from 4am-1230pm)  ADL Assistance: Independent  Homemaking Assistance: Needs assistance  Homemaking Responsibilities: Yes (all IADL tasks able to be shared with or complete by spouse)  Meal Prep Responsibility: Secondary  Laundry Responsibility: Secondary  Cleaning Responsibility: Secondary  Shopping Responsibility: Secondary  Health Care Management: Primary  Ambulation Assistance: Independent (uses RW at the start of each day and progresses to the cane if he feels able to)  Transfer Assistance: Independent  Active : Yes  Mode of Transportation: Truck  Occupation: Retired  Type of Occupation: Previously worked in a [address] and as a   Leisure & Hobbies: Enjoys spending time with family       Objective   Balance  Sitting: Supervision (seated EOB ~10 minutes total with SUP)  Standing: SBA-CGA (3x bouts of standing at EOB with use of RW; short bouts ~45-90 seconds as pt fatigues quickly with noted increased work of breathing and SOB with SpO2 to 71-79% therefore VCs provided for pt to incorporate seated rest break and breathing techniques; pt required ~2-3 minutes upon returning to seated and incorporating breathing techniques to recover SpO2 to 87-92%)    Functional Mobility  Overall Level of Assistance: Contact-guard assistance  Interventions: Verbal cues (for breathing techniques)  Assistive Device: Walker, rolling       AROM: Within functional limits (BUE)  Strength:  Within functional limits (BUE)  Coordination: Within functional limits (BUE FMC/GMC)    ADL  Feeding: Independent  Grooming: Increased time to complete;Stand by assistance  UE Bathing: Stand by assistance  LE Bathing: Increased time to complete;Contact guard assistance  UE Dressing: Increased time to complete;Stand by assistance  LE Dressing: Increased time to complete;Contact guard assistance (seated EOB to don shoes and thread underwear with SBA, for dyanmic sitting balance and increased time due to decreased activity tolerance; standing with CGA to pull up underwear)  Toileting: Increased time to complete;Contact guard assistance    Bed mobility  Supine to Sit: Stand by assistance  Sit to Supine: Stand by assistance  Scooting: Stand by assistance  Bed Mobility Comments: HOB raised ~30*; increased time/effort to perform with noted SOB/fatigue following minimal exertion requiring prolonged seated rest break at EOB and VCs for breathing techniques to recover    Transfers  Sit to stand: Stand by assistance  Stand to sit: Stand by assistance  Transfer Comments: Pt performed 3x sit <> stand transfers with use of RW from EOB; increased time/effort to perform with noted SOB/increased work of breathing following minimal exertion    Vision  Vision: Within Functional Limits  Hearing  Hearing: Exceptions to [facility]  Hearing Exceptions: Hard of hearing/hearing concerns    Cognition  Overall Cognitive Status: WFL  Orientation  Overall Orientation Status: Within Functional Limits        Education Given To: Patient  Education Provided: Role of Therapy;Plan of Care;Energy Conservation;Transfer Training;Equipment;Precautions (Activity Promotion, Safety Awareness/Fall Prevention, Energy Conservation Techniques- Rest Breaks, Pacing, Breathing Techniques)  Education Method: Verbal  Education Outcome: Verbalized understanding;Continued education needed       AM-PAC Score   AM-[facility] Inpatient Daily Activity Raw Score: 19 (12/20/22 1603)  AM-PAC Inpatient ADL T-Scale Score : 40.22 (12/20/22 1603)  ADL Inpatient CMS 0-100% Score: 42.8 (12/20/22 1603)  ADL Inpatient CMS G-Code Modifier : CK (12/20/22 1603)    Goals  Short Term Goals  Time Frame for Short Term Goals: Pt will, by discharge:  Short Term Goal 1: Perform ADL tasks with mod IND using AE/DME PRN  Short Term Goal 2: Perform functional transfers/functional mobility with mod IND using least restrictive AD  Short Term Goal 3:  Independently demo ability to incorporate energy conservation techniques as needed during engagement in all ADLs and functional transfers/functional mobility  Short Term Goal 4: Demo 8+ minutes standing tolerance with use of least restrictive AD for increased participation in ADL/IADL tasks       Therapy Time   Individual Concurrent Group Co-treatment   Time In 0958         Time Out 1026         Minutes 28         Timed Code Treatment Minutes: [address], OTR/L

## 2022-12-20 NOTE — PROGRESS NOTES
Orthostatic bps done:    Lying: /67 HR: 58    Sitting BP: 104/72 HR 63    Standing: BP 73/56 HR 52    Will let primary team know.

## 2022-12-20 NOTE — PROGRESS NOTES
Inadequate oral intake related to altered taste perception as evidenced by poor intake prior to admission    Nutrition Interventions:   Food and/or Nutrient Delivery: Continue Current Diet, Start Oral Nutrition Supplement  Nutrition Education/Counseling: No recommendation at this time  Coordination of Nutrition Care: Continue to monitor while inpatient  Plan of Care discussed with: Patient    Goals:     Goals: Meet at least 75% of estimated needs, prior to discharge       Nutrition Monitoring and Evaluation:   Behavioral-Environmental Outcomes: None Identified  Food/Nutrient Intake Outcomes: Food and Nutrient Intake, Supplement Intake  Physical Signs/Symptoms Outcomes: Biochemical Data, GI Status, Skin, Weight    Discharge Planning:     Too soon to determine     Ariel Gamino, [address]  Contact: [phone]

## 2022-12-20 NOTE — PROGRESS NOTES
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 Severe reduction of DLCO on PFT on 2/25/2021. Previously referred to lung transplantation, patient voluntarily dropped out of lung transplantation list.  Used to be on nintedanib, followed with his [facility]  COPD stage II on PFT on 2/25/2021 with FEV1 of 55% of normal   46 pack year history of smoking    History of colorectal cancer status postsurgical resection in 2014,, had a colostomy in past which was later reversed    A. Fib  Colorectal carcinoma,   History of pulmonary embolism in 2022,   BPH  Hx of COVID pneumonia  HFrEF od 40-45% on 2D echo in feb 2022 with mild to moderate MR      C/C : presyncope and Shortness of breath , acute in onset while he was at home      HPI :  Patient states he was standing at his home and suddenly started feeling sweaty and feeling that he was going to pass out which made him sit on the floor. Denies any loss of consciousness, did not hit his head. Patient states he always feels dizzy when standing suddenly from lying down position. Patient denies any chest pain, abdominal pain, loose stool, burning urination, leg pain or swelling, no recent sick contact. Evaluation : patient was requiring 15 L of oxygen via nonrebreather and was desaturating when taken off nasal cannula as per the chart. Hypotensive blood pressure 88/62, heart rate 69-rhythm A. Fib. proBNP elevated to 16,000.  2 sets of troponin 35-33. CT PE showed bilateral pulmonary embolism without right heart strain along with underlying honeycomb pattern at peripheral and bases and centrilobular paraseptal emphysema. Patient was started on heparin drip and admitted to internal medicine floor for further management.   VBG showed pH of 7.262, PCO2 of 68.6, PO2 of 42.3, bicarb of 29.9    Assessment and Plan :    Principal Problem:    Acute pulmonary embolism (HCC)  Active Problems:    Pulmonary embolism, bilateral (HCC)    Demand ischemia of myocardium (HCC)    Acute respiratory acidosis (HCC)    Orthostatic hypotension    Acute pulmonary edema (HCC)    Dyslipidemia    Benign prostatic hyperplasia with lower urinary tract symptoms    History of colon cancer    PAF (paroxysmal atrial fibrillation) (HCC)    Hill's esophagus    CHF (congestive heart failure), NYHA class II, acute on chronic, combined (HCC)    Chronic idiopathic pulmonary fibrosis (HCC)    Syncope and collapse  Resolved Problems:    * No resolved hospital problems. *    Acute hypoxic and hypercapnic respiratory failure likely secondary to pulmonary embolism with cor pulmonale aggravated with underlying history of UIP interstitial lung fibrosis, HFrEF and paroxysmal atrial fibrillation with pulmonary edema [id number] score of 4  -IV heparin 18 units/kg, vascular consult, resume home digoxin, will give Symbicort and Atrovent nebulization while inpatient.  -Keep n.p.o.  -Supplemental oxygen through nonrebreather, wean down to nasal cannula as tolerated by patient  -Follow-up on respiratory viral panel,  -Patient has been taking Eliquis at home, and patient is compliant with Eliquis, suspect unprovoked PE given underlying history of malignancy  -Get a 2D echo, venous Doppler bilateral lower extremity  -Resume home medication of sildenafil for his pulmonary hypertension, Flomax for his BPH, pantoprazole for his history of Hill's esophagus    Sasha Hensley MD  Internal Medicine Resident, PGY- [address] [place], [place]  12/19/2022, 8:20 PM

## 2022-12-20 NOTE — PROGRESS NOTES
[facility]  Internal Medicine Teaching Residency Program  Inpatient Daily Progress Note  ______________________________________________________________________________    Patient: Suresh Hudson  YOB: 1955   SRI:[id number]    Acct: [de-identified]     Room: 2019/2019-01  Admit date: 12/19/2022  Today's date: 12/20/22  Number of days in the hospital: 1    SUBJECTIVE   Admitting Diagnosis: Acute pulmonary embolism ([address]  CC: An episode of near syncope  Pt examined at bedside. Chart & results reviewed. No acute event overnight  Remained afebrile and hemodynamically stable. Currently denies any symptoms. no chest pain, shortness of breath, no dizziness. Oxygen requirement has gone down to 6 L. No shortness of breath or cough. ROS:  Constitutional:  negative for chills, fevers, sweats  Respiratory:  negative for cough, dyspnea on exertion, hemoptysis, shortness of breath, wheezing  Cardiovascular:  negative for chest pain, chest pressure/discomfort, lower extremity edema, palpitations  Gastrointestinal:  negative for abdominal pain, constipation, diarrhea, nausea, vomiting  Neurological:  negative for dizziness, headache  BRIEF HISTORY     The patient is a pleasant 69 y.o. male with past medical history of A. fib, idiopathic pulmonary fibrosis with pulmonary hypertension, colorectal carcinoma, history of pulmonary embolism, BPH presents after an episode of near syncope. Patient states he was standing at his home and suddenly started feeling sweaty and feeling that he was going to pass out which made him sit on the floor. Denies any loss of consciousness, did not hit his head. Patient has a history of pulmonary embolism and A. fib and takes Eliquis at home, patient has a history of idiopathic pulmonary fibrosis-along with significant family history-also in the sister-has been taking sildenafil for moderate pulmonary hypertension.   Uses 9 L of oxygen at home. States he has passed out multiple times in the past during which he was found to have COVID-pneumonia. As per the dispense report patient takes tamsulosin and sildenafil-patient is unsure of tamsulosin but takes sildenafil. Patient states he always feels dizzy when standing suddenly from lying down position. Last echo in  showed ejection fraction 40 to 45%, RVSP 41 mmHg, mild to moderate mitral regurgitation. Patient denies any chest pain, abdominal pain, loose stool, burning urination, leg pain or swelling, no recent sick contact. in the ED patient was requiring 15 L of oxygen via nonrebreather and was desaturating when taken off nasal cannula as per the chart. Hypotensive blood pressure 88/62, heart rate 69-rhythm A. Fib. proBNP elevated to 16,000.  2 sets of troponin 35-33. CT PE showed bilateral pulmonary embolism without right heart strain along with underlying honeycomb pattern at peripheral and bases and centrilobular paraseptal emphysema. Patient was started on heparin drip and admitted to internal medicine floor for further management. Has a history of colorectal cancer, had a colostomy in past which was later reversed. OBJECTIVE     Vital Signs:  BP 90/68   Pulse 68   Temp 98.1 °F (36.7 °C) (Oral)   Resp 26   Ht 6' (1.829 m)   Wt 159 lb (72.1 kg)   SpO2 97%   BMI 21.56 kg/m²     Temp (24hrs), Av.4 °F (36.3 °C), Min:96.2 °F (35.7 °C), Max:98.1 °F (36.7 °C)    In: -   Out: 400 [Urine:400]    Physical Exam:  Constitutional: This is a well developed, well nourished, 18.5-24.9 - Normal 69y.o. year old male who is alert, oriented, cooperative and in no apparent distress. Head:normocephalic and atraumatic. EENT:  PERRLA. No conjunctival injections. Septum was midline, mucosa was without erythema, exudates or cobblestoning. No thrush was noted. Neck: Supple without thyromegaly. No elevated JVP. Trachea was midline.   Respiratory: Chest was symmetrical without dullness to percussion. Bilateral diffuse wheezing and rhonchi present. there is no intercostal retraction or use of accessory muscles. No egophony noted. Cardiovascular: Regular without murmur, clicks, gallops or rubs. Abdomen: Slightly rounded and soft without organomegaly. No rebound, rigidity or guarding was appreciated. Lymphatic: No lymphadenopathy. Musculoskeletal: Normal curvature of the spine. No gross muscle weakness. Extremities:  No lower extremity edema, ulcerations, tenderness, varicosities or erythema. Muscle size, tone and strength are normal.  No involuntary movements are noted. Skin:  Warm and dry. Good color, turgor and pigmentation. No lesions or scars.   No cyanosis or clubbing  Neurological/Psychiatric: The patient's general behavior, level of consciousness, thought content and emotional status is normal.        Medications:  Scheduled Medications:    sodium chloride flush  5-40 mL IntraVENous 2 times per day    atorvastatin  40 mg Oral Daily    budesonide-formoterol  1 puff Inhalation BID    digoxin  125 mcg Oral Daily    pantoprazole  40 mg Oral QAM AC    sildenafil  20 mg Oral TID    tamsulosin  0.4 mg Oral Daily    ipratropium  0.5 mg Nebulization 4x daily     Continuous Infusions:    heparin (PORCINE) Infusion 20 Units/kg/hr (12/20/22 0248)    sodium chloride       PRN Medicationsheparin (porcine), 80 Units/kg, PRN  heparin (porcine), 40 Units/kg, PRN  sodium chloride flush, 5-40 mL, PRN  sodium chloride, , PRN  ondansetron, 4 mg, Q8H PRN   Or  ondansetron, 4 mg, Q6H PRN  polyethylene glycol, 17 g, Daily PRN  acetaminophen, 650 mg, Q6H PRN   Or  acetaminophen, 650 mg, Q6H PRN  albuterol sulfate HFA, 2 puff, Q4H PRN        Diagnostic Labs:  CBC:   Recent Labs     12/19/22  1423 12/20/22  0612   WBC 8.5 6.8   RBC 4.47 3.96*   HGB 11.8* 10.7*   HCT 39.6* 35.5*   MCV 88.6 89.6   RDW 14.3 14.3    246     BMP:   Recent Labs     12/19/22  1423 12/20/22  0612    139 K 4.0 4.0    104   CO2 25 30   BUN 24* 21   CREATININE 0.79 0.63*     BNP: No results for input(s): BNP in the last 72 hours. PT/INR: No results for input(s): PROTIME, INR in the last 72 hours. APTT:   Recent Labs     12/19/22  1702 12/20/22  0216   APTT 29.7 40.6*     CARDIAC ENZYMES: No results for input(s): CKMB, CKMBINDEX, TROPONINI in the last 72 hours. Invalid input(s): CKTOTAL;3  FASTING LIPID PANEL:  Lab Results   Component Value Date    CHOL 200 (H) 01/03/2017    HDL 29 (L) 12/20/2021    TRIG 165 (H) 01/03/2017     LIVER PROFILE:   Recent Labs     12/19/22  1423   AST 26   ALT 20   BILITOT 0.7   ALKPHOS 83      MICROBIOLOGY:   Lab Results   Component Value Date/Time    CULTURE NO SIGNIFICANT GROWTH 02/20/2022 06:41 PM       Imaging:    CT HEAD WO CONTRAST    Result Date: 12/19/2022  No acute intracranial abnormality. XR CHEST PORTABLE    Result Date: 12/19/2022  Pulmonary fibrotic changes are again identified. A superimposed pneumonia is not excluded. CT CHEST PULMONARY EMBOLISM W CONTRAST    Addendum Date: 12/19/2022    ADDENDUM: Discussed with Dr. Raf Posadas at 4:40 p.m. Result Date: 12/19/2022  Pulmonary emboli bilaterally as above. No evidence of right ventricular strain. Coronary artery disease and aortic valve disease. COPD and pulmonary fibrosis. RECOMMENDATIONS: The findings were sent to the Radiology Results [address] at 4:35 pm on 12/19/2022 to be communicated to a licensed caregiver. ASSESSMENT & PLAN     ASSESSMENT / PLAN:      IMPRESSION  This is a 69 y.o. male who presented with near syncope and found to have pulmonary embolism. Patient admitted to inpatient status for the management of syncope and pulmonary embolism. Principal Problem:    Acute pulmonary embolism (HCC)-patient was already on Eliquis.   No calf tenderness or swelling.  - on heparin drip  -We will get bilateral lower extremity venous Doppler     Heart failure with reduced ejection fraction 40 to 45% in February 2022 with mild to moderate MR  proBNP elevated-no signs of pulmonary congestion. Resumed home digoxin  We will get an echo. Near Syncope and collapse  We will check orthostatics and echo. PAF (paroxysmal atrial fibrillation) (HCC)  On heparin drip currently. Blood pressure and heart rate stable now. Will use as needed Lopressor for rate control. Chronic idiopathic pulmonary fibrosis (HCC)  COPD stage II on PFT on 2/25/2021 with FEV1 of 55%  Symbicort and Proventil as needed. Maintain oxygen saturation above 92%. NSTEMI-likely type II MI  -Troponins trending down. already on heparin for pulmonary embolism. Dyslipidemia  Resumed Home atorvastatin 40 mg      DVT ppx: Already on heparin drip  GI ppx: Not indicated at this time     PT/OT/SW-has been consulted  Discharge Planning:  consulted we will follow-up     Luis Dodge MD  Internal Medicine Resident, PGY-1  [id number] [facility]  12/20/2022, 10:22 AM

## 2022-12-20 NOTE — SIGNIFICANT EVENT
I had a long discussion with the patient in person, in presence of the RN taking care of the patient. Patient's current clinical condition, laboratory and radiographic findings as well as recommendations of physicians consulted on the case were discussed with the patient in detail. All questions and concerns were addressed, and appropriate emotional support was provided. After understanding patient's current medical condition, Patient decided he wants to continue with the full code.  Patient wants to get CPR and Intubation in the event of cardiac or respiratory arrest.       Luis Dodge MD, PGY-1  Department of Internal Medicine,  [facility])             12/19/2022, 11:32 PM

## 2022-12-20 NOTE — PROGRESS NOTES
Physician Progress Note      PATIENT:               [facility] #:                  [id number]  :                       1955  ADMIT DATE:       2022 2:07 PM  [address] [place] DATE:  RESPONDING  PROVIDER #:        Dasha Frankel MD          QUERY TEXT:    Pt admitted with pulmonary embolism . Pt noted to have a pulmonary   embolism. Per ED Notes Acute pe without cor pulmonale. progress notes on    notes Acute resp failure likely secondary to PE with cor pulmonale. Noted CT   of chest on  shows no ventricular strain. Please clarify one of the   following    The medical record reflects the following:  Risk Factors: Pulmonary fibrosis, copd, chronic CHF  Clinical Indicators: admitted with pulmonary embolism . Pt noted to have a   pulmonary embolism. Per ED Notes Acute pe without cor pulmonale. progress notes   on  notes Acute resp failure likely secondary to PE with cor pulmonale. Noted CT of chest on  shows no ventricular strain  Treatment: iv heparin, Vascular consult, lab monitoring    Thank Padma [address] Dr  Email [email or web address]  Cell [phone]  office hours M-F 6am to 2:30pm  Options provided:  -- Pulmonary Embolism with Acute Cor Pulmonale  -- Pulmonary Embolism without Acute Cor Pulmonale  -- Other - I will add my own diagnosis  -- Disagree - Not applicable / Not valid  -- Disagree - Clinically unable to determine / Unknown  -- Refer to Clinical Documentation Reviewer    PROVIDER RESPONSE TEXT:    This patient has Pulmonary Embolism without acute cor pulmonale. Query created by:  Carle Seip on 2022 8:54 AM      Electronically signed by:  Dasha Frankel MD 2022 6:19 PM

## 2022-12-20 NOTE — PROGRESS NOTES
Physical Therapy  Facility/Department: [facility] CAR 2- STEPDOWN  Physical Therapy Initial Assessment    Name: Fide Prather  : 1955  MRN: [id number]  Date of Service: 2022    Chief Complaint   Patient presents with    Fall    Loss of Consciousness       Discharge Recommendations:    Further therapy recommended at discharge. PT Equipment Recommendations  Equipment Needed: No      Patient Diagnosis(es): The primary encounter diagnosis was Pulmonary embolism without acute cor pulmonale, unspecified chronicity, unspecified pulmonary embolism type ([address] Diagnoses of Syncope and collapse and Shortness of breath were also pertinent to this visit. Past Medical History:  has a past medical history of Atrial fibrillation ([address] Back pain, chronic, Hill's esophagus, Benign essential HTN, BPH (benign prostatic hyperplasia), Cancer ([address] Chronic idiopathic pulmonary fibrosis ([address] Cocaine abuse in remission ([address] Community acquired bacterial pneumonia, ED (erectile dysfunction), GERD (gastroesophageal reflux disease), GI bleed, Hernia, History of colon cancer, Melena, Migraines, Multifocal pneumonia, Murmur, cardiac, and Single subsegmental pulmonary embolism without acute cor pulmonale ([address] Past Surgical History:  has a past surgical history that includes Tonsillectomy; Colonoscopy; colectomy; Colonoscopy (2016); knee surgery (Right, 's); transesophageal echocardiogram (2018); Upper gastrointestinal endoscopy (N/A, 2018); Colonoscopy (N/A, 2018); hernia repair (Right, ); Cardiac catheterization (2018); colectomy; Total knee arthroplasty (Right, 2019); Upper gastrointestinal endoscopy (N/A, 2019); Cardioversion (); and hip surgery (Right, 2022). Assessment   Body Structures, Functions, Activity Limitations Requiring Skilled Therapeutic Intervention: Decreased functional mobility ; Decreased strength;Decreased endurance;Decreased balance  Assessment: Pt grossly CGA to SBA, desatting limiting amb but pt marched in place ~15x CGA with RW. Pt would benefit from continued acute PT to address deficits. Therapy Prognosis: Good  Decision Making: Medium Complexity  Requires PT Follow-Up: Yes  Activity Tolerance  Activity Tolerance: Patient tolerated treatment well;Patient limited by fatigue;Patient limited by endurance     Plan   Physcial Therapy Plan  General Plan:  (5x/wk)  Current Treatment Recommendations: Strengthening, Balance training, Gait training, Stair training, Functional mobility training, Endurance training, Home exercise program, Safety education & training, Patient/Caregiver education & training, Equipment evaluation, education, & procurement, Therapeutic activities  Safety Devices  Type of Devices: Call light within reach, Nurse notified, Gait belt, Patient at risk for falls, All fall risk precautions in place, Left in bed  Restraints  Restraints Initially in Place: No     Restrictions  Restrictions/Precautions  Restrictions/Precautions: Fall Risk, General Precautions  Required Braces or Orthoses?: No  Position Activity Restriction  Other position/activity restrictions: Up as tolerated. Pt with 2 inch leg length difference from prior trauma- has shoe with lift. RLE shorter. Subjective   General  Chart Reviewed: Yes  Patient assessed for rehabilitation services?: Yes  Response To Previous Treatment: Not applicable  Family / Caregiver Present: No  Follows Commands: Within Functional Limits  General Comment  Comments: RN and pt agreeable to PT. Pt alert in bed upon arrival. OT co-eval  Subjective  Subjective: Pt reports injury to RLE \"feb last year\", states hurts all the time but left unrated.          Social/Functional History  Social/Functional History  Lives With: Spouse  Type of Home: House  Home Layout: Laundry in basement, Two level, Bed/Bath upstairs  Home Access: Stairs to enter with rails  Entrance Stairs - Number of Steps: 3 outdoor steps  Entrance Stairs - Rails: Both  Bathroom Shower/Tub: Tub/Shower unit  Bathroom Toilet: Standard  Bathroom Equipment: Shower chair (pt reports grab bars being installed in the shower currently and just obtaining a shower chair yesterday)  Bathroom Accessibility: Accessible  Home Equipment: Alberto Shank, rolling, Oxygen (home O2 at all times, was on 5L O2 however increased to 8-10L O2 ~2wks ago)  Has the patient had two or more falls in the past year or any fall with injury in the past year?: No  Receives Help From: Family (Pt reports supportive wife however states wife does work daily from 4am-1230pm)  ADL Assistance: Independent  Homemaking Assistance: Needs assistance  Homemaking Responsibilities: Yes (all IADL tasks able to be shared with or complete by spouse)  Meal Prep Responsibility: Secondary  Laundry Responsibility: Secondary  Cleaning Responsibility: Secondary  Shopping Responsibility: Secondary  Health Care Management: Primary  Ambulation Assistance: Independent (uses RW at the start of each day and progresses to the cane if he feels able to)  Transfer Assistance: Independent  Active : Yes  Mode of Transportation: Truck  Occupation: Retired  Type of Occupation: Previously worked in a [address] on [employer] and as a   Leisure & Hobbies: Enjoys spending time with family  Vision/Hearing  Vision  Vision: Within Functional Limits  Hearing  Hearing: Exceptions to [facility]  Hearing Exceptions: Hard of hearing/hearing concerns    Cognition   Orientation  Overall Orientation Status: Within Functional Limits  Cognition  Overall Cognitive Status: WFL     Objective     AROM RLE (degrees)  RLE AROM: WFL  AROM LLE (degrees)  LLE AROM : WFL  AROM RUE (degrees)  RUE AROM : WFL  AROM LUE (degrees)  LUE AROM : WFL  Strength RLE  Strength RLE: WFL  Strength LLE  Strength LLE: WFL  Strength RUE  Strength RUE: WFL  Comment: see OT  Strength LUE  Strength LUE: WFL  Comment: see OT           Bed mobility  Supine to Sit: Stand by assistance  Sit to Supine: Stand by assistance  Scooting: Stand by assistance  Transfers  Sit to Stand: Contact guard assistance  Stand to Sit: Stand by assistance  Ambulation  Comments: standing marches ~15 bilaterally with RW, CGA. fatigue and endurance limiting. 7L 02. Balance  Posture: Good  Sitting - Static: Good;-  Sitting - Dynamic: Fair;+  Standing - Static: Fair  Standing - Dynamic: Fair  Comments: RW used while assessing standing balance  Exercise Treatment: Lowest Sp02 noted 71%, soon recovered to mid 80s with cues to deep breath. increased time to recover into low 90s. desatting limiting any ambulation.           AM-PAC Score  AM-PAC Inpatient Mobility Raw Score : 19 (12/20/22 1520)  AM-PAC Inpatient T-Scale Score : 45.44 (12/20/22 1520)  Mobility Inpatient CMS 0-100% Score: 41.77 (12/20/22 1520)  Mobility Inpatient CMS G-Code Modifier : CK (12/20/22 1520)        Goals  Short Term Goals  Time Frame for Short Term Goals: 14 visits  Short Term Goal 1: Pt will be Ruy bed mobility  Short Term Goal 2: Pt will be Ruy transfers  Short Term Goal 3: Pt will be Ruy amb 250'  Short Term Goal 4: Pt will navigate 4 steps Ruy either or B rail use       Education  Patient Education  Education Given To: Patient  Education Provided: Role of Therapy;Plan of Care  Education Method: Demonstration;Verbal  Barriers to Learning: None  Education Outcome: Verbalized understanding;Demonstrated understanding      Therapy Time   Individual Concurrent Group Co-treatment   Time In 0958         Time Out 1026         Minutes 28         Timed Code Treatment Minutes: 8 Minutes       Marlyn Pantoja PT

## 2022-12-20 NOTE — CARE COORDINATION
Case Management Assessment  Initial Evaluation    Date/Time of Evaluation: 12/20/2022 11:43 AM  Assessment Completed by: Abran Singh RN    If patient is discharged prior to next notation, then this note serves as note for discharge by case management. Patient Name: Marietta Chahal                   YOB: 1955  Diagnosis: Syncope and collapse [R55]  Shortness of breath [R06.02]  Pulmonary embolism, bilateral ([address] [I26.99]  Pulmonary embolism without acute cor pulmonale, unspecified chronicity, unspecified pulmonary embolism type ([address] [I26.99]                   Date / Time: 12/19/2022  2:07 PM    Patient Admission Status: Inpatient   Readmission Risk (Low < 19, Mod (19-27), High > 27): Readmission Risk Score: 13.6    Current PCP: Hipolito Hutchison MD  PCP verified by CM? (P) Yes    Chart Reviewed: Yes      History Provided by: (P) Patient  Patient Orientation: (P) Alert and Oriented    Patient Cognition: (P) Alert    Hospitalization in the last 30 days (Readmission):  No    If yes, Readmission Assessment in CM Navigator will be completed. Advance Directives:      Code Status: Full Code   Patient's Primary Decision Maker is: (P) Named in Scanned ACP Document    Primary Decision Maker: Can Caceres (sig other) - Other - [phone]    Secondary Decision Maker: Mara Butcher - Brother/Sister - [phone]    Discharge Planning:    Patient lives with: (P) Spouse/Significant Other Type of Home: (P) House  Primary Care Giver: (P) Self  Patient Support Systems include: (P) Spouse/Significant Other, Family Members   Current Financial resources: (P) Medicare, Medicaid  Current community resources:    Current services prior to admission: (P) Durable Medical Equipment            Current DME: (P) Cane, Oxygen Therapy (Comment), Walker (O2 from [employer] [place] [employer] [place].  has concentrator that goes to 10L)            Type of Home Care services:  (P) None    ADLS  Prior functional level: (P) Independent in ADLs/IADLs  Current functional level: (P) Assistance with the following:, Bathing, Housework, Mobility, Shopping    PT AM-PAC:   /24  OT AM-PAC:   /24    Family can provide assistance at DC: (P) Yes  Would you like Case Management to discuss the discharge plan with any other family members/significant others, and if so, who? (P) Yes (Susie or Gabi Farley)  Plans to Return to Present Housing: (P) Yes  Other Identified Issues/Barriers to RETURNING to current housing: shortness of breath, high O2 demand  Potential Assistance needed at discharge: (P) N/A            Potential DME:    Patient expects to discharge to: (P) [facility] for transportation at discharge:      Financial    Payor: Tyler Galan / Plan: Abran Rosa / Product Type: *No Product type* /     Does insurance require precert for SNF: Yes    Potential assistance Purchasing Medications:    Meds-to-Beds request: No    [facility] [id number] [phone]  [address] [place] [id number]  Phone: [phone] Fax: [phone]    Notes:    Factors facilitating achievement of predicted outcomes: Family support, Cooperative, Pleasant, and Has needed Durable Medical Equipment at home    Barriers to discharge: Decreased endurance and Medical complications    Additional Case Management Notes: patient plans to return home with his girlfriend. He currently has an O2 concentrator from [place] that goes to 10L.  He denies needs and has transportation    The Plan for Transition of Care is related to the following treatment goals of Syncope and collapse [R55]  Shortness of breath [R06.02]  Pulmonary embolism, bilateral ([address] [I26.99]  Pulmonary embolism without acute cor pulmonale, unspecified chronicity, unspecified pulmonary embolism type ([address] [F68.62]    IF APPLICABLE: The Patient and/or patient representative Gerhardt Pu and his family were provided with a choice of provider and agrees with the discharge plan. Freedom of choice list with basic dialogue that supports the patient's individualized plan of care/goals and shares the quality data associated with the providers was provided to: (P) Patient   Patient Representative Name:       The Patient and/or Patient Representative Agree with the Discharge Plan?  (P) Yes    Ike Espino RN  Case Management Department  Ph: [phone] Fax: [phone]

## 2022-12-20 NOTE — PROGRESS NOTES
Division of Vascular Surgery         Progress Note      Name: Duarte Carlintingham  MRN: [id number]             Subjective:     Patient seen and examined at bedside. No acute events overnight. Hemodynamically stable, afebrile. Remains on supplemental oxygen. Reports some continued shortness of breath. Denies chest pain this morning. Denies any nausea, vomiting, fever, or chills. Physical Exam:     Vitals:  BP 90/68   Pulse 57   Temp 98.1 °F (36.7 °C) (Oral)   Resp 23   Ht 6' (1.829 m)   Wt 159 lb (72.1 kg)   SpO2 98%   BMI 21.56 kg/m²     Physical Exam  Constitutional:       General: He is not in acute distress. Appearance: Normal appearance. HENT:      Head: Normocephalic and atraumatic. Mouth/Throat:      Mouth: Mucous membranes are dry. Eyes:      Extraocular Movements: Extraocular movements intact. Cardiovascular:      Rate and Rhythm: Normal rate and regular rhythm. Pulses: Normal pulses. Pulmonary:      Effort: Pulmonary effort is normal.      Comments: On supplemental oxygen   Abdominal:      General: There is no distension. Palpations: Abdomen is soft. Musculoskeletal:         General: No swelling or deformity. Normal range of motion. Cervical back: Neck supple. Skin:     General: Skin is warm and dry. Capillary Refill: Capillary refill takes less than 2 seconds. Neurological:      General: No focal deficit present. Mental Status: He is alert. Imaging/Labs:       CT CHEST PULMONARY EMBOLISM W CONTRAST    Addendum Date: 12/19/2022    ADDENDUM: Discussed with Dr. Lisa Posadas at 4:40 p.m. Result Date: 12/19/2022  Pulmonary Arteries: Partial incomplete filling defect right lower lobar pulmonary artery. Partial filling defect lingular pulmonary artery. Filling defects left lower lobe segmental branches. Mediastinum: No evidence of mediastinal lymphadenopathy. The heart and pericardium demonstrate no acute abnormality.   There is no acute abnormality of the thoracic aorta. A prominent mediastinal lymph nodes essentially unchanged. Cardiomegaly and calcific coronary artery disease. Calcific aortic valve disease. Lungs/pleura: In increased interstitial markings peripherally and at the lung bases in particular with honeycomb type pattern. Centrilobular paraseptal emphysema. Stable pleuroparenchymal opacity right lateral lung. Upper Abdomen: Limited images of the upper abdomen are unremarkable. Soft Tissues/Bones: No acute bone or soft tissue abnormality. Pulmonary emboli bilaterally as above. No evidence of right ventricular strain. Coronary artery disease and aortic valve disease. COPD and pulmonary fibrosis. RECOMMENDATIONS: The findings were sent to the Radiology Results [address] at 4:35 pm on 12/19/2022 to be communicated to a licensed caregiver. Assessment/Plan:     73-year-old male with acute bilateral pulmonary embolism; history of chronic idiopathic pulmonary fibrosis considered for lung transplantation with increasing oxygen requirement    Continue heparin drip   Recommend hematology consult for evaluation of hypercoagulability given PE while on anticoagulation. Would defer recommendations regarding anticoagulation agent to them.    Will follow up lower extremity duplex     Electronically signed by Porsche Park DO on 12/20/22 at 5:32 AM EST      [address] [facility] [address]: [phone]  C: [phone]

## 2022-12-20 NOTE — CONSULTS
Today's Date: 12/20/2022  Patient Name: Katia Lorenz  Date of admission: 12/19/2022  2:07 PM  Patient's age: 69 y.o., 1955  Admission Dx: Syncope and collapse [R55]  Shortness of breath [R06.02]  Pulmonary embolism, bilateral ([address] [I26.99]  Pulmonary embolism without acute cor pulmonale, unspecified chronicity, unspecified pulmonary embolism type ([address] [I26.99]    Reason for Consult: pulmonary embolism on eliqius; concern for anticoagulation failure  Requesting Physician: Augustine Brittle, MD    Chief Complaint:  presyncope    History Obtained From:  patient, electronic medical record    History of Present Illness: The patient is a 69 y.o. male who is admitted to the hospital for shortness of breath. CT PE demonstrated bilateral segmental pulmonary embolisms -right lower lobar, lingula and left lower segmental branches. CA 19-9: 114, CEA 4.8, PSA 0.46    PE history -  11/2020 - Tiny nonocclusive pulmonary embolus in the right lower lobar pulmonary artery. 02/22 - CT PE negative  12/2022 - Partial incomplete filling defect right lower lobar  pulmonary artery. Partial filling defect lingular pulmonary artery. Filling  defects left lower lobe segmental branches. Hematology history  Patient seen by Dr. Ashley Jensen 12/2020 for xarelto failure for pulmonary embolism. At the time, his acute PE was suspected due to medication being held while in hospital. He was changed to eliqius from [facility] per patient's wishes. Patient started xarelto in 2018 due to heart condition by Dr. Laron Gooden per patient. However was switched to eliqius in 2020 due to small Pulmonary embolism. 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 Colostomy was reversed. Vieyra syndrome negative.     Patient stated he follows up with GI and had last colonoscopy last year and has been negative so far with no relapse of cancer. PMH - chronic idiopathic pulmonary fibrosis, atrial fibrillation, BPH, colon cancer. Patient reportedly states that he was diagnosed with a pulmonary embolism this past February; however, per chart review and CT PE performed on 2/18/2022, no evidence of acute pulmonary embolism was found at that time. Past Medical History:   has a past medical history of Atrial fibrillation ([address] Back pain, chronic, Hill's esophagus, Benign essential HTN, BPH (benign prostatic hyperplasia), Cancer ([address] Chronic idiopathic pulmonary fibrosis ([address] Cocaine abuse in remission ([address] Community acquired bacterial pneumonia, ED (erectile dysfunction), GERD (gastroesophageal reflux disease), GI bleed, Hernia, History of colon cancer, Melena, Migraines, Multifocal pneumonia, Murmur, cardiac, and Single subsegmental pulmonary embolism without acute cor pulmonale ([address] Past Surgical History:   has a past surgical history that includes Tonsillectomy; Colonoscopy; colectomy; Colonoscopy (07/18/2016); knee surgery (Right, 1970's); transesophageal echocardiogram (11/29/2018); Upper gastrointestinal endoscopy (N/A, 12/5/2018); Colonoscopy (N/A, 12/6/2018); hernia repair (Right, 2009); Cardiac catheterization (11/29/2018); colectomy; Total knee arthroplasty (Right, 1/8/2019); Upper gastrointestinal endoscopy (N/A, 6/18/2019); Cardioversion (2020); and hip surgery (Right, 2/22/2022). Medications:    Prior to Admission medications    Medication Sig Start Date End Date Taking?  Authorizing Provider   OXYGEN Inhale 4 L into the lungs continuous Uses 4-5 liters 24/7   Yes Historical Provider, MD   omeprazole (PRILOSEC) 40 MG delayed release capsule take 1 capsule by mouth once daily 12/8/22   Cassandra Mitchell MD   fluticasone-umeclidin-vilant (TRELEGY ELLIPTA) 200-62.5-25 MCG/ACT AEPB inhaler Inhale 1 puff into the lungs daily 12/6/22   Cassandra Mitchell MD   albuterol sulfate HFA (VENTOLIN HFA) 108 (90 Base) MCG/ACT inhaler Inhale 2 puffs into the lungs every 4 hours as needed for Wheezing 12/5/22 12/5/23  Cassandra Reich MD   sildenafil (REVATIO) 20 MG tablet Take 1 tablet by mouth 3 times daily 11/8/22   Cassandra Reich MD   sertraline (ZOLOFT) 25 MG tablet take 1 tablet by mouth once daily  Patient not taking: Reported on 11/8/2022 11/2/22   Quratnanci Hammer DO   ibuprofen (ADVIL;MOTRIN) 800 MG tablet take 1 tablet by mouth three times a day if needed for MODERATE PAIN ( PAIN SCALE 4-6 ) 10/24/22   Cassandra Reich MD   sertraline (ZOLOFT) 50 MG tablet Take 1 tablet by mouth daily 10/3/22   Cassandra Reich MD   ELIQUIS 5 MG TABS tablet take 1 tablet by mouth twice a day 9/19/22   MIRI Mesa - NP   [facility]) 0.4 MG capsule take 1 capsule by mouth once daily 8/8/22   Cassandra Reich MD   ferrous sulfate (IRON 325) 325 (65 Fe) MG tablet take 1 tablet by mouth twice a day 7/29/22   Cassandra Reich MD   atorvastatin (LIPITOR) 40 MG tablet take 1 tablet by mouth once daily 4/28/22   Cassandra Reich MD   digoxin (LANOXIN) 125 MCG tablet take 1 tablet by mouth once daily 4/28/22   Cassandra Reich MD   midodrine (PROAMATINE) 5 MG tablet Take 1 tablet by mouth 3 times daily (before meals) 3/2/22   Kailash Agudelo DO   Baclofen (LIORESAL) 5 MG tablet take 1 tablet by mouth twice a day 1/27/22   MD Daniel Rangel MISC by Does not apply route Expires 1/2026 1/19/21   Cassandra Reich MD     Current Facility-Administered Medications   Medication Dose Route Frequency Provider Last Rate Last Admin    heparin 25,000 units in dextrose 5 % 250 mL infusion (rate based)  5-30 Units/kg/hr IntraVENous Continuous Junito Marrero DO 14.4 mL/hr at 12/20/22 0248 20 Units/kg/hr at 12/20/22 0248    heparin (porcine) injection 5,770 Units  80 Units/kg IntraVENous PRN Junito Marrero DO        heparin (porcine) injection 2,880 Units  40 Units/kg IntraVENous PRN Junito Marrero,    2,880 Units at 12/20/22 0249    sodium chloride flush 0.9 % injection 5-40 mL  5-40 mL IntraVENous 2 times per day Ernie Robison MD   10 mL at 12/20/22 0123    sodium chloride flush 0.9 % injection 5-40 mL  5-40 mL IntraVENous PRN Noel Almendarez MD        0.9 % sodium chloride infusion   IntraVENous PRN Noel Almendarez MD        ondansetron (ZOFRAN-ODT) disintegrating tablet 4 mg  4 mg Oral Q8H PRN Noel Almendarez MD        Or    ondansetron (ZOFRAN) injection 4 mg  4 mg IntraVENous Q6H PRN Noel Almendarez MD        polyethylene glycol (GLYCOLAX) packet 17 g  17 g Oral Daily PRN Noel Liu MD        acetaminophen (TYLENOL) tablet 650 mg  650 mg Oral Q6H PRN Noel Liu MD   650 mg at 12/20/22 0219    Or    acetaminophen (TYLENOL) suppository 650 mg  650 mg Rectal Q6H PRN Noel Almendarez MD        atorvastatin (LIPITOR) tablet 40 mg  40 mg Oral Daily Noel Almendarez MD   40 mg at 12/20/22 0844    budesonide-formoterol (SYMBICORT) 80-4.5 MCG/ACT inhaler 1 puff  1 puff Inhalation BID Noel Almendarez MD   1 puff at 12/20/22 0913    albuterol sulfate HFA (PROVENTIL;VENTOLIN;PROAIR) 108 (90 Base) MCG/ACT inhaler 2 puff  2 puff Inhalation Q4H PRN Noel Almendarez MD        digoxin (LANOXIN) tablet 125 mcg  125 mcg Oral Daily Noel Almendarez MD   125 mcg at 12/20/22 0122    pantoprazole (PROTONIX) tablet 40 mg  40 mg Oral QAM AC Deejay Jerez MD   40 mg at 12/20/22 0844    sildenafil (REVATIO) tablet 20 mg  20 mg Oral TID Matias Obrien MD   20 mg at 12/20/22 0844    tamsulosin (FLOMAX) capsule 0.4 mg  0.4 mg Oral Daily Deejay Jerez MD   0.4 mg at 12/20/22 0844    ipratropium (ATROVENT) 0.02 % nebulizer solution 0.5 mg  0.5 mg Nebulization 4x daily Matias Obrien MD   0.5 mg at 12/20/22 5198       Allergies:  Adhesive tape, Codeine, Penicillins, and Nintedanib    Social History:   reports that he quit smoking about 52 years ago.  His smoking use without lesions   Neck - supple, no significant adenopathy   Lymphatics - no palpable lymphadenopathy, no hepatosplenomegaly   Chest - clear to auscultation, no wheezes, rales or rhonchi, symmetric air entry   Heart - normal rate, regular rhythm, normal S1, S2, no murmurs  Abdomen - soft, nontender, nondistended, no masses or organomegaly   Neurological - alert, oriented, normal speech, no focal findings or movement disorder noted   Musculoskeletal - no joint tenderness, deformity or swelling   Extremities - peripheral pulses normal, no pedal edema, no clubbing or cyanosis   Skin - normal coloration and turgor, no rashes, no suspicious skin lesions noted ,    Labs:   Complete Blood Count:   Recent Labs     12/19/22  1423 12/20/22  0612   WBC 8.5 6.8   HGB 11.8* 10.7*   MCV 88.6 89.6    246   RBC 4.47 3.96*   HCT 39.6* 35.5*   MCH 26.4 27.0   MCHC 29.8 30.1   RDW 14.3 14.3   MPV 10.2 10.0      PT/INR:    Lab Results   Component Value Date/Time    PROTIME 18.2 02/22/2022 03:41 AM    INR 1.5 02/22/2022 03:41 AM     PTT:    Lab Results   Component Value Date/Time    APTT 40.6 12/20/2022 02:16 AM       Basal Metabolic Profile:   Recent Labs     12/19/22  1423 12/20/22  0612    139   K 4.0 4.0   BUN 24* 21   CREATININE 0.79 0.63*    104   CO2 25 30      LFTs  Recent Labs     12/19/22  1423   ALKPHOS 83   ALT 20   AST 26   BILITOT 0.7   LABALBU 3.2*       Imaging:  CT HEAD WO CONTRAST    Result Date: 12/19/2022  No acute intracranial abnormality. XR CHEST PORTABLE    Result Date: 12/19/2022  Pulmonary fibrotic changes are again identified. A superimposed pneumonia is not excluded. CT CHEST PULMONARY EMBOLISM W CONTRAST    Addendum Date: 12/19/2022    ADDENDUM: Discussed with Dr. Corina Posadas at 4:40 p.m. Result Date: 12/19/2022  Pulmonary emboli bilaterally as above. No evidence of right ventricular strain. Coronary artery disease and aortic valve disease. COPD and pulmonary fibrosis. RECOMMENDATIONS: The findings were sent to the Radiology Results [address] at 4:35 pm on 12/19/2022 to be communicated to a licensed caregiver. Impression:   Primary Problem  Acute pulmonary embolism [facility])  Active Hospital Problems    Diagnosis Date Noted    Acute pulmonary embolism ([address] [I26.99] 12/19/2022     Priority: Medium    Pulmonary embolism, bilateral ([address] [I26.99] 12/19/2022     Priority: Medium    Demand ischemia of myocardium ([address] [I24.8] 12/19/2022     Priority: Medium    Acute respiratory acidosis (HCC) [J96.02] 12/19/2022     Priority: Medium    Orthostatic hypotension [I95.1] 12/19/2022     Priority: Medium    Acute pulmonary edema ([address] [J81.0] 12/19/2022     Priority: Medium    Syncope and collapse [R55] 01/12/2021    Chronic idiopathic pulmonary fibrosis ([address] [J84.112] 12/08/2020    CHF (congestive heart failure), NYHA class II, acute on chronic, combined ([address] [I50.43] 10/17/2020    Hill's esophagus [K22.70] 06/18/2019    History of colon cancer [Z85.038]     Benign prostatic hyperplasia with lower urinary tract symptoms [N40.1] 04/02/2015    PAF (paroxysmal atrial fibrillation) ([address] [I48.0] 07/21/2014    Dyslipidemia [E78.5] 02/19/2014         Assessment and Plan:    Acute pulmonary embolism while on eliqius concerning for eliqius failure  History of adenocarcinoma of rectum 2014 - in remission per patient  Presyncope   Paroxysmal atrial fibrllation  UIP  NSTEMI type 2     Recommendations  Patient stated he has been complaint with eliqius at home. Could not find surgical pathology report of latest colonoscopy. Will need to review records for the same. Continue heparin gtt at present. Further recommendation per attending. Thank you for asking us to see this patient.     Tobias Taveras MD  Internal Medicine Resident, PGY-2  [facility]         12/20/2022, 10:31 AM        Attending Physician Statement   I have discussed the care of Craig Alcaraz Viv Cain, including pertinent history and exam findings with the resident. I have reviewed the key elements of all parts of the encounter with the resident. I have seen and examined the patient with the resident. I agree with the assessment and plan and status of the problem list as documented. Discussed with patient anticoagulation. Obviously he insist that he was taking Eliquis as prescribed. It was prescribed by cardiology 5 mg twice daily. Patient states he did not miss any doses. So if we take that into consideration patient has failed Eliquis. Discussed options of treatment. Warfarin versus Lovenox. Patient agreed on oral warfarin. Can be started tomorrow if is okay with the other teams unless any procedure is pending. Target INR would be 2-3. We will consult inpatient Coumadin clinic. Patient's questions were answered to the best of his satisfaction and he verbalized full understanding and agreement. Thank you for allowing us to participate in the care of this pleasant patient. [address] [place] [address] [facility]                                 This note is created with the assistance of a speech recognition program.  While intending to generate a document that actually reflects the content of the visit, the document can still have some errors including those of syntax and sound a like substitutions which may escape proof reading. It such instances, actual meaning can be extrapolated by contextual diversion.

## 2022-12-20 NOTE — ED NOTES
Report called to RN on [facility]. All questions answered. Patient updated on plan of care.  Patient to floor via stretcher     Will Roach RN  12/19/22 0126

## 2022-12-20 NOTE — CONSULTS
Division of Vascular Surgery        New Consult      Physician Requesting Consult:  Dr. Garry Greene    Reason for Consult:   bilateral PEs, no R heart strain, on Eliquis    Chief Complaint:     Shortness of breath    History of Present Illness:      Gabrielle Moreno is a 69 y.o. gentleman who presents with shortness of breath. Patient has a history of chronic idiopathic pulmonary fibrosis, atrial fibrillation, BPH, colon cancer. Patient reportedly states that he was diagnosed with a pulmonary embolism this past February; however, per chart review and CT PE performed on 2/18/2022, no evidence of acute pulmonary embolism was found at that time. Patient was admitted for respiratory failure secondary to pneumonia. Patient states that he was on Xarelto but due to the twice daily dosing, decision was made to instead place him on Eliquis shortly after. Patient states that he has been taking Eliquis daily ever since he has been put on it. Patient states that for the last 2 weeks or so he has been feeling more short of breath than usual.  Patient is already on 5 L nasal cannula at home due to his pulmonary fibrosis but recently had to go up to 9 L per his PCP. He also states that he was evaluated at the [facility] for possible lung transplantation due to his pulmonary fibrosis but is no longer on the transplant list.  Patient reports that he had COVID pneumonia a few years ago when it first started and since then has had issues with his lungs and shortness of breath. Patient states that he has passed out several times and he states that today, he felt dizzy and passed out sinking down to the floor while he was in the kitchen. Patient was brought to the emergency department by EMS. He also states that he has been having left calf tenderness for the last day or so. CT PE demonstrated bilateral segmental pulmonary embolisms -right lower lobar, lingula and left lower segmental branches.   Patient was placed on a heparin drip. Currently, patient is on a nonrebreather with SaO2 96%. He states that he does still feel short of breath. Afebrile, heart rate 60s, SBP 110s.     Medical History:     Past Medical History:   Diagnosis Date    Atrial fibrillation (HCC)     Back pain, chronic     Mcguire's esophagus 06/18/2019    Benign essential HTN     BPH (benign prostatic hyperplasia)     Cancer (HCC)     colon-rectal    Chronic idiopathic pulmonary fibrosis ([facility] [address] 03/29/2021    Cocaine abuse in remission [facility])     1970's    ED (erectile dysfunction) 4/2/2015    GERD (gastroesophageal reflux disease)     GI bleed 12/5/2018    Hernia     History of colon cancer     Melena     Migraines     Murmur, cardiac        Surgical History:     Past Surgical History:   Procedure Laterality Date    CARDIAC CATHETERIZATION  11/29/2018    Non-obstructive CAD    CARDIOVERSION  2020    COLECTOMY      2nd colectomy, Colostomy and reversed [facility]      1st time [place]    COLONOSCOPY      COLONOSCOPY  07/18/2016    COLONOSCOPY N/A 12/6/2018    COLONOSCOPY DIAGNOSTIC performed by Gabbi Castellanos MD at [address] [facility] Right 2009    inguinal    HIP SURGERY Right 2/22/2022    HIP FEMORAL HEMIARTHROPLASTY performed by Rene Rodriguez MD at [address] [facility] Right 1970's    arthrotomy    TONSILLECTOMY      TOTAL KNEE ARTHROPLASTY Right 1/8/2019    KNEE TOTAL ARTHROPLASTY performed by Rene Rodriguez MD at [address]     TRANSESOPHAGEAL ECHOCARDIOGRAM  11/29/2018    UPPER GASTROINTESTINAL ENDOSCOPY N/A 12/5/2018    EGD DIAGNOSTIC ONLY performed by Gabbi Castellanos MD at [facility]    UPPER GASTROINTESTINAL ENDOSCOPY N/A 6/18/2019    MCGUIRE'S       Family History:     Family History   Problem Relation Age of Onset    Diabetes Mother     Heart Attack Father     Heart Disease Father     Heart Disease Brother        Allergies:       Adhesive tape, Codeine, Penicillins, and Nintedanib    Medications:      Current Facility-Administered Medications   Medication Dose Route Frequency Provider Last Rate Last Admin    heparin 25,000 units in dextrose 5 % 250 mL infusion (rate based)  5-30 Units/kg/hr IntraVENous Continuous Yesenia Later, DO 13 mL/hr at 12/19/22 1704 18 Units/kg/hr at 12/19/22 1704    heparin (porcine) injection 5,770 Units  80 Units/kg IntraVENous PRN Yesenia Later, DO        heparin (porcine) injection 2,880 Units  40 Units/kg IntraVENous PRN Yesenia Later, DO        sodium chloride flush 0.9 % injection 5-40 mL  5-40 mL IntraVENous 2 times per day Noel Bansal MD        sodium chloride flush 0.9 % injection 5-40 mL  5-40 mL IntraVENous PRN Noel Almendarez MD        0.9 % sodium chloride infusion   IntraVENous PRN Noel Bansal MD        ondansetron (ZOFRAN-ODT) disintegrating tablet 4 mg  4 mg Oral Q8H PRN Noel Bansal MD        Or    ondansetron (ZOFRAN) injection 4 mg  4 mg IntraVENous Q6H PRN Noel Almendarez MD        polyethylene glycol (GLYCOLAX) packet 17 g  17 g Oral Daily PRN Noel Bansal MD        acetaminophen (TYLENOL) tablet 650 mg  650 mg Oral Q6H PRN Noel Bansal MD        Or    acetaminophen (TYLENOL) suppository 650 mg  650 mg Rectal Q6H PRN Noel Almendarez MD        atorvastatin (LIPITOR) tablet 40 mg  40 mg Oral Daily Noel Almendarez MD        budesonide-formoterol (SYMBICORT) 80-4.5 MCG/ACT inhaler 1 puff  1 puff Inhalation BID Noel Almendarez MD        albuterol sulfate HFA (PROVENTIL;VENTOLIN;PROAIR) 108 (90 Base) MCG/ACT inhaler 2 puff  2 puff Inhalation Q4H PRN Noel Bansal MD        digoxin (LANOXIN) tablet 125 mcg  125 mcg Oral Daily Noel Bansal MD         Current Outpatient Medications   Medication Sig Dispense Refill    OXYGEN Inhale 4 L into the lungs continuous Uses 4-5 liters 24/7      omeprazole (PRILOSEC) 40 MG delayed release capsule take 1 capsule by mouth once daily 90 capsule 1 fluticasone-umeclidin-vilant (TRELEGY ELLIPTA) 200-62.5-25 MCG/ACT AEPB inhaler Inhale 1 puff into the lungs daily 1 each 0    albuterol sulfate HFA (VENTOLIN HFA) 108 (90 Base) MCG/ACT inhaler Inhale 2 puffs into the lungs every 4 hours as needed for Wheezing 18 g 0    sildenafil (REVATIO) 20 MG tablet Take 1 tablet by mouth 3 times daily 90 tablet 1    sertraline (ZOLOFT) 25 MG tablet take 1 tablet by mouth once daily (Patient not taking: Reported on 11/8/2022) 30 tablet 3    ibuprofen (ADVIL;MOTRIN) 800 MG tablet take 1 tablet by mouth three times a day if needed for MODERATE PAIN ( PAIN SCALE 4-6 ) 90 tablet 1    sertraline (ZOLOFT) 50 MG tablet Take 1 tablet by mouth daily 30 tablet 3    ELIQUIS 5 MG TABS tablet take 1 tablet by mouth twice a day 60 tablet 5    tamsulosin (FLOMAX) 0.4 MG capsule take 1 capsule by mouth once daily 90 capsule 1    ferrous sulfate (IRON 325) 325 (65 Fe) MG tablet take 1 tablet by mouth twice a day 180 tablet 1    atorvastatin (LIPITOR) 40 MG tablet take 1 tablet by mouth once daily 90 tablet 1    digoxin (LANOXIN) 125 MCG tablet take 1 tablet by mouth once daily 90 tablet 1    midodrine (PROAMATINE) 5 MG tablet Take 1 tablet by mouth 3 times daily (before meals) 90 tablet 3    Baclofen (LIORESAL) 5 MG tablet take 1 tablet by mouth twice a day 180 tablet 1    Handicap Placard MISC by Does not apply route Expires 1/2026 1 each 0       Social History:     Tobacco:    reports that he quit smoking about 52 years ago. His smoking use included cigarettes. He has a 0.50 pack-year smoking history. He has never used smokeless tobacco.  Alcohol:      reports current alcohol use of about 12.0 standard drinks per week. Drug Use:  reports no history of drug use. Review of Systems:     Review of Systems   Constitutional:  Positive for fatigue. Negative for fever. HENT:  Negative for sneezing and sore throat. Eyes:  Negative for photophobia and visual disturbance.    Respiratory: Positive for cough and shortness of breath. Negative for wheezing. Cardiovascular:  Negative for chest pain, palpitations and leg swelling. Gastrointestinal:  Negative for abdominal pain, nausea and vomiting. Genitourinary:  Negative for frequency and hematuria. Musculoskeletal:  Negative for arthralgias and myalgias. Neurological:  Positive for dizziness and syncope. Psychiatric/Behavioral:  Negative for behavioral problems and confusion. Physical Exam:     Vitals:  /67   Pulse 69   Temp (!) 96.2 °F (35.7 °C) (Axillary)   Resp 24   Ht 6' (1.829 m)   Wt 159 lb (72.1 kg)   SpO2 99%   BMI 21.56 kg/m²     Physical Exam  Constitutional:       Appearance: He is ill-appearing. HENT:      Head: Normocephalic and atraumatic. Mouth/Throat:      Mouth: Mucous membranes are moist.      Pharynx: Oropharynx is clear. Eyes:      Extraocular Movements: Extraocular movements intact. Cardiovascular:      Rate and Rhythm: Normal rate and regular rhythm. Pulmonary:      Comments: Normal effort, on nonrebreather. Symmetric rise and fall of chest wall. Abdominal:      General: Abdomen is flat. Palpations: Abdomen is soft. Musculoskeletal:         General: Normal range of motion. Cervical back: Neck supple. Comments: No edema, swelling or erythema in bilateral lower extremities. Skin:     General: Skin is warm. Coloration: Skin is not jaundiced. Neurological:      General: No focal deficit present. Mental Status: He is alert and oriented to person, place, and time.    Psychiatric:         Mood and Affect: Mood normal.         Behavior: Behavior normal.     Imaging/Labs:     CT HEAD WO CONTRAST    Result Date: 12/19/2022  EXAMINATION: CT OF THE HEAD WITHOUT CONTRAST  12/19/2022 3:49 pm TECHNIQUE: CT of the head was performed without the administration of intravenous contrast. Automated exposure control, iterative reconstruction, and/or weight based adjustment of the mA/kV was utilized to reduce the radiation dose to as low as reasonably achievable. COMPARISON: None. HISTORY: ORDERING SYSTEM PROVIDED HISTORY: syncope TECHNOLOGIST PROVIDED HISTORY: syncope Decision Support Exception - unselect if not a suspected or confirmed emergency medical condition->Emergency Medical Condition (MA) FINDINGS: BRAIN/VENTRICLES: There is no acute intracranial hemorrhage, mass effect or midline shift. No abnormal extra-axial fluid collection. The gray-white differentiation is maintained without evidence of an acute infarct. There is no evidence of hydrocephalus. ORBITS: The visualized portion of the orbits demonstrate no acute abnormality. SINUSES: The visualized paranasal sinuses and mastoid air cells demonstrate no acute abnormality. SOFT TISSUES/SKULL:  No acute abnormality of the visualized skull or soft tissues. No acute intracranial abnormality. XR CHEST PORTABLE    Result Date: 12/19/2022  EXAMINATION: ONE XRAY VIEW OF THE CHEST 12/19/2022 2:37 pm COMPARISON: 02/23/2022 radiograph, CT chest 05/09/2022 HISTORY: ORDERING SYSTEM PROVIDED HISTORY: SOB TECHNOLOGIST PROVIDED HISTORY: SOB Initial encounter FINDINGS: Bilateral airspace opacities, left slightly worse than right. Overall these are stable to minimally increased when compared to the prior exam.  No pleural effusion or pneumothorax. Stable cardiac silhouette. The osseous structures are stable. Osteopenia. Pulmonary fibrotic changes are again identified. A superimposed pneumonia is not excluded. CT CHEST PULMONARY EMBOLISM W CONTRAST    Addendum Date: 12/19/2022    ADDENDUM: Discussed with Dr. Raf Posadas at 4:40 p.m. Result Date: 12/19/2022  EXAMINATION: CTA OF THE CHEST 12/19/2022 3:19 pm TECHNIQUE: CTA of the chest was performed after the administration of intravenous contrast.  Multiplanar reformatted images are provided for review. MIP images are provided for review.  Automated exposure control, iterative reconstruction, and/or weight based adjustment of the mA/kV was utilized to reduce the radiation dose to as low as reasonably achievable. COMPARISON: May 9, 2022 CT chest HISTORY: ORDERING SYSTEM PROVIDED HISTORY: syncope, low O2 sat, hx of cancer TECHNOLOGIST PROVIDED HISTORY: syncope, low O2 sat, hx of cancer Decision Support Exception - unselect if not a suspected or confirmed emergency medical condition->Emergency Medical Condition (MA) FINDINGS: Pulmonary Arteries: Partial incomplete filling defect right lower lobar pulmonary artery. Partial filling defect lingular pulmonary artery. Filling defects left lower lobe segmental branches. Mediastinum: No evidence of mediastinal lymphadenopathy. The heart and pericardium demonstrate no acute abnormality. There is no acute abnormality of the thoracic aorta. A prominent mediastinal lymph nodes essentially unchanged. Cardiomegaly and calcific coronary artery disease. Calcific aortic valve disease. Lungs/pleura: In increased interstitial markings peripherally and at the lung bases in particular with honeycomb type pattern. Centrilobular paraseptal emphysema. Stable pleuroparenchymal opacity right lateral lung. Upper Abdomen: Limited images of the upper abdomen are unremarkable. Soft Tissues/Bones: No acute bone or soft tissue abnormality. Pulmonary emboli bilaterally as above. No evidence of right ventricular strain. Coronary artery disease and aortic valve disease. COPD and pulmonary fibrosis. RECOMMENDATIONS: The findings were sent to the Radiology Results [address] at 4:35 pm on 12/19/2022 to be communicated to a licensed caregiver. Assessment and Plan:     20-year-old male with acute bilateral pulmonary embolism; history of chronic idiopathic pulmonary fibrosis considered for lung transplantation with increasing oxygen requirement    Medicine admission  No acute surgical intervention at this time.   Patient has lobar and segmental pulmonary embolism; however, patient has failed anticoagulation as he has been on Eliquis since February.   Will likely need additional work-up for anticoagulation failure  Therapeutic heparin drip  Bilateral lower extremity DVT duplex and recommend echocardiogram      Electronically signed by Nichelle Rose DO on 12/19/22 at 7:05 PM EST      [address] [facility]  Office: [phone]

## 2022-12-21 PROBLEM — J96.11 CHRONIC RESPIRATORY FAILURE WITH HYPOXIA (HCC): Status: ACTIVE | Noted: 2022-01-01

## 2022-12-21 PROBLEM — R55 POSTURAL DIZZINESS WITH NEAR SYNCOPE: Status: ACTIVE | Noted: 2022-01-01

## 2022-12-21 PROBLEM — I27.20 PULMONARY HTN (HCC): Status: ACTIVE | Noted: 2022-12-21

## 2022-12-21 PROBLEM — R42 POSTURAL DIZZINESS WITH NEAR SYNCOPE: Status: ACTIVE | Noted: 2022-12-21

## 2022-12-21 LAB
ABSOLUTE EOS #: 0.27 K/UL (ref 0–0.44)
ABSOLUTE IMMATURE GRANULOCYTE: 0.03 K/UL (ref 0–0.3)
ABSOLUTE LYMPH #: 1.09 K/UL (ref 1.1–3.7)
ABSOLUTE MONO #: 0.54 K/UL (ref 0.1–1.2)
ANION GAP SERPL CALCULATED.3IONS-SCNC: 6 MMOL/L (ref 9–17)
BASOPHILS # BLD: 0 % (ref 0–2)
BASOPHILS ABSOLUTE: 0.03 K/UL (ref 0–0.2)
BUN BLDV-MCNC: 16 MG/DL (ref 8–23)
CALCIUM SERPL-MCNC: 8.1 MG/DL (ref 8.6–10.4)
CHLORIDE BLD-SCNC: 100 MMOL/L (ref 98–107)
CO2: 29 MMOL/L (ref 20–31)
CREAT SERPL-MCNC: 0.55 MG/DL (ref 0.7–1.2)
EOSINOPHILS RELATIVE PERCENT: 4 % (ref 1–4)
GFR SERPL CREATININE-BSD FRML MDRD: >60 ML/MIN/1.73M2
GLUCOSE BLD-MCNC: 84 MG/DL (ref 70–99)
GLUCOSE BLD-MCNC: 88 MG/DL (ref 75–110)
HCT VFR BLD CALC: 33.9 % (ref 40.7–50.3)
HEMOGLOBIN: 10.1 G/DL (ref 13–17)
IMMATURE GRANULOCYTES: 0 %
INR BLD: 1.4
LYMPHOCYTES # BLD: 15 % (ref 24–43)
MCH RBC QN AUTO: 27.2 PG (ref 25.2–33.5)
MCHC RBC AUTO-ENTMCNC: 29.8 G/DL (ref 28.4–34.8)
MCV RBC AUTO: 91.1 FL (ref 82.6–102.9)
MONOCYTES # BLD: 8 % (ref 3–12)
NRBC AUTOMATED: 0 PER 100 WBC
PARTIAL THROMBOPLASTIN TIME: 53.3 SEC (ref 20.5–30.5)
PARTIAL THROMBOPLASTIN TIME: 58 SEC (ref 20.5–30.5)
PARTIAL THROMBOPLASTIN TIME: 69 SEC (ref 20.5–30.5)
PARTIAL THROMBOPLASTIN TIME: 71.5 SEC (ref 20.5–30.5)
PDW BLD-RTO: 14.6 % (ref 11.8–14.4)
PLATELET # BLD: 230 K/UL (ref 138–453)
PMV BLD AUTO: 9.9 FL (ref 8.1–13.5)
POTASSIUM SERPL-SCNC: 3.8 MMOL/L (ref 3.7–5.3)
PROTHROMBIN TIME: 15 SEC (ref 9.1–12.3)
RBC # BLD: 3.72 M/UL (ref 4.21–5.77)
RBC # BLD: ABNORMAL 10*6/UL
SEG NEUTROPHILS: 73 % (ref 36–65)
SEGMENTED NEUTROPHILS ABSOLUTE COUNT: 5.11 K/UL (ref 1.5–8.1)
SODIUM BLD-SCNC: 135 MMOL/L (ref 135–144)
WBC # BLD: 7.1 K/UL (ref 3.5–11.3)

## 2022-12-21 PROCEDURE — 2580000003 HC RX 258: Performed by: STUDENT IN AN ORGANIZED HEALTH CARE EDUCATION/TRAINING PROGRAM

## 2022-12-21 PROCEDURE — 36415 COLL VENOUS BLD VENIPUNCTURE: CPT

## 2022-12-21 PROCEDURE — 6370000000 HC RX 637 (ALT 250 FOR IP)

## 2022-12-21 PROCEDURE — 80048 BASIC METABOLIC PNL TOTAL CA: CPT

## 2022-12-21 PROCEDURE — 2700000000 HC OXYGEN THERAPY PER DAY

## 2022-12-21 PROCEDURE — 6370000000 HC RX 637 (ALT 250 FOR IP): Performed by: STUDENT IN AN ORGANIZED HEALTH CARE EDUCATION/TRAINING PROGRAM

## 2022-12-21 PROCEDURE — 82947 ASSAY GLUCOSE BLOOD QUANT: CPT

## 2022-12-21 PROCEDURE — 99232 SBSQ HOSP IP/OBS MODERATE 35: CPT | Performed by: INTERNAL MEDICINE

## 2022-12-21 PROCEDURE — 6360000002 HC RX W HCPCS: Performed by: STUDENT IN AN ORGANIZED HEALTH CARE EDUCATION/TRAINING PROGRAM

## 2022-12-21 PROCEDURE — 85610 PROTHROMBIN TIME: CPT

## 2022-12-21 PROCEDURE — 99223 1ST HOSP IP/OBS HIGH 75: CPT | Performed by: INTERNAL MEDICINE

## 2022-12-21 PROCEDURE — 85730 THROMBOPLASTIN TIME PARTIAL: CPT

## 2022-12-21 PROCEDURE — 2580000003 HC RX 258

## 2022-12-21 PROCEDURE — 2060000000 HC ICU INTERMEDIATE R&B

## 2022-12-21 PROCEDURE — 94640 AIRWAY INHALATION TREATMENT: CPT

## 2022-12-21 PROCEDURE — 85025 COMPLETE CBC W/AUTO DIFF WBC: CPT

## 2022-12-21 PROCEDURE — 94761 N-INVAS EAR/PLS OXIMETRY MLT: CPT

## 2022-12-21 PROCEDURE — 6370000000 HC RX 637 (ALT 250 FOR IP): Performed by: INTERNAL MEDICINE

## 2022-12-21 RX ORDER — SODIUM CHLORIDE 9 MG/ML
INJECTION, SOLUTION INTRAVENOUS CONTINUOUS
Status: DISCONTINUED | OUTPATIENT
Start: 2022-12-21 | End: 2022-12-26 | Stop reason: HOSPADM

## 2022-12-21 RX ORDER — WARFARIN SODIUM 5 MG/1
5 TABLET ORAL
Status: DISCONTINUED | OUTPATIENT
Start: 2022-12-21 | End: 2022-12-21

## 2022-12-21 RX ORDER — MIDODRINE HYDROCHLORIDE 5 MG/1
5 TABLET ORAL
Status: DISCONTINUED | OUTPATIENT
Start: 2022-12-21 | End: 2022-12-26 | Stop reason: HOSPADM

## 2022-12-21 RX ORDER — WARFARIN SODIUM 5 MG/1
5 TABLET ORAL
Status: COMPLETED | OUTPATIENT
Start: 2022-12-21 | End: 2022-12-21

## 2022-12-21 RX ADMIN — SILDENAFIL 20 MG: 20 TABLET ORAL at 20:01

## 2022-12-21 RX ADMIN — HEPARIN SODIUM 2880 UNITS: 1000 INJECTION INTRAVENOUS; SUBCUTANEOUS at 23:04

## 2022-12-21 RX ADMIN — HEPARIN SODIUM 2880 UNITS: 1000 INJECTION INTRAVENOUS; SUBCUTANEOUS at 08:54

## 2022-12-21 RX ADMIN — WARFARIN SODIUM 3 MG: 2.5 TABLET ORAL at 03:01

## 2022-12-21 RX ADMIN — ACETAMINOPHEN 650 MG: 325 TABLET ORAL at 08:48

## 2022-12-21 RX ADMIN — HEPARIN SODIUM 26 UNITS/KG/HR: 10000 INJECTION, SOLUTION INTRAVENOUS at 17:46

## 2022-12-21 RX ADMIN — IPRATROPIUM BROMIDE 0.5 MG: 0.5 SOLUTION RESPIRATORY (INHALATION) at 20:31

## 2022-12-21 RX ADMIN — DESMOPRESSIN ACETATE 40 MG: 0.2 TABLET ORAL at 08:48

## 2022-12-21 RX ADMIN — DIGOXIN 125 MCG: 125 TABLET ORAL at 08:48

## 2022-12-21 RX ADMIN — SODIUM CHLORIDE: 9 INJECTION, SOLUTION INTRAVENOUS at 13:23

## 2022-12-21 RX ADMIN — BUDESONIDE AND FORMOTEROL FUMARATE DIHYDRATE 1 PUFF: 80; 4.5 AEROSOL RESPIRATORY (INHALATION) at 20:31

## 2022-12-21 RX ADMIN — SODIUM CHLORIDE, PRESERVATIVE FREE 10 ML: 5 INJECTION INTRAVENOUS at 08:48

## 2022-12-21 RX ADMIN — TAMSULOSIN HYDROCHLORIDE 0.4 MG: 0.4 CAPSULE ORAL at 08:48

## 2022-12-21 RX ADMIN — MIDODRINE HYDROCHLORIDE 5 MG: 5 TABLET ORAL at 17:09

## 2022-12-21 RX ADMIN — SILDENAFIL 20 MG: 20 TABLET ORAL at 13:23

## 2022-12-21 RX ADMIN — IPRATROPIUM BROMIDE 0.5 MG: 0.5 SOLUTION RESPIRATORY (INHALATION) at 15:05

## 2022-12-21 RX ADMIN — PANTOPRAZOLE SODIUM 40 MG: 40 TABLET, DELAYED RELEASE ORAL at 08:48

## 2022-12-21 RX ADMIN — MIDODRINE HYDROCHLORIDE 5 MG: 5 TABLET ORAL at 13:23

## 2022-12-21 RX ADMIN — WARFARIN SODIUM 5 MG: 5 TABLET ORAL at 20:01

## 2022-12-21 RX ADMIN — SILDENAFIL 20 MG: 20 TABLET ORAL at 08:48

## 2022-12-21 RX ADMIN — ACETAMINOPHEN 650 MG: 325 TABLET ORAL at 15:55

## 2022-12-21 RX ADMIN — IPRATROPIUM BROMIDE 0.5 MG: 0.5 SOLUTION RESPIRATORY (INHALATION) at 11:23

## 2022-12-21 RX ADMIN — BUDESONIDE AND FORMOTEROL FUMARATE DIHYDRATE 1 PUFF: 80; 4.5 AEROSOL RESPIRATORY (INHALATION) at 07:48

## 2022-12-21 RX ADMIN — IPRATROPIUM BROMIDE 0.5 MG: 0.5 SOLUTION RESPIRATORY (INHALATION) at 07:48

## 2022-12-21 NOTE — PROGRESS NOTES
Pharmacy Note  Warfarin Consult    Lay Mathew is a 69 y.o. male for whom pharmacy has been consulted to manage warfarin therapy. Consulting Physician: Willa Avila MD  Reason for Admission: PE    Warfarin dose: Will initiate 3mg daily and continue heparin drip until INR reaches therapeutic range. Warfarin indication: pulmonary embolism  Target INR range: 2-3     Past Medical History:   Diagnosis Date    Atrial fibrillation (HCC)     Back pain, chronic     Hill's esophagus 06/18/2019    Benign essential HTN     BPH (benign prostatic hyperplasia)     Cancer (HCC)     colon-rectal    Chronic idiopathic pulmonary fibrosis ([facility] [address] 03/29/2021    Cocaine abuse in remission [facility])     1970's    Community acquired bacterial pneumonia 2/18/2022    ED (erectile dysfunction) 4/2/2015    GERD (gastroesophageal reflux disease)     GI bleed 12/5/2018    Hernia     History of colon cancer     Melena     Migraines     Multifocal pneumonia 2/18/2022    Murmur, cardiac     Single subsegmental pulmonary embolism without acute cor pulmonale ([facility] [address] 4/28/2022                Recent Labs     12/20/22  2012   INR 1.4     Recent Labs     12/19/22  1423 12/20/22  0612   HGB 11.8* 10.7*   HCT 39.6* 35.5*    246       Current warfarin drug-drug interactions: Heparin      Date             INR        Dose   12/21/2022            1.4       3mg    Daily PT/INR while inpatient. PT/INR ordered to start 12/21/22 with morning labs. Thank you for the consult. Will continue to follow.   Erwin Husain PharmD

## 2022-12-21 NOTE — PROGRESS NOTES
Today's Date: 12/21/2022  Patient Name: Fide Prather  Date of admission: 12/19/2022  2:07 PM  Patient's age: 69 y.o., 1955  Admission Dx: Syncope and collapse [R55]  Shortness of breath [R06.02]  Pulmonary embolism, bilateral ([address] [I26.99]  Pulmonary embolism without acute cor pulmonale, unspecified chronicity, unspecified pulmonary embolism type ([address] [I26.99]    Reason for Consult: pulmonary embolism while on eliquis  Requesting Physician: Edwina León MD    Chief Complaint:  shortness of breath  Interval Changes:    Patient's shortness of breath at baseline, on 6 L NC - he uses upto 9 L NC at home per patient it was increased by his PCP. Labs reviewed, cbc stable. Patient started on coumadin yesterday, INR today 1.4 (goal 2 - 3) being bridged with heparin gtt. History of Present Illness: The patient is a 69 y.o. male who is admitted to the hospital for shortness of breath. CT PE demonstrated bilateral segmental pulmonary embolisms -right lower lobar, lingula and left lower segmental branches. CA 19-9: 114, CEA 4.8, PSA 0.46     PE history -  11/2020 - Tiny nonocclusive pulmonary embolus in the right lower lobar pulmonary artery. 02/22 - CT PE negative  12/2022 - Partial incomplete filling defect right lower lobar  pulmonary artery. Partial filling defect lingular pulmonary artery. Filling  defects left lower lobe segmental branches. Hematology history  Patient seen by Dr. Maury Thompson 12/2020 for xarelto failure for pulmonary embolism. At the time, his acute PE was suspected due to medication being held while in hospital. He was changed to eliqius from [facility] per patient's wishes. Patient started xarelto in 2018 due to heart condition by Dr. Severino Thompson per patient. However was switched to eliqius in 2020 due to small Pulmonary embolism.       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 Colostomy was reversed. Vieyra syndrome negative. Patient stated he follows up with GI and had last colonoscopy last year and has been negative so far with no relapse of cancer. PMH - chronic idiopathic pulmonary fibrosis, atrial fibrillation, BPH, colon cancer. Patient reportedly states that he was diagnosed with a pulmonary embolism this past February; however, per chart review and CT PE performed on 2/18/2022, no evidence of acute pulmonary embolism was found at that time. Past Medical History:   has a past medical history of Atrial fibrillation ([address] Back pain, chronic, Hill's esophagus, Benign essential HTN, BPH (benign prostatic hyperplasia), Cancer ([address] Chronic idiopathic pulmonary fibrosis ([address] Cocaine abuse in remission ([address] Community acquired bacterial pneumonia, ED (erectile dysfunction), GERD (gastroesophageal reflux disease), GI bleed, Hernia, History of colon cancer, Melena, Migraines, Multifocal pneumonia, Murmur, cardiac, and Single subsegmental pulmonary embolism without acute cor pulmonale ([address] Past Surgical History:   has a past surgical history that includes Tonsillectomy; Colonoscopy; colectomy; Colonoscopy (07/18/2016); knee surgery (Right, 1970's); transesophageal echocardiogram (11/29/2018); Upper gastrointestinal endoscopy (N/A, 12/5/2018); Colonoscopy (N/A, 12/6/2018); hernia repair (Right, 2009); Cardiac catheterization (11/29/2018); colectomy; Total knee arthroplasty (Right, 1/8/2019); Upper gastrointestinal endoscopy (N/A, 6/18/2019); Cardioversion (2020); and hip surgery (Right, 2/22/2022). Medications:    Prior to Admission medications    Medication Sig Start Date End Date Taking?  Authorizing Provider   OXYGEN Inhale 4 L into the lungs continuous Uses 4-5 liters 24/7   Yes Historical Provider, MD   omeprazole (PRILOSEC) 40 MG delayed release capsule take 1 capsule by mouth once daily 12/8/22   César Farmer MD fluticasone-umeclidin-vilant (TRELEGY ELLIPTA) 200-62.5-25 MCG/ACT AEPB inhaler Inhale 1 puff into the lungs daily 12/6/22   Cassandra Corado MD   albuterol sulfate HFA (VENTOLIN HFA) 108 (90 Base) MCG/ACT inhaler Inhale 2 puffs into the lungs every 4 hours as needed for Wheezing 12/5/22 12/5/23  Cassandra Corado MD   sildenafil (REVATIO) 20 MG tablet Take 1 tablet by mouth 3 times daily 11/8/22   Cassandra Corado MD   sertraline (ZOLOFT) 25 MG tablet take 1 tablet by mouth once daily  Patient not taking: Reported on 11/8/2022 11/2/22   Quratulain Miladys Johnson DO   ibuprofen (ADVIL;MOTRIN) 800 MG tablet take 1 tablet by mouth three times a day if needed for MODERATE PAIN ( PAIN SCALE 4-6 ) 10/24/22   Cassandra Corado MD   sertraline (ZOLOFT) 50 MG tablet Take 1 tablet by mouth daily 10/3/22   Cassandra Corado MD   ELIQUIS 5 MG TABS tablet take 1 tablet by mouth twice a day 9/19/22   MIRI Jackson - NP   tamsulosin [facility]) 0.4 MG capsule take 1 capsule by mouth once daily 8/8/22   Cassandra Corado MD   ferrous sulfate (IRON 325) 325 (65 Fe) MG tablet take 1 tablet by mouth twice a day 7/29/22   Cassandra Corado MD   atorvastatin (LIPITOR) 40 MG tablet take 1 tablet by mouth once daily 4/28/22   Cassandra Corado MD   digoxin (LANOXIN) 125 MCG tablet take 1 tablet by mouth once daily 4/28/22   Cassandra Corado MD   midodrine (PROAMATINE) 5 MG tablet Take 1 tablet by mouth 3 times daily (before meals) 3/2/22   Mouna Silva DO   Baclofen (LIORESAL) 5 MG tablet take 1 tablet by mouth twice a day 1/27/22   Cassandra Corado MD   Handanisap Placard MISC by Does not apply route Expires 1/2026 1/19/21   Cassandra Corado MD     Current Facility-Administered Medications   Medication Dose Route Frequency Provider Last Rate Last Admin    warfarin (COUMADIN) split-tablet 3 mg  3 mg Oral Daily Jett Canales MD   3 mg at 12/21/22 0301    sodium chloride (OCEAN) 0.65 % nasal spray 1 spray  1 spray Each Nostril PRN Noel Murry MD        warfarin placeholder: dosing by pharmacy   Other [address] [place] [address], MD        heparin 25,000 units in dextrose 5 % 250 mL infusion (rate based)  5-30 Units/kg/hr IntraVENous Continuous Arbie Elida, DO 18.7 mL/hr at 12/21/22 0853 26 Units/kg/hr at 12/21/22 0853    heparin (porcine) injection 5,770 Units  80 Units/kg IntraVENous PRN Arbie Congo, DO        heparin (porcine) injection 2,880 Units  40 Units/kg IntraVENous PRN Arbie Congo, DO   2,880 Units at 12/21/22 0854    sodium chloride flush 0.9 % injection 5-40 mL  5-40 mL IntraVENous 2 times per day Susie Velasquez MD   10 mL at 12/21/22 0848    sodium chloride flush 0.9 % injection 5-40 mL  5-40 mL IntraVENous PRN Noel Almendarez MD        0.9 % sodium chloride infusion   IntraVENous PRN Noel Murry MD        ondansetron (ZOFRAN-ODT) disintegrating tablet 4 mg  4 mg Oral Q8H PRN Noel Murry MD        Or    ondansetron (ZOFRAN) injection 4 mg  4 mg IntraVENous Q6H PRN Noel Almendarez MD        polyethylene glycol (GLYCOLAX) packet 17 g  17 g Oral Daily PRN Noel Murry MD        acetaminophen (TYLENOL) tablet 650 mg  650 mg Oral Q6H PRN Noel Murry MD   650 mg at 12/21/22 0848    Or    acetaminophen (TYLENOL) suppository 650 mg  650 mg Rectal Q6H PRN Noel Almendarez MD        atorvastatin (LIPITOR) tablet 40 mg  40 mg Oral Daily Noel Almendarez MD   40 mg at 12/21/22 0848    budesonide-formoterol (SYMBICORT) 80-4.5 MCG/ACT inhaler 1 puff  1 puff Inhalation BID Noel Tarango MD   1 puff at 12/21/22 0748    albuterol sulfate HFA (PROVENTIL;VENTOLIN;PROAIR) 108 (90 Base) MCG/ACT inhaler 2 puff  2 puff Inhalation Q4H PRN Noel Almendarez MD        digoxin (LANOXIN) tablet 125 mcg  125 mcg Oral Daily Noel Almendarez MD   125 mcg at 12/21/22 0848    pantoprazole (PROTONIX) tablet 40 mg  40 mg Oral [facility] Deejay Jerez MD   40 mg at 12/21/22 0848    sildenafil (REVATIO) tablet 20 mg  20 mg Oral TID Mak Aguilera MD   20 mg at 12/21/22 0848    tamsulosin (FLOMAX) capsule 0.4 mg  0.4 mg Oral Daily Deejay Jerez MD   0.4 mg at 12/21/22 0848    ipratropium (ATROVENT) 0.02 % nebulizer solution 0.5 mg  0.5 mg Nebulization 4x daily Mak Aguilera MD   0.5 mg at 12/21/22 3968       Allergies:  Adhesive tape, Codeine, Penicillins, and Nintedanib    Social History:   reports that he quit smoking about 52 years ago. His smoking use included cigarettes. He has a 0.50 pack-year smoking history. He has never used smokeless tobacco. He reports that he does not currently use alcohol after a past usage of about 12.0 standard drinks per week. He reports that he does not use drugs. Family History: family history includes Diabetes in his mother; Heart Attack in his father; Heart Disease in his brother and father. Review of Systems:      Constitutional: No fever or chills. No night sweats, no weight loss   Eyes: No eye discharge, double vision, or eye pain   HEENT: negative for sore mouth, sore throat, hoarseness and voice change   Respiratory: negative for cough , sputum, dyspnea, wheezing, hemoptysis, chest pain   Cardiovascular: negative for chest pain, dyspnea, palpitations, orthopnea, PND   Gastrointestinal: negative for nausea, vomiting, diarrhea, constipation, abdominal pain, Dysphagia, hematemesis and hematochezia   Genitourinary: negative for frequency, dysuria, nocturia, urinary incontinence, and hematuria   Integument: negative for rash, skin lesions, bruises.    Hematologic/Lymphatic: negative for easy bruising, bleeding, lymphadenopathy, or petechiae   Endocrine: negative for heat or cold intolerance,weight changes, change in bowel habits and hair loss   Musculoskeletal: negative for myalgias, arthralgias, pain, joint swelling,and bone pain   Neurological: negative for headaches, dizziness, seizures, weakness, numbness    Physical Exam:      /77   Pulse 68   Temp 97.9 °F (36.6 °C) (Oral)   Resp 23   Ht 6' (1.829 m)   Wt 159 lb (72.1 kg)   SpO2 92%   BMI 21.56 kg/m²    Temp (24hrs), Av.1 °F (36.7 °C), Min:97.8 °F (36.6 °C), Max:98.4 °F (36.9 °C)      General appearance - well appearing, no in pain or distress   Mental status - alert and cooperative   Eyes - pupils equal and reactive, extraocular eye movements intact   Ears - bilateral TM's and external ear canals normal   Mouth - mucous membranes moist, pharynx normal without lesions   Neck - supple, no significant adenopathy   Lymphatics - no palpable lymphadenopathy, no hepatosplenomegaly   Chest - clear to auscultation, no wheezes, rales or rhonchi, symmetric air entry   Heart - normal rate, regular rhythm, normal S1, S2, no murmurs  Abdomen - soft, nontender, nondistended, no masses or organomegaly   Neurological - alert, oriented, normal speech, no focal findings or movement disorder noted   Musculoskeletal - no joint tenderness, deformity or swelling   Extremities - peripheral pulses normal, no pedal edema, no clubbing or cyanosis   Skin - normal coloration and turgor, no rashes, no suspicious skin lesions noted ,    Labs:   Complete Blood Count:   Recent Labs     22  1423 22  0612 22  0205   WBC 8.5 6.8 7.1   HGB 11.8* 10.7* 10.1*   MCV 88.6 89.6 91.1    246 230   RBC 4.47 3.96* 3.72*   HCT 39.6* 35.5* 33.9*   MCH 26.4 27.0 27.2   MCHC 29.8 30.1 29.8   RDW 14.3 14.3 14.6*   MPV 10.2 10.0 9.9      PT/INR:    Lab Results   Component Value Date/Time    PROTIME 15.0 2022 02:05 AM    INR 1.4 2022 02:05 AM     PTT:    Lab Results   Component Value Date/Time    APTT 58.0 2022 07:20 AM     Basal Metabolic Profile:   Recent Labs     22  1423 22  0612 22  0205    139 135   K 4.0 4.0 3.8   BUN 24* 21 16   CREATININE 0.79 0.63* 0.55*    104 100   CO2 25 30 29      LFTS  Recent Labs     22  1423   ALKPHOS 83   ALT 20   AST 26   BILITOT 0.7   LABALBU 3.2*       Imaging:  CT HEAD WO CONTRAST    Result Date: 12/19/2022  No acute intracranial abnormality. XR CHEST PORTABLE    Result Date: 12/19/2022  Pulmonary fibrotic changes are again identified. A superimposed pneumonia is not excluded. CT CHEST PULMONARY EMBOLISM W CONTRAST    Addendum Date: 12/19/2022    ADDENDUM: Discussed with Dr. Jaxson Posadas at 4:40 p.m. Result Date: 12/19/2022  Pulmonary emboli bilaterally as above. No evidence of right ventricular strain. Coronary artery disease and aortic valve disease. COPD and pulmonary fibrosis. RECOMMENDATIONS: The findings were sent to the Radiology Results [address] at 4:35 pm on 12/19/2022 to be communicated to a licensed caregiver.        Impression:   Primary Problem  Acute pulmonary embolism [facility])    Active Hospital Problems    Diagnosis Date Noted    Pulmonary embolism without acute cor pulmonale ([address] [I26.99] 12/20/2022     Priority: Medium    Acute pulmonary embolism ([address] [I26.99] 12/19/2022     Priority: Medium    Pulmonary embolism, bilateral ([address] [I26.99] 12/19/2022     Priority: Medium    Demand ischemia of myocardium ([address] [I24.8] 12/19/2022     Priority: Medium    Acute respiratory acidosis (HCC) [J96.02] 12/19/2022     Priority: Medium    Orthostatic hypotension [I95.1] 12/19/2022     Priority: Medium    Acute pulmonary edema ([address] [J81.0] 12/19/2022     Priority: Medium    Syncope and collapse [R55] 01/12/2021    Chronic idiopathic pulmonary fibrosis ([address] [J84.112] 12/08/2020    CHF (congestive heart failure), NYHA class II, acute on chronic, combined ([address] [I50.43] 10/17/2020    Hill's esophagus [K22.70] 06/18/2019    History of colon cancer [Z85.038]     Benign prostatic hyperplasia with lower urinary tract symptoms [N40.1] 04/02/2015    PAF (paroxysmal atrial fibrillation) ([address] [I48.0] 07/21/2014    Dyslipidemia [E78.5] 02/19/2014       Assessment and Plan:    Acute pulmonary embolism while on eliqius concerning for eliqius failure  History of adenocarcinoma of rectum 2014 - in remission per patient  Presyncope   Paroxysmal atrial fibrllation  UIP  NSTEMI type 2     Recommendations     Discussed with patient anticoagulation. Obviously he insist that he was taking Eliquis as prescribed. It was prescribed by cardiology 5 mg twice daily. Patient states he did not miss any doses. So if we take that into consideration patient has failed Eliquis. Discussed options of treatment. Warfarin versus Lovenox. Patient agreed on oral warfarin. Patient started on Coumadin. Target INR would be 2-3. Inpatient Coumadin clinic consulted. Patient's questions were answered to the best of his satisfaction and he verbalized full understanding and agreement. Thank you for allowing us to participate in the care of this pleasant patient. We will continue to follow up on this patient. Nito Hernandez MD  Internal Medicine Resident, PGY-2  [facility]         12/21/2022, 9:19 AM        Attending Physician Statement  The patient was seen and examined during rounds, I have discussed the care of Nolberto Marie, including pertinent history and exam findings with the resident. I have reviewed the key elements of all parts of the encounter with the resident. I agree with the assessment, and status of the problem list as documented. Additional assessment/ plan    Plan for long-term anticoagulation    Electronically signed by   Rui Salcido MD                  This note is created with the assistance of a speech recognition program.  While intending to generate a document that actually reflects the content of the visit, the document can still have some errors including those of syntax and sound a like substitutions which may escape proof reading. It such instances, actual meaning can be extrapolated by contextual diversion.

## 2022-12-21 NOTE — PLAN OF CARE
Problem: Discharge Planning  Goal: Discharge to home or other facility with appropriate resources  12/21/2022 [id number] by Monie Cardoza RN  Outcome: Progressing  Flowsheets (Taken 12/21/2022 0800)  Discharge to home or other facility with appropriate resources: Identify barriers to discharge with patient and caregiver  12/20/2022 1958 by Abigail Stauffer RN  Outcome: Progressing     Problem: Safety - Adult  Goal: Free from fall injury  12/21/2022 0922 by Monie Cardoza RN  Outcome: Progressing  12/20/2022 1958 by Abigail Stauffer RN  Outcome: Progressing     Problem: Skin/Tissue Integrity  Goal: Absence of new skin breakdown  Description: 1. Monitor for areas of redness and/or skin breakdown  2. Assess vascular access sites hourly  3. Every 4-6 hours minimum:  Change oxygen saturation probe site  4. Every 4-6 hours:  If on nasal continuous positive airway pressure, respiratory therapy assess nares and determine need for appliance change or resting period.   12/21/2022 [id number] by Monie Cardoza RN  Outcome: Progressing  12/20/2022 1958 by Abigail Stauffer RN  Outcome: Progressing     Problem: Chronic Conditions and Co-morbidities  Goal: Patient's chronic conditions and co-morbidity symptoms are monitored and maintained or improved  12/21/2022 0922 by Monie Cardoza RN  Outcome: Progressing  Flowsheets (Taken 12/21/2022 0800)  Care Plan - Patient's Chronic Conditions and Co-Morbidity Symptoms are Monitored and Maintained or Improved: Monitor and assess patient's chronic conditions and comorbid symptoms for stability, deterioration, or improvement  12/20/2022 1958 by Abigail Stauffer RN  Outcome: Progressing     Problem: Respiratory - Adult  Goal: Achieves optimal ventilation and oxygenation  12/21/2022 0922 by Monie Cardoza RN  Outcome: Progressing  12/21/2022 0751 by Dick Alex RCP  Outcome: Progressing  12/20/2022 1958 by Abigail Stauffer RN  Outcome: Progressing Problem: Nutrition Deficit:  Goal: Optimize nutritional status  12/21/2022 0922 by Ric Bowles RN  Outcome: Progressing  12/20/2022 1958 by Reza Vincent RN  Outcome: Progressing     Problem: ABCDS Injury Assessment  Goal: Absence of physical injury  12/21/2022 1693 by Ric Bowles RN  Outcome: Progressing  12/20/2022 1958 by Reza Vincent RN  Outcome: Progressing

## 2022-12-21 NOTE — CONSULTS
PULMONARY & CRITICAL CARE MEDICINE CONSULT NOTE     Patient:  Gabrielle Moreno  MRN: [id number]  Admit date: 12/19/2022  Primary Care Physician: Kathe Schroeder MD  Consulting Physician: Baldemar Perry MD  CODE Status: Full Code   LOS: 2    HISTORY     CHIEF COMPLAINT/REASON FOR CONSULT:    Pulm embolism/chronic respiratory failure/pulmonary fibrosis. HISTORY OF PRESENT ILLNESS:  The patient is a 69 y.o. male he is followed up by Dr. Radha Jose with history of pulmonary fibrosis/IPF on 6 L of nasal cannula with chronic hypoxic respiratory failure, history of pulmonary embolism on chronic anticoagulation therapy. He has been followed by hematology he had a history of pulm embolism apparently in 2020 and at that time he was treated with Xarelto and later while he was on Xarelto apparently he has nonocclusive pulm embolism considered as failure of Xarelto and it was changed to Eliquis and he has been taking Eliquis and compliant with Eliquis. According to patient for last 2 to 3 weeks he has been having increasing shortness of breath he gets short of breath by ambulating from room to bathroom also he has increased cough than baseline. Patient presented when he had an episode of near syncope he usually does feel dizzy when he stands up but at that time he was apparently sweaty did not completely pass out and did not have a fall. He does not complain of fever denies purulent sputum denies increased sputum production denies chills denies hemoptysis and does not complain of chest pain currently he does not complain of dizziness lightheadedness or syncope. He presented to emergency room had a CTA chest done which showed that he has bilateral pulm embolism and right lower lobe lingula pulmonary artery and left lower lobe segmental branches per radiology.   CT scan of the chest shows findings consistent with pulmonary fibrosis with basilar honeycomb changes and bronchiectasis no areas of definite consolidation. Patient has been on 6 L nasal cannula maintaining saturation above 90%. He had been taking his Eliquis and consider failure of NOAC/DOAC hematology oncology was consulted and patient is currently on heparin drip hematology discussed with patient about Lovenox versus Coumadin therapy patient wanted to proceed with Coumadin therapy. Previous echo in February this year showed estimated RVSP of 41, ejection fraction of 40 to 22%, diastolic dysfunction RV dilatation with normal systolic function mild to moderate MR. He has history of colon cancer in status postsurgery in 2015 with colostomy and reversal later  He is a non-smoker no history of COPD.       PAST MEDICAL HISTORY:        Diagnosis Date    Atrial fibrillation (HCC)     Back pain, chronic     Hill's esophagus 06/18/2019    Benign essential HTN     BPH (benign prostatic hyperplasia)     Cancer (HCC)     colon-rectal    Chronic idiopathic pulmonary fibrosis ([address] 03/29/2021    Cocaine abuse in remission [facility])     1970's    Community acquired bacterial pneumonia 2/18/2022    ED (erectile dysfunction) 4/2/2015    GERD (gastroesophageal reflux disease)     GI bleed 12/5/2018    Hernia     History of colon cancer     Melena     Migraines     Multifocal pneumonia 2/18/2022    Murmur, cardiac     Single subsegmental pulmonary embolism without acute cor pulmonale (HCC) 4/28/2022     PAST SURGICAL HISTORY:        Procedure Laterality Date    CARDIAC CATHETERIZATION  11/29/2018    Non-obstructive CAD    CARDIOVERSION  2020    COLECTOMY      2nd colectomy, Colostomy and reversed [facility]      1st time [place]    COLONOSCOPY      COLONOSCOPY  07/18/2016    COLONOSCOPY N/A 12/6/2018    COLONOSCOPY DIAGNOSTIC performed by Solomon Spencer MD at [address] Right 2009    inguinal    HIP SURGERY Right 2/22/2022    HIP FEMORAL HEMIARTHROPLASTY performed by Haroldo Boyle MD at [address] [facility] Right [address] arthrotomy    TONSILLECTOMY      TOTAL KNEE ARTHROPLASTY Right 1/8/2019    KNEE TOTAL ARTHROPLASTY performed by Lor Grullon MD at [address]    TRANSESOPHAGEAL ECHOCARDIOGRAM  11/29/2018    UPPER GASTROINTESTINAL ENDOSCOPY N/A 12/5/2018    EGD DIAGNOSTIC ONLY performed by Hemant Canchola MD at [facility]    UPPER GASTROINTESTINAL ENDOSCOPY N/A 6/18/2019    MCGUIRE'S     FAMILY HISTORY:       Problem Relation Age of Onset    Diabetes Mother     Heart Attack Father     Heart Disease Father     Heart Disease Brother      SOCIAL HISTORY:   TOBACCO:   reports that he quit smoking about 52 years ago. His smoking use included cigarettes. He has a 0.50 pack-year smoking history. He has never used smokeless tobacco.  ETOH:  reports that he does not currently use alcohol after a past usage of about 12.0 standard drinks per week. DRUGS: reports no history of drug use. AVOCATION/OCCUPATIONAL EXPOSURE:    He used to work in [employer].:    Allergies   Allergen Reactions    Adhesive Tape Other (See Comments)     Blister badly     Codeine Nausea Only     Other reaction(s): Other: See Comments  NAUSEA  Other reaction(s): Other: See Comments  NAUSEA    Penicillins Swelling     As a baby  Other reaction(s): Unknown  As a baby    Nintedanib Diarrhea     10-15 BM daily         HOME MEDICATIONS:  Prior to Admission medications    Medication Sig Start Date End Date Taking?  Authorizing Provider   OXYGEN Inhale 4 L into the lungs continuous Uses 4-5 liters 24/7   Yes Historical Provider, MD   omeprazole (PRILOSEC) 40 MG delayed release capsule take 1 capsule by mouth once daily 12/8/22   Cassandra Knox MD   fluticasone-umeclidin-vilant (TRELEGY ELLIPTA) 200-62.5-25 MCG/ACT AEPB inhaler Inhale 1 puff into the lungs daily 12/6/22   Cassandra Knox MD   albuterol sulfate HFA (VENTOLIN HFA) 108 (90 Base) MCG/ACT inhaler Inhale 2 puffs into the lungs every 4 hours as needed for Wheezing 12/5/22 12/5/23  Cassandra Knox MD sildenafil (REVATIO) 20 MG tablet Take 1 tablet by mouth 3 times daily 11/8/22   Cassandra Reich MD   sertraline (ZOLOFT) 25 MG tablet take 1 tablet by mouth once daily  Patient not taking: Reported on 11/8/2022 11/2/22   Nacho Meneses DO   ibuprofen (ADVIL;MOTRIN) 800 MG tablet take 1 tablet by mouth three times a day if needed for MODERATE PAIN ( PAIN SCALE 4-6 ) 10/24/22   Cassandra Reich MD   sertraline (ZOLOFT) 50 MG tablet Take 1 tablet by mouth daily 10/3/22   Cassandra Reich MD   ELIQUIS 5 MG TABS tablet take 1 tablet by mouth twice a day 9/19/22   MIRI Mesa NP   [facility] 0.4 MG capsule take 1 capsule by mouth once daily 8/8/22   Cassandra Reich MD   ferrous sulfate (IRON 325) 325 (65 Fe) MG tablet take 1 tablet by mouth twice a day 7/29/22   Cassandra Reich MD   atorvastatin (LIPITOR) 40 MG tablet take 1 tablet by mouth once daily 4/28/22   Cassandra Reich MD   digoxin (LANOXIN) 125 MCG tablet take 1 tablet by mouth once daily 4/28/22   Cassandra Reich MD   midodrine (PROAMATINE) 5 MG tablet Take 1 tablet by mouth 3 times daily (before meals) 3/2/22   Kailash Agudelo DO   Baclofen (LIORESAL) 5 MG tablet take 1 tablet by mouth twice a day 1/27/22   Cassandra Reich MD   Handicap Placard MISC by Does not apply route Expires 1/2026 1/19/21   Cassandra Reich MD     IMMUNIZATIONS:  Most Recent Immunizations   Administered Date(s) Administered    COVID-19, PFIZER PURPLE top, DILUTE for use, (age 15 y+), 30mcg/0.3mL 12/21/2021    Influenza Vaccine, unspecified formulation 12/12/2017    Influenza Virus Vaccine 12/04/2015    Influenza, AFLURIA (age 1 yrs+), FLUZONE, (age 10 mo+), MDV, 0.5mL 01/03/2017    Influenza, FLUAD, (age 72 y+), Adjuvanted, 0.5mL 11/08/2022    Influenza, FLUARIX, FLULAVAL, FLUZONE (age 10 mo+) AND AFLURIA, (age 1 y+), PF, 0.5mL 10/23/2014    Influenza, High Dose (Fluzone 65 yrs and older) 09/27/2018    Influenza, Quadv, 6 mo and older, IM (Fluzone, Flulaval) 2017    Influenza, Triv, inactivated, subunit, adjuvanted, IM (Fluad 65 yrs and older) 2019    Tdap (Boostrix, Adacel) 2022       REVIEW OF SYSTEMS:  General: negative for chills, fatigue or fever  ENT: negative for headaches, nasal congestion, sore throat or visual changes  Allergy and Immunology: negative for postnasal drip or seasonal allergies  Hematological and Lymphatic: negative for bleeding problems, swollen lymph nodes  Respiratory: positive for cough and shortness of breath negative for hemoptysis, sputum changes, tachypnea, or wheezing  Cardiovascular: negative for edema or palpitations  Gastrointestinal: negative for abdominal pain, change in bowel habits or nausea/vomiting  Genito-Urinary: negative for dysuria or urinary frequency/urgency  Musculoskeletal: negative for joint pain or joint swelling  Neurological: negative for numbness/tingling, seizures or weakness  Dermatological: negative for pruritus or rash    PHYSICAL EXAMINATION     VITAL SIGNS:   LAST-  /77   Pulse 68   Temp 97.9 °F (36.6 °C) (Oral)   Resp 23   Ht 6' (1.829 m)   Wt 159 lb (72.1 kg)   SpO2 92%   BMI 21.56 kg/m²   8-24 HR RANGE-  TEMP Temp  Av.1 °F (36.7 °C)  Min: 97.8 °F (36.6 °C)  Max: 98.4 °F (66.7 °C)   BP Systolic (36THO), RWQ:441 , Min:102 , SLS:029      Diastolic (24HIS), QQQ:16, Min:64, Max:80     PULSE Pulse  Av.8  Min: 55  Max: 80   RR Resp  Av  Min: 13  Max: 23   O2 SAT SpO2  Av.7 %  Min: 89 %  Max: 97 %   OXYGEN DELIVERY O2 Flow Rate (L/min)  Av L/min  Min: 6 L/min  Max: 6 L/min     SYSTEMIC EXAMINATION:   General appearance - well appearing, overweight, comfortable and in no acute distress  Mental status - alert, oriented to person, place, and time  Eyes - pupils equal and reactive, extraocular eye movements intact, sclera anicteric  Mouth - mucous membranes moist, pharynx normal without lesions  Neck - supple, no significant adenopathy, carotids upstroke normal bilaterally, no bruits  Lymphatics - no palpable lymphadenopathy, no hepatosplenomegaly  Chest -mild tachypnea at baseline no retractions no cyanosis. Air entry is present bilaterally distant bilateral dry expiratory bibasilar crackles present  Heart - normal rate, regular rhythm, normal S1, S2, no murmurs, rubs, clicks or gallops  Abdomen - soft, nontender, nondistended, no masses or organomegaly  Neurological - motor and sensory grossly normal bilaterally  Musculoskeletal - no joint tenderness, deformity or swelling  Extremities - peripheral pulses normal, no pedal edema, no clubbing or cyanosis  Skin - normal coloration and turgor, no rashes, no suspicious skin lesions noted    DATA REVIEW     Medications: Current Inpatient  Scheduled Meds:   warfarin  3 mg Oral Daily    warfarin  5 mg Oral Once    warfarin placeholder: dosing by pharmacy   Other RX Placeholder    sodium chloride flush  5-40 mL IntraVENous 2 times per day    atorvastatin  40 mg Oral Daily    budesonide-formoterol  1 puff Inhalation BID    digoxin  125 mcg Oral Daily    pantoprazole  40 mg Oral QAM AC    sildenafil  20 mg Oral TID    tamsulosin  0.4 mg Oral Daily    ipratropium  0.5 mg Nebulization 4x daily     Continuous Infusions:   heparin (PORCINE) Infusion 26 Units/kg/hr (12/21/22 0853)    sodium chloride       INPUT/OUTPUT:  In: -   Out: 1675 [Urine:1675]    LABS:-  ABG:   No results for input(s): POCPH, POCPCO2, POCPO2, POCHCO3, EKJU4TWA in the last 72 hours.   CBC:   Recent Labs     12/19/22  1423 12/20/22  0612 12/21/22  0205   WBC 8.5 6.8 7.1   HGB 11.8* 10.7* 10.1*   HCT 39.6* 35.5* 33.9*   MCV 88.6 89.6 91.1    246 230   LYMPHOPCT 11* 12* 15*   RBC 4.47 3.96* 3.72*   MCH 26.4 27.0 27.2   MCHC 29.8 30.1 29.8   RDW 14.3 14.3 14.6*     BMP:   Recent Labs     12/19/22  1423 12/20/22  0612 12/21/22  0205    139 135   K 4.0 4.0 3.8    104 100   CO2 25 30 29   BUN 24* 21 16   CREATININE 0.79 0.63* 0.55* GLUCOSE 141* 111* 84     Liver Function Test:   Recent Labs     12/19/22  1423   PROT 6.5   LABALBU 3.2*   ALT 20   AST 26   ALKPHOS 83   BILITOT 0.7     Amylase/Lipase:  No results for input(s): AMYLASE, LIPASE in the last 72 hours. Coagulation Profile:   Recent Labs     12/20/22 2012 12/21/22  0205 12/21/22  0720   INR 1.4 1.4  --    PROTIME 14.5* 15.0*  --    APTT 51.9* 71.5* 58.0*     Cardiac Enzymes:  No results for input(s): CKTOTAL, CKMB, CKMBINDEX, TROPONINI in the last 72 hours. Lactic Acid:  Lab Results   Component Value Date    LACTA NOT REPORTED 12/07/2020    LACTA NOT REPORTED 10/17/2020    LACTA 1.0 08/11/2014     BNP:   No results found for: BNP  D-Dimer:  Lab Results   Component Value Date    DDIMER 1.10 (H) 02/18/2022     Others:   Lab Results   Component Value Date    TSH 7.55 (H) 12/19/2022     Lab Results   Component Value Date    YOUNG NEGATIVE 10/17/2020    SEDRATE 82 (H) 10/22/2020    CRP 11.1 (H) 01/12/2021     No results found for: Glenalfreda Morelia  Lab Results   Component Value Date    IRON 33 (L) 05/02/2022    TIBC 349 05/02/2022    FERRITIN 47 05/02/2022     No results found for: SPEP, UPEP  Lab Results   Component Value Date/Time    PSA 0.46 12/20/2022 07:23 AM    CEA 4.8 12/20/2022 07:23 AM     114 12/20/2022 07:23 AM     Microbiology:  Recent Labs     12/19/22  1709   SPECDESC . NASOPHARYNGEAL SWAB     Pathology:    Radiology Reports:  VL DUP LOWER EXTREMITY VENOUS BILATERAL   Final Result      CT CHEST PULMONARY EMBOLISM W CONTRAST   Final Result   Addendum (preliminary) 1 of 1   ADDENDUM:   Discussed with Dr. Christiana Posadas at 4:40 p.m. Final   Pulmonary emboli bilaterally as above. No evidence of right ventricular   strain. Coronary artery disease and aortic valve disease. COPD and   pulmonary fibrosis. RECOMMENDATIONS:   The findings were sent to the Radiology Results [address] at 4:35   pm on 12/19/2022 to be communicated to a licensed caregiver. CT HEAD WO CONTRAST   Final Result   No acute intracranial abnormality. XR CHEST PORTABLE   Final Result   Pulmonary fibrotic changes are again identified. A superimposed pneumonia is   not excluded. Pulmonary Function test:    Polysomnogram:    Echocardiogram:   No results found for this or any previous visit. Cardiac Catheterization:   No results found for this or any previous visit. ASSESSMENT AND PLAN     Assessment:    //Recurrent pulmonary pulm embolism while on NOAC/DOAC  //Chronic respiratory failure on long-term oxygen therapy  //Near syncope on presentation likely combination/multifactorial with history of chronic hypoxic respiratory failure with pulm embolism and on sildenafil  //Pulmonary fibrosis/IPF  //Pulmonary hypertension  //History of colon cancer    Plan:    I personally interviewed/examined the patient; reviewed interval history, interpreted all available radiographic and laboratory data at the time of service. Patient remains hemodynamically stable and is currently saturating above 90% on 6 L nasal cannula. He used 6 L of oxygen at home at baseline. I doubt that he had IPF exacerbation and recent increase in dyspnea is likely related to pulm embolism  Patient has been seen by hematology and discussion was made already about changing the anticoagulation as considered failure of NOACs. Patient to be started by Coumadin bridged with heparin and follow-up with oncology for chronic anticoagulation and also in Coumadin clinic. Continue supplemental oxygen to keep oxygen saturation greater than 90 %  Anticoagulation per hematology  Continue incentive spirometry, pulmonary toilet, aspiration precautions and bronchodilators  Antimicrobials reviewed; no need for antibiotic from pulm standpoint. DVT prophylaxis on therapeutic heparin  Physical/occupational therapy; increase activity as tolerated.      Patient should be followed up by Dr. Alberta Beckford after discharge    I updated the patient regarding the current clinical condition, provisional diagnosis and management plan. He verbalized a clear understanding and I addressed his concerns, and answered all questions to the best of my abilities. It was my pleasure to evaluate Rom Guradado today. We will continue to follow. I would like to thank you for allowing me to participate in the care of this patient. Please feel free to call with any further questions or concerns. Samara Evans MD, MLexiD. Pulmonary and Critical Care Medicine           12/21/2022, 10:27 AM    Please note that this chart was generated using voice recognition Dragon dictation software. Although every effort was made to ensure the accuracy of this automated transcription, some errors in transcription may have occurred.

## 2022-12-21 NOTE — PROGRESS NOTES
Pharmacy Note  Warfarin Consult follow-up      Recent Labs     12/21/22  0205   INR 1.4     Recent Labs     12/19/22  1423 12/20/22  0612 12/21/22  0205   HGB 11.8* 10.7* 10.1*   HCT 39.6* 35.5* 33.9*    246 230       Significant Drug-Drug Interactions:  New warfarin drug-drug interactions: none  Discontinued drug-drug interactions: none      Notes:                   Warfarin 5 mg once today. Daily PT/INR while inpatient.       Marisol Dyson PharmD, BCPS  12/21/2022  9:37 AM

## 2022-12-21 NOTE — PROGRESS NOTES
SPIRITUAL CARE DEPARTMENT - [address]  PROGRESS NOTE    Shift date: 12/20/2022  Shift day: Tuesday   Shift # 2    Room # 2019/2019-01   Name: Rom Guardado                Mu-ism: Unknown   Place of Jainism: Unknown    Referral: Routine Visit    Admit Date & Time: 12/19/2022  2:07 PM    Assessment:  Rom Guardado is a 69 y.o. male in the hospital because acute pulmonary embolism. Upon entering the room writer observes patient is laying in bed, slightly elevated and watching television. Intervention:  Writer introduced self and title as  Writer offered space for patient  to express feelings, needs, and concerns and provided a ministry presence. Patient says that he is doing better and it would be even better when he can get out of here.  offered services but patient said he was fine and didn't need a  at this time. Outcome:   let patient know that spiritual care was available if needed. Plan:  Chaplains will remain available to offer spiritual and emotional support as needed.       Electronically signed by Pan Ballesteros, on 12/20/2022 at 8:10 PM.  [facility]  [phone]       12/20/22 2009   Encounter Summary   Service Provided For: Patient   Referral/Consult From: [employer]   Support System Unknown   Last Encounter  12/20/22   Complexity of Encounter Moderate   Begin Time 1527   End Time  1529   Total Time Calculated 2 min   Assessment/Intervention/Outcome   Assessment Calm;Coping   Intervention Active listening;Sustaining Presence/Ministry of presence   Outcome Connection/Belonging;Coping;Expressed Gratitude;Engaged in conversation

## 2022-12-21 NOTE — CONSULTS
[facility]    Consult / H&P               Today's Date: 12/21/2022  Patient Name: Duarte Carlintingham  Date of admission: 12/19/2022  2:07 PM  Patient's age: 69 y.o., 1955  Admission Dx: Syncope and collapse [R55]  Shortness of breath [R06.02]  Pulmonary embolism, bilateral ([address] [I26.99]  Pulmonary embolism without acute cor pulmonale, unspecified chronicity, unspecified pulmonary embolism type ([address] [I26.99]    Reason for Consult:  Cardiac evaluation    Requesting Physician: Rekha Poe MD    CHIEF COMPLAINT: Syncope/dizziness    History Obtained From: Chart review    HISTORY OF PRESENT ILLNESS:    Patient is not a good historian  The patient is a 69 y.o. male with past medical history of A. fib on Eliquis, pulmonary embolism in the past, hypertension, syncope, idiopathic pulmonary fibrosis, colorectal carcinoma    Patient with above past medical history stated that he uses 8 to 10 L oxygen at home. And he usually feels dizzy while he is walking because he desaturates. This time he was going to the restroom and felt dizzy. Did not lose consciousness, did not hit his head. Denied any chest pain, nausea, vomiting, sweating before the dizziness episode. Patient was found to have pulmonary embolism. Patient endorses dizziness while standing from a sitting position usually. Orthostatic positive in the past and during this admission as well. Cardiology has been consulted for syncope. Tropes trending 3533. proBNP 16 K. EKG showing atrial flutter with variable block. Echo showing EF of 40 to 45% in February 2022.                                                                                   Apparently patient is on midodrine 5 mg 3 times daily at home, metoprolol 25 twice daily, had diuretic DC'd in the past, sildenafil, Eliquis  Past Medical History:   has a past medical history of Atrial fibrillation ([address] Back pain, chronic, Hill's esophagus, Benign essential HTN, BPH (benign prostatic hyperplasia), Cancer ([facility] Chronic idiopathic pulmonary fibrosis ([facility] [address] Cocaine abuse in remission ([facility] [address] Community acquired bacterial pneumonia, ED (erectile dysfunction), GERD (gastroesophageal reflux disease), GI bleed, Hernia, History of colon cancer, Melena, Migraines, Multifocal pneumonia, Murmur, cardiac, and Single subsegmental pulmonary embolism without acute cor pulmonale ([facility] [address] Past Surgical History:   has a past surgical history that includes Tonsillectomy; Colonoscopy; colectomy; Colonoscopy (07/18/2016); knee surgery (Right, 1970's); transesophageal echocardiogram (11/29/2018); Upper gastrointestinal endoscopy (N/A, 12/5/2018); Colonoscopy (N/A, 12/6/2018); hernia repair (Right, 2009); Cardiac catheterization (11/29/2018); colectomy; Total knee arthroplasty (Right, 1/8/2019); Upper gastrointestinal endoscopy (N/A, 6/18/2019); Cardioversion (2020); and hip surgery (Right, 2/22/2022). Home Medications:    Prior to Admission medications    Medication Sig Start Date End Date Taking?  Authorizing Provider   OXYGEN Inhale 4 L into the lungs continuous Uses 4-5 liters 24/7   Yes Historical Provider, MD   omeprazole (PRILOSEC) 40 MG delayed release capsule take 1 capsule by mouth once daily 12/8/22   Cassandra Flores MD   fluticasone-umeclidin-vilant (TRELEGY ELLIPTA) 200-62.5-25 MCG/ACT AEPB inhaler Inhale 1 puff into the lungs daily 12/6/22   Cassandra Flores MD   albuterol sulfate HFA (VENTOLIN HFA) 108 (90 Base) MCG/ACT inhaler Inhale 2 puffs into the lungs every 4 hours as needed for Wheezing 12/5/22 12/5/23  Cassandra Flores MD   sildenafil (REVATIO) 20 MG tablet Take 1 tablet by mouth 3 times daily 11/8/22   Cassandra Flores MD   sertraline (ZOLOFT) 25 MG tablet take 1 tablet by mouth once daily  Patient not taking: Reported on 11/8/2022 11/2/22   Nacho Meneses DO   ibuprofen (ADVIL;MOTRIN) 800 MG tablet take 1 tablet by mouth three times a day if needed for MODERATE PAIN ( PAIN SCALE 4-6 ) 10/24/22   Cassandra Solano MD   sertraline (ZOLOFT) 50 MG tablet Take 1 tablet by mouth daily 10/3/22   Cassandra Solano MD   ELIQUIS 5 MG TABS tablet take 1 tablet by mouth twice a day 9/19/22   Romana Baumann, APRN - NP   tamsulosin [facility]) 0.4 MG capsule take 1 capsule by mouth once daily 8/8/22   Cassandra Solano MD   ferrous sulfate (IRON 325) 325 (65 Fe) MG tablet take 1 tablet by mouth twice a day 7/29/22   Cassandra Solano MD   atorvastatin (LIPITOR) 40 MG tablet take 1 tablet by mouth once daily 4/28/22   Cassandra Solano MD   digoxin [facility]) 125 MCG tablet take 1 tablet by mouth once daily 4/28/22   Cassandra Solano MD   midodrine (PROAMATINE) 5 MG tablet Take 1 tablet by mouth 3 times daily (before meals) 3/2/22   Zheng Olsen DO   Baclofen (LIORESAL) 5 MG tablet take 1 tablet by mouth twice a day 1/27/22   Cassandra Solano MD   Handicap Placard MISC by Does not apply route Expires 1/2026 1/19/21   Cassandra Solano MD        Allergies:  Adhesive tape, Codeine, Penicillins, and Nintedanib    Social History:   reports that he quit smoking about 52 years ago. His smoking use included cigarettes. He has a 0.50 pack-year smoking history. He has never used smokeless tobacco. He reports that he does not currently use alcohol after a past usage of about 12.0 standard drinks per week. He reports that he does not use drugs. Family History: family history includes Diabetes in his mother; Heart Attack in his father; Heart Disease in his brother and father. No h/o sudden cardiac death. No for premature CAD    REVIEW OF SYSTEMS:    Constitutional: there has been no unanticipated weight loss. There's been No change in energy level, No change in activity level. Eyes: No visual changes or diplopia. No scleral icterus. ENT: No Headaches  Cardiovascularas above cardiac c history  Respiratory: No previous pulmonry problems, No cough  Gastrointestinal: No abdominal pain.   No change in bowel or bladder habits. Genitourinary: No dysuria, trouble voiding, or hematuria. Musculoskeletal:  No gait disturbance, No weakness or joint complaints. Integumentary: No rash or pruritis. Neurological: No headache, diplopia, change in muscle strength, numbness or tingling. No change in gait, balance, coordination, mood, affect, memory, mentation, behavior. Psychiatric: No anxiety, or depression. Endocrine: No temperature intolerance. No excessive thirst, fluid intake, or urination. No tremor. Hematologic/Lymphatic: No abnormal bruising or bleeding, blood clots or swollen lymph nodes. Allergic/Immunologic: No nasal congestion or hives. PHYSICAL EXAM:      /68   Pulse 59   Temp 97.4 °F (36.3 °C) (Oral)   Resp 26   Ht 6' (1.829 m)   Wt 159 lb (72.1 kg)   SpO2 99%   BMI 21.56 kg/m²    Constitutional and General Appearance: alert, cooperative, no distress and appears stated age  HEENT: PERRL, no cervical lymphadenopathy. No masses palpable. Normal oral mucosa  Respiratory:  Normal excursion and expansion without use of accessory muscles  Resp Auscultation: Good respiratory effort. No for increased work of breathing. On auscultation: clear to auscultation bilaterally  Cardiovascular: The apical impulse is not displaced  Heart tones are crisp and normal. regular S1 and S2.  Jugular venous pulsation Normal  The carotid upstroke is normal in amplitude and contour without delay or bruit  Peripheral pulses are symmetrical and full   Abdomen:   No masses or tenderness  Bowel sounds present  Extremities:   No Cyanosis or Clubbing   Lower extremity edema: No   Skin: Warm and dry  Neurological:  Alert and oriented. Moves all extremities well  No abnormalities of mood, affect, memory, mentation, or behavior are noted    DATA:    Diagnostics:    EKG: atrial flutter wth av blocks   ECHO: Echo:2/22/22  Summary  Difficult to assess LV function due to atrial fibrillation.   Left ventricle is normal in size. Estimated LV EF 40-45%. Evidence of diastolic dysfunction. Right ventricular dilatation with normal systolic function. Left atrial dilatation. Right atrial dilatation. Mild aortic stenosis. Peak instantaneous gradient 28 mmHg and mean gradient 15 mmHg. Trivial aortic insufficiency. Mild to moderate mitral regurgitation. Multiple MR jets noted. Stress:  Cath:        Labs:     CBC:   Recent Labs     12/20/22 0612 12/21/22  0205   WBC 6.8 7.1   HGB 10.7* 10.1*   HCT 35.5* 33.9*    230     BMP:   Recent Labs     12/20/22  0612 12/21/22  0205    135   K 4.0 3.8   CO2 30 29   BUN 21 16   CREATININE 0.63* 0.55*   LABGLOM >60 >60   GLUCOSE 111* 84     BNP: No results for input(s): BNP in the last 72 hours. PT/INR:   Recent Labs     12/20/22 2012 12/21/22  0205   PROTIME 14.5* 15.0*   INR 1.4 1.4     APTT:  Recent Labs     12/21/22  0205 12/21/22  0720   APTT 71.5* 58.0*     CARDIAC ENZYMES:No results for input(s): CKTOTAL, CKMB, CKMBINDEX, TROPONINI in the last 72 hours.   FASTING LIPID PANEL:  Lab Results   Component Value Date/Time    HDL 29 12/20/2021 09:30 AM    TRIG 165 01/03/2017 09:25 AM     LIVER PROFILE:  Recent Labs     12/19/22  1423   AST 26   ALT 20   LABALBU 3.2*       IMPRESSION:    Patient Active Problem List   Diagnosis    Dyslipidemia    ED (erectile dysfunction)    Incomplete bladder emptying    Benign prostatic hyperplasia with lower urinary tract symptoms    History of colon cancer    PAF (paroxysmal atrial fibrillation) (HCC)    Anemia of chronic disease    Pallor of optic disc    Presbyopia    Incisional hernia, without obstruction or gangrene    Hypertrophic nonobstructive cardiomyopathy (HCC)    Iron (Fe) deficiency anemia    Primary osteoarthritis of right knee    History of malignant neoplasm of rectum    Hill's esophagus    CHF (congestive heart failure), NYHA class II, acute on chronic, combined (HCC)    Hypoxia    Dyspnea and respiratory abnormalities Occupational pulmonary disease    Sinus bradycardia    Acute hypoxemic respiratory failure due to COVID-19 [facility])    COVID-19    Pneumonia    Chronic idiopathic pulmonary fibrosis (HCC)    Syncope and collapse    Chronic anticoagulation    Severe malnutrition (HCC)    Cervical stenosis of spinal canal    Impingement syndrome of left shoulder    Fracture of right hip    Allergy to penicillin    Acute pulmonary embolism ([facility]    Pulmonary embolism, bilateral (HCC)    Demand ischemia of myocardium (HCC)    Acute respiratory acidosis (HCC)    Orthostatic hypotension    Acute pulmonary edema (HCC)    Pulmonary embolism without acute cor pulmonale (HCC)       RECOMMENDATIONS:  Syncope with orthostatic positive  Chronic atrial fibrillation on Eliquis  Acute pulmonary embolism. History of pulmonary embolism. Idiopathic pulmonary fibrosis. Chronic hypoxic respiratory failure. Mild pulmonary hypertension with RVSP of 41    Plan: We will give compression stocking. Resume midodrine 5  mg 3 times daily home dose. Continue IV fluid at 70. Toprol  25 twice daily can be started. Hold for low blood pressure 90/50. Appreciate with oncology recommendation regarding changing Eliquis to Coumadin with target INR to be 2-3. Follow echo. Will follow along      Teena Irene MD  Internal Medicine Resident, PGY3   [facility]  12/21/2022,4:06 PM        Attending Physician Statement  I have discussed the care of Tate Radford, including pertinent history and exam findings,  with the cardiology fellow/resident. I have seen and examined the patient and the key elements of all parts of the encounter have been performed by me. I have completed at least one if not all key elements of the E/M (history, physical exam, and MDM). I agree with the assessment, plan and orders as documented by the resident with additional recommendations as below:     Gentle IV hydration.  Resume midodrine 5 mg TID. Compression stockings during daytime. Low dose BB, lopressor 12.5 mg bid on discharge once orthostatic vitals improve.      Erik Alaniz MD, [facility], [place]

## 2022-12-21 NOTE — PROGRESS NOTES
[facility]  Internal Medicine Teaching Residency Program  Inpatient Daily Progress Note  ______________________________________________________________________________    Patient: Kala Roberts  YOB: 1955   YTY:[id number]    Acct: [de-identified]     Room: 2019/2019-01  Admit date: 12/19/2022  Today's date: 12/21/22  Number of days in the hospital: 2    SUBJECTIVE   Admitting Diagnosis: Acute pulmonary embolism ([address]  CC: An episode of near syncope  Pt examined at bedside. Chart & results reviewed. No acute event overnight  Remained afebrile and hemodynamically stable. Currently denies any symptoms. no chest pain, shortness of breath, no dizziness. Oxygen requirement has gone down to 5 L. No shortness of breath or cough. ROS:  Constitutional:  negative for chills, fevers, sweats  Respiratory:  negative for cough, dyspnea on exertion, hemoptysis, shortness of breath, wheezing  Cardiovascular:  negative for chest pain, chest pressure/discomfort, lower extremity edema, palpitations  Gastrointestinal:  negative for abdominal pain, constipation, diarrhea, nausea, vomiting  Neurological:  negative for dizziness, headache  BRIEF HISTORY     The patient is a pleasant 69 y.o. male with past medical history of A. fib, idiopathic pulmonary fibrosis with pulmonary hypertension, colorectal carcinoma, history of pulmonary embolism, BPH presents after an episode of near syncope. Patient states he was standing at his home and suddenly started feeling sweaty and feeling that he was going to pass out which made him sit on the floor. Denies any loss of consciousness, did not hit his head. Patient has a history of pulmonary embolism and A. fib and takes Eliquis at home, patient has a history of idiopathic pulmonary fibrosis-along with significant family history-also in the sister-has been taking sildenafil for moderate pulmonary hypertension.   Uses 9 L of oxygen at home. States he has passed out multiple times in the past during which he was found to have COVID-pneumonia. As per the dispense report patient takes tamsulosin and sildenafil-patient is unsure of tamsulosin but takes sildenafil. Patient states he always feels dizzy when standing suddenly from lying down position. Last echo in  showed ejection fraction 40 to 45%, RVSP 41 mmHg, mild to moderate mitral regurgitation. Patient denies any chest pain, abdominal pain, loose stool, burning urination, leg pain or swelling, no recent sick contact. in the ED patient was requiring 15 L of oxygen via nonrebreather and was desaturating when taken off nasal cannula as per the chart. Hypotensive blood pressure 88/62, heart rate 69-rhythm A. Fib. proBNP elevated to 16,000.  2 sets of troponin 35-33. CT PE showed bilateral pulmonary embolism without right heart strain along with underlying honeycomb pattern at peripheral and bases and centrilobular paraseptal emphysema. Patient was started on heparin drip and admitted to internal medicine floor for further management. Has a history of colorectal cancer, had a colostomy in past which was later reversed. OBJECTIVE     Vital Signs:  /77   Pulse 68   Temp 97.9 °F (36.6 °C) (Oral)   Resp 23   Ht 6' (1.829 m)   Wt 159 lb (72.1 kg)   SpO2 92%   BMI 21.56 kg/m²     Temp (24hrs), Av °F (36.7 °C), Min:97.8 °F (36.6 °C), Max:98.4 °F (36.9 °C)    In: -   Out: 1675 [Urine:1675]    Physical Exam:  Constitutional: This is a well developed, well nourished, 18.5-24.9 - Normal 69y.o. year old male who is alert, oriented, cooperative and in no apparent distress. Head:normocephalic and atraumatic. EENT:  PERRLA. No conjunctival injections. Septum was midline, mucosa was without erythema, exudates or cobblestoning. No thrush was noted. Neck: Supple without thyromegaly. No elevated JVP. Trachea was midline.   Respiratory: Chest was symmetrical without dullness to percussion. Bilateral diffuse wheezing and rhonchi present. there is no intercostal retraction or use of accessory muscles. No egophony noted. Cardiovascular: Regular without murmur, clicks, gallops or rubs. Abdomen: Slightly rounded and soft without organomegaly. No rebound, rigidity or guarding was appreciated. Lymphatic: No lymphadenopathy. Musculoskeletal: Normal curvature of the spine. No gross muscle weakness. Extremities:  No lower extremity edema, ulcerations, tenderness, varicosities or erythema. Muscle size, tone and strength are normal.  No involuntary movements are noted. Skin:  Warm and dry. Good color, turgor and pigmentation. No lesions or scars.   No cyanosis or clubbing  Neurological/Psychiatric: The patient's general behavior, level of consciousness, thought content and emotional status is normal.        Medications:  Scheduled Medications:    warfarin  3 mg Oral Daily    warfarin  5 mg Oral Once    warfarin placeholder: dosing by pharmacy   Other RX Placeholder    sodium chloride flush  5-40 mL IntraVENous 2 times per day    atorvastatin  40 mg Oral Daily    budesonide-formoterol  1 puff Inhalation BID    digoxin  125 mcg Oral Daily    pantoprazole  40 mg Oral QAM AC    sildenafil  20 mg Oral TID    tamsulosin  0.4 mg Oral Daily    ipratropium  0.5 mg Nebulization 4x daily     Continuous Infusions:    heparin (PORCINE) Infusion 26 Units/kg/hr (12/21/22 0853)    sodium chloride       PRN Medicationssodium chloride, 1 spray, PRN  heparin (porcine), 80 Units/kg, PRN  heparin (porcine), 40 Units/kg, PRN  sodium chloride flush, 5-40 mL, PRN  sodium chloride, , PRN  ondansetron, 4 mg, Q8H PRN   Or  ondansetron, 4 mg, Q6H PRN  polyethylene glycol, 17 g, Daily PRN  acetaminophen, 650 mg, Q6H PRN   Or  acetaminophen, 650 mg, Q6H PRN  albuterol sulfate HFA, 2 puff, Q4H PRN      Diagnostic Labs:  CBC:   Recent Labs     12/19/22  1423 12/20/22  0612 12/21/22  0205   WBC 8.5 6.8 7. 1   RBC 4.47 3.96* 3.72*   HGB 11.8* 10.7* 10.1*   HCT 39.6* 35.5* 33.9*   MCV 88.6 89.6 91.1   RDW 14.3 14.3 14.6*    246 230       BMP:   Recent Labs     12/19/22  1423 12/20/22  0612 12/21/22  0205    139 135   K 4.0 4.0 3.8    104 100   CO2 25 30 29   BUN 24* 21 16   CREATININE 0.79 0.63* 0.55*       BNP: No results for input(s): BNP in the last 72 hours. PT/INR:   Recent Labs     12/20/22 2012 12/21/22  0205   PROTIME 14.5* 15.0*   INR 1.4 1.4     APTT:   Recent Labs     12/20/22 2012 12/21/22  0205 12/21/22  0720   APTT 51.9* 71.5* 58.0*       CARDIAC ENZYMES: No results for input(s): CKMB, CKMBINDEX, TROPONINI in the last 72 hours. Invalid input(s): CKTOTAL;3  FASTING LIPID PANEL:  Lab Results   Component Value Date    CHOL 200 (H) 01/03/2017    HDL 29 (L) 12/20/2021    TRIG 165 (H) 01/03/2017     LIVER PROFILE:   Recent Labs     12/19/22  1423   AST 26   ALT 20   BILITOT 0.7   ALKPHOS 83        MICROBIOLOGY:   Lab Results   Component Value Date/Time    CULTURE NO SIGNIFICANT GROWTH 02/20/2022 06:41 PM       Imaging:    CT HEAD WO CONTRAST    Result Date: 12/19/2022  No acute intracranial abnormality. XR CHEST PORTABLE    Result Date: 12/19/2022  Pulmonary fibrotic changes are again identified. A superimposed pneumonia is not excluded. CT CHEST PULMONARY EMBOLISM W CONTRAST    Addendum Date: 12/19/2022    ADDENDUM: Discussed with Dr. Davida Posadas at 4:40 p.m. Result Date: 12/19/2022  Pulmonary emboli bilaterally as above. No evidence of right ventricular strain. Coronary artery disease and aortic valve disease. COPD and pulmonary fibrosis. RECOMMENDATIONS: The findings were sent to the Radiology Results [id number] at 4:35 pm on 12/19/2022 to be communicated to a licensed caregiver. ASSESSMENT & PLAN     ASSESSMENT / PLAN:      IMPRESSION  This is a 69 y.o. male who presented with near syncope and found to have pulmonary embolism.  Patient admitted to inpatient status for the management of syncope and pulmonary embolism. Principal Problem:    Acute pulmonary embolism (HCC)-patient was already on Eliquis. No calf tenderness or swelling.  - on heparin drip-heme-onc started patient on warfarin due to possible Eliquis failure. - bilateral lower extremity venous Doppler-negative     Heart failure with reduced ejection fraction 40 to 45% in February 2022 with mild to moderate MR  proBNP elevated-no signs of pulmonary congestion. Resumed home digoxin  We will get an echo. Near Syncope and collapse  orthostatics positive and echo pending.  -Cardiology has been consulted. PAF (paroxysmal atrial fibrillation) (HCC)  On heparin drip currently. Blood pressure and heart rate stable now. Will use as needed Lopressor for rate control. Chronic idiopathic pulmonary fibrosis (HCC)  COPD stage II on PFT on 2/25/2021 with FEV1 of 55%  Symbicort and Proventil as needed. Maintain oxygen saturation above 92%. Pulmonology has been consulted    NSTEMI-likely type II MI  -Troponins trending down. already on heparin for pulmonary embolism. Dyslipidemia  Resumed Home atorvastatin 40 mg      DVT ppx: Already on heparin drip  GI ppx: Not indicated at this time     PT/OT/SW-has been consulted  Discharge Planning:  consulted we will follow-up     Seth Greenwood MD  Internal Medicine Resident, PGY-1  [facility]; [place], [place]  12/21/2022, 11:20 AM   Attending Physician Statement  I have discussed the care of Kevin Alfaro and I have examined the patient myselft and taken ros and hpi , including pertinent history and exam findings,  with the resident. I have reviewed the key elements of all parts of the encounter with the resident. I agree with the assessment, plan and orders as documented by the resident. Spent 35 minutes in reviewing data/medicines/talking to patient/family,  explaining and answering all the questions. Electronically signed by Rica Goncalves MD

## 2022-12-21 NOTE — PLAN OF CARE
Problem: Respiratory - Adult  Goal: Achieves optimal ventilation and oxygenation  12/21/2022 0751 by Micheline Solorzano RCP  Outcome: Progressing   BRONCHOSPASM/BRONCHOCONSTRICTION     [x]         IMPROVE AERATION/BREATH SOUNDS  [x]   ADMINISTER BRONCHODILATOR THERAPY AS APPROPRIATE  [x]   ASSESS BREATH SOUNDS  []   IMPLEMENT AEROSOL/MDI PROTOCOL  [x]   PATIENT EDUCATION AS NEEDED   PROVIDE ADEQUATE OXYGENATION WITH ACCEPTABLE SP02/ABG'S    [x]  IDENTIFY APPROPRIATE OXYGEN THERAPY  [x]   MONITOR SP02/ABG'S AS NEEDED   [x]   PATIENT EDUCATION AS NEEDED

## 2022-12-21 NOTE — PLAN OF CARE
Problem: Discharge Planning  Goal: Discharge to home or other facility with appropriate resources  Outcome: Progressing     Problem: Safety - Adult  Goal: Free from fall injury  Outcome: Progressing     Problem: Skin/Tissue Integrity  Goal: Absence of new skin breakdown  Description: 1. Monitor for areas of redness and/or skin breakdown  2. Assess vascular access sites hourly  3. Every 4-6 hours minimum:  Change oxygen saturation probe site  4. Every 4-6 hours:  If on nasal continuous positive airway pressure, respiratory therapy assess nares and determine need for appliance change or resting period.   Outcome: Progressing     Problem: Chronic Conditions and Co-morbidities  Goal: Patient's chronic conditions and co-morbidity symptoms are monitored and maintained or improved  Outcome: Progressing     Problem: Respiratory - Adult  Goal: Achieves optimal ventilation and oxygenation  12/20/2022 1958 by Katie Forrest RN  Outcome: Progressing  12/20/2022 0918 by Javier Mayes RCP  Outcome: Progressing     Problem: Nutrition Deficit:  Goal: Optimize nutritional status  Outcome: Progressing     Problem: ABCDS Injury Assessment  Goal: Absence of physical injury  Outcome: Progressing

## 2022-12-21 NOTE — PROGRESS NOTES
Division of Vascular Surgery         Progress Note      Name: Elli Ulloa  MRN: [id number]             Subjective:     Patient seen and examined at bedside. No acute events overnight. Hemodynamically stable, afebrile. Patient remains on supplemental oxygen. He states that his breathing has not returned to baseline. Denies any chest pain. Denies any lower extremity pain or swelling. Lower extremity duplexes negative for DVT. Physical Exam:     Vitals:  /64   Pulse 71   Temp 97.8 °F (36.6 °C) (Oral)   Resp 20   Ht 6' (1.829 m)   Wt 159 lb (72.1 kg)   SpO2 91%   BMI 21.56 kg/m²     Physical Exam  Constitutional:       General: He is not in acute distress. Appearance: Normal appearance. HENT:      Head: Normocephalic and atraumatic. Mouth/Throat:      Mouth: Mucous membranes are dry. Eyes:      Extraocular Movements: Extraocular movements intact. Cardiovascular:      Rate and Rhythm: Normal rate and regular rhythm. Pulses: Normal pulses. Pulmonary:      Effort: Pulmonary effort is normal.      Comments: On supplemental oxygen   Abdominal:      General: There is no distension. Palpations: Abdomen is soft. Musculoskeletal:         General: No swelling or deformity. Normal range of motion. Cervical back: Neck supple. Skin:     General: Skin is warm and dry. Capillary Refill: Capillary refill takes less than 2 seconds. Neurological:      General: No focal deficit present. Mental Status: He is alert. Imaging/Labs:       CT CHEST PULMONARY EMBOLISM W CONTRAST    Addendum Date: 12/19/2022      Pulmonary emboli bilaterally as above. No evidence of right ventricular strain. Coronary artery disease and aortic valve disease. COPD and pulmonary fibrosis.       VL DUP LOWER EXTREMITY VENOUS BILATERAL    Result Date: 12/20/2022  Summary   Simultaneous real time imaging utilizing B-Mode, color doppler and  spectral waveform analysis was performed on the bilateral lower  extremities for venous examination of the deep and superficial systems. Findings are:   Right:  No evidence of deep or superficial venous thrombosis. Left:  No evidence of deep or superficial venous thrombosis.     Assessment/Plan:     68-year-old male with acute bilateral pulmonary embolism; history of chronic idiopathic pulmonary fibrosis considered for lung transplantation with increasing oxygen requirement    Continue heparin drip   Will defer anticoagulation to hematology as patient requires hypercoagulability work up in light of developing PE while on Eliquis     Electronically signed by Beau Bloom DO on 12/20/22 at 5:32 AM EST      [address] [place] [address],[address]: [phone]  C: [phone]

## 2022-12-22 LAB
ABSOLUTE EOS #: 0.23 K/UL (ref 0–0.44)
ABSOLUTE IMMATURE GRANULOCYTE: <0.03 K/UL (ref 0–0.3)
ABSOLUTE LYMPH #: 0.96 K/UL (ref 1.1–3.7)
ABSOLUTE MONO #: 0.49 K/UL (ref 0.1–1.2)
ANION GAP SERPL CALCULATED.3IONS-SCNC: 6 MMOL/L (ref 9–17)
BASOPHILS # BLD: 1 % (ref 0–2)
BASOPHILS ABSOLUTE: 0.03 K/UL (ref 0–0.2)
BUN BLDV-MCNC: 15 MG/DL (ref 8–23)
CALCIUM SERPL-MCNC: 8.5 MG/DL (ref 8.6–10.4)
CHLORIDE BLD-SCNC: 104 MMOL/L (ref 98–107)
CO2: 32 MMOL/L (ref 20–31)
CREAT SERPL-MCNC: 0.53 MG/DL (ref 0.7–1.2)
EKG ATRIAL RATE: 65 BPM
EKG Q-T INTERVAL: 330 MS
EKG QRS DURATION: 76 MS
EKG QTC CALCULATION (BAZETT): 392 MS
EKG R AXIS: 108 DEGREES
EKG T AXIS: -176 DEGREES
EKG VENTRICULAR RATE: 85 BPM
EOSINOPHILS RELATIVE PERCENT: 4 % (ref 1–4)
GFR SERPL CREATININE-BSD FRML MDRD: >60 ML/MIN/1.73M2
GLUCOSE BLD-MCNC: 97 MG/DL (ref 70–99)
HCT VFR BLD CALC: 35.9 % (ref 40.7–50.3)
HCT VFR BLD CALC: 36.4 % (ref 40.7–50.3)
HEMOGLOBIN: 10.7 G/DL (ref 13–17)
HEMOGLOBIN: 11 G/DL (ref 13–17)
IMMATURE GRANULOCYTES: 0 %
INR BLD: 1.4
LYMPHOCYTES # BLD: 15 % (ref 24–43)
MCH RBC QN AUTO: 26.6 PG (ref 25.2–33.5)
MCHC RBC AUTO-ENTMCNC: 29.8 G/DL (ref 28.4–34.8)
MCV RBC AUTO: 89.3 FL (ref 82.6–102.9)
MONOCYTES # BLD: 8 % (ref 3–12)
NRBC AUTOMATED: 0 PER 100 WBC
PARTIAL THROMBOPLASTIN TIME: 55.5 SEC (ref 20.5–30.5)
PARTIAL THROMBOPLASTIN TIME: 83.3 SEC (ref 20.5–30.5)
PARTIAL THROMBOPLASTIN TIME: 92.8 SEC (ref 20.5–30.5)
PDW BLD-RTO: 14.3 % (ref 11.8–14.4)
PLATELET # BLD: 251 K/UL (ref 138–453)
PMV BLD AUTO: 10.5 FL (ref 8.1–13.5)
POTASSIUM SERPL-SCNC: 4.1 MMOL/L (ref 3.7–5.3)
PROTHROMBIN TIME: 14.2 SEC (ref 9.1–12.3)
RBC # BLD: 4.02 M/UL (ref 4.21–5.77)
SEG NEUTROPHILS: 72 % (ref 36–65)
SEGMENTED NEUTROPHILS ABSOLUTE COUNT: 4.82 K/UL (ref 1.5–8.1)
SODIUM BLD-SCNC: 142 MMOL/L (ref 135–144)
WBC # BLD: 6.5 K/UL (ref 3.5–11.3)

## 2022-12-22 PROCEDURE — 6370000000 HC RX 637 (ALT 250 FOR IP)

## 2022-12-22 PROCEDURE — 80048 BASIC METABOLIC PNL TOTAL CA: CPT

## 2022-12-22 PROCEDURE — 97110 THERAPEUTIC EXERCISES: CPT

## 2022-12-22 PROCEDURE — 99232 SBSQ HOSP IP/OBS MODERATE 35: CPT | Performed by: INTERNAL MEDICINE

## 2022-12-22 PROCEDURE — 85014 HEMATOCRIT: CPT

## 2022-12-22 PROCEDURE — 85730 THROMBOPLASTIN TIME PARTIAL: CPT

## 2022-12-22 PROCEDURE — 94761 N-INVAS EAR/PLS OXIMETRY MLT: CPT

## 2022-12-22 PROCEDURE — 85610 PROTHROMBIN TIME: CPT

## 2022-12-22 PROCEDURE — 36415 COLL VENOUS BLD VENIPUNCTURE: CPT

## 2022-12-22 PROCEDURE — 99233 SBSQ HOSP IP/OBS HIGH 50: CPT | Performed by: INTERNAL MEDICINE

## 2022-12-22 PROCEDURE — 97116 GAIT TRAINING THERAPY: CPT

## 2022-12-22 PROCEDURE — 6360000002 HC RX W HCPCS: Performed by: STUDENT IN AN ORGANIZED HEALTH CARE EDUCATION/TRAINING PROGRAM

## 2022-12-22 PROCEDURE — 2060000000 HC ICU INTERMEDIATE R&B

## 2022-12-22 PROCEDURE — 85018 HEMOGLOBIN: CPT

## 2022-12-22 PROCEDURE — 93306 TTE W/DOPPLER COMPLETE: CPT

## 2022-12-22 PROCEDURE — 6370000000 HC RX 637 (ALT 250 FOR IP): Performed by: STUDENT IN AN ORGANIZED HEALTH CARE EDUCATION/TRAINING PROGRAM

## 2022-12-22 PROCEDURE — 2700000000 HC OXYGEN THERAPY PER DAY

## 2022-12-22 PROCEDURE — 85025 COMPLETE CBC W/AUTO DIFF WBC: CPT

## 2022-12-22 PROCEDURE — 6370000000 HC RX 637 (ALT 250 FOR IP): Performed by: INTERNAL MEDICINE

## 2022-12-22 PROCEDURE — 93010 ELECTROCARDIOGRAM REPORT: CPT | Performed by: INTERNAL MEDICINE

## 2022-12-22 PROCEDURE — 2580000003 HC RX 258

## 2022-12-22 PROCEDURE — 2580000003 HC RX 258: Performed by: STUDENT IN AN ORGANIZED HEALTH CARE EDUCATION/TRAINING PROGRAM

## 2022-12-22 PROCEDURE — 94640 AIRWAY INHALATION TREATMENT: CPT

## 2022-12-22 RX ORDER — WARFARIN SODIUM 5 MG/1
5 TABLET ORAL
Status: COMPLETED | OUTPATIENT
Start: 2022-12-22 | End: 2022-12-22

## 2022-12-22 RX ORDER — WARFARIN SODIUM 5 MG/1
TABLET ORAL
Qty: 30 TABLET | Refills: 3 | Status: CANCELLED | OUTPATIENT
Start: 2022-12-22

## 2022-12-22 RX ADMIN — SODIUM CHLORIDE: 9 INJECTION, SOLUTION INTRAVENOUS at 02:13

## 2022-12-22 RX ADMIN — WARFARIN SODIUM 5 MG: 5 TABLET ORAL at 16:53

## 2022-12-22 RX ADMIN — IPRATROPIUM BROMIDE 0.5 MG: 0.5 SOLUTION RESPIRATORY (INHALATION) at 19:57

## 2022-12-22 RX ADMIN — SILDENAFIL 20 MG: 20 TABLET ORAL at 14:51

## 2022-12-22 RX ADMIN — MIDODRINE HYDROCHLORIDE 5 MG: 5 TABLET ORAL at 11:35

## 2022-12-22 RX ADMIN — PANTOPRAZOLE SODIUM 40 MG: 40 TABLET, DELAYED RELEASE ORAL at 08:21

## 2022-12-22 RX ADMIN — BUDESONIDE AND FORMOTEROL FUMARATE DIHYDRATE 1 PUFF: 80; 4.5 AEROSOL RESPIRATORY (INHALATION) at 19:57

## 2022-12-22 RX ADMIN — ACETAMINOPHEN 650 MG: 325 TABLET ORAL at 20:14

## 2022-12-22 RX ADMIN — BUDESONIDE AND FORMOTEROL FUMARATE DIHYDRATE 1 PUFF: 80; 4.5 AEROSOL RESPIRATORY (INHALATION) at 07:43

## 2022-12-22 RX ADMIN — SODIUM CHLORIDE, PRESERVATIVE FREE 10 ML: 5 INJECTION INTRAVENOUS at 08:21

## 2022-12-22 RX ADMIN — MIDODRINE HYDROCHLORIDE 5 MG: 5 TABLET ORAL at 16:53

## 2022-12-22 RX ADMIN — SILDENAFIL 20 MG: 20 TABLET ORAL at 08:21

## 2022-12-22 RX ADMIN — IPRATROPIUM BROMIDE 0.5 MG: 0.5 SOLUTION RESPIRATORY (INHALATION) at 11:20

## 2022-12-22 RX ADMIN — HEPARIN SODIUM 24 UNITS/KG/HR: 10000 INJECTION, SOLUTION INTRAVENOUS at 13:00

## 2022-12-22 RX ADMIN — DESMOPRESSIN ACETATE 40 MG: 0.2 TABLET ORAL at 08:21

## 2022-12-22 RX ADMIN — ACETAMINOPHEN 650 MG: 325 TABLET ORAL at 13:53

## 2022-12-22 RX ADMIN — IPRATROPIUM BROMIDE 0.5 MG: 0.5 SOLUTION RESPIRATORY (INHALATION) at 15:34

## 2022-12-22 RX ADMIN — MIDODRINE HYDROCHLORIDE 5 MG: 5 TABLET ORAL at 08:21

## 2022-12-22 RX ADMIN — HEPARIN SODIUM 26 UNITS/KG/HR: 10000 INJECTION, SOLUTION INTRAVENOUS at 06:32

## 2022-12-22 RX ADMIN — SILDENAFIL 20 MG: 20 TABLET ORAL at 20:14

## 2022-12-22 RX ADMIN — TAMSULOSIN HYDROCHLORIDE 0.4 MG: 0.4 CAPSULE ORAL at 08:21

## 2022-12-22 RX ADMIN — HEPARIN SODIUM 2880 UNITS: 1000 INJECTION INTRAVENOUS; SUBCUTANEOUS at 21:35

## 2022-12-22 RX ADMIN — DIGOXIN 125 MCG: 125 TABLET ORAL at 08:21

## 2022-12-22 RX ADMIN — IPRATROPIUM BROMIDE 0.5 MG: 0.5 SOLUTION RESPIRATORY (INHALATION) at 07:43

## 2022-12-22 RX ADMIN — HEPARIN SODIUM 24 UNITS/KG/HR: 10000 INJECTION, SOLUTION INTRAVENOUS at 21:34

## 2022-12-22 RX ADMIN — ACETAMINOPHEN 650 MG: 325 TABLET ORAL at 04:35

## 2022-12-22 ASSESSMENT — ENCOUNTER SYMPTOMS
TROUBLE SWALLOWING: 0
EYE REDNESS: 0
WHEEZING: 0
DIARRHEA: 0
PHOTOPHOBIA: 0
COUGH: 1
VOMITING: 0
SORE THROAT: 0
ALLERGIC/IMMUNOLOGIC NEGATIVE: 1
SHORTNESS OF BREATH: 1
CHEST TIGHTNESS: 0
ABDOMINAL PAIN: 0
VOICE CHANGE: 0

## 2022-12-22 ASSESSMENT — PAIN DESCRIPTION - ONSET: ONSET: ON-GOING

## 2022-12-22 ASSESSMENT — PAIN SCALES - GENERAL
PAINLEVEL_OUTOF10: 9
PAINLEVEL_OUTOF10: 5
PAINLEVEL_OUTOF10: 6
PAINLEVEL_OUTOF10: 3

## 2022-12-22 ASSESSMENT — PAIN DESCRIPTION - LOCATION
LOCATION: HEAD;GENERALIZED
LOCATION: HEAD;GENERALIZED

## 2022-12-22 ASSESSMENT — PAIN DESCRIPTION - ORIENTATION: ORIENTATION: OTHER (COMMENT)

## 2022-12-22 ASSESSMENT — PAIN DESCRIPTION - FREQUENCY: FREQUENCY: CONTINUOUS

## 2022-12-22 ASSESSMENT — PAIN DESCRIPTION - DESCRIPTORS
DESCRIPTORS: THROBBING
DESCRIPTORS: ACHING

## 2022-12-22 ASSESSMENT — PAIN SCALES - WONG BAKER: WONGBAKER_NUMERICALRESPONSE: 2

## 2022-12-22 NOTE — PROGRESS NOTES
PULMONARY & CRITICAL CARE MEDICINE PROGRESS  NOTE     Patient:  Lucien Marks  MRN: [id number]  Admit date: 12/19/2022  Primary Care Physician: Courtney Eller MD  Consulting Physician: Laura Romo MD  CODE Status: Full Code  LOS: 3    SUBJECTIVE     I personally interviewed/examined the patient, reviewed interval history and interpreted all available radiographic, laboratory data at the time of service. Chief Compliant/Reason for Initial Consult:   Pulm embolism/chronic respiratory failure/pulmonary fibrosis. Brief Hospital Course: The patient is a 69 y.o. male he is followed up by Dr. Alberta Beckford with history of pulmonary fibrosis/IPF on 6 L of nasal cannula with chronic hypoxic respiratory failure, history of pulmonary embolism on chronic anticoagulation therapy. He has been followed by hematology he had a history of pulm embolism apparently in 2020 and at that time he was treated with Xarelto and later while he was on Xarelto apparently he has nonocclusive pulm embolism considered as failure of Xarelto and it was changed to Eliquis and he has been taking Eliquis and compliant with Eliquis. According to patient for last 2 to 3 weeks he has been having increasing shortness of breath he gets short of breath by ambulating from room to bathroom also he has increased cough than baseline. Patient presented when he had an episode of near syncope he usually does feel dizzy when he stands up but at that time he was apparently sweaty did not completely pass out and did not have a fall. He does not complain of fever denies purulent sputum denies increased sputum production denies chills denies hemoptysis and does not complain of chest pain currently he does not complain of dizziness lightheadedness or syncope.   He presented to emergency room had a CTA chest done which showed that he has bilateral pulm embolism and right lower lobe lingula pulmonary artery and left lower lobe segmental branches per radiology. CT scan of the chest shows findings consistent with pulmonary fibrosis with basilar honeycomb changes and bronchiectasis no areas of definite consolidation. Patient has been on 6 L nasal cannula maintaining saturation above 90%. He had been taking his Eliquis and consider failure of NOAC/DOAC hematology oncology was consulted and patient is currently on heparin drip hematology discussed with patient about Lovenox versus Coumadin therapy patient wanted to proceed with Coumadin therapy. Previous echo in February this year showed estimated RVSP of 41, ejection fraction of 40 to 47%, diastolic dysfunction RV dilatation with normal systolic function mild to moderate MR. He has history of colon cancer in status postsurgery in 2015 with colostomy and reversal later  He is a non-smoker no history of COPD. Interval History:  12/22/22  Patient is hemodynamically stable. Usually he is on 6 L and maintaining saturation above 90% but any activity ambulation out of bed to chair he desaturated to low 80s. He is afebrile heart rate is in the 60-70 and respiratory rate is low to mid 20s. He denies any increased cough or sputum production cough is mostly after he received the treatment. Review of Systems:  Review of Systems   Constitutional:  Positive for activity change. Negative for appetite change, fever and unexpected weight change. HENT:  Negative for postnasal drip, sore throat, trouble swallowing and voice change. Eyes:  Negative for photophobia, redness and visual disturbance. Respiratory:  Positive for cough and shortness of breath. Negative for chest tightness and wheezing. Cardiovascular:  Negative for chest pain, palpitations and leg swelling. Gastrointestinal:  Negative for abdominal pain, diarrhea and vomiting. Endocrine: Negative for polydipsia, polyphagia and polyuria.    Genitourinary:  Negative for dysuria, frequency and hematuria. Musculoskeletal: Negative. Allergic/Immunologic: Negative. Neurological:  Negative for dizziness, syncope, speech difficulty and weakness. Hematological:  Negative for adenopathy. Does not bruise/bleed easily. Psychiatric/Behavioral: Negative. OBJECTIVE     VITAL SIGNS:   LAST-  /76   Pulse 71   Temp 97.7 °F (36.5 °C) (Oral)   Resp 17   Ht 6' (1.829 m)   Wt 159 lb (72.1 kg)   SpO2 (!) 87%   BMI 21.56 kg/m²   8-24 HR RANGE-  TEMP Temp  Av.7 °F (36.5 °C)  Min: 97.4 °F (36.3 °C)  Max: 98.1 °F (09.9 °C)   BP Systolic (01XXJ), XJA:994 , Min:111 , OMK:855      Diastolic (04KNR), TIH:20, Min:67, Max:124     PULSE Pulse  Av.4  Min: 56  Max: 71   RR Resp  Av  Min: 13  Max: 26   O2 SAT SpO2  Av.8 %  Min: 87 %  Max: 94 %   OXYGEN DELIVERY O2 Flow Rate (L/min)  Av.8 L/min  Min: 5 L/min  Max: 6 L/min     Systemic Examination:   Physical Exam  General appearance - looks comfortable and in no acute distress mildly tachypneic  Mental status - alert, oriented to person, place, and time  Eyes - pupils equal and reactive, extraocular eye movements intact  Mouth - mucous membranes moist, pharynx normal without lesions  Neck - supple, no significant adenopathy  Chest - Chest was symmetrical without dullness to percussion. Breath sounds bilaterally present he has bibasilar dry inspiratory crackles present extending to lower lung field no expiratory wheezing or rhonchi.  There is no intercostal recession or use of accessory muscles  Heart - normal rate, regular rhythm, normal S1, S2, no murmurs, rubs, clicks or gallops  Abdomen - soft, nontender, nondistended, no masses or organomegaly  Neurological - alert, oriented, normal speech, no focal findings or movement disorder noted  Extremities - peripheral pulses normal, no pedal edema, no clubbing or cyanosis  Skin - normal coloration and turgor, no rashes, no suspicious skin lesions noted     DATA REVIEW Medications:  Scheduled Meds:   warfarin  5 mg Oral Once    midodrine  5 mg Oral TID     warfarin placeholder: dosing by pharmacy   Other RX Placeholder    sodium chloride flush  5-40 mL IntraVENous 2 times per day    atorvastatin  40 mg Oral Daily    budesonide-formoterol  1 puff Inhalation BID    digoxin  125 mcg Oral Daily    pantoprazole  40 mg Oral QAM AC    sildenafil  20 mg Oral TID    tamsulosin  0.4 mg Oral Daily    ipratropium  0.5 mg Nebulization 4x daily     Continuous Infusions:   sodium chloride 75 mL/hr at 12/22/22 0627    heparin (PORCINE) Infusion 24 Units/kg/hr (12/22/22 1300)    sodium chloride       LABS:-  ABG:   No results for input(s): POCPH, POCPCO2, POCPO2, POCHCO3, XWVN8WSE in the last 72 hours. CBC:   Recent Labs     12/20/22 0612 12/21/22 0205 12/22/22  0443   WBC 6.8 7.1 6.5   HGB 10.7* 10.1* 10.7*   HCT 35.5* 33.9* 35.9*   MCV 89.6 91.1 89.3    230 251   LYMPHOPCT 12* 15* 15*   RBC 3.96* 3.72* 4.02*   MCH 27.0 27.2 26.6   MCHC 30.1 29.8 29.8   RDW 14.3 14.6* 14.3     BMP:   Recent Labs     12/20/22 0612 12/21/22 0205 12/22/22  0443    135 142   K 4.0 3.8 4.1    100 104   CO2 30 29 32*   BUN 21 16 15   CREATININE 0.63* 0.55* 0.53*   GLUCOSE 111* 84 97     Liver Function Test:   No results for input(s): PROT, LABALBU, ALT, AST, GGT, ALKPHOS, BILITOT in the last 72 hours. Amylase/Lipase:  No results for input(s): AMYLASE, LIPASE in the last 72 hours. Coagulation Profile:   Recent Labs     12/20/22 2012 12/21/22  0205 12/21/22  0720 12/21/22  2130 12/22/22  0443 12/22/22  1201   INR 1.4 1.4  --   --  1.4  --    PROTIME 14.5* 15.0*  --   --  14.2*  --    APTT 51.9* 71.5*   < > 53.3* 92.8* 83.3*    < > = values in this interval not displayed. Cardiac Enzymes:  No results for input(s): CKTOTAL, CKMB, CKMBINDEX, TROPONINI in the last 72 hours.   Lactic Acid:  Lab Results   Component Value Date    LACTA NOT REPORTED 12/07/2020    LACTA NOT REPORTED 10/17/2020 LACTA 1.0 08/11/2014     BNP:   No results found for: BNP  D-Dimer:  Lab Results   Component Value Date    DDIMER 1.10 (H) 02/18/2022     Others:   Lab Results   Component Value Date    TSH 7.55 (H) 12/19/2022     Lab Results   Component Value Date    YOUNG NEGATIVE 10/17/2020    SEDRATE 82 (H) 10/22/2020    CRP 11.1 (H) 01/12/2021     No results found for: Liseth Dupree  Lab Results   Component Value Date    IRON 33 (L) 05/02/2022    TIBC 349 05/02/2022    FERRITIN 47 05/02/2022     No results found for: SPEP, UPEP  Lab Results   Component Value Date/Time    PSA 0.46 12/20/2022 07:23 AM    CEA 4.8 12/20/2022 07:23 AM     114 12/20/2022 07:23 AM       Input/Output:    Intake/Output Summary (Last 24 hours) at 12/22/2022 1426  Last data filed at 12/22/2022 1122  Gross per 24 hour   Intake 2226.02 ml   Output 1650 ml   Net 576.02 ml       Microbiology:  Recent Labs     12/19/22  1709   [facility] . NASOPHARYNGEAL SWAB       Pathology:    Radiology reports:  VL DUP LOWER EXTREMITY VENOUS BILATERAL   Final Result      CT CHEST PULMONARY EMBOLISM W CONTRAST   Final Result   Addendum (preliminary) 1 of 1   ADDENDUM:   Discussed with Dr. Raf Posadas at 4:40 p.m. Final   Pulmonary emboli bilaterally as above. No evidence of right ventricular   strain. Coronary artery disease and aortic valve disease. COPD and   pulmonary fibrosis. RECOMMENDATIONS:   The findings were sent to the Radiology Results [address] at 4:35   pm on 12/19/2022 to be communicated to a licensed caregiver. CT HEAD WO CONTRAST   Final Result   No acute intracranial abnormality. XR CHEST PORTABLE   Final Result   Pulmonary fibrotic changes are again identified. A superimposed pneumonia is   not excluded. Echocardiogram:   No results found for this or any previous visit. Cardiac Catheterization:   No results found for this or any previous visit.       ASSESSMENT AND PLAN     Assessment: //Recurrent pulmonary pulm embolism while on NOAC/DOAC  //Chronic respiratory failure on long-term oxygen therapy  //Near syncope on presentation likely combination/multifactorial with history of chronic hypoxic respiratory failure with pulm embolism and on sildenafil  //Pulmonary fibrosis/IPF  //Pulmonary hypertension  //History of colon cancer    Plan:    Patient remains hemodynamically stable and is currently saturating above 90% on 6 L nasal cannula. He used 6 L of oxygen at home at baseline. I doubt that he had IPF exacerbation and recent increase in dyspnea is likely related to pulm embolism with baseline progressive pulmonary fibrosis and chronic respiratory failure  Follow-up with hematology on heparin drip and is started on Coumadin as considered failure of NOACs. Coumadin bridged with heparin and follow-up with oncology for chronic anticoagulation and also in Coumadin clinic. Continue supplemental oxygen to keep oxygen saturation greater than 88 0 %  Anticoagulation per hematology  Continue incentive spirometry, pulmonary toilet, aspiration precautions and bronchodilators  Antimicrobials reviewed; no need for antibiotic from pulm standpoint. DVT prophylaxis on therapeutic heparin  Physical/occupational therapy; increase activity as tolerated. Patient should be followed up by Dr. Estee Plascencia after discharge  I updated the patient regarding the current clinical condition, provisional diagnosis and management plan. I addressed concerns and answered all questions to the best of my abilities. It was my pleasure to evaluate Duarte Centennial today. We will continue to follow. I would like to thank you for allowing me to participate in the care of this patient. Please feel free to call with any further questions or concerns. Eduin Sullivan MD, M.D.    Pulmonary and Critical Care Medicine           12/22/2022, 2:26 PM    Please note that this chart was generated using voice recognition Dragon dictation software. Although every effort was made to ensure the accuracy of this automated transcription, some errors in transcription may have occurred.

## 2022-12-22 NOTE — PROGRESS NOTES
Pharmacy Note  Warfarin Consult follow-up      Recent Labs     12/22/22  0443   INR 1.4     Recent Labs     12/20/22  0612 12/21/22  0205 12/22/22  0443   HGB 10.7* 10.1* 10.7*   HCT 35.5* 33.9* 35.9*    230 251       Significant Drug-Drug Interactions:  New warfarin drug-drug interactions: none  Discontinued drug-drug interactions: none      Notes:                   Warfarin 5 mg po today. Daily PT/INR while inpatient.      Octavio Paula PharmD, BCPS  12/22/2022  9:35 AM

## 2022-12-22 NOTE — PROGRESS NOTES
[facility]  Progress Note                   Date:   12/22/2022  Patient name: Jonna Cooper  Date of admission:  12/19/2022  2:07 PM  MRN:   [id number]  YOB: 1955  PCP: Darling Ness MD    Reason for Admission: Syncope and collapse [R55]  Shortness of breath [R06.02]  Pulmonary embolism, bilateral ([address] [I26.99]  Pulmonary embolism without acute cor pulmonale, unspecified chronicity, unspecified pulmonary embolism type ([address] [I26.99]    Subjective:       Clinical Changes /Abnormalities: Patient was seen and examined in room with wife. He denies chest pain and complains of continued SOB, not worsening. He states he felt light headed when he walked to bathroom earlier. Labs/vitals/tele reviewed. Discussed with RN.      Review of Systems    Medications:   Scheduled Meds:   warfarin  5 mg Oral Once    midodrine  5 mg Oral TID WC    warfarin placeholder: dosing by pharmacy   Other RX Placeholder    sodium chloride flush  5-40 mL IntraVENous 2 times per day    atorvastatin  40 mg Oral Daily    budesonide-formoterol  1 puff Inhalation BID    digoxin  125 mcg Oral Daily    pantoprazole  40 mg Oral QAM AC    sildenafil  20 mg Oral TID    tamsulosin  0.4 mg Oral Daily    ipratropium  0.5 mg Nebulization 4x daily     Continuous Infusions:   sodium chloride 75 mL/hr at 12/22/22 0627    heparin (PORCINE) Infusion 26 Units/kg/hr (12/22/22 5807)    sodium chloride       CBC:   Recent Labs     12/20/22  0612 12/21/22  0205 12/22/22  0443   WBC 6.8 7.1 6.5   HGB 10.7* 10.1* 10.7*    230 251     BMP:    Recent Labs     12/20/22  0612 12/21/22  0205 12/22/22  0443    135 142   K 4.0 3.8 4.1    100 104   CO2 30 29 32*   BUN 21 16 15   CREATININE 0.63* 0.55* 0.53*   GLUCOSE 111* 84 97     Hepatic:  Recent Labs     12/19/22  1423   AST 26   ALT 20   BILITOT 0.7   ALKPHOS 83     Troponin:   Recent Labs     12/19/22  1423 12/19/22  1600   TROPHS 35* 33*     BNP: No results for input(s): BNP in the last 72 hours. Lipids: No results for input(s): CHOL, HDL in the last 72 hours. Invalid input(s): LDLCALCU  INR:   Recent Labs     12/20/22 2012 12/21/22  0205 12/22/22  0443   INR 1.4 1.4 1.4       Objective:   Vitals: /76   Pulse 62   Temp 97.7 °F (36.5 °C) (Oral)   Resp 16   Ht 6' (1.829 m)   Wt 159 lb (72.1 kg)   SpO2 92%   BMI 21.56 kg/m²   General appearance: alert and cooperative with exam  HEENT: Head: Normocephalic, no lesions, without obvious abnormality. Neck:no JVD, trachea midline, no adenopathy  Lungs: Clear to auscultation  Heart: Irregular rate and rhythm, s1/s2 auscultated, no murmurs, afib, HR 70's  Abdomen: soft, non-tender, bowel sounds active  Extremities: no edema  Neurologic: not done        Assessment / Acute Cardiac Problems:   Syncope with orthostatic positive  Chronic atrial fibrillation on Eliquis  Acute pulmonary embolism. History of pulmonary embolism. Idiopathic pulmonary fibrosis. Chronic hypoxic respiratory failure.   Mild pulmonary hypertension with RVSP of 41    Patient Active Problem List:     Dyslipidemia     ED (erectile dysfunction)     Incomplete bladder emptying     Benign prostatic hyperplasia with lower urinary tract symptoms     History of colon cancer     PAF (paroxysmal atrial fibrillation) (HCC)     Anemia of chronic disease     Pallor of optic disc     Presbyopia     Incisional hernia, without obstruction or gangrene     Hypertrophic nonobstructive cardiomyopathy (HCC)     Iron (Fe) deficiency anemia     Primary osteoarthritis of right knee     History of malignant neoplasm of rectum     Hill's esophagus     CHF (congestive heart failure), NYHA class II, acute on chronic, combined (HCC)     Hypoxia     Dyspnea and respiratory abnormalities     Occupational pulmonary disease     Sinus bradycardia     Acute hypoxemic respiratory failure due to COVID-19 ([facility]     COVID-19     Pneumonia     Chronic idiopathic pulmonary fibrosis ([facility] [address]     Syncope and collapse     Chronic anticoagulation     Severe malnutrition (HCC)     Cervical stenosis of spinal canal     Impingement syndrome of left shoulder     Fracture of right hip     Allergy to penicillin     Acute pulmonary embolism ([facility] [address]     Pulmonary embolism, bilateral (HCC)     Demand ischemia of myocardium (HCC)     Acute respiratory acidosis (HCC)     Orthostatic hypotension     Acute pulmonary edema (HCC)     Pulmonary embolism without acute cor pulmonale (HCC)     Chronic respiratory failure with hypoxia (HCC)     Pulmonary HTN (HCC)     Postural dizziness with near syncope      Plan of Treatment:   No signs of volume overload. Further POC pending ECHO results. Will order compression stockings. PAF. Continue heparin gtt. Patient to be DC with warfarin and follow up with coumadin clinic per heme-onc recommendations. Continue digoxin. Keep K > 4 and Mg > 2.      Electronically signed by MIRI Yanez CNP on 12/22/2022 at 12:52 PM  [address]  [phone]

## 2022-12-22 NOTE — DISCHARGE INSTRUCTIONS
Please do follow up with coumadin clinic for managing your anticoagulation with coumadin  STOP taking eliquis which you have at home as you are starting Coumadin.    Follow up with PCP post discharge  Follow-up with your pulmonary Dr. Sally Pretyt for treatment of IPF  Follow up with pulmonary for your pulmonary fibrosis  Start taking Prednisone tablets as prescribed  Follow up with cardiology for evaluation of sever aortic stenosis  Continue to use the compression stockings  Follow-up with heme-onc in their office after discharge for further work-up for pulmonary embolism

## 2022-12-22 NOTE — PROGRESS NOTES
Physical Therapy  Facility/Department: [facility] CAR 2- STEPDOWN  Physical Therapy Daily treatment note  Name: Yovany Zamora  : 1955  MRN: [id number]  Date of Service: 2022    Discharge Recommendations:  Patient would benefit from continued therapy after discharge   PT Equipment Recommendations  Equipment Needed: No      Patient Diagnosis(es): The primary encounter diagnosis was Pulmonary embolism without acute cor pulmonale, unspecified chronicity, unspecified pulmonary embolism type ([address] Diagnoses of Syncope and collapse and Shortness of breath were also pertinent to this visit. Past Medical History:  has a past medical history of Atrial fibrillation ([address] Back pain, chronic, Hill's esophagus, Benign essential HTN, BPH (benign prostatic hyperplasia), Cancer ([address] Chronic idiopathic pulmonary fibrosis ([address] Cocaine abuse in remission ([address] Community acquired bacterial pneumonia, ED (erectile dysfunction), GERD (gastroesophageal reflux disease), GI bleed, Hernia, History of colon cancer, Melena, Migraines, Multifocal pneumonia, Murmur, cardiac, and Single subsegmental pulmonary embolism without acute cor pulmonale ([address] Past Surgical History:  has a past surgical history that includes Tonsillectomy; Colonoscopy; colectomy; Colonoscopy (2016); knee surgery (Right, 's); transesophageal echocardiogram (2018); Upper gastrointestinal endoscopy (N/A, 2018); Colonoscopy (N/A, 2018); hernia repair (Right, ); Cardiac catheterization (2018); colectomy; Total knee arthroplasty (Right, 2019); Upper gastrointestinal endoscopy (N/A, 2019); Cardioversion (); and hip surgery (Right, 2022). Assessment   Body Structures, Functions, Activity Limitations Requiring Skilled Therapeutic Intervention: Decreased functional mobility ; Decreased strength;Decreased endurance;Decreased balance  Assessment: Pt grossly CGA to SBA, desatting limiting amb to 8 ft x2 with increased rest breaks to recover. Pt did not de sat with supine exercise. Pt would benefit from continued acute PT to address deficits. Therapy Prognosis: Good  Decision Making: Medium Complexity  Requires PT Follow-Up: Yes  Activity Tolerance  Activity Tolerance: Patient tolerated treatment well;Patient limited by endurance;Treatment limited secondary to medical complications  Activity Tolerance Comments: Pt desatting with bed mobility, transfers and amb, increased rest breaks taken     Plan   Physcial Therapy Plan  General Plan:  (5-6 x week)  Current Treatment Recommendations: Strengthening, Balance training, Gait training, Stair training, Functional mobility training, Endurance training, Home exercise program, Safety education & training, Patient/Caregiver education & training, Equipment evaluation, education, & procurement, Therapeutic activities  Safety Devices  Type of Devices: Call light within reach, Left in bed, Nurse notified  Restraints  Restraints Initially in Place: No     Restrictions  Restrictions/Precautions  Restrictions/Precautions: Fall Risk, General Precautions  Required Braces or Orthoses?: No  Position Activity Restriction  Other position/activity restrictions: Up as tolerated. Pt with 2 inch leg length difference from prior trauma- has shoe with lift. RLE shorter. Subjective   General  Chart Reviewed: Yes  Patient assessed for rehabilitation services?: Yes  Response To Previous Treatment: Patient with no complaints from previous session. Family / Caregiver Present: No  Follows Commands: Within Functional Limits  General Comment  Comments: Pt retired to bed  Subjective  Subjective: RN and pt agreeable to PT. Pt alert in bed upon arrival.Pt reports injury to RLE \"feb last year\", states pain is chronic 5/10.         Cognition   Orientation  Overall Orientation Status: Within Functional Limits  Orientation Level: Oriented X4  Cognition  Overall Cognitive Status: WFL      Bed mobility  Supine to Sit: Stand by assistance  Sit to Supine: Stand by assistance  Scooting: Stand by assistance  Bed Mobility Comments: HOB raised ~30*; pt SPO2 deopped to 83% after sup to sit tx, prolonged seated rest break at EOB and VCs for breathing techniques to recover. Transfers  Sit to Stand: Contact guard assistance  Stand to Sit: Stand by assistance  Comment: RW utalized, pt sat 91% while seated EOB, dropped to 88% following 3 min stand. Ambulation  Surface: Level tile  Device: Rolling Walker  Other Apparatus: O2 (7L)  Assistance: Stand by assistance (assisted with line managment)  Quality of Gait: midly antalgic  Distance: 8 ft x2  Comments: Pt amb 8ft x2 with RW and 7L NC, Spo2 dropped mid walk to 81% pt sat at chair, performed PLB to recover to 91%, amb back to bed, with Spo2 dropping 80%, quickly returning to 95% with rest.  More Ambulation?: No  Stairs/Curb  Stairs?: No     Balance  Posture: Good  Sitting - Static: Good;-  Sitting - Dynamic: Fair;+  Standing - Static: Fair  Standing - Dynamic: Fair  Comments: RW used while assessing standing balance  Exercise Treatment: Lowest Sp02 note 81%, soon recovered to mid 80s with cues to deep breath. 2 mins to recover into low 90s.  desatting limiting ambulation distance ( 8ft x2 with extended rest period)  A/AROM Exercises: Pt able to complete, supine ankle pumps x20, and heel slides x15 with Spo2 between 91% increasing to 96% at highest  Static Standing Balance Exercises: pt stood 3 mins with CGA and RW, sat down when pt desatted      AM-PAC Score  AM-PAC Inpatient Mobility Raw Score : 19 (12/22/22 1116)  AM-PAC Inpatient T-Scale Score : 45.44 (12/22/22 1116)  Mobility Inpatient CMS 0-100% Score: 41.77 (12/22/22 1116)  Mobility Inpatient CMS G-Code Modifier : CK (12/22/22 1116)   Goals  Short Term Goals  Time Frame for Short Term Goals: 14 visits  Short Term Goal 1: Pt will be Ruy bed mobility  Short Term Goal 2: Pt will be Ruy transfers  Short Term Goal 3: Pt will be Ruy amb 250'  Short Term Goal 4: Pt will navigate 4 steps Ruy either or B rail use       Education  Patient Education  Education Given To: Patient  Education Provided: Role of Therapy;Plan of Care  Education Method: Demonstration;Verbal  Barriers to Learning: None  Education Outcome: Verbalized understanding;Demonstrated understanding      Therapy Time   Individual Concurrent Group Co-treatment   Time In 1030         Time Out 1100         Minutes 30         Timed Code Treatment Minutes: Jenna Badillo 26, PTA

## 2022-12-22 NOTE — PLAN OF CARE
Problem: Respiratory - Adult  Goal: Achieves optimal ventilation and oxygenation  12/22/2022 0745 by Amari Marsh RCP  Outcome: Progressing   BRONCHOSPASM/BRONCHOCONSTRICTION     [x]         IMPROVE AERATION/BREATH SOUNDS  [x]   ADMINISTER BRONCHODILATOR THERAPY AS APPROPRIATE  [x]   ASSESS BREATH SOUNDS  []   IMPLEMENT AEROSOL/MDI PROTOCOL  [x]   PATIENT EDUCATION AS NEEDED   PROVIDE ADEQUATE OXYGENATION WITH ACCEPTABLE SP02/ABG'S    [x]  IDENTIFY APPROPRIATE OXYGEN THERAPY  [x]   MONITOR SP02/ABG'S AS NEEDED   [x]   PATIENT EDUCATION AS NEEDED

## 2022-12-22 NOTE — PROGRESS NOTES
[facility]  Internal Medicine Teaching Residency Program  Inpatient Daily Progress Note  ______________________________________________________________________________    Patient: Josh Vergara  YOB: 1955   FQR:[id number]    Acct: [de-identified]     Room: 2019/2019-01  Admit date: 12/19/2022  Today's date: 12/22/22  Number of days in the hospital: 3    SUBJECTIVE   Admitting Diagnosis: Acute pulmonary embolism ([address]  CC: An episode of near syncope    Pt examined at bedside. Chart & results reviewed. No acute event overnight  No new complaints of lightheadedness or syncopal episodes overnight  Patient has not moved out of bed  Has persistent shortness of breath, his saturations dropped to 86% while speaking full sentences. No signs of distress noted  Patient is hoping to get improvement and to follow-up with his pulmonary doctor once discharged from the hospital and he is willing to work with therapy today  Remained afebrile and hemodynamically stable. Denies any bleeding episodes while on heparin    ROS:  Constitutional:  negative for chills, fevers, sweats  Respiratory:  negative for cough, dyspnea on exertion, hemoptysis, shortness of breath, wheezing  Cardiovascular:  negative for chest pain, chest pressure/discomfort, lower extremity edema, palpitations  Gastrointestinal:  negative for abdominal pain, constipation, diarrhea, nausea, vomiting  Neurological:  negative for dizziness, headache    BRIEF HISTORY     The patient is a pleasant 69 y.o. male with past medical history of A. fib, idiopathic pulmonary fibrosis with pulmonary hypertension, colorectal carcinoma, history of pulmonary embolism, BPH presents after an episode of near syncope. Patient states he was standing at his home and suddenly started feeling sweaty and feeling that he was going to pass out which made him sit on the floor. Denies any loss of consciousness, did not hit his head. Patient has a history of pulmonary embolism and A. fib and takes Eliquis at home, patient has a history of idiopathic pulmonary fibrosis-along with significant family history-also in the sister-has been taking sildenafil for moderate pulmonary hypertension. Uses 9 L of oxygen at home. States he has passed out multiple times in the past during which he was found to have COVID-pneumonia. As per the dispense report patient takes tamsulosin and sildenafil-patient is unsure of tamsulosin but takes sildenafil. Patient states he always feels dizzy when standing suddenly from lying down position. Last echo in  showed ejection fraction 40 to 45%, RVSP 41 mmHg, mild to moderate mitral regurgitation. Patient denies any chest pain, abdominal pain, loose stool, burning urination, leg pain or swelling, no recent sick contact. in the ED patient was requiring 15 L of oxygen via nonrebreather and was desaturating when taken off nasal cannula as per the chart. Hypotensive blood pressure 88/62, heart rate 69-rhythm A. Fib. proBNP elevated to 16,000.  2 sets of troponin 35-33. CT PE showed bilateral pulmonary embolism without right heart strain along with underlying honeycomb pattern at peripheral and bases and centrilobular paraseptal emphysema. Patient was started on heparin drip and admitted to internal medicine floor for further management. Has a history of colorectal cancer, had a colostomy in past which was later reversed. 2022-INR at 1.4, has persistent shortness of breath currently on 7 L of nasal cannula    OBJECTIVE     Vital Signs:  /80   Pulse 68   Temp 97.7 °F (36.5 °C) (Oral)   Resp 23   Ht 6' (1.829 m)   Wt 159 lb (72.1 kg)   SpO2 91%   BMI 21.56 kg/m²     Temp (24hrs), Av.6 °F (36.4 °C), Min:97.3 °F (36.3 °C), Max:98.1 °F (36.7 °C)    In:    Out:  [Urine:]    Physical Exam:  Constitutional: This is a well developed, well nourished, 18.5-24.9 - Normal 69 y.o. PRN  ondansetron, 4 mg, Q8H PRN   Or  ondansetron, 4 mg, Q6H PRN  polyethylene glycol, 17 g, Daily PRN  acetaminophen, 650 mg, Q6H PRN   Or  acetaminophen, 650 mg, Q6H PRN  albuterol sulfate HFA, 2 puff, Q4H PRN      Diagnostic Labs:  CBC:   Recent Labs     12/20/22 0612 12/21/22 0205 12/22/22  0443   WBC 6.8 7.1 6.5   RBC 3.96* 3.72* 4.02*   HGB 10.7* 10.1* 10.7*   HCT 35.5* 33.9* 35.9*   MCV 89.6 91.1 89.3   RDW 14.3 14.6* 14.3    230 251     BMP:   Recent Labs     12/20/22 0612 12/21/22  0205 12/22/22  0443    135 142   K 4.0 3.8 4.1    100 104   CO2 30 29 32*   BUN 21 16 15   CREATININE 0.63* 0.55* 0.53*     BNP: No results for input(s): BNP in the last 72 hours. PT/INR:   Recent Labs     12/20/22 2012 12/21/22 0205 12/22/22  0443   PROTIME 14.5* 15.0* 14.2*   INR 1.4 1.4 1.4     APTT:   Recent Labs     12/21/22  1613 12/21/22  2130 12/22/22  0443   APTT 69.0* 53.3* 92.8*     CARDIAC ENZYMES: No results for input(s): CKMB, CKMBINDEX, TROPONINI in the last 72 hours. Invalid input(s): CKTOTAL;3  FASTING LIPID PANEL:  Lab Results   Component Value Date    CHOL 200 (H) 01/03/2017    HDL 29 (L) 12/20/2021    TRIG 165 (H) 01/03/2017     LIVER PROFILE:   Recent Labs     12/19/22  1423   AST 26   ALT 20   BILITOT 0.7   ALKPHOS 83      MICROBIOLOGY:   Lab Results   Component Value Date/Time    CULTURE NO SIGNIFICANT GROWTH 02/20/2022 06:41 PM       Imaging:    CT HEAD WO CONTRAST    Result Date: 12/19/2022  No acute intracranial abnormality. XR CHEST PORTABLE    Result Date: 12/19/2022  Pulmonary fibrotic changes are again identified. A superimposed pneumonia is not excluded. CT CHEST PULMONARY EMBOLISM W CONTRAST    Addendum Date: 12/19/2022    ADDENDUM: Discussed with Dr. Cristi Posadas at 4:40 p.m. Result Date: 12/19/2022  Pulmonary emboli bilaterally as above. No evidence of right ventricular strain. Coronary artery disease and aortic valve disease.   COPD and pulmonary fibrosis. RECOMMENDATIONS: The findings were sent to the Radiology Results [address] at 4:35 pm on 12/19/2022 to be communicated to a licensed caregiver. ASSESSMENT & PLAN     ASSESSMENT / PLAN:      IMPRESSION  This is a 69 y.o. male who presented with near syncope and found to have pulmonary embolism. Patient admitted to inpatient status for the management of syncope and pulmonary embolism. Principal Problem:    Acute pulmonary embolism (HCC)-likely secondary to Eliquis failure, continue IV heparin, bridging with warfarin in progress, INR at 1.4 today, heme-onc has been following patient will be discharged with warfarin and follow-up with Coumadin clinic. Heart failure with reduced ejection fraction Euvolemic, no fluid congestion, off IV diuretics, cardiology on board, repeat echo is pending  Near Syncope and collapse- orthostatics positive and echo pending. Cardiology on board, compression stockings, further management as per cardiology likely with a treatable test versus Holter monitor  PAF (paroxysmal atrial fibrillation) (HCC)-rate controlled, continue digoxin 125 oral daily ,continue bridging IV heparin to warfarin, INR is at 1.4 pharmacy to dose warfarin   Chronic idiopathic pulmonary fibrosis ([facility] [address] , COPD stage II on PFT on 2/25/2021 with FEV1 of 55%  -Follows with Dr. Clary Carey,  acute respiratory failure , continue bronchodilators ,secondary to pulmonary embolism as per pulmonary. We will continue to follow the recommendations. Dyslipidemia- Resumed Home atorvastatin 40 mg      DVT ppx:  Coumadin and IV heparin  GI ppx: Not indicated at this time     PT/OT/SW-has been consulted  Discharge Planning: encouraged Patient needs to work with physical therapy    Camila Vernon MD  Internal Medicine Resident, PGY-3  [facility];  [place], [place]  12/22/2022, 8:08 AM  Attending Physician Statement  I have discussed the care of Moni Barajas and I have examined the patient myselft and taken ros and hpi , including pertinent history and exam findings,  with the resident. I have reviewed the key elements of all parts of the encounter with the resident. I agree with the assessment, plan and orders as documented by the resident. Spent 35 minutes in reviewing data/medicines/talking to patient/family,  explaining and answering all the questions.         Electronically signed by Augustine Brittle, MD

## 2022-12-22 NOTE — PLAN OF CARE
Problem: Discharge Planning  Goal: Discharge to home or other facility with appropriate resources  Outcome: Progressing     Problem: Safety - Adult  Goal: Free from fall injury  Outcome: Progressing  Flowsheets (Taken 12/21/2022 2000)  Free From Fall Injury: Based on caregiver fall risk screen, instruct family/caregiver to ask for assistance with transferring infant if caregiver noted to have fall risk factors     Problem: Skin/Tissue Integrity  Goal: Absence of new skin breakdown  Description: 1. Monitor for areas of redness and/or skin breakdown  2. Assess vascular access sites hourly  3. Every 4-6 hours minimum:  Change oxygen saturation probe site  4. Every 4-6 hours:  If on nasal continuous positive airway pressure, respiratory therapy assess nares and determine need for appliance change or resting period.   Outcome: Progressing     Problem: Chronic Conditions and Co-morbidities  Goal: Patient's chronic conditions and co-morbidity symptoms are monitored and maintained or improved  Outcome: Progressing     Problem: Respiratory - Adult  Goal: Achieves optimal ventilation and oxygenation  Outcome: Progressing  Flowsheets (Taken 12/21/2022 2000)  Achieves optimal ventilation and oxygenation:   Assess for changes in respiratory status   Assess for changes in mentation and behavior   Position to facilitate oxygenation and minimize respiratory effort   Oxygen supplementation based on oxygen saturation or arterial blood gases     Problem: Nutrition Deficit:  Goal: Optimize nutritional status  Outcome: Progressing     Problem: ABCDS Injury Assessment  Goal: Absence of physical injury  Outcome: Progressing

## 2022-12-23 LAB
ABSOLUTE EOS #: 0.3 K/UL (ref 0–0.44)
ABSOLUTE IMMATURE GRANULOCYTE: <0.03 K/UL (ref 0–0.3)
ABSOLUTE LYMPH #: 1 K/UL (ref 1.1–3.7)
ABSOLUTE MONO #: 0.45 K/UL (ref 0.1–1.2)
ANION GAP SERPL CALCULATED.3IONS-SCNC: 6 MMOL/L (ref 9–17)
BASOPHILS # BLD: 0 % (ref 0–2)
BASOPHILS ABSOLUTE: <0.03 K/UL (ref 0–0.2)
BUN BLDV-MCNC: 12 MG/DL (ref 8–23)
CALCIUM SERPL-MCNC: 8.1 MG/DL (ref 8.6–10.4)
CHLORIDE BLD-SCNC: 105 MMOL/L (ref 98–107)
CO2: 30 MMOL/L (ref 20–31)
CREAT SERPL-MCNC: 0.52 MG/DL (ref 0.7–1.2)
EOSINOPHILS RELATIVE PERCENT: 5 % (ref 1–4)
GFR SERPL CREATININE-BSD FRML MDRD: >60 ML/MIN/1.73M2
GLUCOSE BLD-MCNC: 101 MG/DL (ref 70–99)
HCT VFR BLD CALC: 35 % (ref 40.7–50.3)
HEMOGLOBIN: 10.4 G/DL (ref 13–17)
IMMATURE GRANULOCYTES: 0 %
INR BLD: 1.5
LYMPHOCYTES # BLD: 16 % (ref 24–43)
MCH RBC QN AUTO: 25.9 PG (ref 25.2–33.5)
MCHC RBC AUTO-ENTMCNC: 29.7 G/DL (ref 28.4–34.8)
MCV RBC AUTO: 87.3 FL (ref 82.6–102.9)
MONOCYTES # BLD: 7 % (ref 3–12)
NRBC AUTOMATED: 0 PER 100 WBC
PARTIAL THROMBOPLASTIN TIME: 72.2 SEC (ref 20.5–30.5)
PARTIAL THROMBOPLASTIN TIME: 75.4 SEC (ref 20.5–30.5)
PDW BLD-RTO: 14.4 % (ref 11.8–14.4)
PLATELET # BLD: 235 K/UL (ref 138–453)
PMV BLD AUTO: 10 FL (ref 8.1–13.5)
POTASSIUM SERPL-SCNC: 3.8 MMOL/L (ref 3.7–5.3)
PROTHROMBIN TIME: 15.3 SEC (ref 9.1–12.3)
RBC # BLD: 4.01 M/UL (ref 4.21–5.77)
SEG NEUTROPHILS: 72 % (ref 36–65)
SEGMENTED NEUTROPHILS ABSOLUTE COUNT: 4.38 K/UL (ref 1.5–8.1)
SODIUM BLD-SCNC: 141 MMOL/L (ref 135–144)
WBC # BLD: 6.2 K/UL (ref 3.5–11.3)

## 2022-12-23 PROCEDURE — 6360000002 HC RX W HCPCS: Performed by: INTERNAL MEDICINE

## 2022-12-23 PROCEDURE — 6360000002 HC RX W HCPCS: Performed by: STUDENT IN AN ORGANIZED HEALTH CARE EDUCATION/TRAINING PROGRAM

## 2022-12-23 PROCEDURE — 94640 AIRWAY INHALATION TREATMENT: CPT

## 2022-12-23 PROCEDURE — 86147 CARDIOLIPIN ANTIBODY EA IG: CPT

## 2022-12-23 PROCEDURE — 2580000003 HC RX 258: Performed by: STUDENT IN AN ORGANIZED HEALTH CARE EDUCATION/TRAINING PROGRAM

## 2022-12-23 PROCEDURE — 2060000000 HC ICU INTERMEDIATE R&B

## 2022-12-23 PROCEDURE — 6370000000 HC RX 637 (ALT 250 FOR IP)

## 2022-12-23 PROCEDURE — 2700000000 HC OXYGEN THERAPY PER DAY

## 2022-12-23 PROCEDURE — 85730 THROMBOPLASTIN TIME PARTIAL: CPT

## 2022-12-23 PROCEDURE — 99232 SBSQ HOSP IP/OBS MODERATE 35: CPT | Performed by: INTERNAL MEDICINE

## 2022-12-23 PROCEDURE — 99233 SBSQ HOSP IP/OBS HIGH 50: CPT | Performed by: INTERNAL MEDICINE

## 2022-12-23 PROCEDURE — 2580000003 HC RX 258

## 2022-12-23 PROCEDURE — 6370000000 HC RX 637 (ALT 250 FOR IP): Performed by: INTERNAL MEDICINE

## 2022-12-23 PROCEDURE — 80048 BASIC METABOLIC PNL TOTAL CA: CPT

## 2022-12-23 PROCEDURE — 97116 GAIT TRAINING THERAPY: CPT

## 2022-12-23 PROCEDURE — 86146 BETA-2 GLYCOPROTEIN ANTIBODY: CPT

## 2022-12-23 PROCEDURE — 85610 PROTHROMBIN TIME: CPT

## 2022-12-23 PROCEDURE — 85613 RUSSELL VIPER VENOM DILUTED: CPT

## 2022-12-23 PROCEDURE — 97110 THERAPEUTIC EXERCISES: CPT

## 2022-12-23 PROCEDURE — 6370000000 HC RX 637 (ALT 250 FOR IP): Performed by: STUDENT IN AN ORGANIZED HEALTH CARE EDUCATION/TRAINING PROGRAM

## 2022-12-23 PROCEDURE — 85025 COMPLETE CBC W/AUTO DIFF WBC: CPT

## 2022-12-23 PROCEDURE — 97535 SELF CARE MNGMENT TRAINING: CPT

## 2022-12-23 PROCEDURE — 94761 N-INVAS EAR/PLS OXIMETRY MLT: CPT

## 2022-12-23 PROCEDURE — 36415 COLL VENOUS BLD VENIPUNCTURE: CPT

## 2022-12-23 RX ORDER — METHYLPREDNISOLONE SODIUM SUCCINATE 40 MG/ML
40 INJECTION, POWDER, LYOPHILIZED, FOR SOLUTION INTRAMUSCULAR; INTRAVENOUS 2 TIMES DAILY
Status: COMPLETED | OUTPATIENT
Start: 2022-12-23 | End: 2022-12-25

## 2022-12-23 RX ORDER — WARFARIN SODIUM 5 MG/1
5 TABLET ORAL
Status: COMPLETED | OUTPATIENT
Start: 2022-12-23 | End: 2022-12-23

## 2022-12-23 RX ADMIN — DIGOXIN 125 MCG: 125 TABLET ORAL at 08:40

## 2022-12-23 RX ADMIN — HEPARIN SODIUM 26 UNITS/KG/HR: 10000 INJECTION, SOLUTION INTRAVENOUS at 09:52

## 2022-12-23 RX ADMIN — SODIUM CHLORIDE: 9 INJECTION, SOLUTION INTRAVENOUS at 04:47

## 2022-12-23 RX ADMIN — METHYLPREDNISOLONE SODIUM SUCCINATE 40 MG: 40 INJECTION, POWDER, FOR SOLUTION INTRAMUSCULAR; INTRAVENOUS at 11:13

## 2022-12-23 RX ADMIN — SILDENAFIL 20 MG: 20 TABLET ORAL at 16:24

## 2022-12-23 RX ADMIN — IPRATROPIUM BROMIDE 0.5 MG: 0.5 SOLUTION RESPIRATORY (INHALATION) at 15:56

## 2022-12-23 RX ADMIN — HEPARIN SODIUM 26 UNITS/KG/HR: 10000 INJECTION, SOLUTION INTRAVENOUS at 22:00

## 2022-12-23 RX ADMIN — PANTOPRAZOLE SODIUM 40 MG: 40 TABLET, DELAYED RELEASE ORAL at 08:40

## 2022-12-23 RX ADMIN — IPRATROPIUM BROMIDE 0.5 MG: 0.5 SOLUTION RESPIRATORY (INHALATION) at 07:28

## 2022-12-23 RX ADMIN — BUDESONIDE AND FORMOTEROL FUMARATE DIHYDRATE 1 PUFF: 80; 4.5 AEROSOL RESPIRATORY (INHALATION) at 20:29

## 2022-12-23 RX ADMIN — SILDENAFIL 20 MG: 20 TABLET ORAL at 08:41

## 2022-12-23 RX ADMIN — DESMOPRESSIN ACETATE 40 MG: 0.2 TABLET ORAL at 08:41

## 2022-12-23 RX ADMIN — WARFARIN SODIUM 5 MG: 5 TABLET ORAL at 16:24

## 2022-12-23 RX ADMIN — IPRATROPIUM BROMIDE 0.5 MG: 0.5 SOLUTION RESPIRATORY (INHALATION) at 11:39

## 2022-12-23 RX ADMIN — SILDENAFIL 20 MG: 20 TABLET ORAL at 20:14

## 2022-12-23 RX ADMIN — ACETAMINOPHEN 650 MG: 325 TABLET ORAL at 11:13

## 2022-12-23 RX ADMIN — SODIUM CHLORIDE, PRESERVATIVE FREE 10 ML: 5 INJECTION INTRAVENOUS at 20:14

## 2022-12-23 RX ADMIN — METHYLPREDNISOLONE SODIUM SUCCINATE 40 MG: 40 INJECTION, POWDER, FOR SOLUTION INTRAMUSCULAR; INTRAVENOUS at 20:14

## 2022-12-23 RX ADMIN — TAMSULOSIN HYDROCHLORIDE 0.4 MG: 0.4 CAPSULE ORAL at 08:41

## 2022-12-23 RX ADMIN — IPRATROPIUM BROMIDE 0.5 MG: 0.5 SOLUTION RESPIRATORY (INHALATION) at 20:29

## 2022-12-23 RX ADMIN — BUDESONIDE AND FORMOTEROL FUMARATE DIHYDRATE 1 PUFF: 80; 4.5 AEROSOL RESPIRATORY (INHALATION) at 07:28

## 2022-12-23 ASSESSMENT — ENCOUNTER SYMPTOMS
ALLERGIC/IMMUNOLOGIC NEGATIVE: 1
COUGH: 1
SORE THROAT: 0
CHEST TIGHTNESS: 0
EYE REDNESS: 0
ABDOMINAL PAIN: 0
WHEEZING: 0
VOICE CHANGE: 0
SHORTNESS OF BREATH: 1
DIARRHEA: 0
VOMITING: 0
TROUBLE SWALLOWING: 0
PHOTOPHOBIA: 0

## 2022-12-23 ASSESSMENT — PAIN SCALES - GENERAL: PAINLEVEL_OUTOF10: 7

## 2022-12-23 NOTE — PROGRESS NOTES
[facility]  Progress Note                   Date:   12/23/2022  Patient name: Josh Vergara  Date of admission:  12/19/2022  2:07 PM  MRN:   [id number]  YOB: 1955  PCP: Ara Peabody, MD    Reason for Admission: Syncope and collapse [R55]  Shortness of breath [R06.02]  Pulmonary embolism, bilateral ([address] [I26.99]  Pulmonary embolism without acute cor pulmonale, unspecified chronicity, unspecified pulmonary embolism type ([address] [I26.99]    Subjective:       Clinical Changes /Abnormalities: Patient seen and examined. Denies chest pain, mild  shortness of breath. On high flow NC  per  RN at 9 liters of NC home oxygen history of lung fibrosis Tele/vitals/labs reviewed . Review of Systems    Medications:   Scheduled Meds:   warfarin  5 mg Oral Once    methylPREDNISolone  40 mg IntraVENous BID    midodrine  5 mg Oral TID WC    warfarin placeholder: dosing by pharmacy   Other RX Placeholder    sodium chloride flush  5-40 mL IntraVENous 2 times per day    atorvastatin  40 mg Oral Daily    budesonide-formoterol  1 puff Inhalation BID    digoxin  125 mcg Oral Daily    pantoprazole  40 mg Oral QAM AC    sildenafil  20 mg Oral TID    tamsulosin  0.4 mg Oral Daily    ipratropium  0.5 mg Nebulization 4x daily     Continuous Infusions:   sodium chloride 75 mL/hr at 12/23/22 0447    heparin (PORCINE) Infusion 26 Units/kg/hr (12/23/22 0952)    sodium chloride       CBC:   Recent Labs     12/21/22  0205 12/22/22  0443 12/22/22  1825 12/23/22  0319   WBC 7.1 6.5  --  6.2   HGB 10.1* 10.7* 11.0* 10.4*    251  --  235       BMP:    Recent Labs     12/21/22  0205 12/22/22  0443 12/23/22  0319    142 141   K 3.8 4.1 3.8    104 105   CO2 29 32* 30   BUN 16 15 12   CREATININE 0.55* 0.53* 0.52*   GLUCOSE 84 97 101*       Hepatic:  No results for input(s): AST, ALT, ALB, BILITOT, ALKPHOS in the last 72 hours.     Troponin:   No results for input(s): TROPHS in the last 72 hours.    BNP: No results for input(s): BNP in the last 72 hours. Lipids: No results for input(s): CHOL, HDL in the last 72 hours. Invalid input(s): LDLCALCU  INR:   Recent Labs     12/21/22  0205 12/22/22  0443 12/23/22  0319   INR 1.4 1.4 1.5     ECHO Summary  Left ventricle is enlarged, global left ventricular systolic function is low  normal, estimated ejection fraction is 50%. Evidence of diastolic dysfunction. Definity utilized to better visualize endocardial boarders, overall systolic  function appears preserved. Left atrial dilatation. Inter-atrial septum is intact with no evidence for an atrial septal defect,  by color doppler. Right atrial dilatation. Tricuspid valve was not well visualized. Mild tricuspid regurgitation (by CW doppler.)  Estimated right ventricular systolic pressure is 66 mmHg, suggests severe  pulm HTN  Left pleural effusion. Signature  ----------------------------------------------------------------------------   Electronically signed by Debra Rebolledo(Sonographer) on 12/22/2022 12:24 PM  ----------------------------------------------------------------------------    Objective:   Vitals: BP (!) 124/96   Pulse 71   Temp 98.2 °F (36.8 °C) (Oral)   Resp 26   Ht 6' (1.829 m)   Wt 159 lb (72.1 kg)   SpO2 97%   BMI 21.56 kg/m²   General appearance: alert and cooperative with exam  HEENT: Head: Normocephalic, no lesions, without obvious abnormality. Neck:no JVD, trachea midline, no adenopathy  Lungs: Clear to auscultation  Heart: Irregular rate and rhythm, s1/s2 auscultated, loud murmur, afib,  Abdomen: soft, non-tender, bowel sounds active  Extremities: no edema  Neurologic: not done        Assessment / Acute Cardiac Problems:   Syncope with orthostatic positive  Chronic atrial fibrillation on Eliquis  Acute pulmonary embolism. History of pulmonary embolism. Idiopathic pulmonary fibrosis. Chronic hypoxic respiratory failure.   Mild pulmonary hypertension with RVSP of 39    Patient Active Problem List:     Dyslipidemia     ED (erectile dysfunction)     Incomplete bladder emptying     Benign prostatic hyperplasia with lower urinary tract symptoms     History of colon cancer     PAF (paroxysmal atrial fibrillation) (HCC)     Anemia of chronic disease     Pallor of optic disc     Presbyopia     Incisional hernia, without obstruction or gangrene     Hypertrophic nonobstructive cardiomyopathy (HCC)     Iron (Fe) deficiency anemia     Primary osteoarthritis of right knee     History of malignant neoplasm of rectum     Hill's esophagus     CHF (congestive heart failure), NYHA class II, acute on chronic, combined (HCC)     Hypoxia     Dyspnea and respiratory abnormalities     Occupational pulmonary disease     Sinus bradycardia     Acute hypoxemic respiratory failure due to COVID-19 ([facility] [address]     COVID-19     Pneumonia     Chronic idiopathic pulmonary fibrosis (HCC)     Syncope and collapse     Chronic anticoagulation     Severe malnutrition (HCC)     Cervical stenosis of spinal canal     Impingement syndrome of left shoulder     Fracture of right hip     Allergy to penicillin     Acute pulmonary embolism (HCC)     Pulmonary embolism, bilateral (HCC)     Demand ischemia of myocardium (HCC)     Acute respiratory acidosis (HCC)     Orthostatic hypotension     Acute pulmonary edema (HCC)     Pulmonary embolism without acute cor pulmonale (HCC)     Chronic respiratory failure with hypoxia (HCC)     Pulmonary HTN (HCC)     Postural dizziness with near syncope      Plan of Treatment:   ECHO reviewed as above - EF 50 %. moderate to severe AS, mild to moderate MR and TR . Reviewed with Dr. Ronit Fitzpatrick - outpatient valvular workup   Will order compression stockings. PAF. In afib rate controlled , Continue heparin gtt. Patient to be DC with warfarin and follow up with coumadin clinic per heme-onc recommendations. Continue digoxin.    Cardiology is signing off , follow up with cardiology in 2 weeks

## 2022-12-23 NOTE — PLAN OF CARE
Problem: Respiratory - Adult  Goal: Achieves optimal ventilation and oxygenation  12/23/2022 0731 by Dylan Lee RCP  Outcome: Progressing   BRONCHOSPASM/BRONCHOCONSTRICTION     [x]         IMPROVE AERATION/BREATH SOUNDS  [x]   ADMINISTER BRONCHODILATOR THERAPY AS APPROPRIATE  [x]   ASSESS BREATH SOUNDS  []   IMPLEMENT AEROSOL/MDI PROTOCOL  [x]   PATIENT EDUCATION AS NEEDED   PROVIDE ADEQUATE OXYGENATION WITH ACCEPTABLE SP02/ABG'S    [x]  IDENTIFY APPROPRIATE OXYGEN THERAPY  [x]   MONITOR SP02/ABG'S AS NEEDED   [x]   PATIENT EDUCATION AS NEEDED

## 2022-12-23 NOTE — PROGRESS NOTES
Pharmacy Consult Note  Indication for warfarin use:  PE  INR goal: 2.0-3.0  Warfarin dose prior to admission:  n/a    Daily PT/INR ordered on:  Yes    Current warfarin drug-drug interactions:       Date                   INR        Dose given previous day  12/23/2022            1.5                 5 mg     Plan if INR out of range: Will give 5  mg today.      Marcie Ansari D.  12/23/2022  9:25 AM

## 2022-12-23 NOTE — PROGRESS NOTES
Occupational Therapy  Facility/Department: [facility] CAR 2- STEPDOWN  Occupational Therapy Daily Treatment Note    Name: Yovany Zamora  : 1955  MRN: [id number]  Date of Service: 2022    Discharge Recommendations:  Patient would benefit from continued therapy after discharge      Patient Diagnosis(es): The primary encounter diagnosis was Pulmonary embolism without acute cor pulmonale, unspecified chronicity, unspecified pulmonary embolism type ([address] Diagnoses of Syncope and collapse and Shortness of breath were also pertinent to this visit. Past Medical History:  has a past medical history of Atrial fibrillation ([address] Back pain, chronic, Hill's esophagus, Benign essential HTN, BPH (benign prostatic hyperplasia), Cancer ([address] Chronic idiopathic pulmonary fibrosis ([address] Cocaine abuse in remission ([address] Community acquired bacterial pneumonia, ED (erectile dysfunction), GERD (gastroesophageal reflux disease), GI bleed, Hernia, History of colon cancer, Melena, Migraines, Multifocal pneumonia, Murmur, cardiac, and Single subsegmental pulmonary embolism without acute cor pulmonale ([address] Past Surgical History:  has a past surgical history that includes Tonsillectomy; Colonoscopy; colectomy; Colonoscopy (2016); knee surgery (Right, ); transesophageal echocardiogram (2018); Upper gastrointestinal endoscopy (N/A, 2018); Colonoscopy (N/A, 2018); hernia repair (Right, ); Cardiac catheterization (2018); colectomy; Total knee arthroplasty (Right, 2019); Upper gastrointestinal endoscopy (N/A, 2019); Cardioversion (); and hip surgery (Right, 2022). Assessment   Performance deficits / Impairments: Decreased functional mobility ; Decreased balance;Decreased high-level IADLs;Decreased ADL status; Decreased endurance;Decreased strength  Assessment: Pt making good gains towards goals this session  Prognosis: Good;Fair  Activity Tolerance  Activity Tolerance: Patient Tolerated treatment well;Treatment limited secondary to medical complications (free text)  Activity Tolerance Comments: Frequent rest breaks due to decrease SPO2 with all activity        Plan   Occupational Therapy Plan  Times Per Week: 3-5x/wk  Current Treatment Recommendations: Functional mobility training, Endurance training, Patient/Caregiver education & training, Equipment evaluation, education, & procurement, Self-Care / ADL, Home management training, Safety education & training     Restrictions  Restrictions/Precautions  Restrictions/Precautions: Fall Risk, General Precautions  Required Braces or Orthoses?: No  Position Activity Restriction  Other position/activity restrictions: Up as tolerated. Pt with 2 inch leg length difference from prior trauma- has shoe with lift. RLE shorter. Subjective   General  Patient assessed for rehabilitation services?: Yes  Response to previous treatment: Patient with no complaints from previous session  Family / Caregiver Present: No  General Comment  Comments: RN ok'd for OT treatment this afternoon. Pt supine in bed upon SIGALA arrival. Pt agreeable to participate in session and pleasant/cooperative throughout. Pt reports no pain at this time. Pt curretnly on 10L O2 and fluctuates between 87 and 94%       Objective   Safety Devices  Type of Devices: Call light within reach;Nurse notified; Left in chair  Restraints  Restraints Initially in Place: No  Balance  Sitting: Intact (Pt sat EOB 15 minutes independent)  Standing: With support (Stood 2 minutes SBA>Supervision, SPO2 drops to 87% and returned to seated in recliner with recovery to 92% in ~3 minutes)  Transfer Training  Transfer Training: Yes  Sit to Stand: Modified independent  Stand to Sit: Modified independent  Bed to Chair: Supervision  Gait  Overall Level of Assistance: Stand-by assistance  Interventions: Verbal cues  Distance (ft):  (Room distance)  Assistive Device: Cane, straight        ADL  Grooming: Independent;Supervision  LE Dressing: Supervision  LE Dressing Skilled Clinical Factors: don socks and shoes  Additional Comments: Pt completes basic tasks from seated this session due to decrease in SPO2 with activity        Bed mobility  Supine to Sit: Modified independent  Sit to Supine: Unable to assess  Scooting: Modified independent  Bed Mobility Comments: HOB raised ~30*; pt SPO2 deopped to 87% after sup to sit tx, prolonged seated rest break at EOB and VCs for breathing techniques to recover. Transfers  Sit to stand: Modified independent  Stand to sit: Modified independent  Transfer Comments: Pt completes x2 sit<>stands to increase functional tasks     Cognition  Overall Cognitive Status: WFL  Orientation  Overall Orientation Status: Within Functional Limits      Education Given To: Patient  Education Provided: Role of Therapy; Energy Conservation;Transfer Training;Equipment;Precautions  Education Provided Comments: Safety, pacing and breathing techniques  Education Method: Verbal;Demonstration  Barriers to Learning: None  Education Outcome: Verbalized understanding;Continued education needed;Demonstrated understanding    AM-PAC Score  AM-PAC Inpatient Daily Activity Raw Score: 22 (12/23/22 1602)  AM-PAC Inpatient ADL T-Scale Score : 47.1 (12/23/22 1602)  ADL Inpatient CMS 0-100% Score: 25.8 (12/23/22 1602)  ADL Inpatient CMS G-Code Modifier : CJ (12/23/22 1602)      Goals  Short Term Goals  Time Frame for Short Term Goals: Pt will, by discharge:  Short Term Goal 1: Perform ADL tasks with mod IND using AE/DME PRN  Short Term Goal 2: Perform functional transfers/functional mobility with mod IND using least restrictive AD  Short Term Goal 3:  Independently demo ability to incorporate energy conservation techniques as needed during engagement in all ADLs and functional transfers/functional mobility  Short Term Goal 4: Demo 8+ minutes standing tolerance with use of least restrictive AD for increased participation in ADL/IADL tasks       Therapy Time   Individual Concurrent Group Co-treatment   Time In 1427         Time Out 1500         Minutes 33         Timed Code Treatment Minutes: [address] [place], SIGALA/L

## 2022-12-23 NOTE — PLAN OF CARE
Problem: Discharge Planning  Goal: Discharge to home or other facility with appropriate resources  Outcome: Progressing  Flowsheets (Taken 12/22/2022 2000)  Discharge to home or other facility with appropriate resources:   Identify barriers to discharge with patient and caregiver   Arrange for needed discharge resources and transportation as appropriate   Identify discharge learning needs (meds, wound care, etc)     Problem: Safety - Adult  Goal: Free from fall injury  Outcome: Progressing  Flowsheets (Taken 12/22/2022 2000)  Free From Fall Injury: Based on caregiver fall risk screen, instruct family/caregiver to ask for assistance with transferring infant if caregiver noted to have fall risk factors     Problem: Skin/Tissue Integrity  Goal: Absence of new skin breakdown  Description: 1. Monitor for areas of redness and/or skin breakdown  2. Assess vascular access sites hourly  3. Every 4-6 hours minimum:  Change oxygen saturation probe site  4. Every 4-6 hours:  If on nasal continuous positive airway pressure, respiratory therapy assess nares and determine need for appliance change or resting period.   Outcome: Progressing     Problem: Chronic Conditions and Co-morbidities  Goal: Patient's chronic conditions and co-morbidity symptoms are monitored and maintained or improved  Outcome: Progressing     Problem: Respiratory - Adult  Goal: Achieves optimal ventilation and oxygenation  Outcome: Progressing  Flowsheets (Taken 12/22/2022 2000)  Achieves optimal ventilation and oxygenation:   Assess for changes in respiratory status   Assess for changes in mentation and behavior   Position to facilitate oxygenation and minimize respiratory effort   Oxygen supplementation based on oxygen saturation or arterial blood gases     Problem: Nutrition Deficit:  Goal: Optimize nutritional status  Outcome: Progressing     Problem: ABCDS Injury Assessment  Goal: Absence of physical injury  Outcome: Progressing     Problem: Pain  Goal: Verbalizes/displays adequate comfort level or baseline comfort level  Outcome: Progressing

## 2022-12-23 NOTE — PROGRESS NOTES
[facility]  Internal Medicine Teaching Residency Program  Inpatient Daily Progress Note  ______________________________________________________________________________    Patient: Kala Roberts  YOB: 1955   PJR:[id number]    Acct: [de-identified]     Room: 2019/2019-01  Admit date: 12/19/2022  Today's date: 12/23/22  Number of days in the hospital: 4    SUBJECTIVE   Admitting Diagnosis: Acute pulmonary embolism ([address]  CC: An episode of near syncope    Pt examined at bedside. Chart & results reviewed. No acute event overnight  Remained afebrile and hemodynamically stable. No new complaints of lightheadedness or syncopal episodes overnight  Has persistent shortness of breath, his saturations dropped to 82% while speaking full sentences. No signs of distress noted. Oxygen requirement at his baseline between 5 to 9 L. Patient is hoping to get improvement and to follow-up with his pulmonary doctor once discharged from the hospital.  Denies any bleeding episodes while on heparin    ROS:  Constitutional:  negative for chills, fevers, sweats  Respiratory:  negative for cough, dyspnea on exertion, hemoptysis, shortness of breath, wheezing  Cardiovascular:  negative for chest pain, chest pressure/discomfort, lower extremity edema, palpitations  Gastrointestinal:  negative for abdominal pain, constipation, diarrhea, nausea, vomiting  Neurological:  negative for dizziness, headache    BRIEF HISTORY     The patient is a pleasant 69 y.o. male with past medical history of A. fib, idiopathic pulmonary fibrosis with pulmonary hypertension, colorectal carcinoma, history of pulmonary embolism, BPH presents after an episode of near syncope. Patient states he was standing at his home and suddenly started feeling sweaty and feeling that he was going to pass out which made him sit on the floor. Denies any loss of consciousness, did not hit his head.   Patient has a history of pulmonary embolism and A. fib and takes Eliquis at home, patient has a history of idiopathic pulmonary fibrosis-along with significant family history-also in the sister-has been taking sildenafil for moderate pulmonary hypertension. Uses 9 L of oxygen at home. States he has passed out multiple times in the past during which he was found to have COVID-pneumonia. As per the dispense report patient takes tamsulosin and sildenafil-patient is unsure of tamsulosin but takes sildenafil. Patient states he always feels dizzy when standing suddenly from lying down position. Last echo in  showed ejection fraction 40 to 45%, RVSP 41 mmHg, mild to moderate mitral regurgitation. Patient denies any chest pain, abdominal pain, loose stool, burning urination, leg pain or swelling, no recent sick contact. in the ED patient was requiring 15 L of oxygen via nonrebreather and was desaturating when taken off nasal cannula as per the chart. Hypotensive blood pressure 88/62, heart rate 69-rhythm A. Fib. proBNP elevated to 16,000.  2 sets of troponin 35-33. CT PE showed bilateral pulmonary embolism without right heart strain along with underlying honeycomb pattern at peripheral and bases and centrilobular paraseptal emphysema. Patient was started on heparin drip and admitted to internal medicine floor for further management. Has a history of colorectal cancer, had a colostomy in past which was later reversed.      2022-INR at 1.4, has persistent shortness of breath currently on 7 L of nasal cannula    OBJECTIVE     Vital Signs:  /79   Pulse 70   Temp 98.4 °F (36.9 °C) (Axillary)   Resp 25   Ht 6' (1.829 m)   Wt 159 lb (72.1 kg)   SpO2 (!) 89%   BMI 21.56 kg/m²     Temp (24hrs), Av.9 °F (36.6 °C), Min:97.4 °F (36.3 °C), Max:98.4 °F (36.9 °C)    In: .8   Out:  [Urine:]    Physical Exam:  Constitutional: This is a well developed, well nourished, 18.5-24.9 - Normal 69y.o. year old male who is alert, oriented, cooperative and in no apparent distress. Head:normocephalic and atraumatic. EENT:  PERRLA. No conjunctival injections. Septum was midline, mucosa was without erythema, exudates or cobblestoning. No thrush was noted. Neck: Supple without thyromegaly. No elevated JVP. Trachea was midline. Respiratory: Has tachypnea, nondistressed breathing, bilateral air entry present but diminished with coarse crackles  Cardiovascular: Regular without murmur, clicks, gallops or rubs. Abdomen: Slightly rounded and soft without organomegaly. No rebound, rigidity or guarding was appreciated. Lymphatic: No lymphadenopathy. Musculoskeletal: Normal curvature of the spine. No gross muscle weakness. Extremities:  No lower extremity edema, ulcerations, tenderness, varicosities or erythema. Muscle size, tone and strength are normal.  No involuntary movements are noted. Skin:  Warm and dry. Good color, turgor and pigmentation. No lesions or scars.   No cyanosis or clubbing  Neurological/Psychiatric: The patient's general behavior, level of consciousness, thought content and emotional status is normal.        Medications:  Scheduled Medications:      midodrine  5 mg Oral TID     warfarin placeholder: dosing by pharmacy   Other RX Placeholder    sodium chloride flush  5-40 mL IntraVENous 2 times per day    atorvastatin  40 mg Oral Daily    budesonide-formoterol  1 puff Inhalation BID    digoxin  125 mcg Oral Daily    pantoprazole  40 mg Oral QAM AC    sildenafil  20 mg Oral TID    tamsulosin  0.4 mg Oral Daily    ipratropium  0.5 mg Nebulization 4x daily     Continuous Infusions:    sodium chloride 75 mL/hr at 12/23/22 1207    heparin (PORCINE) Infusion 24 Units/kg/hr (12/22/22 8696)    sodium chloride       PRN Medicationssodium chloride, 1 spray, PRN  heparin (porcine), 80 Units/kg, PRN  heparin (porcine), 40 Units/kg, PRN  sodium chloride flush, 5-40 mL, PRN  sodium chloride, , PRN  ondansetron, 4 mg, Q8H PRN   Or  ondansetron, 4 mg, Q6H PRN  polyethylene glycol, 17 g, Daily PRN  acetaminophen, 650 mg, Q6H PRN   Or  acetaminophen, 650 mg, Q6H PRN  albuterol sulfate HFA, 2 puff, Q4H PRN      Diagnostic Labs:  CBC:   Recent Labs     12/21/22  0205 12/22/22  0443 12/22/22  1825 12/23/22 0319   WBC 7.1 6.5  --  6.2   RBC 3.72* 4.02*  --  4.01*   HGB 10.1* 10.7* 11.0* 10.4*   HCT 33.9* 35.9* 36.4* 35.0*   MCV 91.1 89.3  --  87.3   RDW 14.6* 14.3  --  14.4    251  --  235       BMP:   Recent Labs     12/21/22  0205 12/22/22 0443 12/23/22 0319    142 141   K 3.8 4.1 3.8    104 105   CO2 29 32* 30   BUN 16 15 12   CREATININE 0.55* 0.53* 0.52*       BNP: No results for input(s): BNP in the last 72 hours. PT/INR:   Recent Labs     12/21/22  0205 12/22/22 0443 12/23/22 0319   PROTIME 15.0* 14.2* 15.3*   INR 1.4 1.4 1.5       APTT:   Recent Labs     12/22/22  1201 12/22/22  2044 12/23/22 0319   APTT 83.3* 55.5* 75.4*       CARDIAC ENZYMES: No results for input(s): CKMB, CKMBINDEX, TROPONINI in the last 72 hours. Invalid input(s): CKTOTAL;3  FASTING LIPID PANEL:  Lab Results   Component Value Date    CHOL 200 (H) 01/03/2017    HDL 29 (L) 12/20/2021    TRIG 165 (H) 01/03/2017     LIVER PROFILE:   No results for input(s): AST, ALT, ALB, BILIDIR, BILITOT, ALKPHOS in the last 72 hours. MICROBIOLOGY:   Lab Results   Component Value Date/Time    CULTURE NO SIGNIFICANT GROWTH 02/20/2022 06:41 PM       Imaging:    CT HEAD WO CONTRAST    Result Date: 12/19/2022  No acute intracranial abnormality. XR CHEST PORTABLE    Result Date: 12/19/2022  Pulmonary fibrotic changes are again identified. A superimposed pneumonia is not excluded. CT CHEST PULMONARY EMBOLISM W CONTRAST    Addendum Date: 12/19/2022    ADDENDUM: Discussed with Dr. Jaxson Posadas at 4:40 p.m. Result Date: 12/19/2022  Pulmonary emboli bilaterally as above. No evidence of right ventricular strain.   Coronary artery disease and aortic valve disease. COPD and pulmonary fibrosis. RECOMMENDATIONS: The findings were sent to the Radiology Results [address] at 4:35 pm on 12/19/2022 to be communicated to a licensed caregiver. ASSESSMENT & PLAN     ASSESSMENT / PLAN:      IMPRESSION  This is a 69 y.o. male who presented with near syncope and found to have pulmonary embolism. Patient admitted to inpatient status for the management of syncope and pulmonary embolism. Principal Problem:    Acute pulmonary embolism (HCC)-likely secondary to Eliquis failure-can be due to underlying possible malignancy-will need outpatient work-up and follow-up with heme-onc, continue IV heparin, bridging with warfarin in progress, INR at 1.5 today, heme-onc has been following patient will be discharged with warfarin and follow-up with Coumadin clinic. Heart failure with preserved ejection fraction Euvolemic, no fluid congestion, off IV diuretics, cardiology on board, echo 12/19/2022-LVEF 50 to 55%, grade 3 LVDD, aortic valve heavily calcified with CHERRY-0.9 cm², severe pulmonary hypertension-RVSP-64 mmHg. Near Syncope and collapse- orthostatics positive and echo showing severe aortic stenosis. cardiology on board-further recommendation post echo pending, compression stockings. PAF (paroxysmal atrial fibrillation) (HCC)-rate controlled, continue digoxin 125 oral daily ,continue bridging IV heparin to warfarin, INR is at 1.5 pharmacy to dose warfarin   Chronic idiopathic pulmonary fibrosis ([address] , COPD stage II on PFT on 2/25/2021 with FEV1 of 55%  -Follows with Dr. Aarti Stanford, Hx acute respiratory failure , continue bronchodilators ,secondary to pulmonary embolism as per pulmonary. We will continue to follow the recommendations.    Dyslipidemia- Resumed Home atorvastatin 40 mg      DVT ppx:  Coumadin and IV heparin  GI ppx: Not indicated at this time     PT/OT/SW-has been consulted  Discharge Planning: encouraged Patient needs to work with physical therapy    Gabe Stover MD  Internal Medicine Resident, PGY-1  [place], [place]  12/23/2022, 7:26 AM    Attending Physician Statement  I have discussed the care of Clive Griffin and I have examined the patient myselft and taken ros and hpi , including pertinent history and exam findings,  with the resident. I have reviewed the key elements of all parts of the encounter with the resident. I agree with the assessment, plan and orders as documented by the resident. Spent 35 minutes in reviewing data/medicines/talking to patient/family,  explaining and answering all the questions.         Electronically signed by Wilfred Rodriges MD

## 2022-12-23 NOTE — PROGRESS NOTES
Today's Date: 12/22/2022  Patient Name: Suresh Hudson  Date of admission: 12/19/2022  2:07 PM  Patient's age: 69 y.o., 1955  Admission Dx: Syncope and collapse [R55]  Shortness of breath [R06.02]  Pulmonary embolism, bilateral ([address] [I26.99]  Pulmonary embolism without acute cor pulmonale, unspecified chronicity, unspecified pulmonary embolism type ([address] [I26.99]    Reason for Consult: pulmonary embolism while on eliquis  Requesting Physician: Mike Philippe MD    Chief Complaint:  shortness of breath  Interval Changes:    Patient seen and examined  Labs and vitals reviewed  Patient is short of breath with minimal exertion  Patient on heparin    History of Present Illness: The patient is a 69 y.o. male who is admitted to the hospital for shortness of breath. CT PE demonstrated bilateral segmental pulmonary embolisms -right lower lobar, lingula and left lower segmental branches. CA 19-9: 114, CEA 4.8, PSA 0.46     PE history -  11/2020 - Tiny nonocclusive pulmonary embolus in the right lower lobar pulmonary artery. 02/22 - CT PE negative  12/2022 - Partial incomplete filling defect right lower lobar  pulmonary artery. Partial filling defect lingular pulmonary artery. Filling  defects left lower lobe segmental branches. Hematology history  Patient seen by Dr. Makenna Moreira 12/2020 for xarelto failure for pulmonary embolism. At the time, his acute PE was suspected due to medication being held while in hospital. He was changed to eliqius from [place] [id number] per patient's wishes. Patient started xarelto in 2018 due to heart condition by Dr. Arianna Brenner per patient. However was switched to eliqius in 2020 due to small Pulmonary embolism. 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 Colostomy was reversed. Vieyra syndrome negative.      Patient stated he follows up with GI and had last colonoscopy last year and has been negative so far with no relapse of cancer. PMH - chronic idiopathic pulmonary fibrosis, atrial fibrillation, BPH, colon cancer. Patient reportedly states that he was diagnosed with a pulmonary embolism this past February; however, per chart review and CT PE performed on 2/18/2022, no evidence of acute pulmonary embolism was found at that time. Past Medical History:   has a past medical history of Atrial fibrillation ([address] Back pain, chronic, Hill's esophagus, Benign essential HTN, BPH (benign prostatic hyperplasia), Cancer ([address] Chronic idiopathic pulmonary fibrosis ([address] Cocaine abuse in remission ([address] Community acquired bacterial pneumonia, ED (erectile dysfunction), GERD (gastroesophageal reflux disease), GI bleed, Hernia, History of colon cancer, Melena, Migraines, Multifocal pneumonia, Murmur, cardiac, and Single subsegmental pulmonary embolism without acute cor pulmonale ([address] Past Surgical History:   has a past surgical history that includes Tonsillectomy; Colonoscopy; colectomy; Colonoscopy (07/18/2016); knee surgery (Right, 1970's); transesophageal echocardiogram (11/29/2018); Upper gastrointestinal endoscopy (N/A, 12/5/2018); Colonoscopy (N/A, 12/6/2018); hernia repair (Right, 2009); Cardiac catheterization (11/29/2018); colectomy; Total knee arthroplasty (Right, 1/8/2019); Upper gastrointestinal endoscopy (N/A, 6/18/2019); Cardioversion (2020); and hip surgery (Right, 2/22/2022). Medications:    Prior to Admission medications    Medication Sig Start Date End Date Taking?  Authorizing Provider   OXYGEN Inhale 4 L into the lungs continuous Uses 4-5 liters 24/7   Yes Historical Provider, MD   omeprazole (PRILOSEC) 40 MG delayed release capsule take 1 capsule by mouth once daily 12/8/22   Cassandra Edwards MD   fluticasone-umeclidin-vilant (TRELEGY ELLIPTA) 200-62.5-25 MCG/ACT AEPB inhaler Inhale 1 puff into the lungs daily 12/6/22   Cassandra Edwards MD   albuterol sulfate HFA (VENTOLIN HFA) 108 (90 Base) MCG/ACT inhaler Inhale 2 puffs into the lungs every 4 hours as needed for Wheezing 12/5/22 12/5/23  Cassandra Houser MD   sildenafil (REVATIO) 20 MG tablet Take 1 tablet by mouth 3 times daily 11/8/22   Cassandra Houser MD   sertraline (ZOLOFT) 25 MG tablet take 1 tablet by mouth once daily  Patient not taking: Reported on 11/8/2022 11/2/22   Quratulain Kate Espinosa DO   ibuprofen (ADVIL;MOTRIN) 800 MG tablet take 1 tablet by mouth three times a day if needed for MODERATE PAIN ( PAIN SCALE 4-6 ) 10/24/22   Cassandra Houser MD   sertraline (ZOLOFT) 50 MG tablet Take 1 tablet by mouth daily 10/3/22   Cassandra Houser MD   ELIQUIS 5 MG TABS tablet take 1 tablet by mouth twice a day 9/19/22   MIRI Chou NP   tamsulosin [facility]) 0.4 MG capsule take 1 capsule by mouth once daily 8/8/22   Cassandra Houser MD   ferrous sulfate (IRON 325) 325 (65 Fe) MG tablet take 1 tablet by mouth twice a day 7/29/22   Cassandra Houser MD   atorvastatin (LIPITOR) 40 MG tablet take 1 tablet by mouth once daily 4/28/22   Cassandra Houser MD   digoxin (LANOXIN) 125 MCG tablet take 1 tablet by mouth once daily 4/28/22   Cassandra Houser MD   midodrine (PROAMATINE) 5 MG tablet Take 1 tablet by mouth 3 times daily (before meals) 3/2/22   Colette Urban, DO   Baclofen (LIORESAL) 5 MG tablet take 1 tablet by mouth twice a day 1/27/22   Cassandra Houser MD   Handicap Placard MISC by Does not apply route Expires 1/2026 1/19/21   Cassandra Houser MD     Current Facility-Administered Medications   Medication Dose Route Frequency Provider Last Rate Last Admin    midodrine (PROAMATINE) tablet 5 mg  5 mg Oral TID WC Garret Braun MD   5 mg at 12/22/22 1653    0.9 % sodium chloride infusion   IntraVENous Continuous Abipam Braun MD 75 mL/hr at 12/22/22 0627 Rate Verify at 12/22/22 0627    sodium chloride (OCEAN) 0.65 % nasal spray 1 spray  1 spray Each Nostril PRN Noel Vides MD        warfarin placeholder: dosing by pharmacy   Other [address] [place] [address]        heparin 25,000 units in dextrose 5 % 250 mL infusion (rate based)  5-30 Units/kg/hr IntraVENous Continuous Dwayne Greaser, DO 17.3 mL/hr at 12/22/22 2134 24 Units/kg/hr at 12/22/22 2134    heparin (porcine) injection 5,770 Units  80 Units/kg IntraVENous PRN [place] Greaser, DO        heparin (porcine) injection 2,880 Units  40 Units/kg IntraVENous PRN Dwayne Greaser, DO   2,880 Units at 12/22/22 2135    sodium chloride flush 0.9 % injection 5-40 mL  5-40 mL IntraVENous 2 times per day Asha Ellsworth MD   10 mL at 12/22/22 4371    sodium chloride flush 0.9 % injection 5-40 mL  5-40 mL IntraVENous PRN Noel Almendarez MD        0.9 % sodium chloride infusion   IntraVENous PRN Noel Vides MD        ondansetron (ZOFRAN-ODT) disintegrating tablet 4 mg  4 mg Oral Q8H PRN Noel Vides MD        Or    ondansetron (ZOFRAN) injection 4 mg  4 mg IntraVENous Q6H PRN Noel Almendarez MD        polyethylene glycol (GLYCOLAX) packet 17 g  17 g Oral Daily PRN Noel Vides MD        acetaminophen (TYLENOL) tablet 650 mg  650 mg Oral Q6H PRN Noel Vides MD   650 mg at 12/22/22 2014    Or    acetaminophen (TYLENOL) suppository 650 mg  650 mg Rectal Q6H PRN Noel Almendarez MD        atorvastatin (LIPITOR) tablet 40 mg  40 mg Oral Daily Noel Almendarez MD   40 mg at 12/22/22 0821    budesonide-formoterol (SYMBICORT) 80-4.5 MCG/ACT inhaler 1 puff  1 puff Inhalation BID Noel Almendarez MD   1 puff at 12/22/22 1957    albuterol sulfate HFA (PROVENTIL;VENTOLIN;PROAIR) 108 (90 Base) MCG/ACT inhaler 2 puff  2 puff Inhalation Q4H PRN Noel Almendarez MD        digoxin (LANOXIN) tablet 125 mcg  125 mcg Oral Daily Noel Almendarez MD   125 mcg at 12/22/22 0821    pantoprazole (PROTONIX) tablet 40 mg  [facility]   40 mg at 12/22/22 0821    sildenafil (REVATIO) tablet 20 mg  20 mg Oral TID Bo Ibrahim MD   20 mg at 12/22/22 2014    tamsulosin (FLOMAX) capsule 0.4 mg  0.4 mg Oral Daily Bo Ibrahim MD   0.4 mg at 12/22/22 3266    ipratropium (ATROVENT) 0.02 % nebulizer solution 0.5 mg  0.5 mg Nebulization 4x daily Bo Ibrahim MD   0.5 mg at 12/22/22 1957       Allergies:  Adhesive tape, Codeine, Penicillins, and Nintedanib    Social History:   reports that he quit smoking about 52 years ago. His smoking use included cigarettes. He has a 0.50 pack-year smoking history. He has never used smokeless tobacco. He reports that he does not currently use alcohol after a past usage of about 12.0 standard drinks per week. He reports that he does not use drugs. Family History: family history includes Diabetes in his mother; Heart Attack in his father; Heart Disease in his brother and father. Review of Systems:      Constitutional: No fever or chills. No night sweats, no weight loss   Eyes: No eye discharge, double vision, or eye pain   HEENT: negative for sore mouth, sore throat, hoarseness and voice change   Respiratory: negative for cough , sputum, dyspnea, wheezing, hemoptysis, chest pain   Cardiovascular: negative for chest pain, dyspnea, palpitations, orthopnea, PND   Gastrointestinal: negative for nausea, vomiting, diarrhea, constipation, abdominal pain, Dysphagia, hematemesis and hematochezia   Genitourinary: negative for frequency, dysuria, nocturia, urinary incontinence, and hematuria   Integument: negative for rash, skin lesions, bruises.    Hematologic/Lymphatic: negative for easy bruising, bleeding, lymphadenopathy, or petechiae   Endocrine: negative for heat or cold intolerance,weight changes, change in bowel habits and hair loss   Musculoskeletal: negative for myalgias, arthralgias, pain, joint swelling,and bone pain   Neurological: negative for headaches, dizziness, seizures, weakness, numbness    Physical Exam: /80   Pulse 84   Temp 98.1 °F (36.7 °C) (Oral)   Resp 16   Ht 6' (1.829 m)   Wt 159 lb (72.1 kg)   SpO2 97%   BMI 21.56 kg/m²    Temp (24hrs), Av.7 °F (36.5 °C), Min:97.4 °F (36.3 °C), Max:98.1 °F (36.7 °C)      General appearance - well appearing, no in pain or distress   Mental status - alert and cooperative   Eyes - pupils equal and reactive, extraocular eye movements intact   Ears - bilateral TM's and external ear canals normal   Mouth - mucous membranes moist, pharynx normal without lesions   Neck - supple, no significant adenopathy   Lymphatics - no palpable lymphadenopathy, no hepatosplenomegaly   Chest - clear to auscultation, no wheezes, rales or rhonchi, symmetric air entry   Heart - normal rate, regular rhythm, normal S1, S2, no murmurs  Abdomen - soft, nontender, nondistended, no masses or organomegaly   Neurological - alert, oriented, normal speech, no focal findings or movement disorder noted   Musculoskeletal - no joint tenderness, deformity or swelling   Extremities - peripheral pulses normal, no pedal edema, no clubbing or cyanosis   Skin - normal coloration and turgor, no rashes, no suspicious skin lesions noted ,    Labs:   Complete Blood Count:   Recent Labs     22  0612 22  0205 22  0443 22  1825   WBC 6.8 7.1 6.5  --    HGB 10.7* 10.1* 10.7* 11.0*   MCV 89.6 91.1 89.3  --     230 251  --    RBC 3.96* 3.72* 4.02*  --    HCT 35.5* 33.9* 35.9* 36.4*   MCH 27.0 27.2 26.6  --    MCHC 30.1 29.8 29.8  --    RDW 14.3 14.6* 14.3  --    MPV 10.0 9.9 10.5  --       PT/INR:    Lab Results   Component Value Date/Time    PROTIME 14.2 2022 04:43 AM    INR 1.4 2022 04:43 AM     PTT:    Lab Results   Component Value Date/Time    APTT 55.5 2022 08:44 PM     Basal Metabolic Profile:   Recent Labs     22  0612 22  0205 22  0443    135 142   K 4.0 3.8 4.1   BUN 21 16 15   CREATININE 0.63* 0.55* 0.53*    100 104   CO2 30 29 32*      LFTS  No results for input(s): ALKPHOS, ALT, AST, BILITOT, BILIDIR, LABALBU in the last 72 hours. Imaging:  CT HEAD WO CONTRAST    Result Date: 12/19/2022  No acute intracranial abnormality. XR CHEST PORTABLE    Result Date: 12/19/2022  Pulmonary fibrotic changes are again identified. A superimposed pneumonia is not excluded. CT CHEST PULMONARY EMBOLISM W CONTRAST    Addendum Date: 12/19/2022    ADDENDUM: Discussed with Dr. Felix Posadas at 4:40 p.m. Result Date: 12/19/2022  Pulmonary emboli bilaterally as above. No evidence of right ventricular strain. Coronary artery disease and aortic valve disease. COPD and pulmonary fibrosis. RECOMMENDATIONS: The findings were sent to the Radiology Results [address] at 4:35 pm on 12/19/2022 to be communicated to a licensed caregiver.        Impression:   Primary Problem  Acute pulmonary embolism [facility])    Active Hospital Problems    Diagnosis Date Noted    Chronic respiratory failure with hypoxia ([address] [J96.11] 12/21/2022     Priority: Medium    Pulmonary HTN ([address] [I27.20] 12/21/2022     Priority: Medium    Postural dizziness with near syncope [R42, R55] 12/21/2022     Priority: Medium    Pulmonary embolism without acute cor pulmonale ([address] [I26.99] 12/20/2022     Priority: Medium    Acute pulmonary embolism ([address] [I26.99] 12/19/2022     Priority: Medium    Pulmonary embolism, bilateral ([address] [I26.99] 12/19/2022     Priority: Medium    Demand ischemia of myocardium ([address] [I24.8] 12/19/2022     Priority: Medium    Acute respiratory acidosis (HCC) [J96.02] 12/19/2022     Priority: Medium    Orthostatic hypotension [I95.1] 12/19/2022     Priority: Medium    Acute pulmonary edema ([address] [J81.0] 12/19/2022     Priority: Medium    Syncope and collapse [R55] 01/12/2021    Chronic idiopathic pulmonary fibrosis ([address] [J84.112] 12/08/2020    CHF (congestive heart failure), NYHA class II, acute on chronic, combined ([facility] [I50.43] 10/17/2020 Hill's esophagus [K22.70] 06/18/2019    History of colon cancer [Z85.038]     Benign prostatic hyperplasia with lower urinary tract symptoms [N40.1] 04/02/2015    PAF (paroxysmal atrial fibrillation) [facility] [I48.0] 07/21/2014    Dyslipidemia [E78.5] 02/19/2014       Assessment and Plan:    Acute pulmonary embolism while on eliqius concerning for eliqius failure  History of adenocarcinoma of rectum 2014 - in remission per patient  Presyncope   Paroxysmal atrial fibrllation  UIP  NSTEMI type 2     Recommendations     Discussed with patient anticoagulation. Obviously he insist that he was taking Eliquis as prescribed. It was prescribed by cardiology 5 mg twice daily. Patient states he did not miss any doses. So if we take that into consideration patient has failed Eliquis. Patient decided to proceed with Coumadin. Currently being bridged with heparin  Echocardiogram shows LVH and valvular disease  Target INR 2-3  Patient will need long-term anticoagulation    Electronically signed by   Silvia Hanley MD                  This note is created with the assistance of a speech recognition program.  While intending to generate a document that actually reflects the content of the visit, the document can still have some errors including those of syntax and sound a like substitutions which may escape proof reading. It such instances, actual meaning can be extrapolated by contextual diversion.

## 2022-12-23 NOTE — PROGRESS NOTES
PULMONARY & CRITICAL CARE MEDICINE PROGRESS  NOTE     Patient:  Lucien Marks  MRN: [id number]  Admit date: 12/19/2022  Primary Care Physician: Courtney Eller MD  Consulting Physician: Laura Room MD  CODE Status: Full Code  LOS: 4    SUBJECTIVE     I personally interviewed/examined the patient, reviewed interval history and interpreted all available radiographic, laboratory data at the time of service. Chief Compliant/Reason for Initial Consult:   Pulm embolism/chronic respiratory failure/pulmonary fibrosis. Brief Hospital Course: The patient is a 69 y.o. male he is followed up by Dr. Alberta Beckford with history of pulmonary fibrosis/IPF on 6 L of nasal cannula with chronic hypoxic respiratory failure, history of pulmonary embolism on chronic anticoagulation therapy. He has been followed by hematology he had a history of pulm embolism apparently in 2020 and at that time he was treated with Xarelto and later while he was on Xarelto apparently he has nonocclusive pulm embolism considered as failure of Xarelto and it was changed to Eliquis and he has been taking Eliquis and compliant with Eliquis. According to patient for last 2 to 3 weeks he has been having increasing shortness of breath he gets short of breath by ambulating from room to bathroom also he has increased cough than baseline. Patient presented when he had an episode of near syncope he usually does feel dizzy when he stands up but at that time he was apparently sweaty did not completely pass out and did not have a fall. He does not complain of fever denies purulent sputum denies increased sputum production denies chills denies hemoptysis and does not complain of chest pain currently he does not complain of dizziness lightheadedness or syncope.   He presented to emergency room had a CTA chest done which showed that he has bilateral pulm embolism and right lower lobe lingula pulmonary artery and left lower lobe segmental branches per radiology. CT scan of the chest shows findings consistent with pulmonary fibrosis with basilar honeycomb changes and bronchiectasis no areas of definite consolidation. Patient has been on 6 L nasal cannula maintaining saturation above 90%. He had been taking his Eliquis and consider failure of NOAC/DOAC hematology oncology was consulted and patient is currently on heparin drip hematology discussed with patient about Lovenox versus Coumadin therapy patient wanted to proceed with Coumadin therapy. Previous echo in February this year showed estimated RVSP of 41, ejection fraction of 40 to 41%, diastolic dysfunction RV dilatation with normal systolic function mild to moderate MR. He has history of colon cancer in status postsurgery in 2015 with colostomy and reversal later  He is a non-smoker no history of COPD. Interval History:  12/23/22  Patient had been afebrile T-max is 98.4 he has been hemodynamically stable heart rate is in 70s to 80s. He is though more tachypneic and more hypoxic get more short of breath on minimal activity and require high oxygen from 6 L he is up to 9 to 10 L of nasal cannula with him and FiO2. According to patient he does have more shortness of breath slightly more cough does not complain of sputum production except when he get bronchodilator. Does not complain of chest pain no fever or hemoptysis. Echocardiogram estimated RVSP of 64 ejection fraction of 50 to 55% and severe grade 3 LV diastolic dysfunction. Review of Systems:  Review of Systems   Constitutional:  Positive for activity change. Negative for appetite change, fever and unexpected weight change. HENT:  Negative for postnasal drip, sore throat, trouble swallowing and voice change. Eyes:  Negative for photophobia, redness and visual disturbance. Respiratory:  Positive for cough and shortness of breath.  Negative for chest tightness and wheezing. Cardiovascular:  Negative for chest pain, palpitations and leg swelling. Gastrointestinal:  Negative for abdominal pain, diarrhea and vomiting. Endocrine: Negative for polydipsia, polyphagia and polyuria. Genitourinary:  Negative for dysuria, frequency and hematuria. Musculoskeletal: Negative. Allergic/Immunologic: Negative. Neurological:  Negative for dizziness, syncope, speech difficulty and weakness. Hematological:  Negative for adenopathy. Does not bruise/bleed easily. Psychiatric/Behavioral: Negative. OBJECTIVE     VITAL SIGNS:   LAST-  BP (!) 123/97   Pulse 79   Temp 98.1 °F (36.7 °C) (Oral)   Resp (!) 32   Ht 6' (1.829 m)   Wt 159 lb (72.1 kg)   SpO2 (!) 83%   BMI 21.56 kg/m²   8-24 HR RANGE-  TEMP Temp  Av °F (36.7 °C)  Min: 97.7 °F (36.5 °C)  Max: 98.4 °F (90.3 °C)   BP Systolic (67PNC), KDC:057 , Min:115 , PHV:626      Diastolic (37BNC), YNV:18, Min:71, Max:97     PULSE Pulse  Av.7  Min: 62  Max: 91   RR Resp  Av.5  Min: 21  Max: 32   O2 SAT SpO2  Av.3 %  Min: 83 %  Max: 93 %   OXYGEN DELIVERY O2 Flow Rate (L/min)  Av L/min  Min: 6 L/min  Max: 9 L/min     Systemic Examination:   Physical Exam  General appearance - looks comfortable and in mild distress mildly tachypneic  Mental status - alert, oriented to person, place, and time  Eyes - pupils equal and reactive, extraocular eye movements intact  Mouth - mucous membranes moist, pharynx normal without lesions  Neck - supple, no significant adenopathy  Chest - Chest was symmetrical without dullness to percussion. Mildly tachypneic in mild distress. Breath sounds bilaterally present he has bibasilar dry inspiratory crackles present extending to lower lung field no expiratory wheezing or rhonchi.  There is no intercostal recession or use of accessory muscles  Heart - normal rate, regular rhythm, normal S1, S2, no murmurs, rubs, clicks or gallops  Abdomen - soft, nontender, nondistended, no masses or organomegaly  Neurological - alert, oriented, normal speech, no focal findings or movement disorder noted  Extremities - peripheral pulses normal, no pedal edema, no clubbing or cyanosis  Skin - normal coloration and turgor, no rashes, no suspicious skin lesions noted     DATA REVIEW     Medications:  Scheduled Meds:   warfarin  5 mg Oral Once    midodrine  5 mg Oral TID     warfarin placeholder: dosing by pharmacy   Other RX Placeholder    sodium chloride flush  5-40 mL IntraVENous 2 times per day    atorvastatin  40 mg Oral Daily    budesonide-formoterol  1 puff Inhalation BID    digoxin  125 mcg Oral Daily    pantoprazole  40 mg Oral QAM AC    sildenafil  20 mg Oral TID    tamsulosin  0.4 mg Oral Daily    ipratropium  0.5 mg Nebulization 4x daily     Continuous Infusions:   sodium chloride 75 mL/hr at 12/23/22 0447    heparin (PORCINE) Infusion 24 Units/kg/hr (12/22/22 2134)    sodium chloride       LABS:-  ABG:   No results for input(s): POCPH, POCPCO2, POCPO2, POCHCO3, YBAP4ASI in the last 72 hours. CBC:   Recent Labs     12/21/22  0205 12/22/22 0443 12/22/22 1825 12/23/22 0319   WBC 7.1 6.5  --  6.2   HGB 10.1* 10.7* 11.0* 10.4*   HCT 33.9* 35.9* 36.4* 35.0*   MCV 91.1 89.3  --  87.3    251  --  235   LYMPHOPCT 15* 15*  --  16*   RBC 3.72* 4.02*  --  4.01*   MCH 27.2 26.6  --  25.9   MCHC 29.8 29.8  --  29.7   RDW 14.6* 14.3  --  14.4       BMP:   Recent Labs     12/21/22  0205 12/22/22 0443 12/23/22 0319    142 141   K 3.8 4.1 3.8    104 105   CO2 29 32* 30   BUN 16 15 12   CREATININE 0.55* 0.53* 0.52*   GLUCOSE 84 97 101*       Liver Function Test:   No results for input(s): PROT, LABALBU, ALT, AST, GGT, ALKPHOS, BILITOT in the last 72 hours. Amylase/Lipase:  No results for input(s): AMYLASE, LIPASE in the last 72 hours.   Coagulation Profile:   Recent Labs     12/21/22  0205 12/21/22  0720 12/22/22  0443 12/22/22  1201 12/22/22  2044 12/23/22  0319 12/23/22  0907   INR 1.4  --  1.4  --   --  1.5  --    PROTIME 15.0*  --  14.2*  --   --  15.3*  --    APTT 71.5*   < > 92.8*   < > 55.5* 75.4* 72.2*    < > = values in this interval not displayed. Cardiac Enzymes:  No results for input(s): CKTOTAL, CKMB, CKMBINDEX, TROPONINI in the last 72 hours. Lactic Acid:  Lab Results   Component Value Date    LACTA NOT REPORTED 12/07/2020    LACTA NOT REPORTED 10/17/2020    LACTA 1.0 08/11/2014     BNP:   No results found for: BNP  D-Dimer:  Lab Results   Component Value Date    DDIMER 1.10 (H) 02/18/2022     Others:   Lab Results   Component Value Date    TSH 7.55 (H) 12/19/2022     Lab Results   Component Value Date    YOUNG NEGATIVE 10/17/2020    SEDRATE 82 (H) 10/22/2020    CRP 11.1 (H) 01/12/2021     No results found for: Rupert Brod  Lab Results   Component Value Date    IRON 33 (L) 05/02/2022    TIBC 349 05/02/2022    FERRITIN 47 05/02/2022     No results found for: SPEP, UPEP  Lab Results   Component Value Date/Time    PSA 0.46 12/20/2022 07:23 AM    CEA 4.8 12/20/2022 07:23 AM     114 12/20/2022 07:23 AM       Input/Output:    Intake/Output Summary (Last 24 hours) at 12/23/2022 0946  Last data filed at 12/23/2022 0448  Gross per 24 hour   Intake 2020.82 ml   Output 1925 ml   Net 95.82 ml         Microbiology:  No results for input(s): SPECDESC, SPECDESC, SPECIAL, CULTURE, CULTURE, STATUS, ORG, CDIFFTOXPCR, CAMPYLOBPCR, SALMONELLAPC, SHIGAPCR, SHIGELLAPCR, MPNEUG, MPNEUM, LACTOQL in the last 72 hours. Pathology:    Radiology reports:  VL DUP LOWER EXTREMITY VENOUS BILATERAL   Final Result      CT CHEST PULMONARY EMBOLISM W CONTRAST   Final Result   Addendum (preliminary) 1 of 1   ADDENDUM:   Discussed with Dr. Laurel Posadas at 4:40 p.m. Final   Pulmonary emboli bilaterally as above. No evidence of right ventricular   strain. Coronary artery disease and aortic valve disease. COPD and   pulmonary fibrosis.       RECOMMENDATIONS:   The findings were sent to the Radiology Results [id number] at 4:35   pm on 12/19/2022 to be communicated to a licensed caregiver. CT HEAD WO CONTRAST   Final Result   No acute intracranial abnormality. XR CHEST PORTABLE   Final Result   Pulmonary fibrotic changes are again identified. A superimposed pneumonia is   not excluded. Echocardiogram:   No results found for this or any previous visit. Cardiac Catheterization:   No results found for this or any previous visit. ASSESSMENT AND PLAN     Assessment:       //Recurrent pulmonary pulm embolism while on NOAC/DOAC  //Chronic respiratory failure on long-term oxygen therapy  //Near syncope on presentation likely combination/multifactorial with history of chronic hypoxic respiratory failure with pulm embolism and on sildenafil  //Pulmonary fibrosis/IPF  //Pulmonary hypertension  //History of colon cancer    Plan:    Patient continues require high oxygen currently on 10 L nasal cannula. He also have increased cough and increased shortness of breath. There is no role of chronic steroids for IPF. Will use empirically steroids for possible IPF exacerbation will start with Solu-Medrol first and then prednisone taper over 2 weeks. We will start him on high flow nasal cannula and monitor saturation to keep above 88 to 90%  He is on 6 L of nasal cannula at home  Follow-up with hematology on heparin drip and on Coumadin as considered failure of NOACs. Coumadin bridged with heparin and follow-up with oncology for chronic anticoagulation and also in Coumadin clinic. Continue supplemental oxygen to keep oxygen saturation greater than 88 to 90 %  Anticoagulation per hematology  Continue incentive spirometry, pulmonary toilet, aspiration precautions and bronchodilators  Antimicrobials reviewed; no need for antibiotic from pulm standpoint. DVT prophylaxis on therapeutic heparin  Physical/occupational therapy; increase activity as tolerated.       Discussed with nursing staff. Discussed with respiratory therapist.    I updated the patient regarding the current clinical condition, provisional diagnosis and management plan. I addressed concerns and answered all questions to the best of my abilities. It was my pleasure to evaluate Abril Spain today. We will continue to follow. I would like to thank you for allowing me to participate in the care of this patient. Please feel free to call with any further questions or concerns. Patria Jaramillo MD, M.D. Pulmonary and Critical Care Medicine           12/23/2022, 9:46 AM    Please note that this chart was generated using voice recognition Dragon dictation software. Although every effort was made to ensure the accuracy of this automated transcription, some errors in transcription may have occurred.

## 2022-12-23 NOTE — PROGRESS NOTES
Today's Date: 12/23/2022  Patient Name: Yovany Zamora  Date of admission: 12/19/2022  2:07 PM  Patient's age: 69 y.o., 1955  Admission Dx: Syncope and collapse [R55]  Shortness of breath [R06.02]  Pulmonary embolism, bilateral ([address] [I26.99]  Pulmonary embolism without acute cor pulmonale, unspecified chronicity, unspecified pulmonary embolism type ([address] [I26.99]    Reason for Consult: pulmonary embolism while on eliquis  Requesting Physician: Gerri oSlano MD    Chief Complaint:  shortness of breath  Interval Changes:    Patient seen and examined  Labs and vitals reviewed  Patient complains of shortness of breath with minimal exertion  Coumadin being dosed by pharmacy. Being bridged by heparin. Patient's INR is 1.5 this morning. Hemoglobin 10. No bleeding reported  Echocardiogram showed severe left ventricular diastolic dysfunction with moderate to severe aortic stenosis and severe pulmonary hypertension        History of Present Illness: The patient is a 69 y.o. male who is admitted to the hospital for shortness of breath. CT PE demonstrated bilateral segmental pulmonary embolisms -right lower lobar, lingula and left lower segmental branches. CA 19-9: 114, CEA 4.8, PSA 0.46     PE history -  11/2020 - Tiny nonocclusive pulmonary embolus in the right lower lobar pulmonary artery. 02/22 - CT PE negative  12/2022 - Partial incomplete filling defect right lower lobar  pulmonary artery. Partial filling defect lingular pulmonary artery. Filling  defects left lower lobe segmental branches. Hematology history  Patient seen by Dr. Shelton Todd 12/2020 for xarelto failure for pulmonary embolism. At the time, his acute PE was suspected due to medication being held while in hospital. He was changed to eliqius from [facility] per patient's wishes. Patient started xarelto in 2018 due to heart condition by Dr. Ivon Burger per patient. However was switched to eliqius in 2020 due to small Pulmonary embolism. 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 Colostomy was reversed. Vieyra syndrome negative. Patient stated he follows up with GI and had last colonoscopy last year and has been negative so far with no relapse of cancer. PMH - chronic idiopathic pulmonary fibrosis, atrial fibrillation, BPH, colon cancer. Patient reportedly states that he was diagnosed with a pulmonary embolism this past February; however, per chart review and CT PE performed on 2/18/2022, no evidence of acute pulmonary embolism was found at that time. Past Medical History:   has a past medical history of Atrial fibrillation ([address] Back pain, chronic, Hill's esophagus, Benign essential HTN, BPH (benign prostatic hyperplasia), Cancer ([address] Chronic idiopathic pulmonary fibrosis ([address] Cocaine abuse in remission ([address] Community acquired bacterial pneumonia, ED (erectile dysfunction), GERD (gastroesophageal reflux disease), GI bleed, Hernia, History of colon cancer, Melena, Migraines, Multifocal pneumonia, Murmur, cardiac, and Single subsegmental pulmonary embolism without acute cor pulmonale ([address] Past Surgical History:   has a past surgical history that includes Tonsillectomy; Colonoscopy; colectomy; Colonoscopy (07/18/2016); knee surgery (Right, 1970's); transesophageal echocardiogram (11/29/2018); Upper gastrointestinal endoscopy (N/A, 12/5/2018); Colonoscopy (N/A, 12/6/2018); hernia repair (Right, 2009); Cardiac catheterization (11/29/2018); colectomy; Total knee arthroplasty (Right, 1/8/2019); Upper gastrointestinal endoscopy (N/A, 6/18/2019); Cardioversion (2020); and hip surgery (Right, 2/22/2022). Medications:    Prior to Admission medications    Medication Sig Start Date End Date Taking?  Authorizing Provider   OXYGEN Inhale 4 L into the lungs continuous Uses 4-5 liters 24/7   Yes Historical MD Suzanne   omeprazole (PRILOSEC) 40 MG delayed release capsule take 1 capsule by mouth once daily 12/8/22   Cassandra Flores MD   fluticasone-umeclidin-vilant (TRELEGY ELLIPTA) 200-62.5-25 MCG/ACT AEPB inhaler Inhale 1 puff into the lungs daily 12/6/22   Cassandra Flores MD   albuterol sulfate HFA (VENTOLIN HFA) 108 (90 Base) MCG/ACT inhaler Inhale 2 puffs into the lungs every 4 hours as needed for Wheezing 12/5/22 12/5/23  Cassandra Flores MD   sildenafil (REVATIO) 20 MG tablet Take 1 tablet by mouth 3 times daily 11/8/22   Cassandra Flores MD   sertraline (ZOLOFT) 25 MG tablet take 1 tablet by mouth once daily  Patient not taking: Reported on 11/8/2022 11/2/22   Nacho Meneses DO   ibuprofen (ADVIL;MOTRIN) 800 MG tablet take 1 tablet by mouth three times a day if needed for MODERATE PAIN ( PAIN SCALE 4-6 ) 10/24/22   Cassandra Flores MD   sertraline (ZOLOFT) 50 MG tablet Take 1 tablet by mouth daily 10/3/22   Cassandra Flores MD   ELIQUIS 5 MG TABS tablet take 1 tablet by mouth twice a day 9/19/22   MIRI Roth NP   tamsulosin (FLOMAX) 0.4 MG capsule take 1 capsule by mouth once daily 8/8/22   Cassandra Flores MD   ferrous sulfate (IRON 325) 325 (65 Fe) MG tablet take 1 tablet by mouth twice a day 7/29/22   Cassandra Flores MD   atorvastatin (LIPITOR) 40 MG tablet take 1 tablet by mouth once daily 4/28/22   Cassandra Folres MD   digoxin (LANOXIN) 125 MCG tablet take 1 tablet by mouth once daily 4/28/22   Cassandra Flores MD   midodrine (PROAMATINE) 5 MG tablet Take 1 tablet by mouth 3 times daily (before meals) 3/2/22   Alize Dunlap DO   Baclofen (LIORESAL) 5 MG tablet take 1 tablet by mouth twice a day 1/27/22   Cassandra Flores MD   Handicap Placard MISC by Does not apply route Expires 1/2026 1/19/21   Cassandra Flores MD     Current Facility-Administered Medications   Medication Dose Route Frequency Provider Last Rate Last Admin    midodrine (PROAMATINE) tablet 5 mg  5 mg Oral TID WC Garret Hobbs MD   5 mg at 12/22/22 1653    0.9 % sodium chloride infusion   IntraVENous Continuous Garret Hobbs MD 75 mL/hr at 12/23/22 0447 New Bag at 12/23/22 0447    sodium chloride (OCEAN) 0.65 % nasal spray 1 spray  1 spray Each Nostril PRN Noel Kruse MD        warfarin placeholder: dosing by pharmacy   Other [address] [place] [address]        heparin 25,000 units in dextrose 5 % 250 mL infusion (rate based)  5-30 Units/kg/hr IntraVENous Continuous Sujey [place], DO 17.3 mL/hr at 12/22/22 2134 24 Units/kg/hr at 12/22/22 2134    heparin (porcine) injection 5,770 Units  80 Units/kg IntraVENous PRN [place], DO        heparin (porcine) injection 2,880 Units  40 Units/kg IntraVENous PRN Sujey [place], DO   2,880 Units at 12/22/22 2135    sodium chloride flush 0.9 % injection 5-40 mL  5-40 mL IntraVENous 2 times per day Ovidio Murphy MD   10 mL at 12/22/22 1999    sodium chloride flush 0.9 % injection 5-40 mL  5-40 mL IntraVENous PRN Noel Almendarez MD        0.9 % sodium chloride infusion   IntraVENous PRN Noel Kruse MD        ondansetron (ZOFRAN-ODT) disintegrating tablet 4 mg  4 mg Oral Q8H PRN Noel Kruse MD        Or    ondansetron (ZOFRAN) injection 4 mg  4 mg IntraVENous Q6H PRN Noel Almendarez MD        polyethylene glycol (GLYCOLAX) packet 17 g  17 g Oral Daily PRN Noel Kruse MD        acetaminophen (TYLENOL) tablet 650 mg  650 mg Oral Q6H PRN Noel Kruse MD   650 mg at 12/22/22 2014    Or    acetaminophen (TYLENOL) suppository 650 mg  650 mg Rectal Q6H PRN Noel Almendarez MD        atorvastatin (LIPITOR) tablet 40 mg  40 mg Oral Daily Noel Almendarez MD   40 mg at 12/22/22 0821    budesonide-formoterol (SYMBICORT) 80-4.5 MCG/ACT inhaler 1 puff  1 puff Inhalation BID Noel Almendarez MD   1 puff at 12/23/22 0727    albuterol sulfate HFA (PROVENTIL;VENTOLIN;PROAIR) 108 (90 Base) MCG/ACT inhaler 2 puff  2 puff Inhalation Q4H PRN Onel Iona Bansal MD        digoxin (LANOXIN) tablet 125 mcg  125 mcg Oral Daily Noel Iona Bansal MD   125 mcg at 12/22/22 3969    pantoprazole (PROTONIX) tablet 40 mg  40 mg Oral QAM AC Deejay Jerez MD   40 mg at 12/22/22 8202    sildenafil (REVATIO) tablet 20 mg  20 mg Oral TID Maximilian Shelton MD   20 mg at 12/22/22 2014    tamsulosin (FLOMAX) capsule 0.4 mg  0.4 mg Oral Daily Deejay Jerez MD   0.4 mg at 12/22/22 9398    ipratropium (ATROVENT) 0.02 % nebulizer solution 0.5 mg  0.5 mg Nebulization 4x daily Maximilian Shelton MD   0.5 mg at 12/23/22 0764       Allergies:  Adhesive tape, Codeine, Penicillins, and Nintedanib    Social History:   reports that he quit smoking about 52 years ago. His smoking use included cigarettes. He has a 0.50 pack-year smoking history. He has never used smokeless tobacco. He reports that he does not currently use alcohol after a past usage of about 12.0 standard drinks per week. He reports that he does not use drugs. Family History: family history includes Diabetes in his mother; Heart Attack in his father; Heart Disease in his brother and father. Review of Systems:      Constitutional: No fever or chills. No night sweats, no weight loss   Eyes: No eye discharge, double vision, or eye pain   HEENT: negative for sore mouth, sore throat, hoarseness and voice change   Respiratory: negative for cough , sputum, dyspnea, wheezing, hemoptysis, chest pain   Cardiovascular: negative for chest pain, dyspnea, palpitations, orthopnea, PND   Gastrointestinal: negative for nausea, vomiting, diarrhea, constipation, abdominal pain, Dysphagia, hematemesis and hematochezia   Genitourinary: negative for frequency, dysuria, nocturia, urinary incontinence, and hematuria   Integument: negative for rash, skin lesions, bruises.    Hematologic/Lymphatic: negative for easy bruising, bleeding, lymphadenopathy, or petechiae   Endocrine: negative for heat or cold intolerance,weight changes, change in bowel habits and hair loss   Musculoskeletal: negative for myalgias, arthralgias, pain, joint swelling,and bone pain   Neurological: negative for headaches, dizziness, seizures, weakness, numbness    Physical Exam:      BP (!) 123/97   Pulse 79   Temp 98.1 °F (36.7 °C) (Oral)   Resp (!) 32   Ht 6' (1.829 m)   Wt 159 lb (72.1 kg)   SpO2 (!) 83%   BMI 21.56 kg/m²    Temp (24hrs), Av °F (36.7 °C), Min:97.7 °F (36.5 °C), Max:98.4 °F (36.9 °C)      General appearance - well appearing, no in pain or distress   Mental status - alert and cooperative   Eyes - pupils equal and reactive, extraocular eye movements intact   Ears - bilateral TM's and external ear canals normal   Mouth - mucous membranes moist, pharynx normal without lesions   Neck - supple, no significant adenopathy   Lymphatics - no palpable lymphadenopathy, no hepatosplenomegaly   Chest - clear to auscultation, no wheezes, rales or rhonchi, symmetric air entry   Heart - normal rate, regular rhythm, normal S1, S2, no murmurs  Abdomen - soft, nontender, nondistended, no masses or organomegaly   Neurological - alert, oriented, normal speech, no focal findings or movement disorder noted   Musculoskeletal - no joint tenderness, deformity or swelling   Extremities - peripheral pulses normal, no pedal edema, no clubbing or cyanosis   Skin - normal coloration and turgor, no rashes, no suspicious skin lesions noted ,    Labs:   Complete Blood Count:   Recent Labs     22  0205 22  0443 22  1825 22  0319   WBC 7.1 6.5  --  6.2   HGB 10.1* 10.7* 11.0* 10.4*   MCV 91.1 89.3  --  87.3    251  --  235   RBC 3.72* 4.02*  --  4.01*   HCT 33.9* 35.9* 36.4* 35.0*   MCH 27.2 26.6  --  25.9   MCHC 29.8 29.8  --  29.7   RDW 14.6* 14.3  --  14.4   MPV 9.9 10.5  --  10.0      PT/INR:    Lab Results   Component Value Date/Time    PROTIME 15.3 2022 03:19 AM    INR 1.5 12/23/2022 03:19 AM     PTT:    Lab Results   Component Value Date/Time    APTT 75.4 12/23/2022 03:19 AM     Basal Metabolic Profile:   Recent Labs     12/21/22  0205 12/22/22  0443 12/23/22  0319    142 141   K 3.8 4.1 3.8   BUN 16 15 12   CREATININE 0.55* 0.53* 0.52*    104 105   CO2 29 32* 30      LFTS  No results for input(s): ALKPHOS, ALT, AST, BILITOT, BILIDIR, LABALBU in the last 72 hours. Imaging:  CT HEAD WO CONTRAST    Result Date: 12/19/2022  No acute intracranial abnormality. XR CHEST PORTABLE    Result Date: 12/19/2022  Pulmonary fibrotic changes are again identified. A superimposed pneumonia is not excluded. CT CHEST PULMONARY EMBOLISM W CONTRAST    Addendum Date: 12/19/2022    ADDENDUM: Discussed with Dr. Kevin Posadas at 4:40 p.m. Result Date: 12/19/2022  Pulmonary emboli bilaterally as above. No evidence of right ventricular strain. Coronary artery disease and aortic valve disease. COPD and pulmonary fibrosis. RECOMMENDATIONS: The findings were sent to the Radiology Results [address] at 4:35 pm on 12/19/2022 to be communicated to a licensed caregiver.        Impression:   Primary Problem  Acute pulmonary embolism [facility])    Active Hospital Problems    Diagnosis Date Noted    Chronic respiratory failure with hypoxia ([address] [J96.11] 12/21/2022     Priority: Medium    Pulmonary HTN ([address] [I27.20] 12/21/2022     Priority: Medium    Postural dizziness with near syncope [R42, R55] 12/21/2022     Priority: Medium    Pulmonary embolism without acute cor pulmonale ([address] [I26.99] 12/20/2022     Priority: Medium    Acute pulmonary embolism ([address] [I26.99] 12/19/2022     Priority: Medium    Pulmonary embolism, bilateral ([address] [I26.99] 12/19/2022     Priority: Medium    Demand ischemia of myocardium ([address] [I24.8] 12/19/2022     Priority: Medium    Acute respiratory acidosis ([address] [J96.02] 12/19/2022     Priority: Medium    Orthostatic hypotension [I95.1] 12/19/2022 Priority: Medium    Acute pulmonary edema ([facility] [J81.0] 12/19/2022     Priority: Medium    Syncope and collapse [R55] 01/12/2021    Chronic idiopathic pulmonary fibrosis ([facility] [J84.112] 12/08/2020    CHF (congestive heart failure), NYHA class II, acute on chronic, combined ([facility] [I50.43] 10/17/2020    Hill's esophagus [K22.70] 06/18/2019    History of colon cancer [Z85.038]     Benign prostatic hyperplasia with lower urinary tract symptoms [N40.1] 04/02/2015    PAF (paroxysmal atrial fibrillation) ([facility] [I48.0] 07/21/2014    Dyslipidemia [E78.5] 02/19/2014       Assessment and Plan:    Acute pulmonary embolism while on eliqius concerning for eliqius failure  History of adenocarcinoma of rectum 2014 - in remission per patient  Presyncope   Paroxysmal atrial fibrllation  UIP  NSTEMI type 2     Recommendations     Discussed with patient anticoagulation. Obviously he insist that he was taking Eliquis as prescribed. It was prescribed by cardiology 5 mg twice daily. Patient states he did not miss any doses. So if we take that into consideration patient has failed Eliquis. Patient decided to proceed with Coumadin. Currently being bridged with heparin. Continue to bridge  Discussed differential diagnosis of DOAC failure possible etiologies include drug interaction, antiphospholipid antibodies, HIT, malignancy. Antiphospholipid antibodies pending  Patient has history of early-stage colorectal back in 2014. Has been in remission  Valvular disease also contributing to shortness of breath.   Echocardiogram shows LVH and valvular disease  Target INR 2-3  Patient will need long-term anticoagulation    Electronically signed by   Gely Diallo MD                  This note is created with the assistance of a speech recognition program.  While intending to generate a document that actually reflects the content of the visit, the document can still have some errors including those of syntax and sound a like substitutions which may escape proof reading. It such instances, actual meaning can be extrapolated by contextual diversion.

## 2022-12-23 NOTE — PROGRESS NOTES
Physical Therapy  Facility/Department: [facility] CAR 2- STEPDOWN  Physical Therapy Daily treatment note    Name: Milford Lesch  : 1955  MRN: [id number]  Date of Service: 2022    Discharge Recommendations:  Patient would benefit from continued therapy after discharge   PT Equipment Recommendations  Equipment Needed: No (owns a [facility])      Patient Diagnosis(es): The primary encounter diagnosis was Pulmonary embolism without acute cor pulmonale, unspecified chronicity, unspecified pulmonary embolism type ([address] Diagnoses of Syncope and collapse and Shortness of breath were also pertinent to this visit. Past Medical History:  has a past medical history of Atrial fibrillation ([address] Back pain, chronic, Hill's esophagus, Benign essential HTN, BPH (benign prostatic hyperplasia), Cancer ([address] Chronic idiopathic pulmonary fibrosis ([address] Cocaine abuse in remission ([address] Community acquired bacterial pneumonia, ED (erectile dysfunction), GERD (gastroesophageal reflux disease), GI bleed, Hernia, History of colon cancer, Melena, Migraines, Multifocal pneumonia, Murmur, cardiac, and Single subsegmental pulmonary embolism without acute cor pulmonale ([address] Past Surgical History:  has a past surgical history that includes Tonsillectomy; Colonoscopy; colectomy; Colonoscopy (2016); knee surgery (Right, 's); transesophageal echocardiogram (2018); Upper gastrointestinal endoscopy (N/A, 2018); Colonoscopy (N/A, 2018); hernia repair (Right, ); Cardiac catheterization (2018); colectomy; Total knee arthroplasty (Right, 2019); Upper gastrointestinal endoscopy (N/A, 2019); Cardioversion (); and hip surgery (Right, 2022). Assessment   Body Structures, Functions, Activity Limitations Requiring Skilled Therapeutic Intervention: Decreased functional mobility ; Decreased strength;Decreased endurance;Decreased balance  Assessment: Pt grossly Supervision/SBA desatting limiting amb to 5 ft with rest breaks to recover. Increased rest breaks needed t/o pt motivated to get better. Pt able to sit 5 min unsupported with Spo2 of 96%,  Pt did not de sat with seated exercise. Pt would benefit from continued acute PT to address deficits. Therapy Prognosis: Good  Decision Making: Medium Complexity  Requires PT Follow-Up: Yes  Activity Tolerance  Activity Tolerance: Patient tolerated treatment well;Patient limited by endurance;Treatment limited secondary to medical complications  Activity Tolerance Comments: Pt desatting with bed mobility, transfers and amb, increased rest breaks taken, activity resumed after recovery     Plan   Physcial Therapy Plan  General Plan:  (5-6 x week)  Current Treatment Recommendations: Strengthening, Balance training, Gait training, Stair training, Functional mobility training, Endurance training, Home exercise program, Safety education & training, Patient/Caregiver education & training, Equipment evaluation, education, & procurement, Therapeutic activities  Safety Devices  Type of Devices: Call light within reach, Nurse notified, Left in chair  Restraints  Restraints Initially in Place: No     Restrictions  Restrictions/Precautions  Restrictions/Precautions: Fall Risk, General Precautions  Required Braces or Orthoses?: No  Position Activity Restriction  Other position/activity restrictions: Up as tolerated. Pt with 2 inch leg length difference from prior trauma- has shoe with lift. RLE shorter. Subjective   General  Chart Reviewed: Yes  Patient assessed for rehabilitation services?: Yes  Response To Previous Treatment: Patient with no complaints from previous session. Family / Caregiver Present: No  Follows Commands: Within Functional Limits  General Comment  Comments: Pt retired to chair  Subjective  Subjective: RN and pt agreeable to PT. Pt alert in bed upon arrival, pleasnt and cooperative, denies pain.  10L HFNC         Cognition   Orientation  Overall Orientation Status: Within Functional Limits  Orientation Level: Oriented X4  Cognition  Overall Cognitive Status: WFL      Balance  Sitting: Intact (Pt sat EOB 15 minutes independent)  Standing: With support (Stood 2 minutes SBA>Supervision, SPO2 drops to 87% and returned to seated in recliner with recovery to 92% in ~3 minutes)  Transfer Training  Transfer Training: Yes  Sit to Stand: Modified independent  Stand to Sit: Modified independent  Bed to Chair: Supervision  Gait  Overall Level of Assistance: Stand-by assistance  Interventions: Verbal cues  Distance (ft):  (Room distance)  Assistive Device: Cane, straight  Bed mobility  Supine to Sit: Modified independent  Sit to Supine: Unable to assess  Scooting: Modified independent  Bed Mobility Comments: HOB raised ~30*; pt SPO2 dropped to 87% after sup to sit tx, prolonged seated rest break at EOB and VCs for breathing techniques to recover. Transfers  Sit to Stand: Supervision  Stand to Sit: Supervision  Comment: SPC utalized, pt sat 91% while seated EOB, dropped to 85% following 3 min stand.   Ambulation  Surface: Level tile  Device: Rolling Walker  Other Apparatus: O2 (10 L)  Assistance: Stand by assistance  Quality of Gait: midly antalgic, pt has Leg length discrepencancy  Distance: 5ft  Comments: Pt amb 5 ft to chair, desatted to 87% able to sit and recover on 10 L O2  More Ambulation?: No  Stairs/Curb  Stairs?: No     Balance  Posture: Good  Sitting - Static: Good;-  Sitting - Dynamic: Fair;+  Standing - Static: Fair  Standing - Dynamic: Fair  Comments: SPC used while assessing standing balance  A/AROM Exercises: Pt performed seated ther ex hip flexion, LAQ, ankle pumps x20-30 reps, did not desat Spo2 91-96% on 10 L  Static Sitting Balance Exercises: Sat 5 min unsupported with Spo2 97% on 10 L  Static Standing Balance Exercises: pt stood 3 mins with CGA and RW, sat down when pt desatted      AM-PAC Score  AM-PAC Inpatient Mobility Raw Score : 20 (12/23/22 1628)  AM-PAC Inpatient T-Scale Score : 47.67 (12/23/22 1628)  Mobility Inpatient CMS 0-100% Score: 35.83 (12/23/22 1628)  Mobility Inpatient CMS G-Code Modifier : CJ (12/23/22 1628)   Goals  Short Term Goals  Time Frame for Short Term Goals: 14 visits  Short Term Goal 1: Pt will be Ruy bed mobility  Short Term Goal 2: Pt will be Ruy transfers  Short Term Goal 3: Pt will be Ruy amb 250'  Short Term Goal 4: Pt will navigate 4 steps Ruy either or B rail use       Education  Patient Education  Education Given To: Patient  Education Provided: Role of Therapy;Plan of Care  Education Method: Demonstration;Verbal  Barriers to Learning: None  Education Outcome: Verbalized understanding;Demonstrated understanding      Therapy Time   Individual Concurrent Group Co-treatment   Time In 1503         Time Out 1533         Minutes 30         Timed Code Treatment Minutes: Jenna Badillo 26, PTA

## 2022-12-24 LAB
ABSOLUTE EOS #: 0 K/UL (ref 0–0.4)
ABSOLUTE IMMATURE GRANULOCYTE: 0 K/UL (ref 0–0.3)
ABSOLUTE LYMPH #: 0.35 K/UL (ref 1–4.8)
ABSOLUTE MONO #: 0.14 K/UL (ref 0.1–0.8)
ANION GAP SERPL CALCULATED.3IONS-SCNC: 6 MMOL/L (ref 9–17)
BASOPHILS # BLD: 1 % (ref 0–2)
BASOPHILS ABSOLUTE: 0.07 K/UL (ref 0–0.2)
BUN BLDV-MCNC: 17 MG/DL (ref 8–23)
CALCIUM SERPL-MCNC: 8.1 MG/DL (ref 8.6–10.4)
CHLORIDE BLD-SCNC: 104 MMOL/L (ref 98–107)
CO2: 28 MMOL/L (ref 20–31)
CREAT SERPL-MCNC: 0.52 MG/DL (ref 0.7–1.2)
EOSINOPHILS RELATIVE PERCENT: 0 % (ref 1–4)
GFR SERPL CREATININE-BSD FRML MDRD: >60 ML/MIN/1.73M2
GLUCOSE BLD-MCNC: 141 MG/DL (ref 70–99)
HCT VFR BLD CALC: 36.6 % (ref 40.7–50.3)
HEMOGLOBIN: 11 G/DL (ref 13–17)
IMMATURE GRANULOCYTES: 0 %
INR BLD: 2.5
LYMPHOCYTES # BLD: 5 % (ref 24–44)
MCH RBC QN AUTO: 26.3 PG (ref 25.2–33.5)
MCHC RBC AUTO-ENTMCNC: 30.1 G/DL (ref 28.4–34.8)
MCV RBC AUTO: 87.6 FL (ref 82.6–102.9)
MONOCYTES # BLD: 2 % (ref 1–7)
MORPHOLOGY: ABNORMAL
NRBC AUTOMATED: 0 PER 100 WBC
PARTIAL THROMBOPLASTIN TIME: 77.1 SEC (ref 20.5–30.5)
PDW BLD-RTO: 14.5 % (ref 11.8–14.4)
PLATELET # BLD: 260 K/UL (ref 138–453)
PMV BLD AUTO: 10.4 FL (ref 8.1–13.5)
POTASSIUM SERPL-SCNC: 4.2 MMOL/L (ref 3.7–5.3)
PROTHROMBIN TIME: 24.8 SEC (ref 9.1–12.3)
RBC # BLD: 4.18 M/UL (ref 4.21–5.77)
SEG NEUTROPHILS: 92 % (ref 36–66)
SEGMENTED NEUTROPHILS ABSOLUTE COUNT: 6.44 K/UL (ref 1.8–7.7)
SODIUM BLD-SCNC: 138 MMOL/L (ref 135–144)
WBC # BLD: 7 K/UL (ref 3.5–11.3)

## 2022-12-24 PROCEDURE — 6370000000 HC RX 637 (ALT 250 FOR IP)

## 2022-12-24 PROCEDURE — 99233 SBSQ HOSP IP/OBS HIGH 50: CPT | Performed by: INTERNAL MEDICINE

## 2022-12-24 PROCEDURE — 94761 N-INVAS EAR/PLS OXIMETRY MLT: CPT

## 2022-12-24 PROCEDURE — 85025 COMPLETE CBC W/AUTO DIFF WBC: CPT

## 2022-12-24 PROCEDURE — 85610 PROTHROMBIN TIME: CPT

## 2022-12-24 PROCEDURE — 85730 THROMBOPLASTIN TIME PARTIAL: CPT

## 2022-12-24 PROCEDURE — 94640 AIRWAY INHALATION TREATMENT: CPT

## 2022-12-24 PROCEDURE — 80048 BASIC METABOLIC PNL TOTAL CA: CPT

## 2022-12-24 PROCEDURE — 6360000002 HC RX W HCPCS: Performed by: STUDENT IN AN ORGANIZED HEALTH CARE EDUCATION/TRAINING PROGRAM

## 2022-12-24 PROCEDURE — 2580000003 HC RX 258

## 2022-12-24 PROCEDURE — 2700000000 HC OXYGEN THERAPY PER DAY

## 2022-12-24 PROCEDURE — 6370000000 HC RX 637 (ALT 250 FOR IP): Performed by: INTERNAL MEDICINE

## 2022-12-24 PROCEDURE — 2060000000 HC ICU INTERMEDIATE R&B

## 2022-12-24 PROCEDURE — 36415 COLL VENOUS BLD VENIPUNCTURE: CPT

## 2022-12-24 PROCEDURE — 6370000000 HC RX 637 (ALT 250 FOR IP): Performed by: STUDENT IN AN ORGANIZED HEALTH CARE EDUCATION/TRAINING PROGRAM

## 2022-12-24 PROCEDURE — 6360000002 HC RX W HCPCS: Performed by: INTERNAL MEDICINE

## 2022-12-24 RX ORDER — WARFARIN SODIUM 5 MG/1
5 TABLET ORAL
Status: COMPLETED | OUTPATIENT
Start: 2022-12-24 | End: 2022-12-24

## 2022-12-24 RX ADMIN — MIDODRINE HYDROCHLORIDE 5 MG: 5 TABLET ORAL at 07:57

## 2022-12-24 RX ADMIN — SILDENAFIL 20 MG: 20 TABLET ORAL at 15:22

## 2022-12-24 RX ADMIN — PANTOPRAZOLE SODIUM 40 MG: 40 TABLET, DELAYED RELEASE ORAL at 07:57

## 2022-12-24 RX ADMIN — SILDENAFIL 20 MG: 20 TABLET ORAL at 20:52

## 2022-12-24 RX ADMIN — IPRATROPIUM BROMIDE 0.5 MG: 0.5 SOLUTION RESPIRATORY (INHALATION) at 11:25

## 2022-12-24 RX ADMIN — IPRATROPIUM BROMIDE 0.5 MG: 0.5 SOLUTION RESPIRATORY (INHALATION) at 08:23

## 2022-12-24 RX ADMIN — BUDESONIDE AND FORMOTEROL FUMARATE DIHYDRATE 1 PUFF: 80; 4.5 AEROSOL RESPIRATORY (INHALATION) at 19:52

## 2022-12-24 RX ADMIN — IPRATROPIUM BROMIDE 0.5 MG: 0.5 SOLUTION RESPIRATORY (INHALATION) at 15:17

## 2022-12-24 RX ADMIN — SILDENAFIL 20 MG: 20 TABLET ORAL at 07:57

## 2022-12-24 RX ADMIN — WARFARIN SODIUM 5 MG: 5 TABLET ORAL at 17:08

## 2022-12-24 RX ADMIN — SODIUM CHLORIDE, PRESERVATIVE FREE 10 ML: 5 INJECTION INTRAVENOUS at 07:58

## 2022-12-24 RX ADMIN — TAMSULOSIN HYDROCHLORIDE 0.4 MG: 0.4 CAPSULE ORAL at 07:57

## 2022-12-24 RX ADMIN — DIGOXIN 125 MCG: 125 TABLET ORAL at 07:57

## 2022-12-24 RX ADMIN — METHYLPREDNISOLONE SODIUM SUCCINATE 40 MG: 40 INJECTION, POWDER, FOR SOLUTION INTRAMUSCULAR; INTRAVENOUS at 07:58

## 2022-12-24 RX ADMIN — IPRATROPIUM BROMIDE 0.5 MG: 0.5 SOLUTION RESPIRATORY (INHALATION) at 19:50

## 2022-12-24 RX ADMIN — METHYLPREDNISOLONE SODIUM SUCCINATE 40 MG: 40 INJECTION, POWDER, FOR SOLUTION INTRAMUSCULAR; INTRAVENOUS at 21:54

## 2022-12-24 RX ADMIN — SODIUM CHLORIDE, PRESERVATIVE FREE 10 ML: 5 INJECTION INTRAVENOUS at 20:52

## 2022-12-24 RX ADMIN — DESMOPRESSIN ACETATE 40 MG: 0.2 TABLET ORAL at 07:57

## 2022-12-24 RX ADMIN — MIDODRINE HYDROCHLORIDE 5 MG: 5 TABLET ORAL at 12:52

## 2022-12-24 RX ADMIN — BUDESONIDE AND FORMOTEROL FUMARATE DIHYDRATE 1 PUFF: 80; 4.5 AEROSOL RESPIRATORY (INHALATION) at 08:23

## 2022-12-24 RX ADMIN — MIDODRINE HYDROCHLORIDE 5 MG: 5 TABLET ORAL at 17:08

## 2022-12-24 ASSESSMENT — ENCOUNTER SYMPTOMS
DIARRHEA: 0
ALLERGIC/IMMUNOLOGIC NEGATIVE: 1
ABDOMINAL PAIN: 0
EYE REDNESS: 0
VOICE CHANGE: 0
TROUBLE SWALLOWING: 0
SHORTNESS OF BREATH: 1
VOMITING: 0
WHEEZING: 0
SORE THROAT: 0
COUGH: 1
PHOTOPHOBIA: 0
CHEST TIGHTNESS: 0

## 2022-12-24 NOTE — PLAN OF CARE
Problem: Discharge Planning  Goal: Discharge to home or other facility with appropriate resources  Outcome: Progressing     Problem: Safety - Adult  Goal: Free from fall injury  Outcome: Progressing     Problem: Skin/Tissue Integrity  Goal: Absence of new skin breakdown  Description: 1. Monitor for areas of redness and/or skin breakdown  2. Assess vascular access sites hourly  3. Every 4-6 hours minimum:  Change oxygen saturation probe site  4. Every 4-6 hours:  If on nasal continuous positive airway pressure, respiratory therapy assess nares and determine need for appliance change or resting period.   Outcome: Progressing     Problem: Chronic Conditions and Co-morbidities  Goal: Patient's chronic conditions and co-morbidity symptoms are monitored and maintained or improved  Outcome: Progressing     Problem: Respiratory - Adult  Goal: Achieves optimal ventilation and oxygenation  12/24/2022 1605 by Romana Lowe, RN  Outcome: Progressing  12/24/2022 1129 by Meron Rodas RCP  Outcome: Progressing     Problem: Nutrition Deficit:  Goal: Optimize nutritional status  Outcome: Progressing     Problem: ABCDS Injury Assessment  Goal: Absence of physical injury  Outcome: Progressing     Problem: Pain  Goal: Verbalizes/displays adequate comfort level or baseline comfort level  Outcome: Progressing

## 2022-12-24 NOTE — PLAN OF CARE
Problem: Respiratory - Adult  Goal: Achieves optimal ventilation and oxygenation  Outcome: Progressing    BRONCHOSPASM/BRONCHOCONSTRICTION   [x]         IMPROVE AERATION/BREATH SOUNDS  [x]   ADMINISTER BRONCHODILATOR THERAPY AS APPROPRIATE  [x]   ASSESS BREATH SOUNDS  []   IMPLEMENT AEROSOL/MDI PROTOCOL  [x]   PATIENT EDUCATION AS NEEDED     PROVIDE ADEQUATE OXYGENATION WITH ACCEPTABLE SP02/ABG'S  [x]  IDENTIFY APPROPRIATE OXYGEN THERAPY  [x]   MONITOR SP02/ABG'S AS NEEDED   [x]   PATIENT EDUCATION AS NEEDED

## 2022-12-24 NOTE — PLAN OF CARE
Patient is seen and examined at bedside this morning. Interval history:     Has shortness of breath stable currently on 8 L of high flow nasal cannula oxygen saturating around 95%  Afebrile and hemodynamically stable    Lung examination:  Auscultation has Velcro like practice with underlying IPF history    Plan:    -We will continue IV Solu-Medrol 40 twice daily for 24 more hours and possibly wean to oral for 2 weeks for possible IPF exacerbation. -PT and OT determined patient has high functional status and not a candidate for SNF. Patient is still on high flow nasal cannula oxygen his baseline is 5 to 9 L of nasal cannula  -Had a lengthy discussion with the patient regarding his concerns  of severe aortic stenosis. Patient was evaluated with cardiology and recommended outpatient valvular work-up which is being explained to the patient but he is concerned  -Spoke with RN and  in regards to possibly working up towards inpatient pulmonary rehab and in the meantime we will continue weaning down his high flow nasal cannula oxygen and will continue IV steroid therapy.  -Patient voiced understanding    Bryan Cassidy MD  Internal Medicine Resident, PGY- [place];  [place], [place]  12/24/2022, 11:34 AM

## 2022-12-24 NOTE — PROGRESS NOTES
I have seen and examined the patient independently. I reviewed all laboratory and imaging studies that are relevant. I agree with the resident note with the addendum. Electronically signed by Kristine Vaughn MD on 12/24/2022 at 11:02 PM     Today's Date: 12/24/2022  Patient Name: Milford Lesch  Date of admission: 12/19/2022  2:07 PM  Patient's age: 69 y.o., 1955  Admission Dx: Syncope and collapse [R55]  Shortness of breath [R06.02]  Pulmonary embolism, bilateral ([address] [I26.99]  Pulmonary embolism without acute cor pulmonale, unspecified chronicity, unspecified pulmonary embolism type ([address] [I26.99]    Reason for Consult: pulmonary embolism while on eliquis  Requesting Physician: Antwan Quarles MD    Chief Complaint:  shortness of breath  Interval Changes:    Patient seen and examined  Labs and vitals reviewed  Patient still has baseline shortness of breath. Insists on being discharged. Educated about coumadin and follow up with coumadin clinic out patient. Patient's INR is 2.5 this morning. Hemoglobin 11. No bleeding reported  Echocardiogram showed severe left ventricular diastolic dysfunction with moderate to severe aortic stenosis and severe pulmonary hypertension    Lupus anticoagulant work up pending      History of Present Illness: The patient is a 69 y.o. male who is admitted to the hospital for shortness of breath. CT PE demonstrated bilateral segmental pulmonary embolisms -right lower lobar, lingula and left lower segmental branches. CA 19-9: 114, CEA 4.8, PSA 0.46     PE history -  11/2020 - Tiny nonocclusive pulmonary embolus in the right lower lobar pulmonary artery. 02/22 - CT PE negative  12/2022 - Partial incomplete filling defect right lower lobar  pulmonary artery. Partial filling defect lingular pulmonary artery. Filling  defects left lower lobe segmental branches. Hematology history  Patient seen by Dr. Lee Guzman 12/2020 for xarelto failure for pulmonary embolism.  At the time, his acute PE was suspected due to medication being held while in hospital. He was changed to eliqius from [place] [id number] per patient's wishes. Patient started xarelto in 2018 due to heart condition by Dr. Justyn Finn per patient. However was switched to eliqius in 2020 due to small Pulmonary embolism. 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 Colostomy was reversed. Vieyra syndrome negative. Patient stated he follows up with GI and had last colonoscopy last year and has been negative so far with no relapse of cancer. PMH - chronic idiopathic pulmonary fibrosis, atrial fibrillation, BPH, colon cancer. Patient reportedly states that he was diagnosed with a pulmonary embolism this past February; however, per chart review and CT PE performed on 2/18/2022, no evidence of acute pulmonary embolism was found at that time. Past Medical History:   has a past medical history of Atrial fibrillation ([address] Back pain, chronic, Hill's esophagus, Benign essential HTN, BPH (benign prostatic hyperplasia), Cancer ([address] Chronic idiopathic pulmonary fibrosis ([address] Cocaine abuse in remission ([address] Community acquired bacterial pneumonia, ED (erectile dysfunction), GERD (gastroesophageal reflux disease), GI bleed, Hernia, History of colon cancer, Melena, Migraines, Multifocal pneumonia, Murmur, cardiac, and Single subsegmental pulmonary embolism without acute cor pulmonale ([address] Past Surgical History:   has a past surgical history that includes Tonsillectomy; Colonoscopy; colectomy; Colonoscopy (07/18/2016); knee surgery (Right, 1970's); transesophageal echocardiogram (11/29/2018); Upper gastrointestinal endoscopy (N/A, 12/5/2018); Colonoscopy (N/A, 12/6/2018); hernia repair (Right, 2009); Cardiac catheterization (11/29/2018); colectomy; Total knee arthroplasty (Right, 1/8/2019);  Upper gastrointestinal endoscopy (N/A, 6/18/2019); Cardioversion (2020); and hip surgery (Right, 2/22/2022). Medications:    Prior to Admission medications    Medication Sig Start Date End Date Taking?  Authorizing Provider   OXYGEN Inhale 4 L into the lungs continuous Uses 4-5 liters 24/7   Yes Historical Provider, MD   omeprazole (PRILOSEC) 40 MG delayed release capsule take 1 capsule by mouth once daily 12/8/22   Cassandra Garcia MD   fluticasone-umeclidin-vilant (TRELEGY ELLIPTA) 200-62.5-25 MCG/ACT AEPB inhaler Inhale 1 puff into the lungs daily 12/6/22   Cassandra Garcia MD   albuterol sulfate HFA (VENTOLIN HFA) 108 (90 Base) MCG/ACT inhaler Inhale 2 puffs into the lungs every 4 hours as needed for Wheezing 12/5/22 12/5/23  Cassandra Garcia MD   sildenafil (REVATIO) 20 MG tablet Take 1 tablet by mouth 3 times daily 11/8/22   Cassandra Garcia MD   sertraline (ZOLOFT) 25 MG tablet take 1 tablet by mouth once daily  Patient not taking: Reported on 11/8/2022 11/2/22   Nacho Meneses DO   ibuprofen (ADVIL;MOTRIN) 800 MG tablet take 1 tablet by mouth three times a day if needed for MODERATE PAIN ( PAIN SCALE 4-6 ) 10/24/22   Cassandra Garcia MD   sertraline (ZOLOFT) 50 MG tablet Take 1 tablet by mouth daily 10/3/22   Cassandra Garcia MD   ELIQUIS 5 MG TABS tablet take 1 tablet by mouth twice a day 9/19/22   MIRI Vazquez NP   tamsulosin (FLOMAX) 0.4 MG capsule take 1 capsule by mouth once daily 8/8/22   Cassandra Garcia MD   ferrous sulfate (IRON 325) 325 (65 Fe) MG tablet take 1 tablet by mouth twice a day 7/29/22   Cassandra Garcia MD   atorvastatin (LIPITOR) 40 MG tablet take 1 tablet by mouth once daily 4/28/22   Cassandra Garcia MD   digoxin (LANOXIN) 125 MCG tablet take 1 tablet by mouth once daily 4/28/22   Cassandra Garcia MD   midodrine (PROAMATINE) 5 MG tablet Take 1 tablet by mouth 3 times daily (before meals) 3/2/22   Tenisha Trujillo DO   Baclofen (LIORESAL) 5 MG tablet take 1 tablet by mouth twice a day 1/27/22   Cassandra Flores MD   Daniel Velasquez MISC by Does not apply route Expires 1/2026 1/19/21   Cassandra Flores MD     Current Facility-Administered Medications   Medication Dose Route Frequency Provider Last Rate Last Admin    methylPREDNISolone sodium (SOLU-MEDROL) injection 40 mg  40 mg IntraVENous BID Lizeth Burgess MD   40 mg at 12/24/22 0758    midodrine (PROAMATINE) tablet 5 mg  5 mg Oral TID  Dennisdarryl Zhu, DO   5 mg at 12/24/22 0757    0.9 % sodium chloride infusion   IntraVENous Continuous Abid Mati Dc MD 75 mL/hr at 12/23/22 0447 New Bag at 12/23/22 0447    sodium chloride (OCEAN) 0.65 % nasal spray 1 spray  1 spray Each Nostril PRN Noel Mckinney MD        warfarin placeholder: dosing by pharmacy   Other [address] [place] [address] MD        heparin (porcine) injection 5,770 Units  80 Units/kg IntraVENous PRN [place] Quails, DO        heparin (porcine) injection 2,880 Units  40 Units/kg IntraVENous PRN [place] Quails, DO   2,880 Units at 12/22/22 2135    sodium chloride flush 0.9 % injection 5-40 mL  5-40 mL IntraVENous 2 times per day Shelby Krishnamurthy MD   10 mL at 12/24/22 0758    sodium chloride flush 0.9 % injection 5-40 mL  5-40 mL IntraVENous PRN Noel Almendarez MD        0.9 % sodium chloride infusion   IntraVENous PRN Noel Mckinney MD        ondansetron (ZOFRAN-ODT) disintegrating tablet 4 mg  4 mg Oral Q8H PRN Noel Mckinney MD        Or    ondansetron (ZOFRAN) injection 4 mg  4 mg IntraVENous Q6H PRN Noel Almendarez MD        polyethylene glycol (GLYCOLAX) packet 17 g  17 g Oral Daily PRN Noel Mckinney MD        acetaminophen (TYLENOL) tablet 650 mg  650 mg Oral Q6H PRN Noel Mckinney MD   650 mg at 12/23/22 1113    Or    acetaminophen (TYLENOL) suppository 650 mg  650 mg Rectal Q6H PRN Noel Almendarez MD        atorvastatin (LIPITOR) tablet 40 mg  40 mg Oral Daily Noel Mckinney MD 40 mg at 12/24/22 0757    budesonide-formoterol (SYMBICORT) 80-4.5 MCG/ACT inhaler 1 puff  1 puff Inhalation BID Noel Almendarez MD   1 puff at 12/24/22 0823    albuterol sulfate HFA (PROVENTIL;VENTOLIN;PROAIR) 108 (90 Base) MCG/ACT inhaler 2 puff  2 puff Inhalation Q4H PRN Noel Almendarez MD        digoxin (LANOXIN) tablet 125 mcg  125 mcg Oral Daily Noel Almendarez MD   125 mcg at 12/24/22 0757    pantoprazole (PROTONIX) tablet 40 mg  40 mg Oral QAM AC Deejay Jerez MD   40 mg at 12/24/22 0757    sildenafil (REVATIO) tablet 20 mg  20 mg Oral TID Agnieszka Altamirano MD   20 mg at 12/24/22 0757    tamsulosin (FLOMAX) capsule 0.4 mg  0.4 mg Oral Daily Deejay Jerez MD   0.4 mg at 12/24/22 0757    ipratropium (ATROVENT) 0.02 % nebulizer solution 0.5 mg  0.5 mg Nebulization 4x daily Agnieszka Altamirano MD   0.5 mg at 12/24/22 1125       Allergies:  Adhesive tape, Codeine, Penicillins, and Nintedanib    Social History:   reports that he quit smoking about 52 years ago. His smoking use included cigarettes. He has a 0.50 pack-year smoking history. He has never used smokeless tobacco. He reports that he does not currently use alcohol after a past usage of about 12.0 standard drinks per week. He reports that he does not use drugs. Family History: family history includes Diabetes in his mother; Heart Attack in his father; Heart Disease in his brother and father. Review of Systems:      Constitutional: No fever or chills.  No night sweats, no weight loss   Eyes: No eye discharge, double vision, or eye pain   HEENT: negative for sore mouth, sore throat, hoarseness and voice change   Respiratory: negative for cough , sputum, dyspnea, wheezing, hemoptysis, chest pain   Cardiovascular: negative for chest pain, dyspnea, palpitations, orthopnea, PND   Gastrointestinal: negative for nausea, vomiting, diarrhea, constipation, abdominal pain, Dysphagia, hematemesis and hematochezia   Genitourinary: negative for frequency, dysuria, nocturia, urinary incontinence, and hematuria   Integument: negative for rash, skin lesions, bruises.    Hematologic/Lymphatic: negative for easy bruising, bleeding, lymphadenopathy, or petechiae   Endocrine: negative for heat or cold intolerance,weight changes, change in bowel habits and hair loss   Musculoskeletal: negative for myalgias, arthralgias, pain, joint swelling,and bone pain   Neurological: negative for headaches, dizziness, seizures, weakness, numbness    Physical Exam:      /81   Pulse 85   Temp 98 °F (36.7 °C) (Oral)   Resp 13   Ht 6' (1.829 m)   Wt 159 lb (72.1 kg)   SpO2 95%   BMI 21.56 kg/m²    Temp (24hrs), Av °F (36.7 °C), Min:97.9 °F (36.6 °C), Max:98.1 °F (36.7 °C)      General appearance - well appearing, no in pain or distress   Mental status - alert and cooperative   Eyes - pupils equal and reactive, extraocular eye movements intact   Ears - bilateral TM's and external ear canals normal   Mouth - mucous membranes moist, pharynx normal without lesions   Neck - supple, no significant adenopathy   Lymphatics - no palpable lymphadenopathy, no hepatosplenomegaly   Chest - clear to auscultation, no wheezes, rales or rhonchi, symmetric air entry   Heart - normal rate, regular rhythm, normal S1, S2, no murmurs  Abdomen - soft, nontender, nondistended, no masses or organomegaly   Neurological - alert, oriented, normal speech, no focal findings or movement disorder noted   Musculoskeletal - no joint tenderness, deformity or swelling   Extremities - peripheral pulses normal, no pedal edema, no clubbing or cyanosis   Skin - normal coloration and turgor, no rashes, no suspicious skin lesions noted ,    Labs:   Complete Blood Count:   Recent Labs     22  0443 22  1825 22  0319 22  0551   WBC 6.5  --  6.2 7.0   HGB 10.7* 11.0* 10.4* 11.0*   MCV 89.3  --  87.3 87.6     --  235 260   RBC 4.02*  --  4.01* 4.18*   HCT 35.9* 36.4* 35.0* 36.6* MCH 26.6  --  25.9 26.3   MCHC 29.8  --  29.7 30.1   RDW 14.3  --  14.4 14.5*   MPV 10.5  --  10.0 10.4      PT/INR:    Lab Results   Component Value Date/Time    PROTIME 24.8 12/24/2022 05:51 AM    INR 2.5 12/24/2022 05:51 AM     PTT:    Lab Results   Component Value Date/Time    APTT 77.1 12/24/2022 05:51 AM     Basal Metabolic Profile:   Recent Labs     12/22/22  0443 12/23/22  0319 12/24/22  0551    141 138   K 4.1 3.8 4.2   BUN 15 12 17   CREATININE 0.53* 0.52* 0.52*    105 104   CO2 32* 30 28      LFTS  No results for input(s): ALKPHOS, ALT, AST, BILITOT, BILIDIR, LABALBU in the last 72 hours. Imaging:  CT HEAD WO CONTRAST    Result Date: 12/19/2022  No acute intracranial abnormality. XR CHEST PORTABLE    Result Date: 12/19/2022  Pulmonary fibrotic changes are again identified. A superimposed pneumonia is not excluded. CT CHEST PULMONARY EMBOLISM W CONTRAST    Addendum Date: 12/19/2022    ADDENDUM: Discussed with Dr. Davida Posadas at 4:40 p.m. Result Date: 12/19/2022  Pulmonary emboli bilaterally as above. No evidence of right ventricular strain. Coronary artery disease and aortic valve disease. COPD and pulmonary fibrosis. RECOMMENDATIONS: The findings were sent to the Radiology Results [address] at 4:35 pm on 12/19/2022 to be communicated to a licensed caregiver.        Impression:   Primary Problem  Acute pulmonary embolism [facility])    Active Hospital Problems    Diagnosis Date Noted    Chronic respiratory failure with hypoxia [facility]) [J96.11] 12/21/2022     Priority: Medium    Pulmonary HTN ([address] [I27.20] 12/21/2022     Priority: Medium    Postural dizziness with near syncope [R42, R55] 12/21/2022     Priority: Medium    Pulmonary embolism without acute cor pulmonale ([address] [I26.99] 12/20/2022     Priority: Medium    Acute pulmonary embolism ([address] [I26.99] 12/19/2022     Priority: Medium    Pulmonary embolism, bilateral ([address] [I26.99] 12/19/2022     Priority: Medium [place], [place]          This note is created with the assistance of a speech recognition program.  While intending to generate a document that actually reflects the content of the visit, the document can still have some errors including those of syntax and sound a like substitutions which may escape proof reading. It such instances, actual meaning can be extrapolated by contextual diversion.

## 2022-12-24 NOTE — CARE COORDINATION
Transitional planning:  Met with pt at bedside because medical assistance approached [place] and stated pt didn't want to go to rehab. This is not in the notes. So, NCM met with pt and unit RN in room. Pt has tears in his eyes. He states he is upset because one doctor told him he was not going home and another one came in and said he was going home. He is concerned about his dyspnea and \"3 leaky valves,\" (heart). Unit RN is sending message to attending via PS. Pt has no PT/OT rehab therapy needs at this time. [address] PS Dr. Asif Garza and notified he can place outpt. Pulmonary rehab, but no facilities for just pulmonary rehab.     [address] at [place], regarding [address] admission. Anjelica Ilir states, \"because this pt doesn't have any IV fluids and just has an O2 demand issue, he will more than likely not meet criteria for LTACH. \" Also, he is unaware of facilities with just pulmonary rehab. Suggested referrals to SNF and then with denial if they say pt needs more cardiac rehab or can't meet O2 demands, the LTACH can take that denial information and possibly get him into an LTACH. Spoke with charge nurse who states unit nurses are attempting to wean pt down on oxygen, now. PS Dr. Willy Avery. All of the above information.

## 2022-12-24 NOTE — PLAN OF CARE
Problem: Discharge Planning  Goal: Discharge to home or other facility with appropriate resources  Outcome: Progressing  Flowsheets  Taken 12/23/2022 1930 by Lianna Zapata RN  Discharge to home or other facility with appropriate resources: Identify barriers to discharge with patient and caregiver  Taken 12/23/2022 0837 by Jing Cagle RN  Discharge to home or other facility with appropriate resources:   Identify barriers to discharge with patient and caregiver   Arrange for needed discharge resources and transportation as appropriate   Identify discharge learning needs (meds, wound care, etc)     Problem: Safety - Adult  Goal: Free from fall injury  Outcome: Progressing     Problem: Skin/Tissue Integrity  Goal: Absence of new skin breakdown  Description: 1. Monitor for areas of redness and/or skin breakdown  2. Assess vascular access sites hourly  3. Every 4-6 hours minimum:  Change oxygen saturation probe site  4. Every 4-6 hours:  If on nasal continuous positive airway pressure, respiratory therapy assess nares and determine need for appliance change or resting period.   Outcome: Progressing     Problem: Chronic Conditions and Co-morbidities  Goal: Patient's chronic conditions and co-morbidity symptoms are monitored and maintained or improved  Outcome: Progressing  Flowsheets  Taken 12/23/2022 1930 by Lianna Zapata RN  Care Plan - Patient's Chronic Conditions and Co-Morbidity Symptoms are Monitored and Maintained or Improved: Monitor and assess patient's chronic conditions and comorbid symptoms for stability, deterioration, or improvement  Taken 12/23/2022 0837 by Jing Cagle RN  Care Plan - Patient's Chronic Conditions and Co-Morbidity Symptoms are Monitored and Maintained or Improved:   Monitor and assess patient's chronic conditions and comorbid symptoms for stability, deterioration, or improvement   Collaborate with multidisciplinary team to address chronic and comorbid conditions and prevent

## 2022-12-24 NOTE — PROGRESS NOTES
PULMONARY & CRITICAL CARE MEDICINE PROGRESS  NOTE     Patient:  Ronda Mcwilliams  MRN: [id number]  Admit date: 12/19/2022  Primary Care Physician: Jeffrey Evans MD  Consulting Physician: Solomon Abreu MD  CODE Status: Full Code  LOS: 5    SUBJECTIVE     I personally interviewed/examined the patient, reviewed interval history and interpreted all available radiographic, laboratory data at the time of service. Chief Compliant/Reason for Initial Consult:   Pulm embolism/chronic respiratory failure/pulmonary fibrosis. Brief Hospital Course: The patient is a 69 y.o. male he is followed up by Dr. Ewa Claire with history of pulmonary fibrosis/IPF on 6 L of nasal cannula with chronic hypoxic respiratory failure, history of pulmonary embolism on chronic anticoagulation therapy. He has been followed by hematology he had a history of pulm embolism apparently in 2020 and at that time he was treated with Xarelto and later while he was on Xarelto apparently he has nonocclusive pulm embolism considered as failure of Xarelto and it was changed to Eliquis and he has been taking Eliquis and compliant with Eliquis. According to patient for last 2 to 3 weeks he has been having increasing shortness of breath he gets short of breath by ambulating from room to bathroom also he has increased cough than baseline. Patient presented when he had an episode of near syncope he usually does feel dizzy when he stands up but at that time he was apparently sweaty did not completely pass out and did not have a fall. He does not complain of fever denies purulent sputum denies increased sputum production denies chills denies hemoptysis and does not complain of chest pain currently he does not complain of dizziness lightheadedness or syncope.   He presented to emergency room had a CTA chest done which showed that he has bilateral pulm embolism and right lower lobe lingula pulmonary artery and left lower lobe segmental branches per radiology. CT scan of the chest shows findings consistent with pulmonary fibrosis with basilar honeycomb changes and bronchiectasis no areas of definite consolidation. Patient has been on 6 L nasal cannula maintaining saturation above 90%. He had been taking his Eliquis and consider failure of NOAC/DOAC hematology oncology was consulted and patient is currently on heparin drip hematology discussed with patient about Lovenox versus Coumadin therapy patient wanted to proceed with Coumadin therapy. Previous echo in February this year showed estimated RVSP of 41, ejection fraction of 40 to 74%, diastolic dysfunction RV dilatation with normal systolic function mild to moderate MR. He has history of colon cancer in status postsurgery in 2015 with colostomy and reversal later  He is a non-smoker no history of COPD. Interval History:  12/24/22  Patient is afebrile T-max 98.3 last 24 hours his respiratory rate is in low 20s heart rate is in 70s to 90s and he is on high flow salter nasal cannula at 8 L maintaining saturation most of the time above 90% he does desaturate when he is out of bed to chair but he is able to talk without getting the saturation as per last few days he was desaturating while he was even talking. According patient cough is the same mild cough denies sputum production does not complain of chest pain denies dizziness. He is a started on IV Solu-Medrol every 12 hours since 12/23/2022. Echocardiogram estimated RVSP of 64 ejection fraction of 50 to 55% and severe grade 3 LV diastolic dysfunction. Moderate to severe aortic stenosis    Review of Systems:  Review of Systems   Constitutional:  Positive for activity change. Negative for appetite change, fever and unexpected weight change. HENT:  Negative for postnasal drip, sore throat, trouble swallowing and voice change.     Eyes:  Negative for photophobia, redness and visual disturbance. Respiratory:  Positive for cough and shortness of breath. Negative for chest tightness and wheezing. Cardiovascular:  Negative for chest pain, palpitations and leg swelling. Gastrointestinal:  Negative for abdominal pain, diarrhea and vomiting. Endocrine: Negative for polydipsia, polyphagia and polyuria. Genitourinary:  Negative for dysuria, frequency and hematuria. Musculoskeletal: Negative. Allergic/Immunologic: Negative. Neurological:  Negative for dizziness, syncope, speech difficulty and weakness. Hematological:  Negative for adenopathy. Does not bruise/bleed easily. Psychiatric/Behavioral: Negative. OBJECTIVE     VITAL SIGNS:   LAST-  /81   Pulse 74   Temp 98 °F (36.7 °C) (Oral)   Resp 19   Ht 6' (1.829 m)   Wt 159 lb (72.1 kg)   SpO2 92%   BMI 21.56 kg/m²   8-24 HR RANGE-  TEMP Temp  Av.1 °F (36.7 °C)  Min: 97.9 °F (36.6 °C)  Max: 98.2 °F (70.4 °C)   BP Systolic (58YRO), W[place] , Min:123 , Z      Diastolic (14QLE), HZA:99, Min:78, Max:96     PULSE Pulse  Av.4  Min: 71  Max: 134   RR Resp  Av.3  Min: 15  Max: 22   O2 SAT SpO2  Av %  Min: 92 %  Max: 97 %   OXYGEN DELIVERY O2 Flow Rate (L/min)  Av L/min  Min: 8 L/min  Max: 8 L/min     Systemic Examination:   Physical Exam  General appearance - looks comfortable and in mild distress mildly tachypneic  Mental status - alert, oriented to person, place, and time  Eyes - pupils equal and reactive, extraocular eye movements intact  Mouth - mucous membranes moist, pharynx normal without lesions  Neck - supple, no significant adenopathy  Chest - Chest was symmetrical without dullness to percussion. Mildly tachypneic in mild distress. Breath sounds bilaterally present he has bibasilar dry inspiratory crackles present extending to lower lung field no expiratory wheezing or rhonchi.  There is no intercostal recession or use of accessory muscles  Heart - normal rate, regular rhythm, normal S1, S2, no murmurs, rubs, clicks or gallops  Abdomen - soft, nontender, nondistended, no masses or organomegaly  Neurological - alert, oriented, normal speech, no focal findings or movement disorder noted  Extremities - peripheral pulses normal, no pedal edema, no clubbing or cyanosis  Skin - normal coloration and turgor, no rashes, no suspicious skin lesions noted     DATA REVIEW     Medications:  Scheduled Meds:   methylPREDNISolone  40 mg IntraVENous BID    midodrine  5 mg Oral TID WC    warfarin placeholder: dosing by pharmacy   Other RX Placeholder    sodium chloride flush  5-40 mL IntraVENous 2 times per day    atorvastatin  40 mg Oral Daily    budesonide-formoterol  1 puff Inhalation BID    digoxin  125 mcg Oral Daily    pantoprazole  40 mg Oral QAM AC    sildenafil  20 mg Oral TID    tamsulosin  0.4 mg Oral Daily    ipratropium  0.5 mg Nebulization 4x daily     Continuous Infusions:   sodium chloride 75 mL/hr at 12/23/22 0447    heparin (PORCINE) Infusion 26 Units/kg/hr (12/23/22 2200)    sodium chloride       LABS:-  ABG:   No results for input(s): POCPH, POCPCO2, POCPO2, POCHCO3, CWVB9MFU in the last 72 hours. CBC:   Recent Labs     12/22/22  0443 12/22/22  1825 12/23/22 0319 12/24/22  0551   WBC 6.5  --  6.2 7.0   HGB 10.7* 11.0* 10.4* 11.0*   HCT 35.9* 36.4* 35.0* 36.6*   MCV 89.3  --  87.3 87.6     --  235 260   LYMPHOPCT 15*  --  16* 5*   RBC 4.02*  --  4.01* 4.18*   MCH 26.6  --  25.9 26.3   MCHC 29.8  --  29.7 30.1   RDW 14.3  --  14.4 14.5*       BMP:   Recent Labs     12/22/22  0443 12/23/22  0319 12/24/22  0551    141 138   K 4.1 3.8 4.2    105 104   CO2 32* 30 28   BUN 15 12 17   CREATININE 0.53* 0.52* 0.52*   GLUCOSE 97 101* 141*       Liver Function Test:   No results for input(s): PROT, LABALBU, ALT, AST, GGT, ALKPHOS, BILITOT in the last 72 hours. Amylase/Lipase:  No results for input(s): AMYLASE, LIPASE in the last 72 hours.   Coagulation Profile:   Recent Labs 12/23/22  0319 12/23/22  0907 12/23/22  1619 12/24/22  0551   INR 1.5  --  1.9 2.5   PROTIME 15.3*  --  19.4* 24.8*   APTT 75.4* 72.2* 64.3* 77.1*       Cardiac Enzymes:  No results for input(s): CKTOTAL, CKMB, CKMBINDEX, TROPONINI in the last 72 hours. Lactic Acid:  Lab Results   Component Value Date    LACTA NOT REPORTED 12/07/2020    LACTA NOT REPORTED 10/17/2020    LACTA 1.0 08/11/2014     BNP:   No results found for: BNP  D-Dimer:  Lab Results   Component Value Date    DDIMER 1.10 (H) 02/18/2022     Others:   Lab Results   Component Value Date    TSH 7.55 (H) 12/19/2022     Lab Results   Component Value Date    YOUNG NEGATIVE 10/17/2020    SEDRATE 82 (H) 10/22/2020    CRP 11.1 (H) 01/12/2021     No results found for: Nikki Bounds  Lab Results   Component Value Date    IRON 33 (L) 05/02/2022    TIBC 349 05/02/2022    FERRITIN 47 05/02/2022     No results found for: SPEP, UPEP  Lab Results   Component Value Date/Time    PSA 0.46 12/20/2022 07:23 AM    CEA 4.8 12/20/2022 07:23 AM     114 12/20/2022 07:23 AM       Input/Output:    Intake/Output Summary (Last 24 hours) at 12/24/2022 0951  Last data filed at 12/23/2022 2205  Gross per 24 hour   Intake --   Output 250 ml   Net -250 ml         Microbiology:  No results for input(s): SPECDESC, SPECDESC, SPECIAL, CULTURE, CULTURE, STATUS, ORG, CDIFFTOXPCR, CAMPYLOBPCR, SALMONELLAPC, SHIGAPCR, SHIGELLAPCR, MPNEUG, MPNEUM, LACTOQL in the last 72 hours. Pathology:    Radiology reports:  VL DUP LOWER EXTREMITY VENOUS BILATERAL   Final Result      CT CHEST PULMONARY EMBOLISM W CONTRAST   Final Result   Addendum (preliminary) 1 of 1   ADDENDUM:   Discussed with Dr. Barb Posadas at 4:40 p.m. Final   Pulmonary emboli bilaterally as above. No evidence of right ventricular   strain. Coronary artery disease and aortic valve disease. COPD and   pulmonary fibrosis.       RECOMMENDATIONS:   The findings were sent to the Radiology Results [address] abilities. It was my pleasure to evaluate Rubi Ramirez today. We will continue to follow. I would like to thank you for allowing me to participate in the care of this patient. Please feel free to call with any further questions or concerns. Shayna Granados MD, M.D. Pulmonary and Critical Care Medicine           12/24/2022, 9:51 AM    Please note that this chart was generated using voice recognition Dragon dictation software. Although every effort was made to ensure the accuracy of this automated transcription, some errors in transcription may have occurred.

## 2022-12-24 NOTE — PROGRESS NOTES
[facility]  Internal Medicine Teaching Residency Program  Inpatient Daily Progress Note  ______________________________________________________________________________    Patient: Marietta Chahal  YOB: 1955   LXN:[id number]    Acct: [de-identified]     Room: 2019/2019-01  Admit date: 12/19/2022  Today's date: 12/24/22  Number of days in the hospital: 5    SUBJECTIVE   Admitting Diagnosis: Acute pulmonary embolism ([address]  CC: An episode of near syncope    Pt examined at bedside. Chart & results reviewed. No acute event overnight  Remained afebrile and hemodynamically stable. No new complaints of lightheadedness or syncopal episodes overnight  Has persistent shortness of breath, his saturations not dropping on 8 L while speaking full. No signs of distress noted. Oxygen requirement at his baseline between 5 to 9 L. Denies any bleeding episodes while on heparin    ROS:  Constitutional:  negative for chills, fevers, sweats  Respiratory:  negative for cough, dyspnea on exertion, hemoptysis, shortness of breath, wheezing  Cardiovascular:  negative for chest pain, chest pressure/discomfort, lower extremity edema, palpitations  Gastrointestinal:  negative for abdominal pain, constipation, diarrhea, nausea, vomiting  Neurological:  negative for dizziness, headache    BRIEF HISTORY     The patient is a pleasant 69 y.o. male with past medical history of A. fib, idiopathic pulmonary fibrosis with pulmonary hypertension, colorectal carcinoma, history of pulmonary embolism, BPH presents after an episode of near syncope. Patient states he was standing at his home and suddenly started feeling sweaty and feeling that he was going to pass out which made him sit on the floor. Denies any loss of consciousness, did not hit his head.   Patient has a history of pulmonary embolism and A. fib and takes Eliquis at home, patient has a history of idiopathic pulmonary fibrosis-along with significant family history-also in the sister-has been taking sildenafil for moderate pulmonary hypertension. Uses 9 L of oxygen at home. States he has passed out multiple times in the past during which he was found to have COVID-pneumonia. As per the dispense report patient takes tamsulosin and sildenafil-patient is unsure of tamsulosin but takes sildenafil. Patient states he always feels dizzy when standing suddenly from lying down position. Last echo in  showed ejection fraction 40 to 45%, RVSP 41 mmHg, mild to moderate mitral regurgitation. Patient denies any chest pain, abdominal pain, loose stool, burning urination, leg pain or swelling, no recent sick contact. in the ED patient was requiring 15 L of oxygen via nonrebreather and was desaturating when taken off nasal cannula as per the chart. Hypotensive blood pressure 88/62, heart rate 69-rhythm A. Fib. proBNP elevated to 16,000.  2 sets of troponin 35-33. CT PE showed bilateral pulmonary embolism without right heart strain along with underlying honeycomb pattern at peripheral and bases and centrilobular paraseptal emphysema. Patient was started on heparin drip and admitted to internal medicine floor for further management. Has a history of colorectal cancer, had a colostomy in past which was later reversed. 2022-INR at 1.4, has persistent shortness of breath currently on 7 L of nasal cannula    OBJECTIVE     Vital Signs:  /81   Pulse 74   Temp 98 °F (36.7 °C) (Oral)   Resp 19   Ht 6' (1.829 m)   Wt 159 lb (72.1 kg)   SpO2 92%   BMI 21.56 kg/m²     Temp (24hrs), Av °F (36.7 °C), Min:97.9 °F (36.6 °C), Max:98.1 °F (36.7 °C)    In: -   Out: 250 [Urine:250]    Physical Exam:  Constitutional: This is a well developed, well nourished, 18.5-24.9 - Normal 69y.o. year old male who is alert, oriented, cooperative and in no apparent distress. Head:normocephalic and atraumatic. EENT:  PERRLA.   No conjunctival injections. Septum was midline, mucosa was without erythema, exudates or cobblestoning. No thrush was noted. Neck: Supple without thyromegaly. No elevated JVP. Trachea was midline. Respiratory: Has tachypnea, nondistressed breathing, bilateral air entry present but diminished with coarse crackles  Cardiovascular: Regular without murmur, clicks, gallops or rubs. Abdomen: Slightly rounded and soft without organomegaly. No rebound, rigidity or guarding was appreciated. Lymphatic: No lymphadenopathy. Musculoskeletal: Normal curvature of the spine. No gross muscle weakness. Extremities:  No lower extremity edema, ulcerations, tenderness, varicosities or erythema. Muscle size, tone and strength are normal.  No involuntary movements are noted. Skin:  Warm and dry. Good color, turgor and pigmentation. No lesions or scars.   No cyanosis or clubbing  Neurological/Psychiatric: The patient's general behavior, level of consciousness, thought content and emotional status is normal.        Medications:  Scheduled Medications:      methylPREDNISolone  40 mg IntraVENous BID    midodrine  5 mg Oral TID WC    warfarin placeholder: dosing by pharmacy   Other RX Placeholder    sodium chloride flush  5-40 mL IntraVENous 2 times per day    atorvastatin  40 mg Oral Daily    budesonide-formoterol  1 puff Inhalation BID    digoxin  125 mcg Oral Daily    pantoprazole  40 mg Oral QAM AC    sildenafil  20 mg Oral TID    tamsulosin  0.4 mg Oral Daily    ipratropium  0.5 mg Nebulization 4x daily     Continuous Infusions:    sodium chloride 75 mL/hr at 12/23/22 0447    sodium chloride       PRN Medicationssodium chloride, 1 spray, PRN  heparin (porcine), 80 Units/kg, PRN  heparin (porcine), 40 Units/kg, PRN  sodium chloride flush, 5-40 mL, PRN  sodium chloride, , PRN  ondansetron, 4 mg, Q8H PRN   Or  ondansetron, 4 mg, Q6H PRN  polyethylene glycol, 17 g, Daily PRN  acetaminophen, 650 mg, Q6H PRN   Or  acetaminophen, 650 mg, Q6H PRN  albuterol sulfate HFA, 2 puff, Q4H PRN      Diagnostic Labs:  CBC:   Recent Labs     12/22/22  0443 12/22/22  1825 12/23/22  0319 12/24/22  0551   WBC 6.5  --  6.2 7.0   RBC 4.02*  --  4.01* 4.18*   HGB 10.7* 11.0* 10.4* 11.0*   HCT 35.9* 36.4* 35.0* 36.6*   MCV 89.3  --  87.3 87.6   RDW 14.3  --  14.4 14.5*     --  235 260       BMP:   Recent Labs     12/22/22  0443 12/23/22  0319 12/24/22  0551    141 138   K 4.1 3.8 4.2    105 104   CO2 32* 30 28   BUN 15 12 17   CREATININE 0.53* 0.52* 0.52*       BNP: No results for input(s): BNP in the last 72 hours. PT/INR:   Recent Labs     12/23/22  0319 12/23/22  1619 12/24/22  0551   PROTIME 15.3* 19.4* 24.8*   INR 1.5 1.9 2.5       APTT:   Recent Labs     12/23/22  0907 12/23/22  1619 12/24/22  0551   APTT 72.2* 64.3* 77.1*       CARDIAC ENZYMES: No results for input(s): CKMB, CKMBINDEX, TROPONINI in the last 72 hours. Invalid input(s): CKTOTAL;3  FASTING LIPID PANEL:  Lab Results   Component Value Date    CHOL 200 (H) 01/03/2017    HDL 29 (L) 12/20/2021    TRIG 165 (H) 01/03/2017     LIVER PROFILE:   No results for input(s): AST, ALT, ALB, BILIDIR, BILITOT, ALKPHOS in the last 72 hours. MICROBIOLOGY:   Lab Results   Component Value Date/Time    CULTURE NO SIGNIFICANT GROWTH 02/20/2022 06:41 PM       Imaging:    CT HEAD WO CONTRAST    Result Date: 12/19/2022  No acute intracranial abnormality. XR CHEST PORTABLE    Result Date: 12/19/2022  Pulmonary fibrotic changes are again identified. A superimposed pneumonia is not excluded. CT CHEST PULMONARY EMBOLISM W CONTRAST    Addendum Date: 12/19/2022    ADDENDUM: Discussed with Dr. Mila Posadas at 4:40 p.m. Result Date: 12/19/2022  Pulmonary emboli bilaterally as above. No evidence of right ventricular strain. Coronary artery disease and aortic valve disease. COPD and pulmonary fibrosis.  RECOMMENDATIONS: The findings were sent to the Radiology Results Communication Center at 4:35 pm on 12/19/2022 to be communicated to a licensed caregiver. ASSESSMENT & PLAN     ASSESSMENT / PLAN:      IMPRESSION  This is a 69 y.o. male who presented with near syncope and found to have pulmonary embolism. Patient admitted to inpatient status for the management of syncope and pulmonary embolism. Principal Problem:    Acute pulmonary embolism (HCC)-likely secondary to Eliquis failure-can be due to underlying possible malignancy-will need outpatient work-up and follow-up with heme-onc, bridging with warfarin in progress, INR at 2.5 today, heme-onc has been following patient will be discharged with warfarin and follow-up with Coumadin clinic. Heart failure with preserved ejection fraction Euvolemic, no fluid congestion, off IV diuretics, cardiology on board, echo 12/19/2022-LVEF 50 to 55%, grade 3 LVDD, aortic valve heavily calcified with CHERRY-0.9 cm², severe pulmonary hypertension-RVSP-64 mmHg. Near Syncope and collapse- orthostatics positive and echo showing severe aortic stenosis. cardiology on board- Recommended outpatient follow-up with cardiology for severe aortic stenosis. compression stockings. PAF (paroxysmal atrial fibrillation) (HCC)-rate controlled, continue digoxin 125 oral daily ,continue warfarin, INR is at 2.5 pharmacy to dose warfarin   Chronic idiopathic pulmonary fibrosis ([facility] [address] , COPD stage II on PFT on 2/25/2021 with FEV1 of 55%  -Follows with Dr. Renard Friend, Hx acute respiratory failure , continue bronchodilators ,secondary to pulmonary embolism as per pulmonary. We will continue to follow the recommendations. Dyslipidemia- Resumed Home atorvastatin 40 mg      DVT ppx:  Coumadin   GI ppx: Not indicated at this time     PT/OT/SW-has been consulted  Discharge Planning: encouraged Patient needs to work with physical therapy    Aileen Segovia MD  Internal Medicine Resident, PGY-1  [facility];  [place], [place]  12/24/2022, 11:14 AM    Attending Physician Statement  I have discussed the care of Yovany Zamora and I have examined the patient myselft and taken ros and hpi , including pertinent history and exam findings,  with the resident. I have reviewed the key elements of all parts of the encounter with the resident. I agree with the assessment, plan and orders as documented by the resident.   Discussed case with Dr. Flaquito Keating  On high flow oxygen  Idiopathic pulmonary fibrosis   Concern for acute exacerbation of IPF, on IV steroids    Electronically signed by Zee Glynn MD

## 2022-12-25 LAB
ABSOLUTE EOS #: 0 K/UL (ref 0–0.4)
ABSOLUTE IMMATURE GRANULOCYTE: 0 K/UL (ref 0–0.3)
ABSOLUTE LYMPH #: 0.37 K/UL (ref 1–4.8)
ABSOLUTE MONO #: 0.37 K/UL (ref 0.1–0.8)
ANION GAP SERPL CALCULATED.3IONS-SCNC: 7 MMOL/L (ref 9–17)
BASOPHILS # BLD: 0 % (ref 0–2)
BASOPHILS ABSOLUTE: 0 K/UL (ref 0–0.2)
BUN BLDV-MCNC: 22 MG/DL (ref 8–23)
CALCIUM SERPL-MCNC: 8.3 MG/DL (ref 8.6–10.4)
CHLORIDE BLD-SCNC: 101 MMOL/L (ref 98–107)
CO2: 29 MMOL/L (ref 20–31)
CREAT SERPL-MCNC: 0.55 MG/DL (ref 0.7–1.2)
EOSINOPHILS RELATIVE PERCENT: 0 % (ref 1–4)
GFR SERPL CREATININE-BSD FRML MDRD: >60 ML/MIN/1.73M2
GLUCOSE BLD-MCNC: 141 MG/DL (ref 70–99)
HCT VFR BLD CALC: 37.3 % (ref 40.7–50.3)
HEMOGLOBIN: 11.1 G/DL (ref 13–17)
IMMATURE GRANULOCYTES: 0 %
INR BLD: 2.6
LYMPHOCYTES # BLD: 3 % (ref 24–44)
MCH RBC QN AUTO: 26.1 PG (ref 25.2–33.5)
MCHC RBC AUTO-ENTMCNC: 29.8 G/DL (ref 28.4–34.8)
MCV RBC AUTO: 87.8 FL (ref 82.6–102.9)
MONOCYTES # BLD: 3 % (ref 1–7)
MORPHOLOGY: ABNORMAL
NRBC AUTOMATED: 0 PER 100 WBC
PARTIAL THROMBOPLASTIN TIME: 30.5 SEC (ref 20.5–30.5)
PDW BLD-RTO: 14.6 % (ref 11.8–14.4)
PLATELET # BLD: 310 K/UL (ref 138–453)
PMV BLD AUTO: 10.3 FL (ref 8.1–13.5)
POTASSIUM SERPL-SCNC: 4 MMOL/L (ref 3.7–5.3)
PROTHROMBIN TIME: 25.6 SEC (ref 9.1–12.3)
RBC # BLD: 4.25 M/UL (ref 4.21–5.77)
SEG NEUTROPHILS: 94 % (ref 36–66)
SEGMENTED NEUTROPHILS ABSOLUTE COUNT: 11.46 K/UL (ref 1.8–7.7)
SODIUM BLD-SCNC: 137 MMOL/L (ref 135–144)
WBC # BLD: 12.2 K/UL (ref 3.5–11.3)

## 2022-12-25 PROCEDURE — 6370000000 HC RX 637 (ALT 250 FOR IP): Performed by: INTERNAL MEDICINE

## 2022-12-25 PROCEDURE — 2700000000 HC OXYGEN THERAPY PER DAY

## 2022-12-25 PROCEDURE — 99233 SBSQ HOSP IP/OBS HIGH 50: CPT | Performed by: INTERNAL MEDICINE

## 2022-12-25 PROCEDURE — 6360000002 HC RX W HCPCS: Performed by: INTERNAL MEDICINE

## 2022-12-25 PROCEDURE — 99232 SBSQ HOSP IP/OBS MODERATE 35: CPT | Performed by: INTERNAL MEDICINE

## 2022-12-25 PROCEDURE — 94640 AIRWAY INHALATION TREATMENT: CPT

## 2022-12-25 PROCEDURE — 94761 N-INVAS EAR/PLS OXIMETRY MLT: CPT

## 2022-12-25 PROCEDURE — 36415 COLL VENOUS BLD VENIPUNCTURE: CPT

## 2022-12-25 PROCEDURE — 85610 PROTHROMBIN TIME: CPT

## 2022-12-25 PROCEDURE — 6370000000 HC RX 637 (ALT 250 FOR IP)

## 2022-12-25 PROCEDURE — 85025 COMPLETE CBC W/AUTO DIFF WBC: CPT

## 2022-12-25 PROCEDURE — 6360000002 HC RX W HCPCS: Performed by: STUDENT IN AN ORGANIZED HEALTH CARE EDUCATION/TRAINING PROGRAM

## 2022-12-25 PROCEDURE — 85730 THROMBOPLASTIN TIME PARTIAL: CPT

## 2022-12-25 PROCEDURE — 2060000000 HC ICU INTERMEDIATE R&B

## 2022-12-25 PROCEDURE — 80048 BASIC METABOLIC PNL TOTAL CA: CPT

## 2022-12-25 PROCEDURE — 6370000000 HC RX 637 (ALT 250 FOR IP): Performed by: STUDENT IN AN ORGANIZED HEALTH CARE EDUCATION/TRAINING PROGRAM

## 2022-12-25 PROCEDURE — 2580000003 HC RX 258

## 2022-12-25 RX ORDER — ALBUTEROL SULFATE 2.5 MG/3ML
2.5 SOLUTION RESPIRATORY (INHALATION) 4 TIMES DAILY
Status: DISCONTINUED | OUTPATIENT
Start: 2022-12-26 | End: 2022-12-26 | Stop reason: HOSPADM

## 2022-12-25 RX ORDER — ALBUTEROL SULFATE 2.5 MG/3ML
2.5 SOLUTION RESPIRATORY (INHALATION)
Status: DISCONTINUED | OUTPATIENT
Start: 2022-12-25 | End: 2022-12-26 | Stop reason: HOSPADM

## 2022-12-25 RX ORDER — PREDNISONE 20 MG/1
40 TABLET ORAL DAILY
Status: DISCONTINUED | OUTPATIENT
Start: 2022-12-29 | End: 2022-12-26 | Stop reason: HOSPADM

## 2022-12-25 RX ORDER — PREDNISONE 20 MG/1
20 TABLET ORAL DAILY
Status: DISCONTINUED | OUTPATIENT
Start: 2023-01-01 | End: 2022-12-26 | Stop reason: HOSPADM

## 2022-12-25 RX ORDER — PREDNISONE 10 MG/1
10 TABLET ORAL DAILY
Status: DISCONTINUED | OUTPATIENT
Start: 2023-01-04 | End: 2022-12-26 | Stop reason: HOSPADM

## 2022-12-25 RX ORDER — WARFARIN SODIUM 2.5 MG/1
2.5 TABLET ORAL
Status: COMPLETED | OUTPATIENT
Start: 2022-12-25 | End: 2022-12-25

## 2022-12-25 RX ADMIN — SODIUM CHLORIDE, PRESERVATIVE FREE 10 ML: 5 INJECTION INTRAVENOUS at 20:58

## 2022-12-25 RX ADMIN — IPRATROPIUM BROMIDE 0.5 MG: 0.5 SOLUTION RESPIRATORY (INHALATION) at 09:38

## 2022-12-25 RX ADMIN — MIDODRINE HYDROCHLORIDE 5 MG: 5 TABLET ORAL at 16:07

## 2022-12-25 RX ADMIN — PANTOPRAZOLE SODIUM 40 MG: 40 TABLET, DELAYED RELEASE ORAL at 08:38

## 2022-12-25 RX ADMIN — DIGOXIN 125 MCG: 125 TABLET ORAL at 08:38

## 2022-12-25 RX ADMIN — IPRATROPIUM BROMIDE 0.5 MG: 0.5 SOLUTION RESPIRATORY (INHALATION) at 12:32

## 2022-12-25 RX ADMIN — IPRATROPIUM BROMIDE 0.5 MG: 0.5 SOLUTION RESPIRATORY (INHALATION) at 16:36

## 2022-12-25 RX ADMIN — SODIUM CHLORIDE, PRESERVATIVE FREE 10 ML: 5 INJECTION INTRAVENOUS at 08:39

## 2022-12-25 RX ADMIN — BUDESONIDE AND FORMOTEROL FUMARATE DIHYDRATE 1 PUFF: 80; 4.5 AEROSOL RESPIRATORY (INHALATION) at 21:17

## 2022-12-25 RX ADMIN — DESMOPRESSIN ACETATE 40 MG: 0.2 TABLET ORAL at 08:38

## 2022-12-25 RX ADMIN — BUDESONIDE AND FORMOTEROL FUMARATE DIHYDRATE 1 PUFF: 80; 4.5 AEROSOL RESPIRATORY (INHALATION) at 09:38

## 2022-12-25 RX ADMIN — METHYLPREDNISOLONE SODIUM SUCCINATE 40 MG: 40 INJECTION, POWDER, FOR SOLUTION INTRAMUSCULAR; INTRAVENOUS at 20:58

## 2022-12-25 RX ADMIN — WARFARIN SODIUM 2.5 MG: 2.5 TABLET ORAL at 16:45

## 2022-12-25 RX ADMIN — IPRATROPIUM BROMIDE 0.5 MG: 0.5 SOLUTION RESPIRATORY (INHALATION) at 21:17

## 2022-12-25 RX ADMIN — SILDENAFIL 20 MG: 20 TABLET ORAL at 16:07

## 2022-12-25 RX ADMIN — SILDENAFIL 20 MG: 20 TABLET ORAL at 08:38

## 2022-12-25 RX ADMIN — METHYLPREDNISOLONE SODIUM SUCCINATE 40 MG: 40 INJECTION, POWDER, FOR SOLUTION INTRAMUSCULAR; INTRAVENOUS at 08:38

## 2022-12-25 RX ADMIN — MIDODRINE HYDROCHLORIDE 5 MG: 5 TABLET ORAL at 08:38

## 2022-12-25 RX ADMIN — ALBUTEROL SULFATE 2 PUFF: 90 AEROSOL, METERED RESPIRATORY (INHALATION) at 21:18

## 2022-12-25 RX ADMIN — MIDODRINE HYDROCHLORIDE 5 MG: 5 TABLET ORAL at 12:40

## 2022-12-25 RX ADMIN — SILDENAFIL 20 MG: 20 TABLET ORAL at 20:58

## 2022-12-25 RX ADMIN — TAMSULOSIN HYDROCHLORIDE 0.4 MG: 0.4 CAPSULE ORAL at 08:38

## 2022-12-25 ASSESSMENT — PAIN SCALES - GENERAL
PAINLEVEL_OUTOF10: 0
PAINLEVEL_OUTOF10: 0

## 2022-12-25 ASSESSMENT — ENCOUNTER SYMPTOMS
ABDOMINAL PAIN: 0
EYE REDNESS: 0
COUGH: 1
TROUBLE SWALLOWING: 0
WHEEZING: 0
SORE THROAT: 0
PHOTOPHOBIA: 0
CHEST TIGHTNESS: 0
DIARRHEA: 0
VOMITING: 0
SHORTNESS OF BREATH: 1
VOICE CHANGE: 0
ALLERGIC/IMMUNOLOGIC NEGATIVE: 1

## 2022-12-25 NOTE — PROGRESS NOTES
PULMONARY & CRITICAL CARE MEDICINE PROGRESS  NOTE     Patient:  Eboni Tate  MRN: [id number]  Admit date: 12/19/2022  Primary Care Physician: Julio Victor MD  Consulting Physician: Austin Guzman MD  CODE Status: Full Code  LOS: 6    SUBJECTIVE     I personally interviewed/examined the patient, reviewed interval history and interpreted all available radiographic, laboratory data at the time of service. Chief Compliant/Reason for Initial Consult:   Pulm embolism/chronic respiratory failure/pulmonary fibrosis. Brief Hospital Course: The patient is a 69 y.o. male he is followed up by Dr. Logan Singletary with history of pulmonary fibrosis/IPF on 6 L of nasal cannula with chronic hypoxic respiratory failure, history of pulmonary embolism on chronic anticoagulation therapy. He has been followed by hematology he had a history of pulm embolism apparently in 2020 and at that time he was treated with Xarelto and later while he was on Xarelto apparently he has nonocclusive pulm embolism considered as failure of Xarelto and it was changed to Eliquis and he has been taking Eliquis and compliant with Eliquis. According to patient for last 2 to 3 weeks he has been having increasing shortness of breath he gets short of breath by ambulating from room to bathroom also he has increased cough than baseline. Patient presented when he had an episode of near syncope he usually does feel dizzy when he stands up but at that time he was apparently sweaty did not completely pass out and did not have a fall. He does not complain of fever denies purulent sputum denies increased sputum production denies chills denies hemoptysis and does not complain of chest pain currently he does not complain of dizziness lightheadedness or syncope.   He presented to emergency room had a CTA chest done which showed that he has bilateral pulm embolism and right lower lobe lingula pulmonary artery and left lower lobe segmental branches per radiology. CT scan of the chest shows findings consistent with pulmonary fibrosis with basilar honeycomb changes and bronchiectasis no areas of definite consolidation. Patient has been on 6 L nasal cannula maintaining saturation above 90%. He had been taking his Eliquis and consider failure of NOAC/DOAC hematology oncology was consulted and patient is currently on heparin drip hematology discussed with patient about Lovenox versus Coumadin therapy patient wanted to proceed with Coumadin therapy. Previous echo in February this year showed estimated RVSP of 41, ejection fraction of 40 to 70%, diastolic dysfunction RV dilatation with normal systolic function mild to moderate MR. He has history of colon cancer in status postsurgery in 2015 with colostomy and reversal later  He is a non-smoker no history of COPD. Interval History:  12/25/22  Last 24-hour patient is afebrile T-max is 98.4 heart rate is in 80s to 90s he is hemodynamically stable. He is on Jonathan high flow nasal cannula and now he is on 5 L. According patient he has mild same cough get shortness of breath on walking to the bathroom shortness of breath at rest is better he is able to talk in better sentences and not desaturating while he is talking. He denies any chest pain hemoptysis dizziness or presyncope. He is started on Solu-Medrol 40 mg every 12 hours on 12/23/2022 for possible IPF exacerbation. Echocardiogram estimated RVSP of 64 ejection fraction of 50 to 55% and severe grade 3 LV diastolic dysfunction. Moderate to severe aortic stenosis    Review of Systems:  Review of Systems   Constitutional:  Positive for activity change. Negative for appetite change, fever and unexpected weight change. HENT:  Negative for postnasal drip, sore throat, trouble swallowing and voice change. Eyes:  Negative for photophobia, redness and visual disturbance.    Respiratory: Positive for cough and shortness of breath. Negative for chest tightness and wheezing. Cardiovascular:  Negative for chest pain, palpitations and leg swelling. Gastrointestinal:  Negative for abdominal pain, diarrhea and vomiting. Endocrine: Negative for polydipsia, polyphagia and polyuria. Genitourinary:  Negative for dysuria, frequency and hematuria. Musculoskeletal: Negative. Allergic/Immunologic: Negative. Neurological:  Negative for dizziness, syncope, speech difficulty and weakness. Hematological:  Negative for adenopathy. Does not bruise/bleed easily. Psychiatric/Behavioral: Negative. OBJECTIVE     VITAL SIGNS:   LAST-  /81   Pulse 83   Temp 97.9 °F (36.6 °C) (Axillary)   Resp 19   Ht 6' (1.829 m)   Wt 159 lb (72.1 kg)   SpO2 97%   BMI 21.56 kg/m²   8-24 HR RANGE-  TEMP Temp  Av.1 °F (36.7 °C)  Min: 97.9 °F (36.6 °C)  Max: 98.4 °F (24.4 °C)   BP Systolic (83UPN), RQQ:605 , Min:118 , EIC:848      Diastolic (14ASO), IEI:19, Min:62, Max:81     PULSE Pulse  Av.7  Min: 69  Max: 94   RR Resp  Av.5  Min: 18  Max: 19   O2 SAT SpO2  Av.5 %  Min: 97 %  Max: 98 %   OXYGEN DELIVERY O2 Flow Rate (L/min)  Av L/min  Min: 5 L/min  Max: 7 L/min     Systemic Examination:   Physical Exam  General appearance - looks comfortable and in mild distress mildly tachypneic  Mental status - alert, oriented to person, place, and time  Eyes - pupils equal and reactive, extraocular eye movements intact  Mouth - mucous membranes moist, pharynx normal without lesions  Neck - supple, no significant adenopathy  Chest - Chest was symmetrical without dullness to percussion. Mildly tachypneic in mild distress. Breath sounds bilaterally present he has bibasilar dry inspiratory crackles present extending to lower lung field no expiratory wheezing or rhonchi.  There is no intercostal recession or use of accessory muscles  Heart - normal rate, regular rhythm, normal S1, S2, no murmurs, rubs, clicks or gallops  Abdomen - soft, nontender, nondistended, no masses or organomegaly  Neurological - alert, oriented, normal speech, no focal findings or movement disorder noted  Extremities - peripheral pulses normal, no pedal edema, no clubbing or cyanosis  Skin - normal coloration and turgor, no rashes, no suspicious skin lesions noted     DATA REVIEW     Medications:  Scheduled Meds:   methylPREDNISolone  40 mg IntraVENous BID    midodrine  5 mg Oral TID WC    warfarin placeholder: dosing by pharmacy   Other RX Placeholder    sodium chloride flush  5-40 mL IntraVENous 2 times per day    atorvastatin  40 mg Oral Daily    budesonide-formoterol  1 puff Inhalation BID    digoxin  125 mcg Oral Daily    pantoprazole  40 mg Oral QAM AC    sildenafil  20 mg Oral TID    tamsulosin  0.4 mg Oral Daily    ipratropium  0.5 mg Nebulization 4x daily     Continuous Infusions:   sodium chloride 75 mL/hr at 12/23/22 0447    sodium chloride       LABS:-  ABG:   No results for input(s): POCPH, POCPCO2, POCPO2, POCHCO3, BKLP1YJL in the last 72 hours. CBC:   Recent Labs     12/23/22 0319 12/24/22  0551 12/25/22  0659   WBC 6.2 7.0 12.2*   HGB 10.4* 11.0* 11.1*   HCT 35.0* 36.6* 37.3*   MCV 87.3 87.6 87.8    260 310   LYMPHOPCT 16* 5* 3*   RBC 4.01* 4.18* 4.25   MCH 25.9 26.3 26.1   MCHC 29.7 30.1 29.8   RDW 14.4 14.5* 14.6*       BMP:   Recent Labs     12/23/22 0319 12/24/22  0551 12/25/22  0659    138 137   K 3.8 4.2 4.0    104 101   CO2 30 28 29   BUN 12 17 22   CREATININE 0.52* 0.52* 0.55*   GLUCOSE 101* 141* 141*       Liver Function Test:   No results for input(s): PROT, LABALBU, ALT, AST, GGT, ALKPHOS, BILITOT in the last 72 hours. Amylase/Lipase:  No results for input(s): AMYLASE, LIPASE in the last 72 hours.   Coagulation Profile:   Recent Labs     12/23/22  1619 12/24/22  0551 12/25/22  0659   INR 1.9 2.5 2.6   PROTIME 19.4* 24.8* 25.6*   APTT 64.3* 77.1* 30.5       Cardiac Enzymes:  No results for input(s): CKTOTAL, CKMB, CKMBINDEX, TROPONINI in the last 72 hours. Lactic Acid:  Lab Results   Component Value Date    LACTA NOT REPORTED 12/07/2020    LACTA NOT REPORTED 10/17/2020    LACTA 1.0 08/11/2014     BNP:   No results found for: BNP  D-Dimer:  Lab Results   Component Value Date    DDIMER 1.10 (H) 02/18/2022     Others:   Lab Results   Component Value Date    TSH 7.55 (H) 12/19/2022     Lab Results   Component Value Date    YOUNG NEGATIVE 10/17/2020    SEDRATE 82 (H) 10/22/2020    CRP 11.1 (H) 01/12/2021     No results found for: [place]  Lab Results   Component Value Date    IRON 33 (L) 05/02/2022    TIBC 349 05/02/2022    FERRITIN 47 05/02/2022     No results found for: SPEP, UPEP  Lab Results   Component Value Date/Time    PSA 0.46 12/20/2022 07:23 AM    CEA 4.8 12/20/2022 07:23 AM     114 12/20/2022 07:23 AM       Input/Output:  No intake or output data in the 24 hours ending 12/25/22 0922      Microbiology:  No results for input(s): SPECDESC, SPECDESC, SPECIAL, CULTURE, CULTURE, STATUS, ORG, CDIFFTOXPCR, CAMPYLOBPCR, SALMONELLAPC, SHIGAPCR, SHIGELLAPCR, MPNEUG, MPNEUM, LACTOQL in the last 72 hours. Pathology:    Radiology reports:  VL DUP LOWER EXTREMITY VENOUS BILATERAL   Final Result      CT CHEST PULMONARY EMBOLISM W CONTRAST   Final Result   Addendum (preliminary) 1 of 1   ADDENDUM:   Discussed with Dr. Laurel Posadas at 4:40 p.m. Final   Pulmonary emboli bilaterally as above. No evidence of right ventricular   strain. Coronary artery disease and aortic valve disease. COPD and   pulmonary fibrosis. RECOMMENDATIONS:   The findings were sent to the Radiology Results [address] at 4:35   pm on 12/19/2022 to be communicated to a licensed caregiver. CT HEAD WO CONTRAST   Final Result   No acute intracranial abnormality. XR CHEST PORTABLE   Final Result   Pulmonary fibrotic changes are again identified.   A superimposed pneumonia is   not excluded. Echocardiogram:   No results found for this or any previous visit. Cardiac Catheterization:   No results found for this or any previous visit. ASSESSMENT AND PLAN     Assessment:       //Recurrent pulmonary pulm embolism while on NOAC/DOAC  //Chronic respiratory failure on long-term oxygen therapy  //Near syncope on presentation likely combination/multifactorial with history of chronic hypoxic respiratory failure with pulm embolism and on sildenafil  //Pulmonary fibrosis/IPF  //Pulmonary hypertension  //Moderate to severe aortic stenosis  //History of colon cancer    Plan:    On Solu-Medrol started on 12/23/2022 for possible IPF exacerbation. Continue with Solu-Medrol first and then prednisone taper over 2 weeks from tomorrow. Continue high flow nasal cannula and monitor saturation to keep above 88 to 90%. Recommend to change high flow to nasal cannula. He use 5 to 9 L nasal cannula at home  He is off heparin drip and on Coumadin INR is 2.6  Coumadin bridged with heparin and follow-up with oncology for chronic anticoagulation and also in Coumadin clinic. Long-term anticoagulation per hematology  Follow-up with cardiology for aortic stenosis. Continue incentive spirometry, pulmonary toilet, aspiration precautions and bronchodilators  Antimicrobials reviewed; no need for antibiotic from pulm standpoint. DVT prophylaxis on therapeutic heparin  Physical/occupational therapy; increase activity as tolerated. Discussed with nursing staff. I updated the patient regarding the current clinical condition, provisional diagnosis and management plan. I addressed concerns and answered all questions to the best of my abilities. It was my pleasure to evaluate [facility] today. We will continue to follow. I would like to thank you for allowing me to participate in the care of this patient. Please feel free to call with any further questions or concerns.     Roger Man MD, [facility] [address]           12/25/2022, 9:22 AM    Please note that this chart was generated using voice recognition Dragon dictation software. Although every effort was made to ensure the accuracy of this automated transcription, some errors in transcription may have occurred.

## 2022-12-25 NOTE — DISCHARGE INSTR - COC
Continuity of Care Form    Patient Name: Lucien Marks   :  1955  MRN:  [id number]    Admit date:  2022  Discharge date:  ***    Code Status Order: Full Code   Advance Directives:     Admitting Physician:  Laura Romo MD  PCP: Courtney Eller MD    Discharging Nurse: [facility] Unit/Room#:   Discharging Unit Phone Number: ***    Emergency Contact:   Extended Emergency Contact Information  Primary Emergency Contact: Greg Jovan (sig other)  Address: [address]           [address] [address] [address] [facility], [address] Phone: [phone]  Mobile Phone: [phone]  Relation: Other  Secondary Emergency Contact: Soundra Bearded  Address: [address] Phone: [phone]  Mobile Phone: [phone]  Relation: Brother/Sister    Past Surgical History:  Past Surgical History:   Procedure Laterality Date    CARDIAC CATHETERIZATION  2018    Non-obstructive CAD    CARDIOVERSION      COLECTOMY      2nd colectomy, Colostomy and reversed [facility]      1st time [place]    COLONOSCOPY      COLONOSCOPY  2016    COLONOSCOPY N/A 2018    COLONOSCOPY DIAGNOSTIC performed by Sunny Ding MD at [address] [facility] Right     inguinal    HIP SURGERY Right 2022    HIP FEMORAL HEMIARTHROPLASTY performed by Jesús Crisostomo MD at [address] [facility] Right 1970's    arthrotomy    TONSILLECTOMY      TOTAL KNEE ARTHROPLASTY Right 2019    KNEE TOTAL ARTHROPLASTY performed by Jesús Crisostomo MD at [address]     TRANSESOPHAGEAL ECHOCARDIOGRAM  2018    UPPER GASTROINTESTINAL ENDOSCOPY N/A 2018    EGD DIAGNOSTIC ONLY performed by Sunny Ding MD at [facility] [address] 2019    [facility]       Immunization History:   Immunization History   Administered Date(s) Administered    COVID-19, PFIZER PURPLE top, DILUTE for use, (age 15 y+), 30mcg/0.3mL 2021, 05/04/2021, 12/21/2021    Influenza Vaccine, unspecified formulation 01/16/2014, 01/03/2017, 12/12/2017    Influenza Virus Vaccine 01/16/2014, 10/26/2014, 12/04/2015    Influenza, AFLURIA (age 1 yrs+), FLUZONE, (age 10 mo+), MDV, 0.5mL 10/23/2014, 01/03/2017    Influenza, FLUAD, (age 72 y+), Adjuvanted, 0.5mL 11/16/2020, 10/22/2021, 11/08/2022    Influenza, FLUARIX, FLULAVAL, FLUZONE (age 10 mo+) AND AFLURIA, (age 1 y+), PF, 0.5mL 10/23/2014    Influenza, High Dose (Fluzone 65 yrs and older) 09/27/2018    Influenza, Sotero Noun, 6 mo and older, IM (Fluzone, Flulaval) 12/12/2017    Influenza, Triv, inactivated, subunit, adjuvanted, IM (Fluad 65 yrs and older) 11/08/2019    Tdap (Boostrix, Adacel) 12/19/2022       Active Problems:  Patient Active Problem List   Diagnosis Code    Dyslipidemia E78.5    ED (erectile dysfunction) N52.9    Incomplete bladder emptying R33.9    Benign prostatic hyperplasia with lower urinary tract symptoms N40.1    History of colon cancer Z85.038    PAF (paroxysmal atrial fibrillation) (HCC) I48.0    Anemia of chronic disease D63.8    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    Presbyopia H52.4    Incisional hernia, without obstruction or gangrene K43.2    Hypertrophic nonobstructive cardiomyopathy (HCC) I42.2    Iron (Fe) deficiency anemia D50.9    Primary osteoarthritis of right knee M17.11    History of malignant neoplasm of rectum Z85.048    Hill's esophagus K22.70    CHF (congestive heart failure), NYHA class II, acute on chronic, combined ([facility] [address] I50.43    Hypoxia R09.02    Shortness of breath R06.02    Occupational pulmonary disease J98.4    Sinus bradycardia R00.1    Acute hypoxemic respiratory failure due to COVID-19 (HCC) U07.1, J96.01    COVID-19 U07.1    Pneumonia J18.9    Chronic idiopathic pulmonary fibrosis (HCC) J84.112    Syncope and collapse R55    Chronic anticoagulation Z79.01    Severe malnutrition (HCC) E43    Cervical stenosis of spinal canal M48.02    Impingement syndrome of left shoulder M75.42    Fracture of right hip S72.001A    Allergy to penicillin Z88.0    Acute pulmonary embolism (HCC) I26.99    Pulmonary embolism, bilateral (HCC) I26.99    Demand ischemia of myocardium (HCC) I24.8    Acute respiratory acidosis (HCC) J96.02    Orthostatic hypotension I95.1    Acute pulmonary edema (HCC) J81.0    Pulmonary embolism without acute cor pulmonale (HCC) I26.99    Chronic respiratory failure with hypoxia (HCC) J96.11    Pulmonary HTN (HCC) I27.20    Postural dizziness with near syncope R42, R55       Isolation/Infection:   Isolation            No Isolation          Patient Infection Status       Infection Onset Added Last Indicated Last Indicated By Review Planned Expiration Resolved Resolved By    None active    Resolved    COVID-19 (Rule Out) 22 Respiratory Panel, Molecular, with COVID-19 (Restricted: peds pts or suitable admitted adults) (Ordered)   22 Rule-Out Test Resulted    COVID-19 (Rule Out) 22 COVID-19, Rapid (Ordered)   22 Rule-Out Test Resulted    COVID-19 (Rule Out) 22 COVID-19, Rapid (Ordered)   22 Rule-Out Test Resulted    COVID-19 20 COVID-19   20     COVID-19 (Rule Out) 20 COVID-19 (Ordered)   20 Rule-Out Test Resulted    COVID-19 (Rule Out) 10/17/20 10/17/20 10/17/20 COVID-19 (Ordered)   10/17/20 Rule-Out Test Resulted    COVID-19 (Rule Out) 10/17/20 10/17/20 10/17/20 COVID-19 (Ordered)   10/17/20 Rule-Out Test Resulted            Nurse Assessment:  Last Vital Signs: BP (!) 137/93   Pulse 90   Temp 97.5 °F (36.4 °C) (Oral)   Resp 20   Ht 6' (1.829 m)   Wt 159 lb (72.1 kg)   SpO2 93%   BMI 21.56 kg/m²     Last documented pain score (0-10 scale): Pain Level: 7  Last Weight:   Wt Readings from Last 1 Encounters:   22 159 lb (72.1 kg)     Mental Status:  oriented, alert, thought processes intact, and able to concentrate and follow conversation    IV Access:  - None    Nursing Mobility/ADLs:  Walking   Independent  Transfer  Independent  Bathing  Independent  Dressing  Independent  [address]  Med Delivery   whole    Wound Care Documentation and Therapy:  Incision 02/22/22 Hip Anterior;Proximal;Right (Active)   Number of days: 306        Elimination:  Continence: Bowel: Yes  Bladder: Yes  Urinary Catheter: None   Colostomy/Ileostomy/Ileal Conduit: No       Date of Last BM: 12/26/2022      Intake/Output Summary (Last 24 hours) at 12/25/2022 1528  Last data filed at 12/25/2022 1247  Gross per 24 hour   Intake 120 ml   Output 500 ml   Net -380 ml     I/O last 3 completed shifts:  In: -   Out: 250 [Urine:250]    Safety Concerns:     History of Falls (last 30 days)    Impairments/Disabilities:      None    Nutrition Therapy:  Current Nutrition Therapy:   - Oral Diet:  General    Routes of Feeding: Oral  Liquids: Thin Liquids  Daily Fluid Restriction: no  Last Modified Barium Swallow with Video (Video Swallowing Test): not done    Treatments at the Time of Hospital Discharge:   Respiratory Treatments: albuterol   Oxygen Therapy:  is on oxygen at 5-10 L/min per nasal cannula.   Ventilator:    - No ventilator support    Rehab Therapies: Physical Therapy and Occupational Therapy  Weight Bearing Status/Restrictions: No weight bearing restrictions  Other Medical Equipment (for information only, NOT a DME order):  cane, walker, bath bench, and bedside commode  Other Treatments: none    Patient's personal belongings (please select all that are sent with patient):  Hearing Aides right, Dentures lower    RN SIGNATURE:  {Esignature:[id number]}    CASE MANAGEMENT/SOCIAL WORK SECTION    Inpatient Status Date: 12/19/22    Readmission Risk Assessment Score:  Readmission Risk              Risk of Unplanned Readmission:  16           Discharging to Facility/ Agency   Name: Address:  Phone:  Fax:    Dialysis Facility (if applicable)   Name:  Address:  Dialysis Schedule:  Phone:  Fax:    / signature: Electronically signed by Joe Han RN on 12/26/22 at 4:45 PM EST    PHYSICIAN SECTION    Prognosis: Fair    Condition at Discharge: Stable    Rehab Potential (if transferring to Rehab): Fair    Recommended Labs or Other Treatments After Discharge:     Please do follow up with coumadin clinic for managing your anticoagulation with coumadin  STOP taking eliquis which you have at home as you are starting Coumadin. Follow up with PCP post discharge  Follow-up with your pulmonary Dr. Pao Osei for treatment of IPF  Follow up with pulmonary for your pulmonary fibrosis  Start taking Prednisone tablets as prescribed  Follow up with cardiology for evaluation of sever aortic stenosis  Continue to use the compression stockings  Follow-up with heme-onc in their office after discharge for further work-up for pulmonary embolism      Physician Certification: I certify the above information and transfer of Indu Chilel  is necessary for the continuing treatment of the diagnosis listed and that he requires [address] for greater 30 days.      Update Admission H&P: No change in H&P    PHYSICIAN SIGNATURE:  Electronically signed by Gautam Crabtree MD on 12/25/22 at 3:28 PM EST

## 2022-12-25 NOTE — PLAN OF CARE
Problem: Discharge Planning  Goal: Discharge to home or other facility with appropriate resources  12/25/2022 0006 by Jacob Kruse RN  Outcome: Progressing  12/24/2022 1605 by Romana Lowe, RN  Outcome: Progressing     Problem: Safety - Adult  Goal: Free from fall injury  12/25/2022 0006 by Jacob Kruse RN  Outcome: Progressing  12/24/2022 1605 by Romana Lowe, RN  Outcome: Progressing     Problem: Skin/Tissue Integrity  Goal: Absence of new skin breakdown  Description: 1. Monitor for areas of redness and/or skin breakdown  2. Assess vascular access sites hourly  3. Every 4-6 hours minimum:  Change oxygen saturation probe site  4. Every 4-6 hours:  If on nasal continuous positive airway pressure, respiratory therapy assess nares and determine need for appliance change or resting period.   12/25/2022 0006 by Jacob Kruse RN  Outcome: Progressing  12/24/2022 1605 by Romana Lowe, RN  Outcome: Progressing     Problem: Chronic Conditions and Co-morbidities  Goal: Patient's chronic conditions and co-morbidity symptoms are monitored and maintained or improved  12/25/2022 0006 by Jacob Kruse RN  Outcome: Progressing  12/24/2022 1605 by Romana Lowe, RN  Outcome: Progressing     Problem: Respiratory - Adult  Goal: Achieves optimal ventilation and oxygenation  12/25/2022 0006 by Jacob Kruse RN  Outcome: Progressing  12/24/2022 1605 by Romana Lowe, RN  Outcome: Progressing  12/24/2022 1129 by Meron Rodas RCP  Outcome: Progressing     Problem: Nutrition Deficit:  Goal: Optimize nutritional status  12/25/2022 0006 by Jacob Kruse RN  Outcome: Progressing  12/24/2022 1605 by Romana Lowe, RN  Outcome: Progressing     Problem: ABCDS Injury Assessment  Goal: Absence of physical injury  12/25/2022 0006 by Jacob Kruse RN  Outcome: Progressing  12/24/2022 1605 by Romana Lowe, RN  Outcome: Progressing     Problem: Pain  Goal: Verbalizes/displays adequate comfort level or baseline comfort level  12/25/2022 0006 by Jovita Willett RN  Outcome: Progressing  12/24/2022 1605 by Chris Guerra RN  Outcome: Progressing

## 2022-12-25 NOTE — PROGRESS NOTES
Today's Date: 12/25/2022  Patient Name: Elli Ulloa  Date of admission: 12/19/2022  2:07 PM  Patient's age: 69 y.o., 1955  Admission Dx: Syncope and collapse [R55]  Shortness of breath [R06.02]  Pulmonary embolism, bilateral ([address] [I26.99]  Pulmonary embolism without acute cor pulmonale, unspecified chronicity, unspecified pulmonary embolism type ([address] [I26.99]    Reason for Consult: pulmonary embolism while on eliquis  Requesting Physician: Rica Goncalves MD    Chief Complaint:  shortness of breath  Interval Changes:    Has persistent shortness of breath, his saturations not dropping on 8 L while speaking full. No signs of distress noted. Oxygen requirement at his baseline between 5 to 9 L. Denies any bleeding episodes while on heparin      History of Present Illness: The patient is a 69 y.o. male who is admitted to the hospital for shortness of breath. CT PE demonstrated bilateral segmental pulmonary embolisms -right lower lobar, lingula and left lower segmental branches. CA 19-9: 114, CEA 4.8, PSA 0.46     PE history -  11/2020 - Tiny nonocclusive pulmonary embolus in the right lower lobar pulmonary artery. 02/22 - CT PE negative  12/2022 - Partial incomplete filling defect right lower lobar  pulmonary artery. Partial filling defect lingular pulmonary artery. Filling  defects left lower lobe segmental branches. Hematology history  Patient seen by Dr. Yessica Marroquin 12/2020 for xarelto failure for pulmonary embolism. At the time, his acute PE was suspected due to medication being held while in hospital. He was changed to eliqius from [address] per patient's wishes. Patient started xarelto in 2018 due to heart condition by Dr. Nicole Hernandez per patient. However was switched to eliqius in 2020 due to small Pulmonary embolism. 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 Colostomy was reversed. Vieyra syndrome negative. Patient stated he follows up with GI and had last colonoscopy last year and has been negative so far with no relapse of cancer. PMH - chronic idiopathic pulmonary fibrosis, atrial fibrillation, BPH, colon cancer. Patient reportedly states that he was diagnosed with a pulmonary embolism this past February; however, per chart review and CT PE performed on 2/18/2022, no evidence of acute pulmonary embolism was found at that time. Past Medical History:   has a past medical history of Atrial fibrillation ([address] Back pain, chronic, Hill's esophagus, Benign essential HTN, BPH (benign prostatic hyperplasia), Cancer ([address] Chronic idiopathic pulmonary fibrosis ([address] Cocaine abuse in remission ([address] Community acquired bacterial pneumonia, ED (erectile dysfunction), GERD (gastroesophageal reflux disease), GI bleed, Hernia, History of colon cancer, Melena, Migraines, Multifocal pneumonia, Murmur, cardiac, and Single subsegmental pulmonary embolism without acute cor pulmonale ([address] Past Surgical History:   has a past surgical history that includes Tonsillectomy; Colonoscopy; colectomy; Colonoscopy (07/18/2016); knee surgery (Right, 1970's); transesophageal echocardiogram (11/29/2018); Upper gastrointestinal endoscopy (N/A, 12/5/2018); Colonoscopy (N/A, 12/6/2018); hernia repair (Right, 2009); Cardiac catheterization (11/29/2018); colectomy; Total knee arthroplasty (Right, 1/8/2019); Upper gastrointestinal endoscopy (N/A, 6/18/2019); Cardioversion (2020); and hip surgery (Right, 2/22/2022). Medications:    Prior to Admission medications    Medication Sig Start Date End Date Taking?  Authorizing Provider   OXYGEN Inhale 4 L into the lungs continuous Uses 4-5 liters 24/7   Yes Historical Provider, MD   omeprazole (PRILOSEC) 40 MG delayed release capsule take 1 capsule by mouth once daily 12/8/22   Molly Patricia MD   fluticasone-umeclidin-vilant (Madison Tabatha ELLIPTA) 200-62.5-25 MCG/ACT AEPB inhaler Inhale 1 puff into the lungs daily 12/6/22   Cassandra Espinoza MD   albuterol sulfate HFA (VENTOLIN HFA) 108 (90 Base) MCG/ACT inhaler Inhale 2 puffs into the lungs every 4 hours as needed for Wheezing 12/5/22 12/5/23  Cassandra Espinoza MD   sildenafil (REVATIO) 20 MG tablet Take 1 tablet by mouth 3 times daily 11/8/22   Cassandra Espinoza MD   sertraline (ZOLOFT) 25 MG tablet take 1 tablet by mouth once daily  Patient not taking: Reported on 11/8/2022 11/2/22   Nacho Meneses DO   ibuprofen (ADVIL;MOTRIN) 800 MG tablet take 1 tablet by mouth three times a day if needed for MODERATE PAIN ( PAIN SCALE 4-6 ) 10/24/22   Cassandra Espinoza MD   sertraline (ZOLOFT) 50 MG tablet Take 1 tablet by mouth daily 10/3/22   Cassandra Espinoza MD   ELIQUIS 5 MG TABS tablet take 1 tablet by mouth twice a day 9/19/22   MIRI Cadena - NP   tamsulosin [facility]) 0.4 MG capsule take 1 capsule by mouth once daily 8/8/22   Cassandra Espinoza MD   ferrous sulfate (IRON 325) 325 (65 Fe) MG tablet take 1 tablet by mouth twice a day 7/29/22   Cassandra Espinoza MD   atorvastatin (LIPITOR) 40 MG tablet take 1 tablet by mouth once daily 4/28/22   Cassandra Espinoza MD   digoxin (LANOXIN) 125 MCG tablet take 1 tablet by mouth once daily 4/28/22   Cassandra Espinoza MD   midodrine (PROAMATINE) 5 MG tablet Take 1 tablet by mouth 3 times daily (before meals) 3/2/22   Jacqueline Muller DO   Baclofen (LIORESAL) 5 MG tablet take 1 tablet by mouth twice a day 1/27/22   Cassandra Espinoza MD   Handicap Placard MISC by Does not apply route Expires 1/2026 1/19/21   Cassandra Espinoza MD     Current Facility-Administered Medications   Medication Dose Route Frequency Provider Last Rate Last Admin    [START ON 12/26/2022] predniSONE (DELTASONE) tablet 60 mg  60 mg Oral Daily Piter Stephenson MD        Followed by    Mohsen Stuart ON 12/29/2022] predniSONE (DELTASONE) tablet 40 mg  40 mg Oral Daily Piter Stephenson MD Followed by    Ailyn Wright ON 1/1/2023] predniSONE (DELTASONE) tablet 20 mg  20 mg Oral Daily [address] LOAN MD        Followed by    Ailyn Wright ON 1/4/2023] predniSONE (DELTASONE) tablet 10 mg  10 mg Oral Daily Dimitry Beatty MD        methylPREDNISolone sodium (SOLU-MEDROL) injection 40 mg  40 mg IntraVENous BID [address] LOAN MD   40 mg at 12/25/22 0838    midodrine (PROAMATINE) tablet 5 mg  5 mg Oral TID  Mendez Carlosciaran,    5 mg at 12/25/22 1607    0.9 % sodium chloride infusion   IntraVENous Continuous Abid Zach Johnson MD 75 mL/hr at 12/23/22 0447 New Bag at 12/23/22 0447    sodium chloride (OCEAN) 0.65 % nasal spray 1 spray  1 spray Each Nostril PRN Noel Mansfield MD        warfarin placeholder: dosing by pharmacy   Other [address] [place] [address] MD        sodium chloride flush 0.9 % injection 5-40 mL  5-40 mL IntraVENous 2 times per day Dominga Austin MD   10 mL at 12/25/22 0839    sodium chloride flush 0.9 % injection 5-40 mL  5-40 mL IntraVENous PRN Noel Almendarez MD        0.9 % sodium chloride infusion   IntraVENous PRN Noel Almendarez MD        ondansetron (ZOFRAN-ODT) disintegrating tablet 4 mg  4 mg Oral Q8H PRN Noel Almendarez MD        Or    ondansetron (ZOFRAN) injection 4 mg  4 mg IntraVENous Q6H PRN Noel Almendarez MD        polyethylene glycol (GLYCOLAX) packet 17 g  17 g Oral Daily PRN Noel Mansfield MD        acetaminophen (TYLENOL) tablet 650 mg  650 mg Oral Q6H PRN Noel Almendarez MD   650 mg at 12/23/22 1113    Or    acetaminophen (TYLENOL) suppository 650 mg  650 mg Rectal Q6H PRN Noel Almendarez MD        atorvastatin (LIPITOR) tablet 40 mg  40 mg Oral Daily Noel Almendarez MD   40 mg at 12/25/22 0838    budesonide-formoterol (SYMBICORT) 80-4.5 MCG/ACT inhaler 1 puff  1 puff Inhalation BID Noel Mansfield MD   1 puff at 12/25/22 0938    albuterol sulfate HFA (PROVENTIL;VENTOLIN;PROAIR) 108 (90 Base) MCG/ACT inhaler 2 puff  2 puff Inhalation Q4H PRN Noel Tse MD        digoxin (LANOXIN) tablet 125 mcg  125 mcg Oral Daily Noel Tse MD   125 mcg at 12/25/22 0838    pantoprazole (PROTONIX) tablet 40 mg  40 mg Oral QAM AC Deejay Jerze MD   40 mg at 12/25/22 6408    sildenafil (REVATIO) tablet 20 mg  20 mg Oral TID Vinny Baker MD   20 mg at 12/25/22 1607    tamsulosin (FLOMAX) capsule 0.4 mg  0.4 mg Oral Daily Deejay Jerez MD   0.4 mg at 12/25/22 0838    ipratropium (ATROVENT) 0.02 % nebulizer solution 0.5 mg  0.5 mg Nebulization 4x daily Vinny Baker MD   0.5 mg at 12/25/22 1636       Allergies:  Adhesive tape, Codeine, Penicillins, and Nintedanib    Social History:   reports that he quit smoking about 52 years ago. His smoking use included cigarettes. He has a 0.50 pack-year smoking history. He has never used smokeless tobacco. He reports that he does not currently use alcohol after a past usage of about 12.0 standard drinks per week. He reports that he does not use drugs. Family History: family history includes Diabetes in his mother; Heart Attack in his father; Heart Disease in his brother and father. Review of Systems:      Constitutional: No fever or chills. No night sweats, no weight loss   Eyes: No eye discharge, double vision, or eye pain   HEENT: negative for sore mouth, sore throat, hoarseness and voice change   Respiratory: negative for cough , sputum, dyspnea, wheezing, hemoptysis, chest pain   Cardiovascular: negative for chest pain, dyspnea, palpitations, orthopnea, PND   Gastrointestinal: negative for nausea, vomiting, diarrhea, constipation, abdominal pain, Dysphagia, hematemesis and hematochezia   Genitourinary: negative for frequency, dysuria, nocturia, urinary incontinence, and hematuria   Integument: negative for rash, skin lesions, bruises.    Hematologic/Lymphatic: negative for easy bruising, bleeding, lymphadenopathy, or petechiae   Endocrine: negative for heat or cold intolerance,weight changes, change in bowel habits and hair loss   Musculoskeletal: negative for myalgias, arthralgias, pain, joint swelling,and bone pain   Neurological: negative for headaches, dizziness, seizures, weakness, numbness    Physical Exam:      /69   Pulse 82   Temp 97.5 °F (36.4 °C) (Oral)   Resp 20   Ht 6' (1.829 m)   Wt 159 lb (72.1 kg)   SpO2 95%   BMI 21.56 kg/m²    Temp (24hrs), Av.9 °F (36.6 °C), Min:97.5 °F (36.4 °C), Max:98.4 °F (36.9 °C)      General appearance - well appearing, no in pain or distress   Mental status - alert and cooperative   Eyes - pupils equal and reactive, extraocular eye movements intact   Ears - bilateral TM's and external ear canals normal   Mouth - mucous membranes moist, pharynx normal without lesions   Neck - supple, no significant adenopathy   Lymphatics - no palpable lymphadenopathy, no hepatosplenomegaly   Chest - clear to auscultation, no wheezes, rales or rhonchi, symmetric air entry   Heart - normal rate, regular rhythm, normal S1, S2, no murmurs  Abdomen - soft, nontender, nondistended, no masses or organomegaly   Neurological - alert, oriented, normal speech, no focal findings or movement disorder noted   Musculoskeletal - no joint tenderness, deformity or swelling   Extremities - peripheral pulses normal, no pedal edema, no clubbing or cyanosis   Skin - normal coloration and turgor, no rashes, no suspicious skin lesions noted ,    Labs:   Complete Blood Count:   Recent Labs     22  0319 22  0551 22  0659   WBC 6.2 7.0 12.2*   HGB 10.4* 11.0* 11.1*   MCV 87.3 87.6 87.8    260 310   RBC 4.01* 4.18* 4.25   HCT 35.0* 36.6* 37.3*   MCH 25.9 26.3 26.1   MCHC 29.7 30.1 29.8   RDW 14.4 14.5* 14.6*   MPV 10.0 10.4 10.3        PT/INR:    Lab Results   Component Value Date/Time    PROTIME 25.6 2022 06:59 AM    INR 2.6 2022 06:59 AM     PTT:    Lab Results   Component Value Date/Time    APTT 30.5 2022 06:59 AM Basal Metabolic Profile:   Recent Labs     12/23/22  0319 12/24/22  0551 12/25/22  0659    138 137   K 3.8 4.2 4.0   BUN 12 17 22   CREATININE 0.52* 0.52* 0.55*    104 101   CO2 30 28 29        LFTS  No results for input(s): ALKPHOS, ALT, AST, BILITOT, BILIDIR, LABALBU in the last 72 hours. Imaging:  CT HEAD WO CONTRAST    Result Date: 12/19/2022  No acute intracranial abnormality. XR CHEST PORTABLE    Result Date: 12/19/2022  Pulmonary fibrotic changes are again identified. A superimposed pneumonia is not excluded. CT CHEST PULMONARY EMBOLISM W CONTRAST    Addendum Date: 12/19/2022    ADDENDUM: Discussed with Dr. Lupe Posadas at 4:40 p.m. Result Date: 12/19/2022  Pulmonary emboli bilaterally as above. No evidence of right ventricular strain. Coronary artery disease and aortic valve disease. COPD and pulmonary fibrosis. RECOMMENDATIONS: The findings were sent to the Radiology Results [address] at 4:35 pm on 12/19/2022 to be communicated to a licensed caregiver.        Impression:   Primary Problem  Acute pulmonary embolism [facility])    Active Hospital Problems    Diagnosis Date Noted    Chronic respiratory failure with hypoxia ([address] [J96.11] 12/21/2022     Priority: Medium    Pulmonary HTN ([address] [I27.20] 12/21/2022     Priority: Medium    Postural dizziness with near syncope [R42, R55] 12/21/2022     Priority: Medium    Pulmonary embolism without acute cor pulmonale ([address] [I26.99] 12/20/2022     Priority: Medium    Acute pulmonary embolism ([address] [I26.99] 12/19/2022     Priority: Medium    Pulmonary embolism, bilateral ([address] [I26.99] 12/19/2022     Priority: Medium    Demand ischemia of myocardium ([address] [I24.8] 12/19/2022     Priority: Medium    Acute respiratory acidosis ([address] [J96.02] 12/19/2022     Priority: Medium    Orthostatic hypotension [I95.1] 12/19/2022     Priority: Medium    Acute pulmonary edema ([facility] [J81.0] 12/19/2022     Priority: Medium    Syncope and collapse [R55] 01/12/2021    Chronic idiopathic pulmonary fibrosis [facility]) [J84.112] 12/08/2020    CHF (congestive heart failure), NYHA class II, acute on chronic, combined ([facility] [I50.43] 10/17/2020    Shortness of breath [R06.02]     Hill's esophagus [K22.70] 06/18/2019    History of colon cancer [Z85.038]     Benign prostatic hyperplasia with lower urinary tract symptoms [N40.1] 04/02/2015    PAF (paroxysmal atrial fibrillation) ([facility] [I48.0] 07/21/2014    Dyslipidemia [E78.5] 02/19/2014       Assessment and Plan:    Acute pulmonary embolism while on eliqius concerning for eliqius failure  History of adenocarcinoma of rectum 2014 - in remission per patient  Presyncope   Paroxysmal atrial fibrllation  UIP  NSTEMI type 2     Recommendations     Eliquis failure and patient on Coumadin/bridged with heparin  Discussed differential diagnosis of DOAC failure possible etiologies include drug interaction, antiphospholipid antibodies, HIT, malignancy. Antiphospholipid antibodies pending  Patient has history of early-stage colorectal cancer back in 2014. Has been in remission  Valvular disease also contributing to shortness of breath. Echocardiogram shows LVH and valvular disease  INR is therapeutic> keep target 2-3  Patient will need long-term anticoagulation  Follow-up with hematology as an outpatient  Monitor closely for bleeding/H&H                                    [facility]                            This note is created with the assistance of a speech recognition program.  While intending to generate a document that actually reflects the content of the visit, the document can still have some errors including those of syntax and sound a like substitutions which may escape proof reading. It such instances, actual meaning can be extrapolated by contextual diversion.

## 2022-12-25 NOTE — PROGRESS NOTES
Pharmacy Note  Warfarin Consult follow-up    Warfarin dose PTA: N/A; New start  Indication: acute PE, afib  Goal INR: 2-3    Recent Labs     12/25/22  0659   INR 2.6     Recent Labs     12/23/22  0319 12/24/22  0551 12/25/22  0659   HGB 10.4* 11.0* 11.1*   HCT 35.0* 36.6* 37.3*    260 310       Current warfarin drug-drug interactions: methylprednisolone     Date INR Dose   12/21 1.4 8 mg    12/22  1.4 5 mg    12/23 1.9 5 mg    12/24 2.5 5 mg    12/25 2.6      Notes:  - INR therapeutic, will order Warfarin 2.5 mg x1 today                    Daily PT/INR while inpatient.      Edgar Day, PharmD, 12/25/2022 12:29 PM

## 2022-12-25 NOTE — PROGRESS NOTES
[facility]  Internal Medicine Teaching Residency Program  Inpatient Daily Progress Note  ______________________________________________________________________________    Patient: Suresh Hudson  YOB: 1955   PB:[id number]    Acct: [de-identified]     Room: 2019/2019-01  Admit date: 12/19/2022  Today's date: 12/25/22  Number of days in the hospital: 6    SUBJECTIVE   Admitting Diagnosis: Acute pulmonary embolism ([address]  CC: An episode of near syncope    Pt examined at bedside. Chart & results reviewed. No acute event overnight  Remained afebrile and hemodynamically stable. No new complaints of lightheadedness or syncopal episodes overnight  Has persistent shortness of breath, his saturations not dropping on 8 L while speaking full. No signs of distress noted. Oxygen requirement at his baseline between 5 to 9 L. Denies any bleeding episodes while on heparin    ROS:  Constitutional:  negative for chills, fevers, sweats  Respiratory:  negative for cough, dyspnea on exertion, hemoptysis, shortness of breath, wheezing  Cardiovascular:  negative for chest pain, chest pressure/discomfort, lower extremity edema, palpitations  Gastrointestinal:  negative for abdominal pain, constipation, diarrhea, nausea, vomiting  Neurological:  negative for dizziness, headache    BRIEF HISTORY     The patient is a pleasant 69 y.o. male with past medical history of A. fib, idiopathic pulmonary fibrosis with pulmonary hypertension, colorectal carcinoma, history of pulmonary embolism, BPH presents after an episode of near syncope. Patient states he was standing at his home and suddenly started feeling sweaty and feeling that he was going to pass out which made him sit on the floor. Denies any loss of consciousness, did not hit his head.   Patient has a history of pulmonary embolism and A. fib and takes Eliquis at home, patient has a history of idiopathic pulmonary fibrosis-along with significant family history-also in the sister-has been taking sildenafil for moderate pulmonary hypertension. Uses 9 L of oxygen at home. States he has passed out multiple times in the past during which he was found to have COVID-pneumonia. As per the dispense report patient takes tamsulosin and sildenafil-patient is unsure of tamsulosin but takes sildenafil. Patient states he always feels dizzy when standing suddenly from lying down position. Last echo in  showed ejection fraction 40 to 45%, RVSP 41 mmHg, mild to moderate mitral regurgitation. Patient denies any chest pain, abdominal pain, loose stool, burning urination, leg pain or swelling, no recent sick contact. in the ED patient was requiring 15 L of oxygen via nonrebreather and was desaturating when taken off nasal cannula as per the chart. Hypotensive blood pressure 88/62, heart rate 69-rhythm A. Fib. proBNP elevated to 16,000.  2 sets of troponin 35-33. CT PE showed bilateral pulmonary embolism without right heart strain along with underlying honeycomb pattern at peripheral and bases and centrilobular paraseptal emphysema. Patient was started on heparin drip and admitted to internal medicine floor for further management. Has a history of colorectal cancer, had a colostomy in past which was later reversed. 2022-INR at 1.4, has persistent shortness of breath currently on 7 L of nasal cannula    OBJECTIVE     Vital Signs:  BP (!) 137/93   Pulse 90   Temp 97.5 °F (36.4 °C) (Oral)   Resp 20   Ht 6' (1.829 m)   Wt 159 lb (72.1 kg)   SpO2 93%   BMI 21.56 kg/m²     Temp (24hrs), Av °F (36.7 °C), Min:97.5 °F (36.4 °C), Max:98.4 °F (36.9 °C)    No intake/output data recorded. Physical Exam:  Constitutional: This is a well developed, well nourished, 18.5-24.9 - Normal [address]o. year old male who is alert, oriented, cooperative and in no apparent distress. Head:normocephalic and atraumatic. EENT:  PERRLA.   No conjunctival injections. Septum was midline, mucosa was without erythema, exudates or cobblestoning. No thrush was noted. Neck: Supple without thyromegaly. No elevated JVP. Trachea was midline. Respiratory: Has tachypnea, nondistressed breathing, bilateral air entry present but diminished with coarse crackles  Cardiovascular: Regular without murmur, clicks, gallops or rubs. Abdomen: Slightly rounded and soft without organomegaly. No rebound, rigidity or guarding was appreciated. Lymphatic: No lymphadenopathy. Musculoskeletal: Normal curvature of the spine. No gross muscle weakness. Extremities:  No lower extremity edema, ulcerations, tenderness, varicosities or erythema. Muscle size, tone and strength are normal.  No involuntary movements are noted. Skin:  Warm and dry. Good color, turgor and pigmentation. No lesions or scars.   No cyanosis or clubbing  Neurological/Psychiatric: The patient's general behavior, level of consciousness, thought content and emotional status is normal.        Medications:  Scheduled Medications:      methylPREDNISolone  40 mg IntraVENous BID    midodrine  5 mg Oral TID WC    warfarin placeholder: dosing by pharmacy   Other RX Placeholder    sodium chloride flush  5-40 mL IntraVENous 2 times per day    atorvastatin  40 mg Oral Daily    budesonide-formoterol  1 puff Inhalation BID    digoxin  125 mcg Oral Daily    pantoprazole  40 mg Oral QAM AC    sildenafil  20 mg Oral TID    tamsulosin  0.4 mg Oral Daily    ipratropium  0.5 mg Nebulization 4x daily     Continuous Infusions:    sodium chloride 75 mL/hr at 12/23/22 0447    sodium chloride       PRN Medicationssodium chloride, 1 spray, PRN  sodium chloride flush, 5-40 mL, PRN  sodium chloride, , PRN  ondansetron, 4 mg, Q8H PRN   Or  ondansetron, 4 mg, Q6H PRN  polyethylene glycol, 17 g, Daily PRN  acetaminophen, 650 mg, Q6H PRN   Or  acetaminophen, 650 mg, Q6H PRN  albuterol sulfate HFA, 2 puff, Q4H PRN      Diagnostic Labs:  CBC:   Recent Labs     12/23/22  0319 12/24/22  0551 12/25/22  0659   WBC 6.2 7.0 12.2*   RBC 4.01* 4.18* 4.25   HGB 10.4* 11.0* 11.1*   HCT 35.0* 36.6* 37.3*   MCV 87.3 87.6 87.8   RDW 14.4 14.5* 14.6*    260 310       BMP:   Recent Labs     12/23/22  0319 12/24/22  0551 12/25/22  0659    138 137   K 3.8 4.2 4.0    104 101   CO2 30 28 29   BUN 12 17 22   CREATININE 0.52* 0.52* 0.55*       BNP: No results for input(s): BNP in the last 72 hours. PT/INR:   Recent Labs     12/23/22  1619 12/24/22  0551 12/25/22  0659   PROTIME 19.4* 24.8* 25.6*   INR 1.9 2.5 2.6       APTT:   Recent Labs     12/23/22  1619 12/24/22  0551 12/25/22  0659   APTT 64.3* 77.1* 30.5       CARDIAC ENZYMES: No results for input(s): CKMB, CKMBINDEX, TROPONINI in the last 72 hours. Invalid input(s): CKTOTAL;3  FASTING LIPID PANEL:  Lab Results   Component Value Date    CHOL 200 (H) 01/03/2017    HDL 29 (L) 12/20/2021    TRIG 165 (H) 01/03/2017     LIVER PROFILE:   No results for input(s): AST, ALT, ALB, BILIDIR, BILITOT, ALKPHOS in the last 72 hours. MICROBIOLOGY:   Lab Results   Component Value Date/Time    CULTURE NO SIGNIFICANT GROWTH 02/20/2022 06:41 PM       Imaging:    CT HEAD WO CONTRAST    Result Date: 12/19/2022  No acute intracranial abnormality. XR CHEST PORTABLE    Result Date: 12/19/2022  Pulmonary fibrotic changes are again identified. A superimposed pneumonia is not excluded. CT CHEST PULMONARY EMBOLISM W CONTRAST    Addendum Date: 12/19/2022    ADDENDUM: Discussed with Dr. Cristi Posadas at 4:40 p.m. Result Date: 12/19/2022  Pulmonary emboli bilaterally as above. No evidence of right ventricular strain. Coronary artery disease and aortic valve disease. COPD and pulmonary fibrosis. RECOMMENDATIONS: The findings were sent to the Radiology Results [address] at 4:35 pm on 12/19/2022 to be communicated to a licensed caregiver.        ASSESSMENT & PLAN     ASSESSMENT / PLAN: IMPRESSION  This is a 69 y.o. male who presented with near syncope and found to have pulmonary embolism. Patient admitted to inpatient status for the management of syncope and pulmonary embolism. Principal Problem:    Acute pulmonary embolism (HCC)-likely secondary to Eliquis failure-can be due to underlying possible malignancy-will need outpatient work-up and follow-up with heme-onc, bridging with warfarin in progress, INR at 2.5 today, heme-onc has been following patient will be discharged with warfarin and follow-up with Coumadin clinic. Heart failure with preserved ejection fraction Euvolemic, no fluid congestion, off IV diuretics, cardiology was consulted, echo 12/19/2022-LVEF 50 to 55%, grade 3 LVDD, aortic valve heavily calcified with CHERRY-0.9 cm², severe pulmonary hypertension-RVSP-64 mmHg. Near Syncope and collapse- orthostatics positive and echo showing severe aortic stenosis. cardiology - Recommended outpatient follow-up with cardiology for severe aortic stenosis. compression stockings. PAF (paroxysmal atrial fibrillation) (HCC)-rate controlled, continue digoxin 125 oral daily ,continue warfarin, INR is at 2.5 pharmacy to dose warfarin   Chronic idiopathic pulmonary fibrosis (HCC) with possible exacerbation, COPD stage II on PFT on 2/25/2021 with FEV1 of 55%  -Getting IV steroids as per pulmonology.   -Follows with Dr. Jennifer Cruz, Hx acute respiratory failure , continue bronchodilators ,secondary to pulmonary embolism as per pulmonary.  -Patient will be discharged when cleared by pulmonology. Dyslipidemia- Resumed Home atorvastatin 40 mg      DVT ppx:  Coumadin   GI ppx: Not indicated at this time     PT/OT/SW-has been consulted  Discharge Planning:  to assist with. Keri Alejandre MD  Internal Medicine Resident, PGY-1  [facility];  [place], [place]  12/25/2022, 11:59 AM    Attending Physician Statement  I have discussed the care of Elli Ulloa and I have examined the patient myselft and taken ros and hpi , including pertinent history and exam findings,  with the resident. I have reviewed the key elements of all parts of the encounter with the resident. I agree with the assessment, plan and orders as documented by the resident.   Patient is on IV steroids  On 5 L  Will discuss with pulmonary medicine team, DC plan  Patient refused to go to SNF    Electronically signed by Ruma Sewell MD

## 2022-12-26 VITALS
RESPIRATION RATE: 21 BRPM | HEIGHT: 72 IN | DIASTOLIC BLOOD PRESSURE: 83 MMHG | SYSTOLIC BLOOD PRESSURE: 131 MMHG | HEART RATE: 79 BPM | WEIGHT: 159 LBS | TEMPERATURE: 97.9 F | BODY MASS INDEX: 21.54 KG/M2 | OXYGEN SATURATION: 94 %

## 2022-12-26 PROBLEM — R55 SYNCOPE AND COLLAPSE: Status: RESOLVED | Noted: 2021-01-12 | Resolved: 2022-12-26

## 2022-12-26 PROBLEM — I95.1 ORTHOSTATIC HYPOTENSION: Status: RESOLVED | Noted: 2022-12-19 | Resolved: 2022-12-26

## 2022-12-26 LAB
ABSOLUTE EOS #: 0 K/UL (ref 0–0.4)
ABSOLUTE IMMATURE GRANULOCYTE: 0 K/UL (ref 0–0.3)
ABSOLUTE LYMPH #: 0.48 K/UL (ref 1–4.8)
ABSOLUTE MONO #: 0.12 K/UL (ref 0.1–0.8)
ANION GAP SERPL CALCULATED.3IONS-SCNC: 6 MMOL/L (ref 9–17)
BASOPHILS # BLD: 0 % (ref 0–2)
BASOPHILS ABSOLUTE: 0 K/UL (ref 0–0.2)
BUN BLDV-MCNC: 27 MG/DL (ref 8–23)
CALCIUM SERPL-MCNC: 8.3 MG/DL (ref 8.6–10.4)
CHLORIDE BLD-SCNC: 105 MMOL/L (ref 98–107)
CO2: 29 MMOL/L (ref 20–31)
CREAT SERPL-MCNC: 0.52 MG/DL (ref 0.7–1.2)
EOSINOPHILS RELATIVE PERCENT: 0 % (ref 1–4)
GFR SERPL CREATININE-BSD FRML MDRD: >60 ML/MIN/1.73M2
GLUCOSE BLD-MCNC: 122 MG/DL (ref 70–99)
HCT VFR BLD CALC: 36.9 % (ref 40.7–50.3)
HEMOGLOBIN: 11.1 G/DL (ref 13–17)
IMMATURE GRANULOCYTES: 0 %
INR BLD: 2.6
LYMPHOCYTES # BLD: 4 % (ref 24–44)
MCH RBC QN AUTO: 26.4 PG (ref 25.2–33.5)
MCHC RBC AUTO-ENTMCNC: 30.1 G/DL (ref 28.4–34.8)
MCV RBC AUTO: 87.6 FL (ref 82.6–102.9)
MONOCYTES # BLD: 1 % (ref 1–7)
MORPHOLOGY: NORMAL
NRBC AUTOMATED: 0 PER 100 WBC
PARTIAL THROMBOPLASTIN TIME: 31 SEC (ref 20.5–30.5)
PDW BLD-RTO: 14.9 % (ref 11.8–14.4)
PLATELET # BLD: 321 K/UL (ref 138–453)
PMV BLD AUTO: 10.7 FL (ref 8.1–13.5)
POTASSIUM SERPL-SCNC: 4.3 MMOL/L (ref 3.7–5.3)
PROTHROMBIN TIME: 25.7 SEC (ref 9.1–12.3)
RBC # BLD: 4.21 M/UL (ref 4.21–5.77)
SEG NEUTROPHILS: 95 % (ref 36–66)
SEGMENTED NEUTROPHILS ABSOLUTE COUNT: 11.3 K/UL (ref 1.8–7.7)
SODIUM BLD-SCNC: 140 MMOL/L (ref 135–144)
WBC # BLD: 11.9 K/UL (ref 3.5–11.3)

## 2022-12-26 PROCEDURE — 94640 AIRWAY INHALATION TREATMENT: CPT

## 2022-12-26 PROCEDURE — 6360000002 HC RX W HCPCS: Performed by: STUDENT IN AN ORGANIZED HEALTH CARE EDUCATION/TRAINING PROGRAM

## 2022-12-26 PROCEDURE — 99239 HOSP IP/OBS DSCHRG MGMT >30: CPT | Performed by: INTERNAL MEDICINE

## 2022-12-26 PROCEDURE — 99232 SBSQ HOSP IP/OBS MODERATE 35: CPT | Performed by: INTERNAL MEDICINE

## 2022-12-26 PROCEDURE — 85730 THROMBOPLASTIN TIME PARTIAL: CPT

## 2022-12-26 PROCEDURE — 6370000000 HC RX 637 (ALT 250 FOR IP)

## 2022-12-26 PROCEDURE — 36415 COLL VENOUS BLD VENIPUNCTURE: CPT

## 2022-12-26 PROCEDURE — 80048 BASIC METABOLIC PNL TOTAL CA: CPT

## 2022-12-26 PROCEDURE — 6370000000 HC RX 637 (ALT 250 FOR IP): Performed by: STUDENT IN AN ORGANIZED HEALTH CARE EDUCATION/TRAINING PROGRAM

## 2022-12-26 PROCEDURE — 6360000002 HC RX W HCPCS: Performed by: INTERNAL MEDICINE

## 2022-12-26 PROCEDURE — 85025 COMPLETE CBC W/AUTO DIFF WBC: CPT

## 2022-12-26 PROCEDURE — 6370000000 HC RX 637 (ALT 250 FOR IP): Performed by: INTERNAL MEDICINE

## 2022-12-26 PROCEDURE — 2700000000 HC OXYGEN THERAPY PER DAY

## 2022-12-26 PROCEDURE — 94761 N-INVAS EAR/PLS OXIMETRY MLT: CPT

## 2022-12-26 PROCEDURE — 2580000003 HC RX 258

## 2022-12-26 PROCEDURE — 85610 PROTHROMBIN TIME: CPT

## 2022-12-26 RX ORDER — WARFARIN SODIUM 2 MG/1
4 TABLET ORAL
Status: DISCONTINUED | OUTPATIENT
Start: 2022-12-26 | End: 2022-12-26 | Stop reason: HOSPADM

## 2022-12-26 RX ORDER — PREDNISONE 20 MG/1
20 TABLET ORAL DAILY
Qty: 3 TABLET | Refills: 0 | Status: SHIPPED | OUTPATIENT
Start: 2023-01-01 | End: 2023-01-04

## 2022-12-26 RX ORDER — WARFARIN SODIUM 2 MG/1
2 TABLET ORAL DAILY
Qty: 30 TABLET | Refills: 3 | Status: SHIPPED | OUTPATIENT
Start: 2022-12-26 | End: 2022-12-26 | Stop reason: SDUPTHER

## 2022-12-26 RX ORDER — PREDNISONE 20 MG/1
60 TABLET ORAL DAILY
Qty: 6 TABLET | Refills: 0 | Status: SHIPPED | OUTPATIENT
Start: 2022-12-27 | End: 2022-12-29

## 2022-12-26 RX ORDER — WARFARIN SODIUM 2 MG/1
2 TABLET ORAL DAILY
Qty: 30 TABLET | Refills: 3 | Status: SHIPPED | OUTPATIENT
Start: 2022-12-26

## 2022-12-26 RX ORDER — WARFARIN SODIUM 2 MG/1
TABLET ORAL
Qty: 30 TABLET | Refills: 3 | Status: SHIPPED | OUTPATIENT
Start: 2022-12-26 | End: 2022-12-26 | Stop reason: SDUPTHER

## 2022-12-26 RX ORDER — PREDNISONE 10 MG/1
10 TABLET ORAL DAILY
Qty: 3 TABLET | Refills: 0 | Status: SHIPPED | OUTPATIENT
Start: 2023-01-04 | End: 2023-01-07

## 2022-12-26 RX ORDER — PREDNISONE 20 MG/1
40 TABLET ORAL DAILY
Qty: 6 TABLET | Refills: 0 | Status: SHIPPED | OUTPATIENT
Start: 2022-12-29 | End: 2023-01-01

## 2022-12-26 RX ORDER — FERROUS SULFATE 325(65) MG
325 TABLET ORAL
Qty: 180 TABLET | Refills: 1 | Status: SHIPPED | OUTPATIENT
Start: 2022-12-26

## 2022-12-26 RX ORDER — MIDODRINE HYDROCHLORIDE 5 MG/1
5 TABLET ORAL
Qty: 90 TABLET | Refills: 3 | Status: SHIPPED | OUTPATIENT
Start: 2022-12-26

## 2022-12-26 RX ADMIN — MIDODRINE HYDROCHLORIDE 5 MG: 5 TABLET ORAL at 12:30

## 2022-12-26 RX ADMIN — TAMSULOSIN HYDROCHLORIDE 0.4 MG: 0.4 CAPSULE ORAL at 08:52

## 2022-12-26 RX ADMIN — DIGOXIN 125 MCG: 125 TABLET ORAL at 08:51

## 2022-12-26 RX ADMIN — SILDENAFIL 20 MG: 20 TABLET ORAL at 14:00

## 2022-12-26 RX ADMIN — SILDENAFIL 20 MG: 20 TABLET ORAL at 08:52

## 2022-12-26 RX ADMIN — PANTOPRAZOLE SODIUM 40 MG: 40 TABLET, DELAYED RELEASE ORAL at 08:52

## 2022-12-26 RX ADMIN — DESMOPRESSIN ACETATE 40 MG: 0.2 TABLET ORAL at 08:52

## 2022-12-26 RX ADMIN — ALBUTEROL SULFATE 2.5 MG: 2.5 SOLUTION RESPIRATORY (INHALATION) at 08:30

## 2022-12-26 RX ADMIN — PREDNISONE 60 MG: 50 TABLET ORAL at 08:51

## 2022-12-26 RX ADMIN — SODIUM CHLORIDE, PRESERVATIVE FREE 10 ML: 5 INJECTION INTRAVENOUS at 08:53

## 2022-12-26 RX ADMIN — MIDODRINE HYDROCHLORIDE 5 MG: 5 TABLET ORAL at 08:52

## 2022-12-26 RX ADMIN — IPRATROPIUM BROMIDE 0.5 MG: 0.5 SOLUTION RESPIRATORY (INHALATION) at 08:30

## 2022-12-26 RX ADMIN — BUDESONIDE AND FORMOTEROL FUMARATE DIHYDRATE 1 PUFF: 80; 4.5 AEROSOL RESPIRATORY (INHALATION) at 08:37

## 2022-12-26 RX ADMIN — ALBUTEROL SULFATE 2.5 MG: 2.5 SOLUTION RESPIRATORY (INHALATION) at 12:34

## 2022-12-26 RX ADMIN — IPRATROPIUM BROMIDE 0.5 MG: 0.5 SOLUTION RESPIRATORY (INHALATION) at 12:35

## 2022-12-26 ASSESSMENT — PAIN SCALES - GENERAL: PAINLEVEL_OUTOF10: 0

## 2022-12-26 ASSESSMENT — ENCOUNTER SYMPTOMS
PHOTOPHOBIA: 0
ABDOMINAL PAIN: 0
VOICE CHANGE: 0
SHORTNESS OF BREATH: 1
COUGH: 1
TROUBLE SWALLOWING: 0
SORE THROAT: 0
CHEST TIGHTNESS: 0
EYE REDNESS: 0
VOMITING: 0
DIARRHEA: 0
ALLERGIC/IMMUNOLOGIC NEGATIVE: 1
WHEEZING: 0

## 2022-12-26 NOTE — CARE COORDINATION
Discharge [facility] Case Management Department  Written by: Fernando Murrell RN    Patient Name: Rom Guardado  Attending Provider: Juancarlos Pena MD  Admit Date: 2022  2:07 PM  MRN: [id number]  Account: [de-identified]                     : 1955  Discharge Date: 22  Cruz Lieu with [facility] updated   Faxed o2 testing, facesheet, f2f, o2 order to [facility]   Documented that patient currently has concentrator that can go up to 10L       [facility]   Met with patient to discuss    16:10 appt [facility] tomorrow @ 10:00  Met with patient and s.o. to update  Interested in home care referral to [address] [place] [address] spoke to Johana clinically can accept will call if any insurance issues           Disposition: home, referral to [facility] home care     Fernando Murrell RN

## 2022-12-26 NOTE — PROGRESS NOTES
Antoinette Gilmore will require the following home care treatments or therapies: Home oxygen, frequent INR measurements for anticoagulation. Home care will be necessary because of increased risk of falls, orthostatic hypotension, monitoring of SPO2 oxygen saturation. The patient is in agreement to receiving home care. Gaby Gallardo MD  Internal Medicine Resident, PGY- [facility];  [place], [place]  12/26/2022, 1:05 PM

## 2022-12-26 NOTE — PROGRESS NOTES
Home Oxygen Evaluation    Home Oxygen Evaluation completed. Patient is on 6 liters per minute via [place]. Resting SpO2 = 93%  Resting SpO2 on room air  not done due to pt being on 5L at baseline. SpO2 on oxygen as above with exercise = 84%  O2 flow titrated up to 10L to during the walk to maintain above 89%. Nocturnal Oximetry with patient on room air is recommended is SpO2 is between 89% and 95% (requires additional order).     Rhonda Boland RCP  1:45 PM

## 2022-12-26 NOTE — PROGRESS NOTES
I have seen and examined the patient independently. I reviewed all laboratory and imaging studies that are relevant. I agree with the resident note with the addendum. Electronically signed by Abdiel Olson MD on 12/26/2022 at 9:24 PM     Today's Date: 12/26/2022  Patient Name: Moni Barajas  Date of admission: 12/19/2022  2:07 PM  Patient's age: 69 y.o., 1955  Admission Dx: Syncope and collapse [R55]  Shortness of breath [R06.02]  Pulmonary embolism, bilateral ([address] [I26.99]  Pulmonary embolism without acute cor pulmonale, unspecified chronicity, unspecified pulmonary embolism type ([address] [I26.99]    Reason for Consult: pulmonary embolism while on eliquis  Requesting Physician: Daryll Gilford, MD    Chief Complaint:  shortness of breath  Interval Changes:    Patient has baseline shortness of breath, stated still gets hypoxic while ambulation/exertion. Patient on 6 L NC  Patient on coumadin, pharmacy to dose  INR 2.6    History of Present Illness: The patient is a 69 y.o. male who is admitted to the hospital for shortness of breath. CT PE demonstrated bilateral segmental pulmonary embolisms -right lower lobar, lingula and left lower segmental branches. CA 19-9: 114, CEA 4.8, PSA 0.46     PE history -  11/2020 - Tiny nonocclusive pulmonary embolus in the right lower lobar pulmonary artery. 02/22 - CT PE negative  12/2022 - Partial incomplete filling defect right lower lobar  pulmonary artery. Partial filling defect lingular pulmonary artery. Filling  defects left lower lobe segmental branches. Hematology history  Patient seen by Dr. Cindy Nicholson 12/2020 for xarelto failure for pulmonary embolism. At the time, his acute PE was suspected due to medication being held while in hospital. He was changed to eliqius from [address] per patient's wishes. Patient started xarelto in 2018 due to heart condition by Dr. Carol Doran per patient.  However was switched to eliqius in 2020 due to small Pulmonary embolism. 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 Colostomy was reversed. Vieyra syndrome negative. Patient stated he follows up with GI and had last colonoscopy last year and has been negative so far with no relapse of cancer. PMH - chronic idiopathic pulmonary fibrosis, atrial fibrillation, BPH, colon cancer. Patient reportedly states that he was diagnosed with a pulmonary embolism this past February; however, per chart review and CT PE performed on 2/18/2022, no evidence of acute pulmonary embolism was found at that time. Past Medical History:   has a past medical history of Atrial fibrillation ([address] Back pain, chronic, Hill's esophagus, Benign essential HTN, BPH (benign prostatic hyperplasia), Cancer ([address] Chronic idiopathic pulmonary fibrosis ([address] Cocaine abuse in remission ([address] Community acquired bacterial pneumonia, ED (erectile dysfunction), GERD (gastroesophageal reflux disease), GI bleed, Hernia, History of colon cancer, Melena, Migraines, Multifocal pneumonia, Murmur, cardiac, and Single subsegmental pulmonary embolism without acute cor pulmonale ([address] Past Surgical History:   has a past surgical history that includes Tonsillectomy; Colonoscopy; colectomy; Colonoscopy (07/18/2016); knee surgery (Right, 1970's); transesophageal echocardiogram (11/29/2018); Upper gastrointestinal endoscopy (N/A, 12/5/2018); Colonoscopy (N/A, 12/6/2018); hernia repair (Right, 2009); Cardiac catheterization (11/29/2018); colectomy; Total knee arthroplasty (Right, 1/8/2019); Upper gastrointestinal endoscopy (N/A, 6/18/2019); Cardioversion (2020); and hip surgery (Right, 2/22/2022). Medications:    Prior to Admission medications    Medication Sig Start Date End Date Taking?  Authorizing Provider   OXYGEN Inhale 4 L into the lungs continuous Uses 4-5 liters 24/7   Yes Historical Provider, MD   omeprazole (PRILOSEC) 40 MG delayed release capsule take 1 capsule by mouth once daily 12/8/22   Cassandra Griffin MD   fluticasone-umeclidin-vilant (TRELEGY ELLIPTA) 200-62.5-25 MCG/ACT AEPB inhaler Inhale 1 puff into the lungs daily 12/6/22   Cassandra Griffin MD   albuterol sulfate HFA (VENTOLIN HFA) 108 (90 Base) MCG/ACT inhaler Inhale 2 puffs into the lungs every 4 hours as needed for Wheezing 12/5/22 12/5/23  Cassandra Griffin MD   sildenafil (REVATIO) 20 MG tablet Take 1 tablet by mouth 3 times daily 11/8/22   Cassandra Griffin MD   sertraline (ZOLOFT) 25 MG tablet take 1 tablet by mouth once daily  Patient not taking: Reported on 11/8/2022 11/2/22   Nacho Meneses DO   ibuprofen (ADVIL;MOTRIN) 800 MG tablet take 1 tablet by mouth three times a day if needed for MODERATE PAIN ( PAIN SCALE 4-6 ) 10/24/22   Cassandra Griffin MD   sertraline (ZOLOFT) 50 MG tablet Take 1 tablet by mouth daily 10/3/22   Cassandra Griffin MD   ELIQUIS 5 MG TABS tablet take 1 tablet by mouth twice a day 9/19/22   MIRI Wilson NP   tamsulosin (FLOMAX) 0.4 MG capsule take 1 capsule by mouth once daily 8/8/22   Cassandra Griffin MD   ferrous sulfate (IRON 325) 325 (65 Fe) MG tablet take 1 tablet by mouth twice a day 7/29/22   Cassandra Griffin MD   atorvastatin (LIPITOR) 40 MG tablet take 1 tablet by mouth once daily 4/28/22   Cassandra Griffin MD   digoxin (LANOXIN) 125 MCG tablet take 1 tablet by mouth once daily 4/28/22   Cassandra Griffin MD   midodrine (PROAMATINE) 5 MG tablet Take 1 tablet by mouth 3 times daily (before meals) 3/2/22   Haley Vidal DO   Baclofen (LIORESAL) 5 MG tablet take 1 tablet by mouth twice a day 1/27/22   Cassandra Milas QuantMD Daniel MISC by Does not apply route Expires 1/2026 1/19/21   Cassandra Griffin MD     Current Facility-Administered Medications   Medication Dose Route Frequency Provider Last Rate Last Admin    predniSONE (DELTASONE) tablet 60 mg  60 mg Oral Daily Shayna Granados MD   60 mg at 12/26/22 0851    Followed by    Dian Clements ON 12/29/2022] predniSONE (DELTASONE) tablet 40 mg  40 mg Oral Daily Shayna Granados MD        Followed by    Dian Clements ON 1/1/2023] predniSONE (DELTASONE) tablet 20 mg  20 mg Oral Daily Shayna Granados MD        Followed by    Dian Clements ON 1/4/2023] predniSONE (DELTASONE) tablet 10 mg  10 mg Oral Daily Shayna Granados MD        albuterol (PROVENTIL) nebulizer solution 2.5 mg  2.5 mg Nebulization As Directed RT PRN Jerry Mccann MD        albuterol (PROVENTIL) nebulizer solution 2.5 mg  2.5 mg Nebulization 4x daily Jose Luis Waterman MD   2.5 mg at 12/26/22 0830    midodrine (PROAMATINE) tablet 5 mg  5 mg Oral TID  Sidney Jhaveri DO   5 mg at 12/26/22 0852    0.9 % sodium chloride infusion   IntraVENous Continuous Abid Camila Hobbs MD 75 mL/hr at 12/23/22 0447 New Bag at 12/23/22 0447    sodium chloride (OCEAN) 0.65 % nasal spray 1 spray  1 spray Each Nostril PRN Noel Kruse MD        warfarin placeholder: dosing by pharmacy   Other [address] [place] [address] MD        sodium chloride flush 0.9 % injection 5-40 mL  5-40 mL IntraVENous 2 times per day Ovidio Murphy MD   10 mL at 12/26/22 0853    sodium chloride flush 0.9 % injection 5-40 mL  5-40 mL IntraVENous PRN Noel Almendarez MD        0.9 % sodium chloride infusion   IntraVENous PRN Noel Almendarez MD        ondansetron (ZOFRAN-ODT) disintegrating tablet 4 mg  4 mg Oral Q8H PRN Noel Kruse MD        Or    ondansetron (ZOFRAN) injection 4 mg  4 mg IntraVENous Q6H PRN Noel Gasca MD        polyethylene glycol (GLYCOLAX) packet 17 g  17 g Oral Daily PRN Noel Almendarez MD        acetaminophen (TYLENOL) tablet 650 mg  650 mg Oral Q6H PRN Nole Almendarez MD   650 mg at 12/23/22 1113    Or    acetaminophen (TYLENOL) suppository 650 mg  650 mg Rectal Q6H PRN Noel Almendarez MD        atorvastatin (LIPITOR) tablet 40 mg  40 mg Oral Daily Noel Almendarez MD   40 mg at 12/26/22 0852    budesonide-formoterol (SYMBICORT) 80-4.5 MCG/ACT inhaler 1 puff  1 puff Inhalation BID Noel Almendarez MD   1 puff at 12/26/22 0837    digoxin (LANOXIN) tablet 125 mcg  125 mcg Oral Daily Noel Almendarez MD   125 mcg at 12/26/22 0851    pantoprazole (PROTONIX) tablet 40 mg  40 mg Oral QAM AC Deejay Jerez MD   40 mg at 12/26/22 8806    sildenafil (REVATIO) tablet 20 mg  20 mg Oral TID Yvonne Canales MD   20 mg at 12/26/22 2092    tamsulosin (FLOMAX) capsule 0.4 mg  0.4 mg Oral Daily Deejay Jerez MD   0.4 mg at 12/26/22 0852    ipratropium (ATROVENT) 0.02 % nebulizer solution 0.5 mg  0.5 mg Nebulization 4x daily Yvonne Canales MD   0.5 mg at 12/26/22 0830       Allergies:  Adhesive tape, Codeine, Penicillins, and Nintedanib    Social History:   reports that he quit smoking about 52 years ago. His smoking use included cigarettes. He has a 0.50 pack-year smoking history. He has never used smokeless tobacco. He reports that he does not currently use alcohol after a past usage of about 12.0 standard drinks per week. He reports that he does not use drugs. Family History: family history includes Diabetes in his mother; Heart Attack in his father; Heart Disease in his brother and father. Review of Systems:      Constitutional: No fever or chills.  No night sweats, no weight loss   Eyes: No eye discharge, double vision, or eye pain   HEENT: negative for sore mouth, sore throat, hoarseness and voice change   Respiratory: negative for cough , sputum, dyspnea, wheezing, hemoptysis, chest pain   Cardiovascular: negative for chest pain, dyspnea, palpitations, orthopnea, PND   Gastrointestinal: negative for nausea, vomiting, diarrhea, constipation, abdominal pain, Dysphagia, hematemesis and hematochezia   Genitourinary: negative for frequency, dysuria, nocturia, urinary incontinence, and hematuria   Integument: negative for rash, skin lesions, bruises.    Hematologic/Lymphatic: negative for easy bruising, bleeding, lymphadenopathy, or petechiae   Endocrine: negative for heat or cold intolerance,weight changes, change in bowel habits and hair loss   Musculoskeletal: negative for myalgias, arthralgias, pain, joint swelling,and bone pain   Neurological: negative for headaches, dizziness, seizures, weakness, numbness    Physical Exam:      /88   Pulse 78   Temp 97.9 °F (36.6 °C) (Oral)   Resp 20   Ht 6' (1.829 m)   Wt 159 lb (72.1 kg)   SpO2 99%   BMI 21.56 kg/m²    Temp (24hrs), Av.8 °F (36.6 °C), Min:97.5 °F (36.4 °C), Max:98 °F (36.7 °C)      General appearance - well appearing, no in pain or distress   Mental status - alert and cooperative   Eyes - pupils equal and reactive, extraocular eye movements intact   Ears - bilateral TM's and external ear canals normal   Mouth - mucous membranes moist, pharynx normal without lesions   Neck - supple, no significant adenopathy   Lymphatics - no palpable lymphadenopathy, no hepatosplenomegaly   Chest - clear to auscultation, no wheezes, rales or rhonchi, symmetric air entry   Heart - normal rate, regular rhythm, normal S1, S2, no murmurs  Abdomen - soft, nontender, nondistended, no masses or organomegaly   Neurological - alert, oriented, normal speech, no focal findings or movement disorder noted   Musculoskeletal - no joint tenderness, deformity or swelling   Extremities - peripheral pulses normal, no pedal edema, no clubbing or cyanosis   Skin - normal coloration and turgor, no rashes, no suspicious skin lesions noted ,    Labs:   Complete Blood Count:   Recent Labs     22  0551 22  0659 22  0623   WBC 7.0 12.2* 11.9*   HGB 11.0* 11.1* 11.1*   MCV 87.6 87.8 87.6    310 321   RBC 4.18* 4.25 4.21   HCT 36.6* 37.3* 36.9*   MCH 26.3 26.1 26.4   MCHC 30.1 29.8 30.1   RDW 14.5* 14.6* 14.9*   MPV 10.4 10.3 10.7      PT/INR:    Lab Results   Component Value Date/Time PROTIME 25.7 12/26/2022 06:23 AM    INR 2.6 12/26/2022 06:23 AM     PTT:    Lab Results   Component Value Date/Time    APTT 31.0 12/26/2022 06:23 AM     Basal Metabolic Profile:   Recent Labs     12/24/22  0551 12/25/22  0659 12/26/22  0623    137 140   K 4.2 4.0 4.3   BUN 17 22 27*   CREATININE 0.52* 0.55* 0.52*    101 105   CO2 28 29 29      LFTS  No results for input(s): ALKPHOS, ALT, AST, BILITOT, BILIDIR, LABALBU in the last 72 hours. Imaging:  CT HEAD WO CONTRAST    Result Date: 12/19/2022  No acute intracranial abnormality. XR CHEST PORTABLE    Result Date: 12/19/2022  Pulmonary fibrotic changes are again identified. A superimposed pneumonia is not excluded. CT CHEST PULMONARY EMBOLISM W CONTRAST    Addendum Date: 12/19/2022    ADDENDUM: Discussed with Dr. Nikolai Posadas at 4:40 p.m. Result Date: 12/19/2022  Pulmonary emboli bilaterally as above. No evidence of right ventricular strain. Coronary artery disease and aortic valve disease. COPD and pulmonary fibrosis. RECOMMENDATIONS: The findings were sent to the Radiology Results [address] at 4:35 pm on 12/19/2022 to be communicated to a licensed caregiver.        Impression:   Primary Problem  Acute pulmonary embolism [facility])    Active Hospital Problems    Diagnosis Date Noted    Chronic respiratory failure with hypoxia [facility]) [J96.11] 12/21/2022     Priority: Medium    Pulmonary HTN ([address] [I27.20] 12/21/2022     Priority: Medium    Postural dizziness with near syncope [R42, R55] 12/21/2022     Priority: Medium    Pulmonary embolism without acute cor pulmonale ([address] [I26.99] 12/20/2022     Priority: Medium    Acute pulmonary embolism ([address] [I26.99] 12/19/2022     Priority: Medium    Pulmonary embolism, bilateral ([address] [I26.99] 12/19/2022     Priority: Medium    Demand ischemia of myocardium ([address] [I24.8] 12/19/2022     Priority: Medium    Acute respiratory acidosis ([address] [J96.02] 12/19/2022     Priority: Medium Orthostatic hypotension [I95.1] 12/19/2022     Priority: Medium    Acute pulmonary edema ([facility] [J81.0] 12/19/2022     Priority: Medium    Syncope and collapse [R55] 01/12/2021    Chronic idiopathic pulmonary fibrosis [facility]) [J84.112] 12/08/2020    CHF (congestive heart failure), NYHA class II, acute on chronic, combined ([facility] [I50.43] 10/17/2020    Shortness of breath [R06.02]     Hill's esophagus [K22.70] 06/18/2019    History of colon cancer [Z85.038]     Benign prostatic hyperplasia with lower urinary tract symptoms [N40.1] 04/02/2015    PAF (paroxysmal atrial fibrillation) ([facility] [I48.0] 07/21/2014    Dyslipidemia [E78.5] 02/19/2014       Assessment and Plan:    Acute pulmonary embolism while on eliqius concerning for eliqius failure  History of adenocarcinoma of rectum 2014 - in remission per patient  Presyncope   Paroxysmal atrial fibrllation  UIP  NSTEMI type 2     Recommendations     Eliquis failure and patient on Coumadin/bridged with heparin  Discussed differential diagnosis of DOAC failure possible etiologies include drug interaction, antiphospholipid antibodies, HIT, malignancy. Antiphospholipid antibodies pending  Patient has history of early-stage colorectal cancer back in 2014. Has been in remission  Valvular disease also contributing to shortness of breath. Echocardiogram shows LVH and valvular disease  INR is therapeutic> keep target 2-3  Patient will need long-term anticoagulation  Follow-up with hematology as an outpatient  Monitor closely for bleeding/H&H       Tadeo Eason  Internal Medicine Residency Program, PGY - [address]                             This note is created with the assistance of a speech recognition program.  While intending to generate a document that actually reflects the content of the visit, the document can still have some errors including those of syntax and sound a like substitutions which may escape proof reading.   It such instances, actual meaning can be extrapolated by contextual diversion.

## 2022-12-26 NOTE — PROGRESS NOTES
Comprehensive Nutrition Assessment    Type and Reason for Visit:  Positive Nutrition Screen    Nutrition Recommendations/Plan:   Continue current diet and ONS as ordered  Continue to monitor weight, labs, and intake. Malnutrition Assessment:  Malnutrition Status:  Mild malnutrition ([facility] severe malnutrition) (12/20/22 7865)    Context:        Findings of the 6 clinical characteristics of malnutrition:  Energy Intake:     Weight Loss: Body Fat Loss:        Muscle Mass Loss:       Fluid Accumulation:        Strength:       Nutrition Assessment:    Patient seen for follow up. Diet Regular. Per patient, his appetite has improved and he would like to conintue the ONS. Will order new weight. Labs: BUN: 27, Cr: 0.52, Glu: 122, Ca2+: 8.3, Glu: 122. Nutrition Related Findings:    Labs/meds reviewed Wound Type: None       Current Nutrition Intake & Therapies:    Average Meal Intake: 51-75%  Average Supplements Intake: None Ordered  ADULT DIET; Regular  ADULT ORAL NUTRITION SUPPLEMENT; Lunch, Dinner; Standard High Calorie/High Protein Oral Supplement    Anthropometric Measures:  Height: 6' (182.9 cm)  Ideal Body Weight (IBW): 178 lbs (81 kg)       Current Body Weight: 159 lb (72.1 kg), 89.3 % IBW. Weight Source: Not Specified  Current BMI (kg/m2): 21.6                          BMI Categories: Normal Weight (BMI 18.5-24. 9)    Estimated Daily Nutrient Needs:  Energy Requirements Based On: Kcal/kg  Weight Used for Energy Requirements: Current  Energy (kcal/day): 9954-7606 kcal/day  Weight Used for Protein Requirements: Current  Protein (g/day): 80-85 gm  Method Used for Fluid Requirements: ml/Kg  Fluid (ml/day): 1800 mL/day    Nutrition Diagnosis:   Inadequate oral intake related to altered taste perception as evidenced by poor intake prior to admission    Nutrition Interventions:   Food and/or Nutrient Delivery: Continue Current Diet, Continue Oral Nutrition Supplement  Nutrition Education/Counseling: No recommendation at this time  Coordination of Nutrition Care: Continue to monitor while inpatient  Plan of Care discussed with: Patient    Goals:     Goals: Meet at least 75% of estimated needs, prior to discharge       Nutrition Monitoring and Evaluation:   Behavioral-Environmental Outcomes: None Identified  Food/Nutrient Intake Outcomes: Food and Nutrient Intake, Supplement Intake  Physical Signs/Symptoms Outcomes: Biochemical Data, GI Status, Skin, Weight    Discharge Planning:     Too soon to determine     Yael Ruiz RD  Contact: [address]

## 2022-12-26 NOTE — PROGRESS NOTES
PULMONARY & CRITICAL CARE MEDICINE PROGRESS  NOTE     Patient:  Lay Mathew  MRN: [id number]  Admit date: 12/19/2022  Primary Care Physician: Wayne Mcmahon MD  Consulting Physician: Ronn Mccormack MD  CODE Status: Full Code  LOS: 7    SUBJECTIVE     I personally interviewed/examined the patient, reviewed interval history and interpreted all available radiographic, laboratory data at the time of service. Chief Compliant/Reason for Initial Consult:   Pulm embolism/chronic respiratory failure/pulmonary fibrosis. Brief Hospital Course: The patient is a 69 y.o. male he is followed up by Dr. Viji Alcaraz with history of pulmonary fibrosis/IPF on 6 L of nasal cannula with chronic hypoxic respiratory failure, history of pulmonary embolism on chronic anticoagulation therapy. He has been followed by hematology he had a history of pulm embolism apparently in 2020 and at that time he was treated with Xarelto and later while he was on Xarelto apparently he has nonocclusive pulm embolism considered as failure of Xarelto and it was changed to Eliquis and he has been taking Eliquis and compliant with Eliquis. According to patient for last 2 to 3 weeks he has been having increasing shortness of breath he gets short of breath by ambulating from room to bathroom also he has increased cough than baseline. Patient presented when he had an episode of near syncope he usually does feel dizzy when he stands up but at that time he was apparently sweaty did not completely pass out and did not have a fall. He does not complain of fever denies purulent sputum denies increased sputum production denies chills denies hemoptysis and does not complain of chest pain currently he does not complain of dizziness lightheadedness or syncope.   He presented to emergency room had a CTA chest done which showed that he has bilateral pulm embolism and right lower lobe lingula pulmonary artery and left lower lobe segmental branches per radiology. CT scan of the chest shows findings consistent with pulmonary fibrosis with basilar honeycomb changes and bronchiectasis no areas of definite consolidation. Patient has been on 6 L nasal cannula maintaining saturation above 90%. He had been taking his Eliquis and consider failure of NOAC/DOAC hematology oncology was consulted and patient is currently on heparin drip hematology discussed with patient about Lovenox versus Coumadin therapy patient wanted to proceed with Coumadin therapy. Previous echo in February this year showed estimated RVSP of 41, ejection fraction of 40 to 40%, diastolic dysfunction RV dilatation with normal systolic function mild to moderate MR. He has history of colon cancer in status postsurgery in 2015 with colostomy and reversal later  He is a non-smoker no history of COPD. Interval History:  12/26/22  No acute events   Review of Systems:  Review of Systems   Constitutional:  Positive for activity change. Negative for appetite change, fever and unexpected weight change. HENT:  Negative for postnasal drip, sore throat, trouble swallowing and voice change. Eyes:  Negative for photophobia, redness and visual disturbance. Respiratory:  Positive for cough and shortness of breath. Negative for chest tightness and wheezing. Cardiovascular:  Negative for chest pain, palpitations and leg swelling. Gastrointestinal:  Negative for abdominal pain, diarrhea and vomiting. Endocrine: Negative for polydipsia, polyphagia and polyuria. Genitourinary:  Negative for dysuria, frequency and hematuria. Musculoskeletal: Negative. Allergic/Immunologic: Negative. Neurological:  Negative for dizziness, syncope, speech difficulty and weakness. Hematological:  Negative for adenopathy. Does not bruise/bleed easily. Psychiatric/Behavioral: Negative.        OBJECTIVE     VITAL SIGNS:   LAST-  /88 Pulse 78   Temp 97.9 °F (36.6 °C) (Oral)   Resp 20   Ht 6' (1.829 m)   Wt 159 lb (72.1 kg)   SpO2 99%   BMI 21.56 kg/m²   8-24 HR RANGE-  TEMP Temp  Av.8 °F (36.6 °C)  Min: 97.5 °F (36.4 °C)  Max: 98 °F (99.0 °C)   BP Systolic (17UKP), KGZ:379 , Min:110 , FAM:042      Diastolic (45LOQ), BTJ:97, Min:69, Max:93     PULSE Pulse  Av.6  Min: 70  Max: 90   RR Resp  Av  Min: 20  Max: 20   O2 SAT SpO2  Av %  Min: 95 %  Max: 99 %   OXYGEN DELIVERY O2 Flow Rate (L/min)  Av L/min  Min: 6 L/min  Max: 6 L/min     Systemic Examination:   Physical Exam  General appearance - looks comfortable   Mental status - alert, oriented to person, place, and time  Eyes - pupils equal and reactive, extraocular eye movements intact  Mouth - mucous membranes moist, pharynx normal without lesions  Neck - supple, no significant adenopathy  Chest - Ventilating all lobes without any rales, rhonchi, wheezes or dullness. No bronchial breath sounds.   Chest expansion equal bilaterally   Heart - normal rate, regular rhythm, normal S1, S2, no murmurs, rubs, clicks or gallops  Abdomen - soft, nontender, nondistended, no masses or organomegaly  Neurological - alert, oriented, normal speech, no focal findings or movement disorder noted  Extremities - peripheral pulses normal, no pedal edema, no clubbing or cyanosis  Skin - normal coloration and turgor, no rashes, no suspicious skin lesions noted     DATA REVIEW     Medications:  Scheduled Meds:   warfarin  4 mg Oral Once    predniSONE  60 mg Oral Daily    Followed by    Mohsen Stuart ON 2022] predniSONE  40 mg Oral Daily    Followed by    Mohsen Stuart ON 2023] predniSONE  20 mg Oral Daily    Followed by    Mohsen Stuart ON 2023] predniSONE  10 mg Oral Daily    albuterol  2.5 mg Nebulization 4x daily    midodrine  5 mg Oral TID     warfarin placeholder: dosing by pharmacy   Other RX Placeholder    sodium chloride flush  5-40 mL IntraVENous 2 times per day    atorvastatin  40 mg Oral Daily    budesonide-formoterol  1 puff Inhalation BID    digoxin  125 mcg Oral Daily    pantoprazole  40 mg Oral QAM AC    sildenafil  20 mg Oral TID    tamsulosin  0.4 mg Oral Daily    ipratropium  0.5 mg Nebulization 4x daily     Continuous Infusions:   sodium chloride 75 mL/hr at 12/23/22 0447    sodium chloride       LABS:-  ABG:   No results for input(s): POCPH, POCPCO2, POCPO2, POCHCO3, PWVZ2ZXA in the last 72 hours. CBC:   Recent Labs     12/24/22  0551 12/25/22  0659 12/26/22  0623   WBC 7.0 12.2* 11.9*   HGB 11.0* 11.1* 11.1*   HCT 36.6* 37.3* 36.9*   MCV 87.6 87.8 87.6    310 321   LYMPHOPCT 5* 3* 4*   RBC 4.18* 4.25 4.21   MCH 26.3 26.1 26.4   MCHC 30.1 29.8 30.1   RDW 14.5* 14.6* 14.9*       BMP:   Recent Labs     12/24/22  0551 12/25/22  0659 12/26/22  0623    137 140   K 4.2 4.0 4.3    101 105   CO2 28 29 29   BUN 17 22 27*   CREATININE 0.52* 0.55* 0.52*   GLUCOSE 141* 141* 122*       Liver Function Test:   No results for input(s): PROT, LABALBU, ALT, AST, GGT, ALKPHOS, BILITOT in the last 72 hours. Amylase/Lipase:  No results for input(s): AMYLASE, LIPASE in the last 72 hours. Coagulation Profile:   Recent Labs     12/24/22  0551 12/25/22  0659 12/26/22  0623   INR 2.5 2.6 2.6   PROTIME 24.8* 25.6* 25.7*   APTT 77.1* 30.5 31.0*       Cardiac Enzymes:  No results for input(s): CKTOTAL, CKMB, CKMBINDEX, TROPONINI in the last 72 hours.   Lactic Acid:  Lab Results   Component Value Date    LACTA NOT REPORTED 12/07/2020    LACTA NOT REPORTED 10/17/2020    LACTA 1.0 08/11/2014     BNP:   No results found for: BNP  D-Dimer:  Lab Results   Component Value Date    DDIMER 1.10 (H) 02/18/2022     Others:   Lab Results   Component Value Date    TSH 7.55 (H) 12/19/2022     Lab Results   Component Value Date    YOUNG NEGATIVE 10/17/2020    SEDRATE 82 (H) 10/22/2020    CRP 11.1 (H) 01/12/2021     No results found for: Rosibel Fields  Lab Results   Component Value Date    IRON 33 (L) 05/02/2022 TIBC 349 05/02/2022    FERRITIN 47 05/02/2022     No results found for: SPEP, UPEP  Lab Results   Component Value Date/Time    PSA 0.46 12/20/2022 07:23 AM    CEA 4.8 12/20/2022 07:23 AM     114 12/20/2022 07:23 AM       Input/Output:    Intake/Output Summary (Last 24 hours) at 12/26/2022 1154  Last data filed at 12/26/2022 0811  Gross per 24 hour   Intake 390 ml   Output 1100 ml   Net -710 ml         Microbiology:  No results for input(s): SPECDESC, SPECDESC, SPECIAL, CULTURE, CULTURE, STATUS, ORG, CDIFFTOXPCR, CAMPYLOBPCR, SALMONELLAPC, SHIGAPCR, SHIGELLAPCR, MPNEUG, MPNEUM, LACTOQL in the last 72 hours. Pathology:    Radiology reports:  VL DUP LOWER EXTREMITY VENOUS BILATERAL   Final Result      CT CHEST PULMONARY EMBOLISM W CONTRAST   Final Result   Addendum (preliminary) 1 of 1   ADDENDUM:   Discussed with Dr. Nikolai Posadas at 4:40 p.m. Final   Pulmonary emboli bilaterally as above. No evidence of right ventricular   strain. Coronary artery disease and aortic valve disease. COPD and   pulmonary fibrosis. RECOMMENDATIONS:   The findings were sent to the Radiology Results [address] at 4:35   pm on 12/19/2022 to be communicated to a licensed caregiver. CT HEAD WO CONTRAST   Final Result   No acute intracranial abnormality. XR CHEST PORTABLE   Final Result   Pulmonary fibrotic changes are again identified. A superimposed pneumonia is   not excluded. Echocardiogram:   No results found for this or any previous visit. Cardiac Catheterization:   No results found for this or any previous visit.       ASSESSMENT AND PLAN     Assessment:       //Recurrent pulmonary pulm embolism while on NOAC/DOAC  //Chronic respiratory failure on long-term oxygen therapy  //Near syncope on presentation likely combination/multifactorial with history of chronic hypoxic respiratory failure with pulm embolism and on sildenafil  //Pulmonary fibrosis/IPF  //Pulmonary hypertension  //Moderate to severe aortic stenosis  //History of colon cancer    Plan:    Continue coumadin   Ok to [employer]     I updated the patient regarding the current clinical condition, provisional diagnosis and management plan. I addressed concerns and answered all questions to the best of my abilities. It was my pleasure to evaluate Nick Ledbetter today. We will continue to follow. I would like to thank you for allowing me to participate in the care of this patient. Please feel free to call with any further questions or concerns. Yani Berg MD, M.D. Pulmonary and Critical Care Medicine           12/26/2022, 11:54 AM    Please note that this chart was generated using voice recognition Dragon dictation software. Although every effort was made to ensure the accuracy of this automated transcription, some errors in transcription may have occurred.

## 2022-12-26 NOTE — PROGRESS NOTES
[facility]  Internal Medicine Teaching Residency Program  Inpatient Daily Progress Note  ______________________________________________________________________________    Patient: Marietta Chahal  YOB: 1955   CCV:[id number]    Acct: [de-identified]     Room: 2019/2019-01  Admit date: 12/19/2022  Today's date: 12/26/22  Number of days in the hospital: 7    SUBJECTIVE   Admitting Diagnosis: Acute pulmonary embolism ([address]  CC: An episode of near syncope    Pt examined at bedside. Chart & results reviewed. No acute event overnight  Remained afebrile and hemodynamically stable. No new complaints of lightheadedness or syncopal episodes overnight  Feeling better overall. No signs of distress noted. Oxygen requirement at his baseline between 5 to 9 L. Denies any bleeding episodes while on warfarin    ROS:  Constitutional:  negative for chills, fevers, sweats  Respiratory:  negative for cough, dyspnea on exertion, hemoptysis, shortness of breath, wheezing  Cardiovascular:  negative for chest pain, chest pressure/discomfort, lower extremity edema, palpitations  Gastrointestinal:  negative for abdominal pain, constipation, diarrhea, nausea, vomiting  Neurological:  negative for dizziness, headache    BRIEF HISTORY     The patient is a pleasant 69 y.o. male with past medical history of A. fib, idiopathic pulmonary fibrosis with pulmonary hypertension, colorectal carcinoma, history of pulmonary embolism, BPH presents after an episode of near syncope. Patient states he was standing at his home and suddenly started feeling sweaty and feeling that he was going to pass out which made him sit on the floor. Denies any loss of consciousness, did not hit his head.   Patient has a history of pulmonary embolism and A. fib and takes Eliquis at home, patient has a history of idiopathic pulmonary fibrosis-along with significant family history-also in the sister-has been taking sildenafil for moderate pulmonary hypertension. Uses 9 L of oxygen at home. States he has passed out multiple times in the past during which he was found to have COVID-pneumonia. As per the dispense report patient takes tamsulosin and sildenafil-patient is unsure of tamsulosin but takes sildenafil. Patient states he always feels dizzy when standing suddenly from lying down position. Last echo in  showed ejection fraction 40 to 45%, RVSP 41 mmHg, mild to moderate mitral regurgitation. Patient denies any chest pain, abdominal pain, loose stool, burning urination, leg pain or swelling, no recent sick contact. in the ED patient was requiring 15 L of oxygen via nonrebreather and was desaturating when taken off nasal cannula as per the chart. Hypotensive blood pressure 88/62, heart rate 69-rhythm A. Fib. proBNP elevated to 16,000.  2 sets of troponin 35-33. CT PE showed bilateral pulmonary embolism without right heart strain along with underlying honeycomb pattern at peripheral and bases and centrilobular paraseptal emphysema. Patient was started on heparin drip and admitted to internal medicine floor for further management. Has a history of colorectal cancer, had a colostomy in past which was later reversed. 2022-INR at 1.4, has persistent shortness of breath currently on 7 L of nasal cannula    OBJECTIVE     Vital Signs:  /83   Pulse 79   Temp 97.9 °F (36.6 °C) (Oral)   Resp 21   Ht 6' (1.829 m)   Wt 159 lb (72.1 kg)   SpO2 94%   BMI 21.56 kg/m²     Temp (24hrs), Av.8 °F (36.6 °C), Min:97.6 °F (36.4 °C), Max:98 °F (36.7 °C)    In: 630   Out: 1400 [Urine:1400]    Physical Exam:  Constitutional: This is a well developed, well nourished, 18.5-24.9 - Normal 69y.o. year old male who is alert, oriented, cooperative and in no apparent distress. Head:normocephalic and atraumatic. EENT:  PERRLA. No conjunctival injections.    Septum was midline, mucosa was without erythema, exudates or cobblestoning. No thrush was noted. Neck: Supple without thyromegaly. No elevated JVP. Trachea was midline. Respiratory: Has tachypnea, nondistressed breathing, bilateral air entry present but diminished with coarse crackles  Cardiovascular: Regular without murmur, clicks, gallops or rubs. Abdomen: Slightly rounded and soft without organomegaly. No rebound, rigidity or guarding was appreciated. Lymphatic: No lymphadenopathy. Musculoskeletal: Normal curvature of the spine. No gross muscle weakness. Extremities:  No lower extremity edema, ulcerations, tenderness, varicosities or erythema. Muscle size, tone and strength are normal.  No involuntary movements are noted. Skin:  Warm and dry. Good color, turgor and pigmentation. No lesions or scars.   No cyanosis or clubbing  Neurological/Psychiatric: The patient's general behavior, level of consciousness, thought content and emotional status is normal.        Medications:  Scheduled Medications:      warfarin  4 mg Oral Once    predniSONE  60 mg Oral Daily    Followed by    Adelaida Pelaez ON 12/29/2022] predniSONE  40 mg Oral Daily    Followed by    Adelaida Pelaez ON 1/1/2023] predniSONE  20 mg Oral Daily    Followed by    Adelaida Pelaez ON 1/4/2023] predniSONE  10 mg Oral Daily    albuterol  2.5 mg Nebulization 4x daily    midodrine  5 mg Oral TID     warfarin placeholder: dosing by pharmacy   Other RX Placeholder    sodium chloride flush  5-40 mL IntraVENous 2 times per day    atorvastatin  40 mg Oral Daily    budesonide-formoterol  1 puff Inhalation BID    digoxin  125 mcg Oral Daily    pantoprazole  40 mg Oral QAM AC    sildenafil  20 mg Oral TID    tamsulosin  0.4 mg Oral Daily    ipratropium  0.5 mg Nebulization 4x daily     Continuous Infusions:    sodium chloride 75 mL/hr at 12/23/22 0447    sodium chloride       PRN Medicationsalbuterol, 2.5 mg, As Directed RT PRN  sodium chloride, 1 spray, PRN  sodium chloride flush, 5-40 mL, PRN  sodium chloride, , PRN  ondansetron, 4 mg, Q8H PRN   Or  ondansetron, 4 mg, Q6H PRN  polyethylene glycol, 17 g, Daily PRN  acetaminophen, 650 mg, Q6H PRN   Or  acetaminophen, 650 mg, Q6H PRN      Diagnostic Labs:  CBC:   Recent Labs     12/24/22  0551 12/25/22  0659 12/26/22  0623   WBC 7.0 12.2* 11.9*   RBC 4.18* 4.25 4.21   HGB 11.0* 11.1* 11.1*   HCT 36.6* 37.3* 36.9*   MCV 87.6 87.8 87.6   RDW 14.5* 14.6* 14.9*    310 321       BMP:   Recent Labs     12/24/22  0551 12/25/22  0659 12/26/22  0623    137 140   K 4.2 4.0 4.3    101 105   CO2 28 29 29   BUN 17 22 27*   CREATININE 0.52* 0.55* 0.52*       BNP: No results for input(s): BNP in the last 72 hours. PT/INR:   Recent Labs     12/24/22  0551 12/25/22  0659 12/26/22  0623   PROTIME 24.8* 25.6* 25.7*   INR 2.5 2.6 2.6       APTT:   Recent Labs     12/24/22  0551 12/25/22  0659 12/26/22  0623   APTT 77.1* 30.5 31.0*       CARDIAC ENZYMES: No results for input(s): CKMB, CKMBINDEX, TROPONINI in the last 72 hours. Invalid input(s): CKTOTAL;3  FASTING LIPID PANEL:  Lab Results   Component Value Date    CHOL 200 (H) 01/03/2017    HDL 29 (L) 12/20/2021    TRIG 165 (H) 01/03/2017     LIVER PROFILE:   No results for input(s): AST, ALT, ALB, BILIDIR, BILITOT, ALKPHOS in the last 72 hours. MICROBIOLOGY:   Lab Results   Component Value Date/Time    CULTURE NO SIGNIFICANT GROWTH 02/20/2022 06:41 PM       Imaging:    CT HEAD WO CONTRAST    Result Date: 12/19/2022  No acute intracranial abnormality. XR CHEST PORTABLE    Result Date: 12/19/2022  Pulmonary fibrotic changes are again identified. A superimposed pneumonia is not excluded. CT CHEST PULMONARY EMBOLISM W CONTRAST    Addendum Date: 12/19/2022    ADDENDUM: Discussed with Dr. Felix Posadas at 4:40 p.m. Result Date: 12/19/2022  Pulmonary emboli bilaterally as above. No evidence of right ventricular strain. Coronary artery disease and aortic valve disease. COPD and pulmonary fibrosis. RECOMMENDATIONS: The findings were sent to the Radiology Results [id number] at 4:35 pm on 12/19/2022 to be communicated to a licensed caregiver. ASSESSMENT & PLAN     ASSESSMENT / PLAN:      IMPRESSION  This is a 69 y.o. male who presented with near syncope and found to have pulmonary embolism. Patient admitted to inpatient status for the management of syncope and pulmonary embolism. Principal Problem:    Acute pulmonary embolism (HCC)-likely secondary to Eliquis failure-can be due to underlying possible malignancy-will need outpatient work-up and follow-up with heme-onc, currently on warfarin. heme-onc has been following patient will be discharged with warfarin and follow-up with Coumadin clinic. Heart failure with preserved ejection fraction - Euvolemic, no fluid congestion, off IV diuretics, cardiology was consulted, echo 12/19/2022-LVEF 50 to 55%, grade 3 LVDD, aortic valve heavily calcified with CHERRY-0.9 cm², severe pulmonary hypertension-RVSP-64 mmHg. Near Syncope and collapse- orthostatics positive and echo showing severe aortic stenosis. cardiology - Recommended outpatient follow-up with cardiology for severe aortic stenosis. compression stockings. PAF (paroxysmal atrial fibrillation) (HCC)-rate controlled, continue digoxin 125 oral daily ,continue warfarin, INR is at 2.5 pharmacy to dose warfarin   Chronic idiopathic pulmonary fibrosis ([facility] [address] with possible exacerbation, COPD stage II on PFT on 2/25/2021 with FEV1 of 55%  - Switched to p.o. steroids from today.  -Follows with Dr. Michael Rea, Hx acute respiratory failure , continue bronchodilators ,secondary to pulmonary embolism as per pulmonary.  -Patient will be discharged if cleared by pulmonology today. Dyslipidemia-continued home atorvastatin 40 mg      DVT ppx:  Coumadin   GI ppx: Not indicated at this time     PT/OT/SW-has been consulted  Discharge Planning:  to assist with.     Montana Munoz MD  Internal Medicine Resident, PGY-1  [address], [place]  12/26/2022, 1:02 PM  Attending Physician Statement  I have discussed the care of Antoinette Gilmore and I have examined the patient myselft and taken ros and hpi , including pertinent history and exam findings,  with the resident. I have reviewed the key elements of all parts of the encounter with the resident. I agree with the assessment, plan and orders as documented by the resident.   DC PLAN     Electronically signed by Cindi Lerner MD

## 2022-12-26 NOTE — PROGRESS NOTES
Pharmacy Note  Warfarin Consult follow-up      Recent Labs     12/26/22  0623   INR 2.6     Recent Labs     12/24/22  0551 12/25/22  0659 12/26/22  0623   HGB 11.0* 11.1* 11.1*   HCT 36.6* 37.3* 36.9*    310 321       Significant Drug-Drug Interactions:  New warfarin drug-drug interactions: none  Discontinued drug-drug interactions: none      Notes:                   Warfarin 4 mg today. Daily PT/INR while inpatient.

## 2022-12-26 NOTE — PLAN OF CARE
Problem: Discharge Planning  Goal: Discharge to home or other facility with appropriate resources  Outcome: Adequate for Discharge  Flowsheets (Taken 12/26/2022 5855)  Discharge to home or other facility with appropriate resources:   Identify barriers to discharge with patient and caregiver   Arrange for needed discharge resources and transportation as appropriate     Problem: Safety - Adult  Goal: Free from fall injury  Outcome: Adequate for Discharge     Problem: Skin/Tissue Integrity  Goal: Absence of new skin breakdown  Description: 1. Monitor for areas of redness and/or skin breakdown  2. Assess vascular access sites hourly  3. Every 4-6 hours minimum:  Change oxygen saturation probe site  4. Every 4-6 hours:  If on nasal continuous positive airway pressure, respiratory therapy assess nares and determine need for appliance change or resting period.   Outcome: Adequate for Discharge     Problem: Chronic Conditions and Co-morbidities  Goal: Patient's chronic conditions and co-morbidity symptoms are monitored and maintained or improved  Outcome: Adequate for Discharge  Flowsheets (Taken 12/26/2022 0811)  Care Plan - Patient's Chronic Conditions and Co-Morbidity Symptoms are Monitored and Maintained or Improved:   Monitor and assess patient's chronic conditions and comorbid symptoms for stability, deterioration, or improvement   Collaborate with multidisciplinary team to address chronic and comorbid conditions and prevent exacerbation or deterioration     Problem: Respiratory - Adult  Goal: Achieves optimal ventilation and oxygenation  Outcome: Adequate for Discharge     Problem: Nutrition Deficit:  Goal: Optimize nutritional status  Outcome: Adequate for Discharge  Flowsheets (Taken 12/26/2022 1444 by Isabel Salcedo RD)  Nutrient intake appropriate for improving, restoring, or maintaining nutritional needs:   Assess nutritional status and recommend course of action   Monitor oral intake, labs, and treatment plans   Recommend appropriate diets, oral nutritional supplements, and vitamin/mineral supplements     Problem: ABCDS Injury Assessment  Goal: Absence of physical injury  Outcome: Adequate for Discharge     Problem: Pain  Goal: Verbalizes/displays adequate comfort level or baseline comfort level  Outcome: Adequate for Discharge  Flowsheets (Taken 12/26/2022 0811)  Verbalizes/displays adequate comfort level or baseline comfort level:   Encourage patient to monitor pain and request assistance   Assess pain using appropriate pain scale

## 2022-12-27 ENCOUNTER — HOSPITAL ENCOUNTER (OUTPATIENT)
Dept: PHARMACY | Age: 67
Setting detail: THERAPIES SERIES
Discharge: HOME | End: 2022-12-27
Payer: MEDICARE

## 2022-12-27 ENCOUNTER — ANTI-COAG VISIT (OUTPATIENT)
Dept: PHARMACY | Age: 67
End: 2022-12-27

## 2022-12-27 ENCOUNTER — TELEPHONE (OUTPATIENT)
Dept: FAMILY MEDICINE CLINIC | Age: 67
End: 2022-12-27

## 2022-12-27 ENCOUNTER — CARE COORDINATION (OUTPATIENT)
Dept: CASE MANAGEMENT | Age: 67
End: 2022-12-27

## 2022-12-27 DIAGNOSIS — I48.0 PAF (PAROXYSMAL ATRIAL FIBRILLATION) (HCC): Primary | ICD-10-CM

## 2022-12-27 DIAGNOSIS — I26.99 PULMONARY EMBOLISM, BILATERAL (HCC): Primary | ICD-10-CM

## 2022-12-27 DIAGNOSIS — I26.99 ACUTE PULMONARY EMBOLISM, UNSPECIFIED PULMONARY EMBOLISM TYPE, UNSPECIFIED WHETHER ACUTE COR PULMONALE PRESENT (HCC): ICD-10-CM

## 2022-12-27 LAB
INR BLD: 2
PROTIME: 23 SECONDS

## 2022-12-27 PROCEDURE — 85610 PROTHROMBIN TIME: CPT

## 2022-12-27 PROCEDURE — 99213 OFFICE O/P EST LOW 20 MIN: CPT

## 2022-12-27 PROCEDURE — 1111F DSCHRG MED/CURRENT MED MERGE: CPT | Performed by: INTERNAL MEDICINE

## 2022-12-27 NOTE — TELEPHONE ENCOUNTER
Care Transitions Initial Follow Up Call    Outreach made within 2 business days of discharge: Yes    Patient: Nick Ledbetter Patient : 1955   MRN: [id number]  Reason for Admission: There are no discharge diagnoses documented for the most recent discharge. Discharge Date: 22       Spoke with: ILIR for patient to call the office    Discharge department/facility: [place] [address] Interactive Patient Contact:  Was patient able to fill all prescriptions:   Was patient instructed to bring all medications to the follow-up visit:   Is patient taking all medications as directed in the discharge summary? Does patient understand their discharge instructions:   Does patient have questions or concerns that need addressed prior to 7-14 day follow up office visit:     Scheduled appointment with PCP within 7-14 days    Follow Up  No future appointments.     Jonny Cordova MA

## 2022-12-27 NOTE — TELEPHONE ENCOUNTER
Care Transitions Initial Follow Up Call    Outreach made within 2 business days of discharge: Yes    Patient: Ronda Mcwilliams Patient : 1955   MRN: [id number]  Reason for Admission: There are no discharge diagnoses documented for the most recent discharge. Discharge Date: 22       Spoke with: Patient    Discharge department/facility: [facility] Interactive Patient Contact:  Was patient able to fill all prescriptions: Yes  Was patient instructed to bring all medications to the follow-up visit: Yes  Is patient taking all medications as directed in the discharge summary?  Yes  Does patient understand their discharge instructions: Yes  Does patient have questions or concerns that need addressed prior to 7-14 day follow up office visit: yes -     Scheduled appointment with PCP within 7-14 days    Follow Up  Future Appointments   Date Time Provider Yves Henriquez   2023  2:40 PM [facility] [place], [place]

## 2022-12-27 NOTE — CARE COORDINATION
Care Transitions Outreach Attempt    Call within 2 business days of discharge: Yes   Attempted to reach patient for transitions of care follow up. Unable to reach patient. Patient: Marietta Chahal Patient : 1955 MRN: [id number]    Last Discharge [address]       Date Complaint Diagnosis Description Type Department Provider    22 Fall; Loss of Consciousness Pulmonary embolism without acute cor pulmonale, unspecified chronicity, unspecified pulmonary embolism type ([facility] [address] . .. ED to Hosp-Admission (Discharged) (ADMITTED) [facility] CAR 2 Virgilio Louis MD; Alan Mahan, . .. Was this an external facility discharge?  No Discharge Facility: [facility]    Noted following upcoming appointments from discharge chart review:   [facility] follow up appointment(s):   Future Appointments   Date Time Provider Yves Henriquez   2023  2:40 PM [facility]   2023  3:00 PM Marian Shelley MD [facility]   2023  3:00 PM [facility]     [facility] follow up appointment(s): n/a

## 2022-12-27 NOTE — TELEPHONE ENCOUNTER
Ragini Richards with [facility], called stating patient was not able to  revatio back in November. States the insurance denied it. She stated they were giving this to him in the hospital and patient states it did seem to help. She ask that we do a PA. Per CoverMyMeds, the medication was denied. See attached denial.    She also stated patient is having trouble going to the coumadin clinic. States it is difficult for him to get out of the house due to being on oxygen. She would like to know if you would be able to manage his coumadin, or if he can get a machine to check at home. States he did talk to the coumadin clinic about coming to our lab to get INR checked and they told him if he did that someone would still need to be managing his coumadin.

## 2022-12-27 NOTE — CARE COORDINATION
[facility] Care Transitions Initial Follow Up Call    Call within 2 business days of discharge: Yes    Care Transition Nurse contacted the patient by telephone to perform post hospital discharge assessment. Verified name and  with patient as identifiers. Provided introduction to self, and explanation of the Care Transition Nurse role. Patient: Nolberto Marie Patient : 1955   MRN: [id number]  Reason for Admission: PE  Discharge Date: 22 RARS: Readmission Risk Score: 15.4      Last Discharge  Street       Date Complaint Diagnosis Description Type Department Provider    22 Fall; Loss of Consciousness Pulmonary embolism without acute cor pulmonale, unspecified chronicity, unspecified pulmonary embolism type ([facility] [address] . .. ED to Hosp-Admission (Discharged) (ADMITTED) [facility] CAR 2 Dasha Frankel MD; Greg Rogers, ... Was this an external facility discharge? No Discharge Facility: [facility]    Challenges to be reviewed by the provider   Additional needs identified to be addressed with provider: Yes  medications-Patient's Revatio was not covered by insurance, needs a PA for it's intended use. Patient received while in the hospital and said he felt better on it. T     Method of communication with provider: TC to office, spoke with Elida Thornton, will route message to provider as well    Spoke with patient who returned call. Patient came to the hospital d/t increase in dyspnea, was found to have PE despite being on Eliquis. Patient has pulmonary fibrosis had PE in the past and was on Xarelto but failed that therapy and this time was on Eliquis and also failed on that therapy, so now he is on Coumadin. He went to the anticoagulation clinic today, states he was worried he would run out of air and his tank was getting low. Patient wears O2 at 9L/min, O2 sat while at rest is 97-98% however if he exerts himself, walking up stairs, getting to bathroom, he will drop to the 60-70 range.   At this level he gets lightheaded and feels he is going to pass out. He c/o painful right calf, states it has been sore for the past week. He had hip surgery at the beginning of the year, he had come to the hospital for PNE and fell in the ED fracturing his right hip and had arthroplasty done at that time. Patient voiced frustration with his recent health these past few months. Discharge instructions reviewed, he is on tapering dose of steroids, Coumadin and was ordered compression stockings. He has all other medications but does not have the Revatio. [facility] who said when script was written in November he never got because insurance did not cover. I inquired as to why as patient was taking in the hospital and said it made him feel better. Pharmacy is going to run again and I expressed if it needs a PA to send to the PCP office as they are prescribing this for his pulmonary HTN. Patient has multiple follow up appointments with specialists and now has to go to Coumadin clinic which he reports as cumbersome. Expressed that we can check into other options such as discussing with PCP about managing his Coumadin from home using at home testing. Will send message to provider. Patient feels he has everything he needs, he tried to get appointment with his pulmonologist but they can't get him in until February. He was thinking about seeing pulmonary from [facility] who saw him in the hospital.  I offered assistance with scheduling any further appointments but he declined at this time. Explained role of CTN to patient and follow up for next few weeks. He denies any other needs or concerns at this time. Care Transition Nurse reviewed discharge instructions with patient who verbalized understanding. The patient was given an opportunity to ask questions and does not have any further questions or concerns at this time. Were discharge instructions available to patient? Yes.  Reviewed appropriate site of care based on symptoms and resources available to patient including: PCP  Specialist. The patient agrees to contact the PCP office for questions related to their healthcare. Advance Care Planning:   Does patient have an Advance Directive: reviewed and current. Medication reconciliation was performed with patient, who verbalizes understanding of administration of home medications. Medications reviewed, 1111F entered: yes    Was patient discharged with a pulse oximeter? no    Non-face-to-face services provided:  Obtained and reviewed discharge summary and/or continuity of care documents    Offered patient enrollment in the Remote Patient Monitoring (RPM) program for in-home monitoring: NA.    Care Transitions 24 Hour Call    Schedule Follow Up Appointment with PCP: Completed  Do you have a copy of your discharge instructions?: Yes  Do you have all of your prescriptions and are they filled?: No  Have you been contacted by a [facility] Pharmacist?: No  Have you scheduled your follow up appointment?: Yes  How are you going to get to your appointment?: Car - family or friend to transport  Do you feel like you have everything you need to keep you well at home?: Yes  Care Transitions Interventions         Follow Up  Future Appointments   Date Time Provider Yves Henriquez   1/2/2023  2:40 PM [facility]   1/5/2023  3:00 PM Jovany Shepard  [facility]   1/16/2023  3:00 PM Cassandra Mcgowan MD [address] [facility] Transition Nurse provided contact information. Plan for follow-up call in 1-2 days based on severity of symptoms and risk factors. Plan for next call:  check on dyspnea, see how patient is feeling, check if Revatio was approved.      Jayshree Gardner RN

## 2022-12-27 NOTE — PROGRESS NOTES
Medication Management Service, Warfarin Management  MICHEL CALVERT [facility], [phone]  First visit to ACS Office. Date: 12/27/2022     Education provided on indication and mechanism of warfarin, compliance, appropriate follow-up & monitoring, dietary and medication interactions, potential thromboembolic & bleeding complications, when to seek medical care, and office policy. Patient has been on warfarin in the past, regimen: no; Eliquis. Patient states compliant with regimen. No bleeding or thromboembolic side effects noted. No significant med or dietary changes. No significant recent illness or disease state changes. Subjective: Indu Chilel is a 69 y.o. male who presents to clinic today for anticoagulation monitoring and adjustment. Patient seen in clinic for warfarin management due to  Indication:   atrial fibrillation and PE. INR goal: of 2.0-3.0. Duration of therapy: indefinite. Assessment and PLAN   PT/INR done in office per protocol. INR today is 2.0, therapeutic. Patient did not receive a dose yesterday, he understood that they would give him a dose in the hospital before he left but none documented on the [facility]. Also is on a prednisone taper, believes last day will be on 1/4/2023. Plan:  Patient will take 2mg this Thursday only, otherwise he will take 4mg orally once daily until next INR check. Using warfarin 2 mg tablets. Recheck INR in 6 days. Patient seen in room # 3. Patient signed CPA acknowledgement and HIPAA today. ED. OP. = We discussed use of ibuprofen as he currently take 800mg twice daily (AM and PM) and risks of potential GI bleed with concurrent use. Patient states that he has already had a GI bleed so I stressed that he closely monitor GI pain and watch for blood in the stool and / or urine.   Additionally suggested that he may benefit from a change to 400mg three times daily or even 400mg AM and PM with 200mg in the middle of the daily for better pain control and reduction in total daily ibuprofen, thus theoretically reducing risk associated with GL bleed. He states he will discuss with MD.        Patient referred to possible heart surgery, he needs to f/u with them and hem/onc. We will need a new referral to manage warfarin since original sent from hospitalist, sending message to Dr. Radha Heard. Patient verbalized understanding of dosing directions and information discussed. Dosing schedule given to patient. Progress note sent to referring office.       Carmen Parker RP, CACP  Clinical Pharmacist Medication Management  2022  12:23 PM      For Pharmacy Admin Tracking Only    Intervention Detail: Adherence Monitorin and Dose Adjustment: 1, reason: Therapy Optimization  Total # of Interventions Recommended: 3  Total # of Interventions Accepted: 3  Time Spent (min): 60

## 2022-12-27 NOTE — TELEPHONE ENCOUNTER
Yes - he would be a good candidate to try the home INR machine. If we can identify a supplier, I will put in the order and have it faxed to them. I have not recd a PA for the Revatio - will contact his pharmacy and look into it.

## 2022-12-28 ENCOUNTER — CARE COORDINATION (OUTPATIENT)
Dept: CASE MANAGEMENT | Age: 67
End: 2022-12-28

## 2022-12-28 LAB
BETA 2 GLYCOPROT.1 IGA AB: 2.8 ELISA U/ML (ref 0–7)
BETA 2 GLYCOPROT.1 IGG AB: <0.6 ELISA U/ML (ref 0–7)
BETA 2 GLYCOPROT.1 IGM AB: <0.9 ELISA U/ML (ref 0–7)

## 2022-12-28 NOTE — DISCHARGE SUMMARY
[address]     Department of Internal Medicine - Staff Internal Medicine Teaching Service    INPATIENT DISCHARGE SUMMARY      Patient Identification:  Antoinette Gilmore is a 69 y.o. male. :  1955  MRN: [id number]     Acct: [de-identified]   PCP: Siobhan Shea MD  Admit Date:  2022  Discharge date and time: 2022  4:37 PM   Attending Provider: Dr. Cindi Lerner MD                                    [address] Problem Lists:  Principal Problem:    Acute pulmonary embolism ([address]  Active Problems:    Pulmonary embolism, bilateral ([address]    Demand ischemia of myocardium (HCC)    Acute respiratory acidosis (HCC)    Orthostatic hypotension    Acute pulmonary edema (HCC)    Pulmonary embolism without acute cor pulmonale (HCC)    Chronic respiratory failure with hypoxia (HCC)    Pulmonary HTN (HCC)    Postural dizziness with near syncope    Dyslipidemia    Benign prostatic hyperplasia with lower urinary tract symptoms    History of colon cancer    PAF (paroxysmal atrial fibrillation) (HCC)    Hill's esophagus    CHF (congestive heart failure), NYHA class II, acute on chronic, combined ([address]    Shortness of breath    Chronic idiopathic pulmonary fibrosis ([address]  Resolved Problems:    Syncope and collapse      HOSPITAL STAY     Brief Inpatient course: The patient is a pleasant 69 y.o. male with past medical history of A. fib, idiopathic pulmonary fibrosis with pulmonary hypertension, colorectal carcinoma, history of pulmonary embolism, BPH presents after an episode of near syncope. Patient states he was standing at his home and suddenly started feeling sweaty and feeling that he was going to pass out which made him sit on the floor. Denied any loss of consciousness, did not hit his head.   Patient has a history of pulmonary embolism and A. fib and takes Eliquis at home, patient has a history of idiopathic pulmonary fibrosis-along with Consults:     Final Specialist Recommendations/Findings:   IP CONSULT TO INTERNAL MEDICINE  IP CONSULT TO VASCULAR SURGERY  IP CONSULT TO CASE MANAGEMENT  IP CONSULT TO HEM/ONC  IP CONSULT TO PHARMACY  IP CONSULT TO PULMONOLOGY  IP CONSULT TO CARDIOLOGY  IP CONSULT TO HOME CARE NEEDS      Any Hospital Acquired Infections: none    Discharge Functional Status:  stable    DISCHARGE PLAN     Disposition: home    Patient Instructions:   Discharge Medication List as of 12/26/2022  3:11 PM        START taking these medications    Details   ! ! predniSONE (DELTASONE) 20 MG tablet Take 3 tablets by mouth daily for 2 doses, Disp-6 tablet, R-0Normal      !! predniSONE (DELTASONE) 20 MG tablet Take 2 tablets by mouth daily for 3 doses, Disp-6 tablet, R-0Normal      !! predniSONE (DELTASONE) 20 MG tablet Take 1 tablet by mouth daily for 3 doses, Disp-3 tablet, R-0Normal      !! predniSONE (DELTASONE) 10 MG tablet Take 1 tablet by mouth daily for 3 doses, Disp-3 tablet, R-0Normal       !! - Potential duplicate medications found. Please discuss with provider. CONTINUE these medications which have CHANGED    Details   ferrous sulfate (IRON 325) 325 (65 Fe) MG tablet Take 1 tablet by mouth every 48 hours, Disp-180 tablet, R-1Normal      warfarin (COUMADIN) 2 MG tablet Take 1 tablet by mouth daily Review INR prior to administration. Hazardous med- See facility policy for handling/disposal    Please follow-up with your Coumadin clinic for exact dosage of warfarin that needs to be taken every day based on your INR., Disp -30 tablet, R-3Print           CONTINUE these medications which have NOT CHANGED    Details   Compression Stockings MISC Starting Mon 12/26/2022, Disp-1 each, R-0, Printplease use compression stockings daily.       OXYGEN Inhale 4 L into the lungs continuous Uses 4-5 liters 24/7Historical Med      omeprazole (PRILOSEC) 40 MG delayed release capsule take 1 capsule by mouth once daily, Disp-90 capsule, R-1Normal fluticasone-umeclidin-vilant (TRELEGY ELLIPTA) 200-62.5-25 MCG/ACT AEPB inhaler Inhale 1 puff into the lungs daily, Disp-1 each, R-0LOT:  AR6R EXP:  04/2024Sample      albuterol sulfate HFA (VENTOLIN HFA) 108 (90 Base) MCG/ACT inhaler Inhale 2 puffs into the lungs every 4 hours as needed for Wheezing, Disp-18 g, R-0Normal      sildenafil (REVATIO) 20 MG tablet Take 1 tablet by mouth 3 times daily, Disp-90 tablet, R-1Normal      ibuprofen (ADVIL;MOTRIN) 800 MG tablet take 1 tablet by mouth three times a day if needed for MODERATE PAIN ( PAIN SCALE 4-6 ), Disp-90 tablet, R-1Normal      sertraline (ZOLOFT) 50 MG tablet Take 1 tablet by mouth daily, Disp-30 tablet, R-3Normal      tamsulosin (FLOMAX) 0.4 MG capsule take 1 capsule by mouth once daily, Disp-90 capsule, R-1Normal      atorvastatin (LIPITOR) 40 MG tablet take 1 tablet by mouth once daily, Disp-90 tablet, R-1Normal      digoxin (LANOXIN) 125 MCG tablet take 1 tablet by mouth once daily, Disp-90 tablet, R-1Normal      midodrine (PROAMATINE) 5 MG tablet Take 1 tablet by mouth 3 times daily (before meals), Disp-90 tablet, R-3Normal      Baclofen (LIORESAL) 5 MG tablet take 1 tablet by mouth twice a day, Disp-180 tablet, R-1Normal      Handicap Placard MISC Starting Tue 1/19/2021, Disp-1 each, R-0, PrintExpires 1/2026           STOP taking these medications       ELIQUIS 5 MG TABS tablet Comments:   Reason for Stopping:               Activity: activity as tolerated    Diet: regular diet    Follow-up:    Cassandra Flores MD  [facility] NavigatorMD [facility]  [phone]    Schedule an appointment as soon as possible for a visit in 1 week(s)  Post hospital follow up after pulmonary embolism    Harriet Mercado MD  [address] [facility]  [phone]    Schedule an appointment as soon as possible for a visit in 1 week(s)  Post hospital follow up for Pulmonary fibrosis and pulmonary embolism    MD Yaquelin Perea [address] [address]  [phone]    Schedule an appointment as soon as possible for a visit in 1 week(s)  Pulmonary embolism with failed eliquis on coumadin, anemia and increase  MD Dav Chand [phone]    Follow up  Moderate to sever MR assessment for TAVR    [facility]  [address] [address]  Go on 12/27/2022  10:00 For warfarin anticoagualtion monitoring. Patient Instructions:   Please do follow up with coumadin clinic for managing your anticoagulation with coumadin  STOP taking eliquis which you have at home as you are starting Coumadin. Follow up with PCP post discharge  Follow-up with your pulmonary Dr. Paula Kilpatrick for treatment of IPF  Follow up with pulmonary for your pulmonary fibrosis  Start taking Prednisone tablets as prescribed  Follow up with cardiology for evaluation of sever aortic stenosis  Continue to use the compression stockings  Follow-up with heme-onc in their office after discharge for further work-up for pulmonary embolism  Follow up labs: None  Follow up imaging: None    Note that over 30 minutes was spent in preparing discharge papers, discussing discharge with patient, medication review, etc.      Briana Chow MD, MD  Internal Medicine Resident, PGY-1  [address] [facility];  [place], [place]  12/28/2022, 11:29 AM

## 2022-12-28 NOTE — TELEPHONE ENCOUNTER
Appeal written and faxed. Also spoke with patient regarding INR machine. Suggested to patient to call his insurance to make sure they will cover and to find out what company it has to go through. Patient states he will do that and also talk with the nurse he is working with to see if she can help with this.

## 2022-12-28 NOTE — CARE COORDINATION
[facility] Care Transitions Follow Up Call    Care Transition Nurse contacted the patient by telephone to follow up after admission on . Verified name and  with patient as identifiers. Patient: Clive Griffin  Patient : 1955   MRN: [id number]  Reason for Admission: Acute pulmonary embolism  Discharge Date: 22 RARS: Readmission Risk Score: 15.4      Needs to be reviewed by the provider   Additional needs identified to be addressed with provider: No  none             Method of communication with provider: none. Patient called today to say that he woke up this morning and noticed some swelling to his ankles. Patient denies swelling is severe, but noticed when he woke up. He denies any new issues with his shortness of breath, is short of breath at baseline with his pulmonary fibrosis. We discussed that with the steroids he is on, can cause some fluid retention. He was advised to keep feet elevated while at rest and monitor for increases in swelling or changes in his breathing. He c/o right hip pain from past surgery in February, states his leg is 1.5 inches shorter and has issues with pain to that leg. I informed him I would be calling today to check on home INR monitoring and will follow up with the office about the PA for Revatio. TC to [employer] who does remote INR monitoring. Spoke with representative who provided information on the ordering process which is done online. I did receive message from patient's PCP yesterday who is in agreement that patient would be a good candidate for home monitoring. TC to PCP office and spoke with [facility]. She is currently working on the PA for Revatio and I provided her the information on how to order equipment for INR machine at home. Expressed they need this form and demographic sheet faxed to number listed on form. Left vm for patient updating him on machine and gave him name of website he can go to for more information.     Addressed changes since last contact:  none  Discussed follow-up appointments. Follow Up  Future Appointments   Date Time Provider Yves Florence   1/2/2023  2:40 PM [facility] MG[facility]   1/5/2023  3:00 PM Suyapa Ann MD [facility]   1/16/2023  3:00 [facility]     Non-[facility] follow up appointment(s): n/a    Care Transition Nurse reviewed medical action plan and red flags with patient and discussed any barriers to care and/or understanding of plan of care after discharge. Discussed appropriate site of care based on symptoms and resources available to patient including: PCP  Specialist. The patient agrees to contact the PCP office for questions related to their healthcare. Advance Care Planning:   reviewed and current. Patients top risk factors for readmission: medical condition-Pulmonary fibrosis, pulmonary HTN, PE  Interventions to address risk factors: Education of patient/family/caregiver/guardian to support self-management-elevate feet while resting, monitor for changes in breathing, wear compression stockings. Offered patient enrollment in the Remote Patient Monitoring (RPM) program for in-home monitoring: Patient is not eligible for RPM program.     Care Transitions Subsequent and Final Call    Schedule Follow Up Appointment with PCP: Completed  Subsequent and Final Calls  Do you have any ongoing symptoms?: Yes  Onset of Patient-reported symptoms: In the past 7 days  Patient-reported symptoms: Shortness of Breath, Pain, Other  Have your medications changed?: No  Do you have any questions related to your medications?: No  Do you currently have any active services?: No  Do you have any needs or concerns that I can assist you with?: No  Identified Barriers: Lack of Education  Care Transitions Interventions  Other Interventions:             Care Transition Nurse provided contact information for future needs.  Plan for follow-up call in 3-5 days based on severity of symptoms and risk factors. Plan for next call:  check if he got Revatio yet, see how ankle swelling is.      Jackelin Keith RN

## 2022-12-30 ENCOUNTER — CARE COORDINATION (OUTPATIENT)
Dept: CASE MANAGEMENT | Age: 67
End: 2022-12-30

## 2022-12-30 LAB
ANTICARDIOLIPIN IGA ANTIBODY: 3.4 APL (ref 0–14)
ANTICARDIOLIPIN IGG ANTIBODY: 3.6 GPL (ref 0–10)
CARDIOLIPIN AB IGM: 3.1 MPL (ref 0–10)
DILUTE RUSSELL VIPER VENOM TIME: ABNORMAL
INR BLD: 1.9
PARTIAL THROMBOPLASTIN TIME: 64.3 SEC (ref 20.5–30.5)
PROTHROMBIN TIME: 19.4 SEC (ref 9.1–12.3)

## 2022-12-30 NOTE — CARE COORDINATION
[facility] Care Transitions Follow Up Call    Care Transition Nurse contacted the patient by telephone to follow up after admission on . Verified name and  with patient as identifiers. Patient: Lucien Marks  Patient : 1955   MRN: [id number]  Reason for Admission: Acute pulmonary embolism  Discharge Date: 22 RARS: Readmission Risk Score: 15.4      Needs to be reviewed by the provider   Additional needs identified to be addressed with provider: No  none             Method of communication with provider: none. Spoke with patient who says he is feeling a little better today. He has chronic dyspnea, feels his breathing is at his baseline, no new shortness of breath. He has minimal ankle swelling still but nothing that his too bad. He has a few more days of steroids until completed. He remains on Coumadin, no s/s of bleeding, will have INR check on Monday. Patient's PCP office is working on PA for his Revatio, he said he felt better while on this and hoping to get this approved. Office is also putting in order for home INR machine. Once order and demographics sent in, they will run through patient's insurance. Hopefully, this can be arranged for patient as his O2 demand is quite high and it is cumbersome for him to leave the home with O2. He is having chair lift placed today so he can get up and down his stairs at home. He denies any new needs, expressed he is set for the weekend. Encouraged patient to call if any new questions or concerns. Addressed changes since last contact:  none  Discussed follow-up appointments. If no appointment was previously scheduled, appointment scheduling offered: No.   Is follow up appointment scheduled within 7 days of discharge?  No.    Follow Up  Future Appointments   Date Time Provider Yves Henriquez   2023  2:40 PM [facility]ICATION MGMT [facility]   2023  3:00 PM Spenser Quinones MD [facility]   2023  3:00 PM [facility]     Non-[facility] follow up appointment(s): n/a    Care Transition Nurse reviewed medical action plan and red flags with patient and discussed any barriers to care and/or understanding of plan of care after discharge. Discussed appropriate site of care based on symptoms and resources available to patient including: PCP  Specialist. The patient agrees to contact the PCP office for questions related to their healthcare. Advance Care Planning:   reviewed and current. Patients top risk factors for readmission: medical condition-PE, pulmonary fibrosis  Interventions to address risk factors: Education of patient/family/caregiver/guardian to support self-management-discussed medications, monitor for s/s of bleeding, monitor O2 saturations. Offered patient enrollment in the Remote Patient Monitoring (RPM) program for in-home monitoring: Yes, but did not enroll at this time. Care Transitions Subsequent and Final Call    Schedule Follow Up Appointment with PCP: Completed  Subsequent and Final Calls  Do you have any ongoing symptoms?: Yes  Onset of Patient-reported symptoms: In the past 7 days  Patient-reported symptoms: Shortness of Breath, Other  Have your medications changed?: No  Do you have any questions related to your medications?: No  Do you currently have any active services?: No  Do you have any needs or concerns that I can assist you with?: No  Identified Barriers: Lack of Education  Care Transitions Interventions  Other Interventions:             Care Transition Nurse provided contact information for future needs. Plan for follow-up call in 3-5 days based on severity of symptoms and risk factors. Plan for next call:  check on INR, see if Revatio was approved, see if any status on home INR machine.      Annette Rosado RN

## 2023-01-02 ENCOUNTER — HOSPITAL ENCOUNTER (OUTPATIENT)
Dept: PHARMACY | Age: 68
Setting detail: THERAPIES SERIES
Discharge: HOME | End: 2023-01-02
Payer: MEDICARE

## 2023-01-02 DIAGNOSIS — I26.99 ACUTE PULMONARY EMBOLISM, UNSPECIFIED PULMONARY EMBOLISM TYPE, UNSPECIFIED WHETHER ACUTE COR PULMONALE PRESENT (HCC): ICD-10-CM

## 2023-01-02 DIAGNOSIS — I48.0 PAF (PAROXYSMAL ATRIAL FIBRILLATION) (HCC): Primary | ICD-10-CM

## 2023-01-02 LAB
INR BLD: 3.4
PROTIME: 40.5 SECONDS

## 2023-01-02 PROCEDURE — 85610 PROTHROMBIN TIME: CPT

## 2023-01-02 PROCEDURE — 99212 OFFICE O/P EST SF 10 MIN: CPT

## 2023-01-02 NOTE — PROGRESS NOTES
Medication Management Service, Warfarin Management  MICHEL CALVERT [facility], [phone]  Visit Date: 2023   Subjective: Jose Palacios is a 69 y.o. male who presents to clinic today for anticoagulation monitoring and adjustment. Patient seen in clinic for warfarin management due to  Indication:   atrial fibrillation and PE. INR goal: of 2.0-3.0. Duration of therapy: indefinite. Assessment and PLAN   PT/INR done in office per protocol. INR today is 3.4, supratherapeutic; likely due to prednisone therapy with scheduled completion in 2 days. Plan: Will adjust regimen to warfarin 2mg on Mon, Wed only; 4mg all other days of the week. Using warfarin 2 mg tablets, will need to change tablet strength with next new script or next refill. Recheck INR in 1.5 week(s). Patient seen in room # 3. ED. OP. = Discussed impact of steroids on INR and appetite. Patient verbalized understanding of dosing directions and information discussed. Dosing schedule given to patient. Progress note sent to referring office. Patient acknowledges working in consult agreement with pharmacist as referred by his/her physician.       Sonido Hargrove, [facility], CACP  Clinical Pharmacist Medication Management  2023  3:20 PM    For Pharmacy Admin Tracking Only    Intervention Detail: Adherence Monitorin and Dose Adjustment: 1, reason: Improve Adherence, Therapy Optimization  Total # of Interventions Recommended: 2  Total # of Interventions Accepted: 2  Time Spent (min):  25

## 2023-01-04 ENCOUNTER — CARE COORDINATION (OUTPATIENT)
Dept: CASE MANAGEMENT | Age: 68
End: 2023-01-04

## 2023-01-04 NOTE — CARE COORDINATION
[facility] Care Transitions Follow Up Call    Care Transition Nurse contacted the patient by telephone to follow up after admission on . Verified name and  with patient as identifiers. Patient: Alexander Brody  Patient : 1955   MRN: [id number]  Reason for Admission: Acute pulmonary embolism  Discharge Date: 22 RARS: Readmission Risk Score: 15.4      Needs to be reviewed by the provider   Additional needs identified to be addressed with provider: No  none             Method of communication with provider: none. Spoke with patient today. He states his breathing is about the same as his usual, denies any increases to his dyspnea or any new shortness of breath. Patient has h/o pulmonary fibrosis and pulmonary HTN, is is chronically short of breath. He is on Coumadin as he has failed Eliquis and Xarelto. Last INR was supratherapeutic at 3.6 and will have recheck. Pharmacist thought this may be related to the steroids he was on affecting his INR levels. He denies any s/s of bleeding but did endorse he gets nosebleeds. I advised if he gets nosebleeds that don't stop he needs to be evaluated. He does wear O2 at a high liter flow so I suspect that irritation to his nasal passages is coming from the O2. Revatio that was ordered for his pulmonary HTN needed PA and was denied by his insurance again. He has not heard anymore about the INR machine and expressed that the office sent in this paperwork yesterday. He has appointment tomorrow to see hematologist. He denies any new needs or concerns at this time. Expressed to call with any new needs or questions. Addressed changes since last contact:  none  Discussed follow-up appointments.    Follow Up  Future Appointments   Date Time Provider Yves Henriquez   2023  3:00 PM Jennifer South MD [facility]   2023  3:20 PM [facility]ICATION [facility]MT [facility]   2023  3:00 PM Cassandra Pop MD [facility] Non-[facility] follow up appointment(s): N/a    Care Transition Nurse reviewed medical action plan and red flags with patient and discussed any barriers to care and/or understanding of plan of care after discharge. Discussed appropriate site of care based on symptoms and resources available to patient including: PCP  Specialist. The patient agrees to contact the PCP office for questions related to their healthcare. Patients top risk factors for readmission: medical condition-PE  Interventions to address risk factors: Education of patient/family/caregiver/guardian to support self-management-Discussed symptoms to warrant return to ED, ie: new onset of sudden dyspnea, bleeding that won't stop. Offered patient enrollment in the Remote Patient Monitoring (RPM) program for in-home monitoring: Patient is not eligible for RPM program.     Care Transitions Subsequent and Final Call    Schedule Follow Up Appointment with PCP: Completed  Subsequent and Final Calls  Do you have any ongoing symptoms?: Yes  Onset of Patient-reported symptoms: In the past 7 days  Patient-reported symptoms: Shortness of Breath  Have your medications changed?: No  Do you have any questions related to your medications?: No  Do you currently have any active services?: No  Do you have any needs or concerns that I can assist you with?: No  Identified Barriers: Lack of Education  Care Transitions Interventions  Other Interventions:             Care Transition Nurse provided contact information for future needs. Plan for follow-up call in 7-10 days based on severity of symptoms and risk factors. Plan for next call:  check dyspnea, see if he has heard anything about INR machine. Check hematology notes.      Marc Price RN

## 2023-01-05 ENCOUNTER — OFFICE VISIT (OUTPATIENT)
Dept: ONCOLOGY | Age: 68
End: 2023-01-05
Payer: MEDICARE

## 2023-01-05 ENCOUNTER — TELEPHONE (OUTPATIENT)
Dept: ONCOLOGY | Age: 68
End: 2023-01-05

## 2023-01-05 VITALS
RESPIRATION RATE: 18 BRPM | BODY MASS INDEX: 22.36 KG/M2 | DIASTOLIC BLOOD PRESSURE: 62 MMHG | SYSTOLIC BLOOD PRESSURE: 112 MMHG | HEART RATE: 77 BPM | TEMPERATURE: 97.7 F | WEIGHT: 164.9 LBS

## 2023-01-05 DIAGNOSIS — D68.59 HYPERCOAGULABLE STATE (HCC): ICD-10-CM

## 2023-01-05 DIAGNOSIS — Z85.038 HISTORY OF COLON CANCER: ICD-10-CM

## 2023-01-05 DIAGNOSIS — Z86.711 HISTORY OF PULMONARY EMBOLISM: Primary | ICD-10-CM

## 2023-01-05 PROCEDURE — 1111F DSCHRG MED/CURRENT MED MERGE: CPT | Performed by: INTERNAL MEDICINE

## 2023-01-05 PROCEDURE — 99214 OFFICE O/P EST MOD 30 MIN: CPT | Performed by: INTERNAL MEDICINE

## 2023-01-05 PROCEDURE — 1123F ACP DISCUSS/DSCN MKR DOCD: CPT | Performed by: INTERNAL MEDICINE

## 2023-01-05 PROCEDURE — 1036F TOBACCO NON-USER: CPT | Performed by: INTERNAL MEDICINE

## 2023-01-05 PROCEDURE — 3017F COLORECTAL CA SCREEN DOC REV: CPT | Performed by: INTERNAL MEDICINE

## 2023-01-05 PROCEDURE — G8420 CALC BMI NORM PARAMETERS: HCPCS | Performed by: INTERNAL MEDICINE

## 2023-01-05 PROCEDURE — G8427 DOCREV CUR MEDS BY ELIG CLIN: HCPCS | Performed by: INTERNAL MEDICINE

## 2023-01-05 PROCEDURE — G8484 FLU IMMUNIZE NO ADMIN: HCPCS | Performed by: INTERNAL MEDICINE

## 2023-01-05 PROCEDURE — 99211 OFF/OP EST MAY X REQ PHY/QHP: CPT

## 2023-01-05 NOTE — PATIENT INSTRUCTIONS
Continue follow up in Coumadin clinic  Follow up with Dr Leann Malagon for Hypoxia  Follow up with Dr Joo HAWLEY

## 2023-01-05 NOTE — TELEPHONE ENCOUNTER
Brannon Stevens FOR MD VISIT  DR Dilma Garcia IN TO SEE PATIENT  ORDERS RECEIVED  CONTINUE TO F/U IN COUMADIN CLINIC  F/U WITH DR Mynor Aldana FOR HYPOXIA  F/U WITH DR Florencia Cmumings PRFIOR  AVS PRINTED AND GIVEN TO PATIENT WITH INSTRUCTIONS  PATIENT DISCHARGED VIA WHEELCHAIR

## 2023-01-09 DIAGNOSIS — R06.89 DYSPNEA AND RESPIRATORY ABNORMALITIES: ICD-10-CM

## 2023-01-09 DIAGNOSIS — J84.10 PULMONARY FIBROSIS (HCC): ICD-10-CM

## 2023-01-09 DIAGNOSIS — R06.00 DYSPNEA AND RESPIRATORY ABNORMALITIES: ICD-10-CM

## 2023-01-09 NOTE — TELEPHONE ENCOUNTER
Received letter from [employer] stating med was approved    In pts med list it was discontinued from another office     Med pended if appropriate please sned in    Fax attached

## 2023-01-10 ENCOUNTER — CARE COORDINATION (OUTPATIENT)
Dept: CASE MANAGEMENT | Age: 68
End: 2023-01-10

## 2023-01-10 DIAGNOSIS — I42.2 HYPERTROPHIC NONOBSTRUCTIVE CARDIOMYOPATHY (HCC): ICD-10-CM

## 2023-01-10 DIAGNOSIS — I48.0 PAROXYSMAL ATRIAL FIBRILLATION (HCC): ICD-10-CM

## 2023-01-10 RX ORDER — SILDENAFIL CITRATE 20 MG/1
20 TABLET ORAL 3 TIMES DAILY
Qty: 90 TABLET | Refills: 1 | Status: SHIPPED | OUTPATIENT
Start: 2023-01-10

## 2023-01-10 RX ORDER — DIGOXIN 125 MCG
TABLET ORAL
Qty: 90 TABLET | Refills: 1 | Status: SHIPPED | OUTPATIENT
Start: 2023-01-10

## 2023-01-10 RX ORDER — ATORVASTATIN CALCIUM 40 MG/1
TABLET, FILM COATED ORAL
Qty: 90 TABLET | Refills: 1 | Status: SHIPPED | OUTPATIENT
Start: 2023-01-10

## 2023-01-10 NOTE — CARE COORDINATION
[facility] Care Transitions Follow Up Call    Care Transition Nurse contacted the patient by telephone to follow up after admission on . Verified name and  with patient as identifiers. Patient: Charles Velasquez  Patient : 1955   MRN: [id number]  Reason for Admission: Acute PE  Discharge Date: 22 RARS: Readmission Risk Score: 15.4      Needs to be reviewed by the provider   Additional needs identified to be addressed with provider: No  none             Method of communication with provider: none. Spoke with the patient today. Today he said is a better day compared to yesterday. He has good and bad days with his breathing. He wears O2 at high liter flow rate, He will turn it up to 9L/min when experiencing more shortness of breath. Normally he wears on average about 8L/min but will bump it down to 5L/min when he is resting. Patient had his follow up with oncology on . That day he was c/o increasing shortness of breath so oncology called his pulmonary MD, Dr. Alea Reyes and was able to have him be seen sooner, his appointment for follow up was not scheduled until March but now will be seeing on . He denies any chest pains, c/o fluctuations in his breathing. Medication Revatio was approved finally by his insurance, patient is aware that medication was approved and sent to pharmacy. He has not heard any more on the home INR machine. Currently Coumadin levels are therapeutic, has not had any trouble getting to the clinic so far for testing. Will route to Jacob Kennedy [facility] for continued Care coordination. Addressed changes since last contact:  none  Discussed follow-up appointments.   Follow Up  Future Appointments   Date Time Provider Yves Henriquez   2023  2:20 PM [facility] MEDICATION MGMT [facility]   2023  3:00 PM [facility]     [facility] follow up appointment(s):  with Dr. Robert Tarango Transition Nurse reviewed medical action plan and red flags with patient and discussed any barriers to care and/or understanding of plan of care after discharge. Discussed appropriate site of care based on symptoms and resources available to patient including: PCP  Specialist. The patient agrees to contact the PCP office for questions related to their healthcare. Advance Care Planning:   reviewed and current. Patients top risk factors for readmission: medical condition-COPD  Interventions to address risk factors: Obtained and reviewed discharge summary and/or continuity of care documents and Education of patient/family/caregiver/guardian to support self-management-S/S to report to MD, when to return to ED. Offered patient enrollment in the Remote Patient Monitoring (RPM) program for in-home monitoring: Yes, but did not enroll at this time. Care Transitions Subsequent and Final Call    Schedule Follow Up Appointment with PCP: Completed  Subsequent and Final Calls  Do you have any ongoing symptoms?: Yes  Onset of Patient-reported symptoms: In the past 7 days  Patient-reported symptoms: Shortness of Breath  Have your medications changed?: Yes  Patient Reports: Revatio was approved, will be starting this for pulmonary HTN  Do you have any questions related to your medications?: No  Do you currently have any active services?: No  Do you have any needs or concerns that I can assist you with?: No  Identified Barriers: Lack of Education  Care Transitions Interventions  Other Interventions:             Care Transition Nurse provided contact information for future needs. No further follow-up call indicated based on severity of symptoms and risk factors. Plan for next call: referral to ambulatory care [facility] for continued CC for pulmonary fibrosis, COPD, CHF    Anuradha Cevallos RN     Care Transition Summary: Patient is 68 y/o with h/o idiopathic pulmonary fibrosis, has h/o CHF, Pulmonary HTN.   Was admitted to the hospital for Acute PE. He was originally on Xarelto for his A. Fib in the past, had developed small clot and was taken off of Xarelto and switched to Eliquis. Patient developed PE again on Anticoagulation therapy and has been switched to Coumadin. During the course of following up with him, I had pharmacy send the PA request to the office for the Revatio which he said worked for him when he was on it in the hospital.  This finally got approved yesterday so patient has not started yet on this. With him doing Coumadin and with it being winter and his high O2 demand, I was trying to get him set up with home INR machine. He wears O2 at such a high liter flow that he is afraid he may run out when he is out at appointments. He currently wears it at 8-9L/min. He will knock it down to 5L/min when resting but any activity gets him winded. He saw oncology last week who called Dr. Schulte January about his breathing, patient had felt worse that day in the office and they moved up his follow up to . His appt was originally booked for February which was the soonest they had. He has his good and bad days, today was a better day with his breathing. He is not overly concerned about getting the INR machine at home but if we run into weather or his breathing does not improve at all, he would probably benefit from home monitoring of his INR. Patient: Gricelda Vaughn Patient : 1955        Is patient active with support services? no  Home Self-managing equipment:   Continued Care Coordination Recommended:  yes - for chronic disease management, multiple chronic disease issues.      Problem List:   Patient Active Problem List   Diagnosis    Dyslipidemia    ED (erectile dysfunction)    Incomplete bladder emptying    Benign prostatic hyperplasia with lower urinary tract symptoms    History of colon cancer    PAF (paroxysmal atrial fibrillation) (HCC)    Anemia of chronic disease    Pallor of optic disc    Presbyopia Incisional hernia, without obstruction or gangrene    Hypertrophic nonobstructive cardiomyopathy (HCC)    Iron (Fe) deficiency anemia    Primary osteoarthritis of right knee    History of malignant neoplasm of rectum    Hill's esophagus    CHF (congestive heart failure), NYHA class II, acute on chronic, combined (HCC)    Hypoxia    Shortness of breath    Occupational pulmonary disease    Sinus bradycardia    Acute hypoxemic respiratory failure due to COVID-19 ([address]    COVID-19    Pneumonia    Chronic idiopathic pulmonary fibrosis (HCC)    Chronic anticoagulation    Severe malnutrition (HCC)    Cervical stenosis of spinal canal    Impingement syndrome of left shoulder    Fracture of right hip    Allergy to penicillin    Acute pulmonary embolism ([address]    Pulmonary embolism, bilateral (HCC)    Demand ischemia of myocardium (HCC)    Acute respiratory acidosis (HCC)    Orthostatic hypotension    Acute pulmonary edema (HCC)    Pulmonary embolism without acute cor pulmonale (HCC)    Chronic respiratory failure with hypoxia ([address]    Pulmonary HTN (HCC)    Postural dizziness with near syncope         Follow up appointments:    Future Appointments   Date Time Provider Yves Henriquez   1/12/2023  2:20 PM [facility] MEDICATION MGMT [facility]   1/16/2023  3:00 PM MD Elías Rosario 32

## 2023-01-10 NOTE — TELEPHONE ENCOUNTER
Last visit: 11/8/22  Last Med refill: 4/28/22  Does patient have enough medication for 72 hours: No:     Next Visit Date:  Future Appointments   Date Time Provider Yves Henriquez   1/12/2023  2:20 PM [facility]   1/16/2023  3:00 PM Cassandra Mcgowan  [address] [facility]   Topic Date Due    Pneumococcal 65+ years Vaccine (1 - PCV) Never done    Shingles vaccine (1 of 2) Never done    Colorectal Cancer Screen  12/06/2020    COVID-19 Vaccine (4 - Booster for Pfizer series) 02/15/2022    Annual Wellness Visit (AWV)  12/15/2022    Lipids  12/20/2022    Depression Screen  11/08/2023    DTaP/Tdap/Td vaccine (2 - Td or Tdap) 12/19/2032    Flu vaccine  Completed    AAA screen  Completed    Hepatitis C screen  Completed    Hepatitis A vaccine  Aged Out    Hib vaccine  Aged Out    Meningococcal (ACWY) vaccine  Aged Out       Hemoglobin A1C (%)   Date Value   01/10/2018 5.0             ( goal A1C is < 7)   No results found for: LABMICR  LDL Cholesterol (mg/dL)   Date Value   12/20/2021 68   03/10/2020 82       (goal LDL is <100)   AST (U/L)   Date Value   12/19/2022 26     ALT (U/L)   Date Value   12/19/2022 20     BUN (mg/dL)   Date Value   12/26/2022 27 (H)     BP Readings from Last 3 Encounters:   01/05/23 112/62   12/26/22 131/83   11/08/22 98/66          (goal 120/80)    All Future Testing planned in [facility]  Lab Frequency Next Occurrence   CT CHEST HIGH RESOLUTION Once 04/07/2022   Full PFT Study With Bronchodilator Once 04/07/2022               Patient Active Problem List:     Dyslipidemia     ED (erectile dysfunction)     Incomplete bladder emptying     Benign prostatic hyperplasia with lower urinary tract symptoms     History of colon cancer     PAF (paroxysmal atrial fibrillation) (HCC)     Anemia of chronic disease     Pallor of optic disc     Presbyopia     Incisional hernia, without obstruction or gangrene     Hypertrophic nonobstructive cardiomyopathy ([facility]     Iron (Fe) deficiency anemia     Primary osteoarthritis of right knee     History of malignant neoplasm of rectum     Hill's esophagus     CHF (congestive heart failure), NYHA class II, acute on chronic, combined (HCC)     Hypoxia     Shortness of breath     Occupational pulmonary disease     Sinus bradycardia     Acute hypoxemic respiratory failure due to COVID-19 ([facility]     COVID-19     Pneumonia     Chronic idiopathic pulmonary fibrosis (HCC)     Chronic anticoagulation     Severe malnutrition (HCC)     Cervical stenosis of spinal canal     Impingement syndrome of left shoulder     Fracture of right hip     Allergy to penicillin     Acute pulmonary embolism (HCC)     Pulmonary embolism, bilateral (HCC)     Demand ischemia of myocardium (HCC)     Acute respiratory acidosis (HCC)     Orthostatic hypotension     Acute pulmonary edema (HCC)     Pulmonary embolism without acute cor pulmonale (HCC)     Chronic respiratory failure with hypoxia (HCC)     Pulmonary HTN (HCC)     Postural dizziness with near syncope

## 2023-01-11 DIAGNOSIS — I26.99 ACUTE PULMONARY EMBOLISM WITHOUT ACUTE COR PULMONALE, UNSPECIFIED PULMONARY EMBOLISM TYPE (HCC): Primary | ICD-10-CM

## 2023-01-12 ENCOUNTER — TELEPHONE (OUTPATIENT)
Dept: PHARMACY | Age: 68
End: 2023-01-12

## 2023-01-12 DIAGNOSIS — I48.0 PAF (PAROXYSMAL ATRIAL FIBRILLATION) (HCC): Primary | ICD-10-CM

## 2023-01-12 DIAGNOSIS — I26.99 ACUTE PULMONARY EMBOLISM, UNSPECIFIED PULMONARY EMBOLISM TYPE, UNSPECIFIED WHETHER ACUTE COR PULMONALE PRESENT (HCC): ICD-10-CM

## 2023-01-12 NOTE — TELEPHONE ENCOUNTER
Received an updated referral from the patient's PCP. I closed the previous referral from the patient hospital team and assigned new referral to next appointment. Updated episode information to have PCP as referring provider.     New Start Yes   Tx Start 12/26/2022   Dose 2 mg   Tx Agent Warfarin   INR Goal 2.0-3.0   Tx Duration Indefinite   My clinical question is: eliquis failure - PE on eliquis

## 2023-01-12 NOTE — TELEPHONE ENCOUNTER
Patient called and reports he is not feeling well today and will not be able to come to his appointment. He wanted guidance on how to continue taking his warfarin. I instructed to continue following the instructions from the last visit and rescheduled him for next week.     For Pharmacy Admin Tracking Only    Intervention Detail:   Total # of Interventions Recommended: 0  Total # of Interventions Accepted: 0  Time Spent (min): 1500 Hatch Thomas, PharmD 1/12/2023 1:40 PM

## 2023-01-16 ENCOUNTER — CARE COORDINATION (OUTPATIENT)
Dept: CARE COORDINATION | Age: 68
End: 2023-01-16

## 2023-01-16 ENCOUNTER — OFFICE VISIT (OUTPATIENT)
Dept: FAMILY MEDICINE CLINIC | Age: 68
End: 2023-01-16

## 2023-01-16 VITALS
DIASTOLIC BLOOD PRESSURE: 56 MMHG | OXYGEN SATURATION: 99 % | BODY MASS INDEX: 20.91 KG/M2 | WEIGHT: 154.2 LBS | HEART RATE: 77 BPM | TEMPERATURE: 97.7 F | SYSTOLIC BLOOD PRESSURE: 90 MMHG

## 2023-01-16 DIAGNOSIS — I26.99 ACUTE PULMONARY EMBOLISM WITHOUT ACUTE COR PULMONALE, UNSPECIFIED PULMONARY EMBOLISM TYPE (HCC): ICD-10-CM

## 2023-01-16 DIAGNOSIS — E78.5 DYSLIPIDEMIA: ICD-10-CM

## 2023-01-16 DIAGNOSIS — M17.11 PRIMARY OSTEOARTHRITIS OF RIGHT KNEE: ICD-10-CM

## 2023-01-16 DIAGNOSIS — E43 SEVERE MALNUTRITION (HCC): ICD-10-CM

## 2023-01-16 DIAGNOSIS — Z23 NEED FOR PNEUMOCOCCAL VACCINATION: ICD-10-CM

## 2023-01-16 DIAGNOSIS — I24.8 DEMAND ISCHEMIA OF MYOCARDIUM (HCC): ICD-10-CM

## 2023-01-16 DIAGNOSIS — Z09 HOSPITAL DISCHARGE FOLLOW-UP: Primary | ICD-10-CM

## 2023-01-16 DIAGNOSIS — I48.0 PAROXYSMAL ATRIAL FIBRILLATION (HCC): ICD-10-CM

## 2023-01-16 DIAGNOSIS — J84.112 IDIOPATHIC PULMONARY FIBROSIS (HCC): ICD-10-CM

## 2023-01-16 DIAGNOSIS — I27.20 PULMONARY HTN (HCC): ICD-10-CM

## 2023-01-16 DIAGNOSIS — K22.70 BARRETT'S ESOPHAGUS WITHOUT DYSPLASIA: ICD-10-CM

## 2023-01-16 DIAGNOSIS — J96.11 CHRONIC RESPIRATORY FAILURE WITH HYPOXIA (HCC): ICD-10-CM

## 2023-01-16 LAB
INTERNATIONAL NORMALIZATION RATIO, POC: 2.9
PROTHROMBIN TIME, POC: 34.5

## 2023-01-16 RX ORDER — IBUPROFEN 800 MG/1
800 TABLET ORAL EVERY 8 HOURS PRN
Qty: 90 TABLET | Refills: 1 | Status: SHIPPED | OUTPATIENT
Start: 2023-01-16

## 2023-01-16 RX ORDER — ALBUTEROL SULFATE 2.5 MG/3ML
2.5 SOLUTION RESPIRATORY (INHALATION) EVERY 6 HOURS PRN
Qty: 120 EACH | Refills: 5 | Status: SHIPPED | OUTPATIENT
Start: 2023-01-16

## 2023-01-16 SDOH — ECONOMIC STABILITY: HOUSING INSECURITY: IN THE LAST 12 MONTHS, HOW MANY PLACES HAVE YOU LIVED?: 1

## 2023-01-16 SDOH — ECONOMIC STABILITY: TRANSPORTATION INSECURITY
IN THE PAST 12 MONTHS, HAS LACK OF TRANSPORTATION KEPT YOU FROM MEETINGS, WORK, OR FROM GETTING THINGS NEEDED FOR DAILY LIVING?: NO

## 2023-01-16 SDOH — HEALTH STABILITY: PHYSICAL HEALTH: ON AVERAGE, HOW MANY MINUTES DO YOU ENGAGE IN EXERCISE AT THIS LEVEL?: 0 MIN

## 2023-01-16 SDOH — ECONOMIC STABILITY: TRANSPORTATION INSECURITY
IN THE PAST 12 MONTHS, HAS THE LACK OF TRANSPORTATION KEPT YOU FROM MEDICAL APPOINTMENTS OR FROM GETTING MEDICATIONS?: NO

## 2023-01-16 SDOH — HEALTH STABILITY: PHYSICAL HEALTH: ON AVERAGE, HOW MANY DAYS PER WEEK DO YOU ENGAGE IN MODERATE TO STRENUOUS EXERCISE (LIKE A BRISK WALK)?: 0 DAYS

## 2023-01-16 SDOH — ECONOMIC STABILITY: HOUSING INSECURITY
IN THE LAST 12 MONTHS, WAS THERE A TIME WHEN YOU DID NOT HAVE A STEADY PLACE TO SLEEP OR SLEPT IN A SHELTER (INCLUDING NOW)?: NO

## 2023-01-16 SDOH — ECONOMIC STABILITY: INCOME INSECURITY: IN THE LAST 12 MONTHS, WAS THERE A TIME WHEN YOU WERE NOT ABLE TO PAY THE MORTGAGE OR RENT ON TIME?: NO

## 2023-01-16 ASSESSMENT — PATIENT HEALTH QUESTIONNAIRE - PHQ9
SUM OF ALL RESPONSES TO PHQ QUESTIONS 1-9: 0
SUM OF ALL RESPONSES TO PHQ9 QUESTIONS 1 & 2: 0
SUM OF ALL RESPONSES TO PHQ QUESTIONS 1-9: 0
2. FEELING DOWN, DEPRESSED OR HOPELESS: 0
1. LITTLE INTEREST OR PLEASURE IN DOING THINGS: 0

## 2023-01-16 ASSESSMENT — LIFESTYLE VARIABLES: HOW OFTEN DO YOU HAVE A DRINK CONTAINING ALCOHOL: NEVER

## 2023-01-16 NOTE — PROGRESS NOTES
Pt is here today for a hospital f/u    Pts last INR was about 3 weeks ago, it was 3.4 appt for tomorrow was cancelled     Pt was in [place]. V's on 12/19/2022, wife came home and found pt on the floor, pt does feel weak     Pt states that since he has finished the steroids he does not have a taste for anything, has lost 10 lbs in one week

## 2023-01-16 NOTE — PROGRESS NOTES
Post-Discharge Transitional Care Follow Up      Osmel Ortega   YOB: 1955    Date of Office Visit:  1/16/2023  Date of Hospital Admission: 12/19/22  Date of Hospital Discharge: 12/26/22  Readmission Risk Score (high >=14%. Medium >=10%):Readmission Risk Score: 15.4      Care management risk score Rising risk (score 2-5) and Complex Care (Scores >=6): No Risk Score On File     Non face to face  following discharge, date last encounter closed (first attempt may have been earlier): *No documented post hospital discharge outreach found in the last 14 days     Call initiated 2 business days of discharge: *No response recorded in the last 14 days     Hospital discharge follow-up  -     PA DISCHARGE MEDS RECONCILED W/ CURRENT OUTPATIENT MED LIST  Primary osteoarthritis of right knee  -     ibuprofen (ADVIL;MOTRIN) 800 MG tablet; Take 1 tablet by mouth every 8 hours as needed for Pain, Disp-90 tablet, R-1Normal  Need for pneumococcal vaccination  -     Pneumococcal, PCV20, PREVNAR 20, (age 25 yrs+), IM, PF  Paroxysmal atrial fibrillation (HCC)  -     POCT INR  Acute pulmonary embolism without acute cor pulmonale, unspecified pulmonary embolism type (HCC)  -     POCT INR  Demand ischemia of myocardium (HCC)  Pulmonary HTN (HCC)  Chronic respiratory failure with hypoxia (HCC)  Hill's esophagus without dysplasia  Severe malnutrition (HCC)  Idiopathic pulmonary fibrosis (HCC)  -     albuterol (PROVENTIL) (2.5 MG/3ML) 0.083% nebulizer solution; Take 3 mLs by nebulization every 6 hours as needed for Wheezing or Shortness of Breath, Disp-120 each, R-5Normal  Dyslipidemia  -     Lipid Panel; Future      Medical Decision Making: high complexity  No follow-ups on file.     On this date 1/16/2023 I have spent 20 minutes reviewing previous notes, test results and face to face with the patient discussing the diagnosis and importance of compliance with the treatment plan as well as documenting on the day of the visit. Subjective:   Admitted with worsening dyspnea, found to have multiple PE - Patient was started on heparin drip which was later on switched to warfarin as per heme-onc recommendation due to possible Eliquis failure. Patient had positive orthostatics. Echocardiogram done which showed LVEF 50 to 55%, grade 3 LVDD, aortic valve heavily calcified with CHERRY-0.9 cm², severe pulmonary hypertension-RVSP-64 mmHg.-cardiology was consulted who recommended outpatient follow-up. Pulmonology was consulted and patient was started on steroids due to possible IPF exacerbation and eventually patient was feeling well overall and was discharged home. Inpatient course: Discharge summary reviewed- see chart.     Interval history/Current status: appetite is bad now since has been off the prednisone   He has seen oncology for follow-up   Breathing remains bad, albuterol helps a little   Sildenafil is helping as well - glad he finally has it   Stopped driving, his wife brings him to appointments now, got rid of his truck  Has appointment with pulm 1/20, cardiology scheduled next month   Has appointment with coumadin clinic next week       Patient Active Problem List   Diagnosis    Dyslipidemia    ED (erectile dysfunction)    Incomplete bladder emptying    Benign prostatic hyperplasia with lower urinary tract symptoms    History of colon cancer    PAF (paroxysmal atrial fibrillation) ([address]    Anemia of chronic disease    Pallor of optic disc    Presbyopia    Incisional hernia, without obstruction or gangrene    Hypertrophic nonobstructive cardiomyopathy (HCC)    Iron (Fe) deficiency anemia    Primary osteoarthritis of right knee    History of malignant neoplasm of rectum    Hill's esophagus    CHF (congestive heart failure), NYHA class II, acute on chronic, combined ([address]    Hypoxia    Shortness of breath    Occupational pulmonary disease    Sinus bradycardia    Acute hypoxemic respiratory failure due to COVID-19 ([address] COVID-19    Pneumonia    Chronic idiopathic pulmonary fibrosis (HCC)    Chronic anticoagulation    Severe malnutrition (HCC)    Cervical stenosis of spinal canal    Impingement syndrome of left shoulder    Fracture of right hip    Allergy to penicillin    Acute pulmonary embolism ([facility]    Pulmonary embolism, bilateral (HCC)    Demand ischemia of myocardium (HCC)    Acute respiratory acidosis (HCC)    Orthostatic hypotension    Acute pulmonary edema (HCC)    Pulmonary embolism without acute cor pulmonale (HCC)    Chronic respiratory failure with hypoxia (HCC)    Pulmonary HTN (HCC)    Postural dizziness with near syncope       Medications listed as ordered at the time of discharge from hospital     Medication List            Accurate as of January 16, 2023  3:25 PM. If you have any questions, ask your nurse or doctor.                 CHANGE how you take these medications      ferrous sulfate 325 (65 Fe) MG tablet  Commonly known as: IRON 325  Take 1 tablet by mouth every 48 hours  What changed: additional instructions            CONTINUE taking these medications      atorvastatin 40 MG tablet  Commonly known as: LIPITOR  take 1 tablet by mouth once daily     digoxin 125 MCG tablet  Commonly known as: LANOXIN  take 1 tablet by mouth once daily     Handicap Placard Misc  by Does not apply route Expires 1/2026     ibuprofen 800 MG tablet  Commonly known as: ADVIL;MOTRIN  take 1 tablet by mouth three times a day if needed for MODERATE PAIN ( PAIN SCALE 4-6 )     midodrine 5 MG tablet  Commonly known as: PROAMATINE  Take 1 tablet by mouth 3 times daily (before meals)     omeprazole 40 MG delayed release capsule  Commonly known as: PRILOSEC  take 1 capsule by mouth once daily     OXYGEN     sertraline 50 MG tablet  Commonly known as: ZOLOFT  Take 1 tablet by mouth daily     sildenafil 20 MG tablet  Commonly known as: REVATIO  Take 1 tablet by mouth 3 times daily     tamsulosin 0.4 MG capsule  Commonly known as: FLOMAX  take 1 capsule by mouth once daily     warfarin 2 MG tablet  Commonly known as: COUMADIN  Take as directed by the anticoagulation clinic. If you are unsure how to take this medication, talk to your nurse or doctor. Original instructions: Take 1 tablet by mouth daily Review INR prior to administration. Hazardous med- See facility policy for handling/disposal    Please follow-up with your Coumadin clinic for exact dosage of warfarin that needs to be taken every day based on your INR. Medications marked \"taking\" at this time  Outpatient Medications Marked as Taking for the 1/16/23 encounter (Office Visit) with Yamileth Noonan MD   Medication Sig Dispense Refill    sildenafil (REVATIO) 20 MG tablet Take 1 tablet by mouth 3 times daily 90 tablet 1    atorvastatin (LIPITOR) 40 MG tablet take 1 tablet by mouth once daily 90 tablet 1    digoxin (LANOXIN) 125 MCG tablet take 1 tablet by mouth once daily 90 tablet 1    ferrous sulfate (IRON 325) 325 (65 Fe) MG tablet Take 1 tablet by mouth every 48 hours (Patient taking differently: Take 325 mg by mouth every 48 hours Takes daily) 180 tablet 1    warfarin (COUMADIN) 2 MG tablet Take 1 tablet by mouth daily Review INR prior to administration. Hazardous med- See facility policy for handling/disposal    Please follow-up with your Coumadin clinic for exact dosage of warfarin that needs to be taken every day based on your INR. 30 tablet 3    midodrine (PROAMATINE) 5 MG tablet Take 1 tablet by mouth 3 times daily (before meals) 90 tablet 3    OXYGEN Inhale 4 L into the lungs continuous Uses 4-5 liters 24/7/ Pt.  Use 8 L      omeprazole (PRILOSEC) 40 MG delayed release capsule take 1 capsule by mouth once daily 90 capsule 1    ibuprofen (ADVIL;MOTRIN) 800 MG tablet take 1 tablet by mouth three times a day if needed for MODERATE PAIN ( PAIN SCALE 4-6 ) 90 tablet 1    sertraline (ZOLOFT) 50 MG tablet Take 1 tablet by mouth daily 30 tablet 3    tamsulosin (FLOMAX) 0.4 MG capsule take 1 capsule by mouth once daily 90 capsule 1    Handicap Placard MISC by Does not apply route Expires 1/2026 1 each 0        Medications patient taking as of now reconciled against medications ordered at time of hospital discharge: Yes    Review of Systems   Constitutional:  Positive for fatigue. Negative for fever and unexpected weight change. Respiratory:  Positive for shortness of breath. Negative for cough, choking, chest tightness and wheezing. Cardiovascular:  Negative for chest pain, palpitations and leg swelling. Gastrointestinal:  Negative for abdominal pain, anal bleeding, blood in stool, constipation, diarrhea, nausea and vomiting. Endocrine: Negative. Musculoskeletal:  Negative for joint swelling and myalgias. Skin: Negative. Neurological:  Negative for dizziness. Psychiatric/Behavioral:  Negative for sleep disturbance. All other systems reviewed and are negative. Objective:    BP (!) 90/56 (Site: Left Upper Arm, Position: Sitting, Cuff Size: Medium Adult)   Pulse 77   Temp 97.7 °F (36.5 °C)   Wt 154 lb 3.2 oz (69.9 kg)   SpO2 99% Comment: oxygen at 5 liters  BMI 20.91 kg/m²   Physical Exam  Vitals and nursing note reviewed. Constitutional:       General: He is not in acute distress. Appearance: He is well-developed. He is not ill-appearing. Eyes:      General: Lids are normal. Vision grossly intact. Cardiovascular:      Rate and Rhythm: Normal rate and regular rhythm. Heart sounds: Normal heart sounds, S1 normal and S2 normal. No murmur heard. No friction rub. No gallop. Pulmonary:      Effort: Pulmonary effort is normal. No respiratory distress. Breath sounds: Normal breath sounds. No wheezing. Abdominal:      General: Bowel sounds are normal.      Palpations: Abdomen is soft. There is no mass. Tenderness: There is no abdominal tenderness. There is no guarding.    Musculoskeletal:         General: Normal range of motion. Skin:     General: Skin is warm and dry. Capillary Refill: Capillary refill takes less than 2 seconds. Neurological:      General: No focal deficit present. Mental Status: He is alert and oriented to person, place, and time. An electronic signature was used to authenticate this note.   --Jesus Toledo MD

## 2023-01-16 NOTE — CARE COORDINATION
Ambulatory Care Coordination Note  1/16/2023    ACC: Bala Hickey, RN    Referred by care transition nurse for care management. Spoke with patient and enrolled. Admit for acute PE. Has parox atrial fib, had been on Xarelto, now on Coumadin and managed at [facility]. He declined home health visits. PCP sent order to home INR machine to [employer], no word if approved yet. Pulmonary fibrosis- oxygen dependent wears 5 L at rest and up to 9 liters with activity. No humidity to oxygen and has nose bleeds. Writer called [facility] and requested, they will deliver humidity to him this week, patient notified to call them for any questions/problems on hooking up to concentrator. Appt with Dr. Alicia Crisostomo 1/20. He declined consideration for lung transplant at [facility]. He got Revatio last week after insurance approved. He has to take at least 2 oxygen tanks with him to [facility] or he runs out. Checks pulse ox at home, right now 90 at 7 L oxygen. Drops to 70s with acitivity if doesn't increase oxygen then gets dizzy. Feels resp status at baseline right now, has good days and bad days. CHF- follows with Dr. Charlotte Grayson, appt in Feb. Doesn't weigh self at home, not wearing compression stockings right now since no swelling. Is not on a diuretic. Destiny Half a year ago in ED, had right hip surgery, uses a cane, now right leg 2 inches shorter. No falls since. Poor appetite especially since off steroids. He has to force himself to eat, can't taste anything. History of COVID 2 years ago, is due for another booster. SDOH- no needs identified. He quit driving, his wife drives him to [employer]. Independent with medications. Has medical bills but denied other financial concerns. CC Plan:   -Follow up next week to review PCP and pulm notes, any new orders.   Congestive Heart Failure Assessment    Are you currently restricting fluids?: No Restriction  Do you understand a low sodium diet?: Yes  Do you understand how to read food labels?: Yes  How many restaurant meals do you eat per week?: 0  Do you salt your food before tasting it?: No         Symptoms:  CHF associated dyspnea on exertion: Pos      Symptom course: stable  Weight trend:  (Comment: doesn't weigh self at home)      and   General Assessment    Do you have any symptoms that are causing concern?: No             Offered patient enrollment in the Remote Patient Monitoring (RPM) program for in-home monitoring: Patient declined. Ambulatory Care Coordination Assessment    Care Coordination Protocol  Referral from Primary Care Provider: No  Week 1 - Initial Assessment     Do you have all of your prescriptions and are they filled?: Yes (Comment: he manages them himself)  Barriers to medication adherence: None  Are you able to afford your medications?: Yes  How often do you have trouble taking your medications the way you have been told to take them?: I always take them as prescribed. Do you have Home O2 Therapy?: Yes   Oxygen Regimen: Continuous Flow - Enter rate/FIO2: 5   Method of Delivery: Nasal Cannula, Has cylinders in home, Concentrater   CPAP Use: None      Ability to seek help/take action for Emergent Urgent situations i.e. fire, crime, inclement weather or health crisis. : Independent  Ability to ambulate to restroom: Independent  Ability handle personal hygeine needs (bathing/dressing/grooming): Independent  Ability to manage Medications: Independent  Ability to prepare Food Preparation: Needs Assistance  Ability to maintain home (clean home, laundry): Needs Assistance  Ability to drive and/or has transportation: Dependent  Ability to do shopping: Dependent  Ability to manage finances: Independent  Is patient able to live independently?: Yes     Current Housing: Private Residence        Per the Fall Risk Screening, did the patient have 2 or more falls or 1 fall with injury in the past year?: Yes  How often do you think you are about to fall and you do NOT fall?  For example, you grab something to stabilize yourself or hold onto a wall/furniture?: Rarely  Use of a Mobility Aid: Yes  Difficulty walking/impaired gait: No  Issues with feet or shoes like numbness, edema, shoes not fitting: No  Changes in vision, poor vision or poor lighting in environment: No  Dizziness: No  Other Fall Risk: No     Frequent urination at night?: Yes  Do you use rails/bars?: Yes  Do you have a non-slip tub mat?: No     Are you experiencing loss of meaning?: No  Are you experiencing loss of hope and peace?: No     Thinking about your patient's physical health needs, are there any symptoms or problems (risk indicators) you are unsure about that require further investigation?: No identified areas of uncertainly or problems already being investigated   Are the patients physical health problems impacting on their mental well-being?: No identified areas of concern   Are there any problems with your patients lifestyle behaviors (alcohol, drugs, diet, exercise) that are impacting on physical or mental well-being?: No identified areas of concern   Do you have any other concerns about your patients mental well-being?  How would you rate their severity and impact on the patient?: No identified areas of concern   How would you rate their home environment in terms of safety and stability (including domestic violence, insecure housing, neighbor harassment)?: Consistently safe, supportive, stable, no identified problems   How do daily activities impact on the patient's well-being? (include current or anticipated unemployment, work, caregiving, access to transportation or other): No identified problems or perceived positive benefits   How would you rate their social network (family, work, friends)?: Good participation with social networks   How would you rate their financial resources (including ability to afford all required medical care)?: Financially secure, resources adequate, no identified problems   How wells does the patient now understand their health and well-being (symptoms, signs or risk factors) and what they need to do to manage their health?: Reasonable to good understanding and already engages in managing health or is willing to undertake better management   How well do you think your patient can engage in healthcare discussions? (Barriers include language, deafness, aphasia, alcohol or drug problems, learning difficulties, concentration): Clear and open communication, no identified barriers   Do other services need to be involved to help this patient?: Other care/services in place and adequate   Suggested Interventions and [place]: Declined   Specialty Service Referral: Completed                  Prior to Admission medications    Medication Sig Start Date End Date Taking? Authorizing Provider   sildenafil (REVATIO) 20 MG tablet Take 1 tablet by mouth 3 times daily 1/10/23  Yes Cassandra Miller MD   atorvastatin (LIPITOR) 40 MG tablet take 1 tablet by mouth once daily 1/10/23  Yes Geetha Morris MD   digoxin (LANOXIN) 125 MCG tablet take 1 tablet by mouth once daily 1/10/23  Yes Geetha Morris MD   ferrous sulfate (IRON 325) 325 (65 Fe) MG tablet Take 1 tablet by mouth every 48 hours  Patient taking differently: Take 325 mg by mouth every 48 hours Takes daily 12/26/22  Yes Gabriela Kolb MD   warfarin (COUMADIN) 2 MG tablet Take 1 tablet by mouth daily Review INR prior to administration. Hazardous med- See facility policy for handling/disposal    Please follow-up with your Coumadin clinic for exact dosage of warfarin that needs to be taken every day based on your INR. 12/26/22  Yes Noel Denson MD   midodrine (PROAMATINE) 5 MG tablet Take 1 tablet by mouth 3 times daily (before meals) 12/26/22  Yes Noel Almendarez MD   OXYGEN Inhale 4 L into the lungs continuous Uses 4-5 liters 24/7/ Pt.  Use 8 L   Yes Historical Provider, MD   omeprazole (PRILOSEC) 40 MG delayed release capsule take 1 capsule by mouth once daily 12/8/22  Yes Cassandra Talley MD   ibuprofen (ADVIL;MOTRIN) 800 MG tablet take 1 tablet by mouth three times a day if needed for MODERATE PAIN ( PAIN SCALE 4-6 ) 10/24/22  Yes Cassandra Talley MD   sertraline (ZOLOFT) 50 MG tablet Take 1 tablet by mouth daily 10/3/22  Yes Cassandra Talley MD   tamsulosin [facility]) 0.4 MG capsule take 1 capsule by mouth once daily 8/8/22  Yes Alondra Moffett MD   Handicap [facility] MISC by Does not apply route Expires 1/2026 1/19/21   Alondra Moffett MD       Future Appointments   Date Time Provider Yves Corralesi   1/16/2023  3:00 PM Cassandra Talley MD [id number]   1/24/2023  3:00 PM [facility]

## 2023-01-16 NOTE — TELEPHONE ENCOUNTER
I agree with the Zackary Sinclair  He will be coming here for INR management, has a hard time getting out to coumadin clinic.

## 2023-01-17 ENCOUNTER — APPOINTMENT (OUTPATIENT)
Dept: PHARMACY | Age: 68
End: 2023-01-17
Payer: MEDICARE

## 2023-01-20 ENCOUNTER — TELEPHONE (OUTPATIENT)
Dept: FAMILY MEDICINE CLINIC | Age: 68
End: 2023-01-20

## 2023-01-20 NOTE — TELEPHONE ENCOUNTER
Patient contacted office. He stated he received the nebulizer solution, however, he does not have the nebulizer.

## 2023-01-23 ENCOUNTER — CARE COORDINATION (OUTPATIENT)
Dept: CARE COORDINATION | Age: 68
End: 2023-01-23

## 2023-01-23 NOTE — CARE COORDINATION
Left VM message asking patient to call me back at [phone] for care management follow up, check if got humidity for oxygen, review pulmonology visit. Will call again in a few days.

## 2023-01-24 ENCOUNTER — CARE COORDINATION (OUTPATIENT)
Dept: CARE COORDINATION | Age: 68
End: 2023-01-24

## 2023-01-24 ENCOUNTER — TELEPHONE (OUTPATIENT)
Dept: PHARMACY | Age: 68
End: 2023-01-24

## 2023-01-24 NOTE — CARE COORDINATION
Called and spoke with GEOFF Richards. She stated he passed away yesterday at home, she found him on the kitchen floor.

## 2023-01-27 ASSESSMENT — ENCOUNTER SYMPTOMS
NAUSEA: 0
ABDOMINAL PAIN: 0
BLOOD IN STOOL: 0
DIARRHEA: 0
VOMITING: 0
CHEST TIGHTNESS: 0
SHORTNESS OF BREATH: 1
CHOKING: 0
WHEEZING: 0
CONSTIPATION: 0
COUGH: 0
ANAL BLEEDING: 0

## 2023-01-27 ASSESSMENT — VISUAL ACUITY: OU: 1

## 2023-05-09 NOTE — PROGRESS NOTES
[address]    PROGRESS NOTE             2/28/2022    8:01 AM    Name:   Aron Dhillon  MRN:     [id number]     Acct:      [de-identified]   Room:   2105/2105-01  IP Day:  8  Admit Date:  2/18/2022  6:14 AM    PCP:  Citlali Alberts MD  Code Status:  DNR-CCA    Subjective:     C/C: No chief complaint on file. Interval History Status: improved. Pt seen and examined. Is feeling better overall. Having BM,   breathing seems at baseline at rest but increased SOB with exertion,  Sleep disturbance 2/2 anxiety attacks overnight but pt reports this is ongoing since 1 year. Brief History:     40-year-old male with past medical history of pulmonary fibrosis presented with acute hypoxic respiratory failure, had fall while walking into ED and fractured right hip.  Respiratory failure found to be secondary to pneumonia in setting of pulmonary fibrosis; unsure of antibiotics and patient O2 needs back at baseline.  Right hip hemiarthroplasty done without any complications; postop day #5.  Patient with chronic A. fib but previously controlled rate started going into A. fib with RVR on postoperative days; cardiology consulted & medication adjustments being made. Hypotensive episodes managed with fluid boluses; last 1 given 2/26; BP low normal since. Review of Systems:     Review of Systems   Constitutional: Negative for chills and fever. HENT: Negative for rhinorrhea. Eyes: Negative for discharge and redness. Respiratory: Positive for shortness of breath. Negative for cough. Cardiovascular: Negative for chest pain and palpitations. Gastrointestinal: Negative for constipation and vomiting. Hard stool   Genitourinary: Negative for difficulty urinating and dysuria. Musculoskeletal: Positive for arthralgias. Neurological: Negative for dizziness and light-headedness. Psychiatric/Behavioral: Negative for confusion.  The patient is nervous/anxious (anxiety attacks (chronic)). Medications: Allergies: Allergies   Allergen Reactions    Adhesive Tape Other (See Comments)     Blister badly     Codeine Nausea Only     Other reaction(s): Other: See Comments  NAUSEA  Other reaction(s): Other: See Comments  NAUSEA    Penicillins Swelling     As a baby  Other reaction(s): Unknown  As a baby    Nintedanib Diarrhea     10-15 BM daily       Current Meds:   Scheduled Meds:    predniSONE  20 mg Oral Daily    midodrine  5 mg Oral TID AC    metoprolol tartrate  25 mg Oral BID    famotidine  20 mg Oral BID    sodium chloride flush  5-40 mL IntraVENous 2 times per day    digoxin  125 mcg Oral Daily    sertraline  25 mg Oral Daily    enoxaparin  1 mg/kg SubCUTAneous BID    sodium chloride flush  5-40 mL IntraVENous 2 times per day    atorvastatin  40 mg Oral Daily    [Held by provider] lisinopril  10 mg Oral BID    tamsulosin  0.4 mg Oral Daily    aspirin  81 mg Oral Daily     Continuous Infusions:    sodium chloride      sodium chloride       PRN Meds: oxyCODONE-acetaminophen, sodium chloride flush, sodium chloride, perflutren lipid microspheres, sodium chloride flush, sodium chloride flush, sodium chloride, ondansetron **OR** ondansetron, polyethylene glycol, acetaminophen **OR** acetaminophen, potassium chloride **OR** potassium chloride, magnesium sulfate    Data:     Past Medical History:   has a past medical history of Atrial fibrillation ([facility] [address] Back pain, chronic, Hill's esophagus, Benign essential HTN, BPH (benign prostatic hyperplasia), Cancer (HCC), Chronic idiopathic pulmonary fibrosis ([facility] [address] Cocaine abuse in remission [facility]), ED (erectile dysfunction), GERD (gastroesophageal reflux disease), GI bleed, Hernia, History of colon cancer, Melena, Migraines, and Murmur, cardiac. Social History:   reports that he quit smoking about 51 years ago. He has a 0.50 pack-year smoking history.  He has never used smokeless tobacco. He reports current alcohol use of about 12.0 standard drinks of alcohol per week. He reports that he does not use drugs. Family History:   Family History   Problem Relation Age of Onset    Diabetes Mother     Heart Attack Father     Heart Disease Father     Heart Disease Brother        Vitals:  /75   Pulse 89   Temp 97.3 °F (36.3 °C) (Oral)   Resp 17   Ht 6' (1.829 m)   Wt 162 lb 11.2 oz (73.8 kg)   SpO2 98%   BMI 22.07 kg/m²   Temp (24hrs), Av.7 °F (36.5 °C), Min:97.3 °F (36.3 °C), Max:98.1 °F (36.7 °C)    No results for input(s): POCGLU in the last 72 hours. Vitals:    22 2126 22 0017 22 0610 22 0749   BP: 125/79 104/62 127/74 132/75   Pulse: 84 79 95 89   Resp:  18 18 17   Temp:  97.4 °F (36.3 °C) 97.8 °F (36.6 °C) 97.3 °F (36.3 °C)   TempSrc:  Oral Oral Oral   SpO2:  100% 98% 98%   Weight:   162 lb 11.2 oz (73.8 kg)    Height:           I/O(24Hr):     Intake/Output Summary (Last 24 hours) at 2022 0801  Last data filed at 2022 0618  Gross per 24 hour   Intake 417 ml   Output 1627 ml   Net -1210 ml       Labs:  Recent Results (from the past 24 hour(s))   Calcium, Ionized    Collection Time: 22  8:43 AM   Result Value Ref Range    Calcium, Ion 1.06 (L) 1.13 - 1.33 mmol/L   Hemoglobin and Hematocrit    Collection Time: 22  3:06 PM   Result Value Ref Range    Hemoglobin 9.2 (L) 13.5 - 17.5 g/dL    Hematocrit 28.3 (L) 41 - 53 %   Basic Metabolic Panel w/ Reflex to MG    Collection Time: 22  5:40 AM   Result Value Ref Range    Glucose 81 70 - 99 mg/dL    BUN 17 8 - 23 mg/dL    CREATININE 0.51 (L) 0.70 - 1.20 mg/dL    Calcium 7.9 (L) 8.6 - 10.4 mg/dL    Sodium 136 135 - 144 mmol/L    Potassium 4.4 3.7 - 5.3 mmol/L    Chloride 98 98 - 107 mmol/L    CO2 37 (H) 20 - 31 mmol/L    Anion Gap 1 (L) 9 - 17 mmol/L    GFR Non-African American >60 >60 mL/min    GFR African American >60 >60 mL/min    GFR Comment         CBC with Auto Differential    Collection Time: 02/28/22  5:40 AM   Result Value Ref Range    WBC 9.9 3.5 - 11.0 k/uL    RBC 3.67 (L) 4.5 - 5.9 m/uL    Hemoglobin 9.3 (L) 13.5 - 17.5 g/dL    Hematocrit 28.5 (L) 41 - 53 %    MCV 77.7 (L) 80 - 100 fL    MCH 25.3 (L) 26 - 34 pg    MCHC 32.5 31 - 37 g/dL    RDW 17.3 (H) 11.5 - 14.9 %    Platelets 780 392 - 223 k/uL    MPV 7.0 6.0 - 12.0 fL    Seg Neutrophils 72 (H) 36 - 66 %    Lymphocytes 15 (L) 24 - 44 %    Monocytes 11 (H) 1 - 7 %    Eosinophils % 2 0 - 4 %    Basophils 0 0 - 2 %    Segs Absolute 7.12 1.3 - 9.1 k/uL    Absolute Lymph # 1.49 1.0 - 4.8 k/uL    Absolute Mono # 1.09 0.1 - 1.3 k/uL    Absolute Eos # 0.20 0.0 - 0.4 k/uL    Basophils Absolute 0.00 0.0 - 0.2 k/uL    Morphology FEW ELLIPTOCYTES     Morphology HYPOCHROMIA PRESENT     Morphology ANISOCYTOSIS PRESENT          Lab Results   Component Value Date/Time    SPECIAL NOT REPORTED 07/15/2021 04:33 PM     Lab Results   Component Value Date/Time    CULTURE NO SIGNIFICANT GROWTH 02/20/2022 06:41 PM         Radiology:    XR CHEST (SINGLE VIEW FRONTAL)    Result Date: 2/19/2022  Bilateral airspace disease superimposed on pulmonary fibrosis. Increasing airspace disease predominantly involving the left lung. XR HIP RIGHT (1 VIEW)    Result Date: 2/22/2022  Total hip arthropasty without acute hardware complication. XR HIP RIGHT (2-3 VIEWS)    Result Date: 2/18/2022  Portable chest: 1. Worsening airspace disease throughout the left lung with small left-sided pleural effusion, likely worsening multifocal pneumonia. Follow-up is recommended to document resolution. 2. Underlying fibrosis throughout the lungs. 3. Stable mild cardiomegaly. Right hip: Acute slightly displaced right femoral neck fracture. Underlying osteopenia. CT HEAD WO CONTRAST    Result Date: 2/18/2022  No acute intracranial process identified. XR CHEST PORTABLE    Result Date: 2/23/2022  No significant interval change.      XR CHEST PORTABLE    Result Date: 2/21/2022  Diffuse bilateral pneumonia worse on the left not significantly changed since 02/19/2022 given differences in radiographic technique. Mild cardiomegaly and possible mild pulmonary vascular congestion. XR CHEST PORTABLE    Result Date: 2/18/2022  Portable chest: 1. Worsening airspace disease throughout the left lung with small left-sided pleural effusion, likely worsening multifocal pneumonia. Follow-up is recommended to document resolution. 2. Underlying fibrosis throughout the lungs. 3. Stable mild cardiomegaly. Right hip: Acute slightly displaced right femoral neck fracture. Underlying osteopenia. CT CHEST PULMONARY EMBOLISM W CONTRAST    Result Date: 2/18/2022  1. No evidence for acute pulmonary embolism. 2. There is very large new ground-glass airspace disease occupying most of the left upper lobe and new small left pleural effusion. 3. Solid pleural-based posterior segment right upper lobe airspace density along the major fissure is larger now measuring 4.3 x 1.7 cm, previously about 1.2 x 2.4 cm. Probably also worsening pneumonia but would recommend short interval 3 month follow-up 4. 9 mm right upper lobe suprahilar central nodule is unchanged. RECOMMENDATIONS: Unavailable     CT HIP RIGHT WO CONTRAST    Result Date: 2/22/2022  1. Acute right femoral neck fracture. Physical Examination:        Physical Exam  Constitutional:       General: He is not in acute distress. Appearance: Normal appearance. He is not ill-appearing. Comments: Pt seen walking independently from comode to bed. HENT:      Head: Normocephalic. Nose: Nose normal.      Comments: NC in place  Eyes:      General:         Right eye: No discharge. Conjunctiva/sclera: Conjunctivae normal.   Cardiovascular:      Rate and Rhythm: Tachycardia present.       Comments: Mildly elevated HR likely 2/2 to pt just exerted himself  Pulmonary:      Effort: Pulmonary effort is normal. No hold for HR < 50, SBP <90 (started 2/25)  Midodrine prn for SBP < 90              On home dose of aspirin  On Full dose enoxaparin   Magnesium and potassium replacement protocols      Hypertension  Amlodipine p.o. 5 mg daily => dc'ed for low BP on 2/25  Carvedilol 3.125 mg p.o. twice daily => dc'ed for low BP on 2/25  Lisinopril 10 mg p.o. twice daily => held for low BP on 2/26     Hyperlipidemia  Atorvastatin 40 mg p.o. daily     Other home medications being continued:  Tamsulosin 0.4 mg p.o. daily     Code: DNR-CCA  DVT prophylaxis: Enoxaparin 70 mg SQ twice daily (full dose d/t Afib hx); f/u for when to switch back to home Eliquis  GI prophylaxis: Famotidine 20 mg PO   Diet: Regular  Activity: Up with assistance  Dispo: SNF vs ARU (PMR following)    Please note: Use of a speech recognition software was used in the creation of portions of this note and dictation errors, including those of syntax and sound alike word substitutions, may have escaped proofreading. Oscar Gibson DO  2/28/2022  8:01 AM     Attending Physician Statement  I have discussed the care of Paloma Schaefer with the resident team. I have examined the patient myself and taken ros and hpi , including pertinent history and exam findings,  with the resident. I have reviewed the key elements of all parts of the encounter with the resident. I agree with the assessment, plan and orders as documented by the resident. Principal Problem:    Multifocal pneumonia  Active Problems:    PAF (paroxysmal atrial fibrillation) (HCC)    Pulmonary fibrosis (HCC)    Fracture of right hip    Community acquired bacterial pneumonia    Allergy to penicillin  Resolved Problems:    * No resolved hospital problems.  *    Multifocal pna, pulm fibrosis  resp status at baseline  POD#6 s/p hip arthroplasty  On fulldose lovenox for afib,   Hb stable,   Oozing from incision site  Discharge planning        Electronically signed by Cayla Ramsay MD Propranolol Pregnancy And Lactation Text: This medication is Pregnancy Category C and it isn't known if it is safe during pregnancy. It is excreted in breast milk.

## 2024-02-14 NOTE — PROGRESS NOTES
Cysto clot evac, fulguration of bleeders, channel turp with Dr Galdino Espino for discharge today  Has f/u scheduled with Dr Leo Leyva 10/29/21  Cysto and urogram can be cancelled, will still need PSA prior [facility]  Progress Note                   Date:   11/24/2020  Patient name: Georgiana Mcneal  Date of admission:  11/23/2020  6:44 AM  MRN:   [id number]  YOB: 1955  PCP: Dilcia Heard MD    Reason for Admission: Acute hypoxemic respiratory failure due to COVID-19 ([facility] [address] [U07.1, J96.01]    Subjective:       Clinical Changes /Abnormalities:  Report per Jay Gilmore. For careful stewardship of limited PPE during COVID-19 pandemic my physical exam was deferred. For physical exam, please see today's physical from primary team or ICU team.  SB on tele HR in the 40s    Review of Systems    Medications:   Scheduled Meds:   furosemide  20 mg Intravenous BID    sodium chloride  20 mL Intravenous Once    atorvastatin  40 mg Oral Daily    [Held by provider] lisinopril  20 mg Oral Daily    [Held by provider] sotalol  80 mg Oral BID    tamsulosin  0.4 mg Oral Daily    sodium chloride flush  10 mL Intravenous 2 times per day    famotidine  20 mg Oral Daily    dexamethasone  6 mg Oral Daily    Vitamin D  2,000 Units Oral Daily    heparin (porcine)  4,000 Units Intravenous Once     Continuous Infusions:   heparin (PORCINE) Infusion 14 Units/kg/hr (11/24/20 0939)     CBC:   Recent Labs     11/23/20  0707 11/23/20  1304 11/24/20  0126   WBC 16.7* 14.1* 15.7*   HGB 13.5 12.0* 11.3*    282 305     BMP:    Recent Labs     11/23/20  0707 11/23/20  1304 11/24/20  0126    140 140   K 3.6* 3.2* 3.5*    103 103   CO2 22 27 25   BUN 17 16 22   CREATININE 0.64* 0.62* 0.61*   GLUCOSE 131* 154* 136*     Hepatic:  Recent Labs     11/23/20  0707 11/23/20  1304 11/24/20  0126   AST 29 24 17   ALT 15 12 10   BILITOT 1.57* 1.28* 1.10   ALKPHOS 98 86 72     Troponin:   Recent Labs     11/23/20 2024 11/23/20  2144 11/24/20  0126   TROPHS 69* 64* 56*     BNP: No results for input(s): BNP in the last 72 hours. Lipids: No results for input(s): CHOL, HDL in the last 72 hours.     Invalid Pt feeling better, skin warm and dry. Dizziness improved. Pt dressed to go home   input(s): LDLCALCU  INR:   Recent Labs     11/23/20  0707   INR 1.4     DIAGNOSTIC DATA  EKG:   NSR, Biatrial enlargement  ECHO: 10/20/20   Summary  Left ventricle is normal in size Global left ventricular systolic function  is moderately reduced Estimated ejection fraction is 35 % . Mostly global hypokinesis with minor regional variation. Grade I (mild) left ventricular diastolic dysfunction. Left atrium is moderately dilated. Aortic leaflet calcification with Moderate Aortic Stenosis, maybe  underestimated due to poor LVEF. Thickened mitral valve leaflets. Mild to moderate mitral regurgitation. Trivial tricuspid regurgitation. Estimated right ventricular systolic  pressure is 56UKAD. IVC dilated but unable to assess respiratory collapse.     Cardiac Angiography: 2018  Procedure Summary      Non-obstructive CAD.   Normal LV systolic function.      Recommendations      Medical therapy as needed.   Risk factor modification. Objective:   Vitals: /81   Pulse 59   Temp 96.5 °F (35.8 °C) (Axillary)   Resp 22   Ht 6' (1.829 m)   Wt 167 lb 5.3 oz (75.9 kg)   SpO2 (!) 80%   BMI 22.69 kg/m²   For careful stewardship of limited PPE during COVID-19 pandemic my physical exam was deferred. For physical exam, please see today's physical from primary team or ICU team.         Assessment / Acute Cardiac Problems:   1. A FIB with RVR now SB  2. Covid 19 +  3. Hypertension  4.  Acute on chronic systolic and diastolic HF    Patient Active Problem List:     Dyslipidemia     ED (erectile dysfunction)     Incomplete bladder emptying     Benign prostatic hyperplasia with urinary obstruction     History of colon cancer     Atrial fibrillation with normal ventricular rate (HCC)     Anemia     Pallor of optic disc     Presbyopia     Incisional hernia, without obstruction or gangrene     Hypertrophic nonobstructive cardiomyopathy (HCC)     Iron (Fe) deficiency anemia     Primary osteoarthritis of right knee     Benign essential HTN     History of malignant neoplasm of rectum     Hill's esophagus     CHF (congestive heart failure), NYHA class II, acute on chronic, combined (HCC)     Hypoxia     Dyspnea and respiratory abnormalities     Occupational pulmonary disease     Sinus bradycardia     Acute hypoxemic respiratory failure due to COVID-19 [facility])      Plan of Treatment:   1. AFib with RVR now SB  HR 40s  Will hold sotalol with HR less than 60. Will d/c heparin drip. Will start xarelto  2. NSTEMI likely secondary to  Demand ischemia vs type I  Troponin trending down. Continue heparin drip. Reviewed with Dr. Emil Marc. Will d/c heparin drip and plan for OP followup in cardiology clinic   3. HTN Controlled. 4. Covid pneumonia treatment per primary service.      Electronically signed by MIRI Russell NP on 11/24/2020 at 10:22 [id number] [facility].  [phone]

## 2024-09-03 NOTE — PROGRESS NOTES
[address] [facility]    PROGRESS NOTE             3/2/2022    7:10 AM    Name:   Nadja Strauss  MRN:     [id number]     Acct:      [de-identified]   Room:   2105/2105-01   Day:  15  Admit Date:  2/18/2022  6:14 AM    PCP:  Rose Lehman MD  Code Status:  DNR-CCA    Subjective:     C/C: No chief complaint on file. Interval History Status: improved. Patient seen and examined. No new complaints. Patient reports feeling well. Right lower extremity pain and right knee swelling persists but is manageable. Wound continues to improve. Wheezing and shortness of breath at baseline. No overnight events. Hgb in low 8s this AM; was in low 8s yesterday AM but back to low 9s yesterday PM; pt asymptomatic. Anticipate discharge today; med rec complete, discharge orders pending placement. Brief History:     69-year-old male with past medical history of pulmonary fibrosis presented with acute hypoxic respiratory failure, had fall while walking into ED and fractured right hip.  Respiratory failure found to be secondary to pneumonia in setting of pulmonary fibrosis; unsure of antibiotics and patient O2 needs back at baseline. Right hip hemiarthroplasty done 2/22 without any complications.  Patient with chronic A. fib but previously controlled rate started going into A. fib with RVR on postoperative days; cardiology consulted & medication adjustments being made. Hypotensive episodes managed with fluid boluses; last 1 given 2/26; BP low normal since. Review of Systems:     Review of Systems   Constitutional: Negative for chills and fever. HENT: Negative for congestion and sore throat. Eyes: Negative for discharge and redness. Respiratory: Positive for shortness of breath and wheezing. Cardiovascular: Negative for chest pain and leg swelling.    Gastrointestinal: Negative for abdominal pain, blood in stool, constipation, nausea and vomiting. Genitourinary: Negative for difficulty urinating, dysuria and hematuria. Musculoskeletal: Positive for arthralgias and myalgias. Neurological: Negative for dizziness, light-headedness and headaches. Psychiatric/Behavioral: Negative for sleep disturbance. Medications: Allergies: Allergies   Allergen Reactions    Adhesive Tape Other (See Comments)     Blister badly     Codeine Nausea Only     Other reaction(s): Other: See Comments  NAUSEA  Other reaction(s):  Other: See Comments  NAUSEA    Penicillins Swelling     As a baby  Other reaction(s): Unknown  As a baby    Nintedanib Diarrhea     10-15 BM daily       Current Meds:   Scheduled Meds:    predniSONE  10 mg Oral Daily    midodrine  5 mg Oral TID AC    metoprolol tartrate  25 mg Oral BID    famotidine  20 mg Oral BID    sodium chloride flush  5-40 mL IntraVENous 2 times per day    digoxin  125 mcg Oral Daily    sertraline  25 mg Oral Daily    enoxaparin  1 mg/kg SubCUTAneous BID    sodium chloride flush  5-40 mL IntraVENous 2 times per day    atorvastatin  40 mg Oral Daily    [Held by provider] lisinopril  10 mg Oral BID    tamsulosin  0.4 mg Oral Daily    aspirin  81 mg Oral Daily     Continuous Infusions:    sodium chloride      sodium chloride       PRN Meds: oxyCODONE-acetaminophen, sodium chloride flush, sodium chloride, perflutren lipid microspheres, sodium chloride flush, sodium chloride flush, sodium chloride, ondansetron **OR** ondansetron, polyethylene glycol, acetaminophen **OR** acetaminophen, potassium chloride **OR** potassium chloride, magnesium sulfate    Data:     Past Medical History:   has a past medical history of Atrial fibrillation ([address] Back pain, chronic, Hill's esophagus, Benign essential HTN, BPH (benign prostatic hyperplasia), Cancer ([address] Chronic idiopathic pulmonary fibrosis ([address] Cocaine abuse in remission [facility]), ED (erectile dysfunction), GERD (gastroesophageal reflux disease), GI bleed, Hernia, History of colon cancer, Melena, Migraines, and Murmur, cardiac. Social History:   reports that he quit smoking about 51 years ago. He has a 0.50 pack-year smoking history. He has never used smokeless tobacco. He reports current alcohol use of about 12.0 standard drinks of alcohol per week. He reports that he does not use drugs. Family History:   Family History   Problem Relation Age of Onset    Diabetes Mother     Heart Attack Father     Heart Disease Father     Heart Disease Brother        Vitals:  /72   Pulse 89   Temp 97.9 °F (36.6 °C) (Oral)   Resp 16   Ht 6' (1.829 m)   Wt 155 lb 1.6 oz (70.4 kg)   SpO2 96%   BMI 21.04 kg/m²   Temp (24hrs), Av.8 °F (36.6 °C), Min:97.4 °F (36.3 °C), Max:98 °F (36.7 °C)    No results for input(s): POCGLU in the last 72 hours. Vitals:    22 2013 22 2153 22 2357 22 0615   BP: 111/71  95/65 109/72   Pulse: 91  89    Resp: 16  16    Temp: 97.9 °F (36.6 °C)  97.9 °F (36.6 °C)    TempSrc: Oral  Oral    SpO2: 98% 96%     Weight:   155 lb 1.6 oz (70.4 kg)    Height:           I/O(24Hr):     Intake/Output Summary (Last 24 hours) at 3/2/2022 0710  Last data filed at 3/2/2022 9131  Gross per 24 hour   Intake 1200 ml   Output 1700 ml   Net -500 ml       Labs:  Recent Results (from the past 24 hour(s))   Hemoglobin and Hematocrit    Collection Time: 22 12:30 PM   Result Value Ref Range    Hemoglobin 9.2 (L) 13.5 - 17.5 g/dL    Hematocrit 29.0 (L) 41 - 53 %   Basic Metabolic Panel w/ Reflex to MG    Collection Time: 22  5:31 AM   Result Value Ref Range    Glucose 83 70 - 99 mg/dL    BUN 17 8 - 23 mg/dL    CREATININE 0.52 (L) 0.70 - 1.20 mg/dL    Calcium 8.0 (L) 8.6 - 10.4 mg/dL    Sodium 140 135 - 144 mmol/L    Potassium 4.8 3.7 - 5.3 mmol/L    Chloride 101 98 - 107 mmol/L    CO2 36 (H) 20 - 31 mmol/L    Anion Gap 3 (L) 9 - 17 mmol/L    GFR Non-African American >60 >60 mL/min    GFR African American >60 >60 mL/min    GFR Comment         CBC with Auto Differential    Collection Time: 03/02/22  5:31 AM   Result Value Ref Range    WBC 11.4 (H) 3.5 - 11.0 k/uL    RBC 3.23 (L) 4.5 - 5.9 m/uL    Hemoglobin 8.1 (L) 13.5 - 17.5 g/dL    Hematocrit 25.2 (L) 41 - 53 %    MCV 78.0 (L) 80 - 100 fL    MCH 25.2 (L) 26 - 34 pg    MCHC 32.3 31 - 37 g/dL    RDW 17.5 (H) 11.5 - 14.9 %    Platelets 024 310 - 939 k/uL    MPV 6.8 6.0 - 12.0 fL    Seg Neutrophils 74 (H) 36 - 66 %    Lymphocytes 14 (L) 24 - 44 %    Monocytes 10 (H) 1 - 7 %    Eosinophils % 2 0 - 4 %    Basophils 0 0 - 2 %    Segs Absolute 8.43 1.3 - 9.1 k/uL    Absolute Lymph # 1.60 1.0 - 4.8 k/uL    Absolute Mono # 1.14 0.1 - 1.3 k/uL    Absolute Eos # 0.23 0.0 - 0.4 k/uL    Basophils Absolute 0.00 0.0 - 0.2 k/uL    Morphology ANISOCYTOSIS PRESENT     Morphology HYPOCHROMIA PRESENT     Morphology FEW POLYCHROMASIA     Morphology FEW ELLIPTOCYTES          Lab Results   Component Value Date/Time    SPECIAL NOT REPORTED 07/15/2021 04:33 PM     Lab Results   Component Value Date/Time    CULTURE NO SIGNIFICANT GROWTH 02/20/2022 06:41 PM         Radiology:    XR CHEST (SINGLE VIEW FRONTAL)    Result Date: 2/19/2022  Bilateral airspace disease superimposed on pulmonary fibrosis. Increasing airspace disease predominantly involving the left lung. XR HIP RIGHT (1 VIEW)    Result Date: 2/22/2022  Total hip arthropasty without acute hardware complication. XR HIP RIGHT (2-3 VIEWS)    Result Date: 2/18/2022  Portable chest: 1. Worsening airspace disease throughout the left lung with small left-sided pleural effusion, likely worsening multifocal pneumonia. Follow-up is recommended to document resolution. 2. Underlying fibrosis throughout the lungs. 3. Stable mild cardiomegaly. Right hip: Acute slightly displaced right femoral neck fracture. Underlying osteopenia. CT HEAD WO CONTRAST    Result Date: 2/18/2022  No acute intracranial process identified.      XR CHEST PORTABLE    Result Date: 2/23/2022  No significant interval change. XR CHEST PORTABLE    Result Date: 2/21/2022  Diffuse bilateral pneumonia worse on the left not significantly changed since 02/19/2022 given differences in radiographic technique. Mild cardiomegaly and possible mild pulmonary vascular congestion. XR CHEST PORTABLE    Result Date: 2/18/2022  Portable chest: 1. Worsening airspace disease throughout the left lung with small left-sided pleural effusion, likely worsening multifocal pneumonia. Follow-up is recommended to document resolution. 2. Underlying fibrosis throughout the lungs. 3. Stable mild cardiomegaly. Right hip: Acute slightly displaced right femoral neck fracture. Underlying osteopenia. CT CHEST PULMONARY EMBOLISM W CONTRAST    Result Date: 2/18/2022  1. No evidence for acute pulmonary embolism. 2. There is very large new ground-glass airspace disease occupying most of the left upper lobe and new small left pleural effusion. 3. Solid pleural-based posterior segment right upper lobe airspace density along the major fissure is larger now measuring 4.3 x 1.7 cm, previously about 1.2 x 2.4 cm. Probably also worsening pneumonia but would recommend short interval 3 month follow-up 4. 9 mm right upper lobe suprahilar central nodule is unchanged. RECOMMENDATIONS: Unavailable     CT HIP RIGHT WO CONTRAST    Result Date: 2/22/2022  1. Acute right femoral neck fracture. Physical Examination:        Physical Exam  Constitutional:       General: He is not in acute distress. Appearance: Normal appearance. He is not ill-appearing. HENT:      Head: Normocephalic and atraumatic. Right Ear: External ear normal.      Left Ear: External ear normal.      Nose: Nose normal.      Comments: NC in place  Eyes:      General:         Right eye: No discharge. Left eye: No discharge.       Conjunctiva/sclera: Conjunctivae normal.   Cardiovascular:      Rate and Rhythm: Normal rate and regular rhythm. Pulmonary:      Effort: Pulmonary effort is normal. No respiratory distress. Breath sounds: Rales present. Abdominal:      General: There is no distension. Palpations: Abdomen is soft. Tenderness: There is no abdominal tenderness. Musculoskeletal:         General: Swelling and tenderness present. Right lower leg: No edema. Left lower leg: No edema. Neurological:      Mental Status: He is alert. Mental status is at baseline. Psychiatric:         Mood and Affect: Mood normal.      Comments: Appears in good spirits; is smiling.            Assessment:        Primary Problem  Multifocal pneumonia    Active Hospital Problems    Diagnosis Date Noted    Allergy to penicillin [Z88.0]     Multifocal pneumonia [J18.9] 02/18/2022    Fracture of right hip [S72.001A] 02/18/2022    Community acquired bacterial pneumonia [J15.9] 02/18/2022    Pulmonary fibrosis ([facility] [J84.10] 12/08/2020    PAF (paroxysmal atrial fibrillation) ([facility] [address] [I48.0] 07/21/2014       Plan:        Multifocal pneumonia in setting of pulmonary fibrosis- resolved  C/w O2 supplementation as needed (baseline of 5L on Home O2)  Blood cultures negative, Respiratory cultures pending   Azithromycin course completed 2/21  Ceftriaxone course completed 2/24  Prednisone decreased to 10 mg p.o. daily (from 20 mg)  Incentive spirometry  RT, PT/OT   Pulmonology, ID, and PMR following     Acute on chronic systolic and diastolic CHF exacerbation  Echo 2/22: EF 40 to 45%, evidence of diastolic dysfunction  Cardiology on board      Acute right femoral neck fracture 2/2 fall - s/p hemiarthroplasty POD#7  Right hip femoral hemiarthroplasty done 2/22  Ortho the following  Pain management per Ortho/crit care recommendations  Percocet for pain   Ecchymosis at right hip/thigh surrounding incision site   No active bleeding found  Hgb low but has been stable around 8s & 9s     Depression - improving  Sertraline p.o. 25 mg daily started 2/21     Atrial fibrillation - rate controlled  Acute on chronic; Afib with RVR & hypotensive s/p surgery, especially with exertion; Cardiology consulted & medications adjusted; pt has been rate controlled since  Per Cardiology recommendations, will continued and dc pt on:   Digoxin 125 mg p.o. daily   Lopressor 25 mg BID, hold for HR < 50, SBP <90 (started 2/25)   Midodrine 5 mg p.o. 3 times daily before meals          On home dose of aspirin  On Full dose enoxaparin; switch to Eliquis on dc  Magnesium and potassium replacement protocols      Hypertension  Amlodipine p.o. 5 mg daily => dc'ed for low BP on 2/25  Carvedilol 3.125 mg p.o. twice daily => dc'ed for low BP on 2/25  Lisinopril 10 mg p.o. twice daily => held for low BP on 2/26     Hyperlipidemia  Atorvastatin 40 mg p.o. daily     Other home medications being continued:  Tamsulosin 0.4 mg p.o. daily     Code: DNR-CCA  DVT prophylaxis: Enoxaparin 70 mg SQ twice daily (full dose d/t Afib hx); f/u for when to switch back to home Eliquis  GI prophylaxis: Famotidine 20 mg PO   Diet: Regular  Activity: Up with assistance  Dispo: anticipate discharge to SNF today pending placement    Please note: Use of a speech recognition software was used in the creation of portions of this note and dictation errors, including those of syntax and sound alike word substitutions, may have escaped proofreading. Gwendolyn Heard DO  3/2/2022  7:10 AM       Attending Physician Statement  I have discussed the care of Vonda Mondragon with the resident team. I have examined the patient myself and taken ros and hpi , including pertinent history and exam findings,  with the resident. I have reviewed the key elements of all parts of the encounter with the resident. I agree with the assessment, plan and orders as documented by the resident.     Principal Problem:    Multifocal pneumonia  Active Problems:    PAF (paroxysmal atrial fibrillation) (HCC)    Pulmonary fibrosis [facility])    Fracture of right hip    Community acquired bacterial pneumonia    Allergy to penicillin  Resolved Problems:    * No resolved hospital problems.  *    Okay to discharge pending pre-CERT      Electronically signed by Mikala Frankel MD Standing/Walking

## 2024-12-26 NOTE — ED PROVIDER NOTES
FACULTY SIGN-OUT  ADDENDUM     Care of this patient was assumed from previous attending physician. The patient's initial evaluation and plan have been discussed with the prior provider who initially evaluated the patient. Attestation  I was available and discussed any additional care issues that arose and coordinated the management plans with the resident(s) caring for the patient during my duty period. Any areas of disagreement with resident's documentation of care or procedures are noted on the chart. I was personally present for the key portions of any/all procedures, during my duty period. I have documented in the chart those procedures where I was not present during the key portions. ED COURSE      The patient was given the following medications:  Orders Placed This Encounter   Medications    magnesium sulfate 1 g in dextrose 5% 100 mL IVPB    methylPREDNISolone sodium (SOLU-MEDROL) injection 125 mg    AND Linked Order Group     albuterol (PROVENTIL) nebulizer solution 2.5 mg     ipratropium (ATROVENT) 0.02 % nebulizer solution 0.25 mg    AND Linked Order Group     albuterol (PROVENTIL) nebulizer solution 15 mg     ipratropium (ATROVENT) 0.02 % nebulizer solution 0.25 mg    aspirin chewable tablet 324 mg    magnesium sulfate 1-5 GM/100ML-% IVPB (premix)     Dulgar, Brianna: cabinet override    magnesium sulfate 1-5 GM/100ML-% IVPB (premix)     Dulgar, Brianna: cabinet override    albuterol sulfate  (90 Base) MCG/ACT inhaler 2 puff    iopamidol (ISOVUE-370) 76 % injection 75 mL       RECENT VITALS:   Temp: 97.6 °F (36.4 °C), Pulse: 85, Resp: 28, BP: (!) 190/93    MEDICAL DECISION MAKING        Zee Dewitt is a 77 y.o. male who presents to the Emergency Department with complaints of SOB. Pt hypoxic, improved with bipap. Covid+. Await workup and admit.       Usha Hardy MD  Attending Emergency Physician    (Please note that portions of this note were completed with a voice recognition program.  Efforts were made to edit the dictations but occasionally words are mis-transcribed.)          Terrance Penn MD  11/23/20 4405 Assistance OOB with selected safe patient handling equipment if applicable/Assistance with ambulation/Communicate risk of Fall with Harm to all staff, patient, and family/Monitor gait and stability/Provide patient with walking aids/Provide visual cue: red socks, yellow wristband, yellow gown, etc/Reinforce activity limits and safety measures with patient and family/Bed in lowest position, wheels locked, appropriate side rails in place/Call bell, personal items and telephone in reach/Instruct patient to call for assistance before getting out of bed/chair/stretcher/Non-slip footwear applied when patient is off stretcher/[place] to call system/Physically safe environment - no spills, clutter or unnecessary equipment/Purposeful Proactive Rounding/Room/bathroom lighting operational, light cord in reach

## (undated) DEVICE — BANDAGE,SELF ADHRNT,COFLEX,4"X5YD,STRL: Brand: COLABEL

## (undated) DEVICE — SOLUTION IV IRRIG WATER 1000ML POUR BRL 2F7114

## (undated) DEVICE — BLANKET WRM W29.9XL79.1IN UP BODY FORC AIR MISTRAL-AIR

## (undated) DEVICE — CHLORAPREP 26ML ORANGE

## (undated) DEVICE — KIT SEP W/ BLD DRAW TB SYR NDL TRNQT PD

## (undated) DEVICE — SUTURE STRATAFIX SYMMETRIC PDS + SZ 1 L18IN ABSRB VLT L48MM SXPP1A400

## (undated) DEVICE — GLOVE SURG SZ 8 L12IN FNGR THK13MIL BRN LTX SYN POLYMER W

## (undated) DEVICE — STOCKINETTE ORTH W6XL48IN OFF WHT SGL PLY UNBLEACHED COT RIB

## (undated) DEVICE — Device

## (undated) DEVICE — 4-PORT MANIFOLD: Brand: NEPTUNE 2

## (undated) DEVICE — Z DISCONTINUED PER MEDLINE USE 2741943 DRESSING AQUACEL 10 IN ALG W9XL25CM SIL CVR WTRPRF VIR BACT BARR ANTIMIC

## (undated) DEVICE — FORCEPS BX L240CM JAW DIA2.4MM ORNG L CAP W/ NDL DISP RAD

## (undated) DEVICE — DRESSING PETRO W3XL8IN OIL EMUL N ADH GZ KNIT IMPREG CELOS

## (undated) DEVICE — 3M™ STERI-DRAPE™ U-DRAPE 1015: Brand: STERI-DRAPE™

## (undated) DEVICE — COOLER THER 20-31IN L CRYO COMB W/ PD KNEE TB GRAV FLO

## (undated) DEVICE — CANNULA NSL AD TBNG L7FT PVC STR NONFLARED PRNG O2 DEL W STD

## (undated) DEVICE — MASTISOL ADHESIVE LIQ 2/3ML

## (undated) DEVICE — GLOVE SURG SZ 85 STD WHT LTX SYN POLYMER BEAD REINF ANTI RL

## (undated) DEVICE — DRESSING HYDROCOLLOID BORDER 35X10 IN ALUM PRIMASEAL

## (undated) DEVICE — SOLUTION IRRIG 1000ML 0.9% SOD CHL USP POUR PLAS BTL

## (undated) DEVICE — KIT AUTOTRNS APPL AERO 2 SET SYR 2 TIP FOR PLT SEP SYS GPS

## (undated) DEVICE — GOWN,POLY REINFORCED,LG: Brand: MEDLINE

## (undated) DEVICE — SET HNDPC W COAX BNE CLN TIP SUCT TB BTTRY PWR DISPOSABLE

## (undated) DEVICE — DRAPE,REIN 53X77,STERILE: Brand: MEDLINE

## (undated) DEVICE — STRAP,POSITIONING,KNEE/BODY,FOAM,4X60": Brand: MEDLINE

## (undated) DEVICE — 450 ML BOTTLE OF 0.05% CHLORHEXIDINE GLUCONATE IN 99.95% STERILE WATER FOR IRRIGATION, USP AND APPLICATOR.: Brand: IRRISEPT ANTIMICROBIAL WOUND LAVAGE

## (undated) DEVICE — GLOVE ORANGE PI 8   MSG9080

## (undated) DEVICE — INTENDED TO AID IN THE PASSING OF SUTURES THROUGH BONE AND SOFT TISSUE DURING ORTHOPEDIC SURGERY: Brand: HOFFEE SUTURE RETRIEVER

## (undated) DEVICE — SUTURE VCRL + SZ 2 L27IN ABSRB UD L40MM CP 1/2 CIR REV CUT VCP195H

## (undated) DEVICE — SVMMC POD PK

## (undated) DEVICE — CLOSURE SKIN FLX NONINVASIVE PRELOC TECHNOLOGY FOR 24IN

## (undated) DEVICE — SOLUTION IV IRRIG POUR BRL 0.9% SODIUM CHL 2F7124

## (undated) DEVICE — SHEET, ORTHO, SPLIT, STERILE: Brand: MEDLINE

## (undated) DEVICE — MERCY HEALTH ST CHARLES: Brand: MEDLINE INDUSTRIES, INC.

## (undated) DEVICE — BANDAGE COBAN 4 IN COMPR W4INXL5YD FOAM COHESIVE QUIK STK SELF ADH SFT

## (undated) DEVICE — SUTURE STRATAFIX SPRL MCRYL + SZ 2 0 L27IN ABSRB UD W NDL SXMP1B419

## (undated) DEVICE — CEMENT MIXING SYSTEM WITH FEMORAL BREAKWAY NOZZLE: Brand: REVOLUTION

## (undated) DEVICE — GLOVE ORANGE PI 8 1/2   MSG9085

## (undated) DEVICE — COVER,TABLE,HEAVY DUTY,50"X90",STRL: Brand: MEDLINE

## (undated) DEVICE — FLEXIBLE YANKAUER,LARGE TIP, NO VACUUM CONTROL: Brand: ARGYLE

## (undated) DEVICE — SUTURE VCRL + SZ 2-0 L27IN ABSRB UD CP-1 1/2 CIR REV CUT VCP266H

## (undated) DEVICE — COVER,MAYO STAND,XL,STERILE: Brand: MEDLINE

## (undated) DEVICE — GLOVE SURG SZ 8 L12IN FNGR THK79MIL GRN LTX FREE

## (undated) DEVICE — DUAL CUT SAGITTAL BLADE

## (undated) DEVICE — SUTURE VCRL SZ 0 L36IN ABSRB UD CT-1 L36MM 1/2 CIR TAPR PNT VCP946H

## (undated) DEVICE — Z INACTIVE USE 2525529 CONNECTOR TBNG FOR O2

## (undated) DEVICE — Z INACTIVE USE 2660664 SOLUTION IRRIG 3000ML 0.9% SOD CHL USP UROMATIC PLAS CONT

## (undated) DEVICE — JELLY,LUBE,STERILE,FLIP TOP,TUBE,2-OZ: Brand: MEDLINE

## (undated) DEVICE — 2108 SERIES SAGITTAL BLADE FLARED, GROUND  (29.0 X 1.32 X 84.0MM)

## (undated) DEVICE — 2108 SERIES SAGITTAL BLADE, NO OFFSET  (24.8 X 1.32 X 87.3MM)

## (undated) DEVICE — Z DUPLICATE USE 2527422 TUBING O2 STD 7 FT EXTN NO CRUSH VISUAL CNTRST LF

## (undated) DEVICE — SOLUTION IRRIG 3000ML 0.9% SOD CHL USP UROMATIC PLAS CONT

## (undated) DEVICE — DEFENDO AIR WATER SUCTION AND BIOPSY VALVE KIT FOR  OLYMPUS: Brand: DEFENDO AIR/WATER/SUCTION AND BIOPSY VALVE

## (undated) DEVICE — SUTURE FIBERWIRE SZ 5 L38IN NONABSORBABLE BLU L48MM 1/2 AR7211

## (undated) DEVICE — SOLUTION IRRIG 1000ML STRL H2O USP PLAS POUR BTL

## (undated) DEVICE — GLOVE ORTHO 8   MSG9480

## (undated) DEVICE — Z DISCONTINUED USE 2424143 ADAPTER O2 SWVL CHRISTMAS TREE GRN

## (undated) DEVICE — 3M™ WARMING BLANKET, UPPER BODY, 10 PER CASE, 42268: Brand: BAIR HUGGER™

## (undated) DEVICE — BITEBLOCK 54FR W/ DENT RIM BLOX

## (undated) DEVICE — STERILE PATIENT PROTECTIVE PAD FOR IMP® KNEE POSITIONERS & COHESIVE WRAP (10 / CASE): Brand: DE MAYO KNEE POSITIONER®